# Patient Record
Sex: FEMALE | Race: WHITE | NOT HISPANIC OR LATINO | Employment: OTHER | URBAN - METROPOLITAN AREA
[De-identification: names, ages, dates, MRNs, and addresses within clinical notes are randomized per-mention and may not be internally consistent; named-entity substitution may affect disease eponyms.]

---

## 2017-01-02 ENCOUNTER — NURSE TRIAGE (OUTPATIENT)
Dept: ADMINISTRATIVE | Facility: CLINIC | Age: 82
End: 2017-01-02

## 2017-01-02 NOTE — TELEPHONE ENCOUNTER
"    Reason for Disposition   Pain in the big toe joint    Answer Assessment - Initial Assessment Questions  1. ONSET: "When did the pain start?"       Yesterday    2. LOCATION: "Where is the pain located?"       Right Foot (Toe)  3. PAIN: "How bad is the pain?"    (Scale 1-10; or mild, moderate, severe)    -  MILD (1-3): doesn't interfere with normal activities     -  MODERATE (4-7): interferes with normal activities (e.g., work or school) or awakens from sleep, limping     -  SEVERE (8-10): excruciating pain, unable to do any normal activities, unable to walk      4-5/10 on pain scale  4. WORK OR EXERCISE: "Has there been any recent work or exercise that involved this part of the body?"       No  5. CAUSE: "What do you think is causing the foot pain?"      Gout  6. OTHER SYMPTOMS: "Do you have any other symptoms?" (e.g., leg pain, rash, fever, numbness)      No  7. PREGNANCY: "Is there any chance you are pregnant?" "When was your last menstrual period?"      N/A    Protocols used: ST FOOT PAIN-A-      Patient has an appointment with MD tomorrow.    "

## 2017-01-03 ENCOUNTER — PATIENT OUTREACH (OUTPATIENT)
Dept: ADMINISTRATIVE | Facility: CLINIC | Age: 82
End: 2017-01-03
Payer: MEDICARE

## 2017-01-03 ENCOUNTER — OFFICE VISIT (OUTPATIENT)
Dept: RHEUMATOLOGY | Facility: CLINIC | Age: 82
End: 2017-01-03
Payer: MEDICARE

## 2017-01-03 ENCOUNTER — LAB VISIT (OUTPATIENT)
Dept: LAB | Facility: HOSPITAL | Age: 82
End: 2017-01-03
Attending: INTERNAL MEDICINE
Payer: MEDICARE

## 2017-01-03 VITALS
HEIGHT: 62 IN | SYSTOLIC BLOOD PRESSURE: 135 MMHG | DIASTOLIC BLOOD PRESSURE: 57 MMHG | BODY MASS INDEX: 20.77 KG/M2 | WEIGHT: 112.88 LBS | HEART RATE: 70 BPM

## 2017-01-03 DIAGNOSIS — M10.371 ACUTE GOUT DUE TO RENAL IMPAIRMENT INVOLVING TOE OF RIGHT FOOT: ICD-10-CM

## 2017-01-03 DIAGNOSIS — M1A.9XX1 TOPHACEOUS GOUT: Primary | ICD-10-CM

## 2017-01-03 DIAGNOSIS — N18.30 CKD (CHRONIC KIDNEY DISEASE) STAGE 3, GFR 30-59 ML/MIN: ICD-10-CM

## 2017-01-03 DIAGNOSIS — M1A.9XX1 TOPHACEOUS GOUT: ICD-10-CM

## 2017-01-03 DIAGNOSIS — M81.0 OSTEOPOROSIS, SENILE: ICD-10-CM

## 2017-01-03 DIAGNOSIS — I50.42 CHRONIC COMBINED SYSTOLIC AND DIASTOLIC CONGESTIVE HEART FAILURE: ICD-10-CM

## 2017-01-03 LAB
ALBUMIN SERPL BCP-MCNC: 3.9 G/DL
ALP SERPL-CCNC: 63 U/L
ALT SERPL W/O P-5'-P-CCNC: 35 U/L
ANION GAP SERPL CALC-SCNC: 13 MMOL/L
AST SERPL-CCNC: 28 U/L
BASOPHILS # BLD AUTO: 0.05 K/UL
BASOPHILS NFR BLD: 0.4 %
BILIRUB SERPL-MCNC: 0.7 MG/DL
BUN SERPL-MCNC: 31 MG/DL
CALCIUM SERPL-MCNC: 10.3 MG/DL
CHLORIDE SERPL-SCNC: 95 MMOL/L
CO2 SERPL-SCNC: 33 MMOL/L
CREAT SERPL-MCNC: 1.3 MG/DL
DIFFERENTIAL METHOD: ABNORMAL
EOSINOPHIL # BLD AUTO: 0 K/UL
EOSINOPHIL NFR BLD: 0.2 %
ERYTHROCYTE [DISTWIDTH] IN BLOOD BY AUTOMATED COUNT: 13.6 %
EST. GFR  (AFRICAN AMERICAN): 44 ML/MIN/1.73 M^2
EST. GFR  (NON AFRICAN AMERICAN): 38 ML/MIN/1.73 M^2
GLUCOSE SERPL-MCNC: 109 MG/DL
HCT VFR BLD AUTO: 41 %
HGB BLD-MCNC: 13.2 G/DL
LYMPHOCYTES # BLD AUTO: 1.8 K/UL
LYMPHOCYTES NFR BLD: 15.4 %
MCH RBC QN AUTO: 32.9 PG
MCHC RBC AUTO-ENTMCNC: 32.2 %
MCV RBC AUTO: 102 FL
MONOCYTES # BLD AUTO: 1.3 K/UL
MONOCYTES NFR BLD: 11.6 %
NEUTROPHILS # BLD AUTO: 8.3 K/UL
NEUTROPHILS NFR BLD: 73.4 %
PLATELET # BLD AUTO: 216 K/UL
PLATELET BLD QL SMEAR: ABNORMAL
PMV BLD AUTO: 9.9 FL
POTASSIUM SERPL-SCNC: 5.1 MMOL/L
PROT SERPL-MCNC: 7.4 G/DL
RBC # BLD AUTO: 4.01 M/UL
SODIUM SERPL-SCNC: 141 MMOL/L
URATE SERPL-MCNC: 11.1 MG/DL
WBC # BLD AUTO: 11.46 K/UL

## 2017-01-03 PROCEDURE — 80053 COMPREHEN METABOLIC PANEL: CPT | Mod: PO

## 2017-01-03 PROCEDURE — 99215 OFFICE O/P EST HI 40 MIN: CPT | Mod: S$PBB,,, | Performed by: INTERNAL MEDICINE

## 2017-01-03 PROCEDURE — 84550 ASSAY OF BLOOD/URIC ACID: CPT | Mod: PO

## 2017-01-03 PROCEDURE — 99999 PR PBB SHADOW E&M-EST. PATIENT-LVL III: CPT | Mod: PBBFAC,,, | Performed by: INTERNAL MEDICINE

## 2017-01-03 PROCEDURE — 36415 COLL VENOUS BLD VENIPUNCTURE: CPT | Mod: PO

## 2017-01-03 PROCEDURE — 85025 COMPLETE CBC W/AUTO DIFF WBC: CPT | Mod: PO

## 2017-01-03 RX ORDER — METHYLPREDNISOLONE 4 MG/1
TABLET ORAL
Qty: 1 PACKAGE | Refills: 1 | Status: SHIPPED | OUTPATIENT
Start: 2017-01-03 | End: 2017-01-09 | Stop reason: ALTCHOICE

## 2017-01-03 RX ORDER — BETAMETHASONE SODIUM PHOSPHATE AND BETAMETHASONE ACETATE 3; 3 MG/ML; MG/ML
6 INJECTION, SUSPENSION INTRA-ARTICULAR; INTRALESIONAL; INTRAMUSCULAR; SOFT TISSUE
Status: COMPLETED | OUTPATIENT
Start: 2017-01-03 | End: 2017-01-03

## 2017-01-03 RX ORDER — COLCHICINE 0.6 MG/1
0.6 TABLET ORAL DAILY
Qty: 30 TABLET | Refills: 3 | Status: SHIPPED | OUTPATIENT
Start: 2017-01-03 | End: 2017-05-19 | Stop reason: SDUPTHER

## 2017-01-03 RX ORDER — ALLOPURINOL 100 MG/1
TABLET ORAL
Qty: 30 TABLET | Refills: 1 | Status: SHIPPED | OUTPATIENT
Start: 2017-01-03 | End: 2017-05-19

## 2017-01-03 RX ADMIN — BETAMETHASONE ACETATE AND BETAMETHASONE SODIUM PHOSPHATE 6 MG: 3; 3 INJECTION, SUSPENSION INTRA-ARTICULAR; INTRALESIONAL; INTRAMUSCULAR; SOFT TISSUE at 11:01

## 2017-01-03 NOTE — PROGRESS NOTES
Administered 1cc Betamethasone 6mg/cc to  RUOQ GluteaL. Pt. Tolerated well. No acute reaction noted to site. Pt. Instructed on S/S to report. Pt. Verbalized understanding.    Lot: [id number]  Exp: 02/18

## 2017-01-03 NOTE — PATIENT INSTRUCTIONS
Gout    Gout or is an inflammation of a joint due to a build-up of gout crystals in the joint fluid. This occurs when there is an excess of uric acid (a normal waste product) in the body. Uric acid builds up in the body when the kidneys are unable to filter enough of it from the blood. This may occur with age. It is also associated with kidney disease. Gout occurs more often in persons with obesity, diabetes, hypertension, or high levels of fats in the blood. It may be run in families. Gout tends to come and go. A flare up of gout is called an attack. Drinking alcohol or eating certain foods (such as shellfish or foods with additives such as high-fructose corn syrup) may increase uric acid levels in the blood and cause a gout attack.  During a gout attack, the affected joint may become a hot, red, swollen and painful. If you have had one attack of gout, you are likely to have another. An attack of gout can be treated with medicine. If these attacks become frequent, a daily medicine may be prescribed to help the kidneys remove uric acid from the body.  Home care  During a gout attack:  · Rest painful joints. If gout affects the joints of your foot or leg, you may want to use crutches for the first few days to keep from bearing weight on the affected joint.  · When sitting or lying down, raise the painful joint to a level higher than your heart.  · Apply an ice pack (ice cubes in a plastic bag wrapped in a thin towel) over the injured area for 20 minutes every 1-2 hours the first day for pain relief. Continue this 3-4 times a day for swelling and pain.  · Avoid alcohol and foods listed below (see Preventing attacks) during a gout attack. Drink extra fluid to help flush the uric acid through your kidneys.  · If you were prescribed a medication to treat gout, take it as your healthcare provider has instructed. Don't skip doses.  · Take anti-inflammatory medicine as directed.   · If pain medicines have been prescribed,  take them exactly as directed.    Preventing attacks  · Minimize or avoid alcohol use. Excess alcohol intake can cause a gout attack.  · Limit these foods and beverages:  ¨ Organ meats, such as kidneys and liver  ¨ Certain seafoods (anchovies, sardines, shrimp, scallops, herring, mackerel)  ¨ Wild game, meat extracts and meat gravies  ¨ Foods and beverages sweetened with high-fructose corn syrup, such as sodas  · Eat a healthy diet including low-fat and nonfat dairy, whole grains, and vegetables.  · If you are overweight, talk to your healthcare provider about a weight reduction plan. Avoid fasting or extreme low calorie diets (less than 900 calories per day). This will increase uric acid levels in the body.  · If you have diabetes or high blood pressure, work with your doctor to manage these conditions.  · Protect the joint from injury. Trauma can trigger a gout attack.  Follow-up care  Follow up with your healthcare provider or as advised.   When to seek medical advice  Call your healthcare provider if you have any of the following:  · Fever over 100.4°F (38.ºC) with worsening joint pain  · Increasing redness around the joint  · Pain developing in another joint  · Repeated vomiting, abdominal pain, or blood in the vomit or stool (black or red color)  © 3147-6238 [employer]. [address] [place] [address], [place] [address]. All rights reserved. This information is not intended as a substitute for professional medical care. Always follow your healthcare professional's instructions.          Left- or Right- Side Congestive Heart Failure    The heart is a large muscle. It is a pump that circulates blood throughout the body. Blood carries oxygen to all of the organs, including the brain, muscles, and skin. After your body takes the oxygen out of the blood, the blood returns to the heart. The right side of the heart collects the blood from the body and pumps it to the lungs. In the lungs, it gets fresh oxygen  and gives up carbon dioxide. The oxygen-rich blood from the lungs then returns to the left side of the heart, where it is pumped back out to the rest of your body, starting the process all over.  Congestive heart failure (CHF) occurs when the heart muscle is weakened. This affects the pumping action of the heart. Heart failure can affect the right side of the heart or the left side. But heart failure may affect not only the right side of the heart or only the left side. Although it may have started on one side, it can and often eventually does affect both sides.  Right-side heart failure  When the right side of the heart is weakened, it cant handle the blood it is getting from the rest of the body. This blood returns to the heart through veins. When too much pressure builds up in the veins, fluid leaks out into the tissues. Gravity then causes that fluid to move to those parts of the body that are the lowest. So one of the first symptoms of right-side CHF can include swelling in the feet and ankles. If the condition gets worse, the swelling can even go up past the knees. Sometimes it gets so severe, the liver can get congested as well.  Left-side heart failure  When the left side of the heart is weakened, it cant handle the blood it gets from the lungs. Pressure then builds up in the veins of the lungs, causing fluid to leak into the lung tissues. This may cause CHF and pulmonary edema. This causes you to feel short of breath, weak, or dizzy. These symptoms are often worse with exertion, such as when climbing stairs or walking up hills. Lying with your head flat is uncomfortable and can make your breathing worse. This may make sleeping difficult. You may need to use extra pillows to elevate your upper body to sleep well. The same is true when just resting during the daytime.  There are many causes of heart failure including:  · Coronary artery disease  · Past heart attack (also known as acute myocardial  infarction, or AMI)  · High blood pressure  · Damaged heart valve  · Diabetes  · Obesity  · Cigarette smoking  · Alcohol abuse  Heart failure is a chronic condition. There is no cure. The purpose of medical treatment is to improve the pumping action of the heart. The main way to do this is to remove excess water from the body. A number of medicines can help reach this goal, improve symptoms, and prevent the heart from becoming weaker. Sometimes, heart failure can become so severe that a device is placed in the heart to help with pumping. Another major goal is to better treat the causes of heart failure, such as diabetes and high blood pressure, by making changes in your lifestyle and maximizing medical control when needed.  Home care  Follow these guidelines when caring for yourself at home:  · Check your weight every day. This is very important because a sudden increase in weight gain could mean worsening heart failure. Keep these things in mind:  ¨ Use the same scale every day.  ¨ Weigh yourself at the same time every day.  ¨ Make sure the scale is on a hard floor surface, not on a rug or carpet.  ¨ Keep a record of your weight every day so your healthcare provider can see it. If you are not given a log sheet for this, keep a separate journal for this purpose.   · Cut back on the amount of salt (sodium) you eat. Follow your healthcare provider's recommendation on how much salt or sodium you should have each day.  ¨ Avoid high-salt foods. These include olives, pickles, smoked meats, salted potato chips, and most prepared foods.  ¨ Don't add salt to your food at the table. Use only small amounts of salt when cooking.  ¨ Read the labels carefully on food packages to learn how much salt or sodium is in each serving in the package. Remember, a can or package of food may contain more than 1 serving. So if you eat all the food in the package, you may be getting more salt than you think.  · Follow your healthcare  provider's recommendations about how much fluid you should have. Be aware that some foods, such as soup, pudding, and juicy fruits like oranges or melons, contain liquid. You'll need to count the liquid in those foods as part of your daily fluid intake. Your provider can help you with this.  · Stop smoking.  · Cut back on how much alcohol you drink.  · Lose weight if you are overweight. The excess weight adds a lot of stress on the workload of the heart.  · Stay active. Talk with your provider about an exercise program that is safe for your heart.  · Keep your feet elevated to reduce swelling. Ask your provider about support hose as a preventive treatment for daytime leg swelling.  Besides taking your medicine as instructed, an important part of treatment is lifestyle changes. These include diet, physical activity, stopping smoking, and weight control.  Improve your diet by including more fresh foods, cutting back on how much sugar and saturated fat you eat, and eating fewer processed foods and less salt.  Follow-up care  Follow up with your healthcare provider, or as advised.  Make sure to keep any appointments that were made for you. These can help better control your congestive heart failure. You will need to follow up with your provider on a routine basis to make sure your heart failure is well managed.  If an X-ray, ECG, or other tests were done, you will be told of any new findings that may affect your care.  Call 911  Call 911 if you:  · Become severely short of breath  · Feel lightheaded, or feel like you might pass out or faint  · Have chest pain or discomfort that is different than usual, the medicines your doctor told you to use for this don't help, or the pain lasts longer than 10 to 15 minutes  · You suddenly develop a rapid heart rate  When to seek medical advice  The following may be signs that your heart failure is getting worse. Call your healthcare provider right away if any of these  happen:  · Sudden weight gain. This means 3 or more pounds in one day, or 5 or more pounds in 1 week.  · Trouble breathing not related to being active  · New or increased swelling of your legs or ankles  · Swelling or pain in your abdomen  · Breathing trouble at night. This means waking up short of breath or needing more pillows to breathe.  · Frequent coughing that doesnt go away  · Feeling much more tired than usual  © 5396-3925 [employer]. [address] [place] [address], [place] [address]. All rights reserved. This information is not intended as a substitute for professional medical care. Always follow your healthcare professional's instructions.

## 2017-01-03 NOTE — MR AVS SNAPSHOT
[facility]  [address] [facility] [address] [place] [address]  Phone: [phone]  Fax: [phone]                  Jennifer Mathews   1/3/2017 10:00 AM   Office Visit    Description:  Female : 1932   Provider:  Halina Hernandez MD   Department:  [facility] Rheumatology           Reason for Visit     Osteoporosis     Osteoarthritis           Diagnoses this Visit        Comments    Tophaceous gout    -  Primary     Acute gout due to renal impairment involving toe of right foot         CKD (chronic kidney disease) stage 3, GFR 30-59 ml/min         Chronic combined systolic and diastolic congestive heart failure         Osteoporosis, senile                To Do List           Future Appointments        Provider Department Dept Phone    1/3/2017 1:20 PM LABORATORY, [facility] [phone]    2017 11:00 AM RAUL Gupta [facility] Card[facility] [phone]    2017 11:40 AM Desirae Bello MD [facility] Internal Medicine [phone]    2017 10:15 AM William WaldronD [facility] - Coumadin [phone]    2017 8:00 AM HOME MONITOR DEVICE CHECK, [facility] Arrhythmia [phone]      Goals (5 Years of Data)     None       These Medications        Disp Refills Start End    allopurinol (ZYLOPRIM) 100 MG tablet 30 tablet 1 1/3/2017     Take 1/2 tablet daily until otherwise instructed    Pharmacy: [id number] - [place], [place] - [address] [facility] Ph #: [phone]       colchicine 0.6 mg tablet 30 tablet 3 1/3/2017 1/3/2018    Take 1 tablet (0.6 mg total) by mouth once daily. - Oral    Pharmacy: [id number] - [place], [place] - [address] [facility] Ph #: [phone]       methylPREDNISolone (MEDROL DOSEPACK) 4 mg tablet 1 Package 1 1/3/2017 2017    use as directed    Pharmacy: [id number] - [place], [place] - [address] [facility] Ph #: [phone]         [facility]     [facility] Care Line -   Assistance  Registered nurses in the [facility] provide clinical advisement, health education, appointment booking, and other advisory services.  Call for this free service at [phone].             Medications           Message regarding Medications     Verify the changes and/or additions to your medication regime listed below are the same as discussed with your clinician today.  If any of these changes or additions are incorrect, please notify your healthcare provider.        START taking these NEW medications        Refills    allopurinol (ZYLOPRIM) 100 MG tablet 1    Sig: Take 1/2 tablet daily until otherwise instructed    Class: Normal    colchicine 0.6 mg tablet 3    Sig: Take 1 tablet (0.6 mg total) by mouth once daily.    Class: Normal    Route: Oral    methylPREDNISolone (MEDROL DOSEPACK) 4 mg tablet 1    Sig: use as directed    Class: Normal      These medications were administered today        Dose Freq    betamethasone acetate-betamethasone sodium phosphate injection 6 mg 6 mg [facility] 1 time    Sig: Inject 1 mL (6 mg total) into the muscle one time.    Class: Normal    Route: Intramuscular      STOP taking these medications     levoFLOXacin (LEVAQUIN) 500 MG tablet Take 1 tablet (500 mg total) by mouth once daily.           Verify that the below list of medications is an accurate representation of the medications you are currently taking.  If none reported, the list may be blank. If incorrect, please contact your healthcare provider. Carry this list with you in case of emergency.           Current Medications     acetaminophen (TYLENOL EXTRA STRENGTH) 325 MG tablet Take 2 tablets (650 mg total) by mouth every 8 (eight) hours.    amiodarone (PACERONE) 200 MG Tab Take 1 tablet (200 mg total) by mouth once daily.    aspirin (ECOTRIN) 81 MG EC tablet Take 81 mg by mouth once daily.    carvedilol (COREG) 25 MG tablet Take 1 tablet (25 mg total) by mouth 2 (two) times daily with meals.     "cranberry 1,000 mg Cap Take 1 capsule by mouth Daily.    digoxin (LANOXIN) 125 mcg tablet TAKE 1 TABLET (0.125 MG TOTAL) BY MOUTH ONCE DAILY.    diphenhydrAMINE (BENADRYL) 25 mg capsule Take 25 mg by mouth every 6 (six) hours as needed for Itching.    furosemide (LASIX) 40 MG tablet Take 1 tablet (40 mg total) by mouth 2 (two) times daily. 8 am and 2 pm    gabapentin (NEURONTIN) 100 MG capsule TAKE ONE CAPSULE BY MOUTH TWO TIMES DAILY    MULTIVITAMIN ORAL Take 1 tablet by mouth Daily.    ondansetron (ZOFRAN-ODT) 8 MG TbDL Take 1 tablet (8 mg total) by mouth 3 (three) times daily.    PENNSAID 20 mg/gram /actuation(2 %) sopm Apply 40 mg topically 2 (two) times daily.    tramadol (ULTRAM) 50 mg tablet Take 1 tablet (50 mg total) by mouth 3 (three) times daily as needed.    valsartan (DIOVAN) 80 MG tablet TAKE TWO TABLETS BY MOUTH TWICE DAILY    warfarin (COUMADIN) 5 MG tablet Take  1/2 (2.5 mg) every evening  as directed by the [facility] Clinic.    allopurinol (ZYLOPRIM) 100 MG tablet Take 1/2 tablet daily until otherwise instructed    colchicine 0.6 mg tablet Take 1 tablet (0.6 mg total) by mouth once daily.    methylPREDNISolone (MEDROL DOSEPACK) 4 mg tablet use as directed           Clinical Reference Information           Vital Signs - Last Recorded  Most recent update: 1/3/2017 10:08 AM by Izabel Rouse MA    BP Pulse Ht Wt BMI    (!) 135/57 70 5' 2" (1.575 m) 51.2 kg (112 lb 14 oz) 20.65 kg/m2      Blood Pressure          Most Recent Value    BP  (!)  135/57      Allergies as of 1/3/2017     Cephalosporins    Metaxalone    Penicillins    Pregabalin    Sulfa (Sulfonamide Antibiotics)      Immunizations Administered on Date of Encounter - 1/3/2017     None      Orders Placed During Today's Visit     Recurring Lab Work Interval Expires    CBC auto differential   1/3/2021    Comprehensive metabolic panel   1/3/2021    Uric acid  Every 4 Weeks until 3/4/2018 3/4/2018      Instructions    1. Labwork today to get " baseline kidney and liver function and uric acid  2. Steroid shot today for the gout flare and then start colchicine 0.6mg daily and take it  3. Once your attack resolves completely you can start the allopurinol 50mg (1/2 tablet) daily. If you notice any rash stop immediately and let me know. Otherwise, please call our office when you start the medicine so we know when to schedule the bloodwork.   Allopurinol Oral tablet  What is this medicine?  ALLOPURINOL (al oh PURE i nole) reduces the amount of uric acid the body makes. It is used to treat the symptoms of gout. It is also used to treat or prevent high uric acid levels that occur as a result of certain types of chemotherapy. This medicine may also help patients who frequently have kidney stones.  This medicine may be used for other purposes; ask your health care provider or pharmacist if you have questions.  What should I tell my health care provider before I take this medicine?  They need to know if you have any of these conditions:  · kidney or liver disease  · an unusual or allergic reaction to allopurinol, other medicines, foods, dyes, or preservatives  · pregnant or trying to get pregnant  · breast feeding  How should I use this medicine?  Take this medicine by mouth with a glass of water. Follow the directions on the prescription label. If this medicine upsets your stomach, take it with food or milk. Take your doses at regular intervals. Do not take your medicine more often than directed.  Talk to your pediatrician regarding the use of this medicine in children. Special care may be needed. While this drug may be prescribed for children as young as 6 years for selected conditions, precautions do apply.  Overdosage: If you think you have taken too much of this medicine contact a poison control center or emergency room at once.  NOTE: This medicine is only for you. Do not share this medicine with others.  What if I miss a dose?  If you miss a dose, take it as  soon as you can. If it is almost time for your next dose, take only that dose. Do not take double or extra doses.  What may interact with this medicine?  Do not take this medicine with the following medication:  · didanosine, ddI  This medicine may also interact with the following medications:  · amoxicillin or ampicillin  · azathioprine  · certain medicines used to treat gout  · certain types of diuretics  · chlorpropamide  · cyclosporine  · dicumarol  · mercaptopurine  · tolbutamide  · warfarin  This list may not describe all possible interactions. Give your health care provider a list of all the medicines, herbs, non-prescription drugs, or dietary supplements you use. Also tell them if you smoke, drink alcohol, or use illegal drugs. Some items may interact with your medicine.  What should I watch for while using this medicine?  Visit your doctor or health care professional for regular checks on your progress. If you are taking this medicine to treat gout, you may not have less frequent attacks at first. Keep taking your medicine regularly and the attacks should get better within 2 to 6 weeks. Drink plenty of water (10 to 12 full glasses a day) while you are taking this medicine. This will help to reduce stomach upset and reduce the risk of getting gout or kidney stones.  Call your doctor or health care professional at once if you get a skin rash together with chills, fever, sore throat, or nausea and vomiting, if you have blood in your urine, or difficulty passing urine.  Do not take vitamin C without asking your doctor or health care professional. Too much vitamin C can increase the chance of getting kidney stones.  You may get drowsy or dizzy. Do not drive, use machinery, or do anything that needs mental alertness until you know how this drug affects you. Do not stand or sit up quickly, especially if you are an older patient. This reduces the risk of dizzy or fainting spells. Alcohol can make you more drowsy  and dizzy. Alcohol can also increase the chance of stomach problems and increase the amount of uric acid in your blood. Avoid alcoholic drinks.  What side effects may I notice from receiving this medicine?  Side effects that you should report to your doctor or health care professional as soon as possible:  · allergic reactions like skin rash, itching or hives, swelling of the face, lips, or tongue  · breathing problems  · muscle aches or pains  · redness, blistering, peeling or loosening of the skin, including inside the mouth  Side effects that usually do not require medical attention (report to your doctor or health care professional if they continue or are bothersome):  · changes in taste  · diarrhea  · indigestion  · stomach pain or cramps  This list may not describe all possible side effects. Call your doctor for medical advice about side effects. You may report side effects to FDA at 1-197-FDA-1766.  Where should I keep my medicine?  Keep out of the reach of children.  Store at room temperature between 15 and 25 degrees C (59 and 77 degrees F). Protect from light and moisture. Throw away any unused medicine after the expiration date.  NOTE:This sheet is a summary. It may not cover all possible information. If you have questions about this medicine, talk to your doctor, pharmacist, or health care provider. Copyright© 2016 [employer]      Colchicine Oral tablet  What is this medicine?  COLCHICINE (KOL chi seen) is for joint pain and swelling due to attacks of acute gouty arthritis. The medicine is also used to treat familial Mediterranean fever.  This medicine may be used for other purposes; ask your health care provider or pharmacist if you have questions.  What should I tell my health care provider before I take this medicine?  They need to know if you have any of these conditions:  · anemia  · blood disorders like leukemia or lymphoma  · heart disease  · immune system problems  · intestinal  disease  · kidney disease  · liver disease  · muscle pain or weakness  · take other medicines  · stomach problems  · an unusual or allergic reaction to colchicine, other medicines, lactose, foods, dyes, or preservatives  · pregnant or trying to get pregnant  · breast-feeding  How should I use this medicine?  Take this medicine by mouth with a full glass of water. Follow the directions on the prescription label. You can take it with or without food. If it upsets your stomach, take it with food. Take your medicine at regular intervals. Do not take your medicine more often than directed.  A special MedGuide will be given to you by the pharmacist with each prescription and refill. Be sure to read this information carefully each time.  Talk to your pediatrician regarding the use of this medicine in children. While this drug may be prescribed for children as young as 4 years old for selected conditions, precautions do apply.  Patients over 65 years old may have a stronger reaction and need a smaller dose.  Overdosage: If you think you have taken too much of this medicine contact a poison control center or emergency room at once.  NOTE: This medicine is only for you. Do not share this medicine with others.  What if I miss a dose?  If you miss a dose, take it as soon as you can. If it is almost time for your next dose, take only that dose. Do not take double or extra doses.  What may interact with this medicine?  Do not take this medicine with any of the following medications:  · certain medicines for fungal infections like itraconazole  This medicine may also interact with the following medications:  · alcohol  · certain medicines for cholesterol like atorvastatin  · certain medicines for coughs and colds  · certain medicines to help you breathe better  · cyclosporine  · digoxin  · epinephrine  · grapefruit or grapefruit juice  · methenamine  · other medicines for fungal infection  · sodium bicarbonate  · some antibiotics  like clarithromycin, erythromycin, and telithromycin  · some medicines for an irregular heartbeat or other heart problems  · some medicines for cancer, like lapatinib and tamoxifen  · some medicines for HIV  This list may not describe all possible interactions. Give your health care provider a list of all the medicines, herbs, non-prescription drugs, or dietary supplements you use. Also tell them if you smoke, drink alcohol, or use illegal drugs. Some items may interact with your medicine.  What should I watch for while using this medicine?  Visit your doctor or health care professional for regular checks on your progress. You may need periodic blood checks.  Alcohol can increase the chance of getting stomach problems and gout attacks. Do not drink alcohol.  What side effects may I notice from receiving this medicine?  Side effects that you should report to your doctor or health care professional as soon as possible:  · allergic reactions like skin rash, itching or hives, swelling of the face, lips, or tongue  · fever, chills, or sore throat  · muscle tenderness, pain, or weakness  · numbness or tingling in hands or feet  · unusual bleeding or bruising  · unusually weak or tired  · vomiting  Side effects that usually do not require medical attention (report to your doctor or health care professional if they continue or are bothersome):  · diarrhea  · hair loss  · loss of appetite  · stomach pain or nausea  This list may not describe all possible side effects. Call your doctor for medical advice about side effects. You may report side effects to FDA at 9-957-FDA-9930.  Where should I keep my medicine?  Keep out of the reach of children.  Store at room temperature between 15 and 30 degrees C (59 and 86 degrees F). Keep container tightly closed. Protect from light. Throw away any unused medicine after the expiration date.  NOTE:This sheet is a summary. It may not cover all possible information. If you have questions  about this medicine, talk to your doctor, pharmacist, or health care provider. Copyright© 2016 [employer]

## 2017-01-03 NOTE — PATIENT INSTRUCTIONS
1. Labwork today to get baseline kidney and liver function and uric acid  2. Steroid shot today for the gout flare and then start colchicine 0.6mg daily and take it  3. Once your attack resolves completely you can start the allopurinol 50mg (1/2 tablet) daily. If you notice any rash stop immediately and let me know. Otherwise, please call our office when you start the medicine so we know when to schedule the bloodwork.   Allopurinol Oral tablet  What is this medicine?  ALLOPURINOL (al oh PURE i nole) reduces the amount of uric acid the body makes. It is used to treat the symptoms of gout. It is also used to treat or prevent high uric acid levels that occur as a result of certain types of chemotherapy. This medicine may also help patients who frequently have kidney stones.  This medicine may be used for other purposes; ask your health care provider or pharmacist if you have questions.  What should I tell my health care provider before I take this medicine?  They need to know if you have any of these conditions:  · kidney or liver disease  · an unusual or allergic reaction to allopurinol, other medicines, foods, dyes, or preservatives  · pregnant or trying to get pregnant  · breast feeding  How should I use this medicine?  Take this medicine by mouth with a glass of water. Follow the directions on the prescription label. If this medicine upsets your stomach, take it with food or milk. Take your doses at regular intervals. Do not take your medicine more often than directed.  Talk to your pediatrician regarding the use of this medicine in children. Special care may be needed. While this drug may be prescribed for children as young as 6 years for selected conditions, precautions do apply.  Overdosage: If you think you have taken too much of this medicine contact a poison control center or emergency room at once.  NOTE: This medicine is only for you. Do not share this medicine with others.  What if I miss a dose?  If you  miss a dose, take it as soon as you can. If it is almost time for your next dose, take only that dose. Do not take double or extra doses.  What may interact with this medicine?  Do not take this medicine with the following medication:  · didanosine, ddI  This medicine may also interact with the following medications:  · amoxicillin or ampicillin  · azathioprine  · certain medicines used to treat gout  · certain types of diuretics  · chlorpropamide  · cyclosporine  · dicumarol  · mercaptopurine  · tolbutamide  · warfarin  This list may not describe all possible interactions. Give your health care provider a list of all the medicines, herbs, non-prescription drugs, or dietary supplements you use. Also tell them if you smoke, drink alcohol, or use illegal drugs. Some items may interact with your medicine.  What should I watch for while using this medicine?  Visit your doctor or health care professional for regular checks on your progress. If you are taking this medicine to treat gout, you may not have less frequent attacks at first. Keep taking your medicine regularly and the attacks should get better within 2 to 6 weeks. Drink plenty of water (10 to 12 full glasses a day) while you are taking this medicine. This will help to reduce stomach upset and reduce the risk of getting gout or kidney stones.  Call your doctor or health care professional at once if you get a skin rash together with chills, fever, sore throat, or nausea and vomiting, if you have blood in your urine, or difficulty passing urine.  Do not take vitamin C without asking your doctor or health care professional. Too much vitamin C can increase the chance of getting kidney stones.  You may get drowsy or dizzy. Do not drive, use machinery, or do anything that needs mental alertness until you know how this drug affects you. Do not stand or sit up quickly, especially if you are an older patient. This reduces the risk of dizzy or fainting spells. Alcohol can  make you more drowsy and dizzy. Alcohol can also increase the chance of stomach problems and increase the amount of uric acid in your blood. Avoid alcoholic drinks.  What side effects may I notice from receiving this medicine?  Side effects that you should report to your doctor or health care professional as soon as possible:  · allergic reactions like skin rash, itching or hives, swelling of the face, lips, or tongue  · breathing problems  · muscle aches or pains  · redness, blistering, peeling or loosening of the skin, including inside the mouth  Side effects that usually do not require medical attention (report to your doctor or health care professional if they continue or are bothersome):  · changes in taste  · diarrhea  · indigestion  · stomach pain or cramps  This list may not describe all possible side effects. Call your doctor for medical advice about side effects. You may report side effects to FDA at 1-049-FDA-9424.  Where should I keep my medicine?  Keep out of the reach of children.  Store at room temperature between 15 and 25 degrees C (59 and 77 degrees F). Protect from light and moisture. Throw away any unused medicine after the expiration date.  NOTE:This sheet is a summary. It may not cover all possible information. If you have questions about this medicine, talk to your doctor, pharmacist, or health care provider. Copyright© 2016 [employer] Standard      Colchicine Oral tablet  What is this medicine?  COLCHICINE (KOL chi seen) is for joint pain and swelling due to attacks of acute gouty arthritis. The medicine is also used to treat familial Mediterranean fever.  This medicine may be used for other purposes; ask your health care provider or pharmacist if you have questions.  What should I tell my health care provider before I take this medicine?  They need to know if you have any of these conditions:  · anemia  · blood disorders like leukemia or lymphoma  · heart disease  · immune system  problems  · intestinal disease  · kidney disease  · liver disease  · muscle pain or weakness  · take other medicines  · stomach problems  · an unusual or allergic reaction to colchicine, other medicines, lactose, foods, dyes, or preservatives  · pregnant or trying to get pregnant  · breast-feeding  How should I use this medicine?  Take this medicine by mouth with a full glass of water. Follow the directions on the prescription label. You can take it with or without food. If it upsets your stomach, take it with food. Take your medicine at regular intervals. Do not take your medicine more often than directed.  A special MedGuide will be given to you by the pharmacist with each prescription and refill. Be sure to read this information carefully each time.  Talk to your pediatrician regarding the use of this medicine in children. While this drug may be prescribed for children as young as 4 years old for selected conditions, precautions do apply.  Patients over 65 years old may have a stronger reaction and need a smaller dose.  Overdosage: If you think you have taken too much of this medicine contact a poison control center or emergency room at once.  NOTE: This medicine is only for you. Do not share this medicine with others.  What if I miss a dose?  If you miss a dose, take it as soon as you can. If it is almost time for your next dose, take only that dose. Do not take double or extra doses.  What may interact with this medicine?  Do not take this medicine with any of the following medications:  · certain medicines for fungal infections like itraconazole  This medicine may also interact with the following medications:  · alcohol  · certain medicines for cholesterol like atorvastatin  · certain medicines for coughs and colds  · certain medicines to help you breathe better  · cyclosporine  · digoxin  · epinephrine  · grapefruit or grapefruit juice  · methenamine  · other medicines for fungal infection  · sodium  bicarbonate  · some antibiotics like clarithromycin, erythromycin, and telithromycin  · some medicines for an irregular heartbeat or other heart problems  · some medicines for cancer, like lapatinib and tamoxifen  · some medicines for HIV  This list may not describe all possible interactions. Give your health care provider a list of all the medicines, herbs, non-prescription drugs, or dietary supplements you use. Also tell them if you smoke, drink alcohol, or use illegal drugs. Some items may interact with your medicine.  What should I watch for while using this medicine?  Visit your doctor or health care professional for regular checks on your progress. You may need periodic blood checks.  Alcohol can increase the chance of getting stomach problems and gout attacks. Do not drink alcohol.  What side effects may I notice from receiving this medicine?  Side effects that you should report to your doctor or health care professional as soon as possible:  · allergic reactions like skin rash, itching or hives, swelling of the face, lips, or tongue  · fever, chills, or sore throat  · muscle tenderness, pain, or weakness  · numbness or tingling in hands or feet  · unusual bleeding or bruising  · unusually weak or tired  · vomiting  Side effects that usually do not require medical attention (report to your doctor or health care professional if they continue or are bothersome):  · diarrhea  · hair loss  · loss of appetite  · stomach pain or nausea  This list may not describe all possible side effects. Call your doctor for medical advice about side effects. You may report side effects to FDA at 5-638-FDA-7513.  Where should I keep my medicine?  Keep out of the reach of children.  Store at room temperature between 15 and 30 degrees C (59 and 86 degrees F). Keep container tightly closed. Protect from light. Throw away any unused medicine after the expiration date.  NOTE:This sheet is a summary. It may not cover all possible  information. If you have questions about this medicine, talk to your doctor, pharmacist, or health care provider. Copyright© 2016 Gold Standard

## 2017-01-03 NOTE — PROGRESS NOTES
"Subjective:       Patient ID: Jennifer Mathews is a 84 y.o. female.    Chief Complaint: Osteoporosis and Osteoarthritis    HPI   Here for emergent follow-up after hospitalization recently with possible attack of gout. Previously she was seen 11/15 for osteoporosis therapy and treated with Prolia. She has hx of inflammatory arthritis with hx of foot swelling with possible gout- although didn't sound like it by history.   She does have hyperuricemia.   12/18 she was seen in urgent care and had a cough, fever, shortness of breath 100.5. She was sent to the ED and admitted for IV lasix, duonebs. She had UTI and was treated for pneumonia. Diagnosed with CHF exacerbation, pneumonia,     Other issues include AFIB on coumadin, CHF, HTN, LEV, CAD, HL, CKD, ANemia, CVA    Saturday she noticed AM Right foot pain over the 2nd toe. She has been putting ice on it. She takes tylenol two tylenols three times daily. Takes tramadol three times/day. No improvement.     This is her 3rd attack. Last attack was 6 month ago in the left foot 2nd, 3rd, and 4th toe. Before that she had attack in the right foot same exact presentation.     Review of Systems   Constitutional: Negative for chills and fever.   HENT: Positive for rhinorrhea and sinus pressure. Negative for sore throat.    Respiratory: Positive for cough (at her baseline, nonproductive). Negative for chest tightness and shortness of breath.    Musculoskeletal: Positive for arthralgias.        R 2nd, 3rd, 4th  toe large and swollen               Objective:     Visit Vitals    BP (!) 135/57    Pulse 70    Ht 5' 2" (1.575 m)    Wt 51.2 kg (112 lb 14 oz)    BMI 20.65 kg/m2        Physical Exam   Vitals reviewed.  Constitutional: She is oriented to person, place, and time and well-developed, well-nourished, and in no distress. No distress.   HENT:   Head: Normocephalic and atraumatic.   Right Ear: External ear normal.   Left Ear: External ear normal.   Eyes: Conjunctivae are " normal. Right eye exhibits no discharge. Left eye exhibits no discharge. No scleral icterus.   Neck: Neck supple.   Cardiovascular: Normal rate, regular rhythm and normal heart sounds.    LE varicosities     Pulmonary/Chest: Effort normal. No respiratory distress.   Abdominal: She exhibits no distension.   Neurological: She is alert and oriented to person, place, and time. No cranial nerve deficit. She exhibits normal muscle tone.   Skin: Skin is warm and dry. No rash noted. She is not diaphoretic. No erythema.     Psychiatric: Mood, memory, affect and judgment normal.   Musculoskeletal: Normal range of motion. She exhibits edema and tenderness. She exhibits no deformity.   Right foot with 2nd, 3rd, 4th toe swelling and redness     Left 2nd toe with nail fungal infection but also possible tophus          I personally viewed the xrays with my impressions below:  CXR with left costophernic angle blunting c/w pleural effusion, basilar infiltrate?     LABS     Ref. Range 4/11/2006 13:46 4/7/2014 05:00 3/10/2015 11:35 5/16/2016 10:50 6/14/2016 11:25 6/29/2016 01:35   Uric Acid Latest Ref Range: 2.4 - 5.7 mg/dL 5.2 7.1 (H) 9.7 (H) 9.7 (H) 6.6 (H) 7.9 (H)     Uric acid today is 11  Results for VINICIUS STORM (MRN [id number]) as of 1/3/2017 13:48   Ref. Range 1/3/2017 11:19   WBC Latest Ref Range: 3.90 - 12.70 K/uL 11.46   RBC Latest Ref Range: 4.00 - 5.40 M/uL 4.01   Hemoglobin Latest Ref Range: 12.0 - 16.0 g/dL 13.2   Hematocrit Latest Ref Range: 37.0 - 48.5 % 41.0   MCV Latest Ref Range: 82 - 98 fL 102 (H)   MCH Latest Ref Range: 27.0 - 31.0 pg 32.9 (H)   MCHC Latest Ref Range: 32.0 - 36.0 % 32.2   RDW Latest Ref Range: 11.5 - 14.5 % 13.6   Platelets Latest Ref Range: 150 - 350 K/uL 216   MPV Latest Ref Range: 9.2 - 12.9 fL 9.9   Gran% Latest Ref Range: 38.0 - 73.0 % 73.4 (H)   Gran # Latest Ref Range: 1.8 - 7.7 K/uL 8.3 (H)   Lymph% Latest Ref Range: 18.0 - 48.0 % 15.4 (L)   Lymph # Latest Ref Range: 1.0 - 4.8 K/uL 1.8    Mono% Latest Ref Range: 4.0 - 15.0 % 11.6   Mono # Latest Ref Range: 0.3 - 1.0 K/uL 1.3 (H)   Eosinophil% Latest Ref Range: 0.0 - 8.0 % 0.2   Eos # Latest Ref Range: 0.0 - 0.5 K/uL 0.0   Basophil% Latest Ref Range: 0.0 - 1.9 % 0.4   Baso # Latest Ref Range: 0.00 - 0.20 K/uL 0.05   Platelet Estimate Unknown Appears normal   Sodium Latest Ref Range: 136 - 145 mmol/L 141   Potassium Latest Ref Range: 3.5 - 5.1 mmol/L 5.1   Chloride Latest Ref Range: 95 - 110 mmol/L 95   CO2 Latest Ref Range: 23 - 29 mmol/L 33 (H)   Anion Gap Latest Ref Range: 8 - 16 mmol/L 13   BUN, Bld Latest Ref Range: 8 - 23 mg/dL 31 (H)   Creatinine Latest Ref Range: 0.5 - 1.4 mg/dL 1.3   eGFR if non African American Latest Ref Range: >60 mL/min/1.73 m^2 38 (A)   eGFR if African American Latest Ref Range: >60 mL/min/1.73 m^2 44 (A)   Glucose Latest Ref Range: 70 - 110 mg/dL 109   Calcium Latest Ref Range: 8.7 - 10.5 mg/dL 10.3   Alkaline Phosphatase Latest Ref Range: 55 - 135 U/L 63   Total Protein Latest Ref Range: 6.0 - 8.4 g/dL 7.4   Albumin Latest Ref Range: 3.5 - 5.2 g/dL 3.9   Uric Acid Latest Ref Range: 2.4 - 5.7 mg/dL 11.1 (H)   Total Bilirubin Latest Ref Range: 0.1 - 1.0 mg/dL 0.7   AST Latest Ref Range: 10 - 40 U/L 28   ALT Latest Ref Range: 10 - 44 U/L 35     Assessment:       1. Tophaceous gout    2. Acute gout due to renal impairment involving toe of right foot    3. CKD (chronic kidney disease) stage 3, GFR 30-59 ml/min    4. Chronic combined systolic and diastolic congestive heart failure    5. Osteoporosis, senile        She meets clinical diagnosis of gout based on the ACR Gout classification criteria (10 points). This is not counting her possible tophus on the left 2nd toe. She has had 3 attacks now over the last 1.5 years and she has evidence of CKD stage 3 indicating need to start uric acid lowering therapy.     Gout- acutely exacerbated in right 2nd, 3rd, 4th toe. Likely caused by CHF exacerbation and increased diuretic use.      CKD- need to start low and uptitrate allopurinol slowly  Adjust Colchicine based on kidney function as well     CHF- on diuretics, amniodarone, digoxin. Digoxin changes concentrations of protein bound medicines.     OP- due for Prolia next visit 5/17/2016    Plan:   Cmp, cbc    Start allopurinol 50mg daily when flare resolves  Give IM betamethasone 6mg today and start colchicine 0.6mg daily (digoxin will increase the concentration so I want to avoid diarrhea with once daily dosing, also has CKD)    Gout hand out provided- diet discussed   Side effects discussed of allopurinol and colchicine    Just in case medrol RX also prescribed to pharmacy for patient to keep in case     Let me know when she starts the allopurinol and will schedule monthly cmp, uric acid and adjust accordingly    RTC in 3 months but labs in between x 2 to monitor for allopurinol toxicity in this high risk patient    MB

## 2017-01-09 ENCOUNTER — OFFICE VISIT (OUTPATIENT)
Dept: INTERNAL MEDICINE | Facility: CLINIC | Age: 82
End: 2017-01-09
Payer: MEDICARE

## 2017-01-09 ENCOUNTER — TELEPHONE (OUTPATIENT)
Dept: CARDIOLOGY | Facility: CLINIC | Age: 82
End: 2017-01-09

## 2017-01-09 ENCOUNTER — OFFICE VISIT (OUTPATIENT)
Dept: CARDIOLOGY | Facility: CLINIC | Age: 82
End: 2017-01-09
Payer: MEDICARE

## 2017-01-09 ENCOUNTER — LAB VISIT (OUTPATIENT)
Dept: LAB | Facility: HOSPITAL | Age: 82
End: 2017-01-09
Attending: NURSE PRACTITIONER
Payer: MEDICARE

## 2017-01-09 VITALS
WEIGHT: 109.81 LBS | BODY MASS INDEX: 20.21 KG/M2 | DIASTOLIC BLOOD PRESSURE: 70 MMHG | HEIGHT: 62 IN | OXYGEN SATURATION: 92 % | HEART RATE: 70 BPM | SYSTOLIC BLOOD PRESSURE: 140 MMHG

## 2017-01-09 VITALS
HEIGHT: 62 IN | OXYGEN SATURATION: 95 % | DIASTOLIC BLOOD PRESSURE: 60 MMHG | SYSTOLIC BLOOD PRESSURE: 160 MMHG | WEIGHT: 108.44 LBS | HEART RATE: 70 BPM | TEMPERATURE: 98 F | BODY MASS INDEX: 19.96 KG/M2

## 2017-01-09 DIAGNOSIS — N39.0 URINARY TRACT INFECTION WITHOUT HEMATURIA, SITE UNSPECIFIED: Primary | ICD-10-CM

## 2017-01-09 DIAGNOSIS — I25.5 ISCHEMIC CARDIOMYOPATHY: ICD-10-CM

## 2017-01-09 DIAGNOSIS — Z95.810 CARDIAC RESYNCHRONIZATION THERAPY DEFIBRILLATOR (CRT-D) IN PLACE: ICD-10-CM

## 2017-01-09 DIAGNOSIS — Z95.0 S/P PLACEMENT OF CARDIAC PACEMAKER: ICD-10-CM

## 2017-01-09 DIAGNOSIS — I50.42 CHRONIC COMBINED SYSTOLIC AND DIASTOLIC CONGESTIVE HEART FAILURE: ICD-10-CM

## 2017-01-09 DIAGNOSIS — Z95.2 S/P MVR (MITRAL VALVE REPLACEMENT): ICD-10-CM

## 2017-01-09 DIAGNOSIS — G47.33 OSA (OBSTRUCTIVE SLEEP APNEA): Chronic | ICD-10-CM

## 2017-01-09 DIAGNOSIS — J18.9 PNEUMONIA DUE TO INFECTIOUS ORGANISM, UNSPECIFIED LATERALITY, UNSPECIFIED PART OF LUNG: ICD-10-CM

## 2017-01-09 DIAGNOSIS — Z79.01 ANTICOAGULATED ON COUMADIN: ICD-10-CM

## 2017-01-09 DIAGNOSIS — I10 ESSENTIAL HYPERTENSION: Primary | ICD-10-CM

## 2017-01-09 DIAGNOSIS — I48.91 ATRIAL FIBRILLATION, UNSPECIFIED TYPE: ICD-10-CM

## 2017-01-09 LAB
BACTERIA #/AREA URNS HPF: ABNORMAL /HPF
BILIRUB UR QL STRIP: NEGATIVE
CLARITY UR: ABNORMAL
COLOR UR: YELLOW
GLUCOSE UR QL STRIP: NEGATIVE
HGB UR QL STRIP: NEGATIVE
HYALINE CASTS #/AREA URNS LPF: 12 /LPF
INR PPP: 2.3
KETONES UR QL STRIP: NEGATIVE
LEUKOCYTE ESTERASE UR QL STRIP: ABNORMAL
MICROSCOPIC COMMENT: ABNORMAL
NITRITE UR QL STRIP: NEGATIVE
NON-SQ EPI CELLS #/AREA URNS HPF: 1 /HPF
PH UR STRIP: 6 [PH] (ref 5–8)
PROT UR QL STRIP: NEGATIVE
PROTHROMBIN TIME: 23.6 SEC
SP GR UR STRIP: 1.01 (ref 1–1.03)
SQUAMOUS #/AREA URNS HPF: 2 /HPF
URN SPEC COLLECT METH UR: ABNORMAL
WBC #/AREA URNS HPF: >100 /HPF (ref 0–5)

## 2017-01-09 PROCEDURE — 99495 TRANSJ CARE MGMT MOD F2F 14D: CPT | Mod: PBBFAC | Performed by: FAMILY MEDICINE

## 2017-01-09 PROCEDURE — 87088 URINE BACTERIA CULTURE: CPT

## 2017-01-09 PROCEDURE — 99999 PR PBB SHADOW E&M-EST. PATIENT-LVL III: CPT | Mod: PBBFAC,,, | Performed by: FAMILY MEDICINE

## 2017-01-09 PROCEDURE — 99999 PR PBB SHADOW E&M-EST. PATIENT-LVL III: CPT | Mod: PBBFAC,,, | Performed by: NURSE PRACTITIONER

## 2017-01-09 PROCEDURE — 99214 OFFICE O/P EST MOD 30 MIN: CPT | Mod: S$PBB,,, | Performed by: NURSE PRACTITIONER

## 2017-01-09 PROCEDURE — 87077 CULTURE AEROBIC IDENTIFY: CPT

## 2017-01-09 PROCEDURE — 81000 URINALYSIS NONAUTO W/SCOPE: CPT

## 2017-01-09 PROCEDURE — 87186 SC STD MICRODIL/AGAR DIL: CPT

## 2017-01-09 PROCEDURE — 87086 URINE CULTURE/COLONY COUNT: CPT

## 2017-01-09 PROCEDURE — 99213 OFFICE O/P EST LOW 20 MIN: CPT | Mod: PBBFAC,27 | Performed by: FAMILY MEDICINE

## 2017-01-09 NOTE — MR AVS SNAPSHOT
[facility]  [address] [address]  Phone: [phone]  Fax: [phone]                  Jennifer Mathews   2017 11:00 AM   Office Visit    Description:  Female : 1932   Provider:  RAUL Gupta   Department:  [facility] - Cardiology           Reason for Visit     Hypertension           Diagnoses this Visit        Comments    Essential hypertension    -  Primary     S/P MVR (mitral valve replacement)         S/P placement of cardiac pacemaker         Cardiac resynchronization therapy defibrillator (CRT-D) in place         Anticoagulated on Coumadin         Chronic combined systolic and diastolic congestive heart failure         Ischemic cardiomyopathy         LEV (obstructive sleep apnea)         Atrial fibrillation, unspecified type                To Do List           Future Appointments        Provider Department Dept Phone    2017 1:20 PM Desirae Bello MD [facility] Internal Medicine [phone]    2017 10:15 AM William WaldronD [facility] Coumadin [phone]    2017 8:00 AM HOME MONITOR DEVICE CHECK [facility] Arrhythmia Procedures [phone]    2017 10:30 AM LABORATORY, [facility] [phone]    2017 11:00 AM Halina Hernandez MD [facility] Rheumatology [phone]      Goals (5 Years of Data)     None      Follow-Up and Disposition     Return in about 6 months (around 2017).      [facility]     [facility] Care [facility] - 24/ Assistance  Registered nurses in the [facility] provide clinical advisement, health education, appointment booking, and other advisory services.  Call for this free service at [phone].             Medications           Message regarding Medications     Verify the changes and/or additions to your medication regime listed below are the same as discussed with your clinician today.  If any of these changes or additions are  incorrect, please notify your healthcare provider.        STOP taking these medications     methylPREDNISolone (MEDROL DOSEPACK) 4 mg tablet use as directed           Verify that the below list of medications is an accurate representation of the medications you are currently taking.  If none reported, the list may be blank. If incorrect, please contact your healthcare provider. Carry this list with you in case of emergency.           Current Medications     acetaminophen (TYLENOL EXTRA STRENGTH) 325 MG tablet Take 2 tablets (650 mg total) by mouth every 8 (eight) hours.    allopurinol (ZYLOPRIM) 100 MG tablet Take 1/2 tablet daily until otherwise instructed    amiodarone (PACERONE) 200 MG Tab Take 1 tablet (200 mg total) by mouth once daily.    aspirin (ECOTRIN) 81 MG EC tablet Take 81 mg by mouth once daily.    carvedilol (COREG) 25 MG tablet Take 1 tablet (25 mg total) by mouth 2 (two) times daily with meals.    colchicine 0.6 mg tablet Take 1 tablet (0.6 mg total) by mouth once daily.    cranberry 1,000 mg Cap Take 1 capsule by mouth Daily.    digoxin (LANOXIN) 125 mcg tablet TAKE 1 TABLET (0.125 MG TOTAL) BY MOUTH ONCE DAILY.    diphenhydrAMINE (BENADRYL) 25 mg capsule Take 25 mg by mouth every 6 (six) hours as needed for Itching.    furosemide (LASIX) 40 MG tablet Take 1 tablet (40 mg total) by mouth 2 (two) times daily. 8 am and 2 pm    gabapentin (NEURONTIN) 100 MG capsule TAKE ONE CAPSULE BY MOUTH TWO TIMES DAILY    MULTIVITAMIN ORAL Take 1 tablet by mouth Daily.    PENNSAID 20 mg/gram /actuation(2 %) sopm Apply 40 mg topically 2 (two) times daily.    tramadol (ULTRAM) 50 mg tablet Take 1 tablet (50 mg total) by mouth 3 (three) times daily as needed.    valsartan (DIOVAN) 80 MG tablet TAKE TWO TABLETS BY MOUTH TWICE DAILY    warfarin (COUMADIN) 5 MG tablet Take  1/2 (2.5 mg) every evening  as directed by the [facility] Clinic.           Clinical Reference Information           Vital Signs - Last Recorded  Most  "recent update: 1/9/2017 11:41 AM by RAUL Gupta    BP Pulse Ht Wt SpO2 BMI    (!) 140/70 (BP Location: Left arm, Patient Position: Sitting, BP Method: Manual) 70 5' 2" (1.575 m) 49.8 kg (109 lb 12.6 oz) (!) 92% 20.08 kg/m2      Blood Pressure          Most Recent Value    BP  (!)  140/70      Allergies as of 1/9/2017     Cephalosporins    Metaxalone    Penicillins    Pregabalin    Sulfa (Sulfonamide Antibiotics)      Immunizations Administered on Date of Encounter - 1/9/2017     None      Orders Placed During Today's Visit     Future Labs/Procedures Expected by Expires    [facility]-INR  1/9/2017 3/10/2018      "

## 2017-01-09 NOTE — TELEPHONE ENCOUNTER
Please inform patient that her INR has gone from 1.4 to 2.3. She is now within range since the abx. Continue current dose and follow up with coumadin clinic as scheduled

## 2017-01-09 NOTE — PROGRESS NOTES
Transitional Care Note  Subjective:       Patient ID: Jennifer Mathews is a 84 y.o. female.  Chief Complaint: Transitional Care    Family and/or Caretaker present at visit?  Yes.  Diagnostic tests reviewed/disposition: No diagnosic tests pending after this hospitalization.  Disease/illness education: see A/P  Home health/community services discussion/referrals: Patient does not have home health established from hospital visit.  They do not need home health.  If needed, we will set up home health for the patient.   Establishment or re-establishment of referral orders for community resources: No other necessary community resources.   Discussion with other health care providers: No discussion with other health care providers necessary.   HPI Comments: Patient presents to clinic today with her daughter for [facility] hospital follow up. Patient reports she is feeling well. She denies urinary tract symptoms, fever or chills. She reports completing initial course of antibiotics, she states she was given another course of levaquin, which she has not taken. She has appointment with Cardiology today as well for follow up for CHF.    Review of Systems   Constitutional: Negative for chills, fatigue, fever and unexpected weight change.   Eyes: Negative for visual disturbance.   Respiratory: Negative for shortness of breath.    Cardiovascular: Negative for chest pain.   Musculoskeletal: Negative for myalgias.   Neurological: Negative for headaches.       Objective:      Physical Exam   Constitutional: She is oriented to person, place, and time. She appears well-developed and well-nourished. No distress.   HENT:   Head: Normocephalic and atraumatic.   Eyes: Conjunctivae and EOM are normal. Pupils are equal, round, and reactive to light. No scleral icterus.   Cardiovascular: Normal rate and regular rhythm.  Exam reveals no gallop and no friction rub.    No murmur heard.  Pulmonary/Chest: Effort normal and breath sounds normal.    Neurological: She is alert and oriented to person, place, and time. No cranial nerve deficit. Gait normal.   Psychiatric: She has a normal mood and affect.   Vitals reviewed.      Assessment:       1. Urinary tract infection without hematuria, site unspecified    2. Pneumonia due to infectious organism, unspecified laterality, unspecified part of lung    3. Chronic combined systolic and diastolic congestive heart failure        Plan:       Jennifer was seen today for transitional care.    Diagnoses and all orders for this visit:    Urinary tract infection without hematuria, site unspecified  -     Urinalysis  -     Urine culture    Pneumonia due to infectious organism, unspecified laterality, unspecified part of lung  Comments:  completed course of antibiotic, symptoms resolved    Chronic combined systolic and diastolic congestive heart failure  Comments:  keep follow up with Cardiology today.    Other orders  -     Urinalysis Microscopic    - since patient may not have completed recommended course of antibiotics, will recheck UA/culture today. She is asymptomatic.   - advised to keep follow up with Cardiology today.

## 2017-01-09 NOTE — PROGRESS NOTES
Subjective:   Patient ID:  Jennifer Mathews is a 84 y.o. female who presents for follow up of Hypertension      HPI      Patient presents to clinic for follow up after stopping Lisinopril due to her taking Diovan as well. She has not noticed any major difference in her BP since stopping Lisinopril. Admitted earlier this month with CHF exacerbation. She admits that she had a little more sodium intake over the holiday break.  She was diuresed and discharged home. She has not had any symptoms to suggest decompensated HF. No edema, orthopnea or PND. Continues to follow with Coumadin clinic.  INR hasn't been checked since she has been on IV and oral abx. Course has been completed. She no CNS complaints to suggest TIA or CVA. No abnormal bleeding on Coumadin. Noted to have slight drop in EF to 35% form 40% a year ago, during admission.          Past Medical History   Diagnosis Date    Acute coronary syndrome     Anemia     Anticoagulated on Coumadin 7/13/2015    Arthritis     Atrial fibrillation     Basal cell carcinoma 10/2015     left neck    Cardiac arrest     Cardiac resynchronization therapy defibrillator (CRT-D) in place 7/13/2015    Cardiac resynchronization therapy defibrillator (CRT-D) in place 7/13/2015    Cardiomyopathy 1/30/2014    CHF (congestive heart failure)     Chronic combined systolic and diastolic congestive heart failure     CKD (chronic kidney disease) stage 3, GFR 30-59 ml/min     Dyslipidemia 1/30/2014    Edema     Heart disease     Hypertension     Ischemic cardiomyopathy 7/13/2015    Macular hole of left eye      Old    Macular hole of left eye     LEV (obstructive sleep apnea) 9/30/2013    Recurrent UTI     Refractive error     S/P MVR (mitral valve replacement) 1/30/2014    S/P placement of cardiac pacemaker 1/30/2014    Skin cancer     Sleep apnea     Stroke        Past Surgical History   Procedure Laterality Date    Colonoscopy      Mitral valve surgery       Cardiac pacemaker placement      Cataract extraction       OU    Cholecystectomy      Appendectomy      Cardiac valve surgery         Social History   Substance Use Topics    Smoking status: Never Smoker    Smokeless tobacco: Never Used    Alcohol use No       Family History   Problem Relation Age of Onset    Coronary artery disease Mother      mi    Coronary artery disease Father     Diabetes Brother     Cancer Brother     Melanoma Neg Hx     Psoriasis Neg Hx     Lupus Neg Hx     Eczema Neg Hx        Current Outpatient Prescriptions   Medication Sig    acetaminophen (TYLENOL EXTRA STRENGTH) 325 MG tablet Take 2 tablets (650 mg total) by mouth every 8 (eight) hours.    allopurinol (ZYLOPRIM) 100 MG tablet Take 1/2 tablet daily until otherwise instructed    amiodarone (PACERONE) 200 MG Tab Take 1 tablet (200 mg total) by mouth once daily.    aspirin (ECOTRIN) 81 MG EC tablet Take 81 mg by mouth once daily.    carvedilol (COREG) 25 MG tablet Take 1 tablet (25 mg total) by mouth 2 (two) times daily with meals.    colchicine 0.6 mg tablet Take 1 tablet (0.6 mg total) by mouth once daily.    cranberry 1,000 mg Cap Take 1 capsule by mouth Daily.    digoxin (LANOXIN) 125 mcg tablet TAKE 1 TABLET (0.125 MG TOTAL) BY MOUTH ONCE DAILY.    diphenhydrAMINE (BENADRYL) 25 mg capsule Take 25 mg by mouth every 6 (six) hours as needed for Itching.    furosemide (LASIX) 40 MG tablet Take 1 tablet (40 mg total) by mouth 2 (two) times daily. 8 am and 2 pm    gabapentin (NEURONTIN) 100 MG capsule TAKE ONE CAPSULE BY MOUTH TWO TIMES DAILY    MULTIVITAMIN ORAL Take 1 tablet by mouth Daily.    PENNSAID 20 mg/gram /actuation(2 %) sopm Apply 40 mg topically 2 (two) times daily.    tramadol (ULTRAM) 50 mg tablet Take 1 tablet (50 mg total) by mouth 3 (three) times daily as needed.    valsartan (DIOVAN) 80 MG tablet TAKE TWO TABLETS BY MOUTH TWICE DAILY    warfarin (COUMADIN) 5 MG tablet Take  1/2 (2.5 mg) every  evening  as directed by the Coumadin Clinic.     Current Facility-Administered Medications   Medication    denosumab (PROLIA) injection 60 mg     Current Outpatient Prescriptions on File Prior to Visit   Medication Sig    acetaminophen (TYLENOL EXTRA STRENGTH) 325 MG tablet Take 2 tablets (650 mg total) by mouth every 8 (eight) hours.    allopurinol (ZYLOPRIM) 100 MG tablet Take 1/2 tablet daily until otherwise instructed    amiodarone (PACERONE) 200 MG Tab Take 1 tablet (200 mg total) by mouth once daily.    aspirin (ECOTRIN) 81 MG EC tablet Take 81 mg by mouth once daily.    carvedilol (COREG) 25 MG tablet Take 1 tablet (25 mg total) by mouth 2 (two) times daily with meals.    colchicine 0.6 mg tablet Take 1 tablet (0.6 mg total) by mouth once daily.    cranberry 1,000 mg Cap Take 1 capsule by mouth Daily.    digoxin (LANOXIN) 125 mcg tablet TAKE 1 TABLET (0.125 MG TOTAL) BY MOUTH ONCE DAILY.    diphenhydrAMINE (BENADRYL) 25 mg capsule Take 25 mg by mouth every 6 (six) hours as needed for Itching.    furosemide (LASIX) 40 MG tablet Take 1 tablet (40 mg total) by mouth 2 (two) times daily. 8 am and 2 pm    gabapentin (NEURONTIN) 100 MG capsule TAKE ONE CAPSULE BY MOUTH TWO TIMES DAILY    MULTIVITAMIN ORAL Take 1 tablet by mouth Daily.    PENNSAID 20 mg/gram /actuation(2 %) sopm Apply 40 mg topically 2 (two) times daily.    tramadol (ULTRAM) 50 mg tablet Take 1 tablet (50 mg total) by mouth 3 (three) times daily as needed.    valsartan (DIOVAN) 80 MG tablet TAKE TWO TABLETS BY MOUTH TWICE DAILY    warfarin (COUMADIN) 5 MG tablet Take  1/2 (2.5 mg) every evening  as directed by the Coumadin Clinic.    [DISCONTINUED] methylPREDNISolone (MEDROL DOSEPACK) 4 mg tablet use as directed     Current Facility-Administered Medications on File Prior to Visit   Medication    denosumab (PROLIA) injection 60 mg       Review of Systems   Constitution: Negative for decreased appetite, weakness, malaise/fatigue,  weight gain and weight loss.   HENT: Negative for nosebleeds.    Cardiovascular: Positive for leg swelling ( occasional ) and palpitations (occasional ). Negative for chest pain, claudication, dyspnea on exertion, irregular heartbeat, near-syncope, orthopnea, paroxysmal nocturnal dyspnea and syncope.   Respiratory: Negative for cough, shortness of breath, sleep disturbances due to breathing, snoring and wheezing.         Uses CPAP    Hematologic/Lymphatic: Negative for bleeding problem. Bruises/bleeds easily.   Skin: Negative for rash.   Musculoskeletal: Positive for arthritis, back pain and joint pain. Negative for falls, joint swelling, muscle cramps, muscle weakness and myalgias.   Gastrointestinal: Negative for bloating, abdominal pain, constipation, diarrhea, heartburn, nausea and vomiting.   Genitourinary: Negative for dysuria, hematuria and nocturia.   Neurological: Negative for excessive daytime sleepiness, dizziness, light-headedness, loss of balance, numbness, paresthesias and vertigo.       Objective:   Physical Exam   Constitutional: She is oriented to person, place, and time. She appears well-developed and well-nourished.   Neck: Neck supple. No JVD present.   Cardiovascular: Normal rate, regular rhythm, normal heart sounds and normal pulses.  Exam reveals no friction rub.    No murmur heard.  Pulmonary/Chest: Effort normal and breath sounds normal. No respiratory distress. She has no wheezes. She has no rales.   Abdominal: Soft. Bowel sounds are normal. She exhibits no distension.   Musculoskeletal: She exhibits no edema or tenderness.   Neurological: She is alert and oriented to person, place, and time.   Skin: Skin is warm and dry. No rash noted.   Psychiatric: She has a normal mood and affect. Her behavior is normal.   Nursing note and vitals reviewed.    Vitals:    01/09/17 1119   BP: (!) 140/70   BP Location: Left arm   Patient Position: Sitting   BP Method: Manual   Pulse: 70   SpO2: (!) 92%  "  Weight: 49.8 kg (109 lb 12.6 oz)   Height: 5' 2" (1.575 m)     Lab Results   Component Value Date    CHOL 162 12/27/2013    CHOL 152 05/30/2013    CHOL 137 11/20/2012     Lab Results   Component Value Date    HDL 49 12/27/2013    HDL 47 05/30/2013    HDL 38 (L) 11/20/2012     Lab Results   Component Value Date    LDLCALC 89.0 12/27/2013    LDLCALC 75.0 05/30/2013    LDLCALC 67.0 11/20/2012     Lab Results   Component Value Date    TRIG 120 12/27/2013    TRIG 152 (H) 05/30/2013    TRIG 158 (H) 11/20/2012     Lab Results   Component Value Date    CHOLHDL 30.2 12/27/2013    CHOLHDL 30.9 05/30/2013    CHOLHDL 27.7 11/20/2012       Chemistry        Component Value Date/Time     01/03/2017 1119    K 5.1 01/03/2017 1119    CL 95 01/03/2017 1119    CO2 33 (H) 01/03/2017 1119    BUN 31 (H) 01/03/2017 1119    CREATININE 1.3 01/03/2017 1119     01/03/2017 1119        Component Value Date/Time    CALCIUM 10.3 01/03/2017 1119    ALKPHOS 63 01/03/2017 1119    AST 28 01/03/2017 1119    ALT 35 01/03/2017 1119    BILITOT 0.7 01/03/2017 1119          Lab Results   Component Value Date    TSH 4.026 (H) 07/22/2015     Lab Results   Component Value Date    INR 1.4 (H) 12/30/2016    INR 1.6 (H) 12/29/2016    INR 2.3 (H) 12/28/2016     Lab Results   Component Value Date    WBC 11.46 01/03/2017    HGB 13.2 01/03/2017    HCT 41.0 01/03/2017     (H) 01/03/2017     01/03/2017     BMP  Sodium   Date Value Ref Range Status   01/03/2017 141 136 - 145 mmol/L Final     Potassium   Date Value Ref Range Status   01/03/2017 5.1 3.5 - 5.1 mmol/L Final     Chloride   Date Value Ref Range Status   01/03/2017 95 95 - 110 mmol/L Final     CO2   Date Value Ref Range Status   01/03/2017 33 (H) 23 - 29 mmol/L Final     BUN, Bld   Date Value Ref Range Status   01/03/2017 31 (H) 8 - 23 mg/dL Final     Creatinine   Date Value Ref Range Status   01/03/2017 1.3 0.5 - 1.4 mg/dL Final     Calcium   Date Value Ref Range Status "   01/03/2017 10.3 8.7 - 10.5 mg/dL Final     Anion Gap   Date Value Ref Range Status   01/03/2017 13 8 - 16 mmol/L Final     eGFR if    Date Value Ref Range Status   01/03/2017 44 (A) >60 mL/min/1.73 m^2 Final     eGFR if non    Date Value Ref Range Status   01/03/2017 38 (A) >60 mL/min/1.73 m^2 Final     Comment:     Calculation used to obtain the estimated glomerular filtration  rate (eGFR) is the CKD-EPI equation. Since race is unknown   in our information system, the eGFR values for   -American and Non--American patients are given   for each creatinine result.       Estimated Creatinine Clearance: 25.3 mL/min (based on Cr of 1.3).    Assessment:     1. Essential hypertension    2. S/P MVR (mitral valve replacement)    3. S/P placement of cardiac pacemaker    4. Cardiac resynchronization therapy defibrillator (CRT-D) in place    5. Anticoagulated on Coumadin    6. Chronic combined systolic and diastolic congestive heart failure    7. Ischemic cardiomyopathy    8. LEV (obstructive sleep apnea)    9. Atrial fibrillation, unspecified type    No abnormal bleeding on Coumadin therapy. INR 1.4 during admission and hasn't been checked since she received IV and PO abx  BP acceptable for age  No evidence of volume overload on exam  Feeling well since hospital discharge  No angina or equivalent  NO CNS complaints to suggest TIA or CVA     Plan:   Will have patient continue current medical therapy for now  Will have check INR today since she has been on ABX and make adjustments as needed. Keep scheduled appt with Coumadin clinic later this month  RTC in 6 months

## 2017-01-09 NOTE — MR AVS SNAPSHOT
[facility] Medicine  [address] [facility] [address] [address]  Phone: [phone]  Fax: [phone]                  Jennifer Mathews   2017 1:20 PM   Office Visit    Description:  Female : 1932   Provider:  Desirae Bello MD   Department:  [facility]           Reason for Visit     Transitional Care           Diagnoses this Visit        Comments    Urinary tract infection without hematuria, site unspecified    -  Primary            To Do List           Future Appointments        Provider Department Dept Phone    2017 3:00 PM LABORATORY, [address] [facility] [phone]    2017 10:15 AM Parvin Tay PharmD [facility] Coumadin [phone]    2017 8:00 AM HOME MONITOR DEVICE CHECK [facility] Arrhythmia Procedures [phone]    2017 10:30 AM LABORATORY, [place] [facility] [phone]    2017 11:00 AM Halina Hernandez MD [facility] Rheumatology [phone]      Goals (5 Years of Data)     None      Follow-Up and Disposition     Return in about 6 months (around 2017), or if symptoms worsen or fail to improve, for annual wellness exam.      [facility]     [facility] Nurse Care Line - / Assistance  Registered nurses in the [facility] provide clinical advisement, health education, appointment booking, and other advisory services.  Call for this free service at [phone].             Medications           Message regarding Medications     Verify the changes and/or additions to your medication regime listed below are the same as discussed with your clinician today.  If any of these changes or additions are incorrect, please notify your healthcare provider.             Verify that the below list of medications is an accurate representation of the medications you are currently taking.  If none reported, the list may be blank. If incorrect, please contact your  "healthcare provider. Carry this list with you in case of emergency.           Current Medications     acetaminophen (TYLENOL EXTRA STRENGTH) 325 MG tablet Take 2 tablets (650 mg total) by mouth every 8 (eight) hours.    allopurinol (ZYLOPRIM) 100 MG tablet Take 1/2 tablet daily until otherwise instructed    amiodarone (PACERONE) 200 MG Tab Take 1 tablet (200 mg total) by mouth once daily.    aspirin (ECOTRIN) 81 MG EC tablet Take 81 mg by mouth once daily.    carvedilol (COREG) 25 MG tablet Take 1 tablet (25 mg total) by mouth 2 (two) times daily with meals.    colchicine 0.6 mg tablet Take 1 tablet (0.6 mg total) by mouth once daily.    cranberry 1,000 mg Cap Take 1 capsule by mouth Daily.    digoxin (LANOXIN) 125 mcg tablet TAKE 1 TABLET (0.125 MG TOTAL) BY MOUTH ONCE DAILY.    diphenhydrAMINE (BENADRYL) 25 mg capsule Take 25 mg by mouth every 6 (six) hours as needed for Itching.    furosemide (LASIX) 40 MG tablet Take 1 tablet (40 mg total) by mouth 2 (two) times daily. 8 am and 2 pm    gabapentin (NEURONTIN) 100 MG capsule TAKE ONE CAPSULE BY MOUTH TWO TIMES DAILY    MULTIVITAMIN ORAL Take 1 tablet by mouth Daily.    PENNSAID 20 mg/gram /actuation(2 %) sopm Apply 40 mg topically 2 (two) times daily.    tramadol (ULTRAM) 50 mg tablet Take 1 tablet (50 mg total) by mouth 3 (three) times daily as needed.    valsartan (DIOVAN) 80 MG tablet TAKE TWO TABLETS BY MOUTH TWICE DAILY    warfarin (COUMADIN) 5 MG tablet Take  1/2 (2.5 mg) every evening  as directed by the Coumadin Clinic.           Clinical Reference Information           Vital Signs - Last Recorded  Most recent update: 1/9/2017  1:34 PM by Xiomara Terrell    BP Pulse Temp Ht Wt SpO2    (!) 160/60 (BP Location: Right arm, Patient Position: Sitting) 70 98.4 °F (36.9 °C) (Tympanic) 5' 2" (1.575 m) 49.2 kg (108 lb 7.5 oz) 95%    BMI                19.84 kg/m2          Blood Pressure          Most Recent Value    BP  (!)  160/60      Allergies as of 1/9/2017     " Cephalosporins    Metaxalone    Penicillins    Pregabalin    Sulfa (Sulfonamide Antibiotics)      Immunizations Administered on Date of Encounter - 1/9/2017     None      Orders Placed During Today's Visit      Normal Orders This Visit    Urinalysis     Urine culture

## 2017-01-10 ENCOUNTER — TELEPHONE (OUTPATIENT)
Dept: RHEUMATOLOGY | Facility: CLINIC | Age: 82
End: 2017-01-10

## 2017-01-10 NOTE — TELEPHONE ENCOUNTER
----- Message from pAryl Archibald sent at 1/10/2017  8:31 AM CST -----  Call her regarding medication for her gout.  Call hear t [phone].                                  nuno

## 2017-01-10 NOTE — TELEPHONE ENCOUNTER
Spoke with patient. States that she has finished her medrol dose pack and will be starting her Allopurinol tonight.

## 2017-01-11 LAB — BACTERIA UR CULT: NORMAL

## 2017-01-12 ENCOUNTER — PATIENT MESSAGE (OUTPATIENT)
Dept: INTERNAL MEDICINE | Facility: CLINIC | Age: 82
End: 2017-01-12

## 2017-01-12 ENCOUNTER — TELEPHONE (OUTPATIENT)
Dept: INTERNAL MEDICINE | Facility: CLINIC | Age: 82
End: 2017-01-12

## 2017-01-12 RX ORDER — GABAPENTIN 100 MG/1
CAPSULE ORAL
Qty: 180 CAPSULE | Refills: 1 | Status: SHIPPED | OUTPATIENT
Start: 2017-01-12 | End: 2017-05-01 | Stop reason: SDUPTHER

## 2017-01-12 NOTE — TELEPHONE ENCOUNTER
----- Message from Roopa Hamlin sent at 1/12/2017 11:47 AM CST -----  Contact: pt  Please erasmo pt @ [phone] regarding test results, pt don't understand them.

## 2017-01-13 NOTE — TELEPHONE ENCOUNTER
Patient notified of results and of 's recommendations. Patient states she is not having any fever or other problems. Advised to call the office as needed, patient verbalized understanding.

## 2017-01-13 NOTE — TELEPHONE ENCOUNTER
Please notify patient that her urine culture was positive. It showed resistance to cipro, which is similar to the levaquin she took. Due to her allergies, we are also limited on oral antibiotics that she can take. I consulted with kidney specialist and he advised no treatment is recommended unless she is running fever. When I saw patient in clinic she was afebrile and asymptomatic. Please confirm that this is still the case. If she is doing well, then I would recommend she follow up with Dr. Armijo, regarding recurrent UTIs. If she is having a problem we may need to consider treatment. Please let me know if she has any questions.

## 2017-01-16 ENCOUNTER — TELEPHONE (OUTPATIENT)
Dept: UROLOGY | Facility: CLINIC | Age: 82
End: 2017-01-16

## 2017-01-16 DIAGNOSIS — R30.0 DYSURIA: Primary | ICD-10-CM

## 2017-01-16 NOTE — TELEPHONE ENCOUNTER
Patient states she has a positive urine culture and would like Dr. Armijo to prescribe antibiotics per Dr. Bello. Being that the urine culture was a clean-catch specimen, patient will need a catheterized urine specimen per Dr. Armijo. Scheduled nurse visit for cath u/a and c/s. Will Rx antibiotics based of catheterized urine specimen results if necessary.

## 2017-01-16 NOTE — TELEPHONE ENCOUNTER
----- Message from Hayley Masters sent at 1/16/2017  9:11 AM CST -----  Contact: pt  Pt calling to speak to nurse...states that she recently had urine specimen tested and Dr Bello is advising that results indicated a bacteria in her urine....the patient was advised to contact Dr Armijo to see if he would like to call in something or what needs to be done...please adv/call pt back at [phone]///thx jw

## 2017-01-20 ENCOUNTER — CLINICAL SUPPORT (OUTPATIENT)
Dept: UROLOGY | Facility: CLINIC | Age: 82
End: 2017-01-20
Payer: MEDICARE

## 2017-01-20 ENCOUNTER — ANTI-COAG VISIT (OUTPATIENT)
Dept: CARDIOLOGY | Facility: CLINIC | Age: 82
End: 2017-01-20
Payer: MEDICARE

## 2017-01-20 VITALS — WEIGHT: 108 LBS | BODY MASS INDEX: 19.88 KG/M2 | HEIGHT: 62 IN

## 2017-01-20 DIAGNOSIS — R30.0 DYSURIA: ICD-10-CM

## 2017-01-20 DIAGNOSIS — I48.91 ATRIAL FIBRILLATION, UNSPECIFIED TYPE: ICD-10-CM

## 2017-01-20 DIAGNOSIS — Z79.01 LONG TERM (CURRENT) USE OF ANTICOAGULANTS: Primary | ICD-10-CM

## 2017-01-20 LAB
BACTERIA #/AREA URNS AUTO: ABNORMAL /HPF
HYALINE CASTS UR QL AUTO: 15 /LPF
INR PPP: 5 (ref 2–3)
MICROSCOPIC COMMENT: ABNORMAL
RBC #/AREA URNS AUTO: 2 /HPF (ref 0–4)
SQUAMOUS #/AREA URNS AUTO: 1 /HPF
WBC #/AREA URNS AUTO: 79 /HPF (ref 0–5)
WBC CLUMPS UR QL AUTO: ABNORMAL

## 2017-01-20 PROCEDURE — 87077 CULTURE AEROBIC IDENTIFY: CPT

## 2017-01-20 PROCEDURE — 99999 PR PBB SHADOW E&M-EST. PATIENT-LVL I: CPT | Mod: PBBFAC,,,

## 2017-01-20 PROCEDURE — 87186 SC STD MICRODIL/AGAR DIL: CPT

## 2017-01-20 PROCEDURE — 87086 URINE CULTURE/COLONY COUNT: CPT

## 2017-01-20 PROCEDURE — 99999 PR PBB SHADOW E&M-EST. PATIENT-LVL III: CPT | Mod: PBBFAC,,,

## 2017-01-20 PROCEDURE — 99213 OFFICE O/P EST LOW 20 MIN: CPT | Mod: PBBFAC

## 2017-01-20 PROCEDURE — 81001 URINALYSIS AUTO W/SCOPE: CPT

## 2017-01-20 PROCEDURE — 87088 URINE BACTERIA CULTURE: CPT

## 2017-01-20 RX ORDER — WARFARIN 2.5 MG/1
TABLET ORAL
Qty: 30 TABLET | Refills: 3 | Status: SHIPPED | OUTPATIENT
Start: 2017-01-20 | End: 2017-05-05 | Stop reason: SDUPTHER

## 2017-01-20 NOTE — PROGRESS NOTES
Cath u/a and c/s ordered per Dr. Armijo. Using sterile technique, inserted pediatric catheter into bladder and obtained 13ml of hazy, yellow urine. Patient tolerated well. Specimen sent to lab.

## 2017-01-20 NOTE — PROGRESS NOTES
Recently discharged after c/o SOB and diagnosed with Acute hypoxemic respiratory failure. Several medication changes since last visit. Now on colchicine. Gout flare most likely cause of supra-therapeutic INR. No bleeding issues noted. Will hold x2 doses and lower total weekly dose. Repeat INR in 1 week.

## 2017-01-20 NOTE — MR AVS SNAPSHOT
[facility]  [address] [facility] [address]  Phone: [phone]  Fax: [phone]                  Jennifer Mathews   2017 10:15 AM   Anti-coag visit    Description:  Female : 1932   Provider:  Parvin Tay PharmD   Department:  [facility]           Diagnoses this Visit        Comments    Long term (current) use of anticoagulants    -  Primary     Atrial fibrillation, unspecified type                To Do List           Future Appointments        Provider Department Dept Phone    2017 10:15 AM [facility] [phone]    2017 11:00 AM UROLOGY NURSE, [facility] - Urology [phone]    2017 10:00 AM [facility] [phone]    2017 8:00 AM HOME MONITOR DEVICE CHECK [facility] - Arrhythmia Procedures [phone]    2017 10:30 AM LAB[facility]ATORY, [facility] [phone]      Goals (5 Years of Data)     None      [facility]     [facility] -  Assistance  Registered nurses in the [facility] provide clinical advisement, health education, appointment booking, and other advisory services.  Call for this free service at [phone].             Medications           Message regarding Medications     Verify the changes and/or additions to your medication regime listed below are the same as discussed with your clinician today.  If any of these changes or additions are incorrect, please notify your healthcare provider.             Verify that the below list of medications is an accurate representation of the medications you are currently taking.  If none reported, the list may be blank. If incorrect, please contact your healthcare provider. Carry this list with you in case of emergency.           Current Medications     acetaminophen (TYLENOL EXTRA STRENGTH) 325 MG tablet Take 2 tablets (650 mg total) by mouth every 8  (eight) hours.    allopurinol (ZYLOPRIM) 100 MG tablet Take 1/2 tablet daily until otherwise instructed    amiodarone (PACERONE) 200 MG Tab Take 1 tablet (200 mg total) by mouth once daily.    aspirin (ECOTRIN) 81 MG EC tablet Take 81 mg by mouth once daily.    carvedilol (COREG) 25 MG tablet Take 1 tablet (25 mg total) by mouth 2 (two) times daily with meals.    colchicine 0.6 mg tablet Take 1 tablet (0.6 mg total) by mouth once daily.    cranberry 1,000 mg Cap Take 1 capsule by mouth Daily.    digoxin (LANOXIN) 125 mcg tablet TAKE 1 TABLET (0.125 MG TOTAL) BY MOUTH ONCE DAILY.    diphenhydrAMINE (BENADRYL) 25 mg capsule Take 25 mg by mouth every 6 (six) hours as needed for Itching.    furosemide (LASIX) 40 MG tablet Take 1 tablet (40 mg total) by mouth 2 (two) times daily. 8 am and 2 pm    gabapentin (NEURONTIN) 100 MG capsule TAKE ONE CAPSULE BY MOUTH TWO TIMES DAILY    MULTIVITAMIN ORAL Take 1 tablet by mouth Daily.    PENNSAID 20 mg/gram /actuation(2 %) sopm Apply 40 mg topically 2 (two) times daily.    tramadol (ULTRAM) 50 mg tablet Take 1 tablet (50 mg total) by mouth 3 (three) times daily as needed.    valsartan (DIOVAN) 80 MG tablet TAKE TWO TABLETS BY MOUTH TWICE DAILY    warfarin (COUMADIN) 5 MG tablet Take  1/2 (2.5 mg) every evening  as directed by the [facility].           Clinical Reference Information           Allergies as of 1/20/2017     Cephalosporins    Metaxalone    Penicillins    Pregabalin    Sulfa (Sulfonamide Antibiotics)      Immunizations Administered on Date of Encounter - 1/20/2017     None      Orders Placed During Today's Visit      Normal Orders This Visit    POCT INR          1/20/2017  9:57 AM - William WaldronD      Component Results     Component Value Flag Ref Range Units Status    INR 5.0 (A) 2.0 - 3.0  Final      December 2016 Details    Sun Mon Tue Wed Thu Fri Sat         1               2               3                 4               5               6                7               8               9               10                 11               12               13               14               15               16               17                 18               19               20               21      2.5 mg         22      2.5 mg         23   1.9   2.5 mg   See details      24      2.5 mg           25      2.5 mg         26      2.5 mg         27      2.5 mg         28   2.3   2.5 mg   See details      29   1.6   2.5 mg   See details      30   1.4   2.5 mg   See details      31      2.5 mg          Date Details   12/23 Last INR check   INR: 1.9      12/28 INR: 2.3   Coumadin Therapy: 2.0 - 3.0 for INR for all indicators except mechanical heart valves and antiphospholipid syndromes which should use 2.5 - 3.5.       12/29 INR: 1.6   Coumadin Therapy: 2.0 - 3.0 for INR for all indicators except mechanical heart valves and antiphospholipid syndromes which should use 2.5 - 3.5.       12/30 INR: 1.4   Coumadin Therapy: 2.0 - 3.0 for INR for all indicators except mechanical heart valves and antiphospholipid syndromes which should use 2.5 - 3.5.                  January 2017 Details    Sun Mon Tue Wed Thu Fri Sat     1      2.5 mg         2      2.5 mg         3      2.5 mg         4      2.5 mg         5      2.5 mg         6      2.5 mg         7      2.5 mg           8      2.5 mg         9   2.3   2.5 mg   See details      10      2.5 mg         11      2.5 mg         12      2.5 mg         13      2.5 mg         14      2.5 mg           15      2.5 mg         16      2.5 mg         17      2.5 mg         18      2.5 mg         19      2.5 mg         20   5.0   Hold   See details      21      Hold           22      2.5 mg         23      1.25 mg         24      2.5 mg         25      1.25 mg         26      2.5 mg         27            28                 29               30               31                    Date Details   01/09 INR: 2.3   Coumadin Therapy: 2.0 -  3.0 for INR for all indicators except mechanical heart valves and antiphospholipid syndromes which should use 2.5 - 3.5.       01/20 This INR check   INR: 5.0       Date of next INR:  1/27/2017               How to take your warfarin dose     To take:  1.25 mg Take 0.5 of a 2.5 mg tablet.    To take:  2.5 mg Take 1 of the 2.5 mg tablets.    Hold Do not take your warfarin dose. See the Details table to the right for additional instructions.                Anticoagulation Summary as of 1/20/2017     Maintenance plan 1.25 mg (2.5 mg x 0.5) on Mon, Wed; 2.5 mg (2.5 mg x 1) all other days    Full instructions 1/20: Hold; 1/21: Hold; Otherwise 1.25 mg on Mon, Wed; 2.5 mg all other days    Next INR check 1/27/2017      Anticoagulation Episode Summary     Comments       Patient Findings     Positives Change in medications    Negatives Signs/symptoms of thrombosis, Signs/symptoms of bleeding, Laboratory test error suspected, Change in health, Change in alcohol use, Change in activity, Upcoming invasive procedure, Emergency department visit, Upcoming dental procedure, Missed doses, Extra doses, Change in diet/appetite, Hospital admission, Bruising, Other complaints

## 2017-01-23 ENCOUNTER — TELEPHONE (OUTPATIENT)
Dept: UROLOGY | Facility: CLINIC | Age: 82
End: 2017-01-23

## 2017-01-23 LAB — BACTERIA UR CULT: NORMAL

## 2017-01-23 RX ORDER — NITROFURANTOIN (MACROCRYSTALS) 100 MG/1
100 CAPSULE ORAL EVERY 12 HOURS
Qty: 14 CAPSULE | Refills: 0 | Status: SHIPPED | OUTPATIENT
Start: 2017-01-23 | End: 2017-01-30

## 2017-01-25 DIAGNOSIS — I10 ESSENTIAL HYPERTENSION: Primary | ICD-10-CM

## 2017-01-25 RX ORDER — VALSARTAN 80 MG/1
160 TABLET ORAL 2 TIMES DAILY
Qty: 180 TABLET | Refills: 3 | Status: SHIPPED | OUTPATIENT
Start: 2017-01-25 | End: 2017-07-12 | Stop reason: SDUPTHER

## 2017-01-27 ENCOUNTER — OFFICE VISIT (OUTPATIENT)
Dept: INTERNAL MEDICINE | Facility: CLINIC | Age: 82
End: 2017-01-27
Payer: MEDICARE

## 2017-01-27 ENCOUNTER — ANTI-COAG VISIT (OUTPATIENT)
Dept: CARDIOLOGY | Facility: CLINIC | Age: 82
End: 2017-01-27
Payer: MEDICARE

## 2017-01-27 ENCOUNTER — HOSPITAL ENCOUNTER (OUTPATIENT)
Dept: RADIOLOGY | Facility: HOSPITAL | Age: 82
Discharge: HOME | End: 2017-01-27
Attending: NURSE PRACTITIONER
Payer: MEDICARE

## 2017-01-27 VITALS
DIASTOLIC BLOOD PRESSURE: 66 MMHG | BODY MASS INDEX: 20.98 KG/M2 | OXYGEN SATURATION: 95 % | HEART RATE: 70 BPM | SYSTOLIC BLOOD PRESSURE: 136 MMHG | TEMPERATURE: 97 F | WEIGHT: 114 LBS | HEIGHT: 62 IN

## 2017-01-27 DIAGNOSIS — R05.9 COUGH: Primary | ICD-10-CM

## 2017-01-27 DIAGNOSIS — Z79.01 LONG TERM (CURRENT) USE OF ANTICOAGULANTS: Primary | ICD-10-CM

## 2017-01-27 DIAGNOSIS — R05.9 COUGH: ICD-10-CM

## 2017-01-27 LAB
CTP QC/QA: YES
INR PPP: 1.3 (ref 2–3)

## 2017-01-27 PROCEDURE — 71020 XR CHEST PA AND LATERAL: CPT | Mod: TC

## 2017-01-27 PROCEDURE — 99999 PR PBB SHADOW E&M-EST. PATIENT-LVL I: CPT | Mod: PBBFAC,,,

## 2017-01-27 PROCEDURE — 99999 PR PBB SHADOW E&M-EST. PATIENT-LVL IV: CPT | Mod: PBBFAC,,, | Performed by: NURSE PRACTITIONER

## 2017-01-27 PROCEDURE — 99214 OFFICE O/P EST MOD 30 MIN: CPT | Mod: S$PBB,,, | Performed by: NURSE PRACTITIONER

## 2017-01-27 PROCEDURE — 71020 XR CHEST PA AND LATERAL: CPT | Mod: 26,,, | Performed by: RADIOLOGY

## 2017-01-27 NOTE — PROGRESS NOTES
INR is now sub-therapeutic. Will begin 2.5mg daily except 1.25mg on Monday. Repeat INR in 1 week. Patient denies missed doses, denies diet changes.

## 2017-01-27 NOTE — MR AVS SNAPSHOT
[facility]  [address] [facility]  [place] [address]  Phone: [phone]  Fax: [phone]                  Jennifer Mathews   2017 10:00 AM   Anti-coag visit    Description:  Female : 1932   Provider:  Parvin Tay PharmD   Department:  [facility]           Diagnoses this Visit        Comments    Long term (current) use of anticoagulants    -  Primary            To Do List           Future Appointments        Provider Department Dept Phone    2017 10:40 AM Shikha Browning NP [facility] [phone]    2017 8:00 AM HOME MONITOR DEVICE CHECK [facility] Arrhythmia Procedures [phone]    2/3/2017 10:30 AM [facility] [phone]    2017 10:30 AM LAB[facility]ATORY, [facility] [phone]    2017 11:00 AM Halina Hernandez MD [facility] Rheumatology [phone]      Goals (5 Years of Data)     None      [facility]     [facility] Care [facility] -  Assistance  Registered nurses in the [facility] provide clinical advisement, health education, appointment booking, and other advisory services.  Call for this free service at [phone].             Medications           Message regarding Medications     Verify the changes and/or additions to your medication regime listed below are the same as discussed with your clinician today.  If any of these changes or additions are incorrect, please notify your healthcare provider.             Verify that the below list of medications is an accurate representation of the medications you are currently taking.  If none reported, the list may be blank. If incorrect, please contact your healthcare provider. Carry this list with you in case of emergency.           Current Medications     acetaminophen (TYLENOL EXTRA STRENGTH) 325 MG tablet Take 2 tablets (650 mg total) by mouth every 8 (eight) hours.     allopurinol (ZYLOPRIM) 100 MG tablet Take 1/2 tablet daily until otherwise instructed    amiodarone (PACERONE) 200 MG Tab Take 1 tablet (200 mg total) by mouth once daily.    aspirin (ECOTRIN) 81 MG EC tablet Take 81 mg by mouth once daily.    carvedilol (COREG) 25 MG tablet Take 1 tablet (25 mg total) by mouth 2 (two) times daily with meals.    colchicine 0.6 mg tablet Take 1 tablet (0.6 mg total) by mouth once daily.    cranberry 1,000 mg Cap Take 1 capsule by mouth Daily.    digoxin (LANOXIN) 125 mcg tablet TAKE 1 TABLET (0.125 MG TOTAL) BY MOUTH ONCE DAILY.    diphenhydrAMINE (BENADRYL) 25 mg capsule Take 25 mg by mouth every 6 (six) hours as needed for Itching.    furosemide (LASIX) 40 MG tablet Take 1 tablet (40 mg total) by mouth 2 (two) times daily. 8 am and 2 pm    gabapentin (NEURONTIN) 100 MG capsule TAKE ONE CAPSULE BY MOUTH TWO TIMES DAILY    MULTIVITAMIN ORAL Take 1 tablet by mouth Daily.    nitrofurantoin (MACRODANTIN) 100 MG capsule Take 1 capsule (100 mg total) by mouth every 12 (twelve) hours.    PENNSAID 20 mg/gram /actuation(2 %) sopm Apply 40 mg topically 2 (two) times daily.    tramadol (ULTRAM) 50 mg tablet Take 1 tablet (50 mg total) by mouth 3 (three) times daily as needed.    valsartan (DIOVAN) 80 MG tablet Take 2 tablets (160 mg total) by mouth 2 (two) times daily.    warfarin (COUMADIN) 2.5 MG tablet Take 1/2 tablet on Monday and Wednesday and 1 tablet all other days. Discontinue 5mg tablet.           Clinical Reference Information           Allergies as of 1/27/2017     Cephalosporins    Metaxalone    Penicillins    Pregabalin    Sulfa (Sulfonamide Antibiotics)      Immunizations Administered on Date of Encounter - 1/27/2017     None      Orders Placed During Today's Visit      Normal Orders This Visit    POCT INR          1/27/2017 10:05 AM - William WaldronD      Component Results     Component Value Flag Ref Range Units Status    INR 1.3 (A) 2.0 - 3.0  Final    Quality  Control Acceptable Yes    Final      December 2016 Details    Sun Mon Tue Wed Thu Fri Sat         1               2               3                 4               5               6               7               8               9               10                 11               12               13               14               15               16               17                 18               19               20               21               22               23               24                 25               26               27               28   2.3   2.5 mg   See details      29   1.6   2.5 mg   See details      30   1.4   2.5 mg   See details      31      2.5 mg          Date Details   12/28 INR: 2.3   Coumadin Therapy: 2.0 - 3.0 for INR for all indicators except mechanical heart valves and antiphospholipid syndromes which should use 2.5 - 3.5.       12/29 INR: 1.6   Coumadin Therapy: 2.0 - 3.0 for INR for all indicators except mechanical heart valves and antiphospholipid syndromes which should use 2.5 - 3.5.       12/30 INR: 1.4   Coumadin Therapy: 2.0 - 3.0 for INR for all indicators except mechanical heart valves and antiphospholipid syndromes which should use 2.5 - 3.5.                  January 2017 Details    Sun Mon Tue Wed Thu Fri Sat     1      2.5 mg         2      2.5 mg         3      2.5 mg         4      2.5 mg         5      2.5 mg         6      2.5 mg         7      2.5 mg           8      2.5 mg         9   2.3   2.5 mg   See details      10      2.5 mg         11      2.5 mg         12      2.5 mg         13      2.5 mg         14      2.5 mg           15      2.5 mg         16      2.5 mg         17      2.5 mg         18      2.5 mg         19      2.5 mg         20   5.0   Hold   See details      21      Hold           22      2.5 mg         23      1.25 mg         24      2.5 mg         25      1.25 mg         26      2.5 mg         27   1.3   2.5 mg   See details      28      2.5 mg            29      2.5 mg         30      1.25 mg         31      2.5 mg              Date Details   01/09 INR: 2.3   Coumadin Therapy: 2.0 - 3.0 for INR for all indicators except mechanical heart valves and antiphospholipid syndromes which should use 2.5 - 3.5.       01/20 Last INR check   INR: 5.0      01/27 This INR check   INR: 1.3                     How to take your warfarin dose     To take:  1.25 mg Take 0.5 of a 2.5 mg tablet.    To take:  2.5 mg Take 1 of the 2.5 mg tablets.           February 2017 Details    Sun Mon Tue Wed Thu Fri Sat        1      2.5 mg         2      2.5 mg         3            4                 5               6               7               8               9               10               11                 12               13               14               15               16               17               18                 19               20               21               22               23               24               25                 26               27               28                    Date Details   No additional details    Date of next INR:  2/3/2017         How to take your warfarin dose     To take:  2.5 mg Take 1 of the 2.5 mg tablets.           Anticoagulation Summary as of 1/27/2017     Maintenance plan 1.25 mg (2.5 mg x 0.5) on Mon; 2.5 mg (2.5 mg x 1) all other days    Full instructions 1.25 mg on Mon; 2.5 mg all other days    Next INR check 2/3/2017      Anticoagulation Episode Summary     Comments

## 2017-01-27 NOTE — PROGRESS NOTES
Subjective:       Patient ID: Jennifer Mathews is a 84 y.o. female.    Chief Complaint: Cough    Cough   This is a new problem. The current episode started in the past 7 days (3 days). The problem has been unchanged. The problem occurs every few minutes. The cough is non-productive. Associated symptoms include nasal congestion. Pertinent negatives include no chest pain, chills, ear pain, fever, headaches, myalgias, postnasal drip, rhinorrhea, sore throat or shortness of breath. Nothing aggravates the symptoms. She has tried nothing for the symptoms. The treatment provided no relief.     Review of Systems   Constitutional: Negative for chills and fever.   HENT: Negative for ear pain, postnasal drip, rhinorrhea, sinus pressure, sneezing and sore throat.    Respiratory: Positive for cough. Negative for shortness of breath.    Cardiovascular: Negative for chest pain.   Gastrointestinal: Negative for diarrhea, nausea and vomiting.   Musculoskeletal: Negative for myalgias.   Neurological: Negative for headaches.       Objective:      Physical Exam   Constitutional: She is oriented to person, place, and time. She appears well-developed and well-nourished. No distress.   HENT:   Head: Normocephalic and atraumatic.   Right Ear: External ear normal.   Left Ear: External ear normal.   Nose: Rhinorrhea present. Right sinus exhibits no maxillary sinus tenderness and no frontal sinus tenderness. Left sinus exhibits no maxillary sinus tenderness and no frontal sinus tenderness.   Mouth/Throat: Oropharynx is clear and moist.   Eyes: Conjunctivae and EOM are normal. Pupils are equal, round, and reactive to light. No scleral icterus.   Cardiovascular: Normal rate and regular rhythm.  Exam reveals no gallop and no friction rub.    No murmur heard.  Pulmonary/Chest: Effort normal and breath sounds normal.   Neurological: She is alert and oriented to person, place, and time. No cranial nerve deficit.   Skin: Skin is warm and dry.    Psychiatric: She has a normal mood and affect.   Vitals reviewed.      Assessment:       1. Cough        Plan:   Cough  -     X-Ray Chest PA And Lateral; Future; Expected date: 1/27/17    Chest x-ray negative for PNA. Recommend patient try mucinex (plain) to improve congestion and cough. Patient verbalized understanding and agreement.   Return if symptoms worsen or fail to improve.

## 2017-01-28 PROCEDURE — 99495 TRANSJ CARE MGMT MOD F2F 14D: CPT | Mod: S$PBB,,, | Performed by: FAMILY MEDICINE

## 2017-01-30 ENCOUNTER — CLINICAL SUPPORT (OUTPATIENT)
Dept: CARDIOLOGY | Facility: CLINIC | Age: 82
End: 2017-01-30
Payer: MEDICARE

## 2017-01-30 DIAGNOSIS — I44.2 COMPLETE HEART BLOCK: ICD-10-CM

## 2017-01-30 DIAGNOSIS — I48.20 CHRONIC ATRIAL FIBRILLATION: ICD-10-CM

## 2017-01-30 PROCEDURE — 93296 REM INTERROG EVL PM/IDS: CPT | Mod: PBBFAC,PO | Performed by: INTERNAL MEDICINE

## 2017-01-30 PROCEDURE — 93294 REM INTERROG EVL PM/LDLS PM: CPT | Mod: ,,, | Performed by: INTERNAL MEDICINE

## 2017-02-03 ENCOUNTER — ANTI-COAG VISIT (OUTPATIENT)
Dept: CARDIOLOGY | Facility: CLINIC | Age: 82
End: 2017-02-03
Payer: MEDICARE

## 2017-02-03 DIAGNOSIS — Z79.01 LONG TERM (CURRENT) USE OF ANTICOAGULANTS: Primary | ICD-10-CM

## 2017-02-03 LAB — INR PPP: 1.4 (ref 2–3)

## 2017-02-03 PROCEDURE — 99999 PR PBB SHADOW E&M-EST. PATIENT-LVL I: CPT | Mod: PBBFAC,,,

## 2017-02-03 PROCEDURE — 99211 OFF/OP EST MAY X REQ PHY/QHP: CPT | Mod: PBBFAC

## 2017-02-03 PROCEDURE — 85610 PROTHROMBIN TIME: CPT | Mod: PBBFAC

## 2017-02-03 NOTE — MR AVS SNAPSHOT
[facility]  [address] [facility]  [place] [address]  Phone: [phone]  Fax: [phone]                  Jennifer Mathews   2/3/2017 10:30 AM   Anti-coag visit    Description:  Female : 1932   Provider:  Parvin Tay PharmD   Department:  [facility]           Diagnoses this Visit        Comments    Long term (current) use of anticoagulants    -  Primary            To Do List           Future Appointments        Provider Department Dept Phone    2/3/2017 10:30 AM Parvin Tay PharmD [facility] [phone]    2/10/2017 10:00 AM Parvin Tay PharmD [facility] [phone]    2017 10:30 AM LAB[facility]ATORY, [facility] [phone]    2017 11:00 AM Halina Hernandez MD [facility] Rheumatology [phone]    2017 2:40 PM Kevin Ngo MD [facility] - Pulmonary Services [phone]      Goals (5 Years of Data)     None      [facility]     [facility] -  Assistance  Registered nurses in the [facility] provide clinical advisement, health education, appointment booking, and other advisory services.  Call for this free service at [phone].             Medications           Message regarding Medications     Verify the changes and/or additions to your medication regime listed below are the same as discussed with your clinician today.  If any of these changes or additions are incorrect, please notify your healthcare provider.             Verify that the below list of medications is an accurate representation of the medications you are currently taking.  If none reported, the list may be blank. If incorrect, please contact your healthcare provider. Carry this list with you in case of emergency.           Current Medications     acetaminophen (TYLENOL EXTRA STRENGTH) 325 MG tablet Take 2 tablets (650 mg total) by mouth every 8 (eight) hours.    allopurinol (ZYLOPRIM)  100 MG tablet Take 1/2 tablet daily until otherwise instructed    amiodarone (PACERONE) 200 MG Tab Take 1 tablet (200 mg total) by mouth once daily.    aspirin (ECOTRIN) 81 MG EC tablet Take 81 mg by mouth once daily.    carvedilol (COREG) 25 MG tablet Take 1 tablet (25 mg total) by mouth 2 (two) times daily with meals.    colchicine 0.6 mg tablet Take 1 tablet (0.6 mg total) by mouth once daily.    cranberry 1,000 mg Cap Take 1 capsule by mouth Daily.    digoxin (LANOXIN) 125 mcg tablet TAKE 1 TABLET (0.125 MG TOTAL) BY MOUTH ONCE DAILY.    diphenhydrAMINE (BENADRYL) 25 mg capsule Take 25 mg by mouth every 6 (six) hours as needed for Itching.    furosemide (LASIX) 40 MG tablet Take 1 tablet (40 mg total) by mouth 2 (two) times daily. 8 am and 2 pm    gabapentin (NEURONTIN) 100 MG capsule TAKE ONE CAPSULE BY MOUTH TWO TIMES DAILY    MULTIVITAMIN ORAL Take 1 tablet by mouth Daily.    PENNSAID 20 mg/gram /actuation(2 %) sopm Apply 40 mg topically 2 (two) times daily.    tramadol (ULTRAM) 50 mg tablet Take 1 tablet (50 mg total) by mouth 3 (three) times daily as needed.    valsartan (DIOVAN) 80 MG tablet Take 2 tablets (160 mg total) by mouth 2 (two) times daily.    warfarin (COUMADIN) 2.5 MG tablet Take 1/2 tablet on Monday and Wednesday and 1 tablet all other days. Discontinue 5mg tablet.           Clinical Reference Information           Allergies as of 2/3/2017     Cephalosporins    Metaxalone    Penicillins    Pregabalin    Sulfa (Sulfonamide Antibiotics)      Immunizations Administered on Date of Encounter - 2/3/2017     None      Orders Placed During Today's Visit      Normal Orders This Visit    POCT INR          2/3/2017  9:58 AM - Parvin Tay, PharmD      Component Results     Component Value Flag Ref Range Units Status    INR 1.4 (A) 2.0 - 3.0  Final      January 2017 Details    Sun Mon Tue Wed Thu Fri Sat     1               2               3               4      2.5 mg         5      2.5 mg         6       2.5 mg         7      2.5 mg           8      2.5 mg         9   2.3   2.5 mg   See details      10      2.5 mg         11      2.5 mg         12      2.5 mg         13      2.5 mg         14      2.5 mg           15      2.5 mg         16      2.5 mg         17      2.5 mg         18      2.5 mg         19      2.5 mg         20   5.0   Hold   See details      21      Hold           22      2.5 mg         23      1.25 mg         24      2.5 mg         25      1.25 mg         26      2.5 mg         27   1.3   2.5 mg   See details      28      2.5 mg           29      2.5 mg         30      1.25 mg         31      2.5 mg              Date Details   01/09 INR: 2.3   Coumadin Therapy: 2.0 - 3.0 for INR for all indicators except mechanical heart valves and antiphospholipid syndromes which should use 2.5 - 3.5.       01/20 INR: 5.0      01/27 Last INR check   INR: 1.3                 February 2017 Details    Sun Mon Tue Wed Thu Fri Sat        1      2.5 mg         2      2.5 mg         3   1.4   5 mg   See details      4      2.5 mg           5      2.5 mg         6      2.5 mg         7      2.5 mg         8      2.5 mg         9      2.5 mg         10            11                 12               13               14               15               16               17               18                 19               20               21               22               23               24               25                 26               27               28                    Date Details   02/03 This INR check   INR: 1.4       Date of next INR:  2/10/2017               How to take your warfarin dose     To take:  2.5 mg Take 1 of the 2.5 mg tablets.    To take:  5 mg Take 2 of the 2.5 mg tablets.           Anticoagulation Summary as of 2/3/2017     Maintenance plan 1.25 mg (2.5 mg x 0.5) on Mon; 2.5 mg (2.5 mg x 1) all other days    Full instructions 2/3: 5 mg; 2/6: 2.5 mg; Otherwise 1.25 mg on Mon; 2.5 mg all other  days    Next INR check 2/10/2017      Anticoagulation Episode Summary     Comments       Patient Findings     Negatives Signs/symptoms of thrombosis, Signs/symptoms of bleeding, Laboratory test error suspected, Change in health, Change in alcohol use, Change in activity, Upcoming invasive procedure, Emergency department visit, Upcoming dental procedure, Missed doses, Extra doses, Change in medications, Change in diet/appetite, Hospital admission, Bruising, Other complaints      Language Assistance Services     ATTENTION: Language assistance services are available, free of charge. Please call [phone].      ATENCIÓN: Si kattyla vitaliy, tiene a howard disposición servicios gratuitos de asistencia lingüística. Llame al [phone].     CHÚ Ý: N?u b?n nói Ti?ng Vi?t, có các d?ch v? h? tr? ngôn ng? mi?n phí dành cho b?n. G?i s? [phone].         [facility] complies with applicable Federal civil rights laws and does not discriminate on the basis of race, color, national origin, age, disability, or sex.

## 2017-02-10 ENCOUNTER — ANTI-COAG VISIT (OUTPATIENT)
Dept: CARDIOLOGY | Facility: CLINIC | Age: 82
End: 2017-02-10
Payer: MEDICARE

## 2017-02-10 DIAGNOSIS — Z79.01 LONG TERM (CURRENT) USE OF ANTICOAGULANTS: Primary | ICD-10-CM

## 2017-02-10 LAB — INR PPP: 2.7 (ref 2–3)

## 2017-02-10 PROCEDURE — 85610 PROTHROMBIN TIME: CPT | Mod: PBBFAC

## 2017-02-10 PROCEDURE — 99999 PR PBB SHADOW E&M-EST. PATIENT-LVL I: CPT | Mod: PBBFAC,,,

## 2017-02-10 PROCEDURE — 99211 OFF/OP EST MAY X REQ PHY/QHP: CPT | Mod: PBBFAC

## 2017-02-10 NOTE — MR AVS SNAPSHOT
[facility]  [address]  [place] [address]  Phone: [phone]  Fax: [phone]                  Jennifer Mathews   2/10/2017 10:00 AM   Anti-coag visit    Description:  Female : 1932   Provider:  Parvin Tay PharmD   Department:  [facility]           Diagnoses this Visit        Comments    Long term (current) use of anticoagulants    -  Primary            To Do List           Future Appointments        Provider Department Dept Phone    2/10/2017 10:00 AM Parvin Tay PharmD [facility] [phone]    3/10/2017 10:00 AM Melanie Waldron[facility] [phone]    2017 10:30 AM LAB[facility]ATORY, [facility] [phone]    2017 11:00 AM Halina Hernandez MD [facility] Rheumatology [phone]    2017 2:40 PM Kevin Ngo MD [facility] [phone]      Goals (5 Years of Data)     None      [facility]     [facility] -  Assistance  Registered nurses in the [facility] provide clinical advisement, health education, appointment booking, and other advisory services.  Call for this free service at [phone].             Medications           Message regarding Medications     Verify the changes and/or additions to your medication regime listed below are the same as discussed with your clinician today.  If any of these changes or additions are incorrect, please notify your healthcare provider.             Verify that the below list of medications is an accurate representation of the medications you are currently taking.  If none reported, the list may be blank. If incorrect, please contact your healthcare provider. Carry this list with you in case of emergency.           Current Medications     acetaminophen (TYLENOL EXTRA STRENGTH) 325 MG tablet Take 2 tablets (650 mg total) by mouth every 8 (eight) hours.    allopurinol (ZYLOPRIM)  100 MG tablet Take 1/2 tablet daily until otherwise instructed    amiodarone (PACERONE) 200 MG Tab Take 1 tablet (200 mg total) by mouth once daily.    aspirin (ECOTRIN) 81 MG EC tablet Take 81 mg by mouth once daily.    carvedilol (COREG) 25 MG tablet Take 1 tablet (25 mg total) by mouth 2 (two) times daily with meals.    colchicine 0.6 mg tablet Take 1 tablet (0.6 mg total) by mouth once daily.    cranberry 1,000 mg Cap Take 1 capsule by mouth Daily.    digoxin (LANOXIN) 125 mcg tablet TAKE 1 TABLET (0.125 MG TOTAL) BY MOUTH ONCE DAILY.    diphenhydrAMINE (BENADRYL) 25 mg capsule Take 25 mg by mouth every 6 (six) hours as needed for Itching.    furosemide (LASIX) 40 MG tablet Take 1 tablet (40 mg total) by mouth 2 (two) times daily. 8 am and 2 pm    gabapentin (NEURONTIN) 100 MG capsule TAKE ONE CAPSULE BY MOUTH TWO TIMES DAILY    MULTIVITAMIN ORAL Take 1 tablet by mouth Daily.    PENNSAID 20 mg/gram /actuation(2 %) sopm Apply 40 mg topically 2 (two) times daily.    tramadol (ULTRAM) 50 mg tablet Take 1 tablet (50 mg total) by mouth 3 (three) times daily as needed.    valsartan (DIOVAN) 80 MG tablet Take 2 tablets (160 mg total) by mouth 2 (two) times daily.    warfarin (COUMADIN) 2.5 MG tablet Take 1/2 tablet on Monday and Wednesday and 1 tablet all other days. Discontinue 5mg tablet.           Clinical Reference Information           Allergies as of 2/10/2017     Cephalosporins    Metaxalone    Penicillins    Pregabalin    Sulfa (Sulfonamide Antibiotics)      Immunizations Administered on Date of Encounter - 2/10/2017     None      Orders Placed During Today's Visit      Normal Orders This Visit    POCT INR          2/10/2017  9:49 AM - Parvin Tay, PharmD      Component Results     Component Value Flag Ref Range Units Status    INR 2.7  2.0 - 3.0  Final      January 2017 Details    Sun Mon Tue Wed Thu Fri Sat     1               2               3               4               5               6                7                 8               9               10               11      2.5 mg         12      2.5 mg         13      2.5 mg         14      2.5 mg           15      2.5 mg         16      2.5 mg         17      2.5 mg         18      2.5 mg         19      2.5 mg         20   5.0   Hold   See details      21      Hold           22      2.5 mg         23      1.25 mg         24      2.5 mg         25      1.25 mg         26      2.5 mg         27   1.3   2.5 mg   See details      28      2.5 mg           29      2.5 mg         30      1.25 mg         31      2.5 mg              Date Details   01/20 INR: 5.0      01/27 INR: 1.3                 February 2017 Details    Sun Mon Tue Wed Thu Fri Sat        1      2.5 mg         2      2.5 mg         3   1.4   5 mg   See details      4      2.5 mg           5      2.5 mg         6      2.5 mg         7      2.5 mg         8      2.5 mg         9      2.5 mg         10   2.7   2.5 mg   See details      11      2.5 mg           12      2.5 mg         13      2.5 mg         14      2.5 mg         15      2.5 mg         16      2.5 mg         17      2.5 mg         18      2.5 mg           19      2.5 mg         20      2.5 mg         21      2.5 mg         22      2.5 mg         23      2.5 mg         24      2.5 mg         25      2.5 mg           26      2.5 mg         27      2.5 mg         28      2.5 mg              Date Details   02/03 Last INR check   INR: 1.4      02/10 This INR check   INR: 2.7                     How to take your warfarin dose     To take:  2.5 mg Take 1 of the 2.5 mg tablets.           March 2017 Details    Sun Mon Tue Wed Thu Fri Sat        1      2.5 mg         2      2.5 mg         3      2.5 mg         4      2.5 mg           5      2.5 mg         6      2.5 mg         7      2.5 mg         8      2.5 mg         9      2.5 mg         10            11                 12               13               14               15               16                17               18                 19               20               21               22               23               24               25                 26               27               28               29               30               31                 Date Details   No additional details    Date of next INR:  3/10/2017         How to take your warfarin dose     To take:  2.5 mg Take 1 of the 2.5 mg tablets.           Anticoagulation Summary as of 2/10/2017     Maintenance plan 2.5 mg (2.5 mg x 1) every day    Full instructions 2.5 mg every day    Next INR check 3/10/2017      Anticoagulation Episode Summary     Comments       Patient Findings     Negatives Signs/symptoms of thrombosis, Signs/symptoms of bleeding, Laboratory test error suspected, Change in health, Change in alcohol use, Change in activity, Upcoming invasive procedure, Emergency department visit, Upcoming dental procedure, Missed doses, Extra doses, Change in medications, Change in diet/appetite, Hospital admission, Bruising, Other complaints      Language Assistance Services     ATTENTION: Language assistance services are available, free of charge. Please call [phone].      ATENCIÓN: Si habla español, tiene a howard disposición servicios gratuitos de asistencia lingüística. Llame al [phone].     CHÚ Ý: N?u b?n nói Ti?ng Vi?t, có các d?ch v? h? tr? ngôn ng? mi?n phí dành cho b?n. G?i s? [phone].         [facility] complies with applicable Federal civil rights laws and does not discriminate on the basis of race, color, national origin, age, disability, or sex.

## 2017-02-10 NOTE — PROGRESS NOTES
INR is now therapeutic. Will begin 2.5mg daily until follow-up. Patient reports no bleeding or bruising, no new medications and no diet changes.  I reminded the patient to call with any problems, changes or questions before the next visit.

## 2017-02-13 DIAGNOSIS — I50.9 ACUTE ON CHRONIC CONGESTIVE HEART FAILURE, UNSPECIFIED CONGESTIVE HEART FAILURE TYPE: ICD-10-CM

## 2017-02-13 RX ORDER — CARVEDILOL 25 MG/1
TABLET ORAL
Qty: 60 TABLET | Refills: 2 | Status: SHIPPED | OUTPATIENT
Start: 2017-02-13 | End: 2017-05-29 | Stop reason: SDUPTHER

## 2017-03-10 ENCOUNTER — ANTI-COAG VISIT (OUTPATIENT)
Dept: CARDIOLOGY | Facility: CLINIC | Age: 82
End: 2017-03-10
Payer: MEDICARE

## 2017-03-10 DIAGNOSIS — Z79.01 LONG TERM (CURRENT) USE OF ANTICOAGULANTS: Primary | ICD-10-CM

## 2017-03-10 LAB — INR PPP: 3.2 (ref 2–3)

## 2017-03-10 PROCEDURE — 99211 OFF/OP EST MAY X REQ PHY/QHP: CPT | Mod: PBBFAC

## 2017-03-10 PROCEDURE — 99999 PR PBB SHADOW E&M-EST. PATIENT-LVL I: CPT | Mod: PBBFAC,,,

## 2017-03-10 PROCEDURE — 85610 PROTHROMBIN TIME: CPT | Mod: PBBFAC

## 2017-03-10 NOTE — PROGRESS NOTES
INR is slightly supra-therapeutic today. Possible diet changes. Will restart high vitamin k 2x/week and lower tonight's dose only. Patient reports no bleeding or bruising, no new medications.  I reminded the patient to call with any problems, changes or questions before the next visit.

## 2017-03-10 NOTE — MR AVS SNAPSHOT
[facility]  [address]  [place] [address]  Phone: [phone]  Fax: [phone]                  Jennifer Mathews   3/10/2017 10:00 AM   Anti-coag visit    Description:  Female : 1932   Provider:  Parvin Tay PharmD   Department:  [facility]           Diagnoses this Visit        Comments    Long term (current) use of anticoagulants    -  Primary            To Do List           Future Appointments        Provider Department Dept Phone    3/10/2017 10:00 AM Parvin Tay PharmD [facility] [phone]    2017 10:15 AM Parvin Tay PharmD [facility] [phone]    2017 10:30 AM [facility]AT[facility], [facility] [phone]    2017 11:00 AM Halina Hernandez MD [facility] Rheumatology [phone]    2017 2:40 PM Kevin Ngo MD [facility] [facility] [phone]      Goals (5 Years of Data)     None      [facility]     [facility] -  Assistance  Registered nurses in the [facility] provide clinical advisement, health education, appointment booking, and other advisory services.  Call for this free service at [phone].             Medications           Message regarding Medications     Verify the changes and/or additions to your medication regime listed below are the same as discussed with your clinician today.  If any of these changes or additions are incorrect, please notify your healthcare provider.             Verify that the below list of medications is an accurate representation of the medications you are currently taking.  If none reported, the list may be blank. If incorrect, please contact your healthcare provider. Carry this list with you in case of emergency.           Current Medications     acetaminophen (TYLENOL EXTRA STRENGTH) 325 MG tablet Take 2 tablets (650 mg total) by mouth every 8 (eight) hours.    allopurinol (ZYLOPRIM)  100 MG tablet Take 1/2 tablet daily until otherwise instructed    amiodarone (PACERONE) 200 MG Tab Take 1 tablet (200 mg total) by mouth once daily.    aspirin (ECOTRIN) 81 MG EC tablet Take 81 mg by mouth once daily.    carvedilol (COREG) 25 MG tablet TAKE ONE TABLET BY MOUTH TWICE A DAY WITH MEALS    colchicine 0.6 mg tablet Take 1 tablet (0.6 mg total) by mouth once daily.    cranberry 1,000 mg Cap Take 1 capsule by mouth Daily.    digoxin (LANOXIN) 125 mcg tablet TAKE 1 TABLET (0.125 MG TOTAL) BY MOUTH ONCE DAILY.    diphenhydrAMINE (BENADRYL) 25 mg capsule Take 25 mg by mouth every 6 (six) hours as needed for Itching.    furosemide (LASIX) 40 MG tablet Take 1 tablet (40 mg total) by mouth 2 (two) times daily. 8 am and 2 pm    gabapentin (NEURONTIN) 100 MG capsule TAKE ONE CAPSULE BY MOUTH TWO TIMES DAILY    MULTIVITAMIN ORAL Take 1 tablet by mouth Daily.    PENNSAID 20 mg/gram /actuation(2 %) sopm Apply 40 mg topically 2 (two) times daily.    tramadol (ULTRAM) 50 mg tablet Take 1 tablet (50 mg total) by mouth 3 (three) times daily as needed.    valsartan (DIOVAN) 80 MG tablet Take 2 tablets (160 mg total) by mouth 2 (two) times daily.    warfarin (COUMADIN) 2.5 MG tablet Take 1/2 tablet on Monday and Wednesday and 1 tablet all other days. Discontinue 5mg tablet.           Clinical Reference Information           Allergies as of 3/10/2017     Cephalosporins    Metaxalone    Penicillins    Pregabalin    Sulfa (Sulfonamide Antibiotics)      Immunizations Administered on Date of Encounter - 3/10/2017     None      Orders Placed During Today's Visit      Normal Orders This Visit    POCT INR          3/10/2017  9:51 AM - Parvin Tay, PharmD      Component Results     Component Value Flag Ref Range Units Status    INR 3.2 (A) 2.0 - 3.0  Final      February 2017 Details    Sun Mon Tue Wed Thu Fri Sat        1               2               3               4                 5               6               7                8      2.5 mg         9      2.5 mg         10   2.7   2.5 mg   See details      11      2.5 mg           12      2.5 mg         13      2.5 mg         14      2.5 mg         15      2.5 mg         16      2.5 mg         17      2.5 mg         18      2.5 mg           19      2.5 mg         20      2.5 mg         21      2.5 mg         22      2.5 mg         23      2.5 mg         24      2.5 mg         25      2.5 mg           26      2.5 mg         27      2.5 mg         28      2.5 mg              Date Details   02/10 Last INR check   INR: 2.7                 March 2017 Details    Sun Mon Tue Wed Thu Fri Sat        1      2.5 mg         2      2.5 mg         3      2.5 mg         4      2.5 mg           5      2.5 mg         6      2.5 mg         7      2.5 mg         8      2.5 mg         9      2.5 mg         10   3.2   1.25 mg   See details      11      2.5 mg           12      2.5 mg         13      2.5 mg         14      2.5 mg         15      2.5 mg         16      2.5 mg         17      2.5 mg         18      2.5 mg           19      2.5 mg         20      2.5 mg         21      2.5 mg         22      2.5 mg         23      2.5 mg         24      2.5 mg         25      2.5 mg           26      2.5 mg         27      2.5 mg         28      2.5 mg         29      2.5 mg         30      2.5 mg         31      2.5 mg           Date Details   03/10 This INR check   INR: 3.2                     How to take your warfarin dose     To take:  1.25 mg Take 0.5 of a 2.5 mg tablet.    To take:  2.5 mg Take 1 of the 2.5 mg tablets.           April 2017 Details    Sun Mon Tue Wed Thu Fri Sat           1      2.5 mg           2      2.5 mg         3      2.5 mg         4      2.5 mg         5      2.5 mg         6      2.5 mg         7            8                 9               10               11               12               13               14               15                 16               17                18               19               20               21               22                 23               24               25               26               27               28               29                 30                      Date Details   No additional details    Date of next INR:  4/7/2017         How to take your warfarin dose     To take:  2.5 mg Take 1 of the 2.5 mg tablets.           Anticoagulation Summary as of 3/10/2017     Maintenance plan 2.5 mg (2.5 mg x 1) every day    Full instructions 3/10: 1.25 mg; Otherwise 2.5 mg every day    Next INR check 4/7/2017      Anticoagulation Episode Summary     Comments       Patient Findings     Negatives Signs/symptoms of thrombosis, Signs/symptoms of bleeding, Laboratory test error suspected, Change in health, Change in alcohol use, Change in activity, Upcoming invasive procedure, Emergency department visit, Upcoming dental procedure, Missed doses, Extra doses, Change in medications, Change in diet/appetite, Hospital admission, Bruising, Other complaints      Language Assistance Services     ATTENTION: Language assistance services are available, free of charge. Please call [phone].      ATENCIÓN: Si habla vianneyañol, tiene a howard disposición servicios gratuitos de asistencia lingüística. Llame al [phone].     CHÚ Ý: N?u b?n nói Ti?ng Vi?t, có các d?ch v? h? tr? ngôn ng? mi?n phí dành cho b?n. G?i s? [phone].         [facility] complies with applicable Federal civil rights laws and does not discriminate on the basis of race, color, national origin, age, disability, or sex.

## 2017-04-07 ENCOUNTER — ANTI-COAG VISIT (OUTPATIENT)
Dept: CARDIOLOGY | Facility: CLINIC | Age: 82
End: 2017-04-07
Payer: MEDICARE

## 2017-04-07 DIAGNOSIS — Z79.01 LONG TERM (CURRENT) USE OF ANTICOAGULANTS: Primary | ICD-10-CM

## 2017-04-07 LAB — INR PPP: 2.7 (ref 2–3)

## 2017-04-07 PROCEDURE — 85610 PROTHROMBIN TIME: CPT | Mod: PBBFAC

## 2017-04-07 NOTE — MR AVS SNAPSHOT
[facility]  [address]  [place] [address]  Phone: [phone]  Fax: [phone]                  Jennifer Mathews   2017 10:15 AM   Anti-coag visit    Description:  Female : 1932   Provider:  Parvin Tay PharmD   Department:  [facility]           Diagnoses this Visit        Comments    Long term (current) use of anticoagulants    -  Primary            To Do List           Future Appointments        Provider Department Dept Phone    2017 10:15 AM Parvin Tay PharmD [facility] [phone]    2017 10:15 AM [facility] [phone]    2017 10:15 AM Parvin Tay PharmD [facility] [phone]    2017 10:30 AM LAB[facility]AT[facility], [facility] [phone]    2017 11:00 AM Halina Hernandez MD [facility] Rheumatology [phone]      Goals (5 Years of Data)     None      [facility]     [facility] Care [facility] -  Assistance  Unless otherwise directed by your provider, please contact [facility], our nurse care line that is available for  assistance.     Registered nurses in the [facility] provide: appointment scheduling, clinical advisement, health education, and other advisory services.  Call: [phone] (toll free)               Medications           Message regarding Medications     Verify the changes and/or additions to your medication regime listed below are the same as discussed with your clinician today.  If any of these changes or additions are incorrect, please notify your healthcare provider.             Verify that the below list of medications is an accurate representation of the medications you are currently taking.  If none reported, the list may be blank. If incorrect, please contact your healthcare provider. Carry this list with you in case of emergency.           Current  Medications     acetaminophen (TYLENOL EXTRA STRENGTH) 325 MG tablet Take 2 tablets (650 mg total) by mouth every 8 (eight) hours.    allopurinol (ZYLOPRIM) 100 MG tablet Take 1/2 tablet daily until otherwise instructed    amiodarone (PACERONE) 200 MG Tab Take 1 tablet (200 mg total) by mouth once daily.    aspirin (ECOTRIN) 81 MG EC tablet Take 81 mg by mouth once daily.    carvedilol (COREG) 25 MG tablet TAKE ONE TABLET BY MOUTH TWICE A DAY WITH MEALS    colchicine 0.6 mg tablet Take 1 tablet (0.6 mg total) by mouth once daily.    cranberry 1,000 mg Cap Take 1 capsule by mouth Daily.    digoxin (LANOXIN) 125 mcg tablet TAKE 1 TABLET (0.125 MG TOTAL) BY MOUTH ONCE DAILY.    diphenhydrAMINE (BENADRYL) 25 mg capsule Take 25 mg by mouth every 6 (six) hours as needed for Itching.    furosemide (LASIX) 40 MG tablet Take 1 tablet (40 mg total) by mouth 2 (two) times daily. 8 am and 2 pm    gabapentin (NEURONTIN) 100 MG capsule TAKE ONE CAPSULE BY MOUTH TWO TIMES DAILY    MULTIVITAMIN ORAL Take 1 tablet by mouth Daily.    PENNSAID 20 mg/gram /actuation(2 %) sopm Apply 40 mg topically 2 (two) times daily.    tramadol (ULTRAM) 50 mg tablet Take 1 tablet (50 mg total) by mouth 3 (three) times daily as needed.    valsartan (DIOVAN) 80 MG tablet Take 2 tablets (160 mg total) by mouth 2 (two) times daily.    warfarin (COUMADIN) 2.5 MG tablet Take 1/2 tablet on Monday and Wednesday and 1 tablet all other days. Discontinue 5mg tablet.           Clinical Reference Information           Allergies as of 4/7/2017     Cephalosporins    Metaxalone    Penicillins    Pregabalin    Sulfa (Sulfonamide Antibiotics)      Immunizations Administered on Date of Encounter - 4/7/2017     None      Orders Placed During Today's Visit      Normal Orders This Visit    POCT INR          4/7/2017 10:11 AM - Parvin Tay, PharmD      Component Results     Component Value Flag Ref Range Units Status    INR 2.7  2.0 - 3.0  Final      March 2017  Details    Sun Mon Tue Wed Thu Fri Sat        1               2               3               4                 5               6               7               8      2.5 mg         9      2.5 mg         10   3.2   1.25 mg   See details      11      2.5 mg           12      2.5 mg         13      2.5 mg         14      2.5 mg         15      2.5 mg         16      2.5 mg         17      2.5 mg         18      2.5 mg           19      2.5 mg         20      2.5 mg         21      2.5 mg         22      2.5 mg         23      2.5 mg         24      2.5 mg         25      2.5 mg           26      2.5 mg         27      2.5 mg         28      2.5 mg         29      2.5 mg         30      2.5 mg         31      2.5 mg           Date Details   03/10 Last INR check   INR: 3.2                 April 2017 Details    Sun Mon Tue Wed u Fri Sat           1      2.5 mg           2      2.5 mg         3      2.5 mg         4      2.5 mg         5      2.5 mg         6      2.5 mg         7   2.7   2.5 mg   See details      8      2.5 mg           9      2.5 mg         10      2.5 mg         11      2.5 mg         12      2.5 mg         13      2.5 mg         14      2.5 mg         15      2.5 mg           16      2.5 mg         17      2.5 mg         18      2.5 mg         19      2.5 mg         20      2.5 mg         21      2.5 mg         22      2.5 mg           23      2.5 mg         24      2.5 mg         25      2.5 mg         26      2.5 mg         27      2.5 mg         28      2.5 mg         29      2.5 mg           30      2.5 mg                Date Details   04/07 This INR check   INR: 2.7                     How to take your warfarin dose     To take:  2.5 mg Take 1 of the 2.5 mg tablets.           May 2017 Details    Sun Mon Tue Wed Thu Fri Sat      1      2.5 mg         2      2.5 mg         3      2.5 mg         4      2.5 mg         5            6                 7               8               9               10                11               12               13                 14               15               16               17               18               19               20                 21               22               23               24               25               26               27                 28               29               30               31                   Date Details   No additional details    Date of next INR:  5/5/2017         How to take your warfarin dose     To take:  2.5 mg Take 1 of the 2.5 mg tablets.           Anticoagulation Summary as of 4/7/2017     Maintenance plan 2.5 mg (2.5 mg x 1) every day    Full instructions 2.5 mg every day    Next INR check 5/5/2017      Anticoagulation Episode Summary     Comments       Patient Findings     Negatives Signs/symptoms of thrombosis, Signs/symptoms of bleeding, Laboratory test error suspected, Change in health, Change in alcohol use, Change in activity, Upcoming invasive procedure, Emergency department visit, Upcoming dental procedure, Missed doses, Extra doses, Change in medications, Change in diet/appetite, Hospital admission, Bruising, Other complaints      Language Assistance Services     ATTENTION: Language assistance services are available, free of charge. Please call [phone].      ATENCIÓN: Si habla vitaliy, tiene a howard disposición servicios gratuitos de asistencia lingüística. Llame al [phone].     CHÚ Ý: N?u b?n nói Ti?ng Vi?t, có các d?ch v? h? tr? ngôn ng? mi?n phí dành cho b?n. G?i s? [phone].         [facility] complies with applicable Federal civil rights laws and does not discriminate on the basis of race, color, national origin, age, disability, or sex.

## 2017-04-20 ENCOUNTER — CLINICAL SUPPORT (OUTPATIENT)
Dept: CARDIOLOGY | Facility: CLINIC | Age: 82
End: 2017-04-20
Payer: MEDICARE

## 2017-04-20 DIAGNOSIS — I48.20 CHRONIC ATRIAL FIBRILLATION: ICD-10-CM

## 2017-04-20 PROCEDURE — 93280 PM DEVICE PROGR EVAL DUAL: CPT | Mod: 26,,, | Performed by: INTERNAL MEDICINE

## 2017-05-01 RX ORDER — GABAPENTIN 100 MG/1
CAPSULE ORAL
Qty: 180 CAPSULE | Refills: 1 | Status: SHIPPED | OUTPATIENT
Start: 2017-05-01 | End: 2017-08-10

## 2017-05-05 ENCOUNTER — ANTI-COAG VISIT (OUTPATIENT)
Dept: CARDIOLOGY | Facility: CLINIC | Age: 82
End: 2017-05-05
Payer: MEDICARE

## 2017-05-05 DIAGNOSIS — Z79.01 LONG TERM (CURRENT) USE OF ANTICOAGULANTS: Primary | ICD-10-CM

## 2017-05-05 LAB — INR PPP: 1.9 (ref 2–3)

## 2017-05-05 PROCEDURE — 85610 PROTHROMBIN TIME: CPT | Mod: PBBFAC

## 2017-05-05 RX ORDER — WARFARIN 2.5 MG/1
TABLET ORAL
Qty: 30 TABLET | Refills: 3 | Status: SHIPPED | OUTPATIENT
Start: 2017-05-05 | End: 2017-07-25 | Stop reason: SDUPTHER

## 2017-05-05 NOTE — MR AVS SNAPSHOT
[facility]  [address] [facility]  [place] [address]  Phone: [phone]  Fax: [phone]                  Jennifer Mathews   2017 10:15 AM   Anti-coag visit    Description:  Female : 1932   Provider:  Parvin Tay PharmD   Department:  [facility]           Diagnoses this Visit        Comments    Long term (current) use of anticoagulants    -  Primary            To Do List           Future Appointments        Provider Department Dept Phone    2017 10:15 AM Parvin Tay PharmD [facility] [phone]    2017 10:30 AM LABORATORY, [facility] [phone]    2017 11:00 AM Halina Hernandez MD [facility] [phone]    2017 10:15 AM Parvin Tay PharmD [facility] [phone]    2017 2:40 PM Kevin Ngo MD [facility] [phone]      Goals (5 Years of Data)     None      [facility]     [facility] Line -  Assistance  Unless otherwise directed by your provider, please contact [facility], our nurse care line that is available for  assistance.     Registered nurses in the [facility] provide: appointment scheduling, clinical advisement, health education, and other advisory services.  Call: [phone] (toll free)               Medications           Message regarding Medications     Verify the changes and/or additions to your medication regime listed below are the same as discussed with your clinician today.  If any of these changes or additions are incorrect, please notify your healthcare provider.             Verify that the below list of medications is an accurate representation of the medications you are currently taking.  If none reported, the list may be blank. If incorrect, please contact your healthcare provider. Carry this list with you in case of emergency.           Current Medications      acetaminophen (TYLENOL EXTRA STRENGTH) 325 MG tablet Take 2 tablets (650 mg total) by mouth every 8 (eight) hours.    allopurinol (ZYLOPRIM) 100 MG tablet Take 1/2 tablet daily until otherwise instructed    amiodarone (PACERONE) 200 MG Tab Take 1 tablet (200 mg total) by mouth once daily.    aspirin (ECOTRIN) 81 MG EC tablet Take 81 mg by mouth once daily.    carvedilol (COREG) 25 MG tablet TAKE ONE TABLET BY MOUTH TWICE A DAY WITH MEALS    colchicine 0.6 mg tablet Take 1 tablet (0.6 mg total) by mouth once daily.    cranberry 1,000 mg Cap Take 1 capsule by mouth Daily.    digoxin (LANOXIN) 125 mcg tablet TAKE 1 TABLET (0.125 MG TOTAL) BY MOUTH ONCE DAILY.    diphenhydrAMINE (BENADRYL) 25 mg capsule Take 25 mg by mouth every 6 (six) hours as needed for Itching.    furosemide (LASIX) 40 MG tablet Take 1 tablet (40 mg total) by mouth 2 (two) times daily. 8 am and 2 pm    gabapentin (NEURONTIN) 100 MG capsule TAKE ONE CAPSULE BY MOUTH TWO TIMES DAILY    MULTIVITAMIN ORAL Take 1 tablet by mouth Daily.    PENNSAID 20 mg/gram /actuation(2 %) sopm Apply 40 mg topically 2 (two) times daily.    tramadol (ULTRAM) 50 mg tablet Take 1 tablet (50 mg total) by mouth 3 (three) times daily as needed.    valsartan (DIOVAN) 80 MG tablet Take 2 tablets (160 mg total) by mouth 2 (two) times daily.    warfarin (COUMADIN) 2.5 MG tablet Take 1/2 tablet on Monday and Wednesday and 1 tablet all other days. Discontinue 5mg tablet.           Clinical Reference Information           Allergies as of 5/5/2017     Cephalosporins    Metaxalone    Penicillins    Pregabalin    Sulfa (Sulfonamide Antibiotics)      Immunizations Administered on Date of Encounter - 5/5/2017     None      Orders Placed During Today's Visit      Normal Orders This Visit    POCT INR          5/5/2017  9:57 AM - Parvin Tay, PharmD      Component Results     Component Value Flag Ref Range Units Status    INR 1.9 (A) 2.0 - 3.0  Final      April 2017 Details    Sun Mon  Tue Wed Thu Fri Sat           1                 2               3               4               5      2.5 mg         6      2.5 mg         7   2.7   2.5 mg   See details      8      2.5 mg           9      2.5 mg         10      2.5 mg         11      2.5 mg         12      2.5 mg         13      2.5 mg         14      2.5 mg         15      2.5 mg           16      2.5 mg         17      2.5 mg         18      2.5 mg         19      2.5 mg         20      2.5 mg         21      2.5 mg         22      2.5 mg           23      2.5 mg         24      2.5 mg         25      2.5 mg         26      2.5 mg         27      2.5 mg         28      2.5 mg         29      2.5 mg           30      2.5 mg                Date Details   04/07 Last INR check   INR: 2.7                 May 2017 Details    Sun Mon Tue Wed Thu Fri Sat      1      2.5 mg         2      2.5 mg         3      2.5 mg         4      2.5 mg         5   1.9   2.5 mg   See details      6      2.5 mg           7      2.5 mg         8      2.5 mg         9      2.5 mg         10      2.5 mg         11      2.5 mg         12      2.5 mg         13      2.5 mg           14      2.5 mg         15      2.5 mg         16      2.5 mg         17      2.5 mg         18      2.5 mg         19      2.5 mg         20      2.5 mg           21      2.5 mg         22      2.5 mg         23      2.5 mg         24      2.5 mg         25      2.5 mg         26      2.5 mg         27      2.5 mg           28      2.5 mg         29      2.5 mg         30      2.5 mg         31      2.5 mg             Date Details   05/05 This INR check   INR: 1.9                     How to take your warfarin dose     To take:  2.5 mg Take 1 of the 2.5 mg tablets.           June 2017 Details    Sun Mon Tue Wed Thu Fri Sat         1      2.5 mg         2            3                 4               5               6               7               8               9               10                  11               12               13               14               15               16               17                 18               19               20               21               22               23               24                 25               26               27               28               29               30                 Date Details   No additional details    Date of next INR:  6/2/2017         How to take your warfarin dose     To take:  2.5 mg Take 1 of the 2.5 mg tablets.           Anticoagulation Summary as of 5/5/2017     Maintenance plan 2.5 mg (2.5 mg x 1) every day    Full instructions 2.5 mg every day    Next INR check 6/2/2017      Anticoagulation Episode Summary     Comments       Patient Findings     Negatives Signs/symptoms of thrombosis, Signs/symptoms of bleeding, Laboratory test error suspected, Change in health, Change in alcohol use, Change in activity, Upcoming invasive procedure, Emergency department visit, Upcoming dental procedure, Missed doses, Extra doses, Change in medications, Change in diet/appetite, Hospital admission, Bruising, Other complaints      Language Assistance Services     ATTENTION: Language assistance services are available, free of charge. Please call [phone].      ATENCIÓN: Si habla vianneyciara, tiene a howard disposición servicios gratuitos de asistencia lingüística. Llame al [phone].     CHÚ Ý: N?u b?n nói Ti?ng Vi?t, có các d?ch v? h? tr? ngôn ng? mi?n phí dành cho b?n. G?i s? [phone].         [facility] complies with applicable Federal civil rights laws and does not discriminate on the basis of race, color, national origin, age, disability, or sex.

## 2017-05-10 DIAGNOSIS — I48.20 CHRONIC ATRIAL FIBRILLATION: ICD-10-CM

## 2017-05-10 DIAGNOSIS — Z95.0 CARDIAC PACEMAKER IN SITU: Primary | ICD-10-CM

## 2017-05-10 DIAGNOSIS — I25.5 ISCHEMIC CARDIOMYOPATHY: ICD-10-CM

## 2017-05-17 ENCOUNTER — LAB VISIT (OUTPATIENT)
Dept: LAB | Facility: HOSPITAL | Age: 82
End: 2017-05-17
Attending: INTERNAL MEDICINE
Payer: MEDICARE

## 2017-05-17 ENCOUNTER — OFFICE VISIT (OUTPATIENT)
Dept: RHEUMATOLOGY | Facility: CLINIC | Age: 82
End: 2017-05-17
Payer: MEDICARE

## 2017-05-17 ENCOUNTER — TELEPHONE (OUTPATIENT)
Dept: RHEUMATOLOGY | Facility: CLINIC | Age: 82
End: 2017-05-17

## 2017-05-17 VITALS
BODY MASS INDEX: 21.06 KG/M2 | DIASTOLIC BLOOD PRESSURE: 69 MMHG | WEIGHT: 114.44 LBS | HEIGHT: 62 IN | HEART RATE: 69 BPM | SYSTOLIC BLOOD PRESSURE: 157 MMHG

## 2017-05-17 DIAGNOSIS — N18.30 CKD (CHRONIC KIDNEY DISEASE) STAGE 3, GFR 30-59 ML/MIN: ICD-10-CM

## 2017-05-17 DIAGNOSIS — I48.91 ATRIAL FIBRILLATION, UNSPECIFIED TYPE: ICD-10-CM

## 2017-05-17 DIAGNOSIS — M81.0 OSTEOPOROSIS, SENILE: ICD-10-CM

## 2017-05-17 DIAGNOSIS — M1A.3791 CHRONIC GOUT DUE TO RENAL IMPAIRMENT INVOLVING FOOT WITH TOPHUS, UNSPECIFIED LATERALITY: Primary | ICD-10-CM

## 2017-05-17 DIAGNOSIS — M1A.9XX1 TOPHACEOUS GOUT: ICD-10-CM

## 2017-05-17 PROBLEM — M1A.3790: Status: ACTIVE | Noted: 2017-05-17

## 2017-05-17 LAB
ANION GAP SERPL CALC-SCNC: 16 MMOL/L
BUN SERPL-MCNC: 27 MG/DL
CALCIUM SERPL-MCNC: 9.5 MG/DL
CHLORIDE SERPL-SCNC: 102 MMOL/L
CO2 SERPL-SCNC: 28 MMOL/L
CREAT SERPL-MCNC: 1.1 MG/DL
EST. GFR  (AFRICAN AMERICAN): 53 ML/MIN/1.73 M^2
EST. GFR  (NON AFRICAN AMERICAN): 46 ML/MIN/1.73 M^2
GLUCOSE SERPL-MCNC: 100 MG/DL
POTASSIUM SERPL-SCNC: 4.8 MMOL/L
SODIUM SERPL-SCNC: 146 MMOL/L
URATE SERPL-MCNC: 8.7 MG/DL

## 2017-05-17 PROCEDURE — 99999 PR PBB SHADOW E&M-EST. PATIENT-LVL III: CPT | Mod: PBBFAC,,, | Performed by: INTERNAL MEDICINE

## 2017-05-17 PROCEDURE — 99213 OFFICE O/P EST LOW 20 MIN: CPT | Mod: PBBFAC,PO | Performed by: INTERNAL MEDICINE

## 2017-05-17 PROCEDURE — 96372 THER/PROPH/DIAG INJ SC/IM: CPT | Mod: PBBFAC,PO

## 2017-05-17 PROCEDURE — 99214 OFFICE O/P EST MOD 30 MIN: CPT | Mod: S$PBB,,, | Performed by: INTERNAL MEDICINE

## 2017-05-17 RX ADMIN — DENOSUMAB 60 MG: 60 INJECTION SUBCUTANEOUS at 11:05

## 2017-05-17 NOTE — PROGRESS NOTES
"Subjective:       Patient ID: Jennifer Mathews is a 84 y.o. female.    Chief Complaint: Gout; Chronic Kidney Disease; and Osteoporosis    HPI   Here for routine fup for newly diagnosed, newly treated gout in setting of CKD, also with OP on Prolia. Her gout attacks have involved the digits in her feet, midfoot and she had 3 attacks prior to seeing me. No crystal diagnosis. She had dactylitis last visit that looked pretty characteristic of gout. UA 11. I started colchicine 0.6mg daily and allopurinol 50mg daily. She has not had any issues with these medicines but unfortunately she switched pharmacies and has not been taking the medicines for 2 weeks. No gout flares.  Other issues include AFIB on coumadin, CHF, HTN, LEV, CAD, HL, CKD, ANemia, CVA    For OP on Prolia without previous complications. Not on calcium supplements or vitamin D, only multivitamin.    Review of Systems   Constitutional: Negative for chills and fever.   HENT: Negative for sore throat.    Respiratory: Positive for shortness of breath (with exertion, at baseline). Negative for cough (at her baseline, nonproductive) and chest tightness.    Musculoskeletal: Negative for arthralgias.                 Skin: Positive for color change (over the toes, chronic hyperpigmentation).   Allergic/Immunologic: Positive for environmental allergies.         Objective:     BP (!) 157/69  Pulse 69  Ht 5' 2" (1.575 m)  Wt 51.9 kg (114 lb 6.7 oz)  BMI 20.93 kg/m2     Physical Exam   Vitals reviewed.  Constitutional: She is oriented to person, place, and time and well-developed, well-nourished, and in no distress. No distress.   HENT:   Head: Normocephalic and atraumatic.   Right Ear: External ear normal.   Left Ear: External ear normal.   Eyes: Conjunctivae are normal. Right eye exhibits no discharge. Left eye exhibits no discharge. No scleral icterus.   Neck: Neck supple.   Cardiovascular: Regular rhythm.    LE varicosities     Pulmonary/Chest: Effort normal. No " respiratory distress.   Abdominal: She exhibits no distension.   Neurological: She is alert and oriented to person, place, and time. No cranial nerve deficit. She exhibits normal muscle tone.   Skin: Skin is warm and dry. No rash noted. She is not diaphoretic. No erythema.     Left 2nd, 3rd toe with hyperpigmentation but no pain or active swelling     Psychiatric: Mood, memory, affect and judgment normal.   Musculoskeletal: Normal range of motion. She exhibits deformity (left 2nd toe with possible tophus). She exhibits no edema or tenderness.                  LABS REVIEWED:   Ref. Range 5/17/2017 10:41   Sodium Latest Ref Range: 136 - 145 mmol/L 146 (H)   Potassium Latest Ref Range: 3.5 - 5.1 mmol/L 4.8   Chloride Latest Ref Range: 95 - 110 mmol/L 102   CO2 Latest Ref Range: 23 - 29 mmol/L 28   Anion Gap Latest Ref Range: 8 - 16 mmol/L 16   BUN, Bld Latest Ref Range: 8 - 23 mg/dL 27 (H)   Creatinine Latest Ref Range: 0.5 - 1.4 mg/dL 1.1   eGFR if non African American Latest Ref Range: >60 mL/min/1.73 m^2 46 (A)   eGFR if African American Latest Ref Range: >60 mL/min/1.73 m^2 53 (A)   Glucose Latest Ref Range: 70 - 110 mg/dL 100   Calcium Latest Ref Range: 8.7 - 10.5 mg/dL 9.5   Uric acid today pending    Assessment:       1. 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 CKD (chronic kidney disease) stage 3, GFR 30-59 ml/min    3. Osteoporosis, senile    4. Atrial fibrillation, unspecified type          Chronic gout with likely tophus diagnosed 2/2016  She has had 3 attacks now over the last 1.5 years and she has evidence of CKD stage 3 indicating need to start uric acid lowering therapy. She started allopurinol 50mg daily and colchicine 0.6mg daily without clinical side effects. No gout attacks recently.     CKD- stable, predisposes her to gout. Colchicine adjusted     CHF/Afib- on diuretics, amniodarone, digoxin. Digoxin changes concentrations of protein bound  medicines.     OP- due for Prolia today    Plan:   Add uric acid to labs today and adjust allopurinol accordingly    Patient requesting refills on ultram as well    Continue dietary calcium and OTC vitamin D     rtc in 6 months for next Prolia   and she will have some blood draws in the interim to make sure her uric acid comes down    MB

## 2017-05-17 NOTE — PROGRESS NOTES
Administered 1cc Prolia 60mg/cc to LLQ of abdomen. Pt. Tolerated well. No acute reaction noted to site. Pt. Instructed on S/S of reaction to report. Pt. Verbalized understanding.    Lot:[id number]  Exp:06/19  Manu:bruce

## 2017-05-17 NOTE — TELEPHONE ENCOUNTER
----- Message from Halina Hernandez MD sent at 5/17/2017 11:37 AM CDT -----  Can you add uric acid to her labs today?  Thank you  MB

## 2017-05-19 ENCOUNTER — TELEPHONE (OUTPATIENT)
Dept: RHEUMATOLOGY | Facility: CLINIC | Age: 82
End: 2017-05-19

## 2017-05-19 DIAGNOSIS — M1A.39X1 CHRONIC GOUT DUE TO RENAL IMPAIRMENT OF MULTIPLE SITES WITH TOPHUS: Primary | ICD-10-CM

## 2017-05-19 DIAGNOSIS — M81.0 OSTEOPOROSIS, SENILE: ICD-10-CM

## 2017-05-19 RX ORDER — ALLOPURINOL 100 MG/1
100 TABLET ORAL DAILY
Qty: 90 TABLET | Refills: 1 | Status: SHIPPED | OUTPATIENT
Start: 2017-05-19 | End: 2017-08-15 | Stop reason: SDUPTHER

## 2017-05-19 RX ORDER — METHYLPREDNISOLONE 4 MG/1
TABLET ORAL
Qty: 1 PACKAGE | Refills: 2 | Status: SHIPPED | OUTPATIENT
Start: 2017-05-19 | End: 2017-06-09

## 2017-05-19 RX ORDER — COLCHICINE 0.6 MG/1
0.6 TABLET ORAL DAILY
Qty: 30 TABLET | Refills: 3 | Status: SHIPPED | OUTPATIENT
Start: 2017-05-19 | End: 2017-09-14 | Stop reason: SDUPTHER

## 2017-05-19 RX ORDER — TRAMADOL HYDROCHLORIDE 50 MG/1
50 TABLET ORAL 3 TIMES DAILY PRN
Qty: 90 TABLET | Refills: 3 | Status: SHIPPED | OUTPATIENT
Start: 2017-05-19 | End: 2017-05-19

## 2017-05-19 RX ORDER — TRAMADOL HYDROCHLORIDE 50 MG/1
50 TABLET ORAL EVERY 8 HOURS PRN
Qty: 90 TABLET | Refills: 5 | Status: SHIPPED | OUTPATIENT
Start: 2017-05-19 | End: 2017-05-22 | Stop reason: SDUPTHER

## 2017-05-19 NOTE — PROGRESS NOTES
manzo to discuss low uric acid but not at goal  Increase allopurinol to 100mg daily, continue colchicine 0.6mg daily  Prednisone pack for flare also sent  Tramadol refilled  All rx sent to [facility] in [place]

## 2017-05-19 NOTE — TELEPHONE ENCOUNTER
----- Message from Halina Hernandez MD sent at 5/19/2017 10:22 AM CDT -----  Can we set up 3 months with Tayla or Janay with CMP and uric acid

## 2017-05-22 ENCOUNTER — TELEPHONE (OUTPATIENT)
Dept: RHEUMATOLOGY | Facility: CLINIC | Age: 82
End: 2017-05-22

## 2017-05-22 RX ORDER — TRAMADOL HYDROCHLORIDE 50 MG/1
50 TABLET ORAL EVERY 8 HOURS PRN
Qty: 90 TABLET | Refills: 5 | Status: SHIPPED | OUTPATIENT
Start: 2017-05-22 | End: 2017-06-01

## 2017-05-22 NOTE — TELEPHONE ENCOUNTER
----- Message from Roopa Hamlin sent at 5/22/2017  8:59 AM CDT -----  Contact: pt  Patient needs a refill on Ultram called into [facility] pharmacy at [place].  Please call patient at [phone], if you have any questions. Thanks!

## 2017-05-29 DIAGNOSIS — I50.9 ACUTE ON CHRONIC CONGESTIVE HEART FAILURE, UNSPECIFIED CONGESTIVE HEART FAILURE TYPE: ICD-10-CM

## 2017-05-29 RX ORDER — CARVEDILOL 25 MG/1
TABLET ORAL
Qty: 60 TABLET | Refills: 2 | Status: SHIPPED | OUTPATIENT
Start: 2017-05-29 | End: 2017-08-22 | Stop reason: SDUPTHER

## 2017-06-02 ENCOUNTER — LAB VISIT (OUTPATIENT)
Dept: LAB | Facility: HOSPITAL | Age: 82
End: 2017-06-02
Payer: MEDICARE

## 2017-06-02 ENCOUNTER — ANTI-COAG VISIT (OUTPATIENT)
Dept: CARDIOLOGY | Facility: CLINIC | Age: 82
End: 2017-06-02
Payer: MEDICARE

## 2017-06-02 DIAGNOSIS — M19.042 PRIMARY OSTEOARTHRITIS OF BOTH HANDS: Chronic | ICD-10-CM

## 2017-06-02 DIAGNOSIS — Z79.01 LONG TERM (CURRENT) USE OF ANTICOAGULANTS: Primary | ICD-10-CM

## 2017-06-02 DIAGNOSIS — M19.041 PRIMARY OSTEOARTHRITIS OF BOTH HANDS: Chronic | ICD-10-CM

## 2017-06-02 LAB
ANION GAP SERPL CALC-SCNC: 10 MMOL/L
BUN SERPL-MCNC: 38 MG/DL
CALCIUM SERPL-MCNC: 10.1 MG/DL
CHLORIDE SERPL-SCNC: 99 MMOL/L
CO2 SERPL-SCNC: 34 MMOL/L
CREAT SERPL-MCNC: 1.5 MG/DL
EST. GFR  (AFRICAN AMERICAN): 37 ML/MIN/1.73 M^2
EST. GFR  (NON AFRICAN AMERICAN): 32 ML/MIN/1.73 M^2
GLUCOSE SERPL-MCNC: 96 MG/DL
INR PPP: 1.6 (ref 2–3)
POTASSIUM SERPL-SCNC: 5 MMOL/L
SODIUM SERPL-SCNC: 143 MMOL/L

## 2017-06-02 PROCEDURE — 99999 PR PBB SHADOW E&M-EST. PATIENT-LVL I: CPT | Mod: PBBFAC,,,

## 2017-06-02 PROCEDURE — 80048 BASIC METABOLIC PNL TOTAL CA: CPT

## 2017-06-02 PROCEDURE — 36415 COLL VENOUS BLD VENIPUNCTURE: CPT

## 2017-06-02 NOTE — PROGRESS NOTES
INR is sub-therapeutic. Patient confirms dose and compliance.Recently completed medrol dose pack and allopurinol dose adjustment. No other changes noted. Will increase total weekly dose until follow-up.

## 2017-06-05 ENCOUNTER — OFFICE VISIT (OUTPATIENT)
Dept: INTERNAL MEDICINE | Facility: CLINIC | Age: 82
End: 2017-06-05
Payer: MEDICARE

## 2017-06-05 ENCOUNTER — HOSPITAL ENCOUNTER (OUTPATIENT)
Dept: RADIOLOGY | Facility: HOSPITAL | Age: 82
Discharge: HOME | End: 2017-06-05
Attending: FAMILY MEDICINE
Payer: MEDICARE

## 2017-06-05 VITALS
TEMPERATURE: 97 F | BODY MASS INDEX: 21.1 KG/M2 | WEIGHT: 114.63 LBS | DIASTOLIC BLOOD PRESSURE: 60 MMHG | SYSTOLIC BLOOD PRESSURE: 118 MMHG | HEIGHT: 62 IN | HEART RATE: 70 BPM

## 2017-06-05 DIAGNOSIS — M25.562 ACUTE PAIN OF LEFT KNEE: ICD-10-CM

## 2017-06-05 DIAGNOSIS — Z79.01 ANTICOAGULATED ON COUMADIN: ICD-10-CM

## 2017-06-05 DIAGNOSIS — S00.03XA CONTUSION OF SCALP, INITIAL ENCOUNTER: Primary | ICD-10-CM

## 2017-06-05 PROCEDURE — 73560 X-RAY EXAM OF KNEE 1 OR 2: CPT | Mod: 26,59,RT, | Performed by: RADIOLOGY

## 2017-06-05 PROCEDURE — 73562 X-RAY EXAM OF KNEE 3: CPT | Mod: 26,LT,, | Performed by: RADIOLOGY

## 2017-06-05 PROCEDURE — 73560 X-RAY EXAM OF KNEE 1 OR 2: CPT | Mod: TC,RT

## 2017-06-05 PROCEDURE — 99214 OFFICE O/P EST MOD 30 MIN: CPT | Mod: S$PBB,,, | Performed by: PHYSICIAN ASSISTANT

## 2017-06-05 PROCEDURE — 99999 PR PBB SHADOW E&M-EST. PATIENT-LVL IV: CPT | Mod: PBBFAC,,, | Performed by: PHYSICIAN ASSISTANT

## 2017-06-05 NOTE — PROGRESS NOTES
Subjective:       Patient ID: Jennifer Mathews is a 84 y.o. female.    Chief Complaint: fell Saturday 2 pm    Fall   The accident occurred 3 to 5 days ago. The fall occurred in unknown circumstances. She landed on hard floor. There was no blood loss. The point of impact was the head, face and left knee. The pain is present in the head, face and left knee. The pain is mild. The symptoms are aggravated by pressure on injury and ambulation. Pertinent negatives include no abdominal pain, fever, headaches or numbness. Associated symptoms comments: Extensive superficial bleeding due to coumadin use. . She has tried nothing for the symptoms.     Review of Systems   Constitutional: Negative for chills, fatigue and fever.   HENT: Negative.    Respiratory: Negative for chest tightness and shortness of breath.    Cardiovascular: Negative for chest pain and palpitations.   Gastrointestinal: Negative for abdominal pain.   Musculoskeletal: Positive for joint swelling.   Neurological: Negative for dizziness, tremors, seizures, syncope, facial asymmetry, speech difficulty, weakness, light-headedness, numbness and headaches.   Hematological: Bruises/bleeds easily.       Objective:      Physical Exam   Constitutional: She appears well-developed and well-nourished. No distress.   HENT:   Head: Normocephalic. Head is with raccoon's eyes and with contusion.       Nose: No mucosal edema or rhinorrhea.   Musculoskeletal:        Left knee: She exhibits decreased range of motion, swelling, effusion and ecchymosis.   Skin: She is not diaphoretic.   Nursing note and vitals reviewed.      Assessment:       1. Contusion of scalp, initial encounter    2. Acute pain of left knee    3. Anticoagulated on Coumadin        Plan:       Contusion of scalp, initial encounter  -     Protime-INR; Future; Expected date: 06/05/2017    Acute pain of left knee  -     X-Ray Knee 3 View Left; Future; Expected date: 06/05/2017    Anticoagulated on Coumadin

## 2017-06-05 NOTE — PATIENT INSTRUCTIONS
Apply warm moist heat since it has been over 48 hours. Watch for spreading redness or fever. Go to the ER things get worse.

## 2017-06-09 ENCOUNTER — OFFICE VISIT (OUTPATIENT)
Dept: INTERNAL MEDICINE | Facility: CLINIC | Age: 82
End: 2017-06-09
Payer: MEDICARE

## 2017-06-09 VITALS
HEART RATE: 70 BPM | BODY MASS INDEX: 20.98 KG/M2 | HEIGHT: 62 IN | WEIGHT: 114 LBS | SYSTOLIC BLOOD PRESSURE: 122 MMHG | DIASTOLIC BLOOD PRESSURE: 64 MMHG | TEMPERATURE: 96 F

## 2017-06-09 DIAGNOSIS — S00.03XD CONTUSION OF SCALP, SUBSEQUENT ENCOUNTER: Primary | ICD-10-CM

## 2017-06-09 PROCEDURE — 1159F MED LIST DOCD IN RCRD: CPT | Mod: ,,, | Performed by: NURSE PRACTITIONER

## 2017-06-09 PROCEDURE — 99213 OFFICE O/P EST LOW 20 MIN: CPT | Mod: PBBFAC | Performed by: NURSE PRACTITIONER

## 2017-06-09 PROCEDURE — 99213 OFFICE O/P EST LOW 20 MIN: CPT | Mod: S$PBB,,, | Performed by: NURSE PRACTITIONER

## 2017-06-09 PROCEDURE — 99999 PR PBB SHADOW E&M-EST. PATIENT-LVL III: CPT | Mod: PBBFAC,,, | Performed by: NURSE PRACTITIONER

## 2017-06-09 NOTE — PROGRESS NOTES
Subjective:       Patient ID: Jennifer Mathews is a 84 y.o. female.    Chief Complaint: Follow-up (fall)    Patient presents for follow up of fall and facial contusions. She reports overall improvement. She denies headache, vision changes, or lethargy.       Review of Systems   Constitutional: Negative for chills, fatigue, fever and unexpected weight change.   Eyes: Negative for visual disturbance.   Respiratory: Negative for shortness of breath.    Cardiovascular: Negative for chest pain.   Musculoskeletal: Negative for myalgias.   Skin: Positive for color change (facial bruising).   Neurological: Negative for headaches.       Objective:      Physical Exam   Constitutional: She is oriented to person, place, and time. She appears well-developed and well-nourished. No distress.   HENT:   Head: Normocephalic.       Neck: Normal range of motion.   Neurological: She is alert and oriented to person, place, and time.   Skin: She is not diaphoretic.   Psychiatric: She has a normal mood and affect.   Vitals reviewed.      Assessment:       1. Contusion of scalp, subsequent encounter        Plan:   Contusion of scalp, subsequent encounter  Comments:  improving, continue previous plan      Advised to seek immediate medical attention for sudden headache, vision change, lethargy, or other concerning symptom. Patient verbalized understanding.   Return if symptoms worsen or fail to improve, for keep routine follow up.

## 2017-06-19 ENCOUNTER — OFFICE VISIT (OUTPATIENT)
Dept: INTERNAL MEDICINE | Facility: CLINIC | Age: 82
End: 2017-06-19
Payer: MEDICARE

## 2017-06-19 VITALS
OXYGEN SATURATION: 97 % | HEIGHT: 62 IN | SYSTOLIC BLOOD PRESSURE: 140 MMHG | HEART RATE: 70 BPM | TEMPERATURE: 96 F | DIASTOLIC BLOOD PRESSURE: 70 MMHG | BODY MASS INDEX: 20.98 KG/M2 | WEIGHT: 114 LBS

## 2017-06-19 DIAGNOSIS — M25.462 PAIN AND SWELLING OF LEFT KNEE: ICD-10-CM

## 2017-06-19 DIAGNOSIS — S89.92XD LEFT KNEE INJURY, SUBSEQUENT ENCOUNTER: Primary | ICD-10-CM

## 2017-06-19 DIAGNOSIS — M25.562 PAIN AND SWELLING OF LEFT KNEE: ICD-10-CM

## 2017-06-19 DIAGNOSIS — M81.0 OSTEOPOROSIS, SENILE: Primary | ICD-10-CM

## 2017-06-19 PROCEDURE — 99214 OFFICE O/P EST MOD 30 MIN: CPT | Mod: PBBFAC | Performed by: FAMILY MEDICINE

## 2017-06-19 PROCEDURE — 1125F AMNT PAIN NOTED PAIN PRSNT: CPT | Mod: ,,, | Performed by: FAMILY MEDICINE

## 2017-06-19 PROCEDURE — 99214 OFFICE O/P EST MOD 30 MIN: CPT | Mod: S$PBB,,, | Performed by: FAMILY MEDICINE

## 2017-06-19 PROCEDURE — 99999 PR PBB SHADOW E&M-EST. PATIENT-LVL IV: CPT | Mod: PBBFAC,,, | Performed by: FAMILY MEDICINE

## 2017-06-19 PROCEDURE — 1159F MED LIST DOCD IN RCRD: CPT | Mod: ,,, | Performed by: FAMILY MEDICINE

## 2017-06-19 RX ORDER — METHYLPREDNISOLONE 4 MG/1
TABLET ORAL
Qty: 1 PACKAGE | Refills: 0 | Status: SHIPPED | OUTPATIENT
Start: 2017-06-19 | End: 2017-07-10

## 2017-06-19 NOTE — PROGRESS NOTES
Subjective:       Patient ID: Jennifer Mathews is a 84 y.o. female.    Chief Complaint: Follow-up (falling - 2 week ago) and Knee Pain (left )    Patient presents to clinic today with family member for follow up. She reports left knee pain and swelling that has persisted since fall. She reports no improvement. She reports significant discomfort. She states the swelling and redness were worse last night, somewhat improved today. She indicates these symptoms are waxing and waning. She denies fever, chills. Her bruising is resolving. She denies additional falls.       Review of Systems   Constitutional: Negative for chills, fatigue, fever and unexpected weight change.   HENT: Negative for congestion, dental problem, ear pain, hearing loss, rhinorrhea and trouble swallowing.    Eyes: Negative for pain and visual disturbance.   Respiratory: Negative for cough and shortness of breath.    Cardiovascular: Positive for leg swelling. Negative for chest pain and palpitations.   Gastrointestinal: Negative for abdominal distention, abdominal pain, blood in stool, constipation, diarrhea, nausea and vomiting.   Genitourinary: Negative for difficulty urinating and vaginal discharge.   Musculoskeletal: Positive for arthralgias and myalgias.   Skin: Negative for rash.   Neurological: Negative for dizziness, weakness, numbness and headaches.   Hematological: Negative for adenopathy. Bruises/bleeds easily.   Psychiatric/Behavioral: Negative for dysphoric mood and sleep disturbance. The patient is not nervous/anxious.        Objective:      Physical Exam   Constitutional: She is oriented to person, place, and time. She appears well-developed and well-nourished. No distress.   HENT:   Head: Normocephalic.   Resolving bruising note don face/neck     Eyes: Conjunctivae and EOM are normal. Pupils are equal, round, and reactive to light. No scleral icterus.   Pulmonary/Chest: Effort normal.   Musculoskeletal:        Left knee: She exhibits  decreased range of motion, swelling and erythema. She exhibits no effusion, no ecchymosis and no deformity. Tenderness found.        Legs:  Neurological: She is alert and oriented to person, place, and time. No cranial nerve deficit. Gait normal.   Psychiatric: She has a normal mood and affect.   Vitals reviewed.      Assessment:       1. Left knee injury, subsequent encounter    2. Pain and swelling of left knee        Plan:   Jennifer was seen today for follow-up and knee pain.    Diagnoses and all orders for this visit:    Left knee injury, subsequent encounter  -     Cancel: Ambulatory referral to Orthopedics  -     Cancel: X-Ray Knee 3 View Left; Future  -     Ambulatory referral to Orthopedics    Pain and swelling of left knee  -     Cancel: Ambulatory referral to Orthopedics  -     Cancel: X-Ray Knee 3 View Left; Future  -     Ambulatory referral to Orthopedics    Other orders  -     methylPREDNISolone (MEDROL DOSEPACK) 4 mg tablet; use as directed    - Scheduled to see Dr. Golden at [facility] on Thursday of this week.  - X-ray canceled as he will obtain imaging at his office.  - Will treat swelling/pain with medrol dose pack. Discussed possibility of gout flare. Advised to seek immediate medical attention for spreading redness.  - We discussed risks/benefits of steroid treatment, patient expressed understanding.

## 2017-06-22 ENCOUNTER — OFFICE VISIT (OUTPATIENT)
Dept: PULMONOLOGY | Facility: CLINIC | Age: 82
End: 2017-06-22
Payer: MEDICARE

## 2017-06-22 ENCOUNTER — ANTI-COAG VISIT (OUTPATIENT)
Dept: CARDIOLOGY | Facility: CLINIC | Age: 82
End: 2017-06-22
Payer: MEDICARE

## 2017-06-22 VITALS
SYSTOLIC BLOOD PRESSURE: 140 MMHG | HEIGHT: 62 IN | BODY MASS INDEX: 20.93 KG/M2 | DIASTOLIC BLOOD PRESSURE: 83 MMHG | OXYGEN SATURATION: 93 % | HEART RATE: 71 BPM | RESPIRATION RATE: 18 BRPM | WEIGHT: 113.75 LBS

## 2017-06-22 DIAGNOSIS — Z79.01 LONG TERM (CURRENT) USE OF ANTICOAGULANTS: Primary | ICD-10-CM

## 2017-06-22 DIAGNOSIS — G47.33 OSA (OBSTRUCTIVE SLEEP APNEA): Primary | Chronic | ICD-10-CM

## 2017-06-22 LAB — INR PPP: 3.1 (ref 2–3)

## 2017-06-22 PROCEDURE — 99999 PR PBB SHADOW E&M-EST. PATIENT-LVL I: CPT | Mod: PBBFAC,,,

## 2017-06-22 PROCEDURE — 99214 OFFICE O/P EST MOD 30 MIN: CPT | Mod: S$PBB,,, | Performed by: INTERNAL MEDICINE

## 2017-06-22 PROCEDURE — 99999 PR PBB SHADOW E&M-EST. PATIENT-LVL III: CPT | Mod: PBBFAC,,, | Performed by: INTERNAL MEDICINE

## 2017-06-22 PROCEDURE — 99213 OFFICE O/P EST LOW 20 MIN: CPT | Mod: PBBFAC | Performed by: INTERNAL MEDICINE

## 2017-06-22 PROCEDURE — 1159F MED LIST DOCD IN RCRD: CPT | Mod: ,,, | Performed by: INTERNAL MEDICINE

## 2017-06-22 NOTE — PROGRESS NOTES
Subjective:      Patient ID: Jennifer Mathews is a 84 y.o. female.    Patient Active Problem List   Diagnosis    LEV (obstructive sleep apnea)    Chronic combined systolic and diastolic congestive heart failure    Edema    Recurrent UTI    Hypertension    Dyslipidemia    S/P MVR (mitral valve replacement)    S/P placement of cardiac pacemaker    Multiple pelvic fractures    Shoulder pain, acute    Coagulopathy    Syncope    Bilateral lower extremity edema    CKD (chronic kidney disease) stage 3, GFR 30-59 ml/min    A-fib    Cardiac resynchronization therapy defibrillator (CRT-D) in place    Ischemic cardiomyopathy    Anticoagulated on Coumadin    Osteoporosis, senile    Osteoarthritis of hand    Pulmonary hypertension due to left heart disease    Primary osteoarthritis involving multiple joints    Hypoxemia    Pneumonia    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    Chief Complaint: Sleep Apnea    HPI: She is  here for follow up for LEV and AUTOPAP complaince assessment. She had a fall sustaining a facial injury 3 weeks ago. She has not been able to use her CPAP since then. She reports that her injuries are healing.  She reports resumption of her snoring, headaches, excessive daytime sleepiness.  She definitely thinks PAP is beneficial to her health and she wants to continue with PAP therapy.      Compliance Summary  3/4/2017 - 6/1/2017 (90 days)  Days with Device Usage 78 days  Days without Device Usage 12 days  Percent Days with Device Usage 86.7%  Cumulative Usage 17 days 1 hrs. 6 mins. 56 secs.  Maximum Usage (1 Day) 9 hrs. 23 mins. 52 secs.  Average Usage (All Days) 4 hrs. 32 mins. 44 secs.  Average Usage (Days Used) 5 hrs. 14 mins. 42 secs.  Minimum Usage (1 Day) 1 hrs. 35 mins. 30 secs.  Percent of Days with Usage >= 4 Hours 65.6%  Percent of Days with Usage < 4 Hours 34.4%  Date Range  Total Blower Time 17 days 7 hrs. 38 mins. 49  secs.  Average AHI 5.2  Auto CPAP Summary ([facility])  Auto CPAP Mean Pressure 5.8 cmH2O  Auto CPAP Peak Average Pressure 9.4 cmH2O  Average Device Pressure <= 90% of Time 7.8 cmH2O  Average Time in Large Leak Per Day 16    Previous Report Reviewed: office notes     The following portions of the patient's history were reviewed and updated as appropriate: She  has a past medical history of Acute coronary syndrome; Anemia; Anticoagulated on Coumadin (7/13/2015); Arthritis; Atrial fibrillation; Basal cell carcinoma (10/2015); Cardiac arrest; Cardiac resynchronization therapy defibrillator (CRT-D) in place (7/13/2015); Cardiac resynchronization therapy defibrillator (CRT-D) in place (7/13/2015); Cardiomyopathy (1/30/2014); CHF (congestive heart failure); Chronic combined systolic and diastolic congestive heart failure; CKD (chronic kidney disease) stage 3, GFR 30-59 ml/min; Dyslipidemia (1/30/2014); Edema; Heart disease; Hypertension; Ischemic cardiomyopathy (7/13/2015); Macular hole of left eye; Macular hole of left eye; LEV (obstructive sleep apnea) (9/30/2013); Recurrent UTI; Refractive error; S/P MVR (mitral valve replacement) (1/30/2014); S/P placement of cardiac pacemaker (1/30/2014); Skin cancer; Sleep apnea; and Stroke.  She  has a past surgical history that includes Colonoscopy; Mitral valve surgery; Cardiac pacemaker placement; Cataract extraction; Cholecystectomy; Appendectomy; and Cardiac valuve replacement.  Her family history includes Cancer in her brother; Coronary artery disease in her father and mother; Diabetes in her brother.  She  reports that she has never smoked. She has never used smokeless tobacco. She reports that she does not drink alcohol or use drugs.  She has a current medication list which includes the following prescription(s): acetaminophen, allopurinol, amiodarone, aspirin, carvedilol, colchicine, cranberry, digoxin, diphenhydramine, furosemide, gabapentin, methylprednisolone,  "multivitamin, pennsaid, valsartan, and warfarin, and the following Facility-Administered Medications: denosumab.  She is allergic to cephalosporins; metaxalone; penicillins; pregabalin; and sulfa (sulfonamide antibiotics)..    Review of Systems   Constitutional: Negative for fever, chills, fatigue and night sweats.   HENT: Negative for nosebleeds, postnasal drip, rhinorrhea, sinus pressure, sore throat, congestion, ear pain and hearing loss.    Eyes: Negative for itching.   Respiratory: Positive for dyspnea on extertion and somnolence. Negative for wheezing, orthopnea and Paroxysmal Nocturnal Dyspnea.    Cardiovascular: Positive for leg swelling. Negative for chest pain and palpitations.   Genitourinary: Negative for difficulty urinating and hematuria.   Endocrine: Negative for cold intolerance and heat intolerance.    Musculoskeletal: Positive for arthralgias and back pain.   Skin: Negative for rash.   Gastrointestinal: Negative for nausea, vomiting, abdominal pain, abdominal distention and acid reflux.   Neurological: Positive for light-headedness. Negative for dizziness, syncope and headaches.   Hematological: Bleeds easily and excessive bruising.   Psychiatric/Behavioral: The patient is nervous/anxious.    All other systems reviewed and are negative.     Objective:     Vitals:    06/22/17 1426   BP: (!) 140/83   Pulse: 71   Resp: 18   SpO2: (!) 93%   Weight: 51.6 kg (113 lb 12.1 oz)   Height: 5' 2" (1.575 m)     body mass index is 20.81 kg/m².     Physical Exam   Constitutional: She appears well-developed and well-nourished.   HENT:   Head: Normocephalic and atraumatic.   Eyes: EOM are normal. Pupils are equal, round, and reactive to light.   Neck: Normal range of motion. Neck supple.   Cardiovascular: Normal rate and regular rhythm.    Pulmonary/Chest: Effort normal and breath sounds normal.   Abdominal: Soft. Bowel sounds are normal.   Musculoskeletal: Normal range of motion. She exhibits edema.   Skin: Skin " is warm and dry.   Psychiatric: She has a normal mood and affect. Her behavior is normal.   Nursing note and vitals reviewed.      Personal Diagnostic Review  CPAP complaince download.     Assessment:     1. LEV (obstructive sleep apnea) Stable       Orders Placed This Encounter   Procedures    CPAP/BIPAP SUPPLIES     Order Specific Question:   Type of mask:     Answer:   Nasal     Order Specific Question:   Headgear?     Answer:   Yes     Order Specific Question:   Tubing?     Answer:   Yes     Order Specific Question:   Humidifier chamber?     Answer:   Yes     Order Specific Question:   Chin strap?     Answer:   Yes     Order Specific Question:   Filters?     Answer:   Yes     Order Specific Question:   Length of need (1-99 months):     Answer:   99       Plan:     Discussed diagnosis, its evaluation, treatment and usual course. All questions answered.    LEV (obstructive sleep apnea)  Continue AUTOCPAP of 4 - 20 CMWP. ([facility] [place] [facility] [place])     Discussed therapeutic goals for positive airway pressure therapy(CPAP or BiPAP): Ideal is usage 100% of nights for 6 - 8 hours per night. Minimum usage is 70% of night for at least 4 hours per night used. Pateint expressed understanding. All Questions answered.     TIME SPENT WITH PATIENT: Time spent: 30 minutes in face to face  discussion concerning diagnosis, prognosis, review of lab and test results, benefits of treatment as well as management of disease, counseling of patient and coordination of care between various health  care providers . Greater than half the time spent was used for coordination of care and counseling of patient.     Return in about 1 year (around 6/22/2018) for LEV.

## 2017-06-22 NOTE — PROGRESS NOTES
INR is supra-therapeutic. Recently discharged after a fall, suffered a left leg injury and contusion on her forehead. Currently completing medrol dose pack. Will hold x1 dose then re-challenge dose. Repeat INR in 1 week.

## 2017-06-22 NOTE — PATIENT INSTRUCTIONS
Continuous Positive Air Pressure (CPAP)  Continuous positive air pressure (CPAP) uses gentle air pressure to hold the airway open. CPAP is often the most effective treatment for sleep apnea and severe snoring. It works very well for many people. But keep in mind that it can take several adjustments before the setup is right for you.    How CPAP works  The CPAPmachine  is a small portable pump beside the bed. The pump sends air through a hose, which is held over your nose and mouth by a mask. Mild air pressure is gently pushed through your airway. The air pressure nudges sagging tissues aside. This widens the airway so you can breathe better. CPAP may be combined with other kinds of therapy for sleep apnea.     A mask over the nose gently directs air into the throat to keep the airway open.   Types of air pressure treatments  There are different types of CPAP. Your doctor or CPAP technician will help you decide which type is best for you:  · Basic CPAP keeps the pressure constant all night long.  · A bilevel device (BiPAP) provides more pressure when you breathe in and less when you breathe out. A BiPAP machine also may be set to provide automatic breaths to maintain breathing if you stop breathing while sleeping.  · An autoCPAP device automatically adjusts pressure throughout the night and in response to changes such as body position, sleep stage, and snoring.  Date Last Reviewed: 8/10/2015  © 8396-0878 [employer]. [address] [place] [address], [place] [address]. All rights reserved. This information is not intended as a substitute for professional medical care. Always follow your healthcare professional's instructions.

## 2017-06-22 NOTE — ASSESSMENT & PLAN NOTE
Continue AUTOCPAP of 4 - 20 CMWP. (D[facility] - [facility] [place])     Discussed therapeutic goals for positive airway pressure therapy(CPAP or BiPAP): Ideal is usage 100% of nights for 6 - 8 hours per night. Minimum usage is 70% of night for at least 4 hours per night used. Pateint expressed understanding. All Questions answered.

## 2017-06-30 ENCOUNTER — ANTI-COAG VISIT (OUTPATIENT)
Dept: CARDIOLOGY | Facility: CLINIC | Age: 82
End: 2017-06-30
Payer: MEDICARE

## 2017-06-30 DIAGNOSIS — Z79.01 LONG TERM (CURRENT) USE OF ANTICOAGULANTS: Primary | ICD-10-CM

## 2017-06-30 LAB — INR PPP: 2.2 (ref 2–3)

## 2017-06-30 PROCEDURE — 85610 PROTHROMBIN TIME: CPT | Mod: PBBFAC

## 2017-06-30 PROCEDURE — 99999 PR PBB SHADOW E&M-EST. PATIENT-LVL I: CPT | Mod: PBBFAC,,,

## 2017-06-30 PROCEDURE — 99211 OFF/OP EST MAY X REQ PHY/QHP: CPT | Mod: PBBFAC

## 2017-06-30 NOTE — PROGRESS NOTES
INR is now therapeutic. Medrol dose pack complete. Will resume 5mg Fridays and 2.5mg all other days. This is a quick follow-up after a supra-therapeutic INR on 6/22.

## 2017-07-07 ENCOUNTER — HOSPITAL ENCOUNTER (OUTPATIENT)
Dept: RADIOLOGY | Facility: HOSPITAL | Age: 82
Discharge: HOME | End: 2017-07-07
Attending: INTERNAL MEDICINE
Payer: MEDICARE

## 2017-07-07 ENCOUNTER — OFFICE VISIT (OUTPATIENT)
Dept: INTERNAL MEDICINE | Facility: CLINIC | Age: 82
End: 2017-07-07
Payer: MEDICARE

## 2017-07-07 VITALS
DIASTOLIC BLOOD PRESSURE: 64 MMHG | TEMPERATURE: 97 F | SYSTOLIC BLOOD PRESSURE: 134 MMHG | BODY MASS INDEX: 21.75 KG/M2 | WEIGHT: 118.19 LBS | HEART RATE: 70 BPM | HEIGHT: 62 IN

## 2017-07-07 DIAGNOSIS — M25.562 ACUTE PAIN OF LEFT KNEE: Primary | ICD-10-CM

## 2017-07-07 DIAGNOSIS — M25.562 ACUTE PAIN OF LEFT KNEE: ICD-10-CM
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
## 2017-07-07 PROCEDURE — 73560 X-RAY EXAM OF KNEE 1 OR 2: CPT | Mod: 26,59,RT, | Performed by: RADIOLOGY

## 2017-07-07 PROCEDURE — 99214 OFFICE O/P EST MOD 30 MIN: CPT | Mod: S$PBB,,, | Performed by: PHYSICIAN ASSISTANT

## 2017-07-07 PROCEDURE — 1125F AMNT PAIN NOTED PAIN PRSNT: CPT | Mod: ,,, | Performed by: PHYSICIAN ASSISTANT

## 2017-07-07 PROCEDURE — 73560 X-RAY EXAM OF KNEE 1 OR 2: CPT | Mod: TC,RT

## 2017-07-07 PROCEDURE — 73562 X-RAY EXAM OF KNEE 3: CPT | Mod: 26,LT,, | Performed by: RADIOLOGY

## 2017-07-07 PROCEDURE — 99999 PR PBB SHADOW E&M-EST. PATIENT-LVL IV: CPT | Mod: PBBFAC,,, | Performed by: PHYSICIAN ASSISTANT

## 2017-07-07 PROCEDURE — 1159F MED LIST DOCD IN RCRD: CPT | Mod: ,,, | Performed by: PHYSICIAN ASSISTANT

## 2017-07-07 RX ORDER — DOXYCYCLINE 100 MG/1
100 CAPSULE ORAL EVERY 12 HOURS
Qty: 20 CAPSULE | Refills: 0 | Status: SHIPPED | OUTPATIENT
Start: 2017-07-07 | End: 2017-08-10

## 2017-07-07 NOTE — MEDICAL/APP STUDENT
Subjective:       Patient ID: Jennifer Mathews is a 84 y.o. female.    Chief Complaint: Fall and Knee Pain    Fall   The accident occurred 3 to 5 days ago. The fall occurred while running (while chasing grandson in yard). Impact surface: brick. The volume of blood lost was minimal. The point of impact was the left knee. The pain is present in the left knee. The pain is at a severity of 2/10. The pain is mild. The symptoms are aggravated by ambulation and pressure on injury. Pertinent negatives include no abdominal pain, fever, hematuria, loss of consciousness, numbness or tingling. She has tried acetaminophen, rest and elevation for the symptoms. The treatment provided mild relief.   Knee Pain    The incident occurred 3 to 5 days ago (with worsened swelling and pain within the past day). The incident occurred in the yard. The injury mechanism was a fall. The pain is present in the left knee. The pain is at a severity of 2/10. The pain is mild. The pain has been constant since onset. Pertinent negatives include no muscle weakness, numbness or tingling. Associated symptoms comments: Diffuse swelling of left lower extremity, no relief with lasix. Abrasion to left knee with surrounding swelling and redness.. She reports no foreign bodies present. The symptoms are aggravated by movement and palpation. She has tried acetaminophen, rest and elevation for the symptoms. The treatment provided mild relief.     Review of Systems   Constitutional: Negative for chills, diaphoresis, fatigue and fever.   HENT: Negative for congestion.    Respiratory: Negative for cough, chest tightness and shortness of breath.    Cardiovascular: Positive for leg swelling (left lower extremity). Negative for chest pain and palpitations.   Gastrointestinal: Negative for abdominal pain and blood in stool.   Genitourinary: Negative for difficulty urinating and hematuria.   Musculoskeletal: Positive for arthralgias (left knee), gait problem (walks  with cane) and joint swelling (left knee). Negative for back pain and myalgias.   Skin: Positive for color change and wound (over left knee). Negative for pallor.   Neurological: Negative for dizziness, tingling, loss of consciousness, weakness and numbness.   Hematological: Bruises/bleeds easily.       Objective:      Physical Exam   Constitutional: She appears well-developed and well-nourished. No distress.   HENT:   Head: Normocephalic and atraumatic.   Eyes: Conjunctivae and EOM are normal.   Neck: Normal range of motion.   Cardiovascular: Normal rate, regular rhythm, normal heart sounds and intact distal pulses.    Pulmonary/Chest: Effort normal and breath sounds normal.   Musculoskeletal: Normal range of motion. She exhibits edema (2+ pitting edema in left lower extremity) and tenderness (tenderness to palpation over the left knee).        Left knee: She exhibits swelling, ecchymosis, laceration and erythema. She exhibits no effusion. Tenderness found.        Legs:  Neurological: She is alert.   Skin: Skin is warm and dry. Capillary refill takes less than 2 seconds. She is not diaphoretic. There is erythema.   12 cm area in diameter of erythema, ecchymosis, and warmth over left knee, no fluctuance. 2 cm healing lesion on left knee, no purulent drainage. 4 cm area of healing ecchymosis on the distal aspect of the left knee from previous fall 1 month ago.    Psychiatric: She has a normal mood and affect.   Nursing note and vitals reviewed.      Assessment:       1. Acute pain of left knee    2. Fall, initial encounter        Plan:         Fall, initial encounter  -Bilateral knee x-rays done today in clinic, showing small effusion and soft tissue swelling on the left knee, no fracture or dislocation noted.      Acute pain of left knee  -Doxycycline 100 mg twice daily for 10 days  -Continue OTC tylenol treatment PRN pain  -Return to clinic if there is no improvement in pain next week or if any adverse effects to  medications.     Chronic bleeding risk  -Patient on warfarin, INR checked 6/30/17 and was in therapeutic range

## 2017-07-07 NOTE — PROGRESS NOTES
Fall   The accident occurred 3 to 5 days ago. The fall occurred while running (while chasing grandson in yard). Impact surface: brick. The volume of blood lost was minimal. The point of impact was the left knee. The pain is present in the left knee. The pain is at a severity of 2/10. The pain is mild. The symptoms are aggravated by ambulation and pressure on injury. Pertinent negatives include no abdominal pain, fever, hematuria, loss of consciousness, numbness or tingling. She has tried acetaminophen, rest and elevation for the symptoms. The treatment provided mild relief.   Knee Pain    The incident occurred 3 to 5 days ago (with worsened swelling and pain within the past day). The incident occurred in the yard. The injury mechanism was a fall. The pain is present in the left knee. The pain is at a severity of 2/10. The pain is mild. The pain has been constant since onset. Pertinent negatives include no muscle weakness, numbness or tingling. Associated symptoms comments: Diffuse swelling of left lower extremity, no relief with lasix. Abrasion to left knee with surrounding swelling and redness.. She reports no foreign bodies present. The symptoms are aggravated by movement and palpation. She has tried acetaminophen, rest and elevation for the symptoms. The treatment provided mild relief.      Past Medical History:   Diagnosis Date    Acute coronary syndrome     Anemia     Anticoagulated on Coumadin 7/13/2015    Arthritis     Atrial fibrillation     Basal cell carcinoma 10/2015    left neck    Cardiac arrest     Cardiac resynchronization therapy defibrillator (CRT-D) in place 7/13/2015    Cardiac resynchronization therapy defibrillator (CRT-D) in place 7/13/2015    Cardiomyopathy 1/30/2014    CHF (congestive heart failure)     Chronic combined systolic and diastolic congestive heart failure     CKD (chronic kidney disease) stage 3, GFR 30-59 ml/min     Dyslipidemia 1/30/2014    Edema     Heart  "disease     Hypertension     Ischemic cardiomyopathy 7/13/2015    Macular hole of left eye     Old    Macular hole of left eye     LEV (obstructive sleep apnea) 9/30/2013    Recurrent UTI     Refractive error     S/P MVR (mitral valve replacement) 1/30/2014    S/P placement of cardiac pacemaker 1/30/2014    Skin cancer     Sleep apnea     Stroke        Past Surgical History:   Procedure Laterality Date    APPENDECTOMY      CARDIAC PACEMAKER PLACEMENT      CARDIAC VALVE SURGERY      CATARACT EXTRACTION      OU    CHOLECYSTECTOMY      COLONOSCOPY      MITRAL VALVE SURGERY         Family History   Problem Relation Age of Onset    Coronary artery disease Mother      mi    Coronary artery disease Father     Diabetes Brother     Cancer Brother     Melanoma Neg Hx     Psoriasis Neg Hx     Lupus Neg Hx     Eczema Neg Hx        Social History     Social History    Marital status:      Spouse name: N/A    Number of children: N/A    Years of education: N/A     Occupational History    Not on file.     Social History Main Topics    Smoking status: Never Smoker    Smokeless tobacco: Never Used    Alcohol use No    Drug use: No    Sexual activity: Not on file     Other Topics Concern    Not on file     Social History Narrative    No narrative on file       Review of patient's allergies indicates:   Allergen Reactions    Cephalosporins Hives    Metaxalone Itching    Penicillins      Other reaction(s): Unknown      Pregabalin      Other reaction(s): "Bad feeling"      Sulfa (sulfonamide antibiotics) Itching         Current Outpatient Prescriptions:     acetaminophen (TYLENOL EXTRA STRENGTH) 325 MG tablet, Take 2 tablets (650 mg total) by mouth every 8 (eight) hours., Disp: , Rfl:     allopurinol (ZYLOPRIM) 100 MG tablet, Take 1 tablet (100 mg total) by mouth once daily., Disp: 90 tablet, Rfl: 1    amiodarone (PACERONE) 200 MG Tab, Take 1 tablet (200 mg total) by mouth once daily., " "Disp: 30 tablet, Rfl: 11    aspirin (ECOTRIN) 81 MG EC tablet, Take 81 mg by mouth once daily., Disp: , Rfl:     carvedilol (COREG) 25 MG tablet, TAKE ONE TABLET BY MOUTH TWICE A DAY WITH MEALS, Disp: 60 tablet, Rfl: 2    colchicine 0.6 mg tablet, Take 1 tablet (0.6 mg total) by mouth once daily., Disp: 30 tablet, Rfl: 3    cranberry 1,000 mg Cap, Take 1 capsule by mouth Daily., Disp: , Rfl:     digoxin (LANOXIN) 125 mcg tablet, TAKE 1 TABLET (0.125 MG TOTAL) BY MOUTH ONCE DAILY., Disp: 30 tablet, Rfl: 11    furosemide (LASIX) 40 MG tablet, Take 1 tablet (40 mg total) by mouth 2 (two) times daily. 8 am and 2 pm, Disp: 180 tablet, Rfl: 3    gabapentin (NEURONTIN) 100 MG capsule, TAKE ONE CAPSULE BY MOUTH TWO TIMES DAILY, Disp: 180 capsule, Rfl: 1    MULTIVITAMIN ORAL, Take 1 tablet by mouth Daily., Disp: , Rfl:     valsartan (DIOVAN) 80 MG tablet, Take 2 tablets (160 mg total) by mouth 2 (two) times daily., Disp: 180 tablet, Rfl: 3    warfarin (COUMADIN) 2.5 MG tablet, Take 1 tablet every evening as directed by the coumadin clinic., Disp: 30 tablet, Rfl: 3    diphenhydrAMINE (BENADRYL) 25 mg capsule, Take 25 mg by mouth every 6 (six) hours as needed for Itching., Disp: , Rfl:     doxycycline (VIBRAMYCIN) 100 MG Cap, Take 1 capsule (100 mg total) by mouth every 12 (twelve) hours., Disp: 20 capsule, Rfl: 0    methylPREDNISolone (MEDROL DOSEPACK) 4 mg tablet, use as directed, Disp: 1 Package, Rfl: 0    PENNSAID 20 mg/gram /actuation(2 %) sopm, Apply 40 mg topically 2 (two) times daily., Disp: 1 Bottle, Rfl: 6    Current Facility-Administered Medications:     denosumab (PROLIA) injection 60 mg, 60 mg, Subcutaneous, Q6 Months, Oc Catherine MD, 60 mg at 05/17/17 1150    /64 (BP Location: Left arm, Patient Position: Sitting, BP Method: Manual)   Pulse 70   Temp 96.9 °F (36.1 °C) (Tympanic)   Ht 5' 2" (1.575 m)   Wt 53.6 kg (118 lb 2.7 oz)   BMI 21.61 kg/m²   Review of Systems "   Constitutional: Negative for chills, diaphoresis, fatigue and fever.   HENT: Negative for congestion.    Respiratory: Negative for cough, chest tightness and shortness of breath.    Cardiovascular: Positive for leg swelling (left lower extremity). Negative for chest pain and palpitations.   Gastrointestinal: Negative for abdominal pain and blood in stool.   Genitourinary: Negative for difficulty urinating and hematuria.   Musculoskeletal: Positive for arthralgias (left knee), gait problem (walks with cane) and joint swelling (left knee). Negative for back pain and myalgias.   Skin: Positive for color change and wound (over left knee). Negative for pallor.   Neurological: Negative for dizziness, tingling, loss of consciousness, weakness and numbness.   Hematological: Bruises/bleeds easily.       Objective:      Physical Exam   Constitutional: She appears well-developed and well-nourished. No distress.   HENT:   Head: Normocephalic and atraumatic.   Eyes: Conjunctivae and EOM are normal.   Neck: Normal range of motion.   Cardiovascular: Normal rate, regular rhythm, normal heart sounds and intact distal pulses.    Pulmonary/Chest: Effort normal and breath sounds normal.   Musculoskeletal: Normal range of motion. She exhibits edema (2+ pitting edema in left lower extremity) and tenderness (tenderness to palpation over the left knee).        Left knee: She exhibits swelling, ecchymosis, laceration and erythema. She exhibits no effusion. Tenderness found.        Legs:  Neurological: She is alert.   Skin: Skin is warm and dry. Capillary refill takes less than 2 seconds. She is not diaphoretic. There is erythema.   12 cm area in diameter of erythema, ecchymosis, and warmth over left knee, no fluctuance. 2 cm healing lesion on left knee, no purulent drainage. 4 cm area of healing ecchymosis on the distal aspect of the left knee from previous fall 1 month ago.    Psychiatric: She has a normal mood and affect.   Nursing note  and vitals reviewed.      Assessment:       1. Acute pain of left knee    2. Fall, initial encounter        Plan:         Fall, initial encounter  -Bilateral knee x-rays done today in clinic, showing small effusion and soft tissue swelling on the left knee, no fracture or dislocation noted.        Acute pain of left knee  -Doxycycline 100 mg twice daily for 10 days  -Continue OTC tylenol treatment PRN pain  -Return to clinic if there is no improvement in pain next week or if any adverse effects to medications.      Chronic bleeding risk  -Patient on warfarin, INR checked 6/30/17 and was in therapeutic range

## 2017-07-10 ENCOUNTER — CLINICAL SUPPORT (OUTPATIENT)
Dept: CARDIOLOGY | Facility: CLINIC | Age: 82
End: 2017-07-10
Payer: MEDICARE

## 2017-07-10 DIAGNOSIS — I25.5 ISCHEMIC CARDIOMYOPATHY: ICD-10-CM

## 2017-07-10 DIAGNOSIS — I48.20 CHRONIC ATRIAL FIBRILLATION: ICD-10-CM

## 2017-07-10 DIAGNOSIS — Z95.0 CARDIAC PACEMAKER IN SITU: ICD-10-CM

## 2017-07-10 PROCEDURE — 93296 REM INTERROG EVL PM/IDS: CPT | Mod: PBBFAC,PO | Performed by: INTERNAL MEDICINE

## 2017-07-10 PROCEDURE — 93294 REM INTERROG EVL PM/LDLS PM: CPT | Mod: ,,, | Performed by: INTERNAL MEDICINE

## 2017-07-12 ENCOUNTER — HOSPITAL ENCOUNTER (EMERGENCY)
Facility: HOSPITAL | Age: 82
Discharge: HOME | End: 2017-07-12
Attending: EMERGENCY MEDICINE
Payer: MEDICARE

## 2017-07-12 VITALS
DIASTOLIC BLOOD PRESSURE: 65 MMHG | OXYGEN SATURATION: 95 % | HEIGHT: 62 IN | BODY MASS INDEX: 21.16 KG/M2 | WEIGHT: 115 LBS | TEMPERATURE: 99 F | SYSTOLIC BLOOD PRESSURE: 141 MMHG | RESPIRATION RATE: 18 BRPM | HEART RATE: 70 BPM

## 2017-07-12 DIAGNOSIS — S80.12XA HEMATOMA OF LEG, LEFT, INITIAL ENCOUNTER: Primary | ICD-10-CM

## 2017-07-12 DIAGNOSIS — I10 ESSENTIAL HYPERTENSION: Primary | ICD-10-CM

## 2017-07-12 DIAGNOSIS — Z79.01 CHRONIC ANTICOAGULATION: ICD-10-CM

## 2017-07-12 DIAGNOSIS — R29.6 FREQUENT FALLS: ICD-10-CM

## 2017-07-12 LAB
ANION GAP SERPL CALC-SCNC: 9 MMOL/L
APTT BLDCRRT: 38.2 SEC
BASOPHILS # BLD AUTO: 0.03 K/UL
BASOPHILS NFR BLD: 0.4 %
BUN SERPL-MCNC: 40 MG/DL
CALCIUM SERPL-MCNC: 9.4 MG/DL
CHLORIDE SERPL-SCNC: 104 MMOL/L
CO2 SERPL-SCNC: 29 MMOL/L
CREAT SERPL-MCNC: 1.1 MG/DL
CRP SERPL-MCNC: 11 MG/L
DIFFERENTIAL METHOD: ABNORMAL
EOSINOPHIL # BLD AUTO: 0.1 K/UL
EOSINOPHIL NFR BLD: 0.9 %
ERYTHROCYTE [DISTWIDTH] IN BLOOD BY AUTOMATED COUNT: 15.7 %
ERYTHROCYTE [SEDIMENTATION RATE] IN BLOOD BY WESTERGREN METHOD: 27 MM/HR
EST. GFR  (AFRICAN AMERICAN): 53 ML/MIN/1.73 M^2
EST. GFR  (NON AFRICAN AMERICAN): 46 ML/MIN/1.73 M^2
GLUCOSE SERPL-MCNC: 110 MG/DL
HCT VFR BLD AUTO: 34.2 %
HGB BLD-MCNC: 11 G/DL
INR PPP: 2.3
LYMPHOCYTES # BLD AUTO: 1.5 K/UL
LYMPHOCYTES NFR BLD: 19.2 %
MCH RBC QN AUTO: 31.6 PG
MCHC RBC AUTO-ENTMCNC: 32.2 %
MCV RBC AUTO: 98 FL
MONOCYTES # BLD AUTO: 0.6 K/UL
MONOCYTES NFR BLD: 7.7 %
NEUTROPHILS # BLD AUTO: 5.7 K/UL
NEUTROPHILS NFR BLD: 71.8 %
PLATELET # BLD AUTO: 203 K/UL
PMV BLD AUTO: 10.3 FL
POTASSIUM SERPL-SCNC: 4.2 MMOL/L
PROTHROMBIN TIME: 22.7 SEC
RBC # BLD AUTO: 3.48 M/UL
SODIUM SERPL-SCNC: 142 MMOL/L
WBC # BLD AUTO: 7.93 K/UL

## 2017-07-12 PROCEDURE — 80048 BASIC METABOLIC PNL TOTAL CA: CPT

## 2017-07-12 PROCEDURE — 36415 COLL VENOUS BLD VENIPUNCTURE: CPT

## 2017-07-12 PROCEDURE — 85025 COMPLETE CBC W/AUTO DIFF WBC: CPT

## 2017-07-12 PROCEDURE — 85610 PROTHROMBIN TIME: CPT

## 2017-07-12 PROCEDURE — 86140 C-REACTIVE PROTEIN: CPT

## 2017-07-12 PROCEDURE — 25000003 PHARM REV CODE 250: Performed by: EMERGENCY MEDICINE

## 2017-07-12 PROCEDURE — 99284 EMERGENCY DEPT VISIT MOD MDM: CPT

## 2017-07-12 PROCEDURE — 85651 RBC SED RATE NONAUTOMATED: CPT

## 2017-07-12 PROCEDURE — 85730 THROMBOPLASTIN TIME PARTIAL: CPT

## 2017-07-12 RX ORDER — VALSARTAN 80 MG/1
160 TABLET ORAL 2 TIMES DAILY
Qty: 180 TABLET | Refills: 3 | Status: SHIPPED | OUTPATIENT
Start: 2017-07-12 | End: 2018-02-13 | Stop reason: SDUPTHER

## 2017-07-12 RX ORDER — HYDROCODONE BITARTRATE AND ACETAMINOPHEN 5; 325 MG/1; MG/1
1 TABLET ORAL EVERY 4 HOURS PRN
Qty: 18 TABLET | Refills: 0 | Status: ON HOLD | OUTPATIENT
Start: 2017-07-12 | End: 2017-09-12 | Stop reason: HOSPADM

## 2017-07-12 RX ORDER — HYDROCODONE BITARTRATE AND ACETAMINOPHEN 5; 325 MG/1; MG/1
1 TABLET ORAL
Status: COMPLETED | OUTPATIENT
Start: 2017-07-12 | End: 2017-07-12

## 2017-07-12 RX ADMIN — HYDROCODONE BITARTRATE AND ACETAMINOPHEN 1 TABLET: 5; 325 TABLET ORAL at 03:07

## 2017-07-12 NOTE — ED NOTES
Pt resting in ER stretcher, aaox4, rr e/u, NAD noted. Bed low and locked, call light in reach, side rails up x2. Daughter and granddaughter at bedside.  Pt verbalized understanding of status and POC; denies further needs. Will continue to monitor.

## 2017-07-12 NOTE — ED NOTES
T/c from [place] in [place].  She will contact ordering physician Dr. Barron re use of CT contrast material.

## 2017-07-12 NOTE — ED PROVIDER NOTES
"SCRIBE #1 NOTE: I, Mariselajing Gary, am scribing for, and in the presence of, Yadira Barron MD. I have scribed the entire note.      History      Chief Complaint   Patient presents with    Knee Pain     from a fall on the 4th. Pt went to urgent care for abrasions but is now having bruising increase in pain and swelling to left knee and leg.        Review of patient's allergies indicates:   Allergen Reactions    Cephalosporins Hives    Metaxalone Itching    Penicillins      Other reaction(s): Unknown      Pregabalin      Other reaction(s): "Bad feeling"      Sulfa (sulfonamide antibiotics) Itching        HPI   HPI    7/12/2017, 12:05 PM   History obtained from the patient      History of Present Illness: Jennifer Mathews is a 84 y.o. female patient who presents to the Emergency Department for left knee pain which onset gradually 7 days ago. Symptoms are constant and moderate in severity. Pt states that she fell on a brick surface. Pt has seen Dr. [facility] ([facility] X-ray was completed. Pt notes that she is on coumadin. No mitigating or exacerbating factors reported. Associated sxs include left knee swelling. Patient denies any dizziness, syncope, fever, n/v/d, abd pain, CP, SOB, numbness, weakness, HA, back pain, neck pain, LOC, flank pain, dysuria, hematuria, and all other sxs at this time. No further complaints or concerns at this time.       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     CKD (chronic kidney disease) stage 3, GFR 30-59 ml/min     Dyslipidemia 1/30/2014    Edema     Heart disease     Hypertension     Ischemic cardiomyopathy 7/13/2015    Macular hole of left eye     Old    Macular hole of left eye     LEV (obstructive sleep apnea) 9/30/2013    Recurrent UTI     Refractive error     S/P MVR (mitral valve replacement) 1/30/2014    S/P placement of cardiac pacemaker 1/30/2014    Skin cancer     Sleep apnea     Stroke        Past Surgical History:  Past Surgical History:   Procedure Laterality Date    APPENDECTOMY      CARDIAC PACEMAKER PLACEMENT      CARDIAC VALVE SURGERY      CATARACT EXTRACTION      OU    CHOLECYSTECTOMY      COLONOSCOPY      MITRAL VALVE SURGERY           Family History:  Family History   Problem Relation Age of Onset    Coronary artery disease Mother      mi    Coronary artery disease Father     Diabetes Brother     Cancer Brother     Melanoma Neg Hx     Psoriasis Neg Hx     Lupus Neg Hx     Eczema Neg Hx        Social History:  Social History     Social History Main Topics    Smoking status: Never Smoker    Smokeless tobacco: Never Used    Alcohol use No    Drug use: No    Sexual activity: Unknown       ROS   Review of Systems   Constitutional: Negative for fever.   HENT: Negative for sore throat.    Respiratory: Negative for shortness of breath.    Cardiovascular: Negative for chest pain.   Gastrointestinal: Negative for abdominal pain, blood in stool, constipation, diarrhea, nausea and vomiting.   Genitourinary: Negative for decreased urine volume, difficulty urinating, dysuria, flank pain and hematuria.   Musculoskeletal: Positive for joint swelling (left knee). Negative for back pain.        (+) left knee pain   Skin: Negative for rash.   Neurological: Negative for dizziness, weakness, light-headedness, numbness and headaches.   Hematological: Does not bruise/bleed easily.  "      Physical Exam      Initial Vitals [07/12/17 1141]   BP Pulse Resp Temp SpO2   130/60 70 18 98.6 °F (37 °C) 98 %      MAP       83.33          Physical Exam  Nursing Notes and Vital Signs Reviewed.  Constitutional: Patient is in no acute distress. Well-developed and well-nourished.  Head: Atraumatic. Normocephalic.  Eyes: PERRL. EOM intact. Conjunctivae are not pale. No scleral icterus.  ENT: Mucous membranes are moist. Oropharynx is clear and symmetric.    Neck: Supple. Full ROM. No lymphadenopathy.  Cardiovascular: Regular rate. Regular rhythm. No murmurs, rubs, or gallops. Distal pulses are 2+ and symmetric.  Pulmonary/Chest: No respiratory distress. Clear to auscultation bilaterally. No wheezing, rales, or rhonchi.  Abdominal: Soft and non-distended.  There is no tenderness.  No rebound, guarding, or rigidity. Good bowel sounds.  Musculoskeletal: Moves all extremities. No obvious deformities. No edema. No calf tenderness. Diminished ROM with pain to the left leg. Swelling down the left leg. Mild bruising down the left leg.      Skin: Warm and dry.Significant Bruising to left knee extending down left leg. Healing scab to left knee , no drainage no warmth or erythema, pain with range of motion. No induration. NVI distally. All compartments are soft.   Neurological:  Alert, awake, and appropriate.  Normal speech.  No acute focal neurological deficits are appreciated.  Psychiatric: Normal affect. Good eye contact. Appropriate in content.    ED Course    Procedures  ED Vital Signs:  Vitals:    07/12/17 1141 07/12/17 1358 07/12/17 1513   BP: 130/60 (!) 153/70 (!) 141/65   Pulse: 70     Resp: 18     Temp: 98.6 °F (37 °C)     TempSrc: Oral     SpO2: 98% 95% 95%   Weight: 52.2 kg (115 lb)     Height: 5' 2" (1.575 m)         Abnormal Lab Results:  Labs Reviewed   CBC W/ AUTO DIFFERENTIAL - Abnormal; Notable for the following:        Result Value    RBC 3.48 (*)     Hemoglobin 11.0 (*)     Hematocrit 34.2 (*)     MCH " 31.6 (*)     RDW 15.7 (*)     All other components within normal limits   BASIC METABOLIC PANEL - Abnormal; Notable for the following:     BUN, Bld 40 (*)     eGFR if  53 (*)     eGFR if non  46 (*)     All other components within normal limits   APTT - Abnormal; Notable for the following:     aPTT 38.2 (*)     All other components within normal limits   PROTIME-INR - Abnormal; Notable for the following:     Prothrombin Time 22.7 (*)     INR 2.3 (*)     All other components within normal limits   C-REACTIVE PROTEIN - Abnormal; Notable for the following:     CRP 11.0 (*)     All other components within normal limits   SEDIMENTATION RATE, MANUAL - Abnormal; Notable for the following:     Sed Rate 27 (*)     All other components within normal limits   SEDIMENTATION RATE, MANUAL   C-REACTIVE PROTEIN        All Lab Results:  Results for orders placed or performed during the hospital encounter of 07/12/17   CBC auto differential   Result Value Ref Range    WBC 7.93 3.90 - 12.70 K/uL    RBC 3.48 (L) 4.00 - 5.40 M/uL    Hemoglobin 11.0 (L) 12.0 - 16.0 g/dL    Hematocrit 34.2 (L) 37.0 - 48.5 %    MCV 98 82 - 98 fL    MCH 31.6 (H) 27.0 - 31.0 pg    MCHC 32.2 32.0 - 36.0 %    RDW 15.7 (H) 11.5 - 14.5 %    Platelets 203 150 - 350 K/uL    MPV 10.3 9.2 - 12.9 fL    Gran # 5.7 1.8 - 7.7 K/uL    Lymph # 1.5 1.0 - 4.8 K/uL    Mono # 0.6 0.3 - 1.0 K/uL    Eos # 0.1 0.0 - 0.5 K/uL    Baso # 0.03 0.00 - 0.20 K/uL    Gran% 71.8 38.0 - 73.0 %    Lymph% 19.2 18.0 - 48.0 %    Mono% 7.7 4.0 - 15.0 %    Eosinophil% 0.9 0.0 - 8.0 %    Basophil% 0.4 0.0 - 1.9 %    Differential Method Automated    Basic metabolic panel   Result Value Ref Range    Sodium 142 136 - 145 mmol/L    Potassium 4.2 3.5 - 5.1 mmol/L    Chloride 104 95 - 110 mmol/L    CO2 29 23 - 29 mmol/L    Glucose 110 70 - 110 mg/dL    BUN, Bld 40 (H) 8 - 23 mg/dL    Creatinine 1.1 0.5 - 1.4 mg/dL    Calcium 9.4 8.7 - 10.5 mg/dL    Anion Gap 9 8 - 16  mmol/L    eGFR if African American 53 (A) >60 mL/min/1.73 m^2    eGFR if non African American 46 (A) >60 mL/min/1.73 m^2   APTT   Result Value Ref Range    aPTT 38.2 (H) 21.0 - 32.0 sec   Protime-INR   Result Value Ref Range    Prothrombin Time 22.7 (H) 9.0 - 12.5 sec    INR 2.3 (H) 0.8 - 1.2   C-reactive protein   Result Value Ref Range    CRP 11.0 (H) 0.0 - 8.2 mg/L   Sedimentation rate, manual   Result Value Ref Range    Sed Rate 27 (H) 0 - 20 mm/Hr       Imaging Results:  Imaging Results          CT Lower Extremity Without Contrast Left (Final result)  Result time 07/12/17 14:22:05   Procedure changed from CT Lower Extremity With Contrast Left     Final result by David Davis MD (07/12/17 14:22:05)                 Impression:         1.  Large hematoma in the anterior soft tissues of the left leg at the level of the knee and tibia tuberosity.    2.  No fracture or dislocation of the osseous structures of the left lower extremity.    All CT scans at this facility use dose modulation, iterative reconstruction and/or weight based dosing when appropriate to reduce radiation dose to as low as reasonably achievable.      Electronically signed by: DAVID DAVIS MD  Date:     07/12/17  Time:    14:22              Narrative:    Exam: CT scan of the left lower extremity without contrast    Clinical History:  Fall.  Left knee injury.  Soft tissue swelling anterior left knee.    Technique: The left lower extremity was scanned without intravenous contrast. Multiplanar reformats were reviewed and interpreted.    Comparison: Left knee x-ray, 07/07/2017    Findings:         There is a large hematoma in the anterior soft tissues of the left leg at the level of the knee joint and tibia tuberosity measuring 11.3 cm in craniocaudal dimension, 2.8 cm in anterior posterior dimension, and 5.4 cm in transverse dimension.No radiopaque foreign body is seen.      There is no fracture or dislocation of the left tibia or fibula or patella  or distal femur.  The tarsal bones have a normal appearance.  There is a trace knee joint effusion.                                      The Emergency Provider reviewed the vital signs and test results, which are outlined above.    ED Discussion     2:52 PM: Reassessed pt at this time. FAmily at bedside long discussion reguarding the likely long term healing process due to patient being on coumadin, I do not feel that she has a joint infection or joint hematoma, CT showed bruising was superficial involving the skin only, patient advised to elevated leg and ice it. She was prescribed emperic Doxycycline by PCP today. Pt instructed to follow up with outpatient orthopedics. Discussed with pt all pertinent ED information and results. Discussed pt dx and plan of tx. Gave pt all f/u and return to the ED instructions. All questions and concerns were addressed at this time. Pt expresses understanding of information and instructions, and is comfortable with plan to discharge. Pt is stable for discharge.      ED Medication(s):  Medications   hydrocodone-acetaminophen 5-325mg per tablet 1 tablet (1 tablet Oral Given 7/12/17 1508)       Discharge Medication List as of 7/12/2017  2:58 PM      START taking these medications    Details   hydrocodone-acetaminophen 5-325mg (NORCO) 5-325 mg per tablet Take 1 tablet by mouth every 4 (four) hours as needed for Pain., Starting Wed 7/12/2017, Print      valsartan (DIOVAN) 80 MG tablet Take 2 tablets (160 mg total) by mouth 2 (two) times daily., Starting Wed 7/12/2017, Normal             Follow-up Information     Desirae Bello MD. Schedule an appointment as soon as possible for a visit in 2 days.    Specialty:  Family Medicine  Why:  Return to the Emergency Room, If symptoms worsen  Contact information:  [address] [place] [address] [address] Nyla MORALEZ [id number]  [phone]             [facility]. Schedule an appointment as soon as possible for a visit in 2 days.    Specialty:   Orthopedics  Why:  Return to the Emergency Room, If symptoms worsen  Contact information:  [address] [facility] [address]  [phone]                   Medical Decision Making    Medical Decision Making:   Clinical Tests:   Lab Tests: Ordered and Reviewed  Radiological Study: Ordered and Reviewed           Scribe Attestation:   Scribe #1: I performed the above scribed service and the documentation accurately describes the services I performed. I attest to the accuracy of the note.    Attending:   Physician Attestation Statement for Scribe #1: I, Yadira Barron MD, personally performed the services described in this documentation, as scribed by Marisela Gary, in my presence, and it is both accurate and complete.          Clinical Impression       ICD-10-CM ICD-9-CM   1. Hematoma of leg, left, initial encounter S80.12XA 924.5   2. Chronic anticoagulation Z79.01 V58.61   3. Frequent falls R29.6 V15.88       Disposition:   Disposition: Discharged  Condition: Stable         Yadira Barron MD  07/14/17 3998

## 2017-07-12 NOTE — ED NOTES
T/c from Crystal in [place].  She noted pt labwork re kidney function and plans to consult with radiologist regarding use of contrast for CT.  She will call me back.

## 2017-07-13 ENCOUNTER — TELEPHONE (OUTPATIENT)
Dept: INTERNAL MEDICINE | Facility: CLINIC | Age: 82
End: 2017-07-13

## 2017-07-13 ENCOUNTER — OFFICE VISIT (OUTPATIENT)
Dept: INTERNAL MEDICINE | Facility: CLINIC | Age: 82
End: 2017-07-13
Payer: MEDICARE

## 2017-07-13 VITALS
HEART RATE: 70 BPM | TEMPERATURE: 100 F | SYSTOLIC BLOOD PRESSURE: 130 MMHG | HEIGHT: 62 IN | OXYGEN SATURATION: 97 % | DIASTOLIC BLOOD PRESSURE: 60 MMHG

## 2017-07-13 DIAGNOSIS — M17.11 PRIMARY OSTEOARTHRITIS OF RIGHT KNEE: ICD-10-CM

## 2017-07-13 DIAGNOSIS — S80.02XD TRAUMATIC HEMATOMA OF LEFT KNEE, SUBSEQUENT ENCOUNTER: Primary | ICD-10-CM

## 2017-07-13 PROCEDURE — 99214 OFFICE O/P EST MOD 30 MIN: CPT | Mod: S$PBB,,, | Performed by: FAMILY MEDICINE

## 2017-07-13 PROCEDURE — 1125F AMNT PAIN NOTED PAIN PRSNT: CPT | Mod: ,,, | Performed by: FAMILY MEDICINE

## 2017-07-13 PROCEDURE — 99213 OFFICE O/P EST LOW 20 MIN: CPT | Mod: PBBFAC | Performed by: FAMILY MEDICINE

## 2017-07-13 PROCEDURE — 99999 PR PBB SHADOW E&M-EST. PATIENT-LVL III: CPT | Mod: PBBFAC,,, | Performed by: FAMILY MEDICINE

## 2017-07-13 PROCEDURE — 1159F MED LIST DOCD IN RCRD: CPT | Mod: ,,, | Performed by: FAMILY MEDICINE

## 2017-07-13 NOTE — TELEPHONE ENCOUNTER
Please contact patient. I spoke with Ortho colleague and they recommended to continue with current care. Also, recommended she ice for 5-10 minutes hourly. Use precautions to prevent frost bite (a bag of peas or similar is a good ice pack for knee (do not consume); also recommend thin cloth or t-shirt between skin and ice pack. Keep appt with Ortho in August.

## 2017-07-17 NOTE — PROGRESS NOTES
"Subjective:       Patient ID: Jennifer Mathews is a 84 y.o. female.    Chief Complaint: Knee Injury (left, fell on July 4th.--Hematoma)    Patient presents to clinic today with her daughter for ER follow up. She was seen in ER on July 4th after fall. She had follow up with Mr. Ireland on July 7th. She had x-ray of left knee, negative for fracture. She was given doxycycline, which she is taking as directed. She was seen in ER again on July 12th for left knee pain/swelling. CT showed large hematoma. She presents today for follow up. They request rolling walker as well. Patient denies additional falls.      Review of Systems   Constitutional: Negative for chills, fatigue, fever and unexpected weight change.   HENT: Negative for congestion, dental problem, ear pain, hearing loss, rhinorrhea and trouble swallowing.    Eyes: Negative for pain and visual disturbance.   Respiratory: Negative for cough and shortness of breath.    Cardiovascular: Positive for leg swelling. Negative for chest pain and palpitations.   Gastrointestinal: Negative for abdominal distention, abdominal pain, blood in stool, constipation, diarrhea, nausea and vomiting.   Genitourinary: Negative for difficulty urinating and vaginal discharge.   Musculoskeletal: Positive for arthralgias and joint swelling. Negative for myalgias.   Skin: Negative for rash.   Neurological: Negative for dizziness, weakness, numbness and headaches.   Hematological: Negative for adenopathy. Bruises/bleeds easily.   Psychiatric/Behavioral: Negative for dysphoric mood and sleep disturbance. The patient is not nervous/anxious.        Objective:      Physical Exam   Constitutional: She appears well-developed and well-nourished. No distress.   Musculoskeletal:        Left knee: She exhibits decreased range of motion, swelling, effusion and ecchymosis. She exhibits no deformity. Tenderness found.   Mildly warm  Measurements:  Above knee 15.5" (stable)  Knee 14.5" (stable)  Mid " "calf 14" (decreased from 14.25")  Ankle 10" (decreased from 10.75")   Vitals reviewed.      Assessment:       1. Traumatic hematoma of left knee, subsequent encounter    2. Primary osteoarthritis of right knee        Plan:   Jennifer was seen today for knee injury.    Diagnoses and all orders for this visit:    Traumatic hematoma of left knee, subsequent encounter  -     WALKER FOR HOME USE  -     Ambulatory referral to Orthopedics    Primary osteoarthritis of right knee  -     WALKER FOR HOME USE    - Spoke with Ortho, they recommend to continue current care and also have patient ice (phone message sent).   - advised patient to complete course of antibiotics. Seek medical attention for any problems.  - patient and her daughter expressed understanding.    "

## 2017-07-20 ENCOUNTER — TELEPHONE (OUTPATIENT)
Dept: INTERNAL MEDICINE | Facility: CLINIC | Age: 82
End: 2017-07-20

## 2017-07-20 NOTE — TELEPHONE ENCOUNTER
----- Message from Marlen Mendez sent at 7/20/2017  9:18 AM CDT -----  Contact: pt  States she hasn't received her Walker yet and she wanted to see if you could trace it back. States she just wants to make sure it didn't get lost in the shuffle. Please call pt at [phone]. Thank you

## 2017-07-20 NOTE — TELEPHONE ENCOUNTER
S/w pt and confirmed she wanted rx for walker sent to [facility] in [place], advised pt I would send the order to the pharmacy today and to call us with any problems, pt voiced understanding.

## 2017-07-20 NOTE — TELEPHONE ENCOUNTER
----- Message from Zaheer Hobbs sent at 7/20/2017 12:34 PM CDT -----  Contact: pt n  States she's calling to follow up on her medicine that was called in to her pharm and pharm was told that she doesn't have the equipment in the store for the medicine and wants follow up on that walker and can be reached at [phone]

## 2017-07-21 NOTE — TELEPHONE ENCOUNTER
----- Message from Gudelia Alston sent at 7/21/2017  9:51 AM CDT -----  Contact: Pt   Pt is requesting a call back in regards to a walker pt can be reached at..[phone]

## 2017-07-25 ENCOUNTER — TELEPHONE (OUTPATIENT)
Dept: INTERNAL MEDICINE | Facility: CLINIC | Age: 82
End: 2017-07-25

## 2017-07-25 DIAGNOSIS — Z79.01 LONG TERM (CURRENT) USE OF ANTICOAGULANTS: ICD-10-CM

## 2017-07-25 RX ORDER — WARFARIN 2.5 MG/1
TABLET ORAL
Qty: 30 TABLET | Refills: 3 | Status: SHIPPED | OUTPATIENT
Start: 2017-07-25 | End: 2017-11-10 | Stop reason: SDUPTHER

## 2017-07-25 NOTE — TELEPHONE ENCOUNTER
----- Message from Mary Alice Mcintosh sent at 7/25/2017 12:40 PM CDT -----  Contact: Patient  Patient called and stated she has been waiting to hear back about her Rollator Walker and no one has contacted her.    She can be contacted at [phone].    Thanks,  Mary Alice

## 2017-07-28 ENCOUNTER — ANTI-COAG VISIT (OUTPATIENT)
Dept: CARDIOLOGY | Facility: CLINIC | Age: 82
End: 2017-07-28
Payer: MEDICARE

## 2017-07-28 DIAGNOSIS — Z79.01 LONG TERM (CURRENT) USE OF ANTICOAGULANTS: Primary | ICD-10-CM

## 2017-07-28 LAB — INR PPP: 3.4 (ref 2–3)

## 2017-07-28 PROCEDURE — 99211 OFF/OP EST MAY X REQ PHY/QHP: CPT | Mod: PBBFAC

## 2017-07-28 PROCEDURE — 85610 PROTHROMBIN TIME: CPT | Mod: PBBFAC

## 2017-07-28 PROCEDURE — 99999 PR PBB SHADOW E&M-EST. PATIENT-LVL I: CPT | Mod: PBBFAC,,,

## 2017-07-28 NOTE — PROGRESS NOTES
INR is supra-therapeutic. Recent fall, developed hematoma completed doxycycline x1 week ago per patient. Will hold x1 dose then re-challenge dose until follow-up. Repeat INR in 2 weeks.

## 2017-08-07 ENCOUNTER — OFFICE VISIT (OUTPATIENT)
Dept: ORTHOPEDICS | Facility: CLINIC | Age: 82
End: 2017-08-07
Payer: MEDICARE

## 2017-08-07 VITALS
BODY MASS INDEX: 20.43 KG/M2 | DIASTOLIC BLOOD PRESSURE: 63 MMHG | WEIGHT: 111 LBS | HEART RATE: 69 BPM | SYSTOLIC BLOOD PRESSURE: 130 MMHG | HEIGHT: 62 IN

## 2017-08-07 DIAGNOSIS — S80.02XA TRAUMATIC HEMATOMA OF KNEE, LEFT, INITIAL ENCOUNTER: ICD-10-CM

## 2017-08-07 DIAGNOSIS — M17.12 ARTHRITIS OF KNEE, LEFT: ICD-10-CM

## 2017-08-07 DIAGNOSIS — M25.562 ACUTE PAIN OF LEFT KNEE: Primary | ICD-10-CM

## 2017-08-07 PROCEDURE — 99213 OFFICE O/P EST LOW 20 MIN: CPT | Mod: S$PBB,,, | Performed by: PHYSICIAN ASSISTANT

## 2017-08-07 PROCEDURE — 99999 PR PBB SHADOW E&M-EST. PATIENT-LVL IV: CPT | Mod: PBBFAC,,, | Performed by: PHYSICIAN ASSISTANT

## 2017-08-07 PROCEDURE — 1125F AMNT PAIN NOTED PAIN PRSNT: CPT | Mod: ,,, | Performed by: PHYSICIAN ASSISTANT

## 2017-08-07 PROCEDURE — 99214 OFFICE O/P EST MOD 30 MIN: CPT | Mod: PBBFAC | Performed by: PHYSICIAN ASSISTANT

## 2017-08-07 PROCEDURE — 3078F DIAST BP <80 MM HG: CPT | Mod: ,,, | Performed by: PHYSICIAN ASSISTANT

## 2017-08-07 PROCEDURE — 1159F MED LIST DOCD IN RCRD: CPT | Mod: ,,, | Performed by: PHYSICIAN ASSISTANT

## 2017-08-07 PROCEDURE — 3075F SYST BP GE 130 - 139MM HG: CPT | Mod: ,,, | Performed by: PHYSICIAN ASSISTANT

## 2017-08-07 NOTE — PATIENT INSTRUCTIONS
Quad Set for Leg and Knee    This exercise is designed to stretch and strengthen your knee. Before beginning, read through all the instructions. While exercising, breathe normally and use smooth movements. If you feel any pain, stop the exercise. If pain persists, call your healthcare provider.  1.  Sit on the floor with one leg straight, the other bent.  2.  Flex the foot of your straight leg by pointing your toes toward you. Press the back of your knee into the floor while tightening the muscle on the top of your thigh. Hold for ______ seconds. Then relax.  3.  Repeat ______ times. Do ______ sets a day.  Caution  · Dont arch your back.  · Dont hunch your shoulders.  Date Last Reviewed: 9/20/2015  © 5278-1288 [employer]. [address] [place] [address]. All rights reserved. This information is not intended as a substitute for professional medical care. Always follow your healthcare professional's instructions.        R.I.C.E.    R.I.C.E. stands for Rest, Ice, Compression, and Elevation. Doing these things helps limit pain and swelling after an injury. R.I.C.E. also helps injuries heal faster. Use R.I.C.E. for sprains, strains, and severe bruises or bumps. Follow the tips on this handout and begin R.I.C.E. as soon as possible after an injury.  ? Rest  Pain is your bodys way of telling you to rest an injured area. Whether you have hurt an elbow, hand, foot, or knee, limiting its use will prevent further injury and help you heal.  ? Ice  Applying ice right after an injury helps prevent swelling and reduce pain. Dont place ice directly on your skin.  · Wrap a cold pack or bag of ice in a thin cloth. Place it over the injured area.  · Ice for 10 minutes every 3 hours. Dont ice for more than 20 minutes at a time.  ? Compression  Putting pressure (compression) on an injury helps prevent swelling and provides support.  · Wrap the injured area firmly with an elastic bandage. If your hand or  foot tingles, becomes discolored, or feels cold to the touch, the bandage may be too tight. Rewrap it more loosely.  · If your bandage becomes too loose, rewrap it.  · Do not wear an elastic bandage overnight.  ? Elevation  Keeping an injury elevated helps reduce swelling, pain, and throbbing. Elevation is most effective when the injury is kept elevated higher than the heart.     Call your healthcare provider if you notice any of the following:  · Fingers or toes feel numb, are cold to the touch, or change color  · Skin looks shiny or tight  · Pain, swelling, or bruising worsens and is not improved with elevation   Date Last Reviewed: 9/3/2015  © 8183-0676 [employer]. [address] [place] [address]. All rights reserved. This information is not intended as a substitute for professional medical care. Always follow your healthcare professional's instructions.        Hamstring Curls  The following exercise helps build strong, balanced leg muscles. Make sure to adjust exercise machines as instructed by your physical therapist. He or she will tell you how many times to do the exercise.  Note: To prevent injury, always warm up and stretch before your strengthening exercises. Stop any exercise that causes pain. Discuss it with your physical therapist or doctor.  · Lying on your stomach, pull one leg up as far as you comfortably can.  · Let your leg uncurl slowly and steadily.  · Take care not to arch your back.     Date Last Reviewed: 8/16/2015  © 0604-3442 [employer]. [address] [place] [address]. All rights reserved. This information is not intended as a substitute for professional medical care. Always follow your healthcare professional's instructions.        Quadriceps, Short Arcs (Strength)    1. Sit on the floor with your right leg straight in front of you. Bend your left knee up and put your left foot flat on the floor.  2. Place a rolled-up towel under your right  thigh, just above your knee. Relax your leg.  3. Straighten your right leg, lifting your foot off the floor. Hold for 5 seconds. Then relax.  4. Repeat 10 times, or as instructed.  5. Switch legs and then repeat.     Challenge yourself  Use ankle weights for a tougher workout. Your healthcare provider will tell you what size ankle weights to use.   Date Last Reviewed: 3/10/2016  © 4322-3404 [employer]. [address] [place] [address]. All rights reserved. This information is not intended as a substitute for professional medical care. Always follow your healthcare professional's instructions.        Quadriceps, Short Arcs (Strength)    6. Sit on the floor with your right leg straight in front of you. Bend your left knee up and put your left foot flat on the floor.  7. Place a rolled-up towel under your right thigh, just above your knee. Relax your leg.  8. Straighten your right leg, lifting your foot off the floor. Hold for 5 seconds. Then relax.  9. Repeat 10 times, or as instructed.  10. Switch legs and then repeat.     Challenge yourself  Use ankle weights for a tougher workout. Your healthcare provider will tell you what size ankle weights to use.   Date Last Reviewed: 3/10/2016  © 9503-7603 [employer]. [address] [place] [address]. All rights reserved. This information is not intended as a substitute for professional medical care. Always follow your healthcare professional's instructions.        Back Exercises: Knee Lift        To start, lie on your back with your knees bent and feet flat on the floor. Dont press your neck or lower back to the floor. Breathe deeply. You should feel comfortable and relaxed in this position:  · Start by tightening your abdominal muscles.  · Lift one bent knee off the floor 2 to 4 inches.  · Hold for 10 seconds. Return to start position.  · Repeat 3 times.  · Switch legs.  Date Last Reviewed: 8/16/2015 © 2000-2016 [employer]  [employer]. [address] [place] [address], [place] [address]. All rights reserved. This information is not intended as a substitute for professional medical care. Always follow your healthcare professional's instructions.

## 2017-08-07 NOTE — LETTER
August 10, 2017      Desirae Bello MD  [address] [place] [address] Dr Nyla MORALEZ [id number]           [facility]  [address] [place] [address] [place]  [place] [address]  Phone: [phone]  Fax: [phone]          Patient: Jennifer Mathews   MR Number: [id number]   YOB: 1932   Date of Visit: 8/7/2017       Dear Dr. Desirae Bello:    Thank you for referring Jennifer Mathews to me for evaluation. Attached you will find relevant portions of my assessment and plan of care.    If you have questions, please do not hesitate to call me. I look forward to following Jennifer Mathews along with you.    Sincerely,    Jennifer Enamorado PA-C    Enclosure  CC:  No Recipients    If you would like to receive this communication electronically, please contact [email or web address] or [phone] to request more information on [employer] access.    For providers and/or their staff who would like to refer a patient to [facility], please contact us through our one-stop-shop provider referral line, [facility], at [phone].    If you feel you have received this communication in error or would no longer like to receive these types of communications, please e-mail [email or web address]

## 2017-08-07 NOTE — PROGRESS NOTES
Subjective:      Patient ID: Jennifer Mathews is a 85 y.o. female.    Chief Complaint: Injury and Pain of the Left Knee    HPI     Patient presents to clinic for evaluation of left knee pain secondary to a fall on 7/8/17 and was later evaluated in the ER on 7/12/17.   She was watching kids in the swimming pool and missed a step while walking into the [place] area. She's had two falls in the past 2 months. She reached around in the utility room and fell onto oak tv trays (she has residual bruising to face still.)     She localizes her pain to the inside (medial joint line) of her left knee. The patient was prescribed Norco but does not like taking this so she switched to Tylenol.     The patient is here today with her sister. She had previous CT and x-rays earlier in July.  The patient uses her Rollator walker for ambulation assistance outside of her home.  She rates her current pain a 6 out of 10 with standing.      Review of Systems   Constitution: Negative for chills, fever and night sweats.   Respiratory: Negative for cough, shortness of breath and wheezing.    Skin: Positive for color change.   Musculoskeletal: Positive for falls and joint pain.   Gastrointestinal: Negative for diarrhea, nausea and vomiting.   Neurological: Negative for brief paralysis.   Psychiatric/Behavioral: Negative for altered mental status.         Objective:            General    Vitals reviewed.  Constitutional: She is oriented to person, place, and time. She appears well-developed and well-nourished.   HENT:   Head: Normocephalic.   Nose: Nose normal.   Eyes: EOM are normal. Pupils are equal, round, and reactive to light.   Neck: Normal range of motion. Neck supple.   Cardiovascular: Normal rate and regular rhythm.    Pulmonary/Chest: Effort normal.   Abdominal: Soft. She exhibits no distension. There is no tenderness.   Neurological: She is alert and oriented to person, place, and time.   Psychiatric: She has a normal mood and affect.  Her behavior is normal.     General Musculoskeletal Exam   Gait: normal   Pelvic Obliquity: none    Right Ankle/Foot Exam     Alignment   Forefoot Alignment: normal    Tests   Varus tilt: negative  Heel Walk: able to perform  Tiptoe Walk: able to perform    Other   Sensation: normal    Left Ankle/Foot Exam     Alignment   Forefoot Alignment: normal    Tests   Varus tilt: negative  Heel Walk: able to perform  Tiptoe Walk: able to perform    Other   Sensation: normal    Right Knee Exam     Inspection   Erythema: absent  Scars: absent  Effusion: effusion    Tenderness   The patient is experiencing no tenderness.         Range of Motion   Extension: normal   Flexion: normal     Tests   Ligament Examination Lachman: normal (-1 to 2mm) PCL-Posterior Drawer: normal (0 to 2mm)     Posterior Sag Test: negative  Posterolateral Corner: unstable (>15 degrees difference)  Patella   Patellar Tracking: normal    Other   Sensation: normal    Comments:  1+ edema- calf    Left Knee Exam     Inspection   Erythema: absent  Scars: absent  Swelling: present  Effusion: present (small effusion)    Tenderness   The patient tender to palpation of the medial joint line, lateral joint line and patella.    Crepitus   The patient has crepitus of the patella, MFC and LFC.    Range of Motion   The patient has normal left knee ROM.  Extension: normal   Flexion: normal     Tests   Stability Lachman: normal (-1 to 2mm) PCL-Posterior Drawer: normal (0 to 2mm)  Posterior Sag Test: negative  Posterolateral Corner: unstable (>15 degrees difference)  Patella   Patellar Tracking: normal    Other   Sensation: normal    Comments:  3+ edema- calf  Negative Zac's sign.     Eschar vs scab over anterior knee joint.   No area of fluctuance. No drainage. Area does not look aggressive.         Right Hip Exam     Inspection   Swelling: absent  Bruising: absent    Other   Sensation: normal  Left Hip Exam     Inspection   Swelling: absent  Bruising: absent    Other    Sensation: normal      Back (L-Spine & T-Spine) / Neck (C-Spine) Exam   Back exam is normal.    Neck (C-Spine) Range of Motion   Flexion:     Normal  Extension: Normal  Right Lateral Bend: normal  Left Lateral Bend: normal  Right Rotation: normal  Left Rotation: normal    Spinal Sensation   Right Side Sensation  C-Spine Level: normal   L-Spine Level: normal  S-Spine Level: normal  T-Spine Level: normal  Left Side Sensation  C-Spine Level: normal  L-Spine Level: normal  S-Spine Level: normal  T-Spine Level: normal    Other She has no scoliosis .      Right Hand/Wrist Exam     Inspection   Deformity: Wrist - deformity     Tests   Phalens Sign: negative  Tinels Sign (Medial Nerve): negative  Finkelstein: negative  Cubital Tunnel Compression Test: negative      Other     Neuorologic Exam    Median Distribution: normal  Ulnar Distribution: normal  Radial Distribution: normal      Left Hand/Wrist Exam     Inspection   Deformity: Wrist - absent     Tests   Phalens Sign: negative  Tinels Sign (Medial Nerve): negative  Finkelstein: negative  Cubital Tunnel Compression Test: negative      Other     Sensory Exam  Median Distribution: normal  Ulnar Distribution: normal  Radial Distribution: normal      Right Elbow Exam     Inspection   Effusion: absent  Bruising: absent  Deformity: absent    Tests Tinel's Sign (cubital tunnel): negative    Other   Sensation: normal      Left Elbow Exam     Inspection   Effusion: absent  Bruising: absent  Deformity: absent    Tests Tinel's Sign (cubital tunnel): negative    Other   Sensation: normal    Right Shoulder Exam     Range of Motion   Active Abduction: normal   Passive Abduction: normal   Extension: normal   Forward Flexion: normal   Forward Elevation: normal  Adduction: normal  External Rotation 0 degrees: normal   Internal Rotation 0 degrees: normal     Tests & Signs   Apprehension: negative  Cross Arm: negative  Impingement: negative    Other   Sensation: normal    Left  Shoulder Exam     Range of Motion   Active Abduction: normal   Passive Abduction: normal   Extension: normal   Forward Flexion: normal   Forward Elevation: normal  Adduction: normal  External Rotation 0 degrees: normal   Internal Rotation 0 degrees: normal     Tests & Signs   Apprehension: negative  Cross Arm: negative  Impingement: negative    Other   Sensation: normal       Muscle Strength   Right Upper Extremity   Wrist Extension:    Wrist Flexion:    :    Elbow Pronation:     Elbow Supination:     Elbow Extension:   Elbow Flexion:   Intrinsics:   EPL (Extensor Pollicis Longus):   Left Upper Extremity  Wrist Extension:    Wrist Flexion:    :     Elbow Pronation:     Elbow Supination:     Elbow Extension:   Elbow Flexion:   Intrinsics:   EPL (Extensor Pollicis Longus):   Right Lower Extremity   Hip Abduction:    Quadriceps:     Hamstrin/5   Left Lower Extremity   Hip Abduction:    Quadriceps:     Hamstrin/5     Reflexes     Left Side  Biceps:  2+  Triceps:  2+  Quadriceps:  2+  Achilles:  2+    Right Side   Biceps:  2+  Triceps:  2+  Quadriceps:  2+  Achilles:  2+    Vascular Exam     Right Pulses      Radial:                    2+      Left Pulses      Radial:                    2+      Capillary Refill  Right Hand: normal capillary refill  Left Hand: normal capillary refill    Edema  Right Lower Leg: present  Left Lower Leg: present                    Exam: CT scan of the left lower extremity without contrast    Clinical History:  Fall.  Left knee injury.  Soft tissue swelling anterior left knee.    Technique: The left lower extremity was scanned without intravenous contrast. Multiplanar reformats were reviewed and interpreted.    Comparison: Left knee x-ray, 2017    Findings:         There is a large hematoma in the anterior soft tissues of the left leg at the level of the knee joint and tibia tuberosity  measuring 11.3 cm in craniocaudal dimension, 2.8 cm in anterior posterior dimension, and 5.4 cm in transverse dimension.No radiopaque foreign body is seen.      There is no fracture or dislocation of the left tibia or fibula or patella or distal femur.  The tarsal bones have a normal appearance.  There is a trace knee joint effusion.   Impression          1.  Large hematoma in the anterior soft tissues of the left leg at the level of the knee and tibia tuberosity.    2.  No fracture or dislocation of the osseous structures of the left lower extremity.    All CT scans at this facility use dose modulation, iterative reconstruction and/or weight based dosing when appropriate to reduce radiation dose to as low as reasonably achievable.         Assessment:       Encounter Diagnoses   Name Primary?    Acute pain of left knee Yes    Traumatic hematoma of knee, left, initial encounter     Arthritis of knee, left           Plan:       Jennifer was seen today for injury and pain.    Diagnoses and all orders for this visit:    Acute pain of left knee    Traumatic hematoma of knee, left, initial encounter    Arthritis of knee, left        Patient was instructed to continue ice and using an Ace for compression to help further decrease any swelling.  The patient was instructed on several at-home exercises.  She'll return to recheck her left knee and leg in approximately 3-1/2-4 weeks.  If the area of skin mentioned under physical exam is of concern, I may need to discuss this with  for possible debridement.  However, I do not suspect any evidence or signs of infection at this present time.           Jennifer Enamorado PA-C

## 2017-08-09 DIAGNOSIS — I48.91 ATRIAL FIBRILLATION, UNSPECIFIED TYPE: Primary | ICD-10-CM

## 2017-08-10 ENCOUNTER — OFFICE VISIT (OUTPATIENT)
Dept: INTERNAL MEDICINE | Facility: CLINIC | Age: 82
End: 2017-08-10
Payer: MEDICARE

## 2017-08-10 ENCOUNTER — CLINICAL SUPPORT (OUTPATIENT)
Dept: CARDIOLOGY | Facility: CLINIC | Age: 82
End: 2017-08-10
Payer: MEDICARE

## 2017-08-10 ENCOUNTER — ANTI-COAG VISIT (OUTPATIENT)
Dept: CARDIOLOGY | Facility: CLINIC | Age: 82
End: 2017-08-10
Payer: MEDICARE

## 2017-08-10 ENCOUNTER — OFFICE VISIT (OUTPATIENT)
Dept: CARDIOLOGY | Facility: CLINIC | Age: 82
End: 2017-08-10
Payer: MEDICARE

## 2017-08-10 VITALS
OXYGEN SATURATION: 96 % | HEIGHT: 62 IN | BODY MASS INDEX: 21.51 KG/M2 | WEIGHT: 116.88 LBS | SYSTOLIC BLOOD PRESSURE: 122 MMHG | HEART RATE: 69 BPM | TEMPERATURE: 99 F | DIASTOLIC BLOOD PRESSURE: 58 MMHG

## 2017-08-10 VITALS
BODY MASS INDEX: 21.16 KG/M2 | WEIGHT: 115 LBS | HEART RATE: 70 BPM | DIASTOLIC BLOOD PRESSURE: 62 MMHG | HEIGHT: 62 IN | SYSTOLIC BLOOD PRESSURE: 126 MMHG

## 2017-08-10 DIAGNOSIS — Z95.2 S/P MVR (MITRAL VALVE REPLACEMENT): ICD-10-CM

## 2017-08-10 DIAGNOSIS — Z95.0 S/P PLACEMENT OF CARDIAC PACEMAKER: ICD-10-CM

## 2017-08-10 DIAGNOSIS — E78.5 DYSLIPIDEMIA: ICD-10-CM

## 2017-08-10 DIAGNOSIS — I50.42 CHRONIC COMBINED SYSTOLIC AND DIASTOLIC CONGESTIVE HEART FAILURE: ICD-10-CM

## 2017-08-10 DIAGNOSIS — I48.91 ATRIAL FIBRILLATION, UNSPECIFIED TYPE: ICD-10-CM

## 2017-08-10 DIAGNOSIS — M81.0 OSTEOPOROSIS, SENILE: ICD-10-CM

## 2017-08-10 DIAGNOSIS — I10 ESSENTIAL HYPERTENSION: ICD-10-CM

## 2017-08-10 DIAGNOSIS — I27.22 PULMONARY HYPERTENSION DUE TO LEFT HEART DISEASE: ICD-10-CM

## 2017-08-10 DIAGNOSIS — R60.9 EDEMA, UNSPECIFIED TYPE: Primary | ICD-10-CM

## 2017-08-10 DIAGNOSIS — N18.30 CKD (CHRONIC KIDNEY DISEASE) STAGE 3, GFR 30-59 ML/MIN: ICD-10-CM

## 2017-08-10 DIAGNOSIS — Z79.899 ENCOUNTER FOR LONG-TERM CURRENT USE OF MEDICATION: ICD-10-CM

## 2017-08-10 DIAGNOSIS — Z00.00 ROUTINE GENERAL MEDICAL EXAMINATION AT A HEALTH CARE FACILITY: Primary | ICD-10-CM

## 2017-08-10 DIAGNOSIS — I25.5 ISCHEMIC CARDIOMYOPATHY: ICD-10-CM

## 2017-08-10 DIAGNOSIS — G47.33 OSA (OBSTRUCTIVE SLEEP APNEA): Chronic | ICD-10-CM

## 2017-08-10 DIAGNOSIS — Z79.01 ANTICOAGULATED ON COUMADIN: ICD-10-CM

## 2017-08-10 DIAGNOSIS — M15.9 PRIMARY OSTEOARTHRITIS INVOLVING MULTIPLE JOINTS: ICD-10-CM

## 2017-08-10 DIAGNOSIS — Z79.01 LONG TERM (CURRENT) USE OF ANTICOAGULANTS: Primary | ICD-10-CM

## 2017-08-10 DIAGNOSIS — Z95.810 CARDIAC RESYNCHRONIZATION THERAPY DEFIBRILLATOR (CRT-D) IN PLACE: ICD-10-CM

## 2017-08-10 LAB — INR PPP: 3.3 (ref 2–3)

## 2017-08-10 PROCEDURE — 1125F AMNT PAIN NOTED PAIN PRSNT: CPT | Mod: ,,, | Performed by: FAMILY MEDICINE

## 2017-08-10 PROCEDURE — 93010 ELECTROCARDIOGRAM REPORT: CPT | Mod: S$PBB,,, | Performed by: INTERNAL MEDICINE

## 2017-08-10 PROCEDURE — 93005 ELECTROCARDIOGRAM TRACING: CPT | Mod: PBBFAC | Performed by: INTERNAL MEDICINE

## 2017-08-10 PROCEDURE — 99214 OFFICE O/P EST MOD 30 MIN: CPT | Mod: S$PBB,,, | Performed by: INTERNAL MEDICINE

## 2017-08-10 PROCEDURE — 1125F AMNT PAIN NOTED PAIN PRSNT: CPT | Mod: ,,, | Performed by: INTERNAL MEDICINE

## 2017-08-10 PROCEDURE — 3078F DIAST BP <80 MM HG: CPT | Mod: ,,, | Performed by: INTERNAL MEDICINE

## 2017-08-10 PROCEDURE — 99999 PR PBB SHADOW E&M-EST. PATIENT-LVL III: CPT | Mod: PBBFAC,,, | Performed by: FAMILY MEDICINE

## 2017-08-10 PROCEDURE — 99214 OFFICE O/P EST MOD 30 MIN: CPT | Mod: S$PBB,,, | Performed by: FAMILY MEDICINE

## 2017-08-10 PROCEDURE — 3078F DIAST BP <80 MM HG: CPT | Mod: ,,, | Performed by: FAMILY MEDICINE

## 2017-08-10 PROCEDURE — 1159F MED LIST DOCD IN RCRD: CPT | Mod: ,,, | Performed by: INTERNAL MEDICINE

## 2017-08-10 PROCEDURE — 3074F SYST BP LT 130 MM HG: CPT | Mod: ,,, | Performed by: FAMILY MEDICINE

## 2017-08-10 PROCEDURE — 1159F MED LIST DOCD IN RCRD: CPT | Mod: ,,, | Performed by: FAMILY MEDICINE

## 2017-08-10 PROCEDURE — 3074F SYST BP LT 130 MM HG: CPT | Mod: ,,, | Performed by: INTERNAL MEDICINE

## 2017-08-10 PROCEDURE — 99999 PR PBB SHADOW E&M-EST. PATIENT-LVL IV: CPT | Mod: PBBFAC,,, | Performed by: INTERNAL MEDICINE

## 2017-08-10 NOTE — PROGRESS NOTES
INR remains supra-therapeutic. No bleeding issues. Still recovering from recent fall. Will hold x1 dose then lower total weekly dose until follow-up. Patient reports no bleeding or bruising, no new medications and no diet changes.  I reminded the patient to call with any problems, changes or questions before the next visit.

## 2017-08-10 NOTE — PROGRESS NOTES
Subjective:   Patient ID:  Jennifer Mathews is a 85 y.o. female who presents for follow up of Atrial Fibrillation and Hypertension      HPI  An 84 yo female with h/o cardiomyopathy crt s/p multiple pacer  afib diastolic chf oral anticoagulation is here for f/u she ahs fallen and has bruised her arm and leg.she has noticed a mild increase in weight and some shortness of breath she claims compliance with diet. ahs not been walking a lot lately  For the past month that caused decrease in her exercise remarkably.no change in eating habits she has noted more swelling in legs.but no orthopnea or pnd  No chest pain no palpitation.  Past Medical History:   Diagnosis Date    Acute coronary syndrome     Anemia     Anticoagulated on Coumadin 7/13/2015    Arthritis     Atrial fibrillation     Basal cell carcinoma 10/2015    left neck    Cardiac arrest     Cardiac resynchronization therapy defibrillator (CRT-D) in place 7/13/2015    Cardiac resynchronization therapy defibrillator (CRT-D) in place 7/13/2015    Cardiomyopathy 1/30/2014    CHF (congestive heart failure)     Chronic combined systolic and diastolic congestive heart failure     CKD (chronic kidney disease) stage 3, GFR 30-59 ml/min     Dyslipidemia 1/30/2014    Edema     Heart disease     Hypertension     Ischemic cardiomyopathy 7/13/2015    Macular hole of left eye     Old    Macular hole of left eye     LEV (obstructive sleep apnea) 9/30/2013    Recurrent UTI     Refractive error     S/P MVR (mitral valve replacement) 1/30/2014    S/P placement of cardiac pacemaker 1/30/2014    Skin cancer     Sleep apnea     Stroke        Past Surgical History:   Procedure Laterality Date    APPENDECTOMY      CARDIAC PACEMAKER PLACEMENT      CARDIAC VALVE SURGERY      CATARACT EXTRACTION      OU    CHOLECYSTECTOMY      COLONOSCOPY      MITRAL VALVE SURGERY         Social History   Substance Use Topics    Smoking status: Never Smoker     Smokeless tobacco: Never Used    Alcohol use No       Family History   Problem Relation Age of Onset    Coronary artery disease Mother      mi    Coronary artery disease Father     Diabetes Brother     Cancer Brother     Melanoma Neg Hx     Psoriasis Neg Hx     Lupus Neg Hx     Eczema Neg Hx        Current Outpatient Prescriptions   Medication Sig    acetaminophen (TYLENOL EXTRA STRENGTH) 325 MG tablet Take 2 tablets (650 mg total) by mouth every 8 (eight) hours.    allopurinol (ZYLOPRIM) 100 MG tablet Take 1 tablet (100 mg total) by mouth once daily.    amiodarone (PACERONE) 200 MG Tab Take 1 tablet (200 mg total) by mouth once daily.    aspirin (ECOTRIN) 81 MG EC tablet Take 81 mg by mouth once daily.    carvedilol (COREG) 25 MG tablet TAKE ONE TABLET BY MOUTH TWICE A DAY WITH MEALS    colchicine 0.6 mg tablet Take 1 tablet (0.6 mg total) by mouth once daily.    cranberry 1,000 mg Cap Take 1 capsule by mouth Daily.    digoxin (LANOXIN) 125 mcg tablet TAKE 1 TABLET (0.125 MG TOTAL) BY MOUTH ONCE DAILY.    diphenhydrAMINE (BENADRYL) 25 mg capsule Take 25 mg by mouth every 6 (six) hours as needed for Itching.    furosemide (LASIX) 40 MG tablet Take 1 tablet (40 mg total) by mouth 2 (two) times daily. 8 am and 2 pm    hydrocodone-acetaminophen 5-325mg (NORCO) 5-325 mg per tablet Take 1 tablet by mouth every 4 (four) hours as needed for Pain.    MULTIVITAMIN ORAL Take 1 tablet by mouth Daily.    valsartan (DIOVAN) 80 MG tablet Take 2 tablets (160 mg total) by mouth 2 (two) times daily.    warfarin (COUMADIN) 2.5 MG tablet TAKE 1/2 TABLET ON MONDAY AND WEDNESDAY AND 1 TABLET ALL OTHER DAYS. DISCONTINUE 5MG TABLET.     Current Facility-Administered Medications   Medication    denosumab (PROLIA) injection 60 mg     Current Outpatient Prescriptions on File Prior to Visit   Medication Sig    acetaminophen (TYLENOL EXTRA STRENGTH) 325 MG tablet Take 2 tablets (650 mg total) by mouth every 8 (eight)  "hours.    allopurinol (ZYLOPRIM) 100 MG tablet Take 1 tablet (100 mg total) by mouth once daily.    amiodarone (PACERONE) 200 MG Tab Take 1 tablet (200 mg total) by mouth once daily.    aspirin (ECOTRIN) 81 MG EC tablet Take 81 mg by mouth once daily.    carvedilol (COREG) 25 MG tablet TAKE ONE TABLET BY MOUTH TWICE A DAY WITH MEALS    colchicine 0.6 mg tablet Take 1 tablet (0.6 mg total) by mouth once daily.    cranberry 1,000 mg Cap Take 1 capsule by mouth Daily.    digoxin (LANOXIN) 125 mcg tablet TAKE 1 TABLET (0.125 MG TOTAL) BY MOUTH ONCE DAILY.    diphenhydrAMINE (BENADRYL) 25 mg capsule Take 25 mg by mouth every 6 (six) hours as needed for Itching.    furosemide (LASIX) 40 MG tablet Take 1 tablet (40 mg total) by mouth 2 (two) times daily. 8 am and 2 pm    hydrocodone-acetaminophen 5-325mg (NORCO) 5-325 mg per tablet Take 1 tablet by mouth every 4 (four) hours as needed for Pain.    MULTIVITAMIN ORAL Take 1 tablet by mouth Daily.    valsartan (DIOVAN) 80 MG tablet Take 2 tablets (160 mg total) by mouth 2 (two) times daily.    warfarin (COUMADIN) 2.5 MG tablet TAKE 1/2 TABLET ON MONDAY AND WEDNESDAY AND 1 TABLET ALL OTHER DAYS. DISCONTINUE 5MG TABLET.    [DISCONTINUED] doxycycline (VIBRAMYCIN) 100 MG Cap Take 1 capsule (100 mg total) by mouth every 12 (twelve) hours.    [DISCONTINUED] gabapentin (NEURONTIN) 100 MG capsule TAKE ONE CAPSULE BY MOUTH TWO TIMES DAILY     Current Facility-Administered Medications on File Prior to Visit   Medication    denosumab (PROLIA) injection 60 mg     Review of patient's allergies indicates:   Allergen Reactions    Cephalosporins Hives    Metaxalone Itching    Penicillins      Other reaction(s): Unknown      Pregabalin      Other reaction(s): "Bad feeling"      Sulfa (sulfonamide antibiotics) Itching       ROS  Constitution: Negative for fever, weakness and malaise/fatigue.    HENT: Negative for congestion, headaches and sore throat.    Eyes: Negative for " "discharge.    Cardiovascular: Negative for chest pain, claudication, cyanosis, dyspnea on exertion, irregular heartbeat, near-syncope, orthopnea, palpitations, paroxysmal nocturnal dyspnia and syncope. Leg swelling chronic  Respiratory: Positive for snoring shortness of breath . Negative for cough, shortness of breath, sputum production and wheezing.    Endocrine: Negative for cold intolerance and heat intolerance.    Hematologic/Lymphatic: Negative for bleeding problem.    Skin: Negative for rash.    Musculoskeletal: Positive for arthritis, back pain, joint pain and joint swelling bruises noted of discoloration of arm and leg.. Negative for muscle cramps, muscle weakness, myalgias and neck pain.    Gastrointestinal: Negative for abdominal pain, heartburn, melena, nausea and vomiting.    Genitourinary: Negative for hematuria.    Neurological: Negative for dizziness, light-headedness, loss of balance, numbness, paresthesias and seizures.    Psychiatric/Behavioral: Negative for memory loss. The patient does not have insomnia.    Allergic/Immunologic: Negative for hives  Objective:   Physical Exam  Vitals:    08/10/17 1337   BP: 126/62   Pulse: 70   Weight: 52.2 kg (114 lb 15.5 oz)   Height: 5' 2" (1.575 m)   Constitutional: She is oriented to person, place, and time. She appears well-developed and well-nourished. No distress.   HENT:   Head: Normocephalic and atraumatic.   Eyes: Right eye exhibits no discharge. Left eye exhibits no discharge.   Neck: Neck supple. Positive JVD present. No tracheal deviation present. No thyromegaly present.   Cardiovascular: Normal rate, regular rhythm, S2 normal, normal heart sounds and intact distal pulses. PMI is not displaced. Exam reveals no gallop, no S3, no S4 and no friction rub.   No murmur heard.   Pulmonary/Chest: Effort normal and breath sounds normal. No respiratory distress. She has no wheezes. She has no rales. She exhibits no tenderness.   Sternotomy incision " well-healed. Pacemaker has moved laterally from its original site.   Abdominal: Soft. She exhibits no distension and no mass. There is no tenderness. There is no rebound and no guarding.   Musculoskeletal: She exhibits edema varicosities noted.. She exhibits no tenderness. Bruise large area in the left lower extremity. And left arm   Neurological: She is alert and oriented to person, place, and time. No cranial nerve deficit.   Skin: Skin is warm and dry. No rash noted. She is not diaphoretic. No erythema.   Psychiatric: She has a normal mood and affect. Her behavior is normal. Thought content normal  Lab Results   Component Value Date    CHOL 162 12/27/2013    CHOL 152 05/30/2013    CHOL 137 11/20/2012     Lab Results   Component Value Date    HDL 49 12/27/2013    HDL 47 05/30/2013    HDL 38 (L) 11/20/2012     Lab Results   Component Value Date    LDLCALC 89.0 12/27/2013    LDLCALC 75.0 05/30/2013    LDLCALC 67.0 11/20/2012     Lab Results   Component Value Date    TRIG 120 12/27/2013    TRIG 152 (H) 05/30/2013    TRIG 158 (H) 11/20/2012     Lab Results   Component Value Date    CHOLHDL 30.2 12/27/2013    CHOLHDL 30.9 05/30/2013    CHOLHDL 27.7 11/20/2012       Chemistry        Component Value Date/Time     07/12/2017 1228    K 4.2 07/12/2017 1228     07/12/2017 1228    CO2 29 07/12/2017 1228    BUN 40 (H) 07/12/2017 1228    CREATININE 1.1 07/12/2017 1228     07/12/2017 1228        Component Value Date/Time    CALCIUM 9.4 07/12/2017 1228    ALKPHOS 63 01/03/2017 1119    AST 28 01/03/2017 1119    ALT 35 01/03/2017 1119    BILITOT 0.7 01/03/2017 1119    ESTGFRAFRICA 53 (A) 07/12/2017 1228    EGFRNONAA 46 (A) 07/12/2017 1228          Lab Results   Component Value Date    TSH 4.026 (H) 07/22/2015     Lab Results   Component Value Date    INR 3.3 (A) 08/10/2017    INR 3.4 (A) 07/28/2017    INR 2.3 (H) 07/12/2017     Lab Results   Component Value Date    WBC 7.93 07/12/2017    HGB 11.0 (L) 07/12/2017     HCT 34.2 (L) 07/12/2017    MCV 98 07/12/2017     07/12/2017     BMP  Sodium   Date Value Ref Range Status   07/12/2017 142 136 - 145 mmol/L Final     Potassium   Date Value Ref Range Status   07/12/2017 4.2 3.5 - 5.1 mmol/L Final     Chloride   Date Value Ref Range Status   07/12/2017 104 95 - 110 mmol/L Final     CO2   Date Value Ref Range Status   07/12/2017 29 23 - 29 mmol/L Final     BUN, Bld   Date Value Ref Range Status   07/12/2017 40 (H) 8 - 23 mg/dL Final     Creatinine   Date Value Ref Range Status   07/12/2017 1.1 0.5 - 1.4 mg/dL Final     Calcium   Date Value Ref Range Status   07/12/2017 9.4 8.7 - 10.5 mg/dL Final     Anion Gap   Date Value Ref Range Status   07/12/2017 9 8 - 16 mmol/L Final     eGFR if    Date Value Ref Range Status   07/12/2017 53 (A) >60 mL/min/1.73 m^2 Final     eGFR if non    Date Value Ref Range Status   07/12/2017 46 (A) >60 mL/min/1.73 m^2 Final     Comment:     Calculation used to obtain the estimated glomerular filtration  rate (eGFR) is the CKD-EPI equation. Since race is unknown   in our information system, the eGFR values for   -American and Non--American patients are given   for each creatinine result.       CrCl cannot be calculated (Patient's most recent sCr result is older than the maximum 7 days allowed.).    Assessment:     1. Edema, unspecified type    2. LEV (obstructive sleep apnea)    3. Chronic combined systolic and diastolic congestive heart failure    4. Essential hypertension    5. Dyslipidemia    6. S/P MVR (mitral valve replacement)    7. S/P placement of cardiac pacemaker    8. CKD (chronic kidney disease) stage 3, GFR 30-59 ml/min    9. Cardiac resynchronization therapy defibrillator (CRT-D) in place    10. Ischemic cardiomyopathy    11. Anticoagulated on Coumadin    12. Pulmonary hypertension due to left heart disease      Possibly some mild volume overload will use lasix extra dose 2 days jonny  Row.  Encouraged her to do her exercise back and  will assess clinical response.  Plan:   Continue current therapy  Cardiac low salt diet.  Risk factor modification and excercise program.  F/u in 6 months with lipid cmp

## 2017-08-13 NOTE — PROGRESS NOTES
Subjective:       Patient ID: Jennifer Mathews is a 85 y.o. female.    Chief Complaint: Annual Exam    Patient presents to clinic today for annual physical exam.      Review of Systems   Constitutional: Negative for chills, fatigue, fever and unexpected weight change.   HENT: Negative for congestion, dental problem, ear pain, hearing loss, rhinorrhea and trouble swallowing.    Eyes: Negative for pain and visual disturbance.   Respiratory: Positive for shortness of breath. Negative for cough.    Cardiovascular: Positive for leg swelling. Negative for chest pain and palpitations.   Gastrointestinal: Negative for abdominal distention, abdominal pain, blood in stool, constipation, diarrhea, nausea and vomiting.   Genitourinary: Negative for difficulty urinating and vaginal discharge.   Musculoskeletal: Positive for arthralgias. Negative for myalgias.   Skin: Negative for rash.   Neurological: Negative for dizziness, weakness, numbness and headaches.   Hematological: Negative for adenopathy. Bruises/bleeds easily.   Psychiatric/Behavioral: Negative for dysphoric mood and sleep disturbance. The patient is not nervous/anxious.        Objective:      Physical Exam   Constitutional: She is oriented to person, place, and time. Vital signs are normal. She appears well-developed and well-nourished. No distress.   HENT:   Head: Normocephalic and atraumatic.   Right Ear: Tympanic membrane, external ear and ear canal normal.   Left Ear: Tympanic membrane, external ear and ear canal normal.   Nose: Nose normal. No mucosal edema or rhinorrhea.   Mouth/Throat: Uvula is midline, oropharynx is clear and moist and mucous membranes are normal.   Eyes: Conjunctivae, EOM and lids are normal. Pupils are equal, round, and reactive to light.   Neck: Normal range of motion. Neck supple. No thyromegaly present.   Cardiovascular: Normal rate and regular rhythm.  Exam reveals no gallop and no friction rub.    No murmur heard.  Pulmonary/Chest:  Effort normal and breath sounds normal. She has no wheezes. She has no rhonchi. She has no rales.   Abdominal: Soft. Normal appearance and bowel sounds are normal. She exhibits no distension and no mass. There is no hepatosplenomegaly. There is no tenderness.   Musculoskeletal: Normal range of motion.   Lymphadenopathy:     She has no cervical adenopathy.   Neurological: She is alert and oriented to person, place, and time. She has normal strength. No cranial nerve deficit or sensory deficit. Gait normal.   Reflex Scores:       Patellar reflexes are 2+ on the right side and 2+ on the left side.  Skin: Skin is warm and dry. No lesion and no rash noted. No cyanosis. Nails show no clubbing.   Psychiatric: She has a normal mood and affect.   Vitals reviewed.      Assessment:       1. Routine general medical examination at a health care facility    2. Encounter for long-term current use of medication    3. LEV (obstructive sleep apnea)    4. Pulmonary hypertension due to left heart disease    5. Chronic combined systolic and diastolic congestive heart failure    6. Essential hypertension    7. S/P MVR (mitral valve replacement)    8. S/P placement of cardiac pacemaker    9. Osteoporosis, senile    10. Primary osteoarthritis involving multiple joints    11. CKD (chronic kidney disease) stage 3, GFR 30-59 ml/min        Plan:   Jennifer was seen today for annual exam.    Diagnoses and all orders for this visit:    Routine general medical examination at a health care facility    Encounter for long-term current use of medication  -     Lipid panel; Standing  -     TSH; Standing    LEV (obstructive sleep apnea)  Comments:  followed by Pulmonology    Pulmonary hypertension due to left heart disease  Comments:  followed by Pulmonology    Chronic combined systolic and diastolic congestive heart failure  Comments:  followed by Cardiology    Essential hypertension  Comments:  controlled    S/P MVR (mitral valve  replacement)  Comments:  followed by Cardiology    S/P placement of cardiac pacemaker  Comments:  followed by Cardiology    Osteoporosis, senile  Comments:  followed by Rheumatology    Primary osteoarthritis involving multiple joints  Comments:  followed by Rheumatology    CKD (chronic kidney disease) stage 3, GFR 30-59 ml/min  Comments:  followed by Nephrology      - She has follow up with Dr. Cary this afternoon, she plans to discuss SOB/leg swelling with him at visit.   - Health Maintenance reviewed/updated.

## 2017-08-15 ENCOUNTER — OFFICE VISIT (OUTPATIENT)
Dept: RHEUMATOLOGY | Facility: CLINIC | Age: 82
End: 2017-08-15
Payer: MEDICARE

## 2017-08-15 ENCOUNTER — LAB VISIT (OUTPATIENT)
Dept: LAB | Facility: HOSPITAL | Age: 82
End: 2017-08-15
Attending: PHYSICIAN ASSISTANT
Payer: MEDICARE

## 2017-08-15 ENCOUNTER — TELEPHONE (OUTPATIENT)
Dept: RHEUMATOLOGY | Facility: CLINIC | Age: 82
End: 2017-08-15

## 2017-08-15 VITALS
DIASTOLIC BLOOD PRESSURE: 63 MMHG | SYSTOLIC BLOOD PRESSURE: 132 MMHG | WEIGHT: 117.75 LBS | HEART RATE: 70 BPM | BODY MASS INDEX: 21.53 KG/M2

## 2017-08-15 DIAGNOSIS — M1A.39X1 CHRONIC GOUT DUE TO RENAL IMPAIRMENT OF MULTIPLE SITES WITH TOPHUS: ICD-10-CM

## 2017-08-15 DIAGNOSIS — M85.80 OSTEOPENIA WITH HIGH RISK OF FRACTURE: Primary | ICD-10-CM

## 2017-08-15 DIAGNOSIS — M1A.3791 CHRONIC GOUT DUE TO RENAL IMPAIRMENT INVOLVING FOOT WITH TOPHUS, UNSPECIFIED LATERALITY: ICD-10-CM

## 2017-08-15 DIAGNOSIS — M89.9 DISORDER OF BONE AND ARTICULAR CARTILAGE: ICD-10-CM

## 2017-08-15 DIAGNOSIS — M1A.9XX1 TOPHACEOUS GOUT: ICD-10-CM

## 2017-08-15 DIAGNOSIS — N18.30 CKD (CHRONIC KIDNEY DISEASE) STAGE 3, GFR 30-59 ML/MIN: ICD-10-CM

## 2017-08-15 DIAGNOSIS — Z79.899 ENCOUNTER FOR LONG-TERM CURRENT USE OF MEDICATION: ICD-10-CM

## 2017-08-15 DIAGNOSIS — M94.9 DISORDER OF BONE AND ARTICULAR CARTILAGE: ICD-10-CM

## 2017-08-15 DIAGNOSIS — M81.0 OSTEOPOROSIS, SENILE: ICD-10-CM

## 2017-08-15 DIAGNOSIS — Z51.81 MEDICATION MONITORING ENCOUNTER: ICD-10-CM

## 2017-08-15 LAB
ALBUMIN SERPL BCP-MCNC: 3.8 G/DL
ALP SERPL-CCNC: 68 U/L
ALT SERPL W/O P-5'-P-CCNC: 18 U/L
ANION GAP SERPL CALC-SCNC: 12 MMOL/L
AST SERPL-CCNC: 23 U/L
BASOPHILS # BLD AUTO: 0.05 K/UL
BASOPHILS NFR BLD: 0.8 %
BILIRUB SERPL-MCNC: 0.7 MG/DL
BUN SERPL-MCNC: 23 MG/DL
CALCIUM SERPL-MCNC: 9.4 MG/DL
CHLORIDE SERPL-SCNC: 103 MMOL/L
CHOLEST/HDLC SERPL: 4.9 {RATIO}
CO2 SERPL-SCNC: 29 MMOL/L
CREAT SERPL-MCNC: 1.2 MG/DL
DIFFERENTIAL METHOD: ABNORMAL
EOSINOPHIL # BLD AUTO: 0.1 K/UL
EOSINOPHIL NFR BLD: 2.1 %
ERYTHROCYTE [DISTWIDTH] IN BLOOD BY AUTOMATED COUNT: 16.2 %
EST. GFR  (AFRICAN AMERICAN): 48 ML/MIN/1.73 M^2
EST. GFR  (NON AFRICAN AMERICAN): 41 ML/MIN/1.73 M^2
GLUCOSE SERPL-MCNC: 108 MG/DL
HCT VFR BLD AUTO: 37.5 %
HDL/CHOLESTEROL RATIO: 20.4 %
HDLC SERPL-MCNC: 157 MG/DL
HDLC SERPL-MCNC: 32 MG/DL
HGB BLD-MCNC: 11.8 G/DL
LDLC SERPL CALC-MCNC: 85.8 MG/DL
LYMPHOCYTES # BLD AUTO: 1.6 K/UL
LYMPHOCYTES NFR BLD: 26 %
MCH RBC QN AUTO: 32.2 PG
MCHC RBC AUTO-ENTMCNC: 31.5 G/DL
MCV RBC AUTO: 103 FL
MONOCYTES # BLD AUTO: 0.4 K/UL
MONOCYTES NFR BLD: 5.7 %
NEUTROPHILS # BLD AUTO: 4 K/UL
NEUTROPHILS NFR BLD: 65.4 %
NONHDLC SERPL-MCNC: 125 MG/DL
PLATELET # BLD AUTO: 197 K/UL
PMV BLD AUTO: 9.9 FL
POTASSIUM SERPL-SCNC: 4.6 MMOL/L
PROT SERPL-MCNC: 6.9 G/DL
RBC # BLD AUTO: 3.66 M/UL
SODIUM SERPL-SCNC: 144 MMOL/L
T4 FREE SERPL-MCNC: 1.06 NG/DL
TRIGL SERPL-MCNC: 196 MG/DL
TSH SERPL DL<=0.005 MIU/L-ACNC: 5.77 UIU/ML
URATE SERPL-MCNC: 8 MG/DL
WBC # BLD AUTO: 6.11 K/UL

## 2017-08-15 PROCEDURE — 80053 COMPREHEN METABOLIC PANEL: CPT | Mod: PO

## 2017-08-15 PROCEDURE — 84443 ASSAY THYROID STIM HORMONE: CPT

## 2017-08-15 PROCEDURE — 84439 ASSAY OF FREE THYROXINE: CPT

## 2017-08-15 PROCEDURE — 99214 OFFICE O/P EST MOD 30 MIN: CPT | Mod: S$PBB,,, | Performed by: PHYSICIAN ASSISTANT

## 2017-08-15 PROCEDURE — 1159F MED LIST DOCD IN RCRD: CPT | Mod: ,,, | Performed by: PHYSICIAN ASSISTANT

## 2017-08-15 PROCEDURE — 99214 OFFICE O/P EST MOD 30 MIN: CPT | Mod: PBBFAC,PO | Performed by: PHYSICIAN ASSISTANT

## 2017-08-15 PROCEDURE — 99999 PR PBB SHADOW E&M-EST. PATIENT-LVL IV: CPT | Mod: PBBFAC,,, | Performed by: PHYSICIAN ASSISTANT

## 2017-08-15 PROCEDURE — 36415 COLL VENOUS BLD VENIPUNCTURE: CPT | Mod: PO

## 2017-08-15 PROCEDURE — 3008F BODY MASS INDEX DOCD: CPT | Mod: ,,, | Performed by: PHYSICIAN ASSISTANT

## 2017-08-15 PROCEDURE — 1126F AMNT PAIN NOTED NONE PRSNT: CPT | Mod: ,,, | Performed by: PHYSICIAN ASSISTANT

## 2017-08-15 PROCEDURE — 85025 COMPLETE CBC W/AUTO DIFF WBC: CPT | Mod: PO

## 2017-08-15 PROCEDURE — 80061 LIPID PANEL: CPT | Mod: PO

## 2017-08-15 PROCEDURE — 3078F DIAST BP <80 MM HG: CPT | Mod: ,,, | Performed by: PHYSICIAN ASSISTANT

## 2017-08-15 PROCEDURE — 84550 ASSAY OF BLOOD/URIC ACID: CPT | Mod: PO

## 2017-08-15 PROCEDURE — 3075F SYST BP GE 130 - 139MM HG: CPT | Mod: ,,, | Performed by: PHYSICIAN ASSISTANT

## 2017-08-15 RX ORDER — ALLOPURINOL 100 MG/1
150 TABLET ORAL DAILY
Qty: 180 TABLET | Refills: 1 | Status: SHIPPED | OUTPATIENT
Start: 2017-08-15 | End: 2018-02-26 | Stop reason: SDUPTHER

## 2017-08-15 NOTE — PATIENT INSTRUCTIONS
prolia in November with labs    i'll watch for your uric acid, if still high we will increase allopurinol to 150mg/day x 4 weeks then 200mg/day, but i'll let you know    Cont colchicine 0.6mg/day     Cont daily vit D , calcium in your diet

## 2017-08-15 NOTE — PROGRESS NOTES
Subjective:       Patient ID: Jennifer Mathews is a 85 y.o. female.    Chief Complaint: Gout and Osteoporosis    Jennifer is here for follow up. She last saw Dr. MILLIGAN for newly diagnosed gout in the setting of CKD. SHe also has osteoporosis and is on Prolia. She had a fall a month ago at home after she missed a step outside, no fractures, using a cane now due to residual pain. She occasionally uses a rolling walker if she is doing lots of walking.  Regarding OP, she received prolia on 5.17.17 and is due in two months. No issues tolerating prolia.  She takes vit D daily. She gets calcium in her diet    Regarding gout she is on colchicine 0.6mg/day and allopurinol was raised to 100mg/day. Her uric acid level in may was not at goal.  No issues tolerating her medications. No gout flares in the last few months.       Review of Systems   Constitutional: Negative for chills, fatigue and fever.   HENT: Negative for mouth sores, rhinorrhea and sore throat.    Eyes: Negative for pain and redness.   Respiratory: Negative for cough and shortness of breath.    Cardiovascular: Negative for chest pain.        CHF, htn, CAD   Gastrointestinal: Negative for abdominal pain, constipation, diarrhea, nausea and vomiting.   Genitourinary: Negative for dysuria and hematuria.   Musculoskeletal: Positive for arthralgias. Negative for joint swelling and myalgias.   Skin: Negative for rash.   Neurological: Negative for weakness, numbness and headaches.   Hematological:        Chronic anemia   Psychiatric/Behavioral: The patient is not nervous/anxious.          Objective:   /63   Pulse 70   Wt 53.4 kg (117 lb 11.6 oz)   BMI 21.53 kg/m²      Physical Exam   Constitutional: She is oriented to person, place, and time and well-developed, well-nourished, and in no distress.   HENT:   Head: Normocephalic and atraumatic.   Eyes: Pupils are equal, round, and reactive to light. Right eye exhibits no discharge.   Neck: Normal range of motion.    Cardiovascular: Normal rate, regular rhythm and normal heart sounds.  Exam reveals no friction rub.    Pulmonary/Chest: Effort normal and breath sounds normal. No respiratory distress.   Abdominal: Soft. She exhibits no distension. There is no tenderness.   Lymphadenopathy:     She has no cervical adenopathy.   Neurological: She is alert and oriented to person, place, and time.   Skin: No rash noted. No erythema.     Psychiatric: Mood normal.   Musculoskeletal: Normal range of motion. She exhibits no edema or deformity.   Todd hands with oa changes to 1 cmcs, pips and dips, no synovitis, no tender joints  Right knee with echhymosis s/p fall, small abrasion anteriorly, no major effusion  todd feet with 2 toe ip joint enlargement, no tenderness    Ambulating with cane today         Recent Results (from the past 168 hour(s))   POCT INR    Collection Time: 08/10/17 11:38 AM   Result Value Ref Range    INR 3.3 (A) 2.0 - 3.0   CBC auto differential    Collection Time: 08/15/17  8:57 AM   Result Value Ref Range    WBC 6.11 3.90 - 12.70 K/uL    RBC 3.66 (L) 4.00 - 5.40 M/uL    Hemoglobin 11.8 (L) 12.0 - 16.0 g/dL    Hematocrit 37.5 37.0 - 48.5 %     (H) 82 - 98 fL    MCH 32.2 (H) 27.0 - 31.0 pg    MCHC 31.5 (L) 32.0 - 36.0 g/dL    RDW 16.2 (H) 11.5 - 14.5 %    Platelets 197 150 - 350 K/uL    MPV 9.9 9.2 - 12.9 fL    Gran # 4.0 1.8 - 7.7 K/uL    Lymph # 1.6 1.0 - 4.8 K/uL    Mono # 0.4 0.3 - 1.0 K/uL    Eos # 0.1 0.0 - 0.5 K/uL    Baso # 0.05 0.00 - 0.20 K/uL    Gran% 65.4 38.0 - 73.0 %    Lymph% 26.0 18.0 - 48.0 %    Mono% 5.7 4.0 - 15.0 %    Eosinophil% 2.1 0.0 - 8.0 %    Basophil% 0.8 0.0 - 1.9 %    Differential Method Automated        Dexa 7.22.15: DF -1.6, Right neck -2.3, spine -1.9 frax 22.4 major, 7.4 hip, decreasing bmd     Assessment:       1. Osteopenia with high risk of fracture    2. 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 CKD (chronic kidney disease)  stage 3, GFR 30-59 ml/min    4. Medication monitoring encounter    5. Disorder of bone and articular cartilage        Impression:  Osteopenia with high fracture risk: on prolia q 6 months, last dose may this year, daily vitamin D  Gout: on allopurinol 100mg/day colchcine 0.6mg/day, uric acid in may 8.7, todays level pending--goal fo 6 or less  medication monitoring: no issues, tolerates well colchicine, allopurinol, and prolia  CKD: stable, avoid nsaids      Plan:       prolia due November this year,  Cont Vit D daily 1000units  Check uric acid from today to see if any adjustments needed in allopurinol, if high will increase to 150mg/d x 1 mos then 200mg/day   Cont colchicine 0.6mg/day   Repeat dexa next visit      Return in November for prolia

## 2017-08-22 DIAGNOSIS — I50.9 ACUTE ON CHRONIC CONGESTIVE HEART FAILURE, UNSPECIFIED CONGESTIVE HEART FAILURE TYPE: ICD-10-CM

## 2017-08-22 RX ORDER — CARVEDILOL 25 MG/1
TABLET ORAL
Qty: 60 TABLET | Refills: 2 | Status: SHIPPED | OUTPATIENT
Start: 2017-08-22 | End: 2017-11-16 | Stop reason: SDUPTHER

## 2017-08-25 RX ORDER — CIPROFLOXACIN 500 MG/1
500 TABLET ORAL 2 TIMES DAILY
Qty: 20 TABLET | Refills: 0 | Status: SHIPPED | OUTPATIENT
Start: 2017-08-25 | End: 2017-08-25

## 2017-08-25 RX ORDER — SULFAMETHOXAZOLE AND TRIMETHOPRIM 800; 160 MG/1; MG/1
1 TABLET ORAL 2 TIMES DAILY
Qty: 10 TABLET | Refills: 0 | Status: CANCELLED | OUTPATIENT
Start: 2017-08-25 | End: 2017-08-30

## 2017-08-25 RX ORDER — LEVOFLOXACIN 500 MG/1
500 TABLET, FILM COATED ORAL DAILY
Qty: 10 TABLET | Refills: 0 | Status: ON HOLD | OUTPATIENT
Start: 2017-08-25 | End: 2017-09-12 | Stop reason: HOSPADM

## 2017-08-25 NOTE — TELEPHONE ENCOUNTER
Please let the patient know that a prescription for Levaquin was e- scribed in.  The prescription for Cipro was discontinued due to potential interaction with colchicine.    Thank you,    Dr. Rosenbaum

## 2017-08-25 NOTE — TELEPHONE ENCOUNTER
Please let the patient know that their medication refill was e- scribed in.    Thank you,    Dr. Rosenbaum

## 2017-09-01 ENCOUNTER — ANTI-COAG VISIT (OUTPATIENT)
Dept: CARDIOLOGY | Facility: CLINIC | Age: 82
End: 2017-09-01
Payer: MEDICARE

## 2017-09-01 ENCOUNTER — OFFICE VISIT (OUTPATIENT)
Dept: INTERNAL MEDICINE | Facility: CLINIC | Age: 82
End: 2017-09-01
Payer: MEDICARE

## 2017-09-01 VITALS
DIASTOLIC BLOOD PRESSURE: 60 MMHG | BODY MASS INDEX: 20.93 KG/M2 | OXYGEN SATURATION: 96 % | SYSTOLIC BLOOD PRESSURE: 122 MMHG | WEIGHT: 113.75 LBS | HEART RATE: 70 BPM | TEMPERATURE: 97 F | HEIGHT: 62 IN

## 2017-09-01 DIAGNOSIS — S80.02XD TRAUMATIC HEMATOMA OF LEFT KNEE, SUBSEQUENT ENCOUNTER: Primary | ICD-10-CM

## 2017-09-01 DIAGNOSIS — Z79.01 LONG TERM (CURRENT) USE OF ANTICOAGULANTS: Primary | ICD-10-CM

## 2017-09-01 DIAGNOSIS — M25.562 ACUTE PAIN OF LEFT KNEE: ICD-10-CM

## 2017-09-01 LAB — INR PPP: 1.9 (ref 2–3)

## 2017-09-01 PROCEDURE — 1159F MED LIST DOCD IN RCRD: CPT | Mod: ,,, | Performed by: NURSE PRACTITIONER

## 2017-09-01 PROCEDURE — 3078F DIAST BP <80 MM HG: CPT | Mod: ,,, | Performed by: NURSE PRACTITIONER

## 2017-09-01 PROCEDURE — 99999 PR PBB SHADOW E&M-EST. PATIENT-LVL I: CPT | Mod: PBBFAC,,,

## 2017-09-01 PROCEDURE — 99999 PR PBB SHADOW E&M-EST. PATIENT-LVL IV: CPT | Mod: PBBFAC,,, | Performed by: NURSE PRACTITIONER

## 2017-09-01 PROCEDURE — 99214 OFFICE O/P EST MOD 30 MIN: CPT | Mod: PBBFAC | Performed by: NURSE PRACTITIONER

## 2017-09-01 PROCEDURE — 3074F SYST BP LT 130 MM HG: CPT | Mod: ,,, | Performed by: NURSE PRACTITIONER

## 2017-09-01 PROCEDURE — 1125F AMNT PAIN NOTED PAIN PRSNT: CPT | Mod: ,,, | Performed by: NURSE PRACTITIONER

## 2017-09-01 PROCEDURE — 99213 OFFICE O/P EST LOW 20 MIN: CPT | Mod: S$PBB,,, | Performed by: NURSE PRACTITIONER

## 2017-09-01 PROCEDURE — 85610 PROTHROMBIN TIME: CPT | Mod: PBBFAC

## 2017-09-01 PROCEDURE — 99211 OFF/OP EST MAY X REQ PHY/QHP: CPT | Mod: PBBFAC

## 2017-09-01 RX ORDER — MUPIROCIN CALCIUM 20 MG/G
CREAM TOPICAL
Qty: 30 G | Refills: 0 | Status: ON HOLD | OUTPATIENT
Start: 2017-09-01 | End: 2017-09-12 | Stop reason: HOSPADM

## 2017-09-01 NOTE — PROGRESS NOTES
INR is now slightly sub-therapeutic. Recent labs showed increase in uric acid levels and allopurinol dose adjustment. Now on levofloxacin 500mg with 4 days of therapy left. Will re-challenge current calix until follow-up. Patient knows to contact coumadin clinic with any other medication changes before next visit.

## 2017-09-01 NOTE — PROGRESS NOTES
"Subjective:       Patient ID: Jennifer Mathews is a 85 y.o. female.    Chief Complaint: Wound Dehiscence (left knee, draining completes antibiotics monday.)    Patient presents for follow up of left knee hematoma. She reports improvement however "the scab forms, falls off, and it oozes pus". She is taking antibiotics as directed. She started to apply neosporin without improvement.       Review of Systems   Constitutional: Negative for chills, fatigue, fever and unexpected weight change.   Eyes: Negative for visual disturbance.   Respiratory: Negative for shortness of breath.    Cardiovascular: Negative for chest pain.   Musculoskeletal: Positive for arthralgias. Negative for myalgias.   Skin: Positive for wound (left knee).   Neurological: Negative for headaches.   Hematological: Bruises/bleeds easily (on coumadin).       Objective:      Physical Exam   Constitutional: She is oriented to person, place, and time. She appears well-developed and well-nourished.   HENT:   Head: Normocephalic and atraumatic.   Neurological: She is alert and oriented to person, place, and time.   Skin: She is not diaphoretic.        Psychiatric: She has a normal mood and affect.   Vitals reviewed.      Assessment:       1. Traumatic hematoma of left knee, subsequent encounter    2. Acute pain of left knee        Plan:   Traumatic hematoma of left knee, subsequent encounter  -     mupirocin calcium 2% (BACTROBAN) 2 % cream; Apply to affected area 3 times daily  Dispense: 30 g; Refill: 0    Acute pain of left knee  -     mupirocin calcium 2% (BACTROBAN) 2 % cream; Apply to affected area 3 times daily  Dispense: 30 g; Refill: 0      Keep f/u with ortho on 9/6. Continue antibiotics.   Return if symptoms worsen or fail to improve, for keep routine follow up.      "

## 2017-09-06 ENCOUNTER — HOSPITAL ENCOUNTER (OUTPATIENT)
Dept: RADIOLOGY | Facility: HOSPITAL | Age: 82
Discharge: HOME | End: 2017-09-06
Attending: ORTHOPAEDIC SURGERY
Payer: MEDICARE

## 2017-09-06 ENCOUNTER — OFFICE VISIT (OUTPATIENT)
Dept: ORTHOPEDICS | Facility: CLINIC | Age: 82
End: 2017-09-06
Payer: MEDICARE

## 2017-09-06 VITALS — WEIGHT: 113.75 LBS | HEIGHT: 62 IN | BODY MASS INDEX: 20.93 KG/M2

## 2017-09-06 DIAGNOSIS — S80.02XD TRAUMATIC HEMATOMA OF KNEE, LEFT, SUBSEQUENT ENCOUNTER: Primary | ICD-10-CM

## 2017-09-06 DIAGNOSIS — M25.562 ACUTE PAIN OF LEFT KNEE: ICD-10-CM

## 2017-09-06 DIAGNOSIS — Z01.818 PREOP TESTING: Primary | ICD-10-CM

## 2017-09-06 DIAGNOSIS — Z01.818 PREOP TESTING: ICD-10-CM

## 2017-09-06 DIAGNOSIS — M17.12 ARTHRITIS OF KNEE, LEFT: ICD-10-CM

## 2017-09-06 PROCEDURE — 71020 XR CHEST PA AND LATERAL PRE-OP: CPT | Mod: 26,,, | Performed by: RADIOLOGY

## 2017-09-06 PROCEDURE — 1125F AMNT PAIN NOTED PAIN PRSNT: CPT | Mod: ,,, | Performed by: PHYSICIAN ASSISTANT

## 2017-09-06 PROCEDURE — 1159F MED LIST DOCD IN RCRD: CPT | Mod: ,,, | Performed by: PHYSICIAN ASSISTANT

## 2017-09-06 PROCEDURE — 71020 XR CHEST PA AND LATERAL PRE-OP: CPT | Mod: TC

## 2017-09-06 PROCEDURE — 99999 PR PBB SHADOW E&M-EST. PATIENT-LVL III: CPT | Mod: PBBFAC,,, | Performed by: PHYSICIAN ASSISTANT

## 2017-09-06 PROCEDURE — 99213 OFFICE O/P EST LOW 20 MIN: CPT | Mod: S$PBB,,, | Performed by: PHYSICIAN ASSISTANT

## 2017-09-06 PROCEDURE — 99213 OFFICE O/P EST LOW 20 MIN: CPT | Mod: PBBFAC,25 | Performed by: PHYSICIAN ASSISTANT

## 2017-09-06 NOTE — PROGRESS NOTES
"Subjective:      Patient ID: Jennifer Mathews is a 85 y.o. female.    Chief Complaint: Pain of the Left Knee    HPI  The patient presents to clinic for a follow-up of her left knee. She has persistent discomfort, swelling and bruising of knee and lower leg. It appears that whenever she fell in July, she developed a hematoma of a varicose vein. She states that she completed antibiotics that were called in a couple of weeks ago. This "scabbed" area has been draining blood and slightly purulent substance.     By history, the patient had a fall on 7/8/17 and was later evaluated in the ER on 7/12/17.  She was watching kids in the swimming pool and missed a step while walking into the [place] area. She's had two falls in the past 2 months. She reached around in the utility room and fell onto oak tv trays (she has residual bruising to face still.) She had previous CT and x-rays earlier in July. CT shows: Large hematoma in the anterior soft tissues of the left leg at the level of the knee and tibia tuberosity.    Review of Systems   Constitution: Negative for chills, fever and night sweats.   Respiratory: Negative for cough, shortness of breath and wheezing.    Skin: Positive for color change.   Musculoskeletal: Positive for joint pain and joint swelling.   Gastrointestinal: Negative for diarrhea, nausea and vomiting.   Neurological: Negative for brief paralysis.   Psychiatric/Behavioral: Negative for altered mental status.         Objective:            General    Constitutional: She is oriented to person, place, and time. She appears well-developed and well-nourished.   Neck: Normal range of motion.   Cardiovascular: Normal rate and regular rhythm.    Pulmonary/Chest: Effort normal.   Abdominal: Soft.   Neurological: She is alert and oriented to person, place, and time.   Psychiatric: She has a normal mood and affect. Her behavior is normal.     General Musculoskeletal Exam   Gait: normal       Right Knee Exam "     Tenderness   The patient is experiencing no tenderness.           Left Knee Exam     Inspection   Erythema: absent  Swelling: present  Effusion: present  Bruising: present    Tenderness   The patient tender to palpation of the medial joint line, lateral joint line, patella and tibial tubercle.    Range of Motion   The patient has normal left knee ROM.    Comments:  Eschar vs scab over anterior knee joint.   Area is not actively draining but when squeezed it appears to want to drain. Slightly fluctuant.     3+ edema lower leg    Vascular Exam       Edema  Left Lower Leg: present                    Assessment:       Encounter Diagnoses   Name Primary?    Traumatic hematoma of knee, left, subsequent encounter Yes    Arthritis of knee, left     Acute pain of left knee           Plan:       Jennifer was seen today for pain.    Diagnoses and all orders for this visit:    Traumatic hematoma of knee, left, subsequent encounter    Arthritis of knee, left    Acute pain of left knee        Since the patient is at an unacceptable level of discomfort and her wound has been draining, it is best that we further address this with an outpatient surgery involving irrigation and debridement, possible ligation of varicose vein and wound closure. The patient wishes to proceed with this. She will be scheduled for this on 9/12/17. She understands that she will need preop clearance. The patient will need to hold her coumadin 5 days prior to her surgery.           Jennifer Enamorado PA-C

## 2017-09-07 ENCOUNTER — TELEPHONE (OUTPATIENT)
Dept: CARDIOLOGY | Facility: CLINIC | Age: 82
End: 2017-09-07

## 2017-09-07 DIAGNOSIS — S80.02XA TRAUMATIC HEMATOMA OF LEFT KNEE, INITIAL ENCOUNTER: Primary | ICD-10-CM

## 2017-09-07 NOTE — TELEPHONE ENCOUNTER
----- Message from Elisabet Levy LPN sent at 9/7/2017 10:09 AM CDT -----  This pt is schedule for a irrigation and debridement with would closure on 9/12. She is currently on Coumadin as prescribed by Dr. Cary. Can you please ask him what he would like for this pt to do.      Thanks,   Elisabet

## 2017-09-07 NOTE — TELEPHONE ENCOUNTER
Dr. Cary  Please advise if okay to hold Coumadin and how long if Dr. Rosenbaum has no special protocol

## 2017-09-11 ENCOUNTER — ANESTHESIA EVENT (OUTPATIENT)
Dept: SURGERY | Facility: HOSPITAL | Age: 82
End: 2017-09-11
Payer: MEDICARE

## 2017-09-11 RX ORDER — CALCIUM CARBONATE 600 MG
600 TABLET ORAL ONCE
COMMUNITY
End: 2019-03-20

## 2017-09-11 NOTE — PRE-PROCEDURE INSTRUCTIONS
Pre op instructions reviewed with patient per phone:    To confirm, Your surgeon has instructed you:  Surgery is scheduled 9/12/17 at 1100.      Please report to [facility] 1st floor main lobby by 0930 Pre admit office will call this afternoon only if arrival time changes.      INSTRUCTIONS IMPORTANT!!!  ¨ Do not eat, drink, or smoke after 12 midnight-including water. OK to brush teeth, no gum, candy or mints!    ¨ Take only these medicines with a small swallow of water-morning of surgery.  Pacerone, Carvedilol, Diovan    ____  Do not wear makeup, including mascara.  ____  No powder, lotions or creams to surgical area.  ____  Please remove all jewelry, including piercings and leave at home.  ____  No money or valuables needed. Please leave at home.  ____  Please bring identification and insurance information to hospital.  ____  If going home the same day, arrange for a ride home. You will not be able to   drive if Anesthesia was used.  ____  Children, under 12 years old, must remain in the waiting room with an adult.  They are not allowed in patient areas.  ____  Wear loose fitting clothing. Allow for dressings, bandages.  ____  Stop Aspirin, Ibuprofen, Motrin and Aleve at least 5-7 days before surgery, unless otherwise instructed by your doctor, or the nurse.   You MAY use Tylenol/acetaminophen until day of surgery.  ____  If you take diabetic medication, do not take am of surgery unless instructed by   Doctor.  ____ Stop taking any Fish Oil supplement or any Vitamins that contain Vitamin E at least 5 days prior to surgery.          Bathing Instructions-- The night before surgery and the morning prior to coming to the hospital:   -Do not shave the surgical area.   -Shower and wash your hair and body as usual with anti-bacterial  soap and shampoo.   -Rinse your hair and body completely.   -Use one packet of hibiclens to wash the surgical site (using your hand) gently for 5 minutes.  Do not  scrub you skin too hard.   -Do not use hibiclens on your head, face, or genitals.   -Do not wash with anti-bacterial soap after you use the hibiclens.   -Rinse your body thoroughly.   -Dry with clean, soft towel.  Do not use lotion, cream, deodorant, or powders on   the surgical site.    Use antibacterial soap in place of hibiclens if your surgery is on the head, face or genitals.         Surgical Site Infection    Prevention of surgical site infections:     -Keep incisions clean and dry.   -Do not soak/submerge incisions in water until completely healed.   -Do not apply lotions, powders, creams, or deodorants to site.   -Always make sure hands are cleaned with antibacterial soap/ alcohol-based   prior to touching the surgical site.  (This includes doctors, nurses, staff, and yourself.)    Signs and symptoms:   -Redness and pain around the area where you had surgery   -Drainage of cloudy fluid from your surgical wound   -Fever over 100.4  I have read or had read and explained to me, and understand the above information.

## 2017-09-11 NOTE — PRE-PROCEDURE INSTRUCTIONS
Dr. Holt notified of pt CHF. She states there is no time to get cardiac clearance so, just see what the doctor tomorrow thinks. Pt did see Cardiology for a check up on 8/10/17. Dr. Holt asked to please have the notes from this visit available. This is in Epic.

## 2017-09-12 ENCOUNTER — SURGERY (OUTPATIENT)
Age: 82
End: 2017-09-12

## 2017-09-12 ENCOUNTER — ANESTHESIA (OUTPATIENT)
Dept: SURGERY | Facility: HOSPITAL | Age: 82
End: 2017-09-12
Payer: MEDICARE

## 2017-09-12 ENCOUNTER — HOSPITAL ENCOUNTER (OUTPATIENT)
Facility: HOSPITAL | Age: 82
Discharge: HOME | End: 2017-09-13
Attending: ORTHOPAEDIC SURGERY | Admitting: ORTHOPAEDIC SURGERY
Payer: MEDICARE

## 2017-09-12 DIAGNOSIS — S80.02XD TRAUMATIC HEMATOMA OF LEFT KNEE, SUBSEQUENT ENCOUNTER: ICD-10-CM

## 2017-09-12 DIAGNOSIS — S81.002A UNSPECIFIED OPEN WOUND, LEFT KNEE, INITIAL ENCOUNTER: ICD-10-CM

## 2017-09-12 DIAGNOSIS — S80.02XA TRAUMATIC HEMATOMA OF LEFT KNEE, INITIAL ENCOUNTER: Primary | ICD-10-CM

## 2017-09-12 DIAGNOSIS — M19.90 ARTHRITIS: ICD-10-CM

## 2017-09-12 DIAGNOSIS — S80.02XD TRAUMATIC HEMATOMA OF KNEE, LEFT, SUBSEQUENT ENCOUNTER: ICD-10-CM

## 2017-09-12 PROCEDURE — 63600175 PHARM REV CODE 636 W HCPCS: Performed by: PHYSICIAN ASSISTANT

## 2017-09-12 PROCEDURE — 87070 CULTURE OTHR SPECIMN AEROBIC: CPT

## 2017-09-12 PROCEDURE — 37000009 HC ANESTHESIA EA ADD 15 MINS: Performed by: ORTHOPAEDIC SURGERY

## 2017-09-12 PROCEDURE — 25000003 PHARM REV CODE 250: Performed by: NURSE ANESTHETIST, CERTIFIED REGISTERED

## 2017-09-12 PROCEDURE — 25000003 PHARM REV CODE 250: Performed by: ORTHOPAEDIC SURGERY

## 2017-09-12 PROCEDURE — 88305 TISSUE EXAM BY PATHOLOGIST: CPT | Performed by: PATHOLOGY

## 2017-09-12 PROCEDURE — 87077 CULTURE AEROBIC IDENTIFY: CPT

## 2017-09-12 PROCEDURE — 87186 SC STD MICRODIL/AGAR DIL: CPT | Mod: 59

## 2017-09-12 PROCEDURE — 37000008 HC ANESTHESIA 1ST 15 MINUTES: Performed by: ORTHOPAEDIC SURGERY

## 2017-09-12 PROCEDURE — 88305 TISSUE EXAM BY PATHOLOGIST: CPT | Mod: 26,,, | Performed by: PATHOLOGY

## 2017-09-12 PROCEDURE — C1729 CATH, DRAINAGE: HCPCS | Performed by: ORTHOPAEDIC SURGERY

## 2017-09-12 PROCEDURE — 71000033 HC RECOVERY, INTIAL HOUR: Performed by: ORTHOPAEDIC SURGERY

## 2017-09-12 PROCEDURE — 25000003 PHARM REV CODE 250: Performed by: ANESTHESIOLOGY

## 2017-09-12 PROCEDURE — 87075 CULTR BACTERIA EXCEPT BLOOD: CPT

## 2017-09-12 PROCEDURE — 10140 I&D HMTMA SEROMA/FLUID COLLJ: CPT | Mod: LT,,, | Performed by: ORTHOPAEDIC SURGERY

## 2017-09-12 PROCEDURE — 25000003 PHARM REV CODE 250: Performed by: PHYSICIAN ASSISTANT

## 2017-09-12 PROCEDURE — 36000706: Performed by: ORTHOPAEDIC SURGERY

## 2017-09-12 PROCEDURE — 36000707: Performed by: ORTHOPAEDIC SURGERY

## 2017-09-12 PROCEDURE — 87076 CULTURE ANAEROBE IDENT EACH: CPT

## 2017-09-12 PROCEDURE — 63600175 PHARM REV CODE 636 W HCPCS: Performed by: NURSE ANESTHETIST, CERTIFIED REGISTERED

## 2017-09-12 PROCEDURE — G0378 HOSPITAL OBSERVATION PER HR: HCPCS

## 2017-09-12 PROCEDURE — 71000015 HC POSTOP RECOV 1ST HR: Performed by: ORTHOPAEDIC SURGERY

## 2017-09-12 PROCEDURE — 71000016 HC POSTOP RECOV ADDL HR: Performed by: ORTHOPAEDIC SURGERY

## 2017-09-12 RX ORDER — LIDOCAINE HCL/PF 100 MG/5ML
SYRINGE (ML) INTRAVENOUS
Status: DISCONTINUED | OUTPATIENT
Start: 2017-09-12 | End: 2017-09-12

## 2017-09-12 RX ORDER — SODIUM CHLORIDE 0.9 % (FLUSH) 0.9 %
3 SYRINGE (ML) INJECTION
Status: DISCONTINUED | OUTPATIENT
Start: 2017-09-12 | End: 2017-09-13 | Stop reason: HOSPADM

## 2017-09-12 RX ORDER — OXYCODONE HYDROCHLORIDE 5 MG/1
5 TABLET ORAL
Status: DISCONTINUED | OUTPATIENT
Start: 2017-09-12 | End: 2017-09-12 | Stop reason: SDUPTHER

## 2017-09-12 RX ORDER — ETOMIDATE 2 MG/ML
INJECTION INTRAVENOUS
Status: DISCONTINUED | OUTPATIENT
Start: 2017-09-12 | End: 2017-09-12

## 2017-09-12 RX ORDER — METOCLOPRAMIDE HYDROCHLORIDE 5 MG/ML
10 INJECTION INTRAMUSCULAR; INTRAVENOUS EVERY 10 MIN PRN
Status: DISCONTINUED | OUTPATIENT
Start: 2017-09-12 | End: 2017-09-12 | Stop reason: HOSPADM

## 2017-09-12 RX ORDER — MORPHINE SULFATE 10 MG/ML
2 INJECTION INTRAMUSCULAR; INTRAVENOUS; SUBCUTANEOUS EVERY 5 MIN PRN
Status: DISCONTINUED | OUTPATIENT
Start: 2017-09-12 | End: 2017-09-12 | Stop reason: HOSPADM

## 2017-09-12 RX ORDER — LIDOCAINE HYDROCHLORIDE 10 MG/ML
1 INJECTION, SOLUTION EPIDURAL; INFILTRATION; INTRACAUDAL; PERINEURAL ONCE
Status: DISCONTINUED | OUTPATIENT
Start: 2017-09-12 | End: 2017-09-12 | Stop reason: HOSPADM

## 2017-09-12 RX ORDER — SODIUM CHLORIDE, SODIUM LACTATE, POTASSIUM CHLORIDE, CALCIUM CHLORIDE 600; 310; 30; 20 MG/100ML; MG/100ML; MG/100ML; MG/100ML
INJECTION, SOLUTION INTRAVENOUS CONTINUOUS PRN
Status: DISCONTINUED | OUTPATIENT
Start: 2017-09-12 | End: 2017-09-12

## 2017-09-12 RX ORDER — ONDANSETRON 2 MG/ML
4 INJECTION INTRAMUSCULAR; INTRAVENOUS EVERY 4 HOURS PRN
Status: DISCONTINUED | OUTPATIENT
Start: 2017-09-12 | End: 2017-09-13 | Stop reason: HOSPADM

## 2017-09-12 RX ORDER — BACITRACIN 50000 [IU]/1
INJECTION, POWDER, FOR SOLUTION INTRAMUSCULAR
Status: DISCONTINUED | OUTPATIENT
Start: 2017-09-12 | End: 2017-09-12 | Stop reason: HOSPADM

## 2017-09-12 RX ORDER — FENTANYL CITRATE 50 UG/ML
INJECTION, SOLUTION INTRAMUSCULAR; INTRAVENOUS
Status: DISCONTINUED | OUTPATIENT
Start: 2017-09-12 | End: 2017-09-12

## 2017-09-12 RX ORDER — BUPIVACAINE HYDROCHLORIDE 2.5 MG/ML
INJECTION, SOLUTION EPIDURAL; INFILTRATION; INTRACAUDAL
Status: DISCONTINUED | OUTPATIENT
Start: 2017-09-12 | End: 2017-09-12 | Stop reason: HOSPADM

## 2017-09-12 RX ORDER — ONDANSETRON 4 MG/1
TABLET, ORALLY DISINTEGRATING ORAL
Qty: 30 TABLET | Refills: 1 | Status: SHIPPED | OUTPATIENT
Start: 2017-09-12 | End: 2017-09-29

## 2017-09-12 RX ORDER — ONDANSETRON 2 MG/ML
INJECTION INTRAMUSCULAR; INTRAVENOUS
Status: DISCONTINUED | OUTPATIENT
Start: 2017-09-12 | End: 2017-09-12

## 2017-09-12 RX ORDER — HYDROMORPHONE HYDROCHLORIDE 2 MG/ML
0.5 INJECTION, SOLUTION INTRAMUSCULAR; INTRAVENOUS; SUBCUTANEOUS
Status: DISCONTINUED | OUTPATIENT
Start: 2017-09-12 | End: 2017-09-13 | Stop reason: HOSPADM

## 2017-09-12 RX ORDER — MEPERIDINE HYDROCHLORIDE 50 MG/ML
12.5 INJECTION INTRAMUSCULAR; INTRAVENOUS; SUBCUTANEOUS ONCE AS NEEDED
Status: DISCONTINUED | OUTPATIENT
Start: 2017-09-12 | End: 2017-09-12 | Stop reason: HOSPADM

## 2017-09-12 RX ORDER — ACETAMINOPHEN 325 MG/1
650 TABLET ORAL EVERY 8 HOURS
Status: DISCONTINUED | OUTPATIENT
Start: 2017-09-12 | End: 2017-09-13 | Stop reason: HOSPADM

## 2017-09-12 RX ORDER — OXYCODONE HYDROCHLORIDE 5 MG/1
5 TABLET ORAL
Status: DISCONTINUED | OUTPATIENT
Start: 2017-09-12 | End: 2017-09-13 | Stop reason: HOSPADM

## 2017-09-12 RX ORDER — SODIUM CHLORIDE 9 MG/ML
INJECTION, SOLUTION INTRAVENOUS CONTINUOUS
Status: DISCONTINUED | OUTPATIENT
Start: 2017-09-13 | End: 2017-09-13 | Stop reason: HOSPADM

## 2017-09-12 RX ORDER — OXYCODONE HYDROCHLORIDE 5 MG/1
TABLET ORAL
Qty: 30 TABLET | Refills: 0 | Status: SHIPPED | OUTPATIENT
Start: 2017-09-12 | End: 2017-09-29

## 2017-09-12 RX ORDER — LIDOCAINE HYDROCHLORIDE 10 MG/ML
1 INJECTION, SOLUTION EPIDURAL; INFILTRATION; INTRACAUDAL; PERINEURAL ONCE
Status: DISCONTINUED | OUTPATIENT
Start: 2017-09-12 | End: 2017-09-12 | Stop reason: SDUPTHER

## 2017-09-12 RX ORDER — SODIUM CHLORIDE 0.9 % (FLUSH) 0.9 %
3 SYRINGE (ML) INJECTION EVERY 8 HOURS
Status: DISCONTINUED | OUTPATIENT
Start: 2017-09-12 | End: 2017-09-12 | Stop reason: HOSPADM

## 2017-09-12 RX ORDER — CIPROFLOXACIN 500 MG/1
500 TABLET ORAL 2 TIMES DAILY
Qty: 10 TABLET | Refills: 0 | Status: SHIPPED | OUTPATIENT
Start: 2017-09-12 | End: 2017-09-14 | Stop reason: SDUPTHER

## 2017-09-12 RX ADMIN — BUPIVACAINE HYDROCHLORIDE 30 ML: 2.5 INJECTION, SOLUTION EPIDURAL; INFILTRATION; INTRACAUDAL; PERINEURAL at 12:09

## 2017-09-12 RX ADMIN — OXYCODONE HYDROCHLORIDE 5 MG: 5 TABLET ORAL at 01:09

## 2017-09-12 RX ADMIN — ACETAMINOPHEN 650 MG: 325 TABLET ORAL at 10:09

## 2017-09-12 RX ADMIN — ACETAMINOPHEN 650 MG: 325 TABLET ORAL at 01:09

## 2017-09-12 RX ADMIN — BACITRACIN 50000 UNITS: 5000 INJECTION, POWDER, FOR SOLUTION INTRAMUSCULAR at 12:09

## 2017-09-12 RX ADMIN — FENTANYL CITRATE 100 MCG: 50 INJECTION, SOLUTION INTRAMUSCULAR; INTRAVENOUS at 11:09

## 2017-09-12 RX ADMIN — OXYCODONE HYDROCHLORIDE 5 MG: 5 TABLET ORAL at 04:09

## 2017-09-12 RX ADMIN — OXYCODONE HYDROCHLORIDE 5 MG: 5 TABLET ORAL at 10:09

## 2017-09-12 RX ADMIN — VANCOMYCIN HYDROCHLORIDE 1000 MG: 1 INJECTION, POWDER, LYOPHILIZED, FOR SOLUTION INTRAVENOUS at 10:09

## 2017-09-12 RX ADMIN — SODIUM CHLORIDE, SODIUM LACTATE, POTASSIUM CHLORIDE, AND CALCIUM CHLORIDE: 600; 310; 30; 20 INJECTION, SOLUTION INTRAVENOUS at 11:09

## 2017-09-12 RX ADMIN — LIDOCAINE HYDROCHLORIDE 80 MG: 20 INJECTION, SOLUTION INTRAVENOUS at 11:09

## 2017-09-12 RX ADMIN — ETOMIDATE 12 MG: 2 INJECTION, SOLUTION INTRAVENOUS at 11:09

## 2017-09-12 RX ADMIN — ONDANSETRON 4 MG: 2 INJECTION, SOLUTION INTRAMUSCULAR; INTRAVENOUS at 12:09

## 2017-09-12 NOTE — PROGRESS NOTES
Patient arrived to unit from PACU via stretcher.   Patient in room 228.  Transferred into bed independently.   Bedside report given with Opal BISHOP.  Charge nurse advised of patient arrival.   VS currently stable.   L knee Incision site C/D/I.    Patient oriented to room, rounding sheet and call bell.   Bed in lowest position, call light in reach.  Encouraged to notify of all needs.   Will continue to monitor.

## 2017-09-12 NOTE — INTERVAL H&P NOTE
The patient has been examined and the H&P has been reviewed:    I concur with the findings and no changes have occurred since H&P was written.    Anesthesia/Surgery risks, benefits and alternative options discussed and understood by patient/family.          Active Hospital Problems    Diagnosis  POA    Traumatic hematoma of left knee [S80.02XA]  Yes      Resolved Hospital Problems    Diagnosis Date Resolved POA   No resolved problems to display.

## 2017-09-12 NOTE — TRANSFER OF CARE
"Anesthesia Transfer of Care Note    Patient: Jennifer Mathews    Procedure(s) Performed: Procedure(s) (LRB):  IRRIGATION AND DEBRIDEMENT OF LEFT KNEE (Left)    Patient location: PACU    Anesthesia Type: general    Transport from OR: Transported from OR on room air with adequate spontaneous ventilation    Post pain: adequate analgesia    Post assessment: no apparent anesthetic complications    Post vital signs: stable    Level of consciousness: awake    Nausea/Vomiting: no nausea/vomiting    Complications: none    Transfer of care protocol was followed      Last vitals:   Visit Vitals  BP (!) 180/79 (BP Location: Right arm, Patient Position: Sitting)   Pulse 70   Temp 37.1 °C (98.8 °F) (Temporal)   Resp 16   Ht 5' 2" (1.575 m)   Wt 52.4 kg (115 lb 8.3 oz)   SpO2 (!) 94%   Breastfeeding? No   BMI 21.13 kg/m²     "

## 2017-09-12 NOTE — SIGNIFICANT EVENT
After drain was removed, patient had significant amount of bleeding from the site. Pressure applied.  MD walked by and was notified.  Dressing was changed by MD using sterile gauze, ABD, cast padding, and an ACE wrap

## 2017-09-12 NOTE — TREATMENT PLAN
Dr. Rosenbaum came to assess wound drainage.  Patient and family felt more comfortable if the patient was observed over night in the hospital.  Dr. Rosenbaum is transferring patient to the observation unit to monitor bleeding.  Dr. Rosenbaum removed dressing and reapplied: sterile gauze, ABD pads, cast padding and 2 ACE wraps.  When dressing was removed no active bleeding was noted.

## 2017-09-12 NOTE — BRIEF OP NOTE
[facility]  Brief Operative Note     SUMMARY     Surgery Date: 9/12/2017     Surgeon(s) and Role:     * Juliano Rosenbaum MD - Primary    Assisting Surgeon: None    Pre-op Diagnosis:  Traumatic hematoma of left knee, initial encounter [S80.02XA]  Open wound of left knee    Post-op Diagnosis:  Post-Op Diagnosis Codes:     * Traumatic hematoma of left knee, initial encounter [S80.02XA]  Open wound of left knee    Procedure(s) (LRB):  IRRIGATION AND DEBRIDEMENT OF LEFT KNEE wound with complex closure of wound measuring 8 cm in length.    Anesthesia: General    Description of the findings of the procedure: Necrotic tissue and resolving hematoma involving skin and subcutaneous tissues of the prepatellar area of the left knee    Findings/Key Components:    Necrotic tissue and resolving hematoma involving skin and subcutaneous tissues of the prepatellar area of the left knee        Estimated Blood Loss: 150 mL         Specimens: Skin and subcutaneous tissue  specimen was obtained for aerobic and anaerobic cultures  Specimen (12h ago through future)    Start     Ordered    09/12/17 1151  Specimen to Pathology - Surgery  Once     Comments:  Left prepatellar ulcerationDx:  Patellar wound      09/12/17 1151          Discharge Note    SUMMARY     Admit Date: 9/12/2017    Discharge Date and Time:  09/12/2017 1:06 PM    Hospital Course (synopsis of major diagnoses, care, treatment, and services provided during the course of the hospital stay): The patient had their procedure performed, had a routine post operative recovery, and was discharged home.       Final Diagnosis: Post-Op Diagnosis Codes:     * Traumatic hematoma of left knee, initial encounter [S80.02XA]  Open wound of left knee      Disposition: Home or Self Care    Follow Up/Patient Instructions:     Medications:  Reconciled Home Medications:   Current Discharge Medication List      START taking these medications    Details   ondansetron (ZOFRAN-ODT) 4 MG  TbDL Take 1 tablet sublingually every 4-6 hours as needed for nausea.  Qty: 30 tablet, Refills: 1      oxycodone (ROXICODONE) 5 MG immediate release tablet 1 tablet by mouth every 3-8 hours as needed for pain.  Qty: 30 tablet, Refills: 0         CONTINUE these medications which have NOT CHANGED    Details   acetaminophen (TYLENOL EXTRA STRENGTH) 325 MG tablet Take 2 tablets (650 mg total) by mouth every 8 (eight) hours.    Associated Diagnoses: Arthritis      allopurinol (ZYLOPRIM) 100 MG tablet Take 1.5 tablets (150 mg total) by mouth once daily. X 4 weeks, then increase to 2 tablets (200mg) by mouth daily.  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  Qty: 30 tablet, Refills: 11    Associated Diagnoses: Atrial fibrillation, unspecified type      calcium carbonate (OS-SHERRY) 600 mg calcium (1,500 mg) Tab Take 600 mg by mouth once.      carvedilol (COREG) 25 MG tablet TAKE ONE TABLET BY MOUTH TWICE A DAY WITH MEALS  Qty: 60 tablet, Refills: 2    Associated Diagnoses: Acute on chronic congestive heart failure, unspecified congestive heart failure type      colchicine 0.6 mg tablet Take 1 tablet (0.6 mg total) by mouth once daily.  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      digoxin (LANOXIN) 125 mcg tablet TAKE 1 TABLET (0.125 MG TOTAL) BY MOUTH ONCE DAILY.  Qty: 30 tablet, Refills: 11    Associated Diagnoses: Atrial fibrillation, unspecified type      diphenhydrAMINE (BENADRYL) 25 mg capsule Take 25 mg by mouth every 6 (six) hours as needed for Itching.      furosemide (LASIX) 40 MG tablet Take 1 tablet (40 mg total) by mouth 2 (two) times daily. 8 am and 2 pm  Qty: 180 tablet,  Refills: 3      MULTIVITAMIN ORAL Take 1 tablet by mouth Daily.      valsartan (DIOVAN) 80 MG tablet Take 2 tablets (160 mg total) by mouth 2 (two) times daily.  Qty: 180 tablet, Refills: 3    Associated Diagnoses: Essential hypertension      aspirin (ECOTRIN) 81 MG EC tablet Take 81 mg by mouth once daily.      warfarin (COUMADIN) 2.5 MG tablet TAKE 1/2 TABLET ON MONDAY AND WEDNESDAY AND 1 TABLET ALL OTHER DAYS. DISCONTINUE 5MG TABLET.  Qty: 30 tablet, Refills: 3    Associated Diagnoses: Long term (current) use of anticoagulants         STOP taking these medications       mupirocin calcium 2% (BACTROBAN) 2 % cream Comments:   Reason for Stopping:         hydrocodone-acetaminophen 5-325mg (NORCO) 5-325 mg per tablet Comments:   Reason for Stopping:         levoFLOXacin (LEVAQUIN) 500 MG tablet Comments:   Reason for Stopping:               Discharge Procedure Orders  Diet general   Order Comments: Resume home diet.  If you are diabetic, resume an  ADA [id number] diet.     Call MD for:   Order Comments: For fever greater than 102°, or new loss of sensation, numbness, or excessive pain, not relieved by icing, elevation, rest, and pain medication.     Weight bearing as tolerated     Wound care routine (specify)   Order Comments: Wound care routine: Leave dressing clean dry and intact for the first 5 or 6 days.  Then change dressing with sterile gauze 4 x 4's and reapply Ace wrap taking care to not put it on too tightly.  Patient may sponge bathe or bathe by covering the left lower extremity with a plastic bag and tying the bag and the groin until postoperative day 6 or 7.  Then patient may get her wound wet in the shower.  Do not submerge the knee in a tub bath.  Return to the office 15-18 days postoperatively for staple removal.       Follow-up Information     Nat Enamorado PA-C. Schedule an appointment as soon as possible for a visit on 9/27/2017.    Specialty:  Orthopedic Surgery  Why:  For suture  removal, For wound re-check  Contact information:  [address] [facility] [address] [place] [address] [phone]

## 2017-09-12 NOTE — OP NOTE
The patient has had bleeding through her dressing.  She required to dressing changes.  Her family was not comfortable taking her home seeding.  She will be admitted for 23 hour observation of her wound and dressings.  She will have PT and INR values checked and she will have a CBC performed in the morning.  The patient will have her wound and dressings monitored by nursing and changed on an as-needed basis as ordered.  The patient remains comfortable.  She will not be started on her anticoagulants this evening.

## 2017-09-12 NOTE — ANESTHESIA POSTPROCEDURE EVALUATION
"Anesthesia Post Evaluation    Patient: Jennifer Mathews    Procedure(s) Performed: Procedure(s) (LRB):  IRRIGATION AND DEBRIDEMENT OF LEFT KNEE (Left)    Final Anesthesia Type: general  Patient location during evaluation: PACU  Patient participation: Yes- Able to Participate  Level of consciousness: awake and alert  Post-procedure vital signs: reviewed and stable  Pain management: adequate  Airway patency: patent  PONV status at discharge: No PONV  Anesthetic complications: no      Cardiovascular status: blood pressure returned to baseline  Respiratory status: unassisted  Hydration status: euvolemic  Follow-up not needed.        Visit Vitals  BP (!) 156/70 (BP Location: Left arm, Patient Position: Lying)   Pulse 69   Temp 36.8 °C (98.2 °F) (Temporal)   Resp 20   Ht 5' 2" (1.575 m)   Wt 52.4 kg (115 lb 8.3 oz)   SpO2 (!) 94%   Breastfeeding? No   BMI 21.13 kg/m²       Pain/Elaine Score: Pain Assessment Performed: Yes (9/12/2017  2:30 PM)  Presence of Pain: complains of pain/discomfort (9/12/2017  2:30 PM)  Pain Rating Prior to Med Admin: 5 (9/12/2017  1:24 PM)  Elaine Score: 10 (9/12/2017  2:00 PM)      "

## 2017-09-12 NOTE — DISCHARGE INSTRUCTIONS
General Information:    1. Do not drink alcoholic beverages including beer for 24 hours or as long as you are on pain medication..  2. Do not drive a motor vehicle, operate machinery or power tools, or signs legal papers for 24 hours or as long as you are on pain medication.   3. You may experience light-headedness, dizziness, and sleepiness following surgery. Please do not stay alone. A responsible adult should be with you for this 24 hour period.  4. Go home and rest.  5. Progress slowly to a normal diet unless instructed.  Otherwise, begin with liquids such as soft drinks, then soup and crackers working up to solid foods. Drink plenty of nonalcoholic fluids.  6. Certain anesthetics and pain medications produce nausea and vomiting in certain individuals. If nausea becomes a problem at home, call you doctor.  7. A nurse will be calling you sometime after surgery. Do not be alarmed. This is our way of finding out how you are doing.  8. Several times every hour while you are awake, take 2-3 deep breaths and cough. If you had stomach surgery hold a pillow or rolled towel firmly against your stomach before you cough. This will help with any pain the cough might cause.  9. Several times every hour while you are awake, pump and flex your feet 5-6 times and do foot circles. This will help prevent blood clots.  10. Call your doctor for severe pain, bleeding, fever, or signs or symptoms of infection (pain, swelling, redness, foul odor, drainage).  11. You can contact your doctor anytime by [phone] for the [facility] (at [facility]) or [phone] for the [facility] on [facility].   [email or web address] is another way to contact your doctor if you are an active participant online with [facility].

## 2017-09-12 NOTE — TREATMENT PLAN
Patient has hemovac drain.  I was confirming with MD that he wanted drain removed prior to discharge.  I did not see an order.  He does want drain removed.

## 2017-09-12 NOTE — TREATMENT PLAN
Patient was transferred to room, report was given at bedside.  No bleeding was noted at the time of transfer.

## 2017-09-12 NOTE — ANESTHESIA RELEASE NOTE
"Anesthesia Release from PACU Note    Patient: Jennifer Mathews    Procedure(s) Performed: Procedure(s) (LRB):  IRRIGATION AND DEBRIDEMENT OF LEFT KNEE (Left)    Anesthesia type: general    Post pain: Adequate analgesia    Post assessment: no apparent anesthetic complications    Last Vitals:   Visit Vitals  BP (!) 168/70   Pulse 69   Temp 36.8 °C (98.2 °F) (Temporal)   Resp 20   Ht 5' 2" (1.575 m)   Wt 52.4 kg (115 lb 8.3 oz)   SpO2 98%   Breastfeeding? No   BMI 21.13 kg/m²       Post vital signs: stable    Level of consciousness: awake    Nausea/Vomiting: no nausea/no vomiting    Complications: none    Airway Patency: patent    Respiratory: unassisted    Cardiovascular: stable and blood pressure at baseline    Hydration: euvolemic  "

## 2017-09-12 NOTE — OP NOTE
DATE OF PROCEDURE:  09/12/2017    PREOPERATIVE DIAGNOSES:  Traumatic hematoma of left knee with open wound of left   prepatellar area of the knee.    POSTOPERATIVE DIAGNOSES:  Traumatic hematoma of left knee with open wound of   left prepatellar area of the knee, varicose veins.    OPERATION PERFORMED:  Irrigation and debridement of left knee wound including   skin and subcutaneous tissues as well as necrotic, resolving hematoma and   complex closure of wound measuring 8 cm in length.    SURGEON:  Juliano Rosenbaum M.D.    ASSISTANT:  Jennifer Enamorado PA-C.    ANESTHESIA:  General.    DRAINS:  One medium Hemovac drain.    FINDINGS:  Necrotic subcutaneous tissue, resolving hematoma, left knee.    SPECIMENS:  Skin and subcutaneous tissue as well as fluid for aerobic and   anaerobic cultures.    COMPLICATIONS:  None.    ESTIMATED BLOOD LOSS:  150 mL.    INDICATIONS:  This 85-year-old female had a hard fall on her knee and sustained   an open wound.  Wound has not healed over the course of the last several months.    It intermittently bleeds and drains.  The patient has been prophylactically   treated with oral antibiotics.  She was indicated for irrigation and debridement   of the wound with complex wound closure to decrease her pain, diminish her   morbidity and improve her ultimate functional ability.    Jennifer Enamorado's seasoned skilled hands were necessary as first assistant for   wound retraction, positioning of the extremity, handling of instruments and   instrumentation, wound closure and dressing application.    DESCRIPTION OF OPERATION:  The patient was placed in the supine position on the   operating table and a satisfactory general anesthesia was administered by   Anesthesia.  A tourniquet was placed high on the patient's left upper thigh and   her left lower extremity was then exsanguinated with an Esmarch bandage and   tourniquet inflated to 300 mmHg pressure.  The patient's knee and leg were then   prepped  and draped in the usual sterile fashion.  Curvilinear longitudinal   incision was made and ellipsed the 2 cm diameter wound with dried blood at its   base out of the surgical site.  The wound was planned to potentially accommodate   a total knee replacement incision at some point in the future.  Necrotic   subcutaneous tissue and skin were sharply and excisionally debrided with a #10   blade.  Three liters of bacitracin and saline irrigation solution was then   irrigated through the wound to debride it with a pulse lavage.  The tourniquet   was released and hemostasis maintained with electrocautery and hemostats.    Venous varicosities were cauterized.  After maintenance of hemostasis, a complex   wound closure was then performed utilizing 2-0 Vicryl interrupted buried simple   sutures for the subcutaneous tissues.  It should be noted that the wound was   closed over a medium Hemovac drain and 0.25% plain Marcaine was infiltrated into   the wound for postoperative analgesia purposes.  The skin was closed with   staples and a sterile compression dressing and Ace wrap was applied.  The   patient was then awakened and taken to Recovery Room in satisfactory condition.    There were no complications.  Blood loss was 150 mL and the patient tolerated   the procedure well.      KEITH/IN  dd: 09/12/2017 13:22:40 (CDT)  td: 09/12/2017 14:18:50 (CDT)  Doc ID   #[id number]  Job ID #[id number]    CC:

## 2017-09-12 NOTE — ANESTHESIA PREPROCEDURE EVALUATION
09/12/2017  Jennifer Mathews is a 85 y.o., female.    Anesthesia Evaluation    I have reviewed the Patient Summary Reports.    I have reviewed the Nursing Notes.   I have reviewed the Medications.     Review of Systems  Anesthesia Hx:  No problems with previous Anesthesia  Denies Family Hx of Anesthesia complications.   Denies Personal Hx of Anesthesia complications.   Social:  Non-Smoker    Cardiovascular:   Pacemaker Hypertension Past MI  CHF CONCLUSIONS     1 - Severe left atrial enlargement.     2 - Moderately depressed left ventricular systolic function (EF 35-40%).     3 - Pulmonary hypertension. The estimated PA systolic pressure is 59 mmHg.     4 - Mitral valve prosthesis.     5 - Trivial mitral regurgitation.     6 - Moderate tricuspid regurgitation.     7 - Increased central venous pressure.    Pulmonary:   Sleep Apnea    Renal/:   Chronic Renal Disease    Musculoskeletal:   Arthritis     Neurological:   CVA        Physical Exam  General:  Well nourished    Airway/Jaw/Neck:  Airway Findings: Mouth Opening: Normal Tongue: Normal  Mallampati: I      Dental:  Dental Findings: Upper Dentures   Chest/Lungs:  Chest/Lungs Findings: Normal Respiratory Rate     Heart/Vascular:  Heart Findings: Normal       Mental Status:  Mental Status Findings:  Alert and Oriented         Anesthesia Plan  Type of Anesthesia, risks & benefits discussed:  Anesthesia Type:  general  Patient's Preference:   Intra-op Monitoring Plan:   Intra-op Monitoring Plan Comments:   Post Op Pain Control Plan:   Post Op Pain Control Plan Comments:   Induction:   IV  Beta Blocker:  Patient is on a Beta-Blocker and has received one dose within the past 24 hours (No further documentation required).       Informed Consent: Patient understands risks and agrees with Anesthesia plan.  Questions answered. Anesthesia consent signed with  patient.  ASA Score: 3     Day of Surgery Review of History & Physical: I have interviewed and examined the patient. I have reviewed the patient's H&P dated:  There are no significant changes.          Ready For Surgery From Anesthesia Perspective.

## 2017-09-13 VITALS
OXYGEN SATURATION: 94 % | WEIGHT: 115.5 LBS | TEMPERATURE: 98 F | SYSTOLIC BLOOD PRESSURE: 134 MMHG | BODY MASS INDEX: 21.25 KG/M2 | HEIGHT: 62 IN | DIASTOLIC BLOOD PRESSURE: 65 MMHG | RESPIRATION RATE: 18 BRPM | HEART RATE: 70 BPM

## 2017-09-13 LAB
BASOPHILS # BLD AUTO: 0.05 K/UL
BASOPHILS NFR BLD: 0.6 %
DIFFERENTIAL METHOD: ABNORMAL
EOSINOPHIL # BLD AUTO: 0.1 K/UL
EOSINOPHIL NFR BLD: 1.5 %
ERYTHROCYTE [DISTWIDTH] IN BLOOD BY AUTOMATED COUNT: 14.8 %
HCT VFR BLD AUTO: 32.8 %
HGB BLD-MCNC: 9.8 G/DL
INR PPP: 1.1
LYMPHOCYTES # BLD AUTO: 1.9 K/UL
LYMPHOCYTES NFR BLD: 23.2 %
MCH RBC QN AUTO: 31.8 PG
MCHC RBC AUTO-ENTMCNC: 29.9 G/DL
MCV RBC AUTO: 107 FL
MONOCYTES # BLD AUTO: 0.9 K/UL
MONOCYTES NFR BLD: 10.8 %
NEUTROPHILS # BLD AUTO: 5.1 K/UL
NEUTROPHILS NFR BLD: 63.9 %
PLATELET # BLD AUTO: 199 K/UL
PMV BLD AUTO: 9.9 FL
PROTHROMBIN TIME: 11.1 SEC
RBC # BLD AUTO: 3.08 M/UL
WBC # BLD AUTO: 7.98 K/UL

## 2017-09-13 PROCEDURE — 25000003 PHARM REV CODE 250: Performed by: ORTHOPAEDIC SURGERY

## 2017-09-13 PROCEDURE — 85610 PROTHROMBIN TIME: CPT

## 2017-09-13 PROCEDURE — 85025 COMPLETE CBC W/AUTO DIFF WBC: CPT

## 2017-09-13 PROCEDURE — 36415 COLL VENOUS BLD VENIPUNCTURE: CPT

## 2017-09-13 RX ADMIN — ACETAMINOPHEN 650 MG: 325 TABLET ORAL at 05:09

## 2017-09-13 NOTE — PLAN OF CARE
Problem: Patient Care Overview  Goal: Plan of Care Review  Outcome: Ongoing (interventions implemented as appropriate)  Free of falls/injury during shift  Pain managed effectively w/ PRN meds  LLE w/o any neurological deficits noted  PT/INR monitored  Anticoags on hold  Safety interventions in place

## 2017-09-13 NOTE — PROGRESS NOTES
Went over discharge and post op instructions with patient.   Stressed importance of making and keeping all follow ups.   Patient verbalized understanding and has no questions in regards to discharge.  IV removed, catheter intact.  Telemetry box removed.  Patient wheeled down to waiting car by staff, no distress noted.

## 2017-09-13 NOTE — DISCHARGE SUMMARY
[facility]   Orthopedics  Discharge Summary      Patient Name: Jennifer Mathews  MRN: [id number]  Admission Date: 9/12/2017  Hospital Length of Stay: 0 days  Discharge Date and Time:  09/13/2017 7:31 AM  Attending Physician: Juliano Rosenbaum MD   Discharging Provider: Juliano Rosenbaum MD  Primary Care Provider: Desirae Bello MD    HPI: The patient presented for outpatient surgery to address an open wound of her left knee secondary to a traumatic hematoma.  She has a history of varicose veins.  The patient had bleeding from her drain site after the drain was pulled prior to an anticipated discharge yesterday afternoon.  She required several dressing changes for a significant amount of bloody drainage.  She was admitted overnight for observation.    Procedure(s) (LRB):  IRRIGATION AND DEBRIDEMENT OF LEFT KNEE wound with complex closure of wound measuring 8 cm in length.    Hospital Course: The patient was admitted for 23 hour observation after she had a large amount of bloody drainage from her drain site in the recovery room area.  She had no additional drainage and required no additional dressing changes overnight.  She was able to be discharged home the morning of September 13, 2017.  She will need follow-up for staple removal in 14-18 days.  She should keep her dressing intact for 4 days and then change it as instructed.           Significant Diagnostic Studies: Labs: BMP: No results for input(s): GLU, NA, K, CL, CO2, BUN, CREATININE, CALCIUM, MG in the last 48 hours., CMP No results for input(s): NA, K, CL, CO2, GLU, BUN, CREATININE, CALCIUM, PROT, ALBUMIN, BILITOT, ALKPHOS, AST, ALT, ANIONGAP, ESTGFRAFRICA, EGFRNONAA in the last 48 hours. and All labs within the past 24 hours have been reviewed    Pending Diagnostic Studies:     Procedure Component Value Units Date/Time    Protime-INR [[id number]] Collected:  09/12/17 1728    Order Status:  Sent Lab Status:  In process Updated:  09/12/17 1728     Specimen:  Blood from Blood         Final Active Diagnoses:    Diagnosis Date Noted POA    PRINCIPAL PROBLEM:  Unspecified open wound, left knee, initial encounter [S81.002A] 09/12/2017 Yes    Traumatic hematoma of left knee [S80.02XA] 09/12/2017 Yes    Coagulopathy [D68.9] 04/07/2014 Yes      Problems Resolved During this Admission:    Diagnosis Date Noted Date Resolved POA      Discharged Condition: good    Disposition: Home or Self Care    Follow Up:  Follow-up Information     Nat Enamorado PA-C. Schedule an appointment as soon as possible for a visit on 9/27/2017.    Specialty:  Orthopedic Surgery  Why:  For suture removal, For wound re-check  Contact information:  [address] [place] [address] [address] [place] [id number] [phone]                 Patient Instructions:     Diet general   Order Comments: Resume home diet.  If you are diabetic, resume an  ADA [id number] diet.     Call MD for:   Order Comments: For fever greater than 102°, or new loss of sensation, numbness, or excessive pain, not relieved by icing, elevation, rest, and pain medication.     Weight bearing as tolerated     Wound care routine (specify)   Order Comments: Wound care routine: Leave dressing clean dry and intact for the first 5 or 6 days.  Then change dressing with sterile gauze 4 x 4's and reapply Ace wrap taking care to not put it on too tightly.  Patient may sponge bathe or bathe by covering the left lower extremity with a plastic bag and tying the bag and the groin until postoperative day 6 or 7.  Then patient may get her wound wet in the shower.  Do not submerge the knee in a tub bath.  Return to the office 15-18 days postoperatively for staple removal.       Medications:  Reconciled Home Medications:   Current Discharge Medication List      START taking these medications    Details   ciprofloxacin HCl (CIPRO) 500 MG tablet Take 1 tablet (500 mg total) by mouth 2 (two) times daily.  Qty: 10 tablet, Refills: 0    Associated  Diagnoses: Arthritis      ondansetron (ZOFRAN-ODT) 4 MG TbDL Take 1 tablet sublingually every 4-6 hours as needed for nausea.  Qty: 30 tablet, Refills: 1      oxycodone (ROXICODONE) 5 MG immediate release tablet 1 tablet by mouth every 3-8 hours as needed for pain.  Qty: 30 tablet, Refills: 0         CONTINUE these medications which have NOT CHANGED    Details   acetaminophen (TYLENOL EXTRA STRENGTH) 325 MG tablet Take 2 tablets (650 mg total) by mouth every 8 (eight) hours.    Associated Diagnoses: Arthritis      allopurinol (ZYLOPRIM) 100 MG tablet Take 1.5 tablets (150 mg total) by mouth once daily. X 4 weeks, then increase to 2 tablets (200mg) by mouth daily.  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  Qty: 30 tablet, Refills: 11    Associated Diagnoses: Atrial fibrillation, unspecified type      calcium carbonate (OS-SHERRY) 600 mg calcium (1,500 mg) Tab Take 600 mg by mouth once.      carvedilol (COREG) 25 MG tablet TAKE ONE TABLET BY MOUTH TWICE A DAY WITH MEALS  Qty: 60 tablet, Refills: 2    Associated Diagnoses: Acute on chronic congestive heart failure, unspecified congestive heart failure type      colchicine 0.6 mg tablet Take 1 tablet (0.6 mg total) by mouth once daily.  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      digoxin (LANOXIN) 125 mcg tablet TAKE 1 TABLET (0.125 MG TOTAL) BY MOUTH ONCE DAILY.  Qty: 30 tablet, Refills: 11    Associated Diagnoses: Atrial fibrillation, unspecified type      diphenhydrAMINE (BENADRYL) 25 mg capsule Take 25 mg by mouth every 6 (six) hours as needed for Itching.      furosemide (LASIX) 40 MG tablet Take 1 tablet (40 mg total) by  mouth 2 (two) times daily. 8 am and 2 pm  Qty: 180 tablet, Refills: 3      MULTIVITAMIN ORAL Take 1 tablet by mouth Daily.      valsartan (DIOVAN) 80 MG tablet Take 2 tablets (160 mg total) by mouth 2 (two) times daily.  Qty: 180 tablet, Refills: 3    Associated Diagnoses: Essential hypertension      aspirin (ECOTRIN) 81 MG EC tablet Take 81 mg by mouth once daily.      warfarin (COUMADIN) 2.5 MG tablet TAKE 1/2 TABLET ON MONDAY AND WEDNESDAY AND 1 TABLET ALL OTHER DAYS. DISCONTINUE 5MG TABLET.  Qty: 30 tablet, Refills: 3    Associated Diagnoses: Long term (current) use of anticoagulants         STOP taking these medications       mupirocin calcium 2% (BACTROBAN) 2 % cream Comments:   Reason for Stopping:         hydrocodone-acetaminophen 5-325mg (NORCO) 5-325 mg per tablet Comments:   Reason for Stopping:         levoFLOXacin (LEVAQUIN) 500 MG tablet Comments:   Reason for Stopping:               Juliano Rosenbaum MD  Orthopedics  [facility]

## 2017-09-14 ENCOUNTER — TELEPHONE (OUTPATIENT)
Dept: ORTHOPEDICS | Facility: HOSPITAL | Age: 82
End: 2017-09-14

## 2017-09-14 DIAGNOSIS — M81.0 OSTEOPOROSIS, SENILE: ICD-10-CM

## 2017-09-14 DIAGNOSIS — M19.90 ARTHRITIS: ICD-10-CM

## 2017-09-14 DIAGNOSIS — M1A.39X1 CHRONIC GOUT DUE TO RENAL IMPAIRMENT OF MULTIPLE SITES WITH TOPHUS: ICD-10-CM

## 2017-09-14 RX ORDER — COLCHICINE 0.6 MG/1
0.6 TABLET ORAL DAILY
Qty: 30 TABLET | Refills: 3 | Status: SHIPPED | OUTPATIENT
Start: 2017-09-14 | End: 2018-01-19 | Stop reason: SDUPTHER

## 2017-09-14 RX ORDER — CIPROFLOXACIN 500 MG/1
500 TABLET ORAL 2 TIMES DAILY
Qty: 32 TABLET | Refills: 0 | Status: SHIPPED | OUTPATIENT
Start: 2017-09-14 | End: 2017-09-15

## 2017-09-15 DIAGNOSIS — L08.9 SOFT TISSUE INFECTION: Primary | ICD-10-CM

## 2017-09-15 LAB
BACTERIA SPEC AEROBE CULT: NORMAL
BACTERIA SPEC AEROBE CULT: NORMAL

## 2017-09-15 RX ORDER — MINOCYCLINE HYDROCHLORIDE 100 MG/1
100 CAPSULE ORAL 2 TIMES DAILY
Qty: 20 CAPSULE | Refills: 0 | Status: SHIPPED | OUTPATIENT
Start: 2017-09-15 | End: 2017-09-29

## 2017-09-15 RX ORDER — LINEZOLID 600 MG/1
600 TABLET, FILM COATED ORAL EVERY 12 HOURS
Qty: 20 TABLET | Refills: 0 | Status: SHIPPED | OUTPATIENT
Start: 2017-09-15 | End: 2017-09-29

## 2017-09-15 NOTE — TELEPHONE ENCOUNTER
The patient's culture sensitivities are available.  She has Escherichia coli and enterococcus.  She has an ALLERGY to penicillin, cephalosporins, and sulfa medications.  I spoke with Dr. Parish Duran of infectious disease.  He recommended treatment with minocycline 100 mg every 12 hours ×10 days and Zyvox 600 mg by mouth every 12 hours ×10 days.  These medications have been E scribed to the patient's pharmacy.    I called the patient and instructed her to  her medications, start taking them every 12 hours for 10 days, and stop taking Cipro.  She voiced understanding of the extreme importance of obtaining the antibiotics and taking them despite them being fairly expensive.

## 2017-09-18 LAB — BACTERIA SPEC ANAEROBE CULT: NORMAL

## 2017-09-21 ENCOUNTER — TELEPHONE (OUTPATIENT)
Dept: ORTHOPEDICS | Facility: CLINIC | Age: 82
End: 2017-09-21

## 2017-09-21 NOTE — TELEPHONE ENCOUNTER
Spoke with patient and informed her that I would send her message to KAYODE Enamorado PA-C for advisement. Patient verbalized understanding.

## 2017-09-21 NOTE — TELEPHONE ENCOUNTER
----- Message from Roopa Hamlin sent at 9/21/2017  1:17 PM CDT -----  Contact: pt  Please call pt @ [phone], pt had surgery last week, pt states leg is swollen, pt need to know what to do.

## 2017-09-22 ENCOUNTER — ANTI-COAG VISIT (OUTPATIENT)
Dept: CARDIOLOGY | Facility: CLINIC | Age: 82
End: 2017-09-22
Payer: MEDICARE

## 2017-09-22 ENCOUNTER — OFFICE VISIT (OUTPATIENT)
Dept: ORTHOPEDICS | Facility: CLINIC | Age: 82
End: 2017-09-22
Payer: MEDICARE

## 2017-09-22 DIAGNOSIS — Z79.01 LONG TERM (CURRENT) USE OF ANTICOAGULANTS: Primary | ICD-10-CM

## 2017-09-22 DIAGNOSIS — R60.0 EDEMA OF LEG: ICD-10-CM

## 2017-09-22 DIAGNOSIS — Z48.89 ENCOUNTER FOR POSTOPERATIVE WOUND CHECK: Primary | ICD-10-CM

## 2017-09-22 LAB — INR PPP: 1.5 (ref 2–3)

## 2017-09-22 PROCEDURE — 99999 PR PBB SHADOW E&M-EST. PATIENT-LVL II: CPT | Mod: PBBFAC,,, | Performed by: PHYSICIAN ASSISTANT

## 2017-09-22 PROCEDURE — 85610 PROTHROMBIN TIME: CPT | Mod: PBBFAC

## 2017-09-22 PROCEDURE — 99024 POSTOP FOLLOW-UP VISIT: CPT | Mod: ,,, | Performed by: PHYSICIAN ASSISTANT

## 2017-09-22 PROCEDURE — 99211 OFF/OP EST MAY X REQ PHY/QHP: CPT | Mod: PBBFAC

## 2017-09-22 PROCEDURE — 99999 PR PBB SHADOW E&M-EST. PATIENT-LVL I: CPT | Mod: PBBFAC,,,

## 2017-09-22 PROCEDURE — 99212 OFFICE O/P EST SF 10 MIN: CPT | Mod: PBBFAC | Performed by: PHYSICIAN ASSISTANT

## 2017-09-22 NOTE — PROGRESS NOTES
"Subjective:      Patient ID: Jennifer Mathews is a 85 y.o. female.    Chief Complaint: No chief complaint on file.    HPI    The patient is seen today for postop evaluation of swelling of left lower leg. She had outpatient surgery to address an open wound of her left knee secondary to a traumatic hematoma.  She has a history of varicose veins.This surgery was on 9/12/17.     She has no complaints of pain today. No shortness of breath or chest pain. No drainage from her surgical site. She did have a low -grade fever at home -- it was "99 something."      Review of Systems   Constitution: Negative for chills, fever and night sweats.   Respiratory: Negative for cough, shortness of breath and wheezing.    Musculoskeletal: Positive for joint swelling. Negative for joint pain.   Gastrointestinal: Negative for diarrhea, nausea and vomiting.   Neurological: Negative for brief paralysis.   Psychiatric/Behavioral: Negative for altered mental status.         Objective:            General    Constitutional: She is oriented to person, place, and time. She appears well-developed and well-nourished.   Neck: Normal range of motion.   Cardiovascular: Normal rate and regular rhythm.    Pulmonary/Chest: Effort normal.   Abdominal: Soft.   Neurological: She is alert and oriented to person, place, and time.   Psychiatric: She has a normal mood and affect. Her behavior is normal.     General Musculoskeletal Exam   Gait: normal         Left Knee Exam     Inspection   Erythema: absent  Swelling: present  Bruising: present    Tenderness   The patient is experiencing no tenderness.         Range of Motion   The patient has normal left knee ROM.    Comments:  Incision is clean, dry and intact. No evidence for infection.     + Edema of entire left lower leg, ankle and foot.     Negative Zac's sign. No pain on palpation or compression of knee or calf.     Vascular Exam       Edema  Left Lower Leg: present                    Assessment:     "   Encounter Diagnoses   Name Primary?    Encounter for postoperative wound check Yes    Edema of leg           Plan:       Diagnoses and all orders for this visit:    Encounter for postoperative wound check    Edema of leg        The patient had surgical dressing removed. She was instructed to increase her activity and was educated on at-home exercises for her ankle and leg. The patient was instructed to also take her diuretic twice daily to help decrease the swelling.     She will return in 1 week for staple removal.           Jennifer Enamorado PA-C

## 2017-09-22 NOTE — PROGRESS NOTES
INR is sub-therapeutic. Recent hospital admission outpatient surgery to address an open wound of her left knee secondary to a traumatic hematoma. Warfarin resumed at scheduled dose at discharge. Currently completing zyvox and minocycline. Take 5mg tonight then resume 2.5mg daily. Repeat INR in 1 week.

## 2017-09-29 ENCOUNTER — ANTI-COAG VISIT (OUTPATIENT)
Dept: CARDIOLOGY | Facility: CLINIC | Age: 82
End: 2017-09-29
Payer: MEDICARE

## 2017-09-29 ENCOUNTER — OFFICE VISIT (OUTPATIENT)
Dept: ORTHOPEDICS | Facility: CLINIC | Age: 82
End: 2017-09-29
Payer: MEDICARE

## 2017-09-29 VITALS
BODY MASS INDEX: 21.25 KG/M2 | SYSTOLIC BLOOD PRESSURE: 150 MMHG | WEIGHT: 115.5 LBS | HEART RATE: 70 BPM | DIASTOLIC BLOOD PRESSURE: 56 MMHG | HEIGHT: 62 IN

## 2017-09-29 DIAGNOSIS — R60.0 EDEMA OF LEG: ICD-10-CM

## 2017-09-29 DIAGNOSIS — Z48.89 ENCOUNTER FOR POSTOPERATIVE WOUND CHECK: Primary | ICD-10-CM

## 2017-09-29 DIAGNOSIS — S80.02XD TRAUMATIC HEMATOMA OF LEFT KNEE, SUBSEQUENT ENCOUNTER: ICD-10-CM

## 2017-09-29 DIAGNOSIS — Z79.01 LONG TERM (CURRENT) USE OF ANTICOAGULANTS: Primary | ICD-10-CM

## 2017-09-29 LAB — INR PPP: 3 (ref 2–3)

## 2017-09-29 PROCEDURE — 85610 PROTHROMBIN TIME: CPT | Mod: PBBFAC

## 2017-09-29 PROCEDURE — 99999 PR PBB SHADOW E&M-EST. PATIENT-LVL I: CPT | Mod: PBBFAC,,,

## 2017-09-29 PROCEDURE — 99211 OFF/OP EST MAY X REQ PHY/QHP: CPT | Mod: PBBFAC,27

## 2017-09-29 PROCEDURE — 99024 POSTOP FOLLOW-UP VISIT: CPT | Mod: ,,, | Performed by: PHYSICIAN ASSISTANT

## 2017-09-29 PROCEDURE — 99213 OFFICE O/P EST LOW 20 MIN: CPT | Mod: PBBFAC | Performed by: PHYSICIAN ASSISTANT

## 2017-09-29 PROCEDURE — 99999 PR PBB SHADOW E&M-EST. PATIENT-LVL III: CPT | Mod: PBBFAC,,, | Performed by: PHYSICIAN ASSISTANT

## 2017-09-29 NOTE — PROGRESS NOTES
INR is now therapeutic however on higher end of target range. All antibiotics now complete as of Monday per patient. Resume 2.5mg daily until follow-up. Patient reports no bleeding or bruising. I reminded the patient to call with any problems, changes or questions before the next visit.

## 2017-09-29 NOTE — PROGRESS NOTES
Subjective:      Patient ID: Jennifer Mathews is a 85 y.o. female.    Chief Complaint: Pain and Post-op Evaluation of the Left Knee    HPI  The patient is seen today for postop evaluation and staple removal. She had outpatient surgery to address an open wound of her left knee secondary to a traumatic hematoma.  She has a history of varicose veins.This surgery was on 9/12/17.   Her swelling has dramatically improved. She has no further complaints today.     The patient states that she has completed all antibiotic therapy. No current complaints of pain.    Review of Systems   Constitution: Negative for chills, fever and night sweats.   Respiratory: Negative for cough, shortness of breath and wheezing.    Skin: Positive for color change.   Musculoskeletal: Positive for joint swelling. Negative for joint pain.   Gastrointestinal: Negative for diarrhea, nausea and vomiting.   Neurological: Negative for brief paralysis.   Psychiatric/Behavioral: Negative for altered mental status.         Objective:            General    Constitutional: She is oriented to person, place, and time. She appears well-developed and well-nourished.   Neck: Normal range of motion.   Cardiovascular: Normal rate and regular rhythm.    Pulmonary/Chest: Effort normal.   Abdominal: Soft.   Neurological: She is alert and oriented to person, place, and time.   Psychiatric: She has a normal mood and affect. Her behavior is normal.     General Musculoskeletal Exam   Gait: normal     Right Ankle/Foot Exam     Comments:  Swelling over lateral and medial malleolus- this is chronic.     Left Ankle/Foot Exam     Comments:  Swelling over lateral and medial malleolus- this is chronic.         Left Knee Exam     Inspection   Erythema: absent  Scars: present  Effusion: absent    Tenderness   The patient is experiencing no tenderness.         Range of Motion   The patient has normal left knee ROM.    Comments:  No pain or TTP on exam.     Incision is clean, dry  and intact.                     Assessment:       Encounter Diagnoses   Name Primary?    Encounter for postoperative wound check Yes    Edema of leg     Traumatic hematoma of left knee, subsequent encounter           Plan:       Jennifer was seen today for pain and post-op evaluation.    Diagnoses and all orders for this visit:    Encounter for postoperative wound check    Edema of leg    Traumatic hematoma of left knee, subsequent encounter        The patient had staples removed today. She was instructed to keep entire incision area clean, dry and intact.     If she has any concerns, such as increased swelling, erythema or pain, she will notify the office. Otherwise, she will return as necessary.           Jennifer Enamorado PA-C

## 2017-09-29 NOTE — LETTER
September 29, 2017      Desirae Bello MD  [address] [place] [address] [address] [id number]           [facility]  [address] [place] [address] [address]  [place] [address]  Phone: [phone]  Fax: [phone]          Patient: Jennifer Mathews   MR Number: [id number]   YOB: 1932   Date of Visit: 9/29/2017       Dear Dr. Desirae Bello:    Thank you for referring Jennifer Mathews to me for evaluation. Attached you will find relevant portions of my assessment and plan of care.    If you have questions, please do not hesitate to call me. I look forward to following Jennifer Mathews along with you.    Sincerely,    Jennifer Enamorado PA-C    Enclosure  CC:  No Recipients    If you would like to receive this communication electronically, please contact [email or web address] or [phone] to request more information on [employer] Link access.    For providers and/or their staff who would like to refer a patient to [facility], please contact us through our one-stop-shop provider referral line, [facility], at [phone].    If you feel you have received this communication in error or would no longer like to receive these types of communications, please e-mail [email or web address]

## 2017-10-09 ENCOUNTER — CLINICAL SUPPORT (OUTPATIENT)
Dept: CARDIOLOGY | Facility: CLINIC | Age: 82
End: 2017-10-09
Payer: MEDICARE

## 2017-10-09 DIAGNOSIS — I48.20 CHRONIC ATRIAL FIBRILLATION: ICD-10-CM

## 2017-10-09 DIAGNOSIS — I25.5 ISCHEMIC CARDIOMYOPATHY: ICD-10-CM

## 2017-10-09 DIAGNOSIS — Z95.0 CARDIAC PACEMAKER IN SITU: ICD-10-CM

## 2017-10-09 PROCEDURE — 93294 REM INTERROG EVL PM/LDLS PM: CPT | Mod: ,,, | Performed by: INTERNAL MEDICINE

## 2017-10-09 PROCEDURE — 93296 REM INTERROG EVL PM/IDS: CPT | Mod: PBBFAC,PO | Performed by: INTERNAL MEDICINE

## 2017-10-27 ENCOUNTER — IMMUNIZATION (OUTPATIENT)
Dept: INTERNAL MEDICINE | Facility: CLINIC | Age: 82
End: 2017-10-27
Payer: MEDICARE

## 2017-10-27 ENCOUNTER — ANTI-COAG VISIT (OUTPATIENT)
Dept: CARDIOLOGY | Facility: CLINIC | Age: 82
End: 2017-10-27
Payer: MEDICARE

## 2017-10-27 DIAGNOSIS — Z79.01 LONG-TERM (CURRENT) USE OF ANTICOAGULANTS: Primary | ICD-10-CM

## 2017-10-27 LAB — INR PPP: 1.7 (ref 2–3)

## 2017-10-27 PROCEDURE — 85610 PROTHROMBIN TIME: CPT | Mod: PBBFAC

## 2017-10-27 PROCEDURE — G0008 ADMIN INFLUENZA VIRUS VAC: HCPCS | Mod: PBBFAC

## 2017-10-27 PROCEDURE — 99211 OFF/OP EST MAY X REQ PHY/QHP: CPT | Mod: PBBFAC,27

## 2017-10-27 PROCEDURE — 99999 PR PBB SHADOW E&M-EST. PATIENT-LVL II: CPT | Mod: PBBFAC,,,

## 2017-10-27 PROCEDURE — 99212 OFFICE O/P EST SF 10 MIN: CPT | Mod: PBBFAC

## 2017-10-27 PROCEDURE — 99999 PR PBB SHADOW E&M-EST. PATIENT-LVL I: CPT | Mod: PBBFAC,,,

## 2017-10-27 NOTE — PROGRESS NOTES
INR is sub-therapeutic today. Reports high vitamin k in diet this week. Confirms compliance. Will boost tonight's dose only then re-challenge dose and diet until follow-up. Repeat INR in 2 weeks.

## 2017-11-10 ENCOUNTER — ANTI-COAG VISIT (OUTPATIENT)
Dept: CARDIOLOGY | Facility: CLINIC | Age: 82
End: 2017-11-10
Payer: MEDICARE

## 2017-11-10 DIAGNOSIS — Z79.01 LONG-TERM (CURRENT) USE OF ANTICOAGULANTS: Primary | ICD-10-CM

## 2017-11-10 LAB — INR PPP: 1.8 (ref 2–3)

## 2017-11-10 PROCEDURE — 99211 OFF/OP EST MAY X REQ PHY/QHP: CPT | Mod: PBBFAC

## 2017-11-10 PROCEDURE — 85610 PROTHROMBIN TIME: CPT | Mod: PBBFAC

## 2017-11-10 PROCEDURE — 99999 PR PBB SHADOW E&M-EST. PATIENT-LVL I: CPT | Mod: PBBFAC,,,

## 2017-11-10 RX ORDER — WARFARIN 2.5 MG/1
TABLET ORAL
Qty: 30 TABLET | Refills: 3 | Status: SHIPPED | OUTPATIENT
Start: 2017-11-10 | End: 2017-12-01 | Stop reason: SDUPTHER

## 2017-11-10 NOTE — PROGRESS NOTES
INR remains sub-therapeutic. Patient confirms dose and compliance. Denies foods high in vitamin k. Will gently increase total weekly dose until follow-up. Repeat INR in 3 weeks.

## 2017-11-14 DIAGNOSIS — Z79.01 LONG TERM CURRENT USE OF ANTICOAGULANT THERAPY: ICD-10-CM

## 2017-11-14 RX ORDER — WARFARIN 2.5 MG/1
TABLET ORAL
Qty: 30 TABLET | Refills: 3 | Status: SHIPPED | OUTPATIENT
Start: 2017-11-14 | End: 2017-12-01 | Stop reason: SDUPTHER

## 2017-11-16 DIAGNOSIS — I50.9 ACUTE ON CHRONIC CONGESTIVE HEART FAILURE, UNSPECIFIED CONGESTIVE HEART FAILURE TYPE: ICD-10-CM

## 2017-11-16 RX ORDER — CARVEDILOL 25 MG/1
TABLET ORAL
Qty: 60 TABLET | Refills: 2 | Status: SHIPPED | OUTPATIENT
Start: 2017-11-16 | End: 2018-01-19 | Stop reason: SDUPTHER

## 2017-11-20 ENCOUNTER — OFFICE VISIT (OUTPATIENT)
Dept: RHEUMATOLOGY | Facility: CLINIC | Age: 82
End: 2017-11-20
Payer: MEDICARE

## 2017-11-20 ENCOUNTER — LAB VISIT (OUTPATIENT)
Dept: LAB | Facility: HOSPITAL | Age: 82
End: 2017-11-20
Attending: PHYSICIAN ASSISTANT
Payer: MEDICARE

## 2017-11-20 VITALS
HEART RATE: 69 BPM | DIASTOLIC BLOOD PRESSURE: 72 MMHG | SYSTOLIC BLOOD PRESSURE: 163 MMHG | WEIGHT: 115.06 LBS | BODY MASS INDEX: 21.05 KG/M2

## 2017-11-20 DIAGNOSIS — M85.80 OSTEOPENIA WITH HIGH RISK OF FRACTURE: Primary | ICD-10-CM

## 2017-11-20 DIAGNOSIS — M1A.3791 CHRONIC GOUT DUE TO RENAL IMPAIRMENT INVOLVING FOOT WITH TOPHUS, UNSPECIFIED LATERALITY: ICD-10-CM

## 2017-11-20 DIAGNOSIS — M1A.39X1 CHRONIC GOUT DUE TO RENAL IMPAIRMENT OF MULTIPLE SITES WITH TOPHUS: ICD-10-CM

## 2017-11-20 DIAGNOSIS — Z51.81 MEDICATION MONITORING ENCOUNTER: ICD-10-CM

## 2017-11-20 DIAGNOSIS — N18.30 CKD (CHRONIC KIDNEY DISEASE) STAGE 3, GFR 30-59 ML/MIN: ICD-10-CM

## 2017-11-20 LAB
ANION GAP SERPL CALC-SCNC: 12 MMOL/L
BUN SERPL-MCNC: 29 MG/DL
CALCIUM SERPL-MCNC: 9.9 MG/DL
CHLORIDE SERPL-SCNC: 103 MMOL/L
CO2 SERPL-SCNC: 30 MMOL/L
CREAT SERPL-MCNC: 1.1 MG/DL
EST. GFR  (AFRICAN AMERICAN): 53 ML/MIN/1.73 M^2
EST. GFR  (NON AFRICAN AMERICAN): 46 ML/MIN/1.73 M^2
GLUCOSE SERPL-MCNC: 107 MG/DL
POTASSIUM SERPL-SCNC: 5.1 MMOL/L
SODIUM SERPL-SCNC: 145 MMOL/L
URATE SERPL-MCNC: 5.4 MG/DL

## 2017-11-20 PROCEDURE — 99214 OFFICE O/P EST MOD 30 MIN: CPT | Mod: S$PBB,,, | Performed by: PHYSICIAN ASSISTANT

## 2017-11-20 PROCEDURE — 80048 BASIC METABOLIC PNL TOTAL CA: CPT | Mod: PO

## 2017-11-20 PROCEDURE — 99214 OFFICE O/P EST MOD 30 MIN: CPT | Mod: PBBFAC,25,PO | Performed by: PHYSICIAN ASSISTANT

## 2017-11-20 PROCEDURE — 36415 COLL VENOUS BLD VENIPUNCTURE: CPT | Mod: PO

## 2017-11-20 PROCEDURE — 99999 PR PBB SHADOW E&M-EST. PATIENT-LVL IV: CPT | Mod: PBBFAC,,, | Performed by: PHYSICIAN ASSISTANT

## 2017-11-20 PROCEDURE — 96372 THER/PROPH/DIAG INJ SC/IM: CPT | Mod: PBBFAC,PO

## 2017-11-20 PROCEDURE — 84550 ASSAY OF BLOOD/URIC ACID: CPT | Mod: PO

## 2017-11-20 RX ADMIN — DENOSUMAB 60 MG: 60 INJECTION SUBCUTANEOUS at 11:11

## 2017-11-20 NOTE — LETTER
November 20, 2017      Halina Hernandez MD  [address] [facility]  [address]  [place] [address]           [facility]  [address]  [place] [address]  Phone: [phone]  Fax: [phone]          Patient: Jennifer Mathews   MR Number: [id number]   YOB: 1932   Date of Visit: 11/20/2017       Dear Dr. Halina Hernandez:    Thank you for referring Jennifer Mathews to me for evaluation. Attached you will find relevant portions of my assessment and plan of care.    If you have questions, please do not hesitate to call me. I look forward to following Jennifer Mathews along with you.    Sincerely,    Janay Valdez PA-C    Enclosure  CC:  No Recipients    If you would like to receive this communication electronically, please contact [email or web address] or [phone] to request more information on [employer] Link access.    For providers and/or their staff who would like to refer a patient to [facility], please contact us through our one-stop-shop provider referral line, [facility], at [phone].    If you feel you have received this communication in error or would no longer like to receive these types of communications, please e-mail [email or web address]

## 2017-11-20 NOTE — PATIENT INSTRUCTIONS
Tumeric 1000 mg/day over the counter in vitamin section    prolia will continue every 6 months    Call if shoulder worsens and we will inject    Continue allopurinol 200mg/day, uric acid is at goal

## 2017-11-20 NOTE — PROGRESS NOTES
Subjective:       Patient ID: Jennifer Mathews is a 85 y.o. female.    Chief Complaint: Osteoporosis    Jennifer is here for follow up. She has newly diagnosed gout in the setting of CKD. SHe also has osteoporosis and is on Prolia.  She occasionally uses a rolling walker if she is doing lots of walking, but today ambulating without assistance.  Regarding OP, she received prolia on 5.17.17 and is due today. No issues tolerating prolia.  She takes vit D daily. She gets calcium in her diet. New dexa done today shows stable bmd at the hips, improving at the spine. She has some OA and joint pains but doesn't use nsaids due to coumadin. Today she is complaining of pain in her Right shoulder 5/10.  She denies any increased activity or injury. Pain with lifting her arm and tender on the outside    Regarding gout she is on colchicine 0.6mg/day and allopurinol is at 200mg/day. Last uric acid not at goal.  No issues tolerating her medications. No gout flares in the last few months.       Review of Systems   Constitutional: Negative for chills, fatigue and fever.   HENT: Negative for mouth sores, rhinorrhea and sore throat.    Eyes: Negative for pain and redness.   Respiratory: Negative for cough and shortness of breath.    Cardiovascular: Negative for chest pain.        CHF, htn, CAD   Gastrointestinal: Negative for abdominal pain, constipation, diarrhea, nausea and vomiting.   Genitourinary: Negative for dysuria and hematuria.   Musculoskeletal: Positive for arthralgias. Negative for joint swelling and myalgias.   Skin: Negative for rash.   Neurological: Negative for weakness, numbness and headaches.   Hematological:        Chronic anemia   Psychiatric/Behavioral: The patient is not nervous/anxious.          Objective:   BP (!) 163/72   Pulse 69   Wt 52.2 kg (115 lb 1.3 oz)   BMI 21.05 kg/m²      Physical Exam   Constitutional: She is oriented to person, place, and time and well-developed, well-nourished, and in no  distress.   HENT:   Head: Normocephalic and atraumatic.   Eyes: Pupils are equal, round, and reactive to light. Right eye exhibits no discharge.   Neck: Normal range of motion.   Cardiovascular: Normal rate, regular rhythm and normal heart sounds.  Exam reveals no friction rub.    Pulmonary/Chest: Effort normal and breath sounds normal. No respiratory distress.   Abdominal: Soft. She exhibits no distension. There is no tenderness.   Lymphadenopathy:     She has no cervical adenopathy.   Neurological: She is alert and oriented to person, place, and time.   Skin: No rash noted. No erythema.     Psychiatric: Mood normal.   Musculoskeletal: Normal range of motion. She exhibits no edema or deformity.   Todd hands with oa changes to 1 cmcs, pips and dips, no synovitis, no tender joints  Right shoulder tender to palpation laterally at the subacromial bursa, full but painful rom                 No results found for this or any previous visit (from the past 168 hour(s)).    Dexa 7.22.15: DF -1.6, Right neck -2.3, spine -1.9 frax 22.4 major, 7.4 hip, decreasing bmd  Dexa 11.20.17: LN -1.9, Right neck -2.2, DF-1.4, spine -1.3, stable hip, improving spine, frax major 20.4%, hip 6.5%     Assessment:       1. Osteopenia with high risk of fracture    2. 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 CKD (chronic kidney disease) stage 3, GFR 30-59 ml/min    4. Medication monitoring encounter        Impression:  Osteopenia with high fracture risk: on prolia q 6 months, last dose may this year, daily vitamin D; dexa today stable bmd at hip, improving spine    Gout: on allopurinol 200mg/day, colchcine 0.6mg/day, stable no attacks, uric acid at goal 5.4    medication monitoring: no issues, tolerates well colchicine, allopurinol, and prolia    CKD: stable, avoid nsaids    Right shoulder pain: likely subacromial bursitis, avoids nsaids due to chronic coumadin and ckd    Plan:       prolia due today, give today  and cont q 6 mos  Cont Vit D daily 1000units  Cont colchicine 0.6mg/day   Repeat dexa 11/2019  Cont allopurinol 200mg/day   rec tumeric for joint pain/antiinflammation  Call if shoulder worsens and we will inject      Return in May 2018

## 2017-11-20 NOTE — PROGRESS NOTES
Administered 1cc Prolia 60mg/cc to RLQ of abdomen. Pt. Tolerated well. No acute reaction noted to site. Pt. Instructed on S/S of reaction to report. Pt. Verbalized understanding. Instructed pt to wait 15 minutes for adverse side effects noted.    Lot:[id number]  Exp:09/19  Manu: bruce

## 2017-11-21 DIAGNOSIS — I48.91 ATRIAL FIBRILLATION, UNSPECIFIED TYPE: Primary | ICD-10-CM

## 2017-11-21 RX ORDER — FUROSEMIDE 40 MG/1
TABLET ORAL
Qty: 180 TABLET | Refills: 3 | Status: SHIPPED | OUTPATIENT
Start: 2017-11-21 | End: 2017-11-21 | Stop reason: SDUPTHER

## 2017-11-21 RX ORDER — FUROSEMIDE 40 MG/1
40 TABLET ORAL DAILY
Qty: 180 TABLET | Refills: 3 | Status: SHIPPED | OUTPATIENT
Start: 2017-11-21 | End: 2018-04-19 | Stop reason: SDUPTHER

## 2017-11-21 NOTE — TELEPHONE ENCOUNTER
----- Message from Jagdish Gómez sent at 11/21/2017 11:19 AM CST -----  Contact: Pt  Pt states she needs her Lasix called into...please contact the pt when it is sent because she has to drive a long way to get the RX    [id number] - [place], [place] - [address] [address] [address]  Phone: [phone] Fax: [phone]    Please contact the pt at [phone]

## 2017-12-01 ENCOUNTER — ANTI-COAG VISIT (OUTPATIENT)
Dept: CARDIOLOGY | Facility: CLINIC | Age: 82
End: 2017-12-01
Payer: MEDICARE

## 2017-12-01 DIAGNOSIS — Z79.01 LONG-TERM (CURRENT) USE OF ANTICOAGULANTS: Primary | ICD-10-CM

## 2017-12-01 DIAGNOSIS — Z79.01 LONG TERM CURRENT USE OF ANTICOAGULANT THERAPY: ICD-10-CM

## 2017-12-01 LAB — INR PPP: 1.8 (ref 2–3)

## 2017-12-01 PROCEDURE — 99211 OFF/OP EST MAY X REQ PHY/QHP: CPT | Mod: PBBFAC

## 2017-12-01 PROCEDURE — 99999 PR PBB SHADOW E&M-EST. PATIENT-LVL I: CPT | Mod: PBBFAC,,,

## 2017-12-01 PROCEDURE — 85610 PROTHROMBIN TIME: CPT | Mod: PBBFAC

## 2017-12-01 RX ORDER — WARFARIN 2.5 MG/1
TABLET ORAL
Qty: 32 TABLET | Refills: 3 | Status: ON HOLD | OUTPATIENT
Start: 2017-12-01 | End: 2018-01-25 | Stop reason: HOSPADM

## 2017-12-01 NOTE — PROGRESS NOTES
INR remains sub-therapeutic despite dose adjustment. Confirms dose and compliance. Will increase total weekly dose until follow-up. Repeat INR in 3 weeks.

## 2017-12-06 DIAGNOSIS — I48.91 ATRIAL FIBRILLATION, UNSPECIFIED TYPE: Primary | ICD-10-CM

## 2017-12-06 RX ORDER — DIGOXIN 125 MCG
125 TABLET ORAL DAILY
Qty: 30 TABLET | Refills: 5 | Status: SHIPPED | OUTPATIENT
Start: 2017-12-06 | End: 2018-01-19 | Stop reason: SDUPTHER

## 2017-12-15 ENCOUNTER — TELEPHONE (OUTPATIENT)
Dept: RHEUMATOLOGY | Facility: CLINIC | Age: 82
End: 2017-12-15

## 2017-12-15 DIAGNOSIS — I48.91 ATRIAL FIBRILLATION, UNSPECIFIED TYPE: Primary | ICD-10-CM

## 2017-12-15 RX ORDER — TRAMADOL HYDROCHLORIDE 50 MG/1
50 TABLET ORAL NIGHTLY PRN
Qty: 30 TABLET | Refills: 5 | Status: SHIPPED | OUTPATIENT
Start: 2017-12-15 | End: 2017-12-25

## 2017-12-15 RX ORDER — AMIODARONE HYDROCHLORIDE 200 MG/1
200 TABLET ORAL DAILY
Qty: 30 TABLET | Refills: 11 | Status: SHIPPED | OUTPATIENT
Start: 2017-12-15 | End: 2018-10-25 | Stop reason: SDUPTHER

## 2017-12-15 NOTE — TELEPHONE ENCOUNTER
----- Message from Milena Lim sent at 12/15/2017 11:34 AM CST -----  Contact: pt  The pt states she needs a refill on Tramadol, pt can be reached at [phone]///[email or web address]

## 2017-12-19 ENCOUNTER — ANTI-COAG VISIT (OUTPATIENT)
Dept: CARDIOLOGY | Facility: CLINIC | Age: 82
End: 2017-12-19
Payer: MEDICARE

## 2017-12-19 DIAGNOSIS — Z79.01 LONG-TERM (CURRENT) USE OF ANTICOAGULANTS: Primary | ICD-10-CM

## 2017-12-19 LAB — INR PPP: 1.9 (ref 2–3)

## 2017-12-19 PROCEDURE — 85610 PROTHROMBIN TIME: CPT | Mod: PBBFAC

## 2017-12-19 NOTE — PROGRESS NOTES
INR remains slightly sub-therapeutic despite dose adjustment. Confirms dose and compliance. Will increase total weekly dose until follow-up. Repeat INR in 3 weeks. Patient reports no bleeding or bruising, no new medications and no diet changes.  I reminded the patient to call with any problems, changes or questions before the next visit.

## 2017-12-21 RX ORDER — GABAPENTIN 100 MG/1
CAPSULE ORAL
Qty: 180 CAPSULE | Refills: 1 | Status: SHIPPED | OUTPATIENT
Start: 2017-12-21 | End: 2018-06-15 | Stop reason: SDUPTHER

## 2017-12-28 ENCOUNTER — HOSPITAL ENCOUNTER (OUTPATIENT)
Dept: RADIOLOGY | Facility: HOSPITAL | Age: 82
Discharge: HOME | End: 2017-12-28
Attending: NURSE PRACTITIONER
Payer: MEDICARE

## 2017-12-28 ENCOUNTER — OFFICE VISIT (OUTPATIENT)
Dept: URGENT CARE | Facility: CLINIC | Age: 82
End: 2017-12-28
Payer: MEDICARE

## 2017-12-28 VITALS
DIASTOLIC BLOOD PRESSURE: 50 MMHG | HEART RATE: 70 BPM | OXYGEN SATURATION: 94 % | TEMPERATURE: 98 F | BODY MASS INDEX: 21.04 KG/M2 | SYSTOLIC BLOOD PRESSURE: 120 MMHG | HEIGHT: 62 IN | WEIGHT: 114.31 LBS

## 2017-12-28 DIAGNOSIS — R35.0 URINARY FREQUENCY: ICD-10-CM

## 2017-12-28 DIAGNOSIS — I10 ESSENTIAL HYPERTENSION: ICD-10-CM

## 2017-12-28 DIAGNOSIS — R05.9 COUGH: ICD-10-CM

## 2017-12-28 DIAGNOSIS — N18.30 CKD (CHRONIC KIDNEY DISEASE) STAGE 3, GFR 30-59 ML/MIN: ICD-10-CM

## 2017-12-28 DIAGNOSIS — R50.9 FEVER, UNSPECIFIED FEVER CAUSE: ICD-10-CM

## 2017-12-28 DIAGNOSIS — N39.0 UTI (URINARY TRACT INFECTION), UNCOMPLICATED: ICD-10-CM

## 2017-12-28 DIAGNOSIS — I48.91 ATRIAL FIBRILLATION, UNSPECIFIED TYPE: ICD-10-CM

## 2017-12-28 DIAGNOSIS — R50.9 FEVER, UNSPECIFIED FEVER CAUSE: Primary | ICD-10-CM

## 2017-12-28 LAB
BILIRUB SERPL-MCNC: NEGATIVE MG/DL
BLOOD URINE, POC: ABNORMAL
COLOR, POC UA: YELLOW
CTP QC/QA: YES
FLUAV AG NPH QL: NEGATIVE
FLUBV AG NPH QL: NEGATIVE
GLUCOSE UR QL STRIP: NORMAL
KETONES UR QL STRIP: NEGATIVE
LEUKOCYTE ESTERASE URINE, POC: ABNORMAL
NITRITE, POC UA: POSITIVE
PH, POC UA: 5
PROTEIN, POC: ABNORMAL
SPECIFIC GRAVITY, POC UA: 1
UROBILINOGEN, POC UA: NORMAL

## 2017-12-28 PROCEDURE — 87077 CULTURE AEROBIC IDENTIFY: CPT

## 2017-12-28 PROCEDURE — 99214 OFFICE O/P EST MOD 30 MIN: CPT | Mod: S$PBB,,, | Performed by: NURSE PRACTITIONER

## 2017-12-28 PROCEDURE — 99215 OFFICE O/P EST HI 40 MIN: CPT | Mod: PBBFAC,25,PO | Performed by: NURSE PRACTITIONER

## 2017-12-28 PROCEDURE — 81002 URINALYSIS NONAUTO W/O SCOPE: CPT | Mod: PBBFAC,PO | Performed by: NURSE PRACTITIONER

## 2017-12-28 PROCEDURE — 87088 URINE BACTERIA CULTURE: CPT

## 2017-12-28 PROCEDURE — 71020 XR CHEST PA AND LATERAL: CPT | Mod: TC,PO

## 2017-12-28 PROCEDURE — 71020 XR CHEST PA AND LATERAL: CPT | Mod: 26,,, | Performed by: RADIOLOGY

## 2017-12-28 PROCEDURE — 87186 SC STD MICRODIL/AGAR DIL: CPT

## 2017-12-28 PROCEDURE — 99999 PR PBB SHADOW E&M-EST. PATIENT-LVL V: CPT | Mod: PBBFAC,,, | Performed by: NURSE PRACTITIONER

## 2017-12-28 PROCEDURE — 87804 INFLUENZA ASSAY W/OPTIC: CPT | Mod: PBBFAC,PO | Performed by: NURSE PRACTITIONER

## 2017-12-28 PROCEDURE — 87086 URINE CULTURE/COLONY COUNT: CPT

## 2017-12-28 RX ORDER — BENZONATATE 100 MG/1
200 CAPSULE ORAL 3 TIMES DAILY PRN
Qty: 60 CAPSULE | Refills: 1 | Status: SHIPPED | OUTPATIENT
Start: 2017-12-28 | End: 2018-01-07

## 2017-12-28 RX ORDER — DOXYCYCLINE 100 MG/1
100 CAPSULE ORAL 2 TIMES DAILY
Qty: 20 CAPSULE | Refills: 0 | Status: SHIPPED | OUTPATIENT
Start: 2017-12-28 | End: 2018-01-07

## 2017-12-28 NOTE — PROGRESS NOTES
Doxycycline 100mg BID beginning today. Will hold warfarin the lower total weekly dose. Repeat INR in 1 week. Patient verbalized understanding.

## 2017-12-28 NOTE — PROGRESS NOTES
Chief complaint/reason for visit:  dysuria, urinary frequency    History of present illness:  85-year-old female with daughter complains of dysuria,  urinary frequency & abdominal pain onset yesterday.  Planes of congestion and cough with a fever onset yesterday.  Patient denies any back pain, nausea, vomiting & vaginal d/c.  Patient tried over-the-counter medications with little relief.  Patient concerned about cough.  Discuss further evaluation with urinalysis and chest x-ray.  Patient agrees with plan of therapy.  Discussed need for further evaluation with PT/ INR.       Past Medical History:   Diagnosis Date    Acute coronary syndrome     Anemia     Anticoagulated on Coumadin 7/13/2015    Arthritis     Atrial fibrillation     Basal cell carcinoma 10/2015    left neck    Cardiac arrest     Cardiac resynchronization therapy defibrillator (CRT-D) in place 07/13/2015    Pt denies, states it does not shock me    Cardiac resynchronization therapy defibrillator (CRT-D) in place 7/13/2015    Cardiomyopathy 1/30/2014    CHF (congestive heart failure)     Chronic combined systolic and diastolic congestive heart failure     CKD (chronic kidney disease) stage 3, GFR 30-59 ml/min     Dyslipidemia 1/30/2014    Edema     Heart disease     Hypertension     Ischemic cardiomyopathy 7/13/2015    Macular hole of left eye     Old    Macular hole of left eye     Myocardial infarction     LEV (obstructive sleep apnea) 9/30/2013    Recurrent UTI     Refractive error     S/P MVR (mitral valve replacement) 1/30/2014    S/P placement of cardiac pacemaker 1/30/2014    Skin cancer     Sleep apnea     Stroke          Past Surgical History:   Procedure Laterality Date    APPENDECTOMY      CARDIAC PACEMAKER PLACEMENT      CARDIAC VALVE SURGERY      CATARACT EXTRACTION      OU    CHOLECYSTECTOMY      COLONOSCOPY      MITRAL VALVE SURGERY      x3            Social History     Social History    Marital status:       Spouse name: N/A    Number of children: N/A    Years of education: N/A     Occupational History    Not on file.     Social History Main Topics    Smoking status: Never Smoker    Smokeless tobacco: Never Used    Alcohol use No    Drug use: No    Sexual activity: Not on file     Other Topics Concern    Not on file     Social History Narrative    No narrative on file       Family History   Problem Relation Age of Onset    Coronary artery disease Mother      mi    Coronary artery disease Father     Diabetes Brother     Cancer Brother     Melanoma Neg Hx     Psoriasis Neg Hx     Lupus Neg Hx     Eczema Neg Hx        ROS:  Gen.:  Fever with fatigue  Skin:  Denies  no rashes, itching or changes in skin color or texture  HEENT: Denies headache, nasal congestion, sneezing  Nodes: No masses or lesions  Chest: cough and sputum production  Cardiovascular: Denies chest pain, shortness of breath  Abdomen: Reports slight abdominal pain  Urinary: Reports dysuria & frequency  Musculoskeletal: no joint stiffness, swelling. Denies back pain  Neurologic: History of seizures, paralysis, alteration of gait or coordination  Psychiatric: Denies mood swings, depression or suicidal    PE:  Appearance: Well-nourished, well-developed, in mild distress  V/S: Reviewed.  Skin: No masses, rashes or lesions  HEENT: turbinates pink, Mucus membranes okay, TM's clear bilateral   Chest: Minimal rhonchi on auscultation.   Cardiovascular: Regular rate and rhythm.  Abdomen: Soft with minimal supra pubic tenderness, with active bowel sounds, no CVA tenderness  Musculoskeletal: Motor: 5/5 strength major flexors/extensors.  Neurologic: No sensory deficits. Gait and posture: Ambulates slowly,  No cerebellar signs.   Mental status: Patient awake, alert and oriented    Plan:  Lab work POCT UA, C&S, CXR   Advise increase p.o. fluids--at least 64 ounces of water/juice & rest  Med's: Doxycycline and Tessalon Perles / no  refills  Practice good handwashing.  Advise follow up with PCP for PT/INR  Advise go to ER if nausea, vomiting, fever, increased back pain, or fail to improve with treatment.  AVS provided and reviewed with patient including supportive care, follow up, and red flag symptoms.   Patient verbalizes understanding and agrees with treatment plan. Discharged from Urgent Care in stable condition.      Diagnosis:  Fever  Cough  Hypertension  Urinary frequency / UTI  Chronic kidney disease  Atrial fibrillation/Coumadin therapy

## 2017-12-28 NOTE — PATIENT INSTRUCTIONS
Plan:  Lab work POCT UA, C&S, CXR   Advise increase p.o. fluids--at least 64 ounces of water/juice & rest  Meds: Doxycycline and Tessalon perles / no refills  Practice good handwashing.  Advise follow up with PCP for PT/INR  Advise go to ER if nausea, vomiting, fever, increased back pain, or fail to improve with treatment.  AVS provided and reviewed with patient including supportive care, follow up, and red flag symptoms.   Patient verbalizes understanding and agrees with treatment plan. Discharged from Urgent Care in stable condition.

## 2018-01-02 LAB — BACTERIA UR CULT: NORMAL

## 2018-01-04 ENCOUNTER — TELEPHONE (OUTPATIENT)
Dept: INTERNAL MEDICINE | Facility: CLINIC | Age: 83
End: 2018-01-04

## 2018-01-04 ENCOUNTER — ANTI-COAG VISIT (OUTPATIENT)
Dept: CARDIOLOGY | Facility: CLINIC | Age: 83
End: 2018-01-04
Payer: MEDICARE

## 2018-01-04 ENCOUNTER — PATIENT OUTREACH (OUTPATIENT)
Dept: ADMINISTRATIVE | Facility: HOSPITAL | Age: 83
End: 2018-01-04

## 2018-01-04 DIAGNOSIS — Z79.01 LONG TERM (CURRENT) USE OF ANTICOAGULANTS: Primary | ICD-10-CM

## 2018-01-04 DIAGNOSIS — Z79.01 LONG-TERM (CURRENT) USE OF ANTICOAGULANTS: ICD-10-CM

## 2018-01-04 LAB — INR PPP: 2.5 (ref 2–3)

## 2018-01-04 PROCEDURE — 85610 PROTHROMBIN TIME: CPT | Mod: PBBFAC

## 2018-01-04 PROCEDURE — 99999 PR PBB SHADOW E&M-EST. PATIENT-LVL I: CPT | Mod: PBBFAC,,,

## 2018-01-04 PROCEDURE — 99211 OFF/OP EST MAY X REQ PHY/QHP: CPT | Mod: PBBFAC

## 2018-01-04 NOTE — TELEPHONE ENCOUNTER
Pt states she received call from care coordinator. She was also requesting appt with Dr. Bello to f/u UTI dx with Urgent Care. Appt scheduled 01/18/18 @ 1pm.

## 2018-01-04 NOTE — TELEPHONE ENCOUNTER
----- Message from Eusebia Browning sent at 1/4/2018  3:14 PM CST -----  Contact: pt   Pt request call from nurse. Pt states that she received a call and from [facility] and want to know if it was from the office.   ..[phone] (home)

## 2018-01-04 NOTE — PROGRESS NOTES
INR is now therapeutic with dose adjustment. Completing doxycycline for URI with 3 days of therapy left. Will lower total weekly dose until follow-up. Patient voiced understanding.

## 2018-01-19 DIAGNOSIS — M81.0 OSTEOPOROSIS, SENILE: ICD-10-CM

## 2018-01-19 DIAGNOSIS — I48.91 ATRIAL FIBRILLATION, UNSPECIFIED TYPE: ICD-10-CM

## 2018-01-19 DIAGNOSIS — I50.9 ACUTE ON CHRONIC CONGESTIVE HEART FAILURE, UNSPECIFIED CONGESTIVE HEART FAILURE TYPE: ICD-10-CM

## 2018-01-19 DIAGNOSIS — M1A.39X1 CHRONIC GOUT DUE TO RENAL IMPAIRMENT OF MULTIPLE SITES WITH TOPHUS: ICD-10-CM

## 2018-01-19 RX ORDER — COLCHICINE 0.6 MG/1
0.6 TABLET ORAL DAILY
Qty: 90 TABLET | Refills: 3 | Status: SHIPPED | OUTPATIENT
Start: 2018-01-19 | End: 2018-02-12

## 2018-01-19 RX ORDER — DIGOXIN 125 MCG
125 TABLET ORAL DAILY
Qty: 30 TABLET | Refills: 5 | Status: SHIPPED | OUTPATIENT
Start: 2018-01-19 | End: 2018-01-22 | Stop reason: SDUPTHER

## 2018-01-19 RX ORDER — CARVEDILOL 25 MG/1
25 TABLET ORAL 2 TIMES DAILY WITH MEALS
Qty: 60 TABLET | Refills: 5 | Status: SHIPPED | OUTPATIENT
Start: 2018-01-19 | End: 2018-01-22 | Stop reason: SDUPTHER

## 2018-01-22 ENCOUNTER — CLINICAL SUPPORT (OUTPATIENT)
Dept: CARDIOLOGY | Facility: CLINIC | Age: 83
DRG: 392 | End: 2018-01-22
Attending: SPECIALIST
Payer: MEDICARE

## 2018-01-22 ENCOUNTER — HOSPITAL ENCOUNTER (INPATIENT)
Facility: HOSPITAL | Age: 83
LOS: 3 days | Discharge: HOME | DRG: 392 | End: 2018-01-25
Attending: SPECIALIST | Admitting: INTERNAL MEDICINE
Payer: MEDICARE

## 2018-01-22 DIAGNOSIS — K92.2 GASTROINTESTINAL HEMORRHAGE: ICD-10-CM

## 2018-01-22 DIAGNOSIS — K52.9 ENTEROCOLITIS: ICD-10-CM

## 2018-01-22 DIAGNOSIS — I48.20 CHRONIC ATRIAL FIBRILLATION: ICD-10-CM

## 2018-01-22 DIAGNOSIS — I50.9 ACUTE ON CHRONIC CONGESTIVE HEART FAILURE, UNSPECIFIED CONGESTIVE HEART FAILURE TYPE: ICD-10-CM

## 2018-01-22 DIAGNOSIS — I25.5 ISCHEMIC CARDIOMYOPATHY: ICD-10-CM

## 2018-01-22 DIAGNOSIS — I48.0 PAROXYSMAL ATRIAL FIBRILLATION: ICD-10-CM

## 2018-01-22 DIAGNOSIS — I24.9 ACUTE CORONARY SYNDROME: ICD-10-CM

## 2018-01-22 DIAGNOSIS — K92.2 GASTROINTESTINAL HEMORRHAGE, UNSPECIFIED GASTROINTESTINAL HEMORRHAGE TYPE: Primary | ICD-10-CM

## 2018-01-22 DIAGNOSIS — K92.1 HEMATOCHEZIA: ICD-10-CM

## 2018-01-22 DIAGNOSIS — Z95.0 CARDIAC PACEMAKER IN SITU: ICD-10-CM

## 2018-01-22 DIAGNOSIS — I48.91 ATRIAL FIBRILLATION, UNSPECIFIED TYPE: ICD-10-CM

## 2018-01-22 LAB
ABO + RH BLD: NORMAL
ALBUMIN SERPL BCP-MCNC: 3.7 G/DL
ALP SERPL-CCNC: 57 U/L
ALT SERPL W/O P-5'-P-CCNC: 22 U/L
ANION GAP SERPL CALC-SCNC: 8 MMOL/L
APTT BLDCRRT: 36.5 SEC
AST SERPL-CCNC: 32 U/L
BACTERIA #/AREA URNS HPF: ABNORMAL /HPF
BASOPHILS # BLD AUTO: 0.04 K/UL
BASOPHILS # BLD AUTO: 0.04 K/UL
BASOPHILS NFR BLD: 0.7 %
BASOPHILS NFR BLD: 0.7 %
BILIRUB SERPL-MCNC: 0.4 MG/DL
BILIRUB UR QL STRIP: NEGATIVE
BLD GP AB SCN CELLS X3 SERPL QL: NORMAL
BUN SERPL-MCNC: 23 MG/DL
CALCIUM SERPL-MCNC: 8.5 MG/DL
CHLORIDE SERPL-SCNC: 104 MMOL/L
CK SERPL-CCNC: 52 U/L
CLARITY UR: CLEAR
CO2 SERPL-SCNC: 31 MMOL/L
COLOR UR: YELLOW
CREAT SERPL-MCNC: 0.9 MG/DL
DIFFERENTIAL METHOD: ABNORMAL
DIFFERENTIAL METHOD: ABNORMAL
EOSINOPHIL # BLD AUTO: 0.1 K/UL
EOSINOPHIL # BLD AUTO: 0.1 K/UL
EOSINOPHIL NFR BLD: 1.7 %
EOSINOPHIL NFR BLD: 1.9 %
ERYTHROCYTE [DISTWIDTH] IN BLOOD BY AUTOMATED COUNT: 18.9 %
ERYTHROCYTE [DISTWIDTH] IN BLOOD BY AUTOMATED COUNT: 19.1 %
EST. GFR  (AFRICAN AMERICAN): >60 ML/MIN/1.73 M^2
EST. GFR  (NON AFRICAN AMERICAN): 58 ML/MIN/1.73 M^2
GLUCOSE SERPL-MCNC: 87 MG/DL
GLUCOSE UR QL STRIP: NEGATIVE
HCT VFR BLD AUTO: 33.5 %
HCT VFR BLD AUTO: 34 %
HGB BLD-MCNC: 10.1 G/DL
HGB BLD-MCNC: 10.3 G/DL
HGB UR QL STRIP: ABNORMAL
INR PPP: 3.3
IRON SERPL-MCNC: 51 UG/DL
KETONES UR QL STRIP: NEGATIVE
LEUKOCYTE ESTERASE UR QL STRIP: ABNORMAL
LYMPHOCYTES # BLD AUTO: 1.9 K/UL
LYMPHOCYTES # BLD AUTO: 2 K/UL
LYMPHOCYTES NFR BLD: 32.5 %
LYMPHOCYTES NFR BLD: 34 %
MAGNESIUM SERPL-MCNC: 2.1 MG/DL
MCH RBC QN AUTO: 27.2 PG
MCH RBC QN AUTO: 27.4 PG
MCHC RBC AUTO-ENTMCNC: 30.1 G/DL
MCHC RBC AUTO-ENTMCNC: 30.3 G/DL
MCV RBC AUTO: 90 FL
MCV RBC AUTO: 90 FL
MICROSCOPIC COMMENT: ABNORMAL
MONOCYTES # BLD AUTO: 0.5 K/UL
MONOCYTES # BLD AUTO: 0.6 K/UL
MONOCYTES NFR BLD: 8.7 %
MONOCYTES NFR BLD: 9.8 %
NEUTROPHILS # BLD AUTO: 3.2 K/UL
NEUTROPHILS # BLD AUTO: 3.3 K/UL
NEUTROPHILS NFR BLD: 54.9 %
NEUTROPHILS NFR BLD: 55.1 %
NITRITE UR QL STRIP: NEGATIVE
OB PNL STL: POSITIVE
PH UR STRIP: 6 [PH] (ref 5–8)
PLATELET # BLD AUTO: 166 K/UL
PLATELET # BLD AUTO: 168 K/UL
PMV BLD AUTO: 9.8 FL
PMV BLD AUTO: 9.8 FL
POTASSIUM SERPL-SCNC: 4.6 MMOL/L
PROT SERPL-MCNC: 6.3 G/DL
PROT UR QL STRIP: NEGATIVE
PROTHROMBIN TIME: 32.8 SEC
RBC # BLD AUTO: 3.72 M/UL
RBC # BLD AUTO: 3.76 M/UL
RBC #/AREA URNS HPF: 2 /HPF (ref 0–4)
SATURATED IRON: 12 %
SODIUM SERPL-SCNC: 143 MMOL/L
SP GR UR STRIP: 1.01 (ref 1–1.03)
SQUAMOUS #/AREA URNS HPF: 5 /HPF
TOTAL IRON BINDING CAPACITY: 414 UG/DL
TRANSFERRIN SERPL-MCNC: 280 MG/DL
TROPONIN I SERPL DL<=0.01 NG/ML-MCNC: 0.05 NG/ML
URN SPEC COLLECT METH UR: ABNORMAL
UROBILINOGEN UR STRIP-ACNC: NEGATIVE EU/DL
WBC # BLD AUTO: 5.73 K/UL
WBC # BLD AUTO: 5.97 K/UL
WBC #/AREA URNS HPF: 15 /HPF (ref 0–5)
YEAST URNS QL MICRO: ABNORMAL

## 2018-01-22 PROCEDURE — 82607 VITAMIN B-12: CPT

## 2018-01-22 PROCEDURE — 93010 ELECTROCARDIOGRAM REPORT: CPT | Mod: ,,, | Performed by: INTERNAL MEDICINE

## 2018-01-22 PROCEDURE — 81000 URINALYSIS NONAUTO W/SCOPE: CPT

## 2018-01-22 PROCEDURE — 86850 RBC ANTIBODY SCREEN: CPT

## 2018-01-22 PROCEDURE — 93296 REM INTERROG EVL PM/IDS: CPT

## 2018-01-22 PROCEDURE — 85730 THROMBOPLASTIN TIME PARTIAL: CPT

## 2018-01-22 PROCEDURE — 21400001 HC TELEMETRY ROOM

## 2018-01-22 PROCEDURE — 93294 REM INTERROG EVL PM/LDLS PM: CPT | Mod: ,,, | Performed by: INTERNAL MEDICINE

## 2018-01-22 PROCEDURE — 93005 ELECTROCARDIOGRAM TRACING: CPT

## 2018-01-22 PROCEDURE — 25000003 PHARM REV CODE 250: Performed by: NURSE PRACTITIONER

## 2018-01-22 PROCEDURE — 82550 ASSAY OF CK (CPK): CPT

## 2018-01-22 PROCEDURE — 80053 COMPREHEN METABOLIC PANEL: CPT

## 2018-01-22 PROCEDURE — 85610 PROTHROMBIN TIME: CPT

## 2018-01-22 PROCEDURE — 83735 ASSAY OF MAGNESIUM: CPT

## 2018-01-22 PROCEDURE — 85025 COMPLETE CBC W/AUTO DIFF WBC: CPT

## 2018-01-22 PROCEDURE — 99285 EMERGENCY DEPT VISIT HI MDM: CPT | Mod: 25

## 2018-01-22 PROCEDURE — 83540 ASSAY OF IRON: CPT

## 2018-01-22 PROCEDURE — 82746 ASSAY OF FOLIC ACID SERUM: CPT

## 2018-01-22 PROCEDURE — 36415 COLL VENOUS BLD VENIPUNCTURE: CPT

## 2018-01-22 PROCEDURE — 82728 ASSAY OF FERRITIN: CPT

## 2018-01-22 PROCEDURE — 82272 OCCULT BLD FECES 1-3 TESTS: CPT

## 2018-01-22 PROCEDURE — 84484 ASSAY OF TROPONIN QUANT: CPT

## 2018-01-22 RX ORDER — FUROSEMIDE 40 MG/1
40 TABLET ORAL DAILY
Status: DISCONTINUED | OUTPATIENT
Start: 2018-01-23 | End: 2018-01-25 | Stop reason: HOSPADM

## 2018-01-22 RX ORDER — GABAPENTIN 100 MG/1
100 CAPSULE ORAL 2 TIMES DAILY
Status: DISCONTINUED | OUTPATIENT
Start: 2018-01-22 | End: 2018-01-25 | Stop reason: HOSPADM

## 2018-01-22 RX ORDER — CARVEDILOL 12.5 MG/1
25 TABLET ORAL 2 TIMES DAILY WITH MEALS
Status: DISCONTINUED | OUTPATIENT
Start: 2018-01-22 | End: 2018-01-25 | Stop reason: HOSPADM

## 2018-01-22 RX ORDER — ASPIRIN 81 MG/1
81 TABLET ORAL DAILY
Status: DISCONTINUED | OUTPATIENT
Start: 2018-01-23 | End: 2018-01-25 | Stop reason: HOSPADM

## 2018-01-22 RX ORDER — VALSARTAN 80 MG/1
160 TABLET ORAL 2 TIMES DAILY
Status: DISCONTINUED | OUTPATIENT
Start: 2018-01-22 | End: 2018-01-25 | Stop reason: HOSPADM

## 2018-01-22 RX ORDER — CARVEDILOL 25 MG/1
25 TABLET ORAL 2 TIMES DAILY WITH MEALS
Qty: 180 TABLET | Refills: 3 | Status: SHIPPED | OUTPATIENT
Start: 2018-01-22 | End: 2019-02-21 | Stop reason: SDUPTHER

## 2018-01-22 RX ORDER — AMIODARONE HYDROCHLORIDE 200 MG/1
200 TABLET ORAL DAILY
Status: DISCONTINUED | OUTPATIENT
Start: 2018-01-23 | End: 2018-01-25 | Stop reason: HOSPADM

## 2018-01-22 RX ORDER — DIGOXIN 125 MCG
125 TABLET ORAL DAILY
Status: DISCONTINUED | OUTPATIENT
Start: 2018-01-23 | End: 2018-01-25 | Stop reason: HOSPADM

## 2018-01-22 RX ORDER — DIGOXIN 125 MCG
125 TABLET ORAL DAILY
Qty: 90 TABLET | Refills: 3 | Status: SHIPPED | OUTPATIENT
Start: 2018-01-22 | End: 2019-04-22 | Stop reason: SDUPTHER

## 2018-01-22 RX ADMIN — CARVEDILOL 25 MG: 12.5 TABLET, FILM COATED ORAL at 08:01

## 2018-01-22 RX ADMIN — GABAPENTIN 100 MG: 100 CAPSULE ORAL at 08:01

## 2018-01-22 RX ADMIN — VALSARTAN 160 MG: 80 TABLET, FILM COATED ORAL at 08:01

## 2018-01-22 NOTE — ED PROVIDER NOTES
"SCRIBE #1 NOTE: I, Noam Chong, am scribing for, and in the presence of, Gina Queen MD. I have scribed the entire note.      History      Chief Complaint   Patient presents with    Rectal Bleeding     Pt states, "I am bleeding from my rectum and I am on Coumadin."       Review of patient's allergies indicates:   Allergen Reactions    Cephalosporins Hives    Metaxalone Itching    Penicillins      Other reaction(s): Unknown      Pregabalin      Other reaction(s): "Bad feeling"      Sulfa (sulfonamide antibiotics) Itching        HPI   HPI    1/22/2018, 1:12 PM   History obtained from the patient and daughter      History of Present Illness: Jennifer Mathews is a 85 y.o. female patient with a PMHx of AFIB, who presents to the Emergency Department for blood in stool which onset suddenly this morning. Sxs are constant and moderate in severity. Pt reports bright red blood in stool after waking this morning. There are no mitigating or exacerbating factors noted. Associated sxs include ABD cramps.  Pt denies any fever, N/V/D, hematuria, vaginal bleeding, HA, numbness, CP, SOB, and all other sxs at this time. Pt is on coumadin. No further complaints or concerns at this time.       Arrival mode: Personal vehicle     PCP: Desirae Bello MD       Past Medical History:  Past Medical History:   Diagnosis Date    Acute coronary syndrome     Anemia     Anticoagulated on Coumadin 7/13/2015    Arthritis     Atrial fibrillation     Basal cell carcinoma 10/2015    left neck    Cardiac arrest     Cardiac resynchronization therapy defibrillator (CRT-D) in place 07/13/2015    Pt denies, states it does not shock me    Cardiac resynchronization therapy defibrillator (CRT-D) in place 7/13/2015    Cardiomyopathy 1/30/2014    CHF (congestive heart failure)     Chronic combined systolic and diastolic congestive heart failure     CKD (chronic kidney disease) stage 3, GFR 30-59 ml/min     Dyslipidemia " 1/30/2014    Edema     Heart disease     Hypertension     Ischemic cardiomyopathy 7/13/2015    Macular hole of left eye     Old    Macular hole of left eye     Myocardial infarction     LEV (obstructive sleep apnea) 9/30/2013    Recurrent UTI     Refractive error     S/P MVR (mitral valve replacement) 1/30/2014    S/P placement of cardiac pacemaker 1/30/2014    Skin cancer     Sleep apnea     Stroke        Past Surgical History:  Past Surgical History:   Procedure Laterality Date    APPENDECTOMY      CARDIAC PACEMAKER PLACEMENT      CARDIAC VALVE SURGERY      CATARACT EXTRACTION      OU    CHOLECYSTECTOMY      COLONOSCOPY      MITRAL VALVE SURGERY      x3         Family History:  Family History   Problem Relation Age of Onset    Coronary artery disease Mother      mi    Coronary artery disease Father     Diabetes Brother     Cancer Brother     Melanoma Neg Hx     Psoriasis Neg Hx     Lupus Neg Hx     Eczema Neg Hx        Social History:  Social History     Social History Main Topics    Smoking status: Never Smoker    Smokeless tobacco: Never Used    Alcohol use No    Drug use: No    Sexual activity: unknown       ROS   Review of Systems   Constitutional: Negative for fever.   HENT: Negative for sore throat.    Respiratory: Negative for shortness of breath.    Cardiovascular: Negative for chest pain.   Gastrointestinal: Positive for abdominal pain (cramps), anal bleeding and blood in stool. Negative for constipation, diarrhea, nausea and vomiting.   Genitourinary: Negative for dysuria.   Musculoskeletal: Negative for back pain.   Skin: Negative for rash.   Neurological: Negative for weakness.   Hematological: Does not bruise/bleed easily.       Physical Exam      Initial Vitals [01/22/18 0934]   BP Pulse Resp Temp SpO2   (!) 139/58 70 20 98.6 °F (37 °C) (!) 94 %      MAP       85          Physical Exam  Nursing Notes and Vital Signs Reviewed.  Constitutional: Patient is in no acute  "distress. Well-developed and well-nourished.  Head: Atraumatic. Normocephalic.  Eyes: PERRL. EOM intact. Conjunctivae are not pale. No scleral icterus.  ENT: Mucous membranes are moist. Oropharynx is clear and symmetric.    Neck: Supple. Full ROM. No lymphadenopathy.  Cardiovascular: Regular rate. Regular rhythm. No murmurs, rubs, or gallops. Distal pulses are 2+ and symmetric.  Pulmonary/Chest: No respiratory distress. Clear to auscultation bilaterally. No wheezing or rales.  Abdominal: Soft and non-distended.  There is no tenderness.  No rebound, guarding, or rigidity. Good bowel sounds.  Genitourinary: No CVA tenderness  Musculoskeletal: Moves all extremities. No obvious deformities. No edema. No calf tenderness.  Rectal:  No tenderness.  No masses.  No hemorrhoids.  Normal sphincter tone.  Stool color is brown with gross blood.  Skin: Warm and dry.  Neurological:  Alert, awake, and appropriate.  Normal speech.  No acute focal neurological deficits are appreciated.  Psychiatric: Normal affect. Good eye contact. Appropriate in content.    ED Course    Procedures  ED Vital Signs:  Vitals:    01/22/18 0934 01/22/18 1312 01/22/18 1317 01/22/18 1320   BP: (!) 139/58  (!) 165/70    Pulse: 70 79 69 69   Resp: 20  16    Temp: 98.6 °F (37 °C)      TempSrc: Oral      SpO2: (!) 94%  (!) 94%    Weight: 52.2 kg (115 lb)      Height: 5' 2" (1.575 m)          Abnormal Lab Results:  Labs Reviewed   CBC W/ AUTO DIFFERENTIAL - Abnormal; Notable for the following:        Result Value    RBC 3.76 (*)     Hemoglobin 10.3 (*)     Hematocrit 34.0 (*)     MCHC 30.3 (*)     RDW 18.9 (*)     All other components within normal limits   COMPREHENSIVE METABOLIC PANEL - Abnormal; Notable for the following:     CO2 31 (*)     Calcium 8.5 (*)     eGFR if non  58 (*)     All other components within normal limits   TROPONIN I - Abnormal; Notable for the following:     Troponin I 0.053 (*)     All other components within normal " limits   APTT - Abnormal; Notable for the following:     aPTT 36.5 (*)     All other components within normal limits   PROTIME-INR - Abnormal; Notable for the following:     Prothrombin Time 32.8 (*)     INR 3.3 (*)     All other components within normal limits   URINALYSIS - Abnormal; Notable for the following:     Occult Blood UA 1+ (*)     Leukocytes, UA 2+ (*)     All other components within normal limits   OCCULT BLOOD X 1, STOOL - Abnormal; Notable for the following:     Occult Blood Positive (*)     All other components within normal limits   URINALYSIS MICROSCOPIC - Abnormal; Notable for the following:     WBC, UA 15 (*)     Bacteria, UA Many (*)     All other components within normal limits   CBC W/ AUTO DIFFERENTIAL - Abnormal; Notable for the following:     RBC 3.72 (*)     Hemoglobin 10.1 (*)     Hematocrit 33.5 (*)     MCHC 30.1 (*)     RDW 19.1 (*)     All other components within normal limits   MAGNESIUM   CK   VITAMIN B12   FOLATE   IRON AND TIBC   FERRITIN   TYPE & SCREEN        All Lab Results:  Results for orders placed or performed during the hospital encounter of 01/22/18   CBC auto differential   Result Value Ref Range    WBC 5.97 3.90 - 12.70 K/uL    RBC 3.76 (L) 4.00 - 5.40 M/uL    Hemoglobin 10.3 (L) 12.0 - 16.0 g/dL    Hematocrit 34.0 (L) 37.0 - 48.5 %    MCV 90 82 - 98 fL    MCH 27.4 27.0 - 31.0 pg    MCHC 30.3 (L) 32.0 - 36.0 g/dL    RDW 18.9 (H) 11.5 - 14.5 %    Platelets 166 150 - 350 K/uL    MPV 9.8 9.2 - 12.9 fL    Gran # 3.3 1.8 - 7.7 K/uL    Lymph # 2.0 1.0 - 4.8 K/uL    Mono # 0.5 0.3 - 1.0 K/uL    Eos # 0.1 0.0 - 0.5 K/uL    Baso # 0.04 0.00 - 0.20 K/uL    Gran% 54.9 38.0 - 73.0 %    Lymph% 34.0 18.0 - 48.0 %    Mono% 8.7 4.0 - 15.0 %    Eosinophil% 1.7 0.0 - 8.0 %    Basophil% 0.7 0.0 - 1.9 %    Differential Method Automated    Comprehensive metabolic panel   Result Value Ref Range    Sodium 143 136 - 145 mmol/L    Potassium 4.6 3.5 - 5.1 mmol/L    Chloride 104 95 - 110 mmol/L     CO2 31 (H) 23 - 29 mmol/L    Glucose 87 70 - 110 mg/dL    BUN, Bld 23 8 - 23 mg/dL    Creatinine 0.9 0.5 - 1.4 mg/dL    Calcium 8.5 (L) 8.7 - 10.5 mg/dL    Total Protein 6.3 6.0 - 8.4 g/dL    Albumin 3.7 3.5 - 5.2 g/dL    Total Bilirubin 0.4 0.1 - 1.0 mg/dL    Alkaline Phosphatase 57 55 - 135 U/L    AST 32 10 - 40 U/L    ALT 22 10 - 44 U/L    Anion Gap 8 8 - 16 mmol/L    eGFR if African American >60 >60 mL/min/1.73 m^2    eGFR if non African American 58 (A) >60 mL/min/1.73 m^2   Magnesium   Result Value Ref Range    Magnesium 2.1 1.6 - 2.6 mg/dL   CK   Result Value Ref Range    CPK 52 20 - 180 U/L   Troponin I   Result Value Ref Range    Troponin I 0.053 (H) 0.000 - 0.026 ng/mL   APTT   Result Value Ref Range    aPTT 36.5 (H) 21.0 - 32.0 sec   Protime-INR   Result Value Ref Range    Prothrombin Time 32.8 (H) 9.0 - 12.5 sec    INR 3.3 (H) 0.8 - 1.2   Urinalysis   Result Value Ref Range    Specimen UA Urine, Clean Catch     Color, UA Yellow Yellow, Straw, Sarah    Appearance, UA Clear Clear    pH, UA 6.0 5.0 - 8.0    Specific Gravity, UA 1.010 1.005 - 1.030    Protein, UA Negative Negative    Glucose, UA Negative Negative    Ketones, UA Negative Negative    Bilirubin (UA) Negative Negative    Occult Blood UA 1+ (A) Negative    Nitrite, UA Negative Negative    Urobilinogen, UA Negative <2.0 EU/dL    Leukocytes, UA 2+ (A) Negative   Occult blood x 1, stool   Result Value Ref Range    Occult Blood Positive (A) Negative   Urinalysis Microscopic   Result Value Ref Range    RBC, UA 2 0 - 4 /hpf    WBC, UA 15 (H) 0 - 5 /hpf    Bacteria, UA Many (A) None-Occ /hpf    Yeast, UA None None    Squam Epithel, UA 5 /hpf    Microscopic Comment SEE COMMENT    CBC auto differential   Result Value Ref Range    WBC 5.73 3.90 - 12.70 K/uL    RBC 3.72 (L) 4.00 - 5.40 M/uL    Hemoglobin 10.1 (L) 12.0 - 16.0 g/dL    Hematocrit 33.5 (L) 37.0 - 48.5 %    MCV 90 82 - 98 fL    MCH 27.2 27.0 - 31.0 pg    MCHC 30.1 (L) 32.0 - 36.0 g/dL    RDW 19.1  (H) 11.5 - 14.5 %    Platelets 168 150 - 350 K/uL    MPV 9.8 9.2 - 12.9 fL    Gran # 3.2 1.8 - 7.7 K/uL    Lymph # 1.9 1.0 - 4.8 K/uL    Mono # 0.6 0.3 - 1.0 K/uL    Eos # 0.1 0.0 - 0.5 K/uL    Baso # 0.04 0.00 - 0.20 K/uL    Gran% 55.1 38.0 - 73.0 %    Lymph% 32.5 18.0 - 48.0 %    Mono% 9.8 4.0 - 15.0 %    Eosinophil% 1.9 0.0 - 8.0 %    Basophil% 0.7 0.0 - 1.9 %    Differential Method Automated    Type & Screen   Result Value Ref Range    Group & Rh O POS     Indirect Bibi NEG          Imaging Results:  Imaging Results          X-Ray Chest 1 View (Final result)  Result time 01/22/18 13:32:43    Final result by Bartolome Oh MD (01/22/18 13:32:43)                 Impression:     No adverse interval change      Electronically signed by: BARTOLOME OH MD  Date:     01/22/18  Time:    13:32              Narrative:    History: Bleeding    Comparison: 12/28/17    Result: Single view of the chest.      Cardiomegaly and left-sided pacing device noted in similar configuration. Scarring or small left-sided effusion. Mild atelectasis at the left lung base. No pneumothorax. Bones demonstrate degenerative changes. In comparison to the prior study, there is no adverse interval changes.                                      The EKG was ordered, reviewed, and independently interpreted by the ED provider.  Interpretation time: 15:13  Rate: 70 BPM  Rhythm: 1st degree AV block  Interpretation: LAD. LVH. No STEMI.      The Emergency Provider reviewed the vital signs and test results, which are outlined above.    ED Discussion     2:56 PM: Re-evaluated pt. I have discussed test results, shared treatment plan, and the need for admission with patient and family at bedside. Pt and family express understanding at this time and agree with all information. All questions answered. Pt and family have no further questions or concerns at this time. Pt is ready for admit.    2:57 PM: Discussed case with Serenity Pickett NP ([facility]).  Serenity agrees with current care and management of pt and accepts admission.   Admitting Service: Hospital medicine   Admitting Physician: Dr. Nguyễn  Admit to: Telemetry    ED Medication(s):  Medications   carvedilol tablet 25 mg (not administered)   digoxin tablet 125 mcg (not administered)   gabapentin capsule 100 mg (not administered)   amiodarone tablet 200 mg (not administered)   furosemide tablet 40 mg (not administered)   valsartan tablet 160 mg (not administered)   aspirin EC tablet 81 mg (not administered)       New Prescriptions    No medications on file             Medical Decision Making    Medical Decision Making:   Clinical Tests:   Lab Tests: Ordered and Reviewed  Radiological Study: Reviewed and Ordered  Medical Tests: Ordered and Reviewed           Scribe Attestation:   Scribe #1: I performed the above scribed service and the documentation accurately describes the services I performed. I attest to the accuracy of the note.    Attending:   Physician Attestation Statement for Scribe #1: I, Gina Queen MD, personally performed the services described in this documentation, as scribed by Noam Chong, in my presence, and it is both accurate and complete.          Clinical Impression       ICD-10-CM ICD-9-CM   1. Gastrointestinal hemorrhage, unspecified gastrointestinal hemorrhage type K92.2 578.9   2. Paroxysmal atrial fibrillation I48.0 427.31   3. Acute coronary syndrome I24.9 411.1       Disposition:   Disposition: Admitted  Condition: Fair         Gina Queen MD  02/12/18 1901

## 2018-01-22 NOTE — HPI
Jennifer Mathews is an 84 yo female with a PMH of mitral valve replacement x 3, HTN, PAF, ACS, pacemaker placement, CKD who presented to the ER today with c/o of rectal bleeding that began this morning around 6 am. The patient describes the the blood as bright red blood. The patient reports she has had no further bleeding. The patient denies any associated symptoms. The patient denies any ETOH use, as well as any NSAID use. The patient reports her last colonoscopy was 10 years ago. Patient denies fever, chills, CP, cough, calix, pnd, orthopnea, palpitations, diaphoresis, headache, blurred vision, numbness, tingling, dizziness, localized weakness, n/v/d, abdominal pain, melena, hematemesis, urinary frequency, urgency, dysuria or hematuria. The patient reports that she has had her mitral valve replaced 3 times and is on coumadin. Her PT/INR is 3.3. Her H/H is 10.3/34.0, which is close to her baseline. Will hold tonight's coumadin dose and trend H/H. If the patient has further bleeding overnight will consult GI in the AM.

## 2018-01-23 PROBLEM — K92.1 HEMATOCHEZIA: Status: ACTIVE | Noted: 2018-01-23

## 2018-01-23 PROBLEM — K52.9 ENTEROCOLITIS: Status: ACTIVE | Noted: 2018-01-23

## 2018-01-23 LAB
ALBUMIN SERPL BCP-MCNC: 3.2 G/DL
ALBUMIN SERPL BCP-MCNC: 3.4 G/DL
ALP SERPL-CCNC: 48 U/L
ALP SERPL-CCNC: 53 U/L
ALT SERPL W/O P-5'-P-CCNC: 17 U/L
ALT SERPL W/O P-5'-P-CCNC: 18 U/L
ANION GAP SERPL CALC-SCNC: 7 MMOL/L
ANION GAP SERPL CALC-SCNC: 8 MMOL/L
AST SERPL-CCNC: 25 U/L
AST SERPL-CCNC: 25 U/L
BASOPHILS # BLD AUTO: 0.04 K/UL
BASOPHILS # BLD AUTO: 0.05 K/UL
BASOPHILS NFR BLD: 0.6 %
BASOPHILS NFR BLD: 0.7 %
BILIRUB SERPL-MCNC: 0.4 MG/DL
BILIRUB SERPL-MCNC: 0.4 MG/DL
BUN SERPL-MCNC: 17 MG/DL
BUN SERPL-MCNC: 20 MG/DL
CALCIUM SERPL-MCNC: 8.1 MG/DL
CALCIUM SERPL-MCNC: 8.4 MG/DL
CHLORIDE SERPL-SCNC: 107 MMOL/L
CHLORIDE SERPL-SCNC: 107 MMOL/L
CO2 SERPL-SCNC: 31 MMOL/L
CO2 SERPL-SCNC: 31 MMOL/L
CREAT SERPL-MCNC: 0.8 MG/DL
CREAT SERPL-MCNC: 0.9 MG/DL
CRP SERPL-MCNC: 1.4 MG/L
DIFFERENTIAL METHOD: ABNORMAL
EOSINOPHIL # BLD AUTO: 0.1 K/UL
EOSINOPHIL NFR BLD: 1 %
EOSINOPHIL NFR BLD: 1.1 %
EOSINOPHIL NFR BLD: 1.8 %
EOSINOPHIL NFR BLD: 1.8 %
ERYTHROCYTE [DISTWIDTH] IN BLOOD BY AUTOMATED COUNT: 19.4 %
ERYTHROCYTE [DISTWIDTH] IN BLOOD BY AUTOMATED COUNT: 19.5 %
ERYTHROCYTE [SEDIMENTATION RATE] IN BLOOD BY WESTERGREN METHOD: 10 MM/HR
EST. GFR  (AFRICAN AMERICAN): >60 ML/MIN/1.73 M^2
EST. GFR  (AFRICAN AMERICAN): >60 ML/MIN/1.73 M^2
EST. GFR  (NON AFRICAN AMERICAN): 58 ML/MIN/1.73 M^2
EST. GFR  (NON AFRICAN AMERICAN): >60 ML/MIN/1.73 M^2
FERRITIN SERPL-MCNC: 51 NG/ML
FOLATE SERPL-MCNC: 15.7 NG/ML
GLUCOSE SERPL-MCNC: 162 MG/DL
GLUCOSE SERPL-MCNC: 92 MG/DL
HCT VFR BLD AUTO: 31.7 %
HCT VFR BLD AUTO: 33.2 %
HCT VFR BLD AUTO: 34 %
HCT VFR BLD AUTO: 34 %
HGB BLD-MCNC: 10 G/DL
HGB BLD-MCNC: 10.1 G/DL
HGB BLD-MCNC: 9.4 G/DL
HGB BLD-MCNC: 9.9 G/DL
INR PPP: 2
LYMPHOCYTES # BLD AUTO: 1.7 K/UL
LYMPHOCYTES # BLD AUTO: 1.7 K/UL
LYMPHOCYTES # BLD AUTO: 1.8 K/UL
LYMPHOCYTES # BLD AUTO: 2.2 K/UL
LYMPHOCYTES NFR BLD: 25.6 %
LYMPHOCYTES NFR BLD: 26.3 %
LYMPHOCYTES NFR BLD: 26.9 %
LYMPHOCYTES NFR BLD: 30.5 %
MCH RBC QN AUTO: 27.1 PG
MCH RBC QN AUTO: 27.2 PG
MCH RBC QN AUTO: 27.3 PG
MCH RBC QN AUTO: 27.4 PG
MCHC RBC AUTO-ENTMCNC: 29.4 G/DL
MCHC RBC AUTO-ENTMCNC: 29.7 G/DL
MCHC RBC AUTO-ENTMCNC: 29.7 G/DL
MCHC RBC AUTO-ENTMCNC: 29.8 G/DL
MCV RBC AUTO: 91 FL
MCV RBC AUTO: 92 FL
MCV RBC AUTO: 92 FL
MCV RBC AUTO: 93 FL
MONOCYTES # BLD AUTO: 0.5 K/UL
MONOCYTES # BLD AUTO: 0.7 K/UL
MONOCYTES NFR BLD: 10.6 %
MONOCYTES NFR BLD: 7.4 %
MONOCYTES NFR BLD: 9 %
MONOCYTES NFR BLD: 9 %
NEUTROPHILS # BLD AUTO: 4 K/UL
NEUTROPHILS # BLD AUTO: 4 K/UL
NEUTROPHILS # BLD AUTO: 4.2 K/UL
NEUTROPHILS # BLD AUTO: 4.5 K/UL
NEUTROPHILS NFR BLD: 58 %
NEUTROPHILS NFR BLD: 61.4 %
NEUTROPHILS NFR BLD: 63 %
NEUTROPHILS NFR BLD: 64.1 %
PLATELET # BLD AUTO: 159 K/UL
PLATELET # BLD AUTO: 169 K/UL
PLATELET # BLD AUTO: 169 K/UL
PLATELET # BLD AUTO: 180 K/UL
PMV BLD AUTO: 10.1 FL
PMV BLD AUTO: 9.2 FL
PMV BLD AUTO: 9.6 FL
PMV BLD AUTO: 9.8 FL
POTASSIUM SERPL-SCNC: 4.3 MMOL/L
POTASSIUM SERPL-SCNC: 4.3 MMOL/L
PROT SERPL-MCNC: 5.5 G/DL
PROT SERPL-MCNC: 6 G/DL
PROTHROMBIN TIME: 20.7 SEC
RBC # BLD AUTO: 3.47 M/UL
RBC # BLD AUTO: 3.62 M/UL
RBC # BLD AUTO: 3.67 M/UL
RBC # BLD AUTO: 3.68 M/UL
SODIUM SERPL-SCNC: 145 MMOL/L
SODIUM SERPL-SCNC: 146 MMOL/L
VIT B12 SERPL-MCNC: 560 PG/ML
WBC # BLD AUTO: 6.2 K/UL
WBC # BLD AUTO: 6.5 K/UL
WBC # BLD AUTO: 7.2 K/UL
WBC # BLD AUTO: 7.22 K/UL

## 2018-01-23 PROCEDURE — 94660 CPAP INITIATION&MGMT: CPT

## 2018-01-23 PROCEDURE — 63600175 PHARM REV CODE 636 W HCPCS: Performed by: NURSE PRACTITIONER

## 2018-01-23 PROCEDURE — 87427 SHIGA-LIKE TOXIN AG IA: CPT

## 2018-01-23 PROCEDURE — 99223 1ST HOSP IP/OBS HIGH 75: CPT | Mod: ,,, | Performed by: INTERNAL MEDICINE

## 2018-01-23 PROCEDURE — 89055 LEUKOCYTE ASSESSMENT FECAL: CPT

## 2018-01-23 PROCEDURE — 87209 SMEAR COMPLEX STAIN: CPT

## 2018-01-23 PROCEDURE — 87045 FECES CULTURE AEROBIC BACT: CPT

## 2018-01-23 PROCEDURE — 87449 NOS EACH ORGANISM AG IA: CPT

## 2018-01-23 PROCEDURE — 85651 RBC SED RATE NONAUTOMATED: CPT

## 2018-01-23 PROCEDURE — 21400001 HC TELEMETRY ROOM

## 2018-01-23 PROCEDURE — 99232 SBSQ HOSP IP/OBS MODERATE 35: CPT | Mod: ,,, | Performed by: INTERNAL MEDICINE

## 2018-01-23 PROCEDURE — 25000003 PHARM REV CODE 250: Performed by: NURSE PRACTITIONER

## 2018-01-23 PROCEDURE — 25500020 PHARM REV CODE 255: Performed by: INTERNAL MEDICINE

## 2018-01-23 PROCEDURE — 80053 COMPREHEN METABOLIC PANEL: CPT | Mod: 91

## 2018-01-23 PROCEDURE — 87046 STOOL CULTR AEROBIC BACT EA: CPT | Mod: 59

## 2018-01-23 PROCEDURE — 85025 COMPLETE CBC W/AUTO DIFF WBC: CPT | Mod: 91

## 2018-01-23 PROCEDURE — 86140 C-REACTIVE PROTEIN: CPT

## 2018-01-23 PROCEDURE — 85610 PROTHROMBIN TIME: CPT

## 2018-01-23 PROCEDURE — 27000190 HC CPAP FULL FACE MASK W/VALVE

## 2018-01-23 PROCEDURE — 36415 COLL VENOUS BLD VENIPUNCTURE: CPT

## 2018-01-23 PROCEDURE — 80053 COMPREHEN METABOLIC PANEL: CPT

## 2018-01-23 RX ORDER — NITROFURANTOIN 25; 75 MG/1; MG/1
100 CAPSULE ORAL EVERY 12 HOURS
Status: DISCONTINUED | OUTPATIENT
Start: 2018-01-23 | End: 2018-01-23

## 2018-01-23 RX ADMIN — GABAPENTIN 100 MG: 100 CAPSULE ORAL at 08:01

## 2018-01-23 RX ADMIN — IOHEXOL 100 ML: 350 INJECTION, SOLUTION INTRAVENOUS at 04:01

## 2018-01-23 RX ADMIN — DIGOXIN 125 MCG: 125 TABLET ORAL at 09:01

## 2018-01-23 RX ADMIN — ASPIRIN 81 MG: 81 TABLET, COATED ORAL at 09:01

## 2018-01-23 RX ADMIN — VALSARTAN 160 MG: 80 TABLET, FILM COATED ORAL at 09:01

## 2018-01-23 RX ADMIN — SODIUM CHLORIDE 1 G: 9 INJECTION, SOLUTION INTRAVENOUS at 06:01

## 2018-01-23 RX ADMIN — CARVEDILOL 25 MG: 12.5 TABLET, FILM COATED ORAL at 09:01

## 2018-01-23 RX ADMIN — GABAPENTIN 100 MG: 100 CAPSULE ORAL at 09:01

## 2018-01-23 RX ADMIN — CARVEDILOL 25 MG: 12.5 TABLET, FILM COATED ORAL at 05:01

## 2018-01-23 RX ADMIN — AMIODARONE HYDROCHLORIDE 200 MG: 200 TABLET ORAL at 09:01

## 2018-01-23 RX ADMIN — FUROSEMIDE 40 MG: 40 TABLET ORAL at 09:01

## 2018-01-23 NOTE — ASSESSMENT & PLAN NOTE
The patient has a porcine valve  On coumadin with INR 3.3  Will hold Coumadin tonight 2/2 GI bleed   Check INR in am

## 2018-01-23 NOTE — ASSESSMENT & PLAN NOTE
84 yo female with hematochezia.   Report of bloody diarrhea frequency and amount doesn't match with current clinical picture and labs.   We will repeat labs. Include CRP and ESR.   CTA to assess for source of bleeding.   Stool studies to rule out infection, specifically C. Diff colitis since she had a recent course of antibiotics.   Continue clear liquids for possible Colonoscopy. The timing will depend on the above along with INR - would prefer around 1.5.

## 2018-01-23 NOTE — SUBJECTIVE & OBJECTIVE
"Past Medical History:   Diagnosis Date    Acute coronary syndrome     Anemia     Anticoagulated on Coumadin 7/13/2015    Arthritis     Atrial fibrillation     Basal cell carcinoma 10/2015    left neck    Cardiac arrest     Cardiac resynchronization therapy defibrillator (CRT-D) in place 07/13/2015    Pt denies, states it does not shock me    Cardiac resynchronization therapy defibrillator (CRT-D) in place 7/13/2015    Cardiomyopathy 1/30/2014    CHF (congestive heart failure)     Chronic combined systolic and diastolic congestive heart failure     CKD (chronic kidney disease) stage 3, GFR 30-59 ml/min     Dyslipidemia 1/30/2014    Edema     Heart disease     Hypertension     Ischemic cardiomyopathy 7/13/2015    Macular hole of left eye     Old    Macular hole of left eye     Myocardial infarction     LEV (obstructive sleep apnea) 9/30/2013    Recurrent UTI     Refractive error     S/P MVR (mitral valve replacement) 1/30/2014    S/P placement of cardiac pacemaker 1/30/2014    Skin cancer     Sleep apnea     Stroke        Past Surgical History:   Procedure Laterality Date    APPENDECTOMY      CARDIAC PACEMAKER PLACEMENT      CARDIAC VALVE SURGERY      CATARACT EXTRACTION      OU    CHOLECYSTECTOMY      COLONOSCOPY      MITRAL VALVE SURGERY      x3       Review of patient's allergies indicates:   Allergen Reactions    Cephalosporins Hives    Metaxalone Itching    Penicillins      Other reaction(s): Unknown      Pregabalin      Other reaction(s): "Bad feeling"      Sulfa (sulfonamide antibiotics) Itching     Family History     Problem Relation (Age of Onset)    Cancer Brother    Coronary artery disease Mother, Father    Diabetes Brother        Social History Main Topics    Smoking status: Never Smoker    Smokeless tobacco: Never Used    Alcohol use No    Drug use: No    Sexual activity: Not on file     Review of Systems   Constitutional: Negative for fever.   HENT: Negative " for hearing loss.    Eyes: Negative for visual disturbance.   Respiratory: Negative for cough and shortness of breath.    Cardiovascular: Negative for chest pain.   Gastrointestinal:        As per HPI.   Genitourinary: Negative for dysuria, frequency and hematuria.   Musculoskeletal: Negative for arthralgias and back pain.   Skin: Negative for rash.   Neurological: Negative for seizures, syncope, numbness and headaches.   Hematological: Does not bruise/bleed easily.   Psychiatric/Behavioral: The patient is not nervous/anxious.      Objective:     Vital Signs (Most Recent):  Temp: 98.3 °F (36.8 °C) (01/23/18 1145)  Pulse: 70 (01/23/18 1145)  Resp: 18 (01/23/18 1145)  BP: (!) 164/70 (01/23/18 1145)  SpO2: 95 % (01/23/18 1145) Vital Signs (24h Range):  Temp:  [98 °F (36.7 °C)-98.6 °F (37 °C)] 98.3 °F (36.8 °C)  Pulse:  [69-79] 70  Resp:  [16-18] 18  SpO2:  [92 %-96 %] 95 %  BP: (141-167)/(66-74) 164/70     Weight: 52.3 kg (115 lb 4.8 oz) (01/23/18 0312)  Body mass index is 21.09 kg/m².      Intake/Output Summary (Last 24 hours) at 01/23/18 1301  Last data filed at 01/23/18 0900   Gross per 24 hour   Intake              240 ml   Output              550 ml   Net             -310 ml       Lines/Drains/Airways     Peripheral Intravenous Line                 Peripheral IV - Single Lumen 01/22/18 1358 Right Forearm less than 1 day                Physical Exam   Constitutional: She is oriented to person, place, and time. She appears well-developed and well-nourished.   HENT:   Head: Normocephalic and atraumatic.   Eyes: EOM are normal.   Neck: Normal range of motion. Neck supple. Carotid bruit is not present.   Cardiovascular: Normal rate and regular rhythm.    No murmur heard.  Pulmonary/Chest: Effort normal and breath sounds normal. No respiratory distress. She has no wheezes.   Abdominal: Soft. Normal appearance and bowel sounds are normal. She exhibits no distension and no mass. There is no tenderness.   Musculoskeletal:  She exhibits no edema.   Neurological: She is alert and oriented to person, place, and time. No cranial nerve deficit.   Skin: Skin is warm and dry. No rash noted.   Psychiatric: Her behavior is normal.       Significant Labs:  CBC:   Recent Labs  Lab 01/22/18  1816 01/22/18  2359 01/23/18  0800   WBC 5.73 7.22 7.20   HGB 10.1* 9.4* 9.9*   HCT 33.5* 31.7* 33.2*    169 180     CMP:   Recent Labs  Lab 01/23/18  0458   GLU 92   CALCIUM 8.1*   ALBUMIN 3.2*   PROT 5.5*      K 4.3   CO2 31*      BUN 20   CREATININE 0.8   ALKPHOS 48*   ALT 17   AST 25   BILITOT 0.4     Coagulation:   Recent Labs  Lab 01/22/18  1358 01/23/18  0458   INR 3.3* 2.0*   APTT 36.5*  --        Significant Imaging:  Imaging results within the past 24 hours have been reviewed.

## 2018-01-23 NOTE — ASSESSMENT & PLAN NOTE
-Continue home medications  -Ok to hold Coumadin as patient has bioprosthetic valve  -Continue ASA

## 2018-01-23 NOTE — HPI
Ms. Mathews is an 85 year old female patient with a PMHx of cardiomyopathy (EF=35%), combined CHF, atrial flutter s/p ablation, s/p MVR x 3 (bioprosthetic) and PAF (on Coumadin) who presented to [facility] ED today with a chief complaint of rectal bleeding that began after a BM around 6 AM. Associated symptoms included some mild abdominal cramping. Patient denied any associated fever, chills, nausea, or vomiting. Stated blood appeared mostly bright red. Initial workup in ED revealed mild anemia (10.3, 34.0) and patient subsequently admitted for further evaluation and treatment. Cardiology consulted to assist with management since patient is Warfarin. Patient seen and examined today, resting in bed. States she feels well. Reports bloody diarrhea since admittance, did recently complete a course of abx. No cardiac complaints. GI on board and workup in process. Chart reviewed. Repeat H and H stable. 2D echo in 12/16 showed EF of 35-40%, DD, pulmonary HTN.     Serial EKG's and PPM interrogation reports reviewed-no apparent episodes of atrial fibrillation in last 7 years.

## 2018-01-23 NOTE — PLAN OF CARE
Met with pt and her daughter, Stephy Do, to complete discharge planning assessment. Pt lives alone, but her daughter lives across the street. She has very good family support and does not anticipate any discharge needs at this time.  Report given to  Mack Estrada LCSW.     01/23/18 5830   Discharge Assessment   Assessment Type Discharge Planning Assessment   Confirmed/corrected address and phone number on facesheet? Yes   Assessment information obtained from? Patient;Caregiver   Prior to hospitilization cognitive status: Alert/Oriented   Prior to hospitalization functional status: Independent   Current cognitive status: Alert/Oriented   Current Functional Status: Independent   Lives With alone   Able to Return to Prior Arrangements yes   Is patient able to care for self after discharge? Yes   Who are your caregiver(s) and their phone number(s)? Stephy Do, dtr [phone] (home) [phone] (cell)   Equipment Currently Used at Home CPAP;bedside commode;rollator;bath bench;cane, straight   Do you have any problems affording any of your prescribed medications? No   Does the patient have transportation home? Yes   Transportation Available family or friend will provide;car   Discharge Plan A Home   Patient/Family In Agreement With Plan yes

## 2018-01-23 NOTE — ASSESSMENT & PLAN NOTE
-No recent documented episodes of afib  -Discussed risks and benefits of anticoagulation in detail with patient and her daughter. Since she has no had any arrhythmias in recent years, will hold Coumadin for time being and continue ASA 81 mg daily  -Will arrange for EP f/u as OP for further rec's regarding anticoagulation

## 2018-01-23 NOTE — ASSESSMENT & PLAN NOTE
No further bleeding since admission   CBC every 8 hours  Will transfuse if indicated  Hold coumadin overnight   Will consult GI in am if patient has further bleeding overnight   If no further bleeding overnight and H/H stable, PT can likely be discharged tomorrow with GI follow up

## 2018-01-23 NOTE — PLAN OF CARE
Problem: Patient Care Overview  Goal: Plan of Care Review  Outcome: Ongoing (interventions implemented as appropriate)  The patient has been sinus rhythm on the monitor. Pt NPO at midnight. Pt has had an uneventful night and is resting quietly. Will continue to monitor.

## 2018-01-23 NOTE — SUBJECTIVE & OBJECTIVE
"Past Medical History:   Diagnosis Date    Acute coronary syndrome     Anemia     Anticoagulated on Coumadin 7/13/2015    Arthritis     Atrial fibrillation     Basal cell carcinoma 10/2015    left neck    Cardiac arrest     Cardiac resynchronization therapy defibrillator (CRT-D) in place 07/13/2015    Pt denies, states it does not shock me    Cardiac resynchronization therapy defibrillator (CRT-D) in place 7/13/2015    Cardiomyopathy 1/30/2014    CHF (congestive heart failure)     Chronic combined systolic and diastolic congestive heart failure     CKD (chronic kidney disease) stage 3, GFR 30-59 ml/min     Dyslipidemia 1/30/2014    Edema     Heart disease     Hypertension     Ischemic cardiomyopathy 7/13/2015    Macular hole of left eye     Old    Macular hole of left eye     Myocardial infarction     LEV (obstructive sleep apnea) 9/30/2013    Recurrent UTI     Refractive error     S/P MVR (mitral valve replacement) 1/30/2014    S/P placement of cardiac pacemaker 1/30/2014    Skin cancer     Sleep apnea     Stroke        Past Surgical History:   Procedure Laterality Date    APPENDECTOMY      CARDIAC PACEMAKER PLACEMENT      CARDIAC VALVE SURGERY      CATARACT EXTRACTION      OU    CHOLECYSTECTOMY      COLONOSCOPY      MITRAL VALVE SURGERY      x3       Review of patient's allergies indicates:   Allergen Reactions    Cephalosporins Hives    Metaxalone Itching    Penicillins      Other reaction(s): Unknown      Pregabalin      Other reaction(s): "Bad feeling"      Sulfa (sulfonamide antibiotics) Itching       Current Facility-Administered Medications on File Prior to Encounter   Medication    denosumab (PROLIA) injection 60 mg     Current Outpatient Prescriptions on File Prior to Encounter   Medication Sig    acetaminophen (TYLENOL EXTRA STRENGTH) 325 MG tablet Take 2 tablets (650 mg total) by mouth every 8 (eight) hours.    allopurinol (ZYLOPRIM) 100 MG tablet Take 1.5 " tablets (150 mg total) by mouth once daily. X 4 weeks, then increase to 2 tablets (200mg) by mouth daily.    amiodarone (PACERONE) 200 MG Tab Take 1 tablet (200 mg total) by mouth once daily.    aspirin (ECOTRIN) 81 MG EC tablet Take 81 mg by mouth once daily.    calcium carbonate (OS-SHERRY) 600 mg calcium (1,500 mg) Tab Take 600 mg by mouth once.    colchicine 0.6 mg tablet Take 1 tablet (0.6 mg total) by mouth once daily.    cranberry 1,000 mg Cap Take 1 capsule by mouth Daily.    diphenhydrAMINE (BENADRYL) 25 mg capsule Take 25 mg by mouth every 6 (six) hours as needed for Itching.    furosemide (LASIX) 40 MG tablet Take 1 tablet (40 mg total) by mouth once daily.    gabapentin (NEURONTIN) 100 MG capsule TAKE ONE CAPSULE BY MOUTH TWO TIMES DAILY    MULTIVITAMIN ORAL Take 1 tablet by mouth Daily.    valsartan (DIOVAN) 80 MG tablet Take 2 tablets (160 mg total) by mouth 2 (two) times daily.    warfarin (COUMADIN) 2.5 MG tablet Take 1.5 tablets on Wednesdays and Fridays and 1 tablet all other days as directed by the coumadin clinic    [DISCONTINUED] carvedilol (COREG) 25 MG tablet Take 1 tablet (25 mg total) by mouth 2 (two) times daily with meals.    [DISCONTINUED] digoxin (LANOXIN) 125 mcg tablet Take 1 tablet (125 mcg total) by mouth once daily. TAKE 1 TABLET (0.125 MG TOTAL) BY MOUTH ONCE DAILY.     Family History     Problem Relation (Age of Onset)    Cancer Brother    Coronary artery disease Mother, Father    Diabetes Brother        Social History Main Topics    Smoking status: Never Smoker    Smokeless tobacco: Never Used    Alcohol use No    Drug use: No    Sexual activity: Not on file     Review of Systems   Constitutional: Negative for activity change, appetite change, chills, diaphoresis, fatigue, fever and unexpected weight change.   HENT: Negative for congestion, drooling, facial swelling, rhinorrhea, sinus pressure, sneezing and sore throat.    Eyes: Negative for discharge, redness,  itching and visual disturbance.   Respiratory: Negative for apnea, cough, choking, chest tightness, shortness of breath, wheezing and stridor.    Cardiovascular: Negative for chest pain, palpitations and leg swelling.   Gastrointestinal: Positive for anal bleeding and blood in stool. Negative for abdominal distention, abdominal pain, constipation, diarrhea, nausea and vomiting.   Genitourinary: Negative for decreased urine volume, difficulty urinating, dysuria, frequency, hematuria, pelvic pain, urgency, vaginal bleeding and vaginal discharge.   Musculoskeletal: Negative for arthralgias, back pain, gait problem, joint swelling, myalgias, neck pain and neck stiffness.   Skin: Negative for color change, pallor, rash and wound.   Neurological: Negative for dizziness, seizures, facial asymmetry, speech difficulty, weakness, light-headedness, numbness and headaches.   Psychiatric/Behavioral: Negative for agitation, confusion, hallucinations and suicidal ideas.   All other systems reviewed and are negative.    Objective:     Vital Signs (Most Recent):  Temp: 98.6 °F (37 °C) (01/22/18 0934)  Pulse: 69 (01/22/18 1320)  Resp: 16 (01/22/18 1317)  BP: (!) 165/70 (01/22/18 1317)  SpO2: (!) 94 % (01/22/18 1317) Vital Signs (24h Range):  Temp:  [98.6 °F (37 °C)] 98.6 °F (37 °C)  Pulse:  [69-79] 69  Resp:  [16-20] 16  SpO2:  [94 %] 94 %  BP: (139-165)/(58-70) 165/70     Weight: 52.2 kg (115 lb)  Body mass index is 21.03 kg/m².    Physical Exam   Constitutional: She is oriented to person, place, and time. She appears well-developed and well-nourished.   Elderly and frail appearing    HENT:   Head: Normocephalic and atraumatic.   Eyes: Conjunctivae and EOM are normal. Pupils are equal, round, and reactive to light.   Neck: Normal range of motion. Neck supple.   Cardiovascular: Normal rate, regular rhythm, normal heart sounds and intact distal pulses.    No murmur heard.  Pulmonary/Chest: Effort normal and breath sounds normal. No  respiratory distress.   Abdominal: Soft. Bowel sounds are normal. She exhibits no distension. There is no tenderness.   Musculoskeletal: Normal range of motion. She exhibits no edema, tenderness or deformity.   Neurological: She is alert and oriented to person, place, and time. She has normal reflexes.   Skin: Skin is warm and dry. No erythema.   Psychiatric: She has a normal mood and affect. Her behavior is normal.   Nursing note and vitals reviewed.        CRANIAL NERVES     CN III, IV, VI   Pupils are equal, round, and reactive to light.  Extraocular motions are normal.        Significant Labs: All pertinent labs within the past 24 hours have been reviewed.    Significant Imaging:   Imaging Results          X-Ray Chest 1 View (Final result)  Result time 01/22/18 13:32:43    Final result by Bartolome Oh MD (01/22/18 13:32:43)                 Impression:     No adverse interval change      Electronically signed by: BARTOLOME OH MD  Date:     01/22/18  Time:    13:32              Narrative:    History: Bleeding    Comparison: 12/28/17    Result: Single view of the chest.      Cardiomegaly and left-sided pacing device noted in similar configuration. Scarring or small left-sided effusion. Mild atelectasis at the left lung base. No pneumothorax. Bones demonstrate degenerative changes. In comparison to the prior study, there is no adverse interval changes.

## 2018-01-23 NOTE — CONSULTS
[facility] -   Gastroenterology  Consult Note    Patient Name: Jennifer Mathews  MRN: [id number]  Admission Date: 1/22/2018  Hospital Length of Stay: 1 days  Code Status: Prior   Attending Provider: Eben Nguyễn MD   Consulting Provider: Edinson Cabezas PA-C  Primary Care Physician: Desirae Bello MD  Principal Problem:Gastrointestinal hemorrhage    Inpatient consult to Gastroenterology  Consult performed by: EDINSON CABEZAS  Consult ordered by: GEORGIE SCHWARTZ  Reason for consult: Rectal bleeding        Subjective:     HPI:  The patient presented to the ER for rectal bleeding. The patient said this started yesterday. She felt lower abdominal cramping followed by blood stool. She said it was a mix of dark and bright red. She denies nausea or vomiting. She reports having a similar episode about ten years ago. That is when her last colonoscopy was done. The aid said she has had a blood liquid stool every 30 minutes to an hour. The patient looks good. She denies weakness or dizziness. Her Hgb has remained stable between 9.5 and 10. BUN and creatinine are within normal limits. She denies NSAID use. She is on Coumadin for a h/o Afib and artificial mitral valve. Her INR on admit was 3.3. Coumadin is being held and INR today was 2.0. She reports having recently finished a course of antibiotics for an UTI. She was having diarrhea which she attributed to that, but it continued after the antibiotics were finished.     Past Medical History:   Diagnosis Date    Acute coronary syndrome     Anemia     Anticoagulated on Coumadin 7/13/2015    Arthritis     Atrial fibrillation     Basal cell carcinoma 10/2015    left neck    Cardiac arrest     Cardiac resynchronization therapy defibrillator (CRT-D) in place 07/13/2015    Pt denies, states it does not shock me    Cardiac resynchronization therapy defibrillator (CRT-D) in place 7/13/2015    Cardiomyopathy 1/30/2014    CHF (congestive heart failure)      "Chronic combined systolic and diastolic congestive heart failure     CKD (chronic kidney disease) stage 3, GFR 30-59 ml/min     Dyslipidemia 1/30/2014    Edema     Heart disease     Hypertension     Ischemic cardiomyopathy 7/13/2015    Macular hole of left eye     Old    Macular hole of left eye     Myocardial infarction     LEV (obstructive sleep apnea) 9/30/2013    Recurrent UTI     Refractive error     S/P MVR (mitral valve replacement) 1/30/2014    S/P placement of cardiac pacemaker 1/30/2014    Skin cancer     Sleep apnea     Stroke        Past Surgical History:   Procedure Laterality Date    APPENDECTOMY      CARDIAC PACEMAKER PLACEMENT      CARDIAC VALVE SURGERY      CATARACT EXTRACTION      OU    CHOLECYSTECTOMY      COLONOSCOPY      MITRAL VALVE SURGERY      x3       Review of patient's allergies indicates:   Allergen Reactions    Cephalosporins Hives    Metaxalone Itching    Penicillins      Other reaction(s): Unknown      Pregabalin      Other reaction(s): "Bad feeling"      Sulfa (sulfonamide antibiotics) Itching     Family History     Problem Relation (Age of Onset)    Cancer Brother    Coronary artery disease Mother, Father    Diabetes Brother        Social History Main Topics    Smoking status: Never Smoker    Smokeless tobacco: Never Used    Alcohol use No    Drug use: No    Sexual activity: Not on file     Review of Systems   Constitutional: Negative for fever.   HENT: Negative for hearing loss.    Eyes: Negative for visual disturbance.   Respiratory: Negative for cough and shortness of breath.    Cardiovascular: Negative for chest pain.   Gastrointestinal:        As per HPI.   Genitourinary: Negative for dysuria, frequency and hematuria.   Musculoskeletal: Negative for arthralgias and back pain.   Skin: Negative for rash.   Neurological: Negative for seizures, syncope, numbness and headaches.   Hematological: Does not bruise/bleed easily.   Psychiatric/Behavioral: " The patient is not nervous/anxious.      Objective:     Vital Signs (Most Recent):  Temp: 98.3 °F (36.8 °C) (01/23/18 1145)  Pulse: 70 (01/23/18 1145)  Resp: 18 (01/23/18 1145)  BP: (!) 164/70 (01/23/18 1145)  SpO2: 95 % (01/23/18 1145) Vital Signs (24h Range):  Temp:  [98 °F (36.7 °C)-98.6 °F (37 °C)] 98.3 °F (36.8 °C)  Pulse:  [69-79] 70  Resp:  [16-18] 18  SpO2:  [92 %-96 %] 95 %  BP: (141-167)/(66-74) 164/70     Weight: 52.3 kg (115 lb 4.8 oz) (01/23/18 0312)  Body mass index is 21.09 kg/m².      Intake/Output Summary (Last 24 hours) at 01/23/18 1301  Last data filed at 01/23/18 0900   Gross per 24 hour   Intake              240 ml   Output              550 ml   Net             -310 ml       Lines/Drains/Airways     Peripheral Intravenous Line                 Peripheral IV - Single Lumen 01/22/18 1358 Right Forearm less than 1 day                Physical Exam   Constitutional: She is oriented to person, place, and time. She appears well-developed and well-nourished.   HENT:   Head: Normocephalic and atraumatic.   Eyes: EOM are normal.   Neck: Normal range of motion. Neck supple. Carotid bruit is not present.   Cardiovascular: Normal rate and regular rhythm.    No murmur heard.  Pulmonary/Chest: Effort normal and breath sounds normal. No respiratory distress. She has no wheezes.   Abdominal: Soft. Normal appearance and bowel sounds are normal. She exhibits no distension and no mass. There is no tenderness.   Musculoskeletal: She exhibits no edema.   Neurological: She is alert and oriented to person, place, and time. No cranial nerve deficit.   Skin: Skin is warm and dry. No rash noted.   Psychiatric: Her behavior is normal.       Significant Labs:  CBC:   Recent Labs  Lab 01/22/18  1816 01/22/18  2359 01/23/18  0800   WBC 5.73 7.22 7.20   HGB 10.1* 9.4* 9.9*   HCT 33.5* 31.7* 33.2*    169 180     CMP:   Recent Labs  Lab 01/23/18  0458   GLU 92   CALCIUM 8.1*   ALBUMIN 3.2*   PROT 5.5*      K 4.3    CO2 31*      BUN 20   CREATININE 0.8   ALKPHOS 48*   ALT 17   AST 25   BILITOT 0.4     Coagulation:   Recent Labs  Lab 01/22/18  1358 01/23/18  0458   INR 3.3* 2.0*   APTT 36.5*  --        Significant Imaging:  Imaging results within the past 24 hours have been reviewed.    Assessment/Plan:     Hematochezia    86 yo female with hematochezia.   Report of bloody diarrhea frequency and amount doesn't match with current clinical picture and labs.   We will repeat labs. Include CRP and ESR.   CTA to assess for source of bleeding.   Stool studies to rule out infection, specifically C. Diff colitis since she had a recent course of antibiotics.   Continue clear liquids for possible Colonoscopy. The timing will depend on the above along with INR - would prefer around 1.5.           S/P MVR (mitral valve replacement)    Coumadin is on hold to due active GI bleed.   Aspirin has been continued.   Case was discussed with Dr. Nguyễn.            Thank you for your consult. I will follow-up with patient. Please contact us if you have any additional questions.    Kelvin Cabezas PA-C  Gastroenterology  [facility]

## 2018-01-23 NOTE — H&P
"[facility] Hospital Medicine  History & Physical    Patient Name: Jennifer Mathews  MRN: [id number]  Admission Date: 1/22/2018  Attending Physician: Gina Queen MD   Primary Care Provider: Desirae Bello MD         Patient information was obtained from patient and ER records.     Subjective:     Principal Problem:Gastrointestinal hemorrhage    Chief Complaint:   Chief Complaint   Patient presents with    Rectal Bleeding     Pt states, "I am bleeding from my rectum and I am on Coumadin."        HPI: Jennifer Mathews is an 84 yo female with a PMH of mitral valve replacement x 3, HTN, PAF, ACS, pacemaker placement, CKD who presented to the ER today with c/o of rectal bleeding that began this morning around 6 am. The patient describes the the blood as bright red blood. The patient reports she has had no further bleeding. The patient denies any associated symptoms. The patient denies any ETOH use, as well as any NSAID use. The patient reports her last colonoscopy was 10 years ago. Patient denies fever, chills, CP, cough, calix, pnd, orthopnea, palpitations, diaphoresis, headache, blurred vision, numbness, tingling, dizziness, localized weakness, n/v/d, abdominal pain, melena, hematemesis, urinary frequency, urgency, dysuria or hematuria. The patient reports that she has had her mitral valve replaced 3 times and is on coumadin. Her PT/INR is 3.3. Her H/H is 10.3/34.0, which is close to her baseline. Will hold tonight's coumadin dose and trend H/H. If the patient has further bleeding overnight will consult GI in the AM.         Past Medical History:   Diagnosis Date    Acute coronary syndrome     Anemia     Anticoagulated on Coumadin 7/13/2015    Arthritis     Atrial fibrillation     Basal cell carcinoma 10/2015    left neck    Cardiac arrest     Cardiac resynchronization therapy defibrillator (CRT-D) in place 07/13/2015    Pt denies, states it does not shock me    Cardiac " "resynchronization therapy defibrillator (CRT-D) in place 7/13/2015    Cardiomyopathy 1/30/2014    CHF (congestive heart failure)     Chronic combined systolic and diastolic congestive heart failure     CKD (chronic kidney disease) stage 3, GFR 30-59 ml/min     Dyslipidemia 1/30/2014    Edema     Heart disease     Hypertension     Ischemic cardiomyopathy 7/13/2015    Macular hole of left eye     Old    Macular hole of left eye     Myocardial infarction     LEV (obstructive sleep apnea) 9/30/2013    Recurrent UTI     Refractive error     S/P MVR (mitral valve replacement) 1/30/2014    S/P placement of cardiac pacemaker 1/30/2014    Skin cancer     Sleep apnea     Stroke        Past Surgical History:   Procedure Laterality Date    APPENDECTOMY      CARDIAC PACEMAKER PLACEMENT      CARDIAC VALVE SURGERY      CATARACT EXTRACTION      OU    CHOLECYSTECTOMY      COLONOSCOPY      MITRAL VALVE SURGERY      x3       Review of patient's allergies indicates:   Allergen Reactions    Cephalosporins Hives    Metaxalone Itching    Penicillins      Other reaction(s): Unknown      Pregabalin      Other reaction(s): "Bad feeling"      Sulfa (sulfonamide antibiotics) Itching       Current Facility-Administered Medications on File Prior to Encounter   Medication    denosumab (PROLIA) injection 60 mg     Current Outpatient Prescriptions on File Prior to Encounter   Medication Sig    acetaminophen (TYLENOL EXTRA STRENGTH) 325 MG tablet Take 2 tablets (650 mg total) by mouth every 8 (eight) hours.    allopurinol (ZYLOPRIM) 100 MG tablet Take 1.5 tablets (150 mg total) by mouth once daily. X 4 weeks, then increase to 2 tablets (200mg) by mouth daily.    amiodarone (PACERONE) 200 MG Tab Take 1 tablet (200 mg total) by mouth once daily.    aspirin (ECOTRIN) 81 MG EC tablet Take 81 mg by mouth once daily.    calcium carbonate (OS-SHERRY) 600 mg calcium (1,500 mg) Tab Take 600 mg by mouth once.    colchicine " 0.6 mg tablet Take 1 tablet (0.6 mg total) by mouth once daily.    cranberry 1,000 mg Cap Take 1 capsule by mouth Daily.    diphenhydrAMINE (BENADRYL) 25 mg capsule Take 25 mg by mouth every 6 (six) hours as needed for Itching.    furosemide (LASIX) 40 MG tablet Take 1 tablet (40 mg total) by mouth once daily.    gabapentin (NEURONTIN) 100 MG capsule TAKE ONE CAPSULE BY MOUTH TWO TIMES DAILY    MULTIVITAMIN ORAL Take 1 tablet by mouth Daily.    valsartan (DIOVAN) 80 MG tablet Take 2 tablets (160 mg total) by mouth 2 (two) times daily.    warfarin (COUMADIN) 2.5 MG tablet Take 1.5 tablets on Wednesdays and Fridays and 1 tablet all other days as directed by the coumadin clinic    [DISCONTINUED] carvedilol (COREG) 25 MG tablet Take 1 tablet (25 mg total) by mouth 2 (two) times daily with meals.    [DISCONTINUED] digoxin (LANOXIN) 125 mcg tablet Take 1 tablet (125 mcg total) by mouth once daily. TAKE 1 TABLET (0.125 MG TOTAL) BY MOUTH ONCE DAILY.     Family History     Problem Relation (Age of Onset)    Cancer Brother    Coronary artery disease Mother, Father    Diabetes Brother        Social History Main Topics    Smoking status: Never Smoker    Smokeless tobacco: Never Used    Alcohol use No    Drug use: No    Sexual activity: Not on file     Review of Systems   Constitutional: Negative for activity change, appetite change, chills, diaphoresis, fatigue, fever and unexpected weight change.   HENT: Negative for congestion, drooling, facial swelling, rhinorrhea, sinus pressure, sneezing and sore throat.    Eyes: Negative for discharge, redness, itching and visual disturbance.   Respiratory: Negative for apnea, cough, choking, chest tightness, shortness of breath, wheezing and stridor.    Cardiovascular: Negative for chest pain, palpitations and leg swelling.   Gastrointestinal: Positive for anal bleeding and blood in stool. Negative for abdominal distention, abdominal pain, constipation, diarrhea, nausea  and vomiting.   Genitourinary: Negative for decreased urine volume, difficulty urinating, dysuria, frequency, hematuria, pelvic pain, urgency, vaginal bleeding and vaginal discharge.   Musculoskeletal: Negative for arthralgias, back pain, gait problem, joint swelling, myalgias, neck pain and neck stiffness.   Skin: Negative for color change, pallor, rash and wound.   Neurological: Negative for dizziness, seizures, facial asymmetry, speech difficulty, weakness, light-headedness, numbness and headaches.   Psychiatric/Behavioral: Negative for agitation, confusion, hallucinations and suicidal ideas.   All other systems reviewed and are negative.    Objective:     Vital Signs (Most Recent):  Temp: 98.6 °F (37 °C) (01/22/18 0934)  Pulse: 69 (01/22/18 1320)  Resp: 16 (01/22/18 1317)  BP: (!) 165/70 (01/22/18 1317)  SpO2: (!) 94 % (01/22/18 1317) Vital Signs (24h Range):  Temp:  [98.6 °F (37 °C)] 98.6 °F (37 °C)  Pulse:  [69-79] 69  Resp:  [16-20] 16  SpO2:  [94 %] 94 %  BP: (139-165)/(58-70) 165/70     Weight: 52.2 kg (115 lb)  Body mass index is 21.03 kg/m².    Physical Exam   Constitutional: She is oriented to person, place, and time. She appears well-developed and well-nourished.   Elderly and frail appearing    HENT:   Head: Normocephalic and atraumatic.   Eyes: Conjunctivae and EOM are normal. Pupils are equal, round, and reactive to light.   Neck: Normal range of motion. Neck supple.   Cardiovascular: Normal rate, regular rhythm, normal heart sounds and intact distal pulses.    No murmur heard.  Pulmonary/Chest: Effort normal and breath sounds normal. No respiratory distress.   Abdominal: Soft. Bowel sounds are normal. She exhibits no distension. There is no tenderness.   Musculoskeletal: Normal range of motion. She exhibits no edema, tenderness or deformity.   Neurological: She is alert and oriented to person, place, and time. She has normal reflexes.   Skin: Skin is warm and dry. No erythema.   Psychiatric: She has  a normal mood and affect. Her behavior is normal.   Nursing note and vitals reviewed.        CRANIAL NERVES     CN III, IV, VI   Pupils are equal, round, and reactive to light.  Extraocular motions are normal.        Significant Labs: All pertinent labs within the past 24 hours have been reviewed.    Significant Imaging:   Imaging Results          X-Ray Chest 1 View (Final result)  Result time 01/22/18 13:32:43    Final result by Bartolome Oh MD (01/22/18 13:32:43)                 Impression:     No adverse interval change      Electronically signed by: BARTOLOME OH MD  Date:     01/22/18  Time:    13:32              Narrative:    History: Bleeding    Comparison: 12/28/17    Result: Single view of the chest.      Cardiomegaly and left-sided pacing device noted in similar configuration. Scarring or small left-sided effusion. Mild atelectasis at the left lung base. No pneumothorax. Bones demonstrate degenerative changes. In comparison to the prior study, there is no adverse interval changes.                                 Assessment/Plan:     * Gastrointestinal hemorrhage    No further bleeding since admission   CBC every 8 hours  Will transfuse if indicated  Hold coumadin overnight   Will consult GI in am if patient has further bleeding overnight   If no further bleeding overnight and H/H stable, PT can likely be discharged tomorrow with GI follow up         S/P MVR (mitral valve replacement)    The patient has a porcine valve  On coumadin with INR 3.3  Will hold Coumadin tonight 2/2 GI bleed   Check INR in am           A-fib    Currently SR  Will hold tonight's Coumadin dose 2/2 GI bleed           Hypertension    Monitor vital signs  Resume home meds           LEV (obstructive sleep apnea)    CPAP QHS             VTE Risk Mitigation     None             Haile Morocho NP  Department of Hospital Medicine   [facility]

## 2018-01-23 NOTE — ASSESSMENT & PLAN NOTE
Coumadin is on hold to due active GI bleed.   Aspirin has been continued.   Case was discussed with Dr. Nguyễn.

## 2018-01-23 NOTE — SUBJECTIVE & OBJECTIVE
"Past Medical History:   Diagnosis Date    Acute coronary syndrome     Anemia     Anticoagulated on Coumadin 7/13/2015    Arthritis     Atrial fibrillation     Basal cell carcinoma 10/2015    left neck    Cardiac arrest     Cardiac resynchronization therapy defibrillator (CRT-D) in place 07/13/2015    Pt denies, states it does not shock me    Cardiac resynchronization therapy defibrillator (CRT-D) in place 7/13/2015    Cardiomyopathy 1/30/2014    CHF (congestive heart failure)     Chronic combined systolic and diastolic congestive heart failure     CKD (chronic kidney disease) stage 3, GFR 30-59 ml/min     Dyslipidemia 1/30/2014    Edema     Heart disease     Hypertension     Ischemic cardiomyopathy 7/13/2015    Macular hole of left eye     Old    Macular hole of left eye     Myocardial infarction     LEV (obstructive sleep apnea) 9/30/2013    Recurrent UTI     Refractive error     S/P MVR (mitral valve replacement) 1/30/2014    S/P placement of cardiac pacemaker 1/30/2014    Skin cancer     Sleep apnea     Stroke        Past Surgical History:   Procedure Laterality Date    APPENDECTOMY      CARDIAC PACEMAKER PLACEMENT      CARDIAC VALVE SURGERY      CATARACT EXTRACTION      OU    CHOLECYSTECTOMY      COLONOSCOPY      MITRAL VALVE SURGERY      x3       Review of patient's allergies indicates:   Allergen Reactions    Cephalosporins Hives    Metaxalone Itching    Penicillins      Other reaction(s): Unknown      Pregabalin      Other reaction(s): "Bad feeling"      Sulfa (sulfonamide antibiotics) Itching       No current facility-administered medications on file prior to encounter.      Current Outpatient Prescriptions on File Prior to Encounter   Medication Sig    acetaminophen (TYLENOL EXTRA STRENGTH) 325 MG tablet Take 2 tablets (650 mg total) by mouth every 8 (eight) hours.    allopurinol (ZYLOPRIM) 100 MG tablet Take 1.5 tablets (150 mg total) by mouth once daily. X 4 " weeks, then increase to 2 tablets (200mg) by mouth daily.    amiodarone (PACERONE) 200 MG Tab Take 1 tablet (200 mg total) by mouth once daily.    aspirin (ECOTRIN) 81 MG EC tablet Take 81 mg by mouth once daily.    calcium carbonate (OS-SHERRY) 600 mg calcium (1,500 mg) Tab Take 600 mg by mouth once.    colchicine 0.6 mg tablet Take 1 tablet (0.6 mg total) by mouth once daily.    cranberry 1,000 mg Cap Take 1 capsule by mouth Daily.    diphenhydrAMINE (BENADRYL) 25 mg capsule Take 25 mg by mouth every 6 (six) hours as needed for Itching.    furosemide (LASIX) 40 MG tablet Take 1 tablet (40 mg total) by mouth once daily.    gabapentin (NEURONTIN) 100 MG capsule TAKE ONE CAPSULE BY MOUTH TWO TIMES DAILY    MULTIVITAMIN ORAL Take 1 tablet by mouth Daily.    valsartan (DIOVAN) 80 MG tablet Take 2 tablets (160 mg total) by mouth 2 (two) times daily.    warfarin (COUMADIN) 2.5 MG tablet Take 1.5 tablets on Wednesdays and Fridays and 1 tablet all other days as directed by the coumadin clinic     Family History     Problem Relation (Age of Onset)    Cancer Brother    Coronary artery disease Mother, Father    Diabetes Brother        Social History Main Topics    Smoking status: Never Smoker    Smokeless tobacco: Never Used    Alcohol use No    Drug use: No    Sexual activity: Not on file     Review of Systems   Constitution: Negative.   Eyes: Negative.    Cardiovascular: Negative.    Respiratory: Negative.    Endocrine: Negative.    Hematologic/Lymphatic: Bruises/bleeds easily.   Skin: Negative.    Musculoskeletal: Positive for arthritis.   Gastrointestinal: Positive for hematochezia.        Rectal bleeding   Genitourinary: Negative.    Psychiatric/Behavioral: Negative.    Allergic/Immunologic: Negative.      Objective:     Vital Signs (Most Recent):  Temp: 98.4 °F (36.9 °C) (01/23/18 1449)  Pulse: 70 (01/23/18 1449)  Resp: 17 (01/23/18 1449)  BP: (!) 174/70 (01/23/18 1449)  SpO2: 99 % (01/23/18 1449) Vital  Signs (24h Range):  Temp:  [98 °F (36.7 °C)-98.6 °F (37 °C)] 98.4 °F (36.9 °C)  Pulse:  [69-70] 70  Resp:  [17-18] 17  SpO2:  [92 %-99 %] 99 %  BP: (141-174)/(66-74) 174/70     Weight: 52.3 kg (115 lb 4.8 oz)  Body mass index is 21.09 kg/m².    SpO2: 99 %  O2 Device (Oxygen Therapy): room air      Intake/Output Summary (Last 24 hours) at 01/23/18 1609  Last data filed at 01/23/18 0900   Gross per 24 hour   Intake              240 ml   Output              550 ml   Net             -310 ml       Lines/Drains/Airways     Peripheral Intravenous Line                 Peripheral IV - Single Lumen 01/22/18 1358 Right Forearm 1 day                Physical Exam   Constitutional: She is oriented to person, place, and time. She appears well-developed and well-nourished. No distress.   HENT:   Head: Normocephalic and atraumatic.   Eyes: Pupils are equal, round, and reactive to light. Right eye exhibits no discharge. Left eye exhibits no discharge.   Neck: Neck supple. No thyromegaly present.   Cardiovascular: Normal rate, regular rhythm, S1 normal and S2 normal.  PMI is not displaced.  Exam reveals no gallop, no S3, no S4 and no friction rub.    Murmur heard.  High-pitched blowing holosystolic murmur is present  at the apex  Pulses:       Radial pulses are 2+ on the right side, and 2+ on the left side.   Pulmonary/Chest: Effort normal and breath sounds normal. No respiratory distress. She has no wheezes. She has no rales.   PPM site well-healed   Abdominal: Soft. She exhibits no distension. There is no tenderness. There is no rebound.   Musculoskeletal: She exhibits no edema.   Neurological: She is alert and oriented to person, place, and time.   Skin: Skin is warm and dry. She is not diaphoretic. No erythema.   Psychiatric: She has a normal mood and affect. Her behavior is normal. Thought content normal.   Nursing note and vitals reviewed.      Significant Labs:   CMP   Recent Labs  Lab 01/22/18  1358 01/23/18  2650  01/23/18  1258    145 146*   K 4.6 4.3 4.3    107 107   CO2 31* 31* 31*   GLU 87 92 162*   BUN 23 20 17   CREATININE 0.9 0.8 0.9   CALCIUM 8.5* 8.1* 8.4*   PROT 6.3 5.5* 6.0   ALBUMIN 3.7 3.2* 3.4*   BILITOT 0.4 0.4 0.4   ALKPHOS 57 48* 53*   AST 32 25 25   ALT 22 17 18   ANIONGAP 8 7* 8   ESTGFRAFRICA >60 >60 >60   EGFRNONAA 58* >60 58*   , CBC   Recent Labs  Lab 01/22/18  2359 01/23/18  0800 01/23/18  1255   WBC 7.22 7.20 6.20   HGB 9.4* 9.9* 10.1*   HCT 31.7* 33.2* 34.0*    180 159    and All pertinent lab results from the last 24 hours have been reviewed.    Significant Imaging: Echocardiogram:   2D echo with color flow doppler:   Results for orders placed or performed during the hospital encounter of 12/28/16   2D echo with color flow doppler   Result Value Ref Range    EF 35 (A) 55 - 65    Mitral Valve Regurgitation TRIVIAL     Diastolic Dysfunction Yes (A)     Est. PA Systolic Pressure 58.56 (A)     Tricuspid Valve Regurgitation MODERATE (A)    , EKG: Reviewed and X-Ray: CXR: X-Ray Chest PA and Lateral (CXR): No results found for this visit on 01/22/18.

## 2018-01-23 NOTE — HPI
The patient presented to the ER for rectal bleeding. The patient said this started yesterday. She felt lower abdominal cramping followed by blood stool. She said it was a mix of dark and bright red. She denies nausea or vomiting. She reports having a similar episode about ten years ago. That is when her last colonoscopy was done. The aid said she has had a blood liquid stool every 30 minutes to an hour. The patient looks good. She denies weakness or dizziness. Her Hgb has remained stable between 9.5 and 10. BUN and creatinine are within normal limits. She denies NSAID use. She is on Coumadin for a h/o Afib and artificial mitral valve. Her INR on admit was 3.3. Coumadin is being held and INR today was 2.0. She reports having recently finished a course of antibiotics for an UTI. She was having diarrhea which she attributed to that, but it continued after the antibiotics were finished.

## 2018-01-23 NOTE — CONSULTS
[facility]  Cardiology  Consult Note    Patient Name: Jennifer Mathews  MRN: [id number]  Admission Date: 1/22/2018  Hospital Length of Stay: 1 days  Code Status: Prior   Attending Provider: Eben Nguyễn MD   Consulting Provider: Adriana Pizano PA-C  Primary Care Physician: Desirae Bello MD  Principal Problem:Gastrointestinal hemorrhage    Patient information was obtained from patient, past medical records and ER records.     Inpatient consult to Cardiology  Consult performed by: ADRIANA PIZANO  Consult ordered by: GEORGIE SCHWARTZ        Subjective:     Chief Complaint:  Rectal bleeding    HPI:   Ms. Mathews is an 85 year old female patient with a PMHx of cardiomyopathy (EF=35%), combined CHF, atrial flutter s/p ablation, s/p MVR x 3 (bioprosthetic) and PAF (on Coumadin) who presented to [facility] ED today with a chief complaint of rectal bleeding that began after a BM around 6 AM. Associated symptoms included some mild abdominal cramping. Patient denied any associated fever, chills, nausea, or vomiting. Stated blood appeared mostly bright red. Initial workup in ED revealed mild anemia (10.3, 34.0) and patient subsequently admitted for further evaluation and treatment. Cardiology consulted to assist with management since patient is Warfarin. Patient seen and examined today, resting in bed. States she feels well. Reports bloody diarrhea since admittance, did recently complete a course of abx. No cardiac complaints. GI on board and workup in process. Chart reviewed. Repeat H and H stable. 2D echo in 12/16 showed EF of 35-40%, DD, pulmonary HTN.     Serial EKG's and PPM interrogation reports reviewed-no apparent episodes of atrial fibrillation in last 7 years.     Past Medical History:   Diagnosis Date    Acute coronary syndrome     Anemia     Anticoagulated on Coumadin 7/13/2015    Arthritis     Atrial fibrillation     Basal cell carcinoma 10/2015    left neck    Cardiac arrest      "Cardiac resynchronization therapy defibrillator (CRT-D) in place 07/13/2015    Pt denies, states it does not shock me    Cardiac resynchronization therapy defibrillator (CRT-D) in place 7/13/2015    Cardiomyopathy 1/30/2014    CHF (congestive heart failure)     Chronic combined systolic and diastolic congestive heart failure     CKD (chronic kidney disease) stage 3, GFR 30-59 ml/min     Dyslipidemia 1/30/2014    Edema     Heart disease     Hypertension     Ischemic cardiomyopathy 7/13/2015    Macular hole of left eye     Old    Macular hole of left eye     Myocardial infarction     LEV (obstructive sleep apnea) 9/30/2013    Recurrent UTI     Refractive error     S/P MVR (mitral valve replacement) 1/30/2014    S/P placement of cardiac pacemaker 1/30/2014    Skin cancer     Sleep apnea     Stroke        Past Surgical History:   Procedure Laterality Date    APPENDECTOMY      CARDIAC PACEMAKER PLACEMENT      CARDIAC VALVE SURGERY      CATARACT EXTRACTION      OU    CHOLECYSTECTOMY      COLONOSCOPY      MITRAL VALVE SURGERY      x3       Review of patient's allergies indicates:   Allergen Reactions    Cephalosporins Hives    Metaxalone Itching    Penicillins      Other reaction(s): Unknown      Pregabalin      Other reaction(s): "Bad feeling"      Sulfa (sulfonamide antibiotics) Itching       No current facility-administered medications on file prior to encounter.      Current Outpatient Prescriptions on File Prior to Encounter   Medication Sig    acetaminophen (TYLENOL EXTRA STRENGTH) 325 MG tablet Take 2 tablets (650 mg total) by mouth every 8 (eight) hours.    allopurinol (ZYLOPRIM) 100 MG tablet Take 1.5 tablets (150 mg total) by mouth once daily. X 4 weeks, then increase to 2 tablets (200mg) by mouth daily.    amiodarone (PACERONE) 200 MG Tab Take 1 tablet (200 mg total) by mouth once daily.    aspirin (ECOTRIN) 81 MG EC tablet Take 81 mg by mouth once daily.    calcium " carbonate (OS-SHERRY) 600 mg calcium (1,500 mg) Tab Take 600 mg by mouth once.    colchicine 0.6 mg tablet Take 1 tablet (0.6 mg total) by mouth once daily.    cranberry 1,000 mg Cap Take 1 capsule by mouth Daily.    diphenhydrAMINE (BENADRYL) 25 mg capsule Take 25 mg by mouth every 6 (six) hours as needed for Itching.    furosemide (LASIX) 40 MG tablet Take 1 tablet (40 mg total) by mouth once daily.    gabapentin (NEURONTIN) 100 MG capsule TAKE ONE CAPSULE BY MOUTH TWO TIMES DAILY    MULTIVITAMIN ORAL Take 1 tablet by mouth Daily.    valsartan (DIOVAN) 80 MG tablet Take 2 tablets (160 mg total) by mouth 2 (two) times daily.    warfarin (COUMADIN) 2.5 MG tablet Take 1.5 tablets on Wednesdays and Fridays and 1 tablet all other days as directed by the coumadin clinic     Family History     Problem Relation (Age of Onset)    Cancer Brother    Coronary artery disease Mother, Father    Diabetes Brother        Social History Main Topics    Smoking status: Never Smoker    Smokeless tobacco: Never Used    Alcohol use No    Drug use: No    Sexual activity: Not on file     Review of Systems   Constitution: Negative.   Eyes: Negative.    Cardiovascular: Negative.    Respiratory: Negative.    Endocrine: Negative.    Hematologic/Lymphatic: Bruises/bleeds easily.   Skin: Negative.    Musculoskeletal: Positive for arthritis.   Gastrointestinal: Positive for hematochezia.        Rectal bleeding   Genitourinary: Negative.    Psychiatric/Behavioral: Negative.    Allergic/Immunologic: Negative.      Objective:     Vital Signs (Most Recent):  Temp: 98.4 °F (36.9 °C) (01/23/18 1449)  Pulse: 70 (01/23/18 1449)  Resp: 17 (01/23/18 1449)  BP: (!) 174/70 (01/23/18 1449)  SpO2: 99 % (01/23/18 1449) Vital Signs (24h Range):  Temp:  [98 °F (36.7 °C)-98.6 °F (37 °C)] 98.4 °F (36.9 °C)  Pulse:  [69-70] 70  Resp:  [17-18] 17  SpO2:  [92 %-99 %] 99 %  BP: (141-174)/(66-74) 174/70     Weight: 52.3 kg (115 lb 4.8 oz)  Body mass index is  21.09 kg/m².    SpO2: 99 %  O2 Device (Oxygen Therapy): room air      Intake/Output Summary (Last 24 hours) at 01/23/18 1609  Last data filed at 01/23/18 0900   Gross per 24 hour   Intake              240 ml   Output              550 ml   Net             -310 ml       Lines/Drains/Airways     Peripheral Intravenous Line                 Peripheral IV - Single Lumen 01/22/18 1358 Right Forearm 1 day                Physical Exam   Constitutional: She is oriented to person, place, and time. She appears well-developed and well-nourished. No distress.   HENT:   Head: Normocephalic and atraumatic.   Eyes: Pupils are equal, round, and reactive to light. Right eye exhibits no discharge. Left eye exhibits no discharge.   Neck: Neck supple. No thyromegaly present.   Cardiovascular: Normal rate, regular rhythm, S1 normal and S2 normal.  PMI is not displaced.  Exam reveals no gallop, no S3, no S4 and no friction rub.    Murmur heard.  High-pitched blowing holosystolic murmur is present  at the apex  Pulses:       Radial pulses are 2+ on the right side, and 2+ on the left side.   Pulmonary/Chest: Effort normal and breath sounds normal. No respiratory distress. She has no wheezes. She has no rales.   PPM site well-healed   Abdominal: Soft. She exhibits no distension. There is no tenderness. There is no rebound.   Musculoskeletal: She exhibits no edema.   Neurological: She is alert and oriented to person, place, and time.   Skin: Skin is warm and dry. She is not diaphoretic. No erythema.   Psychiatric: She has a normal mood and affect. Her behavior is normal. Thought content normal.   Nursing note and vitals reviewed.      Significant Labs:   CMP   Recent Labs  Lab 01/22/18  1358 01/23/18  0458 01/23/18  1258    145 146*   K 4.6 4.3 4.3    107 107   CO2 31* 31* 31*   GLU 87 92 162*   BUN 23 20 17   CREATININE 0.9 0.8 0.9   CALCIUM 8.5* 8.1* 8.4*   PROT 6.3 5.5* 6.0   ALBUMIN 3.7 3.2* 3.4*   BILITOT 0.4 0.4 0.4   ALKPHOS  57 48* 53*   AST 32 25 25   ALT 22 17 18   ANIONGAP 8 7* 8   ESTGFRAFRICA >60 >60 >60   EGFRNONAA 58* >60 58*   , CBC   Recent Labs  Lab 01/22/18  2359 01/23/18  0800 01/23/18  1255   WBC 7.22 7.20 6.20   HGB 9.4* 9.9* 10.1*   HCT 31.7* 33.2* 34.0*    180 159    and All pertinent lab results from the last 24 hours have been reviewed.    Significant Imaging: Echocardiogram:   2D echo with color flow doppler:   Results for orders placed or performed during the hospital encounter of 12/28/16   2D echo with color flow doppler   Result Value Ref Range    EF 35 (A) 55 - 65    Mitral Valve Regurgitation TRIVIAL     Diastolic Dysfunction Yes (A)     Est. PA Systolic Pressure 58.56 (A)     Tricuspid Valve Regurgitation MODERATE (A)    , EKG: Reviewed and X-Ray: CXR: X-Ray Chest PA and Lateral (CXR): No results found for this visit on 01/22/18.    Assessment and Plan:   Patient on Coumadin and ASA who presents with hematochezia. H and H stable, GI on board. As patient has bioprosthetic valve and has had no recent episodes of atrial fibrillation, recommend holding Coumadin for time being and continuing ASA 81 mg daily. Risks/benefits discussed. Will arrange EP f/u as OP for further rec's regarding anticoagulation.     Hematochezia    -GI on board  -CT pending  -Ok to hold Coumadin  -Continue ASA        A-fib    -No recent documented episodes of afib  -Discussed risks and benefits of anticoagulation in detail with patient and her daughter. Since she has no had any arrhythmias in recent years, will hold Coumadin for time being and continue ASA 81 mg daily  -Will arrange for EP f/u as OP for further rec's regarding anticoagulation          S/P MVR (mitral valve replacement)    -Continue home medications  -Ok to hold Coumadin as patient has bioprosthetic valve  -Continue ASA        Hypertension    -Continue home medications        Chronic combined systolic and diastolic congestive heart failure    -Appears  compensated  -Continue home medications        LEV (obstructive sleep apnea)    -Continue CPAP            VTE Risk Mitigation     None          Thank you for your consult. I will sign off. Please contact us if you have any additional questions.    Adriana Arizmendi PA-C  Cardiology   [facility]    Chart reviewed. Dr. Sevilla examined patient and agrees with plan as outlined above.

## 2018-01-24 ENCOUNTER — TELEPHONE (OUTPATIENT)
Dept: CARDIOLOGY | Facility: CLINIC | Age: 83
End: 2018-01-24

## 2018-01-24 LAB
ALBUMIN SERPL BCP-MCNC: 3.2 G/DL
ALP SERPL-CCNC: 43 U/L
ALT SERPL W/O P-5'-P-CCNC: 15 U/L
ANION GAP SERPL CALC-SCNC: 7 MMOL/L
AST SERPL-CCNC: 22 U/L
BASOPHILS # BLD AUTO: 0.04 K/UL
BASOPHILS # BLD AUTO: 0.05 K/UL
BASOPHILS NFR BLD: 0.7 %
BASOPHILS NFR BLD: 0.7 %
BASOPHILS NFR BLD: 0.8 %
BASOPHILS NFR BLD: 1 %
BILIRUB SERPL-MCNC: 0.4 MG/DL
BUN SERPL-MCNC: 13 MG/DL
C DIFF GDH STL QL: NEGATIVE
C DIFF TOX A+B STL QL IA: NEGATIVE
CALCIUM SERPL-MCNC: 7.9 MG/DL
CHLORIDE SERPL-SCNC: 105 MMOL/L
CO2 SERPL-SCNC: 30 MMOL/L
CREAT SERPL-MCNC: 0.7 MG/DL
DIFFERENTIAL METHOD: ABNORMAL
EOSINOPHIL # BLD AUTO: 0.1 K/UL
EOSINOPHIL NFR BLD: 1.2 %
EOSINOPHIL NFR BLD: 1.5 %
EOSINOPHIL NFR BLD: 1.8 %
EOSINOPHIL NFR BLD: 2 %
ERYTHROCYTE [DISTWIDTH] IN BLOOD BY AUTOMATED COUNT: 19.3 %
ERYTHROCYTE [DISTWIDTH] IN BLOOD BY AUTOMATED COUNT: 19.5 %
ERYTHROCYTE [DISTWIDTH] IN BLOOD BY AUTOMATED COUNT: 19.5 %
ERYTHROCYTE [DISTWIDTH] IN BLOOD BY AUTOMATED COUNT: 19.7 %
EST. GFR  (AFRICAN AMERICAN): >60 ML/MIN/1.73 M^2
EST. GFR  (NON AFRICAN AMERICAN): >60 ML/MIN/1.73 M^2
GLUCOSE SERPL-MCNC: 83 MG/DL
HCT VFR BLD AUTO: 29.2 %
HCT VFR BLD AUTO: 30.4 %
HCT VFR BLD AUTO: 30.5 %
HCT VFR BLD AUTO: 31.9 %
HGB BLD-MCNC: 8.9 G/DL
HGB BLD-MCNC: 9.1 G/DL
HGB BLD-MCNC: 9.1 G/DL
HGB BLD-MCNC: 9.6 G/DL
INR PPP: 1.5
LYMPHOCYTES # BLD AUTO: 1.4 K/UL
LYMPHOCYTES # BLD AUTO: 1.5 K/UL
LYMPHOCYTES # BLD AUTO: 1.5 K/UL
LYMPHOCYTES # BLD AUTO: 1.9 K/UL
LYMPHOCYTES NFR BLD: 25.3 %
LYMPHOCYTES NFR BLD: 28.5 %
LYMPHOCYTES NFR BLD: 28.7 %
LYMPHOCYTES NFR BLD: 30.5 %
MCH RBC QN AUTO: 27.4 PG
MCH RBC QN AUTO: 27.5 PG
MCH RBC QN AUTO: 27.8 PG
MCH RBC QN AUTO: 27.9 PG
MCHC RBC AUTO-ENTMCNC: 29.8 G/DL
MCHC RBC AUTO-ENTMCNC: 29.9 G/DL
MCHC RBC AUTO-ENTMCNC: 30.1 G/DL
MCHC RBC AUTO-ENTMCNC: 30.5 G/DL
MCV RBC AUTO: 92 FL
MCV RBC AUTO: 93 FL
MONOCYTES # BLD AUTO: 0.5 K/UL
MONOCYTES # BLD AUTO: 0.5 K/UL
MONOCYTES # BLD AUTO: 0.6 K/UL
MONOCYTES # BLD AUTO: 0.8 K/UL
MONOCYTES NFR BLD: 10.8 %
MONOCYTES NFR BLD: 11.5 %
MONOCYTES NFR BLD: 12.2 %
MONOCYTES NFR BLD: 8.3 %
NEUTROPHILS # BLD AUTO: 2.9 K/UL
NEUTROPHILS # BLD AUTO: 3.1 K/UL
NEUTROPHILS # BLD AUTO: 3.4 K/UL
NEUTROPHILS # BLD AUTO: 3.7 K/UL
NEUTROPHILS NFR BLD: 54.6 %
NEUTROPHILS NFR BLD: 57.7 %
NEUTROPHILS NFR BLD: 57.7 %
NEUTROPHILS NFR BLD: 64.5 %
PLATELET # BLD AUTO: 148 K/UL
PLATELET # BLD AUTO: 153 K/UL
PLATELET # BLD AUTO: 160 K/UL
PLATELET # BLD AUTO: 169 K/UL
PMV BLD AUTO: 10.1 FL
PMV BLD AUTO: 10.3 FL
PMV BLD AUTO: 9.6 FL
PMV BLD AUTO: 9.9 FL
POTASSIUM SERPL-SCNC: 3.9 MMOL/L
PROT SERPL-MCNC: 5.4 G/DL
PROTHROMBIN TIME: 15.4 SEC
RBC # BLD AUTO: 3.19 M/UL
RBC # BLD AUTO: 3.31 M/UL
RBC # BLD AUTO: 3.32 M/UL
RBC # BLD AUTO: 3.45 M/UL
SODIUM SERPL-SCNC: 142 MMOL/L
WBC # BLD AUTO: 5.01 K/UL
WBC # BLD AUTO: 5.29 K/UL
WBC # BLD AUTO: 5.78 K/UL
WBC # BLD AUTO: 6.13 K/UL
WBC #/AREA STL HPF: NORMAL /[HPF]

## 2018-01-24 PROCEDURE — 25000003 PHARM REV CODE 250: Performed by: NURSE PRACTITIONER

## 2018-01-24 PROCEDURE — 99900035 HC TECH TIME PER 15 MIN (STAT)

## 2018-01-24 PROCEDURE — 80053 COMPREHEN METABOLIC PANEL: CPT

## 2018-01-24 PROCEDURE — 36415 COLL VENOUS BLD VENIPUNCTURE: CPT

## 2018-01-24 PROCEDURE — 99232 SBSQ HOSP IP/OBS MODERATE 35: CPT | Mod: ,,, | Performed by: PHYSICIAN ASSISTANT

## 2018-01-24 PROCEDURE — 21400001 HC TELEMETRY ROOM

## 2018-01-24 PROCEDURE — 85610 PROTHROMBIN TIME: CPT

## 2018-01-24 PROCEDURE — 63600175 PHARM REV CODE 636 W HCPCS: Performed by: NURSE PRACTITIONER

## 2018-01-24 PROCEDURE — S0030 INJECTION, METRONIDAZOLE: HCPCS | Performed by: INTERNAL MEDICINE

## 2018-01-24 PROCEDURE — 63600175 PHARM REV CODE 636 W HCPCS: Performed by: INTERNAL MEDICINE

## 2018-01-24 PROCEDURE — 85025 COMPLETE CBC W/AUTO DIFF WBC: CPT | Mod: 91

## 2018-01-24 PROCEDURE — 25000003 PHARM REV CODE 250: Performed by: INTERNAL MEDICINE

## 2018-01-24 RX ORDER — CIPROFLOXACIN 2 MG/ML
400 INJECTION, SOLUTION INTRAVENOUS
Status: DISCONTINUED | OUTPATIENT
Start: 2018-01-24 | End: 2018-01-25 | Stop reason: HOSPADM

## 2018-01-24 RX ORDER — METRONIDAZOLE 500 MG/100ML
500 INJECTION, SOLUTION INTRAVENOUS
Status: DISCONTINUED | OUTPATIENT
Start: 2018-01-24 | End: 2018-01-25 | Stop reason: HOSPADM

## 2018-01-24 RX ADMIN — CARVEDILOL 25 MG: 12.5 TABLET, FILM COATED ORAL at 09:01

## 2018-01-24 RX ADMIN — CARVEDILOL 25 MG: 12.5 TABLET, FILM COATED ORAL at 04:01

## 2018-01-24 RX ADMIN — GABAPENTIN 100 MG: 100 CAPSULE ORAL at 09:01

## 2018-01-24 RX ADMIN — CIPROFLOXACIN 400 MG: 2 INJECTION, SOLUTION INTRAVENOUS at 04:01

## 2018-01-24 RX ADMIN — FUROSEMIDE 40 MG: 40 TABLET ORAL at 09:01

## 2018-01-24 RX ADMIN — SODIUM CHLORIDE 1 G: 9 INJECTION, SOLUTION INTRAVENOUS at 05:01

## 2018-01-24 RX ADMIN — DIGOXIN 125 MCG: 125 TABLET ORAL at 09:01

## 2018-01-24 RX ADMIN — ASPIRIN 81 MG: 81 TABLET, COATED ORAL at 09:01

## 2018-01-24 RX ADMIN — AMIODARONE HYDROCHLORIDE 200 MG: 200 TABLET ORAL at 09:01

## 2018-01-24 RX ADMIN — VALSARTAN 160 MG: 80 TABLET, FILM COATED ORAL at 09:01

## 2018-01-24 RX ADMIN — METRONIDAZOLE 500 MG: 500 INJECTION, SOLUTION INTRAVENOUS at 05:01

## 2018-01-24 NOTE — ASSESSMENT & PLAN NOTE
No further bleeding since admission   CBC every 8 hours  Will transfuse if indicated  Hold coumadin overnight   Several additional bloody bowel movements today  GI consulted, will monitor   Stool studies pending   CTA abd did not show any obvious source of bleeding

## 2018-01-24 NOTE — PROGRESS NOTES
[facility]  Gastroenterology  Progress Note    Patient Name: Jennifer Mathews  MRN: [id number]  Admission Date: 1/22/2018  Hospital Length of Stay: 2 days  Code Status: Prior   Attending Provider: Eben Nguyễn MD  Consulting Provider: Kelvin Cabezas PA-C  Primary Care Physician: Desirae Bello MD  Principal Problem: Gastrointestinal hemorrhage      Subjective:     Interval History:   The patient is feeling well. She reports having two loose stools today without blood. She is tolerating her diet. CTA didn't show any active bleeding. There was possible transverse colitis. C. Diff is negative. Stool positive for WBCs.     Review of Systems   Constitutional:        See Interval History for daily ROS.      Objective:     Vital Signs (Most Recent):  Temp: 97.7 °F (36.5 °C) (01/24/18 1147)  Pulse: 70 (01/24/18 1147)  Resp: 18 (01/24/18 1147)  BP: (!) 148/68 (01/24/18 1147)  SpO2: (!) 92 % (01/24/18 1147) Vital Signs (24h Range):  Temp:  [97.4 °F (36.3 °C)-98.5 °F (36.9 °C)] 97.7 °F (36.5 °C)  Pulse:  [69-70] 70  Resp:  [17-18] 18  SpO2:  [92 %-97 %] 92 %  BP: (134-162)/(63-77) 148/68     Weight: 52.3 kg (115 lb 4.8 oz) (01/23/18 0312)  Body mass index is 21.09 kg/m².      Intake/Output Summary (Last 24 hours) at 01/24/18 1542  Last data filed at 01/23/18 1800   Gross per 24 hour   Intake              240 ml   Output                0 ml   Net              240 ml       Lines/Drains/Airways     Peripheral Intravenous Line                 Peripheral IV - Single Lumen 01/22/18 1358 Right Forearm 2 days                Physical Exam   Constitutional: She is oriented to person, place, and time. She appears well-developed and well-nourished. No distress.   HENT:   Head: Normocephalic and atraumatic.   Cardiovascular: Normal rate and regular rhythm.    Pulmonary/Chest: Effort normal and breath sounds normal.   Abdominal: Soft. Bowel sounds are normal. She exhibits no distension. There is no tenderness.    Neurological: She is alert and oriented to person, place, and time. No cranial nerve deficit.   Psychiatric: Her behavior is normal.       Significant Labs:  CBC:   Recent Labs  Lab 01/24/18  0038 01/24/18  0540 01/24/18  1213   WBC 6.13 5.01 5.78   HGB 9.1* 9.1* 9.6*   HCT 30.4* 30.5* 31.9*    153 169     CMP:   Recent Labs  Lab 01/24/18  0540   GLU 83   CALCIUM 7.9*   ALBUMIN 3.2*   PROT 5.4*      K 3.9   CO2 30*      BUN 13   CREATININE 0.7   ALKPHOS 43*   ALT 15   AST 22   BILITOT 0.4     Coagulation:   Recent Labs  Lab 01/24/18  0854   INR 1.5*         Significant Imaging:  Imaging results within the past 24 hours have been reviewed.    Assessment/Plan:     Hematochezia    86 yo female with hematochezia.   Suspect infectious cause.   Bleeding stopped and Hgb stable.   Recommend starting antibiotics for colitis.   Advance diet as tolerated.   No plan for Colonoscopy at this time.   Follow up with GI in two weeks.   Plan discussed with Dr. Nguyễn.         A-fib    Patient said Cardiology doesn't plan to restart Coumadin.             Thank you for your consult. I will sign off. Please contact us if you have any additional questions.    Kelvin Cabezas PA-C  Gastroenterology  [facility]

## 2018-01-24 NOTE — SUBJECTIVE & OBJECTIVE
Interval History: The patient reported several additional bloody bowel movements today. The patient is hemodynamically stable. Her H/H is stable at 10/34. CTA abdomen did not show any obvious bleeding source. GI following, stool studies pending, will continue to monitor. Will continue to monitor.     Review of Systems   Constitutional: Negative for activity change, appetite change, chills, diaphoresis, fatigue, fever and unexpected weight change.   HENT: Negative for congestion, drooling, facial swelling, rhinorrhea, sinus pressure, sneezing and sore throat.    Eyes: Negative for discharge, redness, itching and visual disturbance.   Respiratory: Negative for apnea, cough, choking, chest tightness, shortness of breath, wheezing and stridor.    Cardiovascular: Negative for chest pain, palpitations and leg swelling.   Gastrointestinal: Positive for anal bleeding and blood in stool. Negative for abdominal distention, abdominal pain, constipation, diarrhea, nausea and vomiting.   Genitourinary: Negative for decreased urine volume, difficulty urinating, dysuria, frequency, hematuria, pelvic pain, urgency, vaginal bleeding and vaginal discharge.   Musculoskeletal: Negative for arthralgias, back pain, gait problem, joint swelling, myalgias, neck pain and neck stiffness.   Skin: Negative for color change, pallor, rash and wound.   Neurological: Negative for dizziness, seizures, facial asymmetry, speech difficulty, weakness, light-headedness, numbness and headaches.   Psychiatric/Behavioral: Negative for agitation, confusion, hallucinations and suicidal ideas.   All other systems reviewed and are negative.    Objective:     Vital Signs (Most Recent):  Temp: 98.4 °F (36.9 °C) (01/23/18 1449)  Pulse: 70 (01/23/18 1449)  Resp: 17 (01/23/18 1449)  BP: (!) 174/70 (01/23/18 1449)  SpO2: 99 % (01/23/18 1449) Vital Signs (24h Range):  Temp:  [98 °F (36.7 °C)-98.6 °F (37 °C)] 98.4 °F (36.9 °C)  Pulse:  [69-70] 70  Resp:  [17-18]  17  SpO2:  [92 %-99 %] 99 %  BP: (141-174)/(66-74) 174/70     Weight: 52.3 kg (115 lb 4.8 oz)  Body mass index is 21.09 kg/m².    Intake/Output Summary (Last 24 hours) at 01/23/18 1808  Last data filed at 01/23/18 0900   Gross per 24 hour   Intake              240 ml   Output              550 ml   Net             -310 ml      Physical Exam   Constitutional: She is oriented to person, place, and time. She appears well-developed and well-nourished.   Elderly and frail appearing    HENT:   Head: Normocephalic and atraumatic.   Eyes: Conjunctivae and EOM are normal. Pupils are equal, round, and reactive to light.   Neck: Normal range of motion. Neck supple.   Cardiovascular: Normal rate, regular rhythm, normal heart sounds and intact distal pulses.    No murmur heard.  Pulmonary/Chest: Effort normal and breath sounds normal. No respiratory distress.   Abdominal: Soft. Bowel sounds are normal. She exhibits no distension. There is no tenderness.   Musculoskeletal: Normal range of motion. She exhibits no edema, tenderness or deformity.   Neurological: She is alert and oriented to person, place, and time. She has normal reflexes.   Skin: Skin is warm and dry. No erythema.   Psychiatric: She has a normal mood and affect. Her behavior is normal.   Nursing note and vitals reviewed.      Significant Labs: All pertinent labs within the past 24 hours have been reviewed.    Significant Imaging:   Imaging Results          CTA Abdomen and Pelvis (Final result)  Result time 01/23/18 16:42:23    Final result by Bartolome Ham MD (01/23/18 16:42:23)                 Impression:         No evidence of active GI bleed. See above for discussion.    Fluid-filled loops of nondilated bowel are seen throughout the abdomen with mild hyperemia which can be seen with enteritis. Focal thickening in mild stranding is seen within the proximal transverse colon which should be seen with focal colitis.     Severe cardiomegaly and findings of right  heart failure.    All CT scans at this facility use dose modulation, iterative reconstruction and/or weight based dosing when appropriate to reduce radiation dose to as low as reasonably achievable.      Electronically signed by: DAMON OH MD  Date:     01/23/18  Time:    16:42              Narrative:    TECHNIQUE: Using 100 cc of  Omnipaque 350 IV, and multi-detector helical CT technique, axial CT angiogram images of the abdomen were obtained from the lung bases through the pelvis. Precontrast and portal venous phase images of the abdomen and pelvis also done. 2D post-processing coronal and sagittal reconstructions of the abdominal aorta and visceral arteries performed.     Comparison: 8/8/7.    History:  Lower GI bleed    Findings:    Scarring or atelectasis is seen at the lung bases, left greater than right. Mild pleural thickening is seen within the lung bases with trace left pleural effusion. Moderate to severe cardiomegaly. Conduit or enlarged vascular structures seen supplying the distal LAD and left heart. Reflux of contrast is seen down the IVC into the hepatic veins consistent with right heart failure. Small hiatal hernia seen within the lower chest..    The liver is normal in size and attenuation with no focal hepatic abnormality. SUE is seen within the right hepatic lobe without a discrete liver mass identified. The gallbladder shows no evidence of stones or pericholecystic fluid.  There is no intra-or extrahepatic biliary ductal dilatation.    The stomach, spleen, pancreas, and adrenal glands are unremarkable.    The kidneys are normal in size and location and concentrate and excrete contrast properly on delayed imaging. Scarring is seen within the kidneys bilaterally with small cysts noted. There is no evidence of hydronephrosis.  The ureters appear normal in course and caliber without evidence of ureteral dilatation. The urinary bladder demonstrates no significant abnormality.    The abdominal  aorta is normal in course and caliber with moderate atherosclerotic calcifications. No evidence of aneurysm or dissection.    The visualized loops of small and large bowel show no evidence of obstruction or inflammation.  Fluid-filled loops of nondilated bowel are seen throughout the abdomen with mild hyperemia which can be seen with enteritis. Focal thickening in mild stranding is seen within the proximal transverse colon which should be seen with focal colitis. Nonspecific hyperemia is seen within the cecum and proximal ascending colon from images 197 through 235. No evidence of pooling of contrast on delayed images to suggest acute GI bleed. There is no ascites, free fluid, or intraperitoneal free air. There is no evidence of lymph node enlargement in the abdomen or pelvis. No    Chronic fracture deformity is seen within the left pubic bone. Moderate degenerative changes are seen throughout the lumbar and pelvis. Chronic appearing wedge deformity is seen at T10 and T11.  The extraperitoneal soft tissues are unremarkable.                             X-Ray Chest 1 View (Final result)  Result time 01/22/18 13:32:43    Final result by Bartolome Oh MD (01/22/18 13:32:43)                 Impression:     No adverse interval change      Electronically signed by: BARTOLOME OH MD  Date:     01/22/18  Time:    13:32              Narrative:    History: Bleeding    Comparison: 12/28/17    Result: Single view of the chest.      Cardiomegaly and left-sided pacing device noted in similar configuration. Scarring or small left-sided effusion. Mild atelectasis at the left lung base. No pneumothorax. Bones demonstrate degenerative changes. In comparison to the prior study, there is no adverse interval changes.

## 2018-01-24 NOTE — TELEPHONE ENCOUNTER
----- Message from Adriana Arizmendi PA-C sent at 1/24/2018 12:25 PM CST -----  In room 220, needs hosp f/u with me next week when I am in clinic with Dr. Luis Daniel Harley

## 2018-01-24 NOTE — PLAN OF CARE
Problem: Patient Care Overview  Goal: Plan of Care Review  Outcome: Ongoing (interventions implemented as appropriate)  Pt stable. Pt is NSR and Paced on the heart monitor.  Pt had no complaints of pain.  Pt had frequent bloody bowel movements this shift.  Stool sample collected and sent to lab.  Pt had CTA of abd/pelvis, pt to have possible colonoscopy.  Pt tolerating clear liquids.  Plan of care reviewed.  Pt verbalizes understanding.  Pt was free from falls or injuries during duration of shift.  Pt turned and repositioned self.  PIV intact with no redness, swelling or drainage.  Bed low, wheels locked, bed alarm on, call light in reach.  Pt instructed to call for assistance.

## 2018-01-24 NOTE — PLAN OF CARE
Problem: Patient Care Overview  Goal: Plan of Care Review  Outcome: Ongoing (interventions implemented as appropriate)  The patient has been paced on the monitor. Pt on clear liquid diet. Pt had liquid bowel movement with some blood clots. Pt is resting quietly, will continue to monitor.

## 2018-01-24 NOTE — TELEPHONE ENCOUNTER
Patient has been scheduled for 10am next Friday at [facility] for hospital follow up. Attempted to notify/in hospital.

## 2018-01-24 NOTE — PROGRESS NOTES
Pt wore Cpap for 6 hours, now ready to take off mask, Family at bedside, said alarms kept her awake, very sweet lady.

## 2018-01-24 NOTE — SUBJECTIVE & OBJECTIVE
Subjective:     Interval History:   The patient is feeling well. She reports having two loose stools today without blood. She is tolerating her diet. CTA didn't show any active bleeding. There was possible transverse colitis. C. Diff is negative. Stool positive for WBCs.     Review of Systems   Constitutional:        See Interval History for daily ROS.      Objective:     Vital Signs (Most Recent):  Temp: 97.7 °F (36.5 °C) (01/24/18 1147)  Pulse: 70 (01/24/18 1147)  Resp: 18 (01/24/18 1147)  BP: (!) 148/68 (01/24/18 1147)  SpO2: (!) 92 % (01/24/18 1147) Vital Signs (24h Range):  Temp:  [97.4 °F (36.3 °C)-98.5 °F (36.9 °C)] 97.7 °F (36.5 °C)  Pulse:  [69-70] 70  Resp:  [17-18] 18  SpO2:  [92 %-97 %] 92 %  BP: (134-162)/(63-77) 148/68     Weight: 52.3 kg (115 lb 4.8 oz) (01/23/18 0312)  Body mass index is 21.09 kg/m².      Intake/Output Summary (Last 24 hours) at 01/24/18 1542  Last data filed at 01/23/18 1800   Gross per 24 hour   Intake              240 ml   Output                0 ml   Net              240 ml       Lines/Drains/Airways     Peripheral Intravenous Line                 Peripheral IV - Single Lumen 01/22/18 1358 Right Forearm 2 days                Physical Exam   Constitutional: She is oriented to person, place, and time. She appears well-developed and well-nourished. No distress.   HENT:   Head: Normocephalic and atraumatic.   Cardiovascular: Normal rate and regular rhythm.    Pulmonary/Chest: Effort normal and breath sounds normal.   Abdominal: Soft. Bowel sounds are normal. She exhibits no distension. There is no tenderness.   Neurological: She is alert and oriented to person, place, and time. No cranial nerve deficit.   Psychiatric: Her behavior is normal.       Significant Labs:  CBC:   Recent Labs  Lab 01/24/18  0038 01/24/18  0540 01/24/18  1213   WBC 6.13 5.01 5.78   HGB 9.1* 9.1* 9.6*   HCT 30.4* 30.5* 31.9*    153 169     CMP:   Recent Labs  Lab 01/24/18  0540   GLU 83   CALCIUM 7.9*    ALBUMIN 3.2*   PROT 5.4*      K 3.9   CO2 30*      BUN 13   CREATININE 0.7   ALKPHOS 43*   ALT 15   AST 22   BILITOT 0.4     Coagulation:   Recent Labs  Lab 01/24/18  0854   INR 1.5*         Significant Imaging:  Imaging results within the past 24 hours have been reviewed.

## 2018-01-24 NOTE — HOSPITAL COURSE
1/23/18 The patient reported several additional bloody bowel movements today. The patient is hemodynamically stable. Her H/H is stable at 10/34. CTA abdomen did not show any obvious bleeding source. GI following, stool studies pending, will continue to monitor. Will continue to monitor.   1/24/18 The patient had 2 additional bowel movements today that were not bloody. H/H is stable. C diff was negative. Diarrhea is likely 2/2 colitis, will start cirpo and flagyl. Advance diet as tolerated. Will likely discharge home tomorrow.    1/25/18 The patient denies any further bleeding overnight. The patient reports that her stool is not as watery today. Her H/H remained stable overnight at 8.9/29.4. The case was discussed with GI and no endoscopy is indicated at this time. The patient was seen and examined today and deemed stable for discharge. The patient will be discharged home on a coarse of cipro/flagyl. The patient will follow up with her PCP and will follow up with GI in 2 weeks.

## 2018-01-24 NOTE — ASSESSMENT & PLAN NOTE
Currently SR  Will hold tonight's Coumadin dose 2/2 GI bleed   The patient will need referral to EP upon discharge to determine appropriate anticoagulation for PAF/PAFL

## 2018-01-24 NOTE — ASSESSMENT & PLAN NOTE
84 yo female with hematochezia.   Suspect infectious cause.   Bleeding stopped and Hgb stable.   Recommend starting antibiotics for colitis.   Advance diet as tolerated.   No plan for Colonoscopy at this time.   Follow up with GI in two weeks.   Plan discussed with Dr. Nguyễn.

## 2018-01-24 NOTE — ASSESSMENT & PLAN NOTE
The patient has a porcine valve  On coumadin with INR 3.3  Will hold Coumadin tonight 2/2 GI bleed   Check INR in am   Continue ASA

## 2018-01-24 NOTE — PROGRESS NOTES
[facility] Hospital Medicine  Progress Note    Patient Name: Jennifer Mathews  MRN: [id number]  Patient Class: IP- Inpatient   Admission Date: 1/22/2018  Length of Stay: 1 days  Attending Physician: Eben Nguyễn MD  Primary Care Provider: Deisrae Bello MD        Subjective:     Principal Problem:Gastrointestinal hemorrhage    HPI:  Jennifer Mathews is an 84 yo female with a PMH of mitral valve replacement x 3, HTN, PAF, ACS, pacemaker placement, CKD who presented to the ER today with c/o of rectal bleeding that began this morning around 6 am. The patient describes the the blood as bright red blood. The patient reports she has had no further bleeding. The patient denies any associated symptoms. The patient denies any ETOH use, as well as any NSAID use. The patient reports her last colonoscopy was 10 years ago. Patient denies fever, chills, CP, cough, calix, pnd, orthopnea, palpitations, diaphoresis, headache, blurred vision, numbness, tingling, dizziness, localized weakness, n/v/d, abdominal pain, melena, hematemesis, urinary frequency, urgency, dysuria or hematuria. The patient reports that she has had her mitral valve replaced 3 times and is on coumadin. Her PT/INR is 3.3. Her H/H is 10.3/34.0, which is close to her baseline. Will hold tonight's coumadin dose and trend H/H. If the patient has further bleeding overnight will consult GI in the AM.         Hospital Course:  1/23/18 The patient reported several additional bloody bowel movements today. The patient is hemodynamically stable. Her H/H is stable at 10/34. CTA abdomen did not show any obvious bleeding source. GI following, stool studies pending, will continue to monitor. Will continue to monitor.     Interval History: The patient reported several additional bloody bowel movements today. The patient is hemodynamically stable. Her H/H is stable at 10/34. CTA abdomen did not show any obvious bleeding source. GI following, stool studies  pending, will continue to monitor. Will continue to monitor.     Review of Systems   Constitutional: Negative for activity change, appetite change, chills, diaphoresis, fatigue, fever and unexpected weight change.   HENT: Negative for congestion, drooling, facial swelling, rhinorrhea, sinus pressure, sneezing and sore throat.    Eyes: Negative for discharge, redness, itching and visual disturbance.   Respiratory: Negative for apnea, cough, choking, chest tightness, shortness of breath, wheezing and stridor.    Cardiovascular: Negative for chest pain, palpitations and leg swelling.   Gastrointestinal: Positive for anal bleeding and blood in stool. Negative for abdominal distention, abdominal pain, constipation, diarrhea, nausea and vomiting.   Genitourinary: Negative for decreased urine volume, difficulty urinating, dysuria, frequency, hematuria, pelvic pain, urgency, vaginal bleeding and vaginal discharge.   Musculoskeletal: Negative for arthralgias, back pain, gait problem, joint swelling, myalgias, neck pain and neck stiffness.   Skin: Negative for color change, pallor, rash and wound.   Neurological: Negative for dizziness, seizures, facial asymmetry, speech difficulty, weakness, light-headedness, numbness and headaches.   Psychiatric/Behavioral: Negative for agitation, confusion, hallucinations and suicidal ideas.   All other systems reviewed and are negative.    Objective:     Vital Signs (Most Recent):  Temp: 98.4 °F (36.9 °C) (01/23/18 1449)  Pulse: 70 (01/23/18 1449)  Resp: 17 (01/23/18 1449)  BP: (!) 174/70 (01/23/18 1449)  SpO2: 99 % (01/23/18 1449) Vital Signs (24h Range):  Temp:  [98 °F (36.7 °C)-98.6 °F (37 °C)] 98.4 °F (36.9 °C)  Pulse:  [69-70] 70  Resp:  [17-18] 17  SpO2:  [92 %-99 %] 99 %  BP: (141-174)/(66-74) 174/70     Weight: 52.3 kg (115 lb 4.8 oz)  Body mass index is 21.09 kg/m².    Intake/Output Summary (Last 24 hours) at 01/23/18 1808  Last data filed at 01/23/18 0900   Gross per 24 hour    Intake              240 ml   Output              550 ml   Net             -310 ml      Physical Exam   Constitutional: She is oriented to person, place, and time. She appears well-developed and well-nourished.   Elderly and frail appearing    HENT:   Head: Normocephalic and atraumatic.   Eyes: Conjunctivae and EOM are normal. Pupils are equal, round, and reactive to light.   Neck: Normal range of motion. Neck supple.   Cardiovascular: Normal rate, regular rhythm, normal heart sounds and intact distal pulses.    No murmur heard.  Pulmonary/Chest: Effort normal and breath sounds normal. No respiratory distress.   Abdominal: Soft. Bowel sounds are normal. She exhibits no distension. There is no tenderness.   Musculoskeletal: Normal range of motion. She exhibits no edema, tenderness or deformity.   Neurological: She is alert and oriented to person, place, and time. She has normal reflexes.   Skin: Skin is warm and dry. No erythema.   Psychiatric: She has a normal mood and affect. Her behavior is normal.   Nursing note and vitals reviewed.      Significant Labs: All pertinent labs within the past 24 hours have been reviewed.    Significant Imaging:   Imaging Results          CTA Abdomen and Pelvis (Final result)  Result time 01/23/18 16:42:23    Final result by Bartolome Ham MD (01/23/18 16:42:23)                 Impression:         No evidence of active GI bleed. See above for discussion.    Fluid-filled loops of nondilated bowel are seen throughout the abdomen with mild hyperemia which can be seen with enteritis. Focal thickening in mild stranding is seen within the proximal transverse colon which should be seen with focal colitis.     Severe cardiomegaly and findings of right heart failure.    All CT scans at this facility use dose modulation, iterative reconstruction and/or weight based dosing when appropriate to reduce radiation dose to as low as reasonably achievable.      Electronically signed by: BARTOLOME  ADRIANO CALI  Date:     01/23/18  Time:    16:42              Narrative:    TECHNIQUE: Using 100 cc of  Omnipaque 350 IV, and multi-detector helical CT technique, axial CT angiogram images of the abdomen were obtained from the lung bases through the pelvis. Precontrast and portal venous phase images of the abdomen and pelvis also done. 2D post-processing coronal and sagittal reconstructions of the abdominal aorta and visceral arteries performed.     Comparison: 8/8/7.    History:  Lower GI bleed    Findings:    Scarring or atelectasis is seen at the lung bases, left greater than right. Mild pleural thickening is seen within the lung bases with trace left pleural effusion. Moderate to severe cardiomegaly. Conduit or enlarged vascular structures seen supplying the distal LAD and left heart. Reflux of contrast is seen down the IVC into the hepatic veins consistent with right heart failure. Small hiatal hernia seen within the lower chest..    The liver is normal in size and attenuation with no focal hepatic abnormality. SUE is seen within the right hepatic lobe without a discrete liver mass identified. The gallbladder shows no evidence of stones or pericholecystic fluid.  There is no intra-or extrahepatic biliary ductal dilatation.    The stomach, spleen, pancreas, and adrenal glands are unremarkable.    The kidneys are normal in size and location and concentrate and excrete contrast properly on delayed imaging. Scarring is seen within the kidneys bilaterally with small cysts noted. There is no evidence of hydronephrosis.  The ureters appear normal in course and caliber without evidence of ureteral dilatation. The urinary bladder demonstrates no significant abnormality.    The abdominal aorta is normal in course and caliber with moderate atherosclerotic calcifications. No evidence of aneurysm or dissection.    The visualized loops of small and large bowel show no evidence of obstruction or inflammation.  Fluid-filled  loops of nondilated bowel are seen throughout the abdomen with mild hyperemia which can be seen with enteritis. Focal thickening in mild stranding is seen within the proximal transverse colon which should be seen with focal colitis. Nonspecific hyperemia is seen within the cecum and proximal ascending colon from images 197 through 235. No evidence of pooling of contrast on delayed images to suggest acute GI bleed. There is no ascites, free fluid, or intraperitoneal free air. There is no evidence of lymph node enlargement in the abdomen or pelvis. No    Chronic fracture deformity is seen within the left pubic bone. Moderate degenerative changes are seen throughout the lumbar and pelvis. Chronic appearing wedge deformity is seen at T10 and T11.  The extraperitoneal soft tissues are unremarkable.                             X-Ray Chest 1 View (Final result)  Result time 01/22/18 13:32:43    Final result by Bartolome Oh MD (01/22/18 13:32:43)                 Impression:     No adverse interval change      Electronically signed by: BARTOLOME OH MD  Date:     01/22/18  Time:    13:32              Narrative:    History: Bleeding    Comparison: 12/28/17    Result: Single view of the chest.      Cardiomegaly and left-sided pacing device noted in similar configuration. Scarring or small left-sided effusion. Mild atelectasis at the left lung base. No pneumothorax. Bones demonstrate degenerative changes. In comparison to the prior study, there is no adverse interval changes.                                 Assessment/Plan:      * Gastrointestinal hemorrhage    No further bleeding since admission   CBC every 8 hours  Will transfuse if indicated  Hold coumadin overnight   Several additional bloody bowel movements today  GI consulted, will monitor   Stool studies pending   CTA abd did not show any obvious source of bleeding        S/P MVR (mitral valve replacement)    The patient has a porcine valve  On coumadin with INR  3.3  Will hold Coumadin tonight 2/2 GI bleed   Check INR in am   Continue ASA        A-fib    Currently SR  Will hold tonight's Coumadin dose 2/2 GI bleed   The patient will need referral to EP upon discharge to determine appropriate anticoagulation for PAF/PAFL        S/P placement of cardiac pacemaker              Hypertension    Monitor vital signs  Resume home meds           LEV (obstructive sleep apnea)    CPAP QHS             VTE Risk Mitigation     None              Haile Morocho NP  Department of Hospital Medicine   [facility]

## 2018-01-25 VITALS
TEMPERATURE: 99 F | WEIGHT: 116 LBS | SYSTOLIC BLOOD PRESSURE: 161 MMHG | RESPIRATION RATE: 16 BRPM | BODY MASS INDEX: 21.35 KG/M2 | HEART RATE: 70 BPM | HEIGHT: 62 IN | OXYGEN SATURATION: 92 % | DIASTOLIC BLOOD PRESSURE: 71 MMHG

## 2018-01-25 PROBLEM — K92.2 GASTROINTESTINAL HEMORRHAGE: Status: RESOLVED | Noted: 2018-01-22 | Resolved: 2018-01-25

## 2018-01-25 PROBLEM — K92.1 HEMATOCHEZIA: Status: RESOLVED | Noted: 2018-01-23 | Resolved: 2018-01-25

## 2018-01-25 LAB
ALBUMIN SERPL BCP-MCNC: 3 G/DL
ALP SERPL-CCNC: 40 U/L
ALT SERPL W/O P-5'-P-CCNC: 13 U/L
ANION GAP SERPL CALC-SCNC: 7 MMOL/L
AST SERPL-CCNC: 22 U/L
BASOPHILS # BLD AUTO: 0.05 K/UL
BASOPHILS # BLD AUTO: 0.05 K/UL
BASOPHILS NFR BLD: 0.8 %
BASOPHILS NFR BLD: 0.8 %
BILIRUB SERPL-MCNC: 0.3 MG/DL
BUN SERPL-MCNC: 11 MG/DL
CALCIUM SERPL-MCNC: 7.5 MG/DL
CHLORIDE SERPL-SCNC: 101 MMOL/L
CO2 SERPL-SCNC: 29 MMOL/L
CREAT SERPL-MCNC: 0.8 MG/DL
DIFFERENTIAL METHOD: ABNORMAL
DIFFERENTIAL METHOD: ABNORMAL
E COLI SXT1 STL QL IA: NEGATIVE
E COLI SXT2 STL QL IA: NEGATIVE
EOSINOPHIL # BLD AUTO: 0.1 K/UL
EOSINOPHIL # BLD AUTO: 0.1 K/UL
EOSINOPHIL NFR BLD: 1.8 %
EOSINOPHIL NFR BLD: 2.1 %
ERYTHROCYTE [DISTWIDTH] IN BLOOD BY AUTOMATED COUNT: 19.5 %
ERYTHROCYTE [DISTWIDTH] IN BLOOD BY AUTOMATED COUNT: 19.5 %
EST. GFR  (AFRICAN AMERICAN): >60 ML/MIN/1.73 M^2
EST. GFR  (NON AFRICAN AMERICAN): >60 ML/MIN/1.73 M^2
GLUCOSE SERPL-MCNC: 130 MG/DL
HCT VFR BLD AUTO: 29.4 %
HCT VFR BLD AUTO: 30.2 %
HGB BLD-MCNC: 8.9 G/DL
HGB BLD-MCNC: 9.1 G/DL
LYMPHOCYTES # BLD AUTO: 1.6 K/UL
LYMPHOCYTES # BLD AUTO: 2 K/UL
LYMPHOCYTES NFR BLD: 25.5 %
LYMPHOCYTES NFR BLD: 30.3 %
MCH RBC QN AUTO: 27.7 PG
MCH RBC QN AUTO: 27.8 PG
MCHC RBC AUTO-ENTMCNC: 30.1 G/DL
MCHC RBC AUTO-ENTMCNC: 30.3 G/DL
MCV RBC AUTO: 92 FL
MCV RBC AUTO: 92 FL
MONOCYTES # BLD AUTO: 0.7 K/UL
MONOCYTES # BLD AUTO: 0.7 K/UL
MONOCYTES NFR BLD: 10.1 %
MONOCYTES NFR BLD: 11.3 %
NEUTROPHILS # BLD AUTO: 3.7 K/UL
NEUTROPHILS # BLD AUTO: 3.8 K/UL
NEUTROPHILS NFR BLD: 56.7 %
NEUTROPHILS NFR BLD: 60.6 %
O+P STL TRI STN: NORMAL
PLATELET # BLD AUTO: 151 K/UL
PLATELET # BLD AUTO: 168 K/UL
PMV BLD AUTO: 9.7 FL
PMV BLD AUTO: 9.8 FL
POTASSIUM SERPL-SCNC: 3.6 MMOL/L
PROT SERPL-MCNC: 5.3 G/DL
RBC # BLD AUTO: 3.2 M/UL
RBC # BLD AUTO: 3.28 M/UL
SODIUM SERPL-SCNC: 137 MMOL/L
WBC # BLD AUTO: 6.08 K/UL
WBC # BLD AUTO: 6.66 K/UL

## 2018-01-25 PROCEDURE — 85025 COMPLETE CBC W/AUTO DIFF WBC: CPT | Mod: 91

## 2018-01-25 PROCEDURE — 63600175 PHARM REV CODE 636 W HCPCS: Performed by: INTERNAL MEDICINE

## 2018-01-25 PROCEDURE — 80053 COMPREHEN METABOLIC PANEL: CPT

## 2018-01-25 PROCEDURE — 25000003 PHARM REV CODE 250: Performed by: NURSE PRACTITIONER

## 2018-01-25 PROCEDURE — 25000003 PHARM REV CODE 250: Performed by: INTERNAL MEDICINE

## 2018-01-25 PROCEDURE — S0030 INJECTION, METRONIDAZOLE: HCPCS | Performed by: INTERNAL MEDICINE

## 2018-01-25 PROCEDURE — 36415 COLL VENOUS BLD VENIPUNCTURE: CPT

## 2018-01-25 RX ORDER — CIPROFLOXACIN 500 MG/1
500 TABLET ORAL 2 TIMES DAILY
Qty: 14 TABLET | Refills: 0 | Status: SHIPPED | OUTPATIENT
Start: 2018-01-25 | End: 2018-02-03

## 2018-01-25 RX ORDER — METRONIDAZOLE 500 MG/1
500 TABLET ORAL 3 TIMES DAILY
Qty: 27 TABLET | Refills: 0 | Status: SHIPPED | OUTPATIENT
Start: 2018-01-25 | End: 2018-02-03

## 2018-01-25 RX ADMIN — ASPIRIN 81 MG: 81 TABLET, COATED ORAL at 08:01

## 2018-01-25 RX ADMIN — VALSARTAN 160 MG: 80 TABLET, FILM COATED ORAL at 08:01

## 2018-01-25 RX ADMIN — FUROSEMIDE 40 MG: 40 TABLET ORAL at 08:01

## 2018-01-25 RX ADMIN — CIPROFLOXACIN 400 MG: 2 INJECTION, SOLUTION INTRAVENOUS at 04:01

## 2018-01-25 RX ADMIN — GABAPENTIN 100 MG: 100 CAPSULE ORAL at 08:01

## 2018-01-25 RX ADMIN — METRONIDAZOLE 500 MG: 500 INJECTION, SOLUTION INTRAVENOUS at 01:01

## 2018-01-25 RX ADMIN — CARVEDILOL 25 MG: 12.5 TABLET, FILM COATED ORAL at 08:01

## 2018-01-25 RX ADMIN — METRONIDAZOLE 500 MG: 500 INJECTION, SOLUTION INTRAVENOUS at 08:01

## 2018-01-25 RX ADMIN — AMIODARONE HYDROCHLORIDE 200 MG: 200 TABLET ORAL at 08:01

## 2018-01-25 RX ADMIN — DIGOXIN 125 MCG: 125 TABLET ORAL at 08:01

## 2018-01-25 NOTE — PLAN OF CARE
Problem: Fall Risk (Adult)  Goal: Identify Related Risk Factors and Signs and Symptoms  Related risk factors and signs and symptoms are identified upon initiation of Human Response Clinical Practice Guideline (CPG)   Outcome: Ongoing (interventions implemented as appropriate)  Reviewed plan of care with patient and daughter, verbalized understanding. No bleeding noted this shift. Antibiotics infused as ordered, tolerated well. No falls, incidents this shift. Bed in lowest position, call light within reach, will continue to monitor.

## 2018-01-25 NOTE — PLAN OF CARE
Problem: Patient Care Overview  Goal: Plan of Care Review  Outcome: Outcome(s) achieved Date Met: 01/25/18  Patient ready for discharge.  Patient remained free of falls, accidents, and trama during the day shift.  Continue to monitor.

## 2018-01-25 NOTE — PLAN OF CARE
Problem: Patient Care Overview  Goal: Plan of Care Review  Outcome: Ongoing (interventions implemented as appropriate)  Pt stable. Pt is V-paced on the heart monitor.  Pt had no complaints of pain.  Pt stated bowel movements have slowed down and have become soft and brown.  Pt will be given regular/gi soft diet to see how pt tolerates.  Cipro and flagyl started this shift.  Plan of care reviewed.  Pt verbalizes understanding.  Pt was free from falls or injuries during duration of shift.  Pt turned and repositioned self.  PIV intact with no redness, swelling or drainage.  Bed low, wheels locked, bed alarm on, call light in reach.  Pt instructed to call for assistance.

## 2018-01-25 NOTE — ASSESSMENT & PLAN NOTE
No further bleeding since admission   CBC every 8 hours  Will transfuse if indicated  Hold coumadin overnight   Several additional bloody bowel movements today  GI following, no indication for a colonoscopy today. Will follow up with GI as an outpatient in 2 weeks  Stool studies pending   CTA abd did not show any obvious source of bleeding

## 2018-01-25 NOTE — DISCHARGE SUMMARY
[facility] Hospital Medicine  Discharge Summary      Patient Name: Jennifer Mathews  MRN: [id number]  Admission Date: 1/22/2018  Hospital Length of Stay: 3 days  Discharge Date and Time:  01/25/2018 4:49 PM  Attending Physician: No att. providers found   Discharging Provider: Haile Morocho NP  Primary Care Provider: Desirae Bello MD      HPI:   Jennifer Mathews is an 84 yo female with a PMH of mitral valve replacement x 3, HTN, PAF, ACS, pacemaker placement, CKD who presented to the ER today with c/o of rectal bleeding that began this morning around 6 am. The patient describes the the blood as bright red blood. The patient reports she has had no further bleeding. The patient denies any associated symptoms. The patient denies any ETOH use, as well as any NSAID use. The patient reports her last colonoscopy was 10 years ago. Patient denies fever, chills, CP, cough, calix, pnd, orthopnea, palpitations, diaphoresis, headache, blurred vision, numbness, tingling, dizziness, localized weakness, n/v/d, abdominal pain, melena, hematemesis, urinary frequency, urgency, dysuria or hematuria. The patient reports that she has had her mitral valve replaced 3 times and is on coumadin. Her PT/INR is 3.3. Her H/H is 10.3/34.0, which is close to her baseline. Will hold tonight's coumadin dose and trend H/H. If the patient has further bleeding overnight will consult GI in the AM.         * No surgery found *      Hospital Course:   1/23/18 The patient reported several additional bloody bowel movements today. The patient is hemodynamically stable. Her H/H is stable at 10/34. CTA abdomen did not show any obvious bleeding source. GI following, stool studies pending, will continue to monitor. Will continue to monitor.   1/24/18 The patient had 2 additional bowel movements today that were not bloody. H/H is stable. C diff was negative. Diarrhea is likely 2/2 colitis, will start cirpo and flagyl. Advance diet as tolerated.  Will likely discharge home tomorrow.    1/25/18 The patient denies any further bleeding overnight. The patient reports that her stool is not as watery today. Her H/H remained stable overnight at 8.9/29.4. The case was discussed with GI and no endoscopy is indicated at this time. The patient was seen and examined today and deemed stable for discharge. The patient will be discharged home on a coarse of cipro/flagyl. The patient will follow up with her PCP and will follow up with GI in 2 weeks.      Consults:   Consults         Status Ordering Provider     Inpatient consult to Cardiology  Once     Provider:  Juliano Sevilla MD    Completed GEORGIE SCHWARTZ     Inpatient consult to Gastroenterology  Once     Provider:  Timothy Arizmendi III, MD    Completed GEORGIE SCHWARTZ          No new Assessment & Plan notes have been filed under this hospital service since the last note was generated.  Service: Hospital Medicine    Final Active Diagnoses:    Diagnosis Date Noted POA    S/P MVR (mitral valve replacement) [Z95.2] 01/30/2014 Not Applicable    Chronic combined systolic and diastolic congestive heart failure [I50.42]  Yes    Enterocolitis [K52.9] 01/23/2018 Yes    A-fib [I48.91] 10/08/2014 Yes    S/P placement of cardiac pacemaker [Z95.0] 01/30/2014 Yes    Hypertension [I10]  Yes    LEV (obstructive sleep apnea) [G47.33] 09/30/2013 Yes     Chronic      Problems Resolved During this Admission:    Diagnosis Date Noted Date Resolved POA    PRINCIPAL PROBLEM:  Gastrointestinal hemorrhage [K92.2] 01/22/2018 01/25/2018 Yes    Hematochezia [K92.1] 01/23/2018 01/25/2018 Yes       Discharged Condition: stable    Disposition: Home or Self Care    Follow Up:  Follow-up Information     Desirae Bello MD. Schedule an appointment as soon as possible for a visit in 3 days.    Specialty:  Family Medicine  Contact information:  [address] [place] [address] [address] Nyla MORALEZ [address] [phone]             RAUL Arzate In 2  weeks.    Specialty:  Gastroenterology  Contact information:  [address]  [place] [address]  [phone]             Schedule an appointment as soon as possible for a visit with Haile Fontana MD.    Specialties:  Electrophysiology, Cardiovascular Disease  Contact information:  [address]  [place] [address]  [phone]                 Patient Instructions:   No discharge procedures on file.    Significant Diagnostic Studies:   Imaging Results          CTA Abdomen and Pelvis (Final result)  Result time 01/23/18 16:42:23    Final result by Bartolome Oh MD (01/23/18 16:42:23)                 Impression:         No evidence of active GI bleed. See above for discussion.    Fluid-filled loops of nondilated bowel are seen throughout the abdomen with mild hyperemia which can be seen with enteritis. Focal thickening in mild stranding is seen within the proximal transverse colon which should be seen with focal colitis.     Severe cardiomegaly and findings of right heart failure.    All CT scans at this facility use dose modulation, iterative reconstruction and/or weight based dosing when appropriate to reduce radiation dose to as low as reasonably achievable.      Electronically signed by: BARTOLOME OH MD  Date:     01/23/18  Time:    16:42              Narrative:    TECHNIQUE: Using 100 cc of  Omnipaque 350 IV, and multi-detector helical CT technique, axial CT angiogram images of the abdomen were obtained from the lung bases through the pelvis. Precontrast and portal venous phase images of the abdomen and pelvis also done. 2D post-processing coronal and sagittal reconstructions of the abdominal aorta and visceral arteries performed.     Comparison: 8/8/7.    History:  Lower GI bleed    Findings:    Scarring or atelectasis is seen at the lung bases, left greater than right. Mild pleural thickening is seen within the lung bases with trace left pleural effusion. Moderate to severe cardiomegaly.  Conduit or enlarged vascular structures seen supplying the distal LAD and left heart. Reflux of contrast is seen down the IVC into the hepatic veins consistent with right heart failure. Small hiatal hernia seen within the lower chest..    The liver is normal in size and attenuation with no focal hepatic abnormality. SUE is seen within the right hepatic lobe without a discrete liver mass identified. The gallbladder shows no evidence of stones or pericholecystic fluid.  There is no intra-or extrahepatic biliary ductal dilatation.    The stomach, spleen, pancreas, and adrenal glands are unremarkable.    The kidneys are normal in size and location and concentrate and excrete contrast properly on delayed imaging. Scarring is seen within the kidneys bilaterally with small cysts noted. There is no evidence of hydronephrosis.  The ureters appear normal in course and caliber without evidence of ureteral dilatation. The urinary bladder demonstrates no significant abnormality.    The abdominal aorta is normal in course and caliber with moderate atherosclerotic calcifications. No evidence of aneurysm or dissection.    The visualized loops of small and large bowel show no evidence of obstruction or inflammation.  Fluid-filled loops of nondilated bowel are seen throughout the abdomen with mild hyperemia which can be seen with enteritis. Focal thickening in mild stranding is seen within the proximal transverse colon which should be seen with focal colitis. Nonspecific hyperemia is seen within the cecum and proximal ascending colon from images 197 through 235. No evidence of pooling of contrast on delayed images to suggest acute GI bleed. There is no ascites, free fluid, or intraperitoneal free air. There is no evidence of lymph node enlargement in the abdomen or pelvis. No    Chronic fracture deformity is seen within the left pubic bone. Moderate degenerative changes are seen throughout the lumbar and pelvis. Chronic appearing  wedge deformity is seen at T10 and T11.  The extraperitoneal soft tissues are unremarkable.                             X-Ray Chest 1 View (Final result)  Result time 01/22/18 13:32:43    Final result by Bartolome Oh MD (01/22/18 13:32:43)                 Impression:     No adverse interval change      Electronically signed by: BARTOLOME OH MD  Date:     01/22/18  Time:    13:32              Narrative:    History: Bleeding    Comparison: 12/28/17    Result: Single view of the chest.      Cardiomegaly and left-sided pacing device noted in similar configuration. Scarring or small left-sided effusion. Mild atelectasis at the left lung base. No pneumothorax. Bones demonstrate degenerative changes. In comparison to the prior study, there is no adverse interval changes.                                 Pending Diagnostic Studies:     None         Medications:  Reconciled Home Medications:   Discharge Medication List as of 1/25/2018 10:55 AM      START taking these medications    Details   ciprofloxacin HCl (CIPRO) 500 MG tablet Take 1 tablet (500 mg total) by mouth 2 (two) times daily., Starting Thu 1/25/2018, Until Sat 2/3/2018, Normal      Lactobac 40-Bifido 3-S.thermop (PROBIOTIC) 100 billion cell Cap Take 1 capsule by mouth once daily., Starting Thu 1/25/2018, Normal      metroNIDAZOLE (FLAGYL) 500 MG tablet Take 1 tablet (500 mg total) by mouth 3 (three) times daily., Starting Thu 1/25/2018, Until Sat 2/3/2018, Normal         CONTINUE these medications which have NOT CHANGED    Details   acetaminophen (TYLENOL EXTRA STRENGTH) 325 MG tablet Take 2 tablets (650 mg total) by mouth every 8 (eight) hours., Starting 8/8/2014, Until Discontinued, OTC      allopurinol (ZYLOPRIM) 100 MG tablet Take 1.5 tablets (150 mg total) by mouth once daily. X 4 weeks, then increase to 2 tablets (200mg) by mouth daily., Starting Tue 8/15/2017, Normal      amiodarone (PACERONE) 200 MG Tab Take 1 tablet (200 mg total) by mouth once  daily., Starting Fri 12/15/2017, Normal      aspirin (ECOTRIN) 81 MG EC tablet Take 81 mg by mouth once daily., Until Discontinued, Historical Med      calcium carbonate (OS-SHERRY) 600 mg calcium (1,500 mg) Tab Take 600 mg by mouth once., Historical Med      carvedilol (COREG) 25 MG tablet Take 1 tablet (25 mg total) by mouth 2 (two) times daily with meals., Starting Mon 1/22/2018, Normal      colchicine 0.6 mg tablet Take 1 tablet (0.6 mg total) by mouth once daily., Starting Fri 1/19/2018, Until Sat 1/19/2019, Normal      cranberry 1,000 mg Cap Take 1 capsule by mouth Daily., Starting 5/10/2012, Until Discontinued, Historical Med      digoxin (LANOXIN) 125 mcg tablet Take 1 tablet (125 mcg total) by mouth once daily. TAKE 1 TABLET (0.125 MG TOTAL) BY MOUTH ONCE DAILY., Starting Mon 1/22/2018, Normal      diphenhydrAMINE (BENADRYL) 25 mg capsule Take 25 mg by mouth every 6 (six) hours as needed for Itching., Until Discontinued, Historical Med      furosemide (LASIX) 40 MG tablet Take 1 tablet (40 mg total) by mouth once daily., Starting Tue 11/21/2017, Normal      gabapentin (NEURONTIN) 100 MG capsule TAKE ONE CAPSULE BY MOUTH TWO TIMES DAILY, Normal      MULTIVITAMIN ORAL Take 1 tablet by mouth Daily., Until Discontinued, Historical Med      valsartan (DIOVAN) 80 MG tablet Take 2 tablets (160 mg total) by mouth 2 (two) times daily., Starting Wed 7/12/2017, Normal         STOP taking these medications       warfarin (COUMADIN) 2.5 MG tablet Comments:   Reason for Stopping:               Indwelling Lines/Drains at time of discharge:   Lines/Drains/Airways          No matching active lines, drains, or airways          Time spent on the discharge of patient: > 30 minutes  Patient was seen and examined on the date of discharge and determined to be suitable for discharge.         Haile Morocho NP  Department of Hospital Medicine  [facility]

## 2018-01-25 NOTE — SUBJECTIVE & OBJECTIVE
Interval History: The patient had 2 additional bowel movements today that were not bloody. H/H is stable. C diff was negative. Diarrhea is likely 2/2 colitis, will start cirpo and flagyl. Advance diet as tolerated. Will likely discharge home tomorrow.        Review of Systems   Constitutional: Negative for activity change, appetite change, chills, diaphoresis, fatigue, fever and unexpected weight change.   HENT: Negative for congestion, drooling, facial swelling, rhinorrhea, sinus pressure, sneezing and sore throat.    Eyes: Negative for discharge, redness, itching and visual disturbance.   Respiratory: Negative for apnea, cough, choking, chest tightness, shortness of breath, wheezing and stridor.    Cardiovascular: Negative for chest pain, palpitations and leg swelling.   Gastrointestinal: Positive for anal bleeding and blood in stool. Negative for abdominal distention, abdominal pain, constipation, diarrhea, nausea and vomiting.   Genitourinary: Negative for decreased urine volume, difficulty urinating, dysuria, frequency, hematuria, pelvic pain, urgency, vaginal bleeding and vaginal discharge.   Musculoskeletal: Negative for arthralgias, back pain, gait problem, joint swelling, myalgias, neck pain and neck stiffness.   Skin: Negative for color change, pallor, rash and wound.   Neurological: Negative for dizziness, seizures, facial asymmetry, speech difficulty, weakness, light-headedness, numbness and headaches.   Psychiatric/Behavioral: Negative for agitation, confusion, hallucinations and suicidal ideas.   All other systems reviewed and are negative.    Objective:     Vital Signs (Most Recent):  Temp: 98.8 °F (37.1 °C) (01/24/18 1615)  Pulse: 69 (01/24/18 1700)  Resp: 18 (01/24/18 1615)  BP: (!) 152/70 (01/24/18 1615)  SpO2: (!) 94 % (01/24/18 1615) Vital Signs (24h Range):  Temp:  [97.4 °F (36.3 °C)-98.8 °F (37.1 °C)] 98.8 °F (37.1 °C)  Pulse:  [69-70] 69  Resp:  [17-18] 18  SpO2:  [92 %-97 %] 94 %  BP:  (134-162)/(63-77) 152/70     Weight: 52.3 kg (115 lb 4.8 oz)  Body mass index is 21.09 kg/m².  No intake or output data in the 24 hours ending 01/24/18 1831   Physical Exam   Constitutional: She is oriented to person, place, and time. She appears well-developed and well-nourished.   Elderly and frail appearing    HENT:   Head: Normocephalic and atraumatic.   Eyes: Conjunctivae and EOM are normal. Pupils are equal, round, and reactive to light.   Neck: Normal range of motion. Neck supple.   Cardiovascular: Normal rate, regular rhythm, normal heart sounds and intact distal pulses.    No murmur heard.  Pulmonary/Chest: Effort normal and breath sounds normal. No respiratory distress.   Abdominal: Soft. Bowel sounds are normal. She exhibits no distension. There is no tenderness.   Musculoskeletal: Normal range of motion. She exhibits no edema, tenderness or deformity.   Neurological: She is alert and oriented to person, place, and time. She has normal reflexes.   Skin: Skin is warm and dry. No erythema.   Psychiatric: She has a normal mood and affect. Her behavior is normal.   Nursing note and vitals reviewed.      Significant Labs: All pertinent labs within the past 24 hours have been reviewed.    Significant Imaging:   Imaging Results          CTA Abdomen and Pelvis (Final result)  Result time 01/23/18 16:42:23    Final result by Bartolome Ham MD (01/23/18 16:42:23)                 Impression:         No evidence of active GI bleed. See above for discussion.    Fluid-filled loops of nondilated bowel are seen throughout the abdomen with mild hyperemia which can be seen with enteritis. Focal thickening in mild stranding is seen within the proximal transverse colon which should be seen with focal colitis.     Severe cardiomegaly and findings of right heart failure.    All CT scans at this facility use dose modulation, iterative reconstruction and/or weight based dosing when appropriate to reduce radiation dose to as  low as reasonably achievable.      Electronically signed by: DAMON OH MD  Date:     01/23/18  Time:    16:42              Narrative:    TECHNIQUE: Using 100 cc of  Omnipaque 350 IV, and multi-detector helical CT technique, axial CT angiogram images of the abdomen were obtained from the lung bases through the pelvis. Precontrast and portal venous phase images of the abdomen and pelvis also done. 2D post-processing coronal and sagittal reconstructions of the abdominal aorta and visceral arteries performed.     Comparison: 8/8/7.    History:  Lower GI bleed    Findings:    Scarring or atelectasis is seen at the lung bases, left greater than right. Mild pleural thickening is seen within the lung bases with trace left pleural effusion. Moderate to severe cardiomegaly. Conduit or enlarged vascular structures seen supplying the distal LAD and left heart. Reflux of contrast is seen down the IVC into the hepatic veins consistent with right heart failure. Small hiatal hernia seen within the lower chest..    The liver is normal in size and attenuation with no focal hepatic abnormality. SUE is seen within the right hepatic lobe without a discrete liver mass identified. The gallbladder shows no evidence of stones or pericholecystic fluid.  There is no intra-or extrahepatic biliary ductal dilatation.    The stomach, spleen, pancreas, and adrenal glands are unremarkable.    The kidneys are normal in size and location and concentrate and excrete contrast properly on delayed imaging. Scarring is seen within the kidneys bilaterally with small cysts noted. There is no evidence of hydronephrosis.  The ureters appear normal in course and caliber without evidence of ureteral dilatation. The urinary bladder demonstrates no significant abnormality.    The abdominal aorta is normal in course and caliber with moderate atherosclerotic calcifications. No evidence of aneurysm or dissection.    The visualized loops of small and large bowel  show no evidence of obstruction or inflammation.  Fluid-filled loops of nondilated bowel are seen throughout the abdomen with mild hyperemia which can be seen with enteritis. Focal thickening in mild stranding is seen within the proximal transverse colon which should be seen with focal colitis. Nonspecific hyperemia is seen within the cecum and proximal ascending colon from images 197 through 235. No evidence of pooling of contrast on delayed images to suggest acute GI bleed. There is no ascites, free fluid, or intraperitoneal free air. There is no evidence of lymph node enlargement in the abdomen or pelvis. No    Chronic fracture deformity is seen within the left pubic bone. Moderate degenerative changes are seen throughout the lumbar and pelvis. Chronic appearing wedge deformity is seen at T10 and T11.  The extraperitoneal soft tissues are unremarkable.                             X-Ray Chest 1 View (Final result)  Result time 01/22/18 13:32:43    Final result by Bartolome Oh MD (01/22/18 13:32:43)                 Impression:     No adverse interval change      Electronically signed by: BARTOLOME OH MD  Date:     01/22/18  Time:    13:32              Narrative:    History: Bleeding    Comparison: 12/28/17    Result: Single view of the chest.      Cardiomegaly and left-sided pacing device noted in similar configuration. Scarring or small left-sided effusion. Mild atelectasis at the left lung base. No pneumothorax. Bones demonstrate degenerative changes. In comparison to the prior study, there is no adverse interval changes.

## 2018-01-25 NOTE — PROGRESS NOTES
[facility] Hospital Medicine  Progress Note    Patient Name: Jennifer Mathews  MRN: [id number]  Patient Class: IP- Inpatient   Admission Date: 1/22/2018  Length of Stay: 2 days  Attending Physician: Eben Nguyễn MD  Primary Care Provider: Desirae Bello MD        Subjective:     Principal Problem:Gastrointestinal hemorrhage    HPI:  Jennfier Mathews is an 86 yo female with a PMH of mitral valve replacement x 3, HTN, PAF, ACS, pacemaker placement, CKD who presented to the ER today with c/o of rectal bleeding that began this morning around 6 am. The patient describes the the blood as bright red blood. The patient reports she has had no further bleeding. The patient denies any associated symptoms. The patient denies any ETOH use, as well as any NSAID use. The patient reports her last colonoscopy was 10 years ago. Patient denies fever, chills, CP, cough, calix, pnd, orthopnea, palpitations, diaphoresis, headache, blurred vision, numbness, tingling, dizziness, localized weakness, n/v/d, abdominal pain, melena, hematemesis, urinary frequency, urgency, dysuria or hematuria. The patient reports that she has had her mitral valve replaced 3 times and is on coumadin. Her PT/INR is 3.3. Her H/H is 10.3/34.0, which is close to her baseline. Will hold tonight's coumadin dose and trend H/H. If the patient has further bleeding overnight will consult GI in the AM.         Hospital Course:  1/23/18 The patient reported several additional bloody bowel movements today. The patient is hemodynamically stable. Her H/H is stable at 10/34. CTA abdomen did not show any obvious bleeding source. GI following, stool studies pending, will continue to monitor. Will continue to monitor.   1/24/18 The patient had 2 additional bowel movements today that were not bloody. H/H is stable. C diff was negative. Diarrhea is likely 2/2 colitis, will start cirpo and flagyl. Advance diet as tolerated. Will likely discharge home tomorrow.       Interval History: The patient had 2 additional bowel movements today that were not bloody. H/H is stable. C diff was negative. Diarrhea is likely 2/2 colitis, will start cirpo and flagyl. Advance diet as tolerated. Will likely discharge home tomorrow.        Review of Systems   Constitutional: Negative for activity change, appetite change, chills, diaphoresis, fatigue, fever and unexpected weight change.   HENT: Negative for congestion, drooling, facial swelling, rhinorrhea, sinus pressure, sneezing and sore throat.    Eyes: Negative for discharge, redness, itching and visual disturbance.   Respiratory: Negative for apnea, cough, choking, chest tightness, shortness of breath, wheezing and stridor.    Cardiovascular: Negative for chest pain, palpitations and leg swelling.   Gastrointestinal: Positive for anal bleeding and blood in stool. Negative for abdominal distention, abdominal pain, constipation, diarrhea, nausea and vomiting.   Genitourinary: Negative for decreased urine volume, difficulty urinating, dysuria, frequency, hematuria, pelvic pain, urgency, vaginal bleeding and vaginal discharge.   Musculoskeletal: Negative for arthralgias, back pain, gait problem, joint swelling, myalgias, neck pain and neck stiffness.   Skin: Negative for color change, pallor, rash and wound.   Neurological: Negative for dizziness, seizures, facial asymmetry, speech difficulty, weakness, light-headedness, numbness and headaches.   Psychiatric/Behavioral: Negative for agitation, confusion, hallucinations and suicidal ideas.   All other systems reviewed and are negative.    Objective:     Vital Signs (Most Recent):  Temp: 98.8 °F (37.1 °C) (01/24/18 1615)  Pulse: 69 (01/24/18 1700)  Resp: 18 (01/24/18 1615)  BP: (!) 152/70 (01/24/18 1615)  SpO2: (!) 94 % (01/24/18 1615) Vital Signs (24h Range):  Temp:  [97.4 °F (36.3 °C)-98.8 °F (37.1 °C)] 98.8 °F (37.1 °C)  Pulse:  [69-70] 69  Resp:  [17-18] 18  SpO2:  [92 %-97 %] 94 %  BP:  (134-162)/(63-77) 152/70     Weight: 52.3 kg (115 lb 4.8 oz)  Body mass index is 21.09 kg/m².  No intake or output data in the 24 hours ending 01/24/18 1831   Physical Exam   Constitutional: She is oriented to person, place, and time. She appears well-developed and well-nourished.   Elderly and frail appearing    HENT:   Head: Normocephalic and atraumatic.   Eyes: Conjunctivae and EOM are normal. Pupils are equal, round, and reactive to light.   Neck: Normal range of motion. Neck supple.   Cardiovascular: Normal rate, regular rhythm, normal heart sounds and intact distal pulses.    No murmur heard.  Pulmonary/Chest: Effort normal and breath sounds normal. No respiratory distress.   Abdominal: Soft. Bowel sounds are normal. She exhibits no distension. There is no tenderness.   Musculoskeletal: Normal range of motion. She exhibits no edema, tenderness or deformity.   Neurological: She is alert and oriented to person, place, and time. She has normal reflexes.   Skin: Skin is warm and dry. No erythema.   Psychiatric: She has a normal mood and affect. Her behavior is normal.   Nursing note and vitals reviewed.      Significant Labs: All pertinent labs within the past 24 hours have been reviewed.    Significant Imaging:   Imaging Results          CTA Abdomen and Pelvis (Final result)  Result time 01/23/18 16:42:23    Final result by Bartolome Ham MD (01/23/18 16:42:23)                 Impression:         No evidence of active GI bleed. See above for discussion.    Fluid-filled loops of nondilated bowel are seen throughout the abdomen with mild hyperemia which can be seen with enteritis. Focal thickening in mild stranding is seen within the proximal transverse colon which should be seen with focal colitis.     Severe cardiomegaly and findings of right heart failure.    All CT scans at this facility use dose modulation, iterative reconstruction and/or weight based dosing when appropriate to reduce radiation dose to as  low as reasonably achievable.      Electronically signed by: DAMON OH MD  Date:     01/23/18  Time:    16:42              Narrative:    TECHNIQUE: Using 100 cc of  Omnipaque 350 IV, and multi-detector helical CT technique, axial CT angiogram images of the abdomen were obtained from the lung bases through the pelvis. Precontrast and portal venous phase images of the abdomen and pelvis also done. 2D post-processing coronal and sagittal reconstructions of the abdominal aorta and visceral arteries performed.     Comparison: 8/8/7.    History:  Lower GI bleed    Findings:    Scarring or atelectasis is seen at the lung bases, left greater than right. Mild pleural thickening is seen within the lung bases with trace left pleural effusion. Moderate to severe cardiomegaly. Conduit or enlarged vascular structures seen supplying the distal LAD and left heart. Reflux of contrast is seen down the IVC into the hepatic veins consistent with right heart failure. Small hiatal hernia seen within the lower chest..    The liver is normal in size and attenuation with no focal hepatic abnormality. SUE is seen within the right hepatic lobe without a discrete liver mass identified. The gallbladder shows no evidence of stones or pericholecystic fluid.  There is no intra-or extrahepatic biliary ductal dilatation.    The stomach, spleen, pancreas, and adrenal glands are unremarkable.    The kidneys are normal in size and location and concentrate and excrete contrast properly on delayed imaging. Scarring is seen within the kidneys bilaterally with small cysts noted. There is no evidence of hydronephrosis.  The ureters appear normal in course and caliber without evidence of ureteral dilatation. The urinary bladder demonstrates no significant abnormality.    The abdominal aorta is normal in course and caliber with moderate atherosclerotic calcifications. No evidence of aneurysm or dissection.    The visualized loops of small and large bowel  show no evidence of obstruction or inflammation.  Fluid-filled loops of nondilated bowel are seen throughout the abdomen with mild hyperemia which can be seen with enteritis. Focal thickening in mild stranding is seen within the proximal transverse colon which should be seen with focal colitis. Nonspecific hyperemia is seen within the cecum and proximal ascending colon from images 197 through 235. No evidence of pooling of contrast on delayed images to suggest acute GI bleed. There is no ascites, free fluid, or intraperitoneal free air. There is no evidence of lymph node enlargement in the abdomen or pelvis. No    Chronic fracture deformity is seen within the left pubic bone. Moderate degenerative changes are seen throughout the lumbar and pelvis. Chronic appearing wedge deformity is seen at T10 and T11.  The extraperitoneal soft tissues are unremarkable.                             X-Ray Chest 1 View (Final result)  Result time 01/22/18 13:32:43    Final result by Bartolome Oh MD (01/22/18 13:32:43)                 Impression:     No adverse interval change      Electronically signed by: BARTOLOME OH MD  Date:     01/22/18  Time:    13:32              Narrative:    History: Bleeding    Comparison: 12/28/17    Result: Single view of the chest.      Cardiomegaly and left-sided pacing device noted in similar configuration. Scarring or small left-sided effusion. Mild atelectasis at the left lung base. No pneumothorax. Bones demonstrate degenerative changes. In comparison to the prior study, there is no adverse interval changes.                                 Assessment/Plan:      * Gastrointestinal hemorrhage    No further bleeding since admission   CBC every 8 hours  Will transfuse if indicated  Hold coumadin overnight   Several additional bloody bowel movements today  GI following, no indication for a colonoscopy today. Will follow up with GI as an outpatient in 2 weeks  Stool studies pending   CTA abd did not  show any obvious source of bleeding        S/P MVR (mitral valve replacement)    The patient has a porcine valve  On coumadin with INR 3.3  Will hold Coumadin tonight 2/2 GI bleed   Check INR in am   Continue ASA        Chronic combined systolic and diastolic congestive heart failure              Enterocolitis    Stool studies pending   IV cipro/flagyl          Hematochezia    Resolved           A-fib    Currently SR  Will hold tonight's Coumadin dose 2/2 GI bleed   The patient will need referral to EP upon discharge to determine appropriate anticoagulation for PAF/PAFL        S/P placement of cardiac pacemaker              Hypertension    Monitor vital signs  Resume home meds           LEV (obstructive sleep apnea)    CPAP QHS             VTE Risk Mitigation     None              Haile Morocho NP  Department of Hospital Medicine   [facility]

## 2018-01-26 ENCOUNTER — PATIENT OUTREACH (OUTPATIENT)
Dept: ADMINISTRATIVE | Facility: CLINIC | Age: 83
End: 2018-01-26

## 2018-01-26 ENCOUNTER — PATIENT OUTREACH (OUTPATIENT)
Dept: ADMINISTRATIVE | Facility: HOSPITAL | Age: 83
End: 2018-01-26

## 2018-01-26 NOTE — PATIENT INSTRUCTIONS
Lower GI Bleeding (Stable)  You have signs of blood in your stool. This is called rectal bleeding. The bleeding may have begun in another part of your gastrointestinal (GI) tract. If the blood is bright red, it is likely coming from the lower part of the GI tract. If the blood is black or dark, it might be coming from higher up in the GI tract. Very small amounts of GI bleeding may not be visible and can only be discovered during a test on your stool. Possible causes of lower GI bleeding include:  · Hemorrhoids  · Anal fissures  · Diverticulitis  · Inflammatory bowel disease (Crohn's disease or ulcerative colitis)  · Polyps (growths) in the intestine  Note: Iron supplements and medicines for diarrhea or upset stomach can cause black stools. Foods such as licorice and red beets can also discolor the stool and be mistaken for bleeding. These are not bleeding and are not a cause for alarm.  Home care  You have not lost a large amount of blood and your condition appears stable at this time. You may resume normal activity as long as you feel well.  Avoid NSAIDs, such as aspirin, ibuprofen, or naproxen. They can irritate the stomach and cause further bleeding. If you are taking these medicines for other medical reasons, talk to your healthcare provider before you stop them.   Follow-up care  Follow up with your healthcare provider as advised. Further tests may be needed to find the cause of your bleeding.  When to seek medical advice  Call your healthcare provider for any of the following:  · Large amount of rectal bleeding   · Increasing abdominal pain  · Weakness, dizziness  Call 911  Get emergency medical care if any of the following occur:  · Loss of consciousness  · Vomiting blood  Date Last Reviewed: 6/24/2015  © 6365-3316 [employer]. [address] [place] [address]. All rights reserved. This information is not intended as a substitute for professional medical care. Always follow your  healthcare professional's instructions.

## 2018-01-26 NOTE — PROGRESS NOTES
Patient return my call. Schedule patient on 01/29/18 at 11:00 am with Dr. Bello for hospital follow up and hypertension follow up. Lab prior to visit on the same day at 10:40 am. Patient in agreement and vocalize understanding.  I will send appointment reminder in mail today.    Patient also requested an appointment with RAUL Urrutia. This visit was also a hospital follow up given at discharge. Schedule appointment 02/01/18 at 22:20 pm. Patient in agreement and vocalize understanding. I will send appointment reminder in mail today.

## 2018-01-27 LAB — BACTERIA STL CULT: NORMAL

## 2018-01-29 ENCOUNTER — OFFICE VISIT (OUTPATIENT)
Dept: INTERNAL MEDICINE | Facility: CLINIC | Age: 83
End: 2018-01-29
Payer: MEDICARE

## 2018-01-29 ENCOUNTER — LAB VISIT (OUTPATIENT)
Dept: LAB | Facility: HOSPITAL | Age: 83
End: 2018-01-29
Attending: FAMILY MEDICINE
Payer: MEDICARE

## 2018-01-29 VITALS
BODY MASS INDEX: 21.62 KG/M2 | OXYGEN SATURATION: 97 % | SYSTOLIC BLOOD PRESSURE: 136 MMHG | TEMPERATURE: 98 F | HEART RATE: 70 BPM | WEIGHT: 117.5 LBS | HEIGHT: 62 IN | DIASTOLIC BLOOD PRESSURE: 54 MMHG

## 2018-01-29 DIAGNOSIS — K92.2 GASTROINTESTINAL HEMORRHAGE, UNSPECIFIED GASTROINTESTINAL HEMORRHAGE TYPE: ICD-10-CM

## 2018-01-29 DIAGNOSIS — N39.0 URINARY TRACT INFECTION WITHOUT HEMATURIA, SITE UNSPECIFIED: Primary | ICD-10-CM

## 2018-01-29 DIAGNOSIS — Z79.899 ENCOUNTER FOR LONG-TERM CURRENT USE OF MEDICATION: ICD-10-CM

## 2018-01-29 LAB
CHOLEST SERPL-MCNC: 154 MG/DL
CHOLEST/HDLC SERPL: 4.5 {RATIO}
HDLC SERPL-MCNC: 34 MG/DL
HDLC SERPL: 22.1 %
LDLC SERPL CALC-MCNC: 79 MG/DL
NONHDLC SERPL-MCNC: 120 MG/DL
T4 FREE SERPL-MCNC: 0.73 NG/DL
TRIGL SERPL-MCNC: 205 MG/DL
TSH SERPL DL<=0.005 MIU/L-ACNC: 7.33 UIU/ML

## 2018-01-29 PROCEDURE — 99495 TRANSJ CARE MGMT MOD F2F 14D: CPT | Mod: PBBFAC | Performed by: NURSE PRACTITIONER

## 2018-01-29 PROCEDURE — 84443 ASSAY THYROID STIM HORMONE: CPT

## 2018-01-29 PROCEDURE — 99999 PR PBB SHADOW E&M-EST. PATIENT-LVL V: CPT | Mod: PBBFAC,,, | Performed by: NURSE PRACTITIONER

## 2018-01-29 PROCEDURE — 99495 TRANSJ CARE MGMT MOD F2F 14D: CPT | Mod: S$PBB,,, | Performed by: NURSE PRACTITIONER

## 2018-01-29 PROCEDURE — 80061 LIPID PANEL: CPT

## 2018-01-29 PROCEDURE — 36415 COLL VENOUS BLD VENIPUNCTURE: CPT

## 2018-01-29 PROCEDURE — 84439 ASSAY OF FREE THYROXINE: CPT

## 2018-01-29 PROCEDURE — 99215 OFFICE O/P EST HI 40 MIN: CPT | Mod: PBBFAC | Performed by: NURSE PRACTITIONER

## 2018-01-29 RX ORDER — WARFARIN 2.5 MG/1
TABLET ORAL
Status: ON HOLD | COMMUNITY
Start: 2018-01-27 | End: 2018-04-15 | Stop reason: HOSPADM

## 2018-01-29 NOTE — PROGRESS NOTES
Subjective:       Patient ID: Jennifer Mathews is a 85 y.o. female.    Chief Complaint: TCC hospital followup  Transitional Care Note    Family and/or Caretaker present at visit?  No.  Diagnostic tests reviewed/disposition: No diagnosic tests pending after this hospitalization.  Disease/illness education: GI bleed, UTI  Home health/community services discussion/referrals: Patient does not have home health established from hospital visit.  They do not need home health.  If needed, we will set up home health for the patient.   Establishment or re-establishment of referral orders for community resources: No other necessary community resources.   Discussion with other health care providers: No discussion with other health care providers necessary.           Patient presents for hospital follow up/TCC. She was admitted for GI bleeding. Her H/H at discharge was 8.9/29.4. She reports having a UTI while in the hospital and treated but is still having increased frequency and urine decreased. She would like a recheck to see if UTI still present. She has been off coumadin per cardiology.       Review of Systems   Constitutional: Negative for appetite change, chills, fatigue, fever and unexpected weight change.   Respiratory: Negative for cough, shortness of breath and wheezing.    Cardiovascular: Positive for leg swelling (chronic, stable). Negative for chest pain and palpitations.   Gastrointestinal: Negative for blood in stool, diarrhea, nausea, rectal pain and vomiting.   Genitourinary: Positive for decreased urine volume and frequency. Negative for difficulty urinating, dysuria and hematuria.   Skin: Negative for rash.   Neurological: Negative for headaches.       Objective:      Physical Exam   Constitutional: She is oriented to person, place, and time. She appears well-developed and well-nourished. No distress.   HENT:   Head: Normocephalic and atraumatic.   Eyes: Conjunctivae and EOM are normal. Pupils are equal,  round, and reactive to light.   Cardiovascular: Normal rate and regular rhythm.  Exam reveals no gallop and no friction rub.    No murmur heard.  Pulmonary/Chest: Effort normal and breath sounds normal.   Lymphadenopathy:   +1-2 BLE edema, nonpitting   Neurological: She is alert and oriented to person, place, and time. No cranial nerve deficit.   Skin: Skin is warm and dry.   Psychiatric: She has a normal mood and affect.   Vitals reviewed.      Assessment:       1. Urinary tract infection without hematuria, site unspecified    2. Gastrointestinal hemorrhage, unspecified gastrointestinal hemorrhage type        Plan:   Urinary tract infection without hematuria, site unspecified  -     URINALYSIS; Future; Expected date: 01/29/2018  -     Urine culture; Future; Expected date: 01/29/2018    Gastrointestinal hemorrhage, unspecified gastrointestinal hemorrhage type  Comments:  stable, no bright red stools since hospital d/c, follow up with GI in 2 days; CBC ordered  Orders:  -     CBC auto differential; Future; Expected date: 01/29/2018      Patient reports overall improvement and appears stable today. She had already had labwork done immediately prior to appt and requested that CBC and UA be done 2/1 at [facility] prior to GI appt.   Follow-up if symptoms worsen or fail to improve, for keep routine follow up.

## 2018-01-30 NOTE — PHYSICIAN QUERY
PT Name: Jennifer Mathews  MR #: [id number]     Physician Query Form - Etiology of Condition Clarification      CDS/: Janna Crocker RN              Contact information:[email or web address]  This form is a permanent document in the medical record.     Query Date: January 30, 2018    By submitting this query, we are merely seeking further clarification of documentation.  Please utilize your independent clinical judgment when addressing the question(s) below.     The medical record contains the following:    Findings Supporting Clinical Information Location in Medical Record   GIB ASA and Coumadin on hold  Report of bloody diarrhea frequency and amount doesn't match with current clinical picture and labs.     1/23/18 The patient reported several additional bloody bowel movements today  1/24/18 The patient had 2 additional bowel movements today that were not bloody. H/H is stable. C diff was negative. Diarrhea is likely 2/2 colitis, will start cirpo and flagyl.  Gastrointestinal: Positive for anal bleeding and blood in stool  Enterocolitis.. IV cipro/flagyl    Hematochezia date noted 1/23; date resolved 1/25  Gastrointestinal hemorrhage date noted: 1/22; date resolved 1/25    Occult blood positive  H&H 10.3/34 (close to baseline per DC summary)  H&H 8.9/29.4 Gastroenterology consult 1/23        Hospital Medicine PN 1/24                    Discharge summary        Labs 1/22  Labs 1/22  Labs 1/25     Please document your best medical opinion regarding the etiology of _GIB__ for which the primary focus of treatment is/was directed.       [  x ] GIB due to Enterocolitis  [   ] GIB due to __________________________  [   ] GIB ruled out  [   ] Other ________________                  [    ]  Clinically Undetermined    Please document in your progress notes daily for the duration of treatment, until resolved, and include in your discharge summary.

## 2018-01-30 NOTE — PHYSICIAN QUERY
"PT Name: Jennifer Mathews  MR #: [id number]    Physician Query Form - Hematology Clarification      CDS/: Janna Crocker RN               Contact information:[email or web address]  This form is a permanent document in the medical record.      Query Date: January 30, 2018    By submitting this query, we are merely seeking further clarification of documentation. Please utilize your independent clinical judgment when addressing the question(s) below.    The Medical record contains the following:   Indicators  Supporting Clinical Findings Location in Medical Record   x "Anemia" documented Anemia H&P under past medical history   x H & H = H&H 10.3/34 (close to baseline per DC summary)  H&H 8.9/29.4 Labs 1/22  Labs 1/25    BP =                     HR=     x "GI bleeding" documented Hematochezia date noted 1/23; date resolved 1/25  Gastrointestinal hemorrhage date noted: 1/22; date resolved 1/25 DC summary    Acute bleeding (Non GI site)      Transfusion(s)      Treatment:     x Other:  Occult blood positive  Ferritin 51  Folate 15.7  Iron 51  Iron % sat 12  TIBC 4.4  Transferrin 280 Labs 1/22       Provider, please specify diagnosis or diagnoses associated with above clinical findings.      [x  ] Acute blood loss anemia  [  ] Iron deficiency anemia  [  ] Chronic blood loss anemia      [  ] Other Hematological Diagnosis (please specify): _________________________________    [  ] Clinically Undetermined       Please document in your progress notes daily for the duration of treatment, until resolved, and include in your discharge summary.                                                                                                      "

## 2018-02-01 ENCOUNTER — OFFICE VISIT (OUTPATIENT)
Dept: GASTROENTEROLOGY | Facility: CLINIC | Age: 83
End: 2018-02-01
Payer: MEDICARE

## 2018-02-01 ENCOUNTER — LAB VISIT (OUTPATIENT)
Dept: LAB | Facility: HOSPITAL | Age: 83
End: 2018-02-01
Attending: NURSE PRACTITIONER
Payer: MEDICARE

## 2018-02-01 VITALS
DIASTOLIC BLOOD PRESSURE: 60 MMHG | WEIGHT: 119.06 LBS | BODY MASS INDEX: 21.91 KG/M2 | HEIGHT: 62 IN | SYSTOLIC BLOOD PRESSURE: 140 MMHG | HEART RATE: 78 BPM

## 2018-02-01 DIAGNOSIS — K92.2 LOWER GI BLEED: ICD-10-CM

## 2018-02-01 DIAGNOSIS — K52.9 ENTEROCOLITIS: Primary | ICD-10-CM

## 2018-02-01 DIAGNOSIS — N39.0 URINARY TRACT INFECTION WITHOUT HEMATURIA, SITE UNSPECIFIED: ICD-10-CM

## 2018-02-01 DIAGNOSIS — K92.2 GASTROINTESTINAL HEMORRHAGE, UNSPECIFIED GASTROINTESTINAL HEMORRHAGE TYPE: ICD-10-CM

## 2018-02-01 DIAGNOSIS — Z79.01 LONG TERM (CURRENT) USE OF ANTICOAGULANTS: ICD-10-CM

## 2018-02-01 LAB
BACTERIA #/AREA URNS HPF: ABNORMAL /HPF
BASOPHILS # BLD AUTO: 0.08 K/UL
BASOPHILS NFR BLD: 1 %
BILIRUB UR QL STRIP: NEGATIVE
CLARITY UR: CLEAR
COLOR UR: ABNORMAL
DIFFERENTIAL METHOD: ABNORMAL
EOSINOPHIL # BLD AUTO: 0.2 K/UL
EOSINOPHIL NFR BLD: 2 %
ERYTHROCYTE [DISTWIDTH] IN BLOOD BY AUTOMATED COUNT: 20 %
GLUCOSE UR QL STRIP: NEGATIVE
HCT VFR BLD AUTO: 31.2 %
HGB BLD-MCNC: 9.3 G/DL
HGB UR QL STRIP: ABNORMAL
INR PPP: 1
KETONES UR QL STRIP: NEGATIVE
LEUKOCYTE ESTERASE UR QL STRIP: ABNORMAL
LYMPHOCYTES # BLD AUTO: 1.8 K/UL
LYMPHOCYTES NFR BLD: 23.3 %
MCH RBC QN AUTO: 27.4 PG
MCHC RBC AUTO-ENTMCNC: 29.8 G/DL
MCV RBC AUTO: 92 FL
MICROSCOPIC COMMENT: ABNORMAL
MONOCYTES # BLD AUTO: 0.8 K/UL
MONOCYTES NFR BLD: 10.7 %
NEUTROPHILS # BLD AUTO: 4.9 K/UL
NEUTROPHILS NFR BLD: 63 %
NITRITE UR QL STRIP: POSITIVE
PH UR STRIP: 6 [PH] (ref 5–8)
PLATELET # BLD AUTO: 280 K/UL
PMV BLD AUTO: 10 FL
PROT UR QL STRIP: NEGATIVE
PROTHROMBIN TIME: 10.7 SEC
RBC # BLD AUTO: 3.4 M/UL
RBC #/AREA URNS HPF: 6 /HPF (ref 0–4)
SP GR UR STRIP: 1.02 (ref 1–1.03)
SQUAMOUS #/AREA URNS HPF: 1 /HPF
URN SPEC COLLECT METH UR: ABNORMAL
WBC # BLD AUTO: 7.84 K/UL
WBC #/AREA URNS HPF: 1 /HPF (ref 0–5)

## 2018-02-01 PROCEDURE — 85610 PROTHROMBIN TIME: CPT | Mod: PO

## 2018-02-01 PROCEDURE — 99214 OFFICE O/P EST MOD 30 MIN: CPT | Mod: S$PBB,,, | Performed by: NURSE PRACTITIONER

## 2018-02-01 PROCEDURE — 99214 OFFICE O/P EST MOD 30 MIN: CPT | Mod: PBBFAC,PO | Performed by: NURSE PRACTITIONER

## 2018-02-01 PROCEDURE — 85025 COMPLETE CBC W/AUTO DIFF WBC: CPT | Mod: PO

## 2018-02-01 PROCEDURE — 36415 COLL VENOUS BLD VENIPUNCTURE: CPT | Mod: PO

## 2018-02-01 PROCEDURE — 99999 PR PBB SHADOW E&M-EST. PATIENT-LVL IV: CPT | Mod: PBBFAC,,, | Performed by: NURSE PRACTITIONER

## 2018-02-01 PROCEDURE — 1159F MED LIST DOCD IN RCRD: CPT | Mod: ,,, | Performed by: NURSE PRACTITIONER

## 2018-02-01 PROCEDURE — 1126F AMNT PAIN NOTED NONE PRSNT: CPT | Mod: ,,, | Performed by: NURSE PRACTITIONER

## 2018-02-01 PROCEDURE — 81000 URINALYSIS NONAUTO W/SCOPE: CPT | Mod: PO

## 2018-02-01 NOTE — PROGRESS NOTES
Clinic Consult:  [facility] Consultation Note    Reason for Consult:  The primary encounter diagnosis was Enterocolitis. A diagnosis of Lower GI bleed was also pertinent to this visit.    PCP: Desiare Bello   [address] [facility]  / [place] [id number]    HPI:  This is a 85 y.o. female here for follow up s/p hospital discharge  Pt was recently discharged from the hospital S/P lower GI bleed.  During her hospital stay, she developed diarrhea which was diagnosed and treated as colitis.  She reports that she has one day of abx therapy remaining.    The bleeding was thought to be a result of the colitis with coumadin daily.  She has had no further episodes of diarrhea of bloody stool.   She states that she is overall feeling well without any Gi complaints.   Denies any abdominal pain.  No nausea or vomiting.  No change in bowel pattern.  No melena or hematochezia. No weight loss.    Review of Systems   Constitutional: Negative for chills, fever, malaise/fatigue and weight loss.   Respiratory: Negative for cough.    Cardiovascular: Negative for chest pain.   Gastrointestinal:        Per HPI   Musculoskeletal: Negative for myalgias.   Skin: Negative for itching and rash.   Neurological: Negative for headaches.   Psychiatric/Behavioral: The patient is not nervous/anxious.        Medical History:   Past Medical History:   Diagnosis Date    Acute coronary syndrome     Anemia     Anticoagulated on Coumadin 7/13/2015    Arthritis     Atrial fibrillation     Basal cell carcinoma 10/2015    left neck    Cardiac arrest     Cardiac resynchronization therapy defibrillator (CRT-D) in place 07/13/2015    Pt denies, states it does not shock me    Cardiac resynchronization therapy defibrillator (CRT-D) in place 7/13/2015    Cardiomyopathy 1/30/2014    CHF (congestive heart failure)     Chronic combined systolic and diastolic congestive heart failure     CKD (chronic kidney disease) stage 3, GFR 30-59  ml/min     Dyslipidemia 1/30/2014    Edema     Heart disease     Hypertension     Ischemic cardiomyopathy 7/13/2015    Macular hole of left eye     Old    Macular hole of left eye     Myocardial infarction     LEV (obstructive sleep apnea) 9/30/2013    Recurrent UTI     Refractive error     S/P MVR (mitral valve replacement) 1/30/2014    S/P placement of cardiac pacemaker 1/30/2014    Skin cancer     Sleep apnea     Stroke        Surgical History:  Past Surgical History:   Procedure Laterality Date    APPENDECTOMY      CARDIAC PACEMAKER PLACEMENT      CARDIAC VALVE SURGERY      CATARACT EXTRACTION      OU    CHOLECYSTECTOMY      COLONOSCOPY      MITRAL VALVE SURGERY      x3       Family History:   Family History   Problem Relation Age of Onset    Coronary artery disease Mother      mi    Coronary artery disease Father     Diabetes Brother     Cancer Brother     Melanoma Neg Hx     Psoriasis Neg Hx     Lupus Neg Hx     Eczema Neg Hx        Social History:   Social History   Substance Use Topics    Smoking status: Never Smoker    Smokeless tobacco: Never Used    Alcohol use No       Allergies: Reviewed    Home Medications:   Current Outpatient Prescriptions on File Prior to Visit   Medication Sig Dispense Refill    acetaminophen (TYLENOL EXTRA STRENGTH) 325 MG tablet Take 2 tablets (650 mg total) by mouth every 8 (eight) hours. (Patient taking differently: Take 650 mg by mouth 3 (three) times daily as needed. )      allopurinol (ZYLOPRIM) 100 MG tablet Take 1.5 tablets (150 mg total) by mouth once daily. X 4 weeks, then increase to 2 tablets (200mg) by mouth daily. 180 tablet 1    amiodarone (PACERONE) 200 MG Tab Take 1 tablet (200 mg total) by mouth once daily. 30 tablet 11    aspirin (ECOTRIN) 81 MG EC tablet Take 81 mg by mouth once daily.      calcium carbonate (OS-SHERRY) 600 mg calcium (1,500 mg) Tab Take 600 mg by mouth once.      carvedilol (COREG) 25 MG tablet Take 1  "tablet (25 mg total) by mouth 2 (two) times daily with meals. 180 tablet 3    ciprofloxacin HCl (CIPRO) 500 MG tablet Take 1 tablet (500 mg total) by mouth 2 (two) times daily. 14 tablet 0    cranberry 1,000 mg Cap Take 1 capsule by mouth Daily.      digoxin (LANOXIN) 125 mcg tablet Take 1 tablet (125 mcg total) by mouth once daily. TAKE 1 TABLET (0.125 MG TOTAL) BY MOUTH ONCE DAILY. 90 tablet 3    diphenhydrAMINE (BENADRYL) 25 mg capsule Take 25 mg by mouth every 6 (six) hours as needed for Itching.      furosemide (LASIX) 40 MG tablet Take 1 tablet (40 mg total) by mouth once daily. (Patient taking differently: Take 40 mg by mouth 2 (two) times daily. ) 180 tablet 3    gabapentin (NEURONTIN) 100 MG capsule TAKE ONE CAPSULE BY MOUTH TWO TIMES DAILY 180 capsule 1    Lactobac 40-Bifido 3-S.thermop (PROBIOTIC) 100 billion cell Cap Take 1 capsule by mouth once daily. 30 capsule 0    metroNIDAZOLE (FLAGYL) 500 MG tablet Take 1 tablet (500 mg total) by mouth 3 (three) times daily. 27 tablet 0    MULTIVITAMIN ORAL Take 1 tablet by mouth Daily.      valsartan (DIOVAN) 80 MG tablet Take 2 tablets (160 mg total) by mouth 2 (two) times daily. 180 tablet 3    colchicine 0.6 mg tablet Take 1 tablet (0.6 mg total) by mouth once daily. 90 tablet 3    warfarin (COUMADIN) 2.5 MG tablet        Current Facility-Administered Medications on File Prior to Visit   Medication Dose Route Frequency Provider Last Rate Last Dose    denosumab (PROLIA) injection 60 mg  60 mg Subcutaneous Q6 Months Oc Catherine MD   60 mg at 11/20/17 1119       Physical Exam:  Vital Signs:  BP (!) 140/60   Pulse 78   Ht 5' 2" (1.575 m)   Wt 54 kg (119 lb 0.8 oz)   BMI 21.77 kg/m²   Body mass index is 21.77 kg/m².  Physical Exam   Constitutional: She is oriented to person, place, and time. She appears well-developed and well-nourished.   HENT:   Head: Normocephalic.   Eyes: No scleral icterus.   Neck: Normal range of motion. "   Cardiovascular: Normal rate and regular rhythm.    Pulmonary/Chest: Effort normal and breath sounds normal.   Abdominal: Soft. Bowel sounds are normal. She exhibits no distension. There is no tenderness.   Musculoskeletal: Normal range of motion.   Neurological: She is alert and oriented to person, place, and time.   Skin: Skin is warm and dry.   Psychiatric: She has a normal mood and affect.   Vitals reviewed.      Labs: Pertinent labs reviewed.      Assessment:  1. Enterocolitis    2. Lower GI bleed         Recommendations:  Enterocolitis  Lower GI bleed  - stable. No further complaints.   - No further investigation or intervention needed at this time.  Pt to contact clinic if diarrhea returns.           Follow-up if symptoms worsen or fail to improve.        Thank you so much for allowing me to participate in the care of ABELINO Parkinson

## 2018-02-01 NOTE — LETTER
February 1, 2018      Desirae Bello MD  [address] [place] [address] [address] [place] [id number]           [facility]  [address] [facility] [place] [address]  Phone: [phone]  Fax: [phone]          Patient: Jennifer Mathews   MR Number: [id number]   YOB: 1932   Date of Visit: 2/1/2018       Dear Dr. Desirae Bello:    Thank you for referring Jennifer Mathews to me for evaluation. Attached you will find relevant portions of my assessment and plan of care.    If you have questions, please do not hesitate to call me. I look forward to following Jennifer Mathews along with you.    Sincerely,    Meaghan Duggan, [facility]    Enclosure  CC:  No Recipients    If you would like to receive this communication electronically, please contact [email or web address] or [phone] to request more information on isocket Link access.    For providers and/or their staff who would like to refer a patient to [facility], please contact us through our one-stop-shop provider referral line, [facility], at [phone].    If you feel you have received this communication in error or would no longer like to receive these types of communications, please e-mail [email or web address]

## 2018-02-02 ENCOUNTER — LAB VISIT (OUTPATIENT)
Dept: LAB | Facility: HOSPITAL | Age: 83
End: 2018-02-02
Attending: INTERNAL MEDICINE
Payer: MEDICARE

## 2018-02-02 ENCOUNTER — ANTI-COAG VISIT (OUTPATIENT)
Dept: CARDIOLOGY | Facility: CLINIC | Age: 83
End: 2018-02-02

## 2018-02-02 ENCOUNTER — OFFICE VISIT (OUTPATIENT)
Dept: CARDIOLOGY | Facility: CLINIC | Age: 83
End: 2018-02-02
Payer: MEDICARE

## 2018-02-02 ENCOUNTER — CLINICAL SUPPORT (OUTPATIENT)
Dept: CARDIOLOGY | Facility: CLINIC | Age: 83
End: 2018-02-02
Attending: PHYSICIAN ASSISTANT
Payer: MEDICARE

## 2018-02-02 ENCOUNTER — TELEPHONE (OUTPATIENT)
Dept: CARDIOLOGY | Facility: CLINIC | Age: 83
End: 2018-02-02

## 2018-02-02 VITALS
DIASTOLIC BLOOD PRESSURE: 56 MMHG | WEIGHT: 121.25 LBS | BODY MASS INDEX: 22.31 KG/M2 | HEART RATE: 72 BPM | HEIGHT: 62 IN | SYSTOLIC BLOOD PRESSURE: 140 MMHG

## 2018-02-02 DIAGNOSIS — Z95.810 CARDIAC RESYNCHRONIZATION THERAPY DEFIBRILLATOR (CRT-D) IN PLACE: ICD-10-CM

## 2018-02-02 DIAGNOSIS — M79.89 LEFT LEG SWELLING: ICD-10-CM

## 2018-02-02 DIAGNOSIS — G47.33 OSA (OBSTRUCTIVE SLEEP APNEA): Chronic | ICD-10-CM

## 2018-02-02 DIAGNOSIS — Z95.0 S/P PLACEMENT OF CARDIAC PACEMAKER: ICD-10-CM

## 2018-02-02 DIAGNOSIS — M79.89 LEFT LEG SWELLING: Primary | ICD-10-CM

## 2018-02-02 DIAGNOSIS — I10 ESSENTIAL HYPERTENSION: ICD-10-CM

## 2018-02-02 DIAGNOSIS — M79.605 LEFT LEG PAIN: ICD-10-CM

## 2018-02-02 DIAGNOSIS — Z51.81 MEDICATION MONITORING ENCOUNTER: ICD-10-CM

## 2018-02-02 DIAGNOSIS — E78.5 DYSLIPIDEMIA: ICD-10-CM

## 2018-02-02 DIAGNOSIS — I50.42 CHRONIC COMBINED SYSTOLIC AND DIASTOLIC CONGESTIVE HEART FAILURE: ICD-10-CM

## 2018-02-02 DIAGNOSIS — Z95.2 S/P MVR (MITRAL VALVE REPLACEMENT): ICD-10-CM

## 2018-02-02 DIAGNOSIS — N18.30 CKD (CHRONIC KIDNEY DISEASE) STAGE 3, GFR 30-59 ML/MIN: ICD-10-CM

## 2018-02-02 DIAGNOSIS — I25.5 ISCHEMIC CARDIOMYOPATHY: ICD-10-CM

## 2018-02-02 LAB
ANION GAP SERPL CALC-SCNC: 10 MMOL/L
BNP SERPL-MCNC: 1068 PG/ML
BUN SERPL-MCNC: 34 MG/DL
CALCIUM SERPL-MCNC: 8.9 MG/DL
CHLORIDE SERPL-SCNC: 105 MMOL/L
CO2 SERPL-SCNC: 28 MMOL/L
CREAT SERPL-MCNC: 1.2 MG/DL
EST. GFR  (AFRICAN AMERICAN): 48 ML/MIN/1.73 M^2
EST. GFR  (NON AFRICAN AMERICAN): 41 ML/MIN/1.73 M^2
GLUCOSE SERPL-MCNC: 98 MG/DL
POTASSIUM SERPL-SCNC: 4.5 MMOL/L
SODIUM SERPL-SCNC: 143 MMOL/L

## 2018-02-02 PROCEDURE — 36415 COLL VENOUS BLD VENIPUNCTURE: CPT

## 2018-02-02 PROCEDURE — 1126F AMNT PAIN NOTED NONE PRSNT: CPT | Mod: ,,, | Performed by: PHYSICIAN ASSISTANT

## 2018-02-02 PROCEDURE — 1159F MED LIST DOCD IN RCRD: CPT | Mod: ,,, | Performed by: PHYSICIAN ASSISTANT

## 2018-02-02 PROCEDURE — 99214 OFFICE O/P EST MOD 30 MIN: CPT | Mod: PBBFAC,PO,25 | Performed by: PHYSICIAN ASSISTANT

## 2018-02-02 PROCEDURE — 99999 PR PBB SHADOW E&M-EST. PATIENT-LVL IV: CPT | Mod: PBBFAC,,, | Performed by: PHYSICIAN ASSISTANT

## 2018-02-02 PROCEDURE — 99214 OFFICE O/P EST MOD 30 MIN: CPT | Mod: S$PBB,,, | Performed by: PHYSICIAN ASSISTANT

## 2018-02-02 PROCEDURE — 80048 BASIC METABOLIC PNL TOTAL CA: CPT

## 2018-02-02 PROCEDURE — 83880 ASSAY OF NATRIURETIC PEPTIDE: CPT

## 2018-02-02 PROCEDURE — 93971 EXTREMITY STUDY: CPT | Mod: PBBFAC | Performed by: INTERNAL MEDICINE

## 2018-02-02 NOTE — PROGRESS NOTES
Subjective:    Patient ID:  Jennifer Mathews is a 85 y.o. female who presents for follow-up of Hospital Follow Up      HPI   Ms. Jennifer Mathews is an 84 yo female with a PMH of mitral valve replacement x 3, HTN, PAF, ACS, combined CHF, PPM-CRT-D placement, s/p MVR, edema, pacemaker placement, CKD who presents today for hospital follow-up. Patient recently admitted to [facility] to the ER today with enterocolitis/GI bleed. Her H and H remained stable and she was treated with a course of cipro/flagyl and discharged. Coumadin was stopped while admitted due to bleeding. Patient returns today and states she is doing well. Her only complaint is increased left lower extremity edema. Associated symptoms include left calf soreness. She denies any recent injury to area. States her legs are usually swollen, but her left one is now noticeably bigger than her right. Other than the above issue, patient seems stable. No recurrence of bleeding. No cardiac complaints. Denies chest pain, chest heaviness, or tightness. CHF wise, seems to be doing well. Denies any PND, orthopnea, or abdominal bloating. Weight stable at home. Patient reports compliance with her medications. Still off of Coumadin. No CNS complaints suggestive of TIA/CVA. BP stable. GI has seen patient, repeat H and H stable.       Review of Systems   Constitution: Negative for chills, decreased appetite, fever, weakness and malaise/fatigue.   HENT: Negative for congestion, hoarse voice and sore throat.    Eyes: Negative for blurred vision and discharge.   Cardiovascular: Negative for chest pain, claudication, cyanosis, dyspnea on exertion, irregular heartbeat, leg swelling, near-syncope, orthopnea, palpitations and paroxysmal nocturnal dyspnea.   Respiratory: Negative for cough, hemoptysis, shortness of breath, snoring, sputum production and wheezing.    Endocrine: Negative for cold intolerance and heat intolerance.   Hematologic/Lymphatic: Negative for bleeding problem.  "Does not bruise/bleed easily.   Skin: Negative for rash.   Musculoskeletal: Negative for arthritis, back pain, joint pain, joint swelling, muscle cramps, muscle weakness and myalgias.   Gastrointestinal: Negative for abdominal pain, constipation, diarrhea, heartburn, melena and nausea.   Genitourinary: Negative for hematuria.   Neurological: Negative for dizziness, focal weakness, headaches, light-headedness, loss of balance, numbness, paresthesias and seizures.   Psychiatric/Behavioral: Negative for memory loss. The patient does not have insomnia.    Allergic/Immunologic: Negative for hives.       BP (!) 140/56   Pulse 72   Ht 5' 2" (1.575 m)   Wt 55 kg (121 lb 4.1 oz)   BMI 22.18 kg/m²   Objective:    Physical Exam   Constitutional: She is oriented to person, place, and time. She appears well-developed and well-nourished. No distress.   HENT:   Head: Normocephalic and atraumatic.   Eyes: Pupils are equal, round, and reactive to light. Right eye exhibits no discharge. Left eye exhibits no discharge.   Neck: Neck supple. No JVD present.   Cardiovascular: Normal rate, regular rhythm, S1 normal, S2 normal and normal heart sounds.  PMI is not displaced.  Exam reveals no gallop, no S3, no S4 and no friction rub.    No murmur heard.  Pulses:       Radial pulses are 2+ on the right side, and 2+ on the left side.   Pulmonary/Chest: Effort normal and breath sounds normal. No respiratory distress. She has no wheezes. She has no rales.   Sternotomy site well-healed  PPM noted   Abdominal: Soft. She exhibits no distension. There is no tenderness. There is no rebound.   Musculoskeletal: She exhibits edema (left lower extremity 2+; RLE 1+).   Neurological: She is alert and oriented to person, place, and time.   Skin: Skin is warm and dry. She is not diaphoretic. No erythema.   Psychiatric: She has a normal mood and affect. Her behavior is normal. Thought content normal.   Nursing note and vitals reviewed.        2D Echo " CONCLUSIONS     1 - Severe left atrial enlargement.     2 - Moderately depressed left ventricular systolic function (EF 35-40%).     3 - Pulmonary hypertension. The estimated PA systolic pressure is 59 mmHg.     4 - Mitral valve prosthesis.     5 - Trivial mitral regurgitation.     6 - Moderate tricuspid regurgitation.     7 - Increased central venous pressure.       Chemistry        Component Value Date/Time     02/02/2018 1144    K 4.5 02/02/2018 1144     02/02/2018 1144    CO2 28 02/02/2018 1144    BUN 34 (H) 02/02/2018 1144    CREATININE 1.2 02/02/2018 1144    GLU 98 02/02/2018 1144        Component Value Date/Time    CALCIUM 8.9 02/02/2018 1144    ALKPHOS 40 (L) 01/25/2018 0552    AST 22 01/25/2018 0552    ALT 13 01/25/2018 0552    BILITOT 0.3 01/25/2018 0552    ESTGFRAFRICA 48 (A) 02/02/2018 1144    EGFRNONAA 41 (A) 02/02/2018 1144          Assessment:       1. Left leg swelling    2. S/P placement of cardiac pacemaker    3. S/P MVR (mitral valve replacement)    4. Ischemic cardiomyopathy    5. Essential hypertension    6. Dyslipidemia    7. Chronic combined systolic and diastolic congestive heart failure    8. Cardiac resynchronization therapy defibrillator (CRT-D) in place    9. CKD (chronic kidney disease) stage 3, GFR 30-59 ml/min    10. Medication monitoring encounter    11. LEV (obstructive sleep apnea)    12. Left leg pain       Patient doing well s/p hospital f/u. She does have some lower extremity edema, R > L with right calf pain. Will check LLE venous U/S to rule out DVT. Check BMP and BNP as well to see if we need to increase Lasix. Advised patient to elevate legs and wear compression socks. I discussed Coumadin therapy with Dr. Cary. As H and H is stable, will re-start Coumadin and have patient follow-up with Dr. Chau for further recommendations.   Plan:    -LLE venous U/S with phone review  -BMP, BNP today with phone review  -EP f/u with Dr. Dunne  -Follow-up in 3 months with  Dr. Cary    Chart reviewed. Dr. Cary agrees with plan as outlined above.

## 2018-02-02 NOTE — PROGRESS NOTES
Patient will restart warfarin at 2.5mg daily. Scheduled for pt/inr repeat on Friday. Patient verbalized understanding.

## 2018-02-02 NOTE — TELEPHONE ENCOUNTER
Please call patient. LLE U/S negative for DVT. Labs reviewed. BNP elevated but lower than previous. Creatinine and electrolytes stable. Please have her take extra Lasix x 2 days and assess response. Elevate legs.     Parvin from Coumadin clinic should be contacting her about re-starting Coumadin per Dr. Cary's recommendations. She is to notify us if any signs of bleeding    Thanks

## 2018-02-06 ENCOUNTER — TELEPHONE (OUTPATIENT)
Dept: CARDIOLOGY | Facility: HOSPITAL | Age: 83
End: 2018-02-06

## 2018-02-06 NOTE — TELEPHONE ENCOUNTER
----- Message from Zaheer Hobbs sent at 2/6/2018  3:51 PM CST -----  Contact: pt  Pt states she is returning nurse call and can be reached [phone]

## 2018-02-06 NOTE — TELEPHONE ENCOUNTER
I called pt home #, line busy  I called mobile # listed.   No answer.   Left message for pt to call back.

## 2018-02-06 NOTE — TELEPHONE ENCOUNTER
Spoke with states edema has improved some states she was having edema in left leg more than right. Pt states she is elevating legs when not on them. Pt denies any other symptoms.

## 2018-02-09 ENCOUNTER — ANTI-COAG VISIT (OUTPATIENT)
Dept: CARDIOLOGY | Facility: CLINIC | Age: 83
End: 2018-02-09
Payer: MEDICARE

## 2018-02-09 DIAGNOSIS — Z79.01 LONG TERM (CURRENT) USE OF ANTICOAGULANTS: Primary | ICD-10-CM

## 2018-02-09 LAB — INR PPP: 1.2 (ref 2–3)

## 2018-02-09 PROCEDURE — 99211 OFF/OP EST MAY X REQ PHY/QHP: CPT | Mod: PBBFAC

## 2018-02-09 PROCEDURE — 99999 PR PBB SHADOW E&M-EST. PATIENT-LVL I: CPT | Mod: PBBFAC,,,

## 2018-02-09 PROCEDURE — 85610 PROTHROMBIN TIME: CPT | Mod: PBBFAC

## 2018-02-09 NOTE — PROGRESS NOTES
INR is sub-therapeutic. Recently admitted for enterocolitis/GI bleed, INR was 3.2 on admission. Warfarin resumed last Friday once H/H was stable.  Confirms compliance. Reports high vitamin k in diet twice this week. Will gently increase total weekly dose. Repeat INR in 1 week.

## 2018-02-12 DIAGNOSIS — M1A.39X1 CHRONIC GOUT DUE TO RENAL IMPAIRMENT OF MULTIPLE SITES WITH TOPHUS: ICD-10-CM

## 2018-02-12 DIAGNOSIS — M81.0 OSTEOPOROSIS, SENILE: ICD-10-CM

## 2018-02-12 RX ORDER — COLCHICINE 0.6 MG/1
0.6 CAPSULE ORAL DAILY
Qty: 30 CAPSULE | Refills: 6 | Status: SHIPPED | OUTPATIENT
Start: 2018-02-12 | End: 2018-11-13 | Stop reason: SDUPTHER

## 2018-02-13 ENCOUNTER — TELEPHONE (OUTPATIENT)
Dept: CARDIOLOGY | Facility: CLINIC | Age: 83
End: 2018-02-13

## 2018-02-13 DIAGNOSIS — I10 ESSENTIAL HYPERTENSION: ICD-10-CM

## 2018-02-13 NOTE — TELEPHONE ENCOUNTER
Spoke with pt about medication dosage change..per medicate wants a 90 supply of valsartan.  Pt was taking 4 80mg tabs daily.  Changed to 160mg tabs 2 times/day.  Pt will call if meds are too costly.

## 2018-02-14 RX ORDER — VALSARTAN 160 MG/1
160 TABLET ORAL 2 TIMES DAILY
Qty: 180 TABLET | Refills: 3 | Status: SHIPPED | OUTPATIENT
Start: 2018-02-14 | End: 2018-03-06 | Stop reason: ALTCHOICE

## 2018-02-16 ENCOUNTER — ANTI-COAG VISIT (OUTPATIENT)
Dept: CARDIOLOGY | Facility: CLINIC | Age: 83
End: 2018-02-16
Payer: MEDICARE

## 2018-02-16 DIAGNOSIS — Z79.01 LONG TERM (CURRENT) USE OF ANTICOAGULANTS: Primary | ICD-10-CM

## 2018-02-16 LAB — INR PPP: 2 (ref 2–3)

## 2018-02-16 PROCEDURE — 99211 OFF/OP EST MAY X REQ PHY/QHP: CPT | Mod: PBBFAC

## 2018-02-16 PROCEDURE — 99999 PR PBB SHADOW E&M-EST. PATIENT-LVL I: CPT | Mod: PBBFAC,,,

## 2018-02-16 PROCEDURE — 85610 PROTHROMBIN TIME: CPT | Mod: PBBFAC

## 2018-02-16 NOTE — PROGRESS NOTES
INR is now therapeutic. This is a quick follow-up after a sub-therapeutic IRN on 2/9. Continue new dose until follow-up. Patient reports no bleeding or bruising, no new medications and no diet changes.  I reminded the patient to call with any problems, changes or questions before the next visit.

## 2018-02-20 ENCOUNTER — TELEPHONE (OUTPATIENT)
Dept: CARDIOLOGY | Facility: CLINIC | Age: 83
End: 2018-02-20

## 2018-02-20 NOTE — TELEPHONE ENCOUNTER
Patient states she is returning a missed call from the coumadin clinic, Parvin from this morning.

## 2018-02-20 NOTE — TELEPHONE ENCOUNTER
----- Message from Farzana Pastor sent at 2/20/2018  3:58 PM CST -----  Contact: pt  Pt is calling in regards to a missed call to go over her medications with the nurse please contact her at [phone]

## 2018-02-26 DIAGNOSIS — M1A.39X1 CHRONIC GOUT DUE TO RENAL IMPAIRMENT OF MULTIPLE SITES WITH TOPHUS: ICD-10-CM

## 2018-02-26 DIAGNOSIS — M81.0 OSTEOPOROSIS, SENILE: ICD-10-CM

## 2018-02-27 RX ORDER — ALLOPURINOL 100 MG/1
200 TABLET ORAL DAILY
Qty: 180 TABLET | Refills: 1 | Status: SHIPPED | OUTPATIENT
Start: 2018-02-27 | End: 2018-08-27 | Stop reason: SDUPTHER

## 2018-02-28 ENCOUNTER — INITIAL CONSULT (OUTPATIENT)
Dept: CARDIOLOGY | Facility: CLINIC | Age: 83
End: 2018-02-28
Payer: MEDICARE

## 2018-02-28 ENCOUNTER — ANTI-COAG VISIT (OUTPATIENT)
Dept: CARDIOLOGY | Facility: CLINIC | Age: 83
End: 2018-02-28
Payer: MEDICARE

## 2018-02-28 ENCOUNTER — LAB VISIT (OUTPATIENT)
Dept: LAB | Facility: HOSPITAL | Age: 83
End: 2018-02-28
Attending: INTERNAL MEDICINE
Payer: MEDICARE

## 2018-02-28 VITALS
BODY MASS INDEX: 22.11 KG/M2 | SYSTOLIC BLOOD PRESSURE: 142 MMHG | HEART RATE: 76 BPM | WEIGHT: 120.13 LBS | HEIGHT: 62 IN | DIASTOLIC BLOOD PRESSURE: 60 MMHG

## 2018-02-28 DIAGNOSIS — I48.0 PAROXYSMAL ATRIAL FIBRILLATION: ICD-10-CM

## 2018-02-28 DIAGNOSIS — E03.9 ACQUIRED HYPOTHYROIDISM: ICD-10-CM

## 2018-02-28 DIAGNOSIS — E03.9 HYPOTHYROIDISM, UNSPECIFIED TYPE: ICD-10-CM

## 2018-02-28 DIAGNOSIS — I50.42 CHRONIC COMBINED SYSTOLIC AND DIASTOLIC CONGESTIVE HEART FAILURE: Primary | ICD-10-CM

## 2018-02-28 DIAGNOSIS — Z79.01 LONG TERM (CURRENT) USE OF ANTICOAGULANTS: Primary | ICD-10-CM

## 2018-02-28 DIAGNOSIS — N18.30 CKD (CHRONIC KIDNEY DISEASE) STAGE 3, GFR 30-59 ML/MIN: ICD-10-CM

## 2018-02-28 DIAGNOSIS — E78.5 DYSLIPIDEMIA: ICD-10-CM

## 2018-02-28 DIAGNOSIS — Z95.810 CARDIAC RESYNCHRONIZATION THERAPY DEFIBRILLATOR (CRT-D) IN PLACE: ICD-10-CM

## 2018-02-28 DIAGNOSIS — I50.42 CHRONIC COMBINED SYSTOLIC AND DIASTOLIC CONGESTIVE HEART FAILURE: ICD-10-CM

## 2018-02-28 DIAGNOSIS — S80.02XS TRAUMATIC HEMATOMA OF LEFT KNEE, SEQUELA: ICD-10-CM

## 2018-02-28 DIAGNOSIS — Z95.2 S/P MVR (MITRAL VALVE REPLACEMENT): ICD-10-CM

## 2018-02-28 DIAGNOSIS — I27.22 PULMONARY HYPERTENSION DUE TO LEFT HEART DISEASE: ICD-10-CM

## 2018-02-28 DIAGNOSIS — G47.33 OSA (OBSTRUCTIVE SLEEP APNEA): Chronic | ICD-10-CM

## 2018-02-28 LAB
INR PPP: 1.9 (ref 2–3)
TSH SERPL DL<=0.005 MIU/L-ACNC: 7.53 UIU/ML

## 2018-02-28 PROCEDURE — 85610 PROTHROMBIN TIME: CPT | Mod: PBBFAC,PO

## 2018-02-28 PROCEDURE — 99205 OFFICE O/P NEW HI 60 MIN: CPT | Mod: S$PBB,,, | Performed by: INTERNAL MEDICINE

## 2018-02-28 PROCEDURE — 84439 ASSAY OF FREE THYROXINE: CPT

## 2018-02-28 PROCEDURE — 99211 OFF/OP EST MAY X REQ PHY/QHP: CPT | Mod: PBBFAC,PO

## 2018-02-28 PROCEDURE — 36415 COLL VENOUS BLD VENIPUNCTURE: CPT | Mod: PO

## 2018-02-28 PROCEDURE — 80299 QUANTITATIVE ASSAY DRUG: CPT

## 2018-02-28 PROCEDURE — 99999 PR PBB SHADOW E&M-EST. PATIENT-LVL I: CPT | Mod: PBBFAC,,,

## 2018-02-28 PROCEDURE — 84443 ASSAY THYROID STIM HORMONE: CPT

## 2018-02-28 PROCEDURE — 80162 ASSAY OF DIGOXIN TOTAL: CPT

## 2018-02-28 PROCEDURE — 99213 OFFICE O/P EST LOW 20 MIN: CPT | Mod: PBBFAC,PO,27 | Performed by: INTERNAL MEDICINE

## 2018-02-28 PROCEDURE — 99999 PR PBB SHADOW E&M-EST. PATIENT-LVL III: CPT | Mod: PBBFAC,,, | Performed by: INTERNAL MEDICINE

## 2018-02-28 RX ORDER — LEVOTHYROXINE SODIUM 50 UG/1
50 TABLET ORAL DAILY
Qty: 30 TABLET | Refills: 11 | Status: SHIPPED | OUTPATIENT
Start: 2018-02-28 | End: 2018-03-06

## 2018-02-28 NOTE — PROGRESS NOTES
Subjective:    Patient ID:  Jennifer Mathews is a 85 y.o. female who presents for eval of Atrial Fibrillation and Congestive Heart Failure      HPI  86 yo female with a PMH of mitral valve replacement x 3, HTN, PAF, ACS, combined CHF, CRT-P placement (SJ) , CKD.   Recently admitted to [facility] to the ER today with enterocolitis/GI bleed. Her H and H remained stable and she was treated with a course of cipro/flagyl and discharged.   Coumadin was stopped while admitted due to bleeding.   Patient returned to see Ms Arizmendi on 2/2 and she was doing well. Her only complaint was increased left lower extremity edema. Associated symptoms include left calf soreness. She denied any recent injury to area. Stated her legs are usually swollen, but her left one was then noticeably bigger than her right. Other than the above issue, patient seems stable. No recurrence of bleeding. No cardiac complaints. Denied chest pain, chest heaviness, or tightness.   CHF wise, seemed to be doing well. Denied any PND, orthopnea, or abdominal bloating. Weight stable at home.   Patient reports compliance with her medications. Was still off of Coumadin. No CNS complaints suggestive of TIA/CVA. BP stable. GI has seen patient, repeat H and H stable.   As H and H is stable, Ms Arizmendi re-started Coumadin and aske patient to follow-up with Me for further recommendations.   My last visit with her was in 2014 --  DCM with CHB and CRT-P  that resulted in alleviation of CHF Sx and improvement in EF  Most recent echo from late 2016 shows:    1 - Severe left atrial enlargement.     2 - Moderately depressed left ventricular systolic function (EF 35-40%).     3 - Pulmonary hypertension. The estimated PA systolic pressure is 59 mmHg.     4 - Mitral valve prosthesis.     5 - Trivial mitral regurgitation.     6 - Moderate tricuspid regurgitation.     7 - Increased central venous pressure.    I have reviewed the actual image of the most recent ECG tracing  and it  shows ABiV pacing with a favorable QRS morphology /duration (QRSd no more than 150 Msec in any one lead).  Most recent BNP ~1100  She is comfortable at rest and can walk from parking lot to clinic w/o issues. She walks her dog twice a day around her [place]. 30 to 45 min   She is hypothyroid now    Current Outpatient Prescriptions on File Prior to Visit   Medication Sig Dispense Refill    acetaminophen (TYLENOL EXTRA STRENGTH) 325 MG tablet Take 2 tablets (650 mg total) by mouth every 8 (eight) hours. (Patient taking differently: Take 650 mg by mouth 3 (three) times daily as needed. )      allopurinol (ZYLOPRIM) 100 MG tablet Take 2 tablets (200 mg total) by mouth once daily. 180 tablet 1    amiodarone (PACERONE) 200 MG Tab Take 1 tablet (200 mg total) by mouth once daily. 30 tablet 11    aspirin (ECOTRIN) 81 MG EC tablet Take 81 mg by mouth once daily.      calcium carbonate (OS-SHERRY) 600 mg calcium (1,500 mg) Tab Take 600 mg by mouth once.      carvedilol (COREG) 25 MG tablet Take 1 tablet (25 mg total) by mouth 2 (two) times daily with meals. 180 tablet 3    colchicine 0.6 mg Cap Take 1 capsule (0.6 mg total) by mouth once daily. 30 capsule 6    cranberry 1,000 mg Cap Take 1 capsule by mouth Daily.      digoxin (LANOXIN) 125 mcg tablet Take 1 tablet (125 mcg total) by mouth once daily. TAKE 1 TABLET (0.125 MG TOTAL) BY MOUTH ONCE DAILY. 90 tablet 3    diphenhydrAMINE (BENADRYL) 25 mg capsule Take 25 mg by mouth every 6 (six) hours as needed for Itching.      furosemide (LASIX) 40 MG tablet Take 1 tablet (40 mg total) by mouth once daily. (Patient taking differently: Take 40 mg by mouth 2 (two) times daily. ) 180 tablet 3    gabapentin (NEURONTIN) 100 MG capsule TAKE ONE CAPSULE BY MOUTH TWO TIMES DAILY 180 capsule 1    Lactobac 40-Bifido 3-S.thermop (PROBIOTIC) 100 billion cell Cap Take 1 capsule by mouth once daily. 30 capsule 0    MULTIVITAMIN ORAL Take 1 tablet by mouth Daily.       valsartan (DIOVAN) 160 MG tablet Take 1 tablet (160 mg total) by mouth 2 (two) times daily. 180 tablet 3    warfarin (COUMADIN) 2.5 MG tablet        Current Facility-Administered Medications on File Prior to Visit   Medication Dose Route Frequency Provider Last Rate Last Dose    denosumab (PROLIA) injection 60 mg  60 mg Subcutaneous Q6 Months Oc Catherine MD   60 mg at 11/20/17 1119       Review of Systems   Constitution: Negative. Negative for decreased appetite, weakness, weight gain and weight loss.   HENT: Positive for hearing loss. Negative for congestion, ear discharge, ear pain, hoarse voice, nosebleeds, odynophagia, sore throat, stridor and tinnitus.    Eyes: Negative.  Negative for blurred vision and visual disturbance.   Cardiovascular: Positive for claudication and leg swelling. Negative for chest pain, cyanosis, dyspnea on exertion, irregular heartbeat, near-syncope, orthopnea, palpitations, paroxysmal nocturnal dyspnea and syncope.   Respiratory: Negative.  Negative for cough, shortness of breath and wheezing.    Endocrine: Negative for heat intolerance.   Skin: Negative.  Negative for rash.   Musculoskeletal: Positive for arthritis, gout, joint pain and joint swelling. Negative for back pain, falls, muscle cramps, muscle weakness, myalgias, neck pain and stiffness.   Gastrointestinal: Positive for hemorrhoids. Negative for bloating, abdominal pain, anorexia, change in bowel habit, bowel incontinence, constipation, diarrhea, dysphagia, excessive appetite, flatus, heartburn, hematemesis, hematochezia, jaundice, melena, nausea and vomiting.   Genitourinary: Negative for menorrhagia.   Neurological: Positive for loss of balance. Negative for aphonia, brief paralysis, difficulty with concentration, disturbances in coordination, excessive daytime sleepiness, dizziness, focal weakness, headaches, light-headedness, numbness, paresthesias, seizures, sensory change, tremors and vertigo.    Psychiatric/Behavioral: Negative for altered mental status and depression. The patient is not nervous/anxious.         Objective:    Physical Exam   Constitutional: She is oriented to person, place, and time. She appears well-developed and well-nourished.   HENT:   Head: Normocephalic and atraumatic.   Right Ear: External ear normal.   Left Ear: External ear normal.   Eyes: Conjunctivae are normal. Pupils are equal, round, and reactive to light. Left eye exhibits no discharge. No scleral icterus.   Neck: Normal range of motion. Neck supple. No thyromegaly present.   Cardiovascular: Normal rate, regular rhythm, normal heart sounds and intact distal pulses.  Exam reveals no gallop, no S3, no S4, no friction rub, no midsystolic click and no opening snap.    No murmur heard.  Pulses:       Carotid pulses are 2+ on the right side, and 2+ on the left side.       Radial pulses are 2+ on the right side, and 2+ on the left side.        Dorsalis pedis pulses are 2+ on the right side, and 2+ on the left side.        Posterior tibial pulses are 2+ on the right side, and 2+ on the left side.   Gallop like widely split S2  Neck veins are up to mandible with a fast Y descent   Pulmonary/Chest: Effort normal and breath sounds normal.   Device pocket is in excellent repair   Abdominal: Soft. She exhibits no distension. There is no hepatomegaly. There is no tenderness. There is no guarding.   Musculoskeletal:        Right ankle: She exhibits no swelling.        Left ankle: She exhibits no swelling.        Right lower leg: She exhibits no swelling.        Left lower leg: She exhibits no swelling.   Todd non pitting edema   Neurological: She is alert and oriented to person, place, and time. She has normal strength. No cranial nerve deficit. Gait normal.   Skin: Skin is warm, dry and intact. No rash noted. No cyanosis. Nails show no clubbing.   Psychiatric: She has a normal mood and affect. Her speech is normal and behavior is normal.  "Thought content normal. Cognition and memory are normal.   Nursing note and vitals reviewed.  BP (!) 142/60   Pulse 76   Ht 5' 2" (1.575 m)   Wt 54.5 kg (120 lb 2.4 oz)   BMI 21.98 kg/m²         Assessment:     Overall, she seems to have sig fluid retention with TR and PHTN kiana she is comfortable at this time.  I believe that she would be better off with OAC than not and coumadin would be the go to med due to the Hx prior MV repairs  She probably should be on Aldactone as well as switch to Entresto  -- I will leave this to dr phillip's and Ms Arizmendi's discretion  I will also start her on Synthroid 50 and get amio and Dig levels  Would also update echo  1. Chronic combined systolic and diastolic congestive heart failure    2. 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 Paroxysmal atrial fibrillation    4. CKD (chronic kidney disease) stage 3, GFR 30-59 ml/min    5. Dyslipidemia    6. LEV (obstructive sleep apnea)    7. Pulmonary hypertension due to left heart disease    8. S/P MVR (mitral valve replacement)    9. Traumatic hematoma of left knee, sequela    10. Hypothyroidism, unspecified type    11. Acquired hypothyroidism         Plan:       Orders Placed This Encounter   Procedures    Digoxin level     Standing Status:   Future     Standing Expiration Date:   4/29/2019    Amiodarone level     Standing Status:   Future     Standing Expiration Date:   4/29/2019    TSH     Standing Status:   Future     Standing Expiration Date:   4/29/2019    2D echo with color flow doppler     Please analyze and report on TDIs  Tx     Standing Status:   Future     Standing Expiration Date:   2/28/2019   note sent to Dr phillip and to Ms Arizmendi  Follow-up if symptoms worsen or fail to improve.  There are no discontinued medications.  New Prescriptions    LEVOTHYROXINE (SYNTHROID) 50 MCG TABLET    Take 1 tablet (50 mcg total) by mouth once daily.     Modified Medications    No medications on file "

## 2018-02-28 NOTE — PROGRESS NOTES
INR is slightly sub-therapeutic. Bilateral leg edema, will see Dr. Saldivar today. No changes in medication since last visit. Will re-challenge recent adjusted dose until follow-up. Repeat INR in 4 weeks.

## 2018-02-28 NOTE — LETTER
February 28, 2018      Adriana Arizmendi PA-C  [address] [place] [address]           [facility]  [address]  [place] [address]  Phone: [phone]  Fax: [phone]          Patient: Jennifer Mathews   MR Number: [id number]   YOB: 1932   Date of Visit: 2/28/2018       Dear Adriana Arizmendi:    Thank you for referring Jennifer Mathews to me for evaluation. Attached you will find relevant portions of my assessment and plan of care.    If you have questions, please do not hesitate to call me. I look forward to following Jennifer Mathews along with you.    Sincerely,    Manny Dunne MD    Enclosure  CC:  No Recipients    If you would like to receive this communication electronically, please contact [email or web address] or [phone] to request more information on [employer] access.    For providers and/or their staff who would like to refer a patient to [facility], please contact us through our one-stop-shop provider referral line, [facility] , at [phone].    If you feel you have received this communication in error or would no longer like to receive these types of communications, please e-mail [email or web address]

## 2018-03-01 ENCOUNTER — CLINICAL SUPPORT (OUTPATIENT)
Dept: CARDIOLOGY | Facility: CLINIC | Age: 83
End: 2018-03-01
Attending: INTERNAL MEDICINE
Payer: MEDICARE

## 2018-03-01 DIAGNOSIS — Z95.810 CARDIAC RESYNCHRONIZATION THERAPY DEFIBRILLATOR (CRT-D) IN PLACE: ICD-10-CM

## 2018-03-01 LAB
AORTIC VALVE REGURGITATION: ABNORMAL
DIGOXIN SERPL-MCNC: 1.4 NG/ML
ESTIMATED PA SYSTOLIC PRESSURE: 62.06
MITRAL VALVE REGURGITATION: ABNORMAL
RETIRED EF AND QEF - SEE NOTES: 25 (ref 55–65)
T4 FREE SERPL-MCNC: 0.77 NG/DL
TRICUSPID VALVE REGURGITATION: ABNORMAL

## 2018-03-01 PROCEDURE — 93306 TTE W/DOPPLER COMPLETE: CPT | Mod: PBBFAC | Performed by: INTERNAL MEDICINE

## 2018-03-02 LAB
AMIODARONE FLD-MCNC: 0.6 UG/ML (ref 1–3)
N-DESETHYL-AMIODARONE: 0.9 UG/ML (ref 1–3)

## 2018-03-05 ENCOUNTER — TELEPHONE (OUTPATIENT)
Dept: CARDIOLOGY | Facility: CLINIC | Age: 83
End: 2018-03-05

## 2018-03-05 DIAGNOSIS — E03.8 OTHER SPECIFIED HYPOTHYROIDISM: Primary | ICD-10-CM

## 2018-03-05 NOTE — TELEPHONE ENCOUNTER
Telephoned patient with recent lab results.  Noted message received that she had some complaints about Levothyroxine 50 mcg causing chills and insomnia.  Advised of results and Dr. Schulz wanting to keep everything the same however she stated she stopped Levothyroxine x 2 days now.  She has an appointment with APhillips NP in the morning and asked that everyone be on the same page with her treatment.  Informed that I would check and notify Jazz Gordon and Giorgi

## 2018-03-05 NOTE — TELEPHONE ENCOUNTER
----- Message from Maikel Cary MD sent at 3/4/2018  7:38 AM CST -----  JUAN LUIS   I will thxa I will get my nurse cyndi to get her on ANGIRELLE SARABIA SCHEDULE TO GET Entresto streted and aldactone per our discussion and your note  Thxs  ----- Message -----  From: Juan Luis Dunne MD  Sent: 3/3/2018   9:51 PM  To: Maikel Cary MD    Hi Maikel,  As per my previous visit note , I think sje needs to be strated on Entresto etc  Best

## 2018-03-06 ENCOUNTER — OFFICE VISIT (OUTPATIENT)
Dept: CARDIOLOGY | Facility: CLINIC | Age: 83
End: 2018-03-06
Payer: MEDICARE

## 2018-03-06 VITALS
DIASTOLIC BLOOD PRESSURE: 70 MMHG | WEIGHT: 120.81 LBS | HEIGHT: 62 IN | BODY MASS INDEX: 22.23 KG/M2 | HEART RATE: 72 BPM | SYSTOLIC BLOOD PRESSURE: 138 MMHG

## 2018-03-06 DIAGNOSIS — I50.42 CHRONIC COMBINED SYSTOLIC AND DIASTOLIC CONGESTIVE HEART FAILURE: Primary | ICD-10-CM

## 2018-03-06 DIAGNOSIS — I27.22 PULMONARY HYPERTENSION DUE TO LEFT HEART DISEASE: ICD-10-CM

## 2018-03-06 DIAGNOSIS — I48.0 PAROXYSMAL ATRIAL FIBRILLATION: ICD-10-CM

## 2018-03-06 DIAGNOSIS — N18.30 CKD (CHRONIC KIDNEY DISEASE) STAGE 3, GFR 30-59 ML/MIN: ICD-10-CM

## 2018-03-06 DIAGNOSIS — I10 ESSENTIAL HYPERTENSION: ICD-10-CM

## 2018-03-06 DIAGNOSIS — Z95.2 S/P MVR (MITRAL VALVE REPLACEMENT): ICD-10-CM

## 2018-03-06 DIAGNOSIS — Z79.01 ANTICOAGULATED ON COUMADIN: ICD-10-CM

## 2018-03-06 DIAGNOSIS — I25.5 ISCHEMIC CARDIOMYOPATHY: ICD-10-CM

## 2018-03-06 DIAGNOSIS — E78.5 DYSLIPIDEMIA: ICD-10-CM

## 2018-03-06 DIAGNOSIS — Z95.810 CARDIAC RESYNCHRONIZATION THERAPY DEFIBRILLATOR (CRT-D) IN PLACE: ICD-10-CM

## 2018-03-06 PROCEDURE — 99999 PR PBB SHADOW E&M-EST. PATIENT-LVL IV: CPT | Mod: PBBFAC,,, | Performed by: NURSE PRACTITIONER

## 2018-03-06 PROCEDURE — 99214 OFFICE O/P EST MOD 30 MIN: CPT | Mod: PBBFAC | Performed by: NURSE PRACTITIONER

## 2018-03-06 PROCEDURE — 99214 OFFICE O/P EST MOD 30 MIN: CPT | Mod: S$PBB,,, | Performed by: NURSE PRACTITIONER

## 2018-03-06 RX ORDER — LEVOTHYROXINE SODIUM 50 UG/1
50 TABLET ORAL DAILY
Qty: 30 TABLET | Refills: 11
Start: 2018-03-06 | End: 2019-05-31

## 2018-03-06 NOTE — PROGRESS NOTES
Subjective:   Patient ID:  Jennifer Mathews is a 85 y.o. female who presents for follow up of Congestive Heart Failure      HPI  Patient presents to clinic for management of cardiomyopathy. She has PMH of mitral valve replacement x 3, HTN, PAF, ACS, combined CHF, PPM-CRT-D placement, s/p MVR, edema, pacemaker placement, CKD. Had 2D echo 25% on most recent echo. Patient complains of lower extremity edema. No chest pain, sob, orthopnea, PND, GAR, dizziness, near syncope or syncope. Noted to have TSH 7.527. Patient states that she stopped her Synthroid because it made her too anxious. Was taking Synthroid in the evening.  INR 1.9 without abnormal bleeding. Currently taking Lasix 40mg BID. BMP stable.     Past Medical History:   Diagnosis Date    Acute coronary syndrome     Anemia     Anticoagulated on Coumadin 7/13/2015    Arthritis     Atrial fibrillation     Basal cell carcinoma 10/2015    left neck    Cardiac arrest     Cardiac resynchronization therapy defibrillator (CRT-D) in place 07/13/2015    Pt denies, states it does not shock me    Cardiac resynchronization therapy defibrillator (CRT-D) in place 7/13/2015    Cardiomyopathy 1/30/2014    CHF (congestive heart failure)     Chronic combined systolic and diastolic congestive heart failure     CKD (chronic kidney disease) stage 3, GFR 30-59 ml/min     Dyslipidemia 1/30/2014    Edema     Heart disease     Hypertension     Ischemic cardiomyopathy 7/13/2015    Macular hole of left eye     Old    Macular hole of left eye     Myocardial infarction     LEV (obstructive sleep apnea) 9/30/2013    Recurrent UTI     Refractive error     S/P MVR (mitral valve replacement) 1/30/2014    S/P placement of cardiac pacemaker 1/30/2014    Skin cancer     Sleep apnea     Stroke        Past Surgical History:   Procedure Laterality Date    APPENDECTOMY      CARDIAC PACEMAKER PLACEMENT      CARDIAC VALVE SURGERY      CATARACT EXTRACTION      OU     CHOLECYSTECTOMY      COLONOSCOPY      MITRAL VALVE SURGERY      x3       Social History   Substance Use Topics    Smoking status: Never Smoker    Smokeless tobacco: Never Used    Alcohol use No       Family History   Problem Relation Age of Onset    Coronary artery disease Mother      mi    Coronary artery disease Father     Diabetes Brother     Cancer Brother     Melanoma Neg Hx     Psoriasis Neg Hx     Lupus Neg Hx     Eczema Neg Hx        Current Outpatient Prescriptions   Medication Sig    acetaminophen (TYLENOL EXTRA STRENGTH) 325 MG tablet Take 2 tablets (650 mg total) by mouth every 8 (eight) hours. (Patient taking differently: Take 650 mg by mouth 3 (three) times daily as needed. )    allopurinol (ZYLOPRIM) 100 MG tablet Take 2 tablets (200 mg total) by mouth once daily.    amiodarone (PACERONE) 200 MG Tab Take 1 tablet (200 mg total) by mouth once daily.    aspirin (ECOTRIN) 81 MG EC tablet Take 81 mg by mouth once daily.    calcium carbonate (OS-SHERRY) 600 mg calcium (1,500 mg) Tab Take 600 mg by mouth once.    carvedilol (COREG) 25 MG tablet Take 1 tablet (25 mg total) by mouth 2 (two) times daily with meals.    colchicine 0.6 mg Cap Take 1 capsule (0.6 mg total) by mouth once daily.    cranberry 1,000 mg Cap Take 1 capsule by mouth Daily.    digoxin (LANOXIN) 125 mcg tablet Take 1 tablet (125 mcg total) by mouth once daily. TAKE 1 TABLET (0.125 MG TOTAL) BY MOUTH ONCE DAILY.    diphenhydrAMINE (BENADRYL) 25 mg capsule Take 25 mg by mouth every 6 (six) hours as needed for Itching.    furosemide (LASIX) 40 MG tablet Take 1 tablet (40 mg total) by mouth once daily. (Patient taking differently: Take 40 mg by mouth 2 (two) times daily. )    gabapentin (NEURONTIN) 100 MG capsule TAKE ONE CAPSULE BY MOUTH TWO TIMES DAILY    Lactobac 40-Bifido 3-S.thermop (PROBIOTIC) 100 billion cell Cap Take 1 capsule by mouth once daily.    MULTIVITAMIN ORAL Take 1 tablet by mouth Daily.    warfarin  (COUMADIN) 2.5 MG tablet     sacubitril-valsartan (ENTRESTO) 49-51 mg per tablet Take 1 tablet by mouth 2 (two) times daily.     Current Facility-Administered Medications   Medication    denosumab (PROLIA) injection 60 mg     Current Outpatient Prescriptions on File Prior to Visit   Medication Sig    acetaminophen (TYLENOL EXTRA STRENGTH) 325 MG tablet Take 2 tablets (650 mg total) by mouth every 8 (eight) hours. (Patient taking differently: Take 650 mg by mouth 3 (three) times daily as needed. )    allopurinol (ZYLOPRIM) 100 MG tablet Take 2 tablets (200 mg total) by mouth once daily.    amiodarone (PACERONE) 200 MG Tab Take 1 tablet (200 mg total) by mouth once daily.    aspirin (ECOTRIN) 81 MG EC tablet Take 81 mg by mouth once daily.    calcium carbonate (OS-SHERRY) 600 mg calcium (1,500 mg) Tab Take 600 mg by mouth once.    carvedilol (COREG) 25 MG tablet Take 1 tablet (25 mg total) by mouth 2 (two) times daily with meals.    colchicine 0.6 mg Cap Take 1 capsule (0.6 mg total) by mouth once daily.    cranberry 1,000 mg Cap Take 1 capsule by mouth Daily.    digoxin (LANOXIN) 125 mcg tablet Take 1 tablet (125 mcg total) by mouth once daily. TAKE 1 TABLET (0.125 MG TOTAL) BY MOUTH ONCE DAILY.    diphenhydrAMINE (BENADRYL) 25 mg capsule Take 25 mg by mouth every 6 (six) hours as needed for Itching.    furosemide (LASIX) 40 MG tablet Take 1 tablet (40 mg total) by mouth once daily. (Patient taking differently: Take 40 mg by mouth 2 (two) times daily. )    gabapentin (NEURONTIN) 100 MG capsule TAKE ONE CAPSULE BY MOUTH TWO TIMES DAILY    Lactobac 40-Bifido 3-S.thermop (PROBIOTIC) 100 billion cell Cap Take 1 capsule by mouth once daily.    MULTIVITAMIN ORAL Take 1 tablet by mouth Daily.    warfarin (COUMADIN) 2.5 MG tablet     [DISCONTINUED] valsartan (DIOVAN) 160 MG tablet Take 1 tablet (160 mg total) by mouth 2 (two) times daily.    [DISCONTINUED] levothyroxine (SYNTHROID) 50 MCG tablet Take 1 tablet  "(50 mcg total) by mouth once daily.     Current Facility-Administered Medications on File Prior to Visit   Medication    denosumab (PROLIA) injection 60 mg       ROS    Objective:   Physical Exam   Constitutional: She is oriented to person, place, and time. She appears well-developed and well-nourished.   Neck: Neck supple. No JVD present.   Cardiovascular: Normal rate, regular rhythm, normal heart sounds and normal pulses.  Exam reveals no friction rub.    No murmur heard.  Pulmonary/Chest: Effort normal and breath sounds normal. No respiratory distress. She has no wheezes. She has no rales.   Left device pocket WNL    Abdominal: Soft. Bowel sounds are normal. She exhibits no distension.   Musculoskeletal: She exhibits edema. She exhibits no tenderness.   Neurological: She is alert and oriented to person, place, and time.   Skin: Skin is warm and dry. No rash noted.   Spider veins to BLE    Psychiatric: She has a normal mood and affect. Her behavior is normal.   Nursing note and vitals reviewed.    Vitals:    03/06/18 0901 03/06/18 0903   BP: (!) 142/72 138/70   BP Location: Left arm Right arm   Pulse: 72    Weight: 54.8 kg (120 lb 13 oz)    Height: 5' 2" (1.575 m)      Lab Results   Component Value Date    CHOL 154 01/29/2018    CHOL 157 08/15/2017    CHOL 162 12/27/2013     Lab Results   Component Value Date    HDL 34 (L) 01/29/2018    HDL 32 (L) 08/15/2017    HDL 49 12/27/2013     Lab Results   Component Value Date    LDLCALC 79.0 01/29/2018    LDLCALC 85.8 08/15/2017    LDLCALC 89.0 12/27/2013     Lab Results   Component Value Date    TRIG 205 (H) 01/29/2018    TRIG 196 (H) 08/15/2017    TRIG 120 12/27/2013     Lab Results   Component Value Date    CHOLHDL 22.1 01/29/2018    CHOLHDL 20.4 08/15/2017    CHOLHDL 30.2 12/27/2013       Chemistry        Component Value Date/Time     02/02/2018 1144    K 4.5 02/02/2018 1144     02/02/2018 1144    CO2 28 02/02/2018 1144    BUN 34 (H) 02/02/2018 1144    " CREATININE 1.2 02/02/2018 1144    GLU 98 02/02/2018 1144        Component Value Date/Time    CALCIUM 8.9 02/02/2018 1144    ALKPHOS 40 (L) 01/25/2018 0552    AST 22 01/25/2018 0552    ALT 13 01/25/2018 0552    BILITOT 0.3 01/25/2018 0552    ESTGFRAFRICA 48 (A) 02/02/2018 1144    EGFRNONAA 41 (A) 02/02/2018 1144          Lab Results   Component Value Date    TSH 7.527 (H) 02/28/2018     Lab Results   Component Value Date    INR 1.9 (A) 02/28/2018    INR 2.0 02/16/2018    INR 1.2 (A) 02/09/2018     Lab Results   Component Value Date    WBC 7.84 02/01/2018    HGB 9.3 (L) 02/01/2018    HCT 31.2 (L) 02/01/2018    MCV 92 02/01/2018     02/01/2018     BMP  Sodium   Date Value Ref Range Status   02/02/2018 143 136 - 145 mmol/L Final     Potassium   Date Value Ref Range Status   02/02/2018 4.5 3.5 - 5.1 mmol/L Final     Chloride   Date Value Ref Range Status   02/02/2018 105 95 - 110 mmol/L Final     CO2   Date Value Ref Range Status   02/02/2018 28 23 - 29 mmol/L Final     BUN, Bld   Date Value Ref Range Status   02/02/2018 34 (H) 8 - 23 mg/dL Final     Creatinine   Date Value Ref Range Status   02/02/2018 1.2 0.5 - 1.4 mg/dL Final     Calcium   Date Value Ref Range Status   02/02/2018 8.9 8.7 - 10.5 mg/dL Final     Anion Gap   Date Value Ref Range Status   02/02/2018 10 8 - 16 mmol/L Final     eGFR if    Date Value Ref Range Status   02/02/2018 48 (A) >60 mL/min/1.73 m^2 Final     eGFR if non    Date Value Ref Range Status   02/02/2018 41 (A) >60 mL/min/1.73 m^2 Final     Comment:     Calculation used to obtain the estimated glomerular filtration  rate (eGFR) is the CKD-EPI equation.        CrCl cannot be calculated (Patient's most recent lab result is older than the maximum 7 days allowed.).    Assessment:     1. Chronic combined systolic and diastolic congestive heart failure    2. Pulmonary hypertension due to left heart disease    3. Essential hypertension    4. Dyslipidemia    5.  S/P MVR (mitral valve replacement)    6. Paroxysmal atrial fibrillation    7. Cardiac resynchronization therapy defibrillator (CRT-D) in place    8. Ischemic cardiomyopathy    9. CKD (chronic kidney disease) stage 3, GFR 30-59 ml/min    10. Anticoagulated on Coumadin    Has edema on exam.  Currently taking Lasix 40mg BID   BP stable  Has stopped Synthroid, TSH 7.8  No CNS complaints to suggest TIA    No abnormal bleeding on coumadin  INR 1.9. Being managed by coumadin clinic     Plan:      Needs to restart synthroid   Recheck TSH in 6 weeks   Stop Diovan and start Entresto 49/51mg BID   RTC in 2 weeks with BMP   Will plan to optimize Entresto and add Aldactone in the future

## 2018-03-20 ENCOUNTER — OFFICE VISIT (OUTPATIENT)
Dept: CARDIOLOGY | Facility: CLINIC | Age: 83
End: 2018-03-20
Payer: MEDICARE

## 2018-03-20 ENCOUNTER — LAB VISIT (OUTPATIENT)
Dept: LAB | Facility: HOSPITAL | Age: 83
End: 2018-03-20
Attending: INTERNAL MEDICINE
Payer: MEDICARE

## 2018-03-20 ENCOUNTER — TELEPHONE (OUTPATIENT)
Dept: CARDIOLOGY | Facility: CLINIC | Age: 83
End: 2018-03-20

## 2018-03-20 VITALS
BODY MASS INDEX: 21.99 KG/M2 | HEART RATE: 72 BPM | SYSTOLIC BLOOD PRESSURE: 136 MMHG | DIASTOLIC BLOOD PRESSURE: 66 MMHG | HEIGHT: 62 IN | WEIGHT: 119.5 LBS

## 2018-03-20 DIAGNOSIS — N18.30 CKD (CHRONIC KIDNEY DISEASE) STAGE 3, GFR 30-59 ML/MIN: ICD-10-CM

## 2018-03-20 DIAGNOSIS — I10 ESSENTIAL HYPERTENSION: ICD-10-CM

## 2018-03-20 DIAGNOSIS — Z95.2 S/P MVR (MITRAL VALVE REPLACEMENT): ICD-10-CM

## 2018-03-20 DIAGNOSIS — I48.0 PAROXYSMAL ATRIAL FIBRILLATION: ICD-10-CM

## 2018-03-20 DIAGNOSIS — I27.22 PULMONARY HYPERTENSION DUE TO LEFT HEART DISEASE: ICD-10-CM

## 2018-03-20 DIAGNOSIS — I25.5 ISCHEMIC CARDIOMYOPATHY: ICD-10-CM

## 2018-03-20 DIAGNOSIS — Z95.810 CARDIAC RESYNCHRONIZATION THERAPY DEFIBRILLATOR (CRT-D) IN PLACE: ICD-10-CM

## 2018-03-20 DIAGNOSIS — N18.30 CKD (CHRONIC KIDNEY DISEASE) STAGE 3, GFR 30-59 ML/MIN: Primary | ICD-10-CM

## 2018-03-20 DIAGNOSIS — I50.42 CHRONIC COMBINED SYSTOLIC AND DIASTOLIC CONGESTIVE HEART FAILURE: Primary | ICD-10-CM

## 2018-03-20 DIAGNOSIS — Z79.01 ANTICOAGULATED ON COUMADIN: ICD-10-CM

## 2018-03-20 DIAGNOSIS — E78.5 DYSLIPIDEMIA: ICD-10-CM

## 2018-03-20 DIAGNOSIS — Z95.0 S/P PLACEMENT OF CARDIAC PACEMAKER: ICD-10-CM

## 2018-03-20 DIAGNOSIS — I50.42 CHRONIC COMBINED SYSTOLIC AND DIASTOLIC CONGESTIVE HEART FAILURE: ICD-10-CM

## 2018-03-20 DIAGNOSIS — G47.33 OSA (OBSTRUCTIVE SLEEP APNEA): Chronic | ICD-10-CM

## 2018-03-20 LAB
ALBUMIN SERPL BCP-MCNC: 3.8 G/DL
ALP SERPL-CCNC: 78 U/L
ALT SERPL W/O P-5'-P-CCNC: 35 U/L
ANION GAP SERPL CALC-SCNC: 9 MMOL/L
ANION GAP SERPL CALC-SCNC: 9 MMOL/L
AST SERPL-CCNC: 46 U/L
BILIRUB SERPL-MCNC: 0.4 MG/DL
BUN SERPL-MCNC: 43 MG/DL
BUN SERPL-MCNC: 43 MG/DL
CALCIUM SERPL-MCNC: 8.9 MG/DL
CALCIUM SERPL-MCNC: 8.9 MG/DL
CHLORIDE SERPL-SCNC: 109 MMOL/L
CHLORIDE SERPL-SCNC: 109 MMOL/L
CHOLEST SERPL-MCNC: 155 MG/DL
CHOLEST/HDLC SERPL: 4.3 {RATIO}
CO2 SERPL-SCNC: 31 MMOL/L
CO2 SERPL-SCNC: 31 MMOL/L
CREAT SERPL-MCNC: 1.5 MG/DL
CREAT SERPL-MCNC: 1.5 MG/DL
EST. GFR  (AFRICAN AMERICAN): 36 ML/MIN/1.73 M^2
EST. GFR  (AFRICAN AMERICAN): 36 ML/MIN/1.73 M^2
EST. GFR  (NON AFRICAN AMERICAN): 32 ML/MIN/1.73 M^2
EST. GFR  (NON AFRICAN AMERICAN): 32 ML/MIN/1.73 M^2
GLUCOSE SERPL-MCNC: 103 MG/DL
GLUCOSE SERPL-MCNC: 103 MG/DL
HDLC SERPL-MCNC: 36 MG/DL
HDLC SERPL: 23.2 %
LDLC SERPL CALC-MCNC: 90.8 MG/DL
NONHDLC SERPL-MCNC: 119 MG/DL
POTASSIUM SERPL-SCNC: 5.3 MMOL/L
POTASSIUM SERPL-SCNC: 5.3 MMOL/L
PROT SERPL-MCNC: 6.9 G/DL
SODIUM SERPL-SCNC: 149 MMOL/L
SODIUM SERPL-SCNC: 149 MMOL/L
TRIGL SERPL-MCNC: 141 MG/DL

## 2018-03-20 PROCEDURE — 99999 PR PBB SHADOW E&M-EST. PATIENT-LVL III: CPT | Mod: PBBFAC,,, | Performed by: NURSE PRACTITIONER

## 2018-03-20 PROCEDURE — 80053 COMPREHEN METABOLIC PANEL: CPT | Mod: PO

## 2018-03-20 PROCEDURE — 99213 OFFICE O/P EST LOW 20 MIN: CPT | Mod: PBBFAC | Performed by: NURSE PRACTITIONER

## 2018-03-20 PROCEDURE — 80061 LIPID PANEL: CPT | Mod: PO

## 2018-03-20 PROCEDURE — 36415 COLL VENOUS BLD VENIPUNCTURE: CPT | Mod: PO

## 2018-03-20 PROCEDURE — 99215 OFFICE O/P EST HI 40 MIN: CPT | Mod: S$PBB,,, | Performed by: NURSE PRACTITIONER

## 2018-03-20 NOTE — TELEPHONE ENCOUNTER
The patient has been notified of this information and all questions answered.      Pt has been scheduled for 1 month f/u with labs before scheduled appt. I have mailed pt appt letters.

## 2018-03-20 NOTE — PROGRESS NOTES
Subjective:   Patient ID:  Jennifer Mathews is a 85 y.o. female who presents for follow up of Congestive Heart Failure      HPI    Patient presents to clinic for management of cardiomyopathy. She has PMH of mitral valve replacement x 3, HTN, PAF, ACS, combined CHF, PPM-CRT-D placement, s/p MVR, edema, pacemaker placement, CKD. Had 2D echo 25% on most recent echo. No chest pain, SOB, orthopnea, PND, GAR, dizziness, near syncope or syncope. Sleeps on 1 pillow. INR 1.9 in Feb and has no abnormal bleeding. Currently taking Lasix 40mg BID. Edema improving since last visit.  Started on Entresto 49/51mg BID at last visit. BMP hasn't been checked since starting. Continues to do well with sodium intake. BP stable since starting Entresto.        Past Medical History:   Diagnosis Date    Acute coronary syndrome     Anemia     Anticoagulated on Coumadin 7/13/2015    Arthritis     Atrial fibrillation     Basal cell carcinoma 10/2015    left neck    Cardiac arrest     Cardiac resynchronization therapy defibrillator (CRT-D) in place 07/13/2015    Pt denies, states it does not shock me    Cardiac resynchronization therapy defibrillator (CRT-D) in place 7/13/2015    Cardiomyopathy 1/30/2014    CHF (congestive heart failure)     Chronic combined systolic and diastolic congestive heart failure     CKD (chronic kidney disease) stage 3, GFR 30-59 ml/min     Dyslipidemia 1/30/2014    Edema     Heart disease     Hypertension     Ischemic cardiomyopathy 7/13/2015    Macular hole of left eye     Old    Macular hole of left eye     Myocardial infarction     LEV (obstructive sleep apnea) 9/30/2013    Recurrent UTI     Refractive error     S/P MVR (mitral valve replacement) 1/30/2014    S/P placement of cardiac pacemaker 1/30/2014    Skin cancer     Sleep apnea     Stroke        Past Surgical History:   Procedure Laterality Date    APPENDECTOMY      CARDIAC PACEMAKER PLACEMENT      CARDIAC VALVE SURGERY       CATARACT EXTRACTION      OU    CHOLECYSTECTOMY      COLONOSCOPY      MITRAL VALVE SURGERY      x3       Social History   Substance Use Topics    Smoking status: Never Smoker    Smokeless tobacco: Never Used    Alcohol use No       Family History   Problem Relation Age of Onset    Coronary artery disease Mother      mi    Coronary artery disease Father     Diabetes Brother     Cancer Brother     Melanoma Neg Hx     Psoriasis Neg Hx     Lupus Neg Hx     Eczema Neg Hx        Current Outpatient Prescriptions   Medication Sig    acetaminophen (TYLENOL EXTRA STRENGTH) 325 MG tablet Take 2 tablets (650 mg total) by mouth every 8 (eight) hours. (Patient taking differently: Take 650 mg by mouth 3 (three) times daily as needed. )    allopurinol (ZYLOPRIM) 100 MG tablet Take 2 tablets (200 mg total) by mouth once daily.    amiodarone (PACERONE) 200 MG Tab Take 1 tablet (200 mg total) by mouth once daily.    aspirin (ECOTRIN) 81 MG EC tablet Take 81 mg by mouth once daily.    calcium carbonate (OS-SHERRY) 600 mg calcium (1,500 mg) Tab Take 600 mg by mouth once.    carvedilol (COREG) 25 MG tablet Take 1 tablet (25 mg total) by mouth 2 (two) times daily with meals.    colchicine 0.6 mg Cap Take 1 capsule (0.6 mg total) by mouth once daily.    cranberry 1,000 mg Cap Take 1 capsule by mouth Daily.    digoxin (LANOXIN) 125 mcg tablet Take 1 tablet (125 mcg total) by mouth once daily. TAKE 1 TABLET (0.125 MG TOTAL) BY MOUTH ONCE DAILY.    diphenhydrAMINE (BENADRYL) 25 mg capsule Take 25 mg by mouth every 6 (six) hours as needed for Itching.    furosemide (LASIX) 40 MG tablet Take 1 tablet (40 mg total) by mouth once daily. (Patient taking differently: Take 40 mg by mouth 2 (two) times daily. )    gabapentin (NEURONTIN) 100 MG capsule TAKE ONE CAPSULE BY MOUTH TWO TIMES DAILY    Lactobac 40-Bifido 3-S.thermop (PROBIOTIC) 100 billion cell Cap Take 1 capsule by mouth once daily.    levothyroxine (SYNTHROID) 50  MCG tablet Take 1 tablet (50 mcg total) by mouth once daily.    MULTIVITAMIN ORAL Take 1 tablet by mouth Daily.    sacubitril-valsartan (ENTRESTO) 49-51 mg per tablet Take 1 tablet by mouth 2 (two) times daily.    warfarin (COUMADIN) 2.5 MG tablet      Current Facility-Administered Medications   Medication    denosumab (PROLIA) injection 60 mg     Current Outpatient Prescriptions on File Prior to Visit   Medication Sig    acetaminophen (TYLENOL EXTRA STRENGTH) 325 MG tablet Take 2 tablets (650 mg total) by mouth every 8 (eight) hours. (Patient taking differently: Take 650 mg by mouth 3 (three) times daily as needed. )    allopurinol (ZYLOPRIM) 100 MG tablet Take 2 tablets (200 mg total) by mouth once daily.    amiodarone (PACERONE) 200 MG Tab Take 1 tablet (200 mg total) by mouth once daily.    aspirin (ECOTRIN) 81 MG EC tablet Take 81 mg by mouth once daily.    calcium carbonate (OS-SHERRY) 600 mg calcium (1,500 mg) Tab Take 600 mg by mouth once.    carvedilol (COREG) 25 MG tablet Take 1 tablet (25 mg total) by mouth 2 (two) times daily with meals.    colchicine 0.6 mg Cap Take 1 capsule (0.6 mg total) by mouth once daily.    cranberry 1,000 mg Cap Take 1 capsule by mouth Daily.    digoxin (LANOXIN) 125 mcg tablet Take 1 tablet (125 mcg total) by mouth once daily. TAKE 1 TABLET (0.125 MG TOTAL) BY MOUTH ONCE DAILY.    diphenhydrAMINE (BENADRYL) 25 mg capsule Take 25 mg by mouth every 6 (six) hours as needed for Itching.    furosemide (LASIX) 40 MG tablet Take 1 tablet (40 mg total) by mouth once daily. (Patient taking differently: Take 40 mg by mouth 2 (two) times daily. )    gabapentin (NEURONTIN) 100 MG capsule TAKE ONE CAPSULE BY MOUTH TWO TIMES DAILY    Lactobac 40-Bifido 3-S.thermop (PROBIOTIC) 100 billion cell Cap Take 1 capsule by mouth once daily.    levothyroxine (SYNTHROID) 50 MCG tablet Take 1 tablet (50 mcg total) by mouth once daily.    MULTIVITAMIN ORAL Take 1 tablet by mouth Daily.     sacubitril-valsartan (ENTRESTO) 49-51 mg per tablet Take 1 tablet by mouth 2 (two) times daily.    warfarin (COUMADIN) 2.5 MG tablet      Current Facility-Administered Medications on File Prior to Visit   Medication    denosumab (PROLIA) injection 60 mg       Review of Systems   Constitution: Negative for diaphoresis, weakness, malaise/fatigue, weight gain and weight loss.   HENT: Negative for congestion and nosebleeds.    Cardiovascular: Positive for leg swelling. Negative for chest pain, claudication, cyanosis, dyspnea on exertion, irregular heartbeat, near-syncope, orthopnea, palpitations, paroxysmal nocturnal dyspnea and syncope.   Respiratory: Negative for cough, hemoptysis, shortness of breath, sleep disturbances due to breathing, snoring, sputum production and wheezing.    Hematologic/Lymphatic: Negative for bleeding problem. Does not bruise/bleed easily.   Skin: Negative for rash.   Musculoskeletal: Positive for arthritis, gout and joint pain. Negative for back pain, falls, muscle cramps and muscle weakness.   Gastrointestinal: Negative for abdominal pain, constipation, diarrhea, heartburn, hematemesis, hematochezia, melena and nausea.   Genitourinary: Negative for dysuria, hematuria and nocturia.   Neurological: Negative for excessive daytime sleepiness, dizziness, headaches, light-headedness, loss of balance, numbness and vertigo.       Objective:   Physical Exam   Constitutional: She is oriented to person, place, and time. She appears well-developed and well-nourished.   Neck: Neck supple. No JVD present.   Cardiovascular: Normal rate, regular rhythm, normal heart sounds and normal pulses.  Exam reveals no friction rub.    No murmur heard.  Pulmonary/Chest: Effort normal and breath sounds normal. No respiratory distress. She has no wheezes. She has no rales.   Left device pocket WNL    Abdominal: Soft. Bowel sounds are normal. She exhibits no distension.   Musculoskeletal: She exhibits edema. She  "exhibits no tenderness.   Neurological: She is alert and oriented to person, place, and time.   Skin: Skin is warm and dry. No rash noted.   Spider veins to BLE    Psychiatric: She has a normal mood and affect. Her behavior is normal.   Nursing note and vitals reviewed.    Vitals:    03/20/18 1055 03/20/18 1057   BP: 126/64 136/66   BP Location: Left arm Right arm   Pulse: 72    Weight: 54.2 kg (119 lb 7.8 oz)    Height: 5' 2" (1.575 m)      Lab Results   Component Value Date    CHOL 154 01/29/2018    CHOL 157 08/15/2017    CHOL 162 12/27/2013     Lab Results   Component Value Date    HDL 34 (L) 01/29/2018    HDL 32 (L) 08/15/2017    HDL 49 12/27/2013     Lab Results   Component Value Date    LDLCALC 79.0 01/29/2018    LDLCALC 85.8 08/15/2017    LDLCALC 89.0 12/27/2013     Lab Results   Component Value Date    TRIG 205 (H) 01/29/2018    TRIG 196 (H) 08/15/2017    TRIG 120 12/27/2013     Lab Results   Component Value Date    CHOLHDL 22.1 01/29/2018    CHOLHDL 20.4 08/15/2017    CHOLHDL 30.2 12/27/2013       Chemistry        Component Value Date/Time     02/02/2018 1144    K 4.5 02/02/2018 1144     02/02/2018 1144    CO2 28 02/02/2018 1144    BUN 34 (H) 02/02/2018 1144    CREATININE 1.2 02/02/2018 1144    GLU 98 02/02/2018 1144        Component Value Date/Time    CALCIUM 8.9 02/02/2018 1144    ALKPHOS 40 (L) 01/25/2018 0552    AST 22 01/25/2018 0552    ALT 13 01/25/2018 0552    BILITOT 0.3 01/25/2018 0552    ESTGFRAFRICA 48 (A) 02/02/2018 1144    EGFRNONAA 41 (A) 02/02/2018 1144          Lab Results   Component Value Date    TSH 7.527 (H) 02/28/2018     Lab Results   Component Value Date    INR 1.9 (A) 02/28/2018    INR 2.0 02/16/2018    INR 1.2 (A) 02/09/2018     Lab Results   Component Value Date    WBC 7.84 02/01/2018    HGB 9.3 (L) 02/01/2018    HCT 31.2 (L) 02/01/2018    MCV 92 02/01/2018     02/01/2018     BMP  Sodium   Date Value Ref Range Status   02/02/2018 143 136 - 145 mmol/L Final "     Potassium   Date Value Ref Range Status   02/02/2018 4.5 3.5 - 5.1 mmol/L Final     Chloride   Date Value Ref Range Status   02/02/2018 105 95 - 110 mmol/L Final     CO2   Date Value Ref Range Status   02/02/2018 28 23 - 29 mmol/L Final     BUN, Bld   Date Value Ref Range Status   02/02/2018 34 (H) 8 - 23 mg/dL Final     Creatinine   Date Value Ref Range Status   02/02/2018 1.2 0.5 - 1.4 mg/dL Final     Calcium   Date Value Ref Range Status   02/02/2018 8.9 8.7 - 10.5 mg/dL Final     Anion Gap   Date Value Ref Range Status   02/02/2018 10 8 - 16 mmol/L Final     eGFR if    Date Value Ref Range Status   02/02/2018 48 (A) >60 mL/min/1.73 m^2 Final     eGFR if non    Date Value Ref Range Status   02/02/2018 41 (A) >60 mL/min/1.73 m^2 Final     Comment:     Calculation used to obtain the estimated glomerular filtration  rate (eGFR) is the CKD-EPI equation.        CrCl cannot be calculated (Patient's most recent lab result is older than the maximum 7 days allowed.).    Assessment:     1. Chronic combined systolic and diastolic congestive heart failure    2. Essential hypertension    3. Dyslipidemia    4. S/P MVR (mitral valve replacement)    5. S/P placement of cardiac pacemaker    6. Paroxysmal atrial fibrillation    7. Cardiac resynchronization therapy defibrillator (CRT-D) in place    8. Ischemic cardiomyopathy    9. CKD (chronic kidney disease) stage 3, GFR 30-59 ml/min    10. Anticoagulated on Coumadin    11. LEV (obstructive sleep apnea)    12. Pulmonary hypertension due to left heart disease    Edema improving since last visit.   BP stable since starting Entresto  No CNS complaints to suggest TIA    No abnormal bleeding on coumadin  INR 1.9 in Feb. Being managed by coumadin clinic   BMP pending     Plan:     Will titrate meds based on BMP results today.  Continue current medical management for now.   Heart healthy diet  Limit fluid intake 50-60 oz   Daily weights and to notify  clinic if weight increases by more than 3 lbs in 1 day or 5 lbs in 1 week.   Exercise routine as tolerated  RTC based on lab results

## 2018-03-20 NOTE — TELEPHONE ENCOUNTER
I have reviewed labs from today and recommend that she just continue her current medical management for now. She is to continue her current dose of Entresto and Lasix for now. Will need to  refill for the Entresto. Keep Lasix dose the same for now. Follow up in 1 month with BMP before visit to recheck renal function

## 2018-03-27 ENCOUNTER — ANTI-COAG VISIT (OUTPATIENT)
Dept: CARDIOLOGY | Facility: CLINIC | Age: 83
End: 2018-03-27
Payer: MEDICARE

## 2018-03-27 ENCOUNTER — LAB VISIT (OUTPATIENT)
Dept: LAB | Facility: HOSPITAL | Age: 83
End: 2018-03-27
Attending: PHYSICIAN ASSISTANT
Payer: MEDICARE

## 2018-03-27 ENCOUNTER — OFFICE VISIT (OUTPATIENT)
Dept: CARDIOLOGY | Facility: CLINIC | Age: 83
End: 2018-03-27
Payer: MEDICARE

## 2018-03-27 VITALS
HEART RATE: 70 BPM | HEIGHT: 62 IN | DIASTOLIC BLOOD PRESSURE: 64 MMHG | BODY MASS INDEX: 21.86 KG/M2 | SYSTOLIC BLOOD PRESSURE: 132 MMHG | WEIGHT: 118.81 LBS

## 2018-03-27 DIAGNOSIS — Z95.0 S/P PLACEMENT OF CARDIAC PACEMAKER: ICD-10-CM

## 2018-03-27 DIAGNOSIS — I25.5 ISCHEMIC CARDIOMYOPATHY: ICD-10-CM

## 2018-03-27 DIAGNOSIS — Z95.810 CARDIAC RESYNCHRONIZATION THERAPY DEFIBRILLATOR (CRT-D) IN PLACE: ICD-10-CM

## 2018-03-27 DIAGNOSIS — I50.22 CHRONIC SYSTOLIC CONGESTIVE HEART FAILURE: ICD-10-CM

## 2018-03-27 DIAGNOSIS — I50.42 CHRONIC COMBINED SYSTOLIC AND DIASTOLIC CONGESTIVE HEART FAILURE: ICD-10-CM

## 2018-03-27 DIAGNOSIS — G47.33 OSA (OBSTRUCTIVE SLEEP APNEA): Chronic | ICD-10-CM

## 2018-03-27 DIAGNOSIS — E78.5 DYSLIPIDEMIA: ICD-10-CM

## 2018-03-27 DIAGNOSIS — Z95.2 S/P MVR (MITRAL VALVE REPLACEMENT): ICD-10-CM

## 2018-03-27 DIAGNOSIS — I10 ESSENTIAL HYPERTENSION: ICD-10-CM

## 2018-03-27 DIAGNOSIS — R60.9 EDEMA, UNSPECIFIED TYPE: Primary | ICD-10-CM

## 2018-03-27 DIAGNOSIS — Z79.01 LONG TERM (CURRENT) USE OF ANTICOAGULANTS: Primary | ICD-10-CM

## 2018-03-27 LAB
ANION GAP SERPL CALC-SCNC: 12 MMOL/L
BNP SERPL-MCNC: 1047 PG/ML
BUN SERPL-MCNC: 34 MG/DL
CALCIUM SERPL-MCNC: 9.2 MG/DL
CHLORIDE SERPL-SCNC: 105 MMOL/L
CO2 SERPL-SCNC: 26 MMOL/L
CREAT SERPL-MCNC: 1.2 MG/DL
EST. GFR  (AFRICAN AMERICAN): 48 ML/MIN/1.73 M^2
EST. GFR  (NON AFRICAN AMERICAN): 41 ML/MIN/1.73 M^2
GLUCOSE SERPL-MCNC: 109 MG/DL
INR PPP: 5.4 (ref 2–3)
POTASSIUM SERPL-SCNC: 4.6 MMOL/L
SODIUM SERPL-SCNC: 143 MMOL/L

## 2018-03-27 PROCEDURE — 99214 OFFICE O/P EST MOD 30 MIN: CPT | Mod: PBBFAC,PO | Performed by: PHYSICIAN ASSISTANT

## 2018-03-27 PROCEDURE — 99999 PR PBB SHADOW E&M-EST. PATIENT-LVL I: CPT | Mod: PBBFAC,,,

## 2018-03-27 PROCEDURE — 80048 BASIC METABOLIC PNL TOTAL CA: CPT | Mod: PO

## 2018-03-27 PROCEDURE — 83880 ASSAY OF NATRIURETIC PEPTIDE: CPT

## 2018-03-27 PROCEDURE — 36415 COLL VENOUS BLD VENIPUNCTURE: CPT | Mod: PO

## 2018-03-27 PROCEDURE — 99214 OFFICE O/P EST MOD 30 MIN: CPT | Mod: S$PBB,,, | Performed by: PHYSICIAN ASSISTANT

## 2018-03-27 PROCEDURE — 99211 OFF/OP EST MAY X REQ PHY/QHP: CPT | Mod: PBBFAC

## 2018-03-27 PROCEDURE — 85610 PROTHROMBIN TIME: CPT | Mod: PBBFAC

## 2018-03-27 PROCEDURE — 99999 PR PBB SHADOW E&M-EST. PATIENT-LVL IV: CPT | Mod: PBBFAC,,, | Performed by: PHYSICIAN ASSISTANT

## 2018-03-27 NOTE — PROGRESS NOTES
Subjective:    Patient ID:  Jennifer Mathews is a 85 y.o. female who presents for follow-up of Hypertension; Atrial Fibrillation; and Results (rev labs)      HPI   Ms. Mathews is an 84 yo female with a PMH of mitral valve replacement x 3, HTN, PAF, ACS, combined CHF, PPM-CRT-D placement, s/p MVR, edema, pacemaker placement, and CKD who presents today for follow-up. Patient returns today and states she is doing ok. Complains of increased lower extremity edema over the past few days. Denies any associated symptoms. No worsening SOB, PND, weight gain, or abdominal bloating. Reports compliance with her diuretic regimen at home, denies excessive salt intake. No chest pain or tightness. Patient reports compliance with her medications and CPAP machine. Remains on Coumadin, no bleeding issues. INR this AM 5.4, being followed by Coumadin clinic. Recent labs reviewed, will plan to repeat today.       Review of Systems   Constitution: Negative for chills, decreased appetite, fever, weakness and malaise/fatigue.   HENT: Negative for congestion, hoarse voice and sore throat.    Eyes: Negative for blurred vision and discharge.   Cardiovascular: Positive for leg swelling. Negative for chest pain, claudication, cyanosis, dyspnea on exertion, irregular heartbeat, near-syncope, orthopnea, palpitations and paroxysmal nocturnal dyspnea.   Respiratory: Negative for cough, hemoptysis, shortness of breath, snoring, sputum production and wheezing.    Endocrine: Negative for cold intolerance and heat intolerance.   Hematologic/Lymphatic: Negative for bleeding problem. Does not bruise/bleed easily.   Skin: Negative for rash.   Musculoskeletal: Negative for arthritis, back pain, joint pain, joint swelling, muscle cramps, muscle weakness and myalgias.   Gastrointestinal: Negative for abdominal pain, constipation, diarrhea, heartburn, melena and nausea.   Genitourinary: Negative for hematuria.   Neurological: Negative for dizziness, focal  "weakness, headaches, light-headedness, loss of balance, numbness, paresthesias and seizures.   Psychiatric/Behavioral: Negative for memory loss. The patient does not have insomnia.    Allergic/Immunologic: Negative for hives.       /64   Pulse 70   Ht 5' 2" (1.575 m)   Wt 53.9 kg (118 lb 13.3 oz)   BMI 21.73 kg/m²     Objective:    Physical Exam   Constitutional: She is oriented to person, place, and time. She appears well-developed and well-nourished. No distress.   HENT:   Head: Normocephalic and atraumatic.   Eyes: Pupils are equal, round, and reactive to light.   Neck: Neck supple. No JVD present.   Cardiovascular: Normal rate, regular rhythm, S1 normal, S2 normal and normal heart sounds.    No murmur heard.  Pulmonary/Chest: Effort normal and breath sounds normal. No respiratory distress. She has no wheezes. She has no rales.   cabg site well-healed  PPM site well-healed   Abdominal: Soft. She exhibits no distension. There is no tenderness. There is no rebound.   Musculoskeletal: She exhibits edema (2+ BLE).   Neurological: She is alert and oriented to person, place, and time.   Skin: Skin is warm and dry. She is not diaphoretic. No erythema.   CVI changes BLE     Psychiatric: She has a normal mood and affect. Her behavior is normal. Thought content normal.   Nursing note and vitals reviewed.          Chemistry        Component Value Date/Time     03/27/2018 1216    K 4.6 03/27/2018 1216     03/27/2018 1216    CO2 26 03/27/2018 1216    BUN 34 (H) 03/27/2018 1216    CREATININE 1.2 03/27/2018 1216     03/27/2018 1216        Component Value Date/Time    CALCIUM 9.2 03/27/2018 1216    ALKPHOS 78 03/20/2018 1000    AST 46 (H) 03/20/2018 1000    ALT 35 03/20/2018 1000    BILITOT 0.4 03/20/2018 1000    ESTGFRAFRICA 48 (A) 03/27/2018 1216    EGFRNONAA 41 (A) 03/27/2018 1216            2D Echo CONCLUSIONS     1 - Severely depressed left ventricular systolic function (EF 25%).     2 - Normal " right ventricular systolic function .     3 - Mild aortic regurgitation.     4 - Mitral valve prosthesis.     5 - Moderate tricuspid regurgitation.     6 - Increased central venous pressure.     7 - Severe left atrial enlargement.     8 - Concentric hypertrophy.     9 - Pulmonary hypertension. The estimated PA systolic pressure is 62 mmHg.   Assessment:       1. Edema, unspecified type    2. Chronic systolic congestive heart failure    3. LEV (obstructive sleep apnea)    4. S/P placement of cardiac pacemaker    5. S/P MVR (mitral valve replacement)    6. Ischemic cardiomyopathy    7. Essential hypertension    8. Dyslipidemia    9. Cardiac resynchronization therapy defibrillator (CRT-D) in place    10. Chronic combined systolic and diastolic congestive heart failure       Doing well. Complains of increased leg edema. Needs to elevate legs and wear stockings. Ok to increase Lasix to 60 mg BID x 3 days, will assess response. Repeat labs today.   Plan:    -BMP, BNP today with phone review  -Continue current medical management and risk factor modification  -Cardiac, low salt diet  -Elevate legs, compression hose  -Follow-up TBA    Chart reviewed. Dr. Cary agrees with plan as outlined above.

## 2018-03-28 ENCOUNTER — TELEPHONE (OUTPATIENT)
Dept: CARDIOLOGY | Facility: CLINIC | Age: 83
End: 2018-03-28

## 2018-03-28 NOTE — TELEPHONE ENCOUNTER
Please phone patient. Labs reviewed. Creatinine and electrolytes stable. BNP stable as well    Ok to take Lasix 60 mg BID today, will assess response    Thanks

## 2018-03-29 ENCOUNTER — TELEPHONE (OUTPATIENT)
Dept: CARDIOLOGY | Facility: CLINIC | Age: 83
End: 2018-03-29

## 2018-03-29 DIAGNOSIS — Z79.01 LONG TERM CURRENT USE OF ANTICOAGULANT THERAPY: ICD-10-CM

## 2018-03-29 RX ORDER — WARFARIN 2.5 MG/1
TABLET ORAL
Qty: 30 TABLET | Refills: 3 | Status: ON HOLD | OUTPATIENT
Start: 2018-03-29 | End: 2018-04-15 | Stop reason: HOSPADM

## 2018-03-29 NOTE — TELEPHONE ENCOUNTER
Spoke with patient, lower extremity edema is improving but still present. Advised ok to take lasix 60 mg BID again tmw then resume normal dose. She verbalized understanding and repeated back instructions correctly.

## 2018-04-03 ENCOUNTER — ANTI-COAG VISIT (OUTPATIENT)
Dept: CARDIOLOGY | Facility: CLINIC | Age: 83
End: 2018-04-03
Payer: MEDICARE

## 2018-04-03 DIAGNOSIS — Z79.01 LONG TERM (CURRENT) USE OF ANTICOAGULANTS: Primary | ICD-10-CM

## 2018-04-03 LAB — INR PPP: 2.4 (ref 2–3)

## 2018-04-03 PROCEDURE — 85610 PROTHROMBIN TIME: CPT | Mod: PBBFAC

## 2018-04-10 ENCOUNTER — HOSPITAL ENCOUNTER (INPATIENT)
Facility: HOSPITAL | Age: 83
LOS: 5 days | Discharge: HOME HEALTH | DRG: 871 | End: 2018-04-15
Attending: EMERGENCY MEDICINE | Admitting: FAMILY MEDICINE
Payer: MEDICARE

## 2018-04-10 ENCOUNTER — OFFICE VISIT (OUTPATIENT)
Dept: INTERNAL MEDICINE | Facility: CLINIC | Age: 83
DRG: 871 | End: 2018-04-10
Payer: MEDICARE

## 2018-04-10 ENCOUNTER — ANTI-COAG VISIT (OUTPATIENT)
Dept: CARDIOLOGY | Facility: CLINIC | Age: 83
DRG: 871 | End: 2018-04-10
Payer: MEDICARE

## 2018-04-10 VITALS
OXYGEN SATURATION: 90 % | SYSTOLIC BLOOD PRESSURE: 140 MMHG | TEMPERATURE: 103 F | HEART RATE: 70 BPM | DIASTOLIC BLOOD PRESSURE: 44 MMHG | HEIGHT: 62 IN

## 2018-04-10 DIAGNOSIS — R50.9 FEVER, UNSPECIFIED FEVER CAUSE: Primary | ICD-10-CM

## 2018-04-10 DIAGNOSIS — R09.02 HYPOXIA: Primary | ICD-10-CM

## 2018-04-10 DIAGNOSIS — Z79.01 LONG TERM (CURRENT) USE OF ANTICOAGULANTS: Primary | ICD-10-CM

## 2018-04-10 DIAGNOSIS — J18.9 PNEUMONIA OF RIGHT UPPER LOBE DUE TO INFECTIOUS ORGANISM: ICD-10-CM

## 2018-04-10 DIAGNOSIS — I50.42 CHRONIC COMBINED SYSTOLIC AND DIASTOLIC CONGESTIVE HEART FAILURE: ICD-10-CM

## 2018-04-10 DIAGNOSIS — R50.9 FEVER: ICD-10-CM

## 2018-04-10 DIAGNOSIS — Z95.2 S/P MVR (MITRAL VALVE REPLACEMENT): ICD-10-CM

## 2018-04-10 DIAGNOSIS — R09.02 HYPOXIA: ICD-10-CM

## 2018-04-10 LAB
ALBUMIN SERPL BCP-MCNC: 3.5 G/DL
ALP SERPL-CCNC: 66 U/L
ALT SERPL W/O P-5'-P-CCNC: 19 U/L
ANION GAP SERPL CALC-SCNC: 8 MMOL/L
AST SERPL-CCNC: 21 U/L
BACTERIA #/AREA URNS HPF: ABNORMAL /HPF
BASOPHILS # BLD AUTO: 0.03 K/UL
BASOPHILS NFR BLD: 0.2 %
BILIRUB SERPL-MCNC: 1.3 MG/DL
BILIRUB UR QL STRIP: NEGATIVE
BNP SERPL-MCNC: 3587 PG/ML
BUN SERPL-MCNC: 45 MG/DL
CALCIUM SERPL-MCNC: 8.3 MG/DL
CHLORIDE SERPL-SCNC: 100 MMOL/L
CLARITY UR: ABNORMAL
CO2 SERPL-SCNC: 28 MMOL/L
COLOR UR: YELLOW
CREAT SERPL-MCNC: 1.6 MG/DL
DIFFERENTIAL METHOD: ABNORMAL
DIGOXIN SERPL-MCNC: 2.6 NG/ML
EOSINOPHIL # BLD AUTO: 0 K/UL
EOSINOPHIL NFR BLD: 0 %
ERYTHROCYTE [DISTWIDTH] IN BLOOD BY AUTOMATED COUNT: 20.8 %
EST. GFR  (AFRICAN AMERICAN): 34 ML/MIN/1.73 M^2
EST. GFR  (NON AFRICAN AMERICAN): 29 ML/MIN/1.73 M^2
FLUAV AG SPEC QL IA: NEGATIVE
FLUBV AG SPEC QL IA: NEGATIVE
GLUCOSE SERPL-MCNC: 134 MG/DL
GLUCOSE UR QL STRIP: NEGATIVE
HCT VFR BLD AUTO: 29.5 %
HGB BLD-MCNC: 8.6 G/DL
HGB UR QL STRIP: NEGATIVE
HYALINE CASTS #/AREA URNS LPF: 0 /LPF
INR PPP: 1.3
INR PPP: 1.6 (ref 2–3)
KETONES UR QL STRIP: NEGATIVE
LACTATE SERPL-SCNC: 1.8 MMOL/L
LEUKOCYTE ESTERASE UR QL STRIP: ABNORMAL
LIPASE SERPL-CCNC: 31 U/L
LYMPHOCYTES # BLD AUTO: 1.7 K/UL
LYMPHOCYTES NFR BLD: 8.7 %
MCH RBC QN AUTO: 24.8 PG
MCHC RBC AUTO-ENTMCNC: 29.2 G/DL
MCV RBC AUTO: 85 FL
MICROSCOPIC COMMENT: ABNORMAL
MONOCYTES # BLD AUTO: 1.5 K/UL
MONOCYTES NFR BLD: 7.9 %
NEUTROPHILS # BLD AUTO: 16.1 K/UL
NEUTROPHILS NFR BLD: 83.2 %
NITRITE UR QL STRIP: NEGATIVE
PH UR STRIP: 6 [PH] (ref 5–8)
PLATELET # BLD AUTO: 202 K/UL
PMV BLD AUTO: 10.6 FL
POTASSIUM SERPL-SCNC: 4.4 MMOL/L
PROCALCITONIN SERPL IA-MCNC: 2.16 NG/ML
PROT SERPL-MCNC: 6.7 G/DL
PROT UR QL STRIP: ABNORMAL
PROTHROMBIN TIME: 13.6 SEC
RBC # BLD AUTO: 3.47 M/UL
RBC #/AREA URNS HPF: 2 /HPF (ref 0–4)
SODIUM SERPL-SCNC: 136 MMOL/L
SP GR UR STRIP: 1.02 (ref 1–1.03)
SPECIMEN SOURCE: NORMAL
SQUAMOUS #/AREA URNS HPF: 2 /HPF
TROPONIN I SERPL DL<=0.01 NG/ML-MCNC: 0.12 NG/ML
URN SPEC COLLECT METH UR: ABNORMAL
UROBILINOGEN UR STRIP-ACNC: NEGATIVE EU/DL
WBC # BLD AUTO: 19.32 K/UL
WBC #/AREA URNS HPF: 30 /HPF (ref 0–5)
WBC CLUMPS URNS QL MICRO: ABNORMAL

## 2018-04-10 PROCEDURE — 99214 OFFICE O/P EST MOD 30 MIN: CPT | Mod: PBBFAC | Performed by: PHYSICIAN ASSISTANT

## 2018-04-10 PROCEDURE — 85610 PROTHROMBIN TIME: CPT | Mod: PBBFAC

## 2018-04-10 PROCEDURE — 21400001 HC TELEMETRY ROOM

## 2018-04-10 PROCEDURE — 99999 PR PBB SHADOW E&M-EST. PATIENT-LVL IV: CPT | Mod: PBBFAC,,, | Performed by: PHYSICIAN ASSISTANT

## 2018-04-10 PROCEDURE — 63600175 PHARM REV CODE 636 W HCPCS: Performed by: FAMILY MEDICINE

## 2018-04-10 PROCEDURE — 87040 BLOOD CULTURE FOR BACTERIA: CPT

## 2018-04-10 PROCEDURE — 96361 HYDRATE IV INFUSION ADD-ON: CPT

## 2018-04-10 PROCEDURE — 80053 COMPREHEN METABOLIC PANEL: CPT

## 2018-04-10 PROCEDURE — 25000003 PHARM REV CODE 250: Performed by: PHYSICIAN ASSISTANT

## 2018-04-10 PROCEDURE — 96375 TX/PRO/DX INJ NEW DRUG ADDON: CPT

## 2018-04-10 PROCEDURE — 99214 OFFICE O/P EST MOD 30 MIN: CPT | Mod: S$PBB,,, | Performed by: PHYSICIAN ASSISTANT

## 2018-04-10 PROCEDURE — 87400 INFLUENZA A/B EACH AG IA: CPT | Mod: 59

## 2018-04-10 PROCEDURE — 87186 SC STD MICRODIL/AGAR DIL: CPT

## 2018-04-10 PROCEDURE — 80162 ASSAY OF DIGOXIN TOTAL: CPT

## 2018-04-10 PROCEDURE — 94640 AIRWAY INHALATION TREATMENT: CPT

## 2018-04-10 PROCEDURE — 84484 ASSAY OF TROPONIN QUANT: CPT

## 2018-04-10 PROCEDURE — 93010 ELECTROCARDIOGRAM REPORT: CPT | Mod: ,,, | Performed by: INTERNAL MEDICINE

## 2018-04-10 PROCEDURE — 87077 CULTURE AEROBIC IDENTIFY: CPT

## 2018-04-10 PROCEDURE — 99999 PR PBB SHADOW E&M-EST. PATIENT-LVL I: CPT | Mod: PBBFAC,,,

## 2018-04-10 PROCEDURE — 83690 ASSAY OF LIPASE: CPT

## 2018-04-10 PROCEDURE — 83880 ASSAY OF NATRIURETIC PEPTIDE: CPT

## 2018-04-10 PROCEDURE — 93005 ELECTROCARDIOGRAM TRACING: CPT

## 2018-04-10 PROCEDURE — 85610 PROTHROMBIN TIME: CPT

## 2018-04-10 PROCEDURE — 85025 COMPLETE CBC W/AUTO DIFF WBC: CPT

## 2018-04-10 PROCEDURE — 25000242 PHARM REV CODE 250 ALT 637 W/ HCPCS: Performed by: PHYSICIAN ASSISTANT

## 2018-04-10 PROCEDURE — 63600175 PHARM REV CODE 636 W HCPCS: Performed by: PHYSICIAN ASSISTANT

## 2018-04-10 PROCEDURE — 81000 URINALYSIS NONAUTO W/SCOPE: CPT

## 2018-04-10 PROCEDURE — 87088 URINE BACTERIA CULTURE: CPT

## 2018-04-10 PROCEDURE — 63600175 PHARM REV CODE 636 W HCPCS: Performed by: EMERGENCY MEDICINE

## 2018-04-10 PROCEDURE — 84145 PROCALCITONIN (PCT): CPT

## 2018-04-10 PROCEDURE — 36415 COLL VENOUS BLD VENIPUNCTURE: CPT

## 2018-04-10 PROCEDURE — 87086 URINE CULTURE/COLONY COUNT: CPT

## 2018-04-10 PROCEDURE — 99211 OFF/OP EST MAY X REQ PHY/QHP: CPT | Mod: PBBFAC,25

## 2018-04-10 PROCEDURE — 99285 EMERGENCY DEPT VISIT HI MDM: CPT | Mod: 25,27

## 2018-04-10 PROCEDURE — 25000003 PHARM REV CODE 250: Performed by: EMERGENCY MEDICINE

## 2018-04-10 PROCEDURE — 96374 THER/PROPH/DIAG INJ IV PUSH: CPT

## 2018-04-10 PROCEDURE — 25000003 PHARM REV CODE 250: Performed by: FAMILY MEDICINE

## 2018-04-10 PROCEDURE — 83605 ASSAY OF LACTIC ACID: CPT

## 2018-04-10 RX ORDER — IPRATROPIUM BROMIDE AND ALBUTEROL SULFATE 2.5; .5 MG/3ML; MG/3ML
3 SOLUTION RESPIRATORY (INHALATION)
Status: DISCONTINUED | OUTPATIENT
Start: 2018-04-10 | End: 2018-04-15 | Stop reason: HOSPADM

## 2018-04-10 RX ORDER — FUROSEMIDE 10 MG/ML
40 INJECTION INTRAMUSCULAR; INTRAVENOUS
Status: COMPLETED | OUTPATIENT
Start: 2018-04-10 | End: 2018-04-10

## 2018-04-10 RX ORDER — VANCOMYCIN/0.9 % SOD CHLORIDE 1 G/100 ML
1000 PLASTIC BAG, INJECTION (ML) INTRAVENOUS
Status: DISCONTINUED | OUTPATIENT
Start: 2018-04-10 | End: 2018-04-10

## 2018-04-10 RX ORDER — AMIODARONE HYDROCHLORIDE 200 MG/1
200 TABLET ORAL DAILY
Status: DISCONTINUED | OUTPATIENT
Start: 2018-04-11 | End: 2018-04-15 | Stop reason: HOSPADM

## 2018-04-10 RX ORDER — WARFARIN 2 MG/1
4 TABLET ORAL ONCE
Status: COMPLETED | OUTPATIENT
Start: 2018-04-10 | End: 2018-04-10

## 2018-04-10 RX ORDER — HEPARIN SODIUM 5000 [USP'U]/ML
5000 INJECTION, SOLUTION INTRAVENOUS; SUBCUTANEOUS EVERY 8 HOURS
Status: DISCONTINUED | OUTPATIENT
Start: 2018-04-10 | End: 2018-04-10

## 2018-04-10 RX ORDER — FUROSEMIDE 40 MG/1
40 TABLET ORAL DAILY
Status: DISCONTINUED | OUTPATIENT
Start: 2018-04-11 | End: 2018-04-15 | Stop reason: HOSPADM

## 2018-04-10 RX ORDER — GABAPENTIN 100 MG/1
100 CAPSULE ORAL 2 TIMES DAILY
Status: DISCONTINUED | OUTPATIENT
Start: 2018-04-10 | End: 2018-04-15 | Stop reason: HOSPADM

## 2018-04-10 RX ORDER — LEVOTHYROXINE SODIUM 50 UG/1
50 TABLET ORAL DAILY
Status: DISCONTINUED | OUTPATIENT
Start: 2018-04-11 | End: 2018-04-15 | Stop reason: HOSPADM

## 2018-04-10 RX ORDER — ASPIRIN 81 MG/1
81 TABLET ORAL DAILY
Status: DISCONTINUED | OUTPATIENT
Start: 2018-04-10 | End: 2018-04-15 | Stop reason: HOSPADM

## 2018-04-10 RX ORDER — ONDANSETRON 2 MG/ML
4 INJECTION INTRAMUSCULAR; INTRAVENOUS EVERY 12 HOURS PRN
Status: DISCONTINUED | OUTPATIENT
Start: 2018-04-10 | End: 2018-04-15 | Stop reason: HOSPADM

## 2018-04-10 RX ORDER — WARFARIN 2.5 MG/1
2.5 TABLET ORAL DAILY
Status: DISCONTINUED | OUTPATIENT
Start: 2018-04-11 | End: 2018-04-13

## 2018-04-10 RX ORDER — MOXIFLOXACIN HYDROCHLORIDE 400 MG/250ML
400 INJECTION, SOLUTION INTRAVENOUS
Status: DISCONTINUED | OUTPATIENT
Start: 2018-04-10 | End: 2018-04-10

## 2018-04-10 RX ORDER — ACETAMINOPHEN 325 MG/1
650 TABLET ORAL EVERY 8 HOURS PRN
Status: DISCONTINUED | OUTPATIENT
Start: 2018-04-10 | End: 2018-04-15 | Stop reason: HOSPADM

## 2018-04-10 RX ORDER — ACETAMINOPHEN 325 MG/1
650 TABLET ORAL
Status: COMPLETED | OUTPATIENT
Start: 2018-04-10 | End: 2018-04-10

## 2018-04-10 RX ORDER — CARVEDILOL 12.5 MG/1
25 TABLET ORAL 2 TIMES DAILY WITH MEALS
Status: DISCONTINUED | OUTPATIENT
Start: 2018-04-10 | End: 2018-04-15 | Stop reason: HOSPADM

## 2018-04-10 RX ORDER — DIGOXIN 125 MCG
125 TABLET ORAL DAILY
Status: DISCONTINUED | OUTPATIENT
Start: 2018-04-11 | End: 2018-04-11

## 2018-04-10 RX ORDER — SODIUM CHLORIDE 9 MG/ML
INJECTION, SOLUTION INTRAVENOUS CONTINUOUS
Status: ACTIVE | OUTPATIENT
Start: 2018-04-10 | End: 2018-04-11

## 2018-04-10 RX ORDER — MEROPENEM AND SODIUM CHLORIDE 500 MG/50ML
500 INJECTION, SOLUTION INTRAVENOUS EVERY 12 HOURS
Status: DISCONTINUED | OUTPATIENT
Start: 2018-04-10 | End: 2018-04-13

## 2018-04-10 RX ORDER — SODIUM CHLORIDE 9 MG/ML
INJECTION, SOLUTION INTRAVENOUS CONTINUOUS
Status: DISCONTINUED | OUTPATIENT
Start: 2018-04-10 | End: 2018-04-10

## 2018-04-10 RX ADMIN — CARVEDILOL 25 MG: 12.5 TABLET, FILM COATED ORAL at 06:04

## 2018-04-10 RX ADMIN — FUROSEMIDE 40 MG: 10 INJECTION, SOLUTION INTRAMUSCULAR; INTRAVENOUS at 03:04

## 2018-04-10 RX ADMIN — IPRATROPIUM BROMIDE AND ALBUTEROL SULFATE 3 ML: .5; 3 SOLUTION RESPIRATORY (INHALATION) at 08:04

## 2018-04-10 RX ADMIN — SODIUM CHLORIDE 1551 ML: 0.9 INJECTION, SOLUTION INTRAVENOUS at 02:04

## 2018-04-10 RX ADMIN — SODIUM CHLORIDE: 0.9 INJECTION, SOLUTION INTRAVENOUS at 08:04

## 2018-04-10 RX ADMIN — SACUBITRIL AND VALSARTAN 1 TABLET: 49; 51 TABLET, FILM COATED ORAL at 09:04

## 2018-04-10 RX ADMIN — WARFARIN SODIUM 4 MG: 2 TABLET ORAL at 06:04

## 2018-04-10 RX ADMIN — ACETAMINOPHEN 650 MG: 325 TABLET ORAL at 02:04

## 2018-04-10 RX ADMIN — GABAPENTIN 100 MG: 100 CAPSULE ORAL at 09:04

## 2018-04-10 RX ADMIN — MEROPENEM AND SODIUM CHLORIDE 500 MG: 500 INJECTION, SOLUTION INTRAVENOUS at 06:04

## 2018-04-10 RX ADMIN — MOXIFLOXACIN HYDROCHLORIDE 400 MG: 400 INJECTION, SOLUTION INTRAVENOUS at 03:04

## 2018-04-10 RX ADMIN — SODIUM CHLORIDE 1000 MG: 900 INJECTION, SOLUTION INTRAVENOUS at 08:04

## 2018-04-10 RX ADMIN — ASPIRIN 81 MG: 81 TABLET, COATED ORAL at 06:04

## 2018-04-10 NOTE — PROGRESS NOTES
Subjective:       Patient ID: Jennifer Mathews is a 85 y.o. female.    Chief Complaint: Fatigue and Fever    Fever    This is a new problem. The current episode started in the past 7 days. The problem occurs constantly. The problem has been gradually worsening. The maximum temperature noted was 103 to 103.9 F. Associated symptoms include urinary pain. Pertinent negatives include no abdominal pain, chest pain, congestion, coughing, diarrhea, headaches or muscle aches. Associated symptoms comments: lethargic.     Past Medical History:   Diagnosis Date    Acute coronary syndrome     Anemia     Anticoagulated on Coumadin 7/13/2015    Arthritis     Atrial fibrillation     Basal cell carcinoma 10/2015    left neck    Cardiac arrest     Cardiac resynchronization therapy defibrillator (CRT-D) in place 07/13/2015    Pt denies, states it does not shock me    Cardiac resynchronization therapy defibrillator (CRT-D) in place 7/13/2015    Cardiomyopathy 1/30/2014    CHF (congestive heart failure)     Chronic combined systolic and diastolic congestive heart failure     CKD (chronic kidney disease) stage 3, GFR 30-59 ml/min     Dyslipidemia 1/30/2014    Edema     Heart disease     Hypertension     Ischemic cardiomyopathy 7/13/2015    Macular hole of left eye     Old    Macular hole of left eye     Myocardial infarction     LEV (obstructive sleep apnea) 9/30/2013    Recurrent UTI     Refractive error     S/P MVR (mitral valve replacement) 1/30/2014    S/P placement of cardiac pacemaker 1/30/2014    Skin cancer     Sleep apnea     Stroke        Past Surgical History:   Procedure Laterality Date    APPENDECTOMY      CARDIAC PACEMAKER PLACEMENT      CARDIAC VALVE SURGERY      CATARACT EXTRACTION      OU    CHOLECYSTECTOMY      COLONOSCOPY      MITRAL VALVE SURGERY      x3       Family History   Problem Relation Age of Onset    Coronary artery disease Mother      mi    Coronary artery disease  "Father     Diabetes Brother     Cancer Brother     Melanoma Neg Hx     Psoriasis Neg Hx     Lupus Neg Hx     Eczema Neg Hx        Social History     Social History    Marital status:      Spouse name: N/A    Number of children: N/A    Years of education: N/A     Occupational History    Not on file.     Social History Main Topics    Smoking status: Never Smoker    Smokeless tobacco: Never Used    Alcohol use No    Drug use: No    Sexual activity: Not on file     Other Topics Concern    Not on file     Social History Narrative    No narrative on file       Review of patient's allergies indicates:   Allergen Reactions    Cephalosporins Hives    Metaxalone Itching    Penicillins      Other reaction(s): Unknown      Pregabalin      Other reaction(s): "Bad feeling"      Sulfa (sulfonamide antibiotics) Itching       No current facility-administered medications for this visit.     Current Outpatient Prescriptions:     allopurinol (ZYLOPRIM) 100 MG tablet, Take 2 tablets (200 mg total) by mouth once daily., Disp: 180 tablet, Rfl: 1    amiodarone (PACERONE) 200 MG Tab, Take 1 tablet (200 mg total) by mouth once daily., Disp: 30 tablet, Rfl: 11    aspirin (ECOTRIN) 81 MG EC tablet, Take 81 mg by mouth once daily., Disp: , Rfl:     carvedilol (COREG) 25 MG tablet, Take 1 tablet (25 mg total) by mouth 2 (two) times daily with meals., Disp: 180 tablet, Rfl: 3    colchicine 0.6 mg Cap, Take 1 capsule (0.6 mg total) by mouth once daily., Disp: 30 capsule, Rfl: 6    digoxin (LANOXIN) 125 mcg tablet, Take 1 tablet (125 mcg total) by mouth once daily. TAKE 1 TABLET (0.125 MG TOTAL) BY MOUTH ONCE DAILY., Disp: 90 tablet, Rfl: 3    furosemide (LASIX) 40 MG tablet, Take 1 tablet (40 mg total) by mouth once daily. (Patient taking differently: Take 40 mg by mouth 2 (two) times daily. ), Disp: 180 tablet, Rfl: 3    gabapentin (NEURONTIN) 100 MG capsule, TAKE ONE CAPSULE BY MOUTH TWO TIMES DAILY, Disp: " "180 capsule, Rfl: 1    Lactobac 40-Bifido 3-S.thermop (PROBIOTIC) 100 billion cell Cap, Take 1 capsule by mouth once daily., Disp: 30 capsule, Rfl: 0    levothyroxine (SYNTHROID) 50 MCG tablet, Take 1 tablet (50 mcg total) by mouth once daily., Disp: 30 tablet, Rfl: 11    sacubitril-valsartan (ENTRESTO) 49-51 mg per tablet, Take 1 tablet by mouth 2 (two) times daily., Disp: 60 tablet, Rfl: 6    warfarin (COUMADIN) 2.5 MG tablet, TAKE 1 AND 1/2 TABLETS ON FRIDAYS AND 1 TABLET ALL OTHER DAYS AS DIRECTED BY THE COU[facility]ADIN CLINIC (Patient taking differently: Take 1 tablet by mouth every evening as directed by the [facility] Clinic.), Disp: 30 tablet, Rfl: 3    acetaminophen (TYLENOL EXTRA STRENGTH) 325 MG tablet, Take 2 tablets (650 mg total) by mouth every 8 (eight) hours. (Patient taking differently: Take 650 mg by mouth 3 (three) times daily as needed. ), Disp: , Rfl:     calcium carbonate (OS-SHERRY) 600 mg calcium (1,500 mg) Tab, Take 600 mg by mouth once., Disp: , Rfl:     cranberry 1,000 mg Cap, Take 1 capsule by mouth Daily., Disp: , Rfl:     diphenhydrAMINE (BENADRYL) 25 mg capsule, Take 25 mg by mouth every 6 (six) hours as needed for Itching., Disp: , Rfl:     MULTIVITAMIN ORAL, Take 1 tablet by mouth Daily., Disp: , Rfl:     warfarin (COUMADIN) 2.5 MG tablet, Take 1/2 tablet (1.25 mg) on Tuesdays, then 1 tablet on other days as directed by Coumadin Clinic., Disp: , Rfl:     Facility-Administered Medications Ordered in Other Visits:     acetaminophen tablet 650 mg, 650 mg, Oral, ED 1 Time, Randolph Agustin MD    sodium chloride 0.9% bolus 30 mL/kg (Dosing Weight), 30 mL/kg (Dosing Weight), Intravenous, ED 1 Time, Randolph Agustin MD    BP (!) 140/44 (BP Location: Left arm, Patient Position: Sitting, BP Method: Medium (Manual))   Pulse 70   Temp (!) 103.4 °F (39.7 °C) (Tympanic) Comment: wheelchair  Ht 5' 2" (1.575 m)   SpO2 (!) 90%   Review of Systems   Constitutional: Positive for activity " change, appetite change, chills, fatigue and fever.   HENT: Negative for congestion.    Respiratory: Negative for cough, chest tightness and shortness of breath.    Cardiovascular: Negative for chest pain.   Gastrointestinal: Negative for abdominal pain and diarrhea.   Genitourinary: Positive for dysuria.   Neurological: Negative for headaches.   Psychiatric/Behavioral: Positive for confusion.       Objective:      Physical Exam   Constitutional: She appears well-developed and well-nourished. No distress.   HENT:   Head: Normocephalic and atraumatic.   Cardiovascular: Normal rate and regular rhythm.  Exam reveals no gallop and no friction rub.    No murmur heard.  Pulmonary/Chest: Effort normal and breath sounds normal. No respiratory distress. She has no wheezes. She has no rales. She exhibits no tenderness.   Abdominal: Soft. There is no tenderness.   Skin: She is not diaphoretic.   Nursing note and vitals reviewed.      Assessment:       1. Fever, unspecified fever cause    2. Hypoxia        Plan:       Fever, unspecified fever cause    Hypoxia       Patient was sent to the ER for further evaluation

## 2018-04-10 NOTE — PROGRESS NOTES
Patient's INR sub therapeutic today at 1.6.  Reports no missed doses or diet changes.  Instructed to take 1 1/2 tablet (3.75 mg) today - only, then start 2.5 mg daily.  Recheck in 1 week.

## 2018-04-10 NOTE — ASSESSMENT & PLAN NOTE
-Most likely associated with sepsis and CKD.   -Trend troponins.   -ASA daily.   -Continue valsartan and Coreg.

## 2018-04-10 NOTE — ED PROVIDER NOTES
"SCRIBE #1 NOTE: I, Corinne Mack, am scribing for, and in the presence of, Randolph Agustin MD. I have scribed the entire note.      History      Chief Complaint   Patient presents with    Fever     since Sunday; sent from clinics for hypoxia and febrile       Review of patient's allergies indicates:   Allergen Reactions    Cephalosporins Hives    Metaxalone Itching    Penicillins      Other reaction(s): Unknown      Pregabalin      Other reaction(s): "Bad feeling"      Sulfa (sulfonamide antibiotics) Itching        HPI   HPI    4/10/2018, 1:23 PM  History obtained from the patient      History of Present Illness: Jennifer Mathews is a 85 y.o. female patient with PMHx of Afib, cardiac arrest, cardiomyopathy, CHF, CKD, and HTN who was sent to the Emergency Department from the clinic for further evaluation of pt hypoxia and fever (Tmax 102.5) which onset gradually 3 days ago. Symptoms are intermittent and moderate in severity. No mitigating or exacerbating factors reported. No associated sxs reported. Patient denies any chills, diaphoresis, CP, SOB, N/V/D, abd pain, HA, dizziness, and all other sxs at this time. Prior Tx includes Tylenol with little relief. No further complaints or concerns at this time.         Arrival mode: Personal vehicle    PCP: Desirae Bello MD       Past Medical History:  Past Medical History:   Diagnosis Date    Anemia     Anticoagulated on Coumadin 7/13/2015    Arthritis     Atrial fibrillation     Basal cell carcinoma 10/2015    left neck    Cardiac arrest     Cardiac resynchronization therapy defibrillator (CRT-D) in place 07/13/2015    Pt denies, states it does not shock me    Chronic combined systolic and diastolic congestive heart failure     CKD (chronic kidney disease) stage 3, GFR 30-59 ml/min     Dyslipidemia 1/30/2014    Hypertension     Ischemic cardiomyopathy 01/30/2014    Macular hole of left eye     Old    Macular hole of left eye     LEV (obstructive " sleep apnea) 9/30/2013    Recurrent UTI     Refractive error     Stroke        Past Surgical History:  Past Surgical History:   Procedure Laterality Date    APPENDECTOMY      CARDIAC PACEMAKER PLACEMENT  2014    CATARACT EXTRACTION      OU    CHOLECYSTECTOMY      COLONOSCOPY      MITRAL VALVE REPLACEMENT  2014    MITRAL VALVE SURGERY      x3         Family History:  Family History   Problem Relation Age of Onset    Coronary artery disease Mother      mi    Coronary artery disease Father     Diabetes Brother     Cancer Brother     Melanoma Neg Hx     Psoriasis Neg Hx     Lupus Neg Hx     Eczema Neg Hx        Social History:  Social History     Social History Main Topics    Smoking status: Never Smoker    Smokeless tobacco: Never Used    Alcohol use No    Drug use: No    Sexual activity: Not on file       ROS   Review of Systems   Constitutional: Positive for fever (102.5). Negative for chills and diaphoresis.   Respiratory: Negative for cough and shortness of breath.         (+) hypoxia   Cardiovascular: Negative for chest pain and leg swelling.   Gastrointestinal: Negative for abdominal pain, diarrhea, nausea and vomiting.   Musculoskeletal: Negative for back pain, neck pain and neck stiffness.   Skin: Negative for rash and wound.   Neurological: Negative for dizziness, light-headedness, numbness and headaches.   All other systems reviewed and are negative.      Physical Exam      Initial Vitals [04/10/18 1301]   BP Pulse Resp Temp SpO2   (!) 128/55 69 20 (!) 102.5 °F (39.2 °C) (!) 88 %      MAP       79.33          Physical Exam  Nursing Notes and Vital Signs Reviewed.  Constitutional: Patient is in no apparent distress. Well-developed and well-nourished. Elderly.  Head: Atraumatic. Normocephalic.  Eyes: PERRL. EOM intact. Conjunctivae are not pale. No scleral icterus.  ENT: Mucous membranes are moist. Oropharynx is clear and symmetric.    Neck: Supple. Full ROM. No  "lymphadenopathy.  Cardiovascular: Regular rate. Regular rhythm. No rubs or gallops. Distal pulses are 2+ and symmetric. 3/5 murmur.  Pulmonary/Chest: No respiratory distress. Clear to auscultation bilaterally. No wheezing or rales.  Abdominal: Soft and non-distended.  There is no tenderness.  No rebound, guarding, or rigidity.   Musculoskeletal: Moves all extremities. No obvious deformities. Trace BLE pitting edema. No calf tenderness.  Skin: Warm and dry.  Neurological:  Alert, awake, and appropriate.  Normal speech.  No acute focal neurological deficits are appreciated.  Psychiatric: Normal affect. Good eye contact. Appropriate in content.    ED Course    Procedures  ED Vital Signs:  Vitals:    04/10/18 1301 04/10/18 1354 04/10/18 1402   BP: (!) 128/55  (!) 135/109   Pulse: 69  69   Resp: 20  18   Temp: (!) 102.5 °F (39.2 °C)     TempSrc: Oral     SpO2: (!) 88%  100%   Weight:  51.7 kg (114 lb)    Height: 5' 2" (1.575 m) 5' 2" (1.575 m)        Abnormal Lab Results:  Labs Reviewed   CBC W/ AUTO DIFFERENTIAL - Abnormal; Notable for the following:        Result Value    WBC 19.32 (*)     RBC 3.47 (*)     Hemoglobin 8.6 (*)     Hematocrit 29.5 (*)     MCH 24.8 (*)     MCHC 29.2 (*)     RDW 20.8 (*)     Gran # (ANC) 16.1 (*)     Mono # 1.5 (*)     Gran% 83.2 (*)     Lymph% 8.7 (*)     All other components within normal limits   COMPREHENSIVE METABOLIC PANEL - Abnormal; Notable for the following:     Glucose 134 (*)     BUN, Bld 45 (*)     Creatinine 1.6 (*)     Calcium 8.3 (*)     Total Bilirubin 1.3 (*)     eGFR if  34 (*)     eGFR if non  29 (*)     All other components within normal limits   B-TYPE NATRIURETIC PEPTIDE - Abnormal; Notable for the following:     BNP 3,587 (*)     All other components within normal limits   TROPONIN I - Abnormal; Notable for the following:     Troponin I 0.119 (*)     All other components within normal limits   PROCALCITONIN - Abnormal; Notable for the " following:     Procalcitonin 2.16 (*)     All other components within normal limits   CULTURE, BLOOD   CULTURE, BLOOD   CULTURE, URINE   LACTIC ACID, PLASMA   INFLUENZA A AND B ANTIGEN   LIPASE   DIGOXIN LEVEL   PROTIME-INR   URINALYSIS        All Lab Results:  Results for orders placed or performed during the hospital encounter of 04/10/18   CBC auto differential   Result Value Ref Range    WBC 19.32 (H) 3.90 - 12.70 K/uL    RBC 3.47 (L) 4.00 - 5.40 M/uL    Hemoglobin 8.6 (L) 12.0 - 16.0 g/dL    Hematocrit 29.5 (L) 37.0 - 48.5 %    MCV 85 82 - 98 fL    MCH 24.8 (L) 27.0 - 31.0 pg    MCHC 29.2 (L) 32.0 - 36.0 g/dL    RDW 20.8 (H) 11.5 - 14.5 %    Platelets 202 150 - 350 K/uL    MPV 10.6 9.2 - 12.9 fL    Gran # (ANC) 16.1 (H) 1.8 - 7.7 K/uL    Lymph # 1.7 1.0 - 4.8 K/uL    Mono # 1.5 (H) 0.3 - 1.0 K/uL    Eos # 0.0 0.0 - 0.5 K/uL    Baso # 0.03 0.00 - 0.20 K/uL    Gran% 83.2 (H) 38.0 - 73.0 %    Lymph% 8.7 (L) 18.0 - 48.0 %    Mono% 7.9 4.0 - 15.0 %    Eosinophil% 0.0 0.0 - 8.0 %    Basophil% 0.2 0.0 - 1.9 %    Differential Method Automated    Comprehensive metabolic panel   Result Value Ref Range    Sodium 136 136 - 145 mmol/L    Potassium 4.4 3.5 - 5.1 mmol/L    Chloride 100 95 - 110 mmol/L    CO2 28 23 - 29 mmol/L    Glucose 134 (H) 70 - 110 mg/dL    BUN, Bld 45 (H) 8 - 23 mg/dL    Creatinine 1.6 (H) 0.5 - 1.4 mg/dL    Calcium 8.3 (L) 8.7 - 10.5 mg/dL    Total Protein 6.7 6.0 - 8.4 g/dL    Albumin 3.5 3.5 - 5.2 g/dL    Total Bilirubin 1.3 (H) 0.1 - 1.0 mg/dL    Alkaline Phosphatase 66 55 - 135 U/L    AST 21 10 - 40 U/L    ALT 19 10 - 44 U/L    Anion Gap 8 8 - 16 mmol/L    eGFR if African American 34 (A) >60 mL/min/1.73 m^2    eGFR if non African American 29 (A) >60 mL/min/1.73 m^2   Lactic acid, plasma #1   Result Value Ref Range    Lactate (Lactic Acid) 1.8 0.5 - 2.2 mmol/L   Influenza antigen Nasopharyngeal Swab   Result Value Ref Range    Influenza A Ag, EIA Negative Negative    Influenza B Ag, EIA Negative  Negative    Flu A & B Source Nasopharyngeal Swab    Brain natriuretic peptide   Result Value Ref Range    BNP 3,587 (H) 0 - 99 pg/mL   Lipase   Result Value Ref Range    Lipase 31 4 - 60 U/L   Troponin I   Result Value Ref Range    Troponin I 0.119 (H) 0.000 - 0.026 ng/mL   Procalcitonin   Result Value Ref Range    Procalcitonin 2.16 (H) <0.25 ng/mL       Imaging Results:  Imaging Results          X-Ray Chest AP Portable (Final result)  Result time 04/10/18 14:14:08    Final result by Caesar Jenkins III, MD (04/10/18 14:14:08)                 Impression:     Right upper lobe pneumonia.  Small bilateral pleural effusions.        Electronically signed by: CAESAR JENKINS MD  Date:     04/10/18  Time:    14:14              Narrative:    XR CHEST AP PORTABLE    Clinical history: Sepsis .      Comparison: 01/22/2018    Findings: There is stable cardiomegaly. Multiple pacing leads appear stable. There are small bilateral pleural effusions.  There is mild left basilar discoid atelectasis or scarring.  There is new airspace consolidation in the right upper lobe pneumonia.  The remainder of the lungs appear clear of active disease.                               The EKG was ordered, reviewed, and independently interpreted by the ED provider.  Interpretation time: 1332  Rate: 70 BPM  Rhythm: AV dual-paced rhythm  Interpretation: No STEMI.             The Emergency Provider reviewed the vital signs and test results, which are outlined above.    ED Discussion     2:47 PM: Re-evaluated pt. I have discussed test results, shared treatment plan, and the need for admission with patient and family at bedside. Pt and family express understanding at this time and agree with all information. All questions answered. Pt and family have no further questions or concerns at this time. Pt is ready for admit.    2:56 PM: Discussed case with LAST Hernandez (Hospital Medicine). Dr. Jacobson agrees with current care and management of pt and  accepts admission.   Admitting Service: Hospital medicine   Admitting Physician: Dr. Jacobson  Admit to: In-pt tele      ED Medication(s):  Medications   moxifloxacin 400 mg/250 mL IVPB 400 mg (400 mg Intravenous New Bag 4/10/18 1517)   sodium chloride 0.9% bolus 1,551 mL (0 mL/kg × 51.7 kg Intravenous Stopped 4/10/18 1501)   acetaminophen tablet 650 mg (650 mg Oral Given 4/10/18 1416)   furosemide injection 40 mg (40 mg Intravenous Given 4/10/18 1517)       New Prescriptions    No medications on file             Medical Decision Making    Medical Decision Making:   Clinical Tests:   Lab Tests: Ordered and Reviewed  Radiological Study: Ordered and Reviewed  Medical Tests: Ordered and Reviewed           Scribe Attestation:   Scribe #1: I performed the above scribed service and the documentation accurately describes the services I performed. I attest to the accuracy of the note.    Attending:   Physician Attestation Statement for Scribe #1: I, Randolph Agustin MD, personally performed the services described in this documentation, as scribed by Corinne Mack, in my presence, and it is both accurate and complete.          Clinical Impression       ICD-10-CM ICD-9-CM   1. Pneumonia of right upper lobe due to infectious organism J18.1 486   2. Fever R50.9 780.60   3. Hypoxia R09.02 799.02       Disposition:   Disposition: Admitted  Condition: Fair           Randolph Agustin MD  04/10/18 1530

## 2018-04-10 NOTE — ASSESSMENT & PLAN NOTE
-Possibly due to aspiration.   -IV antibiotics, inhaled medications and oxygen therapy.   -Check sputum culture.   -Speech therapy evaluation.

## 2018-04-10 NOTE — HPI
Jennifer Mathews is an 85 year old female with a history of mitral valve replacement, CHF and CKD III complaining of fever that started 3 days ago. She denies associated symptoms and reports that she felt great prior to the onset of fever. The patient does reports chronic bilateral lower extremity edema that is unchanged. She denies cough, SOB, chills, nausea and vomiting. In the ED, the patient was found to have a WBC count of 19,320 with a left shift, a lactic acid of 1.8, an elevated troponin of 0.119, a creatinine of 1.6 and a procalcitonin of 2.16. Code status was discussed with the patient. She if a DNR. Her daughter, Eri Garnett, is her medical power of .

## 2018-04-10 NOTE — SUBJECTIVE & OBJECTIVE
"Past Medical History:   Diagnosis Date    Anemia     Anticoagulated on Coumadin 7/13/2015    Arthritis     Atrial fibrillation     Basal cell carcinoma 10/2015    left neck    Cardiac arrest     Cardiac resynchronization therapy defibrillator (CRT-D) in place 07/13/2015    Pt denies, states it does not shock me    Chronic combined systolic and diastolic congestive heart failure     CKD (chronic kidney disease) stage 3, GFR 30-59 ml/min     Dyslipidemia 1/30/2014    Hypertension     Ischemic cardiomyopathy 01/30/2014    Macular hole of left eye     Old    Macular hole of left eye     LEV (obstructive sleep apnea) 9/30/2013    Recurrent UTI     Refractive error     Stroke        Past Surgical History:   Procedure Laterality Date    APPENDECTOMY      CARDIAC PACEMAKER PLACEMENT  2014    CATARACT EXTRACTION      OU    CHOLECYSTECTOMY      COLONOSCOPY      MITRAL VALVE REPLACEMENT  2014    MITRAL VALVE SURGERY      x3       Review of patient's allergies indicates:   Allergen Reactions    Cephalosporins Hives    Metaxalone Itching    Penicillins      Other reaction(s): Unknown      Pregabalin      Other reaction(s): "Bad feeling"      Sulfa (sulfonamide antibiotics) Itching       Current Facility-Administered Medications on File Prior to Encounter   Medication    denosumab (PROLIA) injection 60 mg     Current Outpatient Prescriptions on File Prior to Encounter   Medication Sig    acetaminophen (TYLENOL EXTRA STRENGTH) 325 MG tablet Take 2 tablets (650 mg total) by mouth every 8 (eight) hours. (Patient taking differently: Take 650 mg by mouth 3 (three) times daily as needed. )    allopurinol (ZYLOPRIM) 100 MG tablet Take 2 tablets (200 mg total) by mouth once daily.    amiodarone (PACERONE) 200 MG Tab Take 1 tablet (200 mg total) by mouth once daily.    aspirin (ECOTRIN) 81 MG EC tablet Take 81 mg by mouth once daily.    carvedilol (COREG) 25 MG tablet Take 1 tablet (25 mg total) by " mouth 2 (two) times daily with meals.    colchicine 0.6 mg Cap Take 1 capsule (0.6 mg total) by mouth once daily.    digoxin (LANOXIN) 125 mcg tablet Take 1 tablet (125 mcg total) by mouth once daily. TAKE 1 TABLET (0.125 MG TOTAL) BY MOUTH ONCE DAILY.    furosemide (LASIX) 40 MG tablet Take 1 tablet (40 mg total) by mouth once daily. (Patient taking differently: Take 40 mg by mouth 2 (two) times daily. )    gabapentin (NEURONTIN) 100 MG capsule TAKE ONE CAPSULE BY MOUTH TWO TIMES DAILY    Lactobac 40-Bifido 3-S.thermop (PROBIOTIC) 100 billion cell Cap Take 1 capsule by mouth once daily.    levothyroxine (SYNTHROID) 50 MCG tablet Take 1 tablet (50 mcg total) by mouth once daily.    sacubitril-valsartan (ENTRESTO) 49-51 mg per tablet Take 1 tablet by mouth 2 (two) times daily.    warfarin (COUMADIN) 2.5 MG tablet Take 1/2 tablet (1.25 mg) on Tuesdays, then 1 tablet on other days as directed by [facility] Clinic.    warfarin (COUMADIN) 2.5 MG tablet TAKE 1 AND 1/2 TABLETS ON FRIDAYS AND 1 TABLET ALL OTHER DAYS AS DIRECTED BY THE COUMADIN CLINIC (Patient taking differently: Take 1 tablet by mouth every evening as directed by the [facility]madin Clinic.)    calcium carbonate (OS-SHERRY) 600 mg calcium (1,500 mg) Tab Take 600 mg by mouth once.    cranberry 1,000 mg Cap Take 1 capsule by mouth Daily.    diphenhydrAMINE (BENADRYL) 25 mg capsule Take 25 mg by mouth every 6 (six) hours as needed for Itching.    MULTIVITAMIN ORAL Take 1 tablet by mouth Daily.     Family History     Problem Relation (Age of Onset)    Cancer Brother    Coronary artery disease Mother, Father    Diabetes Brother        Social History Main Topics    Smoking status: Never Smoker    Smokeless tobacco: Never Used    Alcohol use No    Drug use: No    Sexual activity: Not on file     Review of Systems   Constitutional: Positive for fever. Negative for appetite change, chills, diaphoresis and fatigue.   HENT: Negative for congestion, ear pain,  mouth sores, sore throat and trouble swallowing.    Eyes: Negative for pain and visual disturbance.   Respiratory: Negative for cough, chest tightness and shortness of breath.    Cardiovascular: Positive for leg swelling. Negative for chest pain and palpitations.   Gastrointestinal: Negative for abdominal pain, constipation and nausea.   Endocrine: Negative for cold intolerance, heat intolerance, polydipsia and polyuria.   Genitourinary: Negative for dysuria, frequency and hematuria.   Musculoskeletal: Negative for arthralgias, back pain, myalgias and neck pain.   Skin: Negative for pallor, rash and wound.   Allergic/Immunologic: Negative for environmental allergies and immunocompromised state.   Neurological: Negative for dizziness, seizures, syncope, weakness, numbness and headaches.   Hematological: Negative for adenopathy. Does not bruise/bleed easily.   Psychiatric/Behavioral: Negative for agitation, confusion and sleep disturbance.     Objective:     Vital Signs (Most Recent):  Temp: (!) 102.5 °F (39.2 °C) (04/10/18 1301)  Pulse: 69 (04/10/18 1502)  Resp: 16 (04/10/18 1502)  BP: 127/61 (04/10/18 1502)  SpO2: 96 % (04/10/18 1502) Vital Signs (24h Range):  Temp:  [102.5 °F (39.2 °C)-103.4 °F (39.7 °C)] 102.5 °F (39.2 °C)  Pulse:  [69-70] 69  Resp:  [16-20] 16  SpO2:  [88 %-100 %] 96 %  BP: (127-140)/() 127/61     Weight: 51.7 kg (114 lb)  Body mass index is 20.85 kg/m².    Physical Exam   Constitutional: She is oriented to person, place, and time. She appears well-developed and well-nourished. No distress.   HENT:   Head: Normocephalic and atraumatic.   Eyes: Conjunctivae are normal.   PERRL   Neck: Neck supple. No JVD present.   Cardiovascular: Normal rate, regular rhythm and normal heart sounds.    Pulmonary/Chest: Effort normal. She has decreased breath sounds in the right lower field and the left lower field.   Abdominal: Soft. Bowel sounds are normal. She exhibits no distension. There is no  tenderness.   Musculoskeletal: Normal range of motion. She exhibits edema (+2 lower extremity ).   Neurological: She is alert and oriented to person, place, and time.   Skin: Skin is warm and dry.   Psychiatric: She has a normal mood and affect. Her behavior is normal. Thought content normal.   Nursing note and vitals reviewed.          Significant Labs:   CBC:   Recent Labs  Lab 04/10/18  1330   WBC 19.32*   HGB 8.6*   HCT 29.5*        CMP:   Recent Labs  Lab 04/10/18  1330      K 4.4      CO2 28   *   BUN 45*   CREATININE 1.6*   CALCIUM 8.3*   PROT 6.7   ALBUMIN 3.5   BILITOT 1.3*   ALKPHOS 66   AST 21   ALT 19   ANIONGAP 8   EGFRNONAA 29*     Lactic Acid:   Recent Labs  Lab 04/10/18  1330   LACTATE 1.8     Troponin:   Recent Labs  Lab 04/10/18  1330   TROPONINI 0.119*     All pertinent labs within the past 24 hours have been reviewed.    Significant Imaging: I have reviewed all pertinent imaging results/findings within the past 24 hours.   Imaging Results          X-Ray Chest AP Portable (Final result)  Result time 04/10/18 14:14:08    Final result by Caesar Jenkins III, MD (04/10/18 14:14:08)                 Impression:     Right upper lobe pneumonia.  Small bilateral pleural effusions.        Electronically signed by: CAESAR JENKINS MD  Date:     04/10/18  Time:    14:14              Narrative:    XR CHEST AP PORTABLE    Clinical history: Sepsis .      Comparison: 01/22/2018    Findings: There is stable cardiomegaly. Multiple pacing leads appear stable. There are small bilateral pleural effusions.  There is mild left basilar discoid atelectasis or scarring.  There is new airspace consolidation in the right upper lobe pneumonia.  The remainder of the lungs appear clear of active disease.

## 2018-04-10 NOTE — H&P
[facility] Hospital Medicine  History & Physical    Patient Name: Jennifer Mathews  MRN: [id number]  Admission Date: 4/10/2018  Attending Physician: Randolph Agustin MD   Primary Care Provider: Desirae Bello MD         Patient information was obtained from patient, past medical records and ER records.     Subjective:     Principal Problem:Pneumonia of right upper lobe due to infectious organism    Chief Complaint:   Chief Complaint   Patient presents with    Fever     since Sunday; sent from clinics for hypoxia and febrile        HPI: Jennifer Mathews is an 85 year old female with a history of mitral valve replacement, CHF and CKD III complaining of fever that started 3 days ago. She denies associated symptoms and reports that she felt great prior to the onset of fever. The patient does reports chronic bilateral lower extremity edema that is unchanged. She denies cough, SOB, chills, nausea and vomiting. In the ED, the patient was found to have a WBC count of 19,320 with a left shift, a lactic acid of 1.8, an elevated troponin of 0.119, a creatinine of 1.6 and a procalcitonin of 2.16. Code status was discussed with the patient. She if a DNR. Her daughter, Eri Garnett, is her medical power of .     Past Medical History:   Diagnosis Date    Anemia     Anticoagulated on Coumadin 7/13/2015    Arthritis     Atrial fibrillation     Basal cell carcinoma 10/2015    left neck    Cardiac arrest     Cardiac resynchronization therapy defibrillator (CRT-D) in place 07/13/2015    Pt denies, states it does not shock me    Chronic combined systolic and diastolic congestive heart failure     CKD (chronic kidney disease) stage 3, GFR 30-59 ml/min     Dyslipidemia 1/30/2014    Hypertension     Ischemic cardiomyopathy 01/30/2014    Macular hole of left eye     Old    Macular hole of left eye     LEV (obstructive sleep apnea) 9/30/2013    Recurrent UTI     Refractive error     Stroke   "      Past Surgical History:   Procedure Laterality Date    APPENDECTOMY      CARDIAC PACEMAKER PLACEMENT  2014    CATARACT EXTRACTION      OU    CHOLECYSTECTOMY      COLONOSCOPY      MITRAL VALVE REPLACEMENT  2014    MITRAL VALVE SURGERY      x3       Review of patient's allergies indicates:   Allergen Reactions    Cephalosporins Hives    Metaxalone Itching    Penicillins      Other reaction(s): Unknown      Pregabalin      Other reaction(s): "Bad feeling"      Sulfa (sulfonamide antibiotics) Itching       Current Facility-Administered Medications on File Prior to Encounter   Medication    denosumab (PROLIA) injection 60 mg     Current Outpatient Prescriptions on File Prior to Encounter   Medication Sig    acetaminophen (TYLENOL EXTRA STRENGTH) 325 MG tablet Take 2 tablets (650 mg total) by mouth every 8 (eight) hours. (Patient taking differently: Take 650 mg by mouth 3 (three) times daily as needed. )    allopurinol (ZYLOPRIM) 100 MG tablet Take 2 tablets (200 mg total) by mouth once daily.    amiodarone (PACERONE) 200 MG Tab Take 1 tablet (200 mg total) by mouth once daily.    aspirin (ECOTRIN) 81 MG EC tablet Take 81 mg by mouth once daily.    carvedilol (COREG) 25 MG tablet Take 1 tablet (25 mg total) by mouth 2 (two) times daily with meals.    colchicine 0.6 mg Cap Take 1 capsule (0.6 mg total) by mouth once daily.    digoxin (LANOXIN) 125 mcg tablet Take 1 tablet (125 mcg total) by mouth once daily. TAKE 1 TABLET (0.125 MG TOTAL) BY MOUTH ONCE DAILY.    furosemide (LASIX) 40 MG tablet Take 1 tablet (40 mg total) by mouth once daily. (Patient taking differently: Take 40 mg by mouth 2 (two) times daily. )    gabapentin (NEURONTIN) 100 MG capsule TAKE ONE CAPSULE BY MOUTH TWO TIMES DAILY    Lactobac 40-Bifido 3-S.thermop (PROBIOTIC) 100 billion cell Cap Take 1 capsule by mouth once daily.    levothyroxine (SYNTHROID) 50 MCG tablet Take 1 tablet (50 mcg total) by mouth once daily.    " sacubitril-valsartan (ENTRESTO) 49-51 mg per tablet Take 1 tablet by mouth 2 (two) times daily.    warfarin (COUMADIN) 2.5 MG tablet Take 1/2 tablet (1.25 mg) on Tuesdays, then 1 tablet on other days as directed by Coumadin Clinic.    warfarin (COUMADIN) 2.5 MG tablet TAKE 1 AND 1/2 TABLETS ON FRIDAYS AND 1 TABLET ALL OTHER DAYS AS DIRECTED BY THE COUMADIN CLINIC (Patient taking differently: Take 1 tablet by mouth every evening as directed by the Coumadin Clinic.)    calcium carbonate (OS-SHERRY) 600 mg calcium (1,500 mg) Tab Take 600 mg by mouth once.    cranberry 1,000 mg Cap Take 1 capsule by mouth Daily.    diphenhydrAMINE (BENADRYL) 25 mg capsule Take 25 mg by mouth every 6 (six) hours as needed for Itching.    MULTIVITAMIN ORAL Take 1 tablet by mouth Daily.     Family History     Problem Relation (Age of Onset)    Cancer Brother    Coronary artery disease Mother, Father    Diabetes Brother        Social History Main Topics    Smoking status: Never Smoker    Smokeless tobacco: Never Used    Alcohol use No    Drug use: No    Sexual activity: Not on file     Review of Systems   Constitutional: Positive for fever. Negative for appetite change, chills, diaphoresis and fatigue.   HENT: Negative for congestion, ear pain, mouth sores, sore throat and trouble swallowing.    Eyes: Negative for pain and visual disturbance.   Respiratory: Negative for cough, chest tightness and shortness of breath.    Cardiovascular: Positive for leg swelling. Negative for chest pain and palpitations.   Gastrointestinal: Negative for abdominal pain, constipation and nausea.   Endocrine: Negative for cold intolerance, heat intolerance, polydipsia and polyuria.   Genitourinary: Negative for dysuria, frequency and hematuria.   Musculoskeletal: Negative for arthralgias, back pain, myalgias and neck pain.   Skin: Negative for pallor, rash and wound.   Allergic/Immunologic: Negative for environmental allergies and immunocompromised  state.   Neurological: Negative for dizziness, seizures, syncope, weakness, numbness and headaches.   Hematological: Negative for adenopathy. Does not bruise/bleed easily.   Psychiatric/Behavioral: Negative for agitation, confusion and sleep disturbance.     Objective:     Vital Signs (Most Recent):  Temp: (!) 102.5 °F (39.2 °C) (04/10/18 1301)  Pulse: 69 (04/10/18 1502)  Resp: 16 (04/10/18 1502)  BP: 127/61 (04/10/18 1502)  SpO2: 96 % (04/10/18 1502) Vital Signs (24h Range):  Temp:  [102.5 °F (39.2 °C)-103.4 °F (39.7 °C)] 102.5 °F (39.2 °C)  Pulse:  [69-70] 69  Resp:  [16-20] 16  SpO2:  [88 %-100 %] 96 %  BP: (127-140)/() 127/61     Weight: 51.7 kg (114 lb)  Body mass index is 20.85 kg/m².    Physical Exam   Constitutional: She is oriented to person, place, and time. She appears well-developed and well-nourished. No distress.   HENT:   Head: Normocephalic and atraumatic.   Eyes: Conjunctivae are normal.   PERRL   Neck: Neck supple. No JVD present.   Cardiovascular: Normal rate, regular rhythm and normal heart sounds.    Pulmonary/Chest: Effort normal. She has decreased breath sounds in the right lower field and the left lower field.   Abdominal: Soft. Bowel sounds are normal. She exhibits no distension. There is no tenderness.   Musculoskeletal: Normal range of motion. She exhibits edema (+2 lower extremity ).   Neurological: She is alert and oriented to person, place, and time.   Skin: Skin is warm and dry.   Psychiatric: She has a normal mood and affect. Her behavior is normal. Thought content normal.   Nursing note and vitals reviewed.          Significant Labs:   CBC:   Recent Labs  Lab 04/10/18  1330   WBC 19.32*   HGB 8.6*   HCT 29.5*        CMP:   Recent Labs  Lab 04/10/18  1330      K 4.4      CO2 28   *   BUN 45*   CREATININE 1.6*   CALCIUM 8.3*   PROT 6.7   ALBUMIN 3.5   BILITOT 1.3*   ALKPHOS 66   AST 21   ALT 19   ANIONGAP 8   EGFRNONAA 29*     Lactic Acid:   Recent  Labs  Lab 04/10/18  1330   LACTATE 1.8     Troponin:   Recent Labs  Lab 04/10/18  1330   TROPONINI 0.119*     All pertinent labs within the past 24 hours have been reviewed.    Significant Imaging: I have reviewed all pertinent imaging results/findings within the past 24 hours.   Imaging Results          X-Ray Chest AP Portable (Final result)  Result time 04/10/18 14:14:08    Final result by Caesar Jenkins III, MD (04/10/18 14:14:08)                 Impression:     Right upper lobe pneumonia.  Small bilateral pleural effusions.        Electronically signed by: CAESAR JENKINS MD  Date:     04/10/18  Time:    14:14              Narrative:    XR CHEST AP PORTABLE    Clinical history: Sepsis .      Comparison: 01/22/2018    Findings: There is stable cardiomegaly. Multiple pacing leads appear stable. There are small bilateral pleural effusions.  There is mild left basilar discoid atelectasis or scarring.  There is new airspace consolidation in the right upper lobe pneumonia.  The remainder of the lungs appear clear of active disease.                                Assessment/Plan:     * Pneumonia of right upper lobe due to infectious organism with sepsis    -Possibly due to aspiration.   -IV antibiotics, inhaled medications and oxygen therapy.   -Check sputum culture.   -Speech therapy evaluation.           Elevated troponin    -Most likely associated with sepsis and CKD.   -Trend troponins.   -ASA daily.   -Continue valsartan and Coreg.           S/P MVR (mitral valve replacement)    -Continue coumadin.   -Follow up INR.           Acquired hypothyroidism    Continue Synthroid.           Atrial fibrillation    -Rate controlled.   -Continue digoxin, amiodarone and Coreg.   -Follow up INR.           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           CKD (chronic kidney disease) stage 3, GFR 30-59 ml/min    -Creatinine at baseline.   -Monitor.           Hypertension     Continue Coreg, lasix and valsartan.           Chronic combined systolic and diastolic congestive heart failure    Continue Coreg, Lasix and Entresto.           LEV (obstructive sleep apnea)    CPAP QHS.             VTE Risk Mitigation         Ordered     warfarin (COUMADIN) split tablet 1.25 mg  Daily     Route:  Oral        04/10/18 5218             LAST Potter  Department of Hospital Medicine   [facility]

## 2018-04-11 PROBLEM — D62 ACUTE BLOOD LOSS ANEMIA: Status: ACTIVE | Noted: 2018-04-11

## 2018-04-11 PROBLEM — R19.7 DIARRHEA: Status: ACTIVE | Noted: 2018-04-11

## 2018-04-11 LAB
ANION GAP SERPL CALC-SCNC: 8 MMOL/L
BASOPHILS # BLD AUTO: 0.02 K/UL
BASOPHILS NFR BLD: 0.2 %
BUN SERPL-MCNC: 48 MG/DL
CALCIUM SERPL-MCNC: 7.5 MG/DL
CHLORIDE SERPL-SCNC: 108 MMOL/L
CO2 SERPL-SCNC: 22 MMOL/L
CREAT SERPL-MCNC: 1.3 MG/DL
DIFFERENTIAL METHOD: ABNORMAL
DIGOXIN SERPL-MCNC: 2.2 NG/ML
EOSINOPHIL # BLD AUTO: 0 K/UL
EOSINOPHIL NFR BLD: 0 %
ERYTHROCYTE [DISTWIDTH] IN BLOOD BY AUTOMATED COUNT: 20.9 %
EST. GFR  (AFRICAN AMERICAN): 43 ML/MIN/1.73 M^2
EST. GFR  (NON AFRICAN AMERICAN): 37 ML/MIN/1.73 M^2
GLUCOSE SERPL-MCNC: 91 MG/DL
HCT VFR BLD AUTO: 24.5 %
HCT VFR BLD AUTO: 27.2 %
HGB BLD-MCNC: 7.2 G/DL
HGB BLD-MCNC: 7.9 G/DL
INR PPP: 1.6
INR PPP: 1.6
LYMPHOCYTES # BLD AUTO: 1.1 K/UL
LYMPHOCYTES NFR BLD: 8.4 %
MAGNESIUM SERPL-MCNC: 1.9 MG/DL
MCH RBC QN AUTO: 25 PG
MCHC RBC AUTO-ENTMCNC: 29.4 G/DL
MCV RBC AUTO: 85 FL
MONOCYTES # BLD AUTO: 1 K/UL
MONOCYTES NFR BLD: 7.8 %
NEUTROPHILS # BLD AUTO: 10.6 K/UL
NEUTROPHILS NFR BLD: 83.6 %
PHOSPHATE SERPL-MCNC: 2.4 MG/DL
PLATELET # BLD AUTO: 162 K/UL
PMV BLD AUTO: 11.1 FL
POTASSIUM SERPL-SCNC: 4 MMOL/L
PROTHROMBIN TIME: 17 SEC
PROTHROMBIN TIME: 17 SEC
RBC # BLD AUTO: 2.88 M/UL
SODIUM SERPL-SCNC: 138 MMOL/L
VANCOMYCIN SERPL-MCNC: 15.6 UG/ML
WBC # BLD AUTO: 12.61 K/UL

## 2018-04-11 PROCEDURE — 63600175 PHARM REV CODE 636 W HCPCS: Performed by: PHYSICIAN ASSISTANT

## 2018-04-11 PROCEDURE — 85025 COMPLETE CBC W/AUTO DIFF WBC: CPT

## 2018-04-11 PROCEDURE — 25000003 PHARM REV CODE 250: Performed by: PHYSICIAN ASSISTANT

## 2018-04-11 PROCEDURE — 80202 ASSAY OF VANCOMYCIN: CPT

## 2018-04-11 PROCEDURE — 87070 CULTURE OTHR SPECIMN AEROBIC: CPT

## 2018-04-11 PROCEDURE — 85018 HEMOGLOBIN: CPT

## 2018-04-11 PROCEDURE — 83735 ASSAY OF MAGNESIUM: CPT

## 2018-04-11 PROCEDURE — 92610 EVALUATE SWALLOWING FUNCTION: CPT

## 2018-04-11 PROCEDURE — 80162 ASSAY OF DIGOXIN TOTAL: CPT

## 2018-04-11 PROCEDURE — 84100 ASSAY OF PHOSPHORUS: CPT

## 2018-04-11 PROCEDURE — 25000242 PHARM REV CODE 250 ALT 637 W/ HCPCS: Performed by: PHYSICIAN ASSISTANT

## 2018-04-11 PROCEDURE — 80048 BASIC METABOLIC PNL TOTAL CA: CPT

## 2018-04-11 PROCEDURE — 85014 HEMATOCRIT: CPT

## 2018-04-11 PROCEDURE — 94640 AIRWAY INHALATION TREATMENT: CPT

## 2018-04-11 PROCEDURE — 36415 COLL VENOUS BLD VENIPUNCTURE: CPT

## 2018-04-11 PROCEDURE — 87205 SMEAR GRAM STAIN: CPT

## 2018-04-11 PROCEDURE — 25000003 PHARM REV CODE 250: Performed by: FAMILY MEDICINE

## 2018-04-11 PROCEDURE — 85610 PROTHROMBIN TIME: CPT

## 2018-04-11 PROCEDURE — 21400001 HC TELEMETRY ROOM

## 2018-04-11 PROCEDURE — 63600175 PHARM REV CODE 636 W HCPCS: Performed by: FAMILY MEDICINE

## 2018-04-11 PROCEDURE — 25000003 PHARM REV CODE 250: Performed by: EMERGENCY MEDICINE

## 2018-04-11 PROCEDURE — 27000221 HC OXYGEN, UP TO 24 HOURS

## 2018-04-11 RX ORDER — FAMOTIDINE 20 MG/1
20 TABLET, FILM COATED ORAL DAILY
Status: DISCONTINUED | OUTPATIENT
Start: 2018-04-11 | End: 2018-04-15 | Stop reason: HOSPADM

## 2018-04-11 RX ORDER — DIGOXIN 125 MCG
125 TABLET ORAL
Status: DISCONTINUED | OUTPATIENT
Start: 2018-04-13 | End: 2018-04-15 | Stop reason: HOSPADM

## 2018-04-11 RX ORDER — CALCIUM CARBONATE 200(500)MG
1000 TABLET,CHEWABLE ORAL ONCE
Status: COMPLETED | OUTPATIENT
Start: 2018-04-11 | End: 2018-04-11

## 2018-04-11 RX ADMIN — VANCOMYCIN HYDROCHLORIDE 750 MG: 750 INJECTION, POWDER, LYOPHILIZED, FOR SOLUTION INTRAVENOUS at 09:04

## 2018-04-11 RX ADMIN — SACUBITRIL AND VALSARTAN 1 TABLET: 49; 51 TABLET, FILM COATED ORAL at 09:04

## 2018-04-11 RX ADMIN — AMIODARONE HYDROCHLORIDE 200 MG: 200 TABLET ORAL at 09:04

## 2018-04-11 RX ADMIN — FAMOTIDINE 20 MG: 20 TABLET, FILM COATED ORAL at 03:04

## 2018-04-11 RX ADMIN — FUROSEMIDE 40 MG: 40 TABLET ORAL at 09:04

## 2018-04-11 RX ADMIN — WARFARIN SODIUM 2.5 MG: 2.5 TABLET ORAL at 04:04

## 2018-04-11 RX ADMIN — CARVEDILOL 25 MG: 12.5 TABLET, FILM COATED ORAL at 07:04

## 2018-04-11 RX ADMIN — ACETAMINOPHEN 650 MG: 325 TABLET ORAL at 07:04

## 2018-04-11 RX ADMIN — SACUBITRIL AND VALSARTAN 1 TABLET: 49; 51 TABLET, FILM COATED ORAL at 08:04

## 2018-04-11 RX ADMIN — CALCIUM CARBONATE (ANTACID) CHEW TAB 500 MG 1000 MG: 500 CHEW TAB at 03:04

## 2018-04-11 RX ADMIN — CARVEDILOL 25 MG: 12.5 TABLET, FILM COATED ORAL at 04:04

## 2018-04-11 RX ADMIN — MEROPENEM AND SODIUM CHLORIDE 500 MG: 500 INJECTION, SOLUTION INTRAVENOUS at 08:04

## 2018-04-11 RX ADMIN — GABAPENTIN 100 MG: 100 CAPSULE ORAL at 09:04

## 2018-04-11 RX ADMIN — MEROPENEM AND SODIUM CHLORIDE 500 MG: 500 INJECTION, SOLUTION INTRAVENOUS at 07:04

## 2018-04-11 RX ADMIN — GABAPENTIN 100 MG: 100 CAPSULE ORAL at 08:04

## 2018-04-11 RX ADMIN — ASPIRIN 81 MG: 81 TABLET, COATED ORAL at 09:04

## 2018-04-11 RX ADMIN — IPRATROPIUM BROMIDE AND ALBUTEROL SULFATE 3 ML: .5; 3 SOLUTION RESPIRATORY (INHALATION) at 08:04

## 2018-04-11 RX ADMIN — LEVOTHYROXINE SODIUM 50 MCG: 50 TABLET ORAL at 09:04

## 2018-04-11 RX ADMIN — IPRATROPIUM BROMIDE AND ALBUTEROL SULFATE 3 ML: .5; 3 SOLUTION RESPIRATORY (INHALATION) at 02:04

## 2018-04-11 RX ADMIN — IPRATROPIUM BROMIDE AND ALBUTEROL SULFATE 3 ML: .5; 3 SOLUTION RESPIRATORY (INHALATION) at 07:04

## 2018-04-11 NOTE — PROGRESS NOTES
Clinical Pharmacy Progress Note    Pharmacy consulted to dose vancomycin for treatment of Pneumonia    85 y.o. female with history of Afib/LEV/Recurrent UTI/multiple pelvic fractures/faintingPulm HTN due to lt heart disease/recurrent pneumonia     The patient has the following labs:     Date  Creatinine (mg/dl)  BUN  WBC Count  4/10/2018  Estimated Creatinine Clearance: 20.3 mL/min (A) (based on SCr of 1.6 mg/dL (H)).  Lab Results  Component  Value  Date  BUN  45 (H)  04/10/2018     Lab Results  Component  Value  Date  WBC  19.32 (H)  04/10/2018    Ideal BW: 50.1 kg  Actual BW: 54.7 kg  Adjusted (Dosing) BW: 51.9 kg    Tmax/24h = 103.4 F  Cultures: Urine collected/Blood Collected/respiratory-no result      Vancomycin (day 1 of therapy)     Other anti-infectives: Merrem 500 iv q12hrs (day 1 of therapy)    Based on Estimated Creatinine Clearance: 20.3 mL/min (A) (based on SCr of 1.6 mg/dL (H)). and weight of 54.7 kg pharmacy will initiate vancomycin pulse dosing with 750 mg iv q48hrs as place bartholomew dose only and is not to be given unless level is <18 .  A random is ordered for 4/12 @ 0600. Pharmacy will continue to follow patients clinical status, renal function, C&S, and adjust dose as necessary to maintain trough levels between 15 and 20.     Thank you for allowing us to participate in this patient's care.     Florence Rawls [facility]  4/10/2018 7:30 PM

## 2018-04-11 NOTE — PLAN OF CARE
Problem: Patient Care Overview  Goal: Plan of Care Review  Outcome: Ongoing (interventions implemented as appropriate)  POC discussed, all questions addressed at this time. No acute distress. Paced rhythm noted on monitor, HR= 60s. No complaint of pain or discomfort. Pt had diarrhea beginning of shift.  Tmax =101.6. NS @ 50 infusing for the remainder of the ordered 12 hours. IV ABX also given as ordered. Sputum sample pending. Avasys in use, bed low, call bell placed within reach. Will continue to monitor.

## 2018-04-11 NOTE — PT/OT/SLP EVAL
Speech Language Pathology Evaluation  Bedside Swallow    Patient Name:  Jennifer Mathews   MRN:  [id number]  Admitting Diagnosis: Pneumonia of right upper lobe due to infectious organism    Recommendations:                 General Recommendations:  Follow-up not indicated  Diet recommendations:  Regular, Thin   Aspiration Precautions: HOB to 90 degrees and Remain upright 30 minutes post meal   General Precautions: Standard, fall    History:     Past Medical History:   Diagnosis Date    Anemia     Anticoagulated on Coumadin 7/13/2015    Arthritis     Atrial fibrillation     Basal cell carcinoma 10/2015    left neck    Cardiac arrest     Cardiac resynchronization therapy defibrillator (CRT-D) in place 07/13/2015    Pt denies, states it does not shock me    Chronic combined systolic and diastolic congestive heart failure     CKD (chronic kidney disease) stage 3, GFR 30-59 ml/min     Dyslipidemia 1/30/2014    Hypertension     Ischemic cardiomyopathy 01/30/2014    Macular hole of left eye     Old    Macular hole of left eye     LEV (obstructive sleep apnea) 9/30/2013    Recurrent UTI     Refractive error     Stroke        Past Surgical History:   Procedure Laterality Date    APPENDECTOMY      CARDIAC PACEMAKER PLACEMENT  2014    CATARACT EXTRACTION      OU    CHOLECYSTECTOMY      COLONOSCOPY      MITRAL VALVE REPLACEMENT  2014    MITRAL VALVE SURGERY      x3       Social History: Patient lives by herself and has great family support.  Her daughter lives next across the street.  Pt is Independent with all ADLS    Chest X-Rays: Right upper lobe pneumonia    Prior diet: Regular     Subjective   Pt seen sitting on the bedside in no distress.  Patient goals: none stated    Pain/Comfort:  · Pain Rating 1: 0/10    Objective:     Oral Musculature Evaluation  · Oral Musculature: WFL  · Dentition: present and adequate    Bedside Swallow Eval:   Consistencies Assessed:  · Thin liquids and solids      Oral  Phase:   · WFL    Pharyngeal Phase:   · no overt clinical signs/symptoms of aspiration  · no overt clinical signs/symptoms of pharyngeal dysphagia    Assessment:     Jennifer Mathews is a 85 y.o. female with a diagnosis of pnuemonia.  She presents with a safe, functional swallow for intake of all consistencies.  No further ST warranted at this time.       Plan:     · Plan of Care reviewed with:  patient, daughter   · SLP Follow-Up:  No       Discharge recommendations:  home   Barriers to Discharge:  None    Time Tracking:     SLP Treatment Date:   04/11/18  Speech Start Time:  1030  Speech Stop Time:  1048     Speech Total Time (min):  18 min    Billable Minutes: Eval Swallow and Oral Function 18    Ananya Mancini CCC-SLP  04/11/2018

## 2018-04-11 NOTE — PLAN OF CARE
Problem: Patient Care Overview  Goal: Plan of Care Review  Outcome: Ongoing (interventions implemented as appropriate)  Pt free of falls during shift. VSS.  PIV intact and saline locked. AV paced on tele monitor. On contact isolation until rule out cdif - pt having multiple loose stools. Pt educated on need for stool sample. Pt on 2L NC, no SOB noted. Call light in reach, hourly rounding made, daughter at bedside during most of shift,  will continue to monitor.

## 2018-04-11 NOTE — PLAN OF CARE
Met with patient and family. Patient denies any post hospital needs or services at this time.  Discussed possible home health services upon discharge. Patient stated that she has equipment at home but currently only uses a cane . Patient also stated that she has a POA and Living Will, currently not on file. Requested a copy brought to her pcp so that a copy can be placed in her record. Transitional Care Folder, Discharge Planning Begins on Admission pamphlet, [facility] Bedside Delivery pamphlet, Advance Directive information given to patient along with the contact information given.Instructed patient or family to call with any questions or concerns.         04/11/18 3784   Discharge Assessment   Assessment Type Discharge Planning Assessment   Confirmed/corrected address and phone number on facesheet? Yes   Assessment information obtained from? Patient;Caregiver;Medical Record   Expected Length of Stay (days) (TBD)   Communicated expected length of stay with patient/caregiver yes   Prior to hospitilization cognitive status: Alert/Oriented   Prior to hospitalization functional status: Assistive Equipment;Independent   Current cognitive status: Alert/Oriented   Current Functional Status: Needs Assistance   Facility Arrived From: home   Lives With alone   Able to Return to Prior Arrangements yes   Is patient able to care for self after discharge? Yes   Who are your caregiver(s) and their phone number(s)? Stephy Do ( daughter ) [phone]   Patient's perception of discharge disposition home or selfcare   Readmission Within The Last 30 Days no previous admission in last 30 days   Patient currently being followed by outpatient case management? No   Patient currently receives any other outside agency services? No   Equipment Currently Used at Home CPAP;bedside commode;rollator;cane, straight   Do you have any problems affording any of your prescribed medications? No   Is the patient taking medications as  prescribed? yes   Does the patient have transportation home? Yes   Does the patient receive services at the [facility]? No   Discharge Plan A Home;Home with family   Discharge Plan B Home Health   Patient/Family In Agreement With Plan yes

## 2018-04-11 NOTE — PROGRESS NOTES
Clinical Pharmacy: Coumadin Progress Note    Today's INR = 1.6 (unchanged)   Goal INR = 2.0-3.0  Indication: Afib     Will continue patient on current dose of Coumadin 2.5mg daily. Patient had a therapeutic INR of 2.4, last week on 04/03 and often has fluctuations in INR. Daily PT/INR will be monitored and dose adjustments will be made accordingly.   Patient has been educated.     Thank you for allowing us to participate in this patient's care.   Isidra Lindsey, PharmD 4/11/2018 3:04 PM

## 2018-04-11 NOTE — PLAN OF CARE
Problem: Patient Care Overview  Goal: Plan of Care Review  Outcome: Ongoing (interventions implemented as appropriate)  Plan of care discussed with patient, and patient verbalized understanding.  Patient remained Aox4, 2L Nc, and paced on the monitor.  Patient has no pain.  Patient remained free of falls, accidents, and trama during the day shift.  Bed is in the lowest position, bed alarm on and tele sitter -  and call light is within reach - Continue to monitor.

## 2018-04-11 NOTE — PROGRESS NOTES
Clinical Pharmacy: Vancomycin Progress Note    Vancomycin random level = 15.6 mcg/ml    WBC = 12.61 (down from 19.32)  Tmax = 103.4  SCr = 1.3 (down from 1.6)    Blood Cx x 2 sets (04/10) -- NGTD x 1 day   Resp Cx -- no result  Dx: Pneumonia       Goal trough: 15-20 mcg/ml     Patient will receive a Vancomycin 750mg dose today. Pharmacy will continue to pulse dose patient at this time. Patient may receive another dose once level is <18.   Random level due: 04/12 at 0430    Thank you for allowing us to participate in this patient's care.   Isidra Lindsey, PharmD 4/11/2018 8:14 AM

## 2018-04-11 NOTE — PROGRESS NOTES
Discussed dig level with pharmacist. New dig level ordered. Pharmacist states she will let me know if I should give or hold and she will discuss with MD and let me know. Will monitor for dig level and new orders.

## 2018-04-11 NOTE — PROGRESS NOTES
Patient arrived from [facility], report received from MAIRA Anderson taken, and cardiac monitor placed on the patient.  Family is at the bedside.  Continue to monitor.

## 2018-04-12 LAB
ANION GAP SERPL CALC-SCNC: 8 MMOL/L
BASOPHILS # BLD AUTO: 0.03 K/UL
BASOPHILS NFR BLD: 0.3 %
BNP SERPL-MCNC: 3010 PG/ML
BUN SERPL-MCNC: 44 MG/DL
C DIFF GDH STL QL: NEGATIVE
C DIFF TOX A+B STL QL IA: NEGATIVE
CALCIUM SERPL-MCNC: 7.9 MG/DL
CHLORIDE SERPL-SCNC: 109 MMOL/L
CO2 SERPL-SCNC: 25 MMOL/L
CREAT SERPL-MCNC: 1.3 MG/DL
DIFFERENTIAL METHOD: ABNORMAL
EOSINOPHIL # BLD AUTO: 0 K/UL
EOSINOPHIL NFR BLD: 0.3 %
ERYTHROCYTE [DISTWIDTH] IN BLOOD BY AUTOMATED COUNT: 21.1 %
EST. GFR  (AFRICAN AMERICAN): 43 ML/MIN/1.73 M^2
EST. GFR  (NON AFRICAN AMERICAN): 37 ML/MIN/1.73 M^2
GLUCOSE SERPL-MCNC: 84 MG/DL
HCT VFR BLD AUTO: 29.6 %
HGB BLD-MCNC: 8.5 G/DL
INR PPP: 1.6
LYMPHOCYTES # BLD AUTO: 1.2 K/UL
LYMPHOCYTES NFR BLD: 13 %
MAGNESIUM SERPL-MCNC: 1.9 MG/DL
MCH RBC QN AUTO: 24.6 PG
MCHC RBC AUTO-ENTMCNC: 28.7 G/DL
MCV RBC AUTO: 86 FL
MONOCYTES # BLD AUTO: 1 K/UL
MONOCYTES NFR BLD: 11.3 %
NEUTROPHILS # BLD AUTO: 6.6 K/UL
NEUTROPHILS NFR BLD: 75.6 %
OB PNL STL: POSITIVE
PHOSPHATE SERPL-MCNC: 2.2 MG/DL
PLATELET # BLD AUTO: 213 K/UL
PMV BLD AUTO: 10.2 FL
POTASSIUM SERPL-SCNC: 4 MMOL/L
PROTHROMBIN TIME: 16.3 SEC
RBC # BLD AUTO: 3.46 M/UL
SODIUM SERPL-SCNC: 142 MMOL/L
VANCOMYCIN SERPL-MCNC: 16.2 UG/ML
WBC # BLD AUTO: 8.84 K/UL

## 2018-04-12 PROCEDURE — 80202 ASSAY OF VANCOMYCIN: CPT

## 2018-04-12 PROCEDURE — 83880 ASSAY OF NATRIURETIC PEPTIDE: CPT

## 2018-04-12 PROCEDURE — G8979 MOBILITY GOAL STATUS: HCPCS | Mod: CJ

## 2018-04-12 PROCEDURE — 87449 NOS EACH ORGANISM AG IA: CPT

## 2018-04-12 PROCEDURE — 25000003 PHARM REV CODE 250: Performed by: INTERNAL MEDICINE

## 2018-04-12 PROCEDURE — 94761 N-INVAS EAR/PLS OXIMETRY MLT: CPT

## 2018-04-12 PROCEDURE — G8987 SELF CARE CURRENT STATUS: HCPCS | Mod: CK

## 2018-04-12 PROCEDURE — 83735 ASSAY OF MAGNESIUM: CPT

## 2018-04-12 PROCEDURE — 21400001 HC TELEMETRY ROOM

## 2018-04-12 PROCEDURE — 25000003 PHARM REV CODE 250: Performed by: PHYSICIAN ASSISTANT

## 2018-04-12 PROCEDURE — G8988 SELF CARE GOAL STATUS: HCPCS | Mod: CJ

## 2018-04-12 PROCEDURE — 97116 GAIT TRAINING THERAPY: CPT

## 2018-04-12 PROCEDURE — 97530 THERAPEUTIC ACTIVITIES: CPT

## 2018-04-12 PROCEDURE — 27000221 HC OXYGEN, UP TO 24 HOURS

## 2018-04-12 PROCEDURE — 97165 OT EVAL LOW COMPLEX 30 MIN: CPT

## 2018-04-12 PROCEDURE — G8978 MOBILITY CURRENT STATUS: HCPCS | Mod: CK

## 2018-04-12 PROCEDURE — 25000003 PHARM REV CODE 250: Performed by: FAMILY MEDICINE

## 2018-04-12 PROCEDURE — 97161 PT EVAL LOW COMPLEX 20 MIN: CPT

## 2018-04-12 PROCEDURE — 36415 COLL VENOUS BLD VENIPUNCTURE: CPT

## 2018-04-12 PROCEDURE — 94640 AIRWAY INHALATION TREATMENT: CPT

## 2018-04-12 PROCEDURE — 84100 ASSAY OF PHOSPHORUS: CPT

## 2018-04-12 PROCEDURE — 85610 PROTHROMBIN TIME: CPT

## 2018-04-12 PROCEDURE — 25000242 PHARM REV CODE 250 ALT 637 W/ HCPCS: Performed by: PHYSICIAN ASSISTANT

## 2018-04-12 PROCEDURE — 63600175 PHARM REV CODE 636 W HCPCS: Performed by: INTERNAL MEDICINE

## 2018-04-12 PROCEDURE — 80048 BASIC METABOLIC PNL TOTAL CA: CPT

## 2018-04-12 PROCEDURE — 82272 OCCULT BLD FECES 1-3 TESTS: CPT

## 2018-04-12 PROCEDURE — 63600175 PHARM REV CODE 636 W HCPCS: Performed by: PHYSICIAN ASSISTANT

## 2018-04-12 PROCEDURE — 85025 COMPLETE CBC W/AUTO DIFF WBC: CPT

## 2018-04-12 RX ORDER — ENOXAPARIN SODIUM 100 MG/ML
58 INJECTION SUBCUTANEOUS EVERY 24 HOURS
Status: DISCONTINUED | OUTPATIENT
Start: 2018-04-12 | End: 2018-04-13

## 2018-04-12 RX ADMIN — SODIUM CHLORIDE 750 MG: 900 INJECTION, SOLUTION INTRAVENOUS at 10:04

## 2018-04-12 RX ADMIN — MEROPENEM AND SODIUM CHLORIDE 500 MG: 500 INJECTION, SOLUTION INTRAVENOUS at 10:04

## 2018-04-12 RX ADMIN — SACUBITRIL AND VALSARTAN 1 TABLET: 49; 51 TABLET, FILM COATED ORAL at 10:04

## 2018-04-12 RX ADMIN — LEVOTHYROXINE SODIUM 50 MCG: 50 TABLET ORAL at 10:04

## 2018-04-12 RX ADMIN — FUROSEMIDE 40 MG: 40 TABLET ORAL at 10:04

## 2018-04-12 RX ADMIN — FAMOTIDINE 20 MG: 20 TABLET, FILM COATED ORAL at 10:04

## 2018-04-12 RX ADMIN — ENOXAPARIN SODIUM 60 MG: 100 INJECTION SUBCUTANEOUS at 05:04

## 2018-04-12 RX ADMIN — GABAPENTIN 100 MG: 100 CAPSULE ORAL at 08:04

## 2018-04-12 RX ADMIN — SACUBITRIL AND VALSARTAN 1 TABLET: 49; 51 TABLET, FILM COATED ORAL at 08:04

## 2018-04-12 RX ADMIN — CARVEDILOL 25 MG: 12.5 TABLET, FILM COATED ORAL at 09:04

## 2018-04-12 RX ADMIN — GABAPENTIN 100 MG: 100 CAPSULE ORAL at 10:04

## 2018-04-12 RX ADMIN — IPRATROPIUM BROMIDE AND ALBUTEROL SULFATE 3 ML: .5; 3 SOLUTION RESPIRATORY (INHALATION) at 01:04

## 2018-04-12 RX ADMIN — ASPIRIN 81 MG: 81 TABLET, COATED ORAL at 10:04

## 2018-04-12 RX ADMIN — CARVEDILOL 25 MG: 12.5 TABLET, FILM COATED ORAL at 10:04

## 2018-04-12 RX ADMIN — IPRATROPIUM BROMIDE AND ALBUTEROL SULFATE 3 ML: .5; 3 SOLUTION RESPIRATORY (INHALATION) at 08:04

## 2018-04-12 RX ADMIN — MEROPENEM AND SODIUM CHLORIDE 500 MG: 500 INJECTION, SOLUTION INTRAVENOUS at 08:04

## 2018-04-12 RX ADMIN — WARFARIN SODIUM 2.5 MG: 2.5 TABLET ORAL at 06:04

## 2018-04-12 RX ADMIN — CARVEDILOL 25 MG: 12.5 TABLET, FILM COATED ORAL at 04:04

## 2018-04-12 RX ADMIN — AMIODARONE HYDROCHLORIDE 200 MG: 200 TABLET ORAL at 10:04

## 2018-04-12 RX ADMIN — IPRATROPIUM BROMIDE AND ALBUTEROL SULFATE 3 ML: .5; 3 SOLUTION RESPIRATORY (INHALATION) at 07:04

## 2018-04-12 NOTE — ASSESSMENT & PLAN NOTE
4/11/2018 - likely side effect from antibx, c.diff ordered, pending. Will consider symptomatic tx in am if not improved vs changing antibx regimen.

## 2018-04-12 NOTE — HOSPITAL COURSE
4/11/2018 - sob improving, fever 101 overnight, white count down from 19 to 12, hemoglobin decreased to 7.9, will follow hemoccult secondary to recent GI bleed, pt developed diarrhea overnight, no hx of c.diff, follow up results and tx symptoms prn, urine output adequate, ivf d/c, will watch for s/s of heart failure exacerbation, follow blood and sputum cultures  4/12/18- diarrhea improved continued contact isolation for probable cdiff                 Urinalysis -Ecoli                  Follow up CXR -b/l pneumonia                  BC-NGTD                  Cdiff- neg                 mitral valve with  INR 2.4  continue  Lovenox bridge for goal of 3.0                  Continue ASA  Patient doesn't have a mechanical valve -discontinue Lovenox for goal of 2.0-30.      Admitted for shortness of breath, treated for pneumonia and UTI  She had diarrhea due to antibiotics , stools were negative for Cdiff  Patient qualified for home oxygen  Seen and examined, stable for discharge

## 2018-04-12 NOTE — PLAN OF CARE
Problem: Patient Care Overview  Goal: Plan of Care Review  Outcome: Ongoing (interventions implemented as appropriate)  AAOx4, no complaint of pain or discomfort. Paced rhythm on monitor. POC discussed, pt reports being unable to cough up sputum for sample, and stool sample has been contaminated with urine each time using collection hat. Tmax 101.1. IV ABX given as ordered. CXR for this am pending. No falls or injury this shift. Tele sitter in use. Pt assisted with repositioning as needed. Bed low, call bell placed within reach, will continue to monitor.

## 2018-04-12 NOTE — SUBJECTIVE & OBJECTIVE
Interval History:     Review of Systems   Constitutional: Negative.    HENT: Negative.    Eyes: Negative.    Respiratory: Positive for cough, chest tightness and shortness of breath. Negative for choking.    Cardiovascular: Negative.    Gastrointestinal: Positive for diarrhea. Negative for abdominal distention, abdominal pain, anal bleeding, blood in stool, constipation, nausea, rectal pain and vomiting.   Endocrine: Negative.    Genitourinary: Negative.    Musculoskeletal: Negative.    Skin: Negative.    Allergic/Immunologic: Negative.    Neurological: Negative.    Hematological: Negative.    Psychiatric/Behavioral: Negative.      Objective:     Vital Signs (Most Recent):  Temp: 96.8 °F (36 °C) (04/12/18 0826)  Pulse: 75 (04/12/18 1308)  Resp: 18 (04/12/18 1308)  BP: (!) 170/74 (04/12/18 0826)  SpO2: 95 % (04/12/18 1308) Vital Signs (24h Range):  Temp:  [96.8 °F (36 °C)-101.1 °F (38.4 °C)] 96.8 °F (36 °C)  Pulse:  [69-75] 75  Resp:  [18-20] 18  SpO2:  [94 %-97 %] 95 %  BP: (141-170)/(62-74) 170/74     Weight: 58 kg (127 lb 13.9 oz)  Body mass index is 23.39 kg/m².    Intake/Output Summary (Last 24 hours) at 04/12/18 1406  Last data filed at 04/11/18 2025   Gross per 24 hour   Intake               50 ml   Output                0 ml   Net               50 ml      Physical Exam   Constitutional: She is oriented to person, place, and time. She appears well-developed and well-nourished.   HENT:   Head: Normocephalic and atraumatic.   Cardiovascular: Regular rhythm and intact distal pulses.    Pulmonary/Chest: Effort normal.   BS decreased at the bases.   Abdominal: Soft. Bowel sounds are normal. She exhibits no distension. There is no tenderness. There is no guarding.   Musculoskeletal: Normal range of motion. She exhibits no edema or tenderness.   Neurological: She is alert and oriented to person, place, and time. She displays normal reflexes. No cranial nerve deficit or sensory deficit. She exhibits normal muscle  tone. Coordination normal.   Skin: Skin is warm and dry.   Psychiatric: She has a normal mood and affect. Her behavior is normal. Judgment and thought content normal.       Significant Labs:   BMP:   Recent Labs  Lab 04/12/18  0406   GLU 84      K 4.0      CO2 25   BUN 44*   CREATININE 1.3   CALCIUM 7.9*   MG 1.9     CBC:   Recent Labs  Lab 04/11/18  0534 04/11/18  1407   WBC 12.61  --    HGB 7.2* 7.9*   HCT 24.5* 27.2*     --        Significant Imaging:

## 2018-04-12 NOTE — PT/OT/SLP EVAL
Physical Therapy Evaluation    Patient Name:  Jennifer Mathews   MRN:  [id number]    Recommendations:     Discharge Recommendations:  home health PT   Discharge Equipment Recommendations: none   Barriers to discharge: None    Assessment:     Jennifer Mathews is a 85 y.o. female admitted with a medical diagnosis of Pneumonia of right upper lobe due to infectious organism.  She presents with the following impairments/functional limitations:  weakness, impaired endurance, impaired functional mobilty, gait instability, impaired balance, decreased safety awareness .    Rehab Prognosis:  GOOD; patient would benefit from acute skilled PT services to address these deficits and reach maximum level of function.      Recent Surgery: * No surgery found *      Plan:     During this hospitalization, patient to be seen 5 x/week to address the above listed problems via gait training, therapeutic activities, therapeutic exercises  · Plan of Care Expires:  04/19/18   Plan of Care Reviewed with: daughter    Subjective     Communicated with EPIC AND NURSE DARSHAN prior to session.  Patient found IN SUPINE upon PT entry to room, agreeable to evaluation.      Chief Complaint: NONE  Patient comments/goals: TO GO HOME  Pain/Comfort:  · Pain Rating 1: 0/10    Patients cultural, spiritual, Confucianist conflicts given the current situation:      Living Environment:  PT LIVES ALONE AND DAUGHTER LIVES NEARBY, NO STEPS TO ENTER BUT HAS A RAMP  Prior to admission, patients level of function was INDEPENDENT WITH GAIT AND ADL'S.  Patient has the following equipment: bedside commode, rollator, cane, straight (RAMP TO ENTER HOME).  DME owned (not currently used): none.  Upon discharge, patient will have assistance from FAMILY.    Objective:     Patient found with: oxygen     General Precautions: Standard, fall   Orthopedic Precautions:N/A   Braces: N/A     Exams:  · Cognitive Exam:  Patient is oriented to Person, Place, Time and Situation and follows  100% of MULTI-STEP commands   · RLE ROM: WFL  · RLE Strength: Deficits: 3+/5  · LLE ROM: WFL  · LLE Strength: Deficits: 3+/5    Functional Mobility:  · Bed Mobility:     · Supine to Sit: minimum assistance  · Transfers:     · Sit to Stand:  minimum assistance with no AD  · Bed to Chair: minimum assistance with  no AD and HHA  using  Step Transfer  · Gait: 2 X 20 FEET IN ROOM WITH HHA X1    AM-PAC 6 CLICK MOBILITY  Total Score:18       Therapeutic Activities and Exercises:   PT EDUCATION,  MOBILITY AND GAIT IN ROOM,  HHA X1, NO LOB,, WEAK WITH SLOW PACE.  IN CHAIR FOR OOB TOLERANCE.    Patient left up in chair with all lines intact, call button in reach, NURSE  notified and DAUGHTER present.    GOALS:    Physical Therapy Goals        Problem: Physical Therapy Goal    Goal Priority Disciplines Outcome Goal Variances Interventions   Physical Therapy Goal     PT/OT, PT      Description:  Goals to be met by: 18     Patient will increase functional independence with mobility by performin. Supine to sit with Stand-by Assistance  2. Sit to stand transfer with Stand-by Assistance  3. Bed to chair transfer with Stand-by Assistance using Rolling Walker  4. Gait  2x 100 feet with Stand-by Assistance using Rolling Walker.   5. Lower extremity exercise program x20 reps per handout, with supervision                      History:     Past Medical History:   Diagnosis Date    Anemia     Anticoagulated on Coumadin 2015    Arthritis     Atrial fibrillation     Basal cell carcinoma 10/2015    left neck    Cardiac arrest     Cardiac resynchronization therapy defibrillator (CRT-D) in place 2015    Pt denies, states it does not shock me    Chronic combined systolic and diastolic congestive heart failure     CKD (chronic kidney disease) stage 3, GFR 30-59 ml/min     Dyslipidemia 2014    Hypertension     Ischemic cardiomyopathy 2014    Macular hole of left eye     Old    Macular hole of left  eye     LEV (obstructive sleep apnea) 9/30/2013    Recurrent UTI     Refractive error     Stroke        Past Surgical History:   Procedure Laterality Date    APPENDECTOMY      CARDIAC PACEMAKER PLACEMENT  2014    CATARACT EXTRACTION      OU    CHOLECYSTECTOMY      COLONOSCOPY      MITRAL VALVE REPLACEMENT  2014    MITRAL VALVE SURGERY      x3       Clinical Decision Making:     History  Co-morbidities and personal factors that may impact the plan of care Examination  Body Structures and Functions, activity limitations and participation restrictions that may impact the plan of care Clinical Presentation   Decision Making/ Complexity Score   Co-morbidities:   [] Time since onset of injury / illness / exacerbation  [] Status of current condition  []Patient's cognitive status and safety concerns    [] Multiple Medical Problems (see med hx)  Personal Factors:   [] Patient's age  [] Prior Level of function   [] Patient's home situation (environment and family support)  [] Patient's level of motivation  [] Expected progression of patient      HISTORY:(criteria)    [] 23632 - no personal factors/history    [] 60816 - has 1-2 personal factor/comorbidity     [x] 80883 - has >3 personal factor/comorbidity     Body Regions:  [] Objective examination findings  [] Head     []  Neck  [] Trunk   [] Upper Extremity  [] Lower Extremity    Body Systems:  [] For communication ability, affect, cognition, language, and learning style: the assessment of the ability to make needs known, consciousness, orientation (person, place, and time), expected emotional /behavioral responses, and learning preferences (eg, learning barriers, education  needs)  [] For the neuromuscular system: a general assessment of gross coordinated movement (eg, balance, gait, locomotion, transfers, and transitions) and motor function  (motor control and motor learning)  [] For the musculoskeletal system: the assessment of gross symmetry, gross range of  motion, gross strength, height, and weight  [] For the integumentary system: the assessment of pliability(texture), presence of scar formation, skin color, and skin integrity  [] For cardiovascular/pulmonary system: the assessment of heart rate, respiratory rate, blood pressure, and edema     Activity limitations:    [] Patient's cognitive status and saf ety concerns          [] Status of current condition      [] Weight bearing restriction  [] Cardiopulmunary Restriction    Participation Restrictions:   [x] Goals and goal agreement with the patient     [x] Rehab potential (prognosis) and probable outcome      Examination of Body System: (criteria)    [] 00439 - addressing 1-2 elements    [x] 05127 - addressing a total of 3 or more elements     [] 08537 -  Addressing a total of 4 or more elements         Clinical Presentation: (criteria)  Stable - 14105     On examination of body system using standardized tests and measures patient presents with 3 or more elements from any of the following: body structures and functions, activity limitations, and/or participation restrictions.  Leading to a clinical presentation that is considered stable and/or uncomplicated                              Clinical Decision Making  (Eval Complexity):  Low- 67057     Time Tracking:     PT Received On: 04/12/18  PT Start Time: 0925     PT Stop Time: 0950  PT Total Time (min): 25 min     Billable Minutes: Evaluation 15 and Gait Training 10      Mikala Zabala PT  04/12/2018

## 2018-04-12 NOTE — PROGRESS NOTES
[facility] Hospital Medicine  Progress Note    Patient Name: Jennifer Mathews  MRN: [id number]  Patient Class: IP- Inpatient   Admission Date: 4/10/2018  Length of Stay: 2 days  Attending Physician: Salina Crandall MD  Primary Care Provider: Desirae Bello MD        Subjective:     Principal Problem:Pneumonia of right upper lobe due to infectious organism    HPI:  Jennifer Mathews is an 85 year old female with a history of mitral valve replacement, CHF and CKD III complaining of fever that started 3 days ago. She denies associated symptoms and reports that she felt great prior to the onset of fever. The patient does reports chronic bilateral lower extremity edema that is unchanged. She denies cough, SOB, chills, nausea and vomiting. In the ED, the patient was found to have a WBC count of 19,320 with a left shift, a lactic acid of 1.8, an elevated troponin of 0.119, a creatinine of 1.6 and a procalcitonin of 2.16. Code status was discussed with the patient. She if a DNR. Her daughter, Eri Garnett, is her medical power of .     Hospital Course:  4/11/2018 - sob improving, fever 101 overnight, white count down from 19 to 12, hemoglobin decreased to 7.9, will follow hemoccult secondary to recent GI bleed, pt developed diarrhea overnight, no hx of c.diff, follow up results and tx symptoms prn, urine output adequate, ivf d/c, will watch for s/s of heart failure exacerbation, follow blood and sputum cultures  4/12/18- diarrhea improved continued contact isolation for probable cdiff                 Urinalysis -presumptive Ecoli                  Follow up CXR -b/l pneumonia                  BC-NGTD                  Mechanical mitral valve with subtherapeutic INR 1.6 will add Lovenox bridge for goal of 3.0                  Continue ASA    Interval History:     Review of Systems   Constitutional: Negative.    HENT: Negative.    Eyes: Negative.    Respiratory: Positive for cough, chest tightness  and shortness of breath. Negative for choking.    Cardiovascular: Negative.    Gastrointestinal: Positive for diarrhea. Negative for abdominal distention, abdominal pain, anal bleeding, blood in stool, constipation, nausea, rectal pain and vomiting.   Endocrine: Negative.    Genitourinary: Negative.    Musculoskeletal: Negative.    Skin: Negative.    Allergic/Immunologic: Negative.    Neurological: Negative.    Hematological: Negative.    Psychiatric/Behavioral: Negative.      Objective:     Vital Signs (Most Recent):  Temp: 96.8 °F (36 °C) (04/12/18 0826)  Pulse: 75 (04/12/18 1308)  Resp: 18 (04/12/18 1308)  BP: (!) 170/74 (04/12/18 0826)  SpO2: 95 % (04/12/18 1308) Vital Signs (24h Range):  Temp:  [96.8 °F (36 °C)-101.1 °F (38.4 °C)] 96.8 °F (36 °C)  Pulse:  [69-75] 75  Resp:  [18-20] 18  SpO2:  [94 %-97 %] 95 %  BP: (141-170)/(62-74) 170/74     Weight: 58 kg (127 lb 13.9 oz)  Body mass index is 23.39 kg/m².    Intake/Output Summary (Last 24 hours) at 04/12/18 1406  Last data filed at 04/11/18 2025   Gross per 24 hour   Intake               50 ml   Output                0 ml   Net               50 ml      Physical Exam   Constitutional: She is oriented to person, place, and time. She appears well-developed and well-nourished.   HENT:   Head: Normocephalic and atraumatic.   Cardiovascular: Regular rhythm and intact distal pulses.    Pulmonary/Chest: Effort normal.   BS decreased at the bases.   Abdominal: Soft. Bowel sounds are normal. She exhibits no distension. There is no tenderness. There is no guarding.   Musculoskeletal: Normal range of motion. She exhibits no edema or tenderness.   Neurological: She is alert and oriented to person, place, and time. She displays normal reflexes. No cranial nerve deficit or sensory deficit. She exhibits normal muscle tone. Coordination normal.   Skin: Skin is warm and dry.   Psychiatric: She has a normal mood and affect. Her behavior is normal. Judgment and thought content  normal.       Significant Labs:   BMP:   Recent Labs  Lab 04/12/18  0406   GLU 84      K 4.0      CO2 25   BUN 44*   CREATININE 1.3   CALCIUM 7.9*   MG 1.9     CBC:   Recent Labs  Lab 04/11/18  0534 04/11/18  1407   WBC 12.61  --    HGB 7.2* 7.9*   HCT 24.5* 27.2*     --        Significant Imaging:     Assessment/Plan:      * Pneumonia of right upper lobe due to infectious organism with sepsis    -Possibly due to aspiration.   -IV antibiotics, inhaled medications and oxygen therapy.   -Check sputum culture.   -Speech therapy evaluation.           Acute blood loss anemia      4/11/2018 - etiology unclear, pt hospitalized 6 months prior due to gi bleed, will follow h/h and hemoccult        Diarrhea    4/11/2018 - likely side effect from antibx, c.diff ordered, pending.         Acquired hypothyroidism    Continue Synthroid.           Atrial fibrillation    -Rate controlled.   -Continue digoxin, amiodarone and Coreg.   -Follow up INR.           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           Pneumonia    4/11/2018 - continue current antibx   continue supplemental oxygen   follow up shows bilateral pneumonia        Elevated troponin    -Most likely associated with sepsis and CKD.   -Trend troponins.   -ASA daily.   -Continue valsartan and Coreg.           CKD (chronic kidney disease) stage 3, GFR 30-59 ml/min    -Creatinine at baseline.   -Monitor.           S/P MVR (mitral valve replacement)    INR 1.6  Add lovenox bridge for goal INR 3.0  Continue ASA          Hypertension    Continue Coreg, lasix and valsartan.           Chronic combined systolic and diastolic congestive heart failure     Compensated   Continue Coreg, Lasix and Entresto.              LEV (obstructive sleep apnea)    CPAP QHS.             VTE Risk Mitigation         Ordered     enoxaparin injection 60 mg  Daily     Route:  Subcutaneous        04/12/18 1418     warfarin (COUMADIN)  tablet 2.5 mg  Daily     Route:  Oral        04/10/18 1923              Salina Crandall MD  Department of Hospital Medicine   [facility]

## 2018-04-12 NOTE — SUBJECTIVE & OBJECTIVE
Interval History:     Review of Systems   Constitutional: Positive for fever. Negative for appetite change, chills, diaphoresis and fatigue.   HENT: Negative for congestion, ear pain, mouth sores, sore throat and trouble swallowing.    Eyes: Negative for pain and visual disturbance.   Respiratory: Negative for cough, chest tightness and shortness of breath.    Cardiovascular: Positive for leg swelling. Negative for chest pain and palpitations.   Gastrointestinal: Negative for abdominal pain, constipation and nausea.   Endocrine: Negative for cold intolerance, heat intolerance, polydipsia and polyuria.   Genitourinary: Negative for dysuria, frequency and hematuria.   Musculoskeletal: Negative for arthralgias, back pain, myalgias and neck pain.   Skin: Negative for pallor, rash and wound.   Allergic/Immunologic: Negative for environmental allergies and immunocompromised state.   Neurological: Negative for dizziness, seizures, syncope, weakness, numbness and headaches.   Hematological: Negative for adenopathy. Does not bruise/bleed easily.   Psychiatric/Behavioral: Negative for agitation, confusion and sleep disturbance.     Objective:     Vital Signs (Most Recent):  Temp: (!) 100.9 °F (38.3 °C) (04/11/18 1958)  Pulse: 70 (04/11/18 1958)  Resp: 18 (04/11/18 1958)  BP: (!) 141/62 (04/11/18 1958)  SpO2: 95 % (04/11/18 1958) Vital Signs (24h Range):  Temp:  [96.8 °F (36 °C)-101.8 °F (38.8 °C)] 100.9 °F (38.3 °C)  Pulse:  [70-72] 70  Resp:  [12-20] 18  SpO2:  [86 %-98 %] 95 %  BP: (113-146)/(54-73) 141/62     Weight: 56.1 kg (123 lb 10.9 oz)  Body mass index is 22.62 kg/m².    Intake/Output Summary (Last 24 hours) at 04/11/18 2229  Last data filed at 04/11/18 0855   Gross per 24 hour   Intake           550.83 ml   Output              450 ml   Net           100.83 ml      Physical Exam   Constitutional: She is oriented to person, place, and time. She appears well-developed and well-nourished. No distress.   HENT:   Head:  Normocephalic and atraumatic.   Eyes: Conjunctivae are normal.   PERRL   Neck: Neck supple. No JVD present.   Cardiovascular: Normal rate, regular rhythm and normal heart sounds.    Pulmonary/Chest: Effort normal. She has decreased breath sounds in the right lower field and the left lower field.   Abdominal: Soft. Bowel sounds are normal. She exhibits no distension. There is no tenderness.   Musculoskeletal: Normal range of motion. She exhibits edema (+2 lower extremity ).   Neurological: She is alert and oriented to person, place, and time.   Skin: Skin is warm and dry.   Psychiatric: She has a normal mood and affect. Her behavior is normal. Thought content normal.   Nursing note and vitals reviewed.      Significant Labs: All pertinent labs within the past 24 hours have been reviewed.    Significant Imaging: I have reviewed all pertinent imaging results/findings within the past 24 hours.

## 2018-04-12 NOTE — ASSESSMENT & PLAN NOTE
4/11/2018 - etiology unclear, pt hospitalized 6 months prior due to gi bleed, will follow h/h and hemoccult

## 2018-04-12 NOTE — ASSESSMENT & PLAN NOTE
4/11/2018 - continue current antibx, follow cultures, watch for signs of fluid overload, consider bnp

## 2018-04-12 NOTE — PROGRESS NOTES
[facility] Hospital Medicine  Progress Note    Patient Name: Jennifer Mathews  MRN: [id number]  Patient Class: IP- Inpatient   Admission Date: 4/10/2018  Length of Stay: 1 days  Attending Physician: Juliano Jacobson MD  Primary Care Provider: Desirae Bello MD        Subjective:     Principal Problem:Pneumonia of right upper lobe due to infectious organism    HPI:  Jennifer Mathews is an 85 year old female with a history of mitral valve replacement, CHF and CKD III complaining of fever that started 3 days ago. She denies associated symptoms and reports that she felt great prior to the onset of fever. The patient does reports chronic bilateral lower extremity edema that is unchanged. She denies cough, SOB, chills, nausea and vomiting. In the ED, the patient was found to have a WBC count of 19,320 with a left shift, a lactic acid of 1.8, an elevated troponin of 0.119, a creatinine of 1.6 and a procalcitonin of 2.16. Code status was discussed with the patient. She if a DNR. Her daughter, Eri Garnett, is her medical power of .     Hospital Course:  4/11/2018 - sob improving, fever 101 overnight, white count down from 19 to 12, hemoglobin decreased to 7.9, will follow hemoccult secondary to recent GI bleed, pt developed diarrhea overnight, no hx of c.diff, follow up results and tx symptoms prn, urine output adequate, ivf d/c, will watch for s/s of heart failure exacerbation, follow blood and sputum cultures    Interval History:     Review of Systems   Constitutional: Positive for fever. Negative for appetite change, chills, diaphoresis and fatigue.   HENT: Negative for congestion, ear pain, mouth sores, sore throat and trouble swallowing.    Eyes: Negative for pain and visual disturbance.   Respiratory: Negative for cough, chest tightness and shortness of breath.    Cardiovascular: Positive for leg swelling. Negative for chest pain and palpitations.   Gastrointestinal: Negative for abdominal  pain, constipation and nausea.   Endocrine: Negative for cold intolerance, heat intolerance, polydipsia and polyuria.   Genitourinary: Negative for dysuria, frequency and hematuria.   Musculoskeletal: Negative for arthralgias, back pain, myalgias and neck pain.   Skin: Negative for pallor, rash and wound.   Allergic/Immunologic: Negative for environmental allergies and immunocompromised state.   Neurological: Negative for dizziness, seizures, syncope, weakness, numbness and headaches.   Hematological: Negative for adenopathy. Does not bruise/bleed easily.   Psychiatric/Behavioral: Negative for agitation, confusion and sleep disturbance.     Objective:     Vital Signs (Most Recent):  Temp: (!) 100.9 °F (38.3 °C) (04/11/18 1958)  Pulse: 70 (04/11/18 1958)  Resp: 18 (04/11/18 1958)  BP: (!) 141/62 (04/11/18 1958)  SpO2: 95 % (04/11/18 1958) Vital Signs (24h Range):  Temp:  [96.8 °F (36 °C)-101.8 °F (38.8 °C)] 100.9 °F (38.3 °C)  Pulse:  [70-72] 70  Resp:  [12-20] 18  SpO2:  [86 %-98 %] 95 %  BP: (113-146)/(54-73) 141/62     Weight: 56.1 kg (123 lb 10.9 oz)  Body mass index is 22.62 kg/m².    Intake/Output Summary (Last 24 hours) at 04/11/18 2229  Last data filed at 04/11/18 0855   Gross per 24 hour   Intake           550.83 ml   Output              450 ml   Net           100.83 ml      Physical Exam   Constitutional: She is oriented to person, place, and time. She appears well-developed and well-nourished. No distress.   HENT:   Head: Normocephalic and atraumatic.   Eyes: Conjunctivae are normal.   PERRL   Neck: Neck supple. No JVD present.   Cardiovascular: Normal rate, regular rhythm and normal heart sounds.    Pulmonary/Chest: Effort normal. She has decreased breath sounds in the right lower field and the left lower field.   Abdominal: Soft. Bowel sounds are normal. She exhibits no distension. There is no tenderness.   Musculoskeletal: Normal range of motion. She exhibits edema (+2 lower extremity ).   Neurological:  She is alert and oriented to person, place, and time.   Skin: Skin is warm and dry.   Psychiatric: She has a normal mood and affect. Her behavior is normal. Thought content normal.   Nursing note and vitals reviewed.      Significant Labs: All pertinent labs within the past 24 hours have been reviewed.    Significant Imaging: I have reviewed all pertinent imaging results/findings within the past 24 hours.    Assessment/Plan:      * Pneumonia of right upper lobe due to infectious organism with sepsis    -Possibly due to aspiration.   -IV antibiotics, inhaled medications and oxygen therapy.   -Check sputum culture.   -Speech therapy evaluation.           Acute blood loss anemia      4/11/2018 - etiology unclear, pt hospitalized 6 months prior due to gi bleed, will follow h/h and hemoccult        Diarrhea    4/11/2018 - likely side effect from antibx, c.diff ordered, pending. Will consider symptomatic tx in am if not improved vs changing antibx regimen.          Acquired hypothyroidism    Continue Synthroid.           Atrial fibrillation    -Rate controlled.   -Continue digoxin, amiodarone and Coreg.   -Follow up INR.           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           Pneumonia    4/11/2018 - continue current antibx, follow cultures, watch for signs of fluid overload, consider bnp          Elevated troponin    -Most likely associated with sepsis and CKD.   -Trend troponins.   -ASA daily.   -Continue valsartan and Coreg.           CKD (chronic kidney disease) stage 3, GFR 30-59 ml/min    -Creatinine at baseline.   -Monitor.           S/P MVR (mitral valve replacement)    -Continue coumadin.   -Follow up INR.           Hypertension    Continue Coreg, lasix and valsartan.           Chronic combined systolic and diastolic congestive heart failure    Continue Coreg, Lasix and Entresto.           LEV (obstructive sleep apnea)    CPAP QHS.             VTE Risk  Mitigation         Ordered     warfarin (COUMADIN) tablet 2.5 mg  Daily     Route:  Oral        04/10/18 1923              Juliano Jacobson MD  Department of Hospital Medicine   [facility]

## 2018-04-12 NOTE — PROGRESS NOTES
Clinical Pharmacy: Vancomycin Progress Note    Vancomycin random level = 16.2 mcg/ml     WBC = 12.61 --from 04/11  Tmax = 101.4 (down from 103.4)  SCr = 1.3 (stable)     Blood Cx x 2 sets (04/10) -- NGTD x 2 days   Resp Cx -- rare gram positive rods   Dx: Pneumonia       Goal trough: 15-20 mcg/ml      Patient will receive a Vancomycin 750mg dose today. Patient will continue to be pulse dosed at this time. Patient may receive another dose once level is <18.   Random level due: 04/13 at 0430    Thank you for allowing us to participate in this patient's care.   Isidra Lindsey, PharmD 4/12/2018 8:06 AM

## 2018-04-12 NOTE — ASSESSMENT & PLAN NOTE
4/11/2018 - continue current antibx   continue supplemental oxygen   follow up shows bilateral pneumonia

## 2018-04-12 NOTE — PT/OT/SLP EVAL
Occupational Therapy   Evaluation    Name: Jennifer Mathews  MRN: [id number]  Admitting Diagnosis:  Pneumonia of right upper lobe due to infectious organism      Recommendations:     Discharge Recommendations: home health OT (with 25 hour care)  Discharge Equipment Recommendations:  none  Barriers to discharge:  None    History:     Occupational Profile:  Living Environment: lives alone with daughter near by . Ramp to enter  Previous level of function: (i) with adl's   Roles and Routines: occupational therapy  Equipment Owned:  bedside commode, rollator, cane, straight (ramp to enter)  Assistance upon Discharge:     Past Medical History:   Diagnosis Date    Anemia     Anticoagulated on Coumadin 7/13/2015    Arthritis     Atrial fibrillation     Basal cell carcinoma 10/2015    left neck    Cardiac arrest     Cardiac resynchronization therapy defibrillator (CRT-D) in place 07/13/2015    Pt denies, states it does not shock me    Chronic combined systolic and diastolic congestive heart failure     CKD (chronic kidney disease) stage 3, GFR 30-59 ml/min     Dyslipidemia 1/30/2014    Hypertension     Ischemic cardiomyopathy 01/30/2014    Macular hole of left eye     Old    Macular hole of left eye     LEV (obstructive sleep apnea) 9/30/2013    Recurrent UTI     Refractive error     Stroke        Past Surgical History:   Procedure Laterality Date    APPENDECTOMY      CARDIAC PACEMAKER PLACEMENT  2014    CATARACT EXTRACTION      OU    CHOLECYSTECTOMY      COLONOSCOPY      MITRAL VALVE REPLACEMENT  2014    MITRAL VALVE SURGERY      x3       Subjective     Chief Complaint: debility and generalized weakness  Patient/Family stated goals:   Communicated with: nurse and epic chart review prior to session.  Pain/Comfort:  ·      Patients cultural, spiritual, Mandaeism conflicts given the current situation:      Objective:     Patient found with:      General Precautions: Standard,     Orthopedic Precautions:     Braces:       Occupational Performance:    Bed Mobility:    · Patient completed Rolling/Turning to Right with contact guard assistance  · Patient completed Scooting/Bridging with contact guard assistance  · Patient completed Supine to Sit with contact guard assistance    Functional Mobility/Transfers:  · Patient completed Sit <> Stand Transfer with contact guard assistance  with  rolling walker   · Patient completed Bed <> Chair Transfer using Squat Pivot technique with contact guard assistance with rolling walker  · Functional Mobility: pt ambulated 58 feet with cga, rw and slow pace    Activities of Daily Living:  · UB Dressing: minimum assistance x1  · LB Dressing: contact guard assistance x1    Cognitive/Visual Perceptual:  Cognitive/Psychosocial Skills:     -       Oriented to: Person, Place, Time and Situation   -       Follows Commands/attention:Follows two-step commands  -       Communication: clear/fluent  -       Memory: No Deficits noted  -       Safety awareness/insight to disability: intact   Visual/Perceptual:      -Intact    Physical Exam:  Upper Extremity Range of Motion:     -       Right Upper Extremity: WFL  -       Left Upper Extremity: WFL  Upper Extremity Strength:    -       Right Upper Extremity: mmt: 3/5 grossly  -       Left Upper Extremity: mmt: 3/5 grossly   Strength:    -       Right Upper Extremity: mmt: 3/5 grossly  -       Left Upper Extremity: mmt: 3/5 grossly    Patient left up in chair with all lines intact, call button in reach, nurse notified and digna sytem and daughter present    AMPA 6 Click:  AMPA Total Score:      Treatment & Education:  Education:    Assessment:     Jennifer Mathews is a 85 y.o. female with a medical diagnosis of Pneumonia of right upper lobe due to infectious organism.  She presents with Performance deficits affecting function are  .      Rehab Prognosis:  Fair+; patient would benefit from acute skilled OT services to address these deficits and  "reach maximum level of function.         Clinical Decision Makin.  OT Low:  "Pt evaluation falls under low complexity for evaluation coding due to performance deficits noted in 1-3 areas as stated above and 0 co-morbities affecting current functional status. Data obtained from problem focused assessments. No modifications or assistance was required for completion of evaluation. Only brief occupational profile and history review completed."     Plan:     Patient to be seen   to address the above listed problems via    · Plan of Care Expires:    · Plan of Care Reviewed with:      This Plan of care has been discussed with the patient who was involved in its development and understands and is in agreement with the identified goals and treatment plan    GOALS:    Occupational Therapy Goals     Not on file                Time Tracking:     OT Date of Treatment: 18  OT Start Time: 1006  OT Stop Time: 1030  OT Total Time (min): 24 min    Billable Minutes:Evaluation 10 minutes  Therapeutic Activity 14 minutes    Brie Schreiber OT  2018    "

## 2018-04-12 NOTE — PLAN OF CARE
Met with patient and family discussed receiving home health services. List from [facility] for home health printed and given to patient. Preference letter obtained for [facility]. Referral faxed to [facility] . Notified Nelsy Valdez  with [facility]

## 2018-04-13 LAB
ANION GAP SERPL CALC-SCNC: 8 MMOL/L
ANION GAP SERPL CALC-SCNC: 8 MMOL/L
BACTERIA UR CULT: NORMAL
BASOPHILS # BLD AUTO: 0.03 K/UL
BASOPHILS NFR BLD: 0.4 %
BUN SERPL-MCNC: 36 MG/DL
BUN SERPL-MCNC: 36 MG/DL
CALCIUM SERPL-MCNC: 8 MG/DL
CALCIUM SERPL-MCNC: 8 MG/DL
CHLORIDE SERPL-SCNC: 108 MMOL/L
CHLORIDE SERPL-SCNC: 108 MMOL/L
CO2 SERPL-SCNC: 24 MMOL/L
CO2 SERPL-SCNC: 24 MMOL/L
CREAT SERPL-MCNC: 0.9 MG/DL
CREAT SERPL-MCNC: 0.9 MG/DL
DIFFERENTIAL METHOD: ABNORMAL
EOSINOPHIL # BLD AUTO: 0.1 K/UL
EOSINOPHIL NFR BLD: 1.6 %
ERYTHROCYTE [DISTWIDTH] IN BLOOD BY AUTOMATED COUNT: 20.9 %
EST. GFR  (AFRICAN AMERICAN): >60 ML/MIN/1.73 M^2
EST. GFR  (AFRICAN AMERICAN): >60 ML/MIN/1.73 M^2
EST. GFR  (NON AFRICAN AMERICAN): 58 ML/MIN/1.73 M^2
EST. GFR  (NON AFRICAN AMERICAN): 58 ML/MIN/1.73 M^2
GLUCOSE SERPL-MCNC: 100 MG/DL
GLUCOSE SERPL-MCNC: 100 MG/DL
HCT VFR BLD AUTO: 27.9 %
HGB BLD-MCNC: 8 G/DL
INR PPP: 1.7
LYMPHOCYTES # BLD AUTO: 1.1 K/UL
LYMPHOCYTES NFR BLD: 14.8 %
MAGNESIUM SERPL-MCNC: 2 MG/DL
MCH RBC QN AUTO: 24.2 PG
MCHC RBC AUTO-ENTMCNC: 28.7 G/DL
MCV RBC AUTO: 85 FL
MONOCYTES # BLD AUTO: 1 K/UL
MONOCYTES NFR BLD: 12.8 %
NEUTROPHILS # BLD AUTO: 5.3 K/UL
NEUTROPHILS NFR BLD: 71.1 %
PHOSPHATE SERPL-MCNC: 1.7 MG/DL
PLATELET # BLD AUTO: 215 K/UL
PMV BLD AUTO: 10.5 FL
POTASSIUM SERPL-SCNC: 3.6 MMOL/L
POTASSIUM SERPL-SCNC: 3.6 MMOL/L
PROTHROMBIN TIME: 17.8 SEC
RBC # BLD AUTO: 3.3 M/UL
SODIUM SERPL-SCNC: 140 MMOL/L
SODIUM SERPL-SCNC: 140 MMOL/L
VANCOMYCIN SERPL-MCNC: 13.4 UG/ML
WBC # BLD AUTO: 7.51 K/UL

## 2018-04-13 PROCEDURE — 84100 ASSAY OF PHOSPHORUS: CPT

## 2018-04-13 PROCEDURE — 25000003 PHARM REV CODE 250: Performed by: PHYSICIAN ASSISTANT

## 2018-04-13 PROCEDURE — 21400001 HC TELEMETRY ROOM

## 2018-04-13 PROCEDURE — 25000242 PHARM REV CODE 250 ALT 637 W/ HCPCS: Performed by: PHYSICIAN ASSISTANT

## 2018-04-13 PROCEDURE — 25000003 PHARM REV CODE 250: Performed by: INTERNAL MEDICINE

## 2018-04-13 PROCEDURE — 94761 N-INVAS EAR/PLS OXIMETRY MLT: CPT

## 2018-04-13 PROCEDURE — 97530 THERAPEUTIC ACTIVITIES: CPT

## 2018-04-13 PROCEDURE — 27000221 HC OXYGEN, UP TO 24 HOURS

## 2018-04-13 PROCEDURE — 85025 COMPLETE CBC W/AUTO DIFF WBC: CPT

## 2018-04-13 PROCEDURE — 97116 GAIT TRAINING THERAPY: CPT

## 2018-04-13 PROCEDURE — 80048 BASIC METABOLIC PNL TOTAL CA: CPT

## 2018-04-13 PROCEDURE — G8987 SELF CARE CURRENT STATUS: HCPCS | Mod: CK

## 2018-04-13 PROCEDURE — 36415 COLL VENOUS BLD VENIPUNCTURE: CPT

## 2018-04-13 PROCEDURE — 85610 PROTHROMBIN TIME: CPT

## 2018-04-13 PROCEDURE — 94640 AIRWAY INHALATION TREATMENT: CPT

## 2018-04-13 PROCEDURE — 80202 ASSAY OF VANCOMYCIN: CPT

## 2018-04-13 PROCEDURE — 97110 THERAPEUTIC EXERCISES: CPT

## 2018-04-13 PROCEDURE — G8988 SELF CARE GOAL STATUS: HCPCS | Mod: CJ

## 2018-04-13 PROCEDURE — 25000003 PHARM REV CODE 250: Performed by: FAMILY MEDICINE

## 2018-04-13 PROCEDURE — 63600175 PHARM REV CODE 636 W HCPCS: Performed by: INTERNAL MEDICINE

## 2018-04-13 PROCEDURE — 83735 ASSAY OF MAGNESIUM: CPT

## 2018-04-13 RX ORDER — DOXYCYCLINE HYCLATE 100 MG
100 TABLET ORAL EVERY 12 HOURS
Status: DISCONTINUED | OUTPATIENT
Start: 2018-04-13 | End: 2018-04-13

## 2018-04-13 RX ORDER — DOXYCYCLINE HYCLATE 100 MG
100 TABLET ORAL EVERY 12 HOURS
Status: DISCONTINUED | OUTPATIENT
Start: 2018-04-13 | End: 2018-04-14

## 2018-04-13 RX ORDER — ENOXAPARIN SODIUM 100 MG/ML
58 INJECTION SUBCUTANEOUS EVERY 12 HOURS
Status: DISCONTINUED | OUTPATIENT
Start: 2018-04-13 | End: 2018-04-14

## 2018-04-13 RX ORDER — WARFARIN 2.5 MG/1
2.5 TABLET ORAL DAILY
Status: DISCONTINUED | OUTPATIENT
Start: 2018-04-14 | End: 2018-04-14

## 2018-04-13 RX ORDER — WARFARIN SODIUM 5 MG/1
5 TABLET ORAL ONCE
Status: COMPLETED | OUTPATIENT
Start: 2018-04-13 | End: 2018-04-13

## 2018-04-13 RX ADMIN — FUROSEMIDE 40 MG: 40 TABLET ORAL at 08:04

## 2018-04-13 RX ADMIN — DOXYCYCLINE HYCLATE 100 MG: 100 TABLET, COATED ORAL at 09:04

## 2018-04-13 RX ADMIN — AMIODARONE HYDROCHLORIDE 200 MG: 200 TABLET ORAL at 08:04

## 2018-04-13 RX ADMIN — CARVEDILOL 25 MG: 12.5 TABLET, FILM COATED ORAL at 05:04

## 2018-04-13 RX ADMIN — IPRATROPIUM BROMIDE AND ALBUTEROL SULFATE 3 ML: .5; 3 SOLUTION RESPIRATORY (INHALATION) at 07:04

## 2018-04-13 RX ADMIN — GABAPENTIN 100 MG: 100 CAPSULE ORAL at 08:04

## 2018-04-13 RX ADMIN — IPRATROPIUM BROMIDE AND ALBUTEROL SULFATE 3 ML: .5; 3 SOLUTION RESPIRATORY (INHALATION) at 02:04

## 2018-04-13 RX ADMIN — CARVEDILOL 25 MG: 12.5 TABLET, FILM COATED ORAL at 08:04

## 2018-04-13 RX ADMIN — ENOXAPARIN SODIUM 60 MG: 60 INJECTION, SOLUTION INTRAVENOUS; SUBCUTANEOUS at 08:04

## 2018-04-13 RX ADMIN — ENOXAPARIN SODIUM 60 MG: 60 INJECTION, SOLUTION INTRAVENOUS; SUBCUTANEOUS at 09:04

## 2018-04-13 RX ADMIN — ASPIRIN 81 MG: 81 TABLET, COATED ORAL at 08:04

## 2018-04-13 RX ADMIN — SACUBITRIL AND VALSARTAN 1 TABLET: 49; 51 TABLET, FILM COATED ORAL at 08:04

## 2018-04-13 RX ADMIN — FAMOTIDINE 20 MG: 20 TABLET, FILM COATED ORAL at 08:04

## 2018-04-13 RX ADMIN — LEVOTHYROXINE SODIUM 50 MCG: 50 TABLET ORAL at 08:04

## 2018-04-13 RX ADMIN — DOXYCYCLINE HYCLATE 100 MG: 100 TABLET, COATED ORAL at 08:04

## 2018-04-13 RX ADMIN — DIGOXIN 125 MCG: 125 TABLET ORAL at 08:04

## 2018-04-13 RX ADMIN — WARFARIN SODIUM 5 MG: 5 TABLET ORAL at 05:04

## 2018-04-13 NOTE — SUBJECTIVE & OBJECTIVE
Interval History:     Review of Systems   Constitutional: Negative.    HENT: Negative.    Eyes: Negative.    Respiratory: Positive for cough, chest tightness and shortness of breath. Negative for choking.    Cardiovascular: Negative.    Gastrointestinal: Positive for diarrhea. Negative for abdominal distention, abdominal pain, anal bleeding, blood in stool, constipation, nausea, rectal pain and vomiting.   Endocrine: Negative.    Genitourinary: Negative.    Musculoskeletal: Negative.    Skin: Negative.    Allergic/Immunologic: Negative.    Neurological: Negative.    Hematological: Negative.    Psychiatric/Behavioral: Negative.      Objective:     Vital Signs (Most Recent):  Temp: 98.5 °F (36.9 °C) (04/13/18 0735)  Pulse: 70 (04/13/18 0945)  Resp: 20 (04/13/18 0735)  BP: (!) 147/66 (04/13/18 0735)  SpO2: (!) 93 % (04/13/18 1031) Vital Signs (24h Range):  Temp:  [98 °F (36.7 °C)-99 °F (37.2 °C)] 98.5 °F (36.9 °C)  Pulse:  [69-78] 70  Resp:  [18-20] 20  SpO2:  [92 %-96 %] 93 %  BP: (122-159)/(60-70) 147/66     Weight: 58.1 kg (128 lb 1.4 oz)  Body mass index is 23.43 kg/m².    Intake/Output Summary (Last 24 hours) at 04/13/18 1058  Last data filed at 04/13/18 0500   Gross per 24 hour   Intake              100 ml   Output                0 ml   Net              100 ml      Physical Exam   Constitutional: She is oriented to person, place, and time. She appears well-developed and well-nourished.   HENT:   Head: Normocephalic and atraumatic.   Eyes: Conjunctivae and EOM are normal. Pupils are equal, round, and reactive to light.   Neck: Normal range of motion. Neck supple. No JVD present.   Cardiovascular: Regular rhythm and intact distal pulses.    Pulmonary/Chest: Effort normal.   BS decreased at the bases.   Abdominal: Soft. Bowel sounds are normal. She exhibits no distension. There is no tenderness. There is no guarding.   Musculoskeletal: Normal range of motion. She exhibits no edema or tenderness.   Neurological: She  is alert and oriented to person, place, and time. She displays normal reflexes. No cranial nerve deficit or sensory deficit. She exhibits normal muscle tone. Coordination normal.   Skin: Skin is warm and dry.   Psychiatric: She has a normal mood and affect. Her behavior is normal. Judgment and thought content normal.     Interval History:     Review of Systems   Constitutional: Negative.    HENT: Negative.    Eyes: Negative.    Respiratory: Positive for cough, chest tightness and shortness of breath. Negative for choking.    Cardiovascular: Negative.    Gastrointestinal: Positive for diarrhea. Negative for abdominal distention, abdominal pain, anal bleeding, blood in stool, constipation, nausea, rectal pain and vomiting.   Endocrine: Negative.    Genitourinary: Negative.    Musculoskeletal: Negative.    Skin: Negative.    Allergic/Immunologic: Negative.    Neurological: Negative.    Hematological: Negative.    Psychiatric/Behavioral: Negative.      Objective:     Vital Signs (Most Recent):  Temp: 98.5 °F (36.9 °C) (04/13/18 0735)  Pulse: 70 (04/13/18 0945)  Resp: 20 (04/13/18 0735)  BP: (!) 147/66 (04/13/18 0735)  SpO2: (!) 93 % (04/13/18 1031) Vital Signs (24h Range):  Temp:  [98 °F (36.7 °C)-99 °F (37.2 °C)] 98.5 °F (36.9 °C)  Pulse:  [69-78] 70  Resp:  [18-20] 20  SpO2:  [92 %-96 %] 93 %  BP: (122-159)/(60-70) 147/66     Weight: 58.1 kg (128 lb 1.4 oz)  Body mass index is 23.43 kg/m².    Intake/Output Summary (Last 24 hours) at 04/13/18 1059  Last data filed at 04/13/18 0500   Gross per 24 hour   Intake              100 ml   Output                0 ml   Net              100 ml      Physical Exam   Constitutional: She is oriented to person, place, and time. She appears well-developed and well-nourished.   HENT:   Head: Normocephalic and atraumatic.   Cardiovascular: Regular rhythm and intact distal pulses.    Pulmonary/Chest: Effort normal.   BS decreased at the bases.   Abdominal: Soft. Bowel sounds are normal.  She exhibits no distension. There is no tenderness. There is no guarding.   Musculoskeletal: Normal range of motion. She exhibits no edema or tenderness.   Neurological: She is alert and oriented to person, place, and time. She displays normal reflexes. No cranial nerve deficit or sensory deficit. She exhibits normal muscle tone. Coordination normal.   Skin: Skin is warm and dry.   Psychiatric: She has a normal mood and affect. Her behavior is normal. Judgment and thought content normal.       Significant Labs:   BMP:     Recent Labs  Lab 04/13/18  0438     100     140   K 3.6  3.6     108   CO2 24  24   BUN 36*  36*   CREATININE 0.9  0.9   CALCIUM 8.0*  8.0*   MG 2.0     CBC:     Recent Labs  Lab 04/11/18  1407 04/12/18  1410 04/13/18  0438   WBC  --  8.84 7.51   HGB 7.9* 8.5* 8.0*   HCT 27.2* 29.6* 27.9*   PLT  --  213 215       Significant Imaging:     Significant Labs:   BMP:     Recent Labs  Lab 04/13/18  0438     100     140   K 3.6  3.6     108   CO2 24  24   BUN 36*  36*   CREATININE 0.9  0.9   CALCIUM 8.0*  8.0*   MG 2.0     CBC:     Recent Labs  Lab 04/11/18  1407 04/12/18  1410 04/13/18  0438   WBC  --  8.84 7.51   HGB 7.9* 8.5* 8.0*   HCT 27.2* 29.6* 27.9*   PLT  --  213 215       Significant Imaging:

## 2018-04-13 NOTE — PT/OT/SLP PROGRESS
Physical Therapy  Treatment    Jennifer Mathews   MRN: [id number]   Admitting Diagnosis: Pneumonia of right upper lobe due to infectious organism    PT Received On: 04/13/18  PT Start Time: 0850     PT Stop Time: 0915    PT Total Time (min): 25 min       Billable Minutes:  Gait Training 15 and Therapeutic Activity 10    Treatment Type: Treatment  PT/PTA: PT             General Precautions: Standard, fall  Orthopedic Precautions: N/A   Braces: N/A         Subjective:  Communicated with EPIC AND NURSE CASANOVA prior to session.  PT AGREES TO THERAPY, STATES SHE FEELS BETTER TODAY    Pain/Comfort  Pain Rating 1: 0/10    Objective:   Patient found with: telemetry, oxygen    Functional Mobility:  Bed Mobility: KRISTI SUPINE TO SIT       Transfers: KRISTI X 1 SIT TO STAND FROM BED TO RW       Gait: AMBULATED 40 FEET WITH RW, SLOW PACE, NO LOB       Therapeutic Activities and Exercises:  PT EDUCATION, MOBILITY AND GAIT.  UP IN CHAIR FOR OOB TOLERANCE, ENCOURAGED TO CONT LE EXERCISES THROUGH THE DAY     AM-PAC 6 CLICK MOBILITY  How much help from another person does this patient currently need?   1 = Unable, Total/Dependent Assistance  2 = A lot, Maximum/Moderate Assistance  3 = A little, Minimum/Contact Guard/Supervision  4 = None, Modified [place]/Independent    Turning over in bed (including adjusting bedclothes, sheets and blankets)?: 4  Sitting down on and standing up from a chair with arms (e.g., wheelchair, bedside commode, etc.): 3  Moving from lying on back to sitting on the side of the bed?: 3  Moving to and from a bed to a chair (including a wheelchair)?: 3  Need to walk in hospital room?: 3  Climbing 3-5 steps with a railing?: 1  Total Score: 17    AM-PAC Raw Score CMS G-Code Modifier Level of Impairment Assistance   6 % Total / Unable   7 - 9 CM 80 - 100% Maximal Assist   10 - 14 CL 60 - 80% Moderate Assist   15 - 19 CK 40 - 60% Moderate Assist   20 - 22 CJ 20 - 40% Minimal Assist   23 CI 1-20% SBA / CGA    24 CH 0% Independent/ Mod I     Patient left up in chair with all lines intact, call button in reach and NURSE notified.    Assessment:  Jennifer Mathews is a 85 y.o. female with a medical diagnosis of Pneumonia of right upper lobe due to infectious organism and presents with IMPAIRED MOBILITY AND WILL NEED CONT P.T.    Rehab identified problem list/impairments: Rehab identified problem list/impairments: weakness, impaired endurance, impaired functional mobilty, gait instability, impaired balance, impaired coordination    Rehab potential is good.    Activity tolerance: Good    Discharge recommendations: Discharge Facility/Level Of Care Needs: home health PT     Barriers to discharge:  NONE    Equipment recommendations: Equipment Needed After Discharge: none     GOALS:    Physical Therapy Goals        Problem: Physical Therapy Goal    Goal Priority Disciplines Outcome Goal Variances Interventions   Physical Therapy Goal     PT/OT, PT Ongoing (interventions implemented as appropriate)     Description:  Goals to be met by: 18     Patient will increase functional independence with mobility by performin. Supine to sit with Stand-by Assistance  2. Sit to stand transfer with Stand-by Assistance  3. Bed to chair transfer with Stand-by Assistance using Rolling Walker  4. Gait  2x 100 feet with Stand-by Assistance using Rolling Walker.   5. Lower extremity exercise program x20 reps per handout, with supervision                      PLAN:    Patient to be seen 5 x/week  to address the above listed problems via gait training, therapeutic activities, therapeutic exercises  Plan of Care expires: 18  Plan of Care reviewed with: patient         Mikala Vj, PT  2018

## 2018-04-13 NOTE — PT/OT/SLP PROGRESS
"Occupational Therapy  Treatment    Jennifer Mathews   MRN: [id number]   Admitting Diagnosis: Pneumonia of right upper lobe due to infectious organism    OT Date of Treatment: 04/13/18   OT Start Time: 0905  OT Stop Time: 0915  OT Total Time (min): 10 min    Billable Minutes:  Therapeutic Activity 10    General Precautions: Standard, fall  Orthopedic Precautions: N/A  Braces: N/A         Subjective:  Communicated with RN prior to session.    Pain/Comfort  Pain Rating 1: 0/10  Pain Rating Post-Intervention 1: 0/10    Objective:  Patient found with: telemetry, oxygen     Functional Mobility:  Bed Mobility:       Transfers:        Functional Ambulation: PT AMBULATED X40 FEET WITH RW, MIN A    Balance:   Static Sit: NORMAL: No deviations seen in posture held statically  Dynamic Sit: GOOD: Maintains balance through MODERATE excursions of active trunk movement  Static Stand: FAIR: Maintains without assist but unable to take challenges  Dynamic stand: FAIR: Needs CONTACT GUARD during gait    Therapeutic Activities and Exercises:  PT BED MOBILITY MIN A. STOOD MIN A WITH RW AND AMBULATED X40 FEET MIN A. PT LEFT SEATED UP IN CHAIR WITH ALL NEEDS MET.     AM-PAC 6 CLICK ADL   How much help from another person does this patient currently need?   1 = Unable, Total/Dependent Assistance  2 = A lot, Maximum/Moderate Assistance  3 = A little, Minimum/Contact Guard/Supervision  4 = None, Modified [place]/Independent    Putting on and taking off regular lower body clothing? : 3  Bathing (including washing, rinsing, drying)?: 3  Toileting, which includes using toilet, bedpan, or urinal? : 3  Putting on and taking off regular upper body clothing?: 3  Taking care of personal grooming such as brushing teeth?: 4  Eating meals?: 4  Total Score: 20     AM-PAC Raw Score CMS "G-Code Modifier Level of Impairment Assistance   6 % Total / Unable   7 - 8 CM 80 - 100% Maximal Assist   9-13 CL 60 - 80% Moderate Assist   14 - 19 CK 40 - 60% " Moderate Assist   20 - 22  20 - 40% Minimal Assist   23 CI 1-20% SBA / CGA   24 CH 0% Independent/ Mod I       Patient left up in chair with all lines intact, call button in reach and NURSING notified    ASSESSMENT:  Jennifer Mathews is a 85 y.o. female with a medical diagnosis of Pneumonia of right upper lobe due to infectious organism and presents with DEBILITY, IMPAIRED ADLS, FUNCTIONAL MOBILITY, SAFETY AWARENESS AND POOR ACTIVITY TOLERANCE.    Rehab identified problem list/impairments: Rehab identified problem list/impairments: weakness, impaired endurance, impaired functional mobilty, gait instability, impaired self care skills, impaired balance, decreased coordination, decreased safety awareness    Rehab potential is good.    Activity tolerance: Good    Discharge recommendations: Discharge Facility/Level Of Care Needs: home health OT     Barriers to discharge: Barriers to Discharge: None    Equipment recommendations: none     GOALS:    Occupational Therapy Goals        Problem: Occupational Therapy Goal    Goal Priority Disciplines Outcome Interventions   Occupational Therapy Goal     OT, PT/OT Ongoing (interventions implemented as appropriate)    Description:  Occupational Therapy   Goals to be met by: 4-20-18  1. Pt will tolerate 1 set x 15 reps b ue rom exercise with min resistance  2. s with ue dressing  3. s with bsc t/f's          Jennifer Mathews   MRN: [id number]                             Plan:  Patient to be seen 3 x/week to address the above listed problems via self-care/home management, therapeutic activities, therapeutic exercises  Plan of Care expires: 04/19/18  Plan of Care reviewed with: patient    OT G-codes  Functional Assessment Tool Used: [place] AM-PAC  Score: 20  Functional Limitation: Self care  Self Care Current Status (): CK  Self Care Goal Status (): LUIS Masters, OT  04/13/2018

## 2018-04-13 NOTE — PROGRESS NOTES
Home Oxygen Evaluation    Date Performed: 4/13/2018    1) Patient's Home O2 Sat on room air, while at rest: 88%         If O2 sats on room air at rest are 88% or below, patient qualifies. No additional testing needed. Document N/A in steps 2 and 3. If 89% or above, complete steps 2.      2) Patient's O2 Sat on room air while exercising: N/A        If O2 sats on room air while exercising remain 89% or above patient does not qualify, no further testing needed Document N/A in step 3. If O2 sats on room air while exercising are 88% or below, continue to step 3.      3) Patient's O2 Sat while exercising on O2: N/A at N/A LPM         (Must show improvement from #2 for patients to qualify)    If O2 sats improve on oxygen, patient qualifies for portable oxygen. If not, the patient does not qualify.

## 2018-04-13 NOTE — PLAN OF CARE
Problem: Patient Care Overview  Goal: Plan of Care Review  Outcome: Ongoing (interventions implemented as appropriate)  Pt AAO x 3.  VSS.  Pt remained afebrile throughout this shift.   Pt remained free of falls this shift.   Pt had no c/o pain this shift.  Plan of care reviewed. Patient verbalizes understanding.   Pt moving/turing independently. Frequent weight shifting encouraged.  Pt has Avasys in room.   Pt participated with PT/OT today.   Patient paced on monitor.   Bed low, side rails up x 2, wheels locked, call light in reach.   Patient instructed to call for assistance.   Hourly rounding completed.   Will continue to monitor.

## 2018-04-13 NOTE — ASSESSMENT & PLAN NOTE
4/11/2018 -    continue supplemental oxygen   follow up shows bilateral pneumonia   clinically improved , deesculate abx

## 2018-04-13 NOTE — PLAN OF CARE
Problem: Occupational Therapy Goal  Goal: Occupational Therapy Goal  Occupational Therapy   Goals to be met by: 4-20-18  1. Pt will tolerate 1 set x 15 reps b ue rom exercise with min resistance  2. s with ue dressing  3. s with bsc t/f's          Jennifer LUX Ransom   MRN: [id number]            Outcome: Ongoing (interventions implemented as appropriate)  PT BED MOBILITY MIN A. STOOD MIN A WITH RW AND AMBULATED X40 FEET MIN A. PT LEFT SEATED UP IN CHAIR WITH ALL NEEDS MET.

## 2018-04-13 NOTE — PROGRESS NOTES
Clinical Pharmacy: Coumadin Progress Note    Today's INR = 1.7 (up from 1.6)   Goal INR = 2.0-3.0  Indication: Afib     Patient will receive a Coumadin 5mg dose today and then will be continued on Coumadin 2.5mg daily. Patient is receiving therapeutic Lovenox as a bridge until INR is therapeutic.   Daily PT/INR will be monitored and dose adjustments will be made accordingly.   Patient has been educated.     Thank you for allowing us to participate in this patient's care.   Isidra Lindsey, PharmD 4/13/2018 1:47 PM

## 2018-04-13 NOTE — PLAN OF CARE
Problem: Physical Therapy Goal  Goal: Physical Therapy Goal  Goals to be met by: 18     Patient will increase functional independence with mobility by performin. Supine to sit with Stand-by Assistance  2. Sit to stand transfer with Stand-by Assistance  3. Bed to chair transfer with Stand-by Assistance using Rolling Walker  4. Gait  2x 100 feet with Stand-by Assistance using Rolling Walker.   5. Lower extremity exercise program x20 reps per handout, with supervision     Outcome: Ongoing (interventions implemented as appropriate)  Pt ambulated 50 FEET WITH RW, SLOW PACE, NO SOB

## 2018-04-13 NOTE — PLAN OF CARE
Problem: Patient Care Overview  Goal: Plan of Care Review  Outcome: Ongoing (interventions implemented as appropriate)  The patient has been av paced on the monitor. CDIFF negative, discontinued contact precautions. Pt has had diarrhea several times that is watery in consistency and green. Pt is resting quietly, will continue to monitor.

## 2018-04-13 NOTE — PROGRESS NOTES
[facility] Hospital Medicine  Progress Note    Patient Name: Jennifer Mathews  MRN: [id number]  Patient Class: IP- Inpatient   Admission Date: 4/10/2018  Length of Stay: 3 days  Attending Physician: Salina Crandall MD  Primary Care Provider: Desirae Bello MD        Subjective:     Principal Problem:Pneumonia of right upper lobe due to infectious organism    HPI:  Jennifer Mathews is an 85 year old female with a history of mitral valve replacement, CHF and CKD III complaining of fever that started 3 days ago. She denies associated symptoms and reports that she felt great prior to the onset of fever. The patient does reports chronic bilateral lower extremity edema that is unchanged. She denies cough, SOB, chills, nausea and vomiting. In the ED, the patient was found to have a WBC count of 19,320 with a left shift, a lactic acid of 1.8, an elevated troponin of 0.119, a creatinine of 1.6 and a procalcitonin of 2.16. Code status was discussed with the patient. She if a DNR. Her daughter, Eri Garnett, is her medical power of .     Hospital Course:  4/11/2018 - sob improving, fever 101 overnight, white count down from 19 to 12, hemoglobin decreased to 7.9, will follow hemoccult secondary to recent GI bleed, pt developed diarrhea overnight, no hx of c.diff, follow up results and tx symptoms prn, urine output adequate, ivf d/c, will watch for s/s of heart failure exacerbation, follow blood and sputum cultures  4/12/18- diarrhea improved continued contact isolation for probable cdiff                 Urinalysis -Ecoli                  Follow up CXR -b/l pneumonia                  BC-NGTD                  Mechanical mitral valve with subtherapeutic INR 1.7 continue  Lovenox bridge for goal of 3.0                  Continue ASA    Interval History:     Review of Systems   Constitutional: Negative.    HENT: Negative.    Eyes: Negative.    Respiratory: Positive for cough, chest tightness and  shortness of breath. Negative for choking.    Cardiovascular: Negative.    Gastrointestinal: Positive for diarrhea. Negative for abdominal distention, abdominal pain, anal bleeding, blood in stool, constipation, nausea, rectal pain and vomiting.   Endocrine: Negative.    Genitourinary: Negative.    Musculoskeletal: Negative.    Skin: Negative.    Allergic/Immunologic: Negative.    Neurological: Negative.    Hematological: Negative.    Psychiatric/Behavioral: Negative.      Objective:     Vital Signs (Most Recent):  Temp: 98.5 °F (36.9 °C) (04/13/18 0735)  Pulse: 70 (04/13/18 0945)  Resp: 20 (04/13/18 0735)  BP: (!) 147/66 (04/13/18 0735)  SpO2: (!) 93 % (04/13/18 1031) Vital Signs (24h Range):  Temp:  [98 °F (36.7 °C)-99 °F (37.2 °C)] 98.5 °F (36.9 °C)  Pulse:  [69-78] 70  Resp:  [18-20] 20  SpO2:  [92 %-96 %] 93 %  BP: (122-159)/(60-70) 147/66     Weight: 58.1 kg (128 lb 1.4 oz)  Body mass index is 23.43 kg/m².    Intake/Output Summary (Last 24 hours) at 04/13/18 1058  Last data filed at 04/13/18 0500   Gross per 24 hour   Intake              100 ml   Output                0 ml   Net              100 ml      Physical Exam   Constitutional: She is oriented to person, place, and time. She appears well-developed and well-nourished.   HENT:   Head: Normocephalic and atraumatic.   Eyes: Conjunctivae and EOM are normal. Pupils are equal, round, and reactive to light.   Neck: Normal range of motion. Neck supple. No JVD present.   Cardiovascular: Regular rhythm and intact distal pulses.    Pulmonary/Chest: Effort normal.   BS decreased at the bases.   Abdominal: Soft. Bowel sounds are normal. She exhibits no distension. There is no tenderness. There is no guarding.   Musculoskeletal: Normal range of motion. She exhibits no edema or tenderness.   Neurological: She is alert and oriented to person, place, and time. She displays normal reflexes. No cranial nerve deficit or sensory deficit. She exhibits normal muscle tone.  Coordination normal.   Skin: Skin is warm and dry.   Psychiatric: She has a normal mood and affect. Her behavior is normal. Judgment and thought content normal.     Interval History:     Review of Systems   Constitutional: Negative.    HENT: Negative.    Eyes: Negative.    Respiratory: Positive for cough, chest tightness and shortness of breath. Negative for choking.    Cardiovascular: Negative.    Gastrointestinal: Positive for diarrhea. Negative for abdominal distention, abdominal pain, anal bleeding, blood in stool, constipation, nausea, rectal pain and vomiting.   Endocrine: Negative.    Genitourinary: Negative.    Musculoskeletal: Negative.    Skin: Negative.    Allergic/Immunologic: Negative.    Neurological: Negative.    Hematological: Negative.    Psychiatric/Behavioral: Negative.      Objective:     Vital Signs (Most Recent):  Temp: 98.5 °F (36.9 °C) (04/13/18 0735)  Pulse: 70 (04/13/18 0945)  Resp: 20 (04/13/18 0735)  BP: (!) 147/66 (04/13/18 0735)  SpO2: (!) 93 % (04/13/18 1031) Vital Signs (24h Range):  Temp:  [98 °F (36.7 °C)-99 °F (37.2 °C)] 98.5 °F (36.9 °C)  Pulse:  [69-78] 70  Resp:  [18-20] 20  SpO2:  [92 %-96 %] 93 %  BP: (122-159)/(60-70) 147/66     Weight: 58.1 kg (128 lb 1.4 oz)  Body mass index is 23.43 kg/m².    Intake/Output Summary (Last 24 hours) at 04/13/18 1059  Last data filed at 04/13/18 0500   Gross per 24 hour   Intake              100 ml   Output                0 ml   Net              100 ml      Physical Exam   Constitutional: She is oriented to person, place, and time. She appears well-developed and well-nourished.   HENT:   Head: Normocephalic and atraumatic.   Cardiovascular: Regular rhythm and intact distal pulses.    Pulmonary/Chest: Effort normal.   BS decreased at the bases.   Abdominal: Soft. Bowel sounds are normal. She exhibits no distension. There is no tenderness. There is no guarding.   Musculoskeletal: Normal range of motion. She exhibits no edema or tenderness.    Neurological: She is alert and oriented to person, place, and time. She displays normal reflexes. No cranial nerve deficit or sensory deficit. She exhibits normal muscle tone. Coordination normal.   Skin: Skin is warm and dry.   Psychiatric: She has a normal mood and affect. Her behavior is normal. Judgment and thought content normal.       Significant Labs:   BMP:     Recent Labs  Lab 04/13/18  0438     100     140   K 3.6  3.6     108   CO2 24  24   BUN 36*  36*   CREATININE 0.9  0.9   CALCIUM 8.0*  8.0*   MG 2.0     CBC:     Recent Labs  Lab 04/11/18  1407 04/12/18  1410 04/13/18  0438   WBC  --  8.84 7.51   HGB 7.9* 8.5* 8.0*   HCT 27.2* 29.6* 27.9*   PLT  --  213 215       Significant Imaging:     Significant Labs:   BMP:     Recent Labs  Lab 04/13/18  0438     100     140   K 3.6  3.6     108   CO2 24  24   BUN 36*  36*   CREATININE 0.9  0.9   CALCIUM 8.0*  8.0*   MG 2.0     CBC:     Recent Labs  Lab 04/11/18  1407 04/12/18  1410 04/13/18  0438   WBC  --  8.84 7.51   HGB 7.9* 8.5* 8.0*   HCT 27.2* 29.6* 27.9*   PLT  --  213 215       Significant Imaging:     Assessment/Plan:      * Pneumonia of right upper lobe due to infectious organism with sepsis    -Possibly due to aspiration.   -IV antibiotics, inhaled medications and oxygen therapy.   -Check sputum culture.   -Speech therapy evaluation.           Acute blood loss anemia      4/11/2018 - etiology unclear, pt hospitalized 6 months prior due to gi bleed, will follow h/h and hemoccult        Diarrhea    4/11/2018 - likely side effect from antibx, c.diff  -neg         Acquired hypothyroidism    Continue Synthroid.           Atrial fibrillation    -Rate controlled.   -Continue digoxin, amiodarone and Coreg.   -Follow up INR.           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           Pneumonia    4/11/2018 -    continue supplemental oxygen   follow up  shows bilateral pneumonia   clinically improved , deesculate abx        Elevated troponin    -Most likely associated with sepsis and CKD.   -Trend troponins.   -ASA daily.   -Continue valsartan and Coreg.           CKD (chronic kidney disease) stage 3, GFR 30-59 ml/min    -Creatinine at baseline.   -Monitor.           S/P MVR (mitral valve replacement)    INR 1.7   lovenox bridge for goal INR 3.0  Continue ASA          Hypertension    Continue Coreg, lasix and valsartan.           Chronic combined systolic and diastolic congestive heart failure     Compensated   Continue Coreg, Lasix and Entresto.              LEV (obstructive sleep apnea)    CPAP QHS.             VTE Risk Mitigation         Ordered     warfarin (COUMADIN) tablet 2.5 mg  Daily     Route:  Oral        04/13/18 0857     warfarin (COUMADIN) tablet 5 mg  Once     Route:  Oral        04/13/18 0842     enoxaparin injection 60 mg  Every 12 hours     Route:  Subcutaneous        04/13/18 0841              Salina Crandall MD  Department of Hospital Medicine   [facility]

## 2018-04-14 LAB
ANION GAP SERPL CALC-SCNC: 8 MMOL/L
ANION GAP SERPL CALC-SCNC: 8 MMOL/L
BACTERIA SPEC AEROBE CULT: NORMAL
BASOPHILS # BLD AUTO: 0.02 K/UL
BASOPHILS NFR BLD: 0.3 %
BUN SERPL-MCNC: 32 MG/DL
BUN SERPL-MCNC: 32 MG/DL
CALCIUM SERPL-MCNC: 7.8 MG/DL
CALCIUM SERPL-MCNC: 7.8 MG/DL
CHLORIDE SERPL-SCNC: 111 MMOL/L
CHLORIDE SERPL-SCNC: 111 MMOL/L
CO2 SERPL-SCNC: 24 MMOL/L
CO2 SERPL-SCNC: 24 MMOL/L
CREAT SERPL-MCNC: 0.9 MG/DL
CREAT SERPL-MCNC: 0.9 MG/DL
DIFFERENTIAL METHOD: ABNORMAL
EOSINOPHIL # BLD AUTO: 0.2 K/UL
EOSINOPHIL NFR BLD: 3 %
ERYTHROCYTE [DISTWIDTH] IN BLOOD BY AUTOMATED COUNT: 20.9 %
EST. GFR  (AFRICAN AMERICAN): >60 ML/MIN/1.73 M^2
EST. GFR  (AFRICAN AMERICAN): >60 ML/MIN/1.73 M^2
EST. GFR  (NON AFRICAN AMERICAN): 58 ML/MIN/1.73 M^2
EST. GFR  (NON AFRICAN AMERICAN): 58 ML/MIN/1.73 M^2
GLUCOSE SERPL-MCNC: 105 MG/DL
GLUCOSE SERPL-MCNC: 105 MG/DL
GRAM STN SPEC: NORMAL
HCT VFR BLD AUTO: 27.5 %
HGB BLD-MCNC: 7.9 G/DL
INR PPP: 2.4
LYMPHOCYTES # BLD AUTO: 1.1 K/UL
LYMPHOCYTES NFR BLD: 17.4 %
MAGNESIUM SERPL-MCNC: 1.9 MG/DL
MCH RBC QN AUTO: 24.4 PG
MCHC RBC AUTO-ENTMCNC: 28.7 G/DL
MCV RBC AUTO: 85 FL
MONOCYTES # BLD AUTO: 0.7 K/UL
MONOCYTES NFR BLD: 10.3 %
NEUTROPHILS # BLD AUTO: 4.4 K/UL
NEUTROPHILS NFR BLD: 70 %
PHOSPHATE SERPL-MCNC: 1.8 MG/DL
PLATELET # BLD AUTO: 216 K/UL
PMV BLD AUTO: 10.3 FL
POTASSIUM SERPL-SCNC: 3.3 MMOL/L
POTASSIUM SERPL-SCNC: 3.3 MMOL/L
PROTHROMBIN TIME: 24.8 SEC
RBC # BLD AUTO: 3.24 M/UL
SODIUM SERPL-SCNC: 143 MMOL/L
SODIUM SERPL-SCNC: 143 MMOL/L
WBC # BLD AUTO: 6.31 K/UL

## 2018-04-14 PROCEDURE — 25000003 PHARM REV CODE 250: Performed by: FAMILY MEDICINE

## 2018-04-14 PROCEDURE — 27000221 HC OXYGEN, UP TO 24 HOURS

## 2018-04-14 PROCEDURE — 96372 THER/PROPH/DIAG INJ SC/IM: CPT

## 2018-04-14 PROCEDURE — 94640 AIRWAY INHALATION TREATMENT: CPT

## 2018-04-14 PROCEDURE — 36415 COLL VENOUS BLD VENIPUNCTURE: CPT

## 2018-04-14 PROCEDURE — 85025 COMPLETE CBC W/AUTO DIFF WBC: CPT

## 2018-04-14 PROCEDURE — 85610 PROTHROMBIN TIME: CPT

## 2018-04-14 PROCEDURE — 84100 ASSAY OF PHOSPHORUS: CPT

## 2018-04-14 PROCEDURE — 97530 THERAPEUTIC ACTIVITIES: CPT

## 2018-04-14 PROCEDURE — 94761 N-INVAS EAR/PLS OXIMETRY MLT: CPT

## 2018-04-14 PROCEDURE — 63600175 PHARM REV CODE 636 W HCPCS: Performed by: INTERNAL MEDICINE

## 2018-04-14 PROCEDURE — 25000003 PHARM REV CODE 250: Performed by: PHYSICIAN ASSISTANT

## 2018-04-14 PROCEDURE — 25000242 PHARM REV CODE 250 ALT 637 W/ HCPCS: Performed by: PHYSICIAN ASSISTANT

## 2018-04-14 PROCEDURE — 21400001 HC TELEMETRY ROOM

## 2018-04-14 PROCEDURE — 80048 BASIC METABOLIC PNL TOTAL CA: CPT

## 2018-04-14 PROCEDURE — 25000003 PHARM REV CODE 250: Performed by: INTERNAL MEDICINE

## 2018-04-14 PROCEDURE — 83735 ASSAY OF MAGNESIUM: CPT

## 2018-04-14 PROCEDURE — 97116 GAIT TRAINING THERAPY: CPT

## 2018-04-14 RX ORDER — WARFARIN 2.5 MG/1
2.5 TABLET ORAL ONCE
Status: DISCONTINUED | OUTPATIENT
Start: 2018-04-15 | End: 2018-04-15

## 2018-04-14 RX ORDER — POTASSIUM CHLORIDE 20 MEQ/1
40 TABLET, EXTENDED RELEASE ORAL ONCE
Status: COMPLETED | OUTPATIENT
Start: 2018-04-14 | End: 2018-04-14

## 2018-04-14 RX ORDER — WARFARIN 1 MG/1
1 TABLET ORAL ONCE
Status: COMPLETED | OUTPATIENT
Start: 2018-04-14 | End: 2018-04-14

## 2018-04-14 RX ADMIN — WARFARIN SODIUM 1 MG: 1 TABLET ORAL at 05:04

## 2018-04-14 RX ADMIN — IPRATROPIUM BROMIDE AND ALBUTEROL SULFATE 3 ML: .5; 3 SOLUTION RESPIRATORY (INHALATION) at 01:04

## 2018-04-14 RX ADMIN — ASPIRIN 81 MG: 81 TABLET, COATED ORAL at 08:04

## 2018-04-14 RX ADMIN — SACUBITRIL AND VALSARTAN 1 TABLET: 49; 51 TABLET, FILM COATED ORAL at 08:04

## 2018-04-14 RX ADMIN — DOXYCYCLINE HYCLATE 100 MG: 100 TABLET, COATED ORAL at 08:04

## 2018-04-14 RX ADMIN — LEVOTHYROXINE SODIUM 50 MCG: 50 TABLET ORAL at 08:04

## 2018-04-14 RX ADMIN — IPRATROPIUM BROMIDE AND ALBUTEROL SULFATE 3 ML: .5; 3 SOLUTION RESPIRATORY (INHALATION) at 07:04

## 2018-04-14 RX ADMIN — GABAPENTIN 100 MG: 100 CAPSULE ORAL at 08:04

## 2018-04-14 RX ADMIN — CARVEDILOL 25 MG: 12.5 TABLET, FILM COATED ORAL at 05:04

## 2018-04-14 RX ADMIN — FUROSEMIDE 40 MG: 40 TABLET ORAL at 08:04

## 2018-04-14 RX ADMIN — POTASSIUM CHLORIDE 40 MEQ: 1500 TABLET, EXTENDED RELEASE ORAL at 11:04

## 2018-04-14 RX ADMIN — AMIODARONE HYDROCHLORIDE 200 MG: 200 TABLET ORAL at 08:04

## 2018-04-14 RX ADMIN — FAMOTIDINE 20 MG: 20 TABLET, FILM COATED ORAL at 08:04

## 2018-04-14 RX ADMIN — CARVEDILOL 25 MG: 12.5 TABLET, FILM COATED ORAL at 08:04

## 2018-04-14 RX ADMIN — ENOXAPARIN SODIUM 60 MG: 60 INJECTION, SOLUTION INTRAVENOUS; SUBCUTANEOUS at 08:04

## 2018-04-14 RX ADMIN — SACUBITRIL AND VALSARTAN 1 TABLET: 49; 51 TABLET, FILM COATED ORAL at 09:04

## 2018-04-14 RX ADMIN — GABAPENTIN 100 MG: 100 CAPSULE ORAL at 09:04

## 2018-04-14 NOTE — PLAN OF CARE
Problem: Physical Therapy Goal  Goal: Physical Therapy Goal  Goals to be met by: 18     Patient will increase functional independence with mobility by performin. Supine to sit with Stand-by Assistance  2. Sit to stand transfer with Stand-by Assistance  3. Bed to chair transfer with Stand-by Assistance using Rolling Walker  4. Gait  2x 100 feet with Stand-by Assistance using Rolling Walker.   5. Lower extremity exercise program x20 reps per handout, with supervision     Outcome: Ongoing (interventions implemented as appropriate)  PT with improved endurance and gait safety. Pt met goal for gait today

## 2018-04-14 NOTE — PROGRESS NOTES
[facility] Hospital Medicine  Progress Note    Patient Name: Jennifer Mathews  MRN: [id number]  Patient Class: IP- Inpatient   Admission Date: 4/10/2018  Length of Stay: 4 days  Attending Physician: Salina Crandall MD  Primary Care Provider: Desirae Bello MD        Subjective:     Principal Problem:Pneumonia of right upper lobe due to infectious organism    HPI:  Jennifer Mathews is an 85 year old female with a history of mitral valve replacement, CHF and CKD III complaining of fever that started 3 days ago. She denies associated symptoms and reports that she felt great prior to the onset of fever. The patient does reports chronic bilateral lower extremity edema that is unchanged. She denies cough, SOB, chills, nausea and vomiting. In the ED, the patient was found to have a WBC count of 19,320 with a left shift, a lactic acid of 1.8, an elevated troponin of 0.119, a creatinine of 1.6 and a procalcitonin of 2.16. Code status was discussed with the patient. She if a DNR. Her daughter, Eri Garnett, is her medical power of .     Hospital Course:  4/11/2018 - sob improving, fever 101 overnight, white count down from 19 to 12, hemoglobin decreased to 7.9, will follow hemoccult secondary to recent GI bleed, pt developed diarrhea overnight, no hx of c.diff, follow up results and tx symptoms prn, urine output adequate, ivf d/c, will watch for s/s of heart failure exacerbation, follow blood and sputum cultures  4/12/18- diarrhea improved continued contact isolation for probable cdiff                 Urinalysis -Ecoli                  Follow up CXR -b/l pneumonia                  BC-NGTD                 mitral valve with  INR 2.4  continue  Lovenox bridge for goal of 3.0                  Continue ASA  Patient doesn't have a mechanical valve -discontinue Lovenox for goal of 2.0-30.    No new subjective & objective note has been filed under this hospital service since the last note was  generated.    Assessment/Plan:      * Pneumonia of right upper lobe due to infectious organism with sepsis    -Possibly due to aspiration.   -IV antibiotics, inhaled medications and oxygen therapy.           Acute blood loss anemia      4/11/2018 - etiology unclear, pt hospitalized 6 months prior due to gi bleed        Diarrhea    4/11/2018 - likely side effect from antibx, c.diff  -neg         Acquired hypothyroidism    Continue Synthroid.           Atrial fibrillation    -Rate controlled.   -Continue digoxin, amiodarone and Coreg.   -Follow up INR.           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           Pneumonia    4/11/2018 -    continue supplemental oxygen   follow up shows bilateral pneumonia   clinically improved , deesculate abx        Elevated troponin    -Most likely associated with sepsis and CKD.   -Trend troponins.   -ASA daily.   -Continue valsartan and Coreg.           CKD (chronic kidney disease) stage 3, GFR 30-59 ml/min    -Creatinine at baseline.   -Monitor.           S/P MVR (mitral valve replacement)    INR 2.4   lovenox bridge for goal INR 3.0  Continue ASA          Hypertension    Continue Coreg, lasix and valsartan.           Chronic combined systolic and diastolic congestive heart failure     Compensated   Continue Coreg, Lasix and Entresto.              LEV (obstructive sleep apnea)    CPAP QHS.             VTE Risk Mitigation         Ordered     warfarin (COUMADIN) tablet 2.5 mg  Once     Route:  Oral        04/14/18 0924     warfarin (COUMADIN) tablet 1 mg  Once     Route:  Oral        04/14/18 1213              Salina Crandall MD  Department of Hospital Medicine   [facility]

## 2018-04-14 NOTE — SUBJECTIVE & OBJECTIVE
Interval History:     Review of Systems   Constitutional: Negative.    HENT: Negative.    Eyes: Negative.    Respiratory: Positive for cough, chest tightness and shortness of breath. Negative for choking.    Cardiovascular: Negative.    Gastrointestinal: Positive for diarrhea. Negative for abdominal distention, abdominal pain, anal bleeding, blood in stool, constipation, nausea, rectal pain and vomiting.   Endocrine: Negative.    Genitourinary: Negative.    Musculoskeletal: Negative.    Skin: Negative.    Allergic/Immunologic: Negative.    Neurological: Negative.    Hematological: Negative.    Psychiatric/Behavioral: Negative.      Objective:     Vital Signs (Most Recent):  Temp: 98.1 °F (36.7 °C) (04/14/18 1109)  Pulse: 70 (04/14/18 1109)  Resp: 20 (04/14/18 1109)  BP: (!) 155/69 (04/14/18 1109)  SpO2: 99 % (04/14/18 1109) Vital Signs (24h Range):  Temp:  [97.6 °F (36.4 °C)-98.4 °F (36.9 °C)] 98.1 °F (36.7 °C)  Pulse:  [69-70] 70  Resp:  [18-20] 20  SpO2:  [94 %-99 %] 99 %  BP: (120-173)/(57-71) 155/69     Weight: 58.3 kg (128 lb 8.5 oz)  Body mass index is 23.51 kg/m².    Intake/Output Summary (Last 24 hours) at 04/14/18 1200  Last data filed at 04/13/18 1300   Gross per 24 hour   Intake              240 ml   Output                0 ml   Net              240 ml      Physical Exam   Constitutional: She is oriented to person, place, and time. She appears well-developed and well-nourished.   HENT:   Head: Normocephalic and atraumatic.   Eyes: Conjunctivae and EOM are normal. Pupils are equal, round, and reactive to light.   Neck: Normal range of motion. Neck supple. No JVD present.   Cardiovascular: Regular rhythm and intact distal pulses.    Pulmonary/Chest: Effort normal.   BS decreased at the bases.   Abdominal: Soft. Bowel sounds are normal. She exhibits no distension. There is no tenderness. There is no guarding.   Musculoskeletal: Normal range of motion. She exhibits no edema or tenderness.   Neurological: She  is alert and oriented to person, place, and time. She displays normal reflexes. No cranial nerve deficit or sensory deficit. She exhibits normal muscle tone. Coordination normal.   Skin: Skin is warm and dry.   Psychiatric: She has a normal mood and affect. Her behavior is normal. Judgment and thought content normal.     Interval History:     Review of Systems   Constitutional: Negative.    HENT: Negative.    Eyes: Negative.    Respiratory: Positive for cough, chest tightness and shortness of breath. Negative for choking.    Cardiovascular: Negative.    Gastrointestinal: Positive for diarrhea. Negative for abdominal distention, abdominal pain, anal bleeding, blood in stool, constipation, nausea, rectal pain and vomiting.   Endocrine: Negative.    Genitourinary: Negative.    Musculoskeletal: Negative.    Skin: Negative.    Allergic/Immunologic: Negative.    Neurological: Negative.    Hematological: Negative.    Psychiatric/Behavioral: Negative.      Objective:     Vital Signs (Most Recent):  Temp: 98.1 °F (36.7 °C) (04/14/18 1109)  Pulse: 70 (04/14/18 1109)  Resp: 20 (04/14/18 1109)  BP: (!) 155/69 (04/14/18 1109)  SpO2: 99 % (04/14/18 1109) Vital Signs (24h Range):  Temp:  [97.6 °F (36.4 °C)-98.4 °F (36.9 °C)] 98.1 °F (36.7 °C)  Pulse:  [69-70] 70  Resp:  [18-20] 20  SpO2:  [94 %-99 %] 99 %  BP: (120-173)/(57-71) 155/69     Weight: 58.3 kg (128 lb 8.5 oz)  Body mass index is 23.51 kg/m².    Intake/Output Summary (Last 24 hours) at 04/14/18 1200  Last data filed at 04/13/18 1300   Gross per 24 hour   Intake              240 ml   Output                0 ml   Net              240 ml      Physical Exam   Constitutional: She is oriented to person, place, and time. She appears well-developed and well-nourished.   HENT:   Head: Normocephalic and atraumatic.   Cardiovascular: Regular rhythm and intact distal pulses.    Pulmonary/Chest: Effort normal.   BS decreased at the bases.   Abdominal: Soft. Bowel sounds are normal.  She exhibits no distension. There is no tenderness. There is no guarding.   Musculoskeletal: Normal range of motion. She exhibits no edema or tenderness.   Neurological: She is alert and oriented to person, place, and time. She displays normal reflexes. No cranial nerve deficit or sensory deficit. She exhibits normal muscle tone. Coordination normal.   Skin: Skin is warm and dry.   Psychiatric: She has a normal mood and affect. Her behavior is normal. Judgment and thought content normal.       Significant Labs:   BMP:     Recent Labs  Lab 04/14/18  0525     105     143   K 3.3*  3.3*   *  111*   CO2 24  24   BUN 32*  32*   CREATININE 0.9  0.9   CALCIUM 7.8*  7.8*   MG 1.9     CBC:     Recent Labs  Lab 04/12/18  1410 04/13/18  0438 04/14/18  0622   WBC 8.84 7.51 6.31   HGB 8.5* 8.0* 7.9*   HCT 29.6* 27.9* 27.5*    215 216       Significant Imaging:     Significant Labs:   BMP:     Recent Labs  Lab 04/14/18  0525     105     143   K 3.3*  3.3*   *  111*   CO2 24  24   BUN 32*  32*   CREATININE 0.9  0.9   CALCIUM 7.8*  7.8*   MG 1.9     CBC:     Recent Labs  Lab 04/12/18  1410 04/13/18  0438 04/14/18  0622   WBC 8.84 7.51 6.31   HGB 8.5* 8.0* 7.9*   HCT 29.6* 27.9* 27.5*    215 216       Significant Imaging: Interval History:     Review of Systems   Constitutional: Negative.    HENT: Negative.    Eyes: Negative.    Respiratory: Positive for cough, chest tightness and shortness of breath. Negative for choking.    Cardiovascular: Negative.    Gastrointestinal: Positive for diarrhea. Negative for abdominal distention, abdominal pain, anal bleeding, blood in stool, constipation, nausea, rectal pain and vomiting.   Endocrine: Negative.    Genitourinary: Negative.    Musculoskeletal: Negative.    Skin: Negative.    Allergic/Immunologic: Negative.    Neurological: Negative.    Hematological: Negative.    Psychiatric/Behavioral: Negative.      Objective:      Vital Signs (Most Recent):  Temp: 98.1 °F (36.7 °C) (04/14/18 1109)  Pulse: 70 (04/14/18 1109)  Resp: 20 (04/14/18 1109)  BP: (!) 155/69 (04/14/18 1109)  SpO2: 99 % (04/14/18 1109) Vital Signs (24h Range):  Temp:  [97.6 °F (36.4 °C)-98.4 °F (36.9 °C)] 98.1 °F (36.7 °C)  Pulse:  [69-70] 70  Resp:  [18-20] 20  SpO2:  [94 %-99 %] 99 %  BP: (120-173)/(57-71) 155/69     Weight: 58.3 kg (128 lb 8.5 oz)  Body mass index is 23.51 kg/m².    Intake/Output Summary (Last 24 hours) at 04/14/18 1159  Last data filed at 04/13/18 1300   Gross per 24 hour   Intake              240 ml   Output                0 ml   Net              240 ml      Physical Exam   Constitutional: She is oriented to person, place, and time. She appears well-developed and well-nourished.   HENT:   Head: Normocephalic and atraumatic.   Eyes: Conjunctivae and EOM are normal. Pupils are equal, round, and reactive to light.   Neck: Normal range of motion. Neck supple. No JVD present.   Cardiovascular: Regular rhythm and intact distal pulses.    Pulmonary/Chest: Effort normal.   BS decreased at the bases.   Abdominal: Soft. Bowel sounds are normal. She exhibits no distension. There is no tenderness. There is no guarding.   Musculoskeletal: Normal range of motion. She exhibits no edema or tenderness.   Neurological: She is alert and oriented to person, place, and time. She displays normal reflexes. No cranial nerve deficit or sensory deficit. She exhibits normal muscle tone. Coordination normal.   Skin: Skin is warm and dry.   Psychiatric: She has a normal mood and affect. Her behavior is normal. Judgment and thought content normal.     Interval History:     Review of Systems   Constitutional: Negative.    HENT: Negative.    Eyes: Negative.    Respiratory: Positive for cough, chest tightness and shortness of breath. Negative for choking.    Cardiovascular: Negative.    Gastrointestinal: Positive for diarrhea. Negative for abdominal distention, abdominal pain,  anal bleeding, blood in stool, constipation, nausea, rectal pain and vomiting.   Endocrine: Negative.    Genitourinary: Negative.    Musculoskeletal: Negative.    Skin: Negative.    Allergic/Immunologic: Negative.    Neurological: Negative.    Hematological: Negative.    Psychiatric/Behavioral: Negative.      Objective:     Vital Signs (Most Recent):  Temp: 98.1 °F (36.7 °C) (04/14/18 1109)  Pulse: 70 (04/14/18 1109)  Resp: 20 (04/14/18 1109)  BP: (!) 155/69 (04/14/18 1109)  SpO2: 99 % (04/14/18 1109) Vital Signs (24h Range):  Temp:  [97.6 °F (36.4 °C)-98.4 °F (36.9 °C)] 98.1 °F (36.7 °C)  Pulse:  [69-70] 70  Resp:  [18-20] 20  SpO2:  [94 %-99 %] 99 %  BP: (120-173)/(57-71) 155/69     Weight: 58.3 kg (128 lb 8.5 oz)  Body mass index is 23.51 kg/m².    Intake/Output Summary (Last 24 hours) at 04/14/18 1159  Last data filed at 04/13/18 1300   Gross per 24 hour   Intake              240 ml   Output                0 ml   Net              240 ml      Physical Exam   Constitutional: She is oriented to person, place, and time. She appears well-developed and well-nourished.   HENT:   Head: Normocephalic and atraumatic.   Cardiovascular: Regular rhythm and intact distal pulses.    Pulmonary/Chest: Effort normal.   BS decreased at the bases.   Abdominal: Soft. Bowel sounds are normal. She exhibits no distension. There is no tenderness. There is no guarding.   Musculoskeletal: Normal range of motion. She exhibits no edema or tenderness.   Neurological: She is alert and oriented to person, place, and time. She displays normal reflexes. No cranial nerve deficit or sensory deficit. She exhibits normal muscle tone. Coordination normal.   Skin: Skin is warm and dry.   Psychiatric: She has a normal mood and affect. Her behavior is normal. Judgment and thought content normal.       Significant Labs:   BMP:     Recent Labs  Lab 04/14/18  0525     105     143   K 3.3*  3.3*   *  111*   CO2 24  24   BUN 32*  32*    CREATININE 0.9  0.9   CALCIUM 7.8*  7.8*   MG 1.9     CBC:     Recent Labs  Lab 04/12/18  1410 04/13/18  0438 04/14/18  0622   WBC 8.84 7.51 6.31   HGB 8.5* 8.0* 7.9*   HCT 29.6* 27.9* 27.5*    215 216       Significant Imaging:     Significant Labs:   BMP:     Recent Labs  Lab 04/14/18  0525     105     143   K 3.3*  3.3*   *  111*   CO2 24  24   BUN 32*  32*   CREATININE 0.9  0.9   CALCIUM 7.8*  7.8*   MG 1.9     CBC:     Recent Labs  Lab 04/12/18  1410 04/13/18  0438 04/14/18  0622   WBC 8.84 7.51 6.31   HGB 8.5* 8.0* 7.9*   HCT 29.6* 27.9* 27.5*    215 216       Significant Imaging:

## 2018-04-14 NOTE — PROGRESS NOTES
Coumadin Progress Notes:  INR today = 2.7 increased from 1.7 yesterday  Discussed with Dr. Crandall the goal INR and she confirmed goal to be 2.5-3.5 for Mechanical Heart valve   Will give a 1 mg dose today since INR increased from 1.7 yesterday and she is discontinuing the Lovenox bridge and the doxycline po which increases the INR considerably  Will continue to follow and monitor daily INR's and make adjustements accordingly/Florence Rawls [facility] 4/14/2018 11:53 AM

## 2018-04-14 NOTE — PT/OT/SLP PROGRESS
Physical Therapy Treatment    Patient Name:  Jennifer Mathews   MRN:  [id number]    Recommendations:     Discharge Recommendations:  home health PT   Discharge Equipment Recommendations:     Barriers to discharge: None    Assessment:     Jennifer Mathews is a 85 y.o. female admitted with a medical diagnosis of Pneumonia of right upper lobe due to infectious organism  Improved safety and endurance with gait training today.    Rehab Prognosis:  good; patient would benefit from acute skilled PT services to address these deficits and reach maximum level of function.      Recent Surgery: * No surgery found *      Plan:     During this hospitalization, patient to be seen 5 x/week to address the above listed problems via gait training, therapeutic activities, therapeutic exercises  · Plan of Care Expires:  04/19/18   Plan of Care Reviewed with: patient    Subjective     Communicated with [facility] and [facility] prior to session.  Patient found supine upon PT entry to room, agreeable to treatment.      Chief Complaint: none  Patient comments/goals: improve endurance  Pain/Comfort:  · Pain Rating 1: 0/10    Patients cultural, spiritual, Holiness conflicts given the current situation:      Objective:     Patient found with: oxygen, telemetry     General Precautions: Standard, fall   Orthopedic Precautions:N/A   Braces:       Functional Mobility:  · Bed Mobility:     · Supine to Sit: contact guard assistance  · Transfers:     · Sit to Stand:  contact guard assistance with rolling walker  · Gait: AMB 2 x 100 feet with RW with SBA.  Needed one rest break for LE fatigue.  Good safety demod      AM-PAC 6 CLICK MOBILITY  Turning over in bed (including adjusting bedclothes, sheets and blankets)?: 4  Sitting down on and standing up from a chair with arms (e.g., wheelchair, bedside commode, etc.): 3  Moving from lying on back to sitting on the side of the bed?: 4  Moving to and from a bed to a chair (including a wheelchair)?: 3  Need to walk  in hospital room?: 3  Climbing 3-5 steps with a railing?: 1  Total Score: 18       Therapeutic Activities and Exercises:   Sit to stand with CGA and verbal cues for positioning and forward trunk lean    Patient left up in chair with all lines intact, call button in reach and nursing notified..    GOALS:    Physical Therapy Goals        Problem: Physical Therapy Goal    Goal Priority Disciplines Outcome Goal Variances Interventions   Physical Therapy Goal     PT/OT, PT Ongoing (interventions implemented as appropriate)     Description:  Goals to be met by: 18     Patient will increase functional independence with mobility by performin. Supine to sit with Stand-by Assistance  2. Sit to stand transfer with Stand-by Assistance  3. Bed to chair transfer with Stand-by Assistance using Rolling Walker  4. Gait  2x 100 feet with Stand-by Assistance using Rolling Walker.   5. Lower extremity exercise program x20 reps per handout, with supervision                      Time Tracking:     PT Received On: 18  PT Start Time: 0900     PT Stop Time: 0925  PT Total Time (min): 25 min     Billable Minutes: Gait Training 15 and Therapeutic Activity 10    Treatment Type: Treatment  PT/PTA: PT           Moustapha Dawson, PT  2018

## 2018-04-14 NOTE — PLAN OF CARE
Problem: Patient Care Overview  Goal: Plan of Care Review  Outcome: Ongoing (interventions implemented as appropriate)  Plan of care reviewed and discussed with patient.  Safety and fall precautions in place.  Patient free from injury.  Avasys discontinued.  Oral hydration and ambulation promoted.  Denies pain.  Will continue to monitor.    12 hour chart check complete.

## 2018-04-14 NOTE — PROGRESS NOTES
[facility] Hospital Medicine  Progress Note    Patient Name: Jennifer Mathews  MRN: [id number]  Patient Class: IP- Inpatient   Admission Date: 4/10/2018  Length of Stay: 4 days  Attending Physician: Salina Crandall MD  Primary Care Provider: Desirae Bello MD        Subjective:     Principal Problem:Pneumonia of right upper lobe due to infectious organism    HPI:  Jennifer Mathews is an 85 year old female with a history of mitral valve replacement, CHF and CKD III complaining of fever that started 3 days ago. She denies associated symptoms and reports that she felt great prior to the onset of fever. The patient does reports chronic bilateral lower extremity edema that is unchanged. She denies cough, SOB, chills, nausea and vomiting. In the ED, the patient was found to have a WBC count of 19,320 with a left shift, a lactic acid of 1.8, an elevated troponin of 0.119, a creatinine of 1.6 and a procalcitonin of 2.16. Code status was discussed with the patient. She if a DNR. Her daughter, Eri Garnett, is her medical power of .     Hospital Course:  4/11/2018 - sob improving, fever 101 overnight, white count down from 19 to 12, hemoglobin decreased to 7.9, will follow hemoccult secondary to recent GI bleed, pt developed diarrhea overnight, no hx of c.diff, follow up results and tx symptoms prn, urine output adequate, ivf d/c, will watch for s/s of heart failure exacerbation, follow blood and sputum cultures  4/12/18- diarrhea improved continued contact isolation for probable cdiff                 Urinalysis -Ecoli                  Follow up CXR -b/l pneumonia                  BC-NGTD                  Mechanical mitral valve with  INR 2.4  continue  Lovenox bridge for goal of 3.0                  Continue ASA    Interval History:     Review of Systems   Constitutional: Negative.    HENT: Negative.    Eyes: Negative.    Respiratory: Positive for cough, chest tightness and shortness of breath.  Negative for choking.    Cardiovascular: Negative.    Gastrointestinal: Positive for diarrhea. Negative for abdominal distention, abdominal pain, anal bleeding, blood in stool, constipation, nausea, rectal pain and vomiting.   Endocrine: Negative.    Genitourinary: Negative.    Musculoskeletal: Negative.    Skin: Negative.    Allergic/Immunologic: Negative.    Neurological: Negative.    Hematological: Negative.    Psychiatric/Behavioral: Negative.      Objective:     Vital Signs (Most Recent):  Temp: 98.1 °F (36.7 °C) (04/14/18 1109)  Pulse: 70 (04/14/18 1109)  Resp: 20 (04/14/18 1109)  BP: (!) 155/69 (04/14/18 1109)  SpO2: 99 % (04/14/18 1109) Vital Signs (24h Range):  Temp:  [97.6 °F (36.4 °C)-98.4 °F (36.9 °C)] 98.1 °F (36.7 °C)  Pulse:  [69-70] 70  Resp:  [18-20] 20  SpO2:  [94 %-99 %] 99 %  BP: (120-173)/(57-71) 155/69     Weight: 58.3 kg (128 lb 8.5 oz)  Body mass index is 23.51 kg/m².    Intake/Output Summary (Last 24 hours) at 04/14/18 1200  Last data filed at 04/13/18 1300   Gross per 24 hour   Intake              240 ml   Output                0 ml   Net              240 ml      Physical Exam   Constitutional: She is oriented to person, place, and time. She appears well-developed and well-nourished.   HENT:   Head: Normocephalic and atraumatic.   Eyes: Conjunctivae and EOM are normal. Pupils are equal, round, and reactive to light.   Neck: Normal range of motion. Neck supple. No JVD present.   Cardiovascular: Regular rhythm and intact distal pulses.    Pulmonary/Chest: Effort normal.   BS decreased at the bases.   Abdominal: Soft. Bowel sounds are normal. She exhibits no distension. There is no tenderness. There is no guarding.   Musculoskeletal: Normal range of motion. She exhibits no edema or tenderness.   Neurological: She is alert and oriented to person, place, and time. She displays normal reflexes. No cranial nerve deficit or sensory deficit. She exhibits normal muscle tone. Coordination normal.    Skin: Skin is warm and dry.   Psychiatric: She has a normal mood and affect. Her behavior is normal. Judgment and thought content normal.     Interval History:     Review of Systems   Constitutional: Negative.    HENT: Negative.    Eyes: Negative.    Respiratory: Positive for cough, chest tightness and shortness of breath. Negative for choking.    Cardiovascular: Negative.    Gastrointestinal: Positive for diarrhea. Negative for abdominal distention, abdominal pain, anal bleeding, blood in stool, constipation, nausea, rectal pain and vomiting.   Endocrine: Negative.    Genitourinary: Negative.    Musculoskeletal: Negative.    Skin: Negative.    Allergic/Immunologic: Negative.    Neurological: Negative.    Hematological: Negative.    Psychiatric/Behavioral: Negative.      Objective:     Vital Signs (Most Recent):  Temp: 98.1 °F (36.7 °C) (04/14/18 1109)  Pulse: 70 (04/14/18 1109)  Resp: 20 (04/14/18 1109)  BP: (!) 155/69 (04/14/18 1109)  SpO2: 99 % (04/14/18 1109) Vital Signs (24h Range):  Temp:  [97.6 °F (36.4 °C)-98.4 °F (36.9 °C)] 98.1 °F (36.7 °C)  Pulse:  [69-70] 70  Resp:  [18-20] 20  SpO2:  [94 %-99 %] 99 %  BP: (120-173)/(57-71) 155/69     Weight: 58.3 kg (128 lb 8.5 oz)  Body mass index is 23.51 kg/m².    Intake/Output Summary (Last 24 hours) at 04/14/18 1200  Last data filed at 04/13/18 1300   Gross per 24 hour   Intake              240 ml   Output                0 ml   Net              240 ml      Physical Exam   Constitutional: She is oriented to person, place, and time. She appears well-developed and well-nourished.   HENT:   Head: Normocephalic and atraumatic.   Cardiovascular: Regular rhythm and intact distal pulses.    Pulmonary/Chest: Effort normal.   BS decreased at the bases.   Abdominal: Soft. Bowel sounds are normal. She exhibits no distension. There is no tenderness. There is no guarding.   Musculoskeletal: Normal range of motion. She exhibits no edema or tenderness.   Neurological: She is  alert and oriented to person, place, and time. She displays normal reflexes. No cranial nerve deficit or sensory deficit. She exhibits normal muscle tone. Coordination normal.   Skin: Skin is warm and dry.   Psychiatric: She has a normal mood and affect. Her behavior is normal. Judgment and thought content normal.       Significant Labs:   BMP:     Recent Labs  Lab 04/14/18  0525     105     143   K 3.3*  3.3*   *  111*   CO2 24  24   BUN 32*  32*   CREATININE 0.9  0.9   CALCIUM 7.8*  7.8*   MG 1.9     CBC:     Recent Labs  Lab 04/12/18  1410 04/13/18  0438 04/14/18  0622   WBC 8.84 7.51 6.31   HGB 8.5* 8.0* 7.9*   HCT 29.6* 27.9* 27.5*    215 216       Significant Imaging:     Significant Labs:   BMP:     Recent Labs  Lab 04/14/18  0525     105     143   K 3.3*  3.3*   *  111*   CO2 24  24   BUN 32*  32*   CREATININE 0.9  0.9   CALCIUM 7.8*  7.8*   MG 1.9     CBC:     Recent Labs  Lab 04/12/18  1410 04/13/18  0438 04/14/18  0622   WBC 8.84 7.51 6.31   HGB 8.5* 8.0* 7.9*   HCT 29.6* 27.9* 27.5*    215 216       Significant Imaging: Interval History:     Review of Systems   Constitutional: Negative.    HENT: Negative.    Eyes: Negative.    Respiratory: Positive for cough, chest tightness and shortness of breath. Negative for choking.    Cardiovascular: Negative.    Gastrointestinal: Positive for diarrhea. Negative for abdominal distention, abdominal pain, anal bleeding, blood in stool, constipation, nausea, rectal pain and vomiting.   Endocrine: Negative.    Genitourinary: Negative.    Musculoskeletal: Negative.    Skin: Negative.    Allergic/Immunologic: Negative.    Neurological: Negative.    Hematological: Negative.    Psychiatric/Behavioral: Negative.      Objective:     Vital Signs (Most Recent):  Temp: 98.1 °F (36.7 °C) (04/14/18 1109)  Pulse: 70 (04/14/18 1109)  Resp: 20 (04/14/18 1109)  BP: (!) 155/69 (04/14/18 1109)  SpO2: 99 % (04/14/18  1109) Vital Signs (24h Range):  Temp:  [97.6 °F (36.4 °C)-98.4 °F (36.9 °C)] 98.1 °F (36.7 °C)  Pulse:  [69-70] 70  Resp:  [18-20] 20  SpO2:  [94 %-99 %] 99 %  BP: (120-173)/(57-71) 155/69     Weight: 58.3 kg (128 lb 8.5 oz)  Body mass index is 23.51 kg/m².    Intake/Output Summary (Last 24 hours) at 04/14/18 1159  Last data filed at 04/13/18 1300   Gross per 24 hour   Intake              240 ml   Output                0 ml   Net              240 ml      Physical Exam   Constitutional: She is oriented to person, place, and time. She appears well-developed and well-nourished.   HENT:   Head: Normocephalic and atraumatic.   Eyes: Conjunctivae and EOM are normal. Pupils are equal, round, and reactive to light.   Neck: Normal range of motion. Neck supple. No JVD present.   Cardiovascular: Regular rhythm and intact distal pulses.    Pulmonary/Chest: Effort normal.   BS decreased at the bases.   Abdominal: Soft. Bowel sounds are normal. She exhibits no distension. There is no tenderness. There is no guarding.   Musculoskeletal: Normal range of motion. She exhibits no edema or tenderness.   Neurological: She is alert and oriented to person, place, and time. She displays normal reflexes. No cranial nerve deficit or sensory deficit. She exhibits normal muscle tone. Coordination normal.   Skin: Skin is warm and dry.   Psychiatric: She has a normal mood and affect. Her behavior is normal. Judgment and thought content normal.     Interval History:     Review of Systems   Constitutional: Negative.    HENT: Negative.    Eyes: Negative.    Respiratory: Positive for cough, chest tightness and shortness of breath. Negative for choking.    Cardiovascular: Negative.    Gastrointestinal: Positive for diarrhea. Negative for abdominal distention, abdominal pain, anal bleeding, blood in stool, constipation, nausea, rectal pain and vomiting.   Endocrine: Negative.    Genitourinary: Negative.    Musculoskeletal: Negative.    Skin: Negative.     Allergic/Immunologic: Negative.    Neurological: Negative.    Hematological: Negative.    Psychiatric/Behavioral: Negative.      Objective:     Vital Signs (Most Recent):  Temp: 98.1 °F (36.7 °C) (04/14/18 1109)  Pulse: 70 (04/14/18 1109)  Resp: 20 (04/14/18 1109)  BP: (!) 155/69 (04/14/18 1109)  SpO2: 99 % (04/14/18 1109) Vital Signs (24h Range):  Temp:  [97.6 °F (36.4 °C)-98.4 °F (36.9 °C)] 98.1 °F (36.7 °C)  Pulse:  [69-70] 70  Resp:  [18-20] 20  SpO2:  [94 %-99 %] 99 %  BP: (120-173)/(57-71) 155/69     Weight: 58.3 kg (128 lb 8.5 oz)  Body mass index is 23.51 kg/m².    Intake/Output Summary (Last 24 hours) at 04/14/18 1159  Last data filed at 04/13/18 1300   Gross per 24 hour   Intake              240 ml   Output                0 ml   Net              240 ml      Physical Exam   Constitutional: She is oriented to person, place, and time. She appears well-developed and well-nourished.   HENT:   Head: Normocephalic and atraumatic.   Cardiovascular: Regular rhythm and intact distal pulses.    Pulmonary/Chest: Effort normal.   BS decreased at the bases.   Abdominal: Soft. Bowel sounds are normal. She exhibits no distension. There is no tenderness. There is no guarding.   Musculoskeletal: Normal range of motion. She exhibits no edema or tenderness.   Neurological: She is alert and oriented to person, place, and time. She displays normal reflexes. No cranial nerve deficit or sensory deficit. She exhibits normal muscle tone. Coordination normal.   Skin: Skin is warm and dry.   Psychiatric: She has a normal mood and affect. Her behavior is normal. Judgment and thought content normal.       Significant Labs:   BMP:     Recent Labs  Lab 04/14/18  0525     105     143   K 3.3*  3.3*   *  111*   CO2 24  24   BUN 32*  32*   CREATININE 0.9  0.9   CALCIUM 7.8*  7.8*   MG 1.9     CBC:     Recent Labs  Lab 04/12/18  1410 04/13/18  0438 04/14/18  0622   WBC 8.84 7.51 6.31   HGB 8.5* 8.0* 7.9*   HCT  29.6* 27.9* 27.5*    215 216       Significant Imaging:     Significant Labs:   BMP:     Recent Labs  Lab 04/14/18  0525     105     143   K 3.3*  3.3*   *  111*   CO2 24  24   BUN 32*  32*   CREATININE 0.9  0.9   CALCIUM 7.8*  7.8*   MG 1.9     CBC:     Recent Labs  Lab 04/12/18  1410 04/13/18  0438 04/14/18  0622   WBC 8.84 7.51 6.31   HGB 8.5* 8.0* 7.9*   HCT 29.6* 27.9* 27.5*    215 216       Significant Imaging:     Assessment/Plan:      * Pneumonia of right upper lobe due to infectious organism with sepsis    -Possibly due to aspiration.   -IV antibiotics, inhaled medications and oxygen therapy.           Acute blood loss anemia      4/11/2018 - etiology unclear, pt hospitalized 6 months prior due to gi bleed        Diarrhea    4/11/2018 - likely side effect from antibx, c.diff  -neg         Acquired hypothyroidism    Continue Synthroid.           Atrial fibrillation    -Rate controlled.   -Continue digoxin, amiodarone and Coreg.   -Follow up INR.           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           Pneumonia    4/11/2018 -    continue supplemental oxygen   follow up shows bilateral pneumonia   clinically improved , deesculate abx        Elevated troponin    -Most likely associated with sepsis and CKD.   -Trend troponins.   -ASA daily.   -Continue valsartan and Coreg.           CKD (chronic kidney disease) stage 3, GFR 30-59 ml/min    -Creatinine at baseline.   -Monitor.           S/P MVR (mitral valve replacement)    INR 2.4   lovenox bridge for goal INR 3.0  Continue ASA          Hypertension    Continue Coreg, lasix and valsartan.           Chronic combined systolic and diastolic congestive heart failure     Compensated   Continue Coreg, Lasix and Entresto.              LEV (obstructive sleep apnea)    CPAP QHS.             VTE Risk Mitigation         Ordered     warfarin (COUMADIN) tablet 2.5 mg  Once      Route:  Oral        04/14/18 0924     enoxaparin injection 60 mg  Every 12 hours     Route:  Subcutaneous        04/13/18 0841              Salina Crandall MD  Department of Hospital Medicine   [facility]

## 2018-04-15 VITALS
SYSTOLIC BLOOD PRESSURE: 128 MMHG | DIASTOLIC BLOOD PRESSURE: 68 MMHG | HEART RATE: 71 BPM | TEMPERATURE: 98 F | HEIGHT: 62 IN | RESPIRATION RATE: 17 BRPM | BODY MASS INDEX: 23.65 KG/M2 | OXYGEN SATURATION: 98 % | WEIGHT: 128.5 LBS

## 2018-04-15 PROBLEM — R19.7 DIARRHEA: Status: RESOLVED | Noted: 2018-04-15 | Resolved: 2018-04-15

## 2018-04-15 PROBLEM — R19.7 DIARRHEA: Status: RESOLVED | Noted: 2018-04-11 | Resolved: 2018-04-15

## 2018-04-15 PROBLEM — J18.9 PNEUMONIA OF RIGHT UPPER LOBE DUE TO INFECTIOUS ORGANISM: Status: RESOLVED | Noted: 2018-04-10 | Resolved: 2018-04-15

## 2018-04-15 PROBLEM — D62 ACUTE BLOOD LOSS ANEMIA: Status: RESOLVED | Noted: 2018-04-11 | Resolved: 2018-04-15

## 2018-04-15 PROBLEM — D62 ACUTE BLOOD LOSS ANEMIA: Status: RESOLVED | Noted: 2018-04-15 | Resolved: 2018-04-15

## 2018-04-15 LAB
ANION GAP SERPL CALC-SCNC: 9 MMOL/L
ANION GAP SERPL CALC-SCNC: 9 MMOL/L
BACTERIA BLD CULT: NORMAL
BACTERIA BLD CULT: NORMAL
BASOPHILS # BLD AUTO: 0.02 K/UL
BASOPHILS NFR BLD: 0.3 %
BUN SERPL-MCNC: 29 MG/DL
BUN SERPL-MCNC: 29 MG/DL
CALCIUM SERPL-MCNC: 7.9 MG/DL
CALCIUM SERPL-MCNC: 7.9 MG/DL
CHLORIDE SERPL-SCNC: 109 MMOL/L
CHLORIDE SERPL-SCNC: 109 MMOL/L
CO2 SERPL-SCNC: 24 MMOL/L
CO2 SERPL-SCNC: 24 MMOL/L
CREAT SERPL-MCNC: 0.8 MG/DL
CREAT SERPL-MCNC: 0.8 MG/DL
DIFFERENTIAL METHOD: ABNORMAL
EOSINOPHIL # BLD AUTO: 0.1 K/UL
EOSINOPHIL NFR BLD: 1.8 %
ERYTHROCYTE [DISTWIDTH] IN BLOOD BY AUTOMATED COUNT: 20.8 %
EST. GFR  (AFRICAN AMERICAN): >60 ML/MIN/1.73 M^2
EST. GFR  (AFRICAN AMERICAN): >60 ML/MIN/1.73 M^2
EST. GFR  (NON AFRICAN AMERICAN): >60 ML/MIN/1.73 M^2
EST. GFR  (NON AFRICAN AMERICAN): >60 ML/MIN/1.73 M^2
GLUCOSE SERPL-MCNC: 103 MG/DL
GLUCOSE SERPL-MCNC: 103 MG/DL
HCT VFR BLD AUTO: 28.9 %
HGB BLD-MCNC: 8.1 G/DL
INR PPP: 2.7
LYMPHOCYTES # BLD AUTO: 1.1 K/UL
LYMPHOCYTES NFR BLD: 16.2 %
MAGNESIUM SERPL-MCNC: 1.8 MG/DL
MCH RBC QN AUTO: 23.8 PG
MCHC RBC AUTO-ENTMCNC: 28 G/DL
MCV RBC AUTO: 85 FL
MONOCYTES # BLD AUTO: 0.7 K/UL
MONOCYTES NFR BLD: 10.1 %
NEUTROPHILS # BLD AUTO: 4.8 K/UL
NEUTROPHILS NFR BLD: 73 %
PHOSPHATE SERPL-MCNC: 1.8 MG/DL
PLATELET # BLD AUTO: 275 K/UL
PMV BLD AUTO: 10.6 FL
POTASSIUM SERPL-SCNC: 4.4 MMOL/L
POTASSIUM SERPL-SCNC: 4.4 MMOL/L
PROTHROMBIN TIME: 27.6 SEC
RBC # BLD AUTO: 3.4 M/UL
SODIUM SERPL-SCNC: 142 MMOL/L
SODIUM SERPL-SCNC: 142 MMOL/L
WBC # BLD AUTO: 6.66 K/UL

## 2018-04-15 PROCEDURE — 94761 N-INVAS EAR/PLS OXIMETRY MLT: CPT

## 2018-04-15 PROCEDURE — 25000003 PHARM REV CODE 250: Performed by: FAMILY MEDICINE

## 2018-04-15 PROCEDURE — 80048 BASIC METABOLIC PNL TOTAL CA: CPT

## 2018-04-15 PROCEDURE — 97116 GAIT TRAINING THERAPY: CPT

## 2018-04-15 PROCEDURE — 94640 AIRWAY INHALATION TREATMENT: CPT

## 2018-04-15 PROCEDURE — G8989 SELF CARE D/C STATUS: HCPCS | Mod: CH

## 2018-04-15 PROCEDURE — 25000242 PHARM REV CODE 250 ALT 637 W/ HCPCS: Performed by: PHYSICIAN ASSISTANT

## 2018-04-15 PROCEDURE — 25000003 PHARM REV CODE 250: Performed by: PHYSICIAN ASSISTANT

## 2018-04-15 PROCEDURE — 84100 ASSAY OF PHOSPHORUS: CPT

## 2018-04-15 PROCEDURE — 36415 COLL VENOUS BLD VENIPUNCTURE: CPT

## 2018-04-15 PROCEDURE — G8987 SELF CARE CURRENT STATUS: HCPCS | Mod: CJ

## 2018-04-15 PROCEDURE — G8988 SELF CARE GOAL STATUS: HCPCS | Mod: CI

## 2018-04-15 PROCEDURE — 27000221 HC OXYGEN, UP TO 24 HOURS

## 2018-04-15 PROCEDURE — 97110 THERAPEUTIC EXERCISES: CPT

## 2018-04-15 PROCEDURE — 85025 COMPLETE CBC W/AUTO DIFF WBC: CPT

## 2018-04-15 PROCEDURE — 83735 ASSAY OF MAGNESIUM: CPT

## 2018-04-15 PROCEDURE — 25000003 PHARM REV CODE 250: Performed by: INTERNAL MEDICINE

## 2018-04-15 PROCEDURE — 85610 PROTHROMBIN TIME: CPT

## 2018-04-15 RX ORDER — WARFARIN 1 MG/1
1 TABLET ORAL ONCE
Status: DISCONTINUED | OUTPATIENT
Start: 2018-04-15 | End: 2018-04-15

## 2018-04-15 RX ORDER — WARFARIN 2.5 MG/1
2.5 TABLET ORAL ONCE
Status: DISCONTINUED | OUTPATIENT
Start: 2018-04-16 | End: 2018-04-15 | Stop reason: HOSPADM

## 2018-04-15 RX ORDER — WARFARIN 2.5 MG/1
2.5 TABLET ORAL ONCE
Qty: 1 TABLET | Refills: 0 | Status: SHIPPED | OUTPATIENT
Start: 2018-04-15 | End: 2018-04-19

## 2018-04-15 RX ORDER — DOXYCYCLINE HYCLATE 100 MG
100 TABLET ORAL EVERY 12 HOURS
Status: DISCONTINUED | OUTPATIENT
Start: 2018-04-15 | End: 2018-04-15 | Stop reason: HOSPADM

## 2018-04-15 RX ORDER — SODIUM,POTASSIUM PHOSPHATES 280-250MG
1 POWDER IN PACKET (EA) ORAL ONCE
Status: COMPLETED | OUTPATIENT
Start: 2018-04-15 | End: 2018-04-15

## 2018-04-15 RX ADMIN — POTASSIUM & SODIUM PHOSPHATES POWDER PACK 280-160-250 MG 1 PACKET: 280-160-250 PACK at 12:04

## 2018-04-15 RX ADMIN — DOXYCYCLINE HYCLATE 100 MG: 100 TABLET, COATED ORAL at 09:04

## 2018-04-15 RX ADMIN — IPRATROPIUM BROMIDE AND ALBUTEROL SULFATE 3 ML: .5; 3 SOLUTION RESPIRATORY (INHALATION) at 02:04

## 2018-04-15 RX ADMIN — SACUBITRIL AND VALSARTAN 1 TABLET: 49; 51 TABLET, FILM COATED ORAL at 09:04

## 2018-04-15 RX ADMIN — FUROSEMIDE 40 MG: 40 TABLET ORAL at 09:04

## 2018-04-15 RX ADMIN — LEVOTHYROXINE SODIUM 50 MCG: 50 TABLET ORAL at 09:04

## 2018-04-15 RX ADMIN — CARVEDILOL 25 MG: 12.5 TABLET, FILM COATED ORAL at 07:04

## 2018-04-15 RX ADMIN — AMIODARONE HYDROCHLORIDE 200 MG: 200 TABLET ORAL at 09:04

## 2018-04-15 RX ADMIN — FAMOTIDINE 20 MG: 20 TABLET, FILM COATED ORAL at 09:04

## 2018-04-15 RX ADMIN — IPRATROPIUM BROMIDE AND ALBUTEROL SULFATE 3 ML: .5; 3 SOLUTION RESPIRATORY (INHALATION) at 07:04

## 2018-04-15 RX ADMIN — ASPIRIN 81 MG: 81 TABLET, COATED ORAL at 09:04

## 2018-04-15 RX ADMIN — GABAPENTIN 100 MG: 100 CAPSULE ORAL at 09:04

## 2018-04-15 NOTE — PLAN OF CARE
Problem: Physical Therapy Goal  Goal: Physical Therapy Goal  Goals to be met by: 18     Patient will increase functional independence with mobility by performin. Supine to sit with Stand-by Assistance  2. Sit to stand transfer with Stand-by Assistance  3. Bed to chair transfer with Stand-by Assistance using Rolling Walker  4. Gait  2x 100 feet with Stand-by Assistance using Rolling Walker.   5. Lower extremity exercise program x20 reps per handout, with supervision     Outcome: Ongoing (interventions implemented as appropriate)  PATIENT WITH GOOD MOTIVATION AND WITH PREPARATIONS BEING MADE FOR D/C IN NEAR FUTURE.

## 2018-04-15 NOTE — NURSING
1320 IV removed. Dc instructions provided. Informed pt that she should hold Coumadin tonight and take tomorrow. Then f/u c Coumadin clinic on Tuesday. CAROLINE. Received O2 at 1615

## 2018-04-15 NOTE — PLAN OF CARE
04/15/18 1649   Final Note   Assessment Type Final Discharge Note   Discharge Disposition Home   What phone number can be called within the next 1-3 days to see how you are doing after discharge? [phone]   Right Care Referral Info   Post Acute Recommendation No Care   Citlalli Hinojosa LMSW, M-Sw [facility]  4/15/2018

## 2018-04-15 NOTE — PLAN OF CARE
Problem: Patient Care Overview  Goal: Interdisciplinary Rounds/Family Conf  Outcome: Ongoing (interventions implemented as appropriate)  Pt stable. Plan of care reviewed with patient. Patient verbalized understanding. Bed low, wheels locked, bed alarm on, call light within reach. Patient instructed to call for assistance. Will continue to monitor.     Removed Right ac IV.   Uneventful shift     Value Min Max   Temp 96.7 °F (35.9 °C) 99 °F (37.2 °C)   Pulse 70 71   Resp 18 20   BP: Systolic 125 173   BP: Diastolic 59 71   MAP (mmHg) 85 102   Oxygen Concentration (%) 28 28   SpO2 93 % 99 %

## 2018-04-15 NOTE — PT/OT/SLP PROGRESS
Physical Therapy  Treatment    Jennifer Mathews   MRN: [id number]   Admitting Diagnosis: Pneumonia of right upper lobe due to infectious organism       PT Start Time: 1000     PT Stop Time: 1015    PT Total Time (min): 15 min       Billable Minutes:  Gait Training 15    Treatment Type: Treatment  PT/PTA: PTA     PTA Visit Number: 1       General Precautions: Standard, fall  Orthopedic Precautions: N/A   Braces:           Subjective:  Communicated with NSG prior to session.      Pain/Comfort  Pain Rating 1: 0/10  Pain Rating Post-Intervention 1: 0/10    Objective:        Functional Mobility:  Bed Mobility:        Transfers:       Gait:        Stairs:      Balance:   Static Sit: GOOD: Takes MODERATE challenges from all directions  Dynamic Sit: GOOD-: Maintains balance through MODERATE excursions of active trunk movement,     Static Stand: GOOD-: Takes MODERATE challenges from all directions inconsistently  Dynamic stand: GOOD-: Needs SUPERVISION only during gait and able to self right with moderate      Therapeutic Activities and Exercises:  UPON ARRIVING PATIENT WAS SITING ON LOVE SEAT IN ROOM BY WINDOW IN SUN LIGHT. SIT<-->STAND SBA. AMBULATED WITH RW 75'X2 SBA      AM-PAC 6 CLICK MOBILITY  How much help from another person does this patient currently need?   1 = Unable, Total/Dependent Assistance  2 = A lot, Maximum/Moderate Assistance  3 = A little, Minimum/Contact Guard/Supervision  4 = None, Modified [place]/Independent         AM-PAC Raw Score CMS G-Code Modifier Level of Impairment Assistance   6 % Total / Unable   7 - 9 CM 80 - 100% Maximal Assist   10 - 14 CL 60 - 80% Moderate Assist   15 - 19 CK 40 - 60% Moderate Assist   20 - 22 CJ 20 - 40% Minimal Assist   23 CI 1-20% SBA / CGA   24 CH 0% Independent/ Mod I     Patient left LOVE SEAT IN ROOM UNDER WINDOW with call button in reach.    Assessment:  Jennifer Mathews is a 85 y.o. female with a medical diagnosis of Pneumonia of right upper lobe due  to infectious organism and presents with .    Rehab identified problem list/impairments: Rehab identified problem list/impairments: weakness, impaired balance, impaired self care skills, impaired functional mobilty, impaired endurance, gait instability    Rehab potential is good.    Activity tolerance: Good    Discharge recommendations: Discharge Facility/Level Of Care Needs:  (NONE)     Barriers to discharge:      Equipment recommendations: Equipment Needed After Discharge: walker, rolling     GOALS:    Physical Therapy Goals        Problem: Physical Therapy Goal    Goal Priority Disciplines Outcome Goal Variances Interventions   Physical Therapy Goal     PT/OT, PT Ongoing (interventions implemented as appropriate)     Description:  Goals to be met by: 18     Patient will increase functional independence with mobility by performin. Supine to sit with Stand-by Assistance  2. Sit to stand transfer with Stand-by Assistance  3. Bed to chair transfer with Stand-by Assistance using Rolling Walker  4. Gait  2x 100 feet with Stand-by Assistance using Rolling Walker.   5. Lower extremity exercise program x20 reps per handout, with supervision                      PLAN:    Patient to be seen 5 x/week  to address the above listed problems via gait training, therapeutic activities, therapeutic exercises  Plan of Care expires: 18  Plan of Care reviewed with: patient         Haile Ceasar, PTA  04/15/2018

## 2018-04-15 NOTE — DISCHARGE SUMMARY
[facility] Hospital Medicine  Discharge Summary      Patient Name: Jennifer Mathews  MRN: [id number]  Admission Date: 4/10/2018  Hospital Length of Stay: 5 days  Discharge Date and Time:  04/15/2018 11:23 AM  Attending Physician: Salina Crandall MD   Discharging Provider: Salina Crandall MD  Primary Care Provider: Desirae Bello MD      HPI:   Jennifer Mathews is an 85 year old female with a history of mitral valve replacement, CHF and CKD III complaining of fever that started 3 days ago. She denies associated symptoms and reports that she felt great prior to the onset of fever. The patient does reports chronic bilateral lower extremity edema that is unchanged. She denies cough, SOB, chills, nausea and vomiting. In the ED, the patient was found to have a WBC count of 19,320 with a left shift, a lactic acid of 1.8, an elevated troponin of 0.119, a creatinine of 1.6 and a procalcitonin of 2.16. Code status was discussed with the patient. She if a DNR. Her daughter, Eri Garnett, is her medical power of .     * No surgery found *      Hospital Course:   4/11/2018 - sob improving, fever 101 overnight, white count down from 19 to 12, hemoglobin decreased to 7.9, will follow hemoccult secondary to recent GI bleed, pt developed diarrhea overnight, no hx of c.diff, follow up results and tx symptoms prn, urine output adequate, ivf d/c, will watch for s/s of heart failure exacerbation, follow blood and sputum cultures  4/12/18- diarrhea improved continued contact isolation for probable cdiff                 Urinalysis -Ecoli                  Follow up CXR -b/l pneumonia                  BC-NGTD                  Cdiff- neg                 mitral valve with  INR 2.4  continue  Lovenox bridge for goal of 3.0                  Continue ASA  Patient doesn't have a mechanical valve -discontinue Lovenox for goal of 2.0-30.      Admitted for shortness of breath, treated for pneumonia and UTI  She had  diarrhea due to antibiotics , stools were negative for Cdiff  Patient qualified for home oxygen  Seen and examined, stable for discharge     Consults:   Consults         Status Ordering Provider     Pharmacy to dose Warfarin consult  Once     Provider:  (Not yet assigned)    EVELINE Salnias          No new Assessment & Plan notes have been filed under this hospital service since the last note was generated.  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] 05/17/2017 Yes    Elevated troponin [R74.8] 12/30/2016 Yes    Pneumonia [J18.9] 12/28/2016 Yes    CKD (chronic kidney disease) stage 3, GFR 30-59 ml/min [N18.3] 06/23/2014 Yes    S/P MVR (mitral valve replacement) [Z95.2] 01/30/2014 Not Applicable    Chronic combined systolic and diastolic congestive heart failure [I50.42]  Yes    Hypertension [I10]  Yes    LEV (obstructive sleep apnea) [G47.33] 09/30/2013 Yes     Chronic      Problems Resolved During this Admission:    Diagnosis Date Noted Date Resolved POA       Discharged Condition: good    Disposition:     Follow Up:  Follow-up Information     [facility].    Specialty:  Home Health Services  Why:  Home Health  Contact information:  [address], [address]  [facility] [place] [address] [phone]                 Patient Instructions:   No discharge procedures on file.    Significant Diagnostic Studies: Labs:   BMP:   Recent Labs  Lab 04/14/18  0525 04/15/18  0536     105 103  103     143 142  142   K 3.3*  3.3* 4.4  4.4   *  111* 109  109    CO2 24  24 24  24   BUN 32*  32* 29*  29*   CREATININE 0.9  0.9 0.8  0.8   CALCIUM 7.8*  7.8* 7.9*  7.9*   MG 1.9 1.8   , CMP   Recent Labs  Lab 04/14/18  0525 04/15/18  0536     143 142  142   K 3.3*  3.3* 4.4  4.4   *  111* 109  109   CO2 24  24 24  24     105 103  103   BUN 32*  32* 29*  29*   CREATININE 0.9  0.9 0.8  0.8   CALCIUM 7.8*  7.8* 7.9*  7.9*   ANIONGAP 8  8 9  9   ESTGFRAFRICA >60  >60 >60  >60   EGFRNONAA 58*  58* >60  >60    and CBC   Recent Labs  Lab 04/14/18  0622 04/15/18  0536   WBC 6.31 6.66   HGB 7.9* 8.1*   HCT 27.5* 28.9*    275       Pending Diagnostic Studies:     Procedure Component Value Units Date/Time    Occult blood x 1, stool [[id number]] Collected:  04/12/18 1610    Order Status:  Sent Lab Status:  In process Updated:  04/12/18 1637    Specimen:  Stool from Stool          Medications:  Reconciled Home Medications:      Medication List      ASK your doctor about these medications    acetaminophen 325 MG tablet  Commonly known as:  TYLENOL EXTRA STRENGTH  Take 2 tablets (650 mg total) by mouth every 8 (eight) hours.     allopurinol 100 MG tablet  Commonly known as:  ZYLOPRIM  Take 2 tablets (200 mg total) by mouth once daily.     amiodarone 200 MG Tab  Commonly known as:  PACERONE  Take 1 tablet (200 mg total) by mouth once daily.     aspirin 81 MG EC tablet  Commonly known as:  ECOTRIN  Take 81 mg by mouth once daily.     calcium carbonate 600 mg calcium (1,500 mg) Tab  Commonly known as:  OS-SHERRY  Take 600 mg by mouth once.     carvedilol 25 MG tablet  Commonly known as:  COREG  Take 1 tablet (25 mg total) by mouth 2 (two) times daily with meals.     colchicine 0.6 mg Cap  Take 1 capsule (0.6 mg total) by mouth once daily.     cranberry 1,000 mg Cap  Take 1 capsule by mouth Daily.     digoxin 125 mcg tablet  Commonly known as:  LANOXIN  Take 1 tablet (125 mcg total) by mouth once daily. TAKE 1 TABLET (0.125 MG TOTAL) BY MOUTH  ONCE DAILY.     diphenhydrAMINE 25 mg capsule  Commonly known as:  BENADRYL  Take 25 mg by mouth every 6 (six) hours as needed for Itching.     furosemide 40 MG tablet  Commonly known as:  LASIX  Take 1 tablet (40 mg total) by mouth once daily.     gabapentin 100 MG capsule  Commonly known as:  NEURONTIN  TAKE ONE CAPSULE BY MOUTH TWO TIMES DAILY     Lactobac 40-Bifido 3-S.thermop 100 billion cell Cap  Commonly known as:  PROBIOTIC  Take 1 capsule by mouth once daily.     levothyroxine 50 MCG tablet  Commonly known as:  SYNTHROID  Take 1 tablet (50 mcg total) by mouth once daily.     MULTIVITAMIN ORAL  Take 1 tablet by mouth Daily.     sacubitril-valsartan 49-51 mg per tablet  Commonly known as:  ENTRESTO  Take 1 tablet by mouth 2 (two) times daily.     * warfarin 2.5 MG tablet  Commonly known as:  COUMADIN  Take 1/2 tablet (1.25 mg) on Tuesdays, then 1 tablet on other days as directed by Coumadin Clinic.     * warfarin 2.5 MG tablet  Commonly known as:  COUMADIN  TAKE 1 AND 1/2 TABLETS ON FRIDAYS AND 1 TABLET ALL OTHER DAYS AS DIRECTED BY THE COUMADIN CLINIC        * This list has 2 medication(s) that are the same as other medications prescribed for you. Read the directions carefully, and ask your doctor or other care provider to review them with you.                Indwelling Lines/Drains at time of discharge:   Lines/Drains/Airways          No matching active lines, drains, or airways          Time spent on the discharge of patient: 30  minutes  Patient was seen and examined on the date of discharge and determined to be suitable for discharge.         Salina Crandall MD  Department of Hospital Medicine  [facility]

## 2018-04-15 NOTE — PROGRESS NOTES
"Coumadin Progress Notes:  Jennifer Mathews 's   Target INR is 2-3 for Mitral valve (no Mechanical)  (no mechanical mitral valve per Dr. Crandall-reconfirmation per notes)  Coumadin Monitoring INR   Date Value Ref Range Status   03/23/2011 2.1 (H) 0.8 - 1.2 Final     Comment:     ACCP Guidelline for Coumadin usage recommends a "target range" of  2.0 - 3.0 for INR for all indicators except mechanical heart valves  and antiphospholipid syndromes which should use 2.5 - 3.5.  .     INR   Date Value Ref Range Status   04/15/2018 2.7 (H) 0.8 - 1.2 Final     Comment:     Coumadin Therapy:  2.0 - 3.0 for INR for all indicators except mechanical heart valves  and antiphospholipid syndromes which should use 2.5 - 3.5.       Patient will be receive a 1mg dose today with the anticipation may see effects of the 5 mg dose for 1 more day  PT/INR will be monitored daily. Dose adjustments will be made accordingly.      Thank you for allowing us to participate in this patient's care.     Florence Rawls 4/15/2018 11:22 AM    "

## 2018-04-15 NOTE — PLAN OF CARE
Problem: Occupational Therapy Goal  Goal: Occupational Therapy Goal  Occupational Therapy   Goals to be met by: 4-20-18  1. Pt will tolerate 1 set x 15 reps b ue rom exercise with min resistance  2. s with ue dressing  3. s with bsc t/f's          Jennifer Mathews   MRN: [id number]            Outcome: Ongoing (interventions implemented as appropriate)  PT PROGRESSING APPROPRIATELY.

## 2018-04-15 NOTE — CONSULTS
Order for O2 called into [facility].  Updated order input at 2:25 p.m. Awaiting delivery of O2 to the bedside.  Orders, demographic, discharge summary and O2 eval. .  faxed to [phone].  Citlalli Hinojosa LMSW, ACM-Sw, [facility]

## 2018-04-15 NOTE — DISCHARGE INSTRUCTIONS
Do not take Coumadin tonight. Take 2.5 mg Coumadin orally tomorrow Monday 4/16 and then follow-up with Coumadin clinic on Tuesday 4/17.

## 2018-04-15 NOTE — PT/OT/SLP PROGRESS
"Occupational Therapy  Treatment    Jennifer Mathews   MRN: [id number]   Admitting Diagnosis: Pneumonia of right upper lobe due to infectious organism    OT Date of Treatment: 04/15/18   OT Start Time: 1025  OT Stop Time: 1045  OT Total Time (min): 20 min    Billable Minutes:  Therapeutic Exercise 20    General Precautions: Standard, fall  Orthopedic Precautions: N/A  Braces: N/A         Subjective:  Communicated with NURSE DUDLEY prior to session.  PT FOUND SITTING UP WITH LEGS EXTENDED ON COUCH IN ROOM WEARING OXYGEN  Pain/Comfort  Pain Rating 1: 0/10    Objective:  Patient found with: telemetry, oxygen     Functional Mobility:  Bed Mobility:       Transfers:        Functional Ambulation:     Activities of Daily Living:                     Balance:   Static Sit: NORMAL: No deviations seen in posture held statically  Dynamic Sit: GOOD: Maintains balance through MODERATE excursions of active trunk movement  Static Stand: GOOD-: Takes MODERATE challenges from all directions inconsistently  Dynamic stand: GOOD-: Needs SUPERVISION only during gait and able to self right with moderate     Therapeutic Activities and Exercises:  PT ABLE TO SIT-STAND WITH SUPERVISION. AMBULATED USING RW WITH SBA. ABLE TO PERFORM 2x20 REPS OF B UE ROM WHILE STANDING WITH SBA. NO COMPLAINTS OF SOB DURING TREATMENT  AM-PAC 6 CLICK ADL   How much help from another person does this patient currently need?   1 = Unable, Total/Dependent Assistance  2 = A lot, Maximum/Moderate Assistance  3 = A little, Minimum/Contact Guard/Supervision  4 = None, Modified [place]/Independent    Putting on and taking off regular lower body clothing? : 4  Bathing (including washing, rinsing, drying)?: 3  Toileting, which includes using toilet, bedpan, or urinal? : 3  Putting on and taking off regular upper body clothing?: 4  Taking care of personal grooming such as brushing teeth?: 4  Eating meals?: 4  Total Score: 22     AM-PAC Raw Score CMS "G-Code Modifier " Level of Impairment Assistance   6 % Total / Unable   7 - 8 CM 80 - 100% Maximal Assist   9-13 CL 60 - 80% Moderate Assist   14 - 19 CK 40 - 60% Moderate Assist   20 - 22 CJ 20 - 40% Minimal Assist   23 CI 1-20% SBA / CGA   24 CH 0% Independent/ Mod I       Patient left SITTING UP ON COUCH with all lines intact and call button in reach    ASSESSMENT:  Jennifer Mathews is a 85 y.o. female with a medical diagnosis of Pneumonia of right upper lobe due to infectious organism and presents with IMPROVING SELF-CARE SKILLS AND FUNCTIONAL MOBILITY. SAFETY AWARENESS QUESTIONABLE.    Rehab identified problem list/impairments: Rehab identified problem list/impairments: weakness, impaired functional mobilty, impaired cardiopulmonary response to activity, decreased safety awareness    Rehab potential is good.    Activity tolerance: Good    Discharge recommendations: Discharge Facility/Level Of Care Needs: home health PT     Barriers to discharge: Barriers to Discharge: None    Equipment recommendations: none     GOALS:    Occupational Therapy Goals        Problem: Occupational Therapy Goal    Goal Priority Disciplines Outcome Interventions   Occupational Therapy Goal     OT, PT/OT Ongoing (interventions implemented as appropriate)    Description:  Occupational Therapy   Goals to be met by: 4-20-18  1. Pt will tolerate 1 set x 15 reps b ue rom exercise with min resistance  2. s with ue dressing  3. s with bsc t/f's          Jennifer Mathews   MRN: [id number]                             Plan:  Patient to be seen 3 x/week to address the above listed problems via self-care/home management, therapeutic activities, therapeutic exercises  Plan of Care expires: 04/19/18  Plan of Care reviewed with: patient    OT G-codes  Functional Assessment Tool Used: SUSAN AM-PAC  Score: 22  Functional Limitation: Self care  Self Care Current Status (): CJ  Self Care Goal Status (): CI  Self Care Discharge Status (): ARNOLDO [place]  [place], STEFAN  04/15/2018

## 2018-04-16 ENCOUNTER — PATIENT OUTREACH (OUTPATIENT)
Dept: ADMINISTRATIVE | Facility: CLINIC | Age: 83
End: 2018-04-16

## 2018-04-16 DIAGNOSIS — R53.1 GENERALIZED WEAKNESS: Primary | ICD-10-CM

## 2018-04-16 NOTE — PATIENT INSTRUCTIONS
When You Have Pneumonia  You have been diagnosed with pneumonia. This is a serious lung infection. Most cases of pneumonia are caused by bacteria. Pneumonia most often occurs in older adults, young children, and people with chronic health problems.  Home care  · Take your medicine exactly as directed. Dont skip doses. Continue taking your antibiotics as until they are all gone, even if you start to feel better. This will prevent the pneumonia from coming back.  · Drink at least 8 glasses of water daily, unless directed otherwise. This helps to loosen and thin secretions so that you can cough them up.  · Use a cool-mist humidifier in your bedroom. Be sure to clean the humidifier daily.  · Dont use medicines to suppress your cough unless your cough is dry, painful, or interferes with your sleep. Coughing up mucus is normal. You may use an expectorant if your healthcare provider says its okay.  · You can use warm compresses or a heating pad on the lowest setting to relieve chest discomfort. Use several times a day for 15-20 minutes at a time. To prevent injury to your skin, set the temperature to warm, not hot. Dont put the compress or pad directly on your skin. Make certain it has a cover or wrap it in a towel. This is to prevent skin burns.  · Get plenty of rest until your fever, shortness of breath, and chest pain go away.  · Plan to get a flu shot every year. The flu is a common cause of pneumonia. Getting a flu shot every year can help prevent both the flu and pneumonia.  Getting the pneumococcal vaccine  Talk with your healthcare provider about getting the pneumococcalvaccine. Pneumococcal pneumonia is caused by bacteria that spread from person to person. It can cause minor problems, such as ear infections. But it can also turn into life-threatening illnesses of the lungs (pneumonia), the covering of the brain and spinal cord (meningitis), and the blood (bacteremia).  Children under 2 years of age, adults  over age 65, people with certain health conditions, and smokers are at the highest risk of pneumococcal disease. This vaccine can help prevent pneumococcal disease in both adults and children. Some people should not have the vaccine. Make sure to ask your healthcare provider if you should have the vaccine.   Follow-up care  Make a follow-up appointment as directed by our staff.  When to call your healthcare provider  Call your healthcare provider if you have any of the following:  · Fever above 100.4°F (38°C), or as directed by your healthcare provider  · Mucus from the lungs (sputum) thats yellow, green, bloody, or smells bad  · A large amount of sputum  · Vomiting  · Symptoms that get worse  When to call 911  Call 911 right away if you have any of the following:  · Chest pain  · Trouble breathing  · Blue lips or fingernails   Date Last Reviewed: 11/1/2016  © 8098-8541 [employer]. [address] [place] [address], [place] [address]. All rights reserved. This information is not intended as a substitute for professional medical care. Always follow your healthcare professional's instructions.

## 2018-04-17 ENCOUNTER — LAB VISIT (OUTPATIENT)
Dept: LAB | Facility: HOSPITAL | Age: 83
End: 2018-04-17
Attending: FAMILY MEDICINE
Payer: MEDICARE

## 2018-04-17 ENCOUNTER — TELEPHONE (OUTPATIENT)
Dept: CARDIOLOGY | Facility: CLINIC | Age: 83
End: 2018-04-17

## 2018-04-17 ENCOUNTER — ANTI-COAG VISIT (OUTPATIENT)
Dept: CARDIOLOGY | Facility: CLINIC | Age: 83
End: 2018-04-17
Payer: MEDICARE

## 2018-04-17 DIAGNOSIS — N18.30 CKD (CHRONIC KIDNEY DISEASE) STAGE 3, GFR 30-59 ML/MIN: ICD-10-CM

## 2018-04-17 DIAGNOSIS — Z79.01 LONG TERM (CURRENT) USE OF ANTICOAGULANTS: Primary | ICD-10-CM

## 2018-04-17 DIAGNOSIS — I50.42 CHRONIC COMBINED SYSTOLIC AND DIASTOLIC CONGESTIVE HEART FAILURE: ICD-10-CM

## 2018-04-17 LAB
ANION GAP SERPL CALC-SCNC: 10 MMOL/L
BUN SERPL-MCNC: 27 MG/DL
CALCIUM SERPL-MCNC: 8.4 MG/DL
CHLORIDE SERPL-SCNC: 109 MMOL/L
CO2 SERPL-SCNC: 24 MMOL/L
CREAT SERPL-MCNC: 0.9 MG/DL
EST. GFR  (AFRICAN AMERICAN): >60 ML/MIN/1.73 M^2
EST. GFR  (NON AFRICAN AMERICAN): 58 ML/MIN/1.73 M^2
GLUCOSE SERPL-MCNC: 117 MG/DL
INR PPP: 4 (ref 2–3)
POTASSIUM SERPL-SCNC: 5 MMOL/L
SODIUM SERPL-SCNC: 143 MMOL/L
T4 FREE SERPL-MCNC: 1.28 NG/DL
TSH SERPL DL<=0.005 MIU/L-ACNC: 4.83 UIU/ML

## 2018-04-17 PROCEDURE — 99999 PR PBB SHADOW E&M-EST. PATIENT-LVL I: CPT | Mod: PBBFAC,,,

## 2018-04-17 PROCEDURE — 84443 ASSAY THYROID STIM HORMONE: CPT

## 2018-04-17 PROCEDURE — 84439 ASSAY OF FREE THYROXINE: CPT

## 2018-04-17 PROCEDURE — 80048 BASIC METABOLIC PNL TOTAL CA: CPT

## 2018-04-17 PROCEDURE — 85610 PROTHROMBIN TIME: CPT | Mod: PBBFAC

## 2018-04-17 PROCEDURE — 36415 COLL VENOUS BLD VENIPUNCTURE: CPT

## 2018-04-17 PROCEDURE — 99211 OFF/OP EST MAY X REQ PHY/QHP: CPT | Mod: PBBFAC

## 2018-04-17 NOTE — PLAN OF CARE
04/17/18 0928   Final Note   Assessment Type Final Discharge Note   Discharge Disposition Home-Health  ([facility])

## 2018-04-17 NOTE — PHYSICIAN QUERY
PT Name: Jennifer Mathews  MR #: [id number]    Physician Query Form - Atrial Fibrillation Specificity     CDS/: Hayley Baltazar RN               Contact information: [phone]     This form is a permanent document in the medical record.     Query Date: April 17, 2018    By submitting this query, we are merely seeking further clarification of documentation. Please utilize your independent clinical judgment when addressing the question(s) below.    The medical record contains the following:   Indicators     Supporting Clinical Findings Location in Medical Record   x Atrial Fibrillation Atrial fibrillation   4/14 Hosp med note   x EKG results Vent. Rate : 070 BPM     Atrial Rate : 070 BPM     P-R Int : 000 ms          QRS Dur : 168 ms      QT Int : 450 ms       P-R-T Axes : 000 -31 127 degrees     QTc Int : 486 ms  AV dual-paced rhythm  Abnormal ECG  No previous ECGs available 4/10 EKG     x Medication Home meds:  Amiodarone 200mg daily  Digoxin 125mcg daily  Coreg 25mg twice daily    Amiodarone 200mg daily  Digoxin 125mcg daily  Coreg 25mg twice char   4/10 HP          MAR   x Treatment -Rate controlled.   -Continue digoxin, amiodarone and Coreg.   -Follow up INR.      4/14 Hosp med note    Other         Provider, please further specify the Atrial Fibrillation diagnosis.    [  ] Chronic  [  ] Paroxysmal  [  ] Permanent  [ x ] Persistent  [  ] Other (please specify): ____________________________  [  ] Clinically Undetermined    Please document in your progress notes daily for the duration of treatment until resolved, and include in your discharge summary.

## 2018-04-17 NOTE — PHYSICIAN QUERY
PT Name: Jennifer Mathews  MR #: [id number]    Physician Query Form - Cause and Effect Relationship Clarification      CDS/: Hayley Baltazar RN              Contact information: [phone]    This form is a permanent document in the medical record.     Query Date: April 17, 2018    By submitting this query, we are merely seeking further clarification of documentation. Please utilize your independent clinical judgment when addressing the question(s) below.    The Medical record contains the following:  Supporting Clinical Findings   Location in record    Pneumonia of right upper lobe due to infectious organism with sepsis                                                                                                                                                     Admitted for shortness of breath, treated for pneumonia and UTI                 4/15 DC summary   ESCHERICHIA COLI   >100,000 cfu/ml   No other significant isolate                                                                                                                                                              Respiratory Culture Normal respiratory praful    Gram Stain (Respiratory) <10 epithelial cells per low power field.    Gram Stain (Respiratory) Rare WBC's    Gram Stain (Respiratory) Few yeast    Gram Stain (Respiratory) Rare Gram positive rods                                    4/10 Urine culture          4/11 Resp culture         Provider, please clarify if there is any correlation between Sepsis  and Escherichia coli .           Are the conditions:     [ X ] Due to or associated with each other     [  ] Unrelated to each other     [  ] Other (Please Specify): _________________________     [  ] Clinically Undetermined

## 2018-04-17 NOTE — PHYSICIAN QUERY
PT Name: Jennifer Mathews  MR #: [id number]    Physician Query Form - Cause and Effect Relationship Clarification      CDS/: Hayley Baltazar RN              Contact information: [phone]    This form is a permanent document in the medical record.     Query Date: April 17, 2018    By submitting this query, we are merely seeking further clarification of documentation. Please utilize your independent clinical judgment when addressing the question(s) below.    The Medical record contains the following:  Supporting Clinical Findings   Location in record   Admitted for shortness of breath, treated for pneumonia and UTI                                                                                                                        4/15 DC summary      ESCHERICHIA COLI   >100,000 cfu/ml   No other significant isolate        Meropenem IVPB  Moxifloxacin IVPB  Vancomycin IVPB                                                                                                                                                                                     4/10 Urine culture        4/10-11 MAR    4/11- 4/12 MAR         Provider, please clarify if there is any correlation between UTI  and Ecoli .           Are the conditions:     [ x ] Due to or associated with each other     [  ] Unrelated to each other     [  ] Other (Please Specify): _________________________     [  ] Clinically Undetermined

## 2018-04-17 NOTE — PROGRESS NOTES
Patient's INR is supra therapeutic today at 4.0.  Recent hospital discharge from 4/15/2018.  Reports no active bleeding at present.  Instructed to hold dose today, take 2.5 mg on Wednesday, and recheck on Thursday, 4/19/2018.

## 2018-04-17 NOTE — TELEPHONE ENCOUNTER
BMP stable. Thyroid values are improved. She needs to make sure she is seeing her PCP for management of thyroid disease. Her Synthroid dose may need to be adjusted. We will continue to watch it as well, especially with being on Amiodarone

## 2018-04-19 ENCOUNTER — HOSPITAL ENCOUNTER (OUTPATIENT)
Dept: RADIOLOGY | Facility: HOSPITAL | Age: 83
Discharge: HOME | End: 2018-04-19
Attending: FAMILY MEDICINE
Payer: MEDICARE

## 2018-04-19 ENCOUNTER — ANTI-COAG VISIT (OUTPATIENT)
Dept: CARDIOLOGY | Facility: CLINIC | Age: 83
End: 2018-04-19
Payer: MEDICARE

## 2018-04-19 ENCOUNTER — OFFICE VISIT (OUTPATIENT)
Dept: INTERNAL MEDICINE | Facility: CLINIC | Age: 83
End: 2018-04-19
Payer: MEDICARE

## 2018-04-19 ENCOUNTER — OFFICE VISIT (OUTPATIENT)
Dept: CARDIOLOGY | Facility: CLINIC | Age: 83
End: 2018-04-19
Payer: MEDICARE

## 2018-04-19 VITALS
HEIGHT: 62 IN | OXYGEN SATURATION: 94 % | WEIGHT: 124.13 LBS | BODY MASS INDEX: 22.84 KG/M2 | DIASTOLIC BLOOD PRESSURE: 60 MMHG | TEMPERATURE: 98 F | SYSTOLIC BLOOD PRESSURE: 154 MMHG

## 2018-04-19 VITALS
WEIGHT: 124.13 LBS | HEART RATE: 66 BPM | DIASTOLIC BLOOD PRESSURE: 60 MMHG | BODY MASS INDEX: 22.84 KG/M2 | SYSTOLIC BLOOD PRESSURE: 160 MMHG | HEIGHT: 62 IN

## 2018-04-19 DIAGNOSIS — G47.33 OSA ON CPAP: ICD-10-CM

## 2018-04-19 DIAGNOSIS — I50.42 CHRONIC COMBINED SYSTOLIC AND DIASTOLIC CONGESTIVE HEART FAILURE: ICD-10-CM

## 2018-04-19 DIAGNOSIS — Z95.810 CARDIAC RESYNCHRONIZATION THERAPY DEFIBRILLATOR (CRT-D) IN PLACE: ICD-10-CM

## 2018-04-19 DIAGNOSIS — N18.30 CKD (CHRONIC KIDNEY DISEASE) STAGE 3, GFR 30-59 ML/MIN: ICD-10-CM

## 2018-04-19 DIAGNOSIS — Z95.2 S/P MVR (MITRAL VALVE REPLACEMENT): ICD-10-CM

## 2018-04-19 DIAGNOSIS — I25.5 ISCHEMIC CARDIOMYOPATHY: Primary | ICD-10-CM

## 2018-04-19 DIAGNOSIS — Z95.0 S/P PLACEMENT OF CARDIAC PACEMAKER: ICD-10-CM

## 2018-04-19 DIAGNOSIS — D64.9 ANEMIA, UNSPECIFIED TYPE: ICD-10-CM

## 2018-04-19 DIAGNOSIS — I48.0 PAROXYSMAL ATRIAL FIBRILLATION: ICD-10-CM

## 2018-04-19 DIAGNOSIS — G47.33 OSA (OBSTRUCTIVE SLEEP APNEA): Chronic | ICD-10-CM

## 2018-04-19 DIAGNOSIS — Z79.01 LONG TERM (CURRENT) USE OF ANTICOAGULANTS: Primary | ICD-10-CM

## 2018-04-19 DIAGNOSIS — E03.9 ACQUIRED HYPOTHYROIDISM: ICD-10-CM

## 2018-04-19 DIAGNOSIS — I10 ESSENTIAL HYPERTENSION: ICD-10-CM

## 2018-04-19 DIAGNOSIS — Z79.01 ANTICOAGULATED ON COUMADIN: ICD-10-CM

## 2018-04-19 DIAGNOSIS — I48.91 ATRIAL FIBRILLATION, UNSPECIFIED TYPE: ICD-10-CM

## 2018-04-19 DIAGNOSIS — J18.9 PNEUMONIA OF RIGHT UPPER LOBE DUE TO INFECTIOUS ORGANISM: Primary | ICD-10-CM

## 2018-04-19 DIAGNOSIS — J18.9 PNEUMONIA OF RIGHT UPPER LOBE DUE TO INFECTIOUS ORGANISM: ICD-10-CM

## 2018-04-19 LAB — INR PPP: 2.8 (ref 2–3)

## 2018-04-19 PROCEDURE — 99999 PR PBB SHADOW E&M-EST. PATIENT-LVL IV: CPT | Mod: PBBFAC,,, | Performed by: FAMILY MEDICINE

## 2018-04-19 PROCEDURE — 99213 OFFICE O/P EST LOW 20 MIN: CPT | Mod: PBBFAC,25 | Performed by: NURSE PRACTITIONER

## 2018-04-19 PROCEDURE — 99999 PR PBB SHADOW E&M-EST. PATIENT-LVL III: CPT | Mod: PBBFAC,,, | Performed by: NURSE PRACTITIONER

## 2018-04-19 PROCEDURE — 99495 TRANSJ CARE MGMT MOD F2F 14D: CPT | Mod: PBBFAC | Performed by: FAMILY MEDICINE

## 2018-04-19 PROCEDURE — 99214 OFFICE O/P EST MOD 30 MIN: CPT | Mod: S$PBB,,, | Performed by: NURSE PRACTITIONER

## 2018-04-19 PROCEDURE — 85610 PROTHROMBIN TIME: CPT | Mod: PBBFAC

## 2018-04-19 PROCEDURE — 71046 X-RAY EXAM CHEST 2 VIEWS: CPT | Mod: 26,,, | Performed by: RADIOLOGY

## 2018-04-19 PROCEDURE — 99214 OFFICE O/P EST MOD 30 MIN: CPT | Mod: PBBFAC,25,27 | Performed by: FAMILY MEDICINE

## 2018-04-19 PROCEDURE — 99495 TRANSJ CARE MGMT MOD F2F 14D: CPT | Mod: S$PBB,,, | Performed by: FAMILY MEDICINE

## 2018-04-19 PROCEDURE — 71046 X-RAY EXAM CHEST 2 VIEWS: CPT | Mod: TC

## 2018-04-19 RX ORDER — FUROSEMIDE 40 MG/1
60 TABLET ORAL DAILY
Qty: 180 TABLET | Refills: 3
Start: 2018-04-19 | End: 2018-04-30

## 2018-04-19 NOTE — PROGRESS NOTES
Patient's INR is therapeutic at 2.8.  Will start new dosage of 2.5 mg Monday through Friday and 1.25 mg (1/2 tablet) on Saturday and Sunday.  Recheck on Tuesday, 4/24/2018.

## 2018-04-19 NOTE — PROGRESS NOTES
Subjective:   Patient ID:  Jennifer Mathews is a 85 y.o. female who presents for follow up of Atrial Fibrillation; Congestive Heart Failure; and Hypertension      HPI  Patient presents to clinic for management of cardiomyopathy. She has PMH of mitral valve replacement x 3, HTN, PAF, ACS, combined CHF, PPM-CRT-D placement, s/p MVR, edema, pacemaker placement, CKD. Had 2D echo 25% on most recent echo. No chest pain, SOB, orthopnea, PND, GAR, dizziness, near syncope or syncope. Does complain of lower extremity edema that extends to her knees. Patient feels that her legs began swelling after taking Amiodarone.  Denies any CNS Complaints to suggest TIA or CVA. Recently admitted to [facility] [place] and treated for pneumonia and UTI. Patient is compliant with sodium intake. Unable to wear compression stockings due to her legs being too swollen and arthritis in her hands. Has no abnormal bleeding on Coumadin. INR 2.8 today.     Past Medical History:   Diagnosis Date    Anemia     Anticoagulated on Coumadin 7/13/2015    Arthritis     Atrial fibrillation     Basal cell carcinoma 10/2015    left neck    Cardiac arrest     Cardiac resynchronization therapy defibrillator (CRT-D) in place 07/13/2015    Pt denies, states it does not shock me    Chronic combined systolic and diastolic congestive heart failure     CKD (chronic kidney disease) stage 3, GFR 30-59 ml/min     Dyslipidemia 1/30/2014    Hypertension     Ischemic cardiomyopathy 01/30/2014    Macular hole of left eye     Old    Macular hole of left eye     LEV (obstructive sleep apnea) 9/30/2013    Recurrent UTI     Refractive error     Stroke        Past Surgical History:   Procedure Laterality Date    APPENDECTOMY      CARDIAC PACEMAKER PLACEMENT  2014    CATARACT EXTRACTION      OU    CHOLECYSTECTOMY      COLONOSCOPY      MITRAL VALVE REPLACEMENT  2014    MITRAL VALVE SURGERY      x3       Social History   Substance Use Topics    Smoking status: Never  Smoker    Smokeless tobacco: Never Used    Alcohol use No       Family History   Problem Relation Age of Onset    Coronary artery disease Mother      mi    Coronary artery disease Father     Diabetes Brother     Cancer Brother     Melanoma Neg Hx     Psoriasis Neg Hx     Lupus Neg Hx     Eczema Neg Hx        Current Outpatient Prescriptions   Medication Sig    acetaminophen (TYLENOL EXTRA STRENGTH) 325 MG tablet Take 2 tablets (650 mg total) by mouth every 8 (eight) hours. (Patient taking differently: Take 650 mg by mouth 3 (three) times daily as needed. )    allopurinol (ZYLOPRIM) 100 MG tablet Take 2 tablets (200 mg total) by mouth once daily.    amiodarone (PACERONE) 200 MG Tab Take 1 tablet (200 mg total) by mouth once daily.    aspirin (ECOTRIN) 81 MG EC tablet Take 81 mg by mouth once daily.    calcium carbonate (OS-SHERRY) 600 mg calcium (1,500 mg) Tab Take 600 mg by mouth once.    carvedilol (COREG) 25 MG tablet Take 1 tablet (25 mg total) by mouth 2 (two) times daily with meals.    colchicine 0.6 mg Cap Take 1 capsule (0.6 mg total) by mouth once daily.    cranberry 1,000 mg Cap Take 1 capsule by mouth Daily.    digoxin (LANOXIN) 125 mcg tablet Take 1 tablet (125 mcg total) by mouth once daily. TAKE 1 TABLET (0.125 MG TOTAL) BY MOUTH ONCE DAILY.    furosemide (LASIX) 40 MG tablet Take 1 tablet (40 mg total) by mouth once daily. (Patient taking differently: Take 40 mg by mouth 2 (two) times daily. )    gabapentin (NEURONTIN) 100 MG capsule TAKE ONE CAPSULE BY MOUTH TWO TIMES DAILY    Lactobac 40-Bifido 3-S.thermop (PROBIOTIC) 100 billion cell Cap Take 1 capsule by mouth once daily.    levothyroxine (SYNTHROID) 50 MCG tablet Take 1 tablet (50 mcg total) by mouth once daily.    MULTIVITAMIN ORAL Take 1 tablet by mouth Daily.    sacubitril-valsartan (ENTRESTO) 49-51 mg per tablet Take 1 tablet by mouth 2 (two) times daily.    warfarin (COUMADIN) 2.5 MG tablet Take 1 tablet (2.5 mg total)  by mouth once. (Patient taking differently: Take 2.5 mg by mouth once. Take 1 tablet by mouth every evening Monday through Friday and 1/2 tablet on Saturday and Sunday as directed by the [facility].)     Current Facility-Administered Medications   Medication    denosumab (PROLIA) injection 60 mg     Current Outpatient Prescriptions on File Prior to Visit   Medication Sig    acetaminophen (TYLENOL EXTRA STRENGTH) 325 MG tablet Take 2 tablets (650 mg total) by mouth every 8 (eight) hours. (Patient taking differently: Take 650 mg by mouth 3 (three) times daily as needed. )    allopurinol (ZYLOPRIM) 100 MG tablet Take 2 tablets (200 mg total) by mouth once daily.    amiodarone (PACERONE) 200 MG Tab Take 1 tablet (200 mg total) by mouth once daily.    aspirin (ECOTRIN) 81 MG EC tablet Take 81 mg by mouth once daily.    calcium carbonate (OS-SHERRY) 600 mg calcium (1,500 mg) Tab Take 600 mg by mouth once.    carvedilol (COREG) 25 MG tablet Take 1 tablet (25 mg total) by mouth 2 (two) times daily with meals.    colchicine 0.6 mg Cap Take 1 capsule (0.6 mg total) by mouth once daily.    cranberry 1,000 mg Cap Take 1 capsule by mouth Daily.    digoxin (LANOXIN) 125 mcg tablet Take 1 tablet (125 mcg total) by mouth once daily. TAKE 1 TABLET (0.125 MG TOTAL) BY MOUTH ONCE DAILY.    furosemide (LASIX) 40 MG tablet Take 1 tablet (40 mg total) by mouth once daily. (Patient taking differently: Take 40 mg by mouth 2 (two) times daily. )    gabapentin (NEURONTIN) 100 MG capsule TAKE ONE CAPSULE BY MOUTH TWO TIMES DAILY    Lactobac 40-Bifido 3-S.thermop (PROBIOTIC) 100 billion cell Cap Take 1 capsule by mouth once daily.    levothyroxine (SYNTHROID) 50 MCG tablet Take 1 tablet (50 mcg total) by mouth once daily.    MULTIVITAMIN ORAL Take 1 tablet by mouth Daily.    sacubitril-valsartan (ENTRESTO) 49-51 mg per tablet Take 1 tablet by mouth 2 (two) times daily.    warfarin (COUMADIN) 2.5 MG tablet Take 1 tablet (2.5  mg total) by mouth once. (Patient taking differently: Take 2.5 mg by mouth once. Take 1 tablet by mouth every evening Monday through Friday and 1/2 tablet on Saturday and Sunday as directed by the [facility]madin Clinic.)     Current Facility-Administered Medications on File Prior to Visit   Medication    denosumab (PROLIA) injection 60 mg       Review of Systems   Constitution: Negative for chills, decreased appetite, fever, weakness and malaise/fatigue.   HENT: Negative for congestion, hoarse voice and sore throat.    Eyes: Negative for blurred vision and discharge.   Cardiovascular: Positive for leg swelling. Negative for chest pain, claudication, cyanosis, dyspnea on exertion, irregular heartbeat, near-syncope, orthopnea, palpitations and paroxysmal nocturnal dyspnea.   Respiratory: Negative for cough, hemoptysis, shortness of breath, snoring, sputum production and wheezing.    Endocrine: Negative for cold intolerance and heat intolerance.   Hematologic/Lymphatic: Negative for bleeding problem. Does not bruise/bleed easily.   Skin: Negative for rash.   Musculoskeletal: Negative for arthritis, back pain, joint pain, joint swelling, muscle cramps, muscle weakness and myalgias.   Gastrointestinal: Negative for abdominal pain, constipation, diarrhea, heartburn, melena and nausea.   Genitourinary: Negative for hematuria.   Neurological: Negative for dizziness, focal weakness, headaches, light-headedness, loss of balance, numbness, paresthesias and seizures.   Psychiatric/Behavioral: Negative for memory loss. The patient does not have insomnia.    Allergic/Immunologic: Negative for hives.       Objective:   Physical Exam   Constitutional: She is oriented to person, place, and time. She appears well-developed and well-nourished. No distress.   HENT:   Head: Normocephalic and atraumatic.   Eyes: Pupils are equal, round, and reactive to light.   Neck: Neck supple. No JVD present.   Cardiovascular: Normal rate, regular rhythm,  S1 normal, S2 normal and normal heart sounds.    No murmur heard.  Pulmonary/Chest: Effort normal and breath sounds normal. No respiratory distress. She has no wheezes. She has no rales.   cabg site well-healed  PPM site well-healed   Abdominal: Soft. She exhibits no distension. There is no tenderness. There is no rebound.   Musculoskeletal: She exhibits edema (2+ BLE).   Neurological: She is alert and oriented to person, place, and time.   Skin: Skin is warm and dry. She is not diaphoretic. No erythema.   CVI changes BLE     Psychiatric: She has a normal mood and affect. Her behavior is normal. Thought content normal.   Nursing note and vitals reviewed.               There were no vitals filed for this visit.  Lab Results   Component Value Date    CHOL 155 03/20/2018    CHOL 154 01/29/2018    CHOL 157 08/15/2017     Lab Results   Component Value Date    HDL 36 (L) 03/20/2018    HDL 34 (L) 01/29/2018    HDL 32 (L) 08/15/2017     Lab Results   Component Value Date    LDLCALC 90.8 03/20/2018    LDLCALC 79.0 01/29/2018    LDLCALC 85.8 08/15/2017     Lab Results   Component Value Date    TRIG 141 03/20/2018    TRIG 205 (H) 01/29/2018    TRIG 196 (H) 08/15/2017     Lab Results   Component Value Date    CHOLHDL 23.2 03/20/2018    CHOLHDL 22.1 01/29/2018    CHOLHDL 20.4 08/15/2017       Chemistry        Component Value Date/Time     04/17/2018 1010    K 5.0 04/17/2018 1010     04/17/2018 1010    CO2 24 04/17/2018 1010    BUN 27 (H) 04/17/2018 1010    CREATININE 0.9 04/17/2018 1010     (H) 04/17/2018 1010        Component Value Date/Time    CALCIUM 8.4 (L) 04/17/2018 1010    ALKPHOS 66 04/10/2018 1330    AST 21 04/10/2018 1330    ALT 19 04/10/2018 1330    BILITOT 1.3 (H) 04/10/2018 1330    ESTGFRAFRICA >60 04/17/2018 1010    EGFRNONAA 58 (A) 04/17/2018 1010          Lab Results   Component Value Date    TSH 4.831 (H) 04/17/2018     Lab Results   Component Value Date    INR 2.8 04/19/2018    INR 4.0 (A)  04/17/2018    INR 2.7 (H) 04/15/2018     Lab Results   Component Value Date    WBC 6.66 04/15/2018    HGB 8.1 (L) 04/15/2018    HCT 28.9 (L) 04/15/2018    MCV 85 04/15/2018     04/15/2018     BMP  Sodium   Date Value Ref Range Status   04/17/2018 143 136 - 145 mmol/L Final     Potassium   Date Value Ref Range Status   04/17/2018 5.0 3.5 - 5.1 mmol/L Final     Chloride   Date Value Ref Range Status   04/17/2018 109 95 - 110 mmol/L Final     CO2   Date Value Ref Range Status   04/17/2018 24 23 - 29 mmol/L Final     BUN, Bld   Date Value Ref Range Status   04/17/2018 27 (H) 8 - 23 mg/dL Final     Creatinine   Date Value Ref Range Status   04/17/2018 0.9 0.5 - 1.4 mg/dL Final     Calcium   Date Value Ref Range Status   04/17/2018 8.4 (L) 8.7 - 10.5 mg/dL Final     Anion Gap   Date Value Ref Range Status   04/17/2018 10 8 - 16 mmol/L Final     eGFR if    Date Value Ref Range Status   04/17/2018 >60 >60 mL/min/1.73 m^2 Final     eGFR if non    Date Value Ref Range Status   04/17/2018 58 (A) >60 mL/min/1.73 m^2 Final     Comment:     Calculation used to obtain the estimated glomerular filtration  rate (eGFR) is the CKD-EPI equation.        CrCl cannot be calculated (Unknown ideal weight.).    CONCLUSIONS     1 - Severely depressed left ventricular systolic function (EF 25%).     2 - Normal right ventricular systolic function .     3 - Mild aortic regurgitation.     4 - Mitral valve prosthesis.     5 - Moderate tricuspid regurgitation.     6 - Increased central venous pressure.     7 - Severe left atrial enlargement.     8 - Concentric hypertrophy.     9 - Pulmonary hypertension. The estimated PA systolic pressure is 62 mmHg.             This document has been electronically    SIGNED BY: Juliano Sevilla MD On: 03/01/2018 17:47      Assessment:     1. Ischemic cardiomyopathy    2. Chronic combined systolic and diastolic congestive heart failure    3. Essential hypertension    4. S/P MVR  (mitral valve replacement)    5. S/P placement of cardiac pacemaker    6. Paroxysmal atrial fibrillation    7. Cardiac resynchronization therapy defibrillator (CRT-D) in place    8. CKD (chronic kidney disease) stage 3, GFR 30-59 ml/min    9. Anticoagulated on Coumadin    10. Acquired hypothyroidism    11. LEV (obstructive sleep apnea)      Still having significant LE edema   BP stable  No CNS complaints to suggest TIA    No abnormal bleeding on coumadin  Renal function stable   BMP pending   Good med and diet compliance  Plan:     Increase Lasix 60mg BID over the weekend  Phone review on Monday  Heart healthy diet  Limit fluid intake 50-60 oz   Daily weights and to notify clinic if weight increases by more than 3 lbs in 1 day or 5 lbs in 1 week.   Exercise routine as tolerated  Will discuss return based on phone review.

## 2018-04-20 ENCOUNTER — PROCEDURE VISIT (OUTPATIENT)
Dept: PULMONOLOGY | Facility: CLINIC | Age: 83
End: 2018-04-20
Payer: MEDICARE

## 2018-04-20 ENCOUNTER — OFFICE VISIT (OUTPATIENT)
Dept: PULMONOLOGY | Facility: CLINIC | Age: 83
End: 2018-04-20
Payer: MEDICARE

## 2018-04-20 VITALS
HEIGHT: 62 IN | OXYGEN SATURATION: 97 % | SYSTOLIC BLOOD PRESSURE: 136 MMHG | HEART RATE: 75 BPM | RESPIRATION RATE: 18 BRPM | DIASTOLIC BLOOD PRESSURE: 52 MMHG | BODY MASS INDEX: 22.44 KG/M2 | WEIGHT: 121.94 LBS

## 2018-04-20 DIAGNOSIS — R09.02 HYPOXEMIA: Chronic | ICD-10-CM

## 2018-04-20 DIAGNOSIS — J18.9 PNEUMONIA OF RIGHT UPPER LOBE DUE TO INFECTIOUS ORGANISM: Primary | Chronic | ICD-10-CM

## 2018-04-20 DIAGNOSIS — Z79.01 LONG TERM CURRENT USE OF ANTICOAGULANT THERAPY: ICD-10-CM

## 2018-04-20 DIAGNOSIS — R79.89 ELEVATED PLATELET COUNT: ICD-10-CM

## 2018-04-20 DIAGNOSIS — Z87.19 HISTORY OF GI BLEED: ICD-10-CM

## 2018-04-20 DIAGNOSIS — D64.9 ANEMIA, UNSPECIFIED TYPE: Primary | ICD-10-CM

## 2018-04-20 DIAGNOSIS — G47.33 OSA (OBSTRUCTIVE SLEEP APNEA): Chronic | ICD-10-CM

## 2018-04-20 PROCEDURE — 99213 OFFICE O/P EST LOW 20 MIN: CPT | Mod: PBBFAC | Performed by: INTERNAL MEDICINE

## 2018-04-20 PROCEDURE — 99215 OFFICE O/P EST HI 40 MIN: CPT | Mod: S$PBB,,, | Performed by: INTERNAL MEDICINE

## 2018-04-20 PROCEDURE — 99999 PR PBB SHADOW E&M-EST. PATIENT-LVL III: CPT | Mod: PBBFAC,,, | Performed by: INTERNAL MEDICINE

## 2018-04-20 RX ORDER — DOXYCYCLINE 100 MG/1
100 CAPSULE ORAL EVERY 12 HOURS
Qty: 10 CAPSULE | Refills: 0 | Status: SHIPPED | OUTPATIENT
Start: 2018-04-20 | End: 2018-04-25

## 2018-04-20 RX ORDER — WARFARIN 2.5 MG/1
TABLET ORAL
Qty: 30 TABLET | Refills: 3 | Status: SHIPPED | OUTPATIENT
Start: 2018-04-20 | End: 2018-08-06 | Stop reason: SDUPTHER

## 2018-04-20 NOTE — PROGRESS NOTES
Oxygen Evaluation    1) Patient's O2 Sat on room air while at rest: 95%        If at rest Sat is 89% or above, continue.    2) Patient's O2 Sat on room air while exercisin%         If exercise Sat is 88% or below, continue.    3) Patient's O2 Sat while exercising on O2:  at  LPM         (Must show improvement from #2 for patients to qualify)    If exercise Sat on O2 shows improvement from #2, this patient qualifies for portable Oxygen.  If not, the patient does not qualify.

## 2018-04-20 NOTE — LETTER
April 20, 2018      Desirae Bello MD  [address] [place] [address] [address] [place] [id number]           [facility]  [address] [place] [address] [address]  [place] [address]  Phone: [phone]  Fax: [phone]          Patient: Jennifer Mathews   MR Number: [id number]   YOB: 1932   Date of Visit: 4/20/2018       Dear Dr. Desirae Bello:    Thank you for referring Jennifer Mathews to me for evaluation. Attached you will find relevant portions of my assessment and plan of care.    If you have questions, please do not hesitate to call me. I look forward to following Jennifer Mathews along with you.    Sincerely,    Kevin Ngo MD    Enclosure  CC:  No Recipients    If you would like to receive this communication electronically, please contact [email or web address] or [phone] to request more information on [facility] Link access.    For providers and/or their staff who would like to refer a patient to [facility], please contact us through our one-stop-shop provider referral line, [facility] , at [phone].    If you feel you have received this communication in error or would no longer like to receive these types of communications, please e-mail [email or web address]

## 2018-04-20 NOTE — ASSESSMENT & PLAN NOTE
During exercise, there was no significant desaturation while breathing room air.    DISCONTINUE HOME OXYGEN

## 2018-04-20 NOTE — ASSESSMENT & PLAN NOTE
Continue APAP of 4 - 20 CMWP. ([facility]     Discussed therapeutic goals for positive airway pressure therapy(CPAP or BiPAP): Ideal is usage 100% of nights for 6 - 8 hours per night. Minimum usage is 70% of night for at least 4 hours per night used. Pateint expressed understanding. All Questions answered.    Complaince evaluation in 2 months.

## 2018-04-20 NOTE — PROGRESS NOTES
Subjective:      Patient ID: Jennifer Mathews is a 85 y.o. female.  Patient Active Problem List   Diagnosis    LEV (obstructive sleep apnea)    Chronic combined systolic and diastolic congestive heart failure    Edema    Recurrent UTI    Hypertension    Dyslipidemia    S/P MVR (mitral valve replacement)    S/P placement of cardiac pacemaker    Multiple pelvic fractures    Shoulder pain, acute    Coagulopathy    Syncope    Bilateral lower extremity edema    CKD (chronic kidney disease) stage 3, GFR 30-59 ml/min    A-fib    Cardiac resynchronization therapy defibrillator (CRT-D) in place    Ischemic cardiomyopathy    Anticoagulated on Coumadin    Osteopenia with high risk of fracture    Osteoarthritis of hand    Pulmonary hypertension due to left heart disease    Primary osteoarthritis involving multiple joints    Hypoxemia    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    Chief Complaint: Sleep Apnea and Pneumonia        HPI    She was admitted at [facility] from 04/10/2018 - 04/15/2018 for UTI, pneumonia. Successfully treated with IV antibiotics. Discharged home with. Referred to pulmonary by her PCP to evaluate her oxygen requirements. CXR by her PCP revealed radiological improvement in her Pneumonia - RUL.     Patients reports NYHA II dyspnea    The patient does not have currently have symptoms / an exacerbation.       No cough, sputum, or hemoptysis, No shortness or breath and No recent change in breathing.      EXERCISE OXIMETRY;     O2 Sat % Supplemental Oxygen Heart Rate   Pre-exercise  (Resting) 95 N/A  70   Minute 1 92 N/A 74   Minute 2 91 N/A 86   Minute 3 94 N/A 90   Minute 4 93 N/A 101   Minute 5  95 N/A 100   Minute 6 92 N/A 102     Recovery Time: n/a    Performing nurse/viviane:ED Loo    CLINICAL INTERPRETATION:    During exercise, there was no significant desaturation while breathing room air.    She is on AUTOPAP 4 - 20 CMWP. She definitely thinks PAP is beneficial to her health and she wants to continue with PAP therapy.    Compliance Summary  1/20/2018 - 4/19/2018 (90 days)  Days with Device Usage 69 days  Days without Device Usage 21 days  Percent Days with Device Usage 76.7%  Cumulative Usage 14 days 17 hrs. 32 mins. 22 secs.  Maximum Usage (1 Day) 8 hrs. 47 mins. 47 secs.  Average Usage (All Days) 3 hrs. 55 mins. 41 secs.  Average Usage (Days Used) 5 hrs. 7 mins. 25 secs.  Minimum Usage (1 Day) 37 secs.  Percent of Days with Usage >= 4 Hours 57.8%  Percent of Days with Usage < 4 Hours 42.2%  Date Range  Total Blower Time 14 days 20 hrs. 18 mins. 46 secs.  Average AHI 3.4  Auto-CPAP Summary ([facility])  Auto-CPAP Mean Pressure 5.4 cmH2O  Auto-CPAP Peak Average Pressure 8.7 cmH2O  Average Device Pressure <= 90% of Time 7.2 cmH2O  Average Time in Large Leak Per Day 1 mins. 39 secs.  [place] Sleepiness Scale   EPWORTH SLEEPINESS SCALE 4/20/2018 6/22/2017 6/15/2016 4/13/2016 2/29/2016   Sitting and reading 0 0 1 1 3   Watching TV 1 1 1 3 3   Sitting, inactive in a public place (e.g. a theatre or a meeting) 0 0 0 0 0   As a passenger in a car for an hour without a break 0 0 0 0 3   Lying down to rest in the afternoon when circumstances permit 2 1 2 0 3   Sitting and talking to someone 0 0 0 1 2   Sitting quietly after a lunch without alcohol 1 0 2 2 1   In a car, while stopped for a few minutes in traffic 0 0 0 0 0   Total score 4 2 6 7 15       A full  review of systems, past , family  and social histories was performed except as mentioned in the note above, these are non contributory to the main  issues discussed today.       Previous Report Reviewed: office notes and radiology reports     The following portions of the patient's history were reviewed and updated as appropriate: She  has a past medical history of Anemia; Anticoagulated on Coumadin (7/13/2015); Arthritis; Atrial fibrillation; Basal cell carcinoma (10/2015); Cardiac arrest; Cardiac resynchronization therapy defibrillator (CRT-D) in place (07/13/2015); Chronic combined systolic and diastolic congestive heart failure; CKD (chronic kidney disease) stage 3, GFR 30-59 ml/min; Dyslipidemia (1/30/2014); Hypertension; Ischemic cardiomyopathy (01/30/2014); Macular hole of left eye; Macular hole of left eye; LEV (obstructive sleep apnea) (9/30/2013); Recurrent UTI; Refractive error; and Stroke.  She  has a past surgical history that includes Colonoscopy; Mitral valve surgery; Cataract extraction; Cholecystectomy; Appendectomy; Cardiac pacemaker placement (2014); and Mitral valve replacement (2014).  Her family history includes Cancer in her brother; Coronary artery disease in her father and mother; Diabetes in her brother.  She  reports that she has never smoked. She has never used smokeless tobacco. She reports that she does not drink alcohol or use drugs.  She has a current medication list which includes the following prescription(s): acetaminophen, allopurinol, amiodarone, aspirin, calcium carbonate, carvedilol, colchicine, cranberry, digoxin, furosemide, gabapentin, lactobac 40-bifido 3-s.thermop, levothyroxine, multivitamin, sacubitril-valsartan, warfarin, and doxycycline, and the following Facility-Administered Medications: denosumab.  She is allergic to cephalosporins; metaxalone; penicillins; pregabalin; and sulfa (sulfonamide antibiotics)..    Review of Systems   Constitutional: Negative for fever, chills, fatigue and night sweats.   HENT: Negative for nosebleeds, postnasal drip, rhinorrhea, sinus pressure, sore throat, congestion, ear pain and  "hearing loss.    Eyes: Negative for itching.   Respiratory: Positive for dyspnea on extertion and somnolence. Negative for wheezing, orthopnea and Paroxysmal Nocturnal Dyspnea.    Cardiovascular: Positive for leg swelling. Negative for chest pain and palpitations.   Genitourinary: Negative for difficulty urinating and hematuria.   Endocrine: Negative for cold intolerance and heat intolerance.    Musculoskeletal: Positive for arthralgias and back pain.   Skin: Negative for rash.   Gastrointestinal: Negative for nausea, vomiting, abdominal pain, abdominal distention and acid reflux.   Neurological: Positive for light-headedness. Negative for dizziness, syncope and headaches.   Hematological: Bleeds easily and excessive bruising.   Psychiatric/Behavioral: The patient is nervous/anxious.    All other systems reviewed and are negative.       Objective:   BP (!) 136/52   Pulse 75   Resp 18   Ht 5' 2" (1.575 m)   Wt 55.3 kg (121 lb 14.6 oz)   SpO2 97%   BMI 22.30 kg/m²   Body mass index is 22.3 kg/m².    Physical Exam   Constitutional: She appears well-developed and well-nourished.   HENT:   Head: Normocephalic and atraumatic.   Eyes: EOM are normal. Pupils are equal, round, and reactive to light.   Neck: Normal range of motion. Neck supple.   Cardiovascular: Normal rate and regular rhythm.    Pulmonary/Chest: Effort normal and breath sounds normal.   Abdominal: Soft. Bowel sounds are normal.   Musculoskeletal: Normal range of motion. She exhibits edema.   Skin: Skin is warm and dry.   Psychiatric: She has a normal mood and affect. Her behavior is normal.   Nursing note and vitals reviewed.      Personal Diagnostic Review  Chest x-ray: There is persistent consolidation noted within the right upper lung zone although the area of consolidation is less dense and smaller when compared to the prior exam.  Continued follow-up to resolution is recommended.  The heart remains enlarged.  A multi lead pacing device is noted. "  Cardiac valve replacement noted.  Small bilateral pleural effusions suspected.      EXERCISE OXIMETRY;     O2 Sat % Supplemental Oxygen Heart Rate   Pre-exercise  (Resting) 95 N/A 70   Minute 1 92 N/A 74   Minute 2 91 N/A 86   Minute 3 94 N/A 90   Minute 4 93 N/A 101   Minute 5  95 N/A 100   Minute 6 92 N/A 102     Recovery Time: n/a    Performing nurse/tech:ED Loo    CLINICAL INTERPRETATION:    During exercise, there was no significant desaturation while breathing room air.      Assessment / plan:     Discussed diagnosis, its evaluation, treatment and usual course. All questions answered.    Problem List Items Addressed This Visit        Pulmonary    Hypoxemia    Current Assessment & Plan     During exercise, there was no significant desaturation while breathing room air.    DISCONTINUE HOME OXYGEN         Relevant Orders    Six Minute Walk Test to qualify for Home Oxygen    HME - OTHER    Pneumonia of right upper lobe due to infectious organism  - Primary    Current Assessment & Plan     Clinically resolved. Radiologically improving.   DOXYCYCLINE 100mg PO BID  X 5 days.   Repeat CXR in 8 weeks.          Relevant Medications    doxycycline (VIBRAMYCIN) 100 MG Cap    Other Relevant Orders    X-Ray Chest PA And Lateral       Other    LEV (obstructive sleep apnea) (Chronic)    Current Assessment & Plan     Continue APAP of 4 - 20 CMWP. (DME - [facility])     Discussed therapeutic goals for positive airway pressure therapy(CPAP or BiPAP): Ideal is usage 100% of nights for 6 - 8 hours per night. Minimum usage is 70% of night for at least 4 hours per night used. Pateint expressed understanding. All Questions answered.    Complaince evaluation in 2 months.                  TIME SPENT WITH PATIENT: Time spent: 45 minutes in face to face  discussion concerning diagnosis, prognosis, review of lab and test results, benefits of treatment as well as management of disease, counseling of patient and coordination of care between  various health  care providers . Greater than half the time spent was used for coordination of care and counseling of patient.     Follow-up in about 2 months (around 6/20/2018) for Pneumonia, LEV, Hypoxemia.

## 2018-04-20 NOTE — PATIENT INSTRUCTIONS
Treating Pneumonia  Pneumonia is an infection of one or both of the lungs. Pneumonia:  · Is usually caused by either a virus or a bacteria  · Can be very serious, especially in infants, young children, and older adults. Its also serious for those with other long-term health problems or weakened immune systems.  · Is sometimes treated at home and sometimes in the hospital    Antibiotic medicines  Antibiotics may be prescribed for pneumonia caused by bacteria. They may be pills (oral medicines), or shots (injections). Or they may be given by IV (intravenously) into a vein. If you are taking oral medicines at home:  · Fill your prescription and start taking your medicine as soon as you can.  · You will likely start to feel better in a day or 2, but dont stop taking the antibiotic.  · Use a pill organizer to help you remember to take your medicine.  · Let your healthcare provider know if you have side effects.  · Take your medicine exactly as directed on the label. Talk to your provider or pharmacist if you have any questions.  Antiviral medicines  Antiviral medicine may be prescribed for pneumonia caused by a virus. For example, antiviral medicine may be prescribed for pneumonia caused by the flu virus. Antibiotics do not work against viruses. If you are taking antiviral medicine at home:  · Fill your prescription and start taking your medicine as soon as you can.  · Talk with your provider or pharmacist about possible side effects.  · Take the medicine exactly as instructed.  To relieve symptoms  There are many medicines that can help relieve symptoms of pneumonia. Some are prescription and some are over-the-counter.  Your healthcare provider may recommend:  · Acetaminophen or ibuprofen to lower your fever and to lessen headache or other pain  · Cough medicine to loosen mucus or to reduce coughing  Make sure you check with your healthcare provider or pharmacist before taking any over-the-counter  medicines.  Special treatments  If you are hospitalized for pneumonia, you may have other therapies, including:  · Inhaled medicines to help with breathing or chest congestion  · Supplemental oxygen to increase low oxygen levels  Drink fluids and eat healthy  You should eat healthy to help your body fight the infection. Drinking a lot of fluids helps to replace fluids lost from fever and to loosen mucus in your chest.  · Diet. Make healthy food choices, including fruits and vegetables, lean meats and other proteins, 100% whole grain and low- or no-fat dairy products.  · Fluids. Drink at least 6 to 8 tall glasses a day. Water and 100% fruit or vegetable juice are best.  Get plenty of rest and sleep  You may be more tired than usual for a while. It is important to get enough sleep at night. Its also important to rest during the day. Talk with your healthcare provider if coughing or other symptoms are interfering with your sleep.  Preventing the spread of germs  The best thing you can do to prevent spreading germs is to wash your hands often. You should:  · Rub your hand with soap and water for 20 to 30 seconds.  · Clean in between your fingers, the backs of your hands, and around your nails.  · Dry your hands on a separate towel or use paper towels.  You should also:  · Keep alcohol-based hand  nearby.  · Make sure you also clean surfaces that you touch. Use a product that kills all types of germs.  · Stay away from others until you are feeling better.  When to call your healthcare provider  Call your healthcare provider if you have any of the following:  · Symptoms get worse  · Fever continues  · Shortness of breath gets worse  · Increased mucus or mucus that is darker in color  · Coughing gets worse  · Lips or fingers are bluish in color  · Side effects from your medicine   Date Last Reviewed: 12/1/2016  © 6292-1804 [employer]. [address] [place] [address], [place] [address]. All rights reserved.  This information is not intended as a substitute for professional medical care. Always follow your healthcare professional's instructions.

## 2018-04-20 NOTE — ASSESSMENT & PLAN NOTE
Clinically resolved. Radiologically improving.   DOXYCYCLINE 100mg PO BID  X 5 days.   Repeat CXR in 8 weeks.

## 2018-04-23 ENCOUNTER — PATIENT MESSAGE (OUTPATIENT)
Dept: INFUSION THERAPY | Facility: HOSPITAL | Age: 83
End: 2018-04-23

## 2018-04-23 ENCOUNTER — TELEPHONE (OUTPATIENT)
Dept: CARDIOLOGY | Facility: CLINIC | Age: 83
End: 2018-04-23

## 2018-04-24 ENCOUNTER — TELEPHONE (OUTPATIENT)
Dept: FAMILY MEDICINE | Facility: CLINIC | Age: 83
End: 2018-04-24

## 2018-04-24 ENCOUNTER — ANTI-COAG VISIT (OUTPATIENT)
Dept: CARDIOLOGY | Facility: CLINIC | Age: 83
End: 2018-04-24
Payer: MEDICARE

## 2018-04-24 DIAGNOSIS — Z79.01 LONG TERM (CURRENT) USE OF ANTICOAGULANTS: Primary | ICD-10-CM

## 2018-04-24 LAB — INR PPP: 1.9 (ref 2–3)

## 2018-04-24 PROCEDURE — 85610 PROTHROMBIN TIME: CPT | Mod: PBBFAC

## 2018-04-24 NOTE — TELEPHONE ENCOUNTER
I spoke with pt. She reports she has been taking lasix 60mg bid. She took lasix 60mg this AM as well.   Wt today on home scale 116lb, was 121lb in clinic 4/19.   She says she is having increase in UOP but only minimal improvement in LE edema. She says after being up out of bed only a few minutes her edema looks just like it did before.    I let her know I would report to the provider and call back with any new orders.

## 2018-04-24 NOTE — TELEPHONE ENCOUNTER
Patient has questions about her medication. She would like a call back before 10:00am. She states someone was suppose to call her back yesterday, but she did not hear from anyone.

## 2018-04-24 NOTE — PROGRESS NOTES
Patient's INR is slightly low at 1.9.  Reports Doxycycline started on 4/19/2018 and taken last dose this morning.  No changes in dose.  Recheck in 1 week.  Please call should you have any questions or concerns at [phone] or [phone].

## 2018-04-25 ENCOUNTER — CLINICAL SUPPORT (OUTPATIENT)
Dept: CARDIOLOGY | Facility: CLINIC | Age: 83
End: 2018-04-25
Attending: INTERNAL MEDICINE
Payer: MEDICARE

## 2018-04-25 DIAGNOSIS — I25.5 ISCHEMIC CARDIOMYOPATHY: ICD-10-CM

## 2018-04-25 DIAGNOSIS — Z95.0 CARDIAC PACEMAKER IN SITU: ICD-10-CM

## 2018-04-25 DIAGNOSIS — I48.20 CHRONIC ATRIAL FIBRILLATION: ICD-10-CM

## 2018-04-25 PROCEDURE — 93281 PM DEVICE PROGR EVAL MULTI: CPT | Mod: PBBFAC | Performed by: INTERNAL MEDICINE

## 2018-04-25 RX ORDER — METOLAZONE 2.5 MG/1
2.5 TABLET ORAL DAILY PRN
Qty: 30 TABLET | Refills: 11 | Status: ON HOLD | OUTPATIENT
Start: 2018-04-25 | End: 2018-05-05 | Stop reason: HOSPADM

## 2018-04-25 RX ORDER — POTASSIUM CHLORIDE 20 MEQ/1
20 TABLET, EXTENDED RELEASE ORAL DAILY
Qty: 30 TABLET | Refills: 6 | Status: SHIPPED | OUTPATIENT
Start: 2018-04-25 | End: 2018-05-02 | Stop reason: SDUPTHER

## 2018-04-25 NOTE — TELEPHONE ENCOUNTER
Please let patient know that I have reached out to Dr. Chau to see if he thinks the Amio has anything to do with her edema. However, looking at her labs, she had a BNP of 3,000  Almost 2 weeks ago, which indicates that she has lots of fluid on board. I think she needs to diurese more. She should continue Lasix 60mg BID, but add metolazone 2.5mg daily x 2 doses for now. Phone review on Friday with further instructions on metolazone. I will let her know what to do then. She also needs to be taking K+ supplement 20meq daily while on high dose dieretics. I will also let her know when I hear back from Dr. Chau.  Scripts have been sent

## 2018-04-25 NOTE — TELEPHONE ENCOUNTER
I saw pt today in clinic. We discussed orders in detail. All questions were answered. phrev is scheduled Friday to f/u

## 2018-04-27 ENCOUNTER — TELEPHONE (OUTPATIENT)
Dept: CARDIOLOGY | Facility: CLINIC | Age: 83
End: 2018-04-27

## 2018-04-27 DIAGNOSIS — I50.42 CHRONIC COMBINED SYSTOLIC AND DIASTOLIC CONGESTIVE HEART FAILURE: Primary | ICD-10-CM

## 2018-04-27 NOTE — TELEPHONE ENCOUNTER
Hold metolazone and continue Lasix over the weekend. Make sure she is taking the KCL supplement. Needs BMP and BNP on Monday

## 2018-04-27 NOTE — TELEPHONE ENCOUNTER
The patient has been notified of this information and all questions answered.      Pt scheduled for labs on Monday 4/30/18.

## 2018-04-27 NOTE — TELEPHONE ENCOUNTER
I spoke with pt states since taking Lasix 60 mg BID and metolazone 2.5 daily x's 2 doses states edema has improved a lot. Pt states she is also taking K+ supplement 20meq.    Pt states right leg/foot looks normal and left leg and foot has improved a little but looks much better. Pt denies any other symptoms.

## 2018-04-30 ENCOUNTER — TELEPHONE (OUTPATIENT)
Dept: INTERNAL MEDICINE | Facility: CLINIC | Age: 83
End: 2018-04-30

## 2018-04-30 ENCOUNTER — HOSPITAL ENCOUNTER (EMERGENCY)
Facility: HOSPITAL | Age: 83
Discharge: HOME | End: 2018-04-30
Attending: EMERGENCY MEDICINE
Payer: MEDICARE

## 2018-04-30 ENCOUNTER — TELEPHONE (OUTPATIENT)
Dept: CARDIOLOGY | Facility: CLINIC | Age: 83
End: 2018-04-30

## 2018-04-30 VITALS
OXYGEN SATURATION: 97 % | HEART RATE: 69 BPM | RESPIRATION RATE: 13 BRPM | BODY MASS INDEX: 20.37 KG/M2 | DIASTOLIC BLOOD PRESSURE: 64 MMHG | TEMPERATURE: 97 F | WEIGHT: 110.69 LBS | SYSTOLIC BLOOD PRESSURE: 139 MMHG | HEIGHT: 62 IN

## 2018-04-30 DIAGNOSIS — E87.5 HIGH POTASSIUM: ICD-10-CM

## 2018-04-30 DIAGNOSIS — E87.5 HYPERKALEMIA: ICD-10-CM

## 2018-04-30 DIAGNOSIS — N28.9 ACUTE RENAL INSUFFICIENCY: ICD-10-CM

## 2018-04-30 DIAGNOSIS — I48.91 ATRIAL FIBRILLATION, UNSPECIFIED TYPE: ICD-10-CM

## 2018-04-30 DIAGNOSIS — E86.0 DEHYDRATION: Primary | ICD-10-CM

## 2018-04-30 LAB
ALBUMIN SERPL BCP-MCNC: 4 G/DL
ALP SERPL-CCNC: 69 U/L
ALT SERPL W/O P-5'-P-CCNC: 20 U/L
ANION GAP SERPL CALC-SCNC: 15 MMOL/L
AST SERPL-CCNC: 26 U/L
BASOPHILS # BLD AUTO: 0.07 K/UL
BASOPHILS NFR BLD: 1 %
BILIRUB SERPL-MCNC: 0.5 MG/DL
BUN SERPL-MCNC: 60 MG/DL
CALCIUM SERPL-MCNC: 9.5 MG/DL
CHLORIDE SERPL-SCNC: 96 MMOL/L
CO2 SERPL-SCNC: 27 MMOL/L
CREAT SERPL-MCNC: 2.1 MG/DL
DIFFERENTIAL METHOD: ABNORMAL
EOSINOPHIL # BLD AUTO: 0.1 K/UL
EOSINOPHIL NFR BLD: 1.5 %
ERYTHROCYTE [DISTWIDTH] IN BLOOD BY AUTOMATED COUNT: 20.9 %
EST. GFR  (AFRICAN AMERICAN): 24 ML/MIN/1.73 M^2
EST. GFR  (NON AFRICAN AMERICAN): 21 ML/MIN/1.73 M^2
GLUCOSE SERPL-MCNC: 120 MG/DL
HCT VFR BLD AUTO: 33.6 %
HGB BLD-MCNC: 9.8 G/DL
INR PPP: 1.7
LYMPHOCYTES # BLD AUTO: 2.4 K/UL
LYMPHOCYTES NFR BLD: 35.2 %
MAGNESIUM SERPL-MCNC: 2.4 MG/DL
MCH RBC QN AUTO: 24.4 PG
MCHC RBC AUTO-ENTMCNC: 29.2 G/DL
MCV RBC AUTO: 84 FL
MONOCYTES # BLD AUTO: 0.4 K/UL
MONOCYTES NFR BLD: 5.7 %
NEUTROPHILS # BLD AUTO: 3.8 K/UL
NEUTROPHILS NFR BLD: 56.6 %
PLATELET # BLD AUTO: 279 K/UL
PMV BLD AUTO: 9.9 FL
POTASSIUM SERPL-SCNC: 5 MMOL/L
PROT SERPL-MCNC: 7.1 G/DL
PROTHROMBIN TIME: 17.4 SEC
RBC # BLD AUTO: 4.01 M/UL
SODIUM SERPL-SCNC: 138 MMOL/L
WBC # BLD AUTO: 6.67 K/UL

## 2018-04-30 PROCEDURE — 85610 PROTHROMBIN TIME: CPT

## 2018-04-30 PROCEDURE — 36415 COLL VENOUS BLD VENIPUNCTURE: CPT

## 2018-04-30 PROCEDURE — 80053 COMPREHEN METABOLIC PANEL: CPT

## 2018-04-30 PROCEDURE — 93005 ELECTROCARDIOGRAM TRACING: CPT

## 2018-04-30 PROCEDURE — 99283 EMERGENCY DEPT VISIT LOW MDM: CPT | Mod: 25

## 2018-04-30 PROCEDURE — 83735 ASSAY OF MAGNESIUM: CPT

## 2018-04-30 PROCEDURE — 93010 ELECTROCARDIOGRAM REPORT: CPT | Mod: ,,, | Performed by: INTERNAL MEDICINE

## 2018-04-30 PROCEDURE — 85025 COMPLETE CBC W/AUTO DIFF WBC: CPT

## 2018-04-30 RX ORDER — FUROSEMIDE 40 MG/1
40 TABLET ORAL DAILY
Qty: 180 TABLET | Refills: 3
Start: 2018-04-30 | End: 2018-05-02 | Stop reason: SDUPTHER

## 2018-04-30 NOTE — TELEPHONE ENCOUNTER
I have contacted patient regarding abnormal BMP. She has required OP diuretics for CHF symptoms, but now has JESSICA. I have advised her to report to the ER for gentle hydration. She verbalized understanding

## 2018-04-30 NOTE — ED PROVIDER NOTES
"SCRIBE #1 NOTE: I, Brice Mills, am scribing for, and in the presence of, Clay Velásquez MD. I have scribed the entire note.      History      Chief Complaint   Patient presents with    Abnormal Lab     Send here by her cardiologist office due to K+ level high and creatinine level high       Review of patient's allergies indicates:   Allergen Reactions    Cephalosporins Hives    Metaxalone Itching    Penicillins      Other reaction(s): Unknown      Pregabalin      Other reaction(s): "Bad feeling"      Sulfa (sulfonamide antibiotics) Itching        HPI   HPI    4/30/2018, 11:50 AM   History obtained from the patient      History of Present Illness: Jennifer Mathews is a 85 y.o. female patient w/ PMhx of anemia, CKD, CHF, and HTN presents to the Emergency Department, sent by Dr. Cary's office, for evaluation after a BMP resulted a potassium of 6.1 and creatinine of 2.1. Pt reports that her lasix was increased from 40 mg BID to 60 mg BID 1 week ago. Pt is asymptomatic. No other associated sxs reported Pt is currently on coumadin, and pt's INR on 04/24 was 1.9. Patient denies any nausea/ vomiting/ diarrhea, chest pain, SOB, abd pain, focal weakness/ numbness, and all other sxs at this time. No further complaints or concerns at this time.         Arrival mode: Personal vehicle    PCP: Desirae Bello MD       Past Medical History:  Past Medical History:   Diagnosis Date    Anemia     Anticoagulated on Coumadin 7/13/2015    Arthritis     Atrial fibrillation     Basal cell carcinoma 10/2015    left neck    Cardiac arrest     Cardiac resynchronization therapy defibrillator (CRT-D) in place 07/13/2015    Pt denies, states it does not shock me    Chronic combined systolic and diastolic congestive heart failure     CKD (chronic kidney disease) stage 3, GFR 30-59 ml/min     Dyslipidemia 1/30/2014    Hypertension     Ischemic cardiomyopathy 01/30/2014    Macular hole of left eye     Old    " Macular hole of left eye     LEV (obstructive sleep apnea) 9/30/2013    Recurrent UTI     Refractive error     Stroke        Past Surgical History:  Past Surgical History:   Procedure Laterality Date    APPENDECTOMY      CARDIAC PACEMAKER PLACEMENT  2014    CATARACT EXTRACTION      OU    CHOLECYSTECTOMY      COLONOSCOPY      MITRAL VALVE REPLACEMENT  2014    MITRAL VALVE SURGERY      x3         Family History:  Family History   Problem Relation Age of Onset    Coronary artery disease Mother      mi    Coronary artery disease Father     Diabetes Brother     Cancer Brother     Melanoma Neg Hx     Psoriasis Neg Hx     Lupus Neg Hx     Eczema Neg Hx        Social History:  Social History     Social History Main Topics    Smoking status: Never Smoker    Smokeless tobacco: Never Used    Alcohol use No    Drug use: No    Sexual activity: Not on file       ROS   Review of Systems   Constitutional: Negative for chills and fever.        + evaluation of abnormal labs   Respiratory: Negative for shortness of breath.    Cardiovascular: Negative for chest pain.   Gastrointestinal: Negative for abdominal pain, diarrhea, nausea and vomiting.   Genitourinary: Negative for difficulty urinating and urgency.   Neurological: Negative for dizziness, syncope, weakness, light-headedness, numbness and headaches.   All other systems reviewed and are negative.      Physical Exam      Initial Vitals [04/30/18 1148]   BP Pulse Resp Temp SpO2   126/65 70 20 97.4 °F (36.3 °C) 96 %      MAP       85.33          Physical Exam  Nursing Notes and Vital Signs Reviewed.  Constitutional: Patient is in no apparent distress. Elderly. Well-nourished.  Head: Atraumatic. Normocephalic.  Eyes: PERRL. EOM intact. Conjunctivae are not pale. No scleral icterus.  ENT: Mucous membranes are dry. Oropharynx is clear and symmetric.    Neck: Supple. Full ROM. No lymphadenopathy.  Cardiovascular: Regular rate. Regular rhythm. No murmurs, rubs,  "or gallops. Distal pulses are 2+ and symmetric.  Pulmonary/Chest: No respiratory distress. Clear to auscultation bilaterally. No wheezing or rales.  Abdominal: Soft and non-distended.  There is no tenderness.  No rebound, guarding, or rigidity. Good bowel sounds.  Musculoskeletal: Moves all extremities. No obvious deformities. No edema. No calf tenderness.  Skin: Warm and dry.  Neurological:  Alert, awake, and appropriate.  Normal speech.  No acute focal neurological deficits are appreciated.  Psychiatric: Normal affect. Good eye contact. Appropriate in content.    ED Course    Procedures  ED Vital Signs:  Vitals:    04/30/18 1148 04/30/18 1207   BP: 126/65 139/64   Pulse: 70 69   Resp: 20 13   Temp: 97.4 °F (36.3 °C)    TempSrc: Oral    SpO2: 96% 97%   Weight: 50.2 kg (110 lb 10.7 oz)    Height: 5' 2" (1.575 m)        Abnormal Lab Results:  Labs Reviewed   CBC W/ AUTO DIFFERENTIAL - Abnormal; Notable for the following:        Result Value    Hemoglobin 9.8 (*)     Hematocrit 33.6 (*)     MCH 24.4 (*)     MCHC 29.2 (*)     RDW 20.9 (*)     All other components within normal limits   COMPREHENSIVE METABOLIC PANEL - Abnormal; Notable for the following:     Glucose 120 (*)     BUN, Bld 60 (*)     Creatinine 2.1 (*)     eGFR if  24 (*)     eGFR if non  21 (*)     All other components within normal limits   PROTIME-INR - Abnormal; Notable for the following:     Prothrombin Time 17.4 (*)     INR 1.7 (*)     All other components within normal limits   MAGNESIUM        All Lab Results:  Results for orders placed or performed during the hospital encounter of 04/30/18   CBC auto differential   Result Value Ref Range    WBC 6.67 3.90 - 12.70 K/uL    RBC 4.01 4.00 - 5.40 M/uL    Hemoglobin 9.8 (L) 12.0 - 16.0 g/dL    Hematocrit 33.6 (L) 37.0 - 48.5 %    MCV 84 82 - 98 fL    MCH 24.4 (L) 27.0 - 31.0 pg    MCHC 29.2 (L) 32.0 - 36.0 g/dL    RDW 20.9 (H) 11.5 - 14.5 %    Platelets 279 150 - 350 K/uL "    MPV 9.9 9.2 - 12.9 fL    Gran # (ANC) 3.8 1.8 - 7.7 K/uL    Lymph # 2.4 1.0 - 4.8 K/uL    Mono # 0.4 0.3 - 1.0 K/uL    Eos # 0.1 0.0 - 0.5 K/uL    Baso # 0.07 0.00 - 0.20 K/uL    Gran% 56.6 38.0 - 73.0 %    Lymph% 35.2 18.0 - 48.0 %    Mono% 5.7 4.0 - 15.0 %    Eosinophil% 1.5 0.0 - 8.0 %    Basophil% 1.0 0.0 - 1.9 %    Differential Method Automated    Comprehensive metabolic panel   Result Value Ref Range    Sodium 138 136 - 145 mmol/L    Potassium 5.0 3.5 - 5.1 mmol/L    Chloride 96 95 - 110 mmol/L    CO2 27 23 - 29 mmol/L    Glucose 120 (H) 70 - 110 mg/dL    BUN, Bld 60 (H) 8 - 23 mg/dL    Creatinine 2.1 (H) 0.5 - 1.4 mg/dL    Calcium 9.5 8.7 - 10.5 mg/dL    Total Protein 7.1 6.0 - 8.4 g/dL    Albumin 4.0 3.5 - 5.2 g/dL    Total Bilirubin 0.5 0.1 - 1.0 mg/dL    Alkaline Phosphatase 69 55 - 135 U/L    AST 26 10 - 40 U/L    ALT 20 10 - 44 U/L    Anion Gap 15 8 - 16 mmol/L    eGFR if African American 24 (A) >60 mL/min/1.73 m^2    eGFR if non African American 21 (A) >60 mL/min/1.73 m^2   Magnesium   Result Value Ref Range    Magnesium 2.4 1.6 - 2.6 mg/dL   Protime-INR   Result Value Ref Range    Prothrombin Time 17.4 (H) 9.0 - 12.5 sec    INR 1.7 (H) 0.8 - 1.2      The EKG was ordered, reviewed, and independently interpreted by the ED provider.  Interpretation time: 12:04  Rate: 71 BPM  Rhythm: AV sequential or dual chamber electronic pacemaker         The Emergency Provider reviewed the vital signs and test results, which are outlined above.    ED Discussion     12:11 PM: Review pt's past medical history  Lab results from today  Basic metabolic panel  Sodium 136 - 145 mmol/L 139    Potassium 3.5 - 5.1 mmol/L 6.1     Chloride 95 - 110 mmol/L 95    CO2 23 - 29 mmol/L 32     Glucose 70 - 110 mg/dL 95    BUN, Bld 8 - 23 mg/dL 61     Creatinine 0.5 - 1.4 mg/dL 2.1     Calcium 8.7 - 10.5 mg/dL 9.6    Anion Gap 8 - 16 mmol/L 12    eGFR if African American >60 mL/min/1.73 m^2 24     eGFR if non  >60  mL/min/1.73 m^2 21       Brain natriuretic peptide   BNP 0 - 99 pg/mL 1,303       12:48 PM: Dr. Velásquez discussed the pt's case with Dr. Cary (cardiology) who recommends decreasing pt's lasix back to 40 mg BID and repeating pt's labs in 1 week.    12:58 PM: Reassessed pt at this time. Discussed with pt all pertinent ED information and results. Discussed pt dx and plan of tx. Gave pt all f/u and return to the ED instructions. All questions and concerns were addressed at this time. Pt expresses understanding of information and instructions, and is comfortable with plan to discharge. Pt is stable for discharge.    I discussed with patient and/or family/caretaker that evaluation in the ED does not suggest any emergent or life threatening medical conditions requiring immediate intervention beyond what was provided in the ED, and I believe patient is safe for discharge.  Regardless, an unremarkable evaluation in the ED does not preclude the development or presence of a serious of life threatening condition. As such, patient was instructed to return immediately for any worsening or change in current symptoms.    ED Medication(s):  Medications - No data to display    Current Discharge Medication List          Follow-up Information     Desirae Bello MD. Call in 1 day.    Specialty:  Family Medicine  Why:  As needed  Contact information:  [address] [place] [address] [address] [place] [address] [phone]             Cardiology. Call in 1 week.    Why:  for repeat lab work                   Medical Decision Making    Medical Decision Making:   Clinical Tests:   Lab Tests: Reviewed and Ordered           Scribe Attestation:   Scribe #1: I performed the above scribed service and the documentation accurately describes the services I performed. I attest to the accuracy of the note.    Attending:   Physician Attestation Statement for Scribe #1: I, Clay Velásquez,*, personally performed the services described in this  documentation, as scribed by Brice Mills, in my presence, and it is both accurate and complete.          Clinical Impression       ICD-10-CM ICD-9-CM   1. Dehydration E86.0 276.51   2. Hyperkalemia E87.5 276.7   3. High potassium E87.5 276.7   4. Acute renal insufficiency N28.9 593.9   5. Atrial fibrillation, unspecified type I48.91 427.31       Disposition:   Disposition: Discharged  Condition: Stable         Clay Velásquez MD  05/01/18 1524

## 2018-04-30 NOTE — TELEPHONE ENCOUNTER
----- Message from Kezia Lim sent at 4/30/2018  4:31 PM CDT -----  Patient state need to know if she need to schedule a follow up appt. Please adv/call [phone].[email or web address]

## 2018-05-01 ENCOUNTER — TELEPHONE (OUTPATIENT)
Dept: CARDIOLOGY | Facility: CLINIC | Age: 83
End: 2018-05-01

## 2018-05-01 DIAGNOSIS — I50.42 CHRONIC COMBINED SYSTOLIC AND DIASTOLIC CONGESTIVE HEART FAILURE: Primary | ICD-10-CM

## 2018-05-01 DIAGNOSIS — N18.30 CKD (CHRONIC KIDNEY DISEASE) STAGE 3, GFR 30-59 ML/MIN: ICD-10-CM

## 2018-05-01 NOTE — TELEPHONE ENCOUNTER
----- Message from Mayuri Boothe LPN sent at 5/1/2018  1:43 PM CDT -----  Contact: Pt  Please advise and route back, thanks    ----- Message -----  From: Jagdish Gómez  Sent: 5/1/2018   1:34 PM  To: [facility] Staff    Pt request a callback to see if she needs to come in for an apt after she was referred to the ER on 04-19-18 by LITTLE Alston but they did not do any fluids and they increased her Lasix, pt states she was informed that she needed to come in and have labs done for fluids in her system but wants to know if she just need to get a order for labs or does she have to make an appt, please contact the pt at [phone]

## 2018-05-01 NOTE — TELEPHONE ENCOUNTER
Patient states that she actually decreased her Lasix to 40mg BID since being seen in the ED.   Patient states that she feels great today. Has no SOB, edema and is not fatigue. Will check BMP and BNP tomorrow at [facility] and adjust Lasix as appropriate.    Please just put on schedule at [facility] tomorrow for labs

## 2018-05-02 ENCOUNTER — TELEPHONE (OUTPATIENT)
Dept: CARDIOLOGY | Facility: CLINIC | Age: 83
End: 2018-05-02

## 2018-05-02 ENCOUNTER — INITIAL CONSULT (OUTPATIENT)
Dept: HEMATOLOGY/ONCOLOGY | Facility: CLINIC | Age: 83
End: 2018-05-02
Payer: MEDICARE

## 2018-05-02 ENCOUNTER — LAB VISIT (OUTPATIENT)
Dept: LAB | Facility: HOSPITAL | Age: 83
End: 2018-05-02
Attending: NURSE PRACTITIONER
Payer: MEDICARE

## 2018-05-02 ENCOUNTER — ANTI-COAG VISIT (OUTPATIENT)
Dept: CARDIOLOGY | Facility: CLINIC | Age: 83
End: 2018-05-02
Payer: MEDICARE

## 2018-05-02 ENCOUNTER — NURSE TRIAGE (OUTPATIENT)
Dept: ADMINISTRATIVE | Facility: CLINIC | Age: 83
End: 2018-05-02

## 2018-05-02 VITALS
HEIGHT: 62 IN | WEIGHT: 111.31 LBS | DIASTOLIC BLOOD PRESSURE: 68 MMHG | OXYGEN SATURATION: 94 % | RESPIRATION RATE: 18 BRPM | HEART RATE: 71 BPM | TEMPERATURE: 98 F | BODY MASS INDEX: 20.48 KG/M2 | SYSTOLIC BLOOD PRESSURE: 120 MMHG

## 2018-05-02 DIAGNOSIS — N18.4 ANEMIA ASSOCIATED WITH STAGE 4 CHRONIC RENAL FAILURE: Primary | ICD-10-CM

## 2018-05-02 DIAGNOSIS — Z79.01 LONG TERM (CURRENT) USE OF ANTICOAGULANTS: Primary | ICD-10-CM

## 2018-05-02 DIAGNOSIS — I48.91 ATRIAL FIBRILLATION, UNSPECIFIED TYPE: ICD-10-CM

## 2018-05-02 DIAGNOSIS — I50.42 CHRONIC COMBINED SYSTOLIC AND DIASTOLIC CONGESTIVE HEART FAILURE: Primary | ICD-10-CM

## 2018-05-02 DIAGNOSIS — D63.1 ANEMIA ASSOCIATED WITH STAGE 4 CHRONIC RENAL FAILURE: Primary | ICD-10-CM

## 2018-05-02 DIAGNOSIS — N18.4 CHRONIC RENAL FAILURE, STAGE 4 (SEVERE): Primary | ICD-10-CM

## 2018-05-02 DIAGNOSIS — N18.4 ANEMIA ASSOCIATED WITH STAGE 4 CHRONIC RENAL FAILURE: ICD-10-CM

## 2018-05-02 DIAGNOSIS — D63.1 ANEMIA ASSOCIATED WITH STAGE 4 CHRONIC RENAL FAILURE: ICD-10-CM

## 2018-05-02 DIAGNOSIS — N18.30 CKD (CHRONIC KIDNEY DISEASE) STAGE 3, GFR 30-59 ML/MIN: ICD-10-CM

## 2018-05-02 DIAGNOSIS — I50.42 CHRONIC COMBINED SYSTOLIC AND DIASTOLIC CONGESTIVE HEART FAILURE: ICD-10-CM

## 2018-05-02 LAB
ANION GAP SERPL CALC-SCNC: 9 MMOL/L
BNP SERPL-MCNC: 600 PG/ML
BUN SERPL-MCNC: 58 MG/DL
CALCIUM SERPL-MCNC: 8.6 MG/DL
CHLORIDE SERPL-SCNC: 99 MMOL/L
CO2 SERPL-SCNC: 31 MMOL/L
CREAT SERPL-MCNC: 1.9 MG/DL
EST. GFR  (AFRICAN AMERICAN): 27 ML/MIN/1.73 M^2
EST. GFR  (NON AFRICAN AMERICAN): 24 ML/MIN/1.73 M^2
GLUCOSE SERPL-MCNC: 87 MG/DL
INR PPP: 2.2 (ref 2–3)
POTASSIUM SERPL-SCNC: 6.7 MMOL/L
SODIUM SERPL-SCNC: 139 MMOL/L

## 2018-05-02 PROCEDURE — 99999 PR PBB SHADOW E&M-EST. PATIENT-LVL III: CPT | Mod: PBBFAC,,, | Performed by: INTERNAL MEDICINE

## 2018-05-02 PROCEDURE — 99213 OFFICE O/P EST LOW 20 MIN: CPT | Mod: PBBFAC,PO | Performed by: INTERNAL MEDICINE

## 2018-05-02 PROCEDURE — 85610 PROTHROMBIN TIME: CPT | Mod: PBBFAC,PO

## 2018-05-02 PROCEDURE — 80048 BASIC METABOLIC PNL TOTAL CA: CPT | Mod: 91,PO

## 2018-05-02 PROCEDURE — 36415 COLL VENOUS BLD VENIPUNCTURE: CPT | Mod: PO

## 2018-05-02 PROCEDURE — 83880 ASSAY OF NATRIURETIC PEPTIDE: CPT

## 2018-05-02 PROCEDURE — 99205 OFFICE O/P NEW HI 60 MIN: CPT | Mod: S$PBB,,, | Performed by: INTERNAL MEDICINE

## 2018-05-02 RX ORDER — POTASSIUM CHLORIDE 20 MEQ/1
20 TABLET, EXTENDED RELEASE ORAL DAILY PRN
Qty: 30 TABLET | Refills: 6
Start: 2018-05-02 | End: 2018-05-23

## 2018-05-02 RX ORDER — FUROSEMIDE 40 MG/1
40 TABLET ORAL DAILY PRN
Qty: 180 TABLET | Refills: 3 | Status: ON HOLD
Start: 2018-05-02 | End: 2018-06-21 | Stop reason: HOSPADM

## 2018-05-02 NOTE — PROGRESS NOTES
Subjective:       Patient ID: Jennifer Mathews is a 85 y.o. female.    Chief Complaint: Follow-up; Results; and Anemia    HPI 85-year-old female referred for evaluation of anemia secondary to chronic renal failure    Past Medical History:   Diagnosis Date    Anemia     Anticoagulated on Coumadin 7/13/2015    Arthritis     Atrial fibrillation     Basal cell carcinoma 10/2015    left neck    Cardiac arrest     Cardiac resynchronization therapy defibrillator (CRT-D) in place 07/13/2015    Pt denies, states it does not shock me    Chronic combined systolic and diastolic congestive heart failure     CKD (chronic kidney disease) stage 3, GFR 30-59 ml/min     Dyslipidemia 1/30/2014    Hypertension     Ischemic cardiomyopathy 01/30/2014    Macular hole of left eye     Old    Macular hole of left eye     LEV (obstructive sleep apnea) 9/30/2013    Recurrent UTI     Refractive error     Stroke      Family History   Problem Relation Age of Onset    Coronary artery disease Mother      mi    Coronary artery disease Father     Diabetes Brother     Cancer Brother     Melanoma Neg Hx     Psoriasis Neg Hx     Lupus Neg Hx     Eczema Neg Hx      Social History     Social History    Marital status:      Spouse name: N/A    Number of children: N/A    Years of education: N/A     Occupational History    Not on file.     Social History Main Topics    Smoking status: Never Smoker    Smokeless tobacco: Never Used    Alcohol use No    Drug use: No    Sexual activity: Not on file     Other Topics Concern    Not on file     Social History Narrative    No narrative on file     Past Surgical History:   Procedure Laterality Date    APPENDECTOMY      CARDIAC PACEMAKER PLACEMENT  2014    CATARACT EXTRACTION      OU    CHOLECYSTECTOMY      COLONOSCOPY      MITRAL VALVE REPLACEMENT  2014    MITRAL VALVE SURGERY      x3       Labs:  Lab Results   Component Value Date    WBC 6.67 04/30/2018    HGB 9.8  (L) 04/30/2018    HCT 33.6 (L) 04/30/2018    MCV 84 04/30/2018     04/30/2018     BMP  Lab Results   Component Value Date     04/30/2018    K 5.0 04/30/2018    CL 96 04/30/2018    CO2 27 04/30/2018    BUN 60 (H) 04/30/2018    CREATININE 2.1 (H) 04/30/2018    CALCIUM 9.5 04/30/2018    ANIONGAP 15 04/30/2018    ESTGFRAFRICA 24 (A) 04/30/2018    EGFRNONAA 21 (A) 04/30/2018     Lab Results   Component Value Date    ALT 20 04/30/2018    AST 26 04/30/2018    ALKPHOS 69 04/30/2018    BILITOT 0.5 04/30/2018       Lab Results   Component Value Date    IRON 51 01/22/2018    TIBC 414 01/22/2018    FERRITIN 51 01/22/2018    SATURATEDIRO 11 (L) 04/13/2010     Lab Results   Component Value Date    BAPEQHKJ77 560 01/22/2018     Lab Results   Component Value Date    FOLATE 15.7 01/22/2018     Lab Results   Component Value Date    TSH 4.831 (H) 04/17/2018         Review of Systems   Constitutional: Positive for activity change and fatigue. Negative for appetite change, chills, diaphoresis, fever and unexpected weight change.   HENT: Negative for congestion, dental problem, drooling, ear discharge, ear pain, facial swelling, hearing loss, mouth sores, nosebleeds, postnasal drip, rhinorrhea, sinus pressure, sneezing, sore throat, tinnitus, trouble swallowing and voice change.    Eyes: Negative for photophobia, pain, discharge, redness, itching and visual disturbance.   Respiratory: Negative for cough, choking, chest tightness, shortness of breath, wheezing and stridor.    Cardiovascular: Negative for chest pain, palpitations and leg swelling.   Gastrointestinal: Negative for abdominal distention, abdominal pain, anal bleeding, blood in stool, constipation, diarrhea, nausea, rectal pain and vomiting.   Endocrine: Negative for cold intolerance, heat intolerance, polydipsia, polyphagia and polyuria.   Genitourinary: Negative for decreased urine volume, difficulty urinating, dyspareunia, dysuria, enuresis, flank pain,  frequency, genital sores, hematuria, menstrual problem, pelvic pain, urgency, vaginal bleeding, vaginal discharge and vaginal pain.   Musculoskeletal: Positive for gait problem. Negative for arthralgias, back pain, joint swelling, myalgias, neck pain and neck stiffness.   Skin: Negative for color change, pallor and rash.   Allergic/Immunologic: Negative for environmental allergies, food allergies and immunocompromised state.   Neurological: Positive for weakness. Negative for dizziness, tremors, seizures, syncope, facial asymmetry, speech difficulty, light-headedness, numbness and headaches.   Hematological: Negative for adenopathy. Does not bruise/bleed easily.   Psychiatric/Behavioral: Positive for dysphoric mood. Negative for agitation, behavioral problems, confusion, decreased concentration, hallucinations, self-injury, sleep disturbance and suicidal ideas. The patient is nervous/anxious. The patient is not hyperactive.        Objective:      Physical Exam   Constitutional: She is oriented to person, place, and time. She appears well-developed and well-nourished. She appears distressed.   HENT:   Head: Normocephalic and atraumatic.   Right Ear: Tympanic membrane and external ear normal.   Left Ear: Tympanic membrane and external ear normal.   Nose: Nose normal. Right sinus exhibits no maxillary sinus tenderness and no frontal sinus tenderness. Left sinus exhibits no maxillary sinus tenderness and no frontal sinus tenderness.   Mouth/Throat: Oropharynx is clear and moist. No oropharyngeal exudate.   Eyes: Conjunctivae, EOM and lids are normal. Pupils are equal, round, and reactive to light. Right eye exhibits no discharge. Left eye exhibits no discharge. Right conjunctiva is not injected. Right conjunctiva has no hemorrhage. Left conjunctiva is not injected. Left conjunctiva has no hemorrhage. No scleral icterus.   Neck: Normal range of motion. Neck supple. No JVD present. No tracheal deviation present. No  thyromegaly present.   Cardiovascular: Normal rate, regular rhythm, normal heart sounds and intact distal pulses.    Pulmonary/Chest: Effort normal and breath sounds normal. No stridor. No respiratory distress. She exhibits no tenderness.   Abdominal: Soft. Bowel sounds are normal. She exhibits no distension and no mass. There is no splenomegaly or hepatomegaly. There is no tenderness. There is no rebound.   Musculoskeletal: Normal range of motion. She exhibits no edema or tenderness.   Lymphadenopathy:     She has no cervical adenopathy.     She has no axillary adenopathy.        Right: No supraclavicular adenopathy present.        Left: No supraclavicular adenopathy present.   Neurological: She is alert and oriented to person, place, and time. No cranial nerve deficit. Coordination abnormal.   Skin: Skin is dry. No rash noted. She is not diaphoretic. No erythema.   Psychiatric: She has a normal mood and affect. Her behavior is normal. Judgment and thought content normal.   Vitals reviewed.          Assessment:      1. Chronic renal failure, stage 4 (severe)    2. Anemia associated with stage 4 chronic renal failure           Plan:   Presumptive diagnosis of anemia secondary to chronic renal failure at this point will ask renal medicine to evaluate patient laboratory evaluation in order orders placed for Procrit if laboratory as appropriate we'll proceed with Procrit 20,000 units every 2 weeks return in 6 weeks with CBC BMP not we'll reconsider and

## 2018-05-02 NOTE — TELEPHONE ENCOUNTER
Reason for Disposition   Caller has medication question only, adult not sick, and triager answers question    Protocols used: ST MEDICATION QUESTION CALL-A-ELIGIO Rodriguez has a prescription for kayexalate but it is a 454 gm container. She isn't sure how many teaspoons/tablespoons to take and how much water to mix with it. Conferenced the pharmacy on the line and it says 4 level teaspoons of powder and should be mixed with 4 tablespoons of liquid. Caller has no further questions. Please contact caller with any further care advice.

## 2018-05-02 NOTE — LETTER
May 2, 2018      Desirae Bello MD  [address] [place] [address] [address] [place] [address]           [facility] - Hematology On[facility]  [address] [facility] [place] [address]  Phone: [phone]  Fax: [phone]          Patient: Jennifer Mathews   MR Number: [id number]   YOB: 1932   Date of Visit: 5/2/2018       Dear Dr. Desirae Bello:    Thank you for referring Jennifer Mathews to me for evaluation. Attached you will find relevant portions of my assessment and plan of care.    If you have questions, please do not hesitate to call me. I look forward to following Jennifer Mathews along with you.    Sincerely,    El Arreguin MD    Enclosure  CC:  No Recipients    If you would like to receive this communication electronically, please contact [email or web address] or [phone] to request more information on [employer] Link access.    For providers and/or their staff who would like to refer a patient to [facility], please contact us through our one-stop-shop provider referral line, [facility] , at [phone].    If you feel you have received this communication in error or would no longer like to receive these types of communications, please e-mail [email or web address]

## 2018-05-02 NOTE — TELEPHONE ENCOUNTER
----- Message from RAUL Gupta sent at 5/2/2018  1:28 PM CDT -----  Hyperkalemia on repeat labs today, but improved when ran again   Hold Lasix for now and use only PRN  Stop KCL supplement.   Use KCL only when Lasix taken  Will given 1 dose of Kayexalate. Patient to hydrate today and recheck BMP on Friday, per patient request. Please make sure she is holding K supplement as well.

## 2018-05-02 NOTE — PROGRESS NOTES
Hyperkalemia on repeat labs today, but improved when ran again   Hold Lasix for now and use only PRN  Stop KCL supplement.   Use KCL only when Lasix taken  Will given 1 dose of Kayexalate. Patient to hydrate today and recheck BMP on Friday, per patient request. Please make sure she is holding K supplement as well.

## 2018-05-02 NOTE — TELEPHONE ENCOUNTER
Spoke with Giorgi informed provider per Linda in lab states pt potassium level is 6.7 per Giorgi ellison pt needs to go to ER. Giorgi states she will contact pt.

## 2018-05-02 NOTE — PROGRESS NOTES
Patient's INR is therapeutic at 2.2.  No changes in dose.  Recheck in 2 weeks.  Please call should you have any questions or concerns at [phone] or [phone].

## 2018-05-02 NOTE — TELEPHONE ENCOUNTER
Spoke with pt notified pt information pt voiced understanding. I have scheduled pt for repeat lab work on this Friday 5/4/18.

## 2018-05-03 DIAGNOSIS — N18.4 ANEMIA ASSOCIATED WITH STAGE 4 CHRONIC RENAL FAILURE: ICD-10-CM

## 2018-05-03 DIAGNOSIS — D50.0 IRON DEFICIENCY ANEMIA DUE TO CHRONIC BLOOD LOSS: Primary | ICD-10-CM

## 2018-05-03 DIAGNOSIS — D63.1 ANEMIA ASSOCIATED WITH STAGE 4 CHRONIC RENAL FAILURE: ICD-10-CM

## 2018-05-03 RX ORDER — SODIUM CHLORIDE 0.9 % (FLUSH) 0.9 %
10 SYRINGE (ML) INJECTION
Status: CANCELLED | OUTPATIENT
Start: 2018-05-03

## 2018-05-03 RX ORDER — HEPARIN 100 UNIT/ML
500 SYRINGE INTRAVENOUS
Status: CANCELLED | OUTPATIENT
Start: 2018-05-03

## 2018-05-04 ENCOUNTER — HOSPITAL ENCOUNTER (OUTPATIENT)
Facility: HOSPITAL | Age: 83
Discharge: HOME | End: 2018-05-05
Attending: EMERGENCY MEDICINE | Admitting: INTERNAL MEDICINE
Payer: MEDICARE

## 2018-05-04 ENCOUNTER — TELEPHONE (OUTPATIENT)
Dept: CARDIOLOGY | Facility: CLINIC | Age: 83
End: 2018-05-04

## 2018-05-04 DIAGNOSIS — E87.5 ACUTE HYPERKALEMIA: Primary | ICD-10-CM

## 2018-05-04 DIAGNOSIS — N17.9 ACUTE RENAL FAILURE, UNSPECIFIED ACUTE RENAL FAILURE TYPE: ICD-10-CM

## 2018-05-04 DIAGNOSIS — R07.9 CHEST PAIN: ICD-10-CM

## 2018-05-04 LAB
ALBUMIN SERPL BCP-MCNC: 3.9 G/DL
ALP SERPL-CCNC: 80 U/L
ALT SERPL W/O P-5'-P-CCNC: 25 U/L
ANION GAP SERPL CALC-SCNC: 10 MMOL/L
ANION GAP SERPL CALC-SCNC: 11 MMOL/L
AST SERPL-CCNC: 31 U/L
BASOPHILS # BLD AUTO: 0.06 K/UL
BASOPHILS NFR BLD: 1 %
BILIRUB SERPL-MCNC: 0.5 MG/DL
BNP SERPL-MCNC: 892 PG/ML
BUN SERPL-MCNC: 56 MG/DL
BUN SERPL-MCNC: 59 MG/DL
CALCIUM SERPL-MCNC: 7.7 MG/DL
CALCIUM SERPL-MCNC: 7.9 MG/DL
CHLORIDE SERPL-SCNC: 100 MMOL/L
CHLORIDE SERPL-SCNC: 105 MMOL/L
CO2 SERPL-SCNC: 26 MMOL/L
CO2 SERPL-SCNC: 29 MMOL/L
CREAT SERPL-MCNC: 1.7 MG/DL
CREAT SERPL-MCNC: 2.1 MG/DL
DIFFERENTIAL METHOD: ABNORMAL
EOSINOPHIL # BLD AUTO: 0.2 K/UL
EOSINOPHIL NFR BLD: 3.7 %
ERYTHROCYTE [DISTWIDTH] IN BLOOD BY AUTOMATED COUNT: 20.8 %
EST. GFR  (AFRICAN AMERICAN): 24 ML/MIN/1.73 M^2
EST. GFR  (AFRICAN AMERICAN): 31 ML/MIN/1.73 M^2
EST. GFR  (NON AFRICAN AMERICAN): 21 ML/MIN/1.73 M^2
EST. GFR  (NON AFRICAN AMERICAN): 27 ML/MIN/1.73 M^2
GLUCOSE SERPL-MCNC: 76 MG/DL
GLUCOSE SERPL-MCNC: 97 MG/DL
HCT VFR BLD AUTO: 31.3 %
HGB BLD-MCNC: 9.2 G/DL
INR PPP: 1.7
LYMPHOCYTES # BLD AUTO: 2.2 K/UL
LYMPHOCYTES NFR BLD: 37.9 %
MCH RBC QN AUTO: 24.7 PG
MCHC RBC AUTO-ENTMCNC: 29.4 G/DL
MCV RBC AUTO: 84 FL
MONOCYTES # BLD AUTO: 0.5 K/UL
MONOCYTES NFR BLD: 8.5 %
NEUTROPHILS # BLD AUTO: 2.8 K/UL
NEUTROPHILS NFR BLD: 48.9 %
PLATELET # BLD AUTO: 207 K/UL
PMV BLD AUTO: 9.9 FL
POTASSIUM SERPL-SCNC: 4.6 MMOL/L
POTASSIUM SERPL-SCNC: 5.9 MMOL/L
PROT SERPL-MCNC: 6.9 G/DL
PROTHROMBIN TIME: 17.4 SEC
RBC # BLD AUTO: 3.73 M/UL
SODIUM SERPL-SCNC: 137 MMOL/L
SODIUM SERPL-SCNC: 144 MMOL/L
TROPONIN I SERPL DL<=0.01 NG/ML-MCNC: 0.03 NG/ML
WBC # BLD AUTO: 5.75 K/UL

## 2018-05-04 PROCEDURE — 80048 BASIC METABOLIC PNL TOTAL CA: CPT

## 2018-05-04 PROCEDURE — 93005 ELECTROCARDIOGRAM TRACING: CPT

## 2018-05-04 PROCEDURE — G0378 HOSPITAL OBSERVATION PER HR: HCPCS

## 2018-05-04 PROCEDURE — 84484 ASSAY OF TROPONIN QUANT: CPT

## 2018-05-04 PROCEDURE — 96365 THER/PROPH/DIAG IV INF INIT: CPT

## 2018-05-04 PROCEDURE — 85610 PROTHROMBIN TIME: CPT

## 2018-05-04 PROCEDURE — 25000003 PHARM REV CODE 250: Performed by: EMERGENCY MEDICINE

## 2018-05-04 PROCEDURE — 25000003 PHARM REV CODE 250: Performed by: NURSE PRACTITIONER

## 2018-05-04 PROCEDURE — 63600175 PHARM REV CODE 636 W HCPCS: Performed by: EMERGENCY MEDICINE

## 2018-05-04 PROCEDURE — 80053 COMPREHEN METABOLIC PANEL: CPT

## 2018-05-04 PROCEDURE — 99285 EMERGENCY DEPT VISIT HI MDM: CPT | Mod: 25

## 2018-05-04 PROCEDURE — 93010 ELECTROCARDIOGRAM REPORT: CPT | Mod: ,,, | Performed by: INTERNAL MEDICINE

## 2018-05-04 PROCEDURE — 94640 AIRWAY INHALATION TREATMENT: CPT

## 2018-05-04 PROCEDURE — 96374 THER/PROPH/DIAG INJ IV PUSH: CPT | Mod: 59

## 2018-05-04 PROCEDURE — 83880 ASSAY OF NATRIURETIC PEPTIDE: CPT

## 2018-05-04 PROCEDURE — 85025 COMPLETE CBC W/AUTO DIFF WBC: CPT

## 2018-05-04 PROCEDURE — 25000242 PHARM REV CODE 250 ALT 637 W/ HCPCS: Performed by: EMERGENCY MEDICINE

## 2018-05-04 RX ORDER — CARVEDILOL 12.5 MG/1
25 TABLET ORAL 2 TIMES DAILY WITH MEALS
Status: DISCONTINUED | OUTPATIENT
Start: 2018-05-04 | End: 2018-05-05 | Stop reason: HOSPADM

## 2018-05-04 RX ORDER — ASPIRIN 81 MG/1
81 TABLET ORAL DAILY
Status: DISCONTINUED | OUTPATIENT
Start: 2018-05-05 | End: 2018-05-05 | Stop reason: HOSPADM

## 2018-05-04 RX ORDER — LEVOTHYROXINE SODIUM 50 UG/1
50 TABLET ORAL
Status: DISCONTINUED | OUTPATIENT
Start: 2018-05-05 | End: 2018-05-05 | Stop reason: HOSPADM

## 2018-05-04 RX ORDER — DIGOXIN 125 MCG
125 TABLET ORAL DAILY
Status: DISCONTINUED | OUTPATIENT
Start: 2018-05-05 | End: 2018-05-05 | Stop reason: HOSPADM

## 2018-05-04 RX ORDER — ALLOPURINOL 100 MG/1
200 TABLET ORAL DAILY
Status: DISCONTINUED | OUTPATIENT
Start: 2018-05-05 | End: 2018-05-05 | Stop reason: HOSPADM

## 2018-05-04 RX ORDER — SODIUM BICARBONATE 1 MEQ/ML
50 SYRINGE (ML) INTRAVENOUS
Status: COMPLETED | OUTPATIENT
Start: 2018-05-04 | End: 2018-05-04

## 2018-05-04 RX ORDER — WARFARIN 2.5 MG/1
2.5 TABLET ORAL DAILY
Status: DISCONTINUED | OUTPATIENT
Start: 2018-05-04 | End: 2018-05-05 | Stop reason: HOSPADM

## 2018-05-04 RX ORDER — ALBUTEROL SULFATE 2.5 MG/.5ML
10 SOLUTION RESPIRATORY (INHALATION)
Status: COMPLETED | OUTPATIENT
Start: 2018-05-04 | End: 2018-05-04

## 2018-05-04 RX ORDER — AMIODARONE HYDROCHLORIDE 200 MG/1
200 TABLET ORAL DAILY
Status: DISCONTINUED | OUTPATIENT
Start: 2018-05-05 | End: 2018-05-05 | Stop reason: HOSPADM

## 2018-05-04 RX ORDER — SODIUM CHLORIDE 9 MG/ML
INJECTION, SOLUTION INTRAVENOUS CONTINUOUS
Status: DISCONTINUED | OUTPATIENT
Start: 2018-05-04 | End: 2018-05-05 | Stop reason: HOSPADM

## 2018-05-04 RX ADMIN — SODIUM BICARBONATE 50 MEQ: 84 INJECTION INTRAVENOUS at 02:05

## 2018-05-04 RX ADMIN — SODIUM CHLORIDE: 0.9 INJECTION, SOLUTION INTRAVENOUS at 03:05

## 2018-05-04 RX ADMIN — INSULIN HUMAN 10 UNITS: 100 INJECTION, SOLUTION PARENTERAL at 02:05

## 2018-05-04 RX ADMIN — CARVEDILOL 25 MG: 12.5 TABLET, FILM COATED ORAL at 06:05

## 2018-05-04 RX ADMIN — SODIUM POLYSTYRENE SULFONATE 30 G: 15 SUSPENSION ORAL; RECTAL at 02:05

## 2018-05-04 RX ADMIN — CALCIUM GLUCONATE 1 G: 98 INJECTION, SOLUTION INTRAVENOUS at 02:05

## 2018-05-04 RX ADMIN — WARFARIN SODIUM 2.5 MG: 2.5 TABLET ORAL at 09:05

## 2018-05-04 RX ADMIN — ALBUTEROL SULFATE 10 MG: 2.5 SOLUTION RESPIRATORY (INHALATION) at 02:05

## 2018-05-04 RX ADMIN — DEXTROSE MONOHYDRATE 25 G: 25 INJECTION, SOLUTION INTRAVENOUS at 02:05

## 2018-05-04 NOTE — HPI
Jennifer Mathews is a 85 year old female with history of HTN, CKD, atrial fibrillation, and combined heart failure-On Entresto, who presents to ED for abnormal lab value. She was seen by Cardiology 2 days ago and was noted with Hyperkalemia. During this visit she was asked to hold potassium supplement and was given Kayexalate. Repeat labs today again showed hyperkalemia, and patient was asked to proceed to ED. She has received Kayexalate, insulin with D5W, and albuterol inhaler. She has no other complaints.

## 2018-05-04 NOTE — PLAN OF CARE
05/04/18 1506   YI Message   Medicare Outpatient and Observation Notification regarding financial responsibility Given to patient/caregiver;Explained to patient/caregiver;Signed/date by patient/caregiver   Date YI was signed 05/04/18   Time YI was signed 1501

## 2018-05-04 NOTE — PROGRESS NOTES
[facility] Hospital Medicine  Progress Note    Patient Name: Jennifer Mathews  MRN: [id number]  Patient Class: OP- Observation   Admission Date: 5/4/2018  Length of Stay: 0 days  Attending Physician: Tomasa Johnson Do, MD  Primary Care Provider: Desirae Bello MD    Subjective:     Principal Problem:Acute hyperkalemia    HPI:  Jennifer Mathews is a 85 year old female with history of HTN, CKD, atrial fibrillation, and combined heart failure-On Entresto, who presents to ED for abnormal lab value. She was seen by Cardiology 2 days ago and was noted with Hyperkalemia. During this visit she was asked to hold potassium supplement and was given Kayexalate. Repeat labs today again showed hyperkalemia, and patient was asked to proceed to ED. She has received Kayexalate, insulin with D5W, and albuterol inhaler. She has no other complaints.       Hospital Course:  No notes on file    Past Medical History:   Diagnosis Date    Anemia     Anticoagulated on Coumadin 7/13/2015    Arthritis     Atrial fibrillation     Basal cell carcinoma 10/2015    left neck    Cardiac arrest     Cardiac resynchronization therapy defibrillator (CRT-D) in place 07/13/2015    Pt denies, states it does not shock me    Chronic combined systolic and diastolic congestive heart failure     CKD (chronic kidney disease) stage 3, GFR 30-59 ml/min     Dyslipidemia 1/30/2014    Hypertension     Ischemic cardiomyopathy 01/30/2014    Macular hole of left eye     Old    Macular hole of left eye     LEV (obstructive sleep apnea) 9/30/2013    Recurrent UTI     Refractive error     Stroke        Past Surgical History:   Procedure Laterality Date    APPENDECTOMY      CARDIAC PACEMAKER PLACEMENT  2014    CATARACT EXTRACTION      OU    CHOLECYSTECTOMY      COLONOSCOPY      MITRAL VALVE REPLACEMENT  2014    MITRAL VALVE SURGERY      x3       Review of patient's allergies indicates:   Allergen Reactions    Cephalosporins Hives  "   Metaxalone Itching    Penicillins      Other reaction(s): Unknown      Pregabalin      Other reaction(s): "Bad feeling"      Sulfa (sulfonamide antibiotics) Itching       Current Facility-Administered Medications on File Prior to Encounter   Medication    denosumab (PROLIA) injection 60 mg     Current Outpatient Prescriptions on File Prior to Encounter   Medication Sig    allopurinol (ZYLOPRIM) 100 MG tablet Take 2 tablets (200 mg total) by mouth once daily.    amiodarone (PACERONE) 200 MG Tab Take 1 tablet (200 mg total) by mouth once daily.    aspirin (ECOTRIN) 81 MG EC tablet Take 81 mg by mouth once daily.    carvedilol (COREG) 25 MG tablet Take 1 tablet (25 mg total) by mouth 2 (two) times daily with meals.    colchicine 0.6 mg Cap Take 1 capsule (0.6 mg total) by mouth once daily.    digoxin (LANOXIN) 125 mcg tablet Take 1 tablet (125 mcg total) by mouth once daily. TAKE 1 TABLET (0.125 MG TOTAL) BY MOUTH ONCE DAILY.    furosemide (LASIX) 40 MG tablet Take 1 tablet (40 mg total) by mouth daily as needed.    gabapentin (NEURONTIN) 100 MG capsule TAKE ONE CAPSULE BY MOUTH TWO TIMES DAILY    Lactobac 40-Bifido 3-S.thermop (PROBIOTIC) 100 billion cell Cap Take 1 capsule by mouth once daily.    levothyroxine (SYNTHROID) 50 MCG tablet Take 1 tablet (50 mcg total) by mouth once daily.    potassium chloride SA (K-DUR,KLOR-CON) 20 MEQ tablet Take 1 tablet (20 mEq total) by mouth daily as needed.    warfarin (COUMADIN) 2.5 MG tablet TAKE 1/2 TABLET ON MONDAY AND WEDNESDAY AND 1 TABLET ALL OTHER DAYS. DISCONTINUE 5MG TABLET. (Patient taking differently: TAKE 1/2 TABLET ON SATURDAY AND SUNDAY AND 1 TABLET MONDAY THROUGH FRIDAY. DISCONTINUE 5MG TABLET.)    acetaminophen (TYLENOL EXTRA STRENGTH) 325 MG tablet Take 2 tablets (650 mg total) by mouth every 8 (eight) hours. (Patient taking differently: Take 650 mg by mouth 3 (three) times daily as needed. )    calcium carbonate (OS-SHERRY) 600 mg calcium " (1,500 mg) Tab Take 600 mg by mouth once.    cranberry 1,000 mg Cap Take 1 capsule by mouth Daily.    metOLazone (ZAROXOLYN) 2.5 MG tablet Take 1 tablet (2.5 mg total) by mouth daily as needed (swelling and/or weight gain).    MULTIVITAMIN ORAL Take 1 tablet by mouth Daily.    [DISCONTINUED] sacubitril-valsartan (ENTRESTO) 49-51 mg per tablet Take 1 tablet by mouth 2 (two) times daily.     Family History     Problem Relation (Age of Onset)    Cancer Brother    Coronary artery disease Mother, Father    Diabetes Brother        Social History Main Topics    Smoking status: Never Smoker    Smokeless tobacco: Never Used    Alcohol use No    Drug use: No    Sexual activity: Not on file     Review of Systems   Constitutional: Negative for activity change, diaphoresis, fatigue and unexpected weight change.        Abnormal lab work     HENT: Negative for congestion, ear pain and sore throat.    Eyes: Negative.    Respiratory: Negative for shortness of breath and wheezing.    Cardiovascular: Negative for chest pain and palpitations.   Gastrointestinal: Negative for abdominal pain, constipation, diarrhea and vomiting.   Endocrine: Negative.    Genitourinary: Negative for flank pain, hematuria and urgency.   Musculoskeletal: Negative for joint swelling and neck pain.   Skin: Negative for pallor.   Neurological: Negative for seizures, syncope and light-headedness.   Hematological: Negative.    Psychiatric/Behavioral: Negative.      Objective:     Vital Signs (Most Recent):  Temp: 97.9 °F (36.6 °C) (05/04/18 1302)  Pulse: 69 (05/04/18 1601)  Resp: (!) 22 (05/04/18 1601)  BP: 136/64 (05/04/18 1601)  SpO2: 95 % (05/04/18 1601) Vital Signs (24h Range):  Temp:  [97.9 °F (36.6 °C)] 97.9 °F (36.6 °C)  Pulse:  [69-70] 69  Resp:  [12-33] 22  SpO2:  [95 %-100 %] 95 %  BP: (133-147)/(61-66) 136/64     Weight: 51.3 kg (113 lb)  Body mass index is 20.67 kg/m².    Physical Exam   Constitutional: She is oriented to person, place, and  time. She appears well-developed and well-nourished.   Elderly, thin   HENT:   Head: Normocephalic and atraumatic.   Eyes: EOM are normal.   Neck: Normal range of motion. Neck supple.   Cardiovascular: Normal rate, regular rhythm and normal heart sounds.    No murmur heard.  Pulmonary/Chest: Effort normal and breath sounds normal. No respiratory distress.   Abdominal: Soft. Bowel sounds are normal. She exhibits no distension. There is no tenderness.   Musculoskeletal: Normal range of motion. She exhibits no edema.   Neurological: She is alert and oriented to person, place, and time.   Skin: Skin is warm and dry.         CRANIAL NERVES     CN III, IV, VI   Extraocular motions are normal.     Significant Labs:   CBC:   Recent Labs  Lab 05/04/18  1317   WBC 5.75   HGB 9.2*   HCT 31.3*        CMP:   Recent Labs  Lab 05/04/18  0949 05/04/18  1317    137   K 6.5* 5.9*    100   CO2 28 26   * 97   BUN 62* 59*   CREATININE 2.2* 2.1*   CALCIUM 8.0* 7.9*   PROT  --  6.9   ALBUMIN  --  3.9   BILITOT  --  0.5   ALKPHOS  --  80   AST  --  31   ALT  --  25   ANIONGAP 9 11   EGFRNONAA 20* 21*       Significant Imaging:  Imaging Results          X-Ray Chest AP Portable (Final result)  Result time 05/04/18 13:54:51    Final result by FARA Wood Sr., MD (05/04/18 13:54:51)                 Impression:      1. The findings are characteristic of mild cardiomegaly with mild bilateral pulmonary edema.  2. Surgical changes  .      Electronically signed by: Javon Wood MD  Date:    05/04/2018  Time:    13:54             Narrative:    EXAMINATION:  XR CHEST AP PORTABLE    CLINICAL HISTORY:  Chest Pain;    COMPARISON:  04/19/2018    FINDINGS:  There are multiple sternotomy wires in place.  A cardiac pacemaker remains in place.  There is mild cardiomegaly.  There is mild prominence of the central pulmonary vascularity bilaterally.  There is a mild amount of interstitial and alveolar opacities seen in both  lungs.  There is no pneumothorax.  The costophrenic angles are sharp.                                  Assessment/Plan:      * Acute hyperkalemia    -Related to Entresto and K+ supplement; will hold these medications  -S/p Kayexalate, insulin D5W, and albuterol inhaler  -repeat labs in AM  -cardiac monitoring  -mild hydration            Acquired hypothyroidism    -resume Synthroid        A-fib    -resume Amiodarone and BB  -check PT/INR  -Pharmacy consulted for initial dose.          Chronic combined systolic and diastolic congestive heart failure    -resume BB  -hold Entresto, Lasix for now  -consult cardiology for medication adjustment          VTE Risk Mitigation     Will be on Coumadin once PT/INR results          Azucena Olivares NP  Department of Hospital Medicine   [facility]

## 2018-05-04 NOTE — TELEPHONE ENCOUNTER
Creatine and K+ continue to remain elevated. She needs to have an ECG to be certain that she is not having ECG changes and may need gentle hydration.  If she doesn't want to be seen in the ED again, she needs to repeat her dose of Kayexalate, Stop Entresto and please make sure she IS NOT taking K supplement. Lasix and Metolazone should both be on hold as well. She needs repeat labs Monday morning if not going to the ED.   This is from a combination of the Lasix, metolazone, Potassium supplement and Entresto. She did and is not tolerating

## 2018-05-04 NOTE — ASSESSMENT & PLAN NOTE
-Related to Entresto and K+ supplement; will hold these medications  -S/p Kayexalate, insulin D5W, and albuterol inhaler  -repeat labs in AM  -cardiac monitoring  -mild hydration

## 2018-05-04 NOTE — ED PROVIDER NOTES
"SCRIBE #1 NOTE: I, Kevin Hi, am scribing for, and in the presence of, Tomasa Johnson Do, MD. I have scribed the entire note.      History      Chief Complaint   Patient presents with    Fatigue     Giorgi Alston (cardiology) told patient to come get check out in ER for labs and EKG       Review of patient's allergies indicates:   Allergen Reactions    Cephalosporins Hives    Metaxalone Itching    Penicillins      Other reaction(s): Unknown      Pregabalin      Other reaction(s): "Bad feeling"      Sulfa (sulfonamide antibiotics) Itching        HPI   HPI    5/4/2018, 1:12 PM   History obtained from the patient      History of Present Illness: Jennifer Mathews is a 85 y.o. female patient who presents to the Emergency Department for fatigue which onset gradually an unknown time PTA. Symptoms are constant and moderate in severity. No mitigating or exacerbating factors reported. No associated sxs. Patient denies any CP, SOB, abd pain, n/v/d, fever, chills, and all other sxs at this time. Pt states that RAUL Liu (cardiology) directed her to come to the ED to be administered fluids for high potassium levels and poor kidney function. No further complaints or concerns at this time.         Arrival mode: Personal vehicle     PCP: Desirae Bello MD       Past Medical History:  Past Medical History:   Diagnosis Date    Anemia     Anticoagulated on Coumadin 7/13/2015    Arthritis     Atrial fibrillation     Basal cell carcinoma 10/2015    left neck    Cardiac arrest     Cardiac resynchronization therapy defibrillator (CRT-D) in place 07/13/2015    Pt denies, states it does not shock me    Chronic combined systolic and diastolic congestive heart failure     CKD (chronic kidney disease) stage 3, GFR 30-59 ml/min     Dyslipidemia 1/30/2014    Hypertension     Ischemic cardiomyopathy 01/30/2014    Macular hole of left eye     Old    Macular hole of left eye     LEV (obstructive sleep " apnea) 9/30/2013    Recurrent UTI     Refractive error     Stroke        Past Surgical History:  Past Surgical History:   Procedure Laterality Date    APPENDECTOMY      CARDIAC PACEMAKER PLACEMENT  2014    CATARACT EXTRACTION      OU    CHOLECYSTECTOMY      COLONOSCOPY      MITRAL VALVE REPLACEMENT  2014    MITRAL VALVE SURGERY      x3         Family History:  Family History   Problem Relation Age of Onset    Coronary artery disease Mother         mi    Coronary artery disease Father     Diabetes Brother     Cancer Brother     Melanoma Neg Hx     Psoriasis Neg Hx     Lupus Neg Hx     Eczema Neg Hx        Social History:  Social History     Social History Main Topics    Smoking status: Never Smoker    Smokeless tobacco: Never Used    Alcohol use No    Drug use: No    Sexual activity: Unknown       ROS   Review of Systems   Constitutional: Positive for fatigue. Negative for chills and fever.   HENT: Negative for sore throat.    Respiratory: Negative for shortness of breath.    Cardiovascular: Negative for chest pain.   Gastrointestinal: Negative for abdominal pain, diarrhea, nausea and vomiting.   Genitourinary: Negative for dysuria.   Musculoskeletal: Negative for back pain.   Skin: Negative for rash.   Neurological: Negative for weakness.   Hematological: Does not bruise/bleed easily.   All other systems reviewed and are negative.      Physical Exam      Initial Vitals [05/04/18 1302]   BP Pulse Resp Temp SpO2   137/62 70 19 97.9 °F (36.6 °C) 95 %      MAP       87          Physical Exam  Nursing Notes and Vital Signs Reviewed.  Constitutional: Patient is in no acute distress. Well-developed and well-nourished.  Head: Atraumatic. Normocephalic.  Eyes: PERRL. EOM intact. Conjunctivae are not pale. No scleral icterus.  ENT: Mucous membranes are moist. Oropharynx is clear and symmetric.    Neck: Supple. Full ROM. No lymphadenopathy.  Cardiovascular: Regular rate. Regular rhythm. No murmurs, rubs,  "or gallops. Distal pulses are 2+ and symmetric.  Pulmonary/Chest: No respiratory distress. Clear to auscultation bilaterally. No wheezing or rales.  Abdominal: Soft and non-distended.  There is no tenderness.  No rebound, guarding, or rigidity.   Musculoskeletal: Moves all extremities. No obvious deformities. No edema. No calf tenderness.  Skin: Warm and dry.  Neurological:  Alert, awake, and appropriate.  Normal speech.  No acute focal neurological deficits are appreciated.  Psychiatric: Normal affect. Good eye contact. Appropriate in content.    ED Course    Critical Care  Date/Time: 5/4/2018 1:56 PM  Performed by: IVANIA ANDRADE  Authorized by: IVANIA ANDRADE   Direct patient critical care time: 15 minutes  Additional history critical care time: 5 minutes  Ordering / reviewing critical care time: 5 minutes  Documentation critical care time: 10 minutes  Total critical care time (exclusive of procedural time) : 35 minutes  Critical care time was exclusive of teaching time and separately billable procedures and treating other patients.  Critical care was necessary to treat or prevent imminent or life-threatening deterioration of the following conditions: Hyperkylemia.  Critical care was time spent personally by me on the following activities: blood draw for specimens, discussions with consultants, development of treatment plan with patient or surrogate, interpretation of cardiac output measurements, evaluation of patient's response to treatment, examination of patient, ordering and review of laboratory studies, pulse oximetry and re-evaluation of patient's condition.        ED Vital Signs:  Vitals:    05/04/18 1302 05/04/18 1317 05/04/18 1320 05/04/18 1322   BP: 137/62  (!) 147/65 138/65   Pulse: 70 70 70 70   Resp: 19      Temp: 97.9 °F (36.6 °C)      TempSrc: Oral      SpO2: 95%  97% 97%   Weight: 51.3 kg (113 lb)      Height: 5' 2" (1.575 m)       05/04/18 1324 05/04/18 1404 05/04/18 1512   BP: 139/66  " 133/61   Pulse: 70 69 69   Resp:  12 (!) 33   Temp:      TempSrc:      SpO2: 99% 100% 98%   Weight:      Height:          Abnormal Lab Results:  Labs Reviewed   CBC W/ AUTO DIFFERENTIAL - Abnormal; Notable for the following:        Result Value    RBC 3.73 (*)     Hemoglobin 9.2 (*)     Hematocrit 31.3 (*)     MCH 24.7 (*)     MCHC 29.4 (*)     RDW 20.8 (*)     All other components within normal limits   COMPREHENSIVE METABOLIC PANEL - Abnormal; Notable for the following:     Potassium 5.9 (*)     BUN, Bld 59 (*)     Creatinine 2.1 (*)     Calcium 7.9 (*)     eGFR if  24 (*)     eGFR if non  21 (*)     All other components within normal limits   TROPONIN I - Abnormal; Notable for the following:     Troponin I 0.031 (*)     All other components within normal limits   B-TYPE NATRIURETIC PEPTIDE - Abnormal; Notable for the following:      (*)     All other components within normal limits   URINALYSIS        All Lab Results:  Results for orders placed or performed during the hospital encounter of 05/04/18   CBC auto differential   Result Value Ref Range    WBC 5.75 3.90 - 12.70 K/uL    RBC 3.73 (L) 4.00 - 5.40 M/uL    Hemoglobin 9.2 (L) 12.0 - 16.0 g/dL    Hematocrit 31.3 (L) 37.0 - 48.5 %    MCV 84 82 - 98 fL    MCH 24.7 (L) 27.0 - 31.0 pg    MCHC 29.4 (L) 32.0 - 36.0 g/dL    RDW 20.8 (H) 11.5 - 14.5 %    Platelets 207 150 - 350 K/uL    MPV 9.9 9.2 - 12.9 fL    Gran # (ANC) 2.8 1.8 - 7.7 K/uL    Lymph # 2.2 1.0 - 4.8 K/uL    Mono # 0.5 0.3 - 1.0 K/uL    Eos # 0.2 0.0 - 0.5 K/uL    Baso # 0.06 0.00 - 0.20 K/uL    Gran% 48.9 38.0 - 73.0 %    Lymph% 37.9 18.0 - 48.0 %    Mono% 8.5 4.0 - 15.0 %    Eosinophil% 3.7 0.0 - 8.0 %    Basophil% 1.0 0.0 - 1.9 %    Differential Method Automated    Comprehensive metabolic panel   Result Value Ref Range    Sodium 137 136 - 145 mmol/L    Potassium 5.9 (H) 3.5 - 5.1 mmol/L    Chloride 100 95 - 110 mmol/L    CO2 26 23 - 29 mmol/L    Glucose 97 70 -  110 mg/dL    BUN, Bld 59 (H) 8 - 23 mg/dL    Creatinine 2.1 (H) 0.5 - 1.4 mg/dL    Calcium 7.9 (L) 8.7 - 10.5 mg/dL    Total Protein 6.9 6.0 - 8.4 g/dL    Albumin 3.9 3.5 - 5.2 g/dL    Total Bilirubin 0.5 0.1 - 1.0 mg/dL    Alkaline Phosphatase 80 55 - 135 U/L    AST 31 10 - 40 U/L    ALT 25 10 - 44 U/L    Anion Gap 11 8 - 16 mmol/L    eGFR if African American 24 (A) >60 mL/min/1.73 m^2    eGFR if non African American 21 (A) >60 mL/min/1.73 m^2   Troponin I #1   Result Value Ref Range    Troponin I 0.031 (H) 0.000 - 0.026 ng/mL   B-Type natriuretic peptide (BNP)   Result Value Ref Range     (H) 0 - 99 pg/mL         Imaging Results:  Imaging Results          X-Ray Chest AP Portable (Final result)  Result time 05/04/18 13:54:51    Final result by FARA Wood Sr., MD (05/04/18 13:54:51)                 Impression:      1. The findings are characteristic of mild cardiomegaly with mild bilateral pulmonary edema.  2. Surgical changes  .      Electronically signed by: Javon Wood MD  Date:    05/04/2018  Time:    13:54             Narrative:    EXAMINATION:  XR CHEST AP PORTABLE    CLINICAL HISTORY:  Chest Pain;    COMPARISON:  04/19/2018    FINDINGS:  There are multiple sternotomy wires in place.  A cardiac pacemaker remains in place.  There is mild cardiomegaly.  There is mild prominence of the central pulmonary vascularity bilaterally.  There is a mild amount of interstitial and alveolar opacities seen in both lungs.  There is no pneumothorax.  The costophrenic angles are sharp.                                        The EKG was ordered, reviewed, and independently interpreted by the ED provider.  Interpretation time: 1315  Rate: 70 BPM  Rhythm: AV dual-paced rhythm  Interpretation: ST elevation. No STEMI.        The Emergency Provider reviewed the vital signs and test results, which are outlined above.    ED Discussion     2:17 PM: Discussed case with NENO Montenegro (Hospital Medicine). Dr. Dewitt agrees  with current care and management of pt and accepts admission.   Admitting Service: Hospital medicine   Admitting Physician: Dr. Dewitt  Admit to: Observation-Telemetry    2:18 PM: Re-evaluated pt. I have discussed test results, shared treatment plan, and the need for admission with patient and family at bedside. Pt and family express understanding at this time and agree with all information. All questions answered. Pt and family have no further questions or concerns at this time. Pt is ready for admit.          ED Medication(s):  Medications   0.9%  NaCl infusion ( Intravenous New Bag 5/4/18 1516)   albuterol sulfate nebulizer solution 10 mg (10 mg Nebulization Given 5/4/18 1404)   calcium gluconate 1g in dextrose 5% 100mL (ready to mix system) (0 g Intravenous Stopped 5/4/18 1444)   dextrose 50% injection 25 g (25 g Intravenous Given 5/4/18 1434)   insulin regular injection 10 Units (10 Units Intravenous Given 5/4/18 1427)   sodium bicarbonate 8.4 % (1 mEq/mL) injection 50 mEq (50 mEq Intravenous Given 5/4/18 1426)   sodium polystyrene 15 gram/60 mL suspension 30 g (30 g Oral Given 5/4/18 1424)       New Prescriptions    No medications on file             Medical Decision Making    Medical Decision Making:   Clinical Tests:   Lab Tests: Reviewed and Ordered  Radiological Study: Ordered and Reviewed  Medical Tests: Ordered and Reviewed           Scribe Attestation:    Scribe #1: I performed the above scribed service and the documentation accurately describes the services I performed. I attest to the accuracy of the note.    Attending:   Physician Attestation Statement for Scribe #1: I, Tomasa Johnson Do, MD, personally performed the services described in this documentation, as scribed by Kevin Hi, in my presence, and it is both accurate and complete.          Clinical Impression       ICD-10-CM ICD-9-CM   1. Acute hyperkalemia E87.5 276.7   2. Chest pain R07.9 786.50   3. Acute renal failure, unspecified  acute renal failure type N17.9 584.9       Disposition:   Disposition: Placed in Observation  Condition: Fair         Tomasa Johnson Do, MD  05/04/18 1600       Tomasa Johnson Do, MD  05/06/18 0237

## 2018-05-04 NOTE — TELEPHONE ENCOUNTER
I called pt, no answer, left message for her to call me back ASAP.     I called pts cell phone and notified her of orders.   She is headed to ED now.

## 2018-05-04 NOTE — SUBJECTIVE & OBJECTIVE
"Past Medical History:   Diagnosis Date    Anemia     Anticoagulated on Coumadin 7/13/2015    Arthritis     Atrial fibrillation     Basal cell carcinoma 10/2015    left neck    Cardiac arrest     Cardiac resynchronization therapy defibrillator (CRT-D) in place 07/13/2015    Pt denies, states it does not shock me    Chronic combined systolic and diastolic congestive heart failure     CKD (chronic kidney disease) stage 3, GFR 30-59 ml/min     Dyslipidemia 1/30/2014    Hypertension     Ischemic cardiomyopathy 01/30/2014    Macular hole of left eye     Old    Macular hole of left eye     LEV (obstructive sleep apnea) 9/30/2013    Recurrent UTI     Refractive error     Stroke        Past Surgical History:   Procedure Laterality Date    APPENDECTOMY      CARDIAC PACEMAKER PLACEMENT  2014    CATARACT EXTRACTION      OU    CHOLECYSTECTOMY      COLONOSCOPY      MITRAL VALVE REPLACEMENT  2014    MITRAL VALVE SURGERY      x3       Review of patient's allergies indicates:   Allergen Reactions    Cephalosporins Hives    Metaxalone Itching    Penicillins      Other reaction(s): Unknown      Pregabalin      Other reaction(s): "Bad feeling"      Sulfa (sulfonamide antibiotics) Itching       Current Facility-Administered Medications on File Prior to Encounter   Medication    denosumab (PROLIA) injection 60 mg     Current Outpatient Prescriptions on File Prior to Encounter   Medication Sig    allopurinol (ZYLOPRIM) 100 MG tablet Take 2 tablets (200 mg total) by mouth once daily.    amiodarone (PACERONE) 200 MG Tab Take 1 tablet (200 mg total) by mouth once daily.    aspirin (ECOTRIN) 81 MG EC tablet Take 81 mg by mouth once daily.    carvedilol (COREG) 25 MG tablet Take 1 tablet (25 mg total) by mouth 2 (two) times daily with meals.    colchicine 0.6 mg Cap Take 1 capsule (0.6 mg total) by mouth once daily.    digoxin (LANOXIN) 125 mcg tablet Take 1 tablet (125 mcg total) by mouth once daily. TAKE " 1 TABLET (0.125 MG TOTAL) BY MOUTH ONCE DAILY.    furosemide (LASIX) 40 MG tablet Take 1 tablet (40 mg total) by mouth daily as needed.    gabapentin (NEURONTIN) 100 MG capsule TAKE ONE CAPSULE BY MOUTH TWO TIMES DAILY    Lactobac 40-Bifido 3-S.thermop (PROBIOTIC) 100 billion cell Cap Take 1 capsule by mouth once daily.    levothyroxine (SYNTHROID) 50 MCG tablet Take 1 tablet (50 mcg total) by mouth once daily.    potassium chloride SA (K-DUR,KLOR-CON) 20 MEQ tablet Take 1 tablet (20 mEq total) by mouth daily as needed.    warfarin (COUMADIN) 2.5 MG tablet TAKE 1/2 TABLET ON MONDAY AND WEDNESDAY AND 1 TABLET ALL OTHER DAYS. DISCONTINUE 5MG TABLET. (Patient taking differently: TAKE 1/2 TABLET ON SATURDAY AND SUNDAY AND 1 TABLET MONDAY THROUGH FRIDAY. DISCONTINUE 5MG TABLET.)    acetaminophen (TYLENOL EXTRA STRENGTH) 325 MG tablet Take 2 tablets (650 mg total) by mouth every 8 (eight) hours. (Patient taking differently: Take 650 mg by mouth 3 (three) times daily as needed. )    calcium carbonate (OS-SHERRY) 600 mg calcium (1,500 mg) Tab Take 600 mg by mouth once.    cranberry 1,000 mg Cap Take 1 capsule by mouth Daily.    metOLazone (ZAROXOLYN) 2.5 MG tablet Take 1 tablet (2.5 mg total) by mouth daily as needed (swelling and/or weight gain).    MULTIVITAMIN ORAL Take 1 tablet by mouth Daily.    [DISCONTINUED] sacubitril-valsartan (ENTRESTO) 49-51 mg per tablet Take 1 tablet by mouth 2 (two) times daily.     Family History     Problem Relation (Age of Onset)    Cancer Brother    Coronary artery disease Mother, Father    Diabetes Brother        Social History Main Topics    Smoking status: Never Smoker    Smokeless tobacco: Never Used    Alcohol use No    Drug use: No    Sexual activity: Not on file     Review of Systems   Constitutional: Negative for activity change, diaphoresis, fatigue and unexpected weight change.        Abnormal lab work     HENT: Negative for congestion, ear pain and sore throat.     Eyes: Negative.    Respiratory: Negative for shortness of breath and wheezing.    Cardiovascular: Negative for chest pain and palpitations.   Gastrointestinal: Negative for abdominal pain, constipation, diarrhea and vomiting.   Endocrine: Negative.    Genitourinary: Negative for flank pain, hematuria and urgency.   Musculoskeletal: Negative for joint swelling and neck pain.   Skin: Negative for pallor.   Neurological: Negative for seizures, syncope and light-headedness.   Hematological: Negative.    Psychiatric/Behavioral: Negative.      Objective:     Vital Signs (Most Recent):  Temp: 97.9 °F (36.6 °C) (05/04/18 1302)  Pulse: 69 (05/04/18 1601)  Resp: (!) 22 (05/04/18 1601)  BP: 136/64 (05/04/18 1601)  SpO2: 95 % (05/04/18 1601) Vital Signs (24h Range):  Temp:  [97.9 °F (36.6 °C)] 97.9 °F (36.6 °C)  Pulse:  [69-70] 69  Resp:  [12-33] 22  SpO2:  [95 %-100 %] 95 %  BP: (133-147)/(61-66) 136/64     Weight: 51.3 kg (113 lb)  Body mass index is 20.67 kg/m².    Physical Exam   Constitutional: She is oriented to person, place, and time. She appears well-developed and well-nourished.   Elderly, thin   HENT:   Head: Normocephalic and atraumatic.   Eyes: EOM are normal.   Neck: Normal range of motion. Neck supple.   Cardiovascular: Normal rate, regular rhythm and normal heart sounds.    No murmur heard.  Pulmonary/Chest: Effort normal and breath sounds normal. No respiratory distress.   Abdominal: Soft. Bowel sounds are normal. She exhibits no distension. There is no tenderness.   Musculoskeletal: Normal range of motion. She exhibits no edema.   Neurological: She is alert and oriented to person, place, and time.   Skin: Skin is warm and dry.         CRANIAL NERVES     CN III, IV, VI   Extraocular motions are normal.     Significant Labs:   CBC:   Recent Labs  Lab 05/04/18  1317   WBC 5.75   HGB 9.2*   HCT 31.3*        CMP:   Recent Labs  Lab 05/04/18  0949 05/04/18  1317    137   K 6.5* 5.9*    100   CO2  28 26   * 97   BUN 62* 59*   CREATININE 2.2* 2.1*   CALCIUM 8.0* 7.9*   PROT  --  6.9   ALBUMIN  --  3.9   BILITOT  --  0.5   ALKPHOS  --  80   AST  --  31   ALT  --  25   ANIONGAP 9 11   EGFRNONAA 20* 21*       Significant Imaging:  Imaging Results          X-Ray Chest AP Portable (Final result)  Result time 05/04/18 13:54:51    Final result by FARA Wood Sr., MD (05/04/18 13:54:51)                 Impression:      1. The findings are characteristic of mild cardiomegaly with mild bilateral pulmonary edema.  2. Surgical changes  .      Electronically signed by: Javon Wood MD  Date:    05/04/2018  Time:    13:54             Narrative:    EXAMINATION:  XR CHEST AP PORTABLE    CLINICAL HISTORY:  Chest Pain;    COMPARISON:  04/19/2018    FINDINGS:  There are multiple sternotomy wires in place.  A cardiac pacemaker remains in place.  There is mild cardiomegaly.  There is mild prominence of the central pulmonary vascularity bilaterally.  There is a mild amount of interstitial and alveolar opacities seen in both lungs.  There is no pneumothorax.  The costophrenic angles are sharp.

## 2018-05-05 VITALS
DIASTOLIC BLOOD PRESSURE: 60 MMHG | SYSTOLIC BLOOD PRESSURE: 141 MMHG | BODY MASS INDEX: 19.88 KG/M2 | HEIGHT: 62 IN | RESPIRATION RATE: 16 BRPM | HEART RATE: 78 BPM | WEIGHT: 108 LBS | TEMPERATURE: 99 F | OXYGEN SATURATION: 94 %

## 2018-05-05 PROBLEM — E87.5 ACUTE HYPERKALEMIA: Status: RESOLVED | Noted: 2018-05-04 | Resolved: 2018-05-05

## 2018-05-05 PROBLEM — N17.9 AKI (ACUTE KIDNEY INJURY): Status: RESOLVED | Noted: 2018-05-05 | Resolved: 2018-05-05

## 2018-05-05 PROBLEM — N17.9 AKI (ACUTE KIDNEY INJURY): Status: ACTIVE | Noted: 2018-05-05

## 2018-05-05 LAB
ANION GAP SERPL CALC-SCNC: 10 MMOL/L
BACTERIA #/AREA URNS HPF: ABNORMAL /HPF
BILIRUB UR QL STRIP: NEGATIVE
BUN SERPL-MCNC: 39 MG/DL
CALCIUM SERPL-MCNC: 7.6 MG/DL
CHLORIDE SERPL-SCNC: 108 MMOL/L
CLARITY UR: CLEAR
CO2 SERPL-SCNC: 26 MMOL/L
COLOR UR: YELLOW
CREAT SERPL-MCNC: 1.4 MG/DL
EST. GFR  (AFRICAN AMERICAN): 40 ML/MIN/1.73 M^2
EST. GFR  (NON AFRICAN AMERICAN): 34 ML/MIN/1.73 M^2
GLUCOSE SERPL-MCNC: 205 MG/DL
GLUCOSE UR QL STRIP: NEGATIVE
HGB UR QL STRIP: NEGATIVE
INR PPP: 1.5
KETONES UR QL STRIP: NEGATIVE
LEUKOCYTE ESTERASE UR QL STRIP: ABNORMAL
MICROSCOPIC COMMENT: ABNORMAL
NITRITE UR QL STRIP: NEGATIVE
PH UR STRIP: 8 [PH] (ref 5–8)
POTASSIUM SERPL-SCNC: 4.5 MMOL/L
PROT UR QL STRIP: NEGATIVE
PROTHROMBIN TIME: 15.8 SEC
RBC #/AREA URNS HPF: 0 /HPF (ref 0–4)
SODIUM SERPL-SCNC: 144 MMOL/L
SP GR UR STRIP: 1.01 (ref 1–1.03)
SQUAMOUS #/AREA URNS HPF: 0 /HPF
URN SPEC COLLECT METH UR: ABNORMAL
UROBILINOGEN UR STRIP-ACNC: NEGATIVE EU/DL
WBC #/AREA URNS HPF: >100 /HPF (ref 0–5)

## 2018-05-05 PROCEDURE — G0378 HOSPITAL OBSERVATION PER HR: HCPCS

## 2018-05-05 PROCEDURE — 81000 URINALYSIS NONAUTO W/SCOPE: CPT

## 2018-05-05 PROCEDURE — 25000003 PHARM REV CODE 250: Performed by: NURSE PRACTITIONER

## 2018-05-05 PROCEDURE — 36415 COLL VENOUS BLD VENIPUNCTURE: CPT

## 2018-05-05 PROCEDURE — 99214 OFFICE O/P EST MOD 30 MIN: CPT | Mod: ,,, | Performed by: INTERNAL MEDICINE

## 2018-05-05 PROCEDURE — 80048 BASIC METABOLIC PNL TOTAL CA: CPT

## 2018-05-05 PROCEDURE — 85610 PROTHROMBIN TIME: CPT

## 2018-05-05 RX ADMIN — AMIODARONE HYDROCHLORIDE 200 MG: 200 TABLET ORAL at 09:05

## 2018-05-05 RX ADMIN — DIGOXIN 125 MCG: 125 TABLET ORAL at 09:05

## 2018-05-05 RX ADMIN — ASPIRIN 81 MG: 81 TABLET, COATED ORAL at 09:05

## 2018-05-05 RX ADMIN — ALLOPURINOL 200 MG: 100 TABLET ORAL at 09:05

## 2018-05-05 RX ADMIN — LEVOTHYROXINE SODIUM 50 MCG: 50 TABLET ORAL at 05:05

## 2018-05-05 RX ADMIN — CARVEDILOL 25 MG: 12.5 TABLET, FILM COATED ORAL at 09:05

## 2018-05-05 NOTE — PLAN OF CARE
Problem: Patient Care Overview  Goal: Plan of Care Review  Outcome: Ongoing (interventions implemented as appropriate)  Plan of care discussed with patient AAOX4 vital signs stable afebrile  Free from fall bed alarm in use no pain noted repeat k was 4.6 at 1800 iv fluids cont on RA  Urine sent for urinalysis neg for uti ambulatory stand by assistance possible d/c today patient slept good during the night will cont to danie

## 2018-05-05 NOTE — CONSULTS
"Jennifer Mathews 's Coumadin will be dosed and monitored by Pharmacy at the request of BOLA Olivares NP.      Indication: Afib  Target INR is 2.0 - 3.0    Coumadin Monitoring INR   Date Value Ref Range Status   03/23/2011 2.1 (H) 0.8 - 1.2 Final     Comment:     ACCP Guidelline for Coumadin usage recommends a "target range" of  2.0 - 3.0 for INR for all indicators except mechanical heart valves  and antiphospholipid syndromes which should use 2.5 - 3.5.  .     INR   Date Value Ref Range Status   05/04/2018 1.7 (H) 0.8 - 1.2 Final     Comment:     Coumadin Therapy:  2.0 - 3.0 for INR for all indicators except mechanical heart valves  and antiphospholipid syndromes which should use 2.5 - 3.5.       Patient will be continued on a dose of 2.5 mg daily. Daily INR has been ordered. Pharmacy will educate patient when able to do so.    Thank you for allowing us to participate in this patient's care.     Dior Zavaleta 5/4/2018 7:13 PM    "

## 2018-05-05 NOTE — ASSESSMENT & PLAN NOTE
-Bumped with increased diuresis, trending down  -Repeat BMP today  -Resume po Lasix at 40 mg daily beginning tmw  -Resume Entresto tmw as well  -Close f/u in clinic next week with repeat labs

## 2018-05-05 NOTE — NURSING
Discharged orders received and reviewed with family and pt. Pt instructed when to take each medication next dose.  IV removed, telemetry removed. Pt assisted with dressing by staff. Pt transported to [facility] via w/c by staff for family to transport home.

## 2018-05-05 NOTE — ASSESSMENT & PLAN NOTE
-Compensated  -Lasix held due to JESSICA  -Ok to resume 40 mg of Lasix, beginning tmw if today's BMP is stable  -Resume Entresto tmw as well  -Stop K supplement, no metolazone  -Continue Coreg, ASA  -Close f/u in clinic next week

## 2018-05-05 NOTE — HOSPITAL COURSE
Nataly Mathews is a 85 year old female admitted for hyperkalemia and acute renal insufficiency while on diuretics, Entresto, and potassium supplement. Patient received insulin and D5W, albuterol inhaler, Kayexelate, and gentle hydration. With medical management, impaired function function and hyperkalemia resolved. Patient was asked to resume 40mg Lasix and Entresto starting tomorrow. Cardiology assisted with services. Patient seen and examined on date of discharge and deemed suitable.

## 2018-05-05 NOTE — CONSULTS
[facility]  Cardiology  Consult Note    Patient Name: Jennifer Mathews  MRN: [id number]  Admission Date: 5/4/2018  Hospital Length of Stay: 0 days  Code Status: Full Code   Attending Provider: Clarice Sin MD   Consulting Provider: Adriana Pizano PA-C  Primary Care Physician: Desirae Bello MD  Principal Problem:Acute hyperkalemia    Patient information was obtained from patient, past medical records and ER records.     Inpatient consult to Cardiology  Consult performed by: ADRIANA PIZANO  Consult ordered by: PEDRO RUSH        Subjective:     Chief Complaint:  Abnormal lab    HPI:   Jennifer Mathews is an 86 yo female with a PMHx of mitral valve replacement x 3, HTN, PAF, ACS, combined CHF, PPM-CRT-D placement, s/p MVR, edema, pacemaker placement, and CKD who presented to [facility] ED yesterday due to abnormal labs. Patient's diuretic regimen had recently been increased due to leg edema/CHF symptoms with resultant bump in creatinine and hyperkalemia. Patient denied any complaints-no chest pain, SOB, leg swelling, PND, or orthopnea. Initial workup in ED revealed creatinine of  2.1 and K of 5.9 and patient subsequently admitted for observation and further monitoring. Cardiology consulted to assist with management. Patient seen and examined today, resting in bed. No real complaints. Remains chest pain free. Reports LE edema remarkably improved. Compliant with her medications. Chart reviewed. Creatinine trending down, K has normalized. EKG without any acute changes.     Past Medical History:   Diagnosis Date    Anemia     Anticoagulated on Coumadin 7/13/2015    Arthritis     Atrial fibrillation     Basal cell carcinoma 10/2015    left neck    Cardiac arrest     Cardiac resynchronization therapy defibrillator (CRT-D) in place 07/13/2015    Pt denies, states it does not shock me    Chronic combined systolic and diastolic congestive heart failure     CKD (chronic kidney disease) stage  "3, GFR 30-59 ml/min     Dyslipidemia 1/30/2014    Hypertension     Ischemic cardiomyopathy 01/30/2014    Macular hole of left eye     Old    Macular hole of left eye     LEV (obstructive sleep apnea) 9/30/2013    Recurrent UTI     Refractive error     Stroke        Past Surgical History:   Procedure Laterality Date    APPENDECTOMY      CARDIAC PACEMAKER PLACEMENT  2014    CATARACT EXTRACTION      OU    CHOLECYSTECTOMY      COLONOSCOPY      MITRAL VALVE REPLACEMENT  2014    MITRAL VALVE SURGERY      x3       Review of patient's allergies indicates:   Allergen Reactions    Cephalosporins Hives    Metaxalone Itching    Penicillins      Other reaction(s): Unknown      Pregabalin      Other reaction(s): "Bad feeling"      Sulfa (sulfonamide antibiotics) Itching       No current facility-administered medications on file prior to encounter.      Current Outpatient Prescriptions on File Prior to Encounter   Medication Sig    allopurinol (ZYLOPRIM) 100 MG tablet Take 2 tablets (200 mg total) by mouth once daily.    amiodarone (PACERONE) 200 MG Tab Take 1 tablet (200 mg total) by mouth once daily.    aspirin (ECOTRIN) 81 MG EC tablet Take 81 mg by mouth once daily.    carvedilol (COREG) 25 MG tablet Take 1 tablet (25 mg total) by mouth 2 (two) times daily with meals.    colchicine 0.6 mg Cap Take 1 capsule (0.6 mg total) by mouth once daily.    digoxin (LANOXIN) 125 mcg tablet Take 1 tablet (125 mcg total) by mouth once daily. TAKE 1 TABLET (0.125 MG TOTAL) BY MOUTH ONCE DAILY.    furosemide (LASIX) 40 MG tablet Take 1 tablet (40 mg total) by mouth daily as needed.    gabapentin (NEURONTIN) 100 MG capsule TAKE ONE CAPSULE BY MOUTH TWO TIMES DAILY    Lactobac 40-Bifido 3-S.thermop (PROBIOTIC) 100 billion cell Cap Take 1 capsule by mouth once daily.    levothyroxine (SYNTHROID) 50 MCG tablet Take 1 tablet (50 mcg total) by mouth once daily.    potassium chloride SA (K-DUR,KLOR-CON) 20 MEQ tablet " Take 1 tablet (20 mEq total) by mouth daily as needed.    warfarin (COUMADIN) 2.5 MG tablet TAKE 1/2 TABLET ON MONDAY AND WEDNESDAY AND 1 TABLET ALL OTHER DAYS. DISCONTINUE 5MG TABLET. (Patient taking differently: TAKE 1/2 TABLET ON SATURDAY AND SUNDAY AND 1 TABLET MONDAY THROUGH FRIDAY. DISCONTINUE 5MG TABLET.)    acetaminophen (TYLENOL EXTRA STRENGTH) 325 MG tablet Take 2 tablets (650 mg total) by mouth every 8 (eight) hours. (Patient taking differently: Take 650 mg by mouth 3 (three) times daily as needed. )    calcium carbonate (OS-SHERRY) 600 mg calcium (1,500 mg) Tab Take 600 mg by mouth once.    cranberry 1,000 mg Cap Take 1 capsule by mouth Daily.    metOLazone (ZAROXOLYN) 2.5 MG tablet Take 1 tablet (2.5 mg total) by mouth daily as needed (swelling and/or weight gain).    MULTIVITAMIN ORAL Take 1 tablet by mouth Daily.     Family History     Problem Relation (Age of Onset)    Cancer Brother    Coronary artery disease Mother, Father    Diabetes Brother        Social History Main Topics    Smoking status: Never Smoker    Smokeless tobacco: Never Used    Alcohol use No    Drug use: No    Sexual activity: Not on file     Review of Systems   Constitution: Negative.   HENT: Negative.    Eyes: Negative.    Cardiovascular: Negative.    Respiratory: Negative.    Endocrine: Negative.    Hematologic/Lymphatic: Negative.    Skin: Negative.    Musculoskeletal: Negative.    Gastrointestinal: Negative.    Genitourinary: Negative.    Neurological: Negative.    Psychiatric/Behavioral: Negative.    Allergic/Immunologic: Negative.      Objective:     Vital Signs (Most Recent):  Temp: 98.5 °F (36.9 °C) (05/05/18 0731)  Pulse: 78 (05/05/18 0930)  Resp: 16 (05/05/18 0731)  BP: (!) 141/60 (05/05/18 0930)  SpO2: (!) 94 % (05/05/18 0930) Vital Signs (24h Range):  Temp:  [97.5 °F (36.4 °C)-98.5 °F (36.9 °C)] 98.5 °F (36.9 °C)  Pulse:  [69-78] 78  Resp:  [12-33] 16  SpO2:  [92 %-100 %] 94 %  BP: (101-150)/(51-67) 141/60      Weight: 49 kg (108 lb)  Body mass index is 19.75 kg/m².    SpO2: (!) 94 %  O2 Device (Oxygen Therapy): room air      Intake/Output Summary (Last 24 hours) at 05/05/18 1014  Last data filed at 05/05/18 0700   Gross per 24 hour   Intake          2673.33 ml   Output              200 ml   Net          2473.33 ml       Lines/Drains/Airways     Peripheral Intravenous Line                 Peripheral IV - Single Lumen 05/04/18 1317 Left Forearm less than 1 day         Peripheral IV - Single Lumen 05/04/18 1420 Right Forearm less than 1 day                Physical Exam   Constitutional: She is oriented to person, place, and time. She appears well-developed and well-nourished. No distress.   HENT:   Head: Normocephalic and atraumatic.   Eyes: Pupils are equal, round, and reactive to light. Right eye exhibits no discharge. Left eye exhibits no discharge.   Neck: Neck supple. JVD present.   Cardiovascular: Normal rate, regular rhythm, S1 normal and S2 normal.    Murmur heard.  +TR murmur   Pulmonary/Chest: Effort normal and breath sounds normal. No respiratory distress. She has no wheezes. She has no rales.   CABG site well-healed  PPM site well-healed   Abdominal: Soft. She exhibits no distension. There is no tenderness. There is no rebound.   Musculoskeletal: She exhibits edema (trace BLE).   Neurological: She is alert and oriented to person, place, and time.   Skin: Skin is warm and dry. She is not diaphoretic. No erythema.   CVI changes BLE   Psychiatric: She has a normal mood and affect. Her behavior is normal. Thought content normal.   Nursing note and vitals reviewed.      Significant Labs:   CMP   Recent Labs  Lab 05/04/18  0949 05/04/18  1317 05/04/18  1859    137 144   K 6.5* 5.9* 4.6    100 105   CO2 28 26 29   * 97 76   BUN 62* 59* 56*   CREATININE 2.2* 2.1* 1.7*   CALCIUM 8.0* 7.9* 7.7*   PROT  --  6.9  --    ALBUMIN  --  3.9  --    BILITOT  --  0.5  --    ALKPHOS  --  80  --    AST  --  31   --    ALT  --  25  --    ANIONGAP 9 11 10   ESTGFRAFRICA 23* 24* 31*   EGFRNONAA 20* 21* 27*   , CBC   Recent Labs  Lab 05/04/18  1317   WBC 5.75   HGB 9.2*   HCT 31.3*      , Troponin   Recent Labs  Lab 05/04/18  1317   TROPONINI 0.031*    and All pertinent lab results from the last 24 hours have been reviewed.    Significant Imaging: Echocardiogram:   2D echo with color flow doppler:   Results for orders placed or performed in visit on 03/01/18   2D echo with color flow doppler   Result Value Ref Range    EF 25 (A) 55 - 65    Mitral Valve Regurgitation TRIVIAL     Aortic Valve Regurgitation MILD     Est. PA Systolic Pressure 62.06 (A)     Tricuspid Valve Regurgitation MODERATE (A)    , EKG: Reviewed and X-Ray: CXR: X-Ray Chest 1 View (CXR): No results found for this visit on 05/04/18. and X-Ray Chest PA and Lateral (CXR): No results found for this visit on 05/04/18.    Assessment and Plan:   Patient who presents with bumped creatinine and hyperkalemia s/p increased diuresis. Clinically stable, labs normalizing. Feels well. Ok to d/c home later today with med rec's as noted below with close f/u  Next week .    * Acute hyperkalemia    -Resolved  -Ok to re-start Entresto tmw        JESSICA (acute kidney injury)    -Bumped with increased diuresis, trending down  -Repeat BMP today  -Resume po Lasix at 40 mg daily beginning tmw  -Resume Entresto tmw as well  -Close f/u in clinic next week with repeat labs        A-fib    -Stable in SR  -Continue Coreg  -Continue Coumadin for CVA prophylaxis        Chronic combined systolic and diastolic congestive heart failure    -Compensated  -Lasix held due to JESSICA  -Ok to resume 40 mg of Lasix, beginning tmw if today's BMP is stable  -Resume Entresto tmw as well  -Stop K supplement, no metolazone  -Continue Coreg, ASA  -Close f/u in clinic next week            VTE Risk Mitigation         Ordered     warfarin (COUMADIN) tablet 2.5 mg  Daily      05/04/18 7275          Thank you for  your consult. I will sign off. Please contact us if you have any additional questions.    Adriana Arizmendi PA-C  Cardiology   [facility]     Chart reviewed. Dr. Cary examined patient and agrees with plan as outlined above.

## 2018-05-05 NOTE — DISCHARGE SUMMARY
[facility] Hospital Medicine  Discharge Summary      Patient Name: Jennifer Mathews  MRN: [id number]  Admission Date: 5/4/2018  Hospital Length of Stay: 0 days  Discharge Date and Time:  05/05/2018 11:17 AM  Attending Physician: Clarice Sin MD   Discharging Provider: Pedro Olivares NP  Primary Care Provider: Desirae Bello MD      HPI:   Jennifer Mathews is a 85 year old female with history of HTN, CKD, atrial fibrillation, and combined heart failure-On Entresto, who presents to ED for abnormal lab value. She was seen by Cardiology 2 days ago and was noted with Hyperkalemia. During this visit she was asked to hold potassium supplement and was given Kayexalate. Repeat labs today again showed hyperkalemia, and patient was asked to proceed to ED. She has received Kayexalate, insulin with D5W, and albuterol inhaler. She has no other complaints.       * No surgery found *      Hospital Course:   Nataly Mathews is a 85 year old female admitted for hyperkalemia and acute renal insufficiency while on diuretics, Entresto, and potassium supplement. Patient received insulin and D5W, albuterol inhaler, Kayexelate, and gentle hydration. With medical management, impaired function function and hyperkalemia resolved. Patient was asked to resume 40mg Lasix and Entresto starting tomorrow. Cardiology assisted with services. Patient seen and examined on date of discharge and deemed suitable.      Consults:   Consults         Status Ordering Provider     Inpatient consult to Cardiology  Once     Provider:  Maikel Cary MD    Completed PEDRO OLIVARES     Pharmacy to dose Warfarin consult  Once     Provider:  (Not yet assigned)    Acknowledged PEDRO OLIVARES          No new Assessment & Plan notes have been filed under this hospital service since the last note was generated.  Service: Hospital Medicine    Final Active Diagnoses:    Diagnosis Date Noted POA    Acquired hypothyroidism [E03.9] 02/28/2018 Yes     A-fib [I48.91] 10/08/2014 Yes    Chronic combined systolic and diastolic congestive heart failure [I50.42]  Yes      Problems Resolved During this Admission:    Diagnosis Date Noted Date Resolved POA    PRINCIPAL PROBLEM:  Acute hyperkalemia [E87.5] 05/04/2018 05/05/2018 Yes    JESSICA (acute kidney injury) [N17.9] 05/05/2018 05/05/2018 Unknown       Discharged Condition: stable    Disposition: Home or Self Care    Follow Up:  Follow-up Information     Desirae Bello MD In 3 days.    Specialty:  Family Medicine  Contact information:  [id number] [place] [address] [address] Nyla MORALEZ [address] [phone]                 Patient Instructions:     Activity as tolerated         Significant Diagnostic Studies: Labs:   CMP   Recent Labs  Lab 05/04/18  1317 05/04/18  1859 05/05/18  1018    144 144   K 5.9* 4.6 4.5    105 108   CO2 26 29 26   GLU 97 76 205*   BUN 59* 56* 39*   CREATININE 2.1* 1.7* 1.4   CALCIUM 7.9* 7.7* 7.6*   PROT 6.9  --   --    ALBUMIN 3.9  --   --    BILITOT 0.5  --   --    ALKPHOS 80  --   --    AST 31  --   --    ALT 25  --   --    ANIONGAP 11 10 10   ESTGFRAFRICA 24* 31* 40*   EGFRNONAA 21* 27* 34*    and CBC   Recent Labs  Lab 05/04/18  1317   WBC 5.75   HGB 9.2*   HCT 31.3*          Pending Diagnostic Studies:     None         Medications:  Reconciled Home Medications:      Medication List      CHANGE how you take these medications    acetaminophen 325 MG tablet  Commonly known as:  TYLENOL EXTRA STRENGTH  Take 2 tablets (650 mg total) by mouth every 8 (eight) hours.  What changed:  · when to take this  · reasons to take this     warfarin 2.5 MG tablet  Commonly known as:  COUMADIN  TAKE 1/2 TABLET ON MONDAY AND WEDNESDAY AND 1 TABLET ALL OTHER DAYS. DISCONTINUE 5MG TABLET.  What changed:  See the new instructions.        CONTINUE taking these medications    allopurinol 100 MG tablet  Commonly known as:  ZYLOPRIM  Take 2 tablets (200 mg total) by mouth once daily.     amiodarone 200  MG Tab  Commonly known as:  PACERONE  Take 1 tablet (200 mg total) by mouth once daily.     aspirin 81 MG EC tablet  Commonly known as:  ECOTRIN  Take 81 mg by mouth once daily.     calcium carbonate 600 mg calcium (1,500 mg) Tab  Commonly known as:  OS-SHERRY  Take 600 mg by mouth once.     carvedilol 25 MG tablet  Commonly known as:  COREG  Take 1 tablet (25 mg total) by mouth 2 (two) times daily with meals.     colchicine 0.6 mg Cap  Take 1 capsule (0.6 mg total) by mouth once daily.     cranberry 1,000 mg Cap  Take 1 capsule by mouth Daily.     digoxin 125 mcg tablet  Commonly known as:  LANOXIN  Take 1 tablet (125 mcg total) by mouth once daily. TAKE 1 TABLET (0.125 MG TOTAL) BY MOUTH ONCE DAILY.     furosemide 40 MG tablet  Commonly known as:  LASIX  Take 1 tablet (40 mg total) by mouth daily as needed.     gabapentin 100 MG capsule  Commonly known as:  NEURONTIN  TAKE ONE CAPSULE BY MOUTH TWO TIMES DAILY     Lactobac 40-Bifido 3-S.thermop 100 billion cell Cap  Commonly known as:  PROBIOTIC  Take 1 capsule by mouth once daily.     levothyroxine 50 MCG tablet  Commonly known as:  SYNTHROID  Take 1 tablet (50 mcg total) by mouth once daily.     MULTIVITAMIN ORAL  Take 1 tablet by mouth Daily.     potassium chloride SA 20 MEQ tablet  Commonly known as:  K-DUR,KLOR-CON  Take 1 tablet (20 mEq total) by mouth daily as needed.     sacubitril-valsartan 49-51 mg per tablet  Commonly known as:  ENTRESTO  Take 1 tablet by mouth 2 (two) times daily.  Start taking on:  5/6/2018        STOP taking these medications    metOLazone 2.5 MG tablet  Commonly known as:  ZAROXOLYN            Indwelling Lines/Drains at time of discharge:   Lines/Drains/Airways          No matching active lines, drains, or airways          Time spent on the discharge of patient: >35 minutes  Patient was seen and examined on the date of discharge and determined to be suitable for discharge.         Azucena Olivares NP  Department of [facility]  Medicine  [facility]

## 2018-05-05 NOTE — SUBJECTIVE & OBJECTIVE
"Past Medical History:   Diagnosis Date    Anemia     Anticoagulated on Coumadin 7/13/2015    Arthritis     Atrial fibrillation     Basal cell carcinoma 10/2015    left neck    Cardiac arrest     Cardiac resynchronization therapy defibrillator (CRT-D) in place 07/13/2015    Pt denies, states it does not shock me    Chronic combined systolic and diastolic congestive heart failure     CKD (chronic kidney disease) stage 3, GFR 30-59 ml/min     Dyslipidemia 1/30/2014    Hypertension     Ischemic cardiomyopathy 01/30/2014    Macular hole of left eye     Old    Macular hole of left eye     LEV (obstructive sleep apnea) 9/30/2013    Recurrent UTI     Refractive error     Stroke        Past Surgical History:   Procedure Laterality Date    APPENDECTOMY      CARDIAC PACEMAKER PLACEMENT  2014    CATARACT EXTRACTION      OU    CHOLECYSTECTOMY      COLONOSCOPY      MITRAL VALVE REPLACEMENT  2014    MITRAL VALVE SURGERY      x3       Review of patient's allergies indicates:   Allergen Reactions    Cephalosporins Hives    Metaxalone Itching    Penicillins      Other reaction(s): Unknown      Pregabalin      Other reaction(s): "Bad feeling"      Sulfa (sulfonamide antibiotics) Itching       No current facility-administered medications on file prior to encounter.      Current Outpatient Prescriptions on File Prior to Encounter   Medication Sig    allopurinol (ZYLOPRIM) 100 MG tablet Take 2 tablets (200 mg total) by mouth once daily.    amiodarone (PACERONE) 200 MG Tab Take 1 tablet (200 mg total) by mouth once daily.    aspirin (ECOTRIN) 81 MG EC tablet Take 81 mg by mouth once daily.    carvedilol (COREG) 25 MG tablet Take 1 tablet (25 mg total) by mouth 2 (two) times daily with meals.    colchicine 0.6 mg Cap Take 1 capsule (0.6 mg total) by mouth once daily.    digoxin (LANOXIN) 125 mcg tablet Take 1 tablet (125 mcg total) by mouth once daily. TAKE 1 TABLET (0.125 MG TOTAL) BY MOUTH ONCE DAILY. "    furosemide (LASIX) 40 MG tablet Take 1 tablet (40 mg total) by mouth daily as needed.    gabapentin (NEURONTIN) 100 MG capsule TAKE ONE CAPSULE BY MOUTH TWO TIMES DAILY    Lactobac 40-Bifido 3-S.thermop (PROBIOTIC) 100 billion cell Cap Take 1 capsule by mouth once daily.    levothyroxine (SYNTHROID) 50 MCG tablet Take 1 tablet (50 mcg total) by mouth once daily.    potassium chloride SA (K-DUR,KLOR-CON) 20 MEQ tablet Take 1 tablet (20 mEq total) by mouth daily as needed.    warfarin (COUMADIN) 2.5 MG tablet TAKE 1/2 TABLET ON MONDAY AND WEDNESDAY AND 1 TABLET ALL OTHER DAYS. DISCONTINUE 5MG TABLET. (Patient taking differently: TAKE 1/2 TABLET ON SATURDAY AND SUNDAY AND 1 TABLET MONDAY THROUGH FRIDAY. DISCONTINUE 5MG TABLET.)    acetaminophen (TYLENOL EXTRA STRENGTH) 325 MG tablet Take 2 tablets (650 mg total) by mouth every 8 (eight) hours. (Patient taking differently: Take 650 mg by mouth 3 (three) times daily as needed. )    calcium carbonate (OS-SHERRY) 600 mg calcium (1,500 mg) Tab Take 600 mg by mouth once.    cranberry 1,000 mg Cap Take 1 capsule by mouth Daily.    metOLazone (ZAROXOLYN) 2.5 MG tablet Take 1 tablet (2.5 mg total) by mouth daily as needed (swelling and/or weight gain).    MULTIVITAMIN ORAL Take 1 tablet by mouth Daily.     Family History     Problem Relation (Age of Onset)    Cancer Brother    Coronary artery disease Mother, Father    Diabetes Brother        Social History Main Topics    Smoking status: Never Smoker    Smokeless tobacco: Never Used    Alcohol use No    Drug use: No    Sexual activity: Not on file     Review of Systems   Constitution: Negative.   HENT: Negative.    Eyes: Negative.    Cardiovascular: Negative.    Respiratory: Negative.    Endocrine: Negative.    Hematologic/Lymphatic: Negative.    Skin: Negative.    Musculoskeletal: Negative.    Gastrointestinal: Negative.    Genitourinary: Negative.    Neurological: Negative.    Psychiatric/Behavioral: Negative.     Allergic/Immunologic: Negative.      Objective:     Vital Signs (Most Recent):  Temp: 98.5 °F (36.9 °C) (05/05/18 0731)  Pulse: 78 (05/05/18 0930)  Resp: 16 (05/05/18 0731)  BP: (!) 141/60 (05/05/18 0930)  SpO2: (!) 94 % (05/05/18 0930) Vital Signs (24h Range):  Temp:  [97.5 °F (36.4 °C)-98.5 °F (36.9 °C)] 98.5 °F (36.9 °C)  Pulse:  [69-78] 78  Resp:  [12-33] 16  SpO2:  [92 %-100 %] 94 %  BP: (101-150)/(51-67) 141/60     Weight: 49 kg (108 lb)  Body mass index is 19.75 kg/m².    SpO2: (!) 94 %  O2 Device (Oxygen Therapy): room air      Intake/Output Summary (Last 24 hours) at 05/05/18 1014  Last data filed at 05/05/18 0700   Gross per 24 hour   Intake          2673.33 ml   Output              200 ml   Net          2473.33 ml       Lines/Drains/Airways     Peripheral Intravenous Line                 Peripheral IV - Single Lumen 05/04/18 1317 Left Forearm less than 1 day         Peripheral IV - Single Lumen 05/04/18 1420 Right Forearm less than 1 day                Physical Exam   Constitutional: She is oriented to person, place, and time. She appears well-developed and well-nourished. No distress.   HENT:   Head: Normocephalic and atraumatic.   Eyes: Pupils are equal, round, and reactive to light. Right eye exhibits no discharge. Left eye exhibits no discharge.   Neck: Neck supple. JVD present.   Cardiovascular: Normal rate, regular rhythm, S1 normal and S2 normal.    Murmur heard.  +TR murmur   Pulmonary/Chest: Effort normal and breath sounds normal. No respiratory distress. She has no wheezes. She has no rales.   CABG site well-healed  PPM site well-healed   Abdominal: Soft. She exhibits no distension. There is no tenderness. There is no rebound.   Musculoskeletal: She exhibits edema (trace BLE).   Neurological: She is alert and oriented to person, place, and time.   Skin: Skin is warm and dry. She is not diaphoretic. No erythema.   CVI changes BLE   Psychiatric: She has a normal mood and affect. Her behavior is  normal. Thought content normal.   Nursing note and vitals reviewed.      Significant Labs:   CMP   Recent Labs  Lab 05/04/18  0949 05/04/18  1317 05/04/18  1859    137 144   K 6.5* 5.9* 4.6    100 105   CO2 28 26 29   * 97 76   BUN 62* 59* 56*   CREATININE 2.2* 2.1* 1.7*   CALCIUM 8.0* 7.9* 7.7*   PROT  --  6.9  --    ALBUMIN  --  3.9  --    BILITOT  --  0.5  --    ALKPHOS  --  80  --    AST  --  31  --    ALT  --  25  --    ANIONGAP 9 11 10   ESTGFRAFRICA 23* 24* 31*   EGFRNONAA 20* 21* 27*   , CBC   Recent Labs  Lab 05/04/18  1317   WBC 5.75   HGB 9.2*   HCT 31.3*      , Troponin   Recent Labs  Lab 05/04/18  1317   TROPONINI 0.031*    and All pertinent lab results from the last 24 hours have been reviewed.    Significant Imaging: Echocardiogram:   2D echo with color flow doppler:   Results for orders placed or performed in visit on 03/01/18   2D echo with color flow doppler   Result Value Ref Range    EF 25 (A) 55 - 65    Mitral Valve Regurgitation TRIVIAL     Aortic Valve Regurgitation MILD     Est. PA Systolic Pressure 62.06 (A)     Tricuspid Valve Regurgitation MODERATE (A)    , EKG: Reviewed and X-Ray: CXR: X-Ray Chest 1 View (CXR): No results found for this visit on 05/04/18. and X-Ray Chest PA and Lateral (CXR): No results found for this visit on 05/04/18.

## 2018-05-05 NOTE — HPI
Jennifer Mathews is an 86 yo female with a PMHx of mitral valve replacement x 3, HTN, PAF, ACS, combined CHF, PPM-CRT-D placement, s/p MVR, edema, pacemaker placement, and CKD who presented to [facility] ED yesterday due to abnormal labs. Patient's diuretic regimen had recently been increased due to leg edema/CHF symptoms with resultant bump in creatinine and hyperkalemia. Patient denied any complaints-no chest pain, SOB, leg swelling, PND, or orthopnea. Initial workup in ED revealed creatinine of  2.1 and K of 5.9 and patient subsequently admitted for observation and further monitoring. Cardiology consulted to assist with management. Patient seen and examined today, resting in bed. No real complaints. Remains chest pain free. Reports LE edema remarkably improved. Compliant with her medications. Chart reviewed. Creatinine trending down, K has normalized. EKG without any acute changes.

## 2018-05-06 PROBLEM — N17.9 AKI (ACUTE KIDNEY INJURY): Status: RESOLVED | Noted: 2018-05-06 | Resolved: 2018-05-05

## 2018-05-07 ENCOUNTER — OFFICE VISIT (OUTPATIENT)
Dept: CARDIOLOGY | Facility: CLINIC | Age: 83
End: 2018-05-07
Payer: MEDICARE

## 2018-05-07 ENCOUNTER — LAB VISIT (OUTPATIENT)
Dept: LAB | Facility: HOSPITAL | Age: 83
End: 2018-05-07
Attending: FAMILY MEDICINE
Payer: MEDICARE

## 2018-05-07 ENCOUNTER — TELEPHONE (OUTPATIENT)
Dept: CARDIOLOGY | Facility: CLINIC | Age: 83
End: 2018-05-07

## 2018-05-07 VITALS
WEIGHT: 115.75 LBS | SYSTOLIC BLOOD PRESSURE: 134 MMHG | BODY MASS INDEX: 21.3 KG/M2 | HEIGHT: 62 IN | DIASTOLIC BLOOD PRESSURE: 60 MMHG

## 2018-05-07 DIAGNOSIS — I50.22 CHRONIC SYSTOLIC CONGESTIVE HEART FAILURE: ICD-10-CM

## 2018-05-07 DIAGNOSIS — Z95.810 CARDIAC RESYNCHRONIZATION THERAPY DEFIBRILLATOR (CRT-D) IN PLACE: ICD-10-CM

## 2018-05-07 DIAGNOSIS — I48.0 PAROXYSMAL ATRIAL FIBRILLATION: ICD-10-CM

## 2018-05-07 DIAGNOSIS — I10 ESSENTIAL HYPERTENSION: ICD-10-CM

## 2018-05-07 DIAGNOSIS — I50.22 CHRONIC SYSTOLIC CONGESTIVE HEART FAILURE: Primary | ICD-10-CM

## 2018-05-07 DIAGNOSIS — Z95.2 S/P MVR (MITRAL VALVE REPLACEMENT): ICD-10-CM

## 2018-05-07 DIAGNOSIS — Z95.0 S/P PLACEMENT OF CARDIAC PACEMAKER: ICD-10-CM

## 2018-05-07 DIAGNOSIS — I25.5 ISCHEMIC CARDIOMYOPATHY: ICD-10-CM

## 2018-05-07 DIAGNOSIS — I50.42 CHRONIC COMBINED SYSTOLIC AND DIASTOLIC CONGESTIVE HEART FAILURE: Primary | ICD-10-CM

## 2018-05-07 DIAGNOSIS — E78.5 DYSLIPIDEMIA: ICD-10-CM

## 2018-05-07 DIAGNOSIS — E87.5 HYPERKALEMIA: ICD-10-CM

## 2018-05-07 LAB
ANION GAP SERPL CALC-SCNC: 10 MMOL/L
BUN SERPL-MCNC: 31 MG/DL
CALCIUM SERPL-MCNC: 8.4 MG/DL
CHLORIDE SERPL-SCNC: 103 MMOL/L
CO2 SERPL-SCNC: 28 MMOL/L
CREAT SERPL-MCNC: 1.2 MG/DL
EST. GFR  (AFRICAN AMERICAN): 48 ML/MIN/1.73 M^2
EST. GFR  (NON AFRICAN AMERICAN): 41 ML/MIN/1.73 M^2
GLUCOSE SERPL-MCNC: 88 MG/DL
POTASSIUM SERPL-SCNC: 5.6 MMOL/L
SODIUM SERPL-SCNC: 141 MMOL/L

## 2018-05-07 PROCEDURE — 99213 OFFICE O/P EST LOW 20 MIN: CPT | Mod: PBBFAC | Performed by: INTERNAL MEDICINE

## 2018-05-07 PROCEDURE — 99999 PR PBB SHADOW E&M-EST. PATIENT-LVL III: CPT | Mod: PBBFAC,,, | Performed by: INTERNAL MEDICINE

## 2018-05-07 PROCEDURE — 36415 COLL VENOUS BLD VENIPUNCTURE: CPT

## 2018-05-07 PROCEDURE — 99215 OFFICE O/P EST HI 40 MIN: CPT | Mod: S$PBB,,, | Performed by: INTERNAL MEDICINE

## 2018-05-07 PROCEDURE — 80048 BASIC METABOLIC PNL TOTAL CA: CPT

## 2018-05-07 RX ORDER — SODIUM POLYSTYRENE SULFONATE 4.1 MEQ/G
POWDER, FOR SUSPENSION ORAL; RECTAL ONCE
Qty: 15 G | Refills: 0 | Status: SHIPPED | OUTPATIENT
Start: 2018-05-07 | End: 2018-05-07

## 2018-05-07 NOTE — PROGRESS NOTES
Subjective:   Patient ID:  Jennifer Mathews is a 85 y.o. female who presents for cardiac consult of Hypertension (f/u) and Hyperlipidemia      HPI  The patient came in today for cardiac consult of Hypertension (f/u) and Hyperlipidemia    Jennifer Mathews is an 84 yo female with a PMHx of mitral valve replacement x 3, HTN, PAF, ACS, combined CHF EF 25%, PPM-CRT-D placement, s/p MVR, edema, pacemaker placement, and CKD who presented to [facility] ED on 5/4/17 due to abnormal labs. Patient's diuretic regimen had recently been increased due to leg edema/CHF symptoms with resultant bump in creatinine and hyperkalemia. Patient denied any complaints-no chest pain, SOB, leg swelling, PND, or orthopnea. Initial workup in ED revealed creatinine of  2.1 and K of 5.9 and patient subsequently admitted for observation and further monitoring. She was given insulin and D5W, albuterol inhaler, Kayexelate, and gentle hydration. With medical management, impaired function function and hyperkalemia resolved. Patient was asked to resume 40mg Lasix BID and Entresto.    Weighed 108 lbs at home scale upon DC and today weighs 115 lbs. She feels well except for LE swelling which has been a chronic problem, could not tolerate compression stockings.     Patient is compliant with medications.    2D ECHO 3/2018  CONCLUSIONS     1 - Severely depressed left ventricular systolic function (EF 25%).     2 - Normal right ventricular systolic function .     3 - Mild aortic regurgitation.     4 - Mitral valve prosthesis.     5 - Moderate tricuspid regurgitation.     6 - Increased central venous pressure.     7 - Severe left atrial enlargement.     8 - Concentric hypertrophy.     9 - Pulmonary hypertension. The estimated PA systolic pressure is 62 mmHg.       Past Medical History:   Diagnosis Date    Acute coronary syndrome     Anemia     Anticoagulated on Coumadin 7/13/2015    Arthritis     Atrial fibrillation     Basal cell carcinoma 10/2015    left  neck    Cardiac arrest     Cardiac resynchronization therapy defibrillator (CRT-D) in place 07/13/2015    Pt denies, states it does not shock me    Chronic combined systolic and diastolic congestive heart failure     CKD (chronic kidney disease) stage 3, GFR 30-59 ml/min     Dyslipidemia 1/30/2014    Hyperlipidemia     Hypertension     Ischemic cardiomyopathy 01/30/2014    Macular hole of left eye     Old    Macular hole of left eye     LEV (obstructive sleep apnea) 9/30/2013    Recurrent UTI     Refractive error     Stroke        Past Surgical History:   Procedure Laterality Date    APPENDECTOMY      CARDIAC CATHETERIZATION      CARDIAC PACEMAKER PLACEMENT  2014    CARDIAC VALVE SURGERY      CATARACT EXTRACTION      OU    CHOLECYSTECTOMY      COLONOSCOPY      MITRAL VALVE REPLACEMENT  2014    MITRAL VALVE SURGERY      x3       Social History   Substance Use Topics    Smoking status: Never Smoker    Smokeless tobacco: Never Used    Alcohol use No       Family History   Problem Relation Age of Onset    Coronary artery disease Mother         mi    Coronary artery disease Father     Diabetes Brother     Cancer Brother     Melanoma Neg Hx     Psoriasis Neg Hx     Lupus Neg Hx     Eczema Neg Hx        Patient's Medications   New Prescriptions    SODIUM POLYSTYRENE (KAYEXALATE) POWDER    Take by mouth once.   Previous Medications    ACETAMINOPHEN (TYLENOL EXTRA STRENGTH) 325 MG TABLET    Take 2 tablets (650 mg total) by mouth every 8 (eight) hours.    ALLOPURINOL (ZYLOPRIM) 100 MG TABLET    Take 2 tablets (200 mg total) by mouth once daily.    AMIODARONE (PACERONE) 200 MG TAB    Take 1 tablet (200 mg total) by mouth once daily.    ASPIRIN (ECOTRIN) 81 MG EC TABLET    Take 81 mg by mouth once daily.    CALCIUM CARBONATE (OS-SHERRY) 600 MG CALCIUM (1,500 MG) TAB    Take 600 mg by mouth once.    CARVEDILOL (COREG) 25 MG TABLET    Take 1 tablet (25 mg total) by mouth 2 (two) times daily with  meals.    COLCHICINE 0.6 MG CAP    Take 1 capsule (0.6 mg total) by mouth once daily.    CRANBERRY 1,000 MG CAP    Take 1 capsule by mouth Daily.    DIGOXIN (LANOXIN) 125 MCG TABLET    Take 1 tablet (125 mcg total) by mouth once daily. TAKE 1 TABLET (0.125 MG TOTAL) BY MOUTH ONCE DAILY.    FUROSEMIDE (LASIX) 40 MG TABLET    Take 1 tablet (40 mg total) by mouth daily as needed.    GABAPENTIN (NEURONTIN) 100 MG CAPSULE    TAKE ONE CAPSULE BY MOUTH TWO TIMES DAILY    LACTOBAC 40-BIFIDO 3-S.THERMOP (PROBIOTIC) 100 BILLION CELL CAP    Take 1 capsule by mouth once daily.    LEVOTHYROXINE (SYNTHROID) 50 MCG TABLET    Take 1 tablet (50 mcg total) by mouth once daily.    MULTIVITAMIN ORAL    Take 1 tablet by mouth Daily.    POTASSIUM CHLORIDE SA (K-DUR,KLOR-CON) 20 MEQ TABLET    Take 1 tablet (20 mEq total) by mouth daily as needed.    SACUBITRIL-VALSARTAN (ENTRESTO) 49-51 MG PER TABLET    Take 1 tablet by mouth 2 (two) times daily.    WARFARIN (COUMADIN) 2.5 MG TABLET    TAKE 1/2 TABLET ON MONDAY AND WEDNESDAY AND 1 TABLET ALL OTHER DAYS. DISCONTINUE 5MG TABLET.   Modified Medications    No medications on file   Discontinued Medications    No medications on file       Review of Systems   Constitutional: Negative.    HENT: Negative.    Eyes: Negative.    Respiratory: Negative for shortness of breath.    Cardiovascular: Positive for leg swelling. Negative for chest pain, palpitations and orthopnea.   Gastrointestinal: Negative.    Genitourinary: Negative.    Musculoskeletal: Negative.    Skin: Negative.    Neurological: Negative.    Endo/Heme/Allergies: Negative.    Psychiatric/Behavioral: Negative.    All 12 systems otherwise negative.      Wt Readings from Last 3 Encounters:   05/07/18 52.5 kg (115 lb 11.9 oz)   05/04/18 49 kg (108 lb)   05/02/18 50.5 kg (111 lb 5.3 oz)     Temp Readings from Last 3 Encounters:   05/05/18 98.5 °F (36.9 °C) (Oral)   05/02/18 97.8 °F (36.6 °C) (Oral)   04/30/18 97.4 °F (36.3 °C) (Oral)     BP  "Readings from Last 3 Encounters:   05/07/18 134/60   05/05/18 (!) 141/60   05/02/18 120/68     Pulse Readings from Last 3 Encounters:   05/05/18 78   05/02/18 71   04/30/18 69       /60 (BP Method: Small (Manual))   Ht 5' 2" (1.575 m)   Wt 52.5 kg (115 lb 11.9 oz)   BMI 21.17 kg/m²     Objective:   Physical Exam   Constitutional: She is oriented to person, place, and time. She appears well-developed and well-nourished. No distress.   HENT:   Head: Normocephalic and atraumatic.   Nose: Nose normal.   Mouth/Throat: Oropharynx is clear and moist.   Eyes: Conjunctivae and EOM are normal. No scleral icterus.   Neck: Normal range of motion. Neck supple. No JVD present. No thyromegaly present.   Cardiovascular: Normal rate, regular rhythm, S1 normal and S2 normal.  Exam reveals no gallop, no S3, no S4 and no friction rub.    No murmur heard.  Pulmonary/Chest: Effort normal and breath sounds normal. No stridor. No respiratory distress. She has no wheezes. She has no rales. She exhibits no tenderness.   Abdominal: Soft. Bowel sounds are normal. She exhibits no distension and no mass. There is no tenderness. There is no rebound.   Genitourinary:   Genitourinary Comments: Deferred   Musculoskeletal: Normal range of motion. She exhibits no edema, tenderness or deformity.   Lymphadenopathy:     She has no cervical adenopathy.   Neurological: She is alert and oriented to person, place, and time. She exhibits normal muscle tone. Coordination normal.   Skin: Skin is warm and dry. No rash noted. She is not diaphoretic. No erythema. No pallor.   Psychiatric: She has a normal mood and affect. Her behavior is normal. Judgment and thought content normal.   Nursing note and vitals reviewed.      Lab Results   Component Value Date     05/07/2018    K 5.6 (H) 05/07/2018     05/07/2018    CO2 28 05/07/2018    BUN 31 (H) 05/07/2018    CREATININE 1.2 05/07/2018    GLU 88 05/07/2018    HGBA1C 6.4 (H) 06/20/2006    MG 2.4 " 04/30/2018    AST 31 05/04/2018    ALT 25 05/04/2018    ALBUMIN 3.9 05/04/2018    PROT 6.9 05/04/2018    BILITOT 0.5 05/04/2018    WBC 5.75 05/04/2018    HGB 9.2 (L) 05/04/2018    HCT 31.3 (L) 05/04/2018    MCV 84 05/04/2018     05/04/2018    INR 1.5 (H) 05/05/2018    INR 2.1 (H) 03/23/2011    TSH 4.831 (H) 04/17/2018    CHOL 155 03/20/2018    HDL 36 (L) 03/20/2018    LDLCALC 90.8 03/20/2018    TRIG 141 03/20/2018     (H) 05/04/2018     Assessment:      1. Chronic combined systolic and diastolic congestive heart failure    2. Essential hypertension    3. Dyslipidemia    4. S/P MVR (mitral valve replacement)    5. S/P placement of cardiac pacemaker    6. Paroxysmal atrial fibrillation    7. Cardiac resynchronization therapy defibrillator (CRT-D) in place    8. Ischemic cardiomyopathy    9. Hyperkalemia        Plan:   1. Combined CHF s/p CRT  - cont lasix 40 mg BID, cont entresto and coreg  - pt was on higher diuretic dose last week but had JESSICA and hyperkalemia which resolved  - cont lasix and extra dose as needed, low salt diet, flu  - f/u as scheduled for routine   - f/u labwork, done today - K elevated, ordered kayexalate    2. HTN  - cont meds    3. PAF  - cont amio, digoxin, bb, coumadin  - f/u coumadin clinic    4. HLD  - cont diet controlled    5. JESSICA on CKD  - sec to overdiuresis  - f/u labwork  - changed diuretics to lasix 40 daily  - DC metolazone     6. Edema  - sec to venous insuff likely  - will order PlasmaFlow compression device   - discussed to elevate legs and still use compressions stockings if able to  - discussed Venaseal but will opt for conservative tx now    7. Hyperkalemia  - ordered kayexalate  - order BMP for tomorrow AM    Thank you for allowing me to participate in this patient's care. Please do not hesitate to contact me with any questions or concerns. Consult note has been forwarded to the referral physician.

## 2018-05-07 NOTE — SUBJECTIVE & OBJECTIVE
"Past Medical History:   Diagnosis Date    Anemia     Anticoagulated on Coumadin 7/13/2015    Arthritis     Atrial fibrillation     Basal cell carcinoma 10/2015    left neck    Cardiac arrest     Cardiac resynchronization therapy defibrillator (CRT-D) in place 07/13/2015    Pt denies, states it does not shock me    Chronic combined systolic and diastolic congestive heart failure     CKD (chronic kidney disease) stage 3, GFR 30-59 ml/min     Dyslipidemia 1/30/2014    Hypertension     Ischemic cardiomyopathy 01/30/2014    Macular hole of left eye     Old    Macular hole of left eye     LEV (obstructive sleep apnea) 9/30/2013    Recurrent UTI     Refractive error     Stroke        Past Surgical History:   Procedure Laterality Date    APPENDECTOMY      CARDIAC PACEMAKER PLACEMENT  2014    CATARACT EXTRACTION      OU    CHOLECYSTECTOMY      COLONOSCOPY      MITRAL VALVE REPLACEMENT  2014    MITRAL VALVE SURGERY      x3       Review of patient's allergies indicates:   Allergen Reactions    Cephalosporins Hives    Metaxalone Itching    Penicillins      Other reaction(s): Unknown      Pregabalin      Other reaction(s): "Bad feeling"      Sulfa (sulfonamide antibiotics) Itching       Current Facility-Administered Medications on File Prior to Encounter   Medication    denosumab (PROLIA) injection 60 mg     Current Outpatient Prescriptions on File Prior to Encounter   Medication Sig    allopurinol (ZYLOPRIM) 100 MG tablet Take 2 tablets (200 mg total) by mouth once daily.    amiodarone (PACERONE) 200 MG Tab Take 1 tablet (200 mg total) by mouth once daily.    aspirin (ECOTRIN) 81 MG EC tablet Take 81 mg by mouth once daily.    carvedilol (COREG) 25 MG tablet Take 1 tablet (25 mg total) by mouth 2 (two) times daily with meals.    colchicine 0.6 mg Cap Take 1 capsule (0.6 mg total) by mouth once daily.    digoxin (LANOXIN) 125 mcg tablet Take 1 tablet (125 mcg total) by mouth once daily. TAKE " 1 TABLET (0.125 MG TOTAL) BY MOUTH ONCE DAILY.    furosemide (LASIX) 40 MG tablet Take 1 tablet (40 mg total) by mouth daily as needed. (Patient taking differently: Take 40 mg by mouth daily as needed. Pt states she's taking twice a day)    gabapentin (NEURONTIN) 100 MG capsule TAKE ONE CAPSULE BY MOUTH TWO TIMES DAILY    Lactobac 40-Bifido 3-S.thermop (PROBIOTIC) 100 billion cell Cap Take 1 capsule by mouth once daily.    levothyroxine (SYNTHROID) 50 MCG tablet Take 1 tablet (50 mcg total) by mouth once daily.    potassium chloride SA (K-DUR,KLOR-CON) 20 MEQ tablet Take 1 tablet (20 mEq total) by mouth daily as needed.    warfarin (COUMADIN) 2.5 MG tablet TAKE 1/2 TABLET ON MONDAY AND WEDNESDAY AND 1 TABLET ALL OTHER DAYS. DISCONTINUE 5MG TABLET. (Patient taking differently: TAKE 1/2 TABLET ON SATURDAY AND SUNDAY AND 1 TABLET MONDAY THROUGH FRIDAY. DISCONTINUE 5MG TABLET.)    acetaminophen (TYLENOL EXTRA STRENGTH) 325 MG tablet Take 2 tablets (650 mg total) by mouth every 8 (eight) hours. (Patient taking differently: Take 650 mg by mouth 3 (three) times daily as needed. )    calcium carbonate (OS-SHERRY) 600 mg calcium (1,500 mg) Tab Take 600 mg by mouth once.    cranberry 1,000 mg Cap Take 1 capsule by mouth Daily.    MULTIVITAMIN ORAL Take 1 tablet by mouth Daily.     Family History     Problem Relation (Age of Onset)    Cancer Brother    Coronary artery disease Mother, Father    Diabetes Brother        Social History Main Topics    Smoking status: Never Smoker    Smokeless tobacco: Never Used    Alcohol use No    Drug use: No    Sexual activity: Not on file     Review of Systems   Constitutional: Negative for activity change, diaphoresis, fatigue and unexpected weight change.        Abnormal lab work     HENT: Negative for congestion, ear pain and sore throat.    Eyes: Negative.    Respiratory: Negative for shortness of breath and wheezing.    Cardiovascular: Negative for chest pain and palpitations.    Gastrointestinal: Negative for abdominal pain, constipation, diarrhea and vomiting.   Endocrine: Negative.    Genitourinary: Negative for flank pain, hematuria and urgency.   Musculoskeletal: Negative for joint swelling and neck pain.   Skin: Negative for pallor.   Neurological: Negative for seizures, syncope and light-headedness.   Hematological: Negative.    Psychiatric/Behavioral: Negative.      Objective:     Vital Signs (Most Recent):  Temp: 98.5 °F (36.9 °C) (05/05/18 0731)  Pulse: 78 (05/05/18 0930)  Resp: 16 (05/05/18 0731)  BP: (!) 141/60 (05/05/18 0930)  SpO2: (!) 94 % (05/05/18 0930) Vital Signs (24h Range):        Weight: 49 kg (108 lb)  Body mass index is 19.75 kg/m².    Physical Exam   Constitutional: She is oriented to person, place, and time. She appears well-developed and well-nourished.   Elderly, thin   HENT:   Head: Normocephalic and atraumatic.   Eyes: EOM are normal.   Neck: Normal range of motion. Neck supple.   Cardiovascular: Normal rate, regular rhythm and normal heart sounds.    No murmur heard.  Pulmonary/Chest: Effort normal and breath sounds normal. No respiratory distress.   Abdominal: Soft. Bowel sounds are normal. She exhibits no distension. There is no tenderness.   Musculoskeletal: Normal range of motion. She exhibits no edema.   Neurological: She is alert and oriented to person, place, and time.   Skin: Skin is warm and dry.         CRANIAL NERVES     CN III, IV, VI   Extraocular motions are normal.       Significant Labs:  CBC:   Recent Labs  Lab 05/04/18  1317   WBC 5.75   HGB 9.2*   HCT 31.3*         CMP:   Recent Labs  Lab 05/04/18  0949 05/04/18  1317    137   K 6.5* 5.9*    100   CO2 28 26   * 97   BUN 62* 59*   CREATININE 2.2* 2.1*   CALCIUM 8.0* 7.9*   PROT  --  6.9   ALBUMIN  --  3.9   BILITOT  --  0.5   ALKPHOS  --  80   AST  --  31   ALT  --  25   ANIONGAP 9 11   EGFRNONAA 20* 21*         Significant Imaging:  Imaging Results                    X-Ray Chest AP Portable (Final result)  Result time 05/04/18 13:54:51                Final result by FARA Wood Sr., MD (05/04/18 13:54:51)                               Impression:        1. The findings are characteristic of mild cardiomegaly with mild bilateral pulmonary edema.  2. Surgical changes  .        Electronically signed by:     Javon Wood MD  Date:                                   05/04/2018  Time:                                   13:54                         Narrative:     EXAMINATION:  XR CHEST AP PORTABLE     CLINICAL HISTORY:  Chest Pain;     COMPARISON:  04/19/2018     FINDINGS:  There are multiple sternotomy wires in place.  A cardiac pacemaker remains in place.  There is mild cardiomegaly.  There is mild prominence of the central pulmonary vascularity bilaterally.  There is a mild amount of interstitial and alveolar opacities seen in both lungs.  There is no pneumothorax.  The costophrenic angles are sharp.

## 2018-05-07 NOTE — H&P
[facility] Hospital Medicine  History & Physical    Patient Name: Jennifer Mathews  MRN: [id number]  Admission Date: 5/4/2018  Attending Physician: No att. providers found   Primary Care Provider: Desirae Bello MD     Patient information was obtained from patient and ER records.     Subjective:     Principal Problem:Acute hyperkalemia    Chief Complaint:   Chief Complaint   Patient presents with    Fatigue     Giorgi Gamaliel (cardiology) told patient to come get check out in ER for labs and EKG        HPI: Jennifer Mathews is a 85 year old female with history of HTN, CKD, atrial fibrillation, and combined heart failure-On Entresto, who presents to ED for abnormal lab value. She was seen by Cardiology 2 days ago and was noted with Hyperkalemia. During this visit she was asked to hold potassium supplement and was given Kayexalate. Repeat labs today again showed hyperkalemia, and patient was asked to proceed to ED. She has received Kayexalate, insulin with D5W, and albuterol inhaler. She has no other complaints.       Past Medical History:   Diagnosis Date    Anemia     Anticoagulated on Coumadin 7/13/2015    Arthritis     Atrial fibrillation     Basal cell carcinoma 10/2015    left neck    Cardiac arrest     Cardiac resynchronization therapy defibrillator (CRT-D) in place 07/13/2015    Pt denies, states it does not shock me    Chronic combined systolic and diastolic congestive heart failure     CKD (chronic kidney disease) stage 3, GFR 30-59 ml/min     Dyslipidemia 1/30/2014    Hypertension     Ischemic cardiomyopathy 01/30/2014    Macular hole of left eye     Old    Macular hole of left eye     LEV (obstructive sleep apnea) 9/30/2013    Recurrent UTI     Refractive error     Stroke        Past Surgical History:   Procedure Laterality Date    APPENDECTOMY      CARDIAC PACEMAKER PLACEMENT  2014    CATARACT EXTRACTION      OU    CHOLECYSTECTOMY      COLONOSCOPY      MITRAL  "VALVE REPLACEMENT  2014    MITRAL VALVE SURGERY      x3       Review of patient's allergies indicates:   Allergen Reactions    Cephalosporins Hives    Metaxalone Itching    Penicillins      Other reaction(s): Unknown      Pregabalin      Other reaction(s): "Bad feeling"      Sulfa (sulfonamide antibiotics) Itching       Current Facility-Administered Medications on File Prior to Encounter   Medication    denosumab (PROLIA) injection 60 mg     Current Outpatient Prescriptions on File Prior to Encounter   Medication Sig    allopurinol (ZYLOPRIM) 100 MG tablet Take 2 tablets (200 mg total) by mouth once daily.    amiodarone (PACERONE) 200 MG Tab Take 1 tablet (200 mg total) by mouth once daily.    aspirin (ECOTRIN) 81 MG EC tablet Take 81 mg by mouth once daily.    carvedilol (COREG) 25 MG tablet Take 1 tablet (25 mg total) by mouth 2 (two) times daily with meals.    colchicine 0.6 mg Cap Take 1 capsule (0.6 mg total) by mouth once daily.    digoxin (LANOXIN) 125 mcg tablet Take 1 tablet (125 mcg total) by mouth once daily. TAKE 1 TABLET (0.125 MG TOTAL) BY MOUTH ONCE DAILY.    furosemide (LASIX) 40 MG tablet Take 1 tablet (40 mg total) by mouth daily as needed. (Patient taking differently: Take 40 mg by mouth daily as needed. Pt states she's taking twice a day)    gabapentin (NEURONTIN) 100 MG capsule TAKE ONE CAPSULE BY MOUTH TWO TIMES DAILY    Lactobac 40-Bifido 3-S.thermop (PROBIOTIC) 100 billion cell Cap Take 1 capsule by mouth once daily.    levothyroxine (SYNTHROID) 50 MCG tablet Take 1 tablet (50 mcg total) by mouth once daily.    potassium chloride SA (K-DUR,KLOR-CON) 20 MEQ tablet Take 1 tablet (20 mEq total) by mouth daily as needed.    warfarin (COUMADIN) 2.5 MG tablet TAKE 1/2 TABLET ON MONDAY AND WEDNESDAY AND 1 TABLET ALL OTHER DAYS. DISCONTINUE 5MG TABLET. (Patient taking differently: TAKE 1/2 TABLET ON SATURDAY AND SUNDAY AND 1 TABLET MONDAY THROUGH FRIDAY. DISCONTINUE 5MG TABLET.) "    acetaminophen (TYLENOL EXTRA STRENGTH) 325 MG tablet Take 2 tablets (650 mg total) by mouth every 8 (eight) hours. (Patient taking differently: Take 650 mg by mouth 3 (three) times daily as needed. )    calcium carbonate (OS-SHERRY) 600 mg calcium (1,500 mg) Tab Take 600 mg by mouth once.    cranberry 1,000 mg Cap Take 1 capsule by mouth Daily.    MULTIVITAMIN ORAL Take 1 tablet by mouth Daily.     Family History     Problem Relation (Age of Onset)    Cancer Brother    Coronary artery disease Mother, Father    Diabetes Brother        Social History Main Topics    Smoking status: Never Smoker    Smokeless tobacco: Never Used    Alcohol use No    Drug use: No    Sexual activity: Not on file     Review of Systems   Constitutional: Negative for activity change, diaphoresis, fatigue and unexpected weight change.        Abnormal lab work     HENT: Negative for congestion, ear pain and sore throat.    Eyes: Negative.    Respiratory: Negative for shortness of breath and wheezing.    Cardiovascular: Negative for chest pain and palpitations.   Gastrointestinal: Negative for abdominal pain, constipation, diarrhea and vomiting.   Endocrine: Negative.    Genitourinary: Negative for flank pain, hematuria and urgency.   Musculoskeletal: Negative for joint swelling and neck pain.   Skin: Negative for pallor.   Neurological: Negative for seizures, syncope and light-headedness.   Hematological: Negative.    Psychiatric/Behavioral: Negative.      Objective:     Vital Signs (Most Recent):  Temp: 98.5 °F (36.9 °C) (05/05/18 0731)  Pulse: 78 (05/05/18 0930)  Resp: 16 (05/05/18 0731)  BP: (!) 141/60 (05/05/18 0930)  SpO2: (!) 94 % (05/05/18 0930) Vital Signs (24h Range):        Weight: 49 kg (108 lb)  Body mass index is 19.75 kg/m².    Physical Exam   Constitutional: She is oriented to person, place, and time. She appears well-developed and well-nourished.   Elderly, thin   HENT:   Head: Normocephalic and atraumatic.   Eyes: EOM  are normal.   Neck: Normal range of motion. Neck supple.   Cardiovascular: Normal rate, regular rhythm and normal heart sounds.    No murmur heard.  Pulmonary/Chest: Effort normal and breath sounds normal. No respiratory distress.   Abdominal: Soft. Bowel sounds are normal. She exhibits no distension. There is no tenderness.   Musculoskeletal: Normal range of motion. She exhibits no edema.   Neurological: She is alert and oriented to person, place, and time.   Skin: Skin is warm and dry.         CRANIAL NERVES     CN III, IV, VI   Extraocular motions are normal.       Significant Labs:  CBC:   Recent Labs  Lab 05/04/18  1317   WBC 5.75   HGB 9.2*   HCT 31.3*         CMP:   Recent Labs  Lab 05/04/18  0949 05/04/18  1317    137   K 6.5* 5.9*    100   CO2 28 26   * 97   BUN 62* 59*   CREATININE 2.2* 2.1*   CALCIUM 8.0* 7.9*   PROT  --  6.9   ALBUMIN  --  3.9   BILITOT  --  0.5   ALKPHOS  --  80   AST  --  31   ALT  --  25   ANIONGAP 9 11   EGFRNONAA 20* 21*         Significant Imaging:      Imaging Results                   X-Ray Chest AP Portable (Final result)  Result time 05/04/18 13:54:51                Final result by FARA Wood Sr., MD (05/04/18 13:54:51)                               Impression:        1. The findings are characteristic of mild cardiomegaly with mild bilateral pulmonary edema.  2. Surgical changes  .        Electronically signed by:     Javon Wood MD  Date:                                   05/04/2018  Time:                                   13:54                         Narrative:     EXAMINATION:  XR CHEST AP PORTABLE     CLINICAL HISTORY:  Chest Pain;     COMPARISON:  04/19/2018     FINDINGS:  There are multiple sternotomy wires in place.  A cardiac pacemaker remains in place.  There is mild cardiomegaly.  There is mild prominence of the central pulmonary vascularity bilaterally.  There is a mild amount of interstitial and alveolar opacities seen in both  lungs.  There is no pneumothorax.  The costophrenic angles are sharp.                  Assessment/Plan:     * Acute hyperkalemia     -Related to Entresto and K+ supplement; will hold these medications  -S/p Kayexalate, insulin D5W, and albuterol inhaler  -repeat labs in AM  -cardiac monitoring  -mild hydration                Acquired hypothyroidism     -resume Synthroid          A-fib     -resume Amiodarone and BB  -check PT/INR  -Pharmacy consulted for initial dose.             Chronic combined systolic and diastolic congestive heart failure     -resume BB  -hold Entresto, Lasix for now  -consult cardiology for medication adjustment                 VTE Risk Mitigation      Will be on Coumadin once PT/INR results             Azucena Olivares NP  Department of Hospital Medicine   [facility]

## 2018-05-08 ENCOUNTER — LAB VISIT (OUTPATIENT)
Dept: LAB | Facility: HOSPITAL | Age: 83
End: 2018-05-08
Attending: INTERNAL MEDICINE
Payer: MEDICARE

## 2018-05-08 DIAGNOSIS — E87.5 HYPERKALEMIA: ICD-10-CM

## 2018-05-08 DIAGNOSIS — I50.42 CHRONIC COMBINED SYSTOLIC AND DIASTOLIC CONGESTIVE HEART FAILURE: ICD-10-CM

## 2018-05-08 LAB
ANION GAP SERPL CALC-SCNC: 8 MMOL/L
BUN SERPL-MCNC: 32 MG/DL
CALCIUM SERPL-MCNC: 8.1 MG/DL
CHLORIDE SERPL-SCNC: 103 MMOL/L
CO2 SERPL-SCNC: 30 MMOL/L
CREAT SERPL-MCNC: 1.2 MG/DL
EST. GFR  (AFRICAN AMERICAN): 47.6 ML/MIN/1.73 M^2
EST. GFR  (NON AFRICAN AMERICAN): 41.3 ML/MIN/1.73 M^2
GLUCOSE SERPL-MCNC: 88 MG/DL
POTASSIUM SERPL-SCNC: 5.3 MMOL/L
SODIUM SERPL-SCNC: 141 MMOL/L

## 2018-05-08 PROCEDURE — 80048 BASIC METABOLIC PNL TOTAL CA: CPT

## 2018-05-08 PROCEDURE — 36415 COLL VENOUS BLD VENIPUNCTURE: CPT

## 2018-05-09 ENCOUNTER — PATIENT OUTREACH (OUTPATIENT)
Dept: ADMINISTRATIVE | Facility: HOSPITAL | Age: 83
End: 2018-05-09

## 2018-05-09 ENCOUNTER — TELEPHONE (OUTPATIENT)
Dept: CARDIOLOGY | Facility: CLINIC | Age: 83
End: 2018-05-09

## 2018-05-09 DIAGNOSIS — E87.5 HYPERKALEMIA: Primary | ICD-10-CM

## 2018-05-09 RX ORDER — SODIUM POLYSTYRENE SULFONATE 4.1 MEQ/G
POWDER, FOR SUSPENSION ORAL; RECTAL ONCE
Qty: 30 G | Refills: 0 | Status: SHIPPED | OUTPATIENT
Start: 2018-05-09 | End: 2018-05-09

## 2018-05-09 NOTE — TELEPHONE ENCOUNTER
Called to patient and gave results of labs and orders to take kayexalate--patient verbalizes understanding by repeating back correctly

## 2018-05-09 NOTE — TELEPHONE ENCOUNTER
----- Message from Trent Duke MD sent at 5/9/2018  8:32 AM CDT -----  Please call the patient regarding her abnormal result. Potassium is improved but still elevated, I ordered another dose of Kayexalate to take and consult to nephrology.

## 2018-05-14 DIAGNOSIS — N18.4 ANEMIA ASSOCIATED WITH STAGE 4 CHRONIC RENAL FAILURE: Primary | ICD-10-CM

## 2018-05-14 DIAGNOSIS — D63.1 ANEMIA ASSOCIATED WITH STAGE 4 CHRONIC RENAL FAILURE: Primary | ICD-10-CM

## 2018-05-15 ENCOUNTER — INFUSION (OUTPATIENT)
Dept: INFUSION THERAPY | Facility: HOSPITAL | Age: 83
End: 2018-05-15
Attending: INTERNAL MEDICINE
Payer: MEDICARE

## 2018-05-15 ENCOUNTER — ANTI-COAG VISIT (OUTPATIENT)
Dept: CARDIOLOGY | Facility: CLINIC | Age: 83
End: 2018-05-15
Payer: MEDICARE

## 2018-05-15 VITALS
RESPIRATION RATE: 16 BRPM | BODY MASS INDEX: 21.3 KG/M2 | SYSTOLIC BLOOD PRESSURE: 146 MMHG | DIASTOLIC BLOOD PRESSURE: 60 MMHG | HEART RATE: 70 BPM | WEIGHT: 115.75 LBS | HEIGHT: 62 IN | TEMPERATURE: 97 F | OXYGEN SATURATION: 96 %

## 2018-05-15 DIAGNOSIS — Z79.01 LONG TERM (CURRENT) USE OF ANTICOAGULANTS: Primary | ICD-10-CM

## 2018-05-15 DIAGNOSIS — D63.1 ANEMIA ASSOCIATED WITH STAGE 4 CHRONIC RENAL FAILURE: Primary | ICD-10-CM

## 2018-05-15 DIAGNOSIS — N18.4 ANEMIA ASSOCIATED WITH STAGE 4 CHRONIC RENAL FAILURE: Primary | ICD-10-CM

## 2018-05-15 LAB — INR PPP: 1.3 (ref 2–3)

## 2018-05-15 PROCEDURE — 85610 PROTHROMBIN TIME: CPT | Mod: PBBFAC

## 2018-05-15 PROCEDURE — 63600175 PHARM REV CODE 636 W HCPCS: Mod: JG,EC | Performed by: INTERNAL MEDICINE

## 2018-05-15 PROCEDURE — 99999 PR PBB SHADOW E&M-EST. PATIENT-LVL I: CPT | Mod: PBBFAC,,,

## 2018-05-15 PROCEDURE — 96372 THER/PROPH/DIAG INJ SC/IM: CPT

## 2018-05-15 PROCEDURE — 99211 OFF/OP EST MAY X REQ PHY/QHP: CPT | Mod: PBBFAC,25

## 2018-05-15 RX ORDER — VALSARTAN 160 MG/1
160 TABLET ORAL DAILY
COMMUNITY
Start: 2018-05-13 | End: 2018-05-23 | Stop reason: ALTCHOICE

## 2018-05-15 RX ADMIN — EPOETIN ALFA 20000 UNITS: 20000 SOLUTION INTRAVENOUS; SUBCUTANEOUS at 11:05

## 2018-05-15 NOTE — NURSING
1109  5/15/18  Injection given without difficulties.Bandaid applied. Patient instructed to stay in the clinic for 15 minutes. Patient verbalized understanding and will notify nurse with any complaints.
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
## 2018-05-15 NOTE — PROGRESS NOTES
Patient's INR is sub therapeutic at 1.3.  Reports no medication/diet changes.  Instructed to start new dose of 2.5 mg daily. Recheck in 1 week.

## 2018-05-22 NOTE — PROGRESS NOTES
"Subjective:       Patient ID: Jennifer Mathews is a 85 y.o. female.    Chief Complaint: Osteopenia    Jennifer is here for follow up. She has newly diagnosed gout in the setting of CKD. She also has osteopenia with high risk of fx and is on Prolia.  She occasionally uses a rolling walker if she is doing lots of walking, but today ambulating without assistance. No issues tolerating prolia.  She takes vit D daily. She gets calcium in her diet. New dexa done fall of 2017 shows stable bmd at the hips, improving at the spine. She has some OA and joint pains but doesn't use nsaids due to coumadin.   Regarding gout she is on colchicine 0.6mg/day and allopurinol is at 200mg/day.No issues tolerating her medications. No gout flares in the last few months.     Review of labs over the last several months show low calcium levels.  She has also struggled with hyperkalemia and was working with cards to treat.  K levels improving. She is scheduled to see Nephrology next week to est care for CKD.  Ca level today actually normal.       Review of Systems   Constitutional: Negative for chills, fatigue and fever.   HENT: Negative for mouth sores, rhinorrhea and sore throat.    Eyes: Negative for pain and redness.   Respiratory: Negative for cough and shortness of breath.    Cardiovascular: Negative for chest pain.        CHF, htn, CAD   Gastrointestinal: Negative for abdominal pain, constipation, diarrhea, nausea and vomiting.   Genitourinary: Negative for dysuria and hematuria.   Musculoskeletal: Positive for arthralgias. Negative for joint swelling and myalgias.   Skin: Negative for rash.   Neurological: Negative for weakness, numbness and headaches.   Hematological:        Chronic anemia   Psychiatric/Behavioral: The patient is not nervous/anxious.          Objective:   BP (!) 148/53   Pulse 62   Ht 5' 2" (1.575 m)   Wt 55.6 kg (122 lb 9.2 oz)   BMI 22.42 kg/m²      Physical Exam   Constitutional: She is oriented to person, place, " and time and well-developed, well-nourished, and in no distress.   HENT:   Head: Normocephalic and atraumatic.   Eyes: Pupils are equal, round, and reactive to light. Right eye exhibits no discharge.   Neck: Normal range of motion.   Cardiovascular: Normal rate, regular rhythm and normal heart sounds.  Exam reveals no friction rub.    Pulmonary/Chest: Effort normal and breath sounds normal. No respiratory distress.   Abdominal: Soft. She exhibits no distension. There is no tenderness.   Lymphadenopathy:     She has no cervical adenopathy.   Neurological: She is alert and oriented to person, place, and time.   Skin: No rash noted. No erythema.     Psychiatric: Mood normal.   Musculoskeletal: Normal range of motion. She exhibits no edema or deformity.   Todd hands with oa changes to 1 cmcs, pips and dips, no synovitis, no tender joints  Right shoulder tender to palpation laterally at the subacromial bursa, full rom               Recent Results (from the past 168 hour(s))   POCT INR    Collection Time: 05/23/18  9:36 AM   Result Value Ref Range    INR 1.8 (A) 2.0 - 3.0   Basic metabolic panel    Collection Time: 05/23/18 10:05 AM   Result Value Ref Range    Sodium 142 136 - 145 mmol/L    Potassium 5.4 (H) 3.5 - 5.1 mmol/L    Chloride 102 95 - 110 mmol/L    CO2 34 (H) 23 - 29 mmol/L    Glucose 111 (H) 70 - 110 mg/dL    BUN, Bld 36 (H) 8 - 23 mg/dL    Creatinine 1.4 0.5 - 1.4 mg/dL    Calcium 9.4 8.7 - 10.5 mg/dL    Anion Gap 6 (L) 8 - 16 mmol/L    eGFR if African American 40 (A) >60 mL/min/1.73 m^2    eGFR if non African American 34 (A) >60 mL/min/1.73 m^2   Uric acid    Collection Time: 05/23/18 10:05 AM   Result Value Ref Range    Uric Acid 5.4 2.4 - 5.7 mg/dL       Dexa 7.22.15: DF -1.6, Right neck -2.3, spine -1.9 frax 22.4 major, 7.4 hip, decreasing bmd  Dexa 11.20.17: LN -1.9, Right neck -2.2, DF-1.4, spine -1.3, stable hip, improving spine, frax major 20.4%, hip 6.5%     Assessment:       1. Osteoporosis, senile     2. 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 CKD (chronic kidney disease) stage 3, GFR 30-59 ml/min    4. Medication monitoring encounter        Impression:  Osteopenia with high fracture risk: on prolia q 6 months, daily vitamin D; dexa 11.2017 stable bmd at hip, improving spine    Gout: on allopurinol 200mg/day, colchcine 0.6mg/day, stable no attacks, uric acid at goal 5.4    medication monitoring: no issues, tolerates well colchicine, allopurinol, and prolia--ca level has been low last several months, today is normal    CKD: gfr decreased, scheduled to est care with nephrology next wk    OA mult joints: didi hands, shoulders, knees, avoids nsaids    Plan:         Labs reviewed and done within past 14 days  Ca 9.4, Creat 1.4, eGFR 34  However ca level has been low last few months, ok to give prolia shot today but would rec ca supplement daily 600mg bid  Chew 2 tums daily next 2 wks then recheck ca level in 10 days  Cont Vit D daily 1000units  Decrease colchicine to MWF and come off next visit if remains stable   Repeat dexa 11/2019  Cont allopurinol 200mg/day   rec tumeric 1000mg/day for oa    See back in 6 mos with cmp, ionized ca, and prolia

## 2018-05-23 ENCOUNTER — ANTI-COAG VISIT (OUTPATIENT)
Dept: CARDIOLOGY | Facility: CLINIC | Age: 83
End: 2018-05-23
Payer: MEDICARE

## 2018-05-23 ENCOUNTER — OFFICE VISIT (OUTPATIENT)
Dept: RHEUMATOLOGY | Facility: CLINIC | Age: 83
End: 2018-05-23
Payer: MEDICARE

## 2018-05-23 ENCOUNTER — LAB VISIT (OUTPATIENT)
Dept: LAB | Facility: HOSPITAL | Age: 83
End: 2018-05-23
Attending: PHYSICIAN ASSISTANT
Payer: MEDICARE

## 2018-05-23 VITALS
DIASTOLIC BLOOD PRESSURE: 53 MMHG | HEIGHT: 62 IN | HEART RATE: 62 BPM | BODY MASS INDEX: 22.55 KG/M2 | SYSTOLIC BLOOD PRESSURE: 148 MMHG | WEIGHT: 122.56 LBS

## 2018-05-23 DIAGNOSIS — M81.0 OSTEOPOROSIS, SENILE: Primary | ICD-10-CM

## 2018-05-23 DIAGNOSIS — N18.30 CKD (CHRONIC KIDNEY DISEASE) STAGE 3, GFR 30-59 ML/MIN: ICD-10-CM

## 2018-05-23 DIAGNOSIS — Z51.81 MEDICATION MONITORING ENCOUNTER: ICD-10-CM

## 2018-05-23 DIAGNOSIS — M15.9 PRIMARY OSTEOARTHRITIS INVOLVING MULTIPLE JOINTS: ICD-10-CM

## 2018-05-23 DIAGNOSIS — M1A.39X1 CHRONIC GOUT DUE TO RENAL IMPAIRMENT OF MULTIPLE SITES WITH TOPHUS: ICD-10-CM

## 2018-05-23 DIAGNOSIS — M1A.3791 CHRONIC GOUT DUE TO RENAL IMPAIRMENT INVOLVING FOOT WITH TOPHUS, UNSPECIFIED LATERALITY: ICD-10-CM

## 2018-05-23 DIAGNOSIS — Z79.01 LONG TERM (CURRENT) USE OF ANTICOAGULANTS: Primary | ICD-10-CM

## 2018-05-23 LAB
ANION GAP SERPL CALC-SCNC: 6 MMOL/L
BUN SERPL-MCNC: 36 MG/DL
CALCIUM SERPL-MCNC: 9.4 MG/DL
CHLORIDE SERPL-SCNC: 102 MMOL/L
CO2 SERPL-SCNC: 34 MMOL/L
CREAT SERPL-MCNC: 1.4 MG/DL
EST. GFR  (AFRICAN AMERICAN): 40 ML/MIN/1.73 M^2
EST. GFR  (NON AFRICAN AMERICAN): 34 ML/MIN/1.73 M^2
GLUCOSE SERPL-MCNC: 111 MG/DL
INR PPP: 1.8 (ref 2–3)
POTASSIUM SERPL-SCNC: 5.4 MMOL/L
SODIUM SERPL-SCNC: 142 MMOL/L
URATE SERPL-MCNC: 5.4 MG/DL

## 2018-05-23 PROCEDURE — 80048 BASIC METABOLIC PNL TOTAL CA: CPT | Mod: PO

## 2018-05-23 PROCEDURE — 99999 PR PBB SHADOW E&M-EST. PATIENT-LVL I: CPT | Mod: PBBFAC,,,

## 2018-05-23 PROCEDURE — 99214 OFFICE O/P EST MOD 30 MIN: CPT | Mod: PBBFAC,PO | Performed by: PHYSICIAN ASSISTANT

## 2018-05-23 PROCEDURE — 84550 ASSAY OF BLOOD/URIC ACID: CPT | Mod: PO

## 2018-05-23 PROCEDURE — 85610 PROTHROMBIN TIME: CPT | Mod: PBBFAC,PO

## 2018-05-23 PROCEDURE — 99211 OFF/OP EST MAY X REQ PHY/QHP: CPT | Mod: PBBFAC,PO

## 2018-05-23 PROCEDURE — 99999 PR PBB SHADOW E&M-EST. PATIENT-LVL IV: CPT | Mod: PBBFAC,,, | Performed by: PHYSICIAN ASSISTANT

## 2018-05-23 PROCEDURE — 36415 COLL VENOUS BLD VENIPUNCTURE: CPT | Mod: PO

## 2018-05-23 PROCEDURE — 99214 OFFICE O/P EST MOD 30 MIN: CPT | Mod: S$PBB,,, | Performed by: PHYSICIAN ASSISTANT

## 2018-05-23 NOTE — PROGRESS NOTES
Allergies and medications reviewed.  Administered 1cc Prolia 60mg/cc to RLQ of abdomen. Labs reviewed: Calcium 9.4, Creatinine1.4 . Pt tolerated well. No acute reaction noted to site. Pt instructed on S/S to report. Pt verbalized understanding. Patient waited 15 mins. 10 [facility] follow up scheduled for 06/01/2018 per Janay Valdez PA-C.     Lot:[id number]  Exp:08/2020  Manu:Sascha

## 2018-05-23 NOTE — PROGRESS NOTES
Patient's INR is slightly low at 1.8.  Reports no missed doses/diet changes.  Instructed to start new dose of 1 1/2 tablet (3.75 mg) on Wednesdays and 1 tablet on all other days.  Recheck in 2 weeks.

## 2018-05-23 NOTE — PATIENT INSTRUCTIONS
Lower colchicine to MWF    Tumeric 1000mg/day for joints     Start calcium supplement 1200mg/day total split 600mg in am and 600mg in pm   Chew 2 tums daily for the next 2 weeks   prolia shot today  Will check labs again next Friday

## 2018-06-01 ENCOUNTER — OFFICE VISIT (OUTPATIENT)
Dept: CARDIOLOGY | Facility: CLINIC | Age: 83
End: 2018-06-01
Payer: MEDICARE

## 2018-06-01 ENCOUNTER — CLINICAL SUPPORT (OUTPATIENT)
Dept: CARDIOLOGY | Facility: CLINIC | Age: 83
End: 2018-06-01
Attending: INTERNAL MEDICINE
Payer: MEDICARE

## 2018-06-01 ENCOUNTER — INFUSION (OUTPATIENT)
Dept: INFUSION THERAPY | Facility: HOSPITAL | Age: 83
End: 2018-06-01
Attending: INTERNAL MEDICINE
Payer: MEDICARE

## 2018-06-01 VITALS
OXYGEN SATURATION: 93 % | TEMPERATURE: 97 F | DIASTOLIC BLOOD PRESSURE: 63 MMHG | BODY MASS INDEX: 22.42 KG/M2 | HEART RATE: 62 BPM | SYSTOLIC BLOOD PRESSURE: 144 MMHG | WEIGHT: 122.56 LBS

## 2018-06-01 DIAGNOSIS — I25.5 ISCHEMIC CARDIOMYOPATHY: ICD-10-CM

## 2018-06-01 DIAGNOSIS — Z95.0 CARDIAC PACEMAKER IN SITU: ICD-10-CM

## 2018-06-01 DIAGNOSIS — I27.22 PULMONARY HYPERTENSION DUE TO LEFT HEART DISEASE: ICD-10-CM

## 2018-06-01 DIAGNOSIS — I50.42 CHRONIC COMBINED SYSTOLIC AND DIASTOLIC CONGESTIVE HEART FAILURE: ICD-10-CM

## 2018-06-01 DIAGNOSIS — I48.20 CHRONIC ATRIAL FIBRILLATION: ICD-10-CM

## 2018-06-01 DIAGNOSIS — N18.4 ANEMIA ASSOCIATED WITH STAGE 4 CHRONIC RENAL FAILURE: Primary | ICD-10-CM

## 2018-06-01 DIAGNOSIS — N18.4 CHRONIC RENAL FAILURE, STAGE 4 (SEVERE): ICD-10-CM

## 2018-06-01 DIAGNOSIS — I10 ESSENTIAL HYPERTENSION: ICD-10-CM

## 2018-06-01 DIAGNOSIS — Z79.01 ANTICOAGULATED ON COUMADIN: ICD-10-CM

## 2018-06-01 DIAGNOSIS — Z95.2 S/P MVR (MITRAL VALVE REPLACEMENT): ICD-10-CM

## 2018-06-01 DIAGNOSIS — Z45.010 PACEMAKER AT END OF BATTERY LIFE: ICD-10-CM

## 2018-06-01 DIAGNOSIS — G47.33 OSA (OBSTRUCTIVE SLEEP APNEA): Chronic | ICD-10-CM

## 2018-06-01 DIAGNOSIS — D63.1 ANEMIA ASSOCIATED WITH STAGE 4 CHRONIC RENAL FAILURE: Primary | ICD-10-CM

## 2018-06-01 DIAGNOSIS — E78.5 DYSLIPIDEMIA: ICD-10-CM

## 2018-06-01 DIAGNOSIS — I48.0 PAROXYSMAL ATRIAL FIBRILLATION: ICD-10-CM

## 2018-06-01 DIAGNOSIS — Z95.810 CARDIAC RESYNCHRONIZATION THERAPY DEFIBRILLATOR (CRT-D) IN PLACE: Primary | ICD-10-CM

## 2018-06-01 PROCEDURE — 93281 PM DEVICE PROGR EVAL MULTI: CPT | Mod: 26,,, | Performed by: INTERNAL MEDICINE

## 2018-06-01 PROCEDURE — 63600175 PHARM REV CODE 636 W HCPCS: Mod: JG,EC,PO | Performed by: INTERNAL MEDICINE

## 2018-06-01 PROCEDURE — 96372 THER/PROPH/DIAG INJ SC/IM: CPT | Mod: PO

## 2018-06-01 PROCEDURE — 99211 OFF/OP EST MAY X REQ PHY/QHP: CPT | Mod: PBBFAC,PO,25 | Performed by: INTERNAL MEDICINE

## 2018-06-01 PROCEDURE — 99215 OFFICE O/P EST HI 40 MIN: CPT | Mod: S$PBB,,, | Performed by: INTERNAL MEDICINE

## 2018-06-01 PROCEDURE — 99999 PR PBB SHADOW E&M-EST. PATIENT-LVL I: CPT | Mod: PBBFAC,,, | Performed by: INTERNAL MEDICINE

## 2018-06-01 RX ADMIN — ERYTHROPOIETIN 20000 UNITS: 20000 INJECTION, SOLUTION INTRAVENOUS; SUBCUTANEOUS at 01:06

## 2018-06-01 NOTE — PROGRESS NOTES
Subjective:   Patient ID:  Jennifer Mathews is a 85 y.o. female     Chief complaint:Pacemaker Check (near trudy)      HPI  Background as recorded in my last note (2/28/18):  84 yo female with a PMH of mitral valve replacement x 3, HTN, PAF, ACS, combined CHF, CRT-P placement (S[facility]) , CKD.   Recently admitted to [facility] to the ER today with enterocolitis/GI bleed. Her H and H remained stable and she was treated with a course of cipro/flagyl and discharged.   Coumadin was stopped while admitted due to bleeding.   Patient returned to see Ms Arizmendi on 2/2 and she was doing well. Her only complaint was increased left lower extremity edema. Associated symptoms include left calf soreness. She denied any recent injury to area. Stated her legs are usually swollen, but her left one was then noticeably bigger than her right. Other than the above issue, patient seems stable. No recurrence of bleeding. No cardiac complaints. Denied chest pain, chest heaviness, or tightness.   CHF wise, seemed to be doing well. Denied any PND, orthopnea, or abdominal bloating. Weight stable at home.   Patient reports compliance with her medications. Was still off of Coumadin. No CNS complaints suggestive of TIA/CVA. BP stable. GI has seen patient, repeat H and H stable.   As H and H is stable, Ms Arizmendi re-started Coumadin and aske patient to follow-up with Me for further recommendations.   My last visit with her was in 2014 --  DCM with CHB and CRT-P  that resulted in alleviation of CHF Sx and improvement in EF  Most recent echo from late 2016 shows:    1 - Severe left atrial enlargement.     2 - Moderately depressed left ventricular systolic function (EF 35-40%).     3 - Pulmonary hypertension. The estimated PA systolic pressure is 59 mmHg.     4 - Mitral valve prosthesis.     5 - Trivial mitral regurgitation.     6 - Moderate tricuspid regurgitation.     7 - Increased central venous pressure.    I have reviewed the actual image of the most  recent ECG tracing  and it shows ABiV pacing with a favorable QRS morphology /duration (QRSd no more than 150 Msec in any one lead).  Most recent BNP ~1100  She is comfortable at rest and can walk from parking lot to clinic w/o issues. She walks her dog twice a day around her [place]. 30 to 45 min   She is hypothyroid now     Update since then:  She has been in the hospital 3 times since then -- once for CJHF/ pneumonia , once for ARF and hyperK and once for dehydration  She has sig leg edema  She is OK with ADLs and does not get too GAR  [facility]  Echo in March:  >.    1 - Severely depressed left ventricular systolic function (EF 25%).     2 - Normal right ventricular systolic function .     3 - Mild aortic regurgitation.     4 - Mitral valve prosthesis.     5 - Moderate tricuspid regurgitation.     6 - Increased central venous pressure.     7 - Severe left atrial enlargement.     8 - Concentric hypertrophy.     9 - Pulmonary hypertension. The estimated PA systolic pressure is 62 mmHg.   Current Outpatient Prescriptions   Medication Sig    acetaminophen (TYLENOL EXTRA STRENGTH) 325 MG tablet Take 2 tablets (650 mg total) by mouth every 8 (eight) hours. (Patient taking differently: Take 650 mg by mouth 3 (three) times daily as needed. )    allopurinol (ZYLOPRIM) 100 MG tablet Take 2 tablets (200 mg total) by mouth once daily.    amiodarone (PACERONE) 200 MG Tab Take 1 tablet (200 mg total) by mouth once daily.    aspirin (ECOTRIN) 81 MG EC tablet Take 81 mg by mouth once daily.    calcium carbonate (OS-SHERRY) 600 mg calcium (1,500 mg) Tab Take 600 mg by mouth once.    carvedilol (COREG) 25 MG tablet Take 1 tablet (25 mg total) by mouth 2 (two) times daily with meals.    colchicine 0.6 mg Cap Take 1 capsule (0.6 mg total) by mouth once daily.    cranberry 1,000 mg Cap Take 1 capsule by mouth Daily.    digoxin (LANOXIN) 125 mcg tablet Take 1 tablet (125 mcg total) by mouth once daily. TAKE 1  TABLET (0.125 MG TOTAL) BY MOUTH ONCE DAILY.    furosemide (LASIX) 40 MG tablet Take 1 tablet (40 mg total) by mouth daily as needed. (Patient taking differently: Take 40 mg by mouth daily as needed. Pt states she's taking twice a day)    gabapentin (NEURONTIN) 100 MG capsule TAKE ONE CAPSULE BY MOUTH TWO TIMES DAILY    Lactobac 40-Bifido 3-S.thermop (PROBIOTIC) 100 billion cell Cap Take 1 capsule by mouth once daily.    levothyroxine (SYNTHROID) 50 MCG tablet Take 1 tablet (50 mcg total) by mouth once daily.    MULTIVITAMIN ORAL Take 1 tablet by mouth Daily.    sacubitril-valsartan (ENTRESTO) 49-51 mg per tablet Take 1 tablet by mouth 2 (two) times daily.    warfarin (COUMADIN) 2.5 MG tablet TAKE 1/2 TABLET ON MONDAY AND WEDNESDAY AND 1 TABLET ALL OTHER DAYS. DISCONTINUE 5MG TABLET. (Patient taking differently: Take by mouth every evening 1 tablet daily, except on Wednesday take 1 1/2 tablet as directed by the [facility].)     Current Facility-Administered Medications   Medication    [START ON 10/27/2018] denosumab (PROLIA) injection 60 mg     Review of Systems   Constitution: Positive for malaise/fatigue. Negative for decreased appetite, weakness, weight gain and weight loss.   HENT: Negative for nosebleeds.    Eyes: Negative for blurred vision and visual disturbance.   Cardiovascular: Positive for leg swelling. Negative for chest pain, claudication, cyanosis, dyspnea on exertion, irregular heartbeat, near-syncope, orthopnea, palpitations, paroxysmal nocturnal dyspnea and syncope.   Respiratory: Negative for cough, shortness of breath and wheezing.    Endocrine: Negative for heat intolerance.   Hematologic/Lymphatic: Bruises/bleeds easily.   Skin: Negative for rash.   Musculoskeletal: Negative for muscle weakness and myalgias.   Gastrointestinal: Negative for abdominal pain, anorexia, melena, nausea and vomiting.   Genitourinary: Negative for menorrhagia.   Neurological: Negative for excessive daytime  sleepiness, dizziness, headaches, loss of balance, seizures and vertigo.   Psychiatric/Behavioral: Negative for altered mental status and depression. The patient is not nervous/anxious.        Objective:   Physical Exam   Constitutional: She is oriented to person, place, and time. She appears well-developed and well-nourished.   HENT:   Head: Normocephalic and atraumatic.   Right Ear: External ear normal.   Left Ear: External ear normal.   Eyes: Conjunctivae are normal. Pupils are equal, round, and reactive to light. Left eye exhibits no discharge. No scleral icterus.   Neck: Normal range of motion. Neck supple. JVD present. No thyromegaly present.   V waves noted   Cardiovascular: Normal rate, regular rhythm and intact distal pulses.  Exam reveals no gallop, no S3, no S4, no friction rub, no midsystolic click and no opening snap.    Murmur heard.  High-pitched blowing holosystolic murmur is present with a grade of 2/6  at the apex  Pulses:       Carotid pulses are 2+ on the right side, and 2+ on the left side.       Radial pulses are 2+ on the right side, and 2+ on the left side.        Dorsalis pedis pulses are 2+ on the right side, and 2+ on the left side.        Posterior tibial pulses are 2+ on the right side, and 2+ on the left side.   Pulmonary/Chest: Effort normal and breath sounds normal.   Device pocket is in excellent repair   Abdominal: Soft. She exhibits no distension. There is no hepatomegaly. There is no tenderness. There is no guarding.   Musculoskeletal:        Right ankle: She exhibits no swelling.        Left ankle: She exhibits no swelling.        Right lower leg: She exhibits no swelling.        Left lower leg: She exhibits no swelling.   +2-3 didi pretibial edema   Neurological: She is alert and oriented to person, place, and time. She has normal strength. No cranial nerve deficit. Gait normal.   Skin: Skin is warm, dry and intact. No rash noted. No cyanosis. Nails show no clubbing.    Psychiatric: She has a normal mood and affect. Her speech is normal and behavior is normal. Thought content normal. Cognition and memory are normal.   Nursing note and vitals reviewed.    P 70 bpm /48/10  Assessment:      1. Cardiac resynchronization therapy defibrillator (CRT-D) in place    2. Pacemaker at end of battery life    3. Paroxysmal atrial fibrillation    4. Anticoagulated on Coumadin    5. Chronic combined systolic and diastolic congestive heart failure    6. Chronic renal failure, stage 4 (severe)    7. Dyslipidemia    8. Essential hypertension    9. Ischemic cardiomyopathy    10. LEV (obstructive sleep apnea)    11. Pulmonary hypertension due to left heart disease    12. S/P MVR (mitral valve replacement)        Plan:    PPM replacement soon  No orders of the defined types were placed in this encounter.    Follow-up post op.  There are no discontinued medications.  New Prescriptions    No medications on file     Modified Medications    No medications on file

## 2018-06-07 ENCOUNTER — ANTI-COAG VISIT (OUTPATIENT)
Dept: CARDIOLOGY | Facility: CLINIC | Age: 83
End: 2018-06-07
Payer: MEDICARE

## 2018-06-07 DIAGNOSIS — Z79.01 LONG TERM (CURRENT) USE OF ANTICOAGULANTS: Primary | ICD-10-CM

## 2018-06-07 LAB — INR PPP: 1.8 (ref 2–3)

## 2018-06-07 PROCEDURE — 85610 PROTHROMBIN TIME: CPT | Mod: PBBFAC

## 2018-06-07 PROCEDURE — 99999 PR PBB SHADOW E&M-EST. PATIENT-LVL I: CPT | Mod: PBBFAC,,,

## 2018-06-07 PROCEDURE — 99211 OFF/OP EST MAY X REQ PHY/QHP: CPT | Mod: PBBFAC

## 2018-06-07 NOTE — PROGRESS NOTES
Patient's INR remains sub therapeutic at 1.8.  Instructed patient to start new dose of 3.75 mg (1 1/2 tablet) on Tuesday and Thursday, then 2.5 mg on all other days.  Recheck in 1 week.

## 2018-06-15 ENCOUNTER — OFFICE VISIT (OUTPATIENT)
Dept: HEMATOLOGY/ONCOLOGY | Facility: CLINIC | Age: 83
End: 2018-06-15
Payer: MEDICARE

## 2018-06-15 ENCOUNTER — ANTI-COAG VISIT (OUTPATIENT)
Dept: CARDIOLOGY | Facility: CLINIC | Age: 83
End: 2018-06-15
Payer: MEDICARE

## 2018-06-15 VITALS
TEMPERATURE: 98 F | RESPIRATION RATE: 18 BRPM | WEIGHT: 120.56 LBS | HEIGHT: 62 IN | OXYGEN SATURATION: 91 % | SYSTOLIC BLOOD PRESSURE: 142 MMHG | HEART RATE: 63 BPM | DIASTOLIC BLOOD PRESSURE: 80 MMHG | BODY MASS INDEX: 22.19 KG/M2

## 2018-06-15 DIAGNOSIS — D63.1 ANEMIA ASSOCIATED WITH STAGE 4 CHRONIC RENAL FAILURE: ICD-10-CM

## 2018-06-15 DIAGNOSIS — Z79.01 LONG TERM (CURRENT) USE OF ANTICOAGULANTS: Primary | ICD-10-CM

## 2018-06-15 DIAGNOSIS — N18.4 CHRONIC RENAL FAILURE, STAGE 4 (SEVERE): ICD-10-CM

## 2018-06-15 DIAGNOSIS — D50.0 IRON DEFICIENCY ANEMIA DUE TO CHRONIC BLOOD LOSS: Primary | ICD-10-CM

## 2018-06-15 DIAGNOSIS — N18.4 ANEMIA ASSOCIATED WITH STAGE 4 CHRONIC RENAL FAILURE: ICD-10-CM

## 2018-06-15 LAB — INR PPP: 1.9 (ref 2–3)

## 2018-06-15 PROCEDURE — 99213 OFFICE O/P EST LOW 20 MIN: CPT | Mod: PBBFAC | Performed by: INTERNAL MEDICINE

## 2018-06-15 PROCEDURE — 85610 PROTHROMBIN TIME: CPT | Mod: PBBFAC

## 2018-06-15 PROCEDURE — 99214 OFFICE O/P EST MOD 30 MIN: CPT | Mod: 25,S$PBB,, | Performed by: INTERNAL MEDICINE

## 2018-06-15 PROCEDURE — 99999 PR PBB SHADOW E&M-EST. PATIENT-LVL III: CPT | Mod: PBBFAC,,, | Performed by: INTERNAL MEDICINE

## 2018-06-15 PROCEDURE — 99999 PR PBB SHADOW E&M-EST. PATIENT-LVL I: CPT | Mod: PBBFAC,,,

## 2018-06-15 PROCEDURE — 99211 OFF/OP EST MAY X REQ PHY/QHP: CPT | Mod: PBBFAC,27

## 2018-06-15 RX ORDER — GABAPENTIN 100 MG/1
CAPSULE ORAL
Qty: 180 CAPSULE | Refills: 1 | Status: SHIPPED | OUTPATIENT
Start: 2018-06-15 | End: 2018-12-16 | Stop reason: SDUPTHER

## 2018-06-15 NOTE — PROGRESS NOTES
Subjective:       Patient ID: Jennifer Mathews is a 85 y.o. female.    Chief Complaint: Follow-up; Results; Chronic Renal Failure; and Anemia    HPI 85-year-old female anemia secondary to chronic renal failure patient returns for consideration of Procrit administration    Past Medical History:   Diagnosis Date    Acute coronary syndrome     Anemia     Anticoagulated on Coumadin 7/13/2015    Arthritis     Atrial fibrillation     Basal cell carcinoma 10/2015    left neck    Cardiac arrest     Cardiac resynchronization therapy defibrillator (CRT-D) in place 07/13/2015    Pt denies, states it does not shock me    Chronic combined systolic and diastolic congestive heart failure     CKD (chronic kidney disease) stage 3, GFR 30-59 ml/min     Dyslipidemia 1/30/2014    Hyperlipidemia     Hypertension     Ischemic cardiomyopathy 01/30/2014    Macular hole of left eye     Old    Macular hole of left eye     LEV (obstructive sleep apnea) 9/30/2013    Recurrent UTI     Refractive error     Stroke      Family History   Problem Relation Age of Onset    Coronary artery disease Mother         mi    Coronary artery disease Father     Diabetes Brother     Cancer Brother     Melanoma Neg Hx     Psoriasis Neg Hx     Lupus Neg Hx     Eczema Neg Hx      Social History     Social History    Marital status:      Spouse name: N/A    Number of children: N/A    Years of education: N/A     Occupational History    Not on file.     Social History Main Topics    Smoking status: Never Smoker    Smokeless tobacco: Never Used    Alcohol use No    Drug use: No    Sexual activity: Not on file     Other Topics Concern    Not on file     Social History Narrative    No narrative on file     Past Surgical History:   Procedure Laterality Date    APPENDECTOMY      CARDIAC CATHETERIZATION      CARDIAC PACEMAKER PLACEMENT  2014    CARDIAC VALVE SURGERY      CATARACT EXTRACTION      OU    CHOLECYSTECTOMY       COLONOSCOPY      MITRAL VALVE REPLACEMENT  2014    MITRAL VALVE SURGERY      x3       Labs:  Lab Results   Component Value Date    WBC 5.71 06/15/2018    WBC 5.71 06/15/2018    HGB 8.3 (L) 06/15/2018    HGB 8.3 (L) 06/15/2018    HCT 29.3 (L) 06/15/2018    HCT 29.3 (L) 06/15/2018    MCV 84 06/15/2018    MCV 84 06/15/2018     06/15/2018     06/15/2018     BMP  Lab Results   Component Value Date     06/15/2018     06/15/2018    K 4.9 06/15/2018    K 4.6 06/15/2018     06/15/2018     06/15/2018    CO2 31 (H) 06/15/2018    CO2 30 (H) 06/15/2018    BUN 39 (H) 06/15/2018    BUN 39 (H) 06/15/2018    CREATININE 1.7 (H) 06/15/2018    CREATININE 1.6 (H) 06/15/2018    CALCIUM 9.7 06/15/2018    CALCIUM 9.6 06/15/2018    ANIONGAP 11 06/15/2018    ANIONGAP 11 06/15/2018    ESTGFRAFRICA 31 (A) 06/15/2018    ESTGFRAFRICA 34 (A) 06/15/2018    EGFRNONAA 27 (A) 06/15/2018    EGFRNONAA 29 (A) 06/15/2018     Lab Results   Component Value Date    ALT 15 06/01/2018    AST 20 06/01/2018    ALKPHOS 69 06/01/2018    BILITOT 0.4 06/01/2018       Lab Results   Component Value Date    IRON 34 05/02/2018    TIBC 496 (H) 05/02/2018    FERRITIN 46 05/02/2018    SATURATEDIRO 11 (L) 04/13/2010     Lab Results   Component Value Date    TIWPNEAT74 560 01/22/2018     Lab Results   Component Value Date    FOLATE 15.7 01/22/2018     Lab Results   Component Value Date    TSH 4.831 (H) 04/17/2018         Review of Systems   Constitutional: Positive for fatigue. Negative for activity change, appetite change, chills, diaphoresis, fever and unexpected weight change.   HENT: Negative for congestion, dental problem, drooling, ear discharge, ear pain, facial swelling, hearing loss, mouth sores, nosebleeds, postnasal drip, rhinorrhea, sinus pressure, sneezing, sore throat, tinnitus, trouble swallowing and voice change.    Eyes: Negative for photophobia, pain, discharge, redness, itching and visual disturbance.    Respiratory: Negative for cough, choking, chest tightness, shortness of breath, wheezing and stridor.    Cardiovascular: Negative for chest pain, palpitations and leg swelling.   Gastrointestinal: Negative for abdominal distention, abdominal pain, anal bleeding, blood in stool, constipation, diarrhea, nausea, rectal pain and vomiting.   Endocrine: Negative for cold intolerance, heat intolerance, polydipsia, polyphagia and polyuria.   Genitourinary: Negative for decreased urine volume, difficulty urinating, dyspareunia, dysuria, enuresis, flank pain, frequency, genital sores, hematuria, menstrual problem, pelvic pain, urgency, vaginal bleeding, vaginal discharge and vaginal pain.   Musculoskeletal: Positive for gait problem. Negative for arthralgias, back pain, joint swelling, myalgias, neck pain and neck stiffness.   Skin: Negative for color change, pallor and rash.   Allergic/Immunologic: Negative for environmental allergies, food allergies and immunocompromised state.   Neurological: Positive for weakness. Negative for dizziness, tremors, seizures, syncope, facial asymmetry, speech difficulty, light-headedness, numbness and headaches.   Hematological: Negative for adenopathy. Does not bruise/bleed easily.   Psychiatric/Behavioral: Positive for dysphoric mood. Negative for agitation, behavioral problems, confusion, decreased concentration, hallucinations, self-injury, sleep disturbance and suicidal ideas. The patient is nervous/anxious. The patient is not hyperactive.        Objective:      Physical Exam   Constitutional: She is oriented to person, place, and time. She appears cachectic. She has a sickly appearance. She appears ill. She appears distressed.   HENT:   Head: Normocephalic and atraumatic.   Right Ear: External ear normal.   Left Ear: External ear normal.   Nose: Nose normal. Right sinus exhibits no maxillary sinus tenderness and no frontal sinus tenderness. Left sinus exhibits no maxillary sinus  tenderness and no frontal sinus tenderness.   Mouth/Throat: Oropharynx is clear and moist. No oropharyngeal exudate.   Eyes: Conjunctivae, EOM and lids are normal. Pupils are equal, round, and reactive to light. Right eye exhibits no discharge. Left eye exhibits no discharge. Right conjunctiva is not injected. Right conjunctiva has no hemorrhage. Left conjunctiva is not injected. Left conjunctiva has no hemorrhage. No scleral icterus.   Neck: Normal range of motion. Neck supple. No JVD present. No tracheal deviation present. No thyromegaly present.   Cardiovascular: Normal rate and regular rhythm.    Pulmonary/Chest: Effort normal. No stridor. No respiratory distress. She exhibits no tenderness.   Abdominal: Soft. She exhibits no distension and no mass. There is no splenomegaly or hepatomegaly. There is no tenderness. There is no rebound.   Musculoskeletal: Normal range of motion. She exhibits no edema or tenderness.   Lymphadenopathy:     She has no cervical adenopathy.     She has no axillary adenopathy.        Right: No supraclavicular adenopathy present.        Left: No supraclavicular adenopathy present.   Neurological: She is alert and oriented to person, place, and time. No cranial nerve deficit. Coordination normal.   Skin: Skin is dry. No rash noted. She is not diaphoretic. No erythema.   Psychiatric: Her behavior is normal. Judgment and thought content normal. Her mood appears anxious. She exhibits a depressed mood.   Vitals reviewed.          Assessment:      1. Iron deficiency anemia due to chronic blood loss    2. Anemia associated with stage 4 chronic renal failure    3. Chronic renal failure, stage 4 (severe)           Plan:   Documented iron deficiency will treat with intravenous iron orders written reviewed return in 2-3 weeks to be seen with nurse practitioner CBC iron status of iron repleted will proceed with Procrit administration

## 2018-06-15 NOTE — PROGRESS NOTES
Patient's INR is slightly low at 1.9.  Reports no missed doses or diet changes.  Instructed to start new dose of 3.75 mg (1 1/2 tablet) on Tuesdays, Thursdays and Saturdays, then 1 tablet on all other days.  Recheck in 2 weeks.

## 2018-06-19 DIAGNOSIS — Z45.010 PACEMAKER AT END OF BATTERY LIFE: Primary | ICD-10-CM

## 2018-06-19 DIAGNOSIS — I48.21 PERMANENT ATRIAL FIBRILLATION: ICD-10-CM

## 2018-06-19 DIAGNOSIS — I50.43 ACUTE ON CHRONIC COMBINED SYSTOLIC AND DIASTOLIC CONGESTIVE HEART FAILURE: ICD-10-CM

## 2018-06-19 DIAGNOSIS — I42.9 CARDIOMYOPATHY, UNSPECIFIED TYPE: ICD-10-CM

## 2018-06-19 DIAGNOSIS — I25.5 ISCHEMIC CARDIOMYOPATHY: ICD-10-CM

## 2018-06-20 ENCOUNTER — NURSE TRIAGE (OUTPATIENT)
Dept: ADMINISTRATIVE | Facility: CLINIC | Age: 83
End: 2018-06-20

## 2018-06-20 ENCOUNTER — SURGERY (OUTPATIENT)
Age: 83
End: 2018-06-20

## 2018-06-20 ENCOUNTER — HOSPITAL ENCOUNTER (OUTPATIENT)
Facility: HOSPITAL | Age: 83
Discharge: HOME | End: 2018-06-21
Attending: EMERGENCY MEDICINE | Admitting: HOSPITALIST
Payer: MEDICARE

## 2018-06-20 DIAGNOSIS — R07.9 CHEST PAIN: ICD-10-CM

## 2018-06-20 DIAGNOSIS — D64.9 ANEMIA, UNSPECIFIED TYPE: ICD-10-CM

## 2018-06-20 DIAGNOSIS — R91.8 PULMONARY INFILTRATE IN LEFT LUNG ON CXR: ICD-10-CM

## 2018-06-20 DIAGNOSIS — G89.18 POST-OP PAIN: Primary | ICD-10-CM

## 2018-06-20 PROBLEM — Z91.89 AT RISK FOR SUDDEN CARDIAC DEATH: Status: ACTIVE | Noted: 2018-06-20

## 2018-06-20 PROBLEM — J18.9 LEFT LOWER LOBE PNEUMONIA: Status: ACTIVE | Noted: 2018-06-20

## 2018-06-20 LAB
ALBUMIN SERPL BCP-MCNC: 3.6 G/DL
ALP SERPL-CCNC: 54 U/L
ALT SERPL W/O P-5'-P-CCNC: 18 U/L
ANION GAP SERPL CALC-SCNC: 9 MMOL/L
AST SERPL-CCNC: 23 U/L
BASOPHILS # BLD AUTO: 0.04 K/UL
BASOPHILS NFR BLD: 0.4 %
BILIRUB SERPL-MCNC: 0.7 MG/DL
BNP SERPL-MCNC: 1882 PG/ML
BUN SERPL-MCNC: 35 MG/DL
CALCIUM SERPL-MCNC: 8.3 MG/DL
CHLORIDE SERPL-SCNC: 107 MMOL/L
CO2 SERPL-SCNC: 25 MMOL/L
CREAT SERPL-MCNC: 1.6 MG/DL
DIFFERENTIAL METHOD: ABNORMAL
EOSINOPHIL # BLD AUTO: 0.1 K/UL
EOSINOPHIL NFR BLD: 0.8 %
ERYTHROCYTE [DISTWIDTH] IN BLOOD BY AUTOMATED COUNT: 19.8 %
EST. GFR  (AFRICAN AMERICAN): 34 ML/MIN/1.73 M^2
EST. GFR  (NON AFRICAN AMERICAN): 29 ML/MIN/1.73 M^2
GLUCOSE SERPL-MCNC: 122 MG/DL
HCT VFR BLD AUTO: 26.4 %
HGB BLD-MCNC: 7.6 G/DL
INR PPP: 1.4
LYMPHOCYTES # BLD AUTO: 1.3 K/UL
LYMPHOCYTES NFR BLD: 11.4 %
MCH RBC QN AUTO: 24.2 PG
MCHC RBC AUTO-ENTMCNC: 28.8 G/DL
MCV RBC AUTO: 84 FL
MONOCYTES # BLD AUTO: 0.7 K/UL
MONOCYTES NFR BLD: 6.5 %
NEUTROPHILS # BLD AUTO: 8.9 K/UL
NEUTROPHILS NFR BLD: 80.9 %
PLATELET # BLD AUTO: 227 K/UL
PMV BLD AUTO: 10.1 FL
POTASSIUM SERPL-SCNC: 4.8 MMOL/L
PROT SERPL-MCNC: 6.1 G/DL
PROTHROMBIN TIME: 14 SEC
RBC # BLD AUTO: 3.14 M/UL
SODIUM SERPL-SCNC: 141 MMOL/L
TROPONIN I SERPL DL<=0.01 NG/ML-MCNC: 0.03 NG/ML
TROPONIN I SERPL DL<=0.01 NG/ML-MCNC: 0.03 NG/ML
WBC # BLD AUTO: 10.99 K/UL

## 2018-06-20 PROCEDURE — 36430 TRANSFUSION BLD/BLD COMPNT: CPT

## 2018-06-20 PROCEDURE — 25000003 PHARM REV CODE 250: Performed by: EMERGENCY MEDICINE

## 2018-06-20 PROCEDURE — 84484 ASSAY OF TROPONIN QUANT: CPT

## 2018-06-20 PROCEDURE — 96376 TX/PRO/DX INJ SAME DRUG ADON: CPT

## 2018-06-20 PROCEDURE — 80053 COMPREHEN METABOLIC PANEL: CPT

## 2018-06-20 PROCEDURE — 36415 COLL VENOUS BLD VENIPUNCTURE: CPT

## 2018-06-20 PROCEDURE — 93005 ELECTROCARDIOGRAM TRACING: CPT | Mod: 59

## 2018-06-20 PROCEDURE — 96375 TX/PRO/DX INJ NEW DRUG ADDON: CPT

## 2018-06-20 PROCEDURE — 99285 EMERGENCY DEPT VISIT HI MDM: CPT | Mod: 25

## 2018-06-20 PROCEDURE — 87040 BLOOD CULTURE FOR BACTERIA: CPT

## 2018-06-20 PROCEDURE — G0378 HOSPITAL OBSERVATION PER HR: HCPCS

## 2018-06-20 PROCEDURE — 85610 PROTHROMBIN TIME: CPT | Mod: 91

## 2018-06-20 PROCEDURE — 96374 THER/PROPH/DIAG INJ IV PUSH: CPT

## 2018-06-20 PROCEDURE — 85025 COMPLETE CBC W/AUTO DIFF WBC: CPT

## 2018-06-20 PROCEDURE — 63600175 PHARM REV CODE 636 W HCPCS: Performed by: EMERGENCY MEDICINE

## 2018-06-20 PROCEDURE — 83880 ASSAY OF NATRIURETIC PEPTIDE: CPT

## 2018-06-20 RX ORDER — GABAPENTIN 100 MG/1
100 CAPSULE ORAL 2 TIMES DAILY
Status: DISCONTINUED | OUTPATIENT
Start: 2018-06-21 | End: 2018-06-21 | Stop reason: HOSPADM

## 2018-06-20 RX ORDER — HYDROCODONE BITARTRATE AND ACETAMINOPHEN 5; 325 MG/1; MG/1
1 TABLET ORAL EVERY 6 HOURS PRN
Status: DISCONTINUED | OUTPATIENT
Start: 2018-06-21 | End: 2018-06-21 | Stop reason: HOSPADM

## 2018-06-20 RX ORDER — AMIODARONE HYDROCHLORIDE 200 MG/1
200 TABLET ORAL DAILY
Status: DISCONTINUED | OUTPATIENT
Start: 2018-06-21 | End: 2018-06-20

## 2018-06-20 RX ORDER — MOXIFLOXACIN HYDROCHLORIDE 400 MG/1
400 TABLET ORAL DAILY
Status: DISCONTINUED | OUTPATIENT
Start: 2018-06-21 | End: 2018-06-21 | Stop reason: HOSPADM

## 2018-06-20 RX ORDER — GLUCAGON 1 MG
1 KIT INJECTION
Status: DISCONTINUED | OUTPATIENT
Start: 2018-06-21 | End: 2018-06-21 | Stop reason: HOSPADM

## 2018-06-20 RX ORDER — SODIUM CHLORIDE 0.9 % (FLUSH) 0.9 %
5 SYRINGE (ML) INJECTION
Status: DISCONTINUED | OUTPATIENT
Start: 2018-06-21 | End: 2018-06-21 | Stop reason: HOSPADM

## 2018-06-20 RX ORDER — DIGOXIN 125 MCG
125 TABLET ORAL DAILY
Status: DISCONTINUED | OUTPATIENT
Start: 2018-06-21 | End: 2018-06-21 | Stop reason: HOSPADM

## 2018-06-20 RX ORDER — DOXYCYCLINE 100 MG/1
100 CAPSULE ORAL EVERY 12 HOURS
Qty: 6 CAPSULE | Refills: 0 | Status: SHIPPED | OUTPATIENT
Start: 2018-06-20 | End: 2018-06-20 | Stop reason: SDUPTHER

## 2018-06-20 RX ORDER — HYDROCODONE BITARTRATE AND ACETAMINOPHEN 10; 325 MG/1; MG/1
1 TABLET ORAL EVERY 6 HOURS PRN
Status: DISCONTINUED | OUTPATIENT
Start: 2018-06-21 | End: 2018-06-21 | Stop reason: HOSPADM

## 2018-06-20 RX ORDER — NAPROXEN SODIUM 220 MG/1
162 TABLET, FILM COATED ORAL
Status: COMPLETED | OUTPATIENT
Start: 2018-06-20 | End: 2018-06-20

## 2018-06-20 RX ORDER — FUROSEMIDE 40 MG/1
40 TABLET ORAL DAILY PRN
Status: DISCONTINUED | OUTPATIENT
Start: 2018-06-21 | End: 2018-06-21 | Stop reason: HOSPADM

## 2018-06-20 RX ORDER — LEVOTHYROXINE SODIUM 50 UG/1
50 TABLET ORAL
Status: DISCONTINUED | OUTPATIENT
Start: 2018-06-21 | End: 2018-06-21 | Stop reason: HOSPADM

## 2018-06-20 RX ORDER — IBUPROFEN 200 MG
16 TABLET ORAL
Status: DISCONTINUED | OUTPATIENT
Start: 2018-06-21 | End: 2018-06-21 | Stop reason: HOSPADM

## 2018-06-20 RX ORDER — IBUPROFEN 200 MG
24 TABLET ORAL
Status: DISCONTINUED | OUTPATIENT
Start: 2018-06-21 | End: 2018-06-21 | Stop reason: HOSPADM

## 2018-06-20 RX ORDER — MORPHINE SULFATE 4 MG/ML
2 INJECTION, SOLUTION INTRAMUSCULAR; INTRAVENOUS
Status: COMPLETED | OUTPATIENT
Start: 2018-06-20 | End: 2018-06-20

## 2018-06-20 RX ORDER — CARVEDILOL 12.5 MG/1
25 TABLET ORAL 2 TIMES DAILY WITH MEALS
Status: DISCONTINUED | OUTPATIENT
Start: 2018-06-21 | End: 2018-06-21 | Stop reason: HOSPADM

## 2018-06-20 RX ORDER — ACETAMINOPHEN 325 MG/1
650 TABLET ORAL EVERY 4 HOURS PRN
Status: DISCONTINUED | OUTPATIENT
Start: 2018-06-21 | End: 2018-06-21 | Stop reason: HOSPADM

## 2018-06-20 RX ORDER — AMIODARONE HYDROCHLORIDE 200 MG/1
200 TABLET ORAL DAILY
Status: DISCONTINUED | OUTPATIENT
Start: 2018-06-21 | End: 2018-06-21 | Stop reason: HOSPADM

## 2018-06-20 RX ORDER — ASPIRIN 325 MG
325 TABLET ORAL
Status: DISCONTINUED | OUTPATIENT
Start: 2018-06-20 | End: 2018-06-20

## 2018-06-20 RX ORDER — ASPIRIN 81 MG/1
81 TABLET ORAL DAILY
Status: DISCONTINUED | OUTPATIENT
Start: 2018-06-21 | End: 2018-06-21 | Stop reason: HOSPADM

## 2018-06-20 RX ORDER — ONDANSETRON 2 MG/ML
4 INJECTION INTRAMUSCULAR; INTRAVENOUS EVERY 8 HOURS PRN
Status: DISCONTINUED | OUTPATIENT
Start: 2018-06-21 | End: 2018-06-21 | Stop reason: HOSPADM

## 2018-06-20 RX ORDER — ONDANSETRON 2 MG/ML
4 INJECTION INTRAMUSCULAR; INTRAVENOUS
Status: COMPLETED | OUTPATIENT
Start: 2018-06-20 | End: 2018-06-20

## 2018-06-20 RX ORDER — COLCHICINE 0.6 MG/1
0.6 TABLET, FILM COATED ORAL DAILY
Status: DISCONTINUED | OUTPATIENT
Start: 2018-06-21 | End: 2018-06-21 | Stop reason: HOSPADM

## 2018-06-20 RX ORDER — ALLOPURINOL 100 MG/1
200 TABLET ORAL DAILY
Status: DISCONTINUED | OUTPATIENT
Start: 2018-06-21 | End: 2018-06-21 | Stop reason: HOSPADM

## 2018-06-20 RX ORDER — DOXYCYCLINE 100 MG/1
100 CAPSULE ORAL EVERY 12 HOURS
Qty: 6 CAPSULE | Refills: 0 | Status: SHIPPED | OUTPATIENT
Start: 2018-06-20 | End: 2018-07-03

## 2018-06-20 RX ADMIN — ONDANSETRON 4 MG: 2 INJECTION INTRAMUSCULAR; INTRAVENOUS at 10:06

## 2018-06-20 RX ADMIN — ASPIRIN 81 MG 162 MG: 81 TABLET ORAL at 08:06

## 2018-06-20 RX ADMIN — MORPHINE SULFATE 2 MG: 4 INJECTION INTRAVENOUS at 10:06

## 2018-06-20 NOTE — TELEPHONE ENCOUNTER
Reason for Disposition   Nursing judgment    Protocols used: ST NO PROTOCOL CALL: SICK ADULT-A-[place]  pt complaining of severe back pain along with left arm pain. She had pacemaker replaced this morning. Advised ER

## 2018-06-21 VITALS
SYSTOLIC BLOOD PRESSURE: 127 MMHG | HEIGHT: 62 IN | DIASTOLIC BLOOD PRESSURE: 67 MMHG | TEMPERATURE: 98 F | HEART RATE: 78 BPM | BODY MASS INDEX: 22.07 KG/M2 | OXYGEN SATURATION: 99 % | WEIGHT: 119.94 LBS | RESPIRATION RATE: 20 BRPM

## 2018-06-21 LAB
ABO + RH BLD: NORMAL
ALBUMIN SERPL BCP-MCNC: 3.1 G/DL
ANION GAP SERPL CALC-SCNC: 6 MMOL/L
BASOPHILS # BLD AUTO: 0.04 K/UL
BASOPHILS NFR BLD: 0.5 %
BLD GP AB SCN CELLS X3 SERPL QL: NORMAL
BLD PROD TYP BPU: NORMAL
BLOOD UNIT EXPIRATION DATE: NORMAL
BLOOD UNIT TYPE CODE: 5100
BLOOD UNIT TYPE: NORMAL
BUN SERPL-MCNC: 33 MG/DL
CALCIUM SERPL-MCNC: 7.9 MG/DL
CHLORIDE SERPL-SCNC: 109 MMOL/L
CO2 SERPL-SCNC: 27 MMOL/L
CODING SYSTEM: NORMAL
CREAT SERPL-MCNC: 1.2 MG/DL
DIFFERENTIAL METHOD: ABNORMAL
DISPENSE STATUS: NORMAL
EOSINOPHIL # BLD AUTO: 0.2 K/UL
EOSINOPHIL NFR BLD: 2.4 %
ERYTHROCYTE [DISTWIDTH] IN BLOOD BY AUTOMATED COUNT: 19.7 %
EST. GFR  (AFRICAN AMERICAN): 48 ML/MIN/1.73 M^2
EST. GFR  (NON AFRICAN AMERICAN): 41 ML/MIN/1.73 M^2
GLUCOSE SERPL-MCNC: 93 MG/DL
HCT VFR BLD AUTO: 23.1 %
HGB BLD-MCNC: 6.6 G/DL
INR PPP: 1.2
LYMPHOCYTES # BLD AUTO: 1.3 K/UL
LYMPHOCYTES NFR BLD: 16.9 %
MAGNESIUM SERPL-MCNC: 2.3 MG/DL
MCH RBC QN AUTO: 24 PG
MCHC RBC AUTO-ENTMCNC: 28.6 G/DL
MCV RBC AUTO: 84 FL
MONOCYTES # BLD AUTO: 0.7 K/UL
MONOCYTES NFR BLD: 9 %
NEUTROPHILS # BLD AUTO: 5.4 K/UL
NEUTROPHILS NFR BLD: 71.5 %
NUM UNITS TRANS PACKED RBC: NORMAL
PHOSPHATE SERPL-MCNC: 3.3 MG/DL
PHOSPHATE SERPL-MCNC: 3.3 MG/DL
PLATELET # BLD AUTO: 182 K/UL
PMV BLD AUTO: 9.6 FL
POTASSIUM SERPL-SCNC: 4.5 MMOL/L
PROTHROMBIN TIME: 12.9 SEC
RBC # BLD AUTO: 2.75 M/UL
SODIUM SERPL-SCNC: 142 MMOL/L
WBC # BLD AUTO: 7.52 K/UL

## 2018-06-21 PROCEDURE — 25000003 PHARM REV CODE 250: Performed by: HOSPITALIST

## 2018-06-21 PROCEDURE — 36430 TRANSFUSION BLD/BLD COMPNT: CPT

## 2018-06-21 PROCEDURE — G0378 HOSPITAL OBSERVATION PER HR: HCPCS

## 2018-06-21 PROCEDURE — 83735 ASSAY OF MAGNESIUM: CPT

## 2018-06-21 PROCEDURE — 85025 COMPLETE CBC W/AUTO DIFF WBC: CPT

## 2018-06-21 PROCEDURE — 36415 COLL VENOUS BLD VENIPUNCTURE: CPT

## 2018-06-21 PROCEDURE — 63600175 PHARM REV CODE 636 W HCPCS: Performed by: EMERGENCY MEDICINE

## 2018-06-21 PROCEDURE — 96376 TX/PRO/DX INJ SAME DRUG ADON: CPT

## 2018-06-21 PROCEDURE — 86920 COMPATIBILITY TEST SPIN: CPT

## 2018-06-21 PROCEDURE — 80069 RENAL FUNCTION PANEL: CPT

## 2018-06-21 PROCEDURE — 99024 POSTOP FOLLOW-UP VISIT: CPT | Mod: ,,, | Performed by: INTERNAL MEDICINE

## 2018-06-21 PROCEDURE — P9016 RBC LEUKOCYTES REDUCED: HCPCS

## 2018-06-21 PROCEDURE — 85610 PROTHROMBIN TIME: CPT

## 2018-06-21 PROCEDURE — 96375 TX/PRO/DX INJ NEW DRUG ADDON: CPT

## 2018-06-21 PROCEDURE — 63600175 PHARM REV CODE 636 W HCPCS: Performed by: NURSE PRACTITIONER

## 2018-06-21 PROCEDURE — 86901 BLOOD TYPING SEROLOGIC RH(D): CPT

## 2018-06-21 RX ORDER — WARFARIN 2.5 MG/1
2.5 TABLET ORAL
Status: DISCONTINUED | OUTPATIENT
Start: 2018-06-22 | End: 2018-06-21

## 2018-06-21 RX ORDER — WARFARIN 2.5 MG/1
2.5 TABLET ORAL
Status: DISCONTINUED | OUTPATIENT
Start: 2018-06-22 | End: 2018-06-21 | Stop reason: HOSPADM

## 2018-06-21 RX ORDER — HYDROCODONE BITARTRATE AND ACETAMINOPHEN 500; 5 MG/1; MG/1
TABLET ORAL
Status: DISCONTINUED | OUTPATIENT
Start: 2018-06-21 | End: 2018-06-21 | Stop reason: HOSPADM

## 2018-06-21 RX ORDER — FUROSEMIDE 40 MG/1
40 TABLET ORAL 2 TIMES DAILY
Qty: 60 TABLET | Refills: 0 | Status: SHIPPED | OUTPATIENT
Start: 2018-06-21 | End: 2018-11-19 | Stop reason: SDUPTHER

## 2018-06-21 RX ORDER — MORPHINE SULFATE 4 MG/ML
2 INJECTION, SOLUTION INTRAMUSCULAR; INTRAVENOUS
Status: COMPLETED | OUTPATIENT
Start: 2018-06-21 | End: 2018-06-21

## 2018-06-21 RX ORDER — DOXYCYCLINE HYCLATE 100 MG
100 TABLET ORAL EVERY 12 HOURS
Status: DISCONTINUED | OUTPATIENT
Start: 2018-06-21 | End: 2018-06-21 | Stop reason: HOSPADM

## 2018-06-21 RX ORDER — HYDROCODONE BITARTRATE AND ACETAMINOPHEN 5; 325 MG/1; MG/1
1 TABLET ORAL EVERY 6 HOURS PRN
Qty: 15 TABLET | Refills: 0 | Status: SHIPPED | OUTPATIENT
Start: 2018-06-21 | End: 2018-07-03

## 2018-06-21 RX ORDER — MOXIFLOXACIN HYDROCHLORIDE 400 MG/1
400 TABLET ORAL DAILY
Qty: 5 TABLET | Refills: 0 | Status: SHIPPED | OUTPATIENT
Start: 2018-06-22 | End: 2018-06-27

## 2018-06-21 RX ORDER — FUROSEMIDE 10 MG/ML
60 INJECTION INTRAMUSCULAR; INTRAVENOUS ONCE
Status: COMPLETED | OUTPATIENT
Start: 2018-06-21 | End: 2018-06-21

## 2018-06-21 RX ADMIN — HYDROCODONE BITARTRATE AND ACETAMINOPHEN 1 TABLET: 10; 325 TABLET ORAL at 08:06

## 2018-06-21 RX ADMIN — FUROSEMIDE 60 MG: 10 INJECTION, SOLUTION INTRAMUSCULAR; INTRAVENOUS at 02:06

## 2018-06-21 RX ADMIN — COLCHICINE 0.6 MG: 0.6 TABLET, FILM COATED ORAL at 08:06

## 2018-06-21 RX ADMIN — DIGOXIN 125 MCG: 125 TABLET ORAL at 08:06

## 2018-06-21 RX ADMIN — FUROSEMIDE 40 MG: 40 TABLET ORAL at 09:06

## 2018-06-21 RX ADMIN — AMIODARONE HYDROCHLORIDE 200 MG: 200 TABLET ORAL at 08:06

## 2018-06-21 RX ADMIN — LEVOTHYROXINE SODIUM 50 MCG: 50 TABLET ORAL at 05:06

## 2018-06-21 RX ADMIN — MOXIFLOXACIN HYDROCHLORIDE 400 MG: 400 TABLET, FILM COATED ORAL at 08:06

## 2018-06-21 RX ADMIN — ALLOPURINOL 200 MG: 100 TABLET ORAL at 08:06

## 2018-06-21 RX ADMIN — CARVEDILOL 25 MG: 12.5 TABLET, FILM COATED ORAL at 07:06

## 2018-06-21 RX ADMIN — MORPHINE SULFATE 2 MG: 4 INJECTION INTRAVENOUS at 12:06

## 2018-06-21 RX ADMIN — DOXYCYCLINE HYCLATE 100 MG: 100 TABLET, COATED ORAL at 08:06

## 2018-06-21 RX ADMIN — SACUBITRIL AND VALSARTAN 1 TABLET: 49; 51 TABLET, FILM COATED ORAL at 09:06

## 2018-06-21 RX ADMIN — GABAPENTIN 100 MG: 100 CAPSULE ORAL at 08:06

## 2018-06-21 RX ADMIN — ASPIRIN 81 MG: 81 TABLET, COATED ORAL at 08:06

## 2018-06-21 NOTE — ASSESSMENT & PLAN NOTE
-patient with ischemic cardiomyopathy with EF of 25%  -Currently decompensated on exam  -has edema, BNP 1880 and small pleural effusion on CXR  -needing to be transfuse today  -will need IV Lasix between units and scheduled dosing  -Has no ICD firing  -Continue Entresto and Coreg

## 2018-06-21 NOTE — NURSING
Patient arrived to unit from ER  Patient in room 233  Transferred into bed with 2 assist.   Bedside report given  Charge nurse advised of patient arrival.   VS currently stable.   Tele monitored applied.   Patient oriented to room, rounding sheet and call bell.   Bed in lowest position, call light in reach.  Encouraged to notify of all needs.   Will continue to monitor.  Pacemaker incision. Sling in place with ice pack. Paced on monitor. VSS.

## 2018-06-21 NOTE — CONSULTS
[facility] -   Cardiology  Consult Note    Patient Name: Jennifer Mathews  MRN: [id number]  Admission Date: 6/20/2018  Hospital Length of Stay: 0 days  Code Status: Full Code   Attending Provider: Parish Duran MD   Consulting Provider: RAUL Gupta  Primary Care Physician: Desirae Bello MD  Principal Problem:Post-op pain    Patient information was obtained from patient and ER records.     Inpatient consult to Cardiology  Consult performed by: BETHANY SARABIA  Consult ordered by: MACKENZIE DENTON  Reason for consult: POD 1 generator exchange and reports of device pocket pain         Subjective:     Chief Complaint:  Device pocket pain      HPI:   85 female  h/o Afib, cardiac arrest s/p CRT-D, hypothyroidism, and HTN, Anemia, HLP, CKD, Ischemic cardiomyopathy with EF 25%, previous CVA and LEV. Patient is POD generator exchange on yesterday for battery being at end of life. Presented to the ED last night with pain to device pocket. CXR showed possible left lower lobe infiltrate versus atelectasis and pleural effusion with no evidence of pneumothorax. Also noted to have small pleural effusion. Patient BNP 1,880. Severe anemia on morning labs with plans in place to transfuse. Has been holding coumadin for generator exchanges.  Complains of lower extremity edema.     Past Medical History:   Diagnosis Date    Acute coronary syndrome     Anemia     Anticoagulated on Coumadin 7/13/2015    Arthritis     Atrial fibrillation     Basal cell carcinoma 10/2015    left neck    Cardiac arrest     Cardiac resynchronization therapy defibrillator (CRT-D) in place 07/13/2015    Pt denies, states it does not shock me    Chronic combined systolic and diastolic congestive heart failure     CKD (chronic kidney disease) stage 3, GFR 30-59 ml/min     Dyslipidemia 1/30/2014    Hyperlipidemia     Hypertension     Ischemic cardiomyopathy 01/30/2014    Macular hole of left eye     Old    Macular hole  "of left eye     LEV (obstructive sleep apnea) 9/30/2013    Recurrent UTI     Refractive error     Stroke        Past Surgical History:   Procedure Laterality Date    APPENDECTOMY      CARDIAC CATHETERIZATION      CARDIAC PACEMAKER PLACEMENT  2014    CARDIAC VALVE SURGERY      CATARACT EXTRACTION      OU    CHOLECYSTECTOMY      COLONOSCOPY      MITRAL VALVE REPLACEMENT  2014    MITRAL VALVE SURGERY      x3       Review of patient's allergies indicates:   Allergen Reactions    Cephalosporins Hives    Metaxalone Itching    Penicillins      Other reaction(s): Unknown      Pregabalin      Other reaction(s): "Bad feeling"      Sulfa (sulfonamide antibiotics) Itching       Current Facility-Administered Medications on File Prior to Encounter   Medication    [COMPLETED] vancomycin 1 g in dextrose 5 % 250 mL    [DISCONTINUED] nozaseptin (NOZIN) nasal      Current Outpatient Prescriptions on File Prior to Encounter   Medication Sig    acetaminophen (TYLENOL EXTRA STRENGTH) 325 MG tablet Take 2 tablets (650 mg total) by mouth every 8 (eight) hours. (Patient taking differently: Take 650 mg by mouth 3 (three) times daily as needed. )    allopurinol (ZYLOPRIM) 100 MG tablet Take 2 tablets (200 mg total) by mouth once daily.    amiodarone (PACERONE) 200 MG Tab Take 1 tablet (200 mg total) by mouth once daily.    aspirin (ECOTRIN) 81 MG EC tablet Take 81 mg by mouth once daily.    calcium carbonate (OS-SHERRY) 600 mg calcium (1,500 mg) Tab Take 600 mg by mouth once.    carvedilol (COREG) 25 MG tablet Take 1 tablet (25 mg total) by mouth 2 (two) times daily with meals.    colchicine 0.6 mg Cap Take 1 capsule (0.6 mg total) by mouth once daily.    cranberry 1,000 mg Cap Take 1 capsule by mouth Daily.    digoxin (LANOXIN) 125 mcg tablet Take 1 tablet (125 mcg total) by mouth once daily. TAKE 1 TABLET (0.125 MG TOTAL) BY MOUTH ONCE DAILY.    doxycycline (VIBRAMYCIN) 100 MG Cap Take 1 capsule (100 mg " total) by mouth every 12 (twelve) hours. Take two capsules as a first dose tonight at 8 pm then 1 caps Q 12 hrs until done    furosemide (LASIX) 40 MG tablet Take 1 tablet (40 mg total) by mouth daily as needed. (Patient taking differently: Take 40 mg by mouth daily as needed. Pt states she's taking twice a day)    gabapentin (NEURONTIN) 100 MG capsule TAKE ONE CAPSULE BY MOUTH TWO TIMES DAILY    Lactobac 40-Bifido 3-S.thermop (PROBIOTIC) 100 billion cell Cap Take 1 capsule by mouth once daily.    levothyroxine (SYNTHROID) 50 MCG tablet Take 1 tablet (50 mcg total) by mouth once daily.    MULTIVITAMIN ORAL Take 1 tablet by mouth Daily.    sacubitril-valsartan (ENTRESTO) 49-51 mg per tablet Take 1 tablet by mouth 2 (two) times daily.    warfarin (COUMADIN) 2.5 MG tablet TAKE 1/2 TABLET ON MONDAY AND WEDNESDAY AND 1 TABLET ALL OTHER DAYS. DISCONTINUE 5MG TABLET. (Patient taking differently: Take by mouth every evening 1 1/2 tablet on Tues, Thurs and Sat, then 1 tablet on all other days as directed by the [facility].)     Family History     Problem Relation (Age of Onset)    Cancer Brother    Coronary artery disease Mother, Father    Diabetes Brother        Social History Main Topics    Smoking status: Never Smoker    Smokeless tobacco: Never Used    Alcohol use No    Drug use: No    Sexual activity: Not on file     Review of Systems   Constitution: Positive for malaise/fatigue. Negative for diaphoresis, weakness, weight gain and weight loss.   HENT: Negative for congestion and nosebleeds.    Cardiovascular: Positive for leg swelling. Negative for chest pain, claudication, cyanosis, dyspnea on exertion, irregular heartbeat, near-syncope, orthopnea, palpitations, paroxysmal nocturnal dyspnea and syncope.        Left chest wall pain. Pain isolated to device pocket      Respiratory: Positive for shortness of breath. Negative for cough, hemoptysis, sleep disturbances due to breathing, snoring, sputum  production and wheezing.    Hematologic/Lymphatic: Negative for bleeding problem. Does not bruise/bleed easily.   Skin: Negative for rash.   Musculoskeletal: Negative for arthritis, back pain, falls, joint pain, muscle cramps and muscle weakness.   Gastrointestinal: Negative for abdominal pain, constipation, diarrhea, heartburn, hematemesis, hematochezia, melena and nausea.   Genitourinary: Negative for dysuria, hematuria and nocturia.   Neurological: Negative for excessive daytime sleepiness, dizziness, headaches, light-headedness, loss of balance, numbness and vertigo.     Objective:     Vital Signs (Most Recent):  Temp: 98.7 °F (37.1 °C) (06/21/18 0749)  Pulse: 72 (06/21/18 0901)  Resp: 20 (06/21/18 0749)  BP: (!) 168/74 (06/21/18 0749)  SpO2: 99 % (06/21/18 0749) Vital Signs (24h Range):  Temp:  [98.7 °F (37.1 °C)-99 °F (37.2 °C)] 98.7 °F (37.1 °C)  Pulse:  [69-77] 72  Resp:  [14-30] 20  SpO2:  [92 %-100 %] 99 %  BP: (132-168)/(49-74) 168/74     Weight: 54.4 kg (119 lb 14.9 oz)  Body mass index is 21.94 kg/m².    SpO2: 99 %  O2 Device (Oxygen Therapy): nasal cannula    No intake or output data in the 24 hours ending 06/21/18 0949    Lines/Drains/Airways     Peripheral Intravenous Line                 Peripheral IV - Single Lumen 06/20/18 2027 Right Antecubital less than 1 day                Physical Exam   Constitutional: She is oriented to person, place, and time. She appears well-developed and well-nourished.   Neck: Neck supple. No JVD present.   Cardiovascular: Normal rate, regular rhythm, normal heart sounds and normal pulses.  Exam reveals no friction rub.    No murmur heard.  Pulmonary/Chest: Effort normal and breath sounds normal. No respiratory distress. She has no wheezes. She has no rales.   Left device pocket with mild erythema and ecchymosis.    Abdominal: Soft. Bowel sounds are normal. She exhibits no distension.   Musculoskeletal: She exhibits edema ( +1 BLE ). She exhibits no tenderness.    Neurological: She is alert and oriented to person, place, and time.   Skin: Skin is warm and dry. No rash noted.   Psychiatric: She has a normal mood and affect. Her behavior is normal.   Nursing note and vitals reviewed.      Significant Labs:   All pertinent lab results from the last 24 hours have been reviewed. and   Recent Lab Results       06/21/18  0658 06/21/18  0648 06/21/18  0545 06/20/18  2249 06/20/18  2239      Unit Blood Type Code  5100[P]        Unit Expiration  [id number][P]        Unit Blood Type  O POS[P]        Albumin   3.1(L)       Alkaline Phosphatase          ALT          Anion Gap   6(L)       AST          Baso #   0.04       Basophil%   0.5       Total Bilirubin          Blood Culture, Routine    No Growth to date[P]      BNP          BUN, Bld   33(H)       Calcium   7.9(L)       Chloride   109       CO2   27       CODING SYSTEM  VCRK655[P]        Creatinine   1.2       Differential Method   Automated       DISPENSE STATUS  CROSSMATCHED[P]        eGFR if    48(A)       eGFR if non    41  Comment:  Calculation used to obtain the estimated glomerular filtration  rate (eGFR) is the CKD-EPI equation.   (A)       Eos #   0.2       Eosinophil%   2.4       Glucose   93       Gran # (ANC)   5.4       Gran%   71.5       Group & Rh O POS         Hematocrit   23.1(L)       Hemoglobin   6.6(L)       INDIRECT QUITA NEG         Coumadin Monitoring INR          Lymph #   1.3       Lymph%   16.9(L)       Magnesium   2.3       MCH   24.0(L)       MCHC   28.6(L)       MCV   84       Mono #   0.7       Mono%   9.0       MPV   9.6       Phosphorus   3.3          3.3       Platelets   182       Potassium   4.5       PRODUCT CODE  [id number][P]        Total Protein          Protime          RBC   2.75(L)       RDW   19.7(H)       Sodium   142       Troponin I     0.026  Comment:  The reference interval for Troponin I represents the 99th percentile   cutoff   for our facility and  is consistent with 3rd generation assay   performance.       UNIT NUMBER  [id number][P]        WBC   7.52                   06/20/18  2238 06/20/18  1953 06/20/18  1950 06/20/18  0957      Unit Blood Type Code         Unit Expiration         Unit Blood Type         Albumin  3.6       Alkaline Phosphatase  54(L)       ALT  18       Anion Gap  9       AST  23       Baso #  0.04       Basophil%  0.4       Total Bilirubin  0.7  Comment:  For infants and newborns, interpretation of results should be based  on gestational age, weight and in agreement with clinical  observations.  Premature Infant recommended reference ranges:  Up to 24 hours.............<8.0 mg/dL  Up to 48 hours............<12.0 mg/dL  3-5 days..................<15.0 mg/dL  6-29 days.................<15.0 mg/dL         Blood Culture, Routine No Growth to date[P]        BNP  1,882  Comment:  Values of less than 100 pg/ml are consistent with non-CHF populations.(H)       BUN, Bld  35(H)       Calcium  8.3(L)       Chloride  107       CO2  25       CODING SYSTEM         Creatinine  1.6(H)       Differential Method  Automated       DISPENSE STATUS         eGFR if   34(A)       eGFR if non   29  Comment:  Calculation used to obtain the estimated glomerular filtration  rate (eGFR) is the CKD-EPI equation.   (A)       Eos #  0.1       Eosinophil%  0.8       Glucose  122(H)       Gran # (ANC)  8.9(H)       Gran%  80.9(H)       Group & Rh         Hematocrit  26.4(L)       Hemoglobin  7.6(L)       INDIRECT QUITA         Coumadin Monitoring INR   1.4  Comment:  Coumadin Therapy:  2.0 - 3.0 for INR for all indicators except mechanical heart valves  and antiphospholipid syndromes which should use 2.5 - 3.5.  (H) 1.4  Comment:  Coumadin Therapy:  2.0 - 3.0 for INR for all indicators except mechanical heart valves  and antiphospholipid syndromes which should use 2.5 - 3.5.  (H)     Lymph #  1.3       Lymph%  11.4(L)       Magnesium          MCH  24.2(L)       MCHC  28.8(L)       MCV  84       Mono #  0.7       Mono%  6.5       MPV  10.1       Phosphorus         Platelets  227       Potassium  4.8       PRODUCT CODE         Total Protein  6.1       Protime   14.0(H) 14.8(H)     RBC  3.14(L)       RDW  19.8(H)       Sodium  141       Troponin I  0.025  Comment:  The reference interval for Troponin I represents the 99th percentile   cutoff   for our facility and is consistent with 3rd generation assay   performance.         UNIT NUMBER         WBC  10.99             Significant Imaging: Echocardiogram:   2D echo with color flow doppler:   Results for orders placed or performed in visit on 03/01/18   2D echo with color flow doppler   Result Value Ref Range    EF 25 (A) 55 - 65    Mitral Valve Regurgitation TRIVIAL     Aortic Valve Regurgitation MILD     Est. PA Systolic Pressure 62.06 (A)     Tricuspid Valve Regurgitation MODERATE (A)       and X-Ray: CXR: X-Ray Chest 1 View (CXR):     Impression       Possible left lower lobe infiltrate versus atelectasis and pleural effusion.  Cardiomegaly.  Postoperative changes.  Pacemaker.  Tiny right pleural effusion.      Electronically signed by: Madi Be MD  Date: 06/20/2018  Time: 20:50         Assessment and Plan:     * Post-op pain    -CXR shows no evidence of pneumothorax  -has mild ecchymosis to site  -coumadin has been on hold for procedure. Do not suspect hematoma.   -control pain with meds and ice pack.   reassurance given          Left lower lobe pneumonia    Being addressed by primary team        A-fib    -Patient with chronic A-fib with controlled rate  -Resume amio, coreg and Digoxin  -Coumadin on hold until anemia sorted  -Recommend consulting Hematology for anemia. Patient states that she was scheduled for iron transfusion tonmorrow        Chronic combined systolic and diastolic congestive heart failure    -patient with ischemic cardiomyopathy with EF of 25%  -Currently decompensated  on exam  -has edema, BNP 1880 and small pleural effusion on CXR  -needing to be transfuse today  -will need IV Lasix between units and scheduled dosing  -Has no ICD firing  -Continue Entresto and Coreg             VTE Risk Mitigation         Ordered     IP VTE HIGH RISK PATIENT  Once      06/21/18 0503     Place HEBER hose  Until discontinued      06/21/18 0503     Place sequential compression device  Until discontinued      06/21/18 0503        Chart reviewed. Patient examined by Dr. Hatfield and agrees with plan that has been outlined.     Thank you for your consult. I will follow-up with patient. Please contact us if you have any additional questions.    RAUL Gupta  Cardiology   [facility] [place]

## 2018-06-21 NOTE — ASSESSMENT & PLAN NOTE
- s/p CRT-D battery change  - pain at procedure site  - admit per cardiology    - start tylenol prn mild pain, norco 5mg prn moderate pain, norco 10mg prn severe pain  - consult cardiology in AM

## 2018-06-21 NOTE — NURSING
Plan of care reviewed. Patient discharged. Verbalized understanding. No further questions at this time. IV and cardiac monitor removed. Sling in place. Wheeled down via wheelchair. Follow up apts made

## 2018-06-21 NOTE — ASSESSMENT & PLAN NOTE
-Patient with chronic A-fib with controlled rate  -Resume amio, coreg and Digoxin  -Coumadin on hold until anemia sorted  -Recommend consulting Hematology for anemia. Patient states that she was scheduled for iron transfusion tonmorrow

## 2018-06-21 NOTE — H&P
[facility]  History & Physical    Patient Name: Jennifer Mathews  MRN: [id number]  Admission Date: 6/20/2018  Attending Physician: Samy Suarez MD  Primary Care Provider: Desirae Bello MD         Patient information was obtained from patient and ER records.     Subjective:     Principal Problem:Post-op pain    Chief Complaint:   Chief Complaint   Patient presents with    Post-op Problem     pt states she had pacemaker replaced today and is having alot of pain to L arm and to back        HPI: 85F h/o Afib, cardiac arrest s/p CRT-D, hypothyroidism, and HTN presents with pain at left upper chest, shoulder and arm in the same area as CRT-D replacement surgery.   Currently patient is POD #1.  She reports waking up with pain this afternoon.  Pain is sharp and positional.  No radiation.  Has taken tylenol and ASA without improvement of pain. No exacerbating or alleviating factors. Patient denies any nausea, vomiting, palpitations, CP, SOB, leg swelling, HA, dizziness, fever, activity change, and all other sxs at this time.  In ER, VSS, Labs show Hb 7.6.  Was 8.3 prior to procedure.  Minimal blood loss from procedure. CXR show post op changes, possible LLL infiltrate, consider consolidation vs atelectasis, and also pleural effusion.  Dr. Houser (cardiology) called in ER and recommends admission.  Hospital medicine called for admission.      Past Medical History:   Diagnosis Date    Acute coronary syndrome     Anemia     Anticoagulated on Coumadin 7/13/2015    Arthritis     Atrial fibrillation     Basal cell carcinoma 10/2015    left neck    Cardiac arrest     Cardiac resynchronization therapy defibrillator (CRT-D) in place 07/13/2015    Pt denies, states it does not shock me    Chronic combined systolic and diastolic congestive heart failure     CKD (chronic kidney disease) stage 3, GFR 30-59 ml/min     Dyslipidemia 1/30/2014    Hyperlipidemia     Hypertension      "Ischemic cardiomyopathy 01/30/2014    Macular hole of left eye     Old    Macular hole of left eye     LEV (obstructive sleep apnea) 9/30/2013    Recurrent UTI     Refractive error     Stroke        Past Surgical History:   Procedure Laterality Date    APPENDECTOMY      CARDIAC CATHETERIZATION      CARDIAC PACEMAKER PLACEMENT  2014    CARDIAC VALVE SURGERY      CATARACT EXTRACTION      OU    CHOLECYSTECTOMY      COLONOSCOPY      MITRAL VALVE REPLACEMENT  2014    MITRAL VALVE SURGERY      x3       Review of patient's allergies indicates:   Allergen Reactions    Cephalosporins Hives    Metaxalone Itching    Penicillins      Other reaction(s): Unknown      Pregabalin      Other reaction(s): "Bad feeling"      Sulfa (sulfonamide antibiotics) Itching       Current Facility-Administered Medications on File Prior to Encounter   Medication    [START ON 10/27/2018] denosumab (PROLIA) injection 60 mg    [COMPLETED] vancomycin 1 g in dextrose 5 % 250 mL    [DISCONTINUED] nozaseptin (NOZIN) nasal     [DISCONTINUED] vancomycin 1 gram/250 mL in sodium chloride 0.9% IVPB 1 g     Current Outpatient Prescriptions on File Prior to Encounter   Medication Sig    acetaminophen (TYLENOL EXTRA STRENGTH) 325 MG tablet Take 2 tablets (650 mg total) by mouth every 8 (eight) hours. (Patient taking differently: Take 650 mg by mouth 3 (three) times daily as needed. )    allopurinol (ZYLOPRIM) 100 MG tablet Take 2 tablets (200 mg total) by mouth once daily.    amiodarone (PACERONE) 200 MG Tab Take 1 tablet (200 mg total) by mouth once daily.    aspirin (ECOTRIN) 81 MG EC tablet Take 81 mg by mouth once daily.    calcium carbonate (OS-SHERRY) 600 mg calcium (1,500 mg) Tab Take 600 mg by mouth once.    carvedilol (COREG) 25 MG tablet Take 1 tablet (25 mg total) by mouth 2 (two) times daily with meals.    colchicine 0.6 mg Cap Take 1 capsule (0.6 mg total) by mouth once daily.    cranberry 1,000 mg Cap Take 1 " capsule by mouth Daily.    digoxin (LANOXIN) 125 mcg tablet Take 1 tablet (125 mcg total) by mouth once daily. TAKE 1 TABLET (0.125 MG TOTAL) BY MOUTH ONCE DAILY.    doxycycline (VIBRAMYCIN) 100 MG Cap Take 1 capsule (100 mg total) by mouth every 12 (twelve) hours. Take two capsules as a first dose tonight at 8 pm then 1 caps Q 12 hrs until done    furosemide (LASIX) 40 MG tablet Take 1 tablet (40 mg total) by mouth daily as needed. (Patient taking differently: Take 40 mg by mouth daily as needed. Pt states she's taking twice a day)    gabapentin (NEURONTIN) 100 MG capsule TAKE ONE CAPSULE BY MOUTH TWO TIMES DAILY    Lactobac 40-Bifido 3-S.thermop (PROBIOTIC) 100 billion cell Cap Take 1 capsule by mouth once daily.    levothyroxine (SYNTHROID) 50 MCG tablet Take 1 tablet (50 mcg total) by mouth once daily.    MULTIVITAMIN ORAL Take 1 tablet by mouth Daily.    sacubitril-valsartan (ENTRESTO) 49-51 mg per tablet Take 1 tablet by mouth 2 (two) times daily.    warfarin (COUMADIN) 2.5 MG tablet TAKE 1/2 TABLET ON MONDAY AND WEDNESDAY AND 1 TABLET ALL OTHER DAYS. DISCONTINUE 5MG TABLET. (Patient taking differently: Take by mouth every evening 1 1/2 tablet on Tues, Thurs and Sat, then 1 tablet on all other days as directed by the [facility]mad[facility].)     Family History     Problem Relation (Age of Onset)    Cancer Brother    Coronary artery disease Mother, Father    Diabetes Brother        Social History Main Topics    Smoking status: Never Smoker    Smokeless tobacco: Never Used    Alcohol use No    Drug use: No    Sexual activity: Not on file     Review of Systems   Constitutional: Negative for chills, diaphoresis, fatigue, fever and unexpected weight change.   HENT: Negative for congestion, facial swelling, sore throat and trouble swallowing.    Eyes: Negative for photophobia, redness and visual disturbance.   Respiratory: Negative for apnea, cough, chest tightness, shortness of breath and wheezing.     Cardiovascular: Negative for chest pain, palpitations and leg swelling.   Gastrointestinal: Negative for abdominal distention, abdominal pain, blood in stool, constipation, diarrhea, nausea and vomiting.   Endocrine: Negative for polydipsia, polyphagia and polyuria.   Genitourinary: Negative for difficulty urinating, dysuria, flank pain, frequency, hematuria and urgency.   Musculoskeletal: Negative for arthralgias, back pain, joint swelling, myalgias and neck stiffness.        L upper chest wall/shoulder/arm pain.    Skin: Negative for pallor and rash.   Allergic/Immunologic: Negative for immunocompromised state.   Neurological: Negative for dizziness, tremors, syncope, weakness, light-headedness and headaches.   Psychiatric/Behavioral: Negative for agitation, confusion and suicidal ideas.   All other systems reviewed and are negative.    Objective:     Vital Signs (Most Recent):  Temp: 99 °F (37.2 °C) (06/20/18 1915)  Pulse: 70 (06/20/18 1915)  Resp: 20 (06/20/18 1915)  BP: (!) 143/59 (06/20/18 1915)  SpO2: (!) 92 % (06/20/18 1915) Vital Signs (24h Range):  Temp:  [99 °F (37.2 °C)] 99 °F (37.2 °C)  Pulse:  [64-77] 70  Resp:  [14-20] 20  SpO2:  [92 %-100 %] 92 %  BP: (132-145)/(49-59) 143/59     Weight: 54.4 kg (119 lb 14.9 oz)  Body mass index is 21.94 kg/m².    Physical Exam   Constitutional: She is oriented to person, place, and time. She appears well-developed and well-nourished. No distress.   HENT:   Head: Normocephalic and atraumatic.   Mouth/Throat: Oropharynx is clear and moist.   Eyes: Conjunctivae and EOM are normal. Pupils are equal, round, and reactive to light. No scleral icterus.   Neck: Normal range of motion. Neck supple. No JVD present. No thyromegaly present.   Cardiovascular: Normal rate, regular rhythm and intact distal pulses.  Exam reveals no gallop and no friction rub.    No murmur heard.  Pulmonary/Chest: Effort normal and breath sounds normal. No respiratory distress. She has no wheezes.  She has no rales. She exhibits no tenderness.   Abdominal: Soft. Bowel sounds are normal. She exhibits no distension and no mass. There is no tenderness. There is no guarding.   Musculoskeletal: Normal range of motion. She exhibits tenderness (mild tenderness of PPM site, ecchymosis seen around the area, no erythema or significant edema noted).   Neurological: She is alert and oriented to person, place, and time. No cranial nerve deficit.   Skin: Skin is warm and dry. Capillary refill takes 2 to 3 seconds. No rash noted. She is not diaphoretic. No erythema.   Psychiatric: She has a normal mood and affect.   Nursing note and vitals reviewed.        CRANIAL NERVES     CN III, IV, VI   Pupils are equal, round, and reactive to light.  Extraocular motions are normal.        Significant Labs:   CBC:   Recent Labs  Lab 06/20/18 1953   WBC 10.99   HGB 7.6*   HCT 26.4*        CMP:   Recent Labs  Lab 06/20/18 1953      K 4.8      CO2 25   *   BUN 35*   CREATININE 1.6*   CALCIUM 8.3*   PROT 6.1   ALBUMIN 3.6   BILITOT 0.7   ALKPHOS 54*   AST 23   ALT 18   ANIONGAP 9   EGFRNONAA 29*     Cardiac Markers:   Recent Labs  Lab 06/20/18 1953   BNP 1,882*     Troponin:   Recent Labs  Lab 06/20/18 1953   TROPONINI 0.025     All pertinent labs within the past 24 hours have been reviewed.    Significant Imaging: I have reviewed all pertinent imaging results/findings within the past 24 hours.   Imaging Results          X-Ray Chest AP Portable (Final result)  Result time 06/20/18 20:50:53    Final result by Madi Be MD (06/20/18 20:50:53)                 Impression:      Possible left lower lobe infiltrate versus atelectasis and pleural effusion.  Cardiomegaly.  Postoperative changes.  Pacemaker.  Tiny right pleural effusion.      Electronically signed by: Madi Be MD  Date:    06/20/2018  Time:    20:50             Narrative:    EXAMINATION:  XR CHEST AP PORTABLE    CLINICAL  HISTORY:  Chest pain, Chest Pain;    COMPARISON:  05/04/2018    FINDINGS:  Sternal wires are present.  Left pacemaker.    Cardiomegaly with aortic atherosclerosis.    Right lung is clear with small right pleural effusion.    Volume loss with consolidation at the left lung base is noted.  Possible left lower lobe infiltrate.                                  Assessment/Plan:     * Post-op pain    - s/p CRT-D exchange surgery due to end of battery life  - pain at procedure site  - admit per cardiology    - start tylenol prn mild pain, norco 5mg prn moderate pain, norco 10mg prn severe pain  - consult cardiology in AM            Left lower lobe pneumonia    - incidental finding on CXR  - moxifloxacin 400mg IV x 1 given in ER    - continue moxifloxacin 400mg PO daily          Anemia associated with stage 4 chronic renal failure    - hb 7.6  - no active bleeds    - monitor H/H  - transfuse PRBC prn Hb<7.0  - continue ferrous sulfate 325 mg daily          Chronic renal failure, stage 4 (severe)    - at baseline  - monitor I/O          Acquired hypothyroidism    - continue synthroid at home dose          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    - continue allopurinol at home dose          A-fib    - Rate controlled  - continue coreg, amiodarone at home dose  - consult pharm to dose warfarin          Hypertension    - continue entresto, coreg          Chronic combined systolic and diastolic congestive heart failure    - s/p CRT-D  - continue coreg, entresto, and lasix at home dose            VTE Risk Mitigation         Ordered     IP VTE HIGH RISK PATIENT  Once      06/21/18 0503     Place HEBER hose  Until discontinued      06/21/18 0503     Place sequential compression device  Until discontinued      06/21/18 0503             Samy Suarez MD  Department of Hospital Medicine   [facility]

## 2018-06-21 NOTE — ASSESSMENT & PLAN NOTE
- s/p CRT-D battery change  - pain at procedure site  - was told to admit by cardiology    - start tylenol prn mild pain, norco 5mg prn moderate pain, norco 10mg prn severe pain  - consult cardiology in AM

## 2018-06-21 NOTE — SUBJECTIVE & OBJECTIVE
"Past Medical History:   Diagnosis Date    Acute coronary syndrome     Anemia     Anticoagulated on Coumadin 7/13/2015    Arthritis     Atrial fibrillation     Basal cell carcinoma 10/2015    left neck    Cardiac arrest     Cardiac resynchronization therapy defibrillator (CRT-D) in place 07/13/2015    Pt denies, states it does not shock me    Chronic combined systolic and diastolic congestive heart failure     CKD (chronic kidney disease) stage 3, GFR 30-59 ml/min     Dyslipidemia 1/30/2014    Hyperlipidemia     Hypertension     Ischemic cardiomyopathy 01/30/2014    Macular hole of left eye     Old    Macular hole of left eye     LEV (obstructive sleep apnea) 9/30/2013    Recurrent UTI     Refractive error     Stroke        Past Surgical History:   Procedure Laterality Date    APPENDECTOMY      CARDIAC CATHETERIZATION      CARDIAC PACEMAKER PLACEMENT  2014    CARDIAC VALVE SURGERY      CATARACT EXTRACTION      OU    CHOLECYSTECTOMY      COLONOSCOPY      MITRAL VALVE REPLACEMENT  2014    MITRAL VALVE SURGERY      x3       Review of patient's allergies indicates:   Allergen Reactions    Cephalosporins Hives    Metaxalone Itching    Penicillins      Other reaction(s): Unknown      Pregabalin      Other reaction(s): "Bad feeling"      Sulfa (sulfonamide antibiotics) Itching       Current Facility-Administered Medications on File Prior to Encounter   Medication    [START ON 10/27/2018] denosumab (PROLIA) injection 60 mg    [COMPLETED] vancomycin 1 g in dextrose 5 % 250 mL    [DISCONTINUED] nozaseptin (NOZIN) nasal     [DISCONTINUED] vancomycin 1 gram/250 mL in sodium chloride 0.9% IVPB 1 g     Current Outpatient Prescriptions on File Prior to Encounter   Medication Sig    acetaminophen (TYLENOL EXTRA STRENGTH) 325 MG tablet Take 2 tablets (650 mg total) by mouth every 8 (eight) hours. (Patient taking differently: Take 650 mg by mouth 3 (three) times daily as needed. )    " allopurinol (ZYLOPRIM) 100 MG tablet Take 2 tablets (200 mg total) by mouth once daily.    amiodarone (PACERONE) 200 MG Tab Take 1 tablet (200 mg total) by mouth once daily.    aspirin (ECOTRIN) 81 MG EC tablet Take 81 mg by mouth once daily.    calcium carbonate (OS-SHERRY) 600 mg calcium (1,500 mg) Tab Take 600 mg by mouth once.    carvedilol (COREG) 25 MG tablet Take 1 tablet (25 mg total) by mouth 2 (two) times daily with meals.    colchicine 0.6 mg Cap Take 1 capsule (0.6 mg total) by mouth once daily.    cranberry 1,000 mg Cap Take 1 capsule by mouth Daily.    digoxin (LANOXIN) 125 mcg tablet Take 1 tablet (125 mcg total) by mouth once daily. TAKE 1 TABLET (0.125 MG TOTAL) BY MOUTH ONCE DAILY.    doxycycline (VIBRAMYCIN) 100 MG Cap Take 1 capsule (100 mg total) by mouth every 12 (twelve) hours. Take two capsules as a first dose tonight at 8 pm then 1 caps Q 12 hrs until done    furosemide (LASIX) 40 MG tablet Take 1 tablet (40 mg total) by mouth daily as needed. (Patient taking differently: Take 40 mg by mouth daily as needed. Pt states she's taking twice a day)    gabapentin (NEURONTIN) 100 MG capsule TAKE ONE CAPSULE BY MOUTH TWO TIMES DAILY    Lactobac 40-Bifido 3-S.thermop (PROBIOTIC) 100 billion cell Cap Take 1 capsule by mouth once daily.    levothyroxine (SYNTHROID) 50 MCG tablet Take 1 tablet (50 mcg total) by mouth once daily.    MULTIVITAMIN ORAL Take 1 tablet by mouth Daily.    sacubitril-valsartan (ENTRESTO) 49-51 mg per tablet Take 1 tablet by mouth 2 (two) times daily.    warfarin (COUMADIN) 2.5 MG tablet TAKE 1/2 TABLET ON MONDAY AND WEDNESDAY AND 1 TABLET ALL OTHER DAYS. DISCONTINUE 5MG TABLET. (Patient taking differently: Take by mouth every evening 1 1/2 tablet on Tues, Thurs and Sat, then 1 tablet on all other days as directed by the [facility].)     Family History     Problem Relation (Age of Onset)    Cancer Brother    Coronary artery disease Mother, Father    Diabetes  Brother        Social History Main Topics    Smoking status: Never Smoker    Smokeless tobacco: Never Used    Alcohol use No    Drug use: No    Sexual activity: Not on file     Review of Systems   Constitutional: Negative for chills, diaphoresis, fatigue, fever and unexpected weight change.   HENT: Negative for congestion, facial swelling, sore throat and trouble swallowing.    Eyes: Negative for photophobia, redness and visual disturbance.   Respiratory: Negative for apnea, cough, chest tightness, shortness of breath and wheezing.    Cardiovascular: Negative for chest pain, palpitations and leg swelling.   Gastrointestinal: Negative for abdominal distention, abdominal pain, blood in stool, constipation, diarrhea, nausea and vomiting.   Endocrine: Negative for polydipsia, polyphagia and polyuria.   Genitourinary: Negative for difficulty urinating, dysuria, flank pain, frequency, hematuria and urgency.   Musculoskeletal: Negative for arthralgias, back pain, joint swelling, myalgias and neck stiffness.        L upper chest wall/shoulder/arm pain.    Skin: Negative for pallor and rash.   Allergic/Immunologic: Negative for immunocompromised state.   Neurological: Negative for dizziness, tremors, syncope, weakness, light-headedness and headaches.   Psychiatric/Behavioral: Negative for agitation, confusion and suicidal ideas.   All other systems reviewed and are negative.    Objective:     Vital Signs (Most Recent):  Temp: 99 °F (37.2 °C) (06/20/18 1915)  Pulse: 70 (06/20/18 1915)  Resp: 20 (06/20/18 1915)  BP: (!) 143/59 (06/20/18 1915)  SpO2: (!) 92 % (06/20/18 1915) Vital Signs (24h Range):  Temp:  [99 °F (37.2 °C)] 99 °F (37.2 °C)  Pulse:  [64-77] 70  Resp:  [14-20] 20  SpO2:  [92 %-100 %] 92 %  BP: (132-145)/(49-59) 143/59     Weight: 54.4 kg (119 lb 14.9 oz)  Body mass index is 21.94 kg/m².    Physical Exam   Constitutional: She is oriented to person, place, and time. She appears well-developed and  well-nourished. No distress.   HENT:   Head: Normocephalic and atraumatic.   Mouth/Throat: Oropharynx is clear and moist.   Eyes: Conjunctivae and EOM are normal. Pupils are equal, round, and reactive to light. No scleral icterus.   Neck: Normal range of motion. Neck supple. No JVD present. No thyromegaly present.   Cardiovascular: Normal rate, regular rhythm and intact distal pulses.  Exam reveals no gallop and no friction rub.    No murmur heard.  Pulmonary/Chest: Effort normal and breath sounds normal. No respiratory distress. She has no wheezes. She has no rales. She exhibits no tenderness.   Abdominal: Soft. Bowel sounds are normal. She exhibits no distension and no mass. There is no tenderness. There is no guarding.   Musculoskeletal: Normal range of motion. She exhibits tenderness (mild tenderness of PPM site, ecchymosis seen around the area, no erythema or significant edema noted).   Neurological: She is alert and oriented to person, place, and time. No cranial nerve deficit.   Skin: Skin is warm and dry. Capillary refill takes 2 to 3 seconds. No rash noted. She is not diaphoretic. No erythema.   Psychiatric: She has a normal mood and affect.   Nursing note and vitals reviewed.        CRANIAL NERVES     CN III, IV, VI   Pupils are equal, round, and reactive to light.  Extraocular motions are normal.        Significant Labs:   CBC:   Recent Labs  Lab 06/20/18 1953   WBC 10.99   HGB 7.6*   HCT 26.4*        CMP:   Recent Labs  Lab 06/20/18 1953      K 4.8      CO2 25   *   BUN 35*   CREATININE 1.6*   CALCIUM 8.3*   PROT 6.1   ALBUMIN 3.6   BILITOT 0.7   ALKPHOS 54*   AST 23   ALT 18   ANIONGAP 9   EGFRNONAA 29*     Cardiac Markers:   Recent Labs  Lab 06/20/18 1953   BNP 1,882*     Troponin:   Recent Labs  Lab 06/20/18 1953   TROPONINI 0.025     All pertinent labs within the past 24 hours have been reviewed.    Significant Imaging: I have reviewed all pertinent imaging  results/findings within the past 24 hours.   Imaging Results          X-Ray Chest AP Portable (Final result)  Result time 06/20/18 20:50:53    Final result by Madi Be MD (06/20/18 20:50:53)                 Impression:      Possible left lower lobe infiltrate versus atelectasis and pleural effusion.  Cardiomegaly.  Postoperative changes.  Pacemaker.  Tiny right pleural effusion.      Electronically signed by: Madi Be MD  Date:    06/20/2018  Time:    20:50             Narrative:    EXAMINATION:  XR CHEST AP PORTABLE    CLINICAL HISTORY:  Chest pain, Chest Pain;    COMPARISON:  05/04/2018    FINDINGS:  Sternal wires are present.  Left pacemaker.    Cardiomegaly with aortic atherosclerosis.    Right lung is clear with small right pleural effusion.    Volume loss with consolidation at the left lung base is noted.  Possible left lower lobe infiltrate.

## 2018-06-21 NOTE — ASSESSMENT & PLAN NOTE
-CXR shows no evidence of pneumothorax  -has mild ecchymosis to site  -coumadin has been on hold for procedure. Do not suspect hematoma.   -control pain with meds and ice pack.   reassurance given

## 2018-06-21 NOTE — ED PROVIDER NOTES
"SCRIBE #1 NOTE: I, Mikala Bush, am scribing for, and in the presence of, Daniel Rios Jr., MD. I have scribed the HPI, ROS, and PEx.     SCRIBE #2 NOTE: I, Kevin Hi , am scribing for, and in the presence of,  Tomasa JUÁREZ Do, MD. I have scribed the remaining portions of the note not scribed by Scribe #1.     History      Chief Complaint   Patient presents with    Post-op Problem     pt states she had pacemaker replaced today and is having alot of pain to L arm and to back       Review of patient's allergies indicates:   Allergen Reactions    Cephalosporins Hives    Metaxalone Itching    Penicillins      Other reaction(s): Unknown      Pregabalin      Other reaction(s): "Bad feeling"      Sulfa (sulfonamide antibiotics) Itching        HPI   HPI    6/20/2018, 7:54 PM   History obtained from the patient      History of Present Illness: Jennifer Mathews is a 85 y.o. female patient with PMHx of AFIB, cardiac arrest, HTN, and stroke presents to the Emergency Department for post op- pain which onset gradually 2 hours ago. Symptoms are constant and moderate in severity. Pt reports that she made it back home from the hospital around 3 am after her pacemaker implantation surgery. Pt reports that she took a nap today around 2pm, and woke up around 5pm with pain in her LUE. Pt denies any CP. Pt reports that she only feels pain in her L arm and shoulder. Pt describes sxs as a sharp pain. Pain is exacerbated with movement. No associated sxs reported. Patient denies any nausea, vomiting, palpitations, CP, SOB, leg swelling, HA, dizziness, fever, activity change, and all other sxs at this time. Prior tx includes aspirin and tylenol. No further complaints or concerns at this time.     Arrival mode: Personal vehicle    PCP: Desirae Bello MD       Past Medical History:  Past Medical History:   Diagnosis Date    Acute coronary syndrome     Anemia     Anticoagulated on Coumadin 7/13/2015    Arthritis     Atrial " fibrillation     Basal cell carcinoma 10/2015    left neck    Cardiac arrest     Cardiac resynchronization therapy defibrillator (CRT-D) in place 07/13/2015    Pt denies, states it does not shock me    Chronic combined systolic and diastolic congestive heart failure     CKD (chronic kidney disease) stage 3, GFR 30-59 ml/min     Dyslipidemia 1/30/2014    Hyperlipidemia     Hypertension     Ischemic cardiomyopathy 01/30/2014    Macular hole of left eye     Old    Macular hole of left eye     LEV (obstructive sleep apnea) 9/30/2013    Recurrent UTI     Refractive error     Stroke        Past Surgical History:  Past Surgical History:   Procedure Laterality Date    APPENDECTOMY      CARDIAC CATHETERIZATION      CARDIAC PACEMAKER PLACEMENT  2014    CARDIAC VALVE SURGERY      CATARACT EXTRACTION      OU    CHOLECYSTECTOMY      COLONOSCOPY      MITRAL VALVE REPLACEMENT  2014    MITRAL VALVE SURGERY      x3         Family History:  Family History   Problem Relation Age of Onset    Coronary artery disease Mother         mi    Coronary artery disease Father     Diabetes Brother     Cancer Brother     Melanoma Neg Hx     Psoriasis Neg Hx     Lupus Neg Hx     Eczema Neg Hx        Social History:  Social History     Social History Main Topics    Smoking status: Never Smoker    Smokeless tobacco: Never Used    Alcohol use No    Drug use: No    Sexual activity: Unknown       ROS   Review of Systems   Constitutional: Negative for activity change, appetite change, fatigue and fever.   HENT: Negative for sore throat.    Respiratory: Negative for shortness of breath.    Cardiovascular: Negative for chest pain.   Gastrointestinal: Negative for nausea and vomiting.   Genitourinary: Negative for dysuria.   Musculoskeletal: Positive for arthralgias. Negative for back pain, gait problem, joint swelling, myalgias and neck pain.   Skin: Negative for rash.   Neurological: Negative for dizziness, speech  "difficulty, weakness, numbness and headaches.   Hematological: Does not bruise/bleed easily.       Physical Exam      Initial Vitals [06/20/18 1915]   BP Pulse Resp Temp SpO2   (!) 143/59 70 20 99 °F (37.2 °C) (!) 92 %      MAP       --          Physical Exam  Nursing Notes and Vital Signs Reviewed.  Constitutional: Patient is in no apparent distress. Well-developed and well-nourished.  Head: Atraumatic. Normocephalic.  Eyes: PERRL. EOM intact. Conjunctivae are not pale. No scleral icterus.  ENT: Mucous membranes are moist. Oropharynx is clear and symmetric.    Neck: Supple. Full ROM. No lymphadenopathy.  Cardiovascular: Regular rate. Regular rhythm. No murmurs, rubs, or gallops. Distal pulses are 2+ and symmetri.  Pulmonary/Chest: No respiratory distress. Clear to auscultation bilaterall. No wheezing or rales. Pacemaker site with surrounding bruising. The entire left chest wall and axilla region is swollen and very tender.  No active bleeding.   No active drainage or signs of infection. Neurovascularly intact.   Abdominal: Soft and non-distended.  There is no tendernes.  No rebound, guarding, or rigidity. Good bowel sound.  Genitourinary: No CVA tenderness  Musculoskeletal: Moves all extremities. No obvious deformities. No edema. No calf tenderness.  Skin: Warm and dry.  Neurological:  Alert, awake, and appropriate.  Normal speech.  No acute focal neurological deficits are appreciated.  Psychiatric: Normal affect. Good eye contact. Appropriate in content.    ED Course    Procedures  ED Vital Signs:  Vitals:    06/20/18 1915 06/21/18 0147 06/21/18 0217 06/21/18 0247   BP: (!) 143/59 133/60 (!) 148/66 133/60   Pulse: 70 70 69 69   Resp: 20 15 20 20   Temp: 99 °F (37.2 °C)      TempSrc: Oral      SpO2: (!) 92% 100% 100% 100%   Weight: 54.4 kg (119 lb 14.9 oz)      Height: 5' 2" (1.575 m)          Abnormal Lab Results:  Labs Reviewed   CBC W/ AUTO DIFFERENTIAL - Abnormal; Notable for the following:        Result Value "    RBC 3.14 (*)     Hemoglobin 7.6 (*)     Hematocrit 26.4 (*)     MCH 24.2 (*)     MCHC 28.8 (*)     RDW 19.8 (*)     Gran # (ANC) 8.9 (*)     Gran% 80.9 (*)     Lymph% 11.4 (*)     All other components within normal limits   COMPREHENSIVE METABOLIC PANEL - Abnormal; Notable for the following:     Glucose 122 (*)     BUN, Bld 35 (*)     Creatinine 1.6 (*)     Calcium 8.3 (*)     Alkaline Phosphatase 54 (*)     eGFR if  34 (*)     eGFR if non  29 (*)     All other components within normal limits   B-TYPE NATRIURETIC PEPTIDE - Abnormal; Notable for the following:     BNP 1,882 (*)     All other components within normal limits   PROTIME-INR - Abnormal; Notable for the following:     Prothrombin Time 14.0 (*)     INR 1.4 (*)     All other components within normal limits   CULTURE, BLOOD   CULTURE, BLOOD   TROPONIN I   PROTIME-INR   TROPONIN I   PHOSPHORUS   CBC W/ AUTO DIFFERENTIAL   MAGNESIUM   RENAL FUNCTION PANEL        All Lab Results:  Results for orders placed or performed during the hospital encounter of 06/20/18   CBC auto differential   Result Value Ref Range    WBC 10.99 3.90 - 12.70 K/uL    RBC 3.14 (L) 4.00 - 5.40 M/uL    Hemoglobin 7.6 (L) 12.0 - 16.0 g/dL    Hematocrit 26.4 (L) 37.0 - 48.5 %    MCV 84 82 - 98 fL    MCH 24.2 (L) 27.0 - 31.0 pg    MCHC 28.8 (L) 32.0 - 36.0 g/dL    RDW 19.8 (H) 11.5 - 14.5 %    Platelets 227 150 - 350 K/uL    MPV 10.1 9.2 - 12.9 fL    Gran # (ANC) 8.9 (H) 1.8 - 7.7 K/uL    Lymph # 1.3 1.0 - 4.8 K/uL    Mono # 0.7 0.3 - 1.0 K/uL    Eos # 0.1 0.0 - 0.5 K/uL    Baso # 0.04 0.00 - 0.20 K/uL    Gran% 80.9 (H) 38.0 - 73.0 %    Lymph% 11.4 (L) 18.0 - 48.0 %    Mono% 6.5 4.0 - 15.0 %    Eosinophil% 0.8 0.0 - 8.0 %    Basophil% 0.4 0.0 - 1.9 %    Differential Method Automated    Comprehensive metabolic panel   Result Value Ref Range    Sodium 141 136 - 145 mmol/L    Potassium 4.8 3.5 - 5.1 mmol/L    Chloride 107 95 - 110 mmol/L    CO2 25 23 - 29 mmol/L     Glucose 122 (H) 70 - 110 mg/dL    BUN, Bld 35 (H) 8 - 23 mg/dL    Creatinine 1.6 (H) 0.5 - 1.4 mg/dL    Calcium 8.3 (L) 8.7 - 10.5 mg/dL    Total Protein 6.1 6.0 - 8.4 g/dL    Albumin 3.6 3.5 - 5.2 g/dL    Total Bilirubin 0.7 0.1 - 1.0 mg/dL    Alkaline Phosphatase 54 (L) 55 - 135 U/L    AST 23 10 - 40 U/L    ALT 18 10 - 44 U/L    Anion Gap 9 8 - 16 mmol/L    eGFR if African American 34 (A) >60 mL/min/1.73 m^2    eGFR if non African American 29 (A) >60 mL/min/1.73 m^2   Troponin I #1   Result Value Ref Range    Troponin I 0.025 0.000 - 0.026 ng/mL   B-Type natriuretic peptide (BNP)   Result Value Ref Range    BNP 1,882 (H) 0 - 99 pg/mL   Protime-INR   Result Value Ref Range    Prothrombin Time 14.0 (H) 9.0 - 12.5 sec    INR 1.4 (H) 0.8 - 1.2   Troponin I #2   Result Value Ref Range    Troponin I 0.026 0.000 - 0.026 ng/mL         Imaging Results:  Imaging Results          X-Ray Chest AP Portable (Final result)  Result time 06/20/18 20:50:53    Final result by Madi Be MD (06/20/18 20:50:53)                 Impression:      Possible left lower lobe infiltrate versus atelectasis and pleural effusion.  Cardiomegaly.  Postoperative changes.  Pacemaker.  Tiny right pleural effusion.      Electronically signed by: Madi Be MD  Date:    06/20/2018  Time:    20:50             Narrative:    EXAMINATION:  XR CHEST AP PORTABLE    CLINICAL HISTORY:  Chest pain, Chest Pain;    COMPARISON:  05/04/2018    FINDINGS:  Sternal wires are present.  Left pacemaker.    Cardiomegaly with aortic atherosclerosis.    Right lung is clear with small right pleural effusion.    Volume loss with consolidation at the left lung base is noted.  Possible left lower lobe infiltrate.                                        The EKG was ordered, reviewed, and independently interpreted by the ED provider.  Interpretation time: 19:29  Rate: 70 BPM  Rhythm: atrial paced rhythm  Interpretation: Non specific intraventricular block. T  wave abnormality. No STEMI.        The Emergency Provider reviewed the vital signs and test results, which are outlined above.    ED Discussion     8:00 PM: Dr. Rios transfers care of pt to Dr. Murillo, pending lab/imaging results.    9:56 PM: Re-evaluated pt. Pt is in no acute distress. Pt's L shoulder/arm pain is still present.  D/w pt all pertinent results. D/w pt any concerns expressed at this time. Answered all questions. Pt expresses understanding at this time.    10:03 PM: Dr. Murillo discussed the pt's case with Dr. Houser (cardiology) who states that the new T wave inversion is nonspecific. He does recommend admission for pain control and low H&H.    10:08 PM: Discussed case with Vidhya Johnson NP, (Hospital Medicine). Dr. Suarez agrees with current care and management of pt and accepts admission.   Admitting Service: Hospital medicine   Admitting Physician: Dr. Suarez  Admit to: Med-Tele/obs    10:10 PM: Re-evaluated pt. I have discussed test results, shared treatment plan, and the need for admission with patient and family at bedside. Explained need for admission due to serial H&H, hypoxia, possible infiltrate vs atelectasis, pain management, and management of swelling. Explained that O2 sats could possibly drop if pt is discharged. Pt and family express understanding at this time and agree with all information. All questions answered. Pt and family have no further questions or concerns at this time. Pt is ready for admit.        ED Medication(s):  Medications   gabapentin capsule 100 mg (not administered)   furosemide tablet 40 mg (not administered)   levothyroxine tablet 50 mcg (not administered)   allopurinol tablet 200 mg (not administered)   colchicine capsule/tablet 0.6 mg (not administered)   carvedilol tablet 25 mg (not administered)   digoxin tablet 125 mcg (not administered)   aspirin EC tablet 81 mg (not administered)   sodium chloride 0.9% flush 5 mL (not administered)   glucose chewable tablet 16 g  (not administered)   glucose chewable tablet 24 g (not administered)   dextrose 50% injection 12.5 g (not administered)   dextrose 50% injection 25 g (not administered)   glucagon (human recombinant) injection 1 mg (not administered)   acetaminophen tablet 650 mg (not administered)   HYDROcodone-acetaminophen 5-325 mg per tablet 1 tablet (not administered)   HYDROcodone-acetaminophen  mg per tablet 1 tablet (not administered)   ondansetron injection 4 mg (not administered)   promethazine (PHENERGAN) 6.25 mg in dextrose 5 % 50 mL IVPB (not administered)   amiodarone tablet 200 mg (not administered)   moxifloxacin tablet 400 mg (not administered)   sacubitril-valsartan 49-51 mg per tablet 1 tablet (not administered)   doxycycline tablet 100 mg (not administered)   aspirin chewable tablet 162 mg (162 mg Oral Given 6/20/18 2023)   ondansetron injection 4 mg (4 mg Intravenous Given 6/20/18 2214)   morphine injection 2 mg (2 mg Intravenous Given 6/20/18 2214)   morphine injection 2 mg (2 mg Intravenous Given 6/21/18 0057)       New Prescriptions    No medications on file             Medical Decision Making    Medical Decision Making:   Clinical Tests:   Lab Tests: Ordered and Reviewed  Radiological Study: Ordered and Reviewed  Medical Tests: Ordered and Reviewed           Scribe Attestation:   Scribe #1: I performed the above scribed service and the documentation accurately describes the services I performed. I attest to the accuracy of the note.    Attending:   Physician Attestation Statement for Scribe #1: I, Daniel Rios Jr., MD, personally performed the services described in this documentation, as scribed by Mikala Bush, in my presence, and it is both accurate and complete.       Scribe Attestation:   Scribe #2: I performed the above scribed service and the documentation accurately describes the services I performed. I attest to the accuracy of the note.    Attending Attestation:           Physician  Attestation for Scribe:    Physician Attestation Statement for Scribe #2: I, Tomasa JUÁREZ Do, MD, reviewed documentation, as scribed by Kevin Hi in my presence, and it is both accurate and complete. I also acknowledge and confirm the content of the note done by Scribe #1.          Clinical Impression       ICD-10-CM ICD-9-CM   1. Post-op pain G89.18 338.18   2. Chest pain R07.9 786.50   3. Pulmonary infiltrate in left lung on CXR R91.8 793.19   4. Anemia, unspecified type D64.9 285.9       Disposition:   Disposition: Placed in Observation  Condition: Fair           Tomasa Johnson Do, MD  06/21/18 0315

## 2018-06-21 NOTE — ASSESSMENT & PLAN NOTE
- hb 7.6  - no active bleeds    - monitor H/H  - transfuse PRBC prn Hb<7.0  - continue ferrous sulfate 325 mg daily

## 2018-06-21 NOTE — PLAN OF CARE
Problem: Patient Care Overview  Goal: Plan of Care Review  Outcome: Ongoing (interventions implemented as appropriate)  Plan of care reviewed with patient. Patient stable. Aaox3. Patient free from falls fall precautions in place. Assist x 1 to the bathroom. Call be;; and belongings with in reach reminded to call for assistance. Turn independently in bed. 1 unit of blood received on this shift.Pacemaker in place. Paced on monitor. Sling in place with ice pack. PRN pain medication controlled pain. Will cont to monitor

## 2018-06-21 NOTE — HPI
85 female  h/o Afib, cardiac arrest s/p CRT-D, hypothyroidism, and HTN, Anemia, HLP, CKD, Ischemic cardiomyopathy with EF 25%, previous CVA and LEV. Patient is POD generator exchange on yesterday for battery being at end of life. Presented to the ED last night with pain to device pocket. CXR showed possible left lower lobe infiltrate versus atelectasis and pleural effusion with no evidence of pneumothorax. Also noted to have small pleural effusion. Patient BNP 1,880. Severe anemia on morning labs with plans in place to transfuse. Has been holding coumadin for generator exchanges.  Complains of lower extremity edema.

## 2018-06-21 NOTE — ASSESSMENT & PLAN NOTE
- incidental finding on CXR  - moxifloxacin 400mg IV x 1 given in ER    - continue moxifloxacin 400mg PO daily

## 2018-06-21 NOTE — SUBJECTIVE & OBJECTIVE
"Past Medical History:   Diagnosis Date    Acute coronary syndrome     Anemia     Anticoagulated on Coumadin 7/13/2015    Arthritis     Atrial fibrillation     Basal cell carcinoma 10/2015    left neck    Cardiac arrest     Cardiac resynchronization therapy defibrillator (CRT-D) in place 07/13/2015    Pt denies, states it does not shock me    Chronic combined systolic and diastolic congestive heart failure     CKD (chronic kidney disease) stage 3, GFR 30-59 ml/min     Dyslipidemia 1/30/2014    Hyperlipidemia     Hypertension     Ischemic cardiomyopathy 01/30/2014    Macular hole of left eye     Old    Macular hole of left eye     LEV (obstructive sleep apnea) 9/30/2013    Recurrent UTI     Refractive error     Stroke        Past Surgical History:   Procedure Laterality Date    APPENDECTOMY      CARDIAC CATHETERIZATION      CARDIAC PACEMAKER PLACEMENT  2014    CARDIAC VALVE SURGERY      CATARACT EXTRACTION      OU    CHOLECYSTECTOMY      COLONOSCOPY      MITRAL VALVE REPLACEMENT  2014    MITRAL VALVE SURGERY      x3       Review of patient's allergies indicates:   Allergen Reactions    Cephalosporins Hives    Metaxalone Itching    Penicillins      Other reaction(s): Unknown      Pregabalin      Other reaction(s): "Bad feeling"      Sulfa (sulfonamide antibiotics) Itching       Current Facility-Administered Medications on File Prior to Encounter   Medication    [COMPLETED] vancomycin 1 g in dextrose 5 % 250 mL    [DISCONTINUED] nozaseptin (NOZIN) nasal      Current Outpatient Prescriptions on File Prior to Encounter   Medication Sig    acetaminophen (TYLENOL EXTRA STRENGTH) 325 MG tablet Take 2 tablets (650 mg total) by mouth every 8 (eight) hours. (Patient taking differently: Take 650 mg by mouth 3 (three) times daily as needed. )    allopurinol (ZYLOPRIM) 100 MG tablet Take 2 tablets (200 mg total) by mouth once daily.    amiodarone (PACERONE) 200 MG Tab Take 1 tablet " (200 mg total) by mouth once daily.    aspirin (ECOTRIN) 81 MG EC tablet Take 81 mg by mouth once daily.    calcium carbonate (OS-SHERRY) 600 mg calcium (1,500 mg) Tab Take 600 mg by mouth once.    carvedilol (COREG) 25 MG tablet Take 1 tablet (25 mg total) by mouth 2 (two) times daily with meals.    colchicine 0.6 mg Cap Take 1 capsule (0.6 mg total) by mouth once daily.    cranberry 1,000 mg Cap Take 1 capsule by mouth Daily.    digoxin (LANOXIN) 125 mcg tablet Take 1 tablet (125 mcg total) by mouth once daily. TAKE 1 TABLET (0.125 MG TOTAL) BY MOUTH ONCE DAILY.    doxycycline (VIBRAMYCIN) 100 MG Cap Take 1 capsule (100 mg total) by mouth every 12 (twelve) hours. Take two capsules as a first dose tonight at 8 pm then 1 caps Q 12 hrs until done    furosemide (LASIX) 40 MG tablet Take 1 tablet (40 mg total) by mouth daily as needed. (Patient taking differently: Take 40 mg by mouth daily as needed. Pt states she's taking twice a day)    gabapentin (NEURONTIN) 100 MG capsule TAKE ONE CAPSULE BY MOUTH TWO TIMES DAILY    Lactobac 40-Bifido 3-S.thermop (PROBIOTIC) 100 billion cell Cap Take 1 capsule by mouth once daily.    levothyroxine (SYNTHROID) 50 MCG tablet Take 1 tablet (50 mcg total) by mouth once daily.    MULTIVITAMIN ORAL Take 1 tablet by mouth Daily.    sacubitril-valsartan (ENTRESTO) 49-51 mg per tablet Take 1 tablet by mouth 2 (two) times daily.    warfarin (COUMADIN) 2.5 MG tablet TAKE 1/2 TABLET ON MONDAY AND WEDNESDAY AND 1 TABLET ALL OTHER DAYS. DISCONTINUE 5MG TABLET. (Patient taking differently: Take by mouth every evening 1 1/2 tablet on Tues, Thurs and Sat, then 1 tablet on all other days as directed by the [facility].)     Family History     Problem Relation (Age of Onset)    Cancer Brother    Coronary artery disease Mother, Father    Diabetes Brother        Social History Main Topics    Smoking status: Never Smoker    Smokeless tobacco: Never Used    Alcohol use No    Drug use:  No    Sexual activity: Not on file     Review of Systems   Constitution: Positive for malaise/fatigue. Negative for diaphoresis, weakness, weight gain and weight loss.   HENT: Negative for congestion and nosebleeds.    Cardiovascular: Positive for leg swelling. Negative for chest pain, claudication, cyanosis, dyspnea on exertion, irregular heartbeat, near-syncope, orthopnea, palpitations, paroxysmal nocturnal dyspnea and syncope.        Left chest wall pain. Pain isolated to device pocket      Respiratory: Positive for shortness of breath. Negative for cough, hemoptysis, sleep disturbances due to breathing, snoring, sputum production and wheezing.    Hematologic/Lymphatic: Negative for bleeding problem. Does not bruise/bleed easily.   Skin: Negative for rash.   Musculoskeletal: Negative for arthritis, back pain, falls, joint pain, muscle cramps and muscle weakness.   Gastrointestinal: Negative for abdominal pain, constipation, diarrhea, heartburn, hematemesis, hematochezia, melena and nausea.   Genitourinary: Negative for dysuria, hematuria and nocturia.   Neurological: Negative for excessive daytime sleepiness, dizziness, headaches, light-headedness, loss of balance, numbness and vertigo.     Objective:     Vital Signs (Most Recent):  Temp: 98.7 °F (37.1 °C) (06/21/18 0749)  Pulse: 72 (06/21/18 0901)  Resp: 20 (06/21/18 0749)  BP: (!) 168/74 (06/21/18 0749)  SpO2: 99 % (06/21/18 0749) Vital Signs (24h Range):  Temp:  [98.7 °F (37.1 °C)-99 °F (37.2 °C)] 98.7 °F (37.1 °C)  Pulse:  [69-77] 72  Resp:  [14-30] 20  SpO2:  [92 %-100 %] 99 %  BP: (132-168)/(49-74) 168/74     Weight: 54.4 kg (119 lb 14.9 oz)  Body mass index is 21.94 kg/m².    SpO2: 99 %  O2 Device (Oxygen Therapy): nasal cannula    No intake or output data in the 24 hours ending 06/21/18 0949    Lines/Drains/Airways     Peripheral Intravenous Line                 Peripheral IV - Single Lumen 06/20/18 2027 Right Antecubital less than 1 day                 Physical Exam   Constitutional: She is oriented to person, place, and time. She appears well-developed and well-nourished.   Neck: Neck supple. No JVD present.   Cardiovascular: Normal rate, regular rhythm, normal heart sounds and normal pulses.  Exam reveals no friction rub.    No murmur heard.  Pulmonary/Chest: Effort normal and breath sounds normal. No respiratory distress. She has no wheezes. She has no rales.   Left device pocket with mild erythema and ecchymosis.    Abdominal: Soft. Bowel sounds are normal. She exhibits no distension.   Musculoskeletal: She exhibits edema ( +1 BLE ). She exhibits no tenderness.   Neurological: She is alert and oriented to person, place, and time.   Skin: Skin is warm and dry. No rash noted.   Psychiatric: She has a normal mood and affect. Her behavior is normal.   Nursing note and vitals reviewed.      Significant Labs:   All pertinent lab results from the last 24 hours have been reviewed. and   Recent Lab Results       06/21/18  0658 06/21/18  0648 06/21/18  0545 06/20/18  2249 06/20/18  2239      Unit Blood Type Code  5100[P]        Unit Expiration  [id number][P]        Unit Blood Type  O POS[P]        Albumin   3.1(L)       Alkaline Phosphatase          ALT          Anion Gap   6(L)       AST          Baso #   0.04       Basophil%   0.5       Total Bilirubin          Blood Culture, Routine    No Growth to date[P]      BNP          BUN, Bld   33(H)       Calcium   7.9(L)       Chloride   109       CO2   27       CODING SYSTEM  [id number][P]        Creatinine   1.2       Differential Method   Automated       DISPENSE STATUS  CROSSMATCHED[P]        eGFR if    48(A)       eGFR if non    41  Comment:  Calculation used to obtain the estimated glomerular filtration  rate (eGFR) is the CKD-EPI equation.   (A)       Eos #   0.2       Eosinophil%   2.4       Glucose   93       Gran # (ANC)   5.4       Gran%   71.5       Group & Rh O POS          Hematocrit   23.1(L)       Hemoglobin   6.6(L)       INDIRECT QUITA NEG         Coumadin Monitoring INR          Lymph #   1.3       Lymph%   16.9(L)       Magnesium   2.3       MCH   24.0(L)       MCHC   28.6(L)       MCV   84       Mono #   0.7       Mono%   9.0       MPV   9.6       Phosphorus   3.3          3.3       Platelets   182       Potassium   4.5       PRODUCT CODE  T6425M02[P]        Total Protein          Protime          RBC   2.75(L)       RDW   19.7(H)       Sodium   142       Troponin I     0.026  Comment:  The reference interval for Troponin I represents the 99th percentile   cutoff   for our facility and is consistent with 3rd generation assay   performance.       UNIT NUMBER  [id number][P]        WBC   7.52                   06/20/18  2238 06/20/18  1953 06/20/18  1950 06/20/18  0957      Unit Blood Type Code         Unit Expiration         Unit Blood Type         Albumin  3.6       Alkaline Phosphatase  54(L)       ALT  18       Anion Gap  9       AST  23       Baso #  0.04       Basophil%  0.4       Total Bilirubin  0.7  Comment:  For infants and newborns, interpretation of results should be based  on gestational age, weight and in agreement with clinical  observations.  Premature Infant recommended reference ranges:  Up to 24 hours.............<8.0 mg/dL  Up to 48 hours............<12.0 mg/dL  3-5 days..................<15.0 mg/dL  6-29 days.................<15.0 mg/dL         Blood Culture, Routine No Growth to date[P]        BNP  1,882  Comment:  Values of less than 100 pg/ml are consistent with non-CHF populations.(H)       BUN, Bld  35(H)       Calcium  8.3(L)       Chloride  107       CO2  25       CODING SYSTEM         Creatinine  1.6(H)       Differential Method  Automated       DISPENSE STATUS         eGFR if   34(A)       eGFR if non   29  Comment:  Calculation used to obtain the estimated glomerular filtration  rate (eGFR) is the CKD-EPI equation.    (A)       Eos #  0.1       Eosinophil%  0.8       Glucose  122(H)       Gran # (ANC)  8.9(H)       Gran%  80.9(H)       Group & Rh         Hematocrit  26.4(L)       Hemoglobin  7.6(L)       INDIRECT QUITA         Coumadin Monitoring INR   1.4  Comment:  Coumadin Therapy:  2.0 - 3.0 for INR for all indicators except mechanical heart valves  and antiphospholipid syndromes which should use 2.5 - 3.5.  (H) 1.4  Comment:  Coumadin Therapy:  2.0 - 3.0 for INR for all indicators except mechanical heart valves  and antiphospholipid syndromes which should use 2.5 - 3.5.  (H)     Lymph #  1.3       Lymph%  11.4(L)       Magnesium         MCH  24.2(L)       MCHC  28.8(L)       MCV  84       Mono #  0.7       Mono%  6.5       MPV  10.1       Phosphorus         Platelets  227       Potassium  4.8       PRODUCT CODE         Total Protein  6.1       Protime   14.0(H) 14.8(H)     RBC  3.14(L)       RDW  19.8(H)       Sodium  141       Troponin I  0.025  Comment:  The reference interval for Troponin I represents the 99th percentile   cutoff   for our facility and is consistent with 3rd generation assay   performance.         UNIT NUMBER         WBC  10.99             Significant Imaging: Echocardiogram:   2D echo with color flow doppler:   Results for orders placed or performed in visit on 03/01/18   2D echo with color flow doppler   Result Value Ref Range    EF 25 (A) 55 - 65    Mitral Valve Regurgitation TRIVIAL     Aortic Valve Regurgitation MILD     Est. PA Systolic Pressure 62.06 (A)     Tricuspid Valve Regurgitation MODERATE (A)       and X-Ray: CXR: X-Ray Chest 1 View (CXR):     Impression       Possible left lower lobe infiltrate versus atelectasis and pleural effusion.  Cardiomegaly.  Postoperative changes.  Pacemaker.  Tiny right pleural effusion.      Electronically signed by: Madi Be MD  Date: 06/20/2018  Time: 20:50

## 2018-06-21 NOTE — HPI
85F h/o Afib, cardiac arrest s/p CRT-D, hypothyroidism, and HTN presents with pain at left upper chest, shoulder and arm in the same area as CRT-D replacement surgery.   Currently patient is POD #1.  She reports waking up with pain this afternoon.  Pain is sharp and positional.  No radiation.  Has taken tylenol and ASA without improvement of pain. No exacerbating or alleviating factors. Patient denies any nausea, vomiting, palpitations, CP, SOB, leg swelling, HA, dizziness, fever, activity change, and all other sxs at this time.  In ER, VSS, Labs show Hb 7.6.  Was 8.3 prior to procedure.  Minimal blood loss from procedure. CXR show post op changes, possible LLL infiltrate, consider consolidation vs atelectasis, and also pleural effusion.  Dr. Houser (cardiology) called in ER and recommends admission.  Hospital medicine called for admission.

## 2018-06-21 NOTE — HOSPITAL COURSE
Patient was admitted for post-op problem. S/p CRT-D exchange surgery due to end of battery life. Initial Hb 7.6. Was 8.3 prior to procedure.  Minimal blood loss from procedure. CXR show post op changes, possible LLL infiltrate, consider consolidation vs atelectasis, and also pleural effusion. Cardiology consult. Continue coreg, amiodarone. On 6/21/18 H/H 6.6/23.1. Patient transfused 1 unit of PRBCs. No active bleeding noted. INR 1.2. Spoke with Dr. Ozuna regarding anemia and anticoagulation assisted, states ok to resume coumadin and follow-up with Dr. Arreguin as scheduled. Patient to keep scheduled appointment for iron transfusion tomorrow. Patient to follow-up with PCP in 3 days for hospital follow-up. Patient also to follow-up with cardiology outpatient next week. Patient seen and examined on the date of discharge and found suitable for discharge.

## 2018-06-21 NOTE — DISCHARGE SUMMARY
[facility] Hospital Medicine  Discharge Summary      Patient Name: Jennifer Mathews  MRN: [id number]  Admission Date: 6/20/2018  Hospital Length of Stay: 0 days  Discharge Date and Time:  06/21/2018 4:09 PM  Attending Physician: No att. providers found   Discharging Provider: Chantal Esparza NP  Primary Care Provider: Desirae Bello MD      HPI:   85F h/o Afib, cardiac arrest s/p CRT-D, hypothyroidism, and HTN presents with pain at left upper chest, shoulder and arm in the same area as CRT-D replacement surgery.   Currently patient is POD #1.  She reports waking up with pain this afternoon.  Pain is sharp and positional.  No radiation.  Has taken tylenol and ASA without improvement of pain. No exacerbating or alleviating factors. Patient denies any nausea, vomiting, palpitations, CP, SOB, leg swelling, HA, dizziness, fever, activity change, and all other sxs at this time.  In ER, VSS, Labs show Hb 7.6.  Was 8.3 prior to procedure.  Minimal blood loss from procedure. CXR show post op changes, possible LLL infiltrate, consider consolidation vs atelectasis, and also pleural effusion.  Dr. Houser (cardiology) called in ER and recommends admission.  Hospital medicine called for admission.      * No surgery found *      Hospital Course:   Patient was admitted for post-op problem. S/p CRT-D exchange surgery due to end of battery life. Initial Hb 7.6. Was 8.3 prior to procedure.  Minimal blood loss from procedure. CXR show post op changes, possible LLL infiltrate, consider consolidation vs atelectasis, and also pleural effusion. Cardiology consult. Continue coreg, amiodarone. On 6/21/18 H/H 6.6/23.1. Patient transfused 1 unit of PRBCs. No active bleeding noted. INR 1.2. Spoke with Dr. Ozuna regarding anemia and anticoagulation assisted, states ok to resume coumadin and follow-up with Dr. Arreguin as scheduled. Patient to keep scheduled appointment for iron transfusion tomorrow. Patient to follow-up  with PCP in 3 days for hospital follow-up. Patient also to follow-up with cardiology outpatient next week. Patient seen and examined on the date of discharge and found suitable for discharge.      Consults:   Consults         Status Ordering Provider     Inpatient consult to Cardiology  Once     Provider:  Marcos Hatfield MD    Completed MACKENZIE DENTON     Inpatient consult to Hematology/Oncology  Once     Provider:  Maciej Coon MD    Acknowledged GAYATHRI MARTINEZ     Pharmacy to dose Warfarin consult  Once     Provider:  (Not yet assigned)    Acknowledged MACKENZIE DENTON          * Post-op pain    - s/p CRT-D battery change  - pain at procedure site  - admit per cardiology    - start tylenol prn mild pain, norco 5mg prn moderate pain, norco 10mg prn severe pain  - consult cardiology in AM              Final Active Diagnoses:    Diagnosis Date Noted POA    PRINCIPAL PROBLEM:  Post-op pain [G89.18] 06/20/2018 Yes    Left lower lobe pneumonia [J18.1] 06/20/2018 Yes    Chronic renal failure, stage 4 (severe) [N18.4] 05/02/2018 Yes    Anemia associated with stage 4 chronic renal failure [N18.4, D63.1] 05/02/2018 Yes    Acquired hypothyroidism [E03.9] 02/28/2018 Yes    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    Specialty:  Family Medicine  Why:  nida follow-up of post op pain   Contact information:  [address] [place] [address] [address] Nyla MORALEZ [address]  [phone]             El Arreguin MD.    Specialty:  Hematology and Oncology  Why:  keep scheduled appointment   Contact information:  [address]  [place] [id number] [phone]             RAUL Gupta In 5 days.    Specialty:  Cardiology  Why:  hospital follow-up pt. needs BMP and BNP prior to appointment   Contact information:  [address]  [place] [address]  [phone]                 Patient Instructions:     Activity as tolerated     Notify your health care provider if you experience any of the following:  increased confusion or weakness     Notify your health care provider if you experience any of the following:  persistent dizziness, light-headedness, or visual disturbances     Notify your health care provider if you experience any of the following:  difficulty breathing or increased cough     Notify your health care provider if you experience any of the following:  severe uncontrolled pain         Significant Diagnostic Studies: Labs:   BMP:   Recent Labs  Lab 06/20/18 1953 06/21/18  0545   * 93    142   K 4.8 4.5    109   CO2 25 27   BUN 35* 33*   CREATININE 1.6* 1.2   CALCIUM 8.3* 7.9*   MG  --  2.3   , CMP   Recent Labs  Lab 06/20/18 1953 06/21/18  0545    142   K 4.8 4.5    109   CO2 25 27   * 93   BUN 35* 33*   CREATININE 1.6* 1.2   CALCIUM 8.3* 7.9*   PROT 6.1  --    ALBUMIN 3.6 3.1*   BILITOT 0.7  --    ALKPHOS 54*  --    AST 23  --    ALT 18  --    ANIONGAP 9 6*   ESTGFRAFRICA 34* 48*   EGFRNONAA 29* 41*   , CBC   Recent Labs  Lab 06/20/18 1953 06/21/18  0545   WBC 10.99 7.52   HGB 7.6* 6.6*   HCT 26.4* 23.1*    182    and All labs within the past 24 hours have been reviewed    Pending Diagnostic Studies:     None         Medications:  Reconciled Home Medications:      Medication List      START taking these medications    HYDROcodone-acetaminophen 5-325 mg per tablet  Commonly known as:  NORCO  Take 1 tablet by mouth every 6 (six) hours as needed.     moxifloxacin 400 mg tablet  Commonly known as:  AVELOX  Take 1 tablet (400 mg total) by mouth once daily. for 5  days  Start taking on:  6/22/2018        CHANGE how you take these medications    acetaminophen 325 MG tablet  Commonly known as:  TYLENOL EXTRA STRENGTH  Take 2 tablets (650 mg total) by mouth every 8 (eight) hours.  What changed:  · when to take this  · reasons to take this     furosemide 40 MG tablet  Commonly known as:  LASIX  Take 1 tablet (40 mg total) by mouth 2 (two) times daily.  What changed:  · when to take this  · reasons to take this     warfarin 2.5 MG tablet  Commonly known as:  COUMADIN  TAKE 1/2 TABLET ON MONDAY AND WEDNESDAY AND 1 TABLET ALL OTHER DAYS. DISCONTINUE 5MG TABLET.  What changed:  See the new instructions.        CONTINUE taking these medications    allopurinol 100 MG tablet  Commonly known as:  ZYLOPRIM  Take 2 tablets (200 mg total) by mouth once daily.     amiodarone 200 MG Tab  Commonly known as:  PACERONE  Take 1 tablet (200 mg total) by mouth once daily.     aspirin 81 MG EC tablet  Commonly known as:  ECOTRIN  Take 81 mg by mouth once daily.     calcium carbonate 600 mg calcium (1,500 mg) Tab  Commonly known as:  OS-SHERRY  Take 600 mg by mouth once.     carvedilol 25 MG tablet  Commonly known as:  COREG  Take 1 tablet (25 mg total) by mouth 2 (two) times daily with meals.     colchicine 0.6 mg Cap  Take 1 capsule (0.6 mg total) by mouth once daily.     cranberry 1,000 mg Cap  Take 1 capsule by mouth Daily.     digoxin 125 mcg tablet  Commonly known as:  LANOXIN  Take 1 tablet (125 mcg total) by mouth once daily. TAKE 1 TABLET (0.125 MG TOTAL) BY MOUTH ONCE DAILY.     doxycycline 100 MG Cap  Commonly known as:  VIBRAMYCIN  Take 1 capsule (100 mg total) by mouth every 12 (twelve) hours. Take two capsules as a first dose tonight at 8 pm then 1 caps Q 12 hrs until done     gabapentin 100 MG capsule  Commonly known as:  NEURONTIN  TAKE ONE CAPSULE BY MOUTH TWO TIMES DAILY     Lactobac 40-Bifido 3-S.thermop 100 billion cell Cap  Commonly known as:  PROBIOTIC  Take 1 capsule by mouth once  daily.     levothyroxine 50 MCG tablet  Commonly known as:  SYNTHROID  Take 1 tablet (50 mcg total) by mouth once daily.     MULTIVITAMIN ORAL  Take 1 tablet by mouth Daily.     sacubitril-valsartan 49-51 mg per tablet  Commonly known as:  ENTRESTO  Take 1 tablet by mouth 2 (two) times daily.            Indwelling Lines/Drains at time of discharge:   Lines/Drains/Airways          No matching active lines, drains, or airways          Time spent on the discharge of patient: >45 minutes  Patient was seen and examined on the date of discharge and determined to be suitable for discharge.         Chantal Esparza NP  Department of Hospital Medicine  [facility]

## 2018-06-22 ENCOUNTER — INFUSION (OUTPATIENT)
Dept: INFUSION THERAPY | Facility: HOSPITAL | Age: 83
End: 2018-06-22
Attending: INTERNAL MEDICINE
Payer: MEDICARE

## 2018-06-22 ENCOUNTER — OFFICE VISIT (OUTPATIENT)
Dept: HEMATOLOGY/ONCOLOGY | Facility: CLINIC | Age: 83
End: 2018-06-22
Payer: MEDICARE

## 2018-06-22 VITALS
TEMPERATURE: 98 F | OXYGEN SATURATION: 91 % | SYSTOLIC BLOOD PRESSURE: 91 MMHG | HEIGHT: 62 IN | DIASTOLIC BLOOD PRESSURE: 40 MMHG | SYSTOLIC BLOOD PRESSURE: 90 MMHG | TEMPERATURE: 99 F | DIASTOLIC BLOOD PRESSURE: 39 MMHG | BODY MASS INDEX: 23.93 KG/M2 | HEART RATE: 75 BPM | HEART RATE: 69 BPM | RESPIRATION RATE: 18 BRPM | WEIGHT: 130.06 LBS | OXYGEN SATURATION: 93 %

## 2018-06-22 DIAGNOSIS — D50.0 IRON DEFICIENCY ANEMIA DUE TO CHRONIC BLOOD LOSS: Primary | ICD-10-CM

## 2018-06-22 DIAGNOSIS — I95.9 HYPOTENSION, UNSPECIFIED HYPOTENSION TYPE: ICD-10-CM

## 2018-06-22 DIAGNOSIS — D50.0 IRON DEFICIENCY ANEMIA DUE TO CHRONIC BLOOD LOSS: ICD-10-CM

## 2018-06-22 DIAGNOSIS — N18.4 ANEMIA ASSOCIATED WITH STAGE 4 CHRONIC RENAL FAILURE: ICD-10-CM

## 2018-06-22 DIAGNOSIS — R53.1 WEAKNESS: Primary | ICD-10-CM

## 2018-06-22 DIAGNOSIS — D63.1 ANEMIA ASSOCIATED WITH STAGE 4 CHRONIC RENAL FAILURE: ICD-10-CM

## 2018-06-22 DIAGNOSIS — R60.0 BILATERAL LOWER EXTREMITY EDEMA: ICD-10-CM

## 2018-06-22 DIAGNOSIS — R79.89 ELEVATED SERUM CREATININE: ICD-10-CM

## 2018-06-22 LAB
ALBUMIN SERPL BCP-MCNC: 3.1 G/DL
ALP SERPL-CCNC: 60 U/L
ALT SERPL W/O P-5'-P-CCNC: 24 U/L
ANION GAP SERPL CALC-SCNC: 8 MMOL/L
AST SERPL-CCNC: 27 U/L
BASOPHILS # BLD AUTO: 0.05 K/UL
BASOPHILS NFR BLD: 0.6 %
BILIRUB SERPL-MCNC: 0.6 MG/DL
BUN SERPL-MCNC: 48 MG/DL
CALCIUM SERPL-MCNC: 7.9 MG/DL
CHLORIDE SERPL-SCNC: 107 MMOL/L
CO2 SERPL-SCNC: 26 MMOL/L
CREAT SERPL-MCNC: 1.8 MG/DL
DIFFERENTIAL METHOD: ABNORMAL
EOSINOPHIL # BLD AUTO: 0 K/UL
EOSINOPHIL NFR BLD: 0.5 %
ERYTHROCYTE [DISTWIDTH] IN BLOOD BY AUTOMATED COUNT: 19.3 %
EST. GFR  (AFRICAN AMERICAN): 29 ML/MIN/1.73 M^2
EST. GFR  (NON AFRICAN AMERICAN): 25 ML/MIN/1.73 M^2
GLUCOSE SERPL-MCNC: 124 MG/DL
HCT VFR BLD AUTO: 23.5 %
HGB BLD-MCNC: 7.1 G/DL
LYMPHOCYTES # BLD AUTO: 2.1 K/UL
LYMPHOCYTES NFR BLD: 24.3 %
MCH RBC QN AUTO: 25.4 PG
MCHC RBC AUTO-ENTMCNC: 30.2 G/DL
MCV RBC AUTO: 84 FL
MONOCYTES # BLD AUTO: 1 K/UL
MONOCYTES NFR BLD: 11.5 %
NEUTROPHILS # BLD AUTO: 5.4 K/UL
NEUTROPHILS NFR BLD: 63.1 %
PLATELET # BLD AUTO: 188 K/UL
PMV BLD AUTO: 9.4 FL
POTASSIUM SERPL-SCNC: 4.4 MMOL/L
PROT SERPL-MCNC: 5.4 G/DL
RBC # BLD AUTO: 2.79 M/UL
SODIUM SERPL-SCNC: 141 MMOL/L
WBC # BLD AUTO: 8.51 K/UL

## 2018-06-22 PROCEDURE — 96361 HYDRATE IV INFUSION ADD-ON: CPT

## 2018-06-22 PROCEDURE — 25000003 PHARM REV CODE 250: Performed by: INTERNAL MEDICINE

## 2018-06-22 PROCEDURE — 99999 PR PBB SHADOW E&M-EST. PATIENT-LVL IV: CPT | Mod: PBBFAC,,, | Performed by: INTERNAL MEDICINE

## 2018-06-22 PROCEDURE — 63600175 PHARM REV CODE 636 W HCPCS: Mod: JG | Performed by: INTERNAL MEDICINE

## 2018-06-22 PROCEDURE — 85025 COMPLETE CBC W/AUTO DIFF WBC: CPT

## 2018-06-22 PROCEDURE — 99214 OFFICE O/P EST MOD 30 MIN: CPT | Mod: PBBFAC,25 | Performed by: INTERNAL MEDICINE

## 2018-06-22 PROCEDURE — 99215 OFFICE O/P EST HI 40 MIN: CPT | Mod: 25,S$PBB,, | Performed by: INTERNAL MEDICINE

## 2018-06-22 PROCEDURE — 96374 THER/PROPH/DIAG INJ IV PUSH: CPT

## 2018-06-22 PROCEDURE — 80053 COMPREHEN METABOLIC PANEL: CPT

## 2018-06-22 RX ORDER — SODIUM CHLORIDE 9 MG/ML
500 INJECTION, SOLUTION INTRAVENOUS ONCE
Status: COMPLETED | OUTPATIENT
Start: 2018-06-22 | End: 2018-06-22

## 2018-06-22 RX ORDER — SODIUM CHLORIDE 0.9 % (FLUSH) 0.9 %
10 SYRINGE (ML) INJECTION
Status: DISCONTINUED | OUTPATIENT
Start: 2018-06-22 | End: 2018-06-22 | Stop reason: HOSPADM

## 2018-06-22 RX ORDER — HEPARIN 100 UNIT/ML
500 SYRINGE INTRAVENOUS
Status: CANCELLED | OUTPATIENT
Start: 2018-06-22

## 2018-06-22 RX ORDER — POTASSIUM CHLORIDE 20 MEQ/1
TABLET, EXTENDED RELEASE ORAL
Refills: 6 | Status: ON HOLD | COMMUNITY
Start: 2018-04-25 | End: 2019-04-07 | Stop reason: HOSPADM

## 2018-06-22 RX ORDER — METOLAZONE 2.5 MG/1
TABLET ORAL
Refills: 11 | COMMUNITY
Start: 2018-04-25 | End: 2018-07-13 | Stop reason: SDUPTHER

## 2018-06-22 RX ORDER — SODIUM CHLORIDE 0.9 % (FLUSH) 0.9 %
10 SYRINGE (ML) INJECTION
Status: CANCELLED | OUTPATIENT
Start: 2018-06-22

## 2018-06-22 RX ADMIN — SODIUM CHLORIDE 500 ML: 0.9 INJECTION, SOLUTION INTRAVENOUS at 12:06

## 2018-06-22 RX ADMIN — FERUMOXYTOL 510 MG: 510 INJECTION INTRAVENOUS at 11:06

## 2018-06-22 NOTE — PROGRESS NOTES
Subjective:      Patient ID: Jennifer Mathews is a 85 y.o. female.    Chief Complaint: No chief complaint on file.    The patient is a 85-year-old  female who presents to the [facility] today as an urgent care visit for anemia.  The patient is followed in the outpatient Hematology Oncology Clinic by Dr. Nabeel Arreguin and I am evaluating the patient today for the 1st time.  I have reviewed all the patient's relevant clinical history available medical record have utilized as my evaluation management recommendations today.  The patient was discharged from [facility], [place] yesterday.  She was transfused with 1 unit of PRBCs yesterday before hospital discharge. She continues on Coumadin at this time.  The patient got her 1st dose of IV Feraheme today.  I was asked to evaluate the patient because of hypotension.  The patient reports that she does have some generalized weakness and fatigue but overall feels better than she did yesterday.  She denies any melena, hematochezia, hematemesis, hemoptysis or hematuria.  She denies any chest pain.  She reports shortness of breath with exertion.  She denies any shortness of breath at rest.  She reports chronic swelling in her legs.  This is unchanged.  She denies any nausea, vomiting or abdominal pain. She denies any bowel or urinary complaints.        Review of Systems   Constitutional: Positive for activity change and fatigue. Negative for appetite change, chills, diaphoresis, fever and unexpected weight change.   HENT: Negative for congestion, dental problem, ear discharge, ear pain, hearing loss, mouth sores, postnasal drip, sinus pressure, sore throat, tinnitus, trouble swallowing and voice change.    Eyes: Negative for photophobia, pain and visual disturbance.   Respiratory: Positive for shortness of breath. Negative for cough, chest tightness and wheezing.    Cardiovascular: Negative for chest pain, palpitations and leg swelling.    Gastrointestinal: Negative for abdominal distention, abdominal pain, blood in stool, constipation, diarrhea, nausea and vomiting.   Endocrine: Negative for cold intolerance, heat intolerance, polydipsia, polyphagia and polyuria.   Genitourinary: Negative for difficulty urinating, dysuria, flank pain, frequency, hematuria, urgency, vaginal bleeding and vaginal discharge.   Musculoskeletal: Negative for arthralgias, back pain, gait problem, joint swelling and myalgias.   Skin: Negative for pallor and rash.   Allergic/Immunologic: Negative for immunocompromised state.   Neurological: Positive for weakness. Negative for dizziness, syncope, light-headedness, numbness and headaches.   Hematological: Negative for adenopathy. Does not bruise/bleed easily.   Psychiatric/Behavioral: Negative for agitation, confusion and dysphoric mood. The patient is not nervous/anxious.        Medication List with Changes/Refills   Current Medications    ACETAMINOPHEN (TYLENOL EXTRA STRENGTH) 325 MG TABLET    Take 2 tablets (650 mg total) by mouth every 8 (eight) hours.    ALLOPURINOL (ZYLOPRIM) 100 MG TABLET    Take 2 tablets (200 mg total) by mouth once daily.    AMIODARONE (PACERONE) 200 MG TAB    Take 1 tablet (200 mg total) by mouth once daily.    ASPIRIN (ECOTRIN) 81 MG EC TABLET    Take 81 mg by mouth once daily.    CALCIUM CARBONATE (OS-SHERRY) 600 MG CALCIUM (1,500 MG) TAB    Take 600 mg by mouth once.    CARVEDILOL (COREG) 25 MG TABLET    Take 1 tablet (25 mg total) by mouth 2 (two) times daily with meals.    COLCHICINE 0.6 MG CAP    Take 1 capsule (0.6 mg total) by mouth once daily.    CRANBERRY 1,000 MG CAP    Take 1 capsule by mouth Daily.    DIGOXIN (LANOXIN) 125 MCG TABLET    Take 1 tablet (125 mcg total) by mouth once daily. TAKE 1 TABLET (0.125 MG TOTAL) BY MOUTH ONCE DAILY.    DOXYCYCLINE (VIBRAMYCIN) 100 MG CAP    Take 1 capsule (100 mg total) by mouth every 12 (twelve) hours. Take two capsules as a first dose tonight at 8  "pm then 1 caps Q 12 hrs until done    FUROSEMIDE (LASIX) 40 MG TABLET    Take 1 tablet (40 mg total) by mouth 2 (two) times daily.    GABAPENTIN (NEURONTIN) 100 MG CAPSULE    TAKE ONE CAPSULE BY MOUTH TWO TIMES DAILY    HYDROCODONE-ACETAMINOPHEN (NORCO) 5-325 MG PER TABLET    Take 1 tablet by mouth every 6 (six) hours as needed.    LACTOBAC 40-BIFIDO 3-S.THERMOP (PROBIOTIC) 100 BILLION CELL CAP    Take 1 capsule by mouth once daily.    LEVOTHYROXINE (SYNTHROID) 50 MCG TABLET    Take 1 tablet (50 mcg total) by mouth once daily.    METOLAZONE (ZAROXOLYN) 2.5 MG TABLET    TAKE 1 TABLET BY MOUTH DAILY AS NEEDED FOR SWELLING AND/OR WEIGHT GAIN    MOXIFLOXACIN (AVELOX) 400 MG TABLET    Take 1 tablet (400 mg total) by mouth once daily. for 5 days    MULTIVITAMIN ORAL    Take 1 tablet by mouth Daily.    POTASSIUM CHLORIDE SA (K-DUR,KLOR-CON) 20 MEQ TABLET    TAKE 1 TABLET (20 MEQ TOTAL) BY MOUTH ONCE DAILY.    SACUBITRIL-VALSARTAN (ENTRESTO) 49-51 MG PER TABLET    Take 1 tablet by mouth 2 (two) times daily.    WARFARIN (COUMADIN) 2.5 MG TABLET    TAKE 1/2 TABLET ON MONDAY AND WEDNESDAY AND 1 TABLET ALL OTHER DAYS. DISCONTINUE 5MG TABLET.     Review of patient's allergies indicates:   Allergen Reactions    Cephalosporins Hives    Metaxalone Itching    Penicillins      Other reaction(s): Unknown      Pregabalin      Other reaction(s): "Bad feeling"      Sulfa (sulfonamide antibiotics) Itching       Lab: I have reviewed all of the patient's relevant lab work available in the medical record and have utilized this in my evaluation and management recommendations today    Imaging: I have reviewed all of the patient's diagnostic/imaging results available in the medical record and have utilized this in my evaluation and management recommendations today.      Objective:     Vitals:    06/22/18 1346   BP: (!) 91/40   Pulse: 69   Temp: 97.6 °F (36.4 °C)       Physical Exam   Constitutional: She is oriented to person, place, and " time. She appears well-developed and well-nourished. She is active and cooperative.   HENT:   Head: Normocephalic and atraumatic.   Nose: Nose normal.   Mouth/Throat: Oropharynx is clear and moist. No oropharyngeal exudate.   Eyes: Pupils are equal, round, and reactive to light. No scleral icterus.   Neck: Neck supple. No thyromegaly present.   Cardiovascular: Normal rate, regular rhythm, normal heart sounds and intact distal pulses.    No murmur heard.  Pulmonary/Chest: Effort normal and breath sounds normal. No stridor. No respiratory distress. She has no wheezes. She has no rales.   Abdominal: Soft. Bowel sounds are normal. She exhibits no distension, no ascites and no mass. There is no hepatosplenomegaly. There is no tenderness. There is no rigidity, no rebound and no guarding.   Musculoskeletal: She exhibits edema. She exhibits no tenderness.   Lymphadenopathy:     She has no cervical adenopathy.   Neurological: She is alert and oriented to person, place, and time. No cranial nerve deficit. She exhibits normal muscle tone. Coordination normal.   Skin: Skin is warm and dry. No rash noted. No pallor.   Psychiatric: She has a normal mood and affect. Her behavior is normal. Judgment and thought content normal.   Nursing note and vitals reviewed.      Assessment:     1.  Iron deficiency anemia  2.  Anemia due to chronic kidney disease  3.  Generalized weakness/fatigue  4.  Hypotension likely related to the above    Plan:     1.  I had a detailed discussion with the patient today with regard to her current clinical situation.  Recheck of the patient's CBC shows interval improvement of hemoglobin/hematocrit after PRBC transfusion yesterday.  Review of patient's CMP shows interval worsening of creatinine.  She was given hydration with normal saline 500 cc IV x1.  2.  The patient has been advised to decrease her Lasix from 40 mg p.o. b.i.d. to 40 mg p.o. once daily for the next 3 days.  I have also advised the patient  to hold Entresto for the next 3 days.  She will check her blood pressure at home and contact her cardiologist if this continues to be low (<100 mm hg systolic) despite these measures.  3.  She knows to proceed to the emergency room for any worsening of her symptoms.    The patient will keep all of her follow ups as scheduled including for IV Feraheme next week.  She will follow up with Dr. Arreguin as recommended.  She knows to call for any additional questions or new problems.

## 2018-06-22 NOTE — PLAN OF CARE
"Problem: Patient Care Overview  Goal: Plan of Care Review  Outcome: Ongoing (interventions implemented as appropriate)  "I feel really weak; I just got out of the hospital."      "

## 2018-06-25 ENCOUNTER — TELEPHONE (OUTPATIENT)
Dept: CARDIOLOGY | Facility: CLINIC | Age: 83
End: 2018-06-25

## 2018-06-25 DIAGNOSIS — I25.5 ISCHEMIC CARDIOMYOPATHY: Primary | ICD-10-CM

## 2018-06-25 NOTE — TELEPHONE ENCOUNTER
Spoke with patient about rescheduling appointment due to provider not being in office patient has been schedule for 7/3/18 with car and patient informed me that Dr. Coon made changes to her medication as of Friday 6/22/2018 not taking entresto and she is now taking 40ml of the lasix per  orders. I informed patient that if any fluid build up or swelling to take another lasix for that as car directed.

## 2018-06-26 LAB
BACTERIA BLD CULT: NORMAL
BACTERIA BLD CULT: NORMAL

## 2018-06-27 ENCOUNTER — TELEPHONE (OUTPATIENT)
Dept: INTERNAL MEDICINE | Facility: CLINIC | Age: 83
End: 2018-06-27

## 2018-06-27 NOTE — TELEPHONE ENCOUNTER
----- Message from Buddy Treadwell sent at 6/27/2018  9:52 AM CDT -----  Pt is requesting a call from nurse to discuss a prescription that was put under name at the pharmacy. Pt states she had questions and concerns regarding the medication .          Please call pt back at [phone]

## 2018-06-29 ENCOUNTER — INFUSION (OUTPATIENT)
Dept: INFUSION THERAPY | Facility: HOSPITAL | Age: 83
End: 2018-06-29
Attending: INTERNAL MEDICINE
Payer: MEDICARE

## 2018-06-29 ENCOUNTER — PATIENT OUTREACH (OUTPATIENT)
Dept: ADMINISTRATIVE | Facility: HOSPITAL | Age: 83
End: 2018-06-29

## 2018-06-29 ENCOUNTER — CLINICAL SUPPORT (OUTPATIENT)
Dept: CARDIOLOGY | Facility: CLINIC | Age: 83
End: 2018-06-29
Payer: MEDICARE

## 2018-06-29 ENCOUNTER — ANTI-COAG VISIT (OUTPATIENT)
Dept: CARDIOLOGY | Facility: CLINIC | Age: 83
End: 2018-06-29
Payer: MEDICARE

## 2018-06-29 VITALS — HEART RATE: 69 BPM | SYSTOLIC BLOOD PRESSURE: 131 MMHG | DIASTOLIC BLOOD PRESSURE: 53 MMHG

## 2018-06-29 DIAGNOSIS — I48.20 CHRONIC ATRIAL FIBRILLATION: ICD-10-CM

## 2018-06-29 DIAGNOSIS — D50.0 IRON DEFICIENCY ANEMIA DUE TO CHRONIC BLOOD LOSS: Primary | ICD-10-CM

## 2018-06-29 DIAGNOSIS — Z79.01 LONG TERM (CURRENT) USE OF ANTICOAGULANTS: Primary | ICD-10-CM

## 2018-06-29 DIAGNOSIS — Z95.0 CARDIAC PACEMAKER IN SITU: ICD-10-CM

## 2018-06-29 DIAGNOSIS — I25.5 ISCHEMIC CARDIOMYOPATHY: ICD-10-CM

## 2018-06-29 LAB — INR PPP: 1.8 (ref 2–3)

## 2018-06-29 PROCEDURE — 93281 PM DEVICE PROGR EVAL MULTI: CPT | Mod: PBBFAC,PO | Performed by: INTERNAL MEDICINE

## 2018-06-29 PROCEDURE — 99211 OFF/OP EST MAY X REQ PHY/QHP: CPT | Mod: PBBFAC,25

## 2018-06-29 PROCEDURE — 99999 PR PBB SHADOW E&M-EST. PATIENT-LVL I: CPT | Mod: PBBFAC,,,

## 2018-06-29 PROCEDURE — 63600175 PHARM REV CODE 636 W HCPCS: Mod: JG | Performed by: INTERNAL MEDICINE

## 2018-06-29 PROCEDURE — 25000003 PHARM REV CODE 250: Performed by: INTERNAL MEDICINE

## 2018-06-29 PROCEDURE — 96375 TX/PRO/DX INJ NEW DRUG ADDON: CPT

## 2018-06-29 PROCEDURE — 85610 PROTHROMBIN TIME: CPT | Mod: PBBFAC

## 2018-06-29 RX ORDER — SODIUM CHLORIDE 0.9 % (FLUSH) 0.9 %
10 SYRINGE (ML) INJECTION
Status: CANCELLED | OUTPATIENT
Start: 2018-06-29

## 2018-06-29 RX ORDER — SODIUM CHLORIDE 0.9 % (FLUSH) 0.9 %
10 SYRINGE (ML) INJECTION
Status: DISCONTINUED | OUTPATIENT
Start: 2018-06-29 | End: 2018-06-29 | Stop reason: HOSPADM

## 2018-06-29 RX ORDER — HEPARIN 100 UNIT/ML
500 SYRINGE INTRAVENOUS
Status: CANCELLED | OUTPATIENT
Start: 2018-06-29

## 2018-06-29 RX ADMIN — FERUMOXYTOL 510 MG: 510 INJECTION INTRAVENOUS at 02:06

## 2018-06-29 NOTE — PATIENT INSTRUCTIONS
[facility]  [address]  [address] [address]  [phone] phone     [phone] fax  Hours of Operation: Monday- Friday 8:00am- 5:00pm  After hours phone  [phone]  Hematology / Oncology Physicians on call      Dr. Rodríguez Batista    Please call with any concerns regarding your appointment today.      Ferumoxytol injection  What is this medicine?  FERUMOXYTOL is an iron complex. Iron is used to make healthy red blood cells, which carry oxygen and nutrients throughout the body. This medicine is used to treat iron deficiency anemia in people with chronic kidney disease.  How should I use this medicine?  This medicine is for injection into a vein. It is given by a health care professional in a hospital or clinic setting.  Talk to your pediatrician regarding the use of this medicine in children. Special care may be needed.  What side effects may I notice from receiving this medicine?  Side effects that you should report to your doctor or health care professional as soon as possible:  · allergic reactions like skin rash, itching or hives, swelling of the face, lips, or tongue  · breathing problems  · changes in blood pressure  · feeling faint or lightheaded, falls  · fever or chills  · flushing, sweating, or hot feelings  · swelling of the ankles or feet  Side effects that usually do not require medical attention (Report these to your doctor or health care professional if they continue or are bothersome.):  · diarrhea  · headache  · nausea, vomiting  · stomach pain  What may interact with this medicine?  This medicine may interact with the following medications:  · other iron products  What if I miss a dose?  It is important not to miss your dose. Call your doctor or health care professional if you are unable to keep an appointment.  Where should I keep my medicine?  This drug is given in a hospital or clinic and will not be stored  at home.  What should I tell my health care provider before I take this medicine?  They need to know if you have any of these conditions:  · anemia not caused by low iron levels  · high levels of iron in the blood  · magnetic resonance imaging (MRI) test scheduled  · an unusual or allergic reaction to iron, other medicines, foods, dyes, or preservatives  · pregnant or trying to get pregnant  · breast-feeding  What should I watch for while using this medicine?  Visit your doctor or healthcare professional regularly. Tell your doctor or healthcare professional if your symptoms do not start to get better or if they get worse. You may need blood work done while you are taking this medicine.  You may need to follow a special diet. Talk to your doctor. Foods that contain iron include: whole grains/cereals, dried fruits, beans, or peas, leafy green vegetables, and organ meats (liver, kidney).  NOTE:This sheet is a summary. It may not cover all possible information. If you have questions about this medicine, talk to your doctor, pharmacist, or health care provider. Copyright© 2017 [employer]        Iron-Deficiency Anemia (Adult)  Red blood cells carry oxygen to the tissues of your body. Anemia is a condition in which you have too few red blood cells. You need iron to make red cells. Anemia makes you feel tired and run down. When anemia becomes severe, your skin becomes pale. You may feel short of breath after physical activity. Other symptoms include:  · Headaches  · Dizziness  · Leg cramps with physical activity  · Drowsiness  · Restless legs  Your anemia is caused by not having enough iron in your body. This may be because of:  · Loss of blood. This can be caused by heavy menstrual periods. It can also be caused by bleeding from the stomach or intestines.  · Poor diet. You may not be eating enough foods that contain iron.  · Inability to absorb iron from the foods you eat  · Pregnancy  If your blood count is low  enough, your healthcare provider may prescribe an iron supplement. It usually takes about 2 to 3 months of treatment with iron supplements to correct anemia. Severe cases of anemia need a blood transfusion to quickly ease symptoms and deliver more oxygen to the cells.  Home care  Follow these guidelines when caring for yourself at home:  · Eat foods high in iron. This will boost the amount of iron stored in your body. It is a natural way to build up the number of blood cells. Good sources of iron include beef, liver, spinach and other dark green leafy vegetables, whole grains, beans, and nuts.  · Do not overexert yourself.  · Talk with your healthcare provider before traveling by air or traveling to high altitudes.  Follow-up care  Follow up with your healthcare provider in 2 months, or as advised. This is to have another red blood cell count to be sure your anemia has been fixed.  When to seek medical advice  Call your healthcare provider right away if any of these occur:  · Shortness of breath or chest pain  · Dizziness or fainting  · Vomiting blood or passing red or black-colored stool   Date Last Reviewed: 2/25/2016  © 4454-6795 [employer]. [address] [place] [address], [place] [address]. All rights reserved. This information is not intended as a substitute for professional medical care. Always follow your healthcare professional's instructions.

## 2018-06-29 NOTE — PLAN OF CARE
"Problem: Patient Care Overview  Goal: Plan of Care Review  Outcome: Ongoing (interventions implemented as appropriate)  "I'm just tired and want to go to sleep."      "

## 2018-06-29 NOTE — PROGRESS NOTES
HPFU visit from 6/20/2018:  Patient's INR is sub therapeutic at 1.8.  Instructed patient to start new dose of 2.5 mg on Mon, Wed and Fri, then 3.75 mg (1 1/2 tablet) on Tues, Thurs, Sat and Sun.  Recheck on Tuesday, 7/03/2018.

## 2018-07-02 ENCOUNTER — LAB VISIT (OUTPATIENT)
Dept: LAB | Facility: HOSPITAL | Age: 83
End: 2018-07-02
Attending: FAMILY MEDICINE
Payer: MEDICARE

## 2018-07-02 DIAGNOSIS — I25.5 ISCHEMIC CARDIOMYOPATHY: ICD-10-CM

## 2018-07-02 LAB
ANION GAP SERPL CALC-SCNC: 10 MMOL/L
BUN SERPL-MCNC: 23 MG/DL
CALCIUM SERPL-MCNC: 9.4 MG/DL
CHLORIDE SERPL-SCNC: 108 MMOL/L
CO2 SERPL-SCNC: 25 MMOL/L
CREAT SERPL-MCNC: 1.4 MG/DL
EST. GFR  (AFRICAN AMERICAN): 40 ML/MIN/1.73 M^2
EST. GFR  (NON AFRICAN AMERICAN): 34 ML/MIN/1.73 M^2
GLUCOSE SERPL-MCNC: 94 MG/DL
POTASSIUM SERPL-SCNC: 4.7 MMOL/L
SODIUM SERPL-SCNC: 143 MMOL/L

## 2018-07-02 PROCEDURE — 80048 BASIC METABOLIC PNL TOTAL CA: CPT

## 2018-07-02 PROCEDURE — 36415 COLL VENOUS BLD VENIPUNCTURE: CPT

## 2018-07-03 ENCOUNTER — LAB VISIT (OUTPATIENT)
Dept: LAB | Facility: HOSPITAL | Age: 83
End: 2018-07-03
Attending: NURSE PRACTITIONER
Payer: MEDICARE

## 2018-07-03 ENCOUNTER — OFFICE VISIT (OUTPATIENT)
Dept: INTERNAL MEDICINE | Facility: CLINIC | Age: 83
End: 2018-07-03
Payer: MEDICARE

## 2018-07-03 ENCOUNTER — TELEPHONE (OUTPATIENT)
Dept: CARDIOLOGY | Facility: CLINIC | Age: 83
End: 2018-07-03

## 2018-07-03 ENCOUNTER — OFFICE VISIT (OUTPATIENT)
Dept: CARDIOLOGY | Facility: CLINIC | Age: 83
End: 2018-07-03
Payer: MEDICARE

## 2018-07-03 ENCOUNTER — TELEPHONE (OUTPATIENT)
Dept: ELECTROPHYSIOLOGY | Facility: CLINIC | Age: 83
End: 2018-07-03

## 2018-07-03 ENCOUNTER — ANTI-COAG VISIT (OUTPATIENT)
Dept: CARDIOLOGY | Facility: CLINIC | Age: 83
End: 2018-07-03
Payer: MEDICARE

## 2018-07-03 VITALS
BODY MASS INDEX: 24.42 KG/M2 | WEIGHT: 132.69 LBS | DIASTOLIC BLOOD PRESSURE: 60 MMHG | HEART RATE: 72 BPM | HEIGHT: 62 IN | SYSTOLIC BLOOD PRESSURE: 138 MMHG | OXYGEN SATURATION: 94 % | TEMPERATURE: 98 F

## 2018-07-03 VITALS
HEART RATE: 68 BPM | HEIGHT: 62 IN | DIASTOLIC BLOOD PRESSURE: 74 MMHG | SYSTOLIC BLOOD PRESSURE: 138 MMHG | WEIGHT: 132.69 LBS | BODY MASS INDEX: 24.42 KG/M2

## 2018-07-03 DIAGNOSIS — Z95.2 S/P MVR (MITRAL VALVE REPLACEMENT): ICD-10-CM

## 2018-07-03 DIAGNOSIS — I10 ESSENTIAL HYPERTENSION: ICD-10-CM

## 2018-07-03 DIAGNOSIS — R60.0 BILATERAL LOWER EXTREMITY EDEMA: ICD-10-CM

## 2018-07-03 DIAGNOSIS — Z79.01 LONG TERM (CURRENT) USE OF ANTICOAGULANTS: Primary | ICD-10-CM

## 2018-07-03 DIAGNOSIS — I25.5 ISCHEMIC CARDIOMYOPATHY: ICD-10-CM

## 2018-07-03 DIAGNOSIS — I48.0 PAROXYSMAL ATRIAL FIBRILLATION: ICD-10-CM

## 2018-07-03 DIAGNOSIS — D68.9 COAGULOPATHY: Primary | ICD-10-CM

## 2018-07-03 DIAGNOSIS — I50.42 CHRONIC COMBINED SYSTOLIC AND DIASTOLIC CONGESTIVE HEART FAILURE: Primary | ICD-10-CM

## 2018-07-03 DIAGNOSIS — Z95.810 CARDIAC RESYNCHRONIZATION THERAPY DEFIBRILLATOR (CRT-D) IN PLACE: ICD-10-CM

## 2018-07-03 LAB
ANISOCYTOSIS BLD QL SMEAR: ABNORMAL
BASOPHILS # BLD AUTO: 0.06 K/UL
BASOPHILS NFR BLD: 0.9 %
DACRYOCYTES BLD QL SMEAR: ABNORMAL
DIFFERENTIAL METHOD: ABNORMAL
EOSINOPHIL # BLD AUTO: 0.1 K/UL
EOSINOPHIL NFR BLD: 2.1 %
ERYTHROCYTE [DISTWIDTH] IN BLOOD BY AUTOMATED COUNT: 28.2 %
HCT VFR BLD AUTO: 33.7 %
HGB BLD-MCNC: 9.5 G/DL
HYPOCHROMIA BLD QL SMEAR: ABNORMAL
INR PPP: 2.2 (ref 2–3)
LYMPHOCYTES # BLD AUTO: 1.4 K/UL
LYMPHOCYTES NFR BLD: 21.2 %
MCH RBC QN AUTO: 27.4 PG
MCHC RBC AUTO-ENTMCNC: 28.2 G/DL
MCV RBC AUTO: 97 FL
MONOCYTES # BLD AUTO: 0.7 K/UL
MONOCYTES NFR BLD: 10.1 %
NEUTROPHILS # BLD AUTO: 4.4 K/UL
NEUTROPHILS NFR BLD: 65.7 %
OVALOCYTES BLD QL SMEAR: ABNORMAL
PLATELET # BLD AUTO: 286 K/UL
PMV BLD AUTO: 9.7 FL
POIKILOCYTOSIS BLD QL SMEAR: ABNORMAL
RBC # BLD AUTO: 3.47 M/UL
SCHISTOCYTES BLD QL SMEAR: PRESENT
SPHEROCYTES BLD QL SMEAR: ABNORMAL
WBC # BLD AUTO: 6.64 K/UL

## 2018-07-03 PROCEDURE — 85025 COMPLETE CBC W/AUTO DIFF WBC: CPT

## 2018-07-03 PROCEDURE — 99213 OFFICE O/P EST LOW 20 MIN: CPT | Mod: S$PBB,,, | Performed by: NURSE PRACTITIONER

## 2018-07-03 PROCEDURE — 99999 PR PBB SHADOW E&M-EST. PATIENT-LVL IV: CPT | Mod: PBBFAC,,, | Performed by: NURSE PRACTITIONER

## 2018-07-03 PROCEDURE — 99214 OFFICE O/P EST MOD 30 MIN: CPT | Mod: PBBFAC | Performed by: NURSE PRACTITIONER

## 2018-07-03 PROCEDURE — 99214 OFFICE O/P EST MOD 30 MIN: CPT | Mod: S$PBB,,, | Performed by: NURSE PRACTITIONER

## 2018-07-03 PROCEDURE — 99214 OFFICE O/P EST MOD 30 MIN: CPT | Mod: PBBFAC,27 | Performed by: NURSE PRACTITIONER

## 2018-07-03 PROCEDURE — 36415 COLL VENOUS BLD VENIPUNCTURE: CPT

## 2018-07-03 PROCEDURE — 85610 PROTHROMBIN TIME: CPT | Mod: PBBFAC

## 2018-07-03 NOTE — PROGRESS NOTES
Subjective:   Patient ID:  Jennifer Mathews is a 85 y.o. female who presents for follow up of Congestive Heart Failure; Leg Swelling; and Shortness of Breath      HPI  Patient presents to clinic for follow up and management of ischemic cardiomyopathy. She has PMH of Afib, cardiac arrest s/p CRT-D, hypothyroidism, and HTN, Anemia, HLP, CKD, MVR,  Ischemic cardiomyopathy with EF 25%, previous CVA and LEV. Had generator exchange on 6/20/18. Patient presented to the ED the following day with complaints of SOB and device pocket pain. CXR showed possible left lower lobe infiltrate versus atelectasis and pleural effusion with no evidence of pneumothorax. Also noted to have small pleural effusion. Patient BNP 1,880. Patient  diuresed and treated with abx during admission. Required admission in May 2018 with JESSICA from diuretics. Required therapy for hyperkalemia. Renal function improved and diuretics eventually resumed. She presents today with complaints of weight gain (at least 15 lbs), has lower extremity edema, Has no PND, has no palpitations.  Entresto stopped and lasix 40mg daily since 6/22/18. Patient saw Hematology and was noted to have weakness and was hypotensive. Was advised to hold Entresto for only 3 days, but has has continued to  Hold. Has still been taking Lasix 40mg once daily.   INR being managed by Coumadin clinic. INR 2.2 today. Has no abnormal bleeding. Has no CNS Complaints to suggest TIA or CVA. No device firing. Noted to have bruising     Past Medical History:   Diagnosis Date    Acute coronary syndrome     Anemia     Anticoagulated on Coumadin 7/13/2015    Arthritis     Atrial fibrillation     Basal cell carcinoma 10/2015    left neck    Cardiac arrest     Cardiac resynchronization therapy defibrillator (CRT-D) in place 07/13/2015    Pt denies, states it does not shock me    Chronic combined systolic and diastolic congestive heart failure     CKD (chronic kidney disease) stage 3, GFR 30-59  ml/min     Dyslipidemia 1/30/2014    Hyperlipidemia     Hypertension     Ischemic cardiomyopathy 01/30/2014    Macular hole of left eye     Old    Macular hole of left eye     LEV (obstructive sleep apnea) 9/30/2013    Recurrent UTI     Refractive error     Stroke        Past Surgical History:   Procedure Laterality Date    APPENDECTOMY      CARDIAC CATHETERIZATION      CARDIAC PACEMAKER PLACEMENT  2014    CARDIAC VALVE SURGERY      CATARACT EXTRACTION      OU    CHOLECYSTECTOMY      COLONOSCOPY      MITRAL VALVE REPLACEMENT  2014    MITRAL VALVE SURGERY      x3    REPLACEMENT OF PACEMAKER GENERATOR Left 6/20/2018    Procedure: REPLACEMENT, PACEMAKER GENERATOR/pt has crt-p versus d;  Surgeon: Manny Dunne MD;  Location: [facility] CATH LAB;  Service: Cardiology;  Laterality: Left;  st renée device  patient is pacer dependent       Social History   Substance Use Topics    Smoking status: Never Smoker    Smokeless tobacco: Never Used    Alcohol use No       Family History   Problem Relation Age of Onset    Coronary artery disease Mother         mi    Coronary artery disease Father     Diabetes Brother     Cancer Brother     Melanoma Neg Hx     Psoriasis Neg Hx     Lupus Neg Hx     Eczema Neg Hx        Current Outpatient Prescriptions   Medication Sig    acetaminophen (TYLENOL EXTRA STRENGTH) 325 MG tablet Take 2 tablets (650 mg total) by mouth every 8 (eight) hours. (Patient taking differently: Take 650 mg by mouth 3 (three) times daily as needed. )    allopurinol (ZYLOPRIM) 100 MG tablet Take 2 tablets (200 mg total) by mouth once daily.    amiodarone (PACERONE) 200 MG Tab Take 1 tablet (200 mg total) by mouth once daily.    aspirin (ECOTRIN) 81 MG EC tablet Take 81 mg by mouth once daily.    calcium carbonate (OS-SHERRY) 600 mg calcium (1,500 mg) Tab Take 600 mg by mouth once.    carvedilol (COREG) 25 MG tablet Take 1 tablet (25 mg total) by mouth 2 (two) times daily with meals.     colchicine 0.6 mg Cap Take 1 capsule (0.6 mg total) by mouth once daily.    cranberry 1,000 mg Cap Take 1 capsule by mouth Daily.    digoxin (LANOXIN) 125 mcg tablet Take 1 tablet (125 mcg total) by mouth once daily. TAKE 1 TABLET (0.125 MG TOTAL) BY MOUTH ONCE DAILY.    doxycycline (VIBRAMYCIN) 100 MG Cap Take 1 capsule (100 mg total) by mouth every 12 (twelve) hours. Take two capsules as a first dose tonight at 8 pm then 1 caps Q 12 hrs until done    furosemide (LASIX) 40 MG tablet Take 1 tablet (40 mg total) by mouth 2 (two) times daily.    gabapentin (NEURONTIN) 100 MG capsule TAKE ONE CAPSULE BY MOUTH TWO TIMES DAILY    Lactobac 40-Bifido 3-S.thermop (PROBIOTIC) 100 billion cell Cap Take 1 capsule by mouth once daily.    levothyroxine (SYNTHROID) 50 MCG tablet Take 1 tablet (50 mcg total) by mouth once daily.    metOLazone (ZAROXOLYN) 2.5 MG tablet TAKE 1 TABLET BY MOUTH DAILY AS NEEDED FOR SWELLING AND/OR WEIGHT GAIN    MULTIVITAMIN ORAL Take 1 tablet by mouth Daily.    potassium chloride SA (K-DUR,KLOR-CON) 20 MEQ tablet TAKE 1 TABLET (20 MEQ TOTAL) BY MOUTH ONCE DAILY.    warfarin (COUMADIN) 2.5 MG tablet TAKE 1/2 TABLET ON MONDAY AND WEDNESDAY AND 1 TABLET ALL OTHER DAYS. DISCONTINUE 5MG TABLET. (Patient taking differently: Take by mouth every evening 1 tablet on Mon, Wed and Fri, then 1 1/2 tablet on Tues, Thurs, Sat and Sun as directed by the Coumadin Clinic.)    HYDROcodone-acetaminophen (NORCO) 5-325 mg per tablet Take 1 tablet by mouth every 6 (six) hours as needed.    sacubitril-valsartan (ENTRESTO) 49-51 mg per tablet Take 1 tablet by mouth 2 (two) times daily.     Current Facility-Administered Medications   Medication    [START ON 10/27/2018] denosumab (PROLIA) injection 60 mg     Current Outpatient Prescriptions on File Prior to Visit   Medication Sig    acetaminophen (TYLENOL EXTRA STRENGTH) 325 MG tablet Take 2 tablets (650 mg total) by mouth every 8 (eight) hours. (Patient  taking differently: Take 650 mg by mouth 3 (three) times daily as needed. )    allopurinol (ZYLOPRIM) 100 MG tablet Take 2 tablets (200 mg total) by mouth once daily.    amiodarone (PACERONE) 200 MG Tab Take 1 tablet (200 mg total) by mouth once daily.    aspirin (ECOTRIN) 81 MG EC tablet Take 81 mg by mouth once daily.    calcium carbonate (OS-SHERRY) 600 mg calcium (1,500 mg) Tab Take 600 mg by mouth once.    carvedilol (COREG) 25 MG tablet Take 1 tablet (25 mg total) by mouth 2 (two) times daily with meals.    colchicine 0.6 mg Cap Take 1 capsule (0.6 mg total) by mouth once daily.    cranberry 1,000 mg Cap Take 1 capsule by mouth Daily.    digoxin (LANOXIN) 125 mcg tablet Take 1 tablet (125 mcg total) by mouth once daily. TAKE 1 TABLET (0.125 MG TOTAL) BY MOUTH ONCE DAILY.    doxycycline (VIBRAMYCIN) 100 MG Cap Take 1 capsule (100 mg total) by mouth every 12 (twelve) hours. Take two capsules as a first dose tonight at 8 pm then 1 caps Q 12 hrs until done    furosemide (LASIX) 40 MG tablet Take 1 tablet (40 mg total) by mouth 2 (two) times daily.    gabapentin (NEURONTIN) 100 MG capsule TAKE ONE CAPSULE BY MOUTH TWO TIMES DAILY    Lactobac 40-Bifido 3-S.thermop (PROBIOTIC) 100 billion cell Cap Take 1 capsule by mouth once daily.    levothyroxine (SYNTHROID) 50 MCG tablet Take 1 tablet (50 mcg total) by mouth once daily.    metOLazone (ZAROXOLYN) 2.5 MG tablet TAKE 1 TABLET BY MOUTH DAILY AS NEEDED FOR SWELLING AND/OR WEIGHT GAIN    MULTIVITAMIN ORAL Take 1 tablet by mouth Daily.    potassium chloride SA (K-DUR,KLOR-CON) 20 MEQ tablet TAKE 1 TABLET (20 MEQ TOTAL) BY MOUTH ONCE DAILY.    warfarin (COUMADIN) 2.5 MG tablet TAKE 1/2 TABLET ON MONDAY AND WEDNESDAY AND 1 TABLET ALL OTHER DAYS. DISCONTINUE 5MG TABLET. (Patient taking differently: Take by mouth every evening 1 tablet on Mon, Wed and Fri, then 1 1/2 tablet on Tues, Thurs, Sat and Sun as directed by the Coumadin Clinic.)     HYDROcodone-acetaminophen (NORCO) 5-325 mg per tablet Take 1 tablet by mouth every 6 (six) hours as needed.    sacubitril-valsartan (ENTRESTO) 49-51 mg per tablet Take 1 tablet by mouth 2 (two) times daily.     Current Facility-Administered Medications on File Prior to Visit   Medication    [START ON 10/27/2018] denosumab (PROLIA) injection 60 mg       Review of Systems   Constitution: Positive for malaise/fatigue. Negative for decreased appetite, weakness, weight gain and weight loss.   HENT: Negative for nosebleeds.    Eyes: Negative for blurred vision and visual disturbance.   Cardiovascular: Positive for leg swelling. Negative for chest pain, claudication, cyanosis, dyspnea on exertion, irregular heartbeat, near-syncope, orthopnea, palpitations, paroxysmal nocturnal dyspnea and syncope.   Respiratory: Negative for cough, shortness of breath and wheezing.    Endocrine: Negative for heat intolerance.   Hematologic/Lymphatic: Bruises/bleeds easily.   Skin: Negative for rash.   Musculoskeletal: Negative for muscle weakness and myalgias.   Gastrointestinal: Negative for abdominal pain, anorexia, melena, nausea and vomiting.   Genitourinary: Negative for menorrhagia.   Neurological: Negative for excessive daytime sleepiness, dizziness, headaches, loss of balance, seizures and vertigo.   Psychiatric/Behavioral: Negative for altered mental status and depression. The patient is not nervous/anxious.        Objective:   Physical Exam   Constitutional: She is oriented to person, place, and time. She appears well-developed and well-nourished.   Neck: Neck supple. No JVD present.   Cardiovascular: Normal rate, regular rhythm, normal heart sounds and normal pulses.  Exam reveals no friction rub.    No murmur heard.  Pulmonary/Chest: Effort normal and breath sounds normal. No respiratory distress. She has no wheezes. She has no rales.   Left device pocket hematoma.      Abdominal: Soft. Bowel sounds are normal. She exhibits  "no distension.   Musculoskeletal: She exhibits edema ( +3 BLE ). She exhibits no tenderness.   Neurological: She is alert and oriented to person, place, and time.   Skin: Skin is warm and dry. No rash noted.   ecchymosis to left rib region  (see pictures below)    Psychiatric: She has a normal mood and affect. Her behavior is normal.   Nursing note and vitals reviewed.            Vitals:    07/03/18 0857   BP: 138/74   Pulse: 68   Weight: 60.2 kg (132 lb 11.5 oz)   Height: 5' 2" (1.575 m)     Lab Results   Component Value Date    CHOL 155 03/20/2018    CHOL 154 01/29/2018    CHOL 157 08/15/2017     Lab Results   Component Value Date    HDL 36 (L) 03/20/2018    HDL 34 (L) 01/29/2018    HDL 32 (L) 08/15/2017     Lab Results   Component Value Date    LDLCALC 90.8 03/20/2018    LDLCALC 79.0 01/29/2018    LDLCALC 85.8 08/15/2017     Lab Results   Component Value Date    TRIG 141 03/20/2018    TRIG 205 (H) 01/29/2018    TRIG 196 (H) 08/15/2017     Lab Results   Component Value Date    CHOLHDL 23.2 03/20/2018    CHOLHDL 22.1 01/29/2018    CHOLHDL 20.4 08/15/2017       Chemistry        Component Value Date/Time     07/02/2018 0746    K 4.7 07/02/2018 0746     07/02/2018 0746    CO2 25 07/02/2018 0746    BUN 23 07/02/2018 0746    CREATININE 1.4 07/02/2018 0746    GLU 94 07/02/2018 0746        Component Value Date/Time    CALCIUM 9.4 07/02/2018 0746    ALKPHOS 60 06/22/2018 1058    AST 27 06/22/2018 1058    ALT 24 06/22/2018 1058    BILITOT 0.6 06/22/2018 1058    ESTGFRAFRICA 40 (A) 07/02/2018 0746    EGFRNONAA 34 (A) 07/02/2018 0746          Lab Results   Component Value Date    TSH 4.831 (H) 04/17/2018     Lab Results   Component Value Date    INR 2.2 07/03/2018    INR 1.8 (A) 06/29/2018    INR 1.2 06/21/2018     Lab Results   Component Value Date    WBC 8.51 06/22/2018    HGB 7.1 (L) 06/22/2018    HCT 23.5 (L) 06/22/2018    MCV 84 06/22/2018     06/22/2018     BMP  Sodium   Date Value Ref Range Status "   07/02/2018 143 136 - 145 mmol/L Final     Potassium   Date Value Ref Range Status   07/02/2018 4.7 3.5 - 5.1 mmol/L Final     Chloride   Date Value Ref Range Status   07/02/2018 108 95 - 110 mmol/L Final     CO2   Date Value Ref Range Status   07/02/2018 25 23 - 29 mmol/L Final     BUN, Bld   Date Value Ref Range Status   07/02/2018 23 8 - 23 mg/dL Final     Creatinine   Date Value Ref Range Status   07/02/2018 1.4 0.5 - 1.4 mg/dL Final     Calcium   Date Value Ref Range Status   07/02/2018 9.4 8.7 - 10.5 mg/dL Final     Anion Gap   Date Value Ref Range Status   07/02/2018 10 8 - 16 mmol/L Final     eGFR if    Date Value Ref Range Status   07/02/2018 40 (A) >60 mL/min/1.73 m^2 Final     eGFR if non    Date Value Ref Range Status   07/02/2018 34 (A) >60 mL/min/1.73 m^2 Final     Comment:     Calculation used to obtain the estimated glomerular filtration  rate (eGFR) is the CKD-EPI equation.        Estimated Creatinine Clearance: 25.1 mL/min (based on SCr of 1.4 mg/dL).    Assessment:     1. Chronic combined systolic and diastolic congestive heart failure    2. Paroxysmal atrial fibrillation    3. Cardiac resynchronization therapy defibrillator (CRT-D) in place    4. Essential hypertension    5. Ischemic cardiomyopathy    6. S/P MVR (mitral valve replacement)     INR 2.2 today. Has no abnormal bleeding.   Has no CNS Complaints to suggest TIA or CVA.   No device firing   Has lower extremity edema   Device pocket Hematoma decreasing in size per patient. Will need to be monitored closely   Needs CBC checked today    Plan:   Increase Lasix to 40mg BID x 3 days. Phone review on Friday.   Continue KCL    Will have her continue to hold Entresto until she diureses.   Will watch renal function closely and try to restart Entresto once compensated   I will have her return to clinic next week for recheck.   Will take BMP next week and if edema not improved, will need to add PRN metolazone as  renal functio allows   CBC today.  Will have her also see Dr. Janna james at next appt to recheck device pocket   Patient discussed with Dr. Penny and recommend continuing Coumadin but with INR goal 2.0-2.5.

## 2018-07-03 NOTE — PROGRESS NOTES
Patient's INR is therapeutic at 2.2.  No changes in dose.  Recheck in 4 weeks.  Please call should you have any questions or concerns at [phone] or [phone].

## 2018-07-03 NOTE — PROGRESS NOTES
Subjective:       Patient ID: Jennifer Mathews is a 85 y.o. female.    Chief Complaint: hospital followup    Patient presents for ER follow up. She was seen for post-op complications s/p CRT-D exchange. She was seen by cardiology today as well.       Review of Systems   Constitutional: Positive for unexpected weight change (gaining due to swelling). Negative for chills, fatigue and fever.   Respiratory: Positive for shortness of breath.    Cardiovascular: Positive for leg swelling. Negative for chest pain and palpitations.   Skin:        Bruising to left side and chest wall at surgical site   Neurological: Negative for weakness.   Psychiatric/Behavioral: Negative for dysphoric mood.       Objective:      Physical Exam   Constitutional: She appears well-developed and well-nourished. No distress.   HENT:   Head: Normocephalic and atraumatic.   Cardiovascular: Normal rate and regular rhythm.    +3 BLE edema   Pulmonary/Chest: Effort normal and breath sounds normal.   Neurological: She is alert.   Skin: She is not diaphoretic.   Psychiatric: She has a normal mood and affect.   Vitals reviewed.      Assessment:       1. Coagulopathy    2. Bilateral lower extremity edema    3. Essential hypertension        Plan:   Coagulopathy  Comments:  followed by coumadin clinic    Bilateral lower extremity edema  Comments:  followed by cardiology, will increase lasix BID and hold entresto    Essential hypertension  Comments:  controlled today, followed by cardiology      ER and discharge summary notes reviewed. Patient was seen by cards today and will follow up again with cardiology in 1 week.   Follow-up if symptoms worsen or fail to improve, for keep routine follow up.

## 2018-07-06 ENCOUNTER — TELEPHONE (OUTPATIENT)
Dept: CARDIOLOGY | Facility: CLINIC | Age: 83
End: 2018-07-06

## 2018-07-06 NOTE — TELEPHONE ENCOUNTER
----- Message from Juliet Anglin sent at 7/6/2018  2:53 PM CDT -----  Contact: pt  Pt stated she's calling to change the appointment on 07/13 to the 07/12 so that she can make one trip, she can be reached at [phone] Thanks

## 2018-07-06 NOTE — TELEPHONE ENCOUNTER
----- Message from Bailee Kulkarni MA sent at 7/3/2018  5:01 PM CDT -----  Regarding: phone review  Contact: [phone]  Patient came in office today saw car 07/3/18 legs was swollen

## 2018-07-06 NOTE — TELEPHONE ENCOUNTER
Returned pt call appts have all been rescheduled to one day 7/13/18. I have sent pt appt letter in mail.

## 2018-07-13 ENCOUNTER — TELEPHONE (OUTPATIENT)
Dept: CARDIOLOGY | Facility: CLINIC | Age: 83
End: 2018-07-13

## 2018-07-13 ENCOUNTER — LAB VISIT (OUTPATIENT)
Dept: LAB | Facility: HOSPITAL | Age: 83
End: 2018-07-13
Attending: NURSE PRACTITIONER
Payer: MEDICARE

## 2018-07-13 ENCOUNTER — OFFICE VISIT (OUTPATIENT)
Dept: CARDIOLOGY | Facility: CLINIC | Age: 83
End: 2018-07-13
Payer: MEDICARE

## 2018-07-13 VITALS
DIASTOLIC BLOOD PRESSURE: 58 MMHG | HEIGHT: 62 IN | HEART RATE: 71 BPM | SYSTOLIC BLOOD PRESSURE: 136 MMHG | BODY MASS INDEX: 23.61 KG/M2 | WEIGHT: 128.31 LBS

## 2018-07-13 DIAGNOSIS — Z45.010 PACEMAKER GENERATOR END OF LIFE: ICD-10-CM

## 2018-07-13 DIAGNOSIS — Z95.2 S/P MVR (MITRAL VALVE REPLACEMENT): ICD-10-CM

## 2018-07-13 DIAGNOSIS — I25.5 ISCHEMIC CARDIOMYOPATHY: ICD-10-CM

## 2018-07-13 DIAGNOSIS — I50.42 CHRONIC COMBINED SYSTOLIC AND DIASTOLIC CHF (CONGESTIVE HEART FAILURE): Primary | ICD-10-CM

## 2018-07-13 DIAGNOSIS — I10 ESSENTIAL HYPERTENSION: ICD-10-CM

## 2018-07-13 DIAGNOSIS — R60.0 BILATERAL LOWER EXTREMITY EDEMA: ICD-10-CM

## 2018-07-13 DIAGNOSIS — I50.42 CHRONIC COMBINED SYSTOLIC AND DIASTOLIC CONGESTIVE HEART FAILURE: ICD-10-CM

## 2018-07-13 DIAGNOSIS — E78.5 DYSLIPIDEMIA: ICD-10-CM

## 2018-07-13 DIAGNOSIS — Z79.01 ANTICOAGULATED ON COUMADIN: ICD-10-CM

## 2018-07-13 DIAGNOSIS — Z95.0 S/P PLACEMENT OF CARDIAC PACEMAKER: ICD-10-CM

## 2018-07-13 LAB
ANION GAP SERPL CALC-SCNC: 8 MMOL/L
BNP SERPL-MCNC: 1673 PG/ML
BUN SERPL-MCNC: 25 MG/DL
CALCIUM SERPL-MCNC: 8.9 MG/DL
CHLORIDE SERPL-SCNC: 103 MMOL/L
CO2 SERPL-SCNC: 30 MMOL/L
CREAT SERPL-MCNC: 1.3 MG/DL
EST. GFR  (AFRICAN AMERICAN): 43 ML/MIN/1.73 M^2
EST. GFR  (NON AFRICAN AMERICAN): 37 ML/MIN/1.73 M^2
GLUCOSE SERPL-MCNC: 98 MG/DL
POTASSIUM SERPL-SCNC: 4.3 MMOL/L
SODIUM SERPL-SCNC: 141 MMOL/L

## 2018-07-13 PROCEDURE — 80048 BASIC METABOLIC PNL TOTAL CA: CPT | Mod: PO

## 2018-07-13 PROCEDURE — 99214 OFFICE O/P EST MOD 30 MIN: CPT | Mod: S$PBB,,, | Performed by: PHYSICIAN ASSISTANT

## 2018-07-13 PROCEDURE — 99214 OFFICE O/P EST MOD 30 MIN: CPT | Mod: PBBFAC,PO | Performed by: PHYSICIAN ASSISTANT

## 2018-07-13 PROCEDURE — 36415 COLL VENOUS BLD VENIPUNCTURE: CPT | Mod: PO

## 2018-07-13 PROCEDURE — 99999 PR PBB SHADOW E&M-EST. PATIENT-LVL IV: CPT | Mod: PBBFAC,,, | Performed by: PHYSICIAN ASSISTANT

## 2018-07-13 PROCEDURE — 83880 ASSAY OF NATRIURETIC PEPTIDE: CPT

## 2018-07-13 RX ORDER — LOSARTAN POTASSIUM 25 MG/1
25 TABLET ORAL DAILY
Qty: 30 TABLET | Refills: 3 | Status: SHIPPED | OUTPATIENT
Start: 2018-07-13 | End: 2018-11-12 | Stop reason: SDUPTHER

## 2018-07-13 RX ORDER — METOLAZONE 2.5 MG/1
2.5 TABLET ORAL EVERY OTHER DAY
Qty: 30 TABLET | Refills: 3 | Status: SHIPPED | OUTPATIENT
Start: 2018-07-13 | End: 2018-11-12

## 2018-07-13 NOTE — TELEPHONE ENCOUNTER
Patient stated had not realized return appt was for [facility] location again on 07/16, perfers appt to be chandeg to ONL with anyone in Cardiology.    Appt Dr. Duke at 1320, ONL on 7/16/2018

## 2018-07-13 NOTE — PROGRESS NOTES
Subjective:    Patient ID:  Jennifer Mathews is a 85 y.o. female who presents for follow-up of Follow-up and Edema      HPI   Ms. Mathews is an 84 yo female with a PMH of mitral valve replacement x 3, HTN, PAF, ACS, combined CHF, PPM-CRT-D placement, s/p MVR, edema, pacemaker placement, and CKD who presents today for follow-up. Patient previously seen by Giorgi Alston and had Lasix dosage increased due to BLE edema. She was also noted to have post-generator change hematoma and wound re-check was needed. Patient returns today and states she is doing ok. Complains of increased BLE edema, worse than at last visit. States swelling has gotten so bad that she is now having trouble walking. Associated symptoms include chronic SOB. Denies any chest pain, palpitations, lightheadedness, dizziness, near syncope, or syncope. No PND or orthopnea. Patient reports compliance with her medications. Mindful of salt intake.     Reviewed meds in detail-patient stated she has never taken Metolazone. Not on Entresto currently, plan was to re-add to regimen once she was more compensated. BMP reviewed. Creatinine and electrolytes stable.    Review of Systems   Constitution: Negative for chills, decreased appetite, fever, weakness and malaise/fatigue.   HENT: Negative for congestion, hoarse voice and sore throat.    Eyes: Negative for blurred vision and discharge.   Cardiovascular: Positive for dyspnea on exertion and leg swelling. Negative for chest pain, claudication, cyanosis, irregular heartbeat, near-syncope, orthopnea, palpitations and paroxysmal nocturnal dyspnea.   Respiratory: Positive for shortness of breath. Negative for cough, hemoptysis, snoring, sputum production and wheezing.    Endocrine: Negative for cold intolerance and heat intolerance.   Hematologic/Lymphatic: Negative for bleeding problem. Bruises/bleeds easily.   Skin: Negative for rash.   Musculoskeletal: Positive for arthritis and joint pain. Negative for back  "pain, joint swelling, muscle cramps, muscle weakness and myalgias.   Gastrointestinal: Negative for abdominal pain, constipation, diarrhea, heartburn, melena and nausea.   Genitourinary: Negative for hematuria.   Neurological: Negative for dizziness, focal weakness, headaches, light-headedness, loss of balance, numbness, paresthesias and seizures.   Psychiatric/Behavioral: Negative for memory loss. The patient does not have insomnia.    Allergic/Immunologic: Negative for hives.       BP (!) 136/58   Pulse 71   Ht 5' 2" (1.575 m)   Wt 58.2 kg (128 lb 4.9 oz)   BMI 23.47 kg/m²     Objective:    Physical Exam   Constitutional: She is oriented to person, place, and time. She appears well-developed and well-nourished. No distress.   HENT:   Head: Normocephalic and atraumatic.   Eyes: Pupils are equal, round, and reactive to light. Right eye exhibits no discharge. Left eye exhibits no discharge.   Neck: Neck supple. JVD present. No thyromegaly present.   Cardiovascular: Normal rate, regular rhythm, S1 normal and S2 normal.    Murmur heard.  Pulmonary/Chest: Effort normal and breath sounds normal. No respiratory distress. She has no wheezes. She has no rales.   cabg site well-healed  Incision C/D/I; +hematoma; no drainage   Abdominal: Soft. She exhibits no distension. There is no tenderness. There is no rebound.   Musculoskeletal: She exhibits edema (3+ BLE edema extending to knees).   Neurological: She is alert and oriented to person, place, and time.   Skin: Skin is warm and dry. She is not diaphoretic. No erythema.   Psychiatric: She has a normal mood and affect. Her behavior is normal. Thought content normal.   Nursing note and vitals reviewed.      2D Echo CONCLUSIONS     1 - Severely depressed left ventricular systolic function (EF 25%).     2 - Normal right ventricular systolic function .     3 - Mild aortic regurgitation.     4 - Mitral valve prosthesis.     5 - Moderate tricuspid regurgitation.     6 - " Increased central venous pressure.     7 - Severe left atrial enlargement.     8 - Concentric hypertrophy.     9 - Pulmonary hypertension. The estimated PA systolic pressure is 62 mmHg.       Chemistry        Component Value Date/Time     07/13/2018 0856    K 4.3 07/13/2018 0856     07/13/2018 0856    CO2 30 (H) 07/13/2018 0856    BUN 25 (H) 07/13/2018 0856    CREATININE 1.3 07/13/2018 0856    GLU 98 07/13/2018 0856        Component Value Date/Time    CALCIUM 8.9 07/13/2018 0856    ALKPHOS 60 06/22/2018 1058    AST 27 06/22/2018 1058    ALT 24 06/22/2018 1058    BILITOT 0.6 06/22/2018 1058    ESTGFRAFRICA 43 (A) 07/13/2018 0856    EGFRNONAA 37 (A) 07/13/2018 0856          Assessment:       1. Chronic combined systolic and diastolic CHF (congestive heart failure)    2. Ischemic cardiomyopathy    3. Dyslipidemia    4. Essential hypertension    5. Pacemaker generator end of life    6. S/P MVR (mitral valve replacement)    7. S/P placement of cardiac pacemaker    8. Anticoagulated on Coumadin    9. Bilateral lower extremity edema       Patient returns for f/u. +volume overloaded on exam. Discussed plan of care with Dr. Cary. Will add afterload therapy with Losartan along with metolazone every other day and assess response with plans for close f/u in clinic next week with repeat labs. Patient knows to go to ED if symptoms fail to improve. She is compliant with salt.   Plan:   -Add Losartan 25 mg daily  -Add metolazone 2.5 mg every other day   -Continue Lasix 40 mg BID  -BMP, BNP at [facility] and appt on Monday  -Continue other meds    Chart reviewed. Dr. Cary agrees with plan as outlined above.

## 2018-07-13 NOTE — TELEPHONE ENCOUNTER
----- Message from Ruba Charles sent at 7/13/2018  4:34 PM CDT -----  Contact: pt  She's calling in regards to being worked into schedule  On Monday 7/16  pls call pt back at [phone] (home)

## 2018-07-13 NOTE — PATIENT INSTRUCTIONS
CONTINUE LASIX 40 mg TWICE DAILY (One in AM, one in afternoon)    I added the following:    (1) METOLAZONE 2.5 mg-Take one pill by mouth every OTHER day 30 minutes prior to Lasix dose    (2) Losartan 25 mg-Take one pill by  Mouth daily

## 2018-07-16 ENCOUNTER — HOSPITAL ENCOUNTER (OUTPATIENT)
Dept: RADIOLOGY | Facility: HOSPITAL | Age: 83
Discharge: HOME | End: 2018-07-16
Attending: INTERNAL MEDICINE
Payer: MEDICARE

## 2018-07-16 ENCOUNTER — OFFICE VISIT (OUTPATIENT)
Dept: CARDIOLOGY | Facility: CLINIC | Age: 83
End: 2018-07-16
Payer: MEDICARE

## 2018-07-16 ENCOUNTER — OFFICE VISIT (OUTPATIENT)
Dept: PULMONOLOGY | Facility: CLINIC | Age: 83
End: 2018-07-16
Payer: MEDICARE

## 2018-07-16 ENCOUNTER — TELEPHONE (OUTPATIENT)
Dept: CARDIOLOGY | Facility: CLINIC | Age: 83
End: 2018-07-16

## 2018-07-16 VITALS
BODY MASS INDEX: 23.17 KG/M2 | WEIGHT: 125.88 LBS | SYSTOLIC BLOOD PRESSURE: 140 MMHG | HEART RATE: 72 BPM | HEIGHT: 62 IN | DIASTOLIC BLOOD PRESSURE: 60 MMHG

## 2018-07-16 VITALS
HEIGHT: 62 IN | SYSTOLIC BLOOD PRESSURE: 120 MMHG | WEIGHT: 124 LBS | HEART RATE: 70 BPM | BODY MASS INDEX: 22.82 KG/M2 | OXYGEN SATURATION: 93 % | RESPIRATION RATE: 16 BRPM | DIASTOLIC BLOOD PRESSURE: 80 MMHG

## 2018-07-16 DIAGNOSIS — E78.5 DYSLIPIDEMIA: ICD-10-CM

## 2018-07-16 DIAGNOSIS — I10 ESSENTIAL HYPERTENSION: ICD-10-CM

## 2018-07-16 DIAGNOSIS — J18.9 PNEUMONIA OF RIGHT UPPER LOBE DUE TO INFECTIOUS ORGANISM: Chronic | ICD-10-CM

## 2018-07-16 DIAGNOSIS — I50.42 CHRONIC COMBINED SYSTOLIC AND DIASTOLIC CONGESTIVE HEART FAILURE: Primary | ICD-10-CM

## 2018-07-16 DIAGNOSIS — I50.42 CHRONIC COMBINED SYSTOLIC AND DIASTOLIC CONGESTIVE HEART FAILURE: Primary | Chronic | ICD-10-CM

## 2018-07-16 DIAGNOSIS — R60.0 BILATERAL LOWER EXTREMITY EDEMA: ICD-10-CM

## 2018-07-16 DIAGNOSIS — G47.33 OSA (OBSTRUCTIVE SLEEP APNEA): Chronic | ICD-10-CM

## 2018-07-16 DIAGNOSIS — I25.5 ISCHEMIC CARDIOMYOPATHY: ICD-10-CM

## 2018-07-16 DIAGNOSIS — D63.1 ANEMIA ASSOCIATED WITH STAGE 4 CHRONIC RENAL FAILURE: ICD-10-CM

## 2018-07-16 DIAGNOSIS — I24.9 ACUTE CORONARY SYNDROME: ICD-10-CM

## 2018-07-16 DIAGNOSIS — Z95.810 CARDIAC RESYNCHRONIZATION THERAPY DEFIBRILLATOR (CRT-D) IN PLACE: ICD-10-CM

## 2018-07-16 DIAGNOSIS — N18.4 ANEMIA ASSOCIATED WITH STAGE 4 CHRONIC RENAL FAILURE: ICD-10-CM

## 2018-07-16 DIAGNOSIS — Z95.2 S/P MVR (MITRAL VALVE REPLACEMENT): ICD-10-CM

## 2018-07-16 DIAGNOSIS — I48.0 PAROXYSMAL ATRIAL FIBRILLATION: ICD-10-CM

## 2018-07-16 DIAGNOSIS — Z95.0 S/P PLACEMENT OF CARDIAC PACEMAKER: ICD-10-CM

## 2018-07-16 DIAGNOSIS — I50.42 CHRONIC COMBINED SYSTOLIC AND DIASTOLIC CHF (CONGESTIVE HEART FAILURE): ICD-10-CM

## 2018-07-16 PROCEDURE — 99999 PR PBB SHADOW E&M-EST. PATIENT-LVL III: CPT | Mod: PBBFAC,,, | Performed by: INTERNAL MEDICINE

## 2018-07-16 PROCEDURE — 99213 OFFICE O/P EST LOW 20 MIN: CPT | Mod: PBBFAC,25,27 | Performed by: INTERNAL MEDICINE

## 2018-07-16 PROCEDURE — 99214 OFFICE O/P EST MOD 30 MIN: CPT | Mod: S$PBB,,, | Performed by: INTERNAL MEDICINE

## 2018-07-16 PROCEDURE — 71046 X-RAY EXAM CHEST 2 VIEWS: CPT | Mod: TC

## 2018-07-16 PROCEDURE — 71046 X-RAY EXAM CHEST 2 VIEWS: CPT | Mod: 26,,, | Performed by: RADIOLOGY

## 2018-07-16 PROCEDURE — 99213 OFFICE O/P EST LOW 20 MIN: CPT | Mod: PBBFAC,25 | Performed by: INTERNAL MEDICINE

## 2018-07-16 RX ORDER — LOSARTAN POTASSIUM 25 MG/1
25 TABLET ORAL DAILY
Qty: 30 TABLET | Refills: 6 | Status: CANCELLED | OUTPATIENT
Start: 2018-07-16

## 2018-07-16 NOTE — TELEPHONE ENCOUNTER
Not sure if she needs refill of Losartan? I sent in last week    Does she need an additional refill?

## 2018-07-16 NOTE — PROGRESS NOTES
Subjective:   Patient ID:  Jennifer Mathews is a 85 y.o. female who presents for cardiac consult of No chief complaint on file.      HPI  The patient came in today for cardiac consult of No chief complaint on file.    Jennifer Mathews is an 84 yo female with a medical conditions of mitral valve replacement x 3, HTN, PAF, ACS, combined CHF EF 25%, PPM-CRT-D placement, s/p MVR, edema, pacemaker placement, and CKD who presents for follow up.     5/7/18 Visit  She presented to [facility] ED on 5/4/17 due to abnormal labs. Patient's diuretic regimen had recently been increased due to leg edema/CHF symptoms with resultant bump in creatinine and hyperkalemia. Patient denied any complaints-no chest pain, SOB, leg swelling, PND, or orthopnea. Initial workup in ED revealed creatinine of  2.1 and K of 5.9 and patient subsequently admitted for observation and further monitoring. She was given insulin and D5W, albuterol inhaler, Kayexelate, and gentle hydration. With medical management, impaired function function and hyperkalemia resolved. Patient was asked to resume 40mg Lasix BID and Entresto.    Weighed 108 lbs at home scale upon DC and today weighs 115 lbs. She feels well except for LE swelling which has been a chronic problem, could not tolerate compression stockings.     7/16/18  Pt has been having increasing LE edema and mild dyspena. Pt had metolazone 2.5 mg added 3 days ago and has lost about 5 lbs since. BNP on 7/13 - 1673; (1882 - 3 weeks ago)  Pt had SOB once and took an extra lasix for 2 days and felt better. Prior to this pt had been off metolazone due to JESSICA. She has stopped Entresto as well and is waiting to take later.     Patient is compliant with medications.    2D ECHO 3/2018  CONCLUSIONS     1 - Severely depressed left ventricular systolic function (EF 25%).     2 - Normal right ventricular systolic function .     3 - Mild aortic regurgitation.     4 - Mitral valve prosthesis.     5 - Moderate tricuspid  regurgitation.     6 - Increased central venous pressure.     7 - Severe left atrial enlargement.     8 - Concentric hypertrophy.     9 - Pulmonary hypertension. The estimated PA systolic pressure is 62 mmHg.       Past Medical History:   Diagnosis Date    Acute coronary syndrome     Anemia     Anticoagulated on Coumadin 7/13/2015    Arthritis     Atrial fibrillation     Basal cell carcinoma 10/2015    left neck    Cardiac arrest     Cardiac resynchronization therapy defibrillator (CRT-D) in place 07/13/2015    Pt denies, states it does not shock me    Chronic combined systolic and diastolic congestive heart failure     CKD (chronic kidney disease) stage 3, GFR 30-59 ml/min     Dyslipidemia 1/30/2014    Hyperlipidemia     Hypertension     Ischemic cardiomyopathy 01/30/2014    Macular hole of left eye     Old    Macular hole of left eye     LEV (obstructive sleep apnea) 9/30/2013    Recurrent UTI     Refractive error     Stroke        Past Surgical History:   Procedure Laterality Date    APPENDECTOMY      CARDIAC CATHETERIZATION      CARDIAC PACEMAKER PLACEMENT  2014    CARDIAC VALVE SURGERY      CATARACT EXTRACTION      OU    CHOLECYSTECTOMY      COLONOSCOPY      MITRAL VALVE REPLACEMENT  2014    MITRAL VALVE SURGERY      x3    REPLACEMENT OF PACEMAKER GENERATOR Left 6/20/2018    Procedure: REPLACEMENT, PACEMAKER GENERATOR/pt has crt-p versus d;  Surgeon: Manny Dunne MD;  Location: [facility] CATH LAB;  Service: Cardiology;  Laterality: Left;  st renée device  patient is pacer dependent       Social History   Substance Use Topics    Smoking status: Never Smoker    Smokeless tobacco: Never Used    Alcohol use No       Family History   Problem Relation Age of Onset    Coronary artery disease Mother         mi    Coronary artery disease Father     Diabetes Brother     Cancer Brother     Melanoma Neg Hx     Psoriasis Neg Hx     Lupus Neg Hx     Eczema Neg Hx        Patient's  Medications   New Prescriptions    No medications on file   Previous Medications    ACETAMINOPHEN (TYLENOL EXTRA STRENGTH) 325 MG TABLET    Take 2 tablets (650 mg total) by mouth every 8 (eight) hours.    ALLOPURINOL (ZYLOPRIM) 100 MG TABLET    Take 2 tablets (200 mg total) by mouth once daily.    AMIODARONE (PACERONE) 200 MG TAB    Take 1 tablet (200 mg total) by mouth once daily.    ASPIRIN (ECOTRIN) 81 MG EC TABLET    Take 81 mg by mouth once daily.    CALCIUM CARBONATE (OS-SHERRY) 600 MG CALCIUM (1,500 MG) TAB    Take 600 mg by mouth once.    CARVEDILOL (COREG) 25 MG TABLET    Take 1 tablet (25 mg total) by mouth 2 (two) times daily with meals.    COLCHICINE 0.6 MG CAP    Take 1 capsule (0.6 mg total) by mouth once daily.    CRANBERRY 1,000 MG CAP    Take 1 capsule by mouth Daily.    DIGOXIN (LANOXIN) 125 MCG TABLET    Take 1 tablet (125 mcg total) by mouth once daily. TAKE 1 TABLET (0.125 MG TOTAL) BY MOUTH ONCE DAILY.    FUROSEMIDE (LASIX) 40 MG TABLET    Take 1 tablet (40 mg total) by mouth 2 (two) times daily.    GABAPENTIN (NEURONTIN) 100 MG CAPSULE    TAKE ONE CAPSULE BY MOUTH TWO TIMES DAILY    LACTOBAC 40-BIFIDO 3-S.THERMOP (PROBIOTIC) 100 BILLION CELL CAP    Take 1 capsule by mouth once daily.    LEVOTHYROXINE (SYNTHROID) 50 MCG TABLET    Take 1 tablet (50 mcg total) by mouth once daily.    LOSARTAN (COZAAR) 25 MG TABLET    Take 1 tablet (25 mg total) by mouth once daily.    METOLAZONE (ZAROXOLYN) 2.5 MG TABLET    Take 1 tablet (2.5 mg total) by mouth every other day.    MULTIVITAMIN ORAL    Take 1 tablet by mouth Daily.    POTASSIUM CHLORIDE SA (K-DUR,KLOR-CON) 20 MEQ TABLET    TAKE 1 TABLET (20 MEQ TOTAL) BY MOUTH ONCE DAILY.    WARFARIN (COUMADIN) 2.5 MG TABLET    TAKE 1/2 TABLET ON MONDAY AND WEDNESDAY AND 1 TABLET ALL OTHER DAYS. DISCONTINUE 5MG TABLET.   Modified Medications    No medications on file   Discontinued Medications    No medications on file       Review of Systems   Constitutional:  "Negative.    HENT: Negative.    Eyes: Negative.    Respiratory: Negative for shortness of breath.    Cardiovascular: Positive for leg swelling. Negative for chest pain, palpitations and orthopnea.   Gastrointestinal: Negative.    Genitourinary: Negative.    Musculoskeletal: Negative.    Skin: Negative.    Neurological: Negative.    Endo/Heme/Allergies: Negative.    Psychiatric/Behavioral: Negative.    All 12 systems otherwise negative.      Wt Readings from Last 3 Encounters:   07/16/18 57.1 kg (125 lb 14.1 oz)   07/13/18 58.2 kg (128 lb 4.9 oz)   07/03/18 60.2 kg (132 lb 11.5 oz)     Temp Readings from Last 3 Encounters:   07/03/18 98 °F (36.7 °C) (Tympanic)   06/22/18 97.6 °F (36.4 °C)   06/22/18 98.5 °F (36.9 °C)     BP Readings from Last 3 Encounters:   07/13/18 (!) 136/58   07/03/18 138/60   07/03/18 138/74     Pulse Readings from Last 3 Encounters:   07/13/18 71   07/03/18 72   07/03/18 68       Ht 5' 2" (1.575 m)   Wt 57.1 kg (125 lb 14.1 oz)   BMI 23.02 kg/m²     Objective:   Physical Exam   Constitutional: She is oriented to person, place, and time. She appears well-developed and well-nourished. No distress.   HENT:   Head: Normocephalic and atraumatic.   Nose: Nose normal.   Mouth/Throat: Oropharynx is clear and moist.   Eyes: Conjunctivae and EOM are normal. No scleral icterus.   Neck: Normal range of motion. Neck supple. No JVD present. No thyromegaly present.   Cardiovascular: Normal rate, regular rhythm, S1 normal and S2 normal.  Exam reveals no gallop, no S3, no S4 and no friction rub.    No murmur heard.  Pulmonary/Chest: Effort normal and breath sounds normal. No stridor. No respiratory distress. She has no wheezes. She has no rales. She exhibits no tenderness.   Abdominal: Soft. Bowel sounds are normal. She exhibits no distension and no mass. There is no tenderness. There is no rebound.   Genitourinary:   Genitourinary Comments: Deferred   Musculoskeletal: Normal range of motion. She exhibits no " edema, tenderness or deformity.   Lymphadenopathy:     She has no cervical adenopathy.   Neurological: She is alert and oriented to person, place, and time. She exhibits normal muscle tone. Coordination normal.   Skin: Skin is warm and dry. No rash noted. She is not diaphoretic. No erythema. No pallor.   Psychiatric: She has a normal mood and affect. Her behavior is normal. Judgment and thought content normal.   Nursing note and vitals reviewed.      Lab Results   Component Value Date     07/13/2018    K 4.3 07/13/2018     07/13/2018    CO2 30 (H) 07/13/2018    BUN 25 (H) 07/13/2018    CREATININE 1.3 07/13/2018    GLU 98 07/13/2018    HGBA1C 6.4 (H) 06/20/2006    MG 2.3 06/21/2018    AST 27 06/22/2018    ALT 24 06/22/2018    ALBUMIN 3.1 (L) 06/22/2018    PROT 5.4 (L) 06/22/2018    BILITOT 0.6 06/22/2018    WBC 6.64 07/03/2018    HGB 9.5 (L) 07/03/2018    HCT 33.7 (L) 07/03/2018    MCV 97 07/03/2018     07/03/2018    INR 2.2 07/03/2018    INR 1.2 06/21/2018    INR 2.1 (H) 03/23/2011    TSH 4.831 (H) 04/17/2018    CHOL 155 03/20/2018    HDL 36 (L) 03/20/2018    LDLCALC 90.8 03/20/2018    TRIG 141 03/20/2018    BNP 1,673 (H) 07/13/2018     Assessment:      1. Chronic combined systolic and diastolic congestive heart failure    2. Essential hypertension    3. Dyslipidemia    4. S/P MVR (mitral valve replacement)    5. S/P placement of cardiac pacemaker    6. Paroxysmal atrial fibrillation    7. Cardiac resynchronization therapy defibrillator (CRT-D) in place    8. Ischemic cardiomyopathy    9. Acute coronary syndrome    10. Bilateral lower extremity edema    11. Anemia associated with stage 4 chronic renal failure        Plan:   1. Combined CHF EF 25% s/p CRT  - cont lasix 40 mg BID, cont coreg and losartan - PT held entresto  - extra lasix dose as needed, low salt diet,   - f/u labwork  - schedule for advanced CHF eval for Cardiomems     2. HTN  - cont meds    3. PAF  - cont amio, digoxin, bb,  coumadin  - f/u coumadin clinic    4. HLD  - cont diet controlled    5. CKD4  - cont to monitor with PCP/renal    6. Edema  - sec to venous insuff likely  - will order PlasmaFlow compression device   - discussed to elevate legs and still use compressions stockings if able to  - discussed Venaseal but will opt for conservative tx now    7. CKD  - referred to nephro    Thank you for allowing me to participate in this patient's care. Please do not hesitate to contact me with any questions or concerns. Consult note has been forwarded to the referral physician.

## 2018-07-16 NOTE — ASSESSMENT & PLAN NOTE
Continue APAP of 4 - 20 CMWP. ([facility])     Discussed therapeutic goals for positive airway pressure therapy(CPAP or BiPAP): Ideal is usage 100% of nights for 6 - 8 hours per night. Minimum usage is 70% of night for at least 4 hours per night used. Pateint expressed understanding. All Questions answered.    Complaince evaluation in 12 months.

## 2018-07-16 NOTE — TELEPHONE ENCOUNTER
Patient stated was started on metolazone Friday, weight loss of 5 lbs since Friday.    Followup appt with Dr. Duke today at [facility].

## 2018-07-16 NOTE — PATIENT INSTRUCTIONS
Lung Anatomy  Your lungs take air in to give your body oxygen, which the body needs to work. Your lungs, like all the tissues in your body, are made up of billions of tiny specialized cells. Old lung cells die and are replaced by new, identical lung cells. This natural process helps ensure healthy lungs.    Date Last Reviewed: 11/1/2016  © 8610-1021 [employer]. [address] [place] [address], [place] [address]. All rights reserved. This information is not intended as a substitute for professional medical care. Always follow your healthcare professional's instructions.

## 2018-07-16 NOTE — PROGRESS NOTES
Subjective:      Patient ID: Jennifer Mathews is a 85 y.o. female.  Patient Active Problem List   Diagnosis    LEV (obstructive sleep apnea)    Chronic combined systolic and diastolic congestive heart failure    Edema    Recurrent UTI    Hypertension    Dyslipidemia    S/P MVR (mitral valve replacement)    S/P placement of cardiac pacemaker    Multiple pelvic fractures    Shoulder pain, acute    Coagulopathy    Syncope    Bilateral lower extremity edema    A-fib    Cardiac resynchronization therapy defibrillator (CRT-D) in place    Ischemic cardiomyopathy    Anticoagulated on Coumadin    Osteopenia with high risk of fracture    Osteoarthritis of hand    Pulmonary hypertension due to left heart disease    Primary osteoarthritis involving multiple joints    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    Chief Complaint: Sleep Apnea and Pneumonia        HPI    CXR reviewed with pateint who voiced understanding. Pneumonia resolved.     Patients reports NYHA II dyspnea    The patient does not have currently have symptoms / an exacerbation.       No cough, sputum, or hemoptysis, No shortness or breath and No recent change in breathing.     During exercise, there was no significant desaturation while breathing room air.        She is on AUTOPAP 4 - 20 CMWP. She definitely thinks PAP is beneficial to her health and she wants to  continue with PAP therapy.      Nursery Sleepiness Scale   EPWORTH SLEEPINESS SCALE 7/16/2018 4/20/2018 6/22/2017 6/15/2016 4/13/2016 2/29/2016   Sitting and reading 1 0 0 1 1 3   Watching TV 2 1 1 1 3 3   Sitting, inactive in a public place (e.g. a theatre or a meeting) 0 0 0 0 0 0   As a passenger in a car for an hour without a break 3 0 0 0 0 3   Lying down to rest in the afternoon when circumstances permit 3 2 1 2 0 3   Sitting and talking to someone 0 0 0 0 1 2   Sitting quietly after a lunch without alcohol 2 1 0 2 2 1   In a car, while stopped for a few minutes in traffic 0 0 0 0 0 0   Total score 11 4 2 6 7 15       A full  review of systems, past , family  and social histories was performed except as mentioned in the note above, these are non contributory to the main issues discussed today.       Previous Report Reviewed: office notes and radiology reports     The following portions of the patient's history were reviewed and updated as appropriate: She  has a past medical history of Acute coronary syndrome; Anemia; Anticoagulated on Coumadin (7/13/2015); Arthritis; Atrial fibrillation; Basal cell carcinoma (10/2015); Cardiac arrest; Cardiac resynchronization therapy defibrillator (CRT-D) in place (07/13/2015); Chronic combined systolic and diastolic congestive heart failure; CKD (chronic kidney disease) stage 3, GFR 30-59 ml/min; Dyslipidemia (1/30/2014); Hyperlipidemia; Hypertension; Ischemic cardiomyopathy (01/30/2014); Macular hole of left eye; Macular hole of left eye; LEV (obstructive sleep apnea) (9/30/2013); Recurrent UTI; Refractive error; and Stroke.  She  has a past surgical history that includes Colonoscopy; Mitral valve surgery; Cataract extraction; Cholecystectomy; Appendectomy; Cardiac pacemaker placement (2014); Mitral valve replacement (2014); Cardiac catheterization; Cardiac valuve replacement; and replacement of pacemaker generator (Left, 6/20/2018).  Her family history includes Cancer in  "her brother; Coronary artery disease in her father and mother; Diabetes in her brother.  She  reports that she has never smoked. She has never used smokeless tobacco. She reports that she does not drink alcohol or use drugs.  She has a current medication list which includes the following prescription(s): acetaminophen, allopurinol, amiodarone, aspirin, calcium carbonate, carvedilol, colchicine, cranberry, digoxin, furosemide, gabapentin, lactobac 40-bifido 3-s.thermop, levothyroxine, losartan, metolazone, multivitamin, potassium chloride sa, and warfarin, and the following Facility-Administered Medications: denosumab.  She is allergic to cephalosporins; metaxalone; penicillins; pregabalin; and sulfa (sulfonamide antibiotics)..    Review of Systems   Constitutional: Negative for fever, chills, fatigue and night sweats.   HENT: Negative for nosebleeds, postnasal drip, rhinorrhea, sinus pressure, sore throat, congestion, ear pain and hearing loss.    Eyes: Negative for itching.   Respiratory: Positive for dyspnea on extertion and somnolence. Negative for wheezing, orthopnea and Paroxysmal Nocturnal Dyspnea.    Cardiovascular: Positive for leg swelling. Negative for chest pain and palpitations.   Genitourinary: Negative for difficulty urinating and hematuria.   Endocrine: Negative for cold intolerance and heat intolerance.    Musculoskeletal: Positive for arthralgias and back pain.   Skin: Negative for rash.   Gastrointestinal: Negative for nausea, vomiting, abdominal pain, abdominal distention and acid reflux.   Neurological: Positive for light-headedness. Negative for dizziness, syncope and headaches.   Hematological: Bleeds easily and excessive bruising.   Psychiatric/Behavioral: The patient is nervous/anxious.    All other systems reviewed and are negative.       Objective:   /80   Pulse 70   Resp 16   Ht 5' 2" (1.575 m)   Wt 56.3 kg (124 lb 0.1 oz)   SpO2 (!) 93%   BMI 22.68 kg/m²   Body mass index is " 22.68 kg/m².    Physical Exam   Constitutional: She appears well-developed and well-nourished.   HENT:   Head: Normocephalic and atraumatic.   Eyes: EOM are normal. Pupils are equal, round, and reactive to light.   Neck: Normal range of motion. Neck supple.   Cardiovascular: Normal rate and regular rhythm.    Pulmonary/Chest: Effort normal and breath sounds normal.   Abdominal: Soft. Bowel sounds are normal.   Musculoskeletal: Normal range of motion. She exhibits edema.   Skin: Skin is warm and dry.   Psychiatric: She has a normal mood and affect. Her behavior is normal.   Nursing note and vitals reviewed.      Personal Diagnostic Review  Chest x-ray: Cardiac silhouette and mediastinum unchanged with cardiomegaly present.  Central pulmonary vasculature similar.  Persistent bibasilar opacity consisting of small effusions and adjacent atelectasis and/or airspace disease greater at the left lung base.  Osseous structures are osteopenic.        Assessment / plan:     Discussed diagnosis, its evaluation, treatment and usual course. All questions answered.    Problem List Items Addressed This Visit        Cardiac/Vascular    Chronic combined systolic and diastolic congestive heart failure - Primary    Current Assessment & Plan     Optimize CHF regimen.  Preload and afterload reduction.  Daily weighing.  Dietary salt restriction.  Alcohol and tobacco avoidance.            Other    LEV (obstructive sleep apnea) (Chronic)    Current Assessment & Plan     Continue APAP of 4 - 20 CMWP. ([facility])     Discussed therapeutic goals for positive airway pressure therapy(CPAP or BiPAP): Ideal is usage 100% of nights for 6 - 8 hours per night. Minimum usage is 70% of night for at least 4 hours per night used. Pateint expressed understanding. All Questions answered.    Complaince evaluation in 12 months.                  TIME SPENT WITH PATIENT: Time spent: 25 minutes in face to face  discussion concerning diagnosis, prognosis,  review of lab and test results, benefits of treatment as well as management of disease, counseling of patient and coordination of care between various health  care providers . Greater than half the time spent was used for coordination of care and counseling of patient.     Follow-up in about 1 year (around 7/16/2019) for LEV.

## 2018-07-16 NOTE — TELEPHONE ENCOUNTER
Please do CHF symptom check BMP looks ok, but BNP remains elevated at 1600. Was 1800 three weeks ago

## 2018-07-16 NOTE — ASSESSMENT & PLAN NOTE
Optimize CHF regimen.  Preload and afterload reduction.  Daily weighing.  Dietary salt restriction.  Alcohol and tobacco avoidance.

## 2018-07-17 NOTE — TELEPHONE ENCOUNTER
Spoke with pt who said [facility] didn't call.      ----- Message from Adriana Arizmendi PA-C sent at 7/17/2018  7:28 AM CDT -----  Please find out if she does need a refill of Losartan?    I sent in a prescription when she saw me    THanks

## 2018-07-20 ENCOUNTER — OFFICE VISIT (OUTPATIENT)
Dept: HEMATOLOGY/ONCOLOGY | Facility: CLINIC | Age: 83
End: 2018-07-20
Payer: MEDICARE

## 2018-07-20 ENCOUNTER — LAB VISIT (OUTPATIENT)
Dept: LAB | Facility: HOSPITAL | Age: 83
End: 2018-07-20
Attending: INTERNAL MEDICINE
Payer: MEDICARE

## 2018-07-20 VITALS
WEIGHT: 125.88 LBS | DIASTOLIC BLOOD PRESSURE: 60 MMHG | HEIGHT: 62 IN | OXYGEN SATURATION: 92 % | HEART RATE: 67 BPM | SYSTOLIC BLOOD PRESSURE: 141 MMHG | BODY MASS INDEX: 23.17 KG/M2 | TEMPERATURE: 98 F

## 2018-07-20 DIAGNOSIS — I10 ESSENTIAL HYPERTENSION: ICD-10-CM

## 2018-07-20 DIAGNOSIS — N18.4 ANEMIA ASSOCIATED WITH STAGE 4 CHRONIC RENAL FAILURE: ICD-10-CM

## 2018-07-20 DIAGNOSIS — D50.0 IRON DEFICIENCY ANEMIA DUE TO CHRONIC BLOOD LOSS: ICD-10-CM

## 2018-07-20 DIAGNOSIS — I48.0 PAROXYSMAL ATRIAL FIBRILLATION: ICD-10-CM

## 2018-07-20 DIAGNOSIS — N18.4 CHRONIC RENAL FAILURE, STAGE 4 (SEVERE): ICD-10-CM

## 2018-07-20 DIAGNOSIS — D50.0 IRON DEFICIENCY ANEMIA DUE TO CHRONIC BLOOD LOSS: Primary | ICD-10-CM

## 2018-07-20 DIAGNOSIS — I27.22 PULMONARY HYPERTENSION DUE TO LEFT HEART DISEASE: ICD-10-CM

## 2018-07-20 DIAGNOSIS — D63.1 ANEMIA ASSOCIATED WITH STAGE 4 CHRONIC RENAL FAILURE: ICD-10-CM

## 2018-07-20 LAB
BASOPHILS # BLD AUTO: 0.07 K/UL
BASOPHILS NFR BLD: 1 %
DIFFERENTIAL METHOD: ABNORMAL
EOSINOPHIL # BLD AUTO: 0.1 K/UL
EOSINOPHIL NFR BLD: 1.9 %
ERYTHROCYTE [DISTWIDTH] IN BLOOD BY AUTOMATED COUNT: 25 %
HCT VFR BLD AUTO: 36.1 %
HGB BLD-MCNC: 10.7 G/DL
LYMPHOCYTES # BLD AUTO: 1.7 K/UL
LYMPHOCYTES NFR BLD: 24.3 %
MCH RBC QN AUTO: 29.2 PG
MCHC RBC AUTO-ENTMCNC: 29.6 G/DL
MCV RBC AUTO: 98 FL
MONOCYTES # BLD AUTO: 0.6 K/UL
MONOCYTES NFR BLD: 9.2 %
NEUTROPHILS # BLD AUTO: 4.4 K/UL
NEUTROPHILS NFR BLD: 64 %
PLATELET # BLD AUTO: 211 K/UL
PMV BLD AUTO: 10.2 FL
RBC # BLD AUTO: 3.67 M/UL
WBC # BLD AUTO: 6.87 K/UL

## 2018-07-20 PROCEDURE — 99214 OFFICE O/P EST MOD 30 MIN: CPT | Mod: S$PBB,,, | Performed by: NURSE PRACTITIONER

## 2018-07-20 PROCEDURE — 36415 COLL VENOUS BLD VENIPUNCTURE: CPT

## 2018-07-20 PROCEDURE — 85025 COMPLETE CBC W/AUTO DIFF WBC: CPT

## 2018-07-20 PROCEDURE — 99999 PR PBB SHADOW E&M-EST. PATIENT-LVL III: CPT | Mod: PBBFAC,,, | Performed by: NURSE PRACTITIONER

## 2018-07-20 PROCEDURE — 99213 OFFICE O/P EST LOW 20 MIN: CPT | Mod: PBBFAC | Performed by: NURSE PRACTITIONER

## 2018-07-23 NOTE — PATIENT INSTRUCTIONS

## 2018-07-23 NOTE — PROGRESS NOTES
Subjective:       Patient ID: Jennifer Mathews is a 85 y.o. female.    Chief Complaint: Anemia and Results    85-year-old  female who presents to the [facility]ology [facility]cology Clinic today as a follow-up for anemia, s/p Feraheme.  The patient is followed in the outpatient Hematology Oncology Clinic by Dr. Nabeel Arreguin and I am evaluating the patient today for the 1st time. I have reviewed all the patient's relevant clinical history available medical record have utilized as my evaluation management recommendations today.  The patient was recently admitted to the hospital (6/2018) and was transfused with 1 unit of PRBCs while she was admitted. She continues on Coumadin at this time.  The patient reports that she does have some generalized weakness and fatigue but overall feels better since the feraheme.  She denies any melena, hematochezia, hematemesis, hemoptysis or hematuria.  She denies any chest pain.  She reports shortness of breath with exertion.  She denies any shortness of breath at rest.  She reports chronic swelling in her legs.  This is unchanged.  She denies any nausea, vomiting or abdominal pain. She denies any bowel or urinary complaints.      Review of Systems   Constitutional: Positive for fatigue. Negative for activity change, appetite change, chills, diaphoresis, fever and unexpected weight change.   HENT: Negative for congestion, hearing loss, mouth sores, nosebleeds and trouble swallowing.    Eyes: Negative for discharge and visual disturbance.   Respiratory: Negative for cough, chest tightness and shortness of breath.    Cardiovascular: Negative for chest pain, palpitations and leg swelling.   Gastrointestinal: Positive for abdominal distention. Negative for abdominal pain, blood in stool, constipation, diarrhea, nausea and vomiting.   Endocrine: Negative for cold intolerance and heat intolerance.   Genitourinary: Negative for difficulty urinating, dyspareunia and hematuria.   Musculoskeletal:  Positive for arthralgias, back pain, gait problem and myalgias.   Skin: Negative.    Neurological: Negative for dizziness, weakness, light-headedness and headaches.   Hematological: Negative for adenopathy. Does not bruise/bleed easily.   Psychiatric/Behavioral: Negative for agitation, behavioral problems and confusion. The patient is nervous/anxious.        Medication List with Changes/Refills   Current Medications    ACETAMINOPHEN (TYLENOL EXTRA STRENGTH) 325 MG TABLET    Take 2 tablets (650 mg total) by mouth every 8 (eight) hours.    ALLOPURINOL (ZYLOPRIM) 100 MG TABLET    Take 2 tablets (200 mg total) by mouth once daily.    AMIODARONE (PACERONE) 200 MG TAB    Take 1 tablet (200 mg total) by mouth once daily.    ASPIRIN (ECOTRIN) 81 MG EC TABLET    Take 81 mg by mouth once daily.    CALCIUM CARBONATE (OS-SHERRY) 600 MG CALCIUM (1,500 MG) TAB    Take 600 mg by mouth once.    CARVEDILOL (COREG) 25 MG TABLET    Take 1 tablet (25 mg total) by mouth 2 (two) times daily with meals.    COLCHICINE 0.6 MG CAP    Take 1 capsule (0.6 mg total) by mouth once daily.    CRANBERRY 1,000 MG CAP    Take 1 capsule by mouth Daily.    DIGOXIN (LANOXIN) 125 MCG TABLET    Take 1 tablet (125 mcg total) by mouth once daily. TAKE 1 TABLET (0.125 MG TOTAL) BY MOUTH ONCE DAILY.    FUROSEMIDE (LASIX) 40 MG TABLET    Take 1 tablet (40 mg total) by mouth 2 (two) times daily.    GABAPENTIN (NEURONTIN) 100 MG CAPSULE    TAKE ONE CAPSULE BY MOUTH TWO TIMES DAILY    LACTOBAC 40-BIFIDO 3-S.THERMOP (PROBIOTIC) 100 BILLION CELL CAP    Take 1 capsule by mouth once daily.    LEVOTHYROXINE (SYNTHROID) 50 MCG TABLET    Take 1 tablet (50 mcg total) by mouth once daily.    LOSARTAN (COZAAR) 25 MG TABLET    Take 1 tablet (25 mg total) by mouth once daily.    METOLAZONE (ZAROXOLYN) 2.5 MG TABLET    Take 1 tablet (2.5 mg total) by mouth every other day.    MULTIVITAMIN ORAL    Take 1 tablet by mouth Daily.    POTASSIUM CHLORIDE SA (K-DUR,KLOR-CON) 20 MEQ  TABLET    TAKE 1 TABLET (20 MEQ TOTAL) BY MOUTH ONCE DAILY.    WARFARIN (COUMADIN) 2.5 MG TABLET    TAKE 1/2 TABLET ON MONDAY AND WEDNESDAY AND 1 TABLET ALL OTHER DAYS. DISCONTINUE 5MG TABLET.     Objective:     Vitals:    07/20/18 1053   BP: (!) 141/60   Pulse: 67   Temp: 97.7 °F (36.5 °C)     Physical Exam   Constitutional: She is oriented to person, place, and time. She appears well-developed and well-nourished. No distress.   HENT:   Head: Normocephalic and atraumatic.   Right Ear: Hearing and external ear normal.   Left Ear: Hearing and external ear normal.   Nose: No rhinorrhea or sinus tenderness. Right sinus exhibits no maxillary sinus tenderness and no frontal sinus tenderness. Left sinus exhibits no maxillary sinus tenderness and no frontal sinus tenderness.   Mouth/Throat: Uvula is midline, oropharynx is clear and moist and mucous membranes are normal. No oral lesions.   Eyes: Conjunctivae are normal. Pupils are equal, round, and reactive to light. Right eye exhibits no discharge. Left eye exhibits no discharge.   Neck: Normal range of motion. Carotid bruit is not present. No tracheal deviation present. No thyromegaly present.   Cardiovascular: Normal rate, regular rhythm, S1 normal, S2 normal, normal heart sounds and intact distal pulses.    No murmur heard.  Pulses:       Dorsalis pedis pulses are 2+ on the right side, and 2+ on the left side.   Pulmonary/Chest: Effort normal and breath sounds normal. No respiratory distress.   Abdominal: Soft. Bowel sounds are normal. She exhibits no distension and no mass. There is no tenderness.   Musculoskeletal: Normal range of motion. She exhibits edema. She exhibits no tenderness.   Lymphadenopathy:     She has no cervical adenopathy.        Right: No supraclavicular adenopathy present.        Left: No supraclavicular adenopathy present.   Neurological: She is alert and oriented to person, place, and time. She has normal strength. No sensory deficit. Coordination  and gait normal.   Skin: Skin is warm and dry. Capillary refill takes less than 2 seconds. No rash noted. There is pallor.   Psychiatric: Her speech is normal and behavior is normal. Judgment and thought content normal. Her mood appears anxious. Cognition and memory are normal. She does not exhibit a depressed mood.   Nursing note and vitals reviewed.      Assessment:       Problem List Items Addressed This Visit     Hypertension     Followed by Cardiology, Stable         A-fib     Followed by Cardiology, Stable         Pulmonary hypertension due to left heart disease    Chronic renal failure, stage 4 (severe)    Anemia associated with stage 4 chronic renal failure    Iron deficiency anemia due to chronic blood loss - Primary     S/P Procrit injections, stable         Relevant Orders    CBC auto differential    Comprehensive metabolic panel    Ferritin    Iron and TIBC          Plan:       1) S/P Marquis, has been treated with Procrit in the past.  2) Hgb: 10.7 today, no need for Procrit. Patient has not had a Hgb >10 since 1/2018. This is an improvement for her at this time.  3) Return to clinic in 4 weeks with labs.     I will review assessment/plan with collaborating physician Dr. Maciej Coon.

## 2018-07-27 ENCOUNTER — TELEPHONE (OUTPATIENT)
Dept: CARDIOLOGY | Facility: CLINIC | Age: 83
End: 2018-07-27

## 2018-07-27 NOTE — TELEPHONE ENCOUNTER
----- Message from Rupa Piedra sent at 7/27/2018 10:48 AM CDT -----  Contact: patient  Calling concerning a letter concerning the recall of Valsartan. Please call patient to advise @ [phone]. Thanks, nikhil

## 2018-07-27 NOTE — TELEPHONE ENCOUNTER
Patient is not supposed to be taking Valsartan as of 3/6/18 when started on Entresto which has now been discontinued and she is currently on Losartan 25 mg daily.  Advised her to discard any bottles of Valsartan and Entresto.  Contacted her Pharmacy and had them make all needed updates

## 2018-07-31 ENCOUNTER — ANTI-COAG VISIT (OUTPATIENT)
Dept: CARDIOLOGY | Facility: CLINIC | Age: 83
End: 2018-07-31
Payer: MEDICARE

## 2018-07-31 DIAGNOSIS — Z79.01 LONG TERM (CURRENT) USE OF ANTICOAGULANTS: Primary | ICD-10-CM

## 2018-07-31 LAB — INR PPP: 2.4 (ref 2–3)

## 2018-07-31 PROCEDURE — 85610 PROTHROMBIN TIME: CPT | Mod: PBBFAC

## 2018-07-31 NOTE — PROGRESS NOTES
Patient's INR is therapeutic at 2.4.  No changes in dose.  Recheck in 1 month.  Please call should you have any questions or concerns at [phone] or [phone].

## 2018-08-06 DIAGNOSIS — Z79.01 LONG TERM CURRENT USE OF ANTICOAGULANT THERAPY: ICD-10-CM

## 2018-08-06 RX ORDER — WARFARIN 2.5 MG/1
TABLET ORAL
Qty: 30 TABLET | Refills: 3 | Status: SHIPPED | OUTPATIENT
Start: 2018-08-06 | End: 2018-11-29 | Stop reason: SDUPTHER

## 2018-08-16 ENCOUNTER — OFFICE VISIT (OUTPATIENT)
Dept: CARDIOLOGY | Facility: CLINIC | Age: 83
End: 2018-08-16
Payer: MEDICARE

## 2018-08-16 VITALS
HEIGHT: 62 IN | DIASTOLIC BLOOD PRESSURE: 40 MMHG | SYSTOLIC BLOOD PRESSURE: 124 MMHG | HEART RATE: 72 BPM | WEIGHT: 120.13 LBS | BODY MASS INDEX: 22.11 KG/M2

## 2018-08-16 DIAGNOSIS — Z95.0 S/P PLACEMENT OF CARDIAC PACEMAKER: ICD-10-CM

## 2018-08-16 DIAGNOSIS — Z95.2 S/P MVR (MITRAL VALVE REPLACEMENT): ICD-10-CM

## 2018-08-16 DIAGNOSIS — I50.42 CHRONIC COMBINED SYSTOLIC AND DIASTOLIC CONGESTIVE HEART FAILURE: Primary | ICD-10-CM

## 2018-08-16 DIAGNOSIS — I10 ESSENTIAL HYPERTENSION: ICD-10-CM

## 2018-08-16 DIAGNOSIS — R60.0 LOCALIZED EDEMA: ICD-10-CM

## 2018-08-16 DIAGNOSIS — I24.9 ACUTE CORONARY SYNDROME: ICD-10-CM

## 2018-08-16 DIAGNOSIS — E78.5 DYSLIPIDEMIA: ICD-10-CM

## 2018-08-16 DIAGNOSIS — I48.0 PAROXYSMAL ATRIAL FIBRILLATION: ICD-10-CM

## 2018-08-16 DIAGNOSIS — Z95.810 CARDIAC RESYNCHRONIZATION THERAPY DEFIBRILLATOR (CRT-D) IN PLACE: ICD-10-CM

## 2018-08-16 DIAGNOSIS — G47.33 OSA (OBSTRUCTIVE SLEEP APNEA): Chronic | ICD-10-CM

## 2018-08-16 DIAGNOSIS — I25.5 ISCHEMIC CARDIOMYOPATHY: ICD-10-CM

## 2018-08-16 DIAGNOSIS — Z79.01 ANTICOAGULATED ON COUMADIN: ICD-10-CM

## 2018-08-16 PROCEDURE — 99999 PR PBB SHADOW E&M-EST. PATIENT-LVL III: CPT | Mod: PBBFAC,,, | Performed by: INTERNAL MEDICINE

## 2018-08-16 PROCEDURE — 99213 OFFICE O/P EST LOW 20 MIN: CPT | Mod: PBBFAC | Performed by: INTERNAL MEDICINE

## 2018-08-16 PROCEDURE — 99214 OFFICE O/P EST MOD 30 MIN: CPT | Mod: S$PBB,,, | Performed by: INTERNAL MEDICINE

## 2018-08-16 NOTE — PROGRESS NOTES
Subjective:   Patient ID:  Jennifer Mathews is a 86 y.o. female who presents for cardiac consult of Hypertension and Congestive Heart Failure      HPI  The patient came in today for cardiac consult of Hypertension and Congestive Heart Failure    Jennifer Mathews is an 85 yo female with a medical conditions of mitral valve replacement x 3, HTN, PAF, ACS, combined CHF EF 25%, PPM-CRT-D placement, s/p MVR, edema, pacemaker placement, and CKD who presents for follow up.     5/7/18 Visit  She presented to [facility] ED on 5/4/17 due to abnormal labs. Patient's diuretic regimen had recently been increased due to leg edema/CHF symptoms with resultant bump in creatinine and hyperkalemia. Patient denied any complaints-no chest pain, SOB, leg swelling, PND, or orthopnea. Initial workup in ED revealed creatinine of  2.1 and K of 5.9 and patient subsequently admitted for observation and further monitoring. She was given insulin and D5W, albuterol inhaler, Kayexelate, and gentle hydration. With medical management, impaired function function and hyperkalemia resolved. Patient was asked to resume 40mg Lasix BID and Entresto.    Weighed 108 lbs at home scale upon DC and today weighs 115 lbs. She feels well except for LE swelling which has been a chronic problem, could not tolerate compression stockings.     7/16/18  Pt has been having increasing LE edema and mild dyspena. Pt had metolazone 2.5 mg added 3 days ago and has lost about 5 lbs since. BNP on 7/13 - 1673; (1882 - 3 weeks ago)  Pt had SOB once and took an extra lasix for 2 days and felt better. Prior to this pt had been off metolazone due to JESSICA. She has stopped Entresto as well and is waiting to take later.     8/16/18  Pt has taking metolazone every other day about 30 min prior to lasix. Pt has log of weights with weight on 7/17/18 - 125lbs and today it is 117 lbs on her scale. Breathing has improved as well. She does not want to do have any advanced CHF eval as she does not  want Cardiomems or any other invasive tests. Her main issue today is leg edema as she is unable to get on stockings, had her neighbor wrap her legs but not tight enough. No CP/GAR.     Patient is compliant with medications.    2D ECHO 3/2018  CONCLUSIONS     1 - Severely depressed left ventricular systolic function (EF 25%).     2 - Normal right ventricular systolic function .     3 - Mild aortic regurgitation.     4 - Mitral valve prosthesis.     5 - Moderate tricuspid regurgitation.     6 - Increased central venous pressure.     7 - Severe left atrial enlargement.     8 - Concentric hypertrophy.     9 - Pulmonary hypertension. The estimated PA systolic pressure is 62 mmHg.       Past Medical History:   Diagnosis Date    Acute coronary syndrome     Anemia     Anticoagulated on Coumadin 7/13/2015    Arthritis     Atrial fibrillation     Basal cell carcinoma 10/2015    left neck    Cardiac arrest     Cardiac resynchronization therapy defibrillator (CRT-D) in place 07/13/2015    Pt denies, states it does not shock me    Chronic combined systolic and diastolic congestive heart failure     CKD (chronic kidney disease) stage 3, GFR 30-59 ml/min     Dyslipidemia 1/30/2014    Hyperlipidemia     Hypertension     Ischemic cardiomyopathy 01/30/2014    Macular hole of left eye     Old    Macular hole of left eye     LEV (obstructive sleep apnea) 9/30/2013    Recurrent UTI     Refractive error     Stroke        Past Surgical History:   Procedure Laterality Date    APPENDECTOMY      CARDIAC CATHETERIZATION      CARDIAC PACEMAKER PLACEMENT  2014    CARDIAC VALVE SURGERY      CATARACT EXTRACTION      OU    CHOLECYSTECTOMY      COLONOSCOPY      MITRAL VALVE REPLACEMENT  2014    MITRAL VALVE SURGERY      x3       Social History     Tobacco Use    Smoking status: Never Smoker    Smokeless tobacco: Never Used   Substance Use Topics    Alcohol use: No    Drug use: No       Family History   Problem  Relation Age of Onset    Coronary artery disease Mother         mi    Coronary artery disease Father     Diabetes Brother     Cancer Brother     Melanoma Neg Hx     Psoriasis Neg Hx     Lupus Neg Hx     Eczema Neg Hx        Patient's Medications   New Prescriptions    No medications on file   Previous Medications    ACETAMINOPHEN (TYLENOL EXTRA STRENGTH) 325 MG TABLET    Take 2 tablets (650 mg total) by mouth every 8 (eight) hours.    ALLOPURINOL (ZYLOPRIM) 100 MG TABLET    Take 2 tablets (200 mg total) by mouth once daily.    AMIODARONE (PACERONE) 200 MG TAB    Take 1 tablet (200 mg total) by mouth once daily.    ASPIRIN (ECOTRIN) 81 MG EC TABLET    Take 81 mg by mouth once daily.    CALCIUM CARBONATE (OS-SHERRY) 600 MG CALCIUM (1,500 MG) TAB    Take 600 mg by mouth once.    CARVEDILOL (COREG) 25 MG TABLET    Take 1 tablet (25 mg total) by mouth 2 (two) times daily with meals.    COLCHICINE 0.6 MG CAP    Take 1 capsule (0.6 mg total) by mouth once daily.    CRANBERRY 1,000 MG CAP    Take 1 capsule by mouth Daily.    DIGOXIN (LANOXIN) 125 MCG TABLET    Take 1 tablet (125 mcg total) by mouth once daily. TAKE 1 TABLET (0.125 MG TOTAL) BY MOUTH ONCE DAILY.    FUROSEMIDE (LASIX) 40 MG TABLET    Take 1 tablet (40 mg total) by mouth 2 (two) times daily.    GABAPENTIN (NEURONTIN) 100 MG CAPSULE    TAKE ONE CAPSULE BY MOUTH TWO TIMES DAILY    LACTOBAC 40-BIFIDO 3-S.THERMOP (PROBIOTIC) 100 BILLION CELL CAP    Take 1 capsule by mouth once daily.    LEVOTHYROXINE (SYNTHROID) 50 MCG TABLET    Take 1 tablet (50 mcg total) by mouth once daily.    LOSARTAN (COZAAR) 25 MG TABLET    Take 1 tablet (25 mg total) by mouth once daily.    METOLAZONE (ZAROXOLYN) 2.5 MG TABLET    Take 1 tablet (2.5 mg total) by mouth every other day.    MULTIVITAMIN ORAL    Take 1 tablet by mouth Daily.    POTASSIUM CHLORIDE SA (K-DUR,KLOR-CON) 20 MEQ TABLET    TAKE 1 TABLET (20 MEQ TOTAL) BY MOUTH ONCE DAILY.    WARFARIN (COUMADIN) 2.5 MG TABLET     "TAKE 1/2 TABLET ON MONDAY AND WEDNESDAY AND 1 TABLET ALL OTHER DAYS. DISCONTINUE 5MG TABLET.   Modified Medications    No medications on file   Discontinued Medications    No medications on file       Review of Systems   Constitutional: Negative.    HENT: Negative.    Eyes: Negative.    Respiratory: Negative for shortness of breath.    Cardiovascular: Positive for leg swelling. Negative for chest pain, palpitations and orthopnea.   Gastrointestinal: Negative.    Genitourinary: Negative.    Musculoskeletal: Negative.    Skin: Negative.    Neurological: Negative.    Endo/Heme/Allergies: Negative.    Psychiatric/Behavioral: Negative.    All 12 systems otherwise negative.      Wt Readings from Last 3 Encounters:   08/16/18 54.5 kg (120 lb 2.4 oz)   07/20/18 57.1 kg (125 lb 14.1 oz)   07/16/18 56.3 kg (124 lb 0.1 oz)     Temp Readings from Last 3 Encounters:   07/20/18 97.7 °F (36.5 °C)   07/03/18 98 °F (36.7 °C) (Tympanic)   06/22/18 97.6 °F (36.4 °C)     BP Readings from Last 3 Encounters:   08/16/18 (!) 124/40   07/20/18 (!) 141/60   07/16/18 120/80     Pulse Readings from Last 3 Encounters:   08/16/18 72   07/20/18 67   07/16/18 70       BP (!) 124/40   Pulse 72   Ht 5' 2" (1.575 m)   Wt 54.5 kg (120 lb 2.4 oz)   BMI 21.98 kg/m²     Objective:   Physical Exam   Constitutional: She is oriented to person, place, and time. She appears well-developed and well-nourished. No distress.   HENT:   Head: Normocephalic and atraumatic.   Nose: Nose normal.   Mouth/Throat: Oropharynx is clear and moist.   Eyes: Conjunctivae and EOM are normal. No scleral icterus.   Neck: Normal range of motion. Neck supple. No JVD present. No thyromegaly present.   Cardiovascular: Normal rate, regular rhythm, S1 normal and S2 normal. Exam reveals no gallop, no S3, no S4 and no friction rub.   No murmur heard.  Pulmonary/Chest: Effort normal and breath sounds normal. No stridor. No respiratory distress. She has no wheezes. She has no rales. " She exhibits no tenderness.   Abdominal: Soft. Bowel sounds are normal. She exhibits no distension and no mass. There is no tenderness. There is no rebound.   Genitourinary:   Genitourinary Comments: Deferred   Musculoskeletal: Normal range of motion. She exhibits no edema, tenderness or deformity.   Lymphadenopathy:     She has no cervical adenopathy.   Neurological: She is alert and oriented to person, place, and time. She exhibits normal muscle tone. Coordination normal.   Skin: Skin is warm and dry. No rash noted. She is not diaphoretic. No erythema. No pallor.   Psychiatric: She has a normal mood and affect. Her behavior is normal. Judgment and thought content normal.   Nursing note and vitals reviewed.      Lab Results   Component Value Date     07/16/2018    K 4.5 07/16/2018     07/16/2018    CO2 31 (H) 07/16/2018    BUN 32 (H) 07/16/2018    CREATININE 1.3 07/16/2018    GLU 94 07/16/2018    HGBA1C 6.4 (H) 06/20/2006    MG 2.2 07/16/2018    AST 27 06/22/2018    ALT 24 06/22/2018    ALBUMIN 3.1 (L) 06/22/2018    PROT 5.4 (L) 06/22/2018    BILITOT 0.6 06/22/2018    WBC 6.87 07/20/2018    HGB 10.7 (L) 07/20/2018    HCT 36.1 (L) 07/20/2018    MCV 98 07/20/2018     07/20/2018    INR 2.4 07/31/2018    INR 1.2 06/21/2018    INR 2.1 (H) 03/23/2011    TSH 4.831 (H) 04/17/2018    CHOL 155 03/20/2018    HDL 36 (L) 03/20/2018    LDLCALC 90.8 03/20/2018    TRIG 141 03/20/2018    BNP 1,878 (H) 07/16/2018     Assessment:      1. Chronic combined systolic and diastolic congestive heart failure    2. Essential hypertension    3. Dyslipidemia    4. S/P MVR (mitral valve replacement)    5. S/P placement of cardiac pacemaker    6. Paroxysmal atrial fibrillation    7. Cardiac resynchronization therapy defibrillator (CRT-D) in place    8. Ischemic cardiomyopathy    9. Acute coronary syndrome    10. Anticoagulated on Coumadin    11. LEV (obstructive sleep apnea)    12. Localized edema        Plan:   1. Combined  CHF EF 25% s/p CRT  - cont lasix 40 mg BID, cont coreg and losartan - PT held entresto, still not taking  - extra lasix dose as needed, low salt diet   - cont metolazone QOD    2. HTN  - cont meds    3. PAF  - cont amio, digoxin, bb, coumadin  - f/u coumadin clinic    4. HLD  - cont diet controlled    5. CKD4  - cont to monitor with PCP/renal    6. Edema  - sec to venous insuff likely  - will order PlasmaFlow compression device - pt did not receive yet  - discussed to elevate legs and still use compressions stockings if able to or wrap legs with ACE bandages   - discussed Venaseal but will opt for conservative tx now    7. CKD  - referred to nephro    Thank you for allowing me to participate in this patient's care. Please do not hesitate to contact me with any questions or concerns. Consult note has been forwarded to the referral physician.

## 2018-08-20 ENCOUNTER — OFFICE VISIT (OUTPATIENT)
Dept: NEPHROLOGY | Facility: CLINIC | Age: 83
End: 2018-08-20
Payer: MEDICARE

## 2018-08-20 VITALS
WEIGHT: 119.94 LBS | DIASTOLIC BLOOD PRESSURE: 50 MMHG | SYSTOLIC BLOOD PRESSURE: 118 MMHG | HEIGHT: 62 IN | BODY MASS INDEX: 22.07 KG/M2 | HEART RATE: 76 BPM

## 2018-08-20 DIAGNOSIS — N18.30 CKD (CHRONIC KIDNEY DISEASE) STAGE 3, GFR 30-59 ML/MIN: Primary | ICD-10-CM

## 2018-08-20 PROCEDURE — 99999 PR PBB SHADOW E&M-EST. PATIENT-LVL III: CPT | Mod: PBBFAC,,, | Performed by: INTERNAL MEDICINE

## 2018-08-20 PROCEDURE — 99205 OFFICE O/P NEW HI 60 MIN: CPT | Mod: S$PBB,,, | Performed by: INTERNAL MEDICINE

## 2018-08-20 PROCEDURE — 99213 OFFICE O/P EST LOW 20 MIN: CPT | Mod: PBBFAC | Performed by: INTERNAL MEDICINE

## 2018-08-20 NOTE — PROGRESS NOTES
"Subjective:       Patient ID: Jennifer Mathews is a 86 y.o. White female who presents for follow-up evaluation of No chief complaint on file.    Edema   Pertinent negatives include no abdominal pain, arthralgias, chest pain, chills, coughing, diaphoresis, fatigue, fever, headaches, joint swelling, nausea, neck pain, numbness, rash, vomiting or weakness.   Hypertension   Pertinent negatives include no chest pain, headaches, neck pain, palpitations or shortness of breath.   Arthritis   She reports no joint swelling. Pertinent negatives include no diarrhea, dysuria, fatigue, fever or rash.   Congestive Heart Failure   Pertinent negatives include no abdominal pain, chest pain, fatigue, palpitations or shortness of breath.       Patient was last seen by Dr. Zabala on 3/17/15.    Patient today reports generalized joint pain and LE edema. No other issues.    Counts as "New Patient" for me.     Review of Systems   Constitutional: Negative for appetite change, chills, diaphoresis, fatigue and fever.   HENT: Negative for ear discharge, hearing loss and postnasal drip.    Eyes: Negative for visual disturbance.   Respiratory: Negative for apnea, cough, chest tightness, shortness of breath, wheezing and stridor.    Cardiovascular: Positive for leg swelling. Negative for chest pain and palpitations.   Gastrointestinal: Negative for abdominal distention, abdominal pain, blood in stool, diarrhea, nausea and vomiting.   Genitourinary: Negative for decreased urine volume, difficulty urinating, dyspareunia, dysuria, enuresis, flank pain, frequency, genital sores, hematuria, pelvic pain, urgency and vaginal discharge.   Musculoskeletal: Positive for arthritis. Negative for arthralgias, back pain, gait problem, joint swelling, neck pain and neck stiffness.   Skin: Negative for color change and rash.   Neurological: Negative for dizziness, tremors, seizures, syncope, weakness, light-headedness, numbness and headaches.   Hematological: " "Does not bruise/bleed easily.   Psychiatric/Behavioral: Negative for agitation, confusion, sleep disturbance and suicidal ideas.       Objective:       Vitals:    08/20/18 1007   BP: (!) 118/50   Pulse: 76   Weight: 54.4 kg (119 lb 14.9 oz)   Height: 5' 2" (1.575 m)       Physical Exam   Constitutional: She is oriented to person, place, and time. She appears well-developed and well-nourished.   HENT:   Head: Normocephalic and atraumatic.   Eyes: Conjunctivae and EOM are normal. Pupils are equal, round, and reactive to light.   Neck: Normal range of motion and full passive range of motion without pain. Neck supple. Carotid bruit is not present. No edema present. No thyroid mass and no thyromegaly present.   Cardiovascular: Normal rate, regular rhythm, S1 normal, S2 normal, intact distal pulses and normal pulses. PMI is not displaced. Exam reveals no gallop and no friction rub.   Murmur heard.  Loud P2    Pulmonary/Chest: Effort normal and breath sounds normal. No accessory muscle usage. No respiratory distress. She has no wheezes. She has no rales. She exhibits no tenderness.   Abdominal: Soft. Bowel sounds are normal. She exhibits no distension and no mass. There is no hepatosplenomegaly. There is no tenderness. There is no rebound and no CVA tenderness. No hernia.   Musculoskeletal: She exhibits edema. She exhibits no tenderness.        Neurological: She is alert and oriented to person, place, and time. She has normal reflexes. No cranial nerve deficit or sensory deficit. She exhibits normal muscle tone. Coordination normal.   Skin: Skin is warm and dry. No bruising, no ecchymosis and no rash noted. No cyanosis or erythema. No pallor. Nails show no clubbing.   Psychiatric: She has a normal mood and affect. Her speech is normal and behavior is normal. Judgment and thought content normal.       Lab Results   Component Value Date    WBC 6.87 07/20/2018    HGB 10.7 (L) 07/20/2018    HCT 36.1 (L) 07/20/2018    MCV 98 " 07/20/2018     07/20/2018     Lab Results   Component Value Date    CREATININE 1.3 07/16/2018    BUN 32 (H) 07/16/2018     07/16/2018    K 4.5 07/16/2018     07/16/2018    CO2 31 (H) 07/16/2018     Lab Results   Component Value Date    PTH 56.0 12/27/2013    CALCIUM 9.3 07/16/2018    PHOS 3.3 06/21/2018    PHOS 3.3 06/21/2018     Lab Results   Component Value Date    ALBUMIN 3.1 (L) 06/22/2018       Urinalysis: no protein, rin blood (5/5/18).         Assessment:       No diagnosis found.    Plan:           1.  Edema with CHF : Continue Lasix/metolazone. Followed by Dr. Cary. Compensated at present.     2. Chronic kidney disease stage III: Last creatinine at 1.3. eGFR 37. Stable. No proteinuria/hemturia. Avoids NSAIDs. Patient on Tylenol at present.     3. Severe arthritis: pain control with Tylenol.     4. Atrial fibrillation: Digoxin, coumadin. Followed by Dr. Cary.     5. Hypertension well controlled BP.     6. Electrolytes: at goal.    7. Meds: reviewed. Continue Losartan.                 Follow up in 5 months

## 2018-08-27 DIAGNOSIS — M81.0 OSTEOPOROSIS, SENILE: ICD-10-CM

## 2018-08-27 DIAGNOSIS — M1A.39X1 CHRONIC GOUT DUE TO RENAL IMPAIRMENT OF MULTIPLE SITES WITH TOPHUS: ICD-10-CM

## 2018-08-27 RX ORDER — ALLOPURINOL 100 MG/1
200 TABLET ORAL DAILY
Qty: 180 TABLET | Refills: 1 | Status: SHIPPED | OUTPATIENT
Start: 2018-08-27 | End: 2019-02-13 | Stop reason: SDUPTHER

## 2018-08-28 ENCOUNTER — ANTI-COAG VISIT (OUTPATIENT)
Dept: CARDIOLOGY | Facility: CLINIC | Age: 83
End: 2018-08-28
Payer: MEDICARE

## 2018-08-28 DIAGNOSIS — Z79.01 LONG TERM (CURRENT) USE OF ANTICOAGULANTS: Primary | ICD-10-CM

## 2018-08-28 LAB — INR PPP: 2.1 (ref 2–3)

## 2018-08-28 PROCEDURE — 85610 PROTHROMBIN TIME: CPT | Mod: PBBFAC

## 2018-08-28 NOTE — PROGRESS NOTES
Patient's INR is therapeutic at 2.1.  No changes in dose.  Recheck in 1 month.  Please call should you have any questions or concerns at [phone] or [phone].

## 2018-09-03 RX ORDER — FUROSEMIDE 40 MG/1
TABLET ORAL
Qty: 60 TABLET | Refills: 0 | OUTPATIENT
Start: 2018-09-03

## 2018-09-18 ENCOUNTER — TELEPHONE (OUTPATIENT)
Dept: CARDIOLOGY | Facility: CLINIC | Age: 83
End: 2018-09-18

## 2018-09-18 NOTE — TELEPHONE ENCOUNTER
Returned patient call regarding boots ordered for her.  States that she has not received them, and is wanting update.  No information found in chart relating to pressure boots.  Will consult with Dr. Srikanth gonzalez.

## 2018-09-18 NOTE — TELEPHONE ENCOUNTER
----- Message from Trang Perez sent at 9/18/2018 10:09 AM CDT -----  Contact: self  Pt states she was suppose to receive hump boots in the mail. Pt has not received them yet and was wondering the status. Please call back at [phone].    Thanks,   Trang Perez

## 2018-09-21 ENCOUNTER — CLINICAL SUPPORT (OUTPATIENT)
Dept: CARDIOLOGY | Facility: CLINIC | Age: 83
End: 2018-09-21
Attending: INTERNAL MEDICINE
Payer: MEDICARE

## 2018-09-21 DIAGNOSIS — I48.20 CHRONIC ATRIAL FIBRILLATION: ICD-10-CM

## 2018-09-21 DIAGNOSIS — Z95.0 CARDIAC PACEMAKER IN SITU: ICD-10-CM

## 2018-09-21 DIAGNOSIS — I25.5 ISCHEMIC CARDIOMYOPATHY: ICD-10-CM

## 2018-09-21 PROCEDURE — 93281 PM DEVICE PROGR EVAL MULTI: CPT | Mod: PBBFAC,PO | Performed by: INTERNAL MEDICINE

## 2018-09-21 PROCEDURE — 93290 INTERROG DEV EVAL ICPMS IP: CPT | Mod: 26,S$PBB,, | Performed by: INTERNAL MEDICINE

## 2018-09-21 NOTE — PROGRESS NOTES
Dr. Schulz inspected pocket (pt c/o device near surface), it is in good repair.  Interrogation reviewed, no changes at this time.    Please see completed report under cardio proc tab.

## 2018-09-24 PROBLEM — G89.18 POST-OP PAIN: Status: RESOLVED | Noted: 2018-06-20 | Resolved: 2018-09-24

## 2018-09-25 ENCOUNTER — CLINICAL SUPPORT (OUTPATIENT)
Dept: INTERNAL MEDICINE | Facility: CLINIC | Age: 83
End: 2018-09-25
Payer: MEDICARE

## 2018-09-25 ENCOUNTER — ANTI-COAG VISIT (OUTPATIENT)
Dept: CARDIOLOGY | Facility: CLINIC | Age: 83
End: 2018-09-25
Payer: MEDICARE

## 2018-09-25 DIAGNOSIS — Z79.01 LONG TERM (CURRENT) USE OF ANTICOAGULANTS: Primary | ICD-10-CM

## 2018-09-25 LAB — INR PPP: 3 (ref 2–3)

## 2018-09-25 PROCEDURE — 90662 IIV NO PRSV INCREASED AG IM: CPT | Mod: PBBFAC

## 2018-09-25 PROCEDURE — 85610 PROTHROMBIN TIME: CPT | Mod: PBBFAC

## 2018-09-25 NOTE — PROGRESS NOTES
Patient's INR is therapeutic at 3.0.  Reported no changes in medication regimen.  Instructed to eat a dark leafy vegetable today to lower INR.  No changes in dose.  Recheck in 1 month.  Please call should you have any questions or concerns at [phone] or [phone].

## 2018-10-17 ENCOUNTER — LAB VISIT (OUTPATIENT)
Dept: LAB | Facility: HOSPITAL | Age: 83
End: 2018-10-17
Attending: NURSE PRACTITIONER
Payer: MEDICARE

## 2018-10-17 DIAGNOSIS — D50.0 IRON DEFICIENCY ANEMIA DUE TO CHRONIC BLOOD LOSS: ICD-10-CM

## 2018-10-17 LAB
ALBUMIN SERPL BCP-MCNC: 3.8 G/DL
ALP SERPL-CCNC: 81 U/L
ALT SERPL W/O P-5'-P-CCNC: 20 U/L
ANION GAP SERPL CALC-SCNC: 11 MMOL/L
AST SERPL-CCNC: 25 U/L
BASOPHILS # BLD AUTO: 0.07 K/UL
BASOPHILS NFR BLD: 1.1 %
BILIRUB SERPL-MCNC: 0.7 MG/DL
BUN SERPL-MCNC: 70 MG/DL
CALCIUM SERPL-MCNC: 9.3 MG/DL
CHLORIDE SERPL-SCNC: 98 MMOL/L
CO2 SERPL-SCNC: 31 MMOL/L
CREAT SERPL-MCNC: 2.4 MG/DL
DIFFERENTIAL METHOD: ABNORMAL
EOSINOPHIL # BLD AUTO: 0.1 K/UL
EOSINOPHIL NFR BLD: 1.9 %
ERYTHROCYTE [DISTWIDTH] IN BLOOD BY AUTOMATED COUNT: 17.5 %
EST. GFR  (AFRICAN AMERICAN): 20.5 ML/MIN/1.73 M^2
EST. GFR  (NON AFRICAN AMERICAN): 17.7 ML/MIN/1.73 M^2
FERRITIN SERPL-MCNC: 91 NG/ML
GLUCOSE SERPL-MCNC: 132 MG/DL
HCT VFR BLD AUTO: 32.3 %
HGB BLD-MCNC: 10 G/DL
IMM GRANULOCYTES # BLD AUTO: 0.03 K/UL
IMM GRANULOCYTES NFR BLD AUTO: 0.5 %
IRON SERPL-MCNC: 59 UG/DL
LYMPHOCYTES # BLD AUTO: 1.5 K/UL
LYMPHOCYTES NFR BLD: 24.6 %
MCH RBC QN AUTO: 32.9 PG
MCHC RBC AUTO-ENTMCNC: 31 G/DL
MCV RBC AUTO: 106 FL
MONOCYTES # BLD AUTO: 0.6 K/UL
MONOCYTES NFR BLD: 9.4 %
NEUTROPHILS # BLD AUTO: 3.9 K/UL
NEUTROPHILS NFR BLD: 62.5 %
NRBC BLD-RTO: 0 /100 WBC
PLATELET # BLD AUTO: 207 K/UL
PMV BLD AUTO: 11.8 FL
POTASSIUM SERPL-SCNC: 4.4 MMOL/L
PROT SERPL-MCNC: 6.7 G/DL
RBC # BLD AUTO: 3.04 M/UL
SATURATED IRON: 14 %
SODIUM SERPL-SCNC: 140 MMOL/L
TOTAL IRON BINDING CAPACITY: 411 UG/DL
TRANSFERRIN SERPL-MCNC: 278 MG/DL
WBC # BLD AUTO: 6.26 K/UL

## 2018-10-17 PROCEDURE — 85025 COMPLETE CBC W/AUTO DIFF WBC: CPT

## 2018-10-17 PROCEDURE — 80053 COMPREHEN METABOLIC PANEL: CPT

## 2018-10-17 PROCEDURE — 83540 ASSAY OF IRON: CPT

## 2018-10-17 PROCEDURE — 82728 ASSAY OF FERRITIN: CPT

## 2018-10-17 PROCEDURE — 36415 COLL VENOUS BLD VENIPUNCTURE: CPT | Mod: PO

## 2018-10-23 ENCOUNTER — ANTI-COAG VISIT (OUTPATIENT)
Dept: CARDIOLOGY | Facility: CLINIC | Age: 83
End: 2018-10-23
Payer: MEDICARE

## 2018-10-23 ENCOUNTER — TELEPHONE (OUTPATIENT)
Dept: CARDIOLOGY | Facility: CLINIC | Age: 83
End: 2018-10-23

## 2018-10-23 ENCOUNTER — OFFICE VISIT (OUTPATIENT)
Dept: HEMATOLOGY/ONCOLOGY | Facility: CLINIC | Age: 83
End: 2018-10-23
Payer: MEDICARE

## 2018-10-23 VITALS
BODY MASS INDEX: 22.31 KG/M2 | DIASTOLIC BLOOD PRESSURE: 63 MMHG | SYSTOLIC BLOOD PRESSURE: 141 MMHG | HEIGHT: 62 IN | HEART RATE: 69 BPM | TEMPERATURE: 97 F | OXYGEN SATURATION: 95 % | WEIGHT: 121.25 LBS

## 2018-10-23 DIAGNOSIS — N18.4 CHRONIC RENAL FAILURE, STAGE 4 (SEVERE): ICD-10-CM

## 2018-10-23 DIAGNOSIS — I50.42 CHRONIC COMBINED SYSTOLIC AND DIASTOLIC CONGESTIVE HEART FAILURE: Primary | ICD-10-CM

## 2018-10-23 DIAGNOSIS — D50.0 IRON DEFICIENCY ANEMIA DUE TO CHRONIC BLOOD LOSS: Primary | ICD-10-CM

## 2018-10-23 DIAGNOSIS — I48.0 PAROXYSMAL ATRIAL FIBRILLATION: ICD-10-CM

## 2018-10-23 DIAGNOSIS — Z79.01 LONG TERM (CURRENT) USE OF ANTICOAGULANTS: Primary | ICD-10-CM

## 2018-10-23 DIAGNOSIS — N18.4 ANEMIA ASSOCIATED WITH STAGE 4 CHRONIC RENAL FAILURE: ICD-10-CM

## 2018-10-23 DIAGNOSIS — I27.22 PULMONARY HYPERTENSION DUE TO LEFT HEART DISEASE: ICD-10-CM

## 2018-10-23 DIAGNOSIS — I10 ESSENTIAL HYPERTENSION: ICD-10-CM

## 2018-10-23 DIAGNOSIS — D63.1 ANEMIA ASSOCIATED WITH STAGE 4 CHRONIC RENAL FAILURE: ICD-10-CM

## 2018-10-23 LAB — INR PPP: 2.5 (ref 2–3)

## 2018-10-23 PROCEDURE — 99213 OFFICE O/P EST LOW 20 MIN: CPT | Mod: PBBFAC | Performed by: INTERNAL MEDICINE

## 2018-10-23 PROCEDURE — 99215 OFFICE O/P EST HI 40 MIN: CPT | Mod: S$PBB,,, | Performed by: INTERNAL MEDICINE

## 2018-10-23 PROCEDURE — 85610 PROTHROMBIN TIME: CPT | Mod: PBBFAC

## 2018-10-23 PROCEDURE — 99999 PR PBB SHADOW E&M-EST. PATIENT-LVL III: CPT | Mod: PBBFAC,,, | Performed by: INTERNAL MEDICINE

## 2018-10-23 NOTE — TELEPHONE ENCOUNTER
I wasn't aware of the bump in her BUN/Cr. Labs were checked by another provider. Please have her stop the Metolazone and only use PRN. The lasix can be cut to once daily.Hold Lasix today and tomorrow.  She needs to hydrate herself and repeat BMP on Friday

## 2018-10-23 NOTE — PROGRESS NOTES
Patient's INR is therapeutic at 2.5.  Instructed to maintain current dose of 2.5 mg on Mon, Wed and Fri; and 3.75 mg on all other days per dose calendar given.  Recheck in 1 month.

## 2018-10-23 NOTE — PROGRESS NOTES
Hematology/Oncology Office Note    CC:  The anemia    Referred by:  No ref. provider found    Diagnosis:  Multifactorial anemia  Chronic kidney disease  Iron deficiency    86-year-old female followed in the Hematology/Oncology Clinic by Dr. Coon for iron deficiency anemia and anemia related to chronic kidney disease.  Patient has no new complaints today and presents today to discuss labs.      Past Medical History:   Diagnosis Date    Acute coronary syndrome     Anemia     Anticoagulated on Coumadin 7/13/2015    Arthritis     Atrial fibrillation     Basal cell carcinoma 10/2015    left neck    Cardiac arrest     Cardiac resynchronization therapy defibrillator (CRT-D) in place 07/13/2015    Pt denies, states it does not shock me    Chronic combined systolic and diastolic congestive heart failure     CKD (chronic kidney disease) stage 3, GFR 30-59 ml/min     Dyslipidemia 1/30/2014    Hyperlipidemia     Hypertension     Ischemic cardiomyopathy 01/30/2014    Macular hole of left eye     Old    Macular hole of left eye     LEV (obstructive sleep apnea) 9/30/2013    Recurrent UTI     Refractive error     Stroke          Social History:  No tobacco, alcohol, or illicit drugs      Family History: family history includes Cancer in her brother; Coronary artery disease in her father and mother; Diabetes in her brother.      HPI  Review of Systems   Constitutional: Positive for activity change. Negative for appetite change, chills, diaphoresis, fatigue, fever and unexpected weight change.   HENT: Negative for congestion, dental problem, ear discharge, ear pain, hearing loss, mouth sores, postnasal drip, sinus pressure, sore throat, tinnitus, trouble swallowing and voice change.    Respiratory: Positive for shortness of breath. Negative for apnea, cough, choking and chest tightness.    Cardiovascular: Positive for leg swelling. Negative for chest pain and palpitations.   Gastrointestinal: Negative for  abdominal distention, abdominal pain, blood in stool, constipation, diarrhea, nausea and vomiting.   Genitourinary: Negative for difficulty urinating, dyspareunia, dysuria, enuresis, flank pain, frequency, hematuria, pelvic pain and urgency.   Musculoskeletal: Negative for arthralgias, back pain, gait problem, joint swelling and myalgias.   Skin: Negative for pallor and rash.   Neurological: Positive for weakness. Negative for dizziness, syncope, light-headedness, numbness and headaches.   Hematological: Negative for adenopathy. Does not bruise/bleed easily.   Psychiatric/Behavioral: Negative for agitation, confusion and dysphoric mood. The patient is not nervous/anxious.        Objective:      Physical Exam   Constitutional: She is oriented to person, place, and time. She appears well-developed and well-nourished.   HENT:   Head: Normocephalic and atraumatic.   Right Ear: Hearing and external ear normal.   Left Ear: Hearing and external ear normal.   Nose: Nose normal. No rhinorrhea or sinus tenderness. Right sinus exhibits no maxillary sinus tenderness and no frontal sinus tenderness. Left sinus exhibits no maxillary sinus tenderness and no frontal sinus tenderness.   Mouth/Throat: Uvula is midline, oropharynx is clear and moist and mucous membranes are normal. No oral lesions. No oropharyngeal exudate.   Eyes: Conjunctivae are normal. Pupils are equal, round, and reactive to light. Right eye exhibits no discharge. Left eye exhibits no discharge. No scleral icterus.   Neck: Normal range of motion. Carotid bruit is not present. No tracheal deviation present. No thyromegaly present.   Cardiovascular: Normal rate, S1 normal, S2 normal and intact distal pulses. Exam reveals no friction rub.   No murmur heard.  Pulses:       Dorsalis pedis pulses are 2+ on the right side, and 2+ on the left side.   Pulmonary/Chest: Effort normal and breath sounds normal. No stridor. No respiratory distress. She has no wheezes. She has  no rales.   Abdominal: Soft. Bowel sounds are normal. She exhibits no distension and no mass. There is no tenderness. There is no rebound. No hernia.   Musculoskeletal: Normal range of motion. She exhibits edema. She exhibits no tenderness or deformity.   Lymphadenopathy:        Right: No supraclavicular adenopathy present.        Left: No supraclavicular adenopathy present.   Neurological: She is alert and oriented to person, place, and time. She has normal strength. No sensory deficit. Coordination and gait normal.   Skin: Skin is warm, dry and intact. Capillary refill takes less than 2 seconds. No abrasion, no bruising and no rash noted. She is not diaphoretic. No pallor.   Psychiatric: Her speech is normal and behavior is normal. Judgment and thought content normal. Cognition and memory are normal.   Nursing note and vitals reviewed.        Lab Results   Component Value Date    WBC 6.26 10/17/2018    HGB 10.0 (L) 10/17/2018    HCT 32.3 (L) 10/17/2018     (H) 10/17/2018     10/17/2018     Lab Results   Component Value Date    CREATININE 2.4 (H) 10/17/2018    BUN 70 (H) 10/17/2018     10/17/2018    K 4.4 10/17/2018    CL 98 10/17/2018    CO2 31 (H) 10/17/2018     Lab Results   Component Value Date    ALT 20 10/17/2018    AST 25 10/17/2018    ALKPHOS 81 10/17/2018    BILITOT 0.7 10/17/2018         Assessment:       86-year-old female followed by Dr. Coon in the Hematology/Oncology Clinic for iron deficiency anemia and anemia related to chronic kidney disease.  The labs performed last week show mild iron deficiency as evidence by 14% iron saturation and acute kidney injury which is likely related to diuresis.  The patient will reduce Lasix to 40 mg once a day and she will follow up with Cardiology.  We will replete iron with Feraheme 510 mg IV x2 doses.  She will follow up with nurse practitioner in 1 month with repeat CBC, CMP, and iron studies.  Plan to restart Procrit if hemoglobin falls  below 10 with adequate iron stores.      Multifactorial anemia secondary to chronic kidney disease/iron deficiency:    --Feraheme 510 mg IV x2 doses  --follow-up in 1 month the with repeat CBC/iron studies      JESSICA: CR 1.4 to 2.4   --decrease Lasix to 40 mg once a day and follow-up with cardiology/renal clinic

## 2018-10-23 NOTE — TELEPHONE ENCOUNTER
The patient has been notified of this information and all questions answered.      Pt states she has already taken Lasix and Metolazone today. I advised pt to hold Lasix tomorrow and Thursday and we will repeat labs on Friday. I have scheduled repeat labs on Friday 10/26/18 [facility].

## 2018-10-23 NOTE — TELEPHONE ENCOUNTER
Returned pt call states she saw Dr. Robb Huerta today states he saw her labs that were done on 10/17/18. Pt stated she was told her kidney function isn't where they want it to be states she was informed to take Lasix 40 mg daily instead of Lasix 40 mg BID and take medication metolazone 2.5 mg every other day.    Pt was scheduled to see Dr. You 11/27/18 [facility].    Pt wanted to inform Giorgi of the information and states to look at her lab work that was done on 10/17/18.

## 2018-10-23 NOTE — TELEPHONE ENCOUNTER
----- Message from Bernadine Narvaez sent at 10/23/2018  1:33 PM CDT -----  Please call pt back at [phone] concerning her kidney functioning. Pt need to see about changing lasix.

## 2018-10-25 ENCOUNTER — TELEPHONE (OUTPATIENT)
Dept: RHEUMATOLOGY | Facility: CLINIC | Age: 83
End: 2018-10-25

## 2018-10-25 DIAGNOSIS — I48.91 ATRIAL FIBRILLATION, UNSPECIFIED TYPE: ICD-10-CM

## 2018-10-25 RX ORDER — AMIODARONE HYDROCHLORIDE 200 MG/1
TABLET ORAL
Qty: 30 TABLET | Refills: 6 | Status: SHIPPED | OUTPATIENT
Start: 2018-10-25 | End: 2019-05-07

## 2018-10-25 NOTE — TELEPHONE ENCOUNTER
MD Danilo Mondragon, LPN   Caller: Unspecified (Today, 11:31 AM)             Let her know she cannot increase Colcrys because of her kidney problems   She will need to get her uric acid checked before next visit   Since the current attack resolved, she can wait  until seen in November by Dr. ANANDA Harley

## 2018-10-25 NOTE — TELEPHONE ENCOUNTER
Spoke with pt and she states that she had a gout attack in her toes and she started the medrol dose pack she had on hand and it has helped and the attack is almost gone. States that her colchicine was cut back to 0.6mg Monday, Wednesday and Friday. Pt would like to know if she needs to increase her colchicine back to once daily or come in for an appointment. Please Advise.       Pt taking Colchicine 0.6 mg MWF, allopurinol  200 mg daily and medrol dose pack PRN.  Last visit- 5.23.18 with Janay Valdez PA-C

## 2018-10-26 ENCOUNTER — TELEPHONE (OUTPATIENT)
Dept: CARDIOLOGY | Facility: HOSPITAL | Age: 83
End: 2018-10-26

## 2018-10-26 ENCOUNTER — LAB VISIT (OUTPATIENT)
Dept: LAB | Facility: HOSPITAL | Age: 83
End: 2018-10-26
Attending: NURSE PRACTITIONER
Payer: MEDICARE

## 2018-10-26 DIAGNOSIS — I50.42 CHRONIC COMBINED SYSTOLIC AND DIASTOLIC CONGESTIVE HEART FAILURE: ICD-10-CM

## 2018-10-26 LAB
ANION GAP SERPL CALC-SCNC: 10 MMOL/L
BUN SERPL-MCNC: 54 MG/DL
CALCIUM SERPL-MCNC: 9.8 MG/DL
CHLORIDE SERPL-SCNC: 105 MMOL/L
CO2 SERPL-SCNC: 28 MMOL/L
CREAT SERPL-MCNC: 1.6 MG/DL
EST. GFR  (AFRICAN AMERICAN): 33 ML/MIN/1.73 M^2
EST. GFR  (NON AFRICAN AMERICAN): 29 ML/MIN/1.73 M^2
GLUCOSE SERPL-MCNC: 137 MG/DL
POTASSIUM SERPL-SCNC: 5.2 MMOL/L
SODIUM SERPL-SCNC: 143 MMOL/L

## 2018-10-26 PROCEDURE — 80048 BASIC METABOLIC PNL TOTAL CA: CPT

## 2018-10-26 PROCEDURE — 36415 COLL VENOUS BLD VENIPUNCTURE: CPT

## 2018-10-26 NOTE — TELEPHONE ENCOUNTER
Renal function has improved on repeat labs. Continue to use the Metolazone only PRN. Ok to resume Lasix on Sunday at 40mg  once daily

## 2018-10-29 ENCOUNTER — INFUSION (OUTPATIENT)
Dept: INFUSION THERAPY | Facility: HOSPITAL | Age: 83
End: 2018-10-29
Attending: INTERNAL MEDICINE
Payer: MEDICARE

## 2018-10-29 VITALS
HEART RATE: 63 BPM | SYSTOLIC BLOOD PRESSURE: 128 MMHG | DIASTOLIC BLOOD PRESSURE: 53 MMHG | RESPIRATION RATE: 18 BRPM | TEMPERATURE: 97 F | OXYGEN SATURATION: 94 %

## 2018-10-29 DIAGNOSIS — D50.0 IRON DEFICIENCY ANEMIA DUE TO CHRONIC BLOOD LOSS: Primary | ICD-10-CM

## 2018-10-29 PROCEDURE — 96365 THER/PROPH/DIAG IV INF INIT: CPT

## 2018-10-29 PROCEDURE — 96375 TX/PRO/DX INJ NEW DRUG ADDON: CPT

## 2018-10-29 PROCEDURE — 63600175 PHARM REV CODE 636 W HCPCS: Performed by: INTERNAL MEDICINE

## 2018-10-29 PROCEDURE — 25000003 PHARM REV CODE 250: Performed by: INTERNAL MEDICINE

## 2018-10-29 PROCEDURE — 63600175 PHARM REV CODE 636 W HCPCS: Mod: JG | Performed by: INTERNAL MEDICINE

## 2018-10-29 RX ORDER — HEPARIN 100 UNIT/ML
500 SYRINGE INTRAVENOUS
Status: CANCELLED | OUTPATIENT
Start: 2018-10-29

## 2018-10-29 RX ORDER — METHYLPREDNISOLONE SOD SUCC 125 MG
125 VIAL (EA) INJECTION
Status: COMPLETED | OUTPATIENT
Start: 2018-10-29 | End: 2018-10-29

## 2018-10-29 RX ADMIN — METHYLPREDNISOLONE SODIUM SUCCINATE 125 MG: 125 INJECTION, POWDER, FOR SOLUTION INTRAMUSCULAR; INTRAVENOUS at 09:10

## 2018-10-29 RX ADMIN — FERUMOXYTOL 510 MG: 510 INJECTION INTRAVENOUS at 10:10
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
## 2018-10-29 NOTE — PLAN OF CARE
Problem: Patient Care Overview (Adult)  Goal: Plan of Care Review  Outcome: Ongoing (interventions implemented as appropriate)  Pt stated felling tired and weak today .

## 2018-10-29 NOTE — NURSING
Called DR Huerta And spoke to Devante and notified regarding pt hx. Drug allergies . Devante will notify DR Huerta if pt have to receive ant pre meds.

## 2018-11-05 ENCOUNTER — INFUSION (OUTPATIENT)
Dept: INFUSION THERAPY | Facility: HOSPITAL | Age: 83
End: 2018-11-05
Attending: INTERNAL MEDICINE
Payer: MEDICARE

## 2018-11-05 VITALS
OXYGEN SATURATION: 94 % | RESPIRATION RATE: 20 BRPM | HEART RATE: 65 BPM | DIASTOLIC BLOOD PRESSURE: 53 MMHG | SYSTOLIC BLOOD PRESSURE: 111 MMHG

## 2018-11-05 DIAGNOSIS — D50.0 IRON DEFICIENCY ANEMIA DUE TO CHRONIC BLOOD LOSS: Primary | ICD-10-CM

## 2018-11-05 PROCEDURE — 25000003 PHARM REV CODE 250: Performed by: INTERNAL MEDICINE

## 2018-11-05 PROCEDURE — 63600175 PHARM REV CODE 636 W HCPCS: Performed by: INTERNAL MEDICINE

## 2018-11-05 PROCEDURE — 96365 THER/PROPH/DIAG IV INF INIT: CPT

## 2018-11-05 PROCEDURE — 96375 TX/PRO/DX INJ NEW DRUG ADDON: CPT

## 2018-11-05 RX ORDER — METHYLPREDNISOLONE SOD SUCC 125 MG
125 VIAL (EA) INJECTION
Status: COMPLETED | OUTPATIENT
Start: 2018-11-05 | End: 2018-11-05

## 2018-11-05 RX ORDER — HEPARIN 100 UNIT/ML
500 SYRINGE INTRAVENOUS
Status: CANCELLED | OUTPATIENT
Start: 2018-11-05

## 2018-11-05 RX ADMIN — FERUMOXYTOL 510 MG: 510 INJECTION INTRAVENOUS at 08:11

## 2018-11-05 RX ADMIN — METHYLPREDNISOLONE SODIUM SUCCINATE 125 MG: 125 INJECTION, POWDER, FOR SOLUTION INTRAMUSCULAR; INTRAVENOUS at 08:11
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
## 2018-11-05 NOTE — PLAN OF CARE
Problem: Patient Care Overview (Adult)  Goal: Plan of Care Review  Outcome: Ongoing (interventions implemented as appropriate)  Pt stated feeling much better . Just have a little upper resp. congestion

## 2018-11-12 ENCOUNTER — HOSPITAL ENCOUNTER (OUTPATIENT)
Dept: RADIOLOGY | Facility: HOSPITAL | Age: 83
Discharge: HOME | End: 2018-11-12
Attending: NURSE PRACTITIONER
Payer: MEDICARE

## 2018-11-12 ENCOUNTER — OFFICE VISIT (OUTPATIENT)
Dept: FAMILY MEDICINE | Facility: CLINIC | Age: 83
End: 2018-11-12
Payer: MEDICARE

## 2018-11-12 VITALS
HEART RATE: 74 BPM | OXYGEN SATURATION: 93 % | BODY MASS INDEX: 22.94 KG/M2 | SYSTOLIC BLOOD PRESSURE: 128 MMHG | TEMPERATURE: 98 F | WEIGHT: 125.44 LBS | DIASTOLIC BLOOD PRESSURE: 55 MMHG

## 2018-11-12 DIAGNOSIS — J40 BRONCHITIS: ICD-10-CM

## 2018-11-12 DIAGNOSIS — N39.0 URINARY TRACT INFECTION WITHOUT HEMATURIA, SITE UNSPECIFIED: Primary | ICD-10-CM

## 2018-11-12 DIAGNOSIS — I50.42 CHRONIC COMBINED SYSTOLIC AND DIASTOLIC CHF (CONGESTIVE HEART FAILURE): ICD-10-CM

## 2018-11-12 LAB
BILIRUB SERPL-MCNC: ABNORMAL MG/DL
BLOOD URINE, POC: ABNORMAL
COLOR, POC UA: YELLOW
GLUCOSE UR QL STRIP: NORMAL
KETONES UR QL STRIP: ABNORMAL
LEUKOCYTE ESTERASE URINE, POC: 1
NITRITE, POC UA: POSITIVE
PH, POC UA: 6
PROTEIN, POC: ABNORMAL
SPECIFIC GRAVITY, POC UA: 1
UROBILINOGEN, POC UA: NORMAL

## 2018-11-12 PROCEDURE — 87077 CULTURE AEROBIC IDENTIFY: CPT

## 2018-11-12 PROCEDURE — 71046 X-RAY EXAM CHEST 2 VIEWS: CPT | Mod: 26,,, | Performed by: RADIOLOGY

## 2018-11-12 PROCEDURE — 87086 URINE CULTURE/COLONY COUNT: CPT

## 2018-11-12 PROCEDURE — 87186 SC STD MICRODIL/AGAR DIL: CPT

## 2018-11-12 PROCEDURE — 81002 URINALYSIS NONAUTO W/O SCOPE: CPT | Mod: PBBFAC,PO | Performed by: NURSE PRACTITIONER

## 2018-11-12 PROCEDURE — 99999 PR PBB SHADOW E&M-EST. PATIENT-LVL V: CPT | Mod: PBBFAC,,, | Performed by: NURSE PRACTITIONER

## 2018-11-12 PROCEDURE — 87088 URINE BACTERIA CULTURE: CPT

## 2018-11-12 PROCEDURE — 99214 OFFICE O/P EST MOD 30 MIN: CPT | Mod: S$PBB,,, | Performed by: NURSE PRACTITIONER

## 2018-11-12 PROCEDURE — 99215 OFFICE O/P EST HI 40 MIN: CPT | Mod: PBBFAC,25,PO | Performed by: NURSE PRACTITIONER

## 2018-11-12 PROCEDURE — 71046 X-RAY EXAM CHEST 2 VIEWS: CPT | Mod: TC,PO

## 2018-11-12 RX ORDER — BENZONATATE 100 MG/1
200 CAPSULE ORAL 3 TIMES DAILY PRN
Qty: 40 CAPSULE | Refills: 0 | Status: SHIPPED | OUTPATIENT
Start: 2018-11-12 | End: 2018-11-23

## 2018-11-12 RX ORDER — DOXYCYCLINE 150 MG/1
150 TABLET ORAL 2 TIMES DAILY
Qty: 20 TABLET | Refills: 0 | Status: SHIPPED | OUTPATIENT
Start: 2018-11-12 | End: 2018-11-23 | Stop reason: ALTCHOICE

## 2018-11-12 NOTE — TELEPHONE ENCOUNTER
----- Message from Adriana Arizmendi PA-C sent at 11/12/2018  1:32 PM CST -----  She needs to be seen in clinic and bring her med bottles as I am not sure if she is taking Losartan or Entresto    Please arrange appointment

## 2018-11-12 NOTE — TELEPHONE ENCOUNTER
Spoke with pt offered appt for today to see Adriana at 2pm or Thursday pt states she prefers next week. I have scheduled pt to see Adriana next Monday 11/19/18 [facility].

## 2018-11-13 DIAGNOSIS — I50.42 CHRONIC COMBINED SYSTOLIC AND DIASTOLIC HEART FAILURE: Primary | ICD-10-CM

## 2018-11-13 RX ORDER — COLCHICINE 0.6 MG/1
0.6 CAPSULE ORAL
Qty: 15 CAPSULE | Refills: 5 | Status: SHIPPED | OUTPATIENT
Start: 2018-11-14 | End: 2019-02-01

## 2018-11-13 NOTE — PROGRESS NOTES
Subjective:       Patient ID: Jennifer Mathews is a 86 y.o. female.    Chief Complaint: Cough and Urinary Tract Infection  pt reports to clinic with chief complaint of cough for 2 weeks.  Denies fever, wheezing or SOB.  Cough produces thick yellow sputum.  Denies wheezing, fever or SOB.      Pt also notes urinary frequency with dysuria. Onset 1 day ago.  Reports a history of frequent UTI's. Review of chart shows  Frequent culture positive for E.coli.  Cough   This is a new problem. The current episode started 1 to 4 weeks ago. The problem has been unchanged. The cough is productive of sputum. Pertinent negatives include no ear congestion, nasal congestion, shortness of breath or wheezing. Nothing aggravates the symptoms. She has tried nothing for the symptoms.     Review of Systems   Constitutional: Negative.    HENT: Negative.    Respiratory: Positive for cough. Negative for shortness of breath and wheezing.    Cardiovascular: Negative.    Genitourinary: Positive for dysuria, frequency and urgency.   Musculoskeletal: Negative.        Objective:      Physical Exam   Constitutional: She is oriented to person, place, and time. She appears well-developed and well-nourished.   HENT:   Head: Normocephalic.   Eyes: EOM are normal.   Neck: Neck supple.   Cardiovascular: Normal rate and normal heart sounds.   Pulmonary/Chest: Effort normal and breath sounds normal.   Dry hacking cough   Abdominal: There is no tenderness.   Musculoskeletal: She exhibits edema (3+ pitting BLE).   Neurological: She is alert and oriented to person, place, and time.   Skin: Skin is warm and dry.   Vitals reviewed.      Assessment:       1. Urinary tract infection without hematuria, site unspecified    2. Bronchitis        Plan:   Urinary tract infection without hematuria, site unspecified  -     POCT URINE DIPSTICK WITHOUT MICROSCOPE  -     Urine culture; Future; Expected date: 11/12/2018    Bronchitis  -     X-Ray Chest PA And Lateral; Future;  Expected date: 11/12/2018  -     doxycycline (ADOXA) 150 MG tablet; Take 1 tablet (150 mg total) by mouth 2 (two) times daily.  Dispense: 20 tablet; Refill: 0  -     benzonatate (TESSALON) 100 MG capsule; Take 2 capsules (200 mg total) by mouth 3 (three) times daily as needed for Cough.  Dispense: 40 capsule; Refill: 0      No Follow-up on file.

## 2018-11-15 LAB — BACTERIA UR CULT: NORMAL

## 2018-11-16 ENCOUNTER — PES CALL (OUTPATIENT)
Dept: ADMINISTRATIVE | Facility: CLINIC | Age: 83
End: 2018-11-16

## 2018-11-19 ENCOUNTER — HOSPITAL ENCOUNTER (OUTPATIENT)
Facility: HOSPITAL | Age: 83
Discharge: HOME | End: 2018-11-20
Attending: EMERGENCY MEDICINE | Admitting: INTERNAL MEDICINE
Payer: MEDICARE

## 2018-11-19 ENCOUNTER — OFFICE VISIT (OUTPATIENT)
Dept: CARDIOLOGY | Facility: CLINIC | Age: 83
End: 2018-11-19
Payer: MEDICARE

## 2018-11-19 VITALS
WEIGHT: 126.75 LBS | HEIGHT: 62 IN | HEART RATE: 74 BPM | SYSTOLIC BLOOD PRESSURE: 144 MMHG | BODY MASS INDEX: 23.32 KG/M2 | DIASTOLIC BLOOD PRESSURE: 68 MMHG

## 2018-11-19 DIAGNOSIS — Z95.810 CARDIAC RESYNCHRONIZATION THERAPY DEFIBRILLATOR (CRT-D) IN PLACE: ICD-10-CM

## 2018-11-19 DIAGNOSIS — I25.5 ISCHEMIC CARDIOMYOPATHY: ICD-10-CM

## 2018-11-19 DIAGNOSIS — I50.42 CHRONIC COMBINED SYSTOLIC AND DIASTOLIC CONGESTIVE HEART FAILURE: ICD-10-CM

## 2018-11-19 DIAGNOSIS — Z79.01 ANTICOAGULATED ON COUMADIN: ICD-10-CM

## 2018-11-19 DIAGNOSIS — G47.33 OSA (OBSTRUCTIVE SLEEP APNEA): Chronic | ICD-10-CM

## 2018-11-19 DIAGNOSIS — I10 ESSENTIAL HYPERTENSION: ICD-10-CM

## 2018-11-19 DIAGNOSIS — Z95.2 S/P MVR (MITRAL VALVE REPLACEMENT): ICD-10-CM

## 2018-11-19 DIAGNOSIS — I27.22 PULMONARY HYPERTENSION DUE TO LEFT HEART DISEASE: ICD-10-CM

## 2018-11-19 DIAGNOSIS — Z95.0 S/P PLACEMENT OF CARDIAC PACEMAKER: ICD-10-CM

## 2018-11-19 DIAGNOSIS — I50.42 CHRONIC COMBINED SYSTOLIC AND DIASTOLIC CONGESTIVE HEART FAILURE: Primary | ICD-10-CM

## 2018-11-19 DIAGNOSIS — R60.0 LOCALIZED EDEMA: ICD-10-CM

## 2018-11-19 DIAGNOSIS — I50.20 SYSTOLIC HEART FAILURE: ICD-10-CM

## 2018-11-19 DIAGNOSIS — I50.43 ACUTE ON CHRONIC COMBINED SYSTOLIC AND DIASTOLIC CONGESTIVE HEART FAILURE: Primary | ICD-10-CM

## 2018-11-19 DIAGNOSIS — I50.9 CHF (CONGESTIVE HEART FAILURE): ICD-10-CM

## 2018-11-19 DIAGNOSIS — R60.0 BILATERAL LOWER EXTREMITY EDEMA: ICD-10-CM

## 2018-11-19 DIAGNOSIS — R06.02 SOB (SHORTNESS OF BREATH): ICD-10-CM

## 2018-11-19 DIAGNOSIS — I48.0 PAROXYSMAL ATRIAL FIBRILLATION: ICD-10-CM

## 2018-11-19 LAB
ALBUMIN SERPL BCP-MCNC: 3.4 G/DL
ALP SERPL-CCNC: 66 U/L
ALT SERPL W/O P-5'-P-CCNC: 21 U/L
ANION GAP SERPL CALC-SCNC: 8 MMOL/L
APTT BLDCRRT: 36.4 SEC
AST SERPL-CCNC: 26 U/L
BASOPHILS # BLD AUTO: 0.04 K/UL
BASOPHILS NFR BLD: 0.6 %
BILIRUB SERPL-MCNC: 0.8 MG/DL
BILIRUB UR QL STRIP: NEGATIVE
BNP SERPL-MCNC: 2111 PG/ML
BUN SERPL-MCNC: 26 MG/DL
CALCIUM SERPL-MCNC: 8.6 MG/DL
CHLORIDE SERPL-SCNC: 110 MMOL/L
CLARITY UR: CLEAR
CO2 SERPL-SCNC: 24 MMOL/L
COLOR UR: YELLOW
CREAT SERPL-MCNC: 1.1 MG/DL
DIFFERENTIAL METHOD: ABNORMAL
DIGOXIN SERPL-MCNC: 1.8 NG/ML
EOSINOPHIL # BLD AUTO: 0.1 K/UL
EOSINOPHIL NFR BLD: 0.9 %
ERYTHROCYTE [DISTWIDTH] IN BLOOD BY AUTOMATED COUNT: 17.8 %
EST. GFR  (AFRICAN AMERICAN): 53 ML/MIN/1.73 M^2
EST. GFR  (NON AFRICAN AMERICAN): 46 ML/MIN/1.73 M^2
ESTIMATED PA SYSTOLIC PRESSURE: 69.17
GLUCOSE SERPL-MCNC: 105 MG/DL
GLUCOSE UR QL STRIP: NEGATIVE
HCT VFR BLD AUTO: 35.4 %
HGB BLD-MCNC: 11.1 G/DL
HGB UR QL STRIP: NEGATIVE
INR PPP: 3.3
INR PPP: 3.7
KETONES UR QL STRIP: NEGATIVE
LEUKOCYTE ESTERASE UR QL STRIP: NEGATIVE
LYMPHOCYTES # BLD AUTO: 1.4 K/UL
LYMPHOCYTES NFR BLD: 20.6 %
MCH RBC QN AUTO: 33.5 PG
MCHC RBC AUTO-ENTMCNC: 31.4 G/DL
MCV RBC AUTO: 107 FL
MONOCYTES # BLD AUTO: 0.6 K/UL
MONOCYTES NFR BLD: 9 %
NEUTROPHILS # BLD AUTO: 4.5 K/UL
NEUTROPHILS NFR BLD: 68.9 %
NITRITE UR QL STRIP: NEGATIVE
PH UR STRIP: 6 [PH] (ref 5–8)
PLATELET # BLD AUTO: 177 K/UL
PMV BLD AUTO: 10.1 FL
POTASSIUM SERPL-SCNC: 4 MMOL/L
PROT SERPL-MCNC: 6 G/DL
PROT UR QL STRIP: NEGATIVE
PROTHROMBIN TIME: 34.2 SEC
PROTHROMBIN TIME: 35.7 SEC
RBC # BLD AUTO: 3.31 M/UL
RETIRED EF AND QEF - SEE NOTES: 20 (ref 55–65)
SODIUM SERPL-SCNC: 142 MMOL/L
SP GR UR STRIP: 1.01 (ref 1–1.03)
T4 FREE SERPL-MCNC: 1.1 NG/DL
TRICUSPID VALVE REGURGITATION: ABNORMAL
TROPONIN I SERPL DL<=0.01 NG/ML-MCNC: 0.03 NG/ML
TROPONIN I SERPL DL<=0.01 NG/ML-MCNC: 0.04 NG/ML
TROPONIN I SERPL DL<=0.01 NG/ML-MCNC: 0.04 NG/ML
TSH SERPL DL<=0.005 MIU/L-ACNC: 8.56 UIU/ML
URN SPEC COLLECT METH UR: NORMAL
UROBILINOGEN UR STRIP-ACNC: NEGATIVE EU/DL
WBC # BLD AUTO: 6.56 K/UL

## 2018-11-19 PROCEDURE — 36415 COLL VENOUS BLD VENIPUNCTURE: CPT

## 2018-11-19 PROCEDURE — 85610 PROTHROMBIN TIME: CPT

## 2018-11-19 PROCEDURE — 63600175 PHARM REV CODE 636 W HCPCS: Performed by: EMERGENCY MEDICINE

## 2018-11-19 PROCEDURE — 81003 URINALYSIS AUTO W/O SCOPE: CPT

## 2018-11-19 PROCEDURE — 93306 TTE W/DOPPLER COMPLETE: CPT | Mod: 26,,, | Performed by: INTERNAL MEDICINE

## 2018-11-19 PROCEDURE — 93005 ELECTROCARDIOGRAM TRACING: CPT

## 2018-11-19 PROCEDURE — 99214 OFFICE O/P EST MOD 30 MIN: CPT | Mod: PBBFAC | Performed by: PHYSICIAN ASSISTANT

## 2018-11-19 PROCEDURE — 99220 PR INITIAL OBSERVATION CARE,LEVL III: CPT | Mod: 25,,, | Performed by: INTERNAL MEDICINE

## 2018-11-19 PROCEDURE — 80053 COMPREHEN METABOLIC PANEL: CPT

## 2018-11-19 PROCEDURE — 25000003 PHARM REV CODE 250: Performed by: EMERGENCY MEDICINE

## 2018-11-19 PROCEDURE — 99285 EMERGENCY DEPT VISIT HI MDM: CPT | Mod: 25,27

## 2018-11-19 PROCEDURE — 96374 THER/PROPH/DIAG INJ IV PUSH: CPT

## 2018-11-19 PROCEDURE — 99214 OFFICE O/P EST MOD 30 MIN: CPT | Mod: S$PBB,,, | Performed by: PHYSICIAN ASSISTANT

## 2018-11-19 PROCEDURE — 85730 THROMBOPLASTIN TIME PARTIAL: CPT

## 2018-11-19 PROCEDURE — 93010 ELECTROCARDIOGRAM REPORT: CPT | Mod: ,,, | Performed by: INTERNAL MEDICINE

## 2018-11-19 PROCEDURE — 84443 ASSAY THYROID STIM HORMONE: CPT

## 2018-11-19 PROCEDURE — 99999 PR PBB SHADOW E&M-EST. PATIENT-LVL IV: CPT | Mod: PBBFAC,,, | Performed by: PHYSICIAN ASSISTANT

## 2018-11-19 PROCEDURE — 84439 ASSAY OF FREE THYROXINE: CPT

## 2018-11-19 PROCEDURE — 25000003 PHARM REV CODE 250: Performed by: NURSE PRACTITIONER

## 2018-11-19 PROCEDURE — G0378 HOSPITAL OBSERVATION PER HR: HCPCS

## 2018-11-19 PROCEDURE — 83880 ASSAY OF NATRIURETIC PEPTIDE: CPT

## 2018-11-19 PROCEDURE — 80162 ASSAY OF DIGOXIN TOTAL: CPT

## 2018-11-19 PROCEDURE — 85610 PROTHROMBIN TIME: CPT | Mod: 91

## 2018-11-19 PROCEDURE — 84484 ASSAY OF TROPONIN QUANT: CPT

## 2018-11-19 PROCEDURE — 85025 COMPLETE CBC W/AUTO DIFF WBC: CPT

## 2018-11-19 PROCEDURE — 93306 TTE W/DOPPLER COMPLETE: CPT

## 2018-11-19 PROCEDURE — 63600175 PHARM REV CODE 636 W HCPCS: Performed by: NURSE PRACTITIONER

## 2018-11-19 RX ORDER — DIGOXIN 125 MCG
125 TABLET ORAL DAILY
Status: DISCONTINUED | OUTPATIENT
Start: 2018-11-19 | End: 2018-11-20 | Stop reason: HOSPADM

## 2018-11-19 RX ORDER — CARVEDILOL 12.5 MG/1
25 TABLET ORAL 2 TIMES DAILY WITH MEALS
Status: DISCONTINUED | OUTPATIENT
Start: 2018-11-19 | End: 2018-11-20 | Stop reason: HOSPADM

## 2018-11-19 RX ORDER — ASPIRIN 81 MG/1
81 TABLET ORAL DAILY
Status: DISCONTINUED | OUTPATIENT
Start: 2018-11-20 | End: 2018-11-20 | Stop reason: HOSPADM

## 2018-11-19 RX ORDER — FUROSEMIDE 10 MG/ML
40 INJECTION INTRAMUSCULAR; INTRAVENOUS 3 TIMES DAILY
Status: DISCONTINUED | OUTPATIENT
Start: 2018-11-19 | End: 2018-11-20 | Stop reason: HOSPADM

## 2018-11-19 RX ORDER — GABAPENTIN 100 MG/1
100 CAPSULE ORAL 2 TIMES DAILY
Status: DISCONTINUED | OUTPATIENT
Start: 2018-11-19 | End: 2018-11-20 | Stop reason: HOSPADM

## 2018-11-19 RX ORDER — AMIODARONE HYDROCHLORIDE 200 MG/1
200 TABLET ORAL DAILY
Status: DISCONTINUED | OUTPATIENT
Start: 2018-11-20 | End: 2018-11-20 | Stop reason: HOSPADM

## 2018-11-19 RX ORDER — WARFARIN 1 MG/1
1 TABLET ORAL
Status: DISCONTINUED | OUTPATIENT
Start: 2018-11-24 | End: 2018-11-20 | Stop reason: HOSPADM

## 2018-11-19 RX ORDER — FUROSEMIDE 10 MG/ML
40 INJECTION INTRAMUSCULAR; INTRAVENOUS 2 TIMES DAILY
Status: DISCONTINUED | OUTPATIENT
Start: 2018-11-19 | End: 2018-11-19 | Stop reason: SDUPTHER

## 2018-11-19 RX ORDER — LOSARTAN POTASSIUM 25 MG/1
25 TABLET ORAL DAILY
Status: DISCONTINUED | OUTPATIENT
Start: 2018-11-19 | End: 2018-11-20 | Stop reason: HOSPADM

## 2018-11-19 RX ORDER — FUROSEMIDE 10 MG/ML
40 INJECTION INTRAMUSCULAR; INTRAVENOUS 3 TIMES DAILY
Status: DISCONTINUED | OUTPATIENT
Start: 2018-11-19 | End: 2018-11-19

## 2018-11-19 RX ORDER — ONDANSETRON 2 MG/ML
4 INJECTION INTRAMUSCULAR; INTRAVENOUS EVERY 8 HOURS PRN
Status: DISCONTINUED | OUTPATIENT
Start: 2018-11-19 | End: 2018-11-20 | Stop reason: HOSPADM

## 2018-11-19 RX ORDER — LEVOTHYROXINE SODIUM 50 UG/1
50 TABLET ORAL
Status: DISCONTINUED | OUTPATIENT
Start: 2018-11-20 | End: 2018-11-20 | Stop reason: HOSPADM

## 2018-11-19 RX ORDER — FAMOTIDINE 20 MG/1
20 TABLET, FILM COATED ORAL DAILY
Status: DISCONTINUED | OUTPATIENT
Start: 2018-11-19 | End: 2018-11-20 | Stop reason: HOSPADM

## 2018-11-19 RX ORDER — FUROSEMIDE 10 MG/ML
40 INJECTION INTRAMUSCULAR; INTRAVENOUS
Status: COMPLETED | OUTPATIENT
Start: 2018-11-19 | End: 2018-11-19

## 2018-11-19 RX ORDER — LOSARTAN POTASSIUM 25 MG/1
TABLET ORAL
Qty: 30 TABLET | Refills: 3 | Status: SHIPPED | OUTPATIENT
Start: 2018-11-19 | End: 2018-12-19 | Stop reason: SDUPTHER

## 2018-11-19 RX ORDER — FUROSEMIDE 40 MG/1
40 TABLET ORAL 2 TIMES DAILY
Qty: 60 TABLET | Refills: 3 | Status: SHIPPED | OUTPATIENT
Start: 2018-11-19 | End: 2019-05-19 | Stop reason: SDUPTHER

## 2018-11-19 RX ORDER — POTASSIUM CHLORIDE 20 MEQ/1
20 TABLET, EXTENDED RELEASE ORAL DAILY
Status: DISCONTINUED | OUTPATIENT
Start: 2018-11-20 | End: 2018-11-20 | Stop reason: HOSPADM

## 2018-11-19 RX ADMIN — GABAPENTIN 100 MG: 100 CAPSULE ORAL at 09:11

## 2018-11-19 RX ADMIN — CARVEDILOL 25 MG: 12.5 TABLET, FILM COATED ORAL at 04:11

## 2018-11-19 RX ADMIN — LOSARTAN POTASSIUM 25 MG: 25 TABLET, FILM COATED ORAL at 02:11

## 2018-11-19 RX ADMIN — FUROSEMIDE 40 MG: 10 INJECTION, SOLUTION INTRAMUSCULAR; INTRAVENOUS at 12:11

## 2018-11-19 RX ADMIN — FAMOTIDINE 20 MG: 20 TABLET ORAL at 04:11

## 2018-11-19 RX ADMIN — FUROSEMIDE 40 MG: 10 INJECTION, SOLUTION INTRAMUSCULAR; INTRAVENOUS at 09:11

## 2018-11-19 RX ADMIN — DIGOXIN 125 MCG: 125 TABLET ORAL at 02:11

## 2018-11-19 RX ADMIN — FUROSEMIDE 40 MG: 10 INJECTION, SOLUTION INTRAMUSCULAR; INTRAVENOUS at 04:11

## 2018-11-19 NOTE — SUBJECTIVE & OBJECTIVE
"Past Medical History:   Diagnosis Date    Acute coronary syndrome     Anemia     Anticoagulated on Coumadin 7/13/2015    Arthritis     Atrial fibrillation     Basal cell carcinoma 10/2015    left neck    Cardiac arrest     Cardiac resynchronization therapy defibrillator (CRT-D) in place 07/13/2015    Pt denies, states it does not shock me    Chronic combined systolic and diastolic congestive heart failure     CKD (chronic kidney disease) stage 3, GFR 30-59 ml/min     Dyslipidemia 1/30/2014    Hyperlipidemia     Hypertension     Ischemic cardiomyopathy 01/30/2014    Macular hole of left eye     Old    Macular hole of left eye     LEV (obstructive sleep apnea) 9/30/2013    Recurrent UTI     Refractive error     Stroke        Past Surgical History:   Procedure Laterality Date    APPENDECTOMY      CARDIAC CATHETERIZATION      CARDIAC PACEMAKER PLACEMENT  2014    CARDIAC VALVE SURGERY      CATARACT EXTRACTION      OU    CHOLECYSTECTOMY      COLONOSCOPY      IRRIGATION AND DEBRIDEMENT OF LEFT KNEE Left 9/12/2017    Performed by Juliano Rosenbaum MD at [facility] OR    MITRAL VALVE REPLACEMENT  2014    MITRAL VALVE SURGERY      x3    REPLACEMENT OF PACEMAKER GENERATOR Left 6/20/2018    Procedure: REPLACEMENT, PACEMAKER GENERATOR/pt has crt-p versus d;  Surgeon: Manny Dunne MD;  Location: [facility] CATH LAB;  Service: Cardiology;  Laterality: Left;  st renée device  patient is pacer dependent    REPLACEMENT, PACEMAKER GENERATOR/pt has crt-p versus d Left 6/20/2018    Performed by Manny Dunne MD at [facility] CATH LAB    REVISION, SKIN POCKET, FOR CARDIAC PACEMAKER Left 2/26/2014    Performed by Manny Dunne MD at [facility] CATH LAB       Review of patient's allergies indicates:   Allergen Reactions    Cephalosporins Hives    Metaxalone Itching    Penicillins      Other reaction(s): Unknown      Pregabalin      Other reaction(s): "Bad feeling"      Sulfa (sulfonamide antibiotics) " Itching       No current facility-administered medications on file prior to encounter.      Current Outpatient Medications on File Prior to Encounter   Medication Sig    allopurinol (ZYLOPRIM) 100 MG tablet Take 2 tablets (200 mg total) by mouth once daily.    amiodarone (PACERONE) 200 MG Tab TAKE 1 TABLET EVERY DAY    aspirin (ECOTRIN) 81 MG EC tablet Take 81 mg by mouth once daily.    benzonatate (TESSALON) 100 MG capsule Take 2 capsules (200 mg total) by mouth 3 (three) times daily as needed for Cough.    calcium carbonate (OS-SHERRY) 600 mg calcium (1,500 mg) Tab Take 600 mg by mouth once.    carvedilol (COREG) 25 MG tablet Take 1 tablet (25 mg total) by mouth 2 (two) times daily with meals.    colchicine (MITIGARE) 0.6 mg Cap Take 1 capsule (0.6 mg total) by mouth 3 (three) times a week. On Monday/ wednessday / Friday    cranberry 1,000 mg Cap Take 1 capsule by mouth Daily.    digoxin (LANOXIN) 125 mcg tablet Take 1 tablet (125 mcg total) by mouth once daily. TAKE 1 TABLET (0.125 MG TOTAL) BY MOUTH ONCE DAILY. (Patient taking differently: Take 125 mcg by mouth once daily. )    doxycycline (ADOXA) 150 MG tablet Take 1 tablet (150 mg total) by mouth 2 (two) times daily.    furosemide (LASIX) 40 MG tablet Take 1 tablet (40 mg total) by mouth 2 (two) times daily.    gabapentin (NEURONTIN) 100 MG capsule TAKE ONE CAPSULE BY MOUTH TWO TIMES DAILY    Lactobac 40-Bifido 3-S.thermop (PROBIOTIC) 100 billion cell Cap Take 1 capsule by mouth once daily.    levothyroxine (SYNTHROID) 50 MCG tablet Take 1 tablet (50 mcg total) by mouth once daily.    losartan (COZAAR) 25 MG tablet TAKE 1 TABLET BY MOUTH EVERY DAY    MULTIVITAMIN ORAL Take 1 tablet by mouth Daily.    potassium chloride SA (K-DUR,KLOR-CON) 20 MEQ tablet TAKE 1 TABLET (20 MEQ TOTAL) BY MOUTH ONCE DAILY.    warfarin (COUMADIN) 2.5 MG tablet TAKE 1/2 TABLET ON MONDAY AND WEDNESDAY AND 1 TABLET ALL OTHER DAYS. DISCONTINUE 5MG TABLET. (Patient taking  differently: TAKE 1 1/2 TABLET ON TUESDAY, THURSDAY, SATURDAY, SUNDAY. AND 1 TABLET ALL OTHER DAYS. DISCONTINUE 5MG TABLET.)    acetaminophen (TYLENOL EXTRA STRENGTH) 325 MG tablet Take 2 tablets (650 mg total) by mouth every 8 (eight) hours. (Patient taking differently: Take 650 mg by mouth 3 (three) times daily as needed. )    [DISCONTINUED] furosemide (LASIX) 40 MG tablet Take 1 tablet (40 mg total) by mouth 2 (two) times daily.     Family History     Problem Relation (Age of Onset)    Cancer Brother    Coronary artery disease Mother, Father    Diabetes Brother        Tobacco Use    Smoking status: Never Smoker    Smokeless tobacco: Never Used   Substance and Sexual Activity    Alcohol use: No    Drug use: No    Sexual activity: Not on file     Review of Systems   Constitution: Positive for weight gain. Negative for weakness and malaise/fatigue.   HENT: Negative for hearing loss and hoarse voice.    Eyes: Negative for blurred vision and visual disturbance.   Cardiovascular: Positive for dyspnea on exertion, leg swelling and orthopnea. Negative for chest pain, claudication, irregular heartbeat, near-syncope, palpitations, paroxysmal nocturnal dyspnea and syncope.   Respiratory: Negative for cough, hemoptysis, shortness of breath, sleep disturbances due to breathing, snoring and wheezing.    Endocrine: Negative for cold intolerance and heat intolerance.   Hematologic/Lymphatic: Bruises/bleeds easily.   Skin: Negative for color change, dry skin and nail changes.   Musculoskeletal: Positive for arthritis and back pain. Negative for joint pain and myalgias.   Gastrointestinal: Positive for bloating. Negative for abdominal pain, constipation, nausea and vomiting.   Genitourinary: Negative for dysuria, flank pain, hematuria and hesitancy.   Neurological: Negative for headaches, light-headedness, loss of balance, numbness and paresthesias.   Psychiatric/Behavioral: Negative for altered mental status.    Allergic/Immunologic: Negative for environmental allergies.     Objective:     Vital Signs (Most Recent):  Temp: 98 °F (36.7 °C) (11/19/18 1440)  Pulse: 69 (11/19/18 1500)  Resp: 18 (11/19/18 1440)  BP: (!) 160/69 (11/19/18 1440)  SpO2: (!) 93 % (11/19/18 1440) Vital Signs (24h Range):  Temp:  [97.9 °F (36.6 °C)-98 °F (36.7 °C)] 98 °F (36.7 °C)  Pulse:  [69-87] 69  Resp:  [18-20] 18  SpO2:  [92 %-93 %] 93 %  BP: (142-160)/(66-69) 160/69     Weight: 57.5 kg (126 lb 12.2 oz)  Body mass index is 23.19 kg/m².    SpO2: (!) 93 %  O2 Device (Oxygen Therapy): room air      Intake/Output Summary (Last 24 hours) at 11/19/2018 1602  Last data filed at 11/19/2018 1417  Gross per 24 hour   Intake --   Output 800 ml   Net -800 ml       Lines/Drains/Airways     Peripheral Intravenous Line                 Peripheral IV - Single Lumen 11/19/18 1030 Left Forearm less than 1 day                Physical Exam   Constitutional: She is oriented to person, place, and time. She appears well-developed and well-nourished. No distress.   HENT:   Head: Normocephalic and atraumatic.   Eyes: Pupils are equal, round, and reactive to light.   Neck: Normal range of motion and full passive range of motion without pain. Neck supple. JVD present.   Cardiovascular: Normal rate, regular rhythm, S1 normal, S2 normal and intact distal pulses. PMI is not displaced. Exam reveals no distant heart sounds.   No murmur heard.  Pulses:       Radial pulses are 2+ on the right side, and 2+ on the left side.        Dorsalis pedis pulses are 1+ on the right side, and 1+ on the left side.   Pulmonary/Chest: Accessory muscle usage present. No respiratory distress. She has decreased breath sounds in the right lower field. She has no wheezes. She has rales.   Abdominal: Soft. Bowel sounds are normal. She exhibits distension. There is no tenderness.   Musculoskeletal: Normal range of motion. She exhibits edema.        Right ankle: She exhibits swelling.        Left  ankle: She exhibits swelling.   Neurological: She is alert and oriented to person, place, and time.   Skin: Skin is warm and dry. She is not diaphoretic. No cyanosis. Nails show no clubbing.   Psychiatric: She has a normal mood and affect. Her speech is normal and behavior is normal. Judgment and thought content normal. Cognition and memory are normal.   Nursing note and vitals reviewed.      Significant Labs:   All pertinent lab results from the last 24 hours have been reviewed. and   Recent Lab Results       11/19/18  1511   11/19/18  1147        Albumin   3.4     Alkaline Phosphatase   66     ALT   21     Anion Gap   8     AST   26     Baso #   0.04     Basophil%   0.6     Total Bilirubin   0.8  Comment:  For infants and newborns, interpretation of results should be based  on gestational age, weight and in agreement with clinical  observations.  Premature Infant recommended reference ranges:  Up to 24 hours.............<8.0 mg/dL  Up to 48 hours............<12.0 mg/dL  3-5 days..................<15.0 mg/dL  6-29 days.................<15.0 mg/dL       BNP   2,111  Comment:  Values of less than 100 pg/ml are consistent with non-CHF populations.     BUN, Bld   26     Calcium   8.6     Chloride   110     CO2   24     Creatinine   1.1     Differential Method   Automated     Digoxin Lvl   1.8  Comment:  Toxic:  Adult: >2.5 ng/mL, Pediatric: >3.0 ng/mL       eGFR if    53     eGFR if non    46  Comment:  Calculation used to obtain the estimated glomerular filtration  rate (eGFR) is the CKD-EPI equation.        Eos #   0.1     Eosinophil%   0.9     Free T4   1.10     Glucose   105     Gran # (ANC)   4.5     Gran%   68.9     Hematocrit   35.4     Hemoglobin   11.1     Coumadin Monitoring INR 3.3  Comment:  Coumadin Therapy:  2.0 - 3.0 for INR for all indicators except mechanical heart valves  and antiphospholipid syndromes which should use 2.5 - 3.5.         Lymph #   1.4     Lymph%   20.6      MCH   33.5     MCHC   31.4     MCV   107     Mono #   0.6     Mono%   9.0     MPV   10.1     Platelets   177     Potassium   4.0     Total Protein   6.0     Protime 34.2       RBC   3.31     RDW   17.8     Sodium   142     Troponin I   0.042  Comment:  The reference interval for Troponin I represents the 99th percentile   cutoff   for our facility and is consistent with 3rd generation assay   performance.       TSH   8.560     WBC   6.56           Significant Imaging: Echocardiogram:   2D echo with color flow doppler:   Results for orders placed or performed in visit on 03/01/18   2D echo with color flow doppler   Result Value Ref Range    QEF 25 (A) 55 - 65    Mitral Valve Regurgitation TRIVIAL     Aortic Valve Regurgitation MILD     Est. PA Systolic Pressure 62.06 (A)     Tricuspid Valve Regurgitation MODERATE (A)     and X-Ray: CXR: X-Ray Chest 1 View (CXR): No results found for this visit on 11/19/18.

## 2018-11-19 NOTE — CONSULTS
"Clinical Pharmacy Consult Note: Coumadin Dosing and Monitoring     Jennifer Mathews 's Coumadin will be dosed and monitored by Pharmacy at the request of Toby Davis NP.   Indication: bioprosthetic MVR, atrial fibrillation   Target INR is 2-3.   Coumadin Monitoring INR   Date Value Ref Range Status   03/23/2011 2.1 (H) 0.8 - 1.2 Final     Comment:     ACCP Guidelline for Coumadin usage recommends a "target range" of  2.0 - 3.0 for INR for all indicators except mechanical heart valves  and antiphospholipid syndromes which should use 2.5 - 3.5.  .     INR   Date Value Ref Range Status   11/19/2018 3.3 (H) 0.8 - 1.2 Final     Comment:     Coumadin Therapy:  2.0 - 3.0 for INR for all indicators except mechanical heart valves  and antiphospholipid syndromes which should use 2.5 - 3.5.       Patient will not receive a warfarin dose at this time. Will hold today's dose and will reinitiate warfarin dosing once INR < 3.   Please note: A warfarin 1 mg every Saturday placeholder dose only has been entered.      At that point the patient will be continued on most recent [facility]tico[facility] dose of 2.5 mg per day on Mon/Wed/Fri and 3.75 mg Tues/Thurs/Sat/Sun.  PT/INR will be monitored daily. Dose adjustments will be made accordingly.      Thank you for allowing us to participate in this patient's care.     Shelley Lucio 11/19/2018 4:30 PM    "

## 2018-11-19 NOTE — HPI
Mrs. Mathews is a 86 year old female with PMHx of HFrEF 25%, s/p PPM-CRT-D placement, CKD, LEV on CPAP, Anemia, s/p MVR who presented to [facility] ED from [facility] for increase weight gain of 10 lbs, LE edema and GAR. She reports that symptoms started after adjustment to PO Lasix dose about 2 weeks ago. ED workup revealed BNP 49551, Troponin 0.042. Hospital medicine placed patient in Observation. Cardiology consulted to assist with management today. Patient seen and examined in room. Denies chest pain or anginal equivalents today. TTE pending. Complains of 10 lb weight gain, GAR, abdominal bloating and LE edema. Denies PND or palpitations. Reports compliance with CPAP therapy. Further recs to follow.

## 2018-11-19 NOTE — PROGRESS NOTES
Pharmacist Renal Dose Adjustment Note    Jennifer Mathews is a 86 y.o. female being treated with the medication Pepcid    Patient Data:    Vital Signs (Most Recent):  Temp: 98 °F (36.7 °C) (11/19/18 1440)  Pulse: 70 (11/19/18 1440)  Resp: 18 (11/19/18 1440)  BP: (!) 160/69 (11/19/18 1440)  SpO2: (!) 93 % (11/19/18 1440)   Vital Signs (72h Range):  Temp:  [97.9 °F (36.6 °C)-98 °F (36.7 °C)]   Pulse:  [69-87]   Resp:  [18-20]   BP: (142-160)/(66-69)   SpO2:  [92 %-93 %]      Recent Labs   Lab 11/19/18  1147   CREATININE 1.1     Serum creatinine: 1.1 mg/dL 11/19/18 1147  Estimated creatinine clearance: 29 mL/min    Medication:Pepcid dose: 20mg frequency BID will be changed to medication:Pepcid dose:20mg frequency:QD    Pharmacist's Name: Anamika Branch  Pharmacist's Extension: [phone]

## 2018-11-19 NOTE — PROGRESS NOTES
Subjective:    Patient ID:  Jennifer Mathews is a 86 y.o. female who presents for follow-up of Edema (belly/legs/feet)      HPI  Ms. Mathews is an 87 yo female with a PMH of mitral valve replacement x 3, HTN, PAF, ACS, combined CHF, PPM-CRT-D placement, s/p MVR, edema, pacemaker placement, and CKD who presents today for CHF follow-up. She returns today and states she is doing ok. Complains of GAR along with productive cough with thick yellow sputum. Saw  and was prescribed doxycycline, has noticed some improvement but cough has persisted. In addition, patient also complains of worsening BLE edema, abdominal bloating, and a weight gain of 10 lbs. Denies any PND, orthopnea, or chest pain. No lightheadedness, dizziness, near syncope, or syncope. Patient reports compliance with her medications. There was some discrepancy as to whether she was on Losartan or Entresto---reviewed meds here in office, patient on Losartan at home. Tries to watch salt intake.    Reviewed CXR from UC visit, suggestive of mild pulmonary congestion.    Review of Systems   Constitution: Positive for malaise/fatigue and weight gain. Negative for chills, decreased appetite, fever and weakness.   HENT: Negative for congestion, hoarse voice and sore throat.    Eyes: Negative for blurred vision and discharge.   Cardiovascular: Positive for dyspnea on exertion and leg swelling. Negative for chest pain, claudication, cyanosis, irregular heartbeat, near-syncope, orthopnea, palpitations and paroxysmal nocturnal dyspnea.   Respiratory: Positive for cough, shortness of breath and sputum production. Negative for hemoptysis, snoring and wheezing.    Endocrine: Negative for cold intolerance and heat intolerance.   Hematologic/Lymphatic: Negative for bleeding problem. Does not bruise/bleed easily.   Skin: Negative for rash.   Musculoskeletal: Positive for arthritis. Negative for back pain, joint pain, joint swelling, muscle cramps, muscle weakness and  "myalgias.   Gastrointestinal: Negative for abdominal pain, constipation, diarrhea, heartburn, melena and nausea.   Genitourinary: Negative for hematuria.   Neurological: Negative for dizziness, focal weakness, headaches, light-headedness, loss of balance, numbness, paresthesias and seizures.   Psychiatric/Behavioral: Negative for memory loss. The patient does not have insomnia.    Allergic/Immunologic: Negative for hives.     BP (!) 144/68 (BP Location: Left arm, Patient Position: Sitting)   Pulse 74   Ht 5' 2" (1.575 m)   Wt 57.5 kg (126 lb 12.2 oz)   BMI 23.19 kg/m²   Objective:    Physical Exam   Constitutional: She is oriented to person, place, and time. She appears well-developed and well-nourished. No distress.   HENT:   Head: Normocephalic and atraumatic.   Eyes: Pupils are equal, round, and reactive to light. Right eye exhibits no discharge. Left eye exhibits no discharge.   Neck: Neck supple. JVD present. No thyromegaly present.   Cardiovascular: Normal rate, regular rhythm, S1 normal and S2 normal.   Murmur heard.  Loud P2   Pulmonary/Chest: Effort normal. She has rales (faint rales at bases).   Abdominal: Soft. She exhibits distension.   Musculoskeletal: She exhibits edema.   Neurological: She is alert and oriented to person, place, and time.   Skin: Skin is warm and dry. She is not diaphoretic. No erythema.   CVI changes BLE     Psychiatric: She has a normal mood and affect. Her behavior is normal. Thought content normal.   Nursing note and vitals reviewed.      2D Echo CONCLUSIONS     1 - Severely depressed left ventricular systolic function (EF 25%).     2 - Normal right ventricular systolic function .     3 - Mild aortic regurgitation.     4 - Mitral valve prosthesis.     5 - Moderate tricuspid regurgitation.     6 - Increased central venous pressure.     7 - Severe left atrial enlargement.     8 - Concentric hypertrophy.     9 - Pulmonary hypertension. The estimated PA systolic pressure is 62 " mmHg.   Assessment:       1. Chronic combined systolic and diastolic congestive heart failure    2. Bilateral lower extremity edema    3. LEV (obstructive sleep apnea)    4. Anticoagulated on Coumadin    5. S/P MVR (mitral valve replacement)    6. S/P placement of cardiac pacemaker    7. Ischemic cardiomyopathy    8. 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 Paroxysmal atrial fibrillation    10. Cardiac resynchronization therapy defibrillator (CRT-D) in place    11. Essential hypertension      Presents for f/u. Appears volume overloaded on exam. Tried increasing po Lasix last week without any improvement. Given her multiple co-morbidities and underlying CKD, sent to ED for labs and IV Lasix.   Plan:   -To [facility] ED for further evaluation and treatment  -Notified  of pending arrival    Chart reviewed. Dr. Cary agrees with plan as outlined above.

## 2018-11-19 NOTE — SUBJECTIVE & OBJECTIVE
"Past Medical History:   Diagnosis Date    Acute coronary syndrome     Anemia     Anticoagulated on Coumadin 7/13/2015    Arthritis     Atrial fibrillation     Basal cell carcinoma 10/2015    left neck    Cardiac arrest     Cardiac resynchronization therapy defibrillator (CRT-D) in place 07/13/2015    Pt denies, states it does not shock me    Chronic combined systolic and diastolic congestive heart failure     CKD (chronic kidney disease) stage 3, GFR 30-59 ml/min     Dyslipidemia 1/30/2014    Hyperlipidemia     Hypertension     Ischemic cardiomyopathy 01/30/2014    Macular hole of left eye     Old    Macular hole of left eye     LEV (obstructive sleep apnea) 9/30/2013    Recurrent UTI     Refractive error     Stroke        Past Surgical History:   Procedure Laterality Date    APPENDECTOMY      CARDIAC CATHETERIZATION      CARDIAC PACEMAKER PLACEMENT  2014    CARDIAC VALVE SURGERY      CATARACT EXTRACTION      OU    CHOLECYSTECTOMY      COLONOSCOPY      IRRIGATION AND DEBRIDEMENT OF LEFT KNEE Left 9/12/2017    Performed by [facility]baum [facility] at [facility] OR    MITRAL VALVE REPLACEMENT  2014    MITRAL VALVE SURGERY      x3    REPLACEMENT OF PACEMAKER GENERATOR Left 6/20/2018    Procedure: REPLACEMENT, PACEMAKER GENERATOR/pt has crt-p versus d;  Surgeon: Manny Dunne MD;  Location: [facility] CATH LAB;  Service: Cardiology;  Laterality: Left;  st renée device  patient is pacer dependent    REPLACEMENT, PACEMAKER GENERATOR/pt has crt-p versus d Left 6/20/2018    Performed by [facility]ne [facility] at [facility] CATH LAB    REVISION, SKIN POCKET, FOR CARDIAC PACEMAKER Left 2/26/2014    Performed by M[facility]ne [facility] at [facility] CATH LAB       Review of patient's allergies indicates:   Allergen Reactions    Cephalosporins Hives    Metaxalone Itching    Penicillins      Other reaction(s): Unknown      Pregabalin      Other reaction(s): "Bad feeling"      Sulfa (sulfonamide antibiotics) " Itching       Current Facility-Administered Medications on File Prior to Encounter   Medication    denosumab (PROLIA) injection 60 mg     Current Outpatient Medications on File Prior to Encounter   Medication Sig    allopurinol (ZYLOPRIM) 100 MG tablet Take 2 tablets (200 mg total) by mouth once daily.    amiodarone (PACERONE) 200 MG Tab TAKE 1 TABLET EVERY DAY    aspirin (ECOTRIN) 81 MG EC tablet Take 81 mg by mouth once daily.    benzonatate (TESSALON) 100 MG capsule Take 2 capsules (200 mg total) by mouth 3 (three) times daily as needed for Cough.    calcium carbonate (OS-SHERRY) 600 mg calcium (1,500 mg) Tab Take 600 mg by mouth once.    carvedilol (COREG) 25 MG tablet Take 1 tablet (25 mg total) by mouth 2 (two) times daily with meals.    colchicine (MITIGARE) 0.6 mg Cap Take 1 capsule (0.6 mg total) by mouth 3 (three) times a week. On Monday/ wednessday / Friday    cranberry 1,000 mg Cap Take 1 capsule by mouth Daily.    digoxin (LANOXIN) 125 mcg tablet Take 1 tablet (125 mcg total) by mouth once daily. TAKE 1 TABLET (0.125 MG TOTAL) BY MOUTH ONCE DAILY. (Patient taking differently: Take 125 mcg by mouth once daily. )    doxycycline (ADOXA) 150 MG tablet Take 1 tablet (150 mg total) by mouth 2 (two) times daily.    furosemide (LASIX) 40 MG tablet Take 1 tablet (40 mg total) by mouth 2 (two) times daily.    gabapentin (NEURONTIN) 100 MG capsule TAKE ONE CAPSULE BY MOUTH TWO TIMES DAILY    Lactobac 40-Bifido 3-S.thermop (PROBIOTIC) 100 billion cell Cap Take 1 capsule by mouth once daily.    levothyroxine (SYNTHROID) 50 MCG tablet Take 1 tablet (50 mcg total) by mouth once daily.    losartan (COZAAR) 25 MG tablet TAKE 1 TABLET BY MOUTH EVERY DAY    MULTIVITAMIN ORAL Take 1 tablet by mouth Daily.    potassium chloride SA (K-DUR,KLOR-CON) 20 MEQ tablet TAKE 1 TABLET (20 MEQ TOTAL) BY MOUTH ONCE DAILY.    warfarin (COUMADIN) 2.5 MG tablet TAKE 1/2 TABLET ON MONDAY AND WEDNESDAY AND 1 TABLET ALL OTHER  DAYS. DISCONTINUE 5MG TABLET. (Patient taking differently: TAKE 1 1/2 TABLET ON TUESDAY, THURSDAY, SATURDAY, SUNDAY. AND 1 TABLET ALL OTHER DAYS. DISCONTINUE 5MG TABLET.)    acetaminophen (TYLENOL EXTRA STRENGTH) 325 MG tablet Take 2 tablets (650 mg total) by mouth every 8 (eight) hours. (Patient taking differently: Take 650 mg by mouth 3 (three) times daily as needed. )    [DISCONTINUED] furosemide (LASIX) 40 MG tablet Take 1 tablet (40 mg total) by mouth 2 (two) times daily.     Family History     Problem Relation (Age of Onset)    Cancer Brother    Coronary artery disease Mother, Father    Diabetes Brother        Tobacco Use    Smoking status: Never Smoker    Smokeless tobacco: Never Used   Substance and Sexual Activity    Alcohol use: No    Drug use: No    Sexual activity: Not on file     Review of Systems   Constitutional: Positive for fatigue and unexpected weight change (weight gain ). Negative for chills and fever.   HENT: Negative for congestion, rhinorrhea and sinus pressure.    Respiratory: Positive for cough. Negative for apnea, choking, chest tightness, shortness of breath, wheezing and stridor.         GAR    Cardiovascular: Positive for leg swelling. Negative for chest pain and palpitations.   Gastrointestinal: Positive for abdominal distention. Negative for abdominal pain, diarrhea, nausea and vomiting.   Endocrine: Negative for cold intolerance and heat intolerance.   Genitourinary: Negative for difficulty urinating and hematuria.   Musculoskeletal: Negative for arthralgias and joint swelling.   Skin: Negative for color change, pallor and rash.   Neurological: Positive for weakness. Negative for dizziness, seizures, numbness and headaches.   Psychiatric/Behavioral: Negative for agitation. The patient is not nervous/anxious.      Objective:     Vital Signs (Most Recent):  Temp: 97.9 °F (36.6 °C) (11/19/18 1100)  Pulse: 69 (11/19/18 1355)  Resp: 20 (11/19/18 1355)  BP: (!) 144/67 (11/19/18  1100)  SpO2: (!) 92 % (11/19/18 1100) Vital Signs (24h Range):  Temp:  [97.9 °F (36.6 °C)] 97.9 °F (36.6 °C)  Pulse:  [69-87] 69  Resp:  [20] 20  SpO2:  [92 %] 92 %  BP: (142-144)/(66-68) 144/67     Weight: 57.5 kg (126 lb 12.2 oz)  Body mass index is 23.19 kg/m².    Physical Exam   Constitutional: She is oriented to person, place, and time. No distress.   Eyes: Right eye exhibits no discharge. Left eye exhibits no discharge.   Neck: JVD present.   Cardiovascular: Exam reveals no gallop and no friction rub.   Murmur heard.   Systolic murmur is present.  Pulmonary/Chest: No stridor. No respiratory distress. She has no wheezes. She has rales. She exhibits no tenderness.   Abdominal: She exhibits distension. She exhibits no mass. There is no tenderness. There is no rebound and no guarding. No hernia.   Musculoskeletal: She exhibits edema (+2-3 bilateral lower ext ).   Neurological: She is alert and oriented to person, place, and time.   Skin: Skin is warm and dry. Capillary refill takes less than 2 seconds. She is not diaphoretic.   Skin discoloration bilateral lower ext   ? Rash to Rt foot    Psychiatric: She has a normal mood and affect. Her behavior is normal.   Nursing note and vitals reviewed.          Significant Labs:   CBC:   Recent Labs   Lab 11/19/18  1147   WBC 6.56   HGB 11.1*   HCT 35.4*        CMP:   Recent Labs   Lab 11/19/18  1147      K 4.0      CO2 24      BUN 26*   CREATININE 1.1   CALCIUM 8.6*   PROT 6.0   ALBUMIN 3.4*   BILITOT 0.8   ALKPHOS 66   AST 26   ALT 21   ANIONGAP 8   EGFRNONAA 46*     Cardiac Markers:   Recent Labs   Lab 11/19/18  1147   BNP 2,111*     Coagulation: No results for input(s): PT, INR, APTT in the last 48 hours.  Troponin:   Recent Labs   Lab 11/19/18  1147   TROPONINI 0.042*       Significant Imaging:     Imaging Results          X-Ray Chest AP Portable (Final result)  Result time 11/19/18 13:04:38    Final result by Bartolome Ham MD (11/19/18  13:04:38)                 Impression:      In comparison to the prior study, there is no adverse interval changes      Electronically signed by: Bartolome Ham MD  Date:    11/19/2018  Time:    13:04             Narrative:    EXAMINATION:  XR CHEST AP PORTABLE    CLINICAL HISTORY:  Shortness of breath    TECHNIQUE:  Single frontal view of the chest was performed.    COMPARISON:  11/12/2018    FINDINGS:  Stable cardiomegaly.  Left-sided pacing device demonstrates similar configuration.  Small left-sided pleural effusion with left lower lobe infiltrate suspected.  Aorta demonstrates atherosclerotic disease.  No pneumothorax.  Moderate degenerative changes.  In comparison to the prior study, there is no adverse interval changes

## 2018-11-19 NOTE — H&P
"[facility] Hospital Medicine  History & Physical    Patient Name: Jennfier Mathews  MRN: [id number]  Admission Date: 11/19/2018  Attending Physician: Nato Cox, *   Primary Care Provider: Desirae Bello MD         Patient information was obtained from patient and ER records.     Subjective:     Principal Problem:Acute on chronic combined systolic and diastolic congestive heart failure    Chief Complaint:   Chief Complaint   Patient presents with    Leg Swelling     Pt states, "My legs are all swollen."        HPI: Ms Mathews is a 86 year old female with PMHx of Systolic Heart Failure, CKD, LEV, PPM-CRT-D placement, Anemia who presented to [facility] after being seen in Cardiology clinic. She has been experiencing increase leg swelling. Associated symptoms include GAR, 10 pound weight gain, abdomenal distention. Denies associated symptoms if chest pain, shortness of breath, palpitations, syncope, dizziness, nausea, vomiting, fever or chills. Vital signs stable. She was seen in urgent care on 11/13/2018 and given doxycyline for bronchitis. 2 D echo on 3/2018 showed severely depressed left ventricular systolic function (EF 25%) and normal right ventricular systolic function, Pulmonary HTN and Mitral valve prosthesis. BNP 2,111. Troponin 0.042. TSH elevated, however T 4 normal.         Past Medical History:   Diagnosis Date    Acute coronary syndrome     Anemia     Anticoagulated on Coumadin 7/13/2015    Arthritis     Atrial fibrillation     Basal cell carcinoma 10/2015    left neck    Cardiac arrest     Cardiac resynchronization therapy defibrillator (CRT-D) in place 07/13/2015    Pt denies, states it does not shock me    Chronic combined systolic and diastolic congestive heart failure     CKD (chronic kidney disease) stage 3, GFR 30-59 ml/min     Dyslipidemia 1/30/2014    Hyperlipidemia     Hypertension     Ischemic cardiomyopathy 01/30/2014    Macular hole " "of left eye     Old    Macular hole of left eye     LEV (obstructive sleep apnea) 9/30/2013    Recurrent UTI     Refractive error     Stroke        Past Surgical History:   Procedure Laterality Date    APPENDECTOMY      CARDIAC CATHETERIZATION      CARDIAC PACEMAKER PLACEMENT  2014    CARDIAC VALVE SURGERY      CATARACT EXTRACTION      OU    CHOLECYSTECTOMY      COLONOSCOPY      IRRIGATION AND DEBRIDEMENT OF LEFT KNEE Left 9/12/2017    [facility] by [facility] OR    MITRAL VALVE REPLACEMENT  2014    MITRAL VALVE SURGERY      x3    REPLACEMENT OF PACEMAKER GENERATOR Left 6/20/2018    Procedure: REPLACEMENT, PACEMAKER GENERATOR/pt has crt-p versus d;  Surgeon: Manny Dunne MD;  Location: [facility] CATH LAB;  Service: Cardiology;  Laterality: Left;  st renée device  patient is pacer dependent    REPLACEMENT, PACEMAKER GENERATOR/pt has crt-p versus d Left 6/20/2018    Performed by Manny Dunne MD at [facility] CATH LAB    REVISION, SKIN POCKET, FOR CARDIAC PACEMAKER Left 2/26/2014    Performed by Manny Dunne MD at [facility] CATH LAB       Review of patient's allergies indicates:   Allergen Reactions    Cephalosporins Hives    Metaxalone Itching    Penicillins      Other reaction(s): Unknown      Pregabalin      Other reaction(s): "Bad feeling"      Sulfa (sulfonamide antibiotics) Itching       Current Facility-Administered Medications on File Prior to Encounter   Medication    denosumab (PROLIA) injection 60 mg     Current Outpatient Medications on File Prior to Encounter   Medication Sig    allopurinol (ZYLOPRIM) 100 MG tablet Take 2 tablets (200 mg total) by mouth once daily.    amiodarone (PACERONE) 200 MG Tab TAKE 1 TABLET EVERY DAY    aspirin (ECOTRIN) 81 MG EC tablet Take 81 mg by mouth once daily.    benzonatate (TESSALON) 100 MG capsule Take 2 capsules (200 mg total) by mouth 3 (three) times daily as needed for Cough.    calcium carbonate (OS-SHERRY) 600 mg " calcium (1,500 mg) Tab Take 600 mg by mouth once.    carvedilol (COREG) 25 MG tablet Take 1 tablet (25 mg total) by mouth 2 (two) times daily with meals.    colchicine (MITIGARE) 0.6 mg Cap Take 1 capsule (0.6 mg total) by mouth 3 (three) times a week. On Monday/ wednessday / Friday    cranberry 1,000 mg Cap Take 1 capsule by mouth Daily.    digoxin (LANOXIN) 125 mcg tablet Take 1 tablet (125 mcg total) by mouth once daily. TAKE 1 TABLET (0.125 MG TOTAL) BY MOUTH ONCE DAILY. (Patient taking differently: Take 125 mcg by mouth once daily. )    doxycycline (ADOXA) 150 MG tablet Take 1 tablet (150 mg total) by mouth 2 (two) times daily.    furosemide (LASIX) 40 MG tablet Take 1 tablet (40 mg total) by mouth 2 (two) times daily.    gabapentin (NEURONTIN) 100 MG capsule TAKE ONE CAPSULE BY MOUTH TWO TIMES DAILY    Lactobac 40-Bifido 3-S.thermop (PROBIOTIC) 100 billion cell Cap Take 1 capsule by mouth once daily.    levothyroxine (SYNTHROID) 50 MCG tablet Take 1 tablet (50 mcg total) by mouth once daily.    losartan (COZAAR) 25 MG tablet TAKE 1 TABLET BY MOUTH EVERY DAY    MULTIVITAMIN ORAL Take 1 tablet by mouth Daily.    potassium chloride SA (K-DUR,KLOR-CON) 20 MEQ tablet TAKE 1 TABLET (20 MEQ TOTAL) BY MOUTH ONCE DAILY.    warfarin (COUMADIN) 2.5 MG tablet TAKE 1/2 TABLET ON MONDAY AND WEDNESDAY AND 1 TABLET ALL OTHER DAYS. DISCONTINUE 5MG TABLET. (Patient taking differently: TAKE 1 1/2 TABLET ON TUESDAY, THURSDAY, SATURDAY, SUNDAY. AND 1 TABLET ALL OTHER DAYS. DISCONTINUE 5MG TABLET.)    acetaminophen (TYLENOL EXTRA STRENGTH) 325 MG tablet Take 2 tablets (650 mg total) by mouth every 8 (eight) hours. (Patient taking differently: Take 650 mg by mouth 3 (three) times daily as needed. )    [DISCONTINUED] furosemide (LASIX) 40 MG tablet Take 1 tablet (40 mg total) by mouth 2 (two) times daily.     Family History     Problem Relation (Age of Onset)    Cancer Brother    Coronary artery disease Mother, Father     Diabetes Brother        Tobacco Use    Smoking status: Never Smoker    Smokeless tobacco: Never Used   Substance and Sexual Activity    Alcohol use: No    Drug use: No    Sexual activity: Not on file     Review of Systems   Constitutional: Positive for fatigue and unexpected weight change (weight gain ). Negative for chills and fever.   HENT: Negative for congestion, rhinorrhea and sinus pressure.    Respiratory: Positive for cough. Negative for apnea, choking, chest tightness, shortness of breath, wheezing and stridor.         GAR    Cardiovascular: Positive for leg swelling. Negative for chest pain and palpitations.   Gastrointestinal: Positive for abdominal distention. Negative for abdominal pain, diarrhea, nausea and vomiting.   Endocrine: Negative for cold intolerance and heat intolerance.   Genitourinary: Negative for difficulty urinating and hematuria.   Musculoskeletal: Negative for arthralgias and joint swelling.   Skin: Negative for color change, pallor and rash.   Neurological: Positive for weakness. Negative for dizziness, seizures, numbness and headaches.   Psychiatric/Behavioral: Negative for agitation. The patient is not nervous/anxious.      Objective:     Vital Signs (Most Recent):  Temp: 97.9 °F (36.6 °C) (11/19/18 1100)  Pulse: 69 (11/19/18 1355)  Resp: 20 (11/19/18 1355)  BP: (!) 144/67 (11/19/18 1100)  SpO2: (!) 92 % (11/19/18 1100) Vital Signs (24h Range):  Temp:  [97.9 °F (36.6 °C)] 97.9 °F (36.6 °C)  Pulse:  [69-87] 69  Resp:  [20] 20  SpO2:  [92 %] 92 %  BP: (142-144)/(66-68) 144/67     Weight: 57.5 kg (126 lb 12.2 oz)  Body mass index is 23.19 kg/m².    Physical Exam   Constitutional: She is oriented to person, place, and time. No distress.   Eyes: Right eye exhibits no discharge. Left eye exhibits no discharge.   Neck: JVD present.   Cardiovascular: Exam reveals no gallop and no friction rub.   Murmur heard.   Systolic murmur is present.  Pulmonary/Chest: No stridor. No respiratory  distress. She has no wheezes. She has rales. She exhibits no tenderness.   Abdominal: She exhibits distension. She exhibits no mass. There is no tenderness. There is no rebound and no guarding. No hernia.   Musculoskeletal: She exhibits edema (+2-3 bilateral lower ext ).   Neurological: She is alert and oriented to person, place, and time.   Skin: Skin is warm and dry. Capillary refill takes less than 2 seconds. She is not diaphoretic.   Skin discoloration bilateral lower ext   ? Rash to Rt foot    Psychiatric: She has a normal mood and affect. Her behavior is normal.   Nursing note and vitals reviewed.          Significant Labs:   CBC:   Recent Labs   Lab 11/19/18  1147   WBC 6.56   HGB 11.1*   HCT 35.4*        CMP:   Recent Labs   Lab 11/19/18  1147      K 4.0      CO2 24      BUN 26*   CREATININE 1.1   CALCIUM 8.6*   PROT 6.0   ALBUMIN 3.4*   BILITOT 0.8   ALKPHOS 66   AST 26   ALT 21   ANIONGAP 8   EGFRNONAA 46*     Cardiac Markers:   Recent Labs   Lab 11/19/18  1147   BNP 2,111*     Coagulation: No results for input(s): PT, INR, APTT in the last 48 hours.  Troponin:   Recent Labs   Lab 11/19/18  1147   TROPONINI 0.042*       Significant Imaging:     Imaging Results          X-Ray Chest AP Portable (Final result)  Result time 11/19/18 13:04:38    Final result by Bartolome Ham MD (11/19/18 13:04:38)                 Impression:      In comparison to the prior study, there is no adverse interval changes      Electronically signed by: Bartolome Ham MD  Date:    11/19/2018  Time:    13:04             Narrative:    EXAMINATION:  XR CHEST AP PORTABLE    CLINICAL HISTORY:  Shortness of breath    TECHNIQUE:  Single frontal view of the chest was performed.    COMPARISON:  11/12/2018    FINDINGS:  Stable cardiomegaly.  Left-sided pacing device demonstrates similar configuration.  Small left-sided pleural effusion with left lower lobe infiltrate suspected.  Aorta demonstrates atherosclerotic  disease.  No pneumothorax.  Moderate degenerative changes.  In comparison to the prior study, there is no adverse interval changes                              Assessment/Plan:     * Acute on chronic combined systolic and diastolic congestive heart failure    Place in Observation   Cardiology consulted   Check 2 D Echo   BNP 2111  Strict I & O   Daily weights   Low sodium diet   Fluid restriction   Continue Coreg, Losartan and Dig   Lasix IV            Hypertension    Continue home BP medications        A-fib    Continue Amiodarone, Coreg and Dig   Restart Coumadin, pharmacy dosing        Iron deficiency anemia due to chronic blood loss    History of anemia    H & H currently stable   Monitor        Anticoagulated on Coumadin    INR pending   Pharmacy to dose coumadin        Bilateral lower extremity edema    IV diuresis  Monitor         S/P placement of cardiac pacemaker    Interrogated 10/2018 and functioning WNL       S/P MVR (mitral valve replacement)    Check 2 D Echo   Cardiology consults        Dyslipidemia    Patient currently not on statin   Controled with diet            LEV (obstructive sleep apnea)    CPAP at night          VTE Risk Mitigation (From admission, onward)        Ordered     Place sequential compression device  Until discontinued      11/19/18 2803             Toby Davis NP  Department of Hospital Medicine   [facility]

## 2018-11-19 NOTE — ED PROVIDER NOTES
"SCRIBE #1 NOTE: I, Ghazal Wendy, am scribing for, and in the presence of, Tanmay Narvaez Jr., MD. I have scribed the entire note.      History      Chief Complaint   Patient presents with    Leg Swelling     Pt states, "My legs are all swollen."       Review of patient's allergies indicates:   Allergen Reactions    Cephalosporins Hives    Metaxalone Itching    Penicillins      Other reaction(s): Unknown      Pregabalin      Other reaction(s): "Bad feeling"      Sulfa (sulfonamide antibiotics) Itching        HPI   HPI    11/19/2018, 11:12 AM   History obtained from the patient      History of Present Illness: Jennifer Mathews is a 86 y.o. female patient with a PMHx of mitral valve replacement and a-fib who presents to the Emergency Department for chronic BLE swelling which worsened 1 week ago. Symptoms are constant and moderate in severity. No mitigating or exacerbating factors reported. Associated sxs include SOB upon exertion. Patient denies any fever, chills, CP, palpitations, weakness, numbness, abd pain, n/v, and all other sxs at this time. No further complaints or concerns at this time.       Arrival mode: Personal vehicle      PCP: Desirae Bello MD       Past Medical History:  Past Medical History:   Diagnosis Date    Acute coronary syndrome     Anemia     Anticoagulated on Coumadin 7/13/2015    Arthritis     Atrial fibrillation     Basal cell carcinoma 10/2015    left neck    Cardiac arrest     Cardiac resynchronization therapy defibrillator (CRT-D) in place 07/13/2015    Pt denies, states it does not shock me    Chronic combined systolic and diastolic congestive heart failure     CKD (chronic kidney disease) stage 3, GFR 30-59 ml/min     Dyslipidemia 1/30/2014    Hyperlipidemia     Hypertension     Ischemic cardiomyopathy 01/30/2014    Macular hole of left eye     Old    Macular hole of left eye     LEV (obstructive sleep apnea) 9/30/2013    Recurrent UTI     Refractive error  "    Stroke        Past Surgical History:  Past Surgical History:   Procedure Laterality Date    APPENDECTOMY      CARDIAC CATHETERIZATION      CARDIAC PACEMAKER PLACEMENT  2014    CARDIAC VALVE SURGERY      CATARACT EXTRACTION      OU    CHOLECYSTECTOMY      COLONOSCOPY      IRRIGATION AND DEBRIDEMENT OF LEFT KNEE Left 9/12/2017    Performed by Juliano Rosenbaum MD at [facility] [place]    MITRAL VALVE REPLACEMENT  2014    MITRAL VALVE SURGERY      x3    REPLACEMENT OF PACEMAKER GENERATOR Left 6/20/2018    Procedure: REPLACEMENT, PACEMAKER GENERATOR/pt has crt-p versus d;  Surgeon: Manny Dunne MD;  Location: [facility] CATH LAB;  Service: Cardiology;  Laterality: Left;  st renée device  patient is pacer dependent    REPLACEMENT, PACEMAKER GENERATOR/pt has crt-p versus d Left 6/20/2018    Performed by Manny Dunne MD at [facility] C[facility]    REVISION, SKIN POCKET, FOR CARDIAC PACEMAKER Left 2/26/2014    Performed by Manny Dunne MD at [facility]         Family History:  Family History   Problem Relation Age of Onset    Coronary artery disease Mother         mi    Coronary artery disease Father     Diabetes Brother     Cancer Brother     Melanoma Neg Hx     Psoriasis Neg Hx     Lupus Neg Hx     Eczema Neg Hx        Social History:  Social History     Tobacco Use    Smoking status: Never Smoker    Smokeless tobacco: Never Used   Substance and Sexual Activity    Alcohol use: No    Drug use: No    Sexual activity: Unknown       ROS   Review of Systems   Constitutional: Negative for chills, diaphoresis and fever.   HENT: Negative for congestion, rhinorrhea and sore throat.    Respiratory: Positive for shortness of breath. Negative for cough and choking.    Cardiovascular: Positive for leg swelling (BLE). Negative for chest pain and palpitations.   Gastrointestinal: Negative for abdominal pain, diarrhea, nausea and vomiting.   Genitourinary: Negative for dysuria, frequency and hematuria.  "  Musculoskeletal: Negative for back pain and neck pain.   Skin: Negative for rash.   Neurological: Negative for dizziness, weakness and numbness.   Hematological: Does not bruise/bleed easily.   All other systems reviewed and are negative.      Physical Exam      Initial Vitals [11/19/18 1100]   BP Pulse Resp Temp SpO2   (!) 144/67 87 20 97.9 °F (36.6 °C) (!) 92 %      MAP       --          Physical Exam  Nursing Notes and Vital Signs Reviewed.  Constitutional: Patient is in no acute distress. Well-developed and well-nourished.  Head: Atraumatic. Normocephalic.  Eyes: PERRL. EOM intact. Conjunctivae are not pale. No scleral icterus.  ENT: Mucous membranes are moist. Oropharynx is clear and symmetric.    Neck: Supple. Full ROM. No lymphadenopathy.  Cardiovascular: Regular rate. Regular rhythm. No murmurs, rubs, or gallops. Distal pulses are 2+ and symmetric.  Pulmonary/Chest: No respiratory distress. Clear to auscultation bilaterally. No wheezing or rales.  Abdominal: Soft and non-distended.  There is no tenderness.  No rebound, guarding, or rigidity.   Musculoskeletal: Moves all extremities. 3+ BLE edema. No calf tenderness.  Skin: Warm and dry.  Neurological:  Alert, awake, and appropriate.  Normal speech.  No acute focal neurological deficits are appreciated.  Psychiatric: Normal affect. Good eye contact. Appropriate in content.    ED Course    Procedures  ED Vital Signs:  Vitals:    11/19/18 1100   BP: (!) 144/67   Pulse: 87   Resp: 20   Temp: 97.9 °F (36.6 °C)   TempSrc: Oral   SpO2: (!) 92%   Weight: 57.5 kg (126 lb 12.2 oz)   Height: 5' 2" (1.575 m)       Abnormal Lab Results:  Labs Reviewed   CBC W/ AUTO DIFFERENTIAL - Abnormal; Notable for the following components:       Result Value    RBC 3.31 (*)     Hemoglobin 11.1 (*)     Hematocrit 35.4 (*)      (*)     MCH 33.5 (*)     MCHC 31.4 (*)     RDW 17.8 (*)     All other components within normal limits   COMPREHENSIVE METABOLIC PANEL - Abnormal; " Notable for the following components:    BUN, Bld 26 (*)     Calcium 8.6 (*)     Albumin 3.4 (*)     eGFR if  53 (*)     eGFR if non  46 (*)     All other components within normal limits   B-TYPE NATRIURETIC PEPTIDE - Abnormal; Notable for the following components:    BNP 2,111 (*)     All other components within normal limits   TROPONIN I - Abnormal; Notable for the following components:    Troponin I 0.042 (*)     All other components within normal limits   DIGOXIN LEVEL   PROTIME-INR   APTT   URINALYSIS   TSH   T4, FREE        All Lab Results:  Results for orders placed or performed during the hospital encounter of 11/19/18   CBC auto differential   Result Value Ref Range    WBC 6.56 3.90 - 12.70 K/uL    RBC 3.31 (L) 4.00 - 5.40 M/uL    Hemoglobin 11.1 (L) 12.0 - 16.0 g/dL    Hematocrit 35.4 (L) 37.0 - 48.5 %     (H) 82 - 98 fL    MCH 33.5 (H) 27.0 - 31.0 pg    MCHC 31.4 (L) 32.0 - 36.0 g/dL    RDW 17.8 (H) 11.5 - 14.5 %    Platelets 177 150 - 350 K/uL    MPV 10.1 9.2 - 12.9 fL    Gran # (ANC) 4.5 1.8 - 7.7 K/uL    Lymph # 1.4 1.0 - 4.8 K/uL    Mono # 0.6 0.3 - 1.0 K/uL    Eos # 0.1 0.0 - 0.5 K/uL    Baso # 0.04 0.00 - 0.20 K/uL    Gran% 68.9 38.0 - 73.0 %    Lymph% 20.6 18.0 - 48.0 %    Mono% 9.0 4.0 - 15.0 %    Eosinophil% 0.9 0.0 - 8.0 %    Basophil% 0.6 0.0 - 1.9 %    Differential Method Automated    Comprehensive metabolic panel   Result Value Ref Range    Sodium 142 136 - 145 mmol/L    Potassium 4.0 3.5 - 5.1 mmol/L    Chloride 110 95 - 110 mmol/L    CO2 24 23 - 29 mmol/L    Glucose 105 70 - 110 mg/dL    BUN, Bld 26 (H) 8 - 23 mg/dL    Creatinine 1.1 0.5 - 1.4 mg/dL    Calcium 8.6 (L) 8.7 - 10.5 mg/dL    Total Protein 6.0 6.0 - 8.4 g/dL    Albumin 3.4 (L) 3.5 - 5.2 g/dL    Total Bilirubin 0.8 0.1 - 1.0 mg/dL    Alkaline Phosphatase 66 55 - 135 U/L    AST 26 10 - 40 U/L    ALT 21 10 - 44 U/L    Anion Gap 8 8 - 16 mmol/L    eGFR if African American 53 (A) >60 mL/min/1.73  m^2    eGFR if non African American 46 (A) >60 mL/min/1.73 m^2   Brain natriuretic peptide   Result Value Ref Range    BNP 2,111 (H) 0 - 99 pg/mL   Troponin I   Result Value Ref Range    Troponin I 0.042 (H) 0.000 - 0.026 ng/mL   Digoxin level   Result Value Ref Range    Digoxin Lvl 1.8 0.8 - 2.0 ng/mL       Imaging Results:  Imaging Results          X-Ray Chest AP Portable (Final result)  Result time 11/19/18 13:04:38    Final result by Bartolome Ham MD (11/19/18 13:04:38)                 Impression:      In comparison to the prior study, there is no adverse interval changes      Electronically signed by: Bartolome Ham MD  Date:    11/19/2018  Time:    13:04             Narrative:    EXAMINATION:  XR CHEST AP PORTABLE    CLINICAL HISTORY:  Shortness of breath    TECHNIQUE:  Single frontal view of the chest was performed.    COMPARISON:  11/12/2018    FINDINGS:  Stable cardiomegaly.  Left-sided pacing device demonstrates similar configuration.  Small left-sided pleural effusion with left lower lobe infiltrate suspected.  Aorta demonstrates atherosclerotic disease.  No pneumothorax.  Moderate degenerative changes.  In comparison to the prior study, there is no adverse interval changes                                        The EKG was ordered, reviewed, and independently interpreted by the ED provider.  Interpretation time: 11:40  Rate: 70 BPM  Rhythm: atrial-paced rhythm with prolonged AV conduction  Interpretation: LAD. Left ventricular hypertrophy with QRS widening and repolarization abnormality. No STEMI.      The Emergency Provider reviewed the vital signs and test results, which are outlined above.    ED Discussion     1:07 PM: Discussed case with Azucena Stern NP (Hospital Medicine). Dr. Tavarez agrees with current care and management of pt and accepts admission.   Admitting Service: Hospital medicine   Admitting Physician: Dr. Tavarez  Admit to: Obs    1:14 PM: Re-evaluated pt. I have discussed test  results, shared treatment plan, and the need for admission with patient and family at bedside. Pt and family express understanding at this time and agree with all information. All questions answered. Pt and family have no further questions or concerns at this time. Pt is ready for admit.    ED Medication(s):  Medications   furosemide injection 40 mg (40 mg Intravenous Given 11/19/18 1231)             Medical Decision Making    Medical Decision Making:   Clinical Tests:   Lab Tests: Reviewed and Ordered  Radiological Study: Reviewed and Ordered  Medical Tests: Reviewed and Ordered           Scribe Attestation:   Scribe #1: I performed the above scribed service and the documentation accurately describes the services I performed. I attest to the accuracy of the note.    Attending:   Physician Attestation Statement for Scribe #1: I, Tanmay Narvaez Jr., MD, personally performed the services described in this documentation, as scribed by Ghazal Nguyen, in my presence, and it is both accurate and complete.          Clinical Impression       ICD-10-CM ICD-9-CM   1. Ischemic cardiomyopathy I25.5 414.8   2. SOB (shortness of breath) R06.02 786.05   3. Anticoagulated on Coumadin Z51.81 V58.83    Z79.01 V58.61   4. S/P MVR (mitral valve replacement) Z95.2 V43.3   5. Localized edema R60.0 782.3   6. Chronic combined systolic and diastolic congestive heart failure I50.42 428.42     428.0       Disposition:   Disposition: Placed in Observation  Condition: Fair         Tanmay Narvaez Jr., MD  11/19/18 3933

## 2018-11-19 NOTE — HPI
Ms Mathews is a 86 year old female with PMHx of Systolic Heart Failure, CKD, LEV, PPM-CRT-D placement, Anemia who presented to [facility] after being seen in Cardiology clinic. She has been experiencing increase leg swelling. Associated symptoms include GAR, 10 pound weight gain, abdomenal distention. Denies associated symptoms if chest pain, shortness of breath, palpitations, syncope, dizziness, nausea, vomiting, fever or chills. Vital signs stable. She was seen in urgent care on 11/13/2018 and given doxycyline for bronchitis. 2 D echo on 3/2018 showed severely depressed left ventricular systolic function (EF 25%) and normal right ventricular systolic function, Pulmonary HTN and Mitral valve prosthesis. BNP 2,111. Troponin 0.042. TSH elevated, however T 4 normal.

## 2018-11-19 NOTE — ASSESSMENT & PLAN NOTE
-Repeat 2D Echo pending  -BNP 2111  -IV lasix TID for now  -Dash diet, 2 gm sodium restriction  -1L fluid restriction  -Daily weights  -Continue BB, ARB, Digoxin, IV lasix  -Repeat Labs in AM

## 2018-11-19 NOTE — ASSESSMENT & PLAN NOTE
Continue Amiodarone, BB, Digoxin  Resume Coumadin, pharmacy to dose  Monitor INR  NO CNS complaints to suggest TIA or CVA today.  No signs of abnormal bleeding on Coumadin

## 2018-11-19 NOTE — ED NOTES
Pt c/o edema.    Patient moved to ED room 10, patient assisted onto stretcher and changed into a gown. Patient placed on cardiac monitor, continuous pulse oximetry and automatic blood pressure cuff. Bed placed in low locked position, side rails up x 2, call light is within reach of patient or family, orientation to room and explanation of wait provided to family and patient, alarms set and turned on for monitor and pulse ox, awaiting MD evaluation and orders, will continue to monitor.    Patient identifies self as Jennifer R Bisi      LOC: The patient is awake, alert and aware of environment with an appropriate affect, the patient is oriented x 3 and speaking appropriately.  APPEARANCE: Patient resting comfortably and in no acute distress, patient is clean and well groomed, patient's clothing is properly fastened.  SKIN: The skin is warm and dry, color consistent with ethnicity, patient has normal skin turgor and moist mucus membranes, skin intact, no breakdown or bruising noted.  MUSCULOSKELETAL: Patient moving all extremities well, swelling of BLE w/o pitting.   RESPIRATORY: Airway is open and patent, respirations are spontaneous, patient has a normal effort and rate, no accessory muscle use noted. Productive cough.   CARDIAC: Patient has a normal rate and rhythm, no periphreal edema noted, capillary refill < 3 seconds.  ABDOMEN: Soft and non tender to palpation, no distention noted.  NEUROLOGIC: PERRL, 3 mm bilaterally, eyes open spontaneously, behavior appropriate to situation, follows commands, facial expression symmetrical, bilateral hand grasp equal and even, purposeful motor response noted, normal sensation in all extremities when touched with a finger.

## 2018-11-19 NOTE — CONSULTS
[facility]  Cardiology  Consult Note    Patient Name: Jennifer Mathews  MRN: [id number]  Admission Date: 11/19/2018  Hospital Length of Stay: 0 days  Code Status: Full Code   Attending Provider: Nato Cox, *   Consulting Provider: Apple Infante NP  Primary Care Physician: Desirae Bello MD  Principal Problem:Acute on chronic combined systolic and diastolic congestive heart failure    Patient information was obtained from patient, past medical records and ER records.     Inpatient consult to Cardiology  Consult performed by: Apple Infante NP  Consult ordered by: Toby Davis NP        Subjective:     Chief Complaint:  Weight gain, abdominal distention, LE edema     HPI:   Mrs. Mathews is a 86 year old female with PMHx of HFrEF 25%, s/p PPM-CRT-D placement, CKD, LEV on CPAP, Anemia, s/p MVR who presented to [facility] ED from [facility] for increase weight gain of 10 lbs, LE edema and GAR. She reports that symptoms started after adjustment to PO Lasix dose about 2 weeks ago. ED workup revealed BNP 26516, Troponin 0.042. Hospital medicine placed patient in Observation. Cardiology consulted to assist with management today. Patient seen and examined in room. Denies chest pain or anginal equivalents today. TTE pending. Complains of 10 lb weight gain, GAR, abdominal bloating and LE edema. Denies PND or palpitations. Reports compliance with CPAP therapy. Further recs to follow.     Past Medical History:   Diagnosis Date    Acute coronary syndrome     Anemia     Anticoagulated on Coumadin 7/13/2015    Arthritis     Atrial fibrillation     Basal cell carcinoma 10/2015    left neck    Cardiac arrest     Cardiac resynchronization therapy defibrillator (CRT-D) in place 07/13/2015    Pt denies, states it does not shock me    Chronic combined systolic and diastolic congestive heart failure     CKD (chronic kidney disease) stage 3, GFR 30-59 ml/min     Dyslipidemia  "1/30/2014    Hyperlipidemia     Hypertension     Ischemic cardiomyopathy 01/30/2014    Macular hole of left eye     Old    Macular hole of left eye     LEV (obstructive sleep apnea) 9/30/2013    Recurrent UTI     Refractive error     Stroke        Past Surgical History:   Procedure Laterality Date    APPENDECTOMY      CARDIAC CATHETERIZATION      CARDIAC PACEMAKER PLACEMENT  2014    CARDIAC VALVE SURGERY      CATARACT EXTRACTION      OU    CHOLECYSTECTOMY      COLONOSCOPY      IRRIGATION AND DEBRIDEMENT OF LEFT KNEE Left 9/12/2017    Performed by [facility]baum [facility] at [facility] OR    MITRAL VALVE REPLACEMENT  2014    MITRAL VALVE SURGERY      x3    REPLACEMENT OF PACEMAKER GENERATOR Left 6/20/2018    Procedure: REPLACEMENT, PACEMAKER GENERATOR/pt has crt-p versus d;  Surgeon: Manny Dunne MD;  Location: [facility] CATH LAB;  Service: Cardiology;  Laterality: Left;  st renée device  patient is pacer dependent    REPLACEMENT, PACEMAKER GENERATOR/pt has crt-p versus d Left 6/20/2018    Performed by Manny Dunne MD at [facility] CATH LAB    REVISION, SKIN POCKET, FOR CARDIAC PACEMAKER Left 2/26/2014    Performed by Manny Dunne MD at [facility] CATH LAB       Review of patient's allergies indicates:   Allergen Reactions    Cephalosporins Hives    Metaxalone Itching    Penicillins      Other reaction(s): Unknown      Pregabalin      Other reaction(s): "Bad feeling"      Sulfa (sulfonamide antibiotics) Itching       No current facility-administered medications on file prior to encounter.      Current Outpatient Medications on File Prior to Encounter   Medication Sig    allopurinol (ZYLOPRIM) 100 MG tablet Take 2 tablets (200 mg total) by mouth once daily.    amiodarone (PACERONE) 200 MG Tab TAKE 1 TABLET EVERY DAY    aspirin (ECOTRIN) 81 MG EC tablet Take 81 mg by mouth once daily.    benzonatate (TESSALON) 100 MG capsule Take 2 capsules (200 mg total) by mouth 3 (three) times daily as " needed for Cough.    calcium carbonate (OS-SHERRY) 600 mg calcium (1,500 mg) Tab Take 600 mg by mouth once.    carvedilol (COREG) 25 MG tablet Take 1 tablet (25 mg total) by mouth 2 (two) times daily with meals.    colchicine (MITIGARE) 0.6 mg Cap Take 1 capsule (0.6 mg total) by mouth 3 (three) times a week. On Monday/ wednessday / Friday    cranberry 1,000 mg Cap Take 1 capsule by mouth Daily.    digoxin (LANOXIN) 125 mcg tablet Take 1 tablet (125 mcg total) by mouth once daily. TAKE 1 TABLET (0.125 MG TOTAL) BY MOUTH ONCE DAILY. (Patient taking differently: Take 125 mcg by mouth once daily. )    doxycycline (ADOXA) 150 MG tablet Take 1 tablet (150 mg total) by mouth 2 (two) times daily.    furosemide (LASIX) 40 MG tablet Take 1 tablet (40 mg total) by mouth 2 (two) times daily.    gabapentin (NEURONTIN) 100 MG capsule TAKE ONE CAPSULE BY MOUTH TWO TIMES DAILY    Lactobac 40-Bifido 3-S.thermop (PROBIOTIC) 100 billion cell Cap Take 1 capsule by mouth once daily.    levothyroxine (SYNTHROID) 50 MCG tablet Take 1 tablet (50 mcg total) by mouth once daily.    losartan (COZAAR) 25 MG tablet TAKE 1 TABLET BY MOUTH EVERY DAY    MULTIVITAMIN ORAL Take 1 tablet by mouth Daily.    potassium chloride SA (K-DUR,KLOR-CON) 20 MEQ tablet TAKE 1 TABLET (20 MEQ TOTAL) BY MOUTH ONCE DAILY.    warfarin (COUMADIN) 2.5 MG tablet TAKE 1/2 TABLET ON MONDAY AND WEDNESDAY AND 1 TABLET ALL OTHER DAYS. DISCONTINUE 5MG TABLET. (Patient taking differently: TAKE 1 1/2 TABLET ON TUESDAY, THURSDAY, SATURDAY, SUNDAY. AND 1 TABLET ALL OTHER DAYS. DISCONTINUE 5MG TABLET.)    acetaminophen (TYLENOL EXTRA STRENGTH) 325 MG tablet Take 2 tablets (650 mg total) by mouth every 8 (eight) hours. (Patient taking differently: Take 650 mg by mouth 3 (three) times daily as needed. )    [DISCONTINUED] furosemide (LASIX) 40 MG tablet Take 1 tablet (40 mg total) by mouth 2 (two) times daily.     Family History     Problem Relation (Age of Onset)     Cancer Brother    Coronary artery disease Mother, Father    Diabetes Brother        Tobacco Use    Smoking status: Never Smoker    Smokeless tobacco: Never Used   Substance and Sexual Activity    Alcohol use: No    Drug use: No    Sexual activity: Not on file     Review of Systems   Constitution: Positive for weight gain. Negative for weakness and malaise/fatigue.   HENT: Negative for hearing loss and hoarse voice.    Eyes: Negative for blurred vision and visual disturbance.   Cardiovascular: Positive for dyspnea on exertion, leg swelling and orthopnea. Negative for chest pain, claudication, irregular heartbeat, near-syncope, palpitations, paroxysmal nocturnal dyspnea and syncope.   Respiratory: Negative for cough, hemoptysis, shortness of breath, sleep disturbances due to breathing, snoring and wheezing.    Endocrine: Negative for cold intolerance and heat intolerance.   Hematologic/Lymphatic: Bruises/bleeds easily.   Skin: Negative for color change, dry skin and nail changes.   Musculoskeletal: Positive for arthritis and back pain. Negative for joint pain and myalgias.   Gastrointestinal: Positive for bloating. Negative for abdominal pain, constipation, nausea and vomiting.   Genitourinary: Negative for dysuria, flank pain, hematuria and hesitancy.   Neurological: Negative for headaches, light-headedness, loss of balance, numbness and paresthesias.   Psychiatric/Behavioral: Negative for altered mental status.   Allergic/Immunologic: Negative for environmental allergies.     Objective:     Vital Signs (Most Recent):  Temp: 98 °F (36.7 °C) (11/19/18 1440)  Pulse: 69 (11/19/18 1500)  Resp: 18 (11/19/18 1440)  BP: (!) 160/69 (11/19/18 1440)  SpO2: (!) 93 % (11/19/18 1440) Vital Signs (24h Range):  Temp:  [97.9 °F (36.6 °C)-98 °F (36.7 °C)] 98 °F (36.7 °C)  Pulse:  [69-87] 69  Resp:  [18-20] 18  SpO2:  [92 %-93 %] 93 %  BP: (142-160)/(66-69) 160/69     Weight: 57.5 kg (126 lb 12.2 oz)  Body mass index is 23.19  kg/m².    SpO2: (!) 93 %  O2 Device (Oxygen Therapy): room air      Intake/Output Summary (Last 24 hours) at 11/19/2018 1602  Last data filed at 11/19/2018 1417  Gross per 24 hour   Intake --   Output 800 ml   Net -800 ml       Lines/Drains/Airways     Peripheral Intravenous Line                 Peripheral IV - Single Lumen 11/19/18 1030 Left Forearm less than 1 day                Physical Exam   Constitutional: She is oriented to person, place, and time. She appears well-developed and well-nourished. No distress.   HENT:   Head: Normocephalic and atraumatic.   Eyes: Pupils are equal, round, and reactive to light.   Neck: Normal range of motion and full passive range of motion without pain. Neck supple. JVD present.   Cardiovascular: Normal rate, regular rhythm, S1 normal, S2 normal and intact distal pulses. PMI is not displaced. Exam reveals no distant heart sounds.   No murmur heard.  Pulses:       Radial pulses are 2+ on the right side, and 2+ on the left side.        Dorsalis pedis pulses are 1+ on the right side, and 1+ on the left side.   Pulmonary/Chest: Accessory muscle usage present. No respiratory distress. She has decreased breath sounds in the right lower field. She has no wheezes. She has rales.   Abdominal: Soft. Bowel sounds are normal. She exhibits distension. There is no tenderness.   Musculoskeletal: Normal range of motion. She exhibits edema.        Right ankle: She exhibits swelling.        Left ankle: She exhibits swelling.   Neurological: She is alert and oriented to person, place, and time.   Skin: Skin is warm and dry. She is not diaphoretic. No cyanosis. Nails show no clubbing.   Psychiatric: She has a normal mood and affect. Her speech is normal and behavior is normal. Judgment and thought content normal. Cognition and memory are normal.   Nursing note and vitals reviewed.      Significant Labs:   All pertinent lab results from the last 24 hours have been reviewed. and   Recent Lab Results        11/19/18  1511   11/19/18  1147        Albumin   3.4     Alkaline Phosphatase   66     ALT   21     Anion Gap   8     AST   26     Baso #   0.04     Basophil%   0.6     Total Bilirubin   0.8  Comment:  For infants and newborns, interpretation of results should be based  on gestational age, weight and in agreement with clinical  observations.  Premature Infant recommended reference ranges:  Up to 24 hours.............<8.0 mg/dL  Up to 48 hours............<12.0 mg/dL  3-5 days..................<15.0 mg/dL  6-29 days.................<15.0 mg/dL       BNP   2,111  Comment:  Values of less than 100 pg/ml are consistent with non-CHF populations.     BUN, Bld   26     Calcium   8.6     Chloride   110     CO2   24     Creatinine   1.1     Differential Method   Automated     Digoxin Lvl   1.8  Comment:  Toxic:  Adult: >2.5 ng/mL, Pediatric: >3.0 ng/mL       eGFR if    53     eGFR if non    46  Comment:  Calculation used to obtain the estimated glomerular filtration  rate (eGFR) is the CKD-EPI equation.        Eos #   0.1     Eosinophil%   0.9     Free T4   1.10     Glucose   105     Gran # (ANC)   4.5     Gran%   68.9     Hematocrit   35.4     Hemoglobin   11.1     Coumadin Monitoring INR 3.3  Comment:  Coumadin Therapy:  2.0 - 3.0 for INR for all indicators except mechanical heart valves  and antiphospholipid syndromes which should use 2.5 - 3.5.         Lymph #   1.4     Lymph%   20.6     MCH   33.5     MCHC   31.4     MCV   107     Mono #   0.6     Mono%   9.0     MPV   10.1     Platelets   177     Potassium   4.0     Total Protein   6.0     Protime 34.2       RBC   3.31     RDW   17.8     Sodium   142     Troponin I   0.042  Comment:  The reference interval for Troponin I represents the 99th percentile   cutoff   for our facility and is consistent with 3rd generation assay   performance.       TSH   8.560     WBC   6.56           Significant Imaging: Echocardiogram:   2D echo with  color flow doppler:   Results for orders placed or performed in visit on 03/01/18   2D echo with color flow doppler   Result Value Ref Range    QEF 25 (A) 55 - 65    Mitral Valve Regurgitation TRIVIAL     Aortic Valve Regurgitation MILD     Est. PA Systolic Pressure 62.06 (A)     Tricuspid Valve Regurgitation MODERATE (A)     and X-Ray: CXR: X-Ray Chest 1 View (CXR): No results found for this visit on 11/19/18.    Assessment and Plan:     * Acute on chronic combined systolic and diastolic congestive heart failure    -Repeat 2D Echo pending  -BNP 2111  -IV lasix TID for now  -Dash diet, 2 gm sodium restriction  -1L fluid restriction  -Daily weights  -Continue BB, ARB, Digoxin, IV lasix  -Repeat Labs in AM     A-fib    Continue Amiodarone, BB, Digoxin  Resume Coumadin, pharmacy to dose  Monitor INR  NO CNS complaints to suggest TIA or CVA today.  No signs of abnormal bleeding on Coumadin     Bilateral lower extremity edema    Continue IV Lasix TID     S/P MVR (mitral valve replacement)    Repeat 2D Echo pending     Hypertension    On medical therapy  Continue BB, Digoxin, Lasix, Losartan     LEV (obstructive sleep apnea)    Per primary team         VTE Risk Mitigation (From admission, onward)        Ordered     Place sequential compression device  Until discontinued      11/19/18 0322          Thank you for your consult. I will follow-up with patient. Please contact us if you have any additional questions.    Apple Infante NP  Cardiology   [facility]

## 2018-11-19 NOTE — ASSESSMENT & PLAN NOTE
Place in Observation   Cardiology consulted   Check 2 D Echo   BNP 2111  Strict I & O   Daily weights   Low sodium diet   Fluid restriction   Continue Coreg, Losartan and Dig   Lasix IV

## 2018-11-20 VITALS
HEIGHT: 62 IN | OXYGEN SATURATION: 91 % | HEART RATE: 72 BPM | TEMPERATURE: 98 F | WEIGHT: 113.75 LBS | DIASTOLIC BLOOD PRESSURE: 67 MMHG | RESPIRATION RATE: 18 BRPM | BODY MASS INDEX: 20.93 KG/M2 | SYSTOLIC BLOOD PRESSURE: 158 MMHG

## 2018-11-20 LAB
ANION GAP SERPL CALC-SCNC: 8 MMOL/L
BASOPHILS # BLD AUTO: 0.06 K/UL
BASOPHILS NFR BLD: 1.2 %
BUN SERPL-MCNC: 28 MG/DL
CALCIUM SERPL-MCNC: 8.5 MG/DL
CHLORIDE SERPL-SCNC: 107 MMOL/L
CO2 SERPL-SCNC: 29 MMOL/L
CREAT SERPL-MCNC: 1.2 MG/DL
DIFFERENTIAL METHOD: ABNORMAL
EOSINOPHIL # BLD AUTO: 0.1 K/UL
EOSINOPHIL NFR BLD: 1.7 %
ERYTHROCYTE [DISTWIDTH] IN BLOOD BY AUTOMATED COUNT: 17.6 %
EST. GFR  (AFRICAN AMERICAN): 47 ML/MIN/1.73 M^2
EST. GFR  (NON AFRICAN AMERICAN): 41 ML/MIN/1.73 M^2
GLUCOSE SERPL-MCNC: 83 MG/DL
HCT VFR BLD AUTO: 33.9 %
HGB BLD-MCNC: 10.6 G/DL
INR PPP: 3.1
LYMPHOCYTES # BLD AUTO: 1.3 K/UL
LYMPHOCYTES NFR BLD: 25.5 %
MCH RBC QN AUTO: 33.1 PG
MCHC RBC AUTO-ENTMCNC: 31.3 G/DL
MCV RBC AUTO: 106 FL
MONOCYTES # BLD AUTO: 0.6 K/UL
MONOCYTES NFR BLD: 11.5 %
NEUTROPHILS # BLD AUTO: 3.1 K/UL
NEUTROPHILS NFR BLD: 60.1 %
PLATELET # BLD AUTO: 159 K/UL
PMV BLD AUTO: 9.5 FL
POTASSIUM SERPL-SCNC: 3.9 MMOL/L
PROTHROMBIN TIME: 32 SEC
RBC # BLD AUTO: 3.2 M/UL
SODIUM SERPL-SCNC: 144 MMOL/L
WBC # BLD AUTO: 5.21 K/UL

## 2018-11-20 PROCEDURE — G0378 HOSPITAL OBSERVATION PER HR: HCPCS

## 2018-11-20 PROCEDURE — 63600175 PHARM REV CODE 636 W HCPCS: Performed by: NURSE PRACTITIONER

## 2018-11-20 PROCEDURE — 85610 PROTHROMBIN TIME: CPT

## 2018-11-20 PROCEDURE — 25000003 PHARM REV CODE 250: Performed by: NURSE PRACTITIONER

## 2018-11-20 PROCEDURE — 85025 COMPLETE CBC W/AUTO DIFF WBC: CPT

## 2018-11-20 PROCEDURE — 99213 OFFICE O/P EST LOW 20 MIN: CPT | Mod: ,,, | Performed by: INTERNAL MEDICINE

## 2018-11-20 PROCEDURE — 36415 COLL VENOUS BLD VENIPUNCTURE: CPT

## 2018-11-20 PROCEDURE — 80048 BASIC METABOLIC PNL TOTAL CA: CPT

## 2018-11-20 PROCEDURE — 25000003 PHARM REV CODE 250: Performed by: EMERGENCY MEDICINE

## 2018-11-20 RX ADMIN — LOSARTAN POTASSIUM 25 MG: 25 TABLET, FILM COATED ORAL at 08:11

## 2018-11-20 RX ADMIN — LEVOTHYROXINE SODIUM 50 MCG: 50 TABLET ORAL at 06:11

## 2018-11-20 RX ADMIN — FAMOTIDINE 20 MG: 20 TABLET ORAL at 08:11

## 2018-11-20 RX ADMIN — POTASSIUM CHLORIDE 20 MEQ: 1500 TABLET, EXTENDED RELEASE ORAL at 08:11

## 2018-11-20 RX ADMIN — FUROSEMIDE 40 MG: 10 INJECTION, SOLUTION INTRAMUSCULAR; INTRAVENOUS at 10:11

## 2018-11-20 RX ADMIN — AMIODARONE HYDROCHLORIDE 200 MG: 200 TABLET ORAL at 08:11

## 2018-11-20 RX ADMIN — GABAPENTIN 100 MG: 100 CAPSULE ORAL at 08:11

## 2018-11-20 RX ADMIN — ASPIRIN 81 MG: 81 TABLET, COATED ORAL at 08:11

## 2018-11-20 RX ADMIN — CARVEDILOL 25 MG: 12.5 TABLET, FILM COATED ORAL at 08:11

## 2018-11-20 RX ADMIN — DIGOXIN 125 MCG: 125 TABLET ORAL at 08:11

## 2018-11-20 NOTE — PROGRESS NOTES
Pharmacy Brief Progress Note:    Patient/caregiver educated on warfarin indication, side effects, and drug interactions. Discussed importance of medication compliance and INR monitoring and reviewed signs of abnormal bleeding. Patient/caregiver given warfarin educational handout. Patient/caregiver expressed understanding and had no further questions.    Thank you for allowing us to participate in this patient's care.     Anamika Branch 11/20/2018 9:49 AM

## 2018-11-20 NOTE — HOSPITAL COURSE
Ms Mathews is a 86 year old female who presented to [facility] Emergency Room after being seen in cardiology Clinic. Symptoms include 10 pound weight, bilateral leg swelling and abdominal distension. Denies associated symptoms of chest pain, increase shortness of breath. Vital signs and O 2 sats stable. Cardiology consulted. She was diuresis with Lasix 40 mg IV TID. Weight 113 lbs today, down from from 126 on yesterday, bilateral leg swelling and abdominal distension are resolved. Case discussed with Cardiology, Dr Houser, ok to discharge today. INR 3.1, pt insturcted to hold coumadin today and restart on tomorrow. Will follow up in Coumadin Clinic. Patient will have close follow up in Cardiology Clinic as outpatient for CHF, appointment scheduled. She was seen, examined and deemed suitable for discharged.

## 2018-11-20 NOTE — NURSING
Attempted to schedule f/u appointment with Dr. Bello within 3 days per MD request. No appointments available until December. Messaged Dr. Bello's staff to see if an appointment can be scheduled. Advised them to contact patient with appointment date and time.

## 2018-11-20 NOTE — PLAN OF CARE
Problem: Patient Care Overview  Goal: Plan of Care Review  Outcome: Ongoing (interventions implemented as appropriate)  Patient AAO x4, resting in bed free from falls and injury, normal sinus rhythm on monitor, PIV saline locked, no SOB, denies pain at this time, voids spontaneously, frequent weight shift encouraged, POC reviewed with patient,  bed low locked, call light within reach, family at bedside, will continue to monitor

## 2018-11-20 NOTE — DISCHARGE SUMMARY
[facility]  Hospital Medicine  Discharge Summary      Patient Name: Jennifer Mathews  MRN: [id number]  Admission Date: 11/19/2018  Hospital Length of Stay: 0 days  Discharge Date and Time:  11/20/2018 10:36 AM  Attending Physician: Nato Cox, *   Discharging Provider: Toby Davis NP  Primary Care Provider: Desirae Bello MD      HPI:   Ms Mathews is a 86 year old female with PMHx of Systolic Heart Failure, CKD, LEV, PPM-CRT-D placement, Anemia who presented to [facility] Emergency Rooms after being seen in Cardiology clinic. She has been experiencing increase leg swelling. Associated symptoms include GAR, 10 pound weight gain, abdomenal distention. Denies associated symptoms if chest pain, shortness of breath, palpitations, syncope, dizziness, nausea, vomiting, fever or chills. Vital signs stable. She was seen in urgent care on 11/13/2018 and given doxycyline for bronchitis. 2 D echo on 3/2018 showed severely depressed left ventricular systolic function (EF 25%) and normal right ventricular systolic function, Pulmonary HTN and Mitral valve prosthesis. BNP 2,111. Troponin 0.042. TSH elevated, however T 4 normal.         * No surgery found *      Hospital Course:   Ms Mathews is a 86 year old female who presented to [facility] Emergency Room after being seen in cardiology Clinic. Symptoms include 10 pound weight, bilateral leg swelling and abdominal distension. Denies associated symptoms of chest pain, increase shortness of breath. Vital signs and O 2 sats stable. Cardiology consulted. She was diuresis with Lasix 40 mg IV TID. Weight 113 lbs today, down from from 126 on yesterday, bilateral leg swelling and abdominal distension are resolved. Case discussed with Cardiology, karen Plata to discharge today. INR 3.1, pt insturcted to hold coumadin today and restart on tomorrow. Will follow up in Coumadin Clinic. Patient will have close follow up in Cardiology Clinic as outpatient for CHF,  appointment scheduled. She was seen, examined and deemed suitable for discharged.      Consults:   Consults (From admission, onward)        Status Ordering Provider     Inpatient consult to Cardiology  Once     Provider:  Suresh Houser MD    Completed MALACHI RODRIGUES     IP consult to case management  Once     Provider:  (Not yet assigned)    Acknowledged KIRSTIE BAER     Pharmacy to dose Warfarin consult  Once     Provider:  (Not yet assigned)    Acknowledged MALACHI RODRIGUES          No new Assessment & Plan notes have been filed under this hospital service since the last note was generated.  Service: Hospital Medicine    Final Active Diagnoses:    Diagnosis Date Noted POA    PRINCIPAL PROBLEM:  Acute on chronic combined systolic and diastolic congestive heart failure [I50.43] 11/19/2018 Yes    Hypertension [I10]  Yes    A-fib [I48.91] 10/08/2014 Yes    Iron deficiency anemia due to chronic blood loss [D50.0] 05/03/2018 Yes    Anticoagulated on Coumadin [Z51.81, Z79.01] 07/13/2015 Not Applicable    Bilateral lower extremity edema [R60.0] 06/05/2014 Yes    S/P MVR (mitral valve replacement) [Z95.2] 01/30/2014 Not Applicable    Dyslipidemia [E78.5] 01/30/2014 Yes    S/P placement of cardiac pacemaker [Z95.0] 01/30/2014 Yes    LEV (obstructive sleep apnea) [G47.33] 09/30/2013 Yes     Chronic      Problems Resolved During this Admission:       Discharged Condition: stable    Disposition: Home or Self Care    Follow Up:  Follow-up Information     Desirae Bello MD In 3 days.    Specialty:  Family Medicine  Contact information:  [address] [place] [address] [address]OR[address]Z [id number]  [phone]             Adriana Arizmendi PA-C.    Specialty:  Cardiology  Why:  as scheduled   Contact information:  [address] [place] [address] [address] Nyla [address]ORALEZ [id number]  [phone]                 Patient Instructions:      Diet Adult Regular     Order Specific Question Answer Comments   Na restriction, if any: 2gNa    Fluid  restriction: Fluid - 1500mL      Activity as tolerated       Significant Diagnostic Studies: Labs:   CMP   Recent Labs   Lab 11/19/18  1147 11/20/18  0409    144   K 4.0 3.9    107   CO2 24 29    83   BUN 26* 28*   CREATININE 1.1 1.2   CALCIUM 8.6* 8.5*   PROT 6.0  --    ALBUMIN 3.4*  --    BILITOT 0.8  --    ALKPHOS 66  --    AST 26  --    ALT 21  --    ANIONGAP 8 8   ESTGFRAFRICA 53* 47*   EGFRNONAA 46* 41*   , CBC   Recent Labs   Lab 11/19/18  1147 11/20/18  0409   WBC 6.56 5.21   HGB 11.1* 10.6*   HCT 35.4* 33.9*    159    and INR   Lab Results   Component Value Date    INR 3.1 (H) 11/20/2018    INR 3.3 (H) 11/19/2018    INR 3.7 (H) 11/19/2018       Pending Diagnostic Studies:     None         Medications:  Reconciled Home Medications:      Medication List      CHANGE how you take these medications    acetaminophen 325 MG tablet  Commonly known as:  TYLENOL EXTRA STRENGTH  Take 2 tablets (650 mg total) by mouth every 8 (eight) hours.  What changed:    · when to take this  · reasons to take this     digoxin 125 mcg tablet  Commonly known as:  LANOXIN  Take 1 tablet (125 mcg total) by mouth once daily. TAKE 1 TABLET (0.125 MG TOTAL) BY MOUTH ONCE DAILY.  What changed:  additional instructions     warfarin 2.5 MG tablet  Commonly known as:  COUMADIN  TAKE 1/2 TABLET ON MONDAY AND WEDNESDAY AND 1 TABLET ALL OTHER DAYS. DISCONTINUE 5MG TABLET.  What changed:  See the new instructions.  Notes to patient:  Hold coumadin today 11/20/2018 and restart tomorrow         CONTINUE taking these medications    allopurinol 100 MG tablet  Commonly known as:  ZYLOPRIM  Take 2 tablets (200 mg total) by mouth once daily.     amiodarone 200 MG Tab  Commonly known as:  PACERONE  TAKE 1 TABLET EVERY DAY     aspirin 81 MG EC tablet  Commonly known as:  ECOTRIN  Take 81 mg by mouth once daily.     benzonatate 100 MG capsule  Commonly known as:  TESSALON  Take 2 capsules (200 mg total) by mouth 3 (three) times  daily as needed for Cough.     calcium carbonate 600 mg calcium (1,500 mg) Tab  Commonly known as:  OS-SHERRY  Take 600 mg by mouth once.     carvedilol 25 MG tablet  Commonly known as:  COREG  Take 1 tablet (25 mg total) by mouth 2 (two) times daily with meals.     colchicine 0.6 mg Cap  Commonly known as:  MITIGARE  Take 1 capsule (0.6 mg total) by mouth 3 (three) times a week. On Monday/ wednessday / Friday     cranberry 1,000 mg Cap  Take 1 capsule by mouth Daily.     doxycycline 150 MG tablet  Commonly known as:  ADOXA  Take 1 tablet (150 mg total) by mouth 2 (two) times daily.     furosemide 40 MG tablet  Commonly known as:  LASIX  Take 1 tablet (40 mg total) by mouth 2 (two) times daily.     gabapentin 100 MG capsule  Commonly known as:  NEURONTIN  TAKE ONE CAPSULE BY MOUTH TWO TIMES DAILY     Lactobac 40-Bifido 3-S.thermop 100 billion cell Cap  Commonly known as:  PROBIOTIC  Take 1 capsule by mouth once daily.     levothyroxine 50 MCG tablet  Commonly known as:  SYNTHROID  Take 1 tablet (50 mcg total) by mouth once daily.     losartan 25 MG tablet  Commonly known as:  COZAAR  TAKE 1 TABLET BY MOUTH EVERY DAY     MULTIVITAMIN ORAL  Take 1 tablet by mouth Daily.     potassium chloride SA 20 MEQ tablet  Commonly known as:  K-DUR,KLOR-CON  TAKE 1 TABLET (20 MEQ TOTAL) BY MOUTH ONCE DAILY.            Indwelling Lines/Drains at time of discharge:   Lines/Drains/Airways          None          Time spent on the discharge of patient: 40 minutes  Patient was seen and examined on the date of discharge and determined to be suitable for discharge.         Toby Davis NP  Department of Hospital Medicine  [facility]

## 2018-11-20 NOTE — PLAN OF CARE
Discharge instructions reviewed with patient.   No medications needed at this time. Instructed per Raimundo Davis NP to hold coumadin today and resume tomorrow.   Follow up appointments scheduled and discussed. Contacted Dr. Bello's office for f/u appointment.     Questions answered. No further needs.    IV removed. Patient tolerated well.   Heart monitor removed, cleaned, and returned to monitor tech.     Patient waiting for daughter to come pick her up. Aware to call nurses station for staff escort to vehicle.

## 2018-11-20 NOTE — PLAN OF CARE
Problem: Patient Care Overview  Goal: Plan of Care Review  Outcome: Ongoing (interventions implemented as appropriate)  POC discussed w/patient, verbalized understanding.   Paced on monitor. VSS. AAO X 4.   Up independently to restroom.   IV intact. Medications administered as prescribed.   Patient turns independently in bed.  Fall precautions in place, call bell in reach, bed in low and locked position.   Patient remains free from falls/injuries.   Patient denies needs at this time.   Will continue to monitor.

## 2018-11-21 NOTE — SUBJECTIVE & OBJECTIVE
ROS  Objective:     Vital Signs (Most Recent):  Temp: 97.5 °F (36.4 °C) (11/20/18 0715)  Pulse: 72 (11/20/18 0919)  Resp: 18 (11/20/18 0919)  BP: (!) 158/67 (11/20/18 0715)  SpO2: (!) 91 % (11/20/18 0919) Vital Signs (24h Range):  Temp:  [97.5 °F (36.4 °C)-98.9 °F (37.2 °C)] 97.5 °F (36.4 °C)  Pulse:  [68-72] 72  Resp:  [16-18] 18  SpO2:  [91 %-95 %] 91 %  BP: (130-158)/(61-67) 158/67     Weight: 51.6 kg (113 lb 12.1 oz)  Body mass index is 20.81 kg/m².     SpO2: (!) 91 %  O2 Device (Oxygen Therapy): room air      Intake/Output Summary (Last 24 hours) at 11/20/2018 1916  Last data filed at 11/20/2018 0900  Gross per 24 hour   Intake 330 ml   Output 2300 ml   Net -1970 ml       Lines/Drains/Airways          None          Physical Exam   Constitutional: She is oriented to person, place, and time. She appears well-developed and well-nourished. No distress.   HENT:   Head: Normocephalic and atraumatic.   Eyes: Pupils are equal, round, and reactive to light.   Neck: Normal range of motion and full passive range of motion without pain. Neck supple. JVD present.   Cardiovascular: Normal rate, regular rhythm, S1 normal, S2 normal and intact distal pulses. PMI is not displaced. Exam reveals no distant heart sounds.   No murmur heard.  Pulses:       Radial pulses are 2+ on the right side, and 2+ on the left side.        Dorsalis pedis pulses are 1+ on the right side, and 1+ on the left side.   Pulmonary/Chest: Accessory muscle usage present. No respiratory distress. She has decreased breath sounds in the right lower field. She has no wheezes. She has rales.   Abdominal: Soft. Bowel sounds are normal. She exhibits distension. There is no tenderness.   Musculoskeletal: Normal range of motion. She exhibits edema.        Right ankle: She exhibits swelling.        Left ankle: She exhibits swelling.   Neurological: She is alert and oriented to person, place, and time.   Skin: Skin is warm and dry. She is not diaphoretic. No  cyanosis. Nails show no clubbing.   Psychiatric: She has a normal mood and affect. Her speech is normal and behavior is normal. Judgment and thought content normal. Cognition and memory are normal.   Nursing note and vitals reviewed.      Significant Labs:   ABG: No results for input(s): PH, PCO2, HCO3, POCSATURATED, BE in the last 48 hours., Blood Culture: No results for input(s): LABBLOO in the last 48 hours., BMP:   Recent Labs   Lab 11/19/18  1147 11/20/18  0409    83    144   K 4.0 3.9    107   CO2 24 29   BUN 26* 28*   CREATININE 1.1 1.2   CALCIUM 8.6* 8.5*   , CMP   Recent Labs   Lab 11/19/18  1147 11/20/18  0409    144   K 4.0 3.9    107   CO2 24 29    83   BUN 26* 28*   CREATININE 1.1 1.2   CALCIUM 8.6* 8.5*   PROT 6.0  --    ALBUMIN 3.4*  --    BILITOT 0.8  --    ALKPHOS 66  --    AST 26  --    ALT 21  --    ANIONGAP 8 8   ESTGFRAFRICA 53* 47*   EGFRNONAA 46* 41*   , CBC   Recent Labs   Lab 11/19/18  1147 11/20/18  0409   WBC 6.56 5.21   HGB 11.1* 10.6*   HCT 35.4* 33.9*    159   , INR   Recent Labs   Lab 11/19/18  1147 11/19/18  1511 11/20/18  0409   INR 3.7* 3.3* 3.1*   , Lipid Panel No results for input(s): CHOL, HDL, LDLCALC, TRIG, CHOLHDL in the last 48 hours. and Troponin   Recent Labs   Lab 11/19/18  1147 11/19/18  1511 11/19/18  2136   TROPONINI 0.042* 0.030* 0.039*       Significant Imaging: EKG:

## 2018-11-21 NOTE — ASSESSMENT & PLAN NOTE
-BNP 2111  -ok to switch to lasix po and d/c  -Dash diet, 2 gm sodium restriction  -1L fluid restriction  -Daily weights  -Continue BB, ARB, Digoxin

## 2018-11-21 NOTE — PROGRESS NOTES
[facility] -   Cardiology  Progress Note    Patient Name: Jennifer Mathews  MRN: [id number]  Admission Date: 11/19/2018  Hospital Length of Stay: 0 days  Code Status: Prior   Attending Physician: No att. providers found   Primary Care Physician: Desirae Bello MD  Expected Discharge Date: 11/20/2018  Principal Problem:Acute on chronic combined systolic and diastolic congestive heart failure    Subjective:     Hospital Course:   11/20 on AV pacing. Improved dyspnea. Good urine output.BP stable        ROS  Objective:     Vital Signs (Most Recent):  Temp: 97.5 °F (36.4 °C) (11/20/18 0715)  Pulse: 72 (11/20/18 0919)  Resp: 18 (11/20/18 0919)  BP: (!) 158/67 (11/20/18 0715)  SpO2: (!) 91 % (11/20/18 0919) Vital Signs (24h Range):  Temp:  [97.5 °F (36.4 °C)-98.9 °F (37.2 °C)] 97.5 °F (36.4 °C)  Pulse:  [68-72] 72  Resp:  [16-18] 18  SpO2:  [91 %-95 %] 91 %  BP: (130-158)/(61-67) 158/67     Weight: 51.6 kg (113 lb 12.1 oz)  Body mass index is 20.81 kg/m².     SpO2: (!) 91 %  O2 Device (Oxygen Therapy): room air      Intake/Output Summary (Last 24 hours) at 11/20/2018 1916  Last data filed at 11/20/2018 0900  Gross per 24 hour   Intake 330 ml   Output 2300 ml   Net -1970 ml       Lines/Drains/Airways          None          Physical Exam   Constitutional: She is oriented to person, place, and time. She appears well-developed and well-nourished. No distress.   HENT:   Head: Normocephalic and atraumatic.   Eyes: Pupils are equal, round, and reactive to light.   Neck: Normal range of motion and full passive range of motion without pain. Neck supple. JVD present.   Cardiovascular: Normal rate, regular rhythm, S1 normal, S2 normal and intact distal pulses. PMI is not displaced. Exam reveals no distant heart sounds.   No murmur heard.  Pulses:       Radial pulses are 2+ on the right side, and 2+ on the left side.        Dorsalis pedis pulses are 1+ on the right side, and 1+ on the left side.   Pulmonary/Chest:  Accessory muscle usage present. No respiratory distress. She has decreased breath sounds in the right lower field. She has no wheezes. She has rales.   Abdominal: Soft. Bowel sounds are normal. She exhibits distension. There is no tenderness.   Musculoskeletal: Normal range of motion. She exhibits edema.        Right ankle: She exhibits swelling.        Left ankle: She exhibits swelling.   Neurological: She is alert and oriented to person, place, and time.   Skin: Skin is warm and dry. She is not diaphoretic. No cyanosis. Nails show no clubbing.   Psychiatric: She has a normal mood and affect. Her speech is normal and behavior is normal. Judgment and thought content normal. Cognition and memory are normal.   Nursing note and vitals reviewed.      Significant Labs:   ABG: No results for input(s): PH, PCO2, HCO3, POCSATURATED, BE in the last 48 hours., Blood Culture: No results for input(s): LABBLOO in the last 48 hours., BMP:   Recent Labs   Lab 11/19/18  1147 11/20/18  0409    83    144   K 4.0 3.9    107   CO2 24 29   BUN 26* 28*   CREATININE 1.1 1.2   CALCIUM 8.6* 8.5*   , CMP   Recent Labs   Lab 11/19/18  1147 11/20/18  0409    144   K 4.0 3.9    107   CO2 24 29    83   BUN 26* 28*   CREATININE 1.1 1.2   CALCIUM 8.6* 8.5*   PROT 6.0  --    ALBUMIN 3.4*  --    BILITOT 0.8  --    ALKPHOS 66  --    AST 26  --    ALT 21  --    ANIONGAP 8 8   ESTGFRAFRICA 53* 47*   EGFRNONAA 46* 41*   , CBC   Recent Labs   Lab 11/19/18  1147 11/20/18  0409   WBC 6.56 5.21   HGB 11.1* 10.6*   HCT 35.4* 33.9*    159   , INR   Recent Labs   Lab 11/19/18  1147 11/19/18  1511 11/20/18  0409   INR 3.7* 3.3* 3.1*   , Lipid Panel No results for input(s): CHOL, HDL, LDLCALC, TRIG, CHOLHDL in the last 48 hours. and Troponin   Recent Labs   Lab 11/19/18  1147 11/19/18  1511 11/19/18  2136   TROPONINI 0.042* 0.030* 0.039*       Significant Imaging: EKG:      Assessment and Plan:       * Acute on  chronic combined systolic and diastolic congestive heart failure      -BNP 2111  -ok to switch to lasix po and d/c  -Dash diet, 2 gm sodium restriction  -1L fluid restriction  -Daily weights  -Continue BB, ARB, Digoxin       A-fib    Continue Amiodarone, BB, Digoxin  Resume Coumadin, pharmacy to dose  Monitor INR  NO CNS complaints to suggest TIA or CVA today.  No signs of abnormal bleeding on Coumadin     Bilateral lower extremity edema    Continue IV Lasix TID     S/P MVR (mitral valve replacement)    Repeat 2D Echo pending     Hypertension    On medical therapy  Continue BB, Digoxin, Lasix, Losartan     LEV (obstructive sleep apnea)    Per primary team         VTE Risk Mitigation (From admission, onward)    None          Suresh Houser MD  Cardiology  [facility]

## 2018-11-23 ENCOUNTER — LAB VISIT (OUTPATIENT)
Dept: LAB | Facility: HOSPITAL | Age: 83
End: 2018-11-23
Attending: INTERNAL MEDICINE
Payer: MEDICARE

## 2018-11-23 ENCOUNTER — OFFICE VISIT (OUTPATIENT)
Dept: CARDIOLOGY | Facility: CLINIC | Age: 83
End: 2018-11-23
Attending: EMERGENCY MEDICINE
Payer: MEDICARE

## 2018-11-23 VITALS
HEIGHT: 62 IN | DIASTOLIC BLOOD PRESSURE: 56 MMHG | SYSTOLIC BLOOD PRESSURE: 136 MMHG | HEART RATE: 68 BPM | WEIGHT: 117.94 LBS | BODY MASS INDEX: 21.7 KG/M2

## 2018-11-23 DIAGNOSIS — I50.43 ACUTE ON CHRONIC COMBINED SYSTOLIC AND DIASTOLIC CONGESTIVE HEART FAILURE: ICD-10-CM

## 2018-11-23 DIAGNOSIS — N18.4 CHRONIC RENAL FAILURE, STAGE 4 (SEVERE): ICD-10-CM

## 2018-11-23 DIAGNOSIS — I48.0 PAROXYSMAL ATRIAL FIBRILLATION: ICD-10-CM

## 2018-11-23 DIAGNOSIS — I50.42 CHRONIC COMBINED SYSTOLIC AND DIASTOLIC CONGESTIVE HEART FAILURE: Primary | ICD-10-CM

## 2018-11-23 DIAGNOSIS — Z95.0 S/P PLACEMENT OF CARDIAC PACEMAKER: ICD-10-CM

## 2018-11-23 DIAGNOSIS — I25.5 ISCHEMIC CARDIOMYOPATHY: ICD-10-CM

## 2018-11-23 DIAGNOSIS — D63.1 ANEMIA ASSOCIATED WITH STAGE 4 CHRONIC RENAL FAILURE: ICD-10-CM

## 2018-11-23 DIAGNOSIS — I27.22 PULMONARY HYPERTENSION DUE TO LEFT HEART DISEASE: ICD-10-CM

## 2018-11-23 DIAGNOSIS — Z95.810 CARDIAC RESYNCHRONIZATION THERAPY DEFIBRILLATOR (CRT-D) IN PLACE: ICD-10-CM

## 2018-11-23 DIAGNOSIS — I10 ESSENTIAL HYPERTENSION: ICD-10-CM

## 2018-11-23 DIAGNOSIS — N18.4 ANEMIA ASSOCIATED WITH STAGE 4 CHRONIC RENAL FAILURE: ICD-10-CM

## 2018-11-23 DIAGNOSIS — E78.5 DYSLIPIDEMIA: ICD-10-CM

## 2018-11-23 DIAGNOSIS — Z79.01 ANTICOAGULATED ON COUMADIN: ICD-10-CM

## 2018-11-23 DIAGNOSIS — Z95.2 S/P MVR (MITRAL VALVE REPLACEMENT): ICD-10-CM

## 2018-11-23 DIAGNOSIS — D50.0 IRON DEFICIENCY ANEMIA DUE TO CHRONIC BLOOD LOSS: ICD-10-CM

## 2018-11-23 LAB
ALBUMIN SERPL BCP-MCNC: 3.9 G/DL
ALP SERPL-CCNC: 74 U/L
ALT SERPL W/O P-5'-P-CCNC: 18 U/L
ANION GAP SERPL CALC-SCNC: 12 MMOL/L
AST SERPL-CCNC: 26 U/L
BASOPHILS # BLD AUTO: 0.04 K/UL
BASOPHILS NFR BLD: 0.6 %
BILIRUB SERPL-MCNC: 0.8 MG/DL
BUN SERPL-MCNC: 33 MG/DL
CALCIUM SERPL-MCNC: 10.4 MG/DL
CHLORIDE SERPL-SCNC: 100 MMOL/L
CO2 SERPL-SCNC: 30 MMOL/L
CREAT SERPL-MCNC: 1.4 MG/DL
DIFFERENTIAL METHOD: ABNORMAL
EOSINOPHIL # BLD AUTO: 0.1 K/UL
EOSINOPHIL NFR BLD: 1.3 %
ERYTHROCYTE [DISTWIDTH] IN BLOOD BY AUTOMATED COUNT: 16.8 %
EST. GFR  (AFRICAN AMERICAN): 39 ML/MIN/1.73 M^2
EST. GFR  (NON AFRICAN AMERICAN): 34 ML/MIN/1.73 M^2
FERRITIN SERPL-MCNC: 567 NG/ML
GLUCOSE SERPL-MCNC: 101 MG/DL
HCT VFR BLD AUTO: 39.8 %
HGB BLD-MCNC: 12.4 G/DL
IRON SERPL-MCNC: 66 UG/DL
LYMPHOCYTES # BLD AUTO: 1.7 K/UL
LYMPHOCYTES NFR BLD: 24.7 %
MCH RBC QN AUTO: 33.5 PG
MCHC RBC AUTO-ENTMCNC: 31.2 G/DL
MCV RBC AUTO: 108 FL
MONOCYTES # BLD AUTO: 0.8 K/UL
MONOCYTES NFR BLD: 10.9 %
NEUTROPHILS # BLD AUTO: 4.3 K/UL
NEUTROPHILS NFR BLD: 62.5 %
PLATELET # BLD AUTO: 197 K/UL
PMV BLD AUTO: 9.1 FL
POTASSIUM SERPL-SCNC: 4.5 MMOL/L
PROT SERPL-MCNC: 6.7 G/DL
RBC # BLD AUTO: 3.7 M/UL
RETICS/RBC NFR AUTO: 4.2 %
SATURATED IRON: 22 %
SODIUM SERPL-SCNC: 142 MMOL/L
TOTAL IRON BINDING CAPACITY: 297 UG/DL
TRANSFERRIN SERPL-MCNC: 201 MG/DL
WBC # BLD AUTO: 6.88 K/UL

## 2018-11-23 PROCEDURE — 80053 COMPREHEN METABOLIC PANEL: CPT

## 2018-11-23 PROCEDURE — 85025 COMPLETE CBC W/AUTO DIFF WBC: CPT

## 2018-11-23 PROCEDURE — 85045 AUTOMATED RETICULOCYTE COUNT: CPT

## 2018-11-23 PROCEDURE — 36415 COLL VENOUS BLD VENIPUNCTURE: CPT

## 2018-11-23 PROCEDURE — 99214 OFFICE O/P EST MOD 30 MIN: CPT | Mod: PBBFAC | Performed by: PHYSICIAN ASSISTANT

## 2018-11-23 PROCEDURE — 99999 PR PBB SHADOW E&M-EST. PATIENT-LVL IV: CPT | Mod: PBBFAC,,, | Performed by: PHYSICIAN ASSISTANT

## 2018-11-23 PROCEDURE — 99214 OFFICE O/P EST MOD 30 MIN: CPT | Mod: S$PBB,,, | Performed by: PHYSICIAN ASSISTANT

## 2018-11-23 PROCEDURE — 83540 ASSAY OF IRON: CPT

## 2018-11-23 PROCEDURE — 82728 ASSAY OF FERRITIN: CPT

## 2018-11-23 NOTE — PROGRESS NOTES
Subjective:    Patient ID:  Jennifer Mathews is a 86 y.o. female who presents for follow-up of CHF/hospital follow-up      HPI  Ms. Mathews is an 87 yo female with a PMH of mitral valve replacement x 3, HTN, PAF, ACS, combined CHF, PPM-CRT-D placement, s/p MVR, edema, pacemaker placement, and CKD who presents today for CHF follow-up. Patient recently hospitalized at [facility] with decompensated CHF. She was diuresed and po Lasix increased to 40 mg BID. She returns today and states she is feeling well. SOB improved. Weight down 9 lbs since our visit. Denies any PND or orthopnea. No chest pain. Still complains of BLE edema, although has noticed slight improvement since our visit. Still having cough with yellow phlegm. No fever or chills. Patient reports compliance with her medications. Mindful of salt intake. No bleeding issues on Coumadin. Would like for me to discuss switching to Eliquis with Dr. Cary.        Review of Systems   Constitution: Positive for weight loss. Negative for chills, decreased appetite, fever, weakness and malaise/fatigue.   HENT: Negative for congestion, hoarse voice and sore throat.    Eyes: Negative for blurred vision and discharge.   Cardiovascular: Positive for leg swelling. Negative for chest pain, claudication, cyanosis, dyspnea on exertion, irregular heartbeat, near-syncope, orthopnea, palpitations and paroxysmal nocturnal dyspnea.   Respiratory: Positive for cough and sputum production. Negative for hemoptysis, shortness of breath, snoring and wheezing.    Endocrine: Negative for cold intolerance and heat intolerance.   Hematologic/Lymphatic: Negative for bleeding problem. Bruises/bleeds easily.   Skin: Negative for rash.   Musculoskeletal: Positive for arthritis and joint pain. Negative for back pain, joint swelling, muscle cramps, muscle weakness and myalgias.   Gastrointestinal: Negative for abdominal pain, constipation, diarrhea, heartburn, melena and nausea.   Genitourinary:  Negative for hematuria.   Neurological: Negative for dizziness, focal weakness, headaches, light-headedness, loss of balance, numbness, paresthesias and seizures.   Psychiatric/Behavioral: Negative for memory loss. The patient does not have insomnia.    Allergic/Immunologic: Negative for hives.        Objective:    Physical Exam   Constitutional: She is oriented to person, place, and time. She appears well-developed and well-nourished. No distress.   HENT:   Head: Normocephalic and atraumatic.   Eyes: Pupils are equal, round, and reactive to light. Right eye exhibits no discharge. Left eye exhibits no discharge.   Neck: Neck supple. JVD present. No thyromegaly present.   Cardiovascular: Normal rate, regular rhythm, S1 normal and S2 normal.   Murmur heard.  High-pitched blowing holosystolic murmur is present at the apex.  Pulmonary/Chest: No respiratory distress. She has no wheezes.   Slightly diminished BS left base   Abdominal: Soft. She exhibits no distension. There is no tenderness. There is no rebound.   Musculoskeletal: She exhibits edema (1-2+ BLE).   Neurological: She is alert and oriented to person, place, and time.   Skin: Skin is warm and dry. She is not diaphoretic. No erythema.   CVI changes BLE     Psychiatric: She has a normal mood and affect. Her behavior is normal. Thought content normal.   Nursing note and vitals reviewed.    2D Echo CONCLUSIONS     1 - Severe left atrial enlargement.     2 - Concentric hypertrophy.     3 - Wall motion abnormalities.     4 - Severely depressed left ventricular systolic function (EF 20-25%).     5 - Moderately depressed right ventricular systolic function .     6 - Pulmonary hypertension. The estimated PA systolic pressure is 69 mmHg.     7 - Mitral valve prosthesis.     8 - Moderate tricuspid regurgitation.     9 - Increased central venous pressure.   Assessment:       1. Chronic combined systolic and diastolic congestive heart failure    2. Anticoagulated on  Coumadin    3. S/P placement of cardiac pacemaker    4. S/P MVR (mitral valve replacement)    5. Ischemic cardiomyopathy    6. Essential hypertension    7. Dyslipidemia    8. Cardiac resynchronization therapy defibrillator (CRT-D) in place    9. Acute on chronic combined systolic and diastolic congestive heart failure    10. Paroxysmal atrial fibrillation    11. Pulmonary hypertension due to left heart disease      Presents for hospital f/u. Clinically improved. LE Edema is in part due to CVI. Continue current dose of Lasix 40 mg BID. Continue other home meds. Watch salt. Elevate legs. Labs scheduled today. Would benefit from HH 1-2x weekly for CHF follow-up. Will discuss Eliquis further with Dr. Cary.   Plan:   -Continue Lasix 40 mg BID for now; advised ok to take extra 1/2 pill to 1 pill if needed for weight gain > 3 lbs, worsening LE edema  -Continue other meds  -Cardiac low salt diet  -Phone review Monday for symptom check  -Needs HH for CHF care  -Will discuss Eliquis further with Dr. Cary  -Follw-up TBA    Chart reviewed. Dr. Cary agrees with plan as outlined above.

## 2018-11-23 NOTE — LETTER
November 23, 2018      [place]  [address] [place] [address]           [facility] Cardiology  [address]  [place] [address]  Phone: [phone]  Fax: [phone]          Patient: Jennifer Mathews   MR Number: [id number]   YOB: 1932   Date of Visit: 11/23/2018       Dear Dr. Tanmay Narvaez Jr.:    Thank you for referring Jennifer Mathews to me for evaluation. Attached you will find relevant portions of my assessment and plan of care.    If you have questions, please do not hesitate to call me. I look forward to following Jennifer Mathews along with you.    Sincerely,    Adriana Arizmendi PA-C    Enclosure  CC:  No Recipients    If you would like to receive this communication electronically, please contact [email or web address] or [phone] to request more information on [employer] access.    For providers and/or their staff who would like to refer a patient to [facility], please contact us through our one-stop-shop provider referral line, [facility], at [phone].    If you feel you have received this communication in error or would no longer like to receive these types of communications, please e-mail [email or web address]

## 2018-11-26 ENCOUNTER — OFFICE VISIT (OUTPATIENT)
Dept: RHEUMATOLOGY | Facility: CLINIC | Age: 83
End: 2018-11-26
Payer: MEDICARE

## 2018-11-26 ENCOUNTER — LAB VISIT (OUTPATIENT)
Dept: LAB | Facility: HOSPITAL | Age: 83
End: 2018-11-26
Attending: PHYSICIAN ASSISTANT
Payer: MEDICARE

## 2018-11-26 ENCOUNTER — TELEPHONE (OUTPATIENT)
Dept: CARDIOLOGY | Facility: CLINIC | Age: 83
End: 2018-11-26

## 2018-11-26 VITALS
WEIGHT: 118.81 LBS | BODY MASS INDEX: 21.86 KG/M2 | DIASTOLIC BLOOD PRESSURE: 62 MMHG | HEART RATE: 70 BPM | HEIGHT: 62 IN | SYSTOLIC BLOOD PRESSURE: 131 MMHG

## 2018-11-26 DIAGNOSIS — M81.0 OSTEOPOROSIS, SENILE: Primary | ICD-10-CM

## 2018-11-26 DIAGNOSIS — D63.1 ANEMIA ASSOCIATED WITH STAGE 4 CHRONIC RENAL FAILURE: Primary | ICD-10-CM

## 2018-11-26 DIAGNOSIS — N18.30 CKD (CHRONIC KIDNEY DISEASE) STAGE 3, GFR 30-59 ML/MIN: ICD-10-CM

## 2018-11-26 DIAGNOSIS — M1A.39X1 CHRONIC GOUT DUE TO RENAL IMPAIRMENT OF MULTIPLE SITES WITH TOPHUS: ICD-10-CM

## 2018-11-26 DIAGNOSIS — N18.4 ANEMIA ASSOCIATED WITH STAGE 4 CHRONIC RENAL FAILURE: Primary | ICD-10-CM

## 2018-11-26 DIAGNOSIS — M81.0 OSTEOPOROSIS, SENILE: ICD-10-CM

## 2018-11-26 LAB
ALBUMIN SERPL BCP-MCNC: 3.7 G/DL
ALP SERPL-CCNC: 79 U/L
ALT SERPL W/O P-5'-P-CCNC: 18 U/L
ANION GAP SERPL CALC-SCNC: 11 MMOL/L
AST SERPL-CCNC: 24 U/L
BILIRUB SERPL-MCNC: 0.5 MG/DL
BUN SERPL-MCNC: 45 MG/DL
CALCIUM SERPL-MCNC: 10 MG/DL
CHLORIDE SERPL-SCNC: 104 MMOL/L
CO2 SERPL-SCNC: 29 MMOL/L
CREAT SERPL-MCNC: 1.7 MG/DL
EST. GFR  (AFRICAN AMERICAN): 31 ML/MIN/1.73 M^2
EST. GFR  (NON AFRICAN AMERICAN): 27 ML/MIN/1.73 M^2
GLUCOSE SERPL-MCNC: 158 MG/DL
POTASSIUM SERPL-SCNC: 4.1 MMOL/L
PROT SERPL-MCNC: 6.2 G/DL
SODIUM SERPL-SCNC: 144 MMOL/L

## 2018-11-26 PROCEDURE — 99999 PR PBB SHADOW E&M-EST. PATIENT-LVL III: CPT | Mod: PBBFAC,,, | Performed by: INTERNAL MEDICINE

## 2018-11-26 PROCEDURE — 99214 OFFICE O/P EST MOD 30 MIN: CPT | Mod: S$PBB,,, | Performed by: INTERNAL MEDICINE

## 2018-11-26 PROCEDURE — 99213 OFFICE O/P EST LOW 20 MIN: CPT | Mod: PBBFAC,PO,25 | Performed by: INTERNAL MEDICINE

## 2018-11-26 PROCEDURE — 36415 COLL VENOUS BLD VENIPUNCTURE: CPT | Mod: PO

## 2018-11-26 PROCEDURE — 80053 COMPREHEN METABOLIC PANEL: CPT | Mod: PO

## 2018-11-26 PROCEDURE — 96372 THER/PROPH/DIAG INJ SC/IM: CPT | Mod: PBBFAC,PO

## 2018-11-26 RX ORDER — METHYLPREDNISOLONE 4 MG/1
TABLET ORAL
Qty: 1 PACKAGE | Refills: 2 | Status: ON HOLD | OUTPATIENT
Start: 2018-11-26 | End: 2019-03-14 | Stop reason: HOSPADM

## 2018-11-26 RX ADMIN — DENOSUMAB 60 MG: 60 INJECTION SUBCUTANEOUS at 10:11

## 2018-11-26 NOTE — PROGRESS NOTES
RHEUMATOLOGY CLINIC FOLLOW UP VISIT  Chief complaints:-  To follow-up for osteoporosis and gout.    HPI:-  Jennifer Moscoso a 86 y.o. pleasant female comes in for a follow up visit with above chief complaints.       Review of Systems   Constitutional: Negative for chills and fever.   HENT: Negative for congestion and sore throat.    Eyes: Negative for blurred vision and redness.   Respiratory: Negative for cough and shortness of breath.    Cardiovascular: Negative for chest pain and leg swelling.   Gastrointestinal: Negative for abdominal pain.   Genitourinary: Negative for dysuria.   Musculoskeletal: Negative for back pain, falls, joint pain, myalgias and neck pain.   Skin: Negative for rash.   Neurological: Negative for headaches.   Endo/Heme/Allergies: Does not bruise/bleed easily.   Psychiatric/Behavioral: Negative for memory loss. The patient does not have insomnia.        Past Medical History:   Diagnosis Date    Acute coronary syndrome     Anemia     Anticoagulated on Coumadin 7/13/2015    Arthritis     Atrial fibrillation     Basal cell carcinoma 10/2015    left neck    Cardiac arrest     Cardiac resynchronization therapy defibrillator (CRT-D) in place 07/13/2015    Pt denies, states it does not shock me    Chronic combined systolic and diastolic congestive heart failure     CKD (chronic kidney disease) stage 3, GFR 30-59 ml/min     Dyslipidemia 1/30/2014    Hyperlipidemia     Hypertension     Ischemic cardiomyopathy 01/30/2014    Macular hole of left eye     Old    Macular hole of left eye     LEV (obstructive sleep apnea) 9/30/2013    Recurrent UTI     Refractive error     Stroke        Past Surgical History:   Procedure Laterality Date    APPENDECTOMY      CARDIAC CATHETERIZATION      CARDIAC PACEMAKER PLACEMENT  2014    CARDIAC VALVE SURGERY      CATARACT EXTRACTION      OU    CHOLECYSTECTOMY      COLONOSCOPY       "IRRIGATION AND DEBRIDEMENT OF LEFT KNEE Left 9/12/2017    Performed by Juliano Rosenbaum [facility] at [facility] OR    MITRAL VALVE REPLACEMENT  2014    MITRAL VALVE SURGERY      x3    REPLACEMENT OF PACEMAKER GENERATOR Left 6/20/2018    Procedure: REPLACEMENT, PACEMAKER GENERATOR/pt has crt-p versus d;  Surgeon: Manny Dunne MD;  Location: [facility] CATH LAB;  Service: Cardiology;  Laterality: Left;  st renée device  patient is pacer dependent    REPLACEMENT, PACEMAKER GENERATOR/pt has crt-p versus d Left 6/20/2018    Performed by Manny Dunne MD at [facility] CATH LAB    REVISION, SKIN POCKET, FOR CARDIAC PACEMAKER Left 2/26/2014    Performed by Manny Dunne MD at [facility] CATH LAB        Social History     Tobacco Use    Smoking status: Never Smoker    Smokeless tobacco: Never Used   Substance Use Topics    Alcohol use: No    Drug use: No       Family History   Problem Relation Age of Onset    Coronary artery disease Mother         mi    Coronary artery disease Father     Diabetes Brother     Cancer Brother     Melanoma Neg Hx     Psoriasis Neg Hx     Lupus Neg Hx     Eczema Neg Hx        Review of patient's allergies indicates:   Allergen Reactions    Cephalosporins Hives    Metaxalone Itching    Penicillins      Other reaction(s): Unknown      Pregabalin      Other reaction(s): "Bad feeling"      Sulfa (sulfonamide antibiotics) Itching           Physical examination:-    Vitals:    11/26/18 0946   BP: 131/62   Pulse: 70   Weight: 53.9 kg (118 lb 13.3 oz)   Height: 5' 2" (1.575 m)   PainSc: 0-No pain       Physical Exam   Constitutional: She is oriented to person, place, and time and well-developed, well-nourished, and in no distress. No distress.   HENT:   Head: Normocephalic.   Mouth/Throat: Oropharynx is clear and moist.   Eyes: Conjunctivae and EOM are normal. Pupils are equal, round, and reactive to light.   Neck: Normal range of motion. Neck supple.   Cardiovascular: Normal rate and intact " distal pulses.   Pulmonary/Chest: Effort normal. No respiratory distress.   Abdominal: Soft. There is no tenderness.   Musculoskeletal:   No synovitis over small joints of hands or feet.  No effusion over large joints.   Neurological: She is alert and oriented to person, place, and time. No cranial nerve deficit.   Skin: Skin is warm. No rash noted. No erythema.   Psychiatric: Mood and affect normal.   Nursing note and vitals reviewed.           Medication List           Accurate as of 11/26/18  5:26 PM. If you have any questions, ask your nurse or doctor.               START taking these medications    methylPREDNISolone 4 mg tablet  Commonly known as:  MEDROL DOSEPACK  use as directed  Started by:  Oc Catherine MD        CHANGE how you take these medications    acetaminophen 325 MG tablet  Commonly known as:  TYLENOL EXTRA STRENGTH  Take 2 tablets (650 mg total) by mouth every 8 (eight) hours.  What changed:    · when to take this  · reasons to take this     digoxin 125 mcg tablet  Commonly known as:  LANOXIN  Take 1 tablet (125 mcg total) by mouth once daily. TAKE 1 TABLET (0.125 MG TOTAL) BY MOUTH ONCE DAILY.  What changed:  additional instructions     warfarin 2.5 MG tablet  Commonly known as:  COUMADIN  TAKE 1/2 TABLET ON MONDAY AND WEDNESDAY AND 1 TABLET ALL OTHER DAYS. DISCONTINUE 5MG TABLET.  What changed:  See the new instructions.        CONTINUE taking these medications    allopurinol 100 MG tablet  Commonly known as:  ZYLOPRIM  Take 2 tablets (200 mg total) by mouth once daily.     amiodarone 200 MG Tab  Commonly known as:  PACERONE  TAKE 1 TABLET EVERY DAY     aspirin 81 MG EC tablet  Commonly known as:  ECOTRIN     calcium carbonate 600 mg calcium (1,500 mg) Tab  Commonly known as:  OS-SHERRY     carvedilol 25 MG tablet  Commonly known as:  COREG  Take 1 tablet (25 mg total) by mouth 2 (two) times daily with meals.     colchicine 0.6 mg Cap  Commonly known as:  MITIGARE  Take 1 capsule (0.6 mg  total) by mouth 3 (three) times a week. On Monday/ wednessday / Friday     cranberry 1,000 mg Cap     furosemide 40 MG tablet  Commonly known as:  LASIX  Take 1 tablet (40 mg total) by mouth 2 (two) times daily.     gabapentin 100 MG capsule  Commonly known as:  NEURONTIN  TAKE ONE CAPSULE BY MOUTH TWO TIMES DAILY     Lactobac 40-Bifido 3-S.thermop 100 billion cell Cap  Commonly known as:  PROBIOTIC  Take 1 capsule by mouth once daily.     levothyroxine 50 MCG tablet  Commonly known as:  SYNTHROID  Take 1 tablet (50 mcg total) by mouth once daily.     losartan 25 MG tablet  Commonly known as:  COZAAR  TAKE 1 TABLET BY MOUTH EVERY DAY     MULTIVITAMIN ORAL     potassium chloride SA 20 MEQ tablet  Commonly known as:  K-DUR,KLOR-CON           Where to Get Your Medications      These medications were sent to [facility]/pharmacy #[id number] - [place], [place] - [address]  [address] [facility] [address]    Phone:  [phone]   · methylPREDNISolone 4 mg tablet         Assessment/Plans:-  First visit with me.  All problems new to me.    # Osteoporosis, senile  Severe osteoporosis on Prolia.  Tolerating it well.  Continue Prolia.  High risk for hypocalcemia after Prolia.  Repeat CMP in 10 days.  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  Continue allopurinol and 3 times weekly colchicine-renally dosed.  Use Medrol Dosepaks for flare-up.  Uric acid at goal.  - methylPREDNISolone (MEDROL DOSEPACK) 4 mg tablet; use as directed  Dispense: 1 Package; Refill: 2     # Follow-up in about 6 months (around 5/26/2019).    Disclaimer: This note was prepared using voice recognition system and is likely to have sound alike errors and is not proof read.  Please call me with any questions.

## 2018-11-26 NOTE — TELEPHONE ENCOUNTER
Follow-up on patients weight and symptoms. I spoke with pt.   She reports her Wt is going down. When she left the hosp her wt was 115lb on home scale    11/21 - 116lb  11/22 - 116.1 lb   11/23 - 117 lb  Sun 25- 115 lb  116.6 lb today.  No coughing. LE edema still the same as it was at LOV. Pt denies any worsening sob, no pnd or orthopnea. Pt currently taking lasix 40mg bid and has not had to take the extra 1/2pill so far.    I let pt know I would notify the provider and call back if any new orders.

## 2018-11-26 NOTE — ASSESSMENT & PLAN NOTE
No flare-up since last visit.  Continue allopurinol and 3 times weekly colchicine-renally dosed.  Use Medrol Dosepaks for flare-up.  Uric acid at goal.

## 2018-11-26 NOTE — TELEPHONE ENCOUNTER
The patient has been notified of this information and all questions answered.  Having cmp and cbc for hem/onc tomorrow

## 2018-11-26 NOTE — ASSESSMENT & PLAN NOTE
Severe osteoporosis on Prolia.  Tolerating it well.  Continue Prolia.  High risk for hypocalcemia after Prolia.  Repeat CMP in 10 days.

## 2018-11-26 NOTE — PATIENT INSTRUCTIONS
You will get the Prolia injection today which can drop your calcium levels over the next 2 weeks. Please over the next two weeks, chew 2 tums daily and try to get more calcium through your diet. Your goal is 1200mg daily.   We will repeat labs in 10 days to make sure the calcium is normal.

## 2018-11-26 NOTE — PROGRESS NOTES
Administered 1cc Prolia 60mg/cc to right upper quad of abdomen. Pt tolerated well. No acute reaction noted at site. Pt instructed on S/S of reaction to report. Pt verbalized understanding. Patient waited 15 minutes post injection    Lot:[id number]  Exp.01/21  Manu:Sascha    Calcium: 10.0  Creatinine: 1.7       Check-out Note:   1. Ok to get prolia. Chew 2 Tums daily after labs in 10 days will advise if to continue 2 Tums daily  2. CMP and vitamin D in 10 days.   3. Follow up in 6 months with Janay and 1 year with me.

## 2018-11-27 ENCOUNTER — LAB VISIT (OUTPATIENT)
Dept: LAB | Facility: HOSPITAL | Age: 83
End: 2018-11-27
Attending: NURSE PRACTITIONER
Payer: MEDICARE

## 2018-11-27 ENCOUNTER — OFFICE VISIT (OUTPATIENT)
Dept: INTERNAL MEDICINE | Facility: CLINIC | Age: 83
End: 2018-11-27
Payer: MEDICARE

## 2018-11-27 ENCOUNTER — OFFICE VISIT (OUTPATIENT)
Dept: NEPHROLOGY | Facility: CLINIC | Age: 83
End: 2018-11-27
Payer: MEDICARE

## 2018-11-27 ENCOUNTER — OFFICE VISIT (OUTPATIENT)
Dept: HEMATOLOGY/ONCOLOGY | Facility: CLINIC | Age: 83
End: 2018-11-27
Payer: MEDICARE

## 2018-11-27 ENCOUNTER — ANTI-COAG VISIT (OUTPATIENT)
Dept: CARDIOLOGY | Facility: CLINIC | Age: 83
End: 2018-11-27
Attending: EMERGENCY MEDICINE
Payer: MEDICARE

## 2018-11-27 VITALS
OXYGEN SATURATION: 92 % | HEIGHT: 62 IN | SYSTOLIC BLOOD PRESSURE: 124 MMHG | WEIGHT: 118.63 LBS | RESPIRATION RATE: 18 BRPM | TEMPERATURE: 98 F | DIASTOLIC BLOOD PRESSURE: 61 MMHG | HEART RATE: 69 BPM | BODY MASS INDEX: 21.83 KG/M2

## 2018-11-27 VITALS
OXYGEN SATURATION: 95 % | HEIGHT: 62 IN | SYSTOLIC BLOOD PRESSURE: 136 MMHG | BODY MASS INDEX: 21.94 KG/M2 | DIASTOLIC BLOOD PRESSURE: 64 MMHG | WEIGHT: 119.25 LBS | TEMPERATURE: 98 F | HEART RATE: 71 BPM

## 2018-11-27 VITALS
SYSTOLIC BLOOD PRESSURE: 132 MMHG | HEART RATE: 78 BPM | WEIGHT: 118.81 LBS | BODY MASS INDEX: 21.86 KG/M2 | HEIGHT: 62 IN | DIASTOLIC BLOOD PRESSURE: 60 MMHG

## 2018-11-27 DIAGNOSIS — Z71.89 ENCOUNTER FOR MEDICATION REVIEW AND COUNSELING: ICD-10-CM

## 2018-11-27 DIAGNOSIS — Z79.01 LONG TERM (CURRENT) USE OF ANTICOAGULANTS: Primary | ICD-10-CM

## 2018-11-27 DIAGNOSIS — D63.1 ANEMIA ASSOCIATED WITH STAGE 4 CHRONIC RENAL FAILURE: Primary | ICD-10-CM

## 2018-11-27 DIAGNOSIS — D50.0 IRON DEFICIENCY ANEMIA DUE TO CHRONIC BLOOD LOSS: ICD-10-CM

## 2018-11-27 DIAGNOSIS — D63.1 ANEMIA ASSOCIATED WITH STAGE 4 CHRONIC RENAL FAILURE: ICD-10-CM

## 2018-11-27 DIAGNOSIS — N18.4 ANEMIA ASSOCIATED WITH STAGE 4 CHRONIC RENAL FAILURE: ICD-10-CM

## 2018-11-27 DIAGNOSIS — N18.30 CHRONIC KIDNEY DISEASE, STAGE III (MODERATE): ICD-10-CM

## 2018-11-27 DIAGNOSIS — N18.4 ANEMIA ASSOCIATED WITH STAGE 4 CHRONIC RENAL FAILURE: Primary | ICD-10-CM

## 2018-11-27 DIAGNOSIS — R76.8 ELEVATED SERUM IMMUNOGLOBULIN FREE LIGHT CHAIN LEVEL: ICD-10-CM

## 2018-11-27 DIAGNOSIS — R79.89 PRERENAL AZOTEMIA: ICD-10-CM

## 2018-11-27 DIAGNOSIS — I50.22 CHRONIC SYSTOLIC CONGESTIVE HEART FAILURE: Primary | ICD-10-CM

## 2018-11-27 DIAGNOSIS — I50.43 ACUTE ON CHRONIC COMBINED SYSTOLIC AND DIASTOLIC CONGESTIVE HEART FAILURE: Primary | ICD-10-CM

## 2018-11-27 DIAGNOSIS — N17.9 ACUTE KIDNEY INJURY: ICD-10-CM

## 2018-11-27 LAB
ALBUMIN SERPL BCP-MCNC: 3.6 G/DL
ALP SERPL-CCNC: 77 U/L
ALT SERPL W/O P-5'-P-CCNC: 21 U/L
ANION GAP SERPL CALC-SCNC: 9 MMOL/L
AST SERPL-CCNC: 32 U/L
BASOPHILS # BLD AUTO: 0.05 K/UL
BASOPHILS NFR BLD: 0.8 %
BILIRUB SERPL-MCNC: 0.6 MG/DL
BUN SERPL-MCNC: 43 MG/DL
CALCIUM SERPL-MCNC: 9.5 MG/DL
CHLORIDE SERPL-SCNC: 102 MMOL/L
CO2 SERPL-SCNC: 31 MMOL/L
CREAT SERPL-MCNC: 1.5 MG/DL
DIFFERENTIAL METHOD: ABNORMAL
EOSINOPHIL # BLD AUTO: 0.1 K/UL
EOSINOPHIL NFR BLD: 1.4 %
ERYTHROCYTE [DISTWIDTH] IN BLOOD BY AUTOMATED COUNT: 15.5 %
EST. GFR  (AFRICAN AMERICAN): 36 ML/MIN/1.73 M^2
EST. GFR  (NON AFRICAN AMERICAN): 31 ML/MIN/1.73 M^2
GLUCOSE SERPL-MCNC: 93 MG/DL
HCT VFR BLD AUTO: 36.8 %
HGB BLD-MCNC: 11.4 G/DL
INR PPP: 4.2 (ref 2–3)
LYMPHOCYTES # BLD AUTO: 1.9 K/UL
LYMPHOCYTES NFR BLD: 29.2 %
MCH RBC QN AUTO: 33.6 PG
MCHC RBC AUTO-ENTMCNC: 31 G/DL
MCV RBC AUTO: 109 FL
MONOCYTES # BLD AUTO: 0.7 K/UL
MONOCYTES NFR BLD: 10.1 %
NEUTROPHILS # BLD AUTO: 3.8 K/UL
NEUTROPHILS NFR BLD: 58.5 %
PLATELET # BLD AUTO: 189 K/UL
PMV BLD AUTO: 10.7 FL
POTASSIUM SERPL-SCNC: 4.2 MMOL/L
PROT SERPL-MCNC: 6.2 G/DL
RBC # BLD AUTO: 3.39 M/UL
SODIUM SERPL-SCNC: 142 MMOL/L
WBC # BLD AUTO: 6.54 K/UL

## 2018-11-27 PROCEDURE — 99213 OFFICE O/P EST LOW 20 MIN: CPT | Mod: S$PBB,,, | Performed by: NURSE PRACTITIONER

## 2018-11-27 PROCEDURE — 99999 PR PBB SHADOW E&M-EST. PATIENT-LVL IV: CPT | Mod: PBBFAC,,, | Performed by: INTERNAL MEDICINE

## 2018-11-27 PROCEDURE — 36415 COLL VENOUS BLD VENIPUNCTURE: CPT

## 2018-11-27 PROCEDURE — 99213 OFFICE O/P EST LOW 20 MIN: CPT | Mod: PBBFAC,27 | Performed by: NURSE PRACTITIONER

## 2018-11-27 PROCEDURE — 99214 OFFICE O/P EST MOD 30 MIN: CPT | Mod: S$PBB,,, | Performed by: NURSE PRACTITIONER

## 2018-11-27 PROCEDURE — 99999 PR PBB SHADOW E&M-EST. PATIENT-LVL I: CPT | Mod: PBBFAC,,,

## 2018-11-27 PROCEDURE — 80053 COMPREHEN METABOLIC PANEL: CPT

## 2018-11-27 PROCEDURE — 99214 OFFICE O/P EST MOD 30 MIN: CPT | Mod: PBBFAC,27 | Performed by: INTERNAL MEDICINE

## 2018-11-27 PROCEDURE — 99211 OFF/OP EST MAY X REQ PHY/QHP: CPT | Mod: PBBFAC

## 2018-11-27 PROCEDURE — 99214 OFFICE O/P EST MOD 30 MIN: CPT | Mod: PBBFAC | Performed by: NURSE PRACTITIONER

## 2018-11-27 PROCEDURE — 99215 OFFICE O/P EST HI 40 MIN: CPT | Mod: S$PBB,,, | Performed by: INTERNAL MEDICINE

## 2018-11-27 PROCEDURE — 85610 PROTHROMBIN TIME: CPT | Mod: PBBFAC

## 2018-11-27 PROCEDURE — 85025 COMPLETE CBC W/AUTO DIFF WBC: CPT

## 2018-11-27 PROCEDURE — 99999 PR PBB SHADOW E&M-EST. PATIENT-LVL IV: CPT | Mod: PBBFAC,,, | Performed by: NURSE PRACTITIONER

## 2018-11-27 PROCEDURE — 99999 PR PBB SHADOW E&M-EST. PATIENT-LVL III: CPT | Mod: PBBFAC,,, | Performed by: NURSE PRACTITIONER

## 2018-11-27 RX ORDER — CALCIUM CARBONATE 200(500)MG
1 TABLET,CHEWABLE ORAL DAILY
COMMUNITY
End: 2018-12-07

## 2018-11-27 NOTE — PROGRESS NOTES
Subjective:      Patient ID: Jennifer Mathews is a 86 y.o. female.    Chief Complaint: lab discussion    HPI:  Patient is a 86 year old  female who presents today for follow-up of her multifactorial anemia.  She has a previous diagnosis iron deficiency anemia and also has chronic kidney disease contributing to her anemia as well.  Labs today show hemoglobin of 11.4 so she will not require Procrit today.  Her iron is currently repleted.    She was recently hospitalized for a CHF exacerbation and was discharged on November 20, 2018.  She has an EF of 20%.  She states she has not had a colonoscopy in over 9 years; however due to her poor cardiac function she would require approval from Cardiology prior to proceeding with GI workup.    She denies chest pain or shortness of breath (stating shortness of breath only on exertion).  She denies fatigue, palpitations.  She denies hematuria, melena, hematochezia, epistaxis, bleeding gums or unusual bruising.  She is on Coumadin daily.  She has +3 edema to lower extremities.     Social History     Socioeconomic History    Marital status:      Spouse name: Not on file    Number of children: Not on file    Years of education: Not on file    Highest education level: Not on file   Social Needs    Financial resource strain: Not on file    Food insecurity - worry: Not on file    Food insecurity - inability: Not on file    Transportation needs - medical: Not on file    Transportation needs - non-medical: Not on file   Occupational History    Not on file   Tobacco Use    Smoking status: Never Smoker    Smokeless tobacco: Never Used   Substance and Sexual Activity    Alcohol use: No    Drug use: No    Sexual activity: Not on file   Other Topics Concern    Are you pregnant or think you may be? Not Asked    Breast-feeding Not Asked   Social History Narrative    Not on file       Family History   Problem Relation Age of Onset    Coronary artery disease  Mother         mi    Coronary artery disease Father     Diabetes Brother     Cancer Brother     Melanoma Neg Hx     Psoriasis Neg Hx     Lupus Neg Hx     Eczema Neg Hx        Past Surgical History:   Procedure Laterality Date    APPENDECTOMY      CARDIAC CATHETERIZATION      CARDIAC PACEMAKER PLACEMENT  2014    CARDIAC VALVE SURGERY      CATARACT EXTRACTION      OU    CHOLECYSTECTOMY      COLONOSCOPY      IRRIGATION AND DEBRIDEMENT OF LEFT KNEE Left 9/12/2017    Performed by [facility]baum [facility] at [facility] OR    MITRAL VALVE REPLACEMENT  2014    MITRAL VALVE SURGERY      x3    REPLACEMENT OF PACEMAKER GENERATOR Left 6/20/2018    Procedure: REPLACEMENT, PACEMAKER GENERATOR/pt has crt-p versus d;  Surgeon: Manny Dunne MD;  Location: [facility] CATH LAB;  Service: Cardiology;  Laterality: Left;  st renée device  patient is pacer dependent    REPLACEMENT, PACEMAKER GENERATOR/pt has crt-p versus d Left 6/20/2018    Performed by Manny Dunne MD at [facility] CATH LAB    REVISION, SKIN POCKET, FOR CARDIAC PACEMAKER Left 2/26/2014    Performed by Manny Dunne MD at [facility] CATH LAB       Past Medical History:   Diagnosis Date    Acute coronary syndrome     Anemia     Anticoagulated on Coumadin 7/13/2015    Arthritis     Atrial fibrillation     Basal cell carcinoma 10/2015    left neck    Cardiac arrest     Cardiac resynchronization therapy defibrillator (CRT-D) in place 07/13/2015    Pt denies, states it does not shock me    Chronic combined systolic and diastolic congestive heart failure     CKD (chronic kidney disease) stage 3, GFR 30-59 ml/min     Dyslipidemia 1/30/2014    Hyperlipidemia     Hypertension     Ischemic cardiomyopathy 01/30/2014    Macular hole of left eye     Old    Macular hole of left eye     LEV (obstructive sleep apnea) 9/30/2013    Recurrent UTI     Refractive error     Stroke        Review of Systems   Constitutional: Negative for activity change, appetite  change and fatigue.   HENT: Negative for congestion and nosebleeds.    Respiratory: Positive for shortness of breath (With exertion). Negative for chest tightness.    Cardiovascular: Positive for leg swelling ( +3 lower extremity). Negative for chest pain and palpitations.   Gastrointestinal: Negative for anal bleeding, blood in stool, diarrhea, nausea and vomiting.   Endocrine: Negative for cold intolerance and heat intolerance.   Genitourinary: Negative for dysuria, hematuria and vaginal bleeding.   Musculoskeletal: Positive for gait problem.   Skin: Negative for rash and wound.   Neurological: Negative for dizziness, syncope and light-headedness.   Hematological: Negative for adenopathy. Does not bruise/bleed easily.   Psychiatric/Behavioral: Negative for confusion and decreased concentration.             Medication List           Accurate as of 11/27/18  5:17 PM. If you have any questions, ask your nurse or doctor.               CHANGE how you take these medications    acetaminophen 325 MG tablet  Commonly known as:  TYLENOL EXTRA STRENGTH  Take 2 tablets (650 mg total) by mouth every 8 (eight) hours.  What changed:    · when to take this  · reasons to take this     digoxin 125 mcg tablet  Commonly known as:  LANOXIN  Take 1 tablet (125 mcg total) by mouth once daily. TAKE 1 TABLET (0.125 MG TOTAL) BY MOUTH ONCE DAILY.  What changed:  additional instructions     warfarin 2.5 MG tablet  Commonly known as:  COUMADIN  TAKE 1/2 TABLET ON MONDAY AND WEDNESDAY AND 1 TABLET ALL OTHER DAYS. DISCONTINUE 5MG TABLET.  What changed:  See the new instructions.        CONTINUE taking these medications    allopurinol 100 MG tablet  Commonly known as:  ZYLOPRIM  Take 2 tablets (200 mg total) by mouth once daily.     amiodarone 200 MG Tab  Commonly known as:  PACERONE  TAKE 1 TABLET EVERY DAY     aspirin 81 MG EC tablet  Commonly known as:  ECOTRIN     calcium carbonate 200 mg calcium (500 mg) chewable tablet  Commonly known as:   TUMS     calcium carbonate 600 mg calcium (1,500 mg) Tab  Commonly known as:  OS-SHERRY     carvedilol 25 MG tablet  Commonly known as:  COREG  Take 1 tablet (25 mg total) by mouth 2 (two) times daily with meals.     colchicine 0.6 mg Cap  Commonly known as:  MITIGARE  Take 1 capsule (0.6 mg total) by mouth 3 (three) times a week. On Monday/ wednessday / Friday     cranberry 1,000 mg Cap     furosemide 40 MG tablet  Commonly known as:  LASIX  Take 1 tablet (40 mg total) by mouth 2 (two) times daily.     gabapentin 100 MG capsule  Commonly known as:  NEURONTIN  TAKE ONE CAPSULE BY MOUTH TWO TIMES DAILY     Lactobac 40-Bifido 3-S.thermop 100 billion cell Cap  Commonly known as:  PROBIOTIC  Take 1 capsule by mouth once daily.     levothyroxine 50 MCG tablet  Commonly known as:  SYNTHROID  Take 1 tablet (50 mcg total) by mouth once daily.     losartan 25 MG tablet  Commonly known as:  COZAAR  TAKE 1 TABLET BY MOUTH EVERY DAY     methylPREDNISolone 4 mg tablet  Commonly known as:  MEDROL DOSEPACK  use as directed     MULTIVITAMIN ORAL     potassium chloride SA 20 MEQ tablet  Commonly known as:  K-DUR,KLOR-CON             Objective:     Vitals:    11/27/18 1252   BP: 124/61   Pulse: 69   Resp: 18   Temp: 97.7 °F (36.5 °C)       Physical Exam   Constitutional: She is oriented to person, place, and time. She appears well-developed and well-nourished.  Non-toxic appearance. She does not have a sickly appearance. She does not appear ill. No distress.   HENT:   Head: Normocephalic and atraumatic.   Right Ear: External ear normal.   Left Ear: External ear normal.   Nose: Nose normal.   Eyes: Conjunctivae, EOM and lids are normal. Right eye exhibits no discharge. Left eye exhibits no discharge. No scleral icterus.   Cardiovascular: Normal rate, regular rhythm, S1 normal, S2 normal and normal heart sounds. Exam reveals no gallop and no friction rub.   No murmur heard.  Pulmonary/Chest: Effort normal and breath sounds normal. No  accessory muscle usage or stridor. No apnea, no tachypnea and no bradypnea. No respiratory distress. She has no wheezes. She has no rales.   Abdominal: Normal appearance. She exhibits no distension. There is no tenderness.   Musculoskeletal: Normal range of motion. She exhibits edema.   Neurological: She is alert and oriented to person, place, and time.   Skin: Skin is warm, dry and intact. Capillary refill takes less than 2 seconds. No bruising, no lesion and no rash noted. She is not diaphoretic. No pallor.   Psychiatric: She has a normal mood and affect. Her speech is normal and behavior is normal. Judgment and thought content normal. Cognition and memory are normal. She is attentive.   Vitals reviewed.      Assessment:     Problem List Items Addressed This Visit        Renal/    Anemia associated with stage 4 chronic renal failure - Primary    Relevant Orders    CBC auto differential    Comprehensive metabolic panel       Oncology    Iron deficiency anemia due to chronic blood loss    Relevant Orders    CBC auto differential    Iron and TIBC    Ferritin          Plan:   Anemia associated with stage 4 chronic renal failure  -     CBC auto differential; Future; Expected date: 11/27/2018  -     Comprehensive metabolic panel; Future; Expected date: 11/27/2018    Iron deficiency anemia due to chronic blood loss  -     CBC auto differential; Future; Expected date: 11/27/2018  -     Iron and TIBC; Future; Expected date: 11/27/2018  -     Ferritin; Future; Expected date: 11/27/2018    Follow up in 2 months with CBC, CMP ferritin, TIBC, iron, FLC, and path review.  Possible procrit 20,000 if hemoglobin <10 after next visit.        I will review assessment/plan with collaborating physician.    Thank You,  ABELINO Prado

## 2018-11-27 NOTE — PROGRESS NOTES
NEPHROLOGY CLINIC FOLLOWUP NOTE AND POST-HOSPITAL FOLLOWUP NOTE    REASON FOR FOLLOWUP AND CHIEF COMPLAINT:  Post-hospital followup for CHF   exacerbation and acute kidney injury.    HISTORY OF PRESENT ILLNESS:  Ms. Jennifer Mathews is an 86-year-old    female whom I am meeting for the first time today.  The patient was previously   seen by Dr. Rey in our clinic.  She has a pertinent history of mild renal   insufficiency with CKD stage III and congestive heart failure.  Her ejection   fraction is severely depressed at 20%.  She recently was drinking excessive   amounts of water and she became hospitalized at [facility] for CHF   exacerbation.  Hospital records were reviewed today.  She was discharged on   11/20/2018.  She required IV diuretics.  She had gained 10 pounds of weight gain   with increased leg swelling and shortness of breath.  On her visit to the   clinic today for post-hospital followup, she says she is doing much better, she   is not short of breath, her leg swelling is still present but is not worse.  She   appreciates the value of keeping salt intake low in diet as well as she said   she knows that she needs to keep her fluid intake low to moderate.  She has no   chest pain, no palpitation, no other pertinent issues today.    Her serum creatinine in the hospital had worsened from baseline of 1.2 to 1.7.    Her creatinine today is 1.5.  She has not taken any NSAIDs.  No diarrhea, no GI   losses, no dehydration, no recent infections, no recent antibiotics.    PAST MEDICAL HISTORY:  1.  CKD stage III, baseline creatinine 1.2 to 1.4.  2.  Hypertension.  3.  Severe systolic congestive heart failure with EF of 20%.  4.  Osteoarthritis.  5.  Multiple falls in the past with pelvic fractures.  6.  Hyperlipidemia.  7.  Paroxysmal atrial fibrillation.  8.  Osteopenia.  9.  Pulmonary hypertension.    PAST SURGICAL HISTORY:  Reviewed per EPIC, unchanged.    FAMILY MEDICAL HISTORY:  Reviewed and  unchanged.    ALLERGIES:  Reviewed to cephalosporins, penicillin, sulfa drugs, pregabalin.    SOCIAL HISTORY:  She does not smoke.  No alcohol.    DIETARY HISTORY:  As above.    MEDICATIONS:  Reviewed, carvedilol 25 mg p.o. b.i.d., Lasix 40 mg b.i.d.,   losartan 25 mg daily, allopurinol 200 mg daily.  Other meds reviewed and noted.    REVIEW OF SYSTEMS:  Recent hospitalization as reviewed and summarized above.  GENERAL:  Negative.  HEAD, EYES, EARS, NOSE, AND THROAT:  Negative.  CARDIAC:  Negative.  PULMONARY:  Negative.  GASTROINTESTINAL:  Negative.  GENITOURINARY:  Negative.  PSYCHOLOGICAL:  Negative.  NEUROLOGICAL:  Negative.  ENDOCRINE:  Negative.  HEMATOLOGIC AND ONCOLOGIC:  Negative.  INFECTIOUS DISEASE:  Negative.  The rest of the review of systems negative.    PHYSICAL EXAMINATION:  VITAL SIGNS:  Blood pressure is 132/60, pulse 78, weight is 119 pounds compared   to 127 pounds when she had been admitted.  GENERAL:  She is cooperative, pleasant, in no acute distress.  Speech and   thought process appropriate, normal.  HEENT:  Mucous membranes moist.  NECK:  No JVD.  HEART:  Regular rate and rhythm.  CHEST:  Clear to auscultation.  No rales.  No wheezes.  Breathing symmetric,   unlabored.  ABDOMEN:  Soft, nontender.  EXTREMITIES:  Showed 3+ pitting edema bilaterally.    LABORATORY DATA:  Reviewed.  Creatinine has improved to 1.5, sodium 142,   potassium 4.2, chloride 102, bicarbonate 31, glucose 93, calcium 9.5, albumin   3.6.  White count 6.5, hemoglobin 11.4, platelets 189.    ASSESSMENT AND PLAN:  This is an 86-year-old female with history of severe CHF,   who presents for post-hospital followup for CHF exacerbation and acute kidney   injury.  The impression is as follows:  1.  Renal.  The patient had acute kidney injury.  Serum creatinine is now stable   and has begun to improve towards previous baseline.  The renal injury was   probably due to prerenal azotemia due to CHF exacerbation causing decreased    renal perfusion, prognosis should be favorable.  Labs reviewed.  Potassium is   normal.  She has mild alkalosis due to intake of Lasix.  2.  Cardiac.  The patient has CHF exacerbation.  She has very severe CHF with   20% EF, only.  She was advised again on reducing salt and fluid intake.  Salt   intake should not be more than 2 to 3 g a day fluid intake not greater than 1.5   liters a day.  She needs to continue Lasix 40 mg p.o. b.i.d.  I have advised the   patient that if she became short of breath or leg swelling worsened, she can   take metolazone, which she already has at home once per day, one hour before the   morning dose of Lasix.  She verbalized understanding.  Despite her advanced   age, she is remarkably alert and well engaged.    PLANS AND RECOMMENDATIONS:  As discussed above.  Opportunity for questions and   discussion provided.  Time was needed to review the records, the patient   evaluation, documentation, face-to-face discussion with the patient, as the   patient was new to me was 45 minutes, more than 50% of the time was spent on   counseling and coordination of care.  Level 5 visit.      MARCE  dd: 11/27/2018 17:44:01 (CST)  td: 11/28/2018 04:54:26 (CST)  Doc ID   #[id number]  Job ID #[id number]    CC:     [id number]

## 2018-11-27 NOTE — PROGRESS NOTES
Subjective:       Patient ID: Jennifer Mathews is a 86 y.o. female.    Chief Complaint: Hospital followup    Patient presents for hospital follow up. She was admitted overnight for acute on chronic CHF. She had a 10 pound weight gain and diuresed. She reports weighing daily at home and stable. She states the BLE edema is also stable. She does not have HH but reports it is ordered per cardiology.       Review of Systems   Constitutional: Positive for fatigue. Negative for chills, fever and unexpected weight change.   Eyes: Negative for visual disturbance.   Respiratory: Negative for shortness of breath.    Cardiovascular: Positive for leg swelling. Negative for chest pain.   Musculoskeletal: Positive for arthralgias. Negative for myalgias.   Neurological: Negative for headaches.       Objective:      Physical Exam   Constitutional: She appears well-developed and well-nourished. No distress.   HENT:   Head: Normocephalic and atraumatic.   Cardiovascular: Normal rate and regular rhythm.   Murmur heard.  Pulmonary/Chest: Effort normal and breath sounds normal.   Neurological: She is alert.   Skin: She is not diaphoretic.   Psychiatric: She has a normal mood and affect.   Vitals reviewed.      Assessment:       1. Acute on chronic combined systolic and diastolic congestive heart failure        Plan:   Acute on chronic combined systolic and diastolic congestive heart failure  Comments:  condition stable, keep cardiology follow up        Follow-up if symptoms worsen or fail to improve, for keep routine follow up.

## 2018-11-27 NOTE — PATIENT INSTRUCTIONS
Follow up in 2 months with CBC, CMP, ferritin, TIBC, iron, FLC, path review labs and possible procrit injection after visit

## 2018-11-27 NOTE — PROGRESS NOTES
Patient's INR is supra-therapeutic at 4.2.  No significant changes or extra doses reported.  No signs of bleeding noted; advised patient to seek immediate medical attention if she notices any abnormal bleeding.  Instructions given for patient to hold dose on today; then resume current dose of 2.5mg Mon, Wed, Fri and 3.75mg on all other days of the week.  Patient voiced understanding.  Follow-up in 1 week.

## 2018-11-28 ENCOUNTER — TELEPHONE (OUTPATIENT)
Dept: CARDIOLOGY | Facility: CLINIC | Age: 83
End: 2018-11-28

## 2018-11-28 NOTE — TELEPHONE ENCOUNTER
Pt was told by  to start taking  metolazone 2.5mg 1 tablet by mouth every other day on yesterday along with the Lasix 40 mg that she takes now. Pt would like to know what LAST Valadez recommends before taking this medication again.      Please advise

## 2018-11-28 NOTE — TELEPHONE ENCOUNTER
I reviewed his note. Looks like he recommended taking Metolazone on an as needed basis with any worsening symptoms. This should be fine. Agree.     She just needs to keep us informed if she is having to take lots of extra doses as it can affect her kidney function.

## 2018-11-28 NOTE — TELEPHONE ENCOUNTER
----- Message from Farzana Pastor sent at 11/28/2018 10:46 AM CST -----  Contact: pt   Pt is requesting a call back from the nurse in regards to the pt med  [phone] (home)

## 2018-11-28 NOTE — TELEPHONE ENCOUNTER
Called and  Notified pt of all this information and all questions answered at this time, pt also verbalized understanding medication instructions.

## 2018-11-29 DIAGNOSIS — Z79.01 LONG TERM CURRENT USE OF ANTICOAGULANT THERAPY: ICD-10-CM

## 2018-11-29 RX ORDER — WARFARIN 2.5 MG/1
TABLET ORAL
Qty: 30 TABLET | Refills: 3 | Status: ON HOLD | OUTPATIENT
Start: 2018-11-29 | End: 2019-03-13

## 2018-11-30 ENCOUNTER — OFFICE VISIT (OUTPATIENT)
Dept: DERMATOLOGY | Facility: CLINIC | Age: 83
End: 2018-11-30
Payer: MEDICARE

## 2018-11-30 ENCOUNTER — TELEPHONE (OUTPATIENT)
Dept: DERMATOLOGY | Facility: CLINIC | Age: 83
End: 2018-11-30

## 2018-11-30 DIAGNOSIS — L30.9 DERMATITIS: Primary | ICD-10-CM

## 2018-11-30 PROCEDURE — 99202 OFFICE O/P NEW SF 15 MIN: CPT | Mod: S$PBB,,, | Performed by: STUDENT IN AN ORGANIZED HEALTH CARE EDUCATION/TRAINING PROGRAM

## 2018-11-30 PROCEDURE — 99212 OFFICE O/P EST SF 10 MIN: CPT | Mod: PBBFAC,PO | Performed by: STUDENT IN AN ORGANIZED HEALTH CARE EDUCATION/TRAINING PROGRAM

## 2018-11-30 PROCEDURE — 99999 PR PBB SHADOW E&M-EST. PATIENT-LVL II: CPT | Mod: PBBFAC,,, | Performed by: STUDENT IN AN ORGANIZED HEALTH CARE EDUCATION/TRAINING PROGRAM

## 2018-11-30 RX ORDER — BETAMETHASONE VALERATE 1.2 MG/G
CREAM TOPICAL 2 TIMES DAILY
Qty: 45 G | Refills: 2 | Status: SHIPPED | OUTPATIENT
Start: 2018-11-30 | End: 2018-11-30 | Stop reason: SDUPTHER

## 2018-11-30 RX ORDER — BETAMETHASONE VALERATE 1.2 MG/G
CREAM TOPICAL 2 TIMES DAILY
Qty: 45 G | Refills: 2 | Status: SHIPPED | OUTPATIENT
Start: 2018-11-30 | End: 2019-03-20

## 2018-11-30 NOTE — TELEPHONE ENCOUNTER
----- Message from Glenis Sevilla sent at 11/30/2018  1:50 PM CST -----  Contact: pt  Calling in regards to please give her a call vianey and please advise [phone] (home)

## 2018-11-30 NOTE — TELEPHONE ENCOUNTER
Informed pt that we will send the rx over to [facility] since they state they have not received anything today. Pt verbalized understanding and has no further questions at this time.

## 2018-12-07 ENCOUNTER — ANTI-COAG VISIT (OUTPATIENT)
Dept: CARDIOLOGY | Facility: CLINIC | Age: 83
End: 2018-12-07
Payer: MEDICARE

## 2018-12-07 ENCOUNTER — LAB VISIT (OUTPATIENT)
Dept: LAB | Facility: HOSPITAL | Age: 83
End: 2018-12-07
Attending: FAMILY MEDICINE
Payer: MEDICARE

## 2018-12-07 ENCOUNTER — OFFICE VISIT (OUTPATIENT)
Dept: INTERNAL MEDICINE | Facility: CLINIC | Age: 83
End: 2018-12-07
Payer: MEDICARE

## 2018-12-07 VITALS
BODY MASS INDEX: 22.6 KG/M2 | HEIGHT: 62 IN | SYSTOLIC BLOOD PRESSURE: 126 MMHG | OXYGEN SATURATION: 96 % | WEIGHT: 122.81 LBS | HEART RATE: 70 BPM | TEMPERATURE: 98 F | DIASTOLIC BLOOD PRESSURE: 50 MMHG

## 2018-12-07 DIAGNOSIS — E78.5 DYSLIPIDEMIA: ICD-10-CM

## 2018-12-07 DIAGNOSIS — Z95.810 CARDIAC RESYNCHRONIZATION THERAPY DEFIBRILLATOR (CRT-D) IN PLACE: ICD-10-CM

## 2018-12-07 DIAGNOSIS — M81.0 OSTEOPOROSIS, SENILE: ICD-10-CM

## 2018-12-07 DIAGNOSIS — R60.0 BILATERAL LOWER EXTREMITY EDEMA: ICD-10-CM

## 2018-12-07 DIAGNOSIS — D68.9 COAGULOPATHY: ICD-10-CM

## 2018-12-07 DIAGNOSIS — Z00.00 ENCOUNTER FOR PREVENTIVE HEALTH EXAMINATION: Primary | ICD-10-CM

## 2018-12-07 DIAGNOSIS — G47.33 OSA (OBSTRUCTIVE SLEEP APNEA): Chronic | ICD-10-CM

## 2018-12-07 DIAGNOSIS — N18.30 CKD (CHRONIC KIDNEY DISEASE) STAGE 3, GFR 30-59 ML/MIN: ICD-10-CM

## 2018-12-07 DIAGNOSIS — D63.1 ANEMIA ASSOCIATED WITH STAGE 4 CHRONIC RENAL FAILURE: ICD-10-CM

## 2018-12-07 DIAGNOSIS — N18.4 ANEMIA ASSOCIATED WITH STAGE 4 CHRONIC RENAL FAILURE: ICD-10-CM

## 2018-12-07 DIAGNOSIS — Z79.01 LONG TERM (CURRENT) USE OF ANTICOAGULANTS: Primary | ICD-10-CM

## 2018-12-07 DIAGNOSIS — M1A.39X1 CHRONIC GOUT DUE TO RENAL IMPAIRMENT OF MULTIPLE SITES WITH TOPHUS: ICD-10-CM

## 2018-12-07 DIAGNOSIS — I27.22 PULMONARY HYPERTENSION DUE TO LEFT HEART DISEASE: ICD-10-CM

## 2018-12-07 DIAGNOSIS — I50.42 CHRONIC COMBINED SYSTOLIC AND DIASTOLIC CONGESTIVE HEART FAILURE: ICD-10-CM

## 2018-12-07 DIAGNOSIS — N18.4 CHRONIC RENAL FAILURE, STAGE 4 (SEVERE): ICD-10-CM

## 2018-12-07 DIAGNOSIS — R94.120 ABNORMAL HEARING SCREEN: ICD-10-CM

## 2018-12-07 DIAGNOSIS — Z91.81 AT RISK FOR FALLS: ICD-10-CM

## 2018-12-07 DIAGNOSIS — I10 ESSENTIAL HYPERTENSION: ICD-10-CM

## 2018-12-07 DIAGNOSIS — I48.0 PAROXYSMAL ATRIAL FIBRILLATION: ICD-10-CM

## 2018-12-07 DIAGNOSIS — E03.9 ACQUIRED HYPOTHYROIDISM: ICD-10-CM

## 2018-12-07 PROBLEM — L08.9 SOFT TISSUE INFECTION: Status: RESOLVED | Noted: 2017-09-15 | Resolved: 2018-12-07

## 2018-12-07 PROBLEM — S81.002A UNSPECIFIED OPEN WOUND, LEFT KNEE, INITIAL ENCOUNTER: Status: RESOLVED | Noted: 2017-09-12 | Resolved: 2018-12-07

## 2018-12-07 PROBLEM — S80.02XA TRAUMATIC HEMATOMA OF LEFT KNEE: Status: RESOLVED | Noted: 2017-09-12 | Resolved: 2018-12-07

## 2018-12-07 LAB
ALBUMIN SERPL BCP-MCNC: 3.7 G/DL
ALP SERPL-CCNC: 79 U/L
ALT SERPL W/O P-5'-P-CCNC: 21 U/L
ANION GAP SERPL CALC-SCNC: 13 MMOL/L
AST SERPL-CCNC: 30 U/L
BILIRUB SERPL-MCNC: 0.4 MG/DL
BUN SERPL-MCNC: 39 MG/DL
CALCIUM SERPL-MCNC: 9.2 MG/DL
CHLORIDE SERPL-SCNC: 103 MMOL/L
CO2 SERPL-SCNC: 27 MMOL/L
CREAT SERPL-MCNC: 1.5 MG/DL
EST. GFR  (AFRICAN AMERICAN): 36 ML/MIN/1.73 M^2
EST. GFR  (NON AFRICAN AMERICAN): 31 ML/MIN/1.73 M^2
GLUCOSE SERPL-MCNC: 152 MG/DL
INR PPP: 2.5 (ref 2–3)
POTASSIUM SERPL-SCNC: 4.8 MMOL/L
PROT SERPL-MCNC: 6.5 G/DL
SODIUM SERPL-SCNC: 143 MMOL/L

## 2018-12-07 PROCEDURE — 85610 PROTHROMBIN TIME: CPT | Mod: PBBFAC

## 2018-12-07 PROCEDURE — 36415 COLL VENOUS BLD VENIPUNCTURE: CPT

## 2018-12-07 PROCEDURE — 80053 COMPREHEN METABOLIC PANEL: CPT

## 2018-12-07 PROCEDURE — 99999 PR PBB SHADOW E&M-EST. PATIENT-LVL IV: CPT | Mod: PBBFAC,,, | Performed by: NURSE PRACTITIONER

## 2018-12-07 PROCEDURE — G0439 PPPS, SUBSEQ VISIT: HCPCS | Mod: ,,, | Performed by: NURSE PRACTITIONER

## 2018-12-07 PROCEDURE — 99214 OFFICE O/P EST MOD 30 MIN: CPT | Mod: PBBFAC | Performed by: NURSE PRACTITIONER

## 2018-12-07 NOTE — ASSESSMENT & PLAN NOTE
Lab Results   Component Value Date    TSH 8.560 (H) 11/19/2018     TSH trending up. T4 normal. Patient not taking by itself on an empty stomach. Medication education provided. Will repeat TSH/T4 in 6 weeks.

## 2018-12-07 NOTE — PROGRESS NOTES
"Jennifer Mathews presented for a  Medicare AWV and comprehensive Health Risk Assessment today. The following components were reviewed and updated:    · Medical history  · Family History  · Social history  · Allergies and Current Medications  · Health Risk Assessment  · Health Maintenance  · Care Team     ** See Completed Assessments for Annual Wellness Visit within the encounter summary.**       The following assessments were completed:  · Living Situation  · CAGE  · Depression Screening  · Timed Get Up and Go  · Whisper Test  · Cognitive Function Screening  · Nutrition Screening  · ADL Screening  · PAQ Screening    Vitals:    12/07/18 1016   BP: (!) 126/50   BP Location: Right arm   Pulse: 70   Temp: 98.4 °F (36.9 °C)   TempSrc: Tympanic   SpO2: 96%   Weight: 55.7 kg (122 lb 12.7 oz)   Height: 5' 2" (1.575 m)     Body mass index is 22.46 kg/m².  Physical Exam   Constitutional: She is oriented to person, place, and time. She appears well-developed and well-nourished. No distress.   HENT:   Head: Normocephalic and atraumatic.   Eyes: Conjunctivae and EOM are normal. Pupils are equal, round, and reactive to light.   Cardiovascular: Normal rate and regular rhythm.   Pulmonary/Chest: Effort normal and breath sounds normal.   Neurological: She is alert and oriented to person, place, and time.   Skin: Skin is warm and dry.   Psychiatric: She has a normal mood and affect.   Vitals reviewed.        Diagnoses and health risks identified today and associated recommendations/orders:    1. Encounter for preventive health examination  Reviewed HM.     Coagulopathy  Stable and controlled. Takes coumadin. Continue current treatment plan as previously prescribed with your cardiologist.    A-fib  Stable and controlled. Continue current treatment plan as previously prescribed with your PCP and cardiologist.  Per patient, "goes in and out".     Chronic combined systolic and diastolic congestive heart failure  Stable and controlled. " Continue current treatment plan as previously prescribed with your cardiologist.    Pulmonary hypertension due to left heart disease  ECHO 3/18 - Pulmonary hypertension. The estimated PA systolic pressure is 62 mmHg.  Stable and controlled. Continue current treatment plan as previously prescribed with your cardiologist.    Chronic renal failure, stage 4 (severe)  CMP  Sodium   Date Value Ref Range Status   11/27/2018 142 136 - 145 mmol/L Final     Potassium   Date Value Ref Range Status   11/27/2018 4.2 3.5 - 5.1 mmol/L Final     Chloride   Date Value Ref Range Status   11/27/2018 102 95 - 110 mmol/L Final     CO2   Date Value Ref Range Status   11/27/2018 31 (H) 23 - 29 mmol/L Final     Glucose   Date Value Ref Range Status   11/27/2018 93 70 - 110 mg/dL Final     BUN, Bld   Date Value Ref Range Status   11/27/2018 43 (H) 8 - 23 mg/dL Final     Creatinine   Date Value Ref Range Status   11/27/2018 1.5 (H) 0.5 - 1.4 mg/dL Final     Calcium   Date Value Ref Range Status   11/27/2018 9.5 8.7 - 10.5 mg/dL Final     Total Protein   Date Value Ref Range Status   11/27/2018 6.2 6.0 - 8.4 g/dL Final     Albumin   Date Value Ref Range Status   11/27/2018 3.6 3.5 - 5.2 g/dL Final     Total Bilirubin   Date Value Ref Range Status   11/27/2018 0.6 0.1 - 1.0 mg/dL Final     Comment:     For infants and newborns, interpretation of results should be based  on gestational age, weight and in agreement with clinical  observations.  Premature Infant recommended reference ranges:  Up to 24 hours.............<8.0 mg/dL  Up to 48 hours............<12.0 mg/dL  3-5 days..................<15.0 mg/dL  6-29 days.................<15.0 mg/dL       Alkaline Phosphatase   Date Value Ref Range Status   11/27/2018 77 55 - 135 U/L Final     AST   Date Value Ref Range Status   11/27/2018 32 10 - 40 U/L Final     ALT   Date Value Ref Range Status   11/27/2018 21 10 - 44 U/L Final     Anion Gap   Date Value Ref Range Status   11/27/2018 9 8 - 16  mmol/L Final     eGFR if    Date Value Ref Range Status   11/27/2018 36 (A) >60 mL/min/1.73 m^2 Final     eGFR if non    Date Value Ref Range Status   11/27/2018 31 (A) >60 mL/min/1.73 m^2 Final     Comment:     Calculation used to obtain the estimated glomerular filtration  rate (eGFR) is the CKD-EPI equation.        Stable. Followed by Dr. You.    Anemia associated with stage 4 chronic renal failure  Lab Results   Component Value Date    WBC 6.54 11/27/2018    HGB 11.4 (L) 11/27/2018    HCT 36.8 (L) 11/27/2018     (H) 11/27/2018     11/27/2018     Stable. Continue current treatment plan as previously prescribed with your PCP.      Acquired hypothyroidism  Lab Results   Component Value Date    TSH 8.560 (H) 11/19/2018     TSH trending up. T4 normal. Patient not taking by itself on an empty stomach. Medication education provided. Will repeat TSH/T4 in 6 weeks.    Osteoporosis, senile  DEXA 2017 osteopenia. Has history of falls. On prolia. Continue current treatment plan as previously prescribed with your rheumatologist.       Bilateral lower extremity edema  Chronic and stable. Followed by cardiology for CHF.    Cardiac resynchronization therapy defibrillator (CRT-D) in place  Stable. Followed by cardiology.     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 Continue current treatment plan as previously prescribed with your nephrologist. No recent gout attacks.    Dyslipidemia  Lab Results   Component Value Date    CHOL 155 03/20/2018    CHOL 154 01/29/2018    CHOL 157 08/15/2017     Lab Results   Component Value Date    HDL 36 (L) 03/20/2018    HDL 34 (L) 01/29/2018    HDL 32 (L) 08/15/2017     Lab Results   Component Value Date    LDLCALC 90.8 03/20/2018    LDLCALC 79.0 01/29/2018    LDLCALC 85.8 08/15/2017     Lab Results   Component Value Date    TRIG 141 03/20/2018    TRIG 205 (H) 01/29/2018    TRIG 196 (H) 08/15/2017      Lab Results   Component Value Date    CHOLHDL 23.2 03/20/2018    CHOLHDL 22.1 01/29/2018    CHOLHDL 20.4 08/15/2017     Stable and controlled. Continue current treatment plan as previously prescribed with your PCP and cardiologist.      Hypertension  Stable and controlled. Continue current treatment plan as previously prescribed with your PCP and cardiologist.    LEV (obstructive sleep apnea)  Stable and controlled. compliant with CPAP/ Continue current treatment plan as previously prescribed with your PCP.        At risk for falls  None in past year. Did PT for history of falls and does stretches and exercises at home.      Abnormal hearing screen  Chronic and stable.       Provided Jennifer with a 5-10 year written screening schedule and personal prevention plan. Recommendations were developed using the USPSTF age appropriate recommendations. Education, counseling, and referrals were provided as needed. After Visit Summary printed and given to patient which includes a list of additional screenings\tests needed.    No Follow-up on file.    Shikha Browning NP

## 2018-12-07 NOTE — ASSESSMENT & PLAN NOTE
DEXA 2017 osteopenia. Has history of falls. On prolia. Continue current treatment plan as previously prescribed with your rheumatologist.

## 2018-12-07 NOTE — ASSESSMENT & PLAN NOTE
Stable and controlled. Takes coumadin. Continue current treatment plan as previously prescribed with your cardiologist.

## 2018-12-07 NOTE — ASSESSMENT & PLAN NOTE
"Stable and controlled. Continue current treatment plan as previously prescribed with your PCP and cardiologist.  Per patient, "goes in and out".   "

## 2018-12-07 NOTE — ASSESSMENT & PLAN NOTE
Lab Results   Component Value Date    CHOL 155 03/20/2018    CHOL 154 01/29/2018    CHOL 157 08/15/2017     Lab Results   Component Value Date    HDL 36 (L) 03/20/2018    HDL 34 (L) 01/29/2018    HDL 32 (L) 08/15/2017     Lab Results   Component Value Date    LDLCALC 90.8 03/20/2018    LDLCALC 79.0 01/29/2018    LDLCALC 85.8 08/15/2017     Lab Results   Component Value Date    TRIG 141 03/20/2018    TRIG 205 (H) 01/29/2018    TRIG 196 (H) 08/15/2017     Lab Results   Component Value Date    CHOLHDL 23.2 03/20/2018    CHOLHDL 22.1 01/29/2018    CHOLHDL 20.4 08/15/2017     Stable and controlled. Continue current treatment plan as previously prescribed with your PCP and cardiologist.

## 2018-12-07 NOTE — ASSESSMENT & PLAN NOTE
CMP  Sodium   Date Value Ref Range Status   11/27/2018 142 136 - 145 mmol/L Final     Potassium   Date Value Ref Range Status   11/27/2018 4.2 3.5 - 5.1 mmol/L Final     Chloride   Date Value Ref Range Status   11/27/2018 102 95 - 110 mmol/L Final     CO2   Date Value Ref Range Status   11/27/2018 31 (H) 23 - 29 mmol/L Final     Glucose   Date Value Ref Range Status   11/27/2018 93 70 - 110 mg/dL Final     BUN, Bld   Date Value Ref Range Status   11/27/2018 43 (H) 8 - 23 mg/dL Final     Creatinine   Date Value Ref Range Status   11/27/2018 1.5 (H) 0.5 - 1.4 mg/dL Final     Calcium   Date Value Ref Range Status   11/27/2018 9.5 8.7 - 10.5 mg/dL Final     Total Protein   Date Value Ref Range Status   11/27/2018 6.2 6.0 - 8.4 g/dL Final     Albumin   Date Value Ref Range Status   11/27/2018 3.6 3.5 - 5.2 g/dL Final     Total Bilirubin   Date Value Ref Range Status   11/27/2018 0.6 0.1 - 1.0 mg/dL Final     Comment:     For infants and newborns, interpretation of results should be based  on gestational age, weight and in agreement with clinical  observations.  Premature Infant recommended reference ranges:  Up to 24 hours.............<8.0 mg/dL  Up to 48 hours............<12.0 mg/dL  3-5 days..................<15.0 mg/dL  6-29 days.................<15.0 mg/dL       Alkaline Phosphatase   Date Value Ref Range Status   11/27/2018 77 55 - 135 U/L Final     AST   Date Value Ref Range Status   11/27/2018 32 10 - 40 U/L Final     ALT   Date Value Ref Range Status   11/27/2018 21 10 - 44 U/L Final     Anion Gap   Date Value Ref Range Status   11/27/2018 9 8 - 16 mmol/L Final     eGFR if    Date Value Ref Range Status   11/27/2018 36 (A) >60 mL/min/1.73 m^2 Final     eGFR if non    Date Value Ref Range Status   11/27/2018 31 (A) >60 mL/min/1.73 m^2 Final     Comment:     Calculation used to obtain the estimated glomerular filtration  rate (eGFR) is the CKD-EPI equation.        Stable. Followed by  Dr. You.

## 2018-12-07 NOTE — ASSESSMENT & PLAN NOTE
Lab Results   Component Value Date    WBC 6.54 11/27/2018    HGB 11.4 (L) 11/27/2018    HCT 36.8 (L) 11/27/2018     (H) 11/27/2018     11/27/2018     Stable. Continue current treatment plan as previously prescribed with your PCP.

## 2018-12-07 NOTE — PATIENT INSTRUCTIONS
Counseling and Referral of Other Preventative  (Italic type indicates deductible and co-insurance are waived)    Patient Name: Jennifer Mathews  Today's Date: 12/7/2018    Health Maintenance       Date Due Completion Date    Zoster Vaccine 07/23/1992 ---    TETANUS VACCINE 02/22/2020 2/22/2010    Lipid Panel 03/20/2023 3/20/2018        No orders of the defined types were placed in this encounter.    The following information is provided to all patients.  This information is to help you find resources for any of the problems found today that may be affecting your health:                Living healthy guide: www.[facility]      Understanding Diabetes: www.diabetes.org      Eating healthy: www.cdc.gov/healthyweight      CDC home safety checklist: www.cdc.gov/steadi/patient.html      Agency on Aging: www.goea.[place].[facility]      Alcoholics anonymous (AA): www.aa.org      Physical Activity: www.idris.nih.gov/ca5znuz      Tobacco use: www.quitwithusla.org

## 2018-12-07 NOTE — PROGRESS NOTES
Patient's INR is therapeutic at 2.5.  Reports no current concerns at the time of visit.  Instructed to maintain current dose of Warfarin 2.5 mg every Monday, Wednesday, and Friday; and 3.75 mg on all other days per dosing calendar given.  Recheck in 2 weeks.

## 2018-12-07 NOTE — ASSESSMENT & PLAN NOTE
ECHO 3/18 - Pulmonary hypertension. The estimated PA systolic pressure is 62 mmHg.  Stable and controlled. Continue current treatment plan as previously prescribed with your cardiologist.

## 2018-12-07 NOTE — ASSESSMENT & PLAN NOTE
Stable and controlled. Continue current treatment plan as previously prescribed with your nephrologist. No recent gout attacks.

## 2018-12-07 NOTE — ASSESSMENT & PLAN NOTE
Stable and controlled. compliant with CPAP/ Continue current treatment plan as previously prescribed with your PCP.

## 2018-12-09 RX ORDER — LEVOTHYROXINE SODIUM 50 UG/1
TABLET ORAL
Qty: 30 TABLET | Refills: 5 | Status: SHIPPED | OUTPATIENT
Start: 2018-12-09 | End: 2019-02-22 | Stop reason: SDUPTHER

## 2018-12-17 RX ORDER — GABAPENTIN 100 MG/1
CAPSULE ORAL
Qty: 180 CAPSULE | Refills: 1 | Status: SHIPPED | OUTPATIENT
Start: 2018-12-17 | End: 2019-07-08 | Stop reason: SDUPTHER

## 2018-12-17 NOTE — PLAN OF CARE
Patient stopped at [facility], for signed letter, please call the patient does [phone].   Problem: Patient Care Overview  Goal: Interdisciplinary Rounds/Family Conf  Outcome: Ongoing (interventions implemented as appropriate)  Pt stable. Plan of care reviewed with patient. Patient verbalized understanding. Bed low, wheels locked, bed alarm on, call light within reach. Patient instructed to call for assistance. Will continue to monitor.     Pt transferring from lovenox to coumadin. INR goal of 3     Has out patient appointment for blood work for Dr. Cary. Would like to know if she can have the blood work done here if it has not already.     Value Min Max   Temp 97.6 °F (36.4 °C) 98.5 °F (36.9 °C)   Pulse 69 70   Resp 18 20   BP: Systolic 120 147   BP: Diastolic 57 66   MAP (mmHg) 82 95   Oxygen Concentration (%) 28 28   SpO2 93 % 99 %

## 2018-12-19 DIAGNOSIS — I50.42 CHRONIC COMBINED SYSTOLIC AND DIASTOLIC CHF (CONGESTIVE HEART FAILURE): ICD-10-CM

## 2018-12-19 RX ORDER — LOSARTAN POTASSIUM 25 MG/1
25 TABLET ORAL DAILY
Qty: 30 TABLET | Refills: 11 | Status: SHIPPED | OUTPATIENT
Start: 2018-12-19 | End: 2019-01-22 | Stop reason: SDUPTHER

## 2018-12-21 ENCOUNTER — ANTI-COAG VISIT (OUTPATIENT)
Dept: CARDIOLOGY | Facility: CLINIC | Age: 83
End: 2018-12-21
Payer: MEDICARE

## 2018-12-21 DIAGNOSIS — Z79.01 LONG TERM (CURRENT) USE OF ANTICOAGULANTS: Primary | ICD-10-CM

## 2018-12-21 LAB — INR PPP: 2.5 (ref 2–3)

## 2018-12-21 PROCEDURE — 85610 PROTHROMBIN TIME: CPT | Mod: PBBFAC

## 2018-12-21 NOTE — PROGRESS NOTES
Patient's INR is therapeutic at 2.5.  No upcoming procedures or changes reported.  No changes in dose.  Continue current dose of 2.5mg on Mondays, Wednesdays, Fridays; and 3.75mg on all other days of the week.  Recheck in 3 weeks.  Please call should you have any questions or concerns at [phone] or [phone].

## 2018-12-26 ENCOUNTER — CLINICAL SUPPORT (OUTPATIENT)
Dept: CARDIOLOGY | Facility: CLINIC | Age: 83
End: 2018-12-26
Payer: MEDICARE

## 2018-12-26 DIAGNOSIS — I25.5 ISCHEMIC CARDIOMYOPATHY: ICD-10-CM

## 2018-12-26 DIAGNOSIS — Z95.0 CARDIAC PACEMAKER IN SITU: ICD-10-CM

## 2018-12-26 DIAGNOSIS — I48.20 CHRONIC ATRIAL FIBRILLATION: ICD-10-CM

## 2018-12-26 PROCEDURE — 93296 REM INTERROG EVL PM/IDS: CPT | Mod: PBBFAC,PO | Performed by: INTERNAL MEDICINE

## 2018-12-26 PROCEDURE — 93294 REM INTERROG EVL PM/LDLS PM: CPT | Mod: ,,, | Performed by: INTERNAL MEDICINE

## 2018-12-26 PROCEDURE — 93294 PACEMAKER REMOTE: ICD-10-PCS | Mod: ,,, | Performed by: INTERNAL MEDICINE

## 2018-12-31 ENCOUNTER — EXTERNAL CHRONIC CARE MANAGEMENT (OUTPATIENT)
Dept: PRIMARY CARE CLINIC | Facility: CLINIC | Age: 83
End: 2018-12-31
Payer: MEDICARE

## 2018-12-31 PROCEDURE — 99490 PR CHRONIC CARE MGMT, 1ST 20 MIN: ICD-10-PCS | Mod: S$PBB,,, | Performed by: FAMILY MEDICINE

## 2018-12-31 PROCEDURE — 99490 CHRNC CARE MGMT STAFF 1ST 20: CPT | Mod: PBBFAC | Performed by: FAMILY MEDICINE

## 2018-12-31 PROCEDURE — 99490 CHRNC CARE MGMT STAFF 1ST 20: CPT | Mod: S$PBB,,, | Performed by: FAMILY MEDICINE

## 2019-01-03 ENCOUNTER — LAB VISIT (OUTPATIENT)
Dept: LAB | Facility: HOSPITAL | Age: 84
End: 2019-01-03
Attending: INTERNAL MEDICINE
Payer: MEDICARE

## 2019-01-03 ENCOUNTER — OFFICE VISIT (OUTPATIENT)
Dept: CARDIOLOGY | Facility: CLINIC | Age: 84
End: 2019-01-03
Payer: MEDICARE

## 2019-01-03 VITALS
DIASTOLIC BLOOD PRESSURE: 70 MMHG | HEIGHT: 62 IN | HEART RATE: 68 BPM | WEIGHT: 119.5 LBS | SYSTOLIC BLOOD PRESSURE: 140 MMHG | BODY MASS INDEX: 21.99 KG/M2

## 2019-01-03 DIAGNOSIS — R60.0 LOCALIZED EDEMA: ICD-10-CM

## 2019-01-03 DIAGNOSIS — I25.5 ISCHEMIC CARDIOMYOPATHY: ICD-10-CM

## 2019-01-03 DIAGNOSIS — R55 SYNCOPE, UNSPECIFIED SYNCOPE TYPE: ICD-10-CM

## 2019-01-03 DIAGNOSIS — M1A.39X1 CHRONIC GOUT DUE TO RENAL IMPAIRMENT OF MULTIPLE SITES WITH TOPHUS: ICD-10-CM

## 2019-01-03 DIAGNOSIS — I50.43 ACUTE ON CHRONIC COMBINED SYSTOLIC AND DIASTOLIC CONGESTIVE HEART FAILURE: ICD-10-CM

## 2019-01-03 DIAGNOSIS — M15.9 PRIMARY OSTEOARTHRITIS INVOLVING MULTIPLE JOINTS: ICD-10-CM

## 2019-01-03 DIAGNOSIS — E78.5 DYSLIPIDEMIA: ICD-10-CM

## 2019-01-03 DIAGNOSIS — I50.42 CHRONIC COMBINED SYSTOLIC AND DIASTOLIC CONGESTIVE HEART FAILURE: Primary | ICD-10-CM

## 2019-01-03 DIAGNOSIS — N18.4 ANEMIA ASSOCIATED WITH STAGE 4 CHRONIC RENAL FAILURE: ICD-10-CM

## 2019-01-03 DIAGNOSIS — I10 ESSENTIAL HYPERTENSION: ICD-10-CM

## 2019-01-03 DIAGNOSIS — D63.1 ANEMIA ASSOCIATED WITH STAGE 4 CHRONIC RENAL FAILURE: ICD-10-CM

## 2019-01-03 DIAGNOSIS — D50.0 IRON DEFICIENCY ANEMIA DUE TO CHRONIC BLOOD LOSS: ICD-10-CM

## 2019-01-03 DIAGNOSIS — Z95.810 CARDIAC RESYNCHRONIZATION THERAPY DEFIBRILLATOR (CRT-D) IN PLACE: ICD-10-CM

## 2019-01-03 DIAGNOSIS — N18.4 CHRONIC RENAL FAILURE, STAGE 4 (SEVERE): ICD-10-CM

## 2019-01-03 DIAGNOSIS — Z51.81 MEDICATION MONITORING ENCOUNTER: ICD-10-CM

## 2019-01-03 DIAGNOSIS — R60.0 BILATERAL LOWER EXTREMITY EDEMA: ICD-10-CM

## 2019-01-03 DIAGNOSIS — Z79.01 ANTICOAGULATED ON COUMADIN: ICD-10-CM

## 2019-01-03 DIAGNOSIS — I50.22 CHRONIC SYSTOLIC CONGESTIVE HEART FAILURE: ICD-10-CM

## 2019-01-03 DIAGNOSIS — G47.33 OSA (OBSTRUCTIVE SLEEP APNEA): Chronic | ICD-10-CM

## 2019-01-03 DIAGNOSIS — I48.0 PAROXYSMAL ATRIAL FIBRILLATION: ICD-10-CM

## 2019-01-03 DIAGNOSIS — I27.22 PULMONARY HYPERTENSION DUE TO LEFT HEART DISEASE: ICD-10-CM

## 2019-01-03 DIAGNOSIS — Z95.0 S/P PLACEMENT OF CARDIAC PACEMAKER: ICD-10-CM

## 2019-01-03 DIAGNOSIS — Z95.2 S/P MVR (MITRAL VALVE REPLACEMENT): ICD-10-CM

## 2019-01-03 DIAGNOSIS — N18.30 CKD (CHRONIC KIDNEY DISEASE) STAGE 3, GFR 30-59 ML/MIN: ICD-10-CM

## 2019-01-03 LAB
ALBUMIN SERPL BCP-MCNC: 4.1 G/DL
ANION GAP SERPL CALC-SCNC: 9 MMOL/L
BUN SERPL-MCNC: 60 MG/DL
CALCIUM SERPL-MCNC: 9.8 MG/DL
CHLORIDE SERPL-SCNC: 99 MMOL/L
CO2 SERPL-SCNC: 33 MMOL/L
CREAT SERPL-MCNC: 1.9 MG/DL
EST. GFR  (AFRICAN AMERICAN): 27.1 ML/MIN/1.73 M^2
EST. GFR  (NON AFRICAN AMERICAN): 23.5 ML/MIN/1.73 M^2
GLUCOSE SERPL-MCNC: 125 MG/DL
PHOSPHATE SERPL-MCNC: 4.3 MG/DL
POTASSIUM SERPL-SCNC: 4 MMOL/L
SODIUM SERPL-SCNC: 141 MMOL/L

## 2019-01-03 PROCEDURE — 99999 PR PBB SHADOW E&M-EST. PATIENT-LVL II: ICD-10-PCS | Mod: PBBFAC,,, | Performed by: INTERNAL MEDICINE

## 2019-01-03 PROCEDURE — 99212 OFFICE O/P EST SF 10 MIN: CPT | Mod: PBBFAC | Performed by: INTERNAL MEDICINE

## 2019-01-03 PROCEDURE — 99214 PR OFFICE/OUTPT VISIT, EST, LEVL IV, 30-39 MIN: ICD-10-PCS | Mod: S$PBB,,, | Performed by: INTERNAL MEDICINE

## 2019-01-03 PROCEDURE — 99214 OFFICE O/P EST MOD 30 MIN: CPT | Mod: S$PBB,,, | Performed by: INTERNAL MEDICINE

## 2019-01-03 PROCEDURE — 99999 PR PBB SHADOW E&M-EST. PATIENT-LVL II: CPT | Mod: PBBFAC,,, | Performed by: INTERNAL MEDICINE

## 2019-01-03 PROCEDURE — 36415 COLL VENOUS BLD VENIPUNCTURE: CPT

## 2019-01-03 PROCEDURE — 80069 RENAL FUNCTION PANEL: CPT

## 2019-01-03 RX ORDER — METOLAZONE 2.5 MG/1
2.5 TABLET ORAL DAILY
Qty: 30 TABLET | Refills: 11 | Status: ON HOLD | OUTPATIENT
Start: 2019-01-03 | End: 2019-03-14 | Stop reason: SDUPTHER

## 2019-01-03 NOTE — PROGRESS NOTES
Subjective:   Patient ID:  Jennifer Mathews is a 86 y.o. female who presents for follow up of No chief complaint on file.      HPI  An 87 yo female with multiple pacer crt s/p valvular replacement is here for f/u she has been doing well clinically he rleg swelling improved she is compliant with diet takes her meds regularily has a sliding scale for weight and leg swelling with diuretic sliding scale and metolazone. Has no orthopnea no change in exercise tolerance. Has no other issues clinically   Past Medical History:   Diagnosis Date    Acute coronary syndrome     Anemia     Anticoagulated on Coumadin 7/13/2015    Arthritis     Asthma     patient denies    Atrial fibrillation     Basal cell carcinoma 10/2015    left neck    Cardiac arrest     Cardiac resynchronization therapy defibrillator (CRT-D) in place 07/13/2015    Pt denies, states it does not shock me    Chronic combined systolic and diastolic congestive heart failure     CKD (chronic kidney disease) stage 3, GFR 30-59 ml/min     Dyslipidemia 1/30/2014    Hyperlipidemia     Hypertension     Hypothyroidism     Ischemic cardiomyopathy 01/30/2014    Macular hole of left eye     Old    Macular hole of left eye     LEV (obstructive sleep apnea) 9/30/2013    Pneumonia     required hospitalization    Recurrent UTI     Refractive error     Spastic colon     Stroke        Past Surgical History:   Procedure Laterality Date    APPENDECTOMY      CARDIAC CATHETERIZATION      CARDIAC PACEMAKER PLACEMENT  2014    CARDIAC VALVE SURGERY      CATARACT EXTRACTION      OU    CHOLECYSTECTOMY      COLONOSCOPY      IRRIGATION AND DEBRIDEMENT OF LEFT KNEE Left 9/12/2017    Performed by Juliano Rosenbaum MD at [facility] OR    MITRAL VALVE REPLACEMENT  2014    MITRAL VALVE SURGERY      x3    REPLACEMENT, PACEMAKER GENERATOR/pt has crt-p versus d Left 6/20/2018    Performed by Manny Dunne MD at [facility] CATH LAB    REVISION, SKIN POCKET, FOR CARDIAC  PACEMAKER Left 2/26/2014    Performed by [facility] at [facility] CATH LAB       Social History     Tobacco Use    Smoking status: Never Smoker    Smokeless tobacco: Never Used   Substance Use Topics    Alcohol use: No    Drug use: No       Family History   Problem Relation Age of Onset    Coronary artery disease Mother         mi    Epilepsy Mother     Heart attack Mother     Coronary artery disease Father     Heart disease Father     Emphysema Father     COPD Father     Diabetes Brother     Cancer Brother     Melanoma Neg Hx     Psoriasis Neg Hx     Lupus Neg Hx     Eczema Neg Hx        Current Outpatient Medications   Medication Sig    allopurinol (ZYLOPRIM) 100 MG tablet Take 2 tablets (200 mg total) by mouth once daily. (Patient taking differently: Take 100 mg by mouth once daily. )    amiodarone (PACERONE) 200 MG Tab TAKE 1 TABLET EVERY DAY    aspirin (ECOTRIN) 81 MG EC tablet Take 81 mg by mouth once daily.    calcium carbonate (OS-SHERRY) 600 mg calcium (1,500 mg) Tab Take 600 mg by mouth once.    carvedilol (COREG) 25 MG tablet Take 1 tablet (25 mg total) by mouth 2 (two) times daily with meals.    colchicine (MITIGARE) 0.6 mg Cap Take 1 capsule (0.6 mg total) by mouth 3 (three) times a week. On Monday/ wednessday / Friday    cranberry 1,000 mg Cap Take 1 capsule by mouth Daily.    digoxin (LANOXIN) 125 mcg tablet Take 1 tablet (125 mcg total) by mouth once daily. TAKE 1 TABLET (0.125 MG TOTAL) BY MOUTH ONCE DAILY. (Patient taking differently: Take 125 mcg by mouth once daily. )    furosemide (LASIX) 40 MG tablet Take 1 tablet (40 mg total) by mouth 2 (two) times daily.    gabapentin (NEURONTIN) 100 MG capsule TAKE ONE CAPSULE BY MOUTH TWO TIMES DAILY    levothyroxine (SYNTHROID) 50 MCG tablet TAKE 1 TABLET BY MOUTH EVERY DAY    losartan (COZAAR) 25 MG tablet Take 1 tablet (25 mg total) by mouth once daily.    MULTIVITAMIN ORAL Take 1 tablet by mouth Daily.    potassium  chloride SA (K-DUR,KLOR-CON) 20 MEQ tablet TAKE 1 TABLET (20 MEQ TOTAL) BY MOUTH ONCE DAILY.    warfarin (COUMADIN) 2.5 MG tablet TAKE 1/2 TABLET ON MONDAY AND WEDNESDAY AND 1 TABLET ALL OTHER DAYS. DISCONTINUE 5MG TABLET. (Patient taking differently: TAKE 1 TABLET ON MONDAYS, WEDNESDAYS, FRIDAYS  AND 1.5 TABLETS ON  ALL OTHER DAYS OR AS DIRECTED BY COUMADIN CLINIC.)    acetaminophen (TYLENOL EXTRA STRENGTH) 325 MG tablet Take 2 tablets (650 mg total) by mouth every 8 (eight) hours. (Patient taking differently: Take 650 mg by mouth 3 (three) times daily as needed. )    betamethasone valerate 0.1% (VALISONE) 0.1 % Crea Apply topically 2 (two) times daily.    Lactobac 40-Bifido 3-S.thermop (PROBIOTIC) 100 billion cell Cap Take 1 capsule by mouth once daily.    levothyroxine (SYNTHROID) 50 MCG tablet Take 1 tablet (50 mcg total) by mouth once daily.    methylPREDNISolone (MEDROL DOSEPACK) 4 mg tablet use as directed     Current Facility-Administered Medications   Medication    denosumab (PROLIA) injection 60 mg     Current Outpatient Medications on File Prior to Visit   Medication Sig    allopurinol (ZYLOPRIM) 100 MG tablet Take 2 tablets (200 mg total) by mouth once daily. (Patient taking differently: Take 100 mg by mouth once daily. )    amiodarone (PACERONE) 200 MG Tab TAKE 1 TABLET EVERY DAY    aspirin (ECOTRIN) 81 MG EC tablet Take 81 mg by mouth once daily.    calcium carbonate (OS-SHERRY) 600 mg calcium (1,500 mg) Tab Take 600 mg by mouth once.    carvedilol (COREG) 25 MG tablet Take 1 tablet (25 mg total) by mouth 2 (two) times daily with meals.    colchicine (MITIGARE) 0.6 mg Cap Take 1 capsule (0.6 mg total) by mouth 3 (three) times a week. On Monday/ wednessday / Friday    cranberry 1,000 mg Cap Take 1 capsule by mouth Daily.    digoxin (LANOXIN) 125 mcg tablet Take 1 tablet (125 mcg total) by mouth once daily. TAKE 1 TABLET (0.125 MG TOTAL) BY MOUTH ONCE DAILY. (Patient taking differently: Take  "125 mcg by mouth once daily. )    furosemide (LASIX) 40 MG tablet Take 1 tablet (40 mg total) by mouth 2 (two) times daily.    gabapentin (NEURONTIN) 100 MG capsule TAKE ONE CAPSULE BY MOUTH TWO TIMES DAILY    levothyroxine (SYNTHROID) 50 MCG tablet TAKE 1 TABLET BY MOUTH EVERY DAY    losartan (COZAAR) 25 MG tablet Take 1 tablet (25 mg total) by mouth once daily.    MULTIVITAMIN ORAL Take 1 tablet by mouth Daily.    potassium chloride SA (K-DUR,KLOR-CON) 20 MEQ tablet TAKE 1 TABLET (20 MEQ TOTAL) BY MOUTH ONCE DAILY.    warfarin (COUMADIN) 2.5 MG tablet TAKE 1/2 TABLET ON MONDAY AND WEDNESDAY AND 1 TABLET ALL OTHER DAYS. DISCONTINUE 5MG TABLET. (Patient taking differently: TAKE 1 TABLET ON MONDAYS, WEDNESDAYS, FRIDAYS  AND 1.5 TABLETS ON  ALL OTHER DAYS OR AS DIRECTED BY COUMADIN CLINIC.)    acetaminophen (TYLENOL EXTRA STRENGTH) 325 MG tablet Take 2 tablets (650 mg total) by mouth every 8 (eight) hours. (Patient taking differently: Take 650 mg by mouth 3 (three) times daily as needed. )    betamethasone valerate 0.1% (VALISONE) 0.1 % Crea Apply topically 2 (two) times daily.    Lactobac 40-Bifido 3-S.thermop (PROBIOTIC) 100 billion cell Cap Take 1 capsule by mouth once daily.    levothyroxine (SYNTHROID) 50 MCG tablet Take 1 tablet (50 mcg total) by mouth once daily.    methylPREDNISolone (MEDROL DOSEPACK) 4 mg tablet use as directed     Current Facility-Administered Medications on File Prior to Visit   Medication    denosumab (PROLIA) injection 60 mg     Review of patient's allergies indicates:   Allergen Reactions    Cephalosporins Hives    Metaxalone Itching    Penicillins      Other reaction(s): Unknown      Pregabalin      Other reaction(s): "Bad feeling"      Sulfa (sulfonamide antibiotics) Itching       Review of Systems   Constitution: Negative for diaphoresis, weakness, malaise/fatigue and weight gain.   HENT: Negative for hoarse voice.    Eyes: Negative for double vision and visual " disturbance.   Cardiovascular: Positive for leg swelling. Negative for chest pain, claudication, cyanosis, dyspnea on exertion, irregular heartbeat, near-syncope, orthopnea, palpitations, paroxysmal nocturnal dyspnea and syncope.   Respiratory: Positive for shortness of breath. Negative for cough, hemoptysis and snoring.    Hematologic/Lymphatic: Negative for bleeding problem. Bruises/bleeds easily.   Skin: Negative for color change and poor wound healing.   Musculoskeletal: Negative for muscle cramps, muscle weakness and myalgias.   Gastrointestinal: Negative for bloating, abdominal pain, change in bowel habit, diarrhea, heartburn, hematemesis, hematochezia, melena and nausea.   Neurological: Negative for excessive daytime sleepiness, dizziness, headaches, light-headedness, loss of balance and numbness.   Psychiatric/Behavioral: Negative for memory loss. The patient does not have insomnia.    Allergic/Immunologic: Negative for hives.       Objective:   Physical Exam   Constitutional: She is oriented to person, place, and time. She appears well-developed and well-nourished. She does not appear ill. No distress.   HENT:   Head: Normocephalic and atraumatic.   Eyes: EOM are normal. Pupils are equal, round, and reactive to light. No scleral icterus.   Neck: Normal range of motion. Neck supple. JVD present. Normal carotid pulses and no hepatojugular reflux present. Carotid bruit is not present. No tracheal deviation present. No thyromegaly present.   Cardiovascular: Normal rate, regular rhythm, normal heart sounds and normal pulses. Exam reveals no gallop and no friction rub.   No murmur heard.  Pulmonary/Chest: Effort normal and breath sounds normal. No respiratory distress. She has no wheezes. She has no rhonchi. She has no rales. She exhibits no tenderness.   Pacer site well healed.  Scar cabg well healed.   Abdominal: Soft. Normal appearance, normal aorta and bowel sounds are normal. She exhibits no distension, no  "abdominal bruit, no ascites and no pulsatile midline mass. There is no hepatomegaly. There is no tenderness.   Musculoskeletal: She exhibits edema (2+ pitting bilateral).        Right shoulder: She exhibits no deformity.   Neurological: She is alert and oriented to person, place, and time. She has normal strength. No cranial nerve deficit. Coordination normal.   Skin: Skin is warm and dry. No rash noted. She is not diaphoretic. No cyanosis or erythema. Nails show no clubbing.   Psychiatric: She has a normal mood and affect. Her speech is normal and behavior is normal.   Nursing note and vitals reviewed.    Vitals:    01/03/19 1420   BP: (!) 140/70   Patient Position: Sitting   BP Method: Small (Manual)   Pulse: 68   Weight: 54.2 kg (119 lb 7.8 oz)   Height: 5' 2" (1.575 m)     Lab Results   Component Value Date    CHOL 155 03/20/2018    CHOL 154 01/29/2018    CHOL 157 08/15/2017     Lab Results   Component Value Date    HDL 36 (L) 03/20/2018    HDL 34 (L) 01/29/2018    HDL 32 (L) 08/15/2017     Lab Results   Component Value Date    LDLCALC 90.8 03/20/2018    LDLCALC 79.0 01/29/2018    LDLCALC 85.8 08/15/2017     Lab Results   Component Value Date    TRIG 141 03/20/2018    TRIG 205 (H) 01/29/2018    TRIG 196 (H) 08/15/2017     Lab Results   Component Value Date    CHOLHDL 23.2 03/20/2018    CHOLHDL 22.1 01/29/2018    CHOLHDL 20.4 08/15/2017       Chemistry        Component Value Date/Time     12/07/2018 1121    K 4.8 12/07/2018 1121     12/07/2018 1121    CO2 27 12/07/2018 1121    BUN 39 (H) 12/07/2018 1121    CREATININE 1.5 (H) 12/07/2018 1121     (H) 12/07/2018 1121        Component Value Date/Time    CALCIUM 9.2 12/07/2018 1121    ALKPHOS 79 12/07/2018 1121    AST 30 12/07/2018 1121    ALT 21 12/07/2018 1121    BILITOT 0.4 12/07/2018 1121    ESTGFRAFRICA 36 (A) 12/07/2018 1121    EGFRNONAA 31 (A) 12/07/2018 1121          Lab Results   Component Value Date    TSH 8.560 (H) 11/19/2018     Lab " Results   Component Value Date    INR 2.5 12/21/2018    INR 2.5 12/07/2018    INR 4.2 (A) 11/27/2018     Lab Results   Component Value Date    WBC 6.54 11/27/2018    HGB 11.4 (L) 11/27/2018    HCT 36.8 (L) 11/27/2018     (H) 11/27/2018     11/27/2018     BMP  Sodium   Date Value Ref Range Status   12/07/2018 143 136 - 145 mmol/L Final     Potassium   Date Value Ref Range Status   12/07/2018 4.8 3.5 - 5.1 mmol/L Final     Chloride   Date Value Ref Range Status   12/07/2018 103 95 - 110 mmol/L Final     CO2   Date Value Ref Range Status   12/07/2018 27 23 - 29 mmol/L Final     BUN, Bld   Date Value Ref Range Status   12/07/2018 39 (H) 8 - 23 mg/dL Final     Creatinine   Date Value Ref Range Status   12/07/2018 1.5 (H) 0.5 - 1.4 mg/dL Final     Calcium   Date Value Ref Range Status   12/07/2018 9.2 8.7 - 10.5 mg/dL Final     Anion Gap   Date Value Ref Range Status   12/07/2018 13 8 - 16 mmol/L Final     eGFR if    Date Value Ref Range Status   12/07/2018 36 (A) >60 mL/min/1.73 m^2 Final     eGFR if non    Date Value Ref Range Status   12/07/2018 31 (A) >60 mL/min/1.73 m^2 Final     Comment:     Calculation used to obtain the estimated glomerular filtration  rate (eGFR) is the CKD-EPI equation.        CrCl cannot be calculated (Patient's most recent lab result is older than the maximum 7 days allowed.).    Assessment:     1. Chronic combined systolic and diastolic congestive heart failure    2. LEV (obstructive sleep apnea)    3. Localized edema    4. Essential hypertension    5. Dyslipidemia    6. S/P MVR (mitral valve replacement)    7. S/P placement of cardiac pacemaker    8. Syncope, unspecified syncope type    9. Bilateral lower extremity edema    10. Paroxysmal atrial fibrillation    11. Cardiac resynchronization therapy defibrillator (CRT-D) in place    12. Ischemic cardiomyopathy    13. Anticoagulated on Coumadin    14. Pulmonary hypertension due to left heart  disease    15. Primary osteoarthritis involving multiple joints    16. 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 Medication monitoring encounter    18. Chronic renal failure, stage 4 (severe)    19. Anemia associated with stage 4 chronic renal failure    20. Iron deficiency anemia due to chronic blood loss    21. Acute on chronic combined systolic and diastolic congestive heart failure    22. CKD (chronic kidney disease) stage 3, GFR 30-59 ml/min      Stable clinically no exacerbation appears well compensated with her  medical regimen. counseled about salt intake.   Plan:   Continue current therapy  Cardiac low salt diet.  Risk factor modification and excercise program.  F/u in 6 months with lipid cmp   Get tsh added to labs from 1/8/2019.

## 2019-01-09 DIAGNOSIS — I25.5 ISCHEMIC CARDIOMYOPATHY: ICD-10-CM

## 2019-01-09 DIAGNOSIS — Z95.0 CARDIAC PACEMAKER IN SITU: ICD-10-CM

## 2019-01-09 DIAGNOSIS — I48.20 CHRONIC ATRIAL FIBRILLATION: Primary | ICD-10-CM

## 2019-01-11 ENCOUNTER — ANTI-COAG VISIT (OUTPATIENT)
Dept: CARDIOLOGY | Facility: CLINIC | Age: 84
End: 2019-01-11
Payer: MEDICARE

## 2019-01-11 DIAGNOSIS — Z79.01 LONG TERM (CURRENT) USE OF ANTICOAGULANTS: Primary | ICD-10-CM

## 2019-01-11 LAB — INR PPP: 1.8 (ref 2–3)

## 2019-01-11 PROCEDURE — 85610 PROTHROMBIN TIME: CPT | Mod: PBBFAC

## 2019-01-11 NOTE — PROGRESS NOTES
Patient's INR is slightly low at 1.8.   Reports no missed doses or diet changes.  No s/s reported.  Since there's no clear explanation exists for the INR to be out of range, patient will maintain current dose of Warfarin 2.5 mg every Monday, Wednesday, and Friday; and 3.75 mg on all other days per dosing calendar given.  Will rechallenge.  Recheck in 2 weeks.

## 2019-01-18 ENCOUNTER — LAB VISIT (OUTPATIENT)
Dept: LAB | Facility: HOSPITAL | Age: 84
End: 2019-01-18
Attending: NURSE PRACTITIONER
Payer: MEDICARE

## 2019-01-18 DIAGNOSIS — D50.0 IRON DEFICIENCY ANEMIA DUE TO CHRONIC BLOOD LOSS: ICD-10-CM

## 2019-01-18 DIAGNOSIS — N18.4 ANEMIA ASSOCIATED WITH STAGE 4 CHRONIC RENAL FAILURE: ICD-10-CM

## 2019-01-18 DIAGNOSIS — D63.1 ANEMIA ASSOCIATED WITH STAGE 4 CHRONIC RENAL FAILURE: ICD-10-CM

## 2019-01-18 DIAGNOSIS — R76.8 ELEVATED SERUM IMMUNOGLOBULIN FREE LIGHT CHAIN LEVEL: ICD-10-CM

## 2019-01-18 LAB
ALBUMIN SERPL BCP-MCNC: 4 G/DL
ALP SERPL-CCNC: 67 U/L
ALT SERPL W/O P-5'-P-CCNC: 18 U/L
ANION GAP SERPL CALC-SCNC: 8 MMOL/L
AST SERPL-CCNC: 24 U/L
BASOPHILS # BLD AUTO: 0.07 K/UL
BASOPHILS NFR BLD: 1.2 %
BILIRUB SERPL-MCNC: 0.6 MG/DL
BUN SERPL-MCNC: 41 MG/DL
CALCIUM SERPL-MCNC: 9.6 MG/DL
CHLORIDE SERPL-SCNC: 98 MMOL/L
CO2 SERPL-SCNC: 34 MMOL/L
CREAT SERPL-MCNC: 1.5 MG/DL
DIFFERENTIAL METHOD: ABNORMAL
EOSINOPHIL # BLD AUTO: 0.1 K/UL
EOSINOPHIL NFR BLD: 2.1 %
ERYTHROCYTE [DISTWIDTH] IN BLOOD BY AUTOMATED COUNT: 14.6 %
EST. GFR  (AFRICAN AMERICAN): 36.1 ML/MIN/1.73 M^2
EST. GFR  (NON AFRICAN AMERICAN): 31.3 ML/MIN/1.73 M^2
GLUCOSE SERPL-MCNC: 112 MG/DL
HCT VFR BLD AUTO: 37.3 %
HGB BLD-MCNC: 11.7 G/DL
LYMPHOCYTES # BLD AUTO: 1.7 K/UL
LYMPHOCYTES NFR BLD: 29.7 %
MCH RBC QN AUTO: 33.4 PG
MCHC RBC AUTO-ENTMCNC: 31.4 G/DL
MCV RBC AUTO: 107 FL
MONOCYTES # BLD AUTO: 0.6 K/UL
MONOCYTES NFR BLD: 10.1 %
NEUTROPHILS # BLD AUTO: 3.2 K/UL
NEUTROPHILS NFR BLD: 56.5 %
NRBC BLD-RTO: 0 /100 WBC
PLATELET # BLD AUTO: 168 K/UL
PMV BLD AUTO: 11.8 FL
POTASSIUM SERPL-SCNC: 4.4 MMOL/L
PROT SERPL-MCNC: 6.9 G/DL
RBC # BLD AUTO: 3.5 M/UL
SODIUM SERPL-SCNC: 140 MMOL/L
WBC # BLD AUTO: 5.65 K/UL

## 2019-01-18 PROCEDURE — 80053 COMPREHEN METABOLIC PANEL: CPT

## 2019-01-18 PROCEDURE — 85060 BLOOD SMEAR INTERPRETATION: CPT | Mod: ,,, | Performed by: PATHOLOGY

## 2019-01-18 PROCEDURE — 85060 PATHOLOGIST REVIEW: ICD-10-PCS | Mod: ,,, | Performed by: PATHOLOGY

## 2019-01-18 PROCEDURE — 83520 IMMUNOASSAY QUANT NOS NONAB: CPT

## 2019-01-18 PROCEDURE — 83540 ASSAY OF IRON: CPT

## 2019-01-18 PROCEDURE — 36415 COLL VENOUS BLD VENIPUNCTURE: CPT | Mod: PO

## 2019-01-18 PROCEDURE — 85025 COMPLETE CBC W/AUTO DIFF WBC: CPT

## 2019-01-18 PROCEDURE — 82728 ASSAY OF FERRITIN: CPT

## 2019-01-19 LAB
FERRITIN SERPL-MCNC: 129 NG/ML
IRON SERPL-MCNC: 42 UG/DL
SATURATED IRON: 11 %
TOTAL IRON BINDING CAPACITY: 380 UG/DL
TRANSFERRIN SERPL-MCNC: 257 MG/DL

## 2019-01-21 LAB
KAPPA LC SER QL IA: 4.98 MG/DL
KAPPA LC/LAMBDA SER IA: 2.06
LAMBDA LC SER QL IA: 2.42 MG/DL
PATH REV BLD -IMP: NORMAL
PATH REV BLD -IMP: NORMAL

## 2019-01-22 DIAGNOSIS — I50.42 CHRONIC COMBINED SYSTOLIC AND DIASTOLIC CHF (CONGESTIVE HEART FAILURE): ICD-10-CM

## 2019-01-22 RX ORDER — LOSARTAN POTASSIUM 25 MG/1
25 TABLET ORAL DAILY
Qty: 30 TABLET | Refills: 11 | Status: SHIPPED | OUTPATIENT
Start: 2019-01-22 | End: 2019-06-24

## 2019-01-24 ENCOUNTER — ANTI-COAG VISIT (OUTPATIENT)
Dept: CARDIOLOGY | Facility: CLINIC | Age: 84
End: 2019-01-24
Payer: MEDICARE

## 2019-01-24 ENCOUNTER — OFFICE VISIT (OUTPATIENT)
Dept: NEPHROLOGY | Facility: CLINIC | Age: 84
End: 2019-01-24
Payer: MEDICARE

## 2019-01-24 VITALS
WEIGHT: 123 LBS | HEIGHT: 62 IN | HEART RATE: 70 BPM | DIASTOLIC BLOOD PRESSURE: 70 MMHG | SYSTOLIC BLOOD PRESSURE: 140 MMHG | BODY MASS INDEX: 22.63 KG/M2

## 2019-01-24 DIAGNOSIS — N18.30 CHRONIC KIDNEY DISEASE, STAGE III (MODERATE): Primary | ICD-10-CM

## 2019-01-24 DIAGNOSIS — Z71.89 ENCOUNTER FOR MEDICATION REVIEW AND COUNSELING: ICD-10-CM

## 2019-01-24 DIAGNOSIS — Z79.01 LONG TERM (CURRENT) USE OF ANTICOAGULANTS: Primary | ICD-10-CM

## 2019-01-24 DIAGNOSIS — N17.9 ACUTE KIDNEY INJURY: ICD-10-CM

## 2019-01-24 DIAGNOSIS — E87.3 METABOLIC ALKALOSIS: ICD-10-CM

## 2019-01-24 DIAGNOSIS — D89.2 PARAPROTEINEMIA: ICD-10-CM

## 2019-01-24 DIAGNOSIS — I50.22 CHRONIC SYSTOLIC CONGESTIVE HEART FAILURE: ICD-10-CM

## 2019-01-24 LAB — INR PPP: 2.9 (ref 2–3)

## 2019-01-24 PROCEDURE — 99214 OFFICE O/P EST MOD 30 MIN: CPT | Mod: PBBFAC | Performed by: INTERNAL MEDICINE

## 2019-01-24 PROCEDURE — 85610 PROTHROMBIN TIME: CPT | Mod: PBBFAC

## 2019-01-24 PROCEDURE — 99215 OFFICE O/P EST HI 40 MIN: CPT | Mod: S$PBB,,, | Performed by: INTERNAL MEDICINE

## 2019-01-24 PROCEDURE — 99999 PR PBB SHADOW E&M-EST. PATIENT-LVL IV: CPT | Mod: PBBFAC,,, | Performed by: INTERNAL MEDICINE

## 2019-01-24 PROCEDURE — 99215 PR OFFICE/OUTPT VISIT, EST, LEVL V, 40-54 MIN: ICD-10-PCS | Mod: S$PBB,,, | Performed by: INTERNAL MEDICINE

## 2019-01-24 PROCEDURE — 99999 PR PBB SHADOW E&M-EST. PATIENT-LVL IV: ICD-10-PCS | Mod: PBBFAC,,, | Performed by: INTERNAL MEDICINE

## 2019-01-24 NOTE — PROGRESS NOTES
NEPHROLOGY CLINIC FOLLOWUP NOTE    REASON FOR FOLLOWUP AND CHIEF COMPLAINT:  Chronic kidney disease and congestive   heart failure.    HISTORY OF PRESENT ILLNESS:  Ms. Jennifer Mathews is an 86-year-old female who   presents for followup.  The patient has a history of CKD stage III and severe   congestive heart failure with ejection fraction of 20%.  The patient's main   issue is fluid status.  She was previously coached on restricting salt intake   and restricting to moderating fluid intake.  She was last seen by me about two   months ago.  Her renal function has remained stable.  She presents for followup   today.  She says she is doing okay.  No worsening in leg swelling.  She denies   any shortness of breath.  She has no cardiopulmonary symptoms, no palpitation,   no chest pain, no discomfort, and no dizziness.  Labs and meds were reviewed   with her.    PAST MEDICAL HISTORY:  1.  CKD stage III, baseline creatinine is about 1.4.  2.  Hypertension.  3.  Severe systolic congestive heart failure with EF of 20%.  4.  Osteoarthritis.  5.  Multiple falls in the past with pelvic fractures.  6.  Hyperlipidemia.  7.  Paroxysmal atrial fibrillation.  8.  Osteopenia.  9.  Pulmonary hypertension.    PAST SURGICAL HISTORY:  Reviewed and unchanged.    FAMILY HISTORY:  Reviewed and unchanged.    ALLERGIES:  Reviewed.  Cephalosporins, penicillin, sulfa drugs, and pregabalin.    SOCIAL HISTORY:  Reviewed.  Negative for smoking.  No alcohol use.    DIETARY HISTORY:  Reviewed, as above.    MEDICATIONS:  Reviewed.  Lasix 40 mg p.o. b.i.d., losartan 25 mg daily,   metolazone 2.5 mg daily, digoxin, carvedilol 25 mg b.i.d.  Other meds reviewed   and noted.    REVIEW OF SYSTEMS:  No recent hospitalizations.  GENERAL:  Negative.  HEAD, EYES, EARS, NOSE, AND THROAT:  Negative.  CARDIAC:  Negative.  PULMONARY:  Negative.  GASTROINTESTINAL:  Negative.  GENITOURINARY:  Negative.  PSYCHOLOGICAL:  Negative.  NEUROLOGICAL:   Negative.  ENDOCRINE:  Negative.  HEMATOLOGIC AND ONCOLOGIC:  Negative.  INFECTIOUS DISEASE:  Negative.  The rest of the review of systems negative.    PHYSICAL EXAMINATION:  VITAL SIGNS:  Blood pressure is 140/70, pulse is 70, weight is 123 pounds, which   is compared to 120 pounds last visit.  GENERAL:  She is cooperative, pleasant, in no acute distress, ambulating by   herself appear to be in no acute distress.  Speech and thought process was   normal and appropriate.  HEENT:  Mucous membranes moist.  NECK:  No JVD.  HEART:  Regular rate and rhythm, S1 and S2 audible.  No rubs.  CHEST:  Clear to auscultation.  No rales.  No wheezes.  Breathing symmetric and   unlabored.  ABDOMEN:  Soft, nontender.  EXTREMITIES:  Showed no edema.    LABS:  Reviewed.  Creatinine is 1.5, sodium 140, potassium 4.4, chloride 98,   bicarbonate 34, calcium 9.6.  White count 5.6, hemoglobin 11.7, platelets 168.    It is noted that primary care had ordered a serum protein electrophoresis.  The   patient has elevation of kappa to lambda in a 2:1 ratio.    ASSESSMENT AND PLAN:  This is an 86-year-old female who has chronic kidney   disease and severe congestive heart failure pressure.  The patient presents for   followup.  The impression is as follows:  1.  Renal.  The patient has stable renal function.  Previous acute kidney injury   has resolved.  The patient is doing well, clinically stable from our point of   view, the patient has stable and normal electrolytes including normal potassium.    She does have some metabolic alkalosis due to the use of diuretics.  We will   monitor.  2.  Cardiac:  She has severe systolic congestive heart failure with ejection   fraction of only 20%.  It appears that she has followed medical advice very   well.  I spent extensive time with the patient today going over the medical   issues, explained the rationale behind salt and fluid restriction to her.    Medications were carefully reviewed with her, the  reason for medications   explained.  3.  Lab review for SPEP.  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    PLANS AND RECOMMENDATIONS:  As discussed above.  Opportunity for question and   discussion provided.    Total time spent 40 minutes, more than 50% of the time was spent in counseling   and coordination of care.  Return to see us in about four months.  Level V   visit.      AK/ABILIO  dd: 01/24/2019 17:32:05 (CST)  td: 01/25/2019 06:54:28 (CST)  Doc ID   #[id number]  Job ID #[id number]    CC:     [id number]

## 2019-01-24 NOTE — PROGRESS NOTES
Patient's INR is therapeutic at 2.9.  Reports no recent changes.  Instructed to maintain current dose of Warfarin 2.5 mg every Monday, Wednesday, and Friday; and 3.75 mg on all other days per dosing calendar given.  Recheck in 1 month.

## 2019-01-31 ENCOUNTER — OFFICE VISIT (OUTPATIENT)
Dept: HEMATOLOGY/ONCOLOGY | Facility: CLINIC | Age: 84
End: 2019-01-31
Payer: MEDICARE

## 2019-01-31 ENCOUNTER — EXTERNAL CHRONIC CARE MANAGEMENT (OUTPATIENT)
Dept: PRIMARY CARE CLINIC | Facility: CLINIC | Age: 84
End: 2019-01-31
Payer: MEDICARE

## 2019-01-31 ENCOUNTER — OFFICE VISIT (OUTPATIENT)
Dept: DERMATOLOGY | Facility: CLINIC | Age: 84
End: 2019-01-31
Payer: MEDICARE

## 2019-01-31 VITALS
HEIGHT: 62 IN | TEMPERATURE: 98 F | OXYGEN SATURATION: 90 % | HEART RATE: 70 BPM | DIASTOLIC BLOOD PRESSURE: 52 MMHG | BODY MASS INDEX: 20.82 KG/M2 | RESPIRATION RATE: 17 BRPM | WEIGHT: 113.13 LBS | SYSTOLIC BLOOD PRESSURE: 110 MMHG

## 2019-01-31 DIAGNOSIS — D63.1 ANEMIA ASSOCIATED WITH STAGE 4 CHRONIC RENAL FAILURE: Primary | ICD-10-CM

## 2019-01-31 DIAGNOSIS — D18.00 ANGIOMA: ICD-10-CM

## 2019-01-31 DIAGNOSIS — Z79.01 ANTICOAGULATED ON COUMADIN: ICD-10-CM

## 2019-01-31 DIAGNOSIS — M79.89 LEFT LEG SWELLING: ICD-10-CM

## 2019-01-31 DIAGNOSIS — L30.9 DERMATITIS: Primary | ICD-10-CM

## 2019-01-31 DIAGNOSIS — D50.0 IRON DEFICIENCY ANEMIA DUE TO CHRONIC BLOOD LOSS: ICD-10-CM

## 2019-01-31 DIAGNOSIS — N18.4 ANEMIA ASSOCIATED WITH STAGE 4 CHRONIC RENAL FAILURE: Primary | ICD-10-CM

## 2019-01-31 DIAGNOSIS — I50.43 ACUTE ON CHRONIC COMBINED SYSTOLIC AND DIASTOLIC CONGESTIVE HEART FAILURE: ICD-10-CM

## 2019-01-31 DIAGNOSIS — Z85.828 HISTORY OF NONMELANOMA SKIN CANCER: ICD-10-CM

## 2019-01-31 PROCEDURE — 99490 CHRNC CARE MGMT STAFF 1ST 20: CPT | Mod: PBBFAC | Performed by: FAMILY MEDICINE

## 2019-01-31 PROCEDURE — 99999 PR PBB SHADOW E&M-EST. PATIENT-LVL III: CPT | Mod: PBBFAC,,, | Performed by: NURSE PRACTITIONER

## 2019-01-31 PROCEDURE — 99999 PR PBB SHADOW E&M-EST. PATIENT-LVL II: CPT | Mod: PBBFAC,,, | Performed by: STUDENT IN AN ORGANIZED HEALTH CARE EDUCATION/TRAINING PROGRAM

## 2019-01-31 PROCEDURE — 99490 CHRNC CARE MGMT STAFF 1ST 20: CPT | Mod: S$PBB,,, | Performed by: FAMILY MEDICINE

## 2019-01-31 PROCEDURE — 99490 PR CHRONIC CARE MGMT, 1ST 20 MIN: ICD-10-PCS | Mod: S$PBB,,, | Performed by: FAMILY MEDICINE

## 2019-01-31 PROCEDURE — 99213 PR OFFICE/OUTPT VISIT, EST, LEVL III, 20-29 MIN: ICD-10-PCS | Mod: S$PBB,,, | Performed by: STUDENT IN AN ORGANIZED HEALTH CARE EDUCATION/TRAINING PROGRAM

## 2019-01-31 PROCEDURE — 99213 OFFICE O/P EST LOW 20 MIN: CPT | Mod: S$PBB,,, | Performed by: STUDENT IN AN ORGANIZED HEALTH CARE EDUCATION/TRAINING PROGRAM

## 2019-01-31 PROCEDURE — 99999 PR PBB SHADOW E&M-EST. PATIENT-LVL III: ICD-10-PCS | Mod: PBBFAC,,, | Performed by: NURSE PRACTITIONER

## 2019-01-31 PROCEDURE — 99214 PR OFFICE/OUTPT VISIT, EST, LEVL IV, 30-39 MIN: ICD-10-PCS | Mod: S$PBB,,, | Performed by: NURSE PRACTITIONER

## 2019-01-31 PROCEDURE — 99999 PR PBB SHADOW E&M-EST. PATIENT-LVL II: ICD-10-PCS | Mod: PBBFAC,,, | Performed by: STUDENT IN AN ORGANIZED HEALTH CARE EDUCATION/TRAINING PROGRAM

## 2019-01-31 PROCEDURE — 99213 OFFICE O/P EST LOW 20 MIN: CPT | Mod: PBBFAC,PN | Performed by: NURSE PRACTITIONER

## 2019-01-31 PROCEDURE — 99214 OFFICE O/P EST MOD 30 MIN: CPT | Mod: S$PBB,,, | Performed by: NURSE PRACTITIONER

## 2019-01-31 PROCEDURE — 99212 OFFICE O/P EST SF 10 MIN: CPT | Mod: PBBFAC,PN,27,25 | Performed by: STUDENT IN AN ORGANIZED HEALTH CARE EDUCATION/TRAINING PROGRAM

## 2019-01-31 NOTE — PROGRESS NOTES
Subjective:      Patient ID: Jennifer Mathews is a 86 y.o. female.    Chief Complaint:     HPI:  Patient is a 86 year old  female who presents today for follow-up of her multifactorial anemia.  She has a previous diagnosis iron deficiency anemia and also has chronic kidney disease contributing to her anemia as well.  Labs on 1/18/19 show hemoglobin of 11.7 so she will not require Procrit today.  Her saturated iron is 11%.     She was recently hospitalized for a CHF exacerbation and was discharged on November 20, 2018.  She has an EF of 20%.  She states she has not had a colonoscopy in over 9 years; however due to her poor cardiac function she would require approval from Cardiology prior to proceeding with GI workup.     She denies chest pain or shortness of breath (stating shortness of breath only on exertion).  She denies fatigue, palpitations.  She denies hematuria, melena, hematochezia, epistaxis, bleeding gums or unusual bruising.  She is on Coumadin daily.  She has +3 edema to lower extremities.         Social History     Socioeconomic History    Marital status:      Spouse name: Not on file    Number of children: Not on file    Years of education: Not on file    Highest education level: Not on file   Social Needs    Financial resource strain: Not on file    Food insecurity - worry: Not on file    Food insecurity - inability: Not on file    Transportation needs - medical: Not on file    Transportation needs - non-medical: Not on file   Occupational History    Not on file   Tobacco Use    Smoking status: Never Smoker    Smokeless tobacco: Never Used   Substance and Sexual Activity    Alcohol use: No    Drug use: No    Sexual activity: No   Other Topics Concern    Are you pregnant or think you may be? Not Asked    Breast-feeding Not Asked   Social History Narrative    Not on file       Family History   Problem Relation Age of Onset    Coronary artery disease Mother          mi    Epilepsy Mother     Heart attack Mother     Coronary artery disease Father     Heart disease Father     Emphysema Father     COPD Father     Diabetes Brother     Cancer Brother     Melanoma Neg Hx     Psoriasis Neg Hx     Lupus Neg Hx     Eczema Neg Hx        Past Surgical History:   Procedure Laterality Date    APPENDECTOMY      CARDIAC CATHETERIZATION      CARDIAC PACEMAKER PLACEMENT  2014    CARDIAC VALVE SURGERY      CATARACT EXTRACTION      OU    CHOLECYSTECTOMY      COLONOSCOPY      IRRIGATION AND DEBRIDEMENT OF LEFT KNEE Left 9/12/2017    Performed by [facility]baum [facility] at [facility] OR    MITRAL VALVE REPLACEMENT  2014    MITRAL VALVE SURGERY      x3    REPLACEMENT, PACEMAKER GENERATOR/pt has crt-p versus d Left 6/20/2018    Performed by Manny Dunne MD at [facility] CATH LAB    REVISION, SKIN POCKET, FOR CARDIAC PACEMAKER Left 2/26/2014    Performed by Manny Dunne MD at [facility] CATH LAB       Past Medical History:   Diagnosis Date    Acute coronary syndrome     Anemia     Anticoagulated on Coumadin 7/13/2015    Arthritis     Asthma     patient denies    Atrial fibrillation     Basal cell carcinoma 10/2015    left neck    Cardiac arrest     Cardiac resynchronization therapy defibrillator (CRT-D) in place 07/13/2015    Pt denies, states it does not shock me    Chronic combined systolic and diastolic congestive heart failure     CKD (chronic kidney disease) stage 3, GFR 30-59 ml/min     Dyslipidemia 1/30/2014    Hyperlipidemia     Hypertension     Hypothyroidism     Ischemic cardiomyopathy 01/30/2014    Macular hole of left eye     Old    Macular hole of left eye     LEV (obstructive sleep apnea) 9/30/2013    Pneumonia     required hospitalization    Recurrent UTI     Refractive error     Spastic colon     Stroke        Review of Systems   Constitutional: Positive for fatigue. Negative for activity change and appetite change.   HENT: Negative for  congestion and nosebleeds.    Respiratory: Positive for shortness of breath (With exertion). Negative for chest tightness.    Cardiovascular: Positive for leg swelling ( +3 lower extremity). Negative for chest pain and palpitations.   Gastrointestinal: Negative for anal bleeding, blood in stool, diarrhea, nausea and vomiting.   Endocrine: Negative for cold intolerance and heat intolerance.   Genitourinary: Negative for dysuria, hematuria and vaginal bleeding.   Musculoskeletal: Positive for gait problem.   Skin: Negative for rash and wound.   Neurological: Negative for dizziness, syncope and light-headedness.   Hematological: Negative for adenopathy. Does not bruise/bleed easily.   Psychiatric/Behavioral: Negative for confusion and decreased concentration.             Medication List           Accurate as of 1/31/19 10:31 AM. If you have any questions, ask your nurse or doctor.               CHANGE how you take these medications    acetaminophen 325 MG tablet  Commonly known as:  TYLENOL EXTRA STRENGTH  Take 2 tablets (650 mg total) by mouth every 8 (eight) hours.  What changed:    · when to take this  · reasons to take this     allopurinol 100 MG tablet  Commonly known as:  ZYLOPRIM  Take 2 tablets (200 mg total) by mouth once daily.  What changed:  how much to take     digoxin 125 mcg tablet  Commonly known as:  LANOXIN  Take 1 tablet (125 mcg total) by mouth once daily. TAKE 1 TABLET (0.125 MG TOTAL) BY MOUTH ONCE DAILY.  What changed:  additional instructions     warfarin 2.5 MG tablet  Commonly known as:  COUMADIN  TAKE 1/2 TABLET ON MONDAY AND WEDNESDAY AND 1 TABLET ALL OTHER DAYS. DISCONTINUE 5MG TABLET.  What changed:  See the new instructions.        CONTINUE taking these medications    amiodarone 200 MG Tab  Commonly known as:  PACERONE  TAKE 1 TABLET EVERY DAY     aspirin 81 MG EC tablet  Commonly known as:  ECOTRIN     betamethasone valerate 0.1% 0.1 % Crea  Commonly known as:  VALISONE  Apply topically  2 (two) times daily.     calcium carbonate 600 mg calcium (1,500 mg) Tab  Commonly known as:  OS-SHERRY     carvedilol 25 MG tablet  Commonly known as:  COREG  Take 1 tablet (25 mg total) by mouth 2 (two) times daily with meals.     colchicine 0.6 mg Cap  Commonly known as:  MITIGARE  Take 1 capsule (0.6 mg total) by mouth 3 (three) times a week. On Monday/ wednessday / Friday     cranberry 1,000 mg Cap     furosemide 40 MG tablet  Commonly known as:  LASIX  Take 1 tablet (40 mg total) by mouth 2 (two) times daily.     gabapentin 100 MG capsule  Commonly known as:  NEURONTIN  TAKE ONE CAPSULE BY MOUTH TWO TIMES DAILY     Lactobac 40-Bifido 3-S.thermop 100 billion cell Cap  Commonly known as:  PROBIOTIC  Take 1 capsule by mouth once daily.     * levothyroxine 50 MCG tablet  Commonly known as:  SYNTHROID  Take 1 tablet (50 mcg total) by mouth once daily.     * levothyroxine 50 MCG tablet  Commonly known as:  SYNTHROID  TAKE 1 TABLET BY MOUTH EVERY DAY     losartan 25 MG tablet  Commonly known as:  COZAAR  Take 1 tablet (25 mg total) by mouth once daily.     methylPREDNISolone 4 mg tablet  Commonly known as:  MEDROL DOSEPACK  use as directed     metOLazone 2.5 MG tablet  Commonly known as:  ZAROXOLYN  Take 1 tablet (2.5 mg total) by mouth once daily.     MULTIVITAMIN ORAL     potassium chloride SA 20 MEQ tablet  Commonly known as:  K-DUR,KLOR-CON         * This list has 2 medication(s) that are the same as other medications prescribed for you. Read the directions carefully, and ask your doctor or other care provider to review them with you.                 Objective:   There were no vitals filed for this visit.    Physical Exam   Constitutional: She is oriented to person, place, and time. She appears well-developed and well-nourished.  Non-toxic appearance. She does not have a sickly appearance. She does not appear ill. No distress.   HENT:   Head: Normocephalic and atraumatic.   Right Ear: External ear normal.   Left  Ear: External ear normal.   Nose: Nose normal.   Eyes: Conjunctivae, EOM and lids are normal. Right eye exhibits no discharge. Left eye exhibits no discharge. No scleral icterus.   Cardiovascular: Normal rate, regular rhythm, S1 normal, S2 normal and normal heart sounds. Exam reveals no gallop and no friction rub.   No murmur heard.  Pulmonary/Chest: Effort normal. No accessory muscle usage or stridor. No apnea, no tachypnea and no bradypnea. No respiratory distress. She has decreased breath sounds in the left lower field. She has no wheezes. She has no rales.   Abdominal: Soft. Normal appearance. She exhibits no distension.   Musculoskeletal: Normal range of motion. She exhibits edema (+3 lower extremities).   Neurological: She is alert and oriented to person, place, and time.   Skin: Skin is warm, dry and intact. Capillary refill takes less than 2 seconds. No bruising, no lesion and no rash noted. She is not diaphoretic. No pallor.   Psychiatric: She has a normal mood and affect. Her speech is normal and behavior is normal. Judgment and thought content normal. She is not actively hallucinating. Cognition and memory are normal. She is attentive.   Vitals reviewed.      Assessment:     Problem List Items Addressed This Visit     None          Plan:   There are no diagnoses linked to this encounter.     Will set up for feraheme infusion x 1.  She will follow up in 2 months to re evaluate with labs prior including CBC, CMP, ferritin, TIBC and iron and will get procrit 20,000 units after as long as iron is repleated and hemoglobin is less than 10.      I will review assessment/plan with collaborating physician.    Thank You,  ABELINO Prado

## 2019-01-31 NOTE — PROGRESS NOTES
Subjective:       Patient ID:  Jennifer Mathews is a 86 y.o. female who presents for   Chief Complaint   Patient presents with    Rash     follow up to rash on right foot tx valisone      History of Present Illness: The patient presents for follow-up of a rash on the right foot and an upper body skin examination. This is a high risk patient here to check for the development of new lesions. She has a history of NMSC on the left neck over 10 years ago. She was last seen on 11/30/18 where she had a rash on the right foot and was given betamethasone. She reports resolution of the rash on the foot.         Review of Systems   Skin: Positive for activity-related sunscreen use. Negative for daily sunscreen use and recent sunburn.   Hematologic/Lymphatic: Does not bruise/bleed easily.        Objective:    Physical Exam   Constitutional: She appears well-developed and well-nourished. No distress.   Neurological: She is alert and oriented to person, place, and time. She is not disoriented.   Psychiatric: She has a normal mood and affect.   Skin:   Areas Examined (abnormalities noted in diagram):   Head / Face Inspection Performed  Neck Inspection Performed  Chest / Axilla Inspection Performed  Back Inspection Performed  RUE Inspected  LUE Inspection Performed                       Diagram Legend     Erythematous scaling macule/papule c/w actinic keratosis       Vascular papule c/w angioma      Pigmented verrucoid papule/plaque c/w seborrheic keratosis      Yellow umbilicated papule c/w sebaceous hyperplasia      Irregularly shaped tan macule c/w lentigo     1-2 mm smooth white papules consistent with Milia      Movable subcutaneous cyst with punctum c/w epidermal inclusion cyst      Subcutaneous movable cyst c/w pilar cyst      Firm pink to brown papule c/w dermatofibroma      Pedunculated fleshy papule(s) c/w skin tag(s)      Evenly pigmented macule c/w junctional nevus     Mildly variegated pigmented, slightly  irregular-bordered macule c/w mildly atypical nevus      Flesh colored to evenly pigmented papule c/w intradermal nevus       Pink pearly papule/plaque c/w basal cell carcinoma      Erythematous hyperkeratotic cursted plaque c/w SCC      Surgical scar with no sign of skin cancer recurrence      Open and closed comedones      Inflammatory papules and pustules      Verrucoid papule consistent consistent with wart     Erythematous eczematous patches and plaques     Dystrophic onycholytic nail with subungual debris c/w onychomycosis     Umbilicated papule    Erythematous-base heme-crusted tan verrucoid plaque consistent with inflamed seborrheic keratosis     Erythematous Silvery Scaling Plaque c/w Psoriasis     See annotation      Assessment / Plan:        Dermatitis - resolved.   Monitor for recurrence     Angioma  This is a benign vascular lesion. Reassurance given. No treatment required.     History of nonmelanoma skin cancer  Area(s) of previous NMSC evaluated with no signs of recurrence.    Upper body skin examination performed today including at least 6 points as noted in physical examination. No lesions suspicious for malignancy noted.             Follow-up in about 1 year (around 1/31/2020).

## 2019-01-31 NOTE — PATIENT INSTRUCTIONS
Monitoring Moles     Don't forget to check your feet.   Moles, also called nevi, are small, colored (pigmented) marks on the skin. They have no known purpose. Many moles appear before age 30, but they also increase frequently as people age. Moles most often are not cancer (benign) and are harmless. But some become cancerous (malignant). Thats why you need to watch the moles on your body and tell your healthcare provider about any that concern you.  What are moles?  Moles are a type of pigmented femi. Freckles are another type of pigmented femi. They are often sprinkled across the bridge of the nose, the cheeks, and the arms. Moles can appear on any part of the body. There are many types, sizes, and shapes of moles. Most moles are solid brown. In most cases they are flat or dome-shaped, smooth, and have well-defined edges.  Why worry about moles?  Most moles are benign and dont require treatment. You can have moles removed if you dont like the way they look or feel. But moles may become a problem if they appear after you are 30, or if they change in certain ways. These moles may turn into melanoma, a type of skin cancer. Melanoma is one of the fastest growing cancers in the [place] It is often curable if caught early. But this disease can be life-threatening, particularly when not diagnosed early. Your risk for melanoma is higher if you:  · Have a lot of moles  · Have had more lifetime exposure to the sun  · Have had severe blistering sunburns  · Use tanning beds  · Have a personal or family history of skin cancer  To manage your risk, its smart to check your moles for changes and ask your healthcare provider to do a thorough skin exam when you have a physical exam. To do this, you first need to learn where your moles are. Then, be sure to check your moles each month.  Checking your moles  You can check many of your moles each month. You can do this right after you shower and before you get dressed. Check your  body from head to toe. Then, make a list of your moles. If you find any new moles or changes in your moles, call your healthcare provider. To check your moles, youll need:  · A full-length mirror  · A stool or chair to sit on while you check your feet  If you have a lot of moles, take digital photos of them. Make sure to take photos both up close and from a distance. These can help you see if any moles change over time.  When to seek medical treatment  See your healthcare provider if your moles hurt, itch, ooze, bleed, thicken, become crusty, or show other changes. Also, be sure to call your health care provider if your moles show any of the following signs of melanoma:  · A change in size, shape, color, or height  · The sides dont match (asymmetry)  · Ragged, notched, or blurred borders  · Different colors within the same mole  · Size is larger than 5 mm or 6 mm in diameter (the size of a pencil eraser)  Date Last Reviewed: 2/1/2017 © 2000-2017 [employer]. [address] [place] [id number]. All rights reserved. This information is not intended as a substitute for professional medical care. Always follow your healthcare professional's instructions.

## 2019-02-01 RX ORDER — COLCHICINE 0.6 MG/1
TABLET ORAL
Qty: 15 TABLET | Refills: 11 | Status: SHIPPED | OUTPATIENT
Start: 2019-02-01 | End: 2019-04-09 | Stop reason: ALTCHOICE

## 2019-02-01 RX ORDER — COLCHICINE 0.6 MG/1
0.6 CAPSULE ORAL
Qty: 15 CAPSULE | Refills: 5 | OUTPATIENT
Start: 2019-02-01

## 2019-02-07 ENCOUNTER — INFUSION (OUTPATIENT)
Dept: INFUSION THERAPY | Facility: HOSPITAL | Age: 84
End: 2019-02-07
Attending: INTERNAL MEDICINE
Payer: MEDICARE

## 2019-02-07 VITALS
RESPIRATION RATE: 20 BRPM | TEMPERATURE: 98 F | OXYGEN SATURATION: 95 % | SYSTOLIC BLOOD PRESSURE: 126 MMHG | DIASTOLIC BLOOD PRESSURE: 61 MMHG | HEART RATE: 70 BPM

## 2019-02-07 DIAGNOSIS — D50.0 IRON DEFICIENCY ANEMIA DUE TO CHRONIC BLOOD LOSS: Primary | ICD-10-CM

## 2019-02-07 PROCEDURE — 96376 TX/PRO/DX INJ SAME DRUG ADON: CPT

## 2019-02-07 PROCEDURE — 96365 THER/PROPH/DIAG IV INF INIT: CPT

## 2019-02-07 PROCEDURE — 63600175 PHARM REV CODE 636 W HCPCS: Mod: JG | Performed by: NURSE PRACTITIONER

## 2019-02-07 PROCEDURE — 25000003 PHARM REV CODE 250: Performed by: NURSE PRACTITIONER

## 2019-02-07 PROCEDURE — 63600175 PHARM REV CODE 636 W HCPCS: Performed by: INTERNAL MEDICINE

## 2019-02-07 RX ORDER — HEPARIN 100 UNIT/ML
500 SYRINGE INTRAVENOUS
Status: CANCELLED | OUTPATIENT
Start: 2019-02-07

## 2019-02-07 RX ORDER — METHYLPREDNISOLONE SOD SUCC 125 MG
125 VIAL (EA) INJECTION
Status: COMPLETED | OUTPATIENT
Start: 2019-02-07 | End: 2019-02-07

## 2019-02-07 RX ADMIN — METHYLPREDNISOLONE SODIUM SUCCINATE 125 MG: 125 INJECTION, POWDER, FOR SOLUTION INTRAMUSCULAR; INTRAVENOUS at 01:02

## 2019-02-07 RX ADMIN — FERUMOXYTOL 510 MG: 510 INJECTION INTRAVENOUS at 01:02

## 2019-02-07 NOTE — PLAN OF CARE
Problem: Adult Inpatient Plan of Care  Goal: Plan of Care Review  Outcome: Ongoing (interventions implemented as appropriate)  Pt stated feeling tired and weak

## 2019-02-13 DIAGNOSIS — M1A.39X1 CHRONIC GOUT DUE TO RENAL IMPAIRMENT OF MULTIPLE SITES WITH TOPHUS: ICD-10-CM

## 2019-02-13 DIAGNOSIS — M81.0 OSTEOPOROSIS, SENILE: ICD-10-CM

## 2019-02-13 RX ORDER — ALLOPURINOL 100 MG/1
200 TABLET ORAL DAILY
Qty: 180 TABLET | Refills: 1 | Status: SHIPPED | OUTPATIENT
Start: 2019-02-13 | End: 2019-07-29

## 2019-02-14 ENCOUNTER — OFFICE VISIT (OUTPATIENT)
Dept: FAMILY MEDICINE | Facility: CLINIC | Age: 84
End: 2019-02-14
Payer: MEDICARE

## 2019-02-14 ENCOUNTER — TELEPHONE (OUTPATIENT)
Dept: FAMILY MEDICINE | Facility: CLINIC | Age: 84
End: 2019-02-14

## 2019-02-14 VITALS
BODY MASS INDEX: 21.53 KG/M2 | HEART RATE: 70 BPM | SYSTOLIC BLOOD PRESSURE: 117 MMHG | WEIGHT: 117.75 LBS | TEMPERATURE: 99 F | DIASTOLIC BLOOD PRESSURE: 52 MMHG | OXYGEN SATURATION: 95 %

## 2019-02-14 DIAGNOSIS — J06.9 VIRAL UPPER RESPIRATORY TRACT INFECTION: Primary | ICD-10-CM

## 2019-02-14 PROCEDURE — 99999 PR PBB SHADOW E&M-EST. PATIENT-LVL IV: CPT | Mod: PBBFAC,,, | Performed by: NURSE PRACTITIONER

## 2019-02-14 PROCEDURE — 99214 OFFICE O/P EST MOD 30 MIN: CPT | Mod: PBBFAC,PO | Performed by: NURSE PRACTITIONER

## 2019-02-14 PROCEDURE — 99213 PR OFFICE/OUTPT VISIT, EST, LEVL III, 20-29 MIN: ICD-10-PCS | Mod: S$PBB,,, | Performed by: NURSE PRACTITIONER

## 2019-02-14 PROCEDURE — 99999 PR PBB SHADOW E&M-EST. PATIENT-LVL IV: ICD-10-PCS | Mod: PBBFAC,,, | Performed by: NURSE PRACTITIONER

## 2019-02-14 PROCEDURE — 99213 OFFICE O/P EST LOW 20 MIN: CPT | Mod: S$PBB,,, | Performed by: NURSE PRACTITIONER

## 2019-02-14 RX ORDER — BENZONATATE 200 MG/1
200 CAPSULE ORAL 3 TIMES DAILY PRN
Qty: 40 CAPSULE | Refills: 1 | Status: SHIPPED | OUTPATIENT
Start: 2019-02-14 | End: 2019-02-22

## 2019-02-14 RX ORDER — LEVOCETIRIZINE DIHYDROCHLORIDE 5 MG/1
5 TABLET, FILM COATED ORAL NIGHTLY
Qty: 30 TABLET | Refills: 0 | Status: SHIPPED | OUTPATIENT
Start: 2019-02-14 | End: 2019-03-20

## 2019-02-14 RX ORDER — AZITHROMYCIN 250 MG/1
TABLET, FILM COATED ORAL
Qty: 6 TABLET | Refills: 0 | Status: SHIPPED | OUTPATIENT
Start: 2019-02-14 | End: 2019-02-14 | Stop reason: ALTCHOICE

## 2019-02-14 RX ORDER — FLUTICASONE PROPIONATE 50 MCG
2 SPRAY, SUSPENSION (ML) NASAL DAILY
Qty: 16 G | Refills: 0 | Status: ON HOLD | OUTPATIENT
Start: 2019-02-14 | End: 2019-03-14 | Stop reason: HOSPADM

## 2019-02-14 RX ORDER — DOXYCYCLINE 150 MG/1
150 TABLET ORAL 2 TIMES DAILY
Qty: 14 TABLET | Refills: 0 | Status: ON HOLD | OUTPATIENT
Start: 2019-02-14 | End: 2019-03-14 | Stop reason: HOSPADM

## 2019-02-14 NOTE — TELEPHONE ENCOUNTER
----- Message from Jessika Bush sent at 2/14/2019  9:31 AM CST -----  Contact: [facility] pharmmacy  Caller needs call back rg drug interaction between two medications  [phone]

## 2019-02-14 NOTE — PROGRESS NOTES
CC:   Chief Complaint   Patient presents with    Cough     HPI: This is a new problem.   Jennifer Mathews is a 86 y.o. female with a complaint of URI.  The current episode started in the past 2 days.   The problem has been gradually worsening.   Associated symptoms included fever, nasal congestion, rhinorrhea, cough.    Pertinent negatives include chest pain, dyspnea, wheezing   Treatments tried: mucinex has been used and this has provided no relief.     [unfilled]  Outpatient Medications Prior to Visit   Medication Sig Dispense Refill    acetaminophen (TYLENOL EXTRA STRENGTH) 325 MG tablet Take 2 tablets (650 mg total) by mouth every 8 (eight) hours. (Patient taking differently: Take 650 mg by mouth 3 (three) times daily as needed. )      allopurinol (ZYLOPRIM) 100 MG tablet TAKE 2 TABLETS (200 MG TOTAL) BY MOUTH ONCE DAILY. 180 tablet 1    amiodarone (PACERONE) 200 MG Tab TAKE 1 TABLET EVERY DAY 30 tablet 6    aspirin (ECOTRIN) 81 MG EC tablet Take 81 mg by mouth once daily.      betamethasone valerate 0.1% (VALISONE) 0.1 % Crea Apply topically 2 (two) times daily. 45 g 2    calcium carbonate (OS-SHERRY) 600 mg calcium (1,500 mg) Tab Take 600 mg by mouth once.      carvedilol (COREG) 25 MG tablet Take 1 tablet (25 mg total) by mouth 2 (two) times daily with meals. 180 tablet 3    colchicine (COLCRYS) 0.6 mg tablet Take 1 tablet by mouth 3 times a week 15 tablet 11    cranberry 1,000 mg Cap Take 1 capsule by mouth Daily.      digoxin (LANOXIN) 125 mcg tablet Take 1 tablet (125 mcg total) by mouth once daily. TAKE 1 TABLET (0.125 MG TOTAL) BY MOUTH ONCE DAILY. (Patient taking differently: Take 125 mcg by mouth once daily. ) 90 tablet 3    gabapentin (NEURONTIN) 100 MG capsule TAKE ONE CAPSULE BY MOUTH TWO TIMES DAILY 180 capsule 1    Lactobac 40-Bifido 3-S.thermop (PROBIOTIC) 100 billion cell Cap Take 1 capsule by mouth once daily. 30 capsule 0    levothyroxine (SYNTHROID) 50 MCG tablet Take 1 tablet  (50 mcg total) by mouth once daily. 30 tablet 11    levothyroxine (SYNTHROID) 50 MCG tablet TAKE 1 TABLET BY MOUTH EVERY DAY 30 tablet 5    losartan (COZAAR) 25 MG tablet Take 1 tablet (25 mg total) by mouth once daily. 30 tablet 11    methylPREDNISolone (MEDROL DOSEPACK) 4 mg tablet use as directed 1 Package 2    metOLazone (ZAROXOLYN) 2.5 MG tablet Take 1 tablet (2.5 mg total) by mouth once daily. 30 tablet 11    MULTIVITAMIN ORAL Take 1 tablet by mouth Daily.      potassium chloride SA (K-DUR,KLOR-CON) 20 MEQ tablet TAKE 1 TABLET (20 MEQ TOTAL) BY MOUTH ONCE DAILY.  6    warfarin (COUMADIN) 2.5 MG tablet TAKE 1/2 TABLET ON MONDAY AND WEDNESDAY AND 1 TABLET ALL OTHER DAYS. DISCONTINUE 5MG TABLET. (Patient taking differently: TAKE 1 TABLET ON MONDAYS, WEDNESDAYS, FRIDAYS  AND 1.5 TABLETS ON  ALL OTHER DAYS OR AS DIRECTED BY COUMADIN CLINIC.) 30 tablet 3    furosemide (LASIX) 40 MG tablet Take 1 tablet (40 mg total) by mouth 2 (two) times daily. 60 tablet 3     Facility-Administered Medications Prior to Visit   Medication Dose Route Frequency Provider Last Rate Last Dose    denosumab (PROLIA) injection 60 mg  60 mg Subcutaneous Q6 Months Oc Catherine MD   60 mg at 11/26/18 1031        Physical Exam   BP (!) 117/52   Pulse 70   Temp 98.8 °F (37.1 °C) (Tympanic)   Wt 53.4 kg (117 lb 11.6 oz)   LMP  (LMP Unknown)   SpO2 95%   BMI 21.53 kg/m²   Constitutional: The patient appears well-developed and well-nourished.   Head: Normocephalic and atraumatic.   Right Ear: Tympanic membrane and ear canal normal. No drainage, swelling or tenderness. Tympanic membrane is not injected, not erythematous and not bulging.   Left Ear: Ear canal normal. No drainage, swelling or tenderness. Tympanic membrane is not injected, not erythematous and not bulging.   Nose: Mucosal edema and rhinorrhea present. No sinus tenderness on palpation   Mouth/Throat: Uvula is midline. Posterior oropharyngeal erythema present. No  oropharyngeal exudate.        THE MUCOSA IS BOGGY AND ERYTHEMATOUS.     Eyes: Conjunctivae normal and lids are normal. Pupils are equal, round, and reactive to light. Right eye exhibits no discharge. Left eye exhibits no discharge. Right eye exhibits normal extraocular motion. Left eye exhibits normal extraocular motion.   Neck: Trachea normal and normal range of motion. Neck supple. No tracheal tenderness present. No mass and no thyromegaly present.   Cardiovascular: Normal rate, regular rhythm, S1 normal, S2 normal and normal heart sounds.  Exam reveals no gallop, no S3, no S4 and no friction rub.    No murmur heard.  Pulmonary/Chest: Effort normal and breath sounds normal. No stridor. Not tachypneic. No respiratory distress. The patient has no wheezes. The patient has no rhonchi. The patient has no rales.   Skin: The patient is not diaphoretic.     Encounter Diagnosis   Name Primary?    Viral upper respiratory tract infection Yes       PLAN:    Jennifer was seen today for cough.    Diagnoses and all orders for this visit:    Viral upper respiratory tract infection  -     Influenza A & B by Molecular  -     fluticasone (FLONASE) 50 mcg/actuation nasal spray; 2 sprays (100 mcg total) by Each Nare route once daily.  -     levocetirizine (XYZAL) 5 MG tablet; Take 1 tablet (5 mg total) by mouth every evening.  -     benzonatate (TESSALON) 200 MG capsule; Take 1 capsule (200 mg total) by mouth 3 (three) times daily as needed for Cough.  -     Discontinue: azithromycin (Z-PARIS) 250 MG tablet; Take 2 tablets by mouth on day 1; Take 1 tablet by mouth on days 2-5      Medications Ordered This Encounter   Medications    benzonatate (TESSALON) 200 MG capsule     Sig: Take 1 capsule (200 mg total) by mouth 3 (three) times daily as needed for Cough.     Dispense:  40 capsule     Refill:  1    fluticasone (FLONASE) 50 mcg/actuation nasal spray     Si sprays (100 mcg total) by Each Nare route once daily.     Dispense:  16 g      Refill:  0    levocetirizine (XYZAL) 5 MG tablet     Sig: Take 1 tablet (5 mg total) by mouth every evening.     Dispense:  30 tablet     Refill:  0     Orders Placed This Encounter   Procedures    Influenza A & B by Molecular     RTC if symptoms are worsening or changing significantly or if not improved by the end of therapy.

## 2019-02-19 ENCOUNTER — ANTI-COAG VISIT (OUTPATIENT)
Dept: CARDIOLOGY | Facility: CLINIC | Age: 84
End: 2019-02-19
Payer: MEDICARE

## 2019-02-19 DIAGNOSIS — Z79.01 LONG TERM (CURRENT) USE OF ANTICOAGULANTS: Primary | ICD-10-CM

## 2019-02-19 LAB — INR PPP: 5.1 (ref 2–3)

## 2019-02-19 PROCEDURE — 99999 PR PBB SHADOW E&M-EST. PATIENT-LVL I: CPT | Mod: PBBFAC,,,

## 2019-02-19 PROCEDURE — 99999 PR PBB SHADOW E&M-EST. PATIENT-LVL I: ICD-10-PCS | Mod: PBBFAC,,,

## 2019-02-19 PROCEDURE — 99211 OFF/OP EST MAY X REQ PHY/QHP: CPT | Mod: PBBFAC

## 2019-02-19 PROCEDURE — 85610 PROTHROMBIN TIME: CPT | Mod: PBBFAC

## 2019-02-19 NOTE — PROGRESS NOTES
Patient's INR is supra-therapeutic at 5.1. Only medication changes: Tessalon perles and probiotics - hasn't started doxycycline.  No signs of bleeding noted; advised patient to seek immediate medical attention if she notices any abnormal bleeding.  Instructions given for patient to hold dose on today; then resume current dose of coumadin 2.5mg on Monday, Wednesday, Friday; and 3.75mg on all other days of the week.  Recheck on 2/22/19.

## 2019-02-21 DIAGNOSIS — I50.9 ACUTE ON CHRONIC CONGESTIVE HEART FAILURE: ICD-10-CM

## 2019-02-21 DIAGNOSIS — I48.91 ATRIAL FIBRILLATION, UNSPECIFIED TYPE: ICD-10-CM

## 2019-02-21 RX ORDER — CARVEDILOL 25 MG/1
TABLET ORAL
Qty: 180 TABLET | Refills: 3 | Status: SHIPPED | OUTPATIENT
Start: 2019-02-21 | End: 2020-05-26

## 2019-02-22 ENCOUNTER — ANTI-COAG VISIT (OUTPATIENT)
Dept: CARDIOLOGY | Facility: CLINIC | Age: 84
End: 2019-02-22
Payer: MEDICARE

## 2019-02-22 ENCOUNTER — OFFICE VISIT (OUTPATIENT)
Dept: INTERNAL MEDICINE | Facility: CLINIC | Age: 84
End: 2019-02-22
Payer: MEDICARE

## 2019-02-22 VITALS
DIASTOLIC BLOOD PRESSURE: 60 MMHG | HEART RATE: 70 BPM | TEMPERATURE: 98 F | WEIGHT: 115.31 LBS | HEIGHT: 62 IN | SYSTOLIC BLOOD PRESSURE: 116 MMHG | BODY MASS INDEX: 21.22 KG/M2

## 2019-02-22 DIAGNOSIS — Z79.01 LONG TERM (CURRENT) USE OF ANTICOAGULANTS: Primary | ICD-10-CM

## 2019-02-22 DIAGNOSIS — T14.8XXA OPEN WOUND: Primary | ICD-10-CM

## 2019-02-22 LAB — INR PPP: 4.7 (ref 2–3)

## 2019-02-22 PROCEDURE — 85610 PROTHROMBIN TIME: CPT | Mod: PBBFAC

## 2019-02-22 PROCEDURE — 99999 PR PBB SHADOW E&M-EST. PATIENT-LVL I: ICD-10-PCS | Mod: PBBFAC,,,

## 2019-02-22 PROCEDURE — 99215 OFFICE O/P EST HI 40 MIN: CPT | Mod: PBBFAC | Performed by: PHYSICIAN ASSISTANT

## 2019-02-22 PROCEDURE — 99214 PR OFFICE/OUTPT VISIT, EST, LEVL IV, 30-39 MIN: ICD-10-PCS | Mod: S$PBB,,, | Performed by: PHYSICIAN ASSISTANT

## 2019-02-22 PROCEDURE — 99214 OFFICE O/P EST MOD 30 MIN: CPT | Mod: S$PBB,,, | Performed by: PHYSICIAN ASSISTANT

## 2019-02-22 PROCEDURE — 99999 PR PBB SHADOW E&M-EST. PATIENT-LVL V: CPT | Mod: PBBFAC,,, | Performed by: PHYSICIAN ASSISTANT

## 2019-02-22 PROCEDURE — 99999 PR PBB SHADOW E&M-EST. PATIENT-LVL V: ICD-10-PCS | Mod: PBBFAC,,, | Performed by: PHYSICIAN ASSISTANT

## 2019-02-22 PROCEDURE — 99211 OFF/OP EST MAY X REQ PHY/QHP: CPT | Mod: PBBFAC,27

## 2019-02-22 PROCEDURE — 99999 PR PBB SHADOW E&M-EST. PATIENT-LVL I: CPT | Mod: PBBFAC,,,

## 2019-02-22 RX ORDER — PROMETHAZINE HYDROCHLORIDE AND DEXTROMETHORPHAN HYDROBROMIDE 6.25; 15 MG/5ML; MG/5ML
5 SYRUP ORAL 3 TIMES DAILY PRN
Qty: 240 ML | Refills: 0 | Status: SHIPPED | OUTPATIENT
Start: 2019-02-22 | End: 2019-03-04

## 2019-02-22 NOTE — PROGRESS NOTES
Subjective:       Patient ID: Jennifer Mathews is a 86 y.o. female.    Chief Complaint: Elbow Injury (left Tuesday 2/19/19  PCP - Eugenia) and URI    Wound Check   She was originally treated 3 to 5 days ago. Previous treatment included wound cleansing or irrigation. There has been no drainage from the wound. There is no redness present. The pain has improved.     Past Medical History:   Diagnosis Date    Acute coronary syndrome     Anemia     Anticoagulated on Coumadin 7/13/2015    Arthritis     Asthma     patient denies    Atrial fibrillation     Basal cell carcinoma 10/2015    left neck    Cardiac arrest     Cardiac resynchronization therapy defibrillator (CRT-D) in place 07/13/2015    Pt denies, states it does not shock me    Chronic combined systolic and diastolic congestive heart failure     CKD (chronic kidney disease) stage 3, GFR 30-59 ml/min     Dyslipidemia 1/30/2014    Hyperlipidemia     Hypertension     Hypothyroidism     Ischemic cardiomyopathy 01/30/2014    Macular hole of left eye     Old    Macular hole of left eye     LEV (obstructive sleep apnea) 9/30/2013    Pneumonia     required hospitalization    Recurrent UTI     Refractive error     Spastic colon     Stroke        Review of Systems   Constitutional: Negative for chills, fatigue and fever.   HENT: Positive for congestion.    Respiratory: Positive for cough and wheezing. Negative for chest tightness and shortness of breath.    Cardiovascular: Negative for chest pain.   Gastrointestinal: Negative for abdominal pain.       Objective:      Physical Exam   Constitutional: She appears well-developed and well-nourished. No distress.   Neck: Neck supple.   Cardiovascular: Normal rate and regular rhythm. Exam reveals no gallop and no friction rub.   No murmur heard.  Pulmonary/Chest: Effort normal. No stridor. No respiratory distress. She has wheezes. She has no rales. She exhibits no tenderness.   Abdominal: Soft. There is no  tenderness.   Lymphadenopathy:     She has no cervical adenopathy.   Skin: Abrasion and bruising noted. She is not diaphoretic.        Nursing note and vitals reviewed.      Assessment:       1. Open wound        Plan:       Open wound  Patient has doxycycline from a recent infection. She was instructed to continue that medication.     Other orders  -     promethazine-dextromethorphan (PROMETHAZINE-DM) 6.25-15 mg/5 mL Syrp; Take 5 mLs by mouth 3 (three) times daily as needed.  Dispense: 240 mL; Refill: 0

## 2019-02-22 NOTE — PATIENT INSTRUCTIONS
Self-Care for Cuts, Scrapes, and Burns  Cuts, scrapes, and burns are hard to avoid. Most minor injuries can be treated at home. A small wound may threaten your health if it causes severe blood loss or becomes infected. Call your doctor if a wound doesnt heal within a couple of weeks.  When should I call my healthcare provider?  Call your healthcare provider right away if:  · You cant stop the bleeding.  · The wound covers a large area, is deep, or you can see muscles, tendons or bones.  · Your ear or eye is injured or burned.  · The burn is larger than the size of your palm, or is on your neck, face, foot, groin, or your hand.  · A puncture wound is deep or wide, or was caused by a dirty or librado object.  · You have signs of infection: fever, pus, pain, or redness.  · It has been 10 years or more since your last tetanus shot.   Caring for cuts, scrapes, and puncture wounds  If youre caring for someone else, remember to protect yourself from illnesses carried in blood and body fluids. Use latex gloves or whatever else is available (a towel, perhaps) as a barrier between you and the blood.  Step 1. Control bleeding  · Apply direct pressure to a cut or scrape to stop bleeding.  · Allow a minor puncture wound to stop bleeding on its own, unless the bleeding is heavy. This may help clean out the wound.  Step 2. Clean the wound  · Kill germs and remove the dirt by washing the wound with warm water and soap.  · Soak a minor puncture wound in warm, sudsy water for several minutes. Repeat this at least 2 times every day.  Step 3. Cover the injury  · Hold the edges of a cut together with a butterfly bandage.  · Apply antibiotic ointment.  · For a cut or scrape, apply an adhesive bandage or clean gauze. Tape it in place.  · Cover a minor puncture with gauze to absorb drainage and let in air to help with healing.  Treating minor burns  · Cool the burn immediately. Otherwise, the skin continues to hold heat and will keep  burning. Use cloths soaked in cool water, place the burned area under a gentle stream of cool water, or submerge the burn in a full sink or bucket.  · Treat a minor burn like you treat a minor cut or scrape. Clean and cover it with a loose dressing.  · Do not put butter, oil, or ointment on a burn. This only seals in heat. After you cool the area, you can apply a moisturizer with Aloe Vera (with or without a numbing agent) to soothe the burn.  · Dont break blisters or pull off skin from a broken blister. This skin helps protect the healing skin underneath.  Date Last Reviewed: 12/1/2016  © 0649-7920 [employer]. [address] [place] [address], [place] [address]. All rights reserved. This information is not intended as a substitute for professional medical care. Always follow your healthcare professional's instructions.

## 2019-02-22 NOTE — PROGRESS NOTES
Patient's INR is supra-therapeutic at 4.7.  Patient will start doxycycline 150mg twice daily (treatment of wound) and promethazine DM cough syrup on today.  Educated patient on potential increase in INR with antibiotic and alcohol component of cough syrup.  No signs of bleeding noted; she does have bruising near site of wound.  Advised patient to seek immediate medical attention if she notices any abnormal bleeding.  Instructions given for patient to hold dose of coumadin on today and tomorrow (2/23); then resume current dose of 2.5mg on Monday, Wednesday, Friday and 3.75mg on all other days of the week.  Patient voiced understanding.  Follow-up in 1 week.

## 2019-03-01 ENCOUNTER — ANTI-COAG VISIT (OUTPATIENT)
Dept: CARDIOLOGY | Facility: CLINIC | Age: 84
End: 2019-03-01
Payer: MEDICARE

## 2019-03-01 DIAGNOSIS — Z79.01 LONG TERM (CURRENT) USE OF ANTICOAGULANTS: Primary | ICD-10-CM

## 2019-03-01 LAB — INR PPP: 4.3 (ref 2–3)

## 2019-03-01 PROCEDURE — 99211 OFF/OP EST MAY X REQ PHY/QHP: CPT | Mod: PBBFAC

## 2019-03-01 PROCEDURE — 99999 PR PBB SHADOW E&M-EST. PATIENT-LVL I: CPT | Mod: PBBFAC,,,

## 2019-03-01 PROCEDURE — 85610 PROTHROMBIN TIME: CPT | Mod: PBBFAC

## 2019-03-01 PROCEDURE — 99999 PR PBB SHADOW E&M-EST. PATIENT-LVL I: ICD-10-PCS | Mod: PBBFAC,,,

## 2019-03-01 NOTE — PROGRESS NOTES
Patient's INR is supra-therapeutic at 4.3.  Still taking Doxycycline 150mg BID ~3 days remaining; no longer taking cough syrup.  No signs of bleeding noted; advised patient to seek immediate medical attention if she notices any abnormal bleeding.  Instructions given for patient to hold dose of coumadin on today and take 1.25mg on tomorrow (3/2); then resume current dose of 2.5mg on Mondays, Wednesdays, Fridays and 3.75mg on all other days of the week.  Patient voiced understanding.  Follow-up in 1 week.

## 2019-03-04 ENCOUNTER — PATIENT MESSAGE (OUTPATIENT)
Dept: CARDIOLOGY | Facility: CLINIC | Age: 84
End: 2019-03-04

## 2019-03-08 ENCOUNTER — ANTI-COAG VISIT (OUTPATIENT)
Dept: CARDIOLOGY | Facility: CLINIC | Age: 84
End: 2019-03-08
Payer: MEDICARE

## 2019-03-08 DIAGNOSIS — Z79.01 LONG TERM (CURRENT) USE OF ANTICOAGULANTS: Primary | ICD-10-CM

## 2019-03-08 LAB — INR PPP: 7.1 (ref 2–3)

## 2019-03-08 PROCEDURE — 99999 PR PBB SHADOW E&M-EST. PATIENT-LVL I: CPT | Mod: PBBFAC,,,

## 2019-03-08 PROCEDURE — 99211 OFF/OP EST MAY X REQ PHY/QHP: CPT | Mod: PBBFAC

## 2019-03-08 PROCEDURE — 99999 PR PBB SHADOW E&M-EST. PATIENT-LVL I: ICD-10-PCS | Mod: PBBFAC,,,

## 2019-03-08 PROCEDURE — 85610 PROTHROMBIN TIME: CPT | Mod: PBBFAC

## 2019-03-08 NOTE — PROGRESS NOTES
Patient's INR is supra-therapeutic at 7.1.  Doxycycline completed about a week ago; patient had a decreased in appetite along with vomiting shortly after the holidays (Fran).  Ms. Mathews states appetite is improving. No signs of bleeding noted; advised patient to seek immediate medical attention if she notices any abnormal bleeding.  Instructions given for patient to hold dose of coumadin on today and tomorrow; then resume current dose of 2.5mg on Mondays, Wednesdays, Fridays and 3.75mg on all other days of the week.  Patient voiced understanding.  Recheck on 3/12/19.

## 2019-03-10 ENCOUNTER — NURSE TRIAGE (OUTPATIENT)
Dept: ADMINISTRATIVE | Facility: CLINIC | Age: 84
End: 2019-03-10

## 2019-03-10 ENCOUNTER — HOSPITAL ENCOUNTER (INPATIENT)
Facility: HOSPITAL | Age: 84
LOS: 2 days | Discharge: HOME | DRG: 377 | End: 2019-03-14
Attending: EMERGENCY MEDICINE | Admitting: EMERGENCY MEDICINE
Payer: MEDICARE

## 2019-03-10 DIAGNOSIS — K92.2 UPPER GI BLEED: ICD-10-CM

## 2019-03-10 DIAGNOSIS — E43 SEVERE MALNUTRITION: ICD-10-CM

## 2019-03-10 DIAGNOSIS — Z79.01 LONG TERM CURRENT USE OF ANTICOAGULANT THERAPY: ICD-10-CM

## 2019-03-10 DIAGNOSIS — R79.1 SUBTHERAPEUTIC INTERNATIONAL NORMALIZED RATIO (INR): ICD-10-CM

## 2019-03-10 DIAGNOSIS — Z79.01 CHRONIC ANTICOAGULATION: ICD-10-CM

## 2019-03-10 DIAGNOSIS — I50.42 CHRONIC COMBINED SYSTOLIC AND DIASTOLIC CONGESTIVE HEART FAILURE: ICD-10-CM

## 2019-03-10 DIAGNOSIS — K92.1 MELENA: Primary | ICD-10-CM

## 2019-03-10 DIAGNOSIS — R94.31 PROLONGED Q-T INTERVAL ON ECG: ICD-10-CM

## 2019-03-10 LAB
ABO + RH BLD: NORMAL
ALBUMIN SERPL BCP-MCNC: 3.2 G/DL
ALP SERPL-CCNC: 68 U/L
ALT SERPL W/O P-5'-P-CCNC: 16 U/L
ANION GAP SERPL CALC-SCNC: 9 MMOL/L
APTT BLDCRRT: 43 SEC
AST SERPL-CCNC: 28 U/L
BASOPHILS # BLD AUTO: 0.03 K/UL
BASOPHILS NFR BLD: 0.4 %
BILIRUB SERPL-MCNC: 0.5 MG/DL
BLD GP AB SCN CELLS X3 SERPL QL: NORMAL
BUN SERPL-MCNC: 83 MG/DL
CALCIUM SERPL-MCNC: 8.4 MG/DL
CHLORIDE SERPL-SCNC: 102 MMOL/L
CO2 SERPL-SCNC: 27 MMOL/L
CREAT SERPL-MCNC: 1.9 MG/DL
DIFFERENTIAL METHOD: ABNORMAL
EOSINOPHIL # BLD AUTO: 0.1 K/UL
EOSINOPHIL NFR BLD: 1 %
ERYTHROCYTE [DISTWIDTH] IN BLOOD BY AUTOMATED COUNT: 16.4 %
EST. GFR  (AFRICAN AMERICAN): 27 ML/MIN/1.73 M^2
EST. GFR  (NON AFRICAN AMERICAN): 24 ML/MIN/1.73 M^2
GLUCOSE SERPL-MCNC: 103 MG/DL
HCT VFR BLD AUTO: 31.9 %
HGB BLD-MCNC: 10.3 G/DL
INR PPP: 3.2
LYMPHOCYTES # BLD AUTO: 2.3 K/UL
LYMPHOCYTES NFR BLD: 33.6 %
MAGNESIUM SERPL-MCNC: 2.1 MG/DL
MCH RBC QN AUTO: 31.9 PG
MCHC RBC AUTO-ENTMCNC: 32.3 G/DL
MCV RBC AUTO: 99 FL
MONOCYTES # BLD AUTO: 0.5 K/UL
MONOCYTES NFR BLD: 7.6 %
NEUTROPHILS # BLD AUTO: 3.9 K/UL
NEUTROPHILS NFR BLD: 57.4 %
PHOSPHATE SERPL-MCNC: 3.4 MG/DL
PLATELET # BLD AUTO: 172 K/UL
PMV BLD AUTO: 10.6 FL
POTASSIUM SERPL-SCNC: 4.7 MMOL/L
PROT SERPL-MCNC: 6 G/DL
PROTHROMBIN TIME: 33.5 SEC
RBC # BLD AUTO: 3.23 M/UL
SODIUM SERPL-SCNC: 138 MMOL/L
WBC # BLD AUTO: 6.73 K/UL

## 2019-03-10 PROCEDURE — 96374 THER/PROPH/DIAG INJ IV PUSH: CPT

## 2019-03-10 PROCEDURE — 86901 BLOOD TYPING SEROLOGIC RH(D): CPT

## 2019-03-10 PROCEDURE — 96361 HYDRATE IV INFUSION ADD-ON: CPT

## 2019-03-10 PROCEDURE — 85025 COMPLETE CBC W/AUTO DIFF WBC: CPT

## 2019-03-10 PROCEDURE — 83735 ASSAY OF MAGNESIUM: CPT

## 2019-03-10 PROCEDURE — C9113 INJ PANTOPRAZOLE SODIUM, VIA: HCPCS | Performed by: NURSE PRACTITIONER

## 2019-03-10 PROCEDURE — 25000003 PHARM REV CODE 250: Performed by: NURSE PRACTITIONER

## 2019-03-10 PROCEDURE — 25000003 PHARM REV CODE 250: Performed by: EMERGENCY MEDICINE

## 2019-03-10 PROCEDURE — 63600175 PHARM REV CODE 636 W HCPCS: Performed by: NURSE PRACTITIONER

## 2019-03-10 PROCEDURE — 84100 ASSAY OF PHOSPHORUS: CPT

## 2019-03-10 PROCEDURE — 85730 THROMBOPLASTIN TIME PARTIAL: CPT

## 2019-03-10 PROCEDURE — G0378 HOSPITAL OBSERVATION PER HR: HCPCS

## 2019-03-10 PROCEDURE — 99285 EMERGENCY DEPT VISIT HI MDM: CPT | Mod: 25

## 2019-03-10 PROCEDURE — 85610 PROTHROMBIN TIME: CPT

## 2019-03-10 PROCEDURE — 80053 COMPREHEN METABOLIC PANEL: CPT

## 2019-03-10 RX ORDER — CARVEDILOL 12.5 MG/1
25 TABLET ORAL 2 TIMES DAILY WITH MEALS
Status: DISCONTINUED | OUTPATIENT
Start: 2019-03-10 | End: 2019-03-14 | Stop reason: HOSPADM

## 2019-03-10 RX ORDER — DIGOXIN 125 MCG
125 TABLET ORAL NIGHTLY
Status: DISCONTINUED | OUTPATIENT
Start: 2019-03-10 | End: 2019-03-14 | Stop reason: HOSPADM

## 2019-03-10 RX ORDER — AMIODARONE HYDROCHLORIDE 200 MG/1
200 TABLET ORAL DAILY
Status: DISCONTINUED | OUTPATIENT
Start: 2019-03-11 | End: 2019-03-14 | Stop reason: HOSPADM

## 2019-03-10 RX ORDER — WARFARIN 2 MG/1
2 TABLET ORAL DAILY
Status: DISCONTINUED | OUTPATIENT
Start: 2019-03-12 | End: 2019-03-11

## 2019-03-10 RX ORDER — ALLOPURINOL 100 MG/1
100 TABLET ORAL DAILY
Status: DISCONTINUED | OUTPATIENT
Start: 2019-03-11 | End: 2019-03-14 | Stop reason: HOSPADM

## 2019-03-10 RX ORDER — FUROSEMIDE 40 MG/1
40 TABLET ORAL 2 TIMES DAILY
Status: DISCONTINUED | OUTPATIENT
Start: 2019-03-11 | End: 2019-03-14 | Stop reason: HOSPADM

## 2019-03-10 RX ORDER — IBUPROFEN 200 MG
24 TABLET ORAL
Status: DISCONTINUED | OUTPATIENT
Start: 2019-03-10 | End: 2019-03-14 | Stop reason: HOSPADM

## 2019-03-10 RX ORDER — LOSARTAN POTASSIUM 25 MG/1
25 TABLET ORAL DAILY
Status: DISCONTINUED | OUTPATIENT
Start: 2019-03-11 | End: 2019-03-14 | Stop reason: HOSPADM

## 2019-03-10 RX ORDER — PANTOPRAZOLE SODIUM 40 MG/10ML
40 INJECTION, POWDER, LYOPHILIZED, FOR SOLUTION INTRAVENOUS 2 TIMES DAILY
Status: DISCONTINUED | OUTPATIENT
Start: 2019-03-10 | End: 2019-03-14

## 2019-03-10 RX ORDER — PANTOPRAZOLE SODIUM 40 MG/10ML
40 INJECTION, POWDER, LYOPHILIZED, FOR SOLUTION INTRAVENOUS ONCE
Status: DISCONTINUED | OUTPATIENT
Start: 2019-03-10 | End: 2019-03-10 | Stop reason: SDUPTHER

## 2019-03-10 RX ORDER — IBUPROFEN 200 MG
16 TABLET ORAL
Status: DISCONTINUED | OUTPATIENT
Start: 2019-03-10 | End: 2019-03-14 | Stop reason: HOSPADM

## 2019-03-10 RX ORDER — GLUCAGON 1 MG
1 KIT INJECTION
Status: DISCONTINUED | OUTPATIENT
Start: 2019-03-10 | End: 2019-03-14 | Stop reason: HOSPADM

## 2019-03-10 RX ORDER — LEVOTHYROXINE SODIUM 50 UG/1
50 TABLET ORAL DAILY
Status: DISCONTINUED | OUTPATIENT
Start: 2019-03-11 | End: 2019-03-14 | Stop reason: HOSPADM

## 2019-03-10 RX ORDER — DIGOXIN 125 MCG
125 TABLET ORAL DAILY
Status: DISCONTINUED | OUTPATIENT
Start: 2019-03-11 | End: 2019-03-10

## 2019-03-10 RX ADMIN — CARVEDILOL 25 MG: 12.5 TABLET, FILM COATED ORAL at 06:03

## 2019-03-10 RX ADMIN — PANTOPRAZOLE SODIUM 40 MG: 40 INJECTION, POWDER, LYOPHILIZED, FOR SOLUTION INTRAVENOUS at 06:03

## 2019-03-10 RX ADMIN — SODIUM CHLORIDE 1000 ML: 0.9 INJECTION, SOLUTION INTRAVENOUS at 04:03

## 2019-03-10 RX ADMIN — DIGOXIN 125 MCG: 125 TABLET ORAL at 11:03

## 2019-03-10 NOTE — ED PROVIDER NOTES
"SCRIBE #1 NOTE: I, Debbie Be, am scribing for, and in the presence of, Yadira Barron MD. I have scribed the entire note.       History     Chief Complaint   Patient presents with    Melena     started this moring; on coumadin, last level was 7.1 on friday      Review of patient's allergies indicates:   Allergen Reactions    Cephalosporins Hives    Metaxalone Itching    Penicillins      Other reaction(s): Unknown      Pregabalin      Other reaction(s): "Bad feeling"      Sulfa (sulfonamide antibiotics) Itching         History of Present Illness     HPI    3/10/2019, 4:10 PM  History obtained from the patient      History of Present Illness: Jennifer Mathews is a 86 y.o. female patient with a PMHx of stroke, hypothyroidism, HTN, HLD, CKD, afib anemia, open heart surgery, and ischemic cardiomyopathy who presents to the Emergency Department for evaluation of blood in stool which onset suddenly this afternoon. Pt states the "stool is black, but the water is red."  Pt reports 2 episodes today. Symptoms are episodic and moderate in severity. No mitigating or exacerbating factors reported. No associated sxs reported. Patient denies any LOC, injury/trauma, abd pain, dizziness, HA, fever, chills, weakness, n/v/d, dysuria, frequency, and all other sxs at this time. Pt notes she had her coumadin checked on Friday and it was 7.1. Pt also notes her last dose of coumadin was on Thursday. No further complaints or concerns at this time.       Arrival mode: Personal vehicle     PCP: Desirae Bello MD        Past Medical History:  Past Medical History:   Diagnosis Date    Acute coronary syndrome     Anemia     Anticoagulated on Coumadin 7/13/2015    Arthritis     Asthma     patient denies    Atrial fibrillation     Basal cell carcinoma 10/2015    left neck    Cardiac arrest     Cardiac resynchronization therapy defibrillator (CRT-D) in place 07/13/2015    Pt denies, states it does not shock me    Chronic " combined systolic and diastolic congestive heart failure     CKD (chronic kidney disease) stage 3, GFR 30-59 ml/min     Dyslipidemia 1/30/2014    Hyperlipidemia     Hypertension     Hypothyroidism     Ischemic cardiomyopathy 01/30/2014    Macular hole of left eye     Old    Macular hole of left eye     LEV (obstructive sleep apnea) 9/30/2013    Pneumonia     required hospitalization    Recurrent UTI     Refractive error     Spastic colon     Stroke        Past Surgical History:  Past Surgical History:   Procedure Laterality Date    APPENDECTOMY      CARDIAC CATHETERIZATION      CARDIAC PACEMAKER PLACEMENT  2014    CARDIAC VALVE SURGERY      CATARACT EXTRACTION      OU    CHOLECYSTECTOMY      COLONOSCOPY      IRRIGATION AND DEBRIDEMENT OF LEFT KNEE Left 9/12/2017    [facility]    MITRAL VALVE REPLACEMENT  2014    MITRAL VALVE SURGERY      x3    REPLACEMENT, PACEMAKER GENERATOR/pt has crt-p versus d Left 6/20/2018    [facility]ne [facility] at [facility] CATH LAB    REVISION, SKIN POCKET, FOR CARDIAC PACEMAKER Left 2/26/2014    Performed [facility] at [facility] CATH LAB         Family History:  Family History   Problem Relation Age of Onset    Coronary artery disease Mother         mi    Epilepsy Mother     Heart attack Mother     Coronary artery disease Father     Heart disease Father     Emphysema Father     COPD Father     Diabetes Brother     Cancer Brother     Melanoma Neg Hx     Psoriasis Neg Hx     Lupus Neg Hx     Eczema Neg Hx        Social History:  Social History     Tobacco Use    Smoking status: Never Smoker    Smokeless tobacco: Never Used   Substance and Sexual Activity    Alcohol use: No    Drug use: No    Sexual activity: No        Review of Systems     Review of Systems   Constitutional: Negative for chills and fever.        (-) injury/trauma   HENT: Negative for sore throat.    Respiratory: Negative for  shortness of breath.    Cardiovascular: Negative for chest pain.   Gastrointestinal: Positive for blood in stool (black stool). Negative for abdominal pain, diarrhea, nausea and vomiting.   Genitourinary: Negative for dysuria and frequency.   Musculoskeletal: Negative for back pain.   Skin: Negative for rash.   Neurological: Negative for dizziness, weakness and headaches.        (-) LOC   Hematological: Does not bruise/bleed easily.   All other systems reviewed and are negative.     Physical Exam     Initial Vitals [03/10/19 1517]   BP Pulse Resp Temp SpO2   (!) 113/52 69 18 98 °F (36.7 °C) (!) 93 %      MAP       --          Physical Exam  Nursing Notes and Vital Signs Reviewed.  Constitutional: Patient is in no acute distress. Well-developed and well-nourished.  Head: Atraumatic. Normocephalic.  Eyes: PERRL. EOM intact. Conjunctivae are not pale. No scleral icterus.  ENT: Mucous membranes are moist. Oropharynx is clear and symmetric.    Neck: Supple. Full ROM. No lymphadenopathy.  Cardiovascular: Regular rate. Regular rhythm. No murmurs, rubs, or gallops. Distal pulses are 2+ and symmetric.  Pulmonary/Chest: No respiratory distress. Clear to auscultation bilaterally. No wheezing or rales.  Abdominal:  Soft and non-distended.  There is no tenderness.  No rebound, guarding, or rigidity.  No organomegaly. No pulsatile mass. Normal bowel sounds.  Genitourinary: No CVA tenderness  Musculoskeletal: Moves all extremities. No obvious deformities. No edema. No calf tenderness.  Skin: Warm and dry.  Neurological:  Alert, awake, and appropriate.  Normal speech.  No acute focal neurological deficits are appreciated.  Psychiatric: Normal affect. Good eye contact. Appropriate in content.     ED Course   Procedures  ED Vital Signs:  Vitals:    03/10/19 1517 03/10/19 1637   BP: (!) 113/52 131/61   Pulse: 69 69   Resp: 18 18   Temp: 98 °F (36.7 °C) 98.3 °F (36.8 °C)   TempSrc: Oral Oral   SpO2: (!) 93% 96%   Weight: 48.1 kg (106  "lb 0.7 oz)    Height: 5' 2" (1.575 m)        Abnormal Lab Results:  Labs Reviewed   CBC W/ AUTO DIFFERENTIAL - Abnormal; Notable for the following components:       Result Value    RBC 3.23 (*)     Hemoglobin 10.3 (*)     Hematocrit 31.9 (*)     MCV 99 (*)     MCH 31.9 (*)     RDW 16.4 (*)     All other components within normal limits   COMPREHENSIVE METABOLIC PANEL - Abnormal; Notable for the following components:    BUN, Bld 83 (*)     Creatinine 1.9 (*)     Calcium 8.4 (*)     Albumin 3.2 (*)     eGFR if  27 (*)     eGFR if non  24 (*)     All other components within normal limits   PROTIME-INR - Abnormal; Notable for the following components:    Prothrombin Time 33.5 (*)     INR 3.2 (*)     All other components within normal limits   APTT - Abnormal; Notable for the following components:    aPTT 43.0 (*)     All other components within normal limits   MAGNESIUM   PHOSPHORUS   MAGNESIUM   PHOSPHORUS   OCCULT BLOOD X 1, STOOL   HEMOGLOBIN   HEMATOCRIT   TYPE & SCREEN        All Lab Results:  Results for orders placed or performed during the hospital encounter of 03/10/19   CBC auto differential   Result Value Ref Range    WBC 6.73 3.90 - 12.70 K/uL    RBC 3.23 (L) 4.00 - 5.40 M/uL    Hemoglobin 10.3 (L) 12.0 - 16.0 g/dL    Hematocrit 31.9 (L) 37.0 - 48.5 %    MCV 99 (H) 82 - 98 fL    MCH 31.9 (H) 27.0 - 31.0 pg    MCHC 32.3 32.0 - 36.0 g/dL    RDW 16.4 (H) 11.5 - 14.5 %    Platelets 172 150 - 350 K/uL    MPV 10.6 9.2 - 12.9 fL    Gran # (ANC) 3.9 1.8 - 7.7 K/uL    Lymph # 2.3 1.0 - 4.8 K/uL    Mono # 0.5 0.3 - 1.0 K/uL    Eos # 0.1 0.0 - 0.5 K/uL    Baso # 0.03 0.00 - 0.20 K/uL    Gran% 57.4 38.0 - 73.0 %    Lymph% 33.6 18.0 - 48.0 %    Mono% 7.6 4.0 - 15.0 %    Eosinophil% 1.0 0.0 - 8.0 %    Basophil% 0.4 0.0 - 1.9 %    Differential Method Automated    Comprehensive metabolic panel   Result Value Ref Range    Sodium 138 136 - 145 mmol/L    Potassium 4.7 3.5 - 5.1 mmol/L    Chloride " 102 95 - 110 mmol/L    CO2 27 23 - 29 mmol/L    Glucose 103 70 - 110 mg/dL    BUN, Bld 83 (H) 8 - 23 mg/dL    Creatinine 1.9 (H) 0.5 - 1.4 mg/dL    Calcium 8.4 (L) 8.7 - 10.5 mg/dL    Total Protein 6.0 6.0 - 8.4 g/dL    Albumin 3.2 (L) 3.5 - 5.2 g/dL    Total Bilirubin 0.5 0.1 - 1.0 mg/dL    Alkaline Phosphatase 68 55 - 135 U/L    AST 28 10 - 40 U/L    ALT 16 10 - 44 U/L    Anion Gap 9 8 - 16 mmol/L    eGFR if African American 27 (A) >60 mL/min/1.73 m^2    eGFR if non African American 24 (A) >60 mL/min/1.73 m^2   Protime-INR   Result Value Ref Range    Prothrombin Time 33.5 (H) 9.0 - 12.5 sec    INR 3.2 (H) 0.8 - 1.2   APTT   Result Value Ref Range    aPTT 43.0 (H) 21.0 - 32.0 sec   Magnesium   Result Value Ref Range    Magnesium 2.1 1.6 - 2.6 mg/dL   Phosphorus   Result Value Ref Range    Phosphorus 3.4 2.7 - 4.5 mg/dL     *Note: Due to a large number of results and/or encounters for the requested time period, some results have not been displayed. A complete set of results can be found in Results Review.         Imaging Results:  Imaging Results    None               The Emergency Provider reviewed the vital signs and test results, which are outlined above.     ED Discussion     5:22 PM: Re-evaluated pt. I have discussed test results, shared treatment plan, and the need for admission with patient and family at bedside. Pt and family express understanding at this time and agree with all information. All questions answered. Pt and family have no further questions or concerns at this time. Pt is ready for admit.    5:23 PM: Discussed case with Bartolome Morrison NP (Hospital Medicine). Dr. Sin agrees with current care and management of pt and accepts admission.   Admitting Service: Hospital Medicine  Admitting Physician: Dr. Sin  Admit to: Obs Med Tele     ED Medication(s):  Medications   pantoprazole injection 40 mg (not administered)   amiodarone tablet 200 mg (not administered)   allopurinol tablet 100 mg (not  administered)   carvedilol tablet 25 mg (not administered)   digoxin tablet 125 mcg (not administered)   levothyroxine tablet 50 mcg (not administered)   losartan tablet 25 mg (not administered)   sodium chloride 0.9% bolus 1,000 mL (1,000 mLs Intravenous New Bag 3/10/19 5429)       New Prescriptions    No medications on file             Medical Decision Making:   Clinical Tests:   Lab Tests: Ordered and Reviewed             Scribe Attestation:   Scribe #1: I performed the above scribed service and the documentation accurately describes the services I performed. I attest to the accuracy of the note.     Attending:   Physician Attestation Statement for Scribe #1: I, Yadira Barron MD, personally performed the services described in this documentation, as scribed by Debbie Be, in my presence, and it is both accurate and complete.           Clinical Impression       ICD-10-CM ICD-9-CM   1. Melena K92.1 578.1   2. Chronic anticoagulation Z79.01 V58.61   3. Subtherapeutic international normalized ratio (INR) R79.1 790.92       Disposition:   Disposition: Placed in Observation  Condition: Fair       Yadira Barron MD  03/10/19 9482

## 2019-03-10 NOTE — ED NOTES
Pt lying in bed in NAD,RR equal and unlabored. Bed is low, locked, and call light in reach. Side rails up x 2. Family is at bedside. Patient denies needs at this time.

## 2019-03-10 NOTE — ED NOTES
Bed: 19  Expected date:   Expected time:   Means of arrival: Personal Transportation  Comments:     Debbie Salguero, RN  03/10/19 0828

## 2019-03-10 NOTE — TELEPHONE ENCOUNTER
Reason for Disposition   [1] MODERATE rectal bleeding (small blood clots, passing blood without stool, or toilet water turns red) AND [2] more than once a day    Protocols used: RECTAL BLEEDING-A-    Pt states her coumadin level was 7.1 Friday and pt now c/o blood in stool since this morning. Pt advised per protocol and pt verbalizes understanding.

## 2019-03-10 NOTE — ASSESSMENT & PLAN NOTE
Was 7.1 on 03/08/2019.  Is improving today's result is 3.2.  Pharmacy to dose and monitor.  Treat GI bleeding.  Further evaluation/diagnostics/interventions/consults pending course

## 2019-03-10 NOTE — ASSESSMENT & PLAN NOTE
Suspected.  Ppi b.i.d..  Suspect related to supratherapeutic warfarin which is improving.  Hold antiplatelet anticoagulation at this time pharmacy to dose and monitor Coumadin.  Consider consult to GI pending course.  Further evaluation/diagnostics/interventions/consults pending course

## 2019-03-10 NOTE — SUBJECTIVE & OBJECTIVE
"Past Medical History:   Diagnosis Date    Acute coronary syndrome     Anemia     Anticoagulated on Coumadin 7/13/2015    Arthritis     Asthma     patient denies    Atrial fibrillation     Basal cell carcinoma 10/2015    left neck    Cardiac arrest     Cardiac resynchronization therapy defibrillator (CRT-D) in place 07/13/2015    Pt denies, states it does not shock me    Chronic combined systolic and diastolic congestive heart failure     CKD (chronic kidney disease) stage 3, GFR 30-59 ml/min     Dyslipidemia 1/30/2014    Hyperlipidemia     Hypertension     Hypothyroidism     Ischemic cardiomyopathy 01/30/2014    Macular hole of left eye     Old    Macular hole of left eye     LEV (obstructive sleep apnea) 9/30/2013    Pneumonia     required hospitalization    Recurrent UTI     Refractive error     Spastic colon     Stroke        Past Surgical History:   Procedure Laterality Date    APPENDECTOMY      CARDIAC CATHETERIZATION      CARDIAC PACEMAKER PLACEMENT  2014    CARDIAC VALVE SURGERY      CATARACT EXTRACTION      OU    CHOLECYSTECTOMY      COLONOSCOPY      IRRIGATION AND DEBRIDEMENT OF LEFT KNEE Left 9/12/2017    Performed by [facility] at [facility] OR    MITRAL VALVE REPLACEMENT  2014    MITRAL VALVE SURGERY      x3    REPLACEMENT, PACEMAKER GENERATOR/pt has crt-p versus d Left 6/20/2018    Performed by Manny Dunne MD at [facility] CATH LAB    REVISION, SKIN POCKET, FOR CARDIAC PACEMAKER Left 2/26/2014    Performed by Manny Dunne MD at [facility] CATH LAB       Review of patient's allergies indicates:   Allergen Reactions    Cephalosporins Hives    Metaxalone Itching    Penicillins      Other reaction(s): Unknown      Pregabalin      Other reaction(s): "Bad feeling"      Sulfa (sulfonamide antibiotics) Itching       Current Facility-Administered Medications on File Prior to Encounter   Medication    denosumab (PROLIA) injection 60 mg     Current Outpatient " Medications on File Prior to Encounter   Medication Sig    allopurinol (ZYLOPRIM) 100 MG tablet TAKE 2 TABLETS (200 MG TOTAL) BY MOUTH ONCE DAILY.    amiodarone (PACERONE) 200 MG Tab TAKE 1 TABLET EVERY DAY    aspirin (ECOTRIN) 81 MG EC tablet Take 81 mg by mouth once daily.    calcium carbonate (OS-SHERRY) 600 mg calcium (1,500 mg) Tab Take 600 mg by mouth once.    carvedilol (COREG) 25 MG tablet TAKE 1 TABLET BY MOUTH TWICE A DAY WITH MEALS    cranberry 1,000 mg Cap Take 1 capsule by mouth Daily.    digoxin (LANOXIN) 125 mcg tablet Take 1 tablet (125 mcg total) by mouth once daily. TAKE 1 TABLET (0.125 MG TOTAL) BY MOUTH ONCE DAILY. (Patient taking differently: Take 125 mcg by mouth once daily. )    doxycycline (ADOXA) 150 MG tablet Take 1 tablet (150 mg total) by mouth 2 (two) times daily.    furosemide (LASIX) 40 MG tablet Take 1 tablet (40 mg total) by mouth 2 (two) times daily.    gabapentin (NEURONTIN) 100 MG capsule TAKE ONE CAPSULE BY MOUTH TWO TIMES DAILY    losartan (COZAAR) 25 MG tablet Take 1 tablet (25 mg total) by mouth once daily.    potassium chloride SA (K-DUR,KLOR-CON) 20 MEQ tablet TAKE 1 TABLET (20 MEQ TOTAL) BY MOUTH ONCE DAILY.    warfarin (COUMADIN) 2.5 MG tablet TAKE 1/2 TABLET ON MONDAY AND WEDNESDAY AND 1 TABLET ALL OTHER DAYS. DISCONTINUE 5MG TABLET. (Patient taking differently: TAKE 1 TABLET ON MONDAYS, WEDNESDAYS, FRIDAYS  AND 1.5 TABLETS ON  ALL OTHER DAYS OR AS DIRECTED BY COUMADIN CLINIC.)    acetaminophen (TYLENOL EXTRA STRENGTH) 325 MG tablet Take 2 tablets (650 mg total) by mouth every 8 (eight) hours. (Patient taking differently: Take 650 mg by mouth 3 (three) times daily as needed. )    betamethasone valerate 0.1% (VALISONE) 0.1 % Crea Apply topically 2 (two) times daily.    colchicine (COLCRYS) 0.6 mg tablet Take 1 tablet by mouth 3 times a week    fluticasone (FLONASE) 50 mcg/actuation nasal spray 2 sprays (100 mcg total) by Each Nare route once daily.     Lactobac 40-Bifido 3-S.thermop (PROBIOTIC) 100 billion cell Cap Take 1 capsule by mouth once daily.    levocetirizine (XYZAL) 5 MG tablet Take 1 tablet (5 mg total) by mouth every evening.    levothyroxine (SYNTHROID) 50 MCG tablet Take 1 tablet (50 mcg total) by mouth once daily.    methylPREDNISolone (MEDROL DOSEPACK) 4 mg tablet use as directed    metOLazone (ZAROXOLYN) 2.5 MG tablet Take 1 tablet (2.5 mg total) by mouth once daily.    MULTIVITAMIN ORAL Take 1 tablet by mouth Daily.     Family History     Problem Relation (Age of Onset)    COPD Father    Cancer Brother    Coronary artery disease Mother, Father    Diabetes Brother    Emphysema Father    Epilepsy Mother    Heart attack Mother    Heart disease Father        Tobacco Use    Smoking status: Never Smoker    Smokeless tobacco: Never Used   Substance and Sexual Activity    Alcohol use: No    Drug use: No    Sexual activity: No     Review of Systems   Constitutional: Negative for chills, diaphoresis, fatigue and fever.   HENT: Negative for congestion, sore throat and voice change.    Eyes: Negative for photophobia and visual disturbance.   Respiratory: Negative for cough, shortness of breath, wheezing and stridor.    Cardiovascular: Negative for chest pain and leg swelling.   Gastrointestinal: Positive for blood in stool. Negative for abdominal distention, abdominal pain, constipation, diarrhea, nausea and vomiting.   Endocrine: Negative for polydipsia, polyphagia and polyuria.   Genitourinary: Negative for difficulty urinating, dysuria, flank pain, pelvic pain, urgency and vaginal discharge.   Musculoskeletal: Negative for back pain, joint swelling, neck pain and neck stiffness.   Skin: Negative for color change and rash.   Allergic/Immunologic: Negative for immunocompromised state.   Neurological: Negative for dizziness, syncope, weakness, numbness and headaches.   Hematological: Does not bruise/bleed easily.   Psychiatric/Behavioral: Negative  for agitation, behavioral problems and confusion.     Objective:     Vital Signs (Most Recent):  Temp: 98.3 °F (36.8 °C) (03/10/19 1637)  Pulse: 69 (03/10/19 1637)  Resp: 18 (03/10/19 1637)  BP: 131/61 (03/10/19 1637)  SpO2: 96 % (03/10/19 1637) Vital Signs (24h Range):  Temp:  [98 °F (36.7 °C)-98.3 °F (36.8 °C)] 98.3 °F (36.8 °C)  Pulse:  [69] 69  Resp:  [18] 18  SpO2:  [93 %-96 %] 96 %  BP: (113-131)/(52-61) 131/61     Weight: 48.1 kg (106 lb 0.7 oz)  Body mass index is 19.4 kg/m².    Physical Exam   Constitutional: She is oriented to person, place, and time. She appears well-developed and well-nourished. No distress.   HENT:   Head: Normocephalic and atraumatic.   Nose: Nose normal.   Eyes: Conjunctivae and EOM are normal. Pupils are equal, round, and reactive to light. No scleral icterus.   Neck: Normal range of motion. Neck supple. No tracheal deviation present.   Cardiovascular: Normal rate, regular rhythm, normal heart sounds and intact distal pulses.   No murmur heard.  Pulmonary/Chest: Effort normal and breath sounds normal. No stridor. No respiratory distress. She has no wheezes. She has no rales.   Abdominal: Soft. Bowel sounds are normal. She exhibits no distension. There is no tenderness. There is no guarding.   Genitourinary:   Genitourinary Comments: Check occult stool     Musculoskeletal: Normal range of motion. She exhibits no edema or deformity.   Neurological: She is alert and oriented to person, place, and time. No cranial nerve deficit.   Skin: Skin is warm and dry. Capillary refill takes less than 2 seconds. No rash noted. She is not diaphoretic.   Psychiatric: She has a normal mood and affect. Her behavior is normal. Judgment and thought content normal.   Nursing note and vitals reviewed.        CRANIAL NERVES     CN III, IV, VI   Pupils are equal, round, and reactive to light.  Extraocular motions are normal.        Significant Labs: All pertinent labs within the past 24 hours have been  reviewed.  Results for orders placed or performed during the hospital encounter of 03/10/19   CBC auto differential   Result Value Ref Range    WBC 6.73 3.90 - 12.70 K/uL    RBC 3.23 (L) 4.00 - 5.40 M/uL    Hemoglobin 10.3 (L) 12.0 - 16.0 g/dL    Hematocrit 31.9 (L) 37.0 - 48.5 %    MCV 99 (H) 82 - 98 fL    MCH 31.9 (H) 27.0 - 31.0 pg    MCHC 32.3 32.0 - 36.0 g/dL    RDW 16.4 (H) 11.5 - 14.5 %    Platelets 172 150 - 350 K/uL    MPV 10.6 9.2 - 12.9 fL    Gran # (ANC) 3.9 1.8 - 7.7 K/uL    Lymph # 2.3 1.0 - 4.8 K/uL    Mono # 0.5 0.3 - 1.0 K/uL    Eos # 0.1 0.0 - 0.5 K/uL    Baso # 0.03 0.00 - 0.20 K/uL    Gran% 57.4 38.0 - 73.0 %    Lymph% 33.6 18.0 - 48.0 %    Mono% 7.6 4.0 - 15.0 %    Eosinophil% 1.0 0.0 - 8.0 %    Basophil% 0.4 0.0 - 1.9 %    Differential Method Automated    Comprehensive metabolic panel   Result Value Ref Range    Sodium 138 136 - 145 mmol/L    Potassium 4.7 3.5 - 5.1 mmol/L    Chloride 102 95 - 110 mmol/L    CO2 27 23 - 29 mmol/L    Glucose 103 70 - 110 mg/dL    BUN, Bld 83 (H) 8 - 23 mg/dL    Creatinine 1.9 (H) 0.5 - 1.4 mg/dL    Calcium 8.4 (L) 8.7 - 10.5 mg/dL    Total Protein 6.0 6.0 - 8.4 g/dL    Albumin 3.2 (L) 3.5 - 5.2 g/dL    Total Bilirubin 0.5 0.1 - 1.0 mg/dL    Alkaline Phosphatase 68 55 - 135 U/L    AST 28 10 - 40 U/L    ALT 16 10 - 44 U/L    Anion Gap 9 8 - 16 mmol/L    eGFR if African American 27 (A) >60 mL/min/1.73 m^2    eGFR if non African American 24 (A) >60 mL/min/1.73 m^2   Protime-INR   Result Value Ref Range    Prothrombin Time 33.5 (H) 9.0 - 12.5 sec    INR 3.2 (H) 0.8 - 1.2   APTT   Result Value Ref Range    aPTT 43.0 (H) 21.0 - 32.0 sec     *Note: Due to a large number of results and/or encounters for the requested time period, some results have not been displayed. A complete set of results can be found in Results Review.       Significant Imaging: I have reviewed all pertinent imaging results/findings within the past 24 hours.   Imaging Results    None

## 2019-03-10 NOTE — ASSESSMENT & PLAN NOTE
Continue amiodarone, digoxin, Coreg.  Hold antiplatelet and anticoagulation given patient's melenic stools   Further evaluation/diagnostics/interventions/consults pending course   .

## 2019-03-10 NOTE — HPI
Jennifer Mathews is a 86 y.o. female patient with a PMHx of stroke, hypothyroidism, HTN, HLD, CKD, afib anemia, open heart surgery, and ischemic cardiomyopathy who presents for evaluation of blood in stool. Onset this afternoon. described as dark stool.  Pt reports 2 episodes today. No mitigating or exacerbating factors reported. To note patient on warfarin. Pt notes she had her coumadin checked on Friday and it was 7.1. And has been being held. Patient evaluated in Er. H/H stable 10.3/31.9. INR today 3.2  BUN above baseline at 83 today Cr. Stable with basline, today 1.9. HM consulted. Patient placed in obs.

## 2019-03-10 NOTE — ED NOTES
Patient ambulatory to room. Patient reconnected to monitor and positioned to comfort. Urine sample collected and labeled at bedside

## 2019-03-10 NOTE — H&P
[facility] Hospital Medicine  History & Physical    Patient Name: Jennifer Mathews  MRN: [id number]  Admission Date: 3/10/2019  Attending Physician: Clarice Sin MD  Primary Care Provider: Desirae Bello MD         Patient information was obtained from patient, past medical records and ER records.     Subjective:     Principal Problem:Upper GI bleed    Chief Complaint:   Chief Complaint   Patient presents with    Melena     started this moring; on coumadin, last level was 7.1 on friday         HPI: Jennifer Mathews is a 86 y.o. female patient with a PMHx of stroke, hypothyroidism, HTN, HLD, CKD, afib anemia, open heart surgery, and ischemic cardiomyopathy who presents for evaluation of blood in stool. Onset this afternoon.  described as dark stool.  Pt reports 2 episodes today. No mitigating or exacerbating factors reported. To note patient on warfarin. Pt notes she had her coumadin checked on Friday and it was 7.1. And has been being held. Patient evaluated in Er. H/H stable 10.3/31.9. INR today 3.2  BUN above baseline at 83 today Cr. Stable with basline, today 1.9. HM consulted. Patient placed in obs.       Past Medical History:   Diagnosis Date    Acute coronary syndrome     Anemia     Anticoagulated on Coumadin 7/13/2015    Arthritis     Asthma     patient denies    Atrial fibrillation     Basal cell carcinoma 10/2015    left neck    Cardiac arrest     Cardiac resynchronization therapy defibrillator (CRT-D) in place 07/13/2015    Pt denies, states it does not shock me    Chronic combined systolic and diastolic congestive heart failure     CKD (chronic kidney disease) stage 3, GFR 30-59 ml/min     Dyslipidemia 1/30/2014    Hyperlipidemia     Hypertension     Hypothyroidism     Ischemic cardiomyopathy 01/30/2014    Macular hole of left eye     Old    Macular hole of left eye     LEV (obstructive sleep apnea) 9/30/2013    Pneumonia     required hospitalization    Recurrent  "UTI     Refractive error     Spastic colon     Stroke        Past Surgical History:   Procedure Laterality Date    APPENDECTOMY      CARDIAC CATHETERIZATION      CARDIAC PACEMAKER PLACEMENT  2014    CARDIAC VALVE SURGERY      CATARACT EXTRACTION      OU    CHOLECYSTECTOMY      COLONOSCOPY      IRRIGATION AND DEBRIDEMENT OF LEFT KNEE Left 9/12/2017    Performed by [facility] at [facility] OR    MITRAL VALVE REPLACEMENT  2014    MITRAL VALVE SURGERY      x3    REPLACEMENT, PACEMAKER GENERATOR/pt has crt-p versus d Left 6/20/2018    Performed by Manny Dunne MD at [facility] CATH LAB    REVISION, SKIN POCKET, FOR CARDIAC PACEMAKER Left 2/26/2014    Performed by Manny Dunne MD at [facility] CATH LAB       Review of patient's allergies indicates:   Allergen Reactions    Cephalosporins Hives    Metaxalone Itching    Penicillins      Other reaction(s): Unknown      Pregabalin      Other reaction(s): "Bad feeling"      Sulfa (sulfonamide antibiotics) Itching       Current Facility-Administered Medications on File Prior to Encounter   Medication    denosumab (PROLIA) injection 60 mg     Current Outpatient Medications on File Prior to Encounter   Medication Sig    allopurinol (ZYLOPRIM) 100 MG tablet TAKE 2 TABLETS (200 MG TOTAL) BY MOUTH ONCE DAILY.    amiodarone (PACERONE) 200 MG Tab TAKE 1 TABLET EVERY DAY    aspirin (ECOTRIN) 81 MG EC tablet Take 81 mg by mouth once daily.    calcium carbonate (OS-SHERRY) 600 mg calcium (1,500 mg) Tab Take 600 mg by mouth once.    carvedilol (COREG) 25 MG tablet TAKE 1 TABLET BY MOUTH TWICE A DAY WITH MEALS    cranberry 1,000 mg Cap Take 1 capsule by mouth Daily.    digoxin (LANOXIN) 125 mcg tablet Take 1 tablet (125 mcg total) by mouth once daily. TAKE 1 TABLET (0.125 MG TOTAL) BY MOUTH ONCE DAILY. (Patient taking differently: Take 125 mcg by mouth once daily. )    doxycycline (ADOXA) 150 MG tablet Take 1 tablet (150 mg total) by mouth 2 (two) times " daily.    furosemide (LASIX) 40 MG tablet Take 1 tablet (40 mg total) by mouth 2 (two) times daily.    gabapentin (NEURONTIN) 100 MG capsule TAKE ONE CAPSULE BY MOUTH TWO TIMES DAILY    losartan (COZAAR) 25 MG tablet Take 1 tablet (25 mg total) by mouth once daily.    potassium chloride SA (K-DUR,KLOR-CON) 20 MEQ tablet TAKE 1 TABLET (20 MEQ TOTAL) BY MOUTH ONCE DAILY.    warfarin (COUMADIN) 2.5 MG tablet TAKE 1/2 TABLET ON MONDAY AND WEDNESDAY AND 1 TABLET ALL OTHER DAYS. DISCONTINUE 5MG TABLET. (Patient taking differently: TAKE 1 TABLET ON MONDAYS, WEDNESDAYS, FRIDAYS  AND 1.5 TABLETS ON  ALL OTHER DAYS OR AS DIRECTED BY COUMADIN CLINIC.)    acetaminophen (TYLENOL EXTRA STRENGTH) 325 MG tablet Take 2 tablets (650 mg total) by mouth every 8 (eight) hours. (Patient taking differently: Take 650 mg by mouth 3 (three) times daily as needed. )    betamethasone valerate 0.1% (VALISONE) 0.1 % Crea Apply topically 2 (two) times daily.    colchicine (COLCRYS) 0.6 mg tablet Take 1 tablet by mouth 3 times a week    fluticasone (FLONASE) 50 mcg/actuation nasal spray 2 sprays (100 mcg total) by Each Nare route once daily.    Lactobac 40-Bifido 3-S.thermop (PROBIOTIC) 100 billion cell Cap Take 1 capsule by mouth once daily.    levocetirizine (XYZAL) 5 MG tablet Take 1 tablet (5 mg total) by mouth every evening.    levothyroxine (SYNTHROID) 50 MCG tablet Take 1 tablet (50 mcg total) by mouth once daily.    methylPREDNISolone (MEDROL DOSEPACK) 4 mg tablet use as directed    metOLazone (ZAROXOLYN) 2.5 MG tablet Take 1 tablet (2.5 mg total) by mouth once daily.    MULTIVITAMIN ORAL Take 1 tablet by mouth Daily.     Family History     Problem Relation (Age of Onset)    COPD Father    Cancer Brother    Coronary artery disease Mother, Father    Diabetes Brother    Emphysema Father    Epilepsy Mother    Heart attack Mother    Heart disease Father        Tobacco Use    Smoking status: Never Smoker    Smokeless tobacco:  Never Used   Substance and Sexual Activity    Alcohol use: No    Drug use: No    Sexual activity: No     Review of Systems   Constitutional: Negative for chills, diaphoresis, fatigue and fever.   HENT: Negative for congestion, sore throat and voice change.    Eyes: Negative for photophobia and visual disturbance.   Respiratory: Negative for cough, shortness of breath, wheezing and stridor.    Cardiovascular: Negative for chest pain and leg swelling.   Gastrointestinal: Positive for blood in stool. Negative for abdominal distention, abdominal pain, constipation, diarrhea, nausea and vomiting.   Endocrine: Negative for polydipsia, polyphagia and polyuria.   Genitourinary: Negative for difficulty urinating, dysuria, flank pain, pelvic pain, urgency and vaginal discharge.   Musculoskeletal: Negative for back pain, joint swelling, neck pain and neck stiffness.   Skin: Negative for color change and rash.   Allergic/Immunologic: Negative for immunocompromised state.   Neurological: Negative for dizziness, syncope, weakness, numbness and headaches.   Hematological: Does not bruise/bleed easily.   Psychiatric/Behavioral: Negative for agitation, behavioral problems and confusion.     Objective:     Vital Signs (Most Recent):  Temp: 98.3 °F (36.8 °C) (03/10/19 1637)  Pulse: 69 (03/10/19 1637)  Resp: 18 (03/10/19 1637)  BP: 131/61 (03/10/19 1637)  SpO2: 96 % (03/10/19 1637) Vital Signs (24h Range):  Temp:  [98 °F (36.7 °C)-98.3 °F (36.8 °C)] 98.3 °F (36.8 °C)  Pulse:  [69] 69  Resp:  [18] 18  SpO2:  [93 %-96 %] 96 %  BP: (113-131)/(52-61) 131/61     Weight: 48.1 kg (106 lb 0.7 oz)  Body mass index is 19.4 kg/m².    Physical Exam   Constitutional: She is oriented to person, place, and time. She appears well-developed and well-nourished. No distress.   HENT:   Head: Normocephalic and atraumatic.   Nose: Nose normal.   Eyes: Conjunctivae and EOM are normal. Pupils are equal, round, and reactive to light. No scleral icterus.    Neck: Normal range of motion. Neck supple. No tracheal deviation present.   Cardiovascular: Normal rate, regular rhythm, normal heart sounds and intact distal pulses.  Bilateral lower extremity +2 edema.  No murmur heard.  Pulmonary/Chest: Effort normal and breath sounds normal. No stridor. No respiratory distress. She has no wheezes. She has no rales.   Abdominal: Soft. Bowel sounds are normal. She exhibits no distension. There is no tenderness. There is no guarding.   Genitourinary:   Genitourinary Comments: Check occult stool     Musculoskeletal: Normal range of motion. She exhibits no edema or deformity.   Neurological: She is alert and oriented to person, place, and time. No cranial nerve deficit.   Skin: Skin is warm and dry. Capillary refill takes less than 2 seconds. No rash noted. She is not diaphoretic.  Generalized bruising to extremities.  Thin, frail  Psychiatric: She has a normal mood and affect. Her behavior is normal. Judgment and thought content normal.   Nursing note and vitals reviewed.        CRANIAL NERVES     CN III, IV, VI   Pupils are equal, round, and reactive to light.  Extraocular motions are normal.        Significant Labs: All pertinent labs within the past 24 hours have been reviewed.  Results for orders placed or performed during the hospital encounter of 03/10/19   CBC auto differential   Result Value Ref Range    WBC 6.73 3.90 - 12.70 K/uL    RBC 3.23 (L) 4.00 - 5.40 M/uL    Hemoglobin 10.3 (L) 12.0 - 16.0 g/dL    Hematocrit 31.9 (L) 37.0 - 48.5 %    MCV 99 (H) 82 - 98 fL    MCH 31.9 (H) 27.0 - 31.0 pg    MCHC 32.3 32.0 - 36.0 g/dL    RDW 16.4 (H) 11.5 - 14.5 %    Platelets 172 150 - 350 K/uL    MPV 10.6 9.2 - 12.9 fL    Gran # (ANC) 3.9 1.8 - 7.7 K/uL    Lymph # 2.3 1.0 - 4.8 K/uL    Mono # 0.5 0.3 - 1.0 K/uL    Eos # 0.1 0.0 - 0.5 K/uL    Baso # 0.03 0.00 - 0.20 K/uL    Gran% 57.4 38.0 - 73.0 %    Lymph% 33.6 18.0 - 48.0 %    Mono% 7.6 4.0 - 15.0 %    Eosinophil% 1.0 0.0 - 8.0 %     Basophil% 0.4 0.0 - 1.9 %    Differential Method Automated    Comprehensive metabolic panel   Result Value Ref Range    Sodium 138 136 - 145 mmol/L    Potassium 4.7 3.5 - 5.1 mmol/L    Chloride 102 95 - 110 mmol/L    CO2 27 23 - 29 mmol/L    Glucose 103 70 - 110 mg/dL    BUN, Bld 83 (H) 8 - 23 mg/dL    Creatinine 1.9 (H) 0.5 - 1.4 mg/dL    Calcium 8.4 (L) 8.7 - 10.5 mg/dL    Total Protein 6.0 6.0 - 8.4 g/dL    Albumin 3.2 (L) 3.5 - 5.2 g/dL    Total Bilirubin 0.5 0.1 - 1.0 mg/dL    Alkaline Phosphatase 68 55 - 135 U/L    AST 28 10 - 40 U/L    ALT 16 10 - 44 U/L    Anion Gap 9 8 - 16 mmol/L    eGFR if African American 27 (A) >60 mL/min/1.73 m^2    eGFR if non African American 24 (A) >60 mL/min/1.73 m^2   Protime-INR   Result Value Ref Range    Prothrombin Time 33.5 (H) 9.0 - 12.5 sec    INR 3.2 (H) 0.8 - 1.2   APTT   Result Value Ref Range    aPTT 43.0 (H) 21.0 - 32.0 sec     *Note: Due to a large number of results and/or encounters for the requested time period, some results have not been displayed. A complete set of results can be found in Results Review.       Significant Imaging: I have reviewed all pertinent imaging results/findings within the past 24 hours.   Imaging Results    None          I have personally reviewed the patients labs, imaging, and discussed the patient case in detail with the Er provider    Assessment/Plan:     * Upper GI bleed    Suspected.  Ppi b.i.d..  Suspect related to supratherapeutic warfarin which is improving.  Hold antiplatelet anticoagulation at this time pharmacy to dose and monitor Coumadin.  Consider consult to GI pending course.  Further evaluation/diagnostics/interventions/consults pending course        Supratherapeutic INR    Was 7.1 on 03/08/2019.  Is improving today's result is 3.2.  Pharmacy to dose and monitor.  Treat GI bleeding.  Further evaluation/diagnostics/interventions/consults pending course          Melena    Hold antiplatelet and anticoagulation at this time.   Pharmacy to monitor and dose Coumadin.  Ppi b.i.d..  Consider consult to GI pending course.  Further evaluation/diagnostics/interventions/consults pending course                 Acquired hypothyroidism      Check TSH.  Continue Synthroid.     A-fib    Continue amiodarone, digoxin, Coreg.  Hold antiplatelet and anticoagulation given patient's melenic stools   Further evaluation/diagnostics/interventions/consults pending course   .       Chronic CHF, combined.  Stable.  Continue at-home medications.  Further evaluation/diagnostics/interventions/consults pending course       VTE Risk Mitigation (From admission, onward)        Ordered     Place HEBER hose  Until discontinued      03/10/19 1722     Place sequential compression device  Until discontinued     No pharmacological due to bleeding. 03/10/19 1722        ADVANCE DIRECTIVES: The topic of advance directives was discussed with the patient and daughter at bedside.  Patient states daughter is her power-of-.  Encourage family to bring any paperwork so a copy can be placed on patient's chart during this admission.  Patient wishes to be full code.      **Portions of this note has been dictated using speech recognition software, M*Modal Fluency Direct; although, time has been taken to proof read and revise it may still contain misspellings, grammatical and or other errors.*            Bartolome Morrison NP  Department of Hospital Medicine   [facility]

## 2019-03-10 NOTE — ASSESSMENT & PLAN NOTE
Hold antiplatelet and anticoagulation at this time.  Pharmacy to monitor and dose Coumadin.  Ppi b.i.d..  Consider consult to GI pending course.  Further evaluation/diagnostics/interventions/consults pending course

## 2019-03-11 PROBLEM — D53.9 MACROCYTIC ANEMIA: Status: ACTIVE | Noted: 2019-03-11

## 2019-03-11 PROBLEM — N39.0 UTI (URINARY TRACT INFECTION): Status: ACTIVE | Noted: 2019-03-11

## 2019-03-11 LAB
ANION GAP SERPL CALC-SCNC: 10 MMOL/L
BACTERIA #/AREA URNS HPF: ABNORMAL /HPF
BASOPHILS # BLD AUTO: 0.03 K/UL
BASOPHILS NFR BLD: 0.5 %
BILIRUB UR QL STRIP: NEGATIVE
BUN SERPL-MCNC: 78 MG/DL
CALCIUM SERPL-MCNC: 8 MG/DL
CHLORIDE SERPL-SCNC: 107 MMOL/L
CLARITY UR: CLEAR
CO2 SERPL-SCNC: 26 MMOL/L
COLOR UR: YELLOW
CREAT SERPL-MCNC: 1.6 MG/DL
DIFFERENTIAL METHOD: ABNORMAL
EOSINOPHIL # BLD AUTO: 0.1 K/UL
EOSINOPHIL NFR BLD: 1.2 %
ERYTHROCYTE [DISTWIDTH] IN BLOOD BY AUTOMATED COUNT: 17 %
EST. GFR  (AFRICAN AMERICAN): 33 ML/MIN/1.73 M^2
EST. GFR  (NON AFRICAN AMERICAN): 29 ML/MIN/1.73 M^2
FERRITIN SERPL-MCNC: 370 NG/ML
GLUCOSE SERPL-MCNC: 81 MG/DL
GLUCOSE UR QL STRIP: NEGATIVE
HCT VFR BLD AUTO: 27.7 %
HCT VFR BLD AUTO: 27.7 %
HCT VFR BLD AUTO: 29.1 %
HCT VFR BLD AUTO: 30.3 %
HCT VFR BLD AUTO: 30.4 %
HGB BLD-MCNC: 8.8 G/DL
HGB BLD-MCNC: 8.8 G/DL
HGB BLD-MCNC: 9.2 G/DL
HGB BLD-MCNC: 9.4 G/DL
HGB BLD-MCNC: 9.5 G/DL
HGB UR QL STRIP: ABNORMAL
INR PPP: 3.2
IRON SERPL-MCNC: 55 UG/DL
KETONES UR QL STRIP: NEGATIVE
LEUKOCYTE ESTERASE UR QL STRIP: ABNORMAL
LYMPHOCYTES # BLD AUTO: 1.9 K/UL
LYMPHOCYTES NFR BLD: 28.9 %
MCH RBC QN AUTO: 31.9 PG
MCHC RBC AUTO-ENTMCNC: 31.8 G/DL
MCV RBC AUTO: 100 FL
MICROSCOPIC COMMENT: ABNORMAL
MONOCYTES # BLD AUTO: 0.6 K/UL
MONOCYTES NFR BLD: 8.8 %
NEUTROPHILS # BLD AUTO: 3.9 K/UL
NEUTROPHILS NFR BLD: 60.6 %
NITRITE UR QL STRIP: NEGATIVE
PH UR STRIP: 6 [PH] (ref 5–8)
PLATELET # BLD AUTO: 151 K/UL
PMV BLD AUTO: 11.1 FL
POTASSIUM SERPL-SCNC: 4.4 MMOL/L
PROT UR QL STRIP: NEGATIVE
PROTHROMBIN TIME: 33.6 SEC
RBC # BLD AUTO: 2.76 M/UL
RBC #/AREA URNS HPF: 1 /HPF (ref 0–4)
SATURATED IRON: 25 %
SODIUM SERPL-SCNC: 143 MMOL/L
SP GR UR STRIP: 1.01 (ref 1–1.03)
SQUAMOUS #/AREA URNS HPF: 1 /HPF
TOTAL IRON BINDING CAPACITY: 221 UG/DL
TRANSFERRIN SERPL-MCNC: 149 MG/DL
URN SPEC COLLECT METH UR: ABNORMAL
UROBILINOGEN UR STRIP-ACNC: NEGATIVE EU/DL
WBC # BLD AUTO: 6.47 K/UL
WBC #/AREA URNS HPF: >100 /HPF (ref 0–5)

## 2019-03-11 PROCEDURE — G0378 HOSPITAL OBSERVATION PER HR: HCPCS

## 2019-03-11 PROCEDURE — 85018 HEMOGLOBIN: CPT | Mod: 91

## 2019-03-11 PROCEDURE — 85025 COMPLETE CBC W/AUTO DIFF WBC: CPT

## 2019-03-11 PROCEDURE — 25000003 PHARM REV CODE 250: Performed by: NURSE PRACTITIONER

## 2019-03-11 PROCEDURE — 97116 GAIT TRAINING THERAPY: CPT

## 2019-03-11 PROCEDURE — 99214 OFFICE O/P EST MOD 30 MIN: CPT | Mod: ,,, | Performed by: INTERNAL MEDICINE

## 2019-03-11 PROCEDURE — 97162 PT EVAL MOD COMPLEX 30 MIN: CPT

## 2019-03-11 PROCEDURE — 93041 RHYTHM ECG TRACING: CPT

## 2019-03-11 PROCEDURE — 85610 PROTHROMBIN TIME: CPT

## 2019-03-11 PROCEDURE — 87186 SC STD MICRODIL/AGAR DIL: CPT

## 2019-03-11 PROCEDURE — 83540 ASSAY OF IRON: CPT

## 2019-03-11 PROCEDURE — 97165 OT EVAL LOW COMPLEX 30 MIN: CPT

## 2019-03-11 PROCEDURE — 80048 BASIC METABOLIC PNL TOTAL CA: CPT

## 2019-03-11 PROCEDURE — 63600175 PHARM REV CODE 636 W HCPCS: Performed by: NURSE PRACTITIONER

## 2019-03-11 PROCEDURE — C9113 INJ PANTOPRAZOLE SODIUM, VIA: HCPCS | Performed by: NURSE PRACTITIONER

## 2019-03-11 PROCEDURE — 85014 HEMATOCRIT: CPT | Mod: 91

## 2019-03-11 PROCEDURE — 87086 URINE CULTURE/COLONY COUNT: CPT

## 2019-03-11 PROCEDURE — 93010 ELECTROCARDIOGRAM REPORT: CPT | Mod: ,,, | Performed by: INTERNAL MEDICINE

## 2019-03-11 PROCEDURE — 97530 THERAPEUTIC ACTIVITIES: CPT

## 2019-03-11 PROCEDURE — 87088 URINE BACTERIA CULTURE: CPT

## 2019-03-11 PROCEDURE — 36415 COLL VENOUS BLD VENIPUNCTURE: CPT

## 2019-03-11 PROCEDURE — 85014 HEMATOCRIT: CPT

## 2019-03-11 PROCEDURE — 82746 ASSAY OF FOLIC ACID SERUM: CPT

## 2019-03-11 PROCEDURE — 93010 EKG 12-LEAD: ICD-10-PCS | Mod: ,,, | Performed by: INTERNAL MEDICINE

## 2019-03-11 PROCEDURE — 81000 URINALYSIS NONAUTO W/SCOPE: CPT

## 2019-03-11 PROCEDURE — 96376 TX/PRO/DX INJ SAME DRUG ADON: CPT

## 2019-03-11 PROCEDURE — 82728 ASSAY OF FERRITIN: CPT

## 2019-03-11 PROCEDURE — 96375 TX/PRO/DX INJ NEW DRUG ADDON: CPT

## 2019-03-11 PROCEDURE — 99214 PR OFFICE/OUTPT VISIT, EST, LEVL IV, 30-39 MIN: ICD-10-PCS | Mod: ,,, | Performed by: INTERNAL MEDICINE

## 2019-03-11 PROCEDURE — 82607 VITAMIN B-12: CPT

## 2019-03-11 PROCEDURE — 85018 HEMOGLOBIN: CPT

## 2019-03-11 PROCEDURE — 87077 CULTURE AEROBIC IDENTIFY: CPT

## 2019-03-11 RX ORDER — PHYTONADIONE 5 MG/1
5 TABLET ORAL ONCE
Status: DISCONTINUED | OUTPATIENT
Start: 2019-03-11 | End: 2019-03-11

## 2019-03-11 RX ORDER — DOXYCYCLINE HYCLATE 100 MG
100 TABLET ORAL EVERY 12 HOURS
Status: DISCONTINUED | OUTPATIENT
Start: 2019-03-11 | End: 2019-03-14 | Stop reason: HOSPADM

## 2019-03-11 RX ORDER — MEROPENEM AND SODIUM CHLORIDE 500 MG/50ML
500 INJECTION, SOLUTION INTRAVENOUS
Status: DISCONTINUED | OUTPATIENT
Start: 2019-03-11 | End: 2019-03-11

## 2019-03-11 RX ORDER — PHYTONADIONE 5 MG/1
5 TABLET ORAL ONCE
Status: COMPLETED | OUTPATIENT
Start: 2019-03-11 | End: 2019-03-11

## 2019-03-11 RX ADMIN — DOXYCYCLINE HYCLATE 100 MG: 100 TABLET, COATED ORAL at 01:03

## 2019-03-11 RX ADMIN — ALLOPURINOL 100 MG: 100 TABLET ORAL at 08:03

## 2019-03-11 RX ADMIN — CARVEDILOL 25 MG: 12.5 TABLET, FILM COATED ORAL at 05:03

## 2019-03-11 RX ADMIN — PANTOPRAZOLE SODIUM 40 MG: 40 INJECTION, POWDER, LYOPHILIZED, FOR SOLUTION INTRAVENOUS at 09:03

## 2019-03-11 RX ADMIN — AMIODARONE HYDROCHLORIDE 200 MG: 200 TABLET ORAL at 08:03

## 2019-03-11 RX ADMIN — DIGOXIN 125 MCG: 125 TABLET ORAL at 09:03

## 2019-03-11 RX ADMIN — PHYTONADIONE 5 MG: 5 TABLET ORAL at 01:03

## 2019-03-11 RX ADMIN — FUROSEMIDE 40 MG: 40 TABLET ORAL at 05:03

## 2019-03-11 RX ADMIN — CARVEDILOL 25 MG: 12.5 TABLET, FILM COATED ORAL at 08:03

## 2019-03-11 RX ADMIN — PANTOPRAZOLE SODIUM 40 MG: 40 INJECTION, POWDER, LYOPHILIZED, FOR SOLUTION INTRAVENOUS at 08:03

## 2019-03-11 RX ADMIN — LOSARTAN POTASSIUM 25 MG: 25 TABLET, FILM COATED ORAL at 08:03

## 2019-03-11 RX ADMIN — LEVOTHYROXINE SODIUM 50 MCG: 50 TABLET ORAL at 08:03

## 2019-03-11 RX ADMIN — DOXYCYCLINE HYCLATE 100 MG: 100 TABLET, COATED ORAL at 09:03

## 2019-03-11 RX ADMIN — MEROPENEM AND SODIUM CHLORIDE 500 MG: 500 INJECTION, SOLUTION INTRAVENOUS at 05:03

## 2019-03-11 RX ADMIN — FUROSEMIDE 40 MG: 40 TABLET ORAL at 08:03

## 2019-03-11 NOTE — ED NOTES
Assumed care from DARIO Joseph. Patient in bed resting, LOUIS, family at bedside. Patient awaiting admit to floor.

## 2019-03-11 NOTE — PLAN OF CARE
Problem: Adult Inpatient Plan of Care  Goal: Plan of Care Review  Outcome: Ongoing (interventions implemented as appropriate)   03/11/19 0319   Plan of Care Review   Plan of Care Reviewed With patient   Progress improving     Goal: Absence of Hospital-Acquired Illness or Injury  Outcome: Ongoing (interventions implemented as appropriate)  Intervention: Identify and Manage Fall Risk   03/11/19 0319   Optimize Balance and Safe Activity   Safety Promotion/Fall Prevention assistive device/personal item within reach;bed alarm set;crib side rails raised x2;Fall Risk reviewed with patient/family;lighting adjusted;medications reviewed;nonskid shoes/socks when out of bed;side rails raised x 2     Intervention: Prevent VTE (venous thromboembolism)   03/11/19 0319   Prevent or Manage Embolism   VTE Prevention/Management remove, assess skin and reapply compression stockings;bleeding precautions maintained;bleeding risk assessed       Goal: Optimal Comfort and Wellbeing    Intervention: Provide Person-Centered Care   03/11/19 0319   Support Dyspnea Relief   Trust Relationship/Rapport care explained;choices provided;emotional support provided;empathic listening provided;questions answered         Problem: Fall Injury Risk  Goal: Absence of Fall and Fall-Related Injury    Intervention: Identify and Manage Contributors to Fall Injury Risk   03/11/19 0319   Manage Acute Allergic Reaction   Medication Review/Management medications reviewed     Intervention: Promote Injury-Free Environment   03/11/19 0319   Optimize Balance and Safe Activity   Safety Promotion/Fall Prevention assistive device/personal item within reach;bed alarm set;crib side rails raised x2;Fall Risk reviewed with patient/family;lighting adjusted;medications reviewed;nonskid shoes/socks when out of bed;side rails raised x 2   Optimize [place] and Functional Mobility   Environmental Safety Modification assistive device/personal items within reach;clutter free  environment maintained;lighting adjusted;mobility aid in reach

## 2019-03-11 NOTE — PROGRESS NOTES
[facility] Hospital Medicine  Progress Note    Patient Name: Jennifer Mathews  MRN: [id number]  Patient Class: OP- Observation   Admission Date: 3/10/2019  Length of Stay: 0 days  Attending Physician: Clarice Sin MD  Primary Care Provider: Desirae Bello MD        Subjective:     Principal Problem:Upper GI bleed    HPI:  Jennifer Mathews is a 86 y.o. female patient with a PMHx of stroke, hypothyroidism, HTN, HLD, CKD, afib anemia, open heart surgery, and ischemic cardiomyopathy who presents for evaluation of blood in stool. Onset this afternoon.  described as dark stool.  Pt reports 2 episodes today. No mitigating or exacerbating factors reported. To note patient on warfarin. Pt notes she had her coumadin checked on Friday and it was 7.1. And has been being held. Patient evaluated in Er. H/H stable 10.3/31.9. INR today 3.2  BUN above baseline at 83 today Cr. Stable with basline, today 1.9. HM consulted. Patient placed in obs.       Hospital Course:  The pt is a 87 yo female with mitral valve replacement x 3-bioprosthesis, Stroke-no obvious deficits, HTN, PAF, combined CHF with EF 25%, ICM s/p PPM-CRT-D placement, recurrent UTI, CKD3-4, and hypothyroidism who was placed in observation for GI bleed and UTI. On 3/8/19, her INR was 7.1. INR was 3.2 on admit. Hgb  10.3>8.8>9.5.     Pt reports she had melena today. INR remains 3.2. Will give Vitamin K 5mg po x one dose. GI evaluated pt and recommends EGD if INR 1.5 or lower         Interval History: +melena today   Review of Systems   Constitutional: Negative for appetite change, chills, diaphoresis, fatigue, fever and unexpected weight change.   HENT: Negative for congestion, nosebleeds, sinus pressure and sore throat.    Eyes: Negative for pain, discharge and visual disturbance.   Respiratory: Negative for cough, chest tightness, shortness of breath, wheezing and stridor.    Cardiovascular: Negative for chest pain, palpitations and leg swelling.    Gastrointestinal: Positive for blood in stool. Negative for abdominal distention, abdominal pain, constipation, diarrhea, nausea and vomiting.   Endocrine: Negative for cold intolerance and heat intolerance.   Genitourinary: Negative for difficulty urinating, dysuria, flank pain, frequency and urgency.   Musculoskeletal: Negative for arthralgias, back pain, joint swelling, myalgias, neck pain and neck stiffness.   Skin: Negative for rash and wound.   Allergic/Immunologic: Negative for food allergies and immunocompromised state.   Neurological: Negative for dizziness, seizures, syncope, facial asymmetry, speech difficulty, weakness, light-headedness, numbness and headaches.   Hematological: Negative for adenopathy.   Psychiatric/Behavioral: Negative for agitation, confusion and hallucinations.     Objective:     Vital Signs (Most Recent):  Temp: 97.9 °F (36.6 °C) (03/11/19 0720)  Pulse: 70 (03/11/19 1123)  Resp: 18 (03/11/19 1123)  BP: 125/60 (03/11/19 1123)  SpO2: 96 % (03/11/19 1123) Vital Signs (24h Range):  Temp:  [97.9 °F (36.6 °C)-98.5 °F (36.9 °C)] 97.9 °F (36.6 °C)  Pulse:  [68-70] 70  Resp:  [16-20] 18  SpO2:  [92 %-98 %] 96 %  BP: (113-146)/(52-61) 125/60     Weight: 48.1 kg (106 lb 0.7 oz)  Body mass index is 19.4 kg/m².    Intake/Output Summary (Last 24 hours) at 3/11/2019 1220  Last data filed at 3/11/2019 1146  Gross per 24 hour   Intake 1000 ml   Output 950 ml   Net 50 ml      Physical Exam   Constitutional: She is oriented to person, place, and time. No distress.   Elderly and frail    HENT:   Head: Normocephalic and atraumatic.   Nose: Nose normal.   Mouth/Throat: Oropharynx is clear and moist.   Eyes: Conjunctivae and EOM are normal. No scleral icterus.   Neck: Normal range of motion. Neck supple. No tracheal deviation present.   Cardiovascular: Normal rate, regular rhythm and intact distal pulses. Exam reveals no gallop and no friction rub.   Murmur heard.  Pulmonary/Chest: Effort normal and  breath sounds normal. No stridor. No respiratory distress. She has no wheezes. She has no rales. She exhibits no tenderness.   Abdominal: Soft. Bowel sounds are normal. She exhibits no distension and no mass. There is no tenderness. There is no rebound and no guarding.   Musculoskeletal: Normal range of motion. She exhibits edema (trace). She exhibits no tenderness or deformity.   Neurological: She is alert and oriented to person, place, and time. No cranial nerve deficit. She exhibits normal muscle tone. Coordination normal.   Skin: Skin is warm and dry. No rash noted. She is not diaphoretic. No erythema. No pallor.   Psychiatric: She has a normal mood and affect. Her behavior is normal. Thought content normal.   Nursing note and vitals reviewed.      Significant Labs:   BMP:   Recent Labs   Lab 03/10/19  1643 03/11/19  0526    81    143   K 4.7 4.4    107   CO2 27 26   BUN 83* 78*   CREATININE 1.9* 1.6*   CALCIUM 8.4* 8.0*   MG 2.1  --      CBC:   Recent Labs   Lab 03/10/19  1643 03/11/19  0240 03/11/19  0525 03/11/19  1121   WBC 6.73  --  6.47  --    HGB 10.3* 9.2* 8.8*  8.8* 9.5*   HCT 31.9* 29.1* 27.7*  27.7* 30.4*     --  151  --      CMP:   Recent Labs   Lab 03/10/19  1643 03/11/19  0526    143   K 4.7 4.4    107   CO2 27 26    81   BUN 83* 78*   CREATININE 1.9* 1.6*   CALCIUM 8.4* 8.0*   PROT 6.0  --    ALBUMIN 3.2*  --    BILITOT 0.5  --    ALKPHOS 68  --    AST 28  --    ALT 16  --    ANIONGAP 9 10   EGFRNONAA 24* 29*     All pertinent labs within the past 24 hours have been reviewed.    Significant Imaging:   Imaging Results    None       Assessment/Plan:      * Upper GI bleed    Serial H/H  Continued melena today  Give one dose of Vitamin K  GI recommends EGD if INR less than 1.5  Cont Protonix        Supratherapeutic INR    Was 7.1 on 03/08/2019  INR 3.2 today  Vit K 5mg po x one dose      UTI (urinary tract infection)    Transition to oral Doxycycline    Urine culture pending      Macrocytic and ABL anemia    Iron studies   Monitor          Chronic renal failure, stage 4 (severe)    Monitor closely        Acquired hypothyroidism      Continue Synthroid.     A-fib    Continue amiodarone, digoxin, Coreg.    Hold ASA and Coumadin      S/P MVR (mitral valve replacement)    Bioprosthesis  Stable          Chronic combined systolic and diastolic congestive heart failure    Cont Coreg, Dig, And Losartan  Hold lasix and metolazone for now          VTE Risk Mitigation (From admission, onward)        Ordered     Place HEBER hose  Until discontinued      03/10/19 1722     Place sequential compression device  Until discontinued      03/10/19 1722              Serenity Pickett NP  Department of Hospital Medicine   [facility]

## 2019-03-11 NOTE — NURSING
Pt c/o burning during urination. Spoke with Vidhya. Ordered UA. UA collected and sent down to lab.

## 2019-03-11 NOTE — ED NOTES
Called to given report to Sonia BISHOP; stated that she'd already received report but was unsure of which nurse gave her report.

## 2019-03-11 NOTE — ASSESSMENT & PLAN NOTE
Serial H/H  Continued melena today  Give one dose of Vitamin K  GI recommends EGD if INR less than 1.5  Cont Protonix

## 2019-03-11 NOTE — HOSPITAL COURSE
The pt is a 85 yo female with mitral valve replacement x 3-bioprosthesis, Stroke-no obvious deficits, HTN, PAF, combined CHF with EF 25%, ICM s/p PPM-CRT-D placement, recurrent UTI, CKD3-4, and hypothyroidism who was placed in observation for GI bleed and UTI. On 3/8/19, her INR was 7.1. INR was 3.2 on admit. Hgb  10.3>9.4>8.5.     Pt reports she no further melena today. INR remains 2.1. Will give another dose of Vitamin K 5mg po. GI evaluated pt and recommends EGD if INR 1.5 or lower     3/13- doing well, no more melena, underwent EGD this afternoon which showed LA Grade D Candida Esophagitis. Biopsied. Gastric erosions without bleeding. Normal first portion of the duodenum and second portion of the duodenum. Pt started on IV Diflucan. Tolerated diet well. H/H stable at 8.9/29- INR 1.3.  3/14- pt continues to do well, H/H remained stable, she also received a dose of Inj Venofer. She feels well, got up to use the bedside commode, no dizziness or weakness. She lives alone and is mostly independent in all her ADLs. She said that she had been sick with Flu and other UTI's on and off for last 1 month but was not taking any steroids but she was on Nasal Steroids Steroids Flonase for her allergies which has been d/donald. She wishes to be discharged home today with HH which has been arranged,. GI has cleared her for discharge. She was seen and examined and deemed stable for discharge home today with HH.

## 2019-03-11 NOTE — CONSULTS
"[facility] -   Gastroenterology  Consult Note    Patient Name: Jennifer Mathews  MRN: [id number]  Admission Date: 3/10/2019  Hospital Length of Stay: 0 days  Code Status: Full Code   Attending Provider: Clarice Sin MD   Consulting Provider: Kelvin Cabezas PA-C  Primary Care Physician: Desirae Bello MD  Principal Problem:Upper GI bleed    Inpatient consult to Gastroenterology  Consult performed by: Kelvin Cabezas PA-C  Consult ordered by: Serenity Pickett NP  Reason for consult: GI bleed        Subjective:     HPI:  86-year-old female with complex past medical history presents with melena and elevated INR. Patient is on Coumadin for A. Fib and states on Friday (3/8) her INR level was 7.1; however, denied any signs of bleeding at that time. Her last dose of Coumadin was Thursday evening and the pharmacist advised her to hold her coumadin dose for Friday and Saturday. On Sunday, patient noticed a singular episode of black stool. States the stool was black, but noticed "darker red" blood in the toilet bowl and on toilet paper. Patient states she did not take her Coumadin on Sunday due to the bleeding and came to ED. Patient supports some mild lower abdominal cramping before the bowel movement. Patient also supports another episode of black stool in the hospital. Of note, states two weeks prior she was sick with an URI and on an antibiotic (doxycycline) which was completed over a week ago. States 2 years ago had similar episode of loose, bloody stools, but denies receiving any scopes. Most recent INR is supra-therapeutic at 3.2. Most recent H&H is 8.8 and 27.7. Baseline hemoglobin typically between 11-12.   BUN = 7.8 and Creatinine = 1.7. Unsure of date of any prior upper or lower scopes. Denies any shortness of breath, loss of appetite, weakness, nausea, vomiting, hematemesis, or any other issues.     Past Medical History:   Diagnosis Date    Acute coronary syndrome     Anemia     " "Anticoagulated on Coumadin 7/13/2015    Arthritis     Asthma     patient denies    Atrial fibrillation     Basal cell carcinoma 10/2015    left neck    Cardiac arrest     Cardiac resynchronization therapy defibrillator (CRT-D) in place 07/13/2015    Pt denies, states it does not shock me    Chronic combined systolic and diastolic congestive heart failure     CKD (chronic kidney disease) stage 3, GFR 30-59 ml/min     Dyslipidemia 1/30/2014    Hyperlipidemia     Hypertension     Hypothyroidism     Ischemic cardiomyopathy 01/30/2014    Macular hole of left eye     Old    Macular hole of left eye     LEV (obstructive sleep apnea) 9/30/2013    Pneumonia     required hospitalization    Recurrent UTI     Refractive error     Spastic colon     Stroke        Past Surgical History:   Procedure Laterality Date    APPENDECTOMY      CARDIAC CATHETERIZATION      CARDIAC PACEMAKER PLACEMENT  2014    CARDIAC VALVE SURGERY      CATARACT EXTRACTION      OU    CHOLECYSTECTOMY      COLONOSCOPY      IRRIGATION AND DEBRIDEMENT OF LEFT KNEE Left 9/12/2017    [facility] OR    MITRAL VALVE REPLACEMENT  2014    MITRAL VALVE SURGERY      x3    REPLACEMENT, PACEMAKER GENERATOR/pt has crt-p versus d Left 6/20/2018    Performed by [facility]ne [facility] at [facility] CATH LAB    REVISION, SKIN POCKET, FOR CARDIAC PACEMAKER Left 2/26/2014    Performed by [facility]ne [facility] at [facility] CATH LAB       Review of patient's allergies indicates:   Allergen Reactions    Cephalosporins Hives    Metaxalone Itching    Penicillins      Other reaction(s): Unknown      Pregabalin      Other reaction(s): "Bad feeling"      Sulfa (sulfonamide antibiotics) Itching     Family History     Problem Relation (Age of Onset)    COPD Father    Cancer Brother    Coronary artery disease Mother, Father    Diabetes Brother    Emphysema Father    Epilepsy Mother    Heart attack Mother    Heart disease Father    "     Tobacco Use    Smoking status: Never Smoker    Smokeless tobacco: Never Used   Substance and Sexual Activity    Alcohol use: No    Drug use: No    Sexual activity: No     Review of Systems   Constitutional: Negative for fever.   HENT: Negative for hearing loss.    Eyes: Negative for visual disturbance.   Respiratory: Negative for cough and shortness of breath.    Cardiovascular: Negative for chest pain.   Gastrointestinal: Positive for abdominal pain (cramping prior to BM) and blood in stool. Negative for abdominal distention, constipation, diarrhea, nausea and vomiting.        As per HPI.   Genitourinary: Positive for dysuria. Negative for frequency and hematuria.   Musculoskeletal: Negative for arthralgias and back pain.   Skin: Negative for rash.   Neurological: Negative for seizures, syncope, numbness and headaches.   Hematological: Does not bruise/bleed easily.   Psychiatric/Behavioral: The patient is not nervous/anxious.      Objective:     Vital Signs (Most Recent):  Temp: 97.9 °F (36.6 °C) (03/11/19 0720)  Pulse: 69 (03/11/19 0905)  Resp: 20 (03/11/19 0720)  BP: (!) 122/57 (03/11/19 0720)  SpO2: (!) 92 % (03/11/19 0720) Vital Signs (24h Range):  Temp:  [97.9 °F (36.6 °C)-98.5 °F (36.9 °C)] 97.9 °F (36.6 °C)  Pulse:  [68-70] 69  Resp:  [16-20] 20  SpO2:  [92 %-98 %] 92 %  BP: (113-146)/(52-61) 122/57     Weight: 48.1 kg (106 lb 0.7 oz) (03/10/19 1517)  Body mass index is 19.4 kg/m².      Intake/Output Summary (Last 24 hours) at 3/11/2019 1111  Last data filed at 3/11/2019 0600  Gross per 24 hour   Intake 1000 ml   Output 350 ml   Net 650 ml       Lines/Drains/Airways     Peripheral Intravenous Line                 Peripheral IV - Single Lumen 03/11/19 0229 Anterior;Left Forearm less than 1 day                Physical Exam   Constitutional: She is oriented to person, place, and time. She appears well-developed and well-nourished.   HENT:   Head: Normocephalic and atraumatic.   Eyes: EOM are normal.    Neck: Normal range of motion. Neck supple. Carotid bruit is not present.   Cardiovascular: Normal rate and regular rhythm.   No murmur heard.  Pulmonary/Chest: Effort normal and breath sounds normal. No respiratory distress. She has no wheezes.   Abdominal: Soft. Normal appearance and bowel sounds are normal. She exhibits no distension and no mass. There is no tenderness.   Musculoskeletal: She exhibits no edema.   Neurological: She is alert and oriented to person, place, and time. No cranial nerve deficit.   Skin: Skin is warm and dry. No rash noted.   Psychiatric: Her behavior is normal.       Significant Labs:  CBC:   Recent Labs   Lab 03/10/19  1643 03/11/19  0240 03/11/19  0525   WBC 6.73  --  6.47   HGB 10.3* 9.2* 8.8*  8.8*   HCT 31.9* 29.1* 27.7*  27.7*     --  151     CMP:   Recent Labs   Lab 03/10/19  1643 03/11/19  0526    81   CALCIUM 8.4* 8.0*   ALBUMIN 3.2*  --    PROT 6.0  --     143   K 4.7 4.4   CO2 27 26    107   BUN 83* 78*   CREATININE 1.9* 1.6*   ALKPHOS 68  --    ALT 16  --    AST 28  --    BILITOT 0.5  --      Coagulation:   Recent Labs   Lab 03/10/19  1643 03/11/19  0525   INR 3.2* 3.2*   APTT 43.0*  --        Significant Imaging:  Imaging results within the past 24 hours have been reviewed.    Assessment/Plan:     Melena    2 episodes of melena per patient. Suspect upper GI bleed. Today's INR = 3.2.  Goal INR 1.5 before endoscopy will be considered. Reversal up to   Re-consult GI when INR reaches appropriate level     Supratherapeutic INR    INR peaked at 7.1 on Friday. Went down to 3.2 today.  Goal 1.5.    See above.          Thank you for your consult. I will follow-up with patient. Please contact us if you have any additional questions.    Kelvin Cabezas PA-C  Gastroenterology  [facility]

## 2019-03-11 NOTE — NURSING
Pt is resting comfortably. Bed locked, in lowered position, lights dimmed, bed alarm on, pt instructed to call for asst.

## 2019-03-11 NOTE — CONSULTS
Anticoagulation Monitoring   Pharmacy Coumadin Consult   Jennifer Mathews  MRN [id number]    ED 19    Dx: Atrial Fibrillation, long term anticoagulation therapy    Goal INR: 2.0 - 3.0  Current INR =  Collected: 03/10/19 1643   Resulting lab: [facility]   Reference range: 0.8 - 1.2   Value: 3.2 Abnormally high      Daily PT-INR values will be collected. A placeholder dose of Coumadin 2 mg has been ordered to begin on or before 3/12 @ 1700 based on daily INR levels.     The patient will be counseled by pharmacy on proper diet, lab values, and medication compliance, along with additional drug information.     Thank you for allowing pharmacy to participate in this patient's care.   Bartolome Rebollar, WilliamD

## 2019-03-11 NOTE — PT/OT/SLP EVAL
Physical Therapy Evaluation    Patient Name:  Jennifer Mathews   MRN:  [id number]    Recommendations:     Discharge Recommendations:  other (see comments)(HHPT)   Discharge Equipment Recommendations: none   Barriers to discharge: NONE    Assessment:     Jennifer Mathews is a 86 y.o. female admitted with a medical diagnosis of Upper GI bleed.  She presents with the following impairments/functional limitations:  weakness, impaired endurance, impaired functional mobilty, gait instability, impaired balance, decreased coordination, decreased safety awareness.    Rehab Prognosis: Good; patient would benefit from acute skilled PT services to address these deficits and reach maximum level of function.    Recent Surgery: * No surgery found *     Plan:     During this hospitalization, patient to be seen 5 x/week to address the identified rehab impairments via gait training, therapeutic activities, therapeutic exercises and progress toward the following goals:    · Plan of Care Expires:  03/18/19    Subjective     Chief Complaint:   Patient/Family Comments/goals:   Pain/Comfort:  · Pain Rating 1: 0/10    Patients cultural, spiritual, Faith conflicts given the current situation:      Living Environment:  PT LIVES ALONE 1 STORY HOUSE WITH RAMP TO ENTER, LIVES LIVES NEXT DOOR AND IS AVAILABLE TO ASSIST AS NEEDED, PT AMB WITH SPC IN COMMUNITY BUT INDEP IN HOME, STILL DRIVES, INDEP WITH ADL'S  Prior to admission, patients level of function was TENNILLE.  Equipment used at home: cane, straight, bath bench, raised toilet, bedside commode, rollator, other (see comments)([facility]).  DME owned (not currently used): none.  Upon discharge, patient will have assistance from DAUGHTER.    Objective:     Communicated with NURSE ACEVEDO prior to session.  Patient found SUPINE telemetry  upon PT entry to room.    General Precautions: Standard, fall   Orthopedic Precautions:N/A   Braces: N/A     Exams:  · Cognitive Exam:  Patient is oriented to Person,  Place, Time and Situation  · Postural Exam:  Patient presented with the following abnormalities:    · -       Rounded shoulders  · Sensation:    · -       Intact  · Skin Integrity/Edema:      · -       Edema: Mild B ANKLES-CHRONIC PER PT  · RLE ROM: WFL  · RLE Strength: GROSSLY 3+/5  · LLE ROM: WFL  · LLE Strength: GROSSLY 3+/5    Functional Mobility:  · Bed Mobility:     · Rolling Left:  stand by assistance  · Scooting: stand by assistance  · Supine to Sit: stand by assistance  · Transfers:     · Sit to Stand:  stand by assistance with straight cane  · Bed to Chair: stand by assistance with  straight cane  using  Step Transfer  · Gait: PT AMB 65' WITH SPC AND CGA, SLOW PACED GAIT, MILDLY UNSTEADY BUT NO GROSS LOB, PT FEARFUL OF FALLING  · Balance: FAIR      Therapeutic Activities and Exercises:   PT EDUCATED IN ROLE OF P.T. AND POC, PT STOOD AT SINK WITH SBA TO WASH/DRY FACE, BRUSH TEETH.  PT EDUCATED IN BLE THEREX TO PERFORM WHILE SEATED IN CHAIR    AM-PAC 6 CLICK MOBILITY  Total Score:20     Patient left up in chair with all lines intact, call button in reach and NURSE notified.    GOALS:   Multidisciplinary Problems     Physical Therapy Goals        Problem: Physical Therapy Goal    Goal Priority Disciplines Outcome Goal Variances Interventions   Physical Therapy Goal     PT, PT/OT      Description:  LTG'S TO BE MET IN 7 DAYS (3-18-19)  1. PT WILL BE TENNILLE WITH BED MOBILITY  2. PT WILL BE TENNILLE WITH TF'S  3. PT WILL ' WITH SPV OR RW WITH SBA  4. PT WILL DEMO G DYNAMIC BALANCE DURING GAIT                     History:     Past Medical History:   Diagnosis Date    Acute coronary syndrome     Anemia     Anticoagulated on Coumadin 7/13/2015    Arthritis     Asthma     patient denies    Atrial fibrillation     Basal cell carcinoma 10/2015    left neck    Cardiac arrest     Cardiac resynchronization therapy defibrillator (CRT-D) in place 07/13/2015    Pt denies, states it does not shock me    Chronic  combined systolic and diastolic congestive heart failure     CKD (chronic kidney disease) stage 3, GFR 30-59 ml/min     Dyslipidemia 1/30/2014    Hyperlipidemia     Hypertension     Hypothyroidism     Ischemic cardiomyopathy 01/30/2014    Macular hole of left eye     Old    Macular hole of left eye     LEV (obstructive sleep apnea) 9/30/2013    Pneumonia     required hospitalization    Recurrent UTI     Refractive error     Spastic colon     Stroke        Past Surgical History:   Procedure Laterality Date    APPENDECTOMY      CARDIAC CATHETERIZATION      CARDIAC PACEMAKER PLACEMENT  2014    CARDIAC VALVE SURGERY      CATARACT EXTRACTION      OU    CHOLECYSTECTOMY      COLONOSCOPY      IRRIGATION AND DEBRIDEMENT OF LEFT KNEE Left 9/12/2017    [facility] at [facility] OR    MITRAL VALVE REPLACEMENT  2014    MITRAL VALVE SURGERY      x3    REPLACEMENT, PACEMAKER GENERATOR/pt has crt-p versus d Left 6/20/2018    Performed by Manny [facility]ne [facility] at [facility] CATH LAB    REVISION, SKIN POCKET, FOR CARDIAC PACEMAKER Left 2/26/2014    Performed by Manny Dunne MD at [facility] CATH LAB       Time Tracking:     PT Received On: 03/11/19  PT Start Time: 0755     PT Stop Time: 0820  PT Total Time (min): 25 min     Billable Minutes: Evaluation 15 and Gait Training 10     PT ENCOURAGED TO CALL FOR ASSISTANCE WITH ALL NEEDS DUE TO FALL RISK STATUS, PT AGREEABLE    Jodie Noriega, PT  03/11/2019

## 2019-03-11 NOTE — PROVIDER PROGRESS NOTES - EMERGENCY DEPT.
Encounter Date: 3/10/2019    ED Physician Progress Notes       SCRIBE NOTE: I, Aranza Rodriguez, am scribing for, and in the presence of,  Holly Boykin MD.  Physician Statement: I, Holly Boykin MD, personally performed the services described in this documentation as scribed by Aranza Rodriguez in my presence, and it is both accurate and complete.            9:38 PM: Abnormal rhythm was noted on monitor. EKG was performed. Pt is resting comfortably, eating a sandwich at this time. Contacted HM as pt is admitted who recommended consulting cardiology.    The EKG was ordered, reviewed, and independently interpreted by the ED provider.  Interpretation time: 2146  Rate: 70 BPM  Rhythm: AV dual-paced rhythm with prolonged AV conductance  Interpretation: Nonspecific ST elevation and depression in leading leads. Difficult to decipher in precordial leads. No STEMI.  When compared to EKG performed November 2018, ST elevation is now present.    10:04 PM: Discussed pt's case with Dr. Houser (Cardiology) who will review the EKG's and call back.    10:08 PM: Discussed pt's case with Dr. Houser (Cardiology) who has reviewed pt's EKG and previous EKG. He states everything is fine, there is not STEMI or significant changes.     10:10 PM: Dr. Comfort delong cardiology has reviewed EKG's and there is no STEMI or significant changes.

## 2019-03-11 NOTE — PLAN OF CARE
Problem: Adult Inpatient Plan of Care  Goal: Plan of Care Review  Outcome: Ongoing (interventions implemented as appropriate)  Patient AAO x4. VSS..   Patient remained afebrile throughout the shift.  Patient remained free of falls this shift.  Patient free of pain this shift.  Patient's stools remain bloody, H/H and INR improving   Plan of care reviewed.  Patient verbalized understanding.   Patient up with assist, turning independently   Frequent weight shifting encouraged.  Patient NSR on monitor.  Bed low, siderails up x2, wheels locked, call light in reach.  Bed alarm maintained for safety.  Patient instructed to call for assistance.  Hourly rounding completed.  Will continue to monitor.

## 2019-03-11 NOTE — SUBJECTIVE & OBJECTIVE
Interval History: +melena today   Review of Systems   Constitutional: Negative for appetite change, chills, diaphoresis, fatigue, fever and unexpected weight change.   HENT: Negative for congestion, nosebleeds, sinus pressure and sore throat.    Eyes: Negative for pain, discharge and visual disturbance.   Respiratory: Negative for cough, chest tightness, shortness of breath, wheezing and stridor.    Cardiovascular: Negative for chest pain, palpitations and leg swelling.   Gastrointestinal: Positive for blood in stool. Negative for abdominal distention, abdominal pain, constipation, diarrhea, nausea and vomiting.   Endocrine: Negative for cold intolerance and heat intolerance.   Genitourinary: Negative for difficulty urinating, dysuria, flank pain, frequency and urgency.   Musculoskeletal: Negative for arthralgias, back pain, joint swelling, myalgias, neck pain and neck stiffness.   Skin: Negative for rash and wound.   Allergic/Immunologic: Negative for food allergies and immunocompromised state.   Neurological: Negative for dizziness, seizures, syncope, facial asymmetry, speech difficulty, weakness, light-headedness, numbness and headaches.   Hematological: Negative for adenopathy.   Psychiatric/Behavioral: Negative for agitation, confusion and hallucinations.     Objective:     Vital Signs (Most Recent):  Temp: 97.9 °F (36.6 °C) (03/11/19 0720)  Pulse: 70 (03/11/19 1123)  Resp: 18 (03/11/19 1123)  BP: 125/60 (03/11/19 1123)  SpO2: 96 % (03/11/19 1123) Vital Signs (24h Range):  Temp:  [97.9 °F (36.6 °C)-98.5 °F (36.9 °C)] 97.9 °F (36.6 °C)  Pulse:  [68-70] 70  Resp:  [16-20] 18  SpO2:  [92 %-98 %] 96 %  BP: (113-146)/(52-61) 125/60     Weight: 48.1 kg (106 lb 0.7 oz)  Body mass index is 19.4 kg/m².    Intake/Output Summary (Last 24 hours) at 3/11/2019 1220  Last data filed at 3/11/2019 1146  Gross per 24 hour   Intake 1000 ml   Output 950 ml   Net 50 ml      Physical Exam   Constitutional: She is oriented to person,  place, and time. No distress.   Elderly and frail    HENT:   Head: Normocephalic and atraumatic.   Nose: Nose normal.   Mouth/Throat: Oropharynx is clear and moist.   Eyes: Conjunctivae and EOM are normal. No scleral icterus.   Neck: Normal range of motion. Neck supple. No tracheal deviation present.   Cardiovascular: Normal rate, regular rhythm and intact distal pulses. Exam reveals no gallop and no friction rub.   Murmur heard.  Pulmonary/Chest: Effort normal and breath sounds normal. No stridor. No respiratory distress. She has no wheezes. She has no rales. She exhibits no tenderness.   Abdominal: Soft. Bowel sounds are normal. She exhibits no distension and no mass. There is no tenderness. There is no rebound and no guarding.   Musculoskeletal: Normal range of motion. She exhibits edema (trace). She exhibits no tenderness or deformity.   Neurological: She is alert and oriented to person, place, and time. No cranial nerve deficit. She exhibits normal muscle tone. Coordination normal.   Skin: Skin is warm and dry. No rash noted. She is not diaphoretic. No erythema. No pallor.   Psychiatric: She has a normal mood and affect. Her behavior is normal. Thought content normal.   Nursing note and vitals reviewed.      Significant Labs:   BMP:   Recent Labs   Lab 03/10/19  1643 03/11/19  0526    81    143   K 4.7 4.4    107   CO2 27 26   BUN 83* 78*   CREATININE 1.9* 1.6*   CALCIUM 8.4* 8.0*   MG 2.1  --      CBC:   Recent Labs   Lab 03/10/19  1643 03/11/19  0240 03/11/19  0525 03/11/19  1121   WBC 6.73  --  6.47  --    HGB 10.3* 9.2* 8.8*  8.8* 9.5*   HCT 31.9* 29.1* 27.7*  27.7* 30.4*     --  151  --      CMP:   Recent Labs   Lab 03/10/19  1643 03/11/19  0526    143   K 4.7 4.4    107   CO2 27 26    81   BUN 83* 78*   CREATININE 1.9* 1.6*   CALCIUM 8.4* 8.0*   PROT 6.0  --    ALBUMIN 3.2*  --    BILITOT 0.5  --    ALKPHOS 68  --    AST 28  --    ALT 16  --    ANIONGAP 9 10    EGFRNONAA 24* 29*     All pertinent labs within the past 24 hours have been reviewed.    Significant Imaging:   Imaging Results    None

## 2019-03-11 NOTE — ASSESSMENT & PLAN NOTE
2 episodes of melena per patient. Suspect upper GI bleed. Today's INR = 3.2.  Goal INR 1.5 before endoscopy will be considered. Reversal up to   Re-consult GI when INR reaches appropriate level

## 2019-03-11 NOTE — SUBJECTIVE & OBJECTIVE
"Past Medical History:   Diagnosis Date    Acute coronary syndrome     Anemia     Anticoagulated on Coumadin 7/13/2015    Arthritis     Asthma     patient denies    Atrial fibrillation     Basal cell carcinoma 10/2015    left neck    Cardiac arrest     Cardiac resynchronization therapy defibrillator (CRT-D) in place 07/13/2015    Pt denies, states it does not shock me    Chronic combined systolic and diastolic congestive heart failure     CKD (chronic kidney disease) stage 3, GFR 30-59 ml/min     Dyslipidemia 1/30/2014    Hyperlipidemia     Hypertension     Hypothyroidism     Ischemic cardiomyopathy 01/30/2014    Macular hole of left eye     Old    Macular hole of left eye     LEV (obstructive sleep apnea) 9/30/2013    Pneumonia     required hospitalization    Recurrent UTI     Refractive error     Spastic colon     Stroke        Past Surgical History:   Procedure Laterality Date    APPENDECTOMY      CARDIAC CATHETERIZATION      CARDIAC PACEMAKER PLACEMENT  2014    CARDIAC VALVE SURGERY      CATARACT EXTRACTION      OU    CHOLECYSTECTOMY      COLONOSCOPY      IRRIGATION AND DEBRIDEMENT OF LEFT KNEE Left 9/12/2017    [facility]formed by [facility] at [facility] OR    MITRAL VALVE REPLACEMENT  2014    MITRAL VALVE SURGERY      x3    REPLACEMENT, PACEMAKER GENERATOR/pt has crt-p versus d Left 6/20/2018    Performed by Manny Dunne MD at [facility] CATH LAB    REVISION, SKIN POCKET, FOR CARDIAC PACEMAKER Left 2/26/2014    Performed by Manny Dunne MD at [facility] CATH LAB       Review of patient's allergies indicates:   Allergen Reactions    Cephalosporins Hives    Metaxalone Itching    Penicillins      Other reaction(s): Unknown      Pregabalin      Other reaction(s): "Bad feeling"      Sulfa (sulfonamide antibiotics) Itching     Family History     Problem Relation (Age of Onset)    COPD Father    Cancer Brother    Coronary artery disease Mother, Father    Diabetes Brother    " Emphysema Father    Epilepsy Mother    Heart attack Mother    Heart disease Father        Tobacco Use    Smoking status: Never Smoker    Smokeless tobacco: Never Used   Substance and Sexual Activity    Alcohol use: No    Drug use: No    Sexual activity: No     Review of Systems   Constitutional: Negative for fever.   HENT: Negative for hearing loss.    Eyes: Negative for visual disturbance.   Respiratory: Negative for cough and shortness of breath.    Cardiovascular: Negative for chest pain.   Gastrointestinal: Positive for abdominal pain (cramping prior to BM) and blood in stool. Negative for abdominal distention, constipation, diarrhea, nausea and vomiting.        As per HPI.   Genitourinary: Positive for dysuria. Negative for frequency and hematuria.   Musculoskeletal: Negative for arthralgias and back pain.   Skin: Negative for rash.   Neurological: Negative for seizures, syncope, numbness and headaches.   Hematological: Does not bruise/bleed easily.   Psychiatric/Behavioral: The patient is not nervous/anxious.      Objective:     Vital Signs (Most Recent):  Temp: 97.9 °F (36.6 °C) (03/11/19 0720)  Pulse: 69 (03/11/19 0905)  Resp: 20 (03/11/19 0720)  BP: (!) 122/57 (03/11/19 0720)  SpO2: (!) 92 % (03/11/19 0720) Vital Signs (24h Range):  Temp:  [97.9 °F (36.6 °C)-98.5 °F (36.9 °C)] 97.9 °F (36.6 °C)  Pulse:  [68-70] 69  Resp:  [16-20] 20  SpO2:  [92 %-98 %] 92 %  BP: (113-146)/(52-61) 122/57     Weight: 48.1 kg (106 lb 0.7 oz) (03/10/19 1517)  Body mass index is 19.4 kg/m².      Intake/Output Summary (Last 24 hours) at 3/11/2019 1111  Last data filed at 3/11/2019 0600  Gross per 24 hour   Intake 1000 ml   Output 350 ml   Net 650 ml       Lines/Drains/Airways     Peripheral Intravenous Line                 Peripheral IV - Single Lumen 03/11/19 0229 Anterior;Left Forearm less than 1 day                Physical Exam   Constitutional: She is oriented to person, place, and time. She appears well-developed and  well-nourished.   HENT:   Head: Normocephalic and atraumatic.   Eyes: EOM are normal.   Neck: Normal range of motion. Neck supple. Carotid bruit is not present.   Cardiovascular: Normal rate and regular rhythm.   No murmur heard.  Pulmonary/Chest: Effort normal and breath sounds normal. No respiratory distress. She has no wheezes.   Abdominal: Soft. Normal appearance and bowel sounds are normal. She exhibits no distension and no mass. There is no tenderness.   Musculoskeletal: She exhibits no edema.   Neurological: She is alert and oriented to person, place, and time. No cranial nerve deficit.   Skin: Skin is warm and dry. No rash noted.   Psychiatric: Her behavior is normal.       Significant Labs:  CBC:   Recent Labs   Lab 03/10/19  1643 03/11/19  0240 03/11/19  0525   WBC 6.73  --  6.47   HGB 10.3* 9.2* 8.8*  8.8*   HCT 31.9* 29.1* 27.7*  27.7*     --  151     CMP:   Recent Labs   Lab 03/10/19  1643 03/11/19  0526    81   CALCIUM 8.4* 8.0*   ALBUMIN 3.2*  --    PROT 6.0  --     143   K 4.7 4.4   CO2 27 26    107   BUN 83* 78*   CREATININE 1.9* 1.6*   ALKPHOS 68  --    ALT 16  --    AST 28  --    BILITOT 0.5  --      Coagulation:   Recent Labs   Lab 03/10/19  1643 03/11/19  0525   INR 3.2* 3.2*   APTT 43.0*  --        Significant Imaging:  Imaging results within the past 24 hours have been reviewed.

## 2019-03-11 NOTE — HPI
"86-year-old female with complex past medical history presents with melena and elevated INR. Patient is on Coumadin for A. Fib and states on Friday (3/8) her INR level was 7.1; however, denied any signs of bleeding at that time. Her last dose of Coumadin was Thursday evening and the pharmacist advised her to hold her coumadin dose for Friday and Saturday. On Sunday, patient noticed a singular episode of black stool. States the stool was black, but noticed "darker red" blood in the toilet bowl and on toilet paper. Patient states she did not take her Coumadin on Sunday due to the bleeding and came to ED. Patient supports some mild lower abdominal cramping before the bowel movement. Patient also supports another episode of black stool in the hospital. Of note, states two weeks prior she was sick with an URI and on an antibiotic (doxycycline) which was completed over a week ago. States 2 years ago had similar episode of loose, bloody stools, but denies receiving any scopes. Most recent INR is supra-therapeutic at 3.2. Most recent H&H is 8.8 and 27.7. Baseline hemoglobin typically between 11-12.   BUN = 7.8 and Creatinine = 1.7. Unsure of date of any prior upper or lower scopes. Denies any shortness of breath, loss of appetite, weakness, nausea, vomiting, hematemesis, or any other issues.   "

## 2019-03-12 PROBLEM — K92.1 MELENA: Status: ACTIVE | Noted: 2019-03-12

## 2019-03-12 PROBLEM — E43 SEVERE MALNUTRITION: Status: ACTIVE | Noted: 2019-03-12

## 2019-03-12 LAB
ANION GAP SERPL CALC-SCNC: 5 MMOL/L
BASOPHILS # BLD AUTO: 0.02 K/UL
BASOPHILS NFR BLD: 0.4 %
BUN SERPL-MCNC: 61 MG/DL
CALCIUM SERPL-MCNC: 7.8 MG/DL
CHLORIDE SERPL-SCNC: 107 MMOL/L
CO2 SERPL-SCNC: 30 MMOL/L
CREAT SERPL-MCNC: 1.3 MG/DL
DIFFERENTIAL METHOD: ABNORMAL
EOSINOPHIL # BLD AUTO: 0.1 K/UL
EOSINOPHIL NFR BLD: 1.6 %
ERYTHROCYTE [DISTWIDTH] IN BLOOD BY AUTOMATED COUNT: 16.7 %
EST. GFR  (AFRICAN AMERICAN): 43 ML/MIN/1.73 M^2
EST. GFR  (NON AFRICAN AMERICAN): 37 ML/MIN/1.73 M^2
FOLATE SERPL-MCNC: 15.3 NG/ML
GLUCOSE SERPL-MCNC: 85 MG/DL
HCT VFR BLD AUTO: 26.6 %
HCT VFR BLD AUTO: 26.9 %
HCT VFR BLD AUTO: 28 %
HGB BLD-MCNC: 8.4 G/DL
HGB BLD-MCNC: 8.5 G/DL
HGB BLD-MCNC: 8.8 G/DL
INR PPP: 2.1
LYMPHOCYTES # BLD AUTO: 1.5 K/UL
LYMPHOCYTES NFR BLD: 29.2 %
MCH RBC QN AUTO: 32 PG
MCHC RBC AUTO-ENTMCNC: 31.6 G/DL
MCV RBC AUTO: 101 FL
MONOCYTES # BLD AUTO: 0.4 K/UL
MONOCYTES NFR BLD: 8.6 %
NEUTROPHILS # BLD AUTO: 3.1 K/UL
NEUTROPHILS NFR BLD: 60.2 %
PLATELET # BLD AUTO: 136 K/UL
PMV BLD AUTO: 10.9 FL
POTASSIUM SERPL-SCNC: 3.8 MMOL/L
PROTHROMBIN TIME: 22.5 SEC
RBC # BLD AUTO: 2.66 M/UL
SODIUM SERPL-SCNC: 142 MMOL/L
VIT B12 SERPL-MCNC: 465 PG/ML
WBC # BLD AUTO: 5.13 K/UL

## 2019-03-12 PROCEDURE — 85610 PROTHROMBIN TIME: CPT

## 2019-03-12 PROCEDURE — 97110 THERAPEUTIC EXERCISES: CPT

## 2019-03-12 PROCEDURE — 97802 MEDICAL NUTRITION INDIV IN: CPT

## 2019-03-12 PROCEDURE — 80048 BASIC METABOLIC PNL TOTAL CA: CPT

## 2019-03-12 PROCEDURE — 25000003 PHARM REV CODE 250: Performed by: NURSE PRACTITIONER

## 2019-03-12 PROCEDURE — C9113 INJ PANTOPRAZOLE SODIUM, VIA: HCPCS | Performed by: NURSE PRACTITIONER

## 2019-03-12 PROCEDURE — 85014 HEMATOCRIT: CPT

## 2019-03-12 PROCEDURE — 96376 TX/PRO/DX INJ SAME DRUG ADON: CPT

## 2019-03-12 PROCEDURE — 21400001 HC TELEMETRY ROOM

## 2019-03-12 PROCEDURE — 97116 GAIT TRAINING THERAPY: CPT

## 2019-03-12 PROCEDURE — 85025 COMPLETE CBC W/AUTO DIFF WBC: CPT

## 2019-03-12 PROCEDURE — 63600175 PHARM REV CODE 636 W HCPCS: Performed by: NURSE PRACTITIONER

## 2019-03-12 PROCEDURE — 36415 COLL VENOUS BLD VENIPUNCTURE: CPT

## 2019-03-12 PROCEDURE — 85018 HEMOGLOBIN: CPT | Mod: 91

## 2019-03-12 RX ORDER — PHYTONADIONE 5 MG/1
5 TABLET ORAL ONCE
Status: COMPLETED | OUTPATIENT
Start: 2019-03-12 | End: 2019-03-12

## 2019-03-12 RX ORDER — SODIUM CHLORIDE 9 MG/ML
INJECTION, SOLUTION INTRAVENOUS CONTINUOUS
Status: DISCONTINUED | OUTPATIENT
Start: 2019-03-12 | End: 2019-03-14 | Stop reason: HOSPADM

## 2019-03-12 RX ADMIN — PANTOPRAZOLE SODIUM 40 MG: 40 INJECTION, POWDER, LYOPHILIZED, FOR SOLUTION INTRAVENOUS at 09:03

## 2019-03-12 RX ADMIN — DIGOXIN 125 MCG: 125 TABLET ORAL at 08:03

## 2019-03-12 RX ADMIN — FUROSEMIDE 40 MG: 40 TABLET ORAL at 09:03

## 2019-03-12 RX ADMIN — FUROSEMIDE 40 MG: 40 TABLET ORAL at 04:03

## 2019-03-12 RX ADMIN — AMIODARONE HYDROCHLORIDE 200 MG: 200 TABLET ORAL at 09:03

## 2019-03-12 RX ADMIN — CARVEDILOL 25 MG: 12.5 TABLET, FILM COATED ORAL at 04:03

## 2019-03-12 RX ADMIN — LEVOTHYROXINE SODIUM 50 MCG: 50 TABLET ORAL at 09:03

## 2019-03-12 RX ADMIN — CARVEDILOL 25 MG: 12.5 TABLET, FILM COATED ORAL at 09:03

## 2019-03-12 RX ADMIN — SODIUM CHLORIDE: 0.9 INJECTION, SOLUTION INTRAVENOUS at 09:03

## 2019-03-12 RX ADMIN — PHYTONADIONE 5 MG: 5 TABLET ORAL at 12:03

## 2019-03-12 RX ADMIN — DOXYCYCLINE HYCLATE 100 MG: 100 TABLET, COATED ORAL at 09:03

## 2019-03-12 RX ADMIN — PANTOPRAZOLE SODIUM 40 MG: 40 INJECTION, POWDER, LYOPHILIZED, FOR SOLUTION INTRAVENOUS at 08:03

## 2019-03-12 RX ADMIN — DOXYCYCLINE HYCLATE 100 MG: 100 TABLET, COATED ORAL at 08:03

## 2019-03-12 RX ADMIN — LOSARTAN POTASSIUM 25 MG: 25 TABLET, FILM COATED ORAL at 09:03

## 2019-03-12 RX ADMIN — ALLOPURINOL 100 MG: 100 TABLET ORAL at 09:03

## 2019-03-12 NOTE — PLAN OF CARE
[facility]     03/12/19 1608   Post-Acute Status   Post-Acute Authorization Home Health/Hospice   Home Health/Hospice Status Referrals Sent

## 2019-03-12 NOTE — CONSULTS
"  [facility] -   Adult Nutrition  Consult Note    SUMMARY     Recommendations    Recommendation: 1. When medically able, ADAT to Cardiac. 2. Add boost breeze TID. 3.  If unable to adv diet to at least full liquids within 72 hrs, consider alternate nutrition support. 4. Will continue to monitor.   Intervention: Provided coumadin diet education w/ handouts from the Nutrition Care Manual. Pt verbalized understanding. RD contact info provided.   Goals: Meet > 85 % EEN/EPN while admitted  Nutrition Goal Status: new  Communication of RD Recs: reviewed with RN(POc, sticky note)    Reason for Assessment    Reason For Assessment: consult   Dx:  1. Melena    2. Chronic anticoagulation    3. Subtherapeutic international normalized ratio (INR)    4. Prolonged Q-T interval on ECG    5. Upper GI bleed      Hx: Anemia, Basal cell carcinoma, cardiac arrest, CKD, HLD, HTN, stroke     General info comments: RD consulted x coumadin diet education. Pt admitted x evaluation of blood in stool. Pt now on clear liquid diet. Pt reports wt loss of 9 lbs within the last month. Pt reports decreased appetite and intake x 1 month PTA (PO intake > 75 %). Per NFPE 3.12.19, moderate subcutaneous fat and muscle loss.   Discharge plan: Cardiac diet     Nutrition Risk Screen    Nutrition Risk Screen: no indicators present    Nutrition/Diet History    Spiritual, Cultural Beliefs, Hinduism Practices, Values that Affect Care: no    Anthropometrics    Temp: 98.5 °F (36.9 °C)  Height Method: Stated  Height: 5' 2" (157.5 cm)  Height (inches): 62 in  Weight Method: Standard Scale  Weight: 48.1 kg (106 lb 0.7 oz)  Weight (lb): 106.04 lb  Ideal Body Weight (IBW), Female: 110 lb  % Ideal Body Weight, Female (lb): 96.4 lb  BMI (Calculated): 19.4  BMI Grade: 18.5-24.9 - normal       Lab/Procedures/Meds    Pertinent Labs Reviewed: reviewed  BMP  Lab Results   Component Value Date     03/12/2019    K 3.8 03/12/2019     03/12/2019    CO2 " 30 (H) 03/12/2019    BUN 61 (H) 03/12/2019    CREATININE 1.3 03/12/2019    CALCIUM 7.8 (L) 03/12/2019    ANIONGAP 5 (L) 03/12/2019    ESTGFRAFRICA 43 (A) 03/12/2019    EGFRNONAA 37 (A) 03/12/2019     Lab Results   Component Value Date    CALCIUM 7.8 (L) 03/12/2019    PHOS 3.4 03/10/2019     Lab Results   Component Value Date    ALBUMIN 3.2 (L) 03/10/2019     No results for input(s): POCTGLUCOSE in the last 24 hours.\    Pertinent Medications Reviewed: reviewed    Physical Findings/Assessment     skin: murtaza 21     Estimated/Assessed Needs    Weight Used For Calorie Calculations: 48.1 kg (106 lb 0.7 oz)  Energy Calorie Requirements (kcal): 1443  Energy Need Method: Kcal/kg  Protein Requirements: 48 - 57 g  Weight Used For Protein Calculations: 48.1 kg (106 lb 0.7 oz)     Estimated Fluid Requirement Method: RDA Method(or per MD)  RDA Method (mL): 1443         Nutrition Prescription Ordered    Nutrition Order Comments: clear liquid diet    Evaluation of Received Nutrient/Fluid Intake          Intake/Output Summary (Last 24 hours) at 3/12/2019 1259  Last data filed at 3/12/2019 0905  Gross per 24 hour   Intake 240 ml   Output 1351 ml   Net -1111 ml       % Intake of Estimated Energy Needs: 25 - 50 %  % Meal Intake: 25 - 50 %    Nutrition Risk      2xweekly     Assessment and Plan    Severe malnutrition    Malnutrition in the context of Acute Illness/Injury    Related to (etiology):  Inadequate energy intake     Signs and Symptoms (as evidenced by):  Energy Intake: <75% of estimated energy requirement for 1 month   Body Fat Depletion: moderate depletion of orbitals   Muscle Mass Depletion: moderate depletion of temples, clavicle region and scapular region   Weight Loss: 7.8 % within the last month     Interventions/Recommendations (treatment strategy):  See above     Nutrition Diagnosis Status:  New          Monitor and Evaluation    Food and Nutrient Intake: energy intake, food and beverage intake  Food and Nutrient  Adminstration: diet order  Anthropometric Measurements: weight  Biochemical Data, Medical Tests and Procedures: electrolyte and renal panel, glucose/endocrine profile  Nutrition-Focused Physical Findings: overall appearance     Malnutrition Assessment                 Orbital Region (Subcutaneous Fat Loss): moderate depletion   [place] Region (Muscle Loss): moderate depletion  Clavicle Bone Region (Muscle Loss): moderate depletion  Clavicle and Acromion Bone Region (Muscle Loss): moderate depletion  Scapular Bone Region (Muscle Loss): moderate depletion                 Nutrition Follow-Up    RD Follow-up?: Yes

## 2019-03-12 NOTE — PLAN OF CARE
Problem: Adult Inpatient Plan of Care  Goal: Plan of Care Review  Outcome: Ongoing (interventions implemented as appropriate)    Recommendations     Recommendation: 1. When medically able, ADAT to Cardiac. 2. Add boost breeze TID. 3.  If unable to adv diet to at least full liquids within 72 hrs, consider alternate nutrition support. 4. Will continue to monitor.   Intervention: Provided coumadin diet education w/ handouts from the Nutrition Care Manual. Pt verbalized understanding. RD contact info provided.   Goals: Meet > 85 % EEN/EPN while admitted  Nutrition Goal Status: new  Communication of RD Recs: reviewed with RN(POc, sticky note)

## 2019-03-12 NOTE — PT/OT/SLP EVAL
Occupational Therapy   Evaluation and Discharge Note    Name: Jennifer Mathews  MRN: [id number]  Admitting Diagnosis:  Upper GI bleed      Recommendations:     Discharge Recommendations: ( OT)  Discharge Equipment Recommendations:  none  Barriers to discharge:  Decreased caregiver support    Assessment:     Jennifer Mathews is a 86 y.o. female with a medical diagnosis of Upper GI bleed. At this time, patient is functioning at their prior level of function and does not require further acute OT services.     Plan:     During this hospitalization, patient does not require further acute OT services.  Please re-consult if situation changes.    · Plan of Care Reviewed with: patient    Subjective     Chief Complaint:   Patient/Family Comments/goals:     Occupational Profile:  Living Environment: LIVES ALONE 1 STORY AND RAMP TO ENTER  Previous level of function: (I) WITH ADL'S AND FUNCTIONAL MOBILITY WITHIN HOUSE AND MOD (I) WITHIN COMMUNITY  Roles and Routines: OCCUPATIONAL THERAPY  Equipment Used at home:  cane, straight, bath bench, raised toilet, bedside commode  Assistance upon Discharge:     Pain/Comfort:  ·      Patients cultural, spiritual, Evangelical conflicts given the current situation:      Objective:     Communicated with: NURSE AND Epic CHART REVIEW prior to session.  Patient found up in chair with telemetry upon OT entry to room.    General Precautions: Standard, fall   Orthopedic Precautions:N/A   Braces: N/A     Occupational Performance:    Bed Mobility:    ·     Functional Mobility/Transfers:  · Patient completed Sit <> Stand Transfer with supervision  with  no assistive device   · Functional Mobility: PT AMBULATED 20 FEET WITH S WITH NO AE      Activities of Daily Living:  · Upper Body Dressing: supervision .  · Lower Body Dressing: supervision .    Cognitive/Visual Perceptual:  Cognitive/Psychosocial Skills:     -       Oriented to: Person, Place, Time and Situation   -       Follows  Commands/attention:Follows multistep  commands  -       Communication: clear/fluent  -       Memory: No Deficits noted  -       Safety awareness/insight to disability: intact   Visual/Perceptual:      -Intact .    Physical Exam:  Upper Extremity Range of Motion:     -       Right Upper Extremity: WFL  -       Left Upper Extremity: WFL  Upper Extremity Strength:    -       Right Upper Extremity: MMT:-4/5 GROSSLY  -       Left Upper Extremity: MMT:-4/5 GROSSLY   Strength:    -       Right Upper Extremity: WFL  -       Left Upper Extremity: WFL    AMPAC 6 Click ADL:  AMPAC Total Score: 24    Treatment & Education:    Education:  PT LEFT SIITTING IN BED SIDE SOFA WITH ALL NEEDS MET. PT EDUCATED ABOUT SAFETY AND VERBALIZED UNDERSTANDING NOT TO RETURN TO BED WITHOUT STAFF  Patient left up in chair with all lines intact, call button in reach and NURSE notified    GOALS:   Multidisciplinary Problems     Occupational Therapy Goals     Not on file                History:     Past Medical History:   Diagnosis Date    Acute coronary syndrome     Anemia     Anticoagulated on Coumadin 7/13/2015    Arthritis     Asthma     patient denies    Atrial fibrillation     Basal cell carcinoma 10/2015    left neck    Cardiac arrest     Cardiac resynchronization therapy defibrillator (CRT-D) in place 07/13/2015    Pt denies, states it does not shock me    Chronic combined systolic and diastolic congestive heart failure     CKD (chronic kidney disease) stage 3, GFR 30-59 ml/min     Dyslipidemia 1/30/2014    Hyperlipidemia     Hypertension     Hypothyroidism     Ischemic cardiomyopathy 01/30/2014    Macular hole of left eye     Old    Macular hole of left eye     LEV (obstructive sleep apnea) 9/30/2013    Pneumonia     required hospitalization    Recurrent UTI     Refractive error     Spastic colon     Stroke        Past Surgical History:   Procedure Laterality Date    APPENDECTOMY      CARDIAC CATHETERIZATION       CARDIAC PACEMAKER PLACEMENT  2014    CARDIAC VALVE SURGERY      CATARACT EXTRACTION      OU    CHOLECYSTECTOMY      COLONOSCOPY      IRRIGATION AND DEBRIDEMENT OF LEFT KNEE Left 9/12/2017    [facility] OR    MITRAL VALVE REPLACEMENT  2014    MITRAL VALVE SURGERY      x3    REPLACEMENT, PACEMAKER GENERATOR/pt has crt-p versus d Left 6/20/2018    Performed by Manny Dunne MD at [facility] CATH LAB    REVISION, SKIN POCKET, FOR CARDIAC PACEMAKER Left 2/26/2014    Performed by Manny Dunne MD at [facility] CATH LAB       Time Tracking:     OT Date of Treatment: 03/11/19  OT Start Time: 0930  OT Stop Time: 0953  OT Total Time (min): 23 min    Billable Minutes:Evaluation 10 MINUTES  Therapeutic Activity 13 MINUTES    Brie Schreiber, OT  3/11/2019

## 2019-03-12 NOTE — PROGRESS NOTES
[facility] Hospital Medicine  Progress Note    Patient Name: Jennifer Mathews  MRN: [id number]  Patient Class: IP- Inpatient   Admission Date: 3/10/2019  Length of Stay: 0 days  Attending Physician: Clarice Sin MD  Primary Care Provider: Desirae Bello MD        Subjective:     Principal Problem:Upper GI bleed    HPI:  Jennifer Mathews is a 86 y.o. female patient with a PMHx of stroke, hypothyroidism, HTN, HLD, CKD, afib anemia, open heart surgery, and ischemic cardiomyopathy who presents for evaluation of blood in stool. Onset this afternoon.  described as dark stool.  Pt reports 2 episodes today. No mitigating or exacerbating factors reported. To note patient on warfarin. Pt notes she had her coumadin checked on Friday and it was 7.1. And has been being held. Patient evaluated in Er. H/H stable 10.3/31.9. INR today 3.2  BUN above baseline at 83 today Cr. Stable with basline, today 1.9. HM consulted. Patient placed in obs.       Hospital Course:  The pt is a 87 yo female with mitral valve replacement x 3-bioprosthesis, Stroke-no obvious deficits, HTN, PAF, combined CHF with EF 25%, ICM s/p PPM-CRT-D placement, recurrent UTI, CKD3-4, and hypothyroidism who was placed in observation for GI bleed and UTI. On 3/8/19, her INR was 7.1. INR was 3.2 on admit. Hgb  10.3>9.4>8.5.     Pt reports she no further melena today. INR remains 2.1. Will give another dose of Vitamin K 5mg po. GI evaluated pt and recommends EGD if INR 1.5 or lower         Interval History: no further melena today   Review of Systems   Constitutional: Negative for appetite change, chills, diaphoresis, fatigue, fever and unexpected weight change.   HENT: Negative for congestion, nosebleeds, sinus pressure and sore throat.    Eyes: Negative for pain, discharge and visual disturbance.   Respiratory: Negative for cough, chest tightness, shortness of breath, wheezing and stridor.    Cardiovascular: Negative for chest pain,  palpitations and leg swelling.   Gastrointestinal: Negative for abdominal distention, abdominal pain, blood in stool, constipation, diarrhea, nausea and vomiting.   Endocrine: Negative for cold intolerance and heat intolerance.   Genitourinary: Positive for dysuria. Negative for difficulty urinating, flank pain, frequency and urgency.   Musculoskeletal: Negative for arthralgias, back pain, joint swelling, myalgias, neck pain and neck stiffness.   Skin: Negative for rash and wound.   Allergic/Immunologic: Negative for food allergies and immunocompromised state.   Neurological: Negative for dizziness, seizures, syncope, facial asymmetry, speech difficulty, weakness, light-headedness, numbness and headaches.   Hematological: Negative for adenopathy.   Psychiatric/Behavioral: Negative for agitation, confusion and hallucinations.     Objective:     Vital Signs (Most Recent):  Temp: 98 °F (36.7 °C) (03/12/19 0741)  Pulse: 73 (03/12/19 0741)  Resp: 16 (03/12/19 0327)  BP: 130/63 (03/12/19 0741)  SpO2: (!) 93 % (03/12/19 0741) Vital Signs (24h Range):  Temp:  [98 °F (36.7 °C)-98.1 °F (36.7 °C)] 98 °F (36.7 °C)  Pulse:  [69-73] 73  Resp:  [16-18] 16  SpO2:  [93 %-94 %] 93 %  BP: (109-137)/(54-64) 130/63     Weight: 48.1 kg (106 lb 0.7 oz)  Body mass index is 19.4 kg/m².    Intake/Output Summary (Last 24 hours) at 3/12/2019 1134  Last data filed at 3/12/2019 0905  Gross per 24 hour   Intake 480 ml   Output 1951 ml   Net -1471 ml      Physical Exam   Constitutional: She is oriented to person, place, and time. No distress.   Elderly and frail    HENT:   Head: Normocephalic and atraumatic.   Nose: Nose normal.   Mouth/Throat: Oropharynx is clear and moist.   Eyes: Conjunctivae and EOM are normal. No scleral icterus.   Neck: Normal range of motion. Neck supple. No tracheal deviation present.   Cardiovascular: Normal rate, regular rhythm and intact distal pulses. Exam reveals no gallop and no friction rub.   Murmur  heard.  Pulmonary/Chest: Effort normal and breath sounds normal. No stridor. No respiratory distress. She has no wheezes. She has no rales. She exhibits no tenderness.   Abdominal: Soft. Bowel sounds are normal. She exhibits no distension and no mass. There is no tenderness. There is no rebound and no guarding.   Musculoskeletal: Normal range of motion. She exhibits edema (trace). She exhibits no tenderness or deformity.   Neurological: She is alert and oriented to person, place, and time. No cranial nerve deficit. She exhibits normal muscle tone. Coordination normal.   Skin: Skin is warm and dry. No rash noted. She is not diaphoretic. No erythema. No pallor.   Psychiatric: She has a normal mood and affect. Her behavior is normal. Thought content normal.   Nursing note and vitals reviewed.      Significant Labs:   BMP:   Recent Labs   Lab 03/10/19  1643  03/12/19  0449      < > 85      < > 142   K 4.7   < > 3.8      < > 107   CO2 27   < > 30*   BUN 83*   < > 61*   CREATININE 1.9*   < > 1.3   CALCIUM 8.4*   < > 7.8*   MG 2.1  --   --     < > = values in this interval not displayed.     CBC:   Recent Labs   Lab 03/10/19  1643  03/11/19  0525 03/11/19  1121 03/11/19  1826 03/12/19 0449   WBC 6.73  --  6.47  --   --  5.13   HGB 10.3*   < > 8.8*  8.8* 9.5* 9.4* 8.5*   HCT 31.9*   < > 27.7*  27.7* 30.4* 30.3* 26.9*     --  151  --   --  136*    < > = values in this interval not displayed.     CMP:   Recent Labs   Lab 03/10/19  1643 03/11/19  0526 03/12/19  0449    143 142   K 4.7 4.4 3.8    107 107   CO2 27 26 30*    81 85   BUN 83* 78* 61*   CREATININE 1.9* 1.6* 1.3   CALCIUM 8.4* 8.0* 7.8*   PROT 6.0  --   --    ALBUMIN 3.2*  --   --    BILITOT 0.5  --   --    ALKPHOS 68  --   --    AST 28  --   --    ALT 16  --   --    ANIONGAP 9 10 5*   EGFRNONAA 24* 29* 37*     All pertinent labs within the past 24 hours have been reviewed.    Significant Imaging:   Imaging Results     None       Assessment/Plan:      * Upper GI bleed    Cont Serial H/H  Give another dose of Vitamin K  GI recommends EGD if INR less than 1.5  Cont Protonix   NPO after MN        Supratherapeutic INR    Was 7.1 on 03/08/2019  INR 2.1 today  Vit K 5mg po x one dose      UTI (urinary tract infection)    Transition to oral Doxycycline   Urine culture pending      Melena    See above        Macrocytic anemia    Monitor        Acute on chronic combined systolic and diastolic congestive heart failure           Chronic renal failure, stage 4 (severe)    Monitor closely        Acquired hypothyroidism      Continue Synthroid.     A-fib    Continue amiodarone, digoxin, Coreg.    Hold ASA and Coumadin      S/P MVR (mitral valve replacement)    Bioprosthesis  Stable          Chronic combined systolic and diastolic congestive heart failure    Cont Coreg, Dig, And Losartan  Hold lasix and metolazone for now          VTE Risk Mitigation (From admission, onward)        Ordered     Place HEBER hose  Until discontinued      03/10/19 1722     Place sequential compression device  Until discontinued      03/10/19 1722              Serenity Pickett NP  Department of Hospital Medicine   [facility]

## 2019-03-12 NOTE — ASSESSMENT & PLAN NOTE
Malnutrition in the context of Acute Illness/Injury    Related to (etiology):  Inadequate energy intake     Signs and Symptoms (as evidenced by):  Energy Intake: <75% of estimated energy requirement for 1 month   Body Fat Depletion: moderate depletion of orbitals   Muscle Mass Depletion: moderate depletion of temples, clavicle region and scapular region   Weight Loss: 7.8 % within the last month     Interventions/Recommendations (treatment strategy):  See above     Nutrition Diagnosis Status:  New

## 2019-03-12 NOTE — ASSESSMENT & PLAN NOTE
Cont Serial H/H  Give another dose of Vitamin K  GI recommends EGD if INR less than 1.5  Cont Protonix   NPO after MN

## 2019-03-12 NOTE — PLAN OF CARE
Met with patient and daughter Gillian Do [phone] at bedside to complete discharge planning assessment. Patient reports that she lives at home alone but has 2 daughters that live nearby that provide support. Patient reports that she is independent with adls and iadls, but uses DME for assistance with mobility, can, standard walker, rollator, grab bar.  Patient and daughter report that PT/OT recommended Home health services. Patient and daughter specifically requested [facility] since [facility] only [facility]. Obtained signed patient preference form for [facility] services, placed in chart. Updated white board with 's name and number. Provided Transitional Care Folder with information on Advance Directives, Discharge Planning Begins on Admission pamphlet, and [facility] Bedside Delivery pamphlet. Instructed patient or family to call with any questions or concerns.      [facility]/pharmacy #[id number] - [place], [place] - [address] [facility]  [address] [address] [address]  Phone: [phone] Fax: [phone]    Desirae Bello MD  Payor: MEDICARE / Plan: MEDICARE PART A & B / Product Type: [facility] /         03/12/19 1559   Discharge Assessment   Assessment Type Discharge Planning Assessment   Confirmed/corrected address and phone number on facesheet? Yes   Assessment information obtained from? Patient;Caregiver   Communicated expected length of stay with patient/caregiver yes   Prior to hospitilization cognitive status: Alert/Oriented   Prior to hospitalization functional status: Assistive Equipment   Current cognitive status: Alert/Oriented   Current Functional Status: Assistive Equipment   Lives With alone   Able to Return to Prior Arrangements yes   Is patient able to care for self after discharge? Yes   Who are your caregiver(s) and their phone number(s)? Zhao West, grandson [phone]; Eri Garnett, daughter [phone]; Gillian  Ernestina, daughter [phone]   Readmission Within the Last 30 Days no previous admission in last 30 days   Patient currently being followed by outpatient case management? No   Patient currently receives any other outside agency services? No   Equipment Currently Used at Home cane, straight;walker, standard;rollator;grab bar   Do you have any problems affording any of your prescribed medications? No   Does the patient have transportation home? Yes   Transportation Anticipated family or friend will provide   Does the patient receive services at the [facility]? Yes   Discharge Plan A Home Health;Home with family   DME Needed Upon Discharge  none   Patient/Family in Agreement with Plan yes   Does the patient have transportation to healthcare appointments? Yes

## 2019-03-12 NOTE — PT/OT/SLP PROGRESS
Physical Therapy  Treatment    Jennifer Mathews   MRN: [id number]   Admitting Diagnosis: Upper GI bleed    PT Received On: 03/12/19  PT Start Time: 1110     PT Stop Time: 1135    PT Total Time (min): 25 min       Billable Minutes:  Gait Training 15 and Therapeutic Exercise 10    Treatment Type: Treatment        PTA Visit Number: 1       General Precautions: Standard, fall  Orthopedic Precautions: N/A   Braces: N/A    Spiritual, Cultural Beliefs, Confucianist Practices, Values that Affect Care: no    Subjective:  Communicated with Epic AND NURSEDUNIA  prior to session.  PATIENT AGREE TO TX NOW.    Pain/Comfort  Pain Rating 1: 0/10    Objective:   Patient found with: telemetry, SUPINE . ASSISTED PATIENT OOB T/FS FOR GT INTO HALLWAY , GOOD PACE,  LITTLE UNSTEADY SO UTILIZED RW AND WAS ABLE TO GO FURTHER DISTANCE VERSUS A SC.    Functional Mobility:  Bed Mobility:    SUPINE TO SIT AT CLOSE SBAX1.    Transfers:   SITTO STAND , STAND TO SIT AT CGAX1.    Gait:    100'X2 , WITH RW AT CGAX1 VCS FOR BODY ALIGNMENT.    Balance:   Static Sit: FAIR: Maintains without assist, but unable to take any challenges   Dynamic Sit: FAIR+: Maintains balance through MINIMAL excursions of active trunk motion  Static Stand: FAIR: Maintains without assist but unable to take challenges  Dynamic stand: FAIR: Needs CONTACT GUARD during gait     Therapeutic Activities and Exercises:  BRIONNA LE EXERCISE INSTRUCTION , OOB T/FS TO B/S CHAIR, GT INTO HALLWAY.    AM-PAC 6 CLICK MOBILITY  How much help from another person does this patient currently need?   1 = Unable, Total/Dependent Assistance  2 = A lot, Maximum/Moderate Assistance  3 = A little, Minimum/Contact Guard/Supervision  4 = None, Modified Ross/Independent    Turning over in bed (including adjusting bedclothes, sheets and blankets)?: 4  Sitting down on and standing up from a chair with arms (e.g., wheelchair, bedside commode, etc.): 4  Moving from lying on back to sitting on the side of  the bed?: 4  Moving to and from a bed to a chair (including a wheelchair)?: 4  Need to walk in hospital room?: 3  Climbing 3-5 steps with a railing?: 1  Basic Mobility Total Score: 20    AM-PAC Raw Score CMS G-Code Modifier Level of Impairment Assistance   6 % Total / Unable   7 - 9 CM 80 - 100% Maximal Assist   10 - 14 CL 60 - 80% Moderate Assist   15 - 19 CK 40 - 60% Moderate Assist   20 - 22 CJ 20 - 40% Minimal Assist   23 CI 1-20% SBA / CGA   24 CH 0% Independent/ Mod I     Patient left up in chair with all lines intact and call button in reach, FAMILY PRESENT.    Assessment:  Jennifer Mathews is a 86 y.o. female with a medical diagnosis of Upper GI bleed . PATIENT PARTICIPATING WELL WITH ALL THERAPUETIC ACTIVITIES.     Rehab identified problem list/impairments: Rehab identified problem list/impairments: weakness, gait instability, impaired endurance, impaired self care skills, impaired functional mobilty, impaired balance    Rehab potential is excellent. PATIENT MOTIVATED TO INCREASE ACTIVITY LEVEL AS TOLERATED.    Activity tolerance: Excellent    Discharge recommendations:       Barriers to discharge:      Equipment recommendations: Equipment Needed After Discharge: none     GOALS:   Multidisciplinary Problems     Physical Therapy Goals        Problem: Physical Therapy Goal    Goal Priority Disciplines Outcome Goal Variances Interventions   Physical Therapy Goal     PT, PT/OT      Description:  LTG'S TO BE MET IN 7 DAYS (3-18-19)  1. PT WILL BE TENNILLE WITH BED MOBILITY  2. PT WILL BE TENNILLE WITH TF'S  3. PT WILL ' WITH SPV OR RW WITH SBA  4. PT WILL DEMO G DYNAMIC BALANCE DURING GAIT                     PLAN:    Patient to be seen 5 x/week  to address the above listed problems via gait training, therapeutic activities, therapeutic exercises  Plan of Care expires: 03/18/19  Plan of Care reviewed with: patient         Mariah Wesley, PTA  03/12/2019

## 2019-03-12 NOTE — PLAN OF CARE
Problem: Adult Inpatient Plan of Care  Goal: Plan of Care Review  Outcome: Ongoing (interventions implemented as appropriate)  Pt alert and oriented. POC reviewed. NPO after midnight. Cane at bedside.  Pt remained free of falls during shift. Bed alarm set , call light in reach, room free of clutter, side rails  up x2, pt on telemetry monitor paced, will continue to monitor.

## 2019-03-12 NOTE — PLAN OF CARE
Problem: Physical Therapy Goal  Goal: Physical Therapy Goal  LTG'S TO BE MET IN 7 DAYS (3-18-19)  1. PT WILL BE TENNILLE WITH BED MOBILITY  2. PT WILL BE TENNILLE WITH TF'S  3. PT WILL ' WITH SPV OR RW WITH SBA  4. PT WILL DEMO G DYNAMIC BALANCE DURING GAIT    Outcome: Ongoing (interventions implemented as appropriate)  PATIENT DOING WELL WITH ALL THERAPY OOB ACTIVITIES , AMBULATING IN HALLWAY AT CGAX1 WITH 'X2, GOOD STEADY PACE.

## 2019-03-12 NOTE — SUBJECTIVE & OBJECTIVE
Interval History: no further melena today   Review of Systems   Constitutional: Negative for appetite change, chills, diaphoresis, fatigue, fever and unexpected weight change.   HENT: Negative for congestion, nosebleeds, sinus pressure and sore throat.    Eyes: Negative for pain, discharge and visual disturbance.   Respiratory: Negative for cough, chest tightness, shortness of breath, wheezing and stridor.    Cardiovascular: Negative for chest pain, palpitations and leg swelling.   Gastrointestinal: Negative for abdominal distention, abdominal pain, blood in stool, constipation, diarrhea, nausea and vomiting.   Endocrine: Negative for cold intolerance and heat intolerance.   Genitourinary: Positive for dysuria. Negative for difficulty urinating, flank pain, frequency and urgency.   Musculoskeletal: Negative for arthralgias, back pain, joint swelling, myalgias, neck pain and neck stiffness.   Skin: Negative for rash and wound.   Allergic/Immunologic: Negative for food allergies and immunocompromised state.   Neurological: Negative for dizziness, seizures, syncope, facial asymmetry, speech difficulty, weakness, light-headedness, numbness and headaches.   Hematological: Negative for adenopathy.   Psychiatric/Behavioral: Negative for agitation, confusion and hallucinations.     Objective:     Vital Signs (Most Recent):  Temp: 98 °F (36.7 °C) (03/12/19 0741)  Pulse: 73 (03/12/19 0741)  Resp: 16 (03/12/19 0327)  BP: 130/63 (03/12/19 0741)  SpO2: (!) 93 % (03/12/19 0741) Vital Signs (24h Range):  Temp:  [98 °F (36.7 °C)-98.1 °F (36.7 °C)] 98 °F (36.7 °C)  Pulse:  [69-73] 73  Resp:  [16-18] 16  SpO2:  [93 %-94 %] 93 %  BP: (109-137)/(54-64) 130/63     Weight: 48.1 kg (106 lb 0.7 oz)  Body mass index is 19.4 kg/m².    Intake/Output Summary (Last 24 hours) at 3/12/2019 1134  Last data filed at 3/12/2019 0905  Gross per 24 hour   Intake 480 ml   Output 1951 ml   Net -1471 ml      Physical Exam   Constitutional: She is oriented  to person, place, and time. No distress.   Elderly and frail    HENT:   Head: Normocephalic and atraumatic.   Nose: Nose normal.   Mouth/Throat: Oropharynx is clear and moist.   Eyes: Conjunctivae and EOM are normal. No scleral icterus.   Neck: Normal range of motion. Neck supple. No tracheal deviation present.   Cardiovascular: Normal rate, regular rhythm and intact distal pulses. Exam reveals no gallop and no friction rub.   Murmur heard.  Pulmonary/Chest: Effort normal and breath sounds normal. No stridor. No respiratory distress. She has no wheezes. She has no rales. She exhibits no tenderness.   Abdominal: Soft. Bowel sounds are normal. She exhibits no distension and no mass. There is no tenderness. There is no rebound and no guarding.   Musculoskeletal: Normal range of motion. She exhibits edema (trace). She exhibits no tenderness or deformity.   Neurological: She is alert and oriented to person, place, and time. No cranial nerve deficit. She exhibits normal muscle tone. Coordination normal.   Skin: Skin is warm and dry. No rash noted. She is not diaphoretic. No erythema. No pallor.   Psychiatric: She has a normal mood and affect. Her behavior is normal. Thought content normal.   Nursing note and vitals reviewed.      Significant Labs:   BMP:   Recent Labs   Lab 03/10/19  1643  03/12/19  0449      < > 85      < > 142   K 4.7   < > 3.8      < > 107   CO2 27   < > 30*   BUN 83*   < > 61*   CREATININE 1.9*   < > 1.3   CALCIUM 8.4*   < > 7.8*   MG 2.1  --   --     < > = values in this interval not displayed.     CBC:   Recent Labs   Lab 03/10/19  1643  03/11/19  0525 03/11/19  1121 03/11/19  1826 03/12/19  0449   WBC 6.73  --  6.47  --   --  5.13   HGB 10.3*   < > 8.8*  8.8* 9.5* 9.4* 8.5*   HCT 31.9*   < > 27.7*  27.7* 30.4* 30.3* 26.9*     --  151  --   --  136*    < > = values in this interval not displayed.     CMP:   Recent Labs   Lab 03/10/19  1643 03/11/19  0526 03/12/19  0449     143 142   K 4.7 4.4 3.8    107 107   CO2 27 26 30*    81 85   BUN 83* 78* 61*   CREATININE 1.9* 1.6* 1.3   CALCIUM 8.4* 8.0* 7.8*   PROT 6.0  --   --    ALBUMIN 3.2*  --   --    BILITOT 0.5  --   --    ALKPHOS 68  --   --    AST 28  --   --    ALT 16  --   --    ANIONGAP 9 10 5*   EGFRNONAA 24* 29* 37*     All pertinent labs within the past 24 hours have been reviewed.    Significant Imaging:   Imaging Results    None

## 2019-03-13 ENCOUNTER — ANESTHESIA EVENT (OUTPATIENT)
Dept: ENDOSCOPY | Facility: HOSPITAL | Age: 84
DRG: 377 | End: 2019-03-13
Payer: MEDICARE

## 2019-03-13 ENCOUNTER — ANESTHESIA (OUTPATIENT)
Dept: ENDOSCOPY | Facility: HOSPITAL | Age: 84
DRG: 377 | End: 2019-03-13
Payer: MEDICARE

## 2019-03-13 PROBLEM — D62 ACUTE BLOOD LOSS ANEMIA: Status: ACTIVE | Noted: 2018-04-15

## 2019-03-13 LAB
ANION GAP SERPL CALC-SCNC: 4 MMOL/L
BACTERIA UR CULT: NORMAL
BASOPHILS # BLD AUTO: 0.03 K/UL
BASOPHILS NFR BLD: 0.6 %
BUN SERPL-MCNC: 46 MG/DL
CALCIUM SERPL-MCNC: 7.9 MG/DL
CHLORIDE SERPL-SCNC: 111 MMOL/L
CO2 SERPL-SCNC: 29 MMOL/L
CREAT SERPL-MCNC: 1.1 MG/DL
DIFFERENTIAL METHOD: ABNORMAL
EOSINOPHIL # BLD AUTO: 0 K/UL
EOSINOPHIL NFR BLD: 0.6 %
ERYTHROCYTE [DISTWIDTH] IN BLOOD BY AUTOMATED COUNT: 16.8 %
EST. GFR  (AFRICAN AMERICAN): 53 ML/MIN/1.73 M^2
EST. GFR  (NON AFRICAN AMERICAN): 46 ML/MIN/1.73 M^2
GLUCOSE SERPL-MCNC: 102 MG/DL
HCT VFR BLD AUTO: 25.8 %
HCT VFR BLD AUTO: 26.8 %
HCT VFR BLD AUTO: 26.9 %
HCT VFR BLD AUTO: 26.9 %
HCT VFR BLD AUTO: 27.3 %
HCT VFR BLD AUTO: 28.8 %
HGB BLD-MCNC: 7.9 G/DL
HGB BLD-MCNC: 8.3 G/DL
HGB BLD-MCNC: 8.6 G/DL
HGB BLD-MCNC: 8.6 G/DL
HGB BLD-MCNC: 8.7 G/DL
HGB BLD-MCNC: 8.9 G/DL
INR PPP: 1.3
LYMPHOCYTES # BLD AUTO: 1.5 K/UL
LYMPHOCYTES NFR BLD: 28.8 %
MCH RBC QN AUTO: 32.3 PG
MCHC RBC AUTO-ENTMCNC: 32 G/DL
MCV RBC AUTO: 101 FL
MONOCYTES # BLD AUTO: 0.5 K/UL
MONOCYTES NFR BLD: 8.8 %
NEUTROPHILS # BLD AUTO: 3.2 K/UL
NEUTROPHILS NFR BLD: 61.2 %
PLATELET # BLD AUTO: 147 K/UL
PMV BLD AUTO: 11.2 FL
POTASSIUM SERPL-SCNC: 3.7 MMOL/L
PROTHROMBIN TIME: 13.7 SEC
RBC # BLD AUTO: 2.66 M/UL
SODIUM SERPL-SCNC: 144 MMOL/L
WBC # BLD AUTO: 5.25 K/UL

## 2019-03-13 PROCEDURE — 36415 COLL VENOUS BLD VENIPUNCTURE: CPT

## 2019-03-13 PROCEDURE — 88312 SPECIAL STAINS GROUP 1: CPT | Mod: 26,,, | Performed by: PATHOLOGY

## 2019-03-13 PROCEDURE — 25000003 PHARM REV CODE 250: Performed by: NURSE PRACTITIONER

## 2019-03-13 PROCEDURE — 37000009 HC ANESTHESIA EA ADD 15 MINS: Performed by: INTERNAL MEDICINE

## 2019-03-13 PROCEDURE — 80048 BASIC METABOLIC PNL TOTAL CA: CPT

## 2019-03-13 PROCEDURE — 37000008 HC ANESTHESIA 1ST 15 MINUTES: Performed by: INTERNAL MEDICINE

## 2019-03-13 PROCEDURE — 88341 IMHCHEM/IMCYTCHM EA ADD ANTB: CPT | Mod: 26,,, | Performed by: PATHOLOGY

## 2019-03-13 PROCEDURE — 85610 PROTHROMBIN TIME: CPT

## 2019-03-13 PROCEDURE — 43239 EGD BIOPSY SINGLE/MULTIPLE: CPT | Performed by: INTERNAL MEDICINE

## 2019-03-13 PROCEDURE — 85025 COMPLETE CBC W/AUTO DIFF WBC: CPT

## 2019-03-13 PROCEDURE — 85018 HEMOGLOBIN: CPT | Mod: 91

## 2019-03-13 PROCEDURE — 88305 TISSUE EXAM BY PATHOLOGIST: CPT | Mod: 26,,, | Performed by: PATHOLOGY

## 2019-03-13 PROCEDURE — 88312 TISSUE SPECIMEN TO PATHOLOGY - SURGERY: ICD-10-PCS | Mod: 26,,, | Performed by: PATHOLOGY

## 2019-03-13 PROCEDURE — 63600175 PHARM REV CODE 636 W HCPCS: Performed by: HOSPITALIST

## 2019-03-13 PROCEDURE — 25000003 PHARM REV CODE 250: Performed by: NURSE ANESTHETIST, CERTIFIED REGISTERED

## 2019-03-13 PROCEDURE — G8978 MOBILITY CURRENT STATUS: HCPCS | Mod: CJ

## 2019-03-13 PROCEDURE — 88305 TISSUE EXAM BY PATHOLOGIST: CPT | Performed by: PATHOLOGY

## 2019-03-13 PROCEDURE — 27201012 HC FORCEPS, HOT/COLD, DISP: Performed by: INTERNAL MEDICINE

## 2019-03-13 PROCEDURE — 88341 IMHCHEM/IMCYTCHM EA ADD ANTB: CPT | Performed by: PATHOLOGY

## 2019-03-13 PROCEDURE — 63600175 PHARM REV CODE 636 W HCPCS: Performed by: NURSE PRACTITIONER

## 2019-03-13 PROCEDURE — 43239 EGD BIOPSY SINGLE/MULTIPLE: CPT | Mod: ,,, | Performed by: INTERNAL MEDICINE

## 2019-03-13 PROCEDURE — C9113 INJ PANTOPRAZOLE SODIUM, VIA: HCPCS | Performed by: NURSE PRACTITIONER

## 2019-03-13 PROCEDURE — 88342 TISSUE SPECIMEN TO PATHOLOGY - SURGERY: ICD-10-PCS | Mod: 26,,, | Performed by: PATHOLOGY

## 2019-03-13 PROCEDURE — 88341 TISSUE SPECIMEN TO PATHOLOGY - SURGERY: ICD-10-PCS | Mod: 26,,, | Performed by: PATHOLOGY

## 2019-03-13 PROCEDURE — 97116 GAIT TRAINING THERAPY: CPT

## 2019-03-13 PROCEDURE — 85014 HEMATOCRIT: CPT | Mod: 91

## 2019-03-13 PROCEDURE — 63600175 PHARM REV CODE 636 W HCPCS: Performed by: EMERGENCY MEDICINE

## 2019-03-13 PROCEDURE — 43239 PR EGD, FLEX, W/BIOPSY, SGL/MULTI: ICD-10-PCS | Mod: ,,, | Performed by: INTERNAL MEDICINE

## 2019-03-13 PROCEDURE — 63600175 PHARM REV CODE 636 W HCPCS: Performed by: NURSE ANESTHETIST, CERTIFIED REGISTERED

## 2019-03-13 PROCEDURE — 88342 IMHCHEM/IMCYTCHM 1ST ANTB: CPT | Mod: 26,,, | Performed by: PATHOLOGY

## 2019-03-13 PROCEDURE — 88305 TISSUE SPECIMEN TO PATHOLOGY - SURGERY: ICD-10-PCS | Mod: 26,,, | Performed by: PATHOLOGY

## 2019-03-13 PROCEDURE — 21400001 HC TELEMETRY ROOM

## 2019-03-13 PROCEDURE — G8979 MOBILITY GOAL STATUS: HCPCS | Mod: CI

## 2019-03-13 RX ORDER — PROPOFOL 10 MG/ML
INJECTION, EMULSION INTRAVENOUS
Status: DISCONTINUED | OUTPATIENT
Start: 2019-03-13 | End: 2019-03-13

## 2019-03-13 RX ORDER — FLUCONAZOLE 2 MG/ML
100 INJECTION, SOLUTION INTRAVENOUS
Status: DISCONTINUED | OUTPATIENT
Start: 2019-03-13 | End: 2019-03-13 | Stop reason: DRUGHIGH

## 2019-03-13 RX ORDER — SODIUM CHLORIDE, SODIUM LACTATE, POTASSIUM CHLORIDE, CALCIUM CHLORIDE 600; 310; 30; 20 MG/100ML; MG/100ML; MG/100ML; MG/100ML
INJECTION, SOLUTION INTRAVENOUS CONTINUOUS PRN
Status: DISCONTINUED | OUTPATIENT
Start: 2019-03-13 | End: 2019-03-13

## 2019-03-13 RX ORDER — ONDANSETRON 2 MG/ML
4 INJECTION INTRAMUSCULAR; INTRAVENOUS EVERY 8 HOURS PRN
Status: DISCONTINUED | OUTPATIENT
Start: 2019-03-13 | End: 2019-03-14 | Stop reason: HOSPADM

## 2019-03-13 RX ORDER — NYSTATIN 100000 [USP'U]/ML
500000 SUSPENSION ORAL 4 TIMES DAILY
Status: DISCONTINUED | OUTPATIENT
Start: 2019-03-13 | End: 2019-03-13 | Stop reason: SDUPTHER

## 2019-03-13 RX ORDER — LIDOCAINE HCL/PF 100 MG/5ML
SYRINGE (ML) INTRAVENOUS
Status: DISCONTINUED | OUTPATIENT
Start: 2019-03-13 | End: 2019-03-13

## 2019-03-13 RX ORDER — FLUCONAZOLE 100 MG/1
200 TABLET ORAL DAILY
Status: DISCONTINUED | OUTPATIENT
Start: 2019-03-13 | End: 2019-03-13 | Stop reason: SDUPTHER

## 2019-03-13 RX ORDER — FLUCONAZOLE 2 MG/ML
200 INJECTION, SOLUTION INTRAVENOUS
Status: DISCONTINUED | OUTPATIENT
Start: 2019-03-13 | End: 2019-03-14 | Stop reason: HOSPADM

## 2019-03-13 RX ADMIN — PROPOFOL 20 MG: 10 INJECTION, EMULSION INTRAVENOUS at 02:03

## 2019-03-13 RX ADMIN — CARVEDILOL 25 MG: 12.5 TABLET, FILM COATED ORAL at 08:03

## 2019-03-13 RX ADMIN — DOXYCYCLINE HYCLATE 100 MG: 100 TABLET, COATED ORAL at 09:03

## 2019-03-13 RX ADMIN — FUROSEMIDE 40 MG: 40 TABLET ORAL at 08:03

## 2019-03-13 RX ADMIN — CARVEDILOL 25 MG: 12.5 TABLET, FILM COATED ORAL at 06:03

## 2019-03-13 RX ADMIN — FUROSEMIDE 40 MG: 40 TABLET ORAL at 06:03

## 2019-03-13 RX ADMIN — LEVOTHYROXINE SODIUM 50 MCG: 50 TABLET ORAL at 08:03

## 2019-03-13 RX ADMIN — ALLOPURINOL 100 MG: 100 TABLET ORAL at 08:03

## 2019-03-13 RX ADMIN — LOSARTAN POTASSIUM 25 MG: 25 TABLET, FILM COATED ORAL at 08:03

## 2019-03-13 RX ADMIN — PANTOPRAZOLE SODIUM 40 MG: 40 INJECTION, POWDER, LYOPHILIZED, FOR SOLUTION INTRAVENOUS at 09:03

## 2019-03-13 RX ADMIN — PROPOFOL 40 MG: 10 INJECTION, EMULSION INTRAVENOUS at 02:03

## 2019-03-13 RX ADMIN — SODIUM CHLORIDE, SODIUM LACTATE, POTASSIUM CHLORIDE, AND CALCIUM CHLORIDE: 600; 310; 30; 20 INJECTION, SOLUTION INTRAVENOUS at 02:03

## 2019-03-13 RX ADMIN — AMIODARONE HYDROCHLORIDE 200 MG: 200 TABLET ORAL at 08:03

## 2019-03-13 RX ADMIN — ONDANSETRON 4 MG: 2 INJECTION INTRAMUSCULAR; INTRAVENOUS at 06:03

## 2019-03-13 RX ADMIN — FLUCONAZOLE, SODIUM CHLORIDE 200 MG: 2 INJECTION INTRAVENOUS at 06:03

## 2019-03-13 RX ADMIN — SODIUM CHLORIDE: 0.9 INJECTION, SOLUTION INTRAVENOUS at 04:03

## 2019-03-13 RX ADMIN — DIGOXIN 125 MCG: 125 TABLET ORAL at 09:03

## 2019-03-13 RX ADMIN — LIDOCAINE HYDROCHLORIDE 50 MG: 20 INJECTION, SOLUTION INTRAVENOUS at 02:03

## 2019-03-13 RX ADMIN — DOXYCYCLINE HYCLATE 100 MG: 100 TABLET, COATED ORAL at 08:03

## 2019-03-13 NOTE — PT/OT/SLP PROGRESS
Physical Therapy Treatment    Patient Name:  Jennifer Mathews   MRN:  [id number]    Recommendations:     Discharge Recommendations:  independent living facility   Discharge Equipment Recommendations: none   Barriers to discharge: None    Assessment:     Jennifer Mathews is a 86 y.o. female admitted with a medical diagnosis of Upper GI bleed.  She presents with the following impairments/functional limitations:  weakness, gait instability, impaired functional mobilty   .    Rehab Prognosis: Good; patient would benefit from acute skilled PT services to address these deficits and reach maximum level of function.    Recent Surgery: Procedure(s) (LRB):  ESOPHAGOGASTRODUODENOSCOPY (EGD) (N/A) Day of Surgery    Plan:     During this hospitalization, patient to be seen 5 x/week to address the identified rehab impairments via gait training, therapeutic activities, therapeutic exercises and progress toward the following goals:    · Plan of Care Expires:  03/18/19    Subjective     Chief Complaint: decreased endurance  Patient/Family Comments/goals: none  Pain/Comfort:  · Pain Rating 1: 0/10      Objective:     Communicated with EPIC prior to session.  Patient found all lines intact and call button in reach peripheral IV  upon PT entry to room.     General Precautions: Standard, fall   Orthopedic Precautions:N/A   Braces: N/A     Functional Mobility:  · Gait with RW 80 ft with CGA       AM-PAC 6 CLICK MOBILITY  Turning over in bed (including adjusting bedclothes, sheets and blankets)?: 4  Sitting down on and standing up from a chair with arms (e.g., wheelchair, bedside commode, etc.): 4  Moving from lying on back to sitting on the side of the bed?: 4  Moving to and from a bed to a chair (including a wheelchair)?: 4  Need to walk in hospital room?: 3  Climbing 3-5 steps with a railing?: 1  Basic Mobility Total Score: 20       Therapeutic Activities and Exercises:   sit to stand with SBA. Stand without u/e support with SPV.  Gait  only as pt scheduled for a scope.  Waiting for transport.    Patient left sitting EOB with all lines intact and call button in reach..    GOALS:   Multidisciplinary Problems     Physical Therapy Goals        Problem: Physical Therapy Goal    Goal Priority Disciplines Outcome Goal Variances Interventions   Physical Therapy Goal     PT, PT/OT Ongoing (interventions implemented as appropriate)     Description:  LTG'S TO BE MET IN 7 DAYS (3-18-19)  1. PT WILL BE TENNILLE WITH BED MOBILITY  2. PT WILL BE TENNILLE WITH TF'S  3. PT WILL ' WITH SPV OR RW WITH SBA  4. PT WILL DEMO G DYNAMIC BALANCE DURING GAIT                     Time Tracking:     PT Received On: 03/13/19  PT Start Time: 1230     PT Stop Time: 1245  PT Total Time (min): 15 min     Billable Minutes: Gait Training 15    Treatment Type: Treatment  PT/PTA: PT     PTA Visit Number: 1     Chichi Lezama, PT  03/13/2019

## 2019-03-13 NOTE — ANESTHESIA PREPROCEDURE EVALUATION
03/13/2019  Jennifer Mathews is a 86 y.o., female.    Anesthesia Evaluation    I have reviewed the Patient Summary Reports.    I have reviewed the Nursing Notes.   I have reviewed the Medications.     Review of Systems  Anesthesia Hx:  No problems with previous Anesthesia  Denies Family Hx of Anesthesia complications.   Denies Personal Hx of Anesthesia complications.   Hematology/Oncology:     Oncology Normal    -- Anemia:   EENT/Dental:EENT/Dental Normal   Cardiovascular:   Pacemaker Hypertension, well controlled Valvular problems/Murmurs CHF hyperlipidemia ECG has been reviewed.   1 - Severe left atrial enlargement.     2 - Concentric hypertrophy.     3 - Wall motion abnormalities.     4 - Severely depressed left ventricular systolic function (EF 20-25%).     5 - Moderately depressed right ventricular systolic function .     6 - Pulmonary hypertension. The estimated PA systolic pressure is 69 mmHg.     7 - Mitral valve prosthesis.     8 - Moderate tricuspid regurgitation.     9 - Increased central venous pressure.             This document has been electronically    SIGNED BY: Suresh Houser MD On: 11/19/2018 18:00   Pulmonary:   Pneumonia Asthma mild Sleep Apnea, CPAP    Renal/:   Chronic Renal Disease, CRI    Hepatic/GI:  Hepatic/GI Normal    Musculoskeletal:   Arthritis     Neurological:   CVA    Endocrine:   Hypothyroidism    Dermatological:  Skin Normal    Psych:  Psychiatric Normal           Physical Exam  General:  Well nourished    Airway/Jaw/Neck:  Airway Findings: Mouth Opening: Normal Tongue: Normal  General Airway Assessment: Adult, Average  Mallampati: II  TM Distance: Normal, at least 6 cm       Chest/Lungs:  Chest/Lungs Findings: Clear to auscultation, Normal Respiratory Rate     Heart/Vascular:  Heart Findings: Rate: Normal        Mental Status:  Mental Status Findings:  Cooperative, Alert  and Oriented         Anesthesia Plan  Type of Anesthesia, risks & benefits discussed:  Anesthesia Type:  MAC  Patient's Preference:   Intra-op Monitoring Plan:   Intra-op Monitoring Plan Comments:   Post Op Pain Control Plan:   Post Op Pain Control Plan Comments:   Induction:   IV  Beta Blocker:  Patient is on a Beta-Blocker and has received one dose within the past 24 hours (No further documentation required).       Informed Consent: Patient understands risks and agrees with Anesthesia plan.  Questions answered. Anesthesia consent signed with patient.  ASA Score: 4     Day of Surgery Review of History & Physical: I have interviewed and examined the patient. I have reviewed the patient's H&P dated:  There are no significant changes.          Ready For Surgery From Anesthesia Perspective.

## 2019-03-13 NOTE — H&P
PRE PROCEDURE H&P    Patient Name: Jennifer Mathews  MRN: [id number]  : 1932  Date of Procedure:  3/13/2019  Referring Physician: Self, Aaareferral  Primary Physician: Desirae Bello MD  Procedure Physician: Ghazal Orellana MD       Planned Procedure: EGD  Diagnosis: melena  Chief Complaint: Same as above    HPI: Patient is an 86 y.o. female is here for the above. INR now 1.3     Past Medical History:   Past Medical History:   Diagnosis Date    Acute coronary syndrome     Anemia     Anticoagulated on Coumadin 2015    Arthritis     Asthma     patient denies    Atrial fibrillation     Basal cell carcinoma 10/2015    left neck    Cardiac arrest     Cardiac resynchronization therapy defibrillator (CRT-D) in place 2015    Pt denies, states it does not shock me    Chronic combined systolic and diastolic congestive heart failure     CKD (chronic kidney disease) stage 3, GFR 30-59 ml/min     Dyslipidemia 2014    Hyperlipidemia     Hypertension     Hypothyroidism     Ischemic cardiomyopathy 2014    Macular hole of left eye     Old    Macular hole of left eye     LEV (obstructive sleep apnea) 2013    Pneumonia     required hospitalization    Recurrent UTI     Refractive error     Spastic colon     Stroke         Past Surgical History:  Past Surgical History:   Procedure Laterality Date    APPENDECTOMY      CARDIAC CATHETERIZATION      CARDIAC PACEMAKER PLACEMENT  2014    CARDIAC VALVE SURGERY      CATARACT EXTRACTION      OU    CHOLECYSTECTOMY      COLONOSCOPY      IRRIGATION AND DEBRIDEMENT OF LEFT KNEE Left 2017    Performed by [facility] at [facility] OR    MITRAL VALVE REPLACEMENT      MITRAL VALVE SURGERY      x3    REPLACEMENT, PACEMAKER GENERATOR/pt has crt-p versus d Left 2018    Performed by Manny Dunne MD at [facility] CATH LAB    REVISION, SKIN POCKET, FOR CARDIAC PACEMAKER Left 2014    Performed by Manny Dunne MD  at [facility] CATH LAB        Home Medications:  Prior to Admission medications    Medication Sig Start Date End Date Taking? Authorizing Provider   allopurinol (ZYLOPRIM) 100 MG tablet TAKE 2 TABLETS (200 MG TOTAL) BY MOUTH ONCE DAILY. 2/13/19  Yes Janay Valdez PA-C   amiodarone (PACERONE) 200 MG Tab TAKE 1 TABLET EVERY DAY 10/25/18  Yes Maikel Cary MD   aspirin (ECOTRIN) 81 MG EC tablet Take 81 mg by mouth once daily.   Yes Historical Provider, MD   calcium carbonate (OS-SHERRY) 600 mg calcium (1,500 mg) Tab Take 600 mg by mouth once.   Yes Historical Provider, MD   carvedilol (COREG) 25 MG tablet TAKE 1 TABLET BY MOUTH TWICE A DAY WITH MEALS 2/21/19  Yes Maikel Cary MD   cranberry 1,000 mg Cap Take 1 capsule by mouth Daily. 5/10/12  Yes Historical Provider, MD   digoxin (LANOXIN) 125 mcg tablet Take 1 tablet (125 mcg total) by mouth once daily. TAKE 1 TABLET (0.125 MG TOTAL) BY MOUTH ONCE DAILY.  Patient taking differently: Take 125 mcg by mouth once daily.  1/22/18  Yes Maikel Cary MD   doxycycline (ADOXA) 150 MG tablet Take 1 tablet (150 mg total) by mouth 2 (two) times daily. 2/14/19  Yes Sheree Phelps NP   furosemide (LASIX) 40 MG tablet Take 1 tablet (40 mg total) by mouth 2 (two) times daily. 11/19/18 3/10/19 Yes Adriana Arizmendi PA-C   gabapentin (NEURONTIN) 100 MG capsule TAKE ONE CAPSULE BY MOUTH TWO TIMES DAILY 12/17/18  Yes Shikha Browning NP   losartan (COZAAR) 25 MG tablet Take 1 tablet (25 mg total) by mouth once daily. 1/22/19  Yes Adriana Arizmendi PA-C   potassium chloride SA (K-DUR,KLOR-CON) 20 MEQ tablet TAKE 1 TABLET (20 MEQ TOTAL) BY MOUTH ONCE DAILY. 4/25/18  Yes Historical Provider, MD   warfarin (COUMADIN) 2.5 MG tablet TAKE 1/2 TABLET ON MONDAY AND WEDNESDAY AND 1 TABLET ALL OTHER DAYS. DISCONTINUE 5MG TABLET.  Patient taking differently: TAKE 1 TABLET ON MONDAYS, WEDNESDAYS, FRIDAYS  AND 1.5 TABLETS ON  ALL OTHER DAYS OR AS DIRECTED BY COUMADIN CLINIC. 11/29/18  Yes Maikel  "DEWEY Cary MD   acetaminophen (TYLENOL EXTRA STRENGTH) 325 MG tablet Take 2 tablets (650 mg total) by mouth every 8 (eight) hours.  Patient taking differently: Take 650 mg by mouth 3 (three) times daily as needed.  8/8/14   Desirae Bello MD   betamethasone valerate 0.1% (VALISONE) 0.1 % Crea Apply topically 2 (two) times daily. 11/30/18   Perry Arndt MD   colchicine (COLCRYS) 0.6 mg tablet Take 1 tablet by mouth 3 times a week 2/1/19   Janay Valdez PA-C   fluticasone (FLONASE) 50 mcg/actuation nasal spray 2 sprays (100 mcg total) by Each Nare route once daily. 2/14/19   Sheree Phelps NP   Lactobac 40-Bifido 3-S.thermop (PROBIOTIC) 100 billion cell Cap Take 1 capsule by mouth once daily. 1/25/18   Haile Morocho NP   levocetirizine (XYZAL) 5 MG tablet Take 1 tablet (5 mg total) by mouth every evening. 2/14/19 2/14/20  Sheree Phelps NP   levothyroxine (SYNTHROID) 50 MCG tablet Take 1 tablet (50 mcg total) by mouth once daily. 3/6/18 3/6/19  RAUL Gupta   methylPREDNISolone (MEDROL DOSEPACK) 4 mg tablet use as directed 11/26/18   Oc Catherine MD   metOLazone (ZAROXOLYN) 2.5 MG tablet Take 1 tablet (2.5 mg total) by mouth once daily. 1/3/19 1/3/20  Maikel Cary MD   MULTIVITAMIN ORAL Take 1 tablet by mouth Daily.    Historical Provider, MD        Allergies:  Review of patient's allergies indicates:   Allergen Reactions    Cephalosporins Hives    Metaxalone Itching    Penicillins      Other reaction(s): Unknown      Pregabalin      Other reaction(s): "Bad feeling"      Sulfa (sulfonamide antibiotics) Itching        Social History:   Social History     Socioeconomic History    Marital status:      Spouse name: Not on file    Number of children: Not on file    Years of education: Not on file    Highest education level: Not on file   Social Needs    Financial resource strain: Not on file    Food insecurity - worry: Not on file    Food insecurity - inability: " "Not on file    Transportation needs - medical: Not on file    Transportation needs - non-medical: Not on file   Occupational History    Not on file   Tobacco Use    Smoking status: Never Smoker    Smokeless tobacco: Never Used   Substance and Sexual Activity    Alcohol use: No    Drug use: No    Sexual activity: No   Other Topics Concern    Are you pregnant or think you may be? Not Asked    Breast-feeding Not Asked   Social History Narrative    Not on file       Family History:  Family History   Problem Relation Age of Onset    Coronary artery disease Mother         mi    Epilepsy Mother     Heart attack Mother     Coronary artery disease Father     Heart disease Father     Emphysema Father     COPD Father     Diabetes Brother     Cancer Brother     Melanoma Neg Hx     Psoriasis Neg Hx     Lupus Neg Hx     Eczema Neg Hx        ROS: No acute cardiac events, no acute respiratory complaints.     Physical Exam (all patients):    BP (!) 140/89 (BP Location: Left arm, Patient Position: Lying)   Pulse 72   Temp 97.8 °F (36.6 °C) (Temporal)   Resp 18   Ht 5' 2" (1.575 m)   Wt 44 kg (97 lb 0 oz)   LMP  (LMP Unknown)   SpO2 (!) 94%   Breastfeeding? No   BMI 17.74 kg/m²   Lungs: Clear to auscultation bilaterally, respirations unlabored  Heart: Regular rate and rhythm, S1 and S2 normal, no obvious murmurs  Abdomen:  Soft, non-tender, bowel sounds normal, no masses, no organomegaly    Lab Results   Component Value Date    WBC 5.25 03/13/2019     (H) 03/13/2019    RDW 16.8 (H) 03/13/2019     (L) 03/13/2019    INR 1.3 (H) 03/13/2019     03/13/2019    HGBA1C 6.4 (H) 06/20/2006    BUN 46 (H) 03/13/2019     03/13/2019    K 3.7 03/13/2019     (H) 03/13/2019        SEDATION PLAN: per anesthesia       History reviewed, vital signs satisfactory, cardiopulmonary status satisfactory, sedation options, risks and plans have been discussed with the patient. All their questions " were answered and the patient agrees to the sedation procedures as planned and the patient is deemed an appropriate candidate for the sedation as planned.    Procedure explained to patient, informed consent obtained and placed in chart.    Ghazal Orellana  3/13/2019  12:54 PM

## 2019-03-13 NOTE — SUBJECTIVE & OBJECTIVE
Interval History: doing well, no more melena, underwent EGD this afternoon which showed LA Grade D candidiasis esophagitis. Biopsied. Gastric erosions without bleeding. Normal first portion of the duodenum and second portion of the duodenum. Pt started on IV Diflucan. Tolerated diet well. H/H stable at 8.9/29- INR 1.3.     Review of Systems   Constitutional: Negative for appetite change, chills, diaphoresis, fatigue, fever and unexpected weight change.   HENT: Negative for congestion, nosebleeds, sinus pressure and sore throat.    Eyes: Negative for pain, discharge and visual disturbance.   Respiratory: Negative for cough, chest tightness, shortness of breath, wheezing and stridor.    Cardiovascular: Negative for chest pain, palpitations and leg swelling.   Gastrointestinal: Negative for abdominal distention, abdominal pain, blood in stool, constipation, diarrhea, nausea and vomiting.   Endocrine: Negative for cold intolerance and heat intolerance.   Genitourinary: Positive for dysuria. Negative for difficulty urinating, flank pain, frequency and urgency.   Musculoskeletal: Negative for arthralgias, back pain, joint swelling, myalgias, neck pain and neck stiffness.   Skin: Negative for rash and wound.   Allergic/Immunologic: Negative for food allergies and immunocompromised state.   Neurological: Negative for dizziness, seizures, syncope, facial asymmetry, speech difficulty, weakness, light-headedness, numbness and headaches.   Hematological: Negative for adenopathy.   Psychiatric/Behavioral: Negative for agitation, confusion and hallucinations.     Objective:     Vital Signs (Most Recent):  Temp: 97.9 °F (36.6 °C) (03/13/19 1443)  Pulse: 71 (03/13/19 1510)  Resp: 18 (03/13/19 1503)  BP: 133/60 (03/13/19 1503)  SpO2: (!) 94 % (03/13/19 1503) Vital Signs (24h Range):  Temp:  [97.5 °F (36.4 °C)-99 °F (37.2 °C)] 97.9 °F (36.6 °C)  Pulse:  [69-75] 71  Resp:  [16-18] 18  SpO2:  [92 %-97 %] 94 %  BP: (105-140)/(49-89)  133/60     Weight: 44 kg (97 lb 0 oz)  Body mass index is 17.74 kg/m².    Intake/Output Summary (Last 24 hours) at 3/13/2019 1839  Last data filed at 3/13/2019 1810  Gross per 24 hour   Intake 100 ml   Output 400 ml   Net -300 ml      Physical Exam   Constitutional: She is oriented to person, place, and time. No distress.   Elderly and frail    HENT:   Head: Normocephalic and atraumatic.   Nose: Nose normal.   Mouth/Throat: Oropharynx is clear and moist.   Eyes: Conjunctivae and EOM are normal. No scleral icterus.   Neck: Normal range of motion. Neck supple. No tracheal deviation present.   Cardiovascular: Normal rate, regular rhythm and intact distal pulses. Exam reveals no gallop and no friction rub.   Murmur heard.  Pulmonary/Chest: Effort normal and breath sounds normal. No stridor. No respiratory distress. She has no wheezes. She has no rales. She exhibits no tenderness.   Abdominal: Soft. Bowel sounds are normal. She exhibits no distension and no mass. There is no tenderness. There is no rebound and no guarding.   Musculoskeletal: Normal range of motion. She exhibits edema (trace). She exhibits no tenderness or deformity.   Neurological: She is alert and oriented to person, place, and time. No cranial nerve deficit. She exhibits normal muscle tone. Coordination normal.   Skin: Skin is warm and dry. No rash noted. She is not diaphoretic. No erythema. No pallor.   Psychiatric: She has a normal mood and affect. Her behavior is normal. Thought content normal.   Nursing note and vitals reviewed.      Significant Labs:   BMP:   Recent Labs   Lab 03/13/19  0453         K 3.7   *   CO2 29   BUN 46*   CREATININE 1.1   CALCIUM 7.9*     CBC:   Recent Labs   Lab 03/12/19  0449  03/13/19  0453 03/13/19  1128 03/13/19  1730   WBC 5.13  --  5.25  --   --    HGB 8.5*   < > 8.6*  8.6* 8.7* 8.9*   HCT 26.9*   < > 26.9*  26.9* 27.3* 28.8*   *  --  147*  --   --     < > = values in this interval not  displayed.     Coagulation:   Recent Labs   Lab 03/13/19  0453   INR 1.3*     All pertinent labs within the past 24 hours have been reviewed.    Significant Imaging: I have reviewed all pertinent imaging results/findings within the past 24 hours.

## 2019-03-13 NOTE — PLAN OF CARE
Problem: Adult Inpatient Plan of Care  Goal: Plan of Care Review  Outcome: Ongoing (interventions implemented as appropriate)  POC reviewed, verbalized understanding. Pt remained free from falls, fall precautions in place. Pt is Paced on monitor, 60-70s. VSS. No other c/o at this time. PIV intact and infusing. Pt NPO since MN. Call bell and personal belongings within reach. Hourly rounding complete. Reminded to call for assistance. Will continue to monitor.

## 2019-03-13 NOTE — PLAN OF CARE
Problem: Adult Inpatient Plan of Care  Goal: Plan of Care Review  Outcome: Ongoing (interventions implemented as appropriate)  Discussed Plan of Care with patient and verbalized understanding - Patient remains AAOx4 - remains free of falls, accidents and trauma during the day shift. Bed in the low position and the call light is within reach. EGD completed, patient tolerated well. Will continue to monitor.

## 2019-03-13 NOTE — ANESTHESIA POSTPROCEDURE EVALUATION
"Anesthesia Post Evaluation    Patient: Jennifer Mathews    Procedure(s) Performed: Procedure(s) (LRB):  ESOPHAGOGASTRODUODENOSCOPY (EGD) (N/A)    Final Anesthesia Type: MAC  Patient location during evaluation: PACU  Patient participation: Yes- Able to Participate  Level of consciousness: awake, awake and alert and oriented  Post-procedure vital signs: reviewed and stable  Pain management: adequate  Airway patency: patent  PONV status at discharge: No PONV  Anesthetic complications: no      Cardiovascular status: blood pressure returned to baseline  Respiratory status: spontaneous ventilation, unassisted and room air  Hydration status: euvolemic  Follow-up not needed.        Visit Vitals  BP (!) 140/89 (BP Location: Left arm, Patient Position: Lying)   Pulse 72   Temp 36.6 °C (97.8 °F) (Temporal)   Resp 18   Ht 5' 2" (1.575 m)   Wt 44 kg (97 lb 0 oz)   LMP  (LMP Unknown)   SpO2 (!) 94%   Breastfeeding? No   BMI 17.74 kg/m²       Pain/Elaine Score: No Data Recorded      "

## 2019-03-13 NOTE — DISCHARGE INSTRUCTIONS
Esophagitis     With esophagitis, the lining of the esophagus is inflamed.   Do you often have burning pain in your chest? You may have esophagitis. This is when the lining of the esophagus becomes red and swollen (inflamed). The esophagus is the tube that connects your throat to your stomach. This sheet tells you more about esophagitis. It also explains your treatment options.  Main types of esophagitis  Reflux esophagitis. This is the more common type. It is caused by GERD (gastroesophageal reflux disease). Stomach contents with stomach acid flow back up into the esophagus. This happens over and over. It leads to inflammation. Risk factors can include:  · Being overweight  · Asthma  · Smoking  · Pregnancy  · Frequent vomiting  · Certain medicines (such as aspirin and other anti-inflammatories)  · Hiatal hernia  Infectious esophagitis. This is caused by an infection. You are more at risk for this if you have a weakened immune system and poor nutrition. Antibiotic use can also be a factor. The infection is often due to the following:  · A type of fungus (typically candida)  · A virus, such as herpes simplex virus 1 (HSV-1) or cytomegalovirus (CMV)  Eosinophilic esophagitis. Foods or other things around you can give you an allergic reaction. This triggers an immune response and leads to esophagitis.  Pill-induced esophagitis. Certain types of medicines can cause inflammation and ulcers in the esophagus. These include doxycycline, aspirin, NSAIDs, alendronate, potassium, quinidine, iron.  Symptoms of esophagitis  The following symptoms can occur with esophagitis:  · Pain when swallowing, or trouble swallowing  · Pain behind your breastbone (heartburn)  · Acid regurgitation  · Chronic sore throat  · Gum Inflammation  · Cavities  · Bad breath  · Nausea  · Pain in your upper belly (abdomen)  · Bleeding (indicated by bright red vomit or black, tarry stool)  These symptoms occur more often with reflux  esophagitis:  · Coughing, wheezing, or asthma  · Hoarseness  Diagnosis of esophagitis  Your healthcare provider will ask about your health history and symptoms. Youll also be examined. Sometimes certain tests are needed. These may include:  · Upper endoscopy. A thin, flexible tube with a tiny light and camera is used. It is inserted through the mouth down into the esophagus. This lets the provider look for damage. A small sample of tissue (biopsy) may also be removed. The sample is sent to a lab for testing.  · Upper GI X-ray with barium. An X-ray is done after you drink a substance called barium. Barium may make problems in the esophagus easier to see on an x-ray.  · Esophageal pH. A soft, thin tube is passed into the esophagus through the nose or mouth for 24 hours. It measures the acid level in the esophagus.  · Esophageal manometry. A soft, thin tube is passed into the esophagus through the nose or mouth. It measures muscle contractions in the esophagus.  Treatment of esophagitis  Medicines. Different medicines can help treat esophagitis. The medicine used will depend on the type of esophagitis you have. Talk with your healthcare provider.  Lifestyle changes. Making the following changes can help reduce irritation and ease your symptoms:  · Avoid spicy foods (pepper, chili powder, ceja). Also avoid hard foods (nuts, crackers, raw vegetables) and acidic foods and drinks (tomatoes, citrus fruits and juices). Other problem foods include chocolate, peppermint, nutmeg, and foods high in fat.  · Until you can swallow without pain, follow a combined liquid and soft diet. Try foods such as cooked cereals, mashed potatoes, and soups.  · Take small bites and chew your food thoroughly.  · Avoid large meals and heavy evening meals. Don't lie down within 2 to 3 hours of eating.  · Get to or stay at a healthy weight.  · Avoid alcohol, caffeine, and smoking or tobacco products.  · Brush and floss your teeth  · Raise your  upper body by 4 to 6 inches when lying in bed. This can be done using a foam wedge. Or put blocks under the legs at the head of your bed.  Surgery. This may be needed for severe reflux esophagitis. Other noninvasive procedures to treat GERD and esophagitis are being studied. Your provider can tell you more.  Why treatment Is important  Without treatment, esophagitis can get worse. This is especially true with severe reflux esophagitis. For instance, continued symptoms can cause scarring of the esophagus. Over time, this can cause a narrowing the esophagus (stricture). This can make it hard to pass food down to the stomach. As symptoms go on they can also cause changes in the lining of the esophagus. These changes can put you at a slightly higher risk of cancer of the esophagus.   Date Last Reviewed: 7/1/2016  © 4930-8665 [employer]. [address] [place] [address], [place] [address]. All rights reserved. This information is not intended as a substitute for professional medical care. Always follow your healthcare professional's instructions.

## 2019-03-13 NOTE — PROGRESS NOTES
[facility] Hospital Medicine  Progress Note    Patient Name: Jennifer Mathews  MRN: [id number]  Patient Class: IP- Inpatient   Admission Date: 3/10/2019  Length of Stay: 1 days  Attending Physician: Clarice iSn MD  Primary Care Provider: Desirae Bello MD        Subjective:     Principal Problem:Upper GI bleed    HPI:  Jennifer Mathews is a 86 y.o. female patient with a PMHx of stroke, hypothyroidism, HTN, HLD, CKD, afib anemia, open heart surgery, and ischemic cardiomyopathy who presents for evaluation of blood in stool. Onset this afternoon.  described as dark stool.  Pt reports 2 episodes today. No mitigating or exacerbating factors reported. To note patient on warfarin. Pt notes she had her coumadin checked on Friday and it was 7.1. And has been being held. Patient evaluated in Er. H/H stable 10.3/31.9. INR today 3.2  BUN above baseline at 83 today Cr. Stable with basline, today 1.9. HM consulted. Patient placed in obs.       Hospital Course:  The pt is a 87 yo female with mitral valve replacement x 3-bioprosthesis, Stroke-no obvious deficits, HTN, PAF, combined CHF with EF 25%, ICM s/p PPM-CRT-D placement, recurrent UTI, CKD3-4, and hypothyroidism who was placed in observation for GI bleed and UTI. On 3/8/19, her INR was 7.1. INR was 3.2 on admit. Hgb  10.3>9.4>8.5.     Pt reports she no further melena today. INR remains 2.1. Will give another dose of Vitamin K 5mg po. GI evaluated pt and recommends EGD if INR 1.5 or lower     3/13- doing well, no more melena, underwent EGD this afternoon which showed LA Grade D candidiasis esophagitis. Biopsied. Gastric erosions without bleeding. Normal first portion of the duodenum and second portion of the duodenum. Pt started on IV Diflucan. Tolerated diet well. H/H stable at 8.9/29- INR 1.3.    Interval History: doing well, no more melena, underwent EGD this afternoon which showed LA Grade D candidiasis esophagitis. Biopsied. Gastric erosions  without bleeding. Normal first portion of the duodenum and second portion of the duodenum. Pt started on IV Diflucan. Tolerated diet well. H/H stable at 8.9/29- INR 1.3.     Review of Systems   Constitutional: Negative for appetite change, chills, diaphoresis, fatigue, fever and unexpected weight change.   HENT: Negative for congestion, nosebleeds, sinus pressure and sore throat.    Eyes: Negative for pain, discharge and visual disturbance.   Respiratory: Negative for cough, chest tightness, shortness of breath, wheezing and stridor.    Cardiovascular: Negative for chest pain, palpitations and leg swelling.   Gastrointestinal: Negative for abdominal distention, abdominal pain, blood in stool, constipation, diarrhea, nausea and vomiting.   Endocrine: Negative for cold intolerance and heat intolerance.   Genitourinary: Positive for dysuria. Negative for difficulty urinating, flank pain, frequency and urgency.   Musculoskeletal: Negative for arthralgias, back pain, joint swelling, myalgias, neck pain and neck stiffness.   Skin: Negative for rash and wound.   Allergic/Immunologic: Negative for food allergies and immunocompromised state.   Neurological: Negative for dizziness, seizures, syncope, facial asymmetry, speech difficulty, weakness, light-headedness, numbness and headaches.   Hematological: Negative for adenopathy.   Psychiatric/Behavioral: Negative for agitation, confusion and hallucinations.     Objective:     Vital Signs (Most Recent):  Temp: 97.9 °F (36.6 °C) (03/13/19 1443)  Pulse: 71 (03/13/19 1510)  Resp: 18 (03/13/19 1503)  BP: 133/60 (03/13/19 1503)  SpO2: (!) 94 % (03/13/19 1503) Vital Signs (24h Range):  Temp:  [97.5 °F (36.4 °C)-99 °F (37.2 °C)] 97.9 °F (36.6 °C)  Pulse:  [69-75] 71  Resp:  [16-18] 18  SpO2:  [92 %-97 %] 94 %  BP: (105-140)/(49-89) 133/60     Weight: 44 kg (97 lb 0 oz)  Body mass index is 17.74 kg/m².    Intake/Output Summary (Last 24 hours) at 3/13/2019 6757  Last data filed at  3/13/2019 1810  Gross per 24 hour   Intake 100 ml   Output 400 ml   Net -300 ml      Physical Exam   Constitutional: She is oriented to person, place, and time. No distress.   Elderly and frail    HENT:   Head: Normocephalic and atraumatic.   Nose: Nose normal.   Mouth/Throat: Oropharynx is clear and moist.   Eyes: Conjunctivae and EOM are normal. No scleral icterus.   Neck: Normal range of motion. Neck supple. No tracheal deviation present.   Cardiovascular: Normal rate, regular rhythm and intact distal pulses. Exam reveals no gallop and no friction rub.   Murmur heard.  Pulmonary/Chest: Effort normal and breath sounds normal. No stridor. No respiratory distress. She has no wheezes. She has no rales. She exhibits no tenderness.   Abdominal: Soft. Bowel sounds are normal. She exhibits no distension and no mass. There is no tenderness. There is no rebound and no guarding.   Musculoskeletal: Normal range of motion. She exhibits edema (trace). She exhibits no tenderness or deformity.   Neurological: She is alert and oriented to person, place, and time. No cranial nerve deficit. She exhibits normal muscle tone. Coordination normal.   Skin: Skin is warm and dry. No rash noted. She is not diaphoretic. No erythema. No pallor.   Psychiatric: She has a normal mood and affect. Her behavior is normal. Thought content normal.   Nursing note and vitals reviewed.      Significant Labs:   BMP:   Recent Labs   Lab 03/13/19 0453         K 3.7   *   CO2 29   BUN 46*   CREATININE 1.1   CALCIUM 7.9*     CBC:   Recent Labs   Lab 03/12/19  0449  03/13/19  0453 03/13/19  1128 03/13/19  1730   WBC 5.13  --  5.25  --   --    HGB 8.5*   < > 8.6*  8.6* 8.7* 8.9*   HCT 26.9*   < > 26.9*  26.9* 27.3* 28.8*   *  --  147*  --   --     < > = values in this interval not displayed.     Coagulation:   Recent Labs   Lab 03/13/19 0453   INR 1.3*     All pertinent labs within the past 24 hours have been  reviewed.    Significant Imaging: I have reviewed all pertinent imaging results/findings within the past 24 hours.    Assessment/Plan:      * Upper GI bleed    Cont Serial H/H  Give another dose of Vitamin K  GI recommends EGD if INR less than 1.5  Cont Protonix   NPO after MN     3/13- sec to Candida Esophagitis with Esophageal bleed and gastric erosions   treat with IV Protonix and IV Diflucan       Coumadin Toxicity/ Supratherapeutic INR    Was 7.1 on 03/08/2019  INR 2.1 today  Vit K 5mg po x one dose      Chronic combined systolic and diastolic congestive heart failure    Cont Coreg, Dig, And Losartan  Hold lasix and metolazone for now        S/P MVR (mitral valve replacement)    Bioprosthesis  Stable          A-fib    Continue amiodarone, digoxin, Coreg.    Hold ASA and Coumadin      Mild Protein Calorie Malnutrition    Mild PCM       Melena    See above        UTI (urinary tract infection)    Transition to oral Doxycycline   Urine culture pending     Give 1 dose of rocephin     Macrocytic anemia    Monitor        Acute on chronic combined systolic and diastolic congestive heart failure           Chronic renal failure, stage 4 (severe)    Monitor closely     normal renal function       Acute blood loss anemia    H/H stable at 28/9.0- did not require blood transfusion  Will continue to monitor       Acquired hypothyroidism      Continue Synthroid.       VTE Risk Mitigation (From admission, onward)        Ordered     Place HEBER hose  Until discontinued      03/10/19 1722     Place sequential compression device  Until discontinued      03/10/19 1722              Clarice Sin MD  Department of Hospital Medicine   [facility]

## 2019-03-13 NOTE — ASSESSMENT & PLAN NOTE
Cont Serial H/H  Give another dose of Vitamin K  GI recommends EGD if INR less than 1.5  Cont Protonix   NPO after MN     3/13- sec to Candida Esophagitis with Esophageal bleed and gastric erosions   treat with IV Protonix and IV Diflucan

## 2019-03-13 NOTE — [employer] PATIENT INSTRUCTIONS
Discharge Summary/Instructions after an Endoscopic Procedure  Patient Name: Jennifer Mathews  Patient MRN: [id number]  Patient YOB: 1932 Wednesday, March 13, 2019 Ghazal Orellana MD  RESTRICTIONS:  During your procedure today, you received medications for sedation.  These   medications may affect your judgment, balance and coordination.  Therefore,   for 24 hours, you have the following restrictions:   - DO NOT drive a car, operate machinery, make legal/financial decisions,   sign important papers or drink alcohol.    ACTIVITY:  Today: no heavy lifting, straining or running due to procedural   sedation/anesthesia.  The following day: return to full activity including work.  DIET:  Eat and drink normally unless instructed otherwise.     TREATMENT FOR COMMON SIDE EFFECTS:  - Mild abdominal pain, nausea, belching, bloating or excessive gas:  rest,   eat lightly and use a heating pad.  - Sore Throat: treat with throat lozenges and/or gargle with warm salt   water.  - Because air was used during the procedure, expelling large amounts of air   from your rectum or belching is normal.  - If a bowel prep was taken, you may not have a bowel movement for 1-3 days.    This is normal.  SYMPTOMS TO WATCH FOR AND REPORT TO YOUR PHYSICIAN:  1. Abdominal pain or bloating, other than gas cramps.  2. Chest pain.  3. Back pain.  4. Signs of infection such as: chills or fever occurring within 24 hours   after the procedure.  5. Rectal bleeding, which would show as bright red, maroon, or black stools.   (A tablespoon of blood from the rectum is not serious, especially if   hemorrhoids are present.)  6. Vomiting.  7. Weakness or dizziness.  GO DIRECTLY TO THE NEAREST EMERGENCY ROOM IF YOU HAVE ANY OF THE FOLLOWING:      Difficulty breathing              Chills and/or fever over 101 F   Persistent vomiting and/or vomiting blood   Severe abdominal pain   Severe chest pain   Black, tarry stools   Bleeding- more than one  tablespoon   Any other symptom or condition that you feel may need urgent attention  Your doctor recommends these additional instructions:  If any biopsies were taken, your doctors clinic will contact you in 1 to 2   weeks with any results.  - Patient has a contact number available for emergencies.  The signs and   symptoms of potential delayed complications were discussed with the   patient.  Return to normal activities tomorrow.  Written discharge   instructions were provided to the patient.   - Resume previous diet today.   - Return patient to [facility] for ongoing care.   - Continue present medications.   - Await pathology results.  For questions, problems or results please call your physician Ghazal Orellana MD at Work:  [phone]  If you have any questions about the above instructions, call the GI   department at [phone] or call the endoscopy unit at [phone]   from 7am until 3 pm.  [facility], EMERGENCY ROOM PHONE NUMBER:   [phone]  IF A COMPLICATION OR EMERGENCY SITUATION ARISES AND YOU ARE UNABLE TO REACH   YOUR PHYSICIAN - GO DIRECTLY TO THE EMERGENCY ROOM.  I have read or have had read to me these discharge instructions for my   procedure and have received a written copy.  I understand these   instructions and will follow-up with my physician if I have any questions.     __________________________________       _____________________________________  Nurse Signature                                          Patient/Designated   Responsible Party Signature  MD Ghazal Glez MD  3/13/2019 2:45:28 PM  This report has been verified and signed electronically.  [employer]

## 2019-03-13 NOTE — NURSING
"Pt c/o nausea this morning. Pt states "I  Think its because I haven't had anything to eat or drink this morning." Dr. Suarez notified and new orders obtained. Will continue to monitor.  "

## 2019-03-13 NOTE — ANESTHESIA RELEASE NOTE
"Anesthesia Release from PACU Note    Patient: Jennifer Mathews    Procedure(s) Performed: Procedure(s) (LRB):  ESOPHAGOGASTRODUODENOSCOPY (EGD) (N/A)    Anesthesia type: MAC    Post pain: Adequate analgesia    Post assessment: no apparent anesthetic complications, tolerated procedure well and no evidence of recall    Last Vitals:   Visit Vitals  BP (!) 140/89 (BP Location: Left arm, Patient Position: Lying)   Pulse 72   Temp 36.6 °C (97.8 °F) (Temporal)   Resp 18   Ht 5' 2" (1.575 m)   Wt 44 kg (97 lb 0 oz)   LMP  (LMP Unknown)   SpO2 (!) 94%   Breastfeeding? No   BMI 17.74 kg/m²       Post vital signs: stable    Level of consciousness: responds to stimulation    Nausea/Vomiting: no nausea/no vomiting    Complications: none    Airway Patency: patent    Respiratory: unassisted    Cardiovascular: stable and blood pressure at baseline    Hydration: euvolemic     "

## 2019-03-14 VITALS
TEMPERATURE: 97 F | OXYGEN SATURATION: 97 % | WEIGHT: 97 LBS | BODY MASS INDEX: 17.85 KG/M2 | SYSTOLIC BLOOD PRESSURE: 131 MMHG | HEART RATE: 70 BPM | HEIGHT: 62 IN | DIASTOLIC BLOOD PRESSURE: 61 MMHG | RESPIRATION RATE: 18 BRPM

## 2019-03-14 PROBLEM — R79.1 SUPRATHERAPEUTIC INR: Status: RESOLVED | Noted: 2019-03-10 | Resolved: 2019-03-14

## 2019-03-14 PROBLEM — D62 ACUTE BLOOD LOSS ANEMIA: Status: RESOLVED | Noted: 2018-04-15 | Resolved: 2019-03-14

## 2019-03-14 PROBLEM — K92.1 MELENA: Status: RESOLVED | Noted: 2019-03-12 | Resolved: 2019-03-14

## 2019-03-14 PROBLEM — K92.2 UPPER GI BLEED: Status: RESOLVED | Noted: 2018-01-22 | Resolved: 2019-03-14

## 2019-03-14 LAB
ANION GAP SERPL CALC-SCNC: 6 MMOL/L
BASOPHILS # BLD AUTO: 0.02 K/UL
BASOPHILS NFR BLD: 0.3 %
BUN SERPL-MCNC: 35 MG/DL
CALCIUM SERPL-MCNC: 7.8 MG/DL
CHLORIDE SERPL-SCNC: 110 MMOL/L
CO2 SERPL-SCNC: 28 MMOL/L
CREAT SERPL-MCNC: 1.1 MG/DL
DIFFERENTIAL METHOD: ABNORMAL
EOSINOPHIL # BLD AUTO: 0 K/UL
EOSINOPHIL NFR BLD: 0.4 %
ERYTHROCYTE [DISTWIDTH] IN BLOOD BY AUTOMATED COUNT: 16.8 %
EST. GFR  (AFRICAN AMERICAN): 53 ML/MIN/1.73 M^2
EST. GFR  (NON AFRICAN AMERICAN): 46 ML/MIN/1.73 M^2
GLUCOSE SERPL-MCNC: 116 MG/DL
HCT VFR BLD AUTO: 28.2 %
HCT VFR BLD AUTO: 28.2 %
HGB BLD-MCNC: 8.9 G/DL
HGB BLD-MCNC: 8.9 G/DL
INR PPP: 1.1
LYMPHOCYTES # BLD AUTO: 1.2 K/UL
LYMPHOCYTES NFR BLD: 15 %
MCH RBC QN AUTO: 32.2 PG
MCHC RBC AUTO-ENTMCNC: 31.6 G/DL
MCV RBC AUTO: 102 FL
MONOCYTES # BLD AUTO: 0.7 K/UL
MONOCYTES NFR BLD: 8.9 %
NEUTROPHILS # BLD AUTO: 5.8 K/UL
NEUTROPHILS NFR BLD: 75.4 %
PLATELET # BLD AUTO: 157 K/UL
PMV BLD AUTO: 10.9 FL
POTASSIUM SERPL-SCNC: 3.4 MMOL/L
PROTHROMBIN TIME: 12.1 SEC
RBC # BLD AUTO: 2.76 M/UL
SODIUM SERPL-SCNC: 144 MMOL/L
WBC # BLD AUTO: 7.73 K/UL

## 2019-03-14 PROCEDURE — 63600175 PHARM REV CODE 636 W HCPCS: Performed by: EMERGENCY MEDICINE

## 2019-03-14 PROCEDURE — 85610 PROTHROMBIN TIME: CPT

## 2019-03-14 PROCEDURE — 85025 COMPLETE CBC W/AUTO DIFF WBC: CPT

## 2019-03-14 PROCEDURE — 63600175 PHARM REV CODE 636 W HCPCS: Performed by: HOSPITALIST

## 2019-03-14 PROCEDURE — 36415 COLL VENOUS BLD VENIPUNCTURE: CPT

## 2019-03-14 PROCEDURE — C9113 INJ PANTOPRAZOLE SODIUM, VIA: HCPCS | Performed by: NURSE PRACTITIONER

## 2019-03-14 PROCEDURE — C9113 INJ PANTOPRAZOLE SODIUM, VIA: HCPCS | Performed by: HOSPITALIST

## 2019-03-14 PROCEDURE — 63600175 PHARM REV CODE 636 W HCPCS: Performed by: NURSE PRACTITIONER

## 2019-03-14 PROCEDURE — 25000003 PHARM REV CODE 250: Performed by: NURSE PRACTITIONER

## 2019-03-14 PROCEDURE — 80048 BASIC METABOLIC PNL TOTAL CA: CPT

## 2019-03-14 RX ORDER — PANTOPRAZOLE SODIUM 40 MG/1
40 TABLET, DELAYED RELEASE ORAL DAILY
Status: DISCONTINUED | OUTPATIENT
Start: 2019-03-15 | End: 2019-03-14 | Stop reason: HOSPADM

## 2019-03-14 RX ORDER — FERROUS SULFATE 324(65)MG
325 TABLET, DELAYED RELEASE (ENTERIC COATED) ORAL DAILY
Qty: 30 TABLET | Refills: 2 | Status: SHIPPED | OUTPATIENT
Start: 2019-03-14 | End: 2019-03-20

## 2019-03-14 RX ORDER — METOLAZONE 2.5 MG/1
2.5 TABLET ORAL DAILY PRN
Qty: 30 TABLET | Refills: 11 | Status: SHIPPED | OUTPATIENT
Start: 2019-03-14 | End: 2019-05-07 | Stop reason: ALTCHOICE

## 2019-03-14 RX ORDER — WARFARIN 2.5 MG/1
TABLET ORAL
Qty: 30 TABLET | Refills: 3 | Status: SHIPPED | OUTPATIENT
Start: 2019-03-14 | End: 2019-03-21 | Stop reason: SDUPTHER

## 2019-03-14 RX ORDER — PANTOPRAZOLE SODIUM 40 MG/10ML
40 INJECTION, POWDER, LYOPHILIZED, FOR SOLUTION INTRAVENOUS ONCE
Status: COMPLETED | OUTPATIENT
Start: 2019-03-14 | End: 2019-03-14

## 2019-03-14 RX ORDER — FLUCONAZOLE 100 MG/1
100 TABLET ORAL DAILY
Qty: 15 TABLET | Refills: 0 | Status: SHIPPED | OUTPATIENT
Start: 2019-03-14 | End: 2019-03-29 | Stop reason: ALTCHOICE

## 2019-03-14 RX ADMIN — ALLOPURINOL 100 MG: 100 TABLET ORAL at 08:03

## 2019-03-14 RX ADMIN — IRON SUCROSE 200 MG: 20 INJECTION, SOLUTION INTRAVENOUS at 11:03

## 2019-03-14 RX ADMIN — DOXYCYCLINE HYCLATE 100 MG: 100 TABLET, COATED ORAL at 08:03

## 2019-03-14 RX ADMIN — FUROSEMIDE 40 MG: 40 TABLET ORAL at 08:03

## 2019-03-14 RX ADMIN — PANTOPRAZOLE SODIUM 40 MG: 40 INJECTION, POWDER, LYOPHILIZED, FOR SOLUTION INTRAVENOUS at 03:03

## 2019-03-14 RX ADMIN — CARVEDILOL 25 MG: 12.5 TABLET, FILM COATED ORAL at 08:03

## 2019-03-14 RX ADMIN — PANTOPRAZOLE SODIUM 40 MG: 40 INJECTION, POWDER, LYOPHILIZED, FOR SOLUTION INTRAVENOUS at 08:03

## 2019-03-14 RX ADMIN — LOSARTAN POTASSIUM 25 MG: 25 TABLET, FILM COATED ORAL at 08:03

## 2019-03-14 RX ADMIN — LEVOTHYROXINE SODIUM 50 MCG: 50 TABLET ORAL at 08:03

## 2019-03-14 RX ADMIN — AMIODARONE HYDROCHLORIDE 200 MG: 200 TABLET ORAL at 08:03

## 2019-03-14 NOTE — DISCHARGE SUMMARY
[facility] Hospital Medicine  Discharge Summary      Patient Name: Jennifer Mathews  MRN: [id number]  Admission Date: 3/10/2019  Hospital Length of Stay: 2 days  Discharge Date and Time:  03/14/2019 11:33 AM  Attending Physician: Clarice Sin MD   Discharging Provider: Clarice Sin MD  Primary Care Provider: Desirae Bello MD      HPI:   Jennifer Mathews is a 86 y.o. female patient with a PMHx of stroke, hypothyroidism, HTN, HLD, CKD, afib anemia, open heart surgery, and ischemic cardiomyopathy who presents for evaluation of blood in stool. Onset this afternoon.  described as dark stool.  Pt reports 2 episodes today. No mitigating or exacerbating factors reported. To note patient on warfarin. Pt notes she had her coumadin checked on Friday and it was 7.1. And has been being held. Patient evaluated in Er. H/H stable 10.3/31.9. INR today 3.2  BUN above baseline at 83 today Cr. Stable with basline, today 1.9. HM consulted. Patient placed in obs.       Procedure(s) (LRB):  ESOPHAGOGASTRODUODENOSCOPY (EGD) (N/A)      Hospital Course:   The pt is a 85 yo female with mitral valve replacement x 3-bioprosthesis, Stroke-no obvious deficits, HTN, PAF, combined CHF with EF 25%, ICM s/p PPM-CRT-D placement, recurrent UTI, CKD3-4, and hypothyroidism who was placed in observation for GI bleed and UTI. On 3/8/19, her INR was 7.1. INR was 3.2 on admit. Hgb  10.3>9.4>8.5.     Pt reports she no further melena today. INR remains 2.1. Will give another dose of Vitamin K 5mg po. GI evaluated pt and recommends EGD if INR 1.5 or lower     3/13- doing well, no more melena, underwent EGD this afternoon which showed LA Grade D Candida Esophagitis. Biopsied. Gastric erosions without bleeding. Normal first portion of the duodenum and second portion of the duodenum. Pt started on IV Diflucan. Tolerated diet well. H/H stable at 8.9/29- INR 1.3.  3/14- pt continues to do well, H/H remained stable, she also received a dose of  Hai Terrell. She feels well, got up to use the bedside commode, no dizziness or weakness. She lives alone and is mostly independent in all her ADLs. She said that she had been sick with Flu and other UTI's on and off for last 1 month but was not taking any steroids but she was on Nasal Steroids Steroids Flonase for her allergies which has been d/donald. She wishes to be discharged home today with HH which has been arranged,. GI has cleared her for discharge. She was seen and examined and deemed stable for discharge home today with HH.      Consults:   Consults (From admission, onward)        Status Ordering Provider     Inpatient consult to Gastroenterology  Once     Provider:  Ghazal Orellana MD    Completed AIRAM MCCORMACK     Nutrition Services Referral  Once     Provider:  (Not yet assigned)    DAMON Bateman          No new Assessment & Plan notes have been filed under this hospital service since the last note was generated.  Service: Hospital Medicine    Final Active Diagnoses:    Diagnosis Date Noted POA    Chronic combined systolic and diastolic congestive heart failure [I50.42]  Yes    S/P MVR (mitral valve replacement) [Z95.2] 01/30/2014 Not Applicable    A-fib [I48.91] 10/08/2014 Yes    Mild Protein Calorie Malnutrition [E43] 03/12/2019 Yes    Macrocytic anemia [D53.9] 03/11/2019 Yes    UTI (urinary tract infection) [N39.0] 03/11/2019 Yes    Chronic renal failure, stage 4 (severe) [N18.4] 05/02/2018 Yes    Acquired hypothyroidism [E03.9] 02/28/2018 Yes      Problems Resolved During this Admission:    Diagnosis Date Noted Date Resolved POA    PRINCIPAL PROBLEM:  Upper GI bleed [K92.2] 01/22/2018 03/14/2019 Yes    Coumadin Toxicity/ Supratherapeutic INR [R79.1] 03/10/2019 03/14/2019 Yes    Melena [K92.1] 03/12/2019 03/14/2019 Yes    Acute blood loss anemia [D62] 04/15/2018 03/14/2019 Yes       Discharged Condition: stable    Disposition: Home or Self Care    Follow Up:  Follow-up  Information     Desirae Bello MD. Schedule an appointment as soon as possible for a visit in 1 week.    Specialty:  Family Medicine  Why:  Hospital follow up  Contact information:  [address] [place] [address]  [phone]             [facility].    Why:  Home Health will contact patient after discharge.  Contact information:  [address]  [place] [address]  [phone]                 Patient Instructions:      Ambulatory referral to Home Health   Referral Priority: Routine Referral Type: Home Health   Referral Reason: Specialty Services Required   Requested Specialty: Home Health Services   Number of Visits Requested: 1     Diet Cardiac     Activity as tolerated       Significant Diagnostic Studies: Labs:   BMP:   Recent Labs   Lab 03/13/19  0453 03/14/19  0510    116*    144   K 3.7 3.4*   * 110   CO2 29 28   BUN 46* 35*   CREATININE 1.1 1.1   CALCIUM 7.9* 7.8*   , CMP   Recent Labs   Lab 03/13/19  0453 03/14/19  0510    144   K 3.7 3.4*   * 110   CO2 29 28    116*   BUN 46* 35*   CREATININE 1.1 1.1   CALCIUM 7.9* 7.8*   ANIONGAP 4* 6*   ESTGFRAFRICA 53* 53*   EGFRNONAA 46* 46*   , CBC   Recent Labs   Lab 03/13/19  0453  03/13/19  1730 03/13/19  2312 03/14/19  0510   WBC 5.25  --   --   --  7.73   HGB 8.6*  8.6*   < > 8.9* 8.3* 8.9*  8.9*   HCT 26.9*  26.9*   < > 28.8* 26.8* 28.2*  28.2*   *  --   --   --  157    < > = values in this interval not displayed.   , INR   Lab Results   Component Value Date    INR 1.1 03/14/2019    INR 1.3 (H) 03/13/2019    INR 2.1 (H) 03/12/2019   , Lipid Panel   Lab Results   Component Value Date    CHOL 155 03/20/2018    HDL 36 (L) 03/20/2018    LDLCALC 90.8 03/20/2018    TRIG 141 03/20/2018    CHOLHDL 23.2 03/20/2018   All labs within the past 24 hours have been reviewed  Microbiology:   Blood Culture   Lab Results   Component Value Date    LABBLOO No growth  after 5 days. 06/20/2018     Radiology: X-Ray: CXR: X-Ray Chest 1 View (CXR): No results found for this visit on 03/10/19.  Cardiac Graphics: Echocardiogram:   2D echo with color flow doppler:   Results for orders placed or performed during the hospital encounter of 11/19/18   2D echo with color flow doppler   Result Value Ref Range    QEF 20 (A) 55 - 65    Est. PA Systolic Pressure 69.17 (A)     Tricuspid Valve Regurgitation MODERATE (A)        Pending Diagnostic Studies:     Procedure Component Value Units Date/Time    Hematocrit [[id number]]     Order Status:  Sent Lab Status:  No result     Specimen:  Blood     Hemoglobin [[id number]]     Order Status:  Sent Lab Status:  No result     Specimen:  Blood          Medications:  Reconciled Home Medications:      Medication List      START taking these medications    fluconazole 100 MG tablet  Commonly known as:  DIFLUCAN  Take 1 tablet (100 mg total) by mouth once daily.        CHANGE how you take these medications    acetaminophen 325 MG tablet  Commonly known as:  TYLENOL EXTRA STRENGTH  Take 2 tablets (650 mg total) by mouth every 8 (eight) hours.  What changed:    · when to take this  · reasons to take this     digoxin 125 mcg tablet  Commonly known as:  LANOXIN  Take 1 tablet (125 mcg total) by mouth once daily. TAKE 1 TABLET (0.125 MG TOTAL) BY MOUTH ONCE DAILY.  What changed:  additional instructions     metOLazone 2.5 MG tablet  Commonly known as:  ZAROXOLYN  Take 1 tablet (2.5 mg total) by mouth daily as needed. For SOB or Leg edema  What changed:    · when to take this  · reasons to take this  · additional instructions     warfarin 2.5 MG tablet  Commonly known as:  COUMADIN  TAKE 1/2 TABLET ON MONDAY AND WEDNESDAY AND 1 TABLET ALL OTHER DAYS. DISCONTINUE 5MG TABLET.  What changed:  See the new instructions.        CONTINUE taking these medications    allopurinol 100 MG tablet  Commonly known as:  ZYLOPRIM  TAKE 2 TABLETS (200 MG TOTAL) BY MOUTH ONCE DAILY.      amiodarone 200 MG Tab  Commonly known as:  PACERONE  TAKE 1 TABLET EVERY DAY     aspirin 81 MG EC tablet  Commonly known as:  ECOTRIN  Take 81 mg by mouth once daily.     betamethasone valerate 0.1% 0.1 % Crea  Commonly known as:  VALISONE  Apply topically 2 (two) times daily.     calcium carbonate 600 mg calcium (1,500 mg) Tab  Commonly known as:  OS-SHERRY  Take 600 mg by mouth once.     carvedilol 25 MG tablet  Commonly known as:  COREG  TAKE 1 TABLET BY MOUTH TWICE A DAY WITH MEALS     colchicine 0.6 mg tablet  Commonly known as:  COLCRYS  Take 1 tablet by mouth 3 times a week     furosemide 40 MG tablet  Commonly known as:  LASIX  Take 1 tablet (40 mg total) by mouth 2 (two) times daily.     gabapentin 100 MG capsule  Commonly known as:  NEURONTIN  TAKE ONE CAPSULE BY MOUTH TWO TIMES DAILY     Lactobac 40-Bifido 3-S.thermop 100 billion cell Cap  Commonly known as:  PROBIOTIC  Take 1 capsule by mouth once daily.     levocetirizine 5 MG tablet  Commonly known as:  XYZAL  Take 1 tablet (5 mg total) by mouth every evening.     levothyroxine 50 MCG tablet  Commonly known as:  SYNTHROID  Take 1 tablet (50 mcg total) by mouth once daily.     losartan 25 MG tablet  Commonly known as:  COZAAR  Take 1 tablet (25 mg total) by mouth once daily.     potassium chloride SA 20 MEQ tablet  Commonly known as:  K-DUR,KLOR-CON  TAKE 1 TABLET (20 MEQ TOTAL) BY MOUTH ONCE DAILY.        STOP taking these medications    cranberry 1,000 mg Cap     doxycycline 150 MG tablet  Commonly known as:  ADOXA     fluticasone 50 mcg/actuation nasal spray  Commonly known as:  FLONASE     methylPREDNISolone 4 mg tablet  Commonly known as:  MEDROL DOSEPACK     MULTIVITAMIN ORAL            Indwelling Lines/Drains at time of discharge:   Lines/Drains/Airways          None          Time spent on the discharge of patient: 47 minutes  Patient was seen and examined on the date of discharge and determined to be suitable for discharge.         Clarice Sin  MD  Department of Hospital Medicine  [facility]

## 2019-03-14 NOTE — PROGRESS NOTES
Discharge instructions given to patient and her daughter, verbalized understanding - IV removed without difficulty, pressure dressing applied to site - cardiac monitor removed and returned to monitor tech - patient to car via wheelchair without distress noted.

## 2019-03-14 NOTE — PLAN OF CARE
Problem: Adult Inpatient Plan of Care  Goal: Plan of Care Review  Outcome: Ongoing (interventions implemented as appropriate)  Patient AAOX4, vitals stable. No complaints of pain. Ambulates independently, free from falls and injury. POC reviewed, will continue to monitor.

## 2019-03-15 NOTE — PHYSICIAN QUERY
"PT Name: Jennifer Mathews  MR #: [id number]    Physician Query Form - Heart  Condition Clarification     CDS/: Flora Huizar RN, CDS               Contact information:[email or web address]  This form is a permanent document in the medical record.     Query Date: March 15, 2019    By submitting this query, we are merely seeking further clarification of documentation. Please utilize your independent clinical judgment when addressing the question(s) below.    The medical record contains the following   Indicators     Supporting Clinical Findings Location in Medical Record    BNP     x EF  25%    PN 3/13    Radiology findings      Echo Results      "Ascites" documented     x "SOB" or "GAR" documented Respiratory: Negative for cough, chest tightness, shortness of breath, wheezing and stridor   H&P 3/10    "Hypoxia" documented     x Heart Failure documented Acute on chronic combined systolic and diastolic congestive heart failure    Chronic combined systolic and diastolic congestive heart failure    PN 3/12      DC Summary 3/14   x "Edema" documented She exhibits edema (trace).     PN 3/12   x Diuretics/Meds furosemide (LASIX) 40 MG tablet   digoxin (LANOXIN) 125 mcg tablet  carvedilol (COREG) 25 MG tablet  furosemide tablet 40 mg   Home Med  Home Med  Home Med  MAR 3/11 - 3/14    Treatment:      Other:      Heart failure (HF) can be acute, chronic or both. It is generally further specificed as systolic, diastolic, or combined. Lastly, it is important to identify an underlying etiology if known or suspected.     Common clues to acute exacerbation:  Rapidly progressive symptoms (w/in 2 weeks of presentation), using IV diuretics to treat, using supplemental O2, pulmonary edema on Xray, MI w/in 4 weeks, and/or BNP >500    Systolic Heart Failure: is defined as chart documentation of a left ventricular ejection fraction (LVEF) less than 40%     Diastolic Heart Failure: is defined as a left ventricular ejection " fraction (LVEF) greater than 40%   +      Evidence of diastolic dysfunction on echocardiography OR    Right heart catheterization wedge pressure above 12 mm Hg OR    Left heart catheterization left ventricular end diastolic pressure 18 mm Hg or above.    References: *American Heart Association    The clinical guidelines noted below are only system guidelines, and do not replace the providers clinical judgment.     Provider, please specify the conflicting diagnosis associated with above clinical findings    [ xx  ] Acute on Chronic Combined Systolic and Diastolic Heart Failure                   [   ] Chronic Combined Systolic and Diastolic Heart Failure  [   ] Other Type of Heart Failure (please specify type): _________________________  [   ] Heart Failure Ruled Out  [   ] Other (please specify): ___________________________________  [  ] Clinically Undetermined                          Please document in your progress notes daily for the duration of treatment until resolved and include in your discharge summary.

## 2019-03-18 ENCOUNTER — PATIENT OUTREACH (OUTPATIENT)
Dept: ADMINISTRATIVE | Facility: CLINIC | Age: 84
End: 2019-03-18

## 2019-03-18 ENCOUNTER — TELEPHONE (OUTPATIENT)
Dept: ADMINISTRATIVE | Facility: CLINIC | Age: 84
End: 2019-03-18

## 2019-03-18 DIAGNOSIS — Z79.01 LONG TERM CURRENT USE OF ANTICOAGULANT THERAPY: ICD-10-CM

## 2019-03-18 RX ORDER — WARFARIN 2.5 MG/1
TABLET ORAL
Qty: 30 TABLET | Refills: 3 | Status: ON HOLD | OUTPATIENT
Start: 2019-03-18 | End: 2019-04-07 | Stop reason: SDUPTHER

## 2019-03-18 NOTE — PATIENT INSTRUCTIONS
Upper GI Bleeding (Stable)  Your upper gastrointestinal (GI) tract includes your esophagus, stomach, and upper small intestine. You have signs of bleeding from your upper GI tract. You may have vomited or coughed up blood or coffee-ground like material. Or you may have black or tarry stools. Very small amounts of GI bleeding may not be visible and can only be found by a test of the stool.  Causes of upper GI bleeding can include:  · Tear in the lining of the esophagus  · Enlarged veins in the esophagus  · An ulcer in the stomach or top of the small intestine  · Severe irritation of the stomach  · Inflammation of the digestive tract  Note: A bloody nose or mouth or dental problems may cause blood to be swallowed and vomited up again. This is not true GI bleeding. Iron supplements and medicines for diarrhea and upset stomach can cause black stools. This is not GI bleeding and is not a cause for concern.  Home care  Depending on the cause of your bleeding, care may include the following:  · You may be given medicines to help protect your GI tract, treat your problem, and promote healing. Take these as directed.  · Avoid NSAIDs, such as aspirin, ibuprofen, or naproxen. They can irritate the stomach and cause further bleeding. If you are taking these medicines for other medical reasons, talk to your healthcare provider before you stop them.    · Avoid alcohol, caffeine, and tobacco, which can delay healing and worsen your problem.  Follow-up care  Follow up with your healthcare provider as advised. Further tests may need to be done to find the cause of your bleeding.  When to seek medical advice  Call your healthcare provider for any of the following:  · Stomach pain appears or gets worse  · Pain spreads to the neck, back, shoulder, or arm  · Weakness or dizziness  · Swelling of your abdomen  · Red blood in your stool  · Fever of 100.4F (38C) or higher, or as directed by your healthcare provider  Call 911  Get  emergency medical care if any of these occur:  · Trouble breathing or swallowing  · Severe dizziness  · Loss of consciousness  · Vomiting blood or large amounts of blood in the stool  Date Last Reviewed: 6/24/2015  © 1918-5412 [email or web address] [address] [place] [address], [place] [address]. All rights reserved. This information is not intended as a substitute for professional medical care. Always follow your healthcare professional's instructions.

## 2019-03-19 ENCOUNTER — PATIENT OUTREACH (OUTPATIENT)
Dept: ADMINISTRATIVE | Facility: HOSPITAL | Age: 84
End: 2019-03-19

## 2019-03-20 ENCOUNTER — OFFICE VISIT (OUTPATIENT)
Dept: INTERNAL MEDICINE | Facility: CLINIC | Age: 84
End: 2019-03-20
Payer: MEDICARE

## 2019-03-20 VITALS
BODY MASS INDEX: 20.29 KG/M2 | HEART RATE: 70 BPM | DIASTOLIC BLOOD PRESSURE: 60 MMHG | TEMPERATURE: 97 F | WEIGHT: 110.25 LBS | HEIGHT: 62 IN | SYSTOLIC BLOOD PRESSURE: 118 MMHG

## 2019-03-20 DIAGNOSIS — B37.81 CANDIDA ESOPHAGITIS: Primary | ICD-10-CM

## 2019-03-20 DIAGNOSIS — E78.5 DYSLIPIDEMIA: ICD-10-CM

## 2019-03-20 DIAGNOSIS — Z79.01 ANTICOAGULATED ON COUMADIN: ICD-10-CM

## 2019-03-20 DIAGNOSIS — K25.3 ACUTE GASTRIC EROSION: ICD-10-CM

## 2019-03-20 PROCEDURE — 99496 TRANSITIONAL CARE MANAGE SERVICE 7 DAY DISCHARGE: ICD-10-PCS | Mod: S$PBB,,, | Performed by: NURSE PRACTITIONER

## 2019-03-20 PROCEDURE — 99496 TRANSJ CARE MGMT HIGH F2F 7D: CPT | Mod: S$PBB,,, | Performed by: NURSE PRACTITIONER

## 2019-03-20 PROCEDURE — 99214 OFFICE O/P EST MOD 30 MIN: CPT | Mod: PBBFAC | Performed by: NURSE PRACTITIONER

## 2019-03-20 PROCEDURE — 99496 TRANSJ CARE MGMT HIGH F2F 7D: CPT | Mod: PBBFAC | Performed by: NURSE PRACTITIONER

## 2019-03-20 PROCEDURE — 99999 PR PBB SHADOW E&M-EST. PATIENT-LVL IV: CPT | Mod: PBBFAC,,, | Performed by: NURSE PRACTITIONER

## 2019-03-20 PROCEDURE — 99999 PR PBB SHADOW E&M-EST. PATIENT-LVL IV: ICD-10-PCS | Mod: PBBFAC,,, | Performed by: NURSE PRACTITIONER

## 2019-03-20 RX ORDER — NYSTATIN 100000 [USP'U]/ML
SUSPENSION ORAL
Refills: 0 | Status: ON HOLD | COMMUNITY
Start: 2019-03-18 | End: 2019-04-07 | Stop reason: HOSPADM

## 2019-03-20 NOTE — PROGRESS NOTES
Jennifer Mathews  03/20/2019  [id number]    Desirae Bello MD  Patient Care Team:  Desirae Bello MD as PCP - General (Family Medicine)  Trent Duke MD as PCP - [facility]  Radha John LPN as Care Coordinator (Internal Medicine)  Prosper You MD as Consulting Physician (Nephrology)  Maikel Cary MD as Consulting Physician (Cardiology)  Oc Catherine MD as Consulting Physician (Rheumatology)  Robb Huerta Jr., MD as Consulting Physician (Hematology and Oncology)  Has the patient seen any provider outside of the [facility] network since the last visit? (no). If yes, HIPPA forms completed and records requested.        Visit Type:a scheduled visit following a recent hospitalization    Chief Complaint:  Chief Complaint   Patient presents with    hospital followup       History of Present Illness:      Patient presents for TCC visit. She was in the hospital in obs 3/8 and then admitted 3/10. Discharged 3/12.      Transitional Care Note    Family and/or Caretaker present at visit?  No.  Diagnostic tests reviewed/disposition: No diagnosic tests pending after this hospitalization.  Disease/illness education: GI bleed  Home health/community services discussion/referrals: Patient has home health established at [facility].   Establishment or re-establishment of referral orders for community resources: No other necessary community resources.   Discussion with other health care providers: discussed with  lab orders.     3/8 coumadin clinic visit and PT/INR supratherapeutic. Patient states she went home and then two days later, blood in stool. Went to ED at that time. EGD done in the hospital and found ulcers. Also yeast discovered and treated with diflucan.     Coumadin currently on hold until finishes diflucan due to med interaction. Diflucan for 8 more days. Last coumadin dose 3/8. Patient states blood draw scheduled tomorrow with  and PT/INR will be drawn.     Denies blood in stool since  discharge. Reports feeling tired from lack of sleep since hospital stay and now doing PT at home with HH.    Needs GI and cardiology hospital follow up.      History:  Past Medical History:   Diagnosis Date    Acute coronary syndrome     Anemia     Anticoagulated on Coumadin 7/13/2015    Arthritis     Asthma     patient denies    Atrial fibrillation     Basal cell carcinoma 10/2015    left neck    Cardiac arrest     Cardiac resynchronization therapy defibrillator (CRT-D) in place 07/13/2015    Pt denies, states it does not shock me    Chronic combined systolic and diastolic congestive heart failure     CKD (chronic kidney disease) stage 3, GFR 30-59 ml/min     Dyslipidemia 1/30/2014    Hyperlipidemia     Hypertension     Hypothyroidism     Ischemic cardiomyopathy 01/30/2014    Macular hole of left eye     Old    Macular hole of left eye     LEV (obstructive sleep apnea) 9/30/2013    Pneumonia     required hospitalization    Recurrent UTI     Refractive error     Spastic colon     Stroke      Past Surgical History:   Procedure Laterality Date    APPENDECTOMY      CARDIAC CATHETERIZATION      CARDIAC PACEMAKER PLACEMENT  2014    CARDIAC VALVE SURGERY      CATARACT EXTRACTION      OU    CHOLECYSTECTOMY      COLONOSCOPY      ESOPHAGOGASTRODUODENOSCOPY (EGD) N/A 3/13/2019    [facility]    IRRIGATION AND DEBRIDEMENT OF LEFT KNEE Left 9/12/2017    [facility]baum [facility] at [facility] OR    MITRAL VALVE REPLACEMENT  2014    MITRAL VALVE SURGERY      x3    REPLACEMENT, PACEMAKER GENERATOR/pt has crt-p versus d Left 6/20/2018    Per[facility]ne [facility] at [facility] CATH LAB    REVISION, SKIN POCKET, FOR CARDIAC PACEMAKER Left 2/26/2014    Per[facility] by [facility] at [facility] CATH LAB     Family History   Problem Relation Age of Onset    Coronary artery disease Mother         mi    Epilepsy Mother     Heart attack Mother     Coronary  artery disease Father     Heart disease Father     Emphysema Father     COPD Father     Diabetes Brother     Cancer Brother     Melanoma Neg Hx     Psoriasis Neg Hx     Lupus Neg Hx     Eczema Neg Hx      Social History     Socioeconomic History    Marital status:      Spouse name: Not on file    Number of children: Not on file    Years of education: Not on file    Highest education level: Not on file   Social Needs    Financial resource strain: Not on file    Food insecurity - worry: Not on file    Food insecurity - inability: Not on file    Transportation needs - medical: Not on file    Transportation needs - non-medical: Not on file   Occupational History    Not on file   Tobacco Use    Smoking status: Never Smoker    Smokeless tobacco: Never Used   Substance and Sexual Activity    Alcohol use: No    Drug use: No    Sexual activity: No   Other Topics Concern    Are you pregnant or think you may be? Not Asked    Breast-feeding Not Asked   Social History Narrative    Not on file     Patient Active Problem List   Diagnosis    LEV (obstructive sleep apnea)    Chronic combined systolic and diastolic congestive heart failure    Edema    Recurrent UTI    Hypertension    Dyslipidemia    S/P MVR (mitral valve replacement)    S/P placement of cardiac pacemaker    Multiple pelvic fractures    Shoulder pain, acute    Coagulopathy    Syncope    Bilateral lower extremity edema    A-fib    Cardiac resynchronization therapy defibrillator (CRT-D) in place    Ischemic cardiomyopathy    Anticoagulated on Coumadin    Osteopenia with high risk of fracture    Osteoarthritis of hand    Pulmonary hypertension due to left heart disease    Primary osteoarthritis involving multiple joints    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    Medication monitoring encounter    Disorder of bone and articular cartilage    Enterocolitis    Left leg swelling     "Acquired hypothyroidism    Chronic renal failure, stage 4 (severe)    Anemia associated with stage 4 chronic renal failure    Iron deficiency anemia due to chronic blood loss    Pacemaker generator end of life    At risk for sudden cardiac death    Acute on chronic combined systolic and diastolic congestive heart failure    Osteoporosis, senile    Elevated serum immunoglobulin free light chain level    Macrocytic anemia    UTI (urinary tract infection)    Mild Protein Calorie Malnutrition     Review of patient's allergies indicates:   Allergen Reactions    Cephalosporins Hives    Metaxalone Itching    Penicillins      Other reaction(s): Unknown      Pregabalin      Other reaction(s): "Bad feeling"      Sulfa (sulfonamide antibiotics) Itching       The following were reviewed at this visit: active problem list, medication list, allergies, family history, social history, and health maintenance.    Medications:  Current Outpatient Medications on File Prior to Visit   Medication Sig Dispense Refill    allopurinol (ZYLOPRIM) 100 MG tablet TAKE 2 TABLETS (200 MG TOTAL) BY MOUTH ONCE DAILY. 180 tablet 1    aspirin (ECOTRIN) 81 MG EC tablet Take 81 mg by mouth once daily.      carvedilol (COREG) 25 MG tablet TAKE 1 TABLET BY MOUTH TWICE A DAY WITH MEALS 180 tablet 3    digoxin (LANOXIN) 125 mcg tablet Take 1 tablet (125 mcg total) by mouth once daily. TAKE 1 TABLET (0.125 MG TOTAL) BY MOUTH ONCE DAILY. (Patient taking differently: Take 125 mcg by mouth once daily. ) 90 tablet 3    fluconazole (DIFLUCAN) 100 MG tablet Take 1 tablet (100 mg total) by mouth once daily. 15 tablet 0    furosemide (LASIX) 40 MG tablet Take 1 tablet (40 mg total) by mouth 2 (two) times daily. 60 tablet 3    gabapentin (NEURONTIN) 100 MG capsule TAKE ONE CAPSULE BY MOUTH TWO TIMES DAILY 180 capsule 1    levothyroxine (SYNTHROID) 50 MCG tablet Take 1 tablet (50 mcg total) by mouth once daily. 30 tablet 11    losartan " (COZAAR) 25 MG tablet Take 1 tablet (25 mg total) by mouth once daily. 30 tablet 11    metOLazone (ZAROXOLYN) 2.5 MG tablet Take 1 tablet (2.5 mg total) by mouth daily as needed. For SOB or Leg edema 30 tablet 11    nystatin (MYCOSTATIN) 100,000 unit/mL suspension SWISH   SWALLOW 4 TO 6 MLS FIVE TO SIX TIMES A DAILY  0    potassium chloride SA (K-DUR,KLOR-CON) 20 MEQ tablet TAKE 1 TABLET (20 MEQ TOTAL) BY MOUTH ONCE DAILY.  6    acetaminophen (TYLENOL EXTRA STRENGTH) 325 MG tablet Take 2 tablets (650 mg total) by mouth every 8 (eight) hours. (Patient taking differently: Take 650 mg by mouth 3 (three) times daily as needed. )      amiodarone (PACERONE) 200 MG Tab TAKE 1 TABLET EVERY DAY 30 tablet 6    colchicine (COLCRYS) 0.6 mg tablet Take 1 tablet by mouth 3 times a week 15 tablet 11    warfarin (COUMADIN) 2.5 MG tablet TAKE 1/2 TABLET ON MONDAY AND WEDNESDAY AND 1 TABLET ALL OTHER DAYS. DISCONTINUE 5MG TABLET. 30 tablet 3    warfarin (COUMADIN) 2.5 MG tablet TAKE 1/2 TABLET ON MONDAY AND WEDNESDAY AND 1 TABLET ALL OTHER DAYS. DISCONTINUE 5MG TABLET. 30 tablet 3    [DISCONTINUED] betamethasone valerate 0.1% (VALISONE) 0.1 % Crea Apply topically 2 (two) times daily. 45 g 2    [DISCONTINUED] calcium carbonate (OS-SHERRY) 600 mg calcium (1,500 mg) Tab Take 600 mg by mouth once.      [DISCONTINUED] ferrous sulfate 324 mg (65 mg iron) TbEC Take 1 tablet (324 mg total) by mouth once daily. 30 tablet 2    [DISCONTINUED] Lactobac 40-Bifido 3-S.thermop (PROBIOTIC) 100 billion cell Cap Take 1 capsule by mouth once daily. 30 capsule 0    [DISCONTINUED] levocetirizine (XYZAL) 5 MG tablet Take 1 tablet (5 mg total) by mouth every evening. 30 tablet 0     Current Facility-Administered Medications on File Prior to Visit   Medication Dose Route Frequency Provider Last Rate Last Dose    denosumab (PROLIA) injection 60 mg  60 mg Subcutaneous Q6 Months Oc Catherine MD   60 mg at 11/26/18 1031       Medications  have been reviewed and reconciled with patient at this visit.  Barriers to medications present (no)    Adverse reactions to current medications (no)    Over the counter medications reviewed (Yes ), and if needed added to active Medication list at this visit.     Exam:  Wt Readings from Last 3 Encounters:   03/20/19 50 kg (110 lb 3.7 oz)   03/13/19 44 kg (97 lb 0 oz)   02/22/19 52.3 kg (115 lb 4.8 oz)     Temp Readings from Last 3 Encounters:   03/20/19 97.2 °F (36.2 °C) (Tympanic)   03/14/19 96.8 °F (36 °C) (Oral)   02/22/19 98 °F (36.7 °C) (Tympanic)     BP Readings from Last 3 Encounters:   03/20/19 118/60   03/14/19 131/61   02/22/19 116/60     Pulse Readings from Last 3 Encounters:   03/20/19 70   03/14/19 70   02/22/19 70     Body mass index is 20.16 kg/m².      Review of Systems   Constitutional: Positive for malaise/fatigue. Negative for chills, fever and weight loss.   Respiratory: Negative for cough and shortness of breath.    Cardiovascular: Negative for chest pain and palpitations.   Gastrointestinal: Negative for abdominal pain, blood in stool, diarrhea, melena, nausea and vomiting.   Genitourinary: Negative for dysuria.   Neurological: Negative for weakness and headaches.     Physical Exam   Constitutional: She is oriented to person, place, and time. She appears well-developed and well-nourished. No distress.   HENT:   Head: Normocephalic and atraumatic.   Eyes: Conjunctivae and EOM are normal. Pupils are equal, round, and reactive to light.   Cardiovascular: An irregularly irregular rhythm present. Exam reveals no gallop and no friction rub.   No murmur heard.  Pulmonary/Chest: Effort normal and breath sounds normal.   Neurological: She is alert and oriented to person, place, and time.   Skin: Skin is warm and dry.   Psychiatric: She has a normal mood and affect.   Vitals reviewed.      Laboratory Reviewed ({Yes)  Lab Results   Component Value Date    WBC 7.73 03/14/2019    HGB 8.9 (L) 03/14/2019     HGB 8.9 (L) 03/14/2019    HCT 28.2 (L) 03/14/2019    HCT 28.2 (L) 03/14/2019     03/14/2019    CHOL 155 03/20/2018    TRIG 141 03/20/2018    HDL 36 (L) 03/20/2018    ALT 16 03/10/2019    AST 28 03/10/2019     03/14/2019    K 3.4 (L) 03/14/2019     03/14/2019    CREATININE 1.1 03/14/2019    BUN 35 (H) 03/14/2019    CO2 28 03/14/2019    TSH 8.560 (H) 11/19/2018    INR 1.1 03/14/2019    HGBA1C 6.4 (H) 06/20/2006       Jennifer was seen today for hospital followup.    Diagnoses and all orders for this visit:    Candida esophagitis  -     Ambulatory referral to Gastroenterology    Acute gastric erosion  Comments:  no s/s of bleeding, CBC tomorrow  Orders:  -     Ambulatory referral to Gastroenterology    Dyslipidemia  Comments:  status pending labs, continue current medications  Orders:  -     Cancel: Lipid panel; Future    Anticoagulated on Coumadin  Comments:  on hold due to drug-drug interaction, PT/INR to be drawn tomorrow by ; will follow up with coumadin clinic and cardiology      Will add CBC and lipid to  blood draw tomorrow.   Scheduled with GI and cardiology for follow up.  Discussed deconditioning and need for PT in recovery and improving fatigue.           Care Plan/Goals: Reviewed  (N/A)  Goals     None          Follow up: Follow-up if symptoms worsen or fail to improve, for keep routine follow up.    After visit summary was printed and given to patient upon discharge today.  Patient goals and care plan are included in After Visit Summary.

## 2019-03-21 ENCOUNTER — ANTI-COAG VISIT (OUTPATIENT)
Dept: CARDIOLOGY | Facility: CLINIC | Age: 84
End: 2019-03-21

## 2019-03-21 ENCOUNTER — LAB VISIT (OUTPATIENT)
Dept: LAB | Facility: HOSPITAL | Age: 84
End: 2019-03-21
Attending: EMERGENCY MEDICINE
Payer: MEDICARE

## 2019-03-21 DIAGNOSIS — E43 ALIMENTARY EDEMA: Primary | ICD-10-CM

## 2019-03-21 LAB
BASOPHILS # BLD AUTO: 0.02 K/UL
BASOPHILS NFR BLD: 0.3 %
CHOLEST SERPL-MCNC: 131 MG/DL
CHOLEST/HDLC SERPL: 4.2 {RATIO}
DIFFERENTIAL METHOD: ABNORMAL
EOSINOPHIL # BLD AUTO: 0.1 K/UL
EOSINOPHIL NFR BLD: 0.6 %
ERYTHROCYTE [DISTWIDTH] IN BLOOD BY AUTOMATED COUNT: 18 %
HCT VFR BLD AUTO: 27.8 %
HDLC SERPL-MCNC: 31 MG/DL
HDLC SERPL: 23.7 %
HGB BLD-MCNC: 8.7 G/DL
INR PPP: 1.1
LDLC SERPL CALC-MCNC: 68.6 MG/DL
LYMPHOCYTES # BLD AUTO: 1.8 K/UL
LYMPHOCYTES NFR BLD: 23.6 %
MCH RBC QN AUTO: 31.9 PG
MCHC RBC AUTO-ENTMCNC: 31.3 G/DL
MCV RBC AUTO: 102 FL
MONOCYTES # BLD AUTO: 1 K/UL
MONOCYTES NFR BLD: 13.3 %
NEUTROPHILS # BLD AUTO: 4.8 K/UL
NEUTROPHILS NFR BLD: 62.2 %
NONHDLC SERPL-MCNC: 100 MG/DL
PLATELET # BLD AUTO: 223 K/UL
PMV BLD AUTO: 11.1 FL
RBC # BLD AUTO: 2.73 M/UL
TRIGL SERPL-MCNC: 157 MG/DL
WBC # BLD AUTO: 7.75 K/UL

## 2019-03-21 PROCEDURE — 85025 COMPLETE CBC W/AUTO DIFF WBC: CPT

## 2019-03-21 PROCEDURE — 80061 LIPID PANEL: CPT

## 2019-03-21 NOTE — PROGRESS NOTES
Mrs. Mathews started Fluconazole on 3/14 x 15 days.  Dose of coumadin was held due to interactions.  Orders given: Resume current dose of 2.5mg on Mondays, Wednesdays, Fridays and 3.75mg on all other days of the week.  Will monitor closely.  Recheck on Monday, 3/25/19.

## 2019-03-22 NOTE — PLAN OF CARE
03/22/19 1432   Final Note   Assessment Type Final Discharge Note   Anticipated Discharge Disposition Home-Health   Right Care Referral Info   Post Acute Recommendation Home-care   Facility Name [facility] [place], State [place], [place]

## 2019-03-25 ENCOUNTER — TELEPHONE (OUTPATIENT)
Dept: RHEUMATOLOGY | Facility: CLINIC | Age: 84
End: 2019-03-25

## 2019-03-25 ENCOUNTER — ANTI-COAG VISIT (OUTPATIENT)
Dept: CARDIOLOGY | Facility: CLINIC | Age: 84
End: 2019-03-25

## 2019-03-25 LAB — INR PPP: 1.6

## 2019-03-25 NOTE — TELEPHONE ENCOUNTER
----- Message from Janay Valdez PA-C sent at 3/25/2019  7:42 AM CDT -----  See if she wants to reschedule her appt, she was the Friday visit who got lost.

## 2019-03-25 NOTE — PROGRESS NOTES
Verbal result taken from Jennifer with [facility]. PT/INR 18.6 / 1.6.   Date drawn 3/25/19.  Patient will be finishing up course of fluconazole on Friday, 3/29/19.  Will continue to monitor closely.  Orders given: Take coumadin 5mg on today; then resume current dose of 2.5mg on Mondays, Wednesdays, Fridays and 3.75mg on all other days of the week.  Recheck on 3/28/19.

## 2019-03-26 ENCOUNTER — PATIENT MESSAGE (OUTPATIENT)
Dept: RHEUMATOLOGY | Facility: CLINIC | Age: 84
End: 2019-03-26

## 2019-03-27 ENCOUNTER — OFFICE VISIT (OUTPATIENT)
Dept: GASTROENTEROLOGY | Facility: CLINIC | Age: 84
End: 2019-03-27
Payer: MEDICARE

## 2019-03-27 VITALS
DIASTOLIC BLOOD PRESSURE: 58 MMHG | HEART RATE: 68 BPM | WEIGHT: 110.88 LBS | SYSTOLIC BLOOD PRESSURE: 122 MMHG | HEIGHT: 62 IN | BODY MASS INDEX: 20.4 KG/M2

## 2019-03-27 DIAGNOSIS — K92.2 UPPER GI BLEED: ICD-10-CM

## 2019-03-27 DIAGNOSIS — K20.90 ESOPHAGITIS: Primary | ICD-10-CM

## 2019-03-27 PROCEDURE — 99213 OFFICE O/P EST LOW 20 MIN: CPT | Mod: S$PBB,,, | Performed by: PHYSICIAN ASSISTANT

## 2019-03-27 PROCEDURE — 99999 PR PBB SHADOW E&M-EST. PATIENT-LVL IV: ICD-10-PCS | Mod: PBBFAC,,, | Performed by: PHYSICIAN ASSISTANT

## 2019-03-27 PROCEDURE — 99214 OFFICE O/P EST MOD 30 MIN: CPT | Mod: PBBFAC | Performed by: PHYSICIAN ASSISTANT

## 2019-03-27 PROCEDURE — 99213 PR OFFICE/OUTPT VISIT, EST, LEVL III, 20-29 MIN: ICD-10-PCS | Mod: S$PBB,,, | Performed by: PHYSICIAN ASSISTANT

## 2019-03-27 PROCEDURE — 99999 PR PBB SHADOW E&M-EST. PATIENT-LVL IV: CPT | Mod: PBBFAC,,, | Performed by: PHYSICIAN ASSISTANT

## 2019-03-27 RX ORDER — PANTOPRAZOLE SODIUM 20 MG/1
20 TABLET, DELAYED RELEASE ORAL DAILY
Qty: 30 TABLET | Refills: 11 | Status: SHIPPED | OUTPATIENT
Start: 2019-03-27 | End: 2019-04-09 | Stop reason: DRUGHIGH

## 2019-03-27 RX ORDER — FUROSEMIDE 40 MG/1
40 TABLET ORAL 2 TIMES DAILY
COMMUNITY
End: 2019-05-13 | Stop reason: SDUPTHER

## 2019-03-27 NOTE — PROGRESS NOTES
Subjective:      Patient ID: Jennifer Mathews is a 86 y.o. female.    Chief Complaint: Hospital Follow Up (Yeast in throat  and stomach)    HPI  The patient is here for a hospital follow up. She was recently admitted to [facility] for anemia, blood in the stool and coumadin toxicity. After her INR was corrected, an EGD was done which showed [place] Grade D candidiasis esophagitis and gastric erosions without bleeding.  treated her with 100 mg daily for 15 days. She reportedly was on an inhaler recently. Today the patient reports feeling better. Her appetite is good. She denies nausea, vomiting, dysphagia, odynophagia, heartburn or melena. She has two days of diflucan left.     Review of Systems  As per HPI.     Objective:     Physical Exam   Constitutional: She is oriented to person, place, and time. She appears well-developed and well-nourished. No distress.   HENT:   Head: Normocephalic and atraumatic.   Eyes: EOM are normal.   Cardiovascular: Normal rate and regular rhythm.   Pulmonary/Chest: Effort normal and breath sounds normal. No respiratory distress. She has no wheezes.   Abdominal: Soft. Bowel sounds are normal. She exhibits no distension. There is no tenderness.   Neurological: She is alert and oriented to person, place, and time. No cranial nerve deficit.   Skin: She is not diaphoretic.   Psychiatric: Her behavior is normal.       Assessment:     1. Esophagitis    2. Upper GI bleed        Plan:     The patient is feeling better. Finish diflucan as prescribed.   She had Santos's on EGD biopsies so I will start her on a PPI daily.     Medications Ordered This Encounter   Medications    pantoprazole (PROTONIX) 20 MG tablet     Sig: Take 1 tablet (20 mg total) by mouth once daily.     Dispense:  30 tablet     Refill:  11     Follow up if symptoms worsen or fail to improve.    Thank you for the opportunity to participate in the care of this patient.   Kelvin Cabezas PA-C.

## 2019-03-28 ENCOUNTER — ANTI-COAG VISIT (OUTPATIENT)
Dept: CARDIOLOGY | Facility: CLINIC | Age: 84
End: 2019-03-28

## 2019-03-28 LAB — INR PPP: 3.9

## 2019-03-28 NOTE — PROGRESS NOTES
Verbal result taken from Nelsy with [facility]. PT/INR 47.2 / 3.9.   Date drawn 3/28/19.  Pantoprazole started today, last dose of fluconazole will be tomorrow.  Will continue to monitor closely.  Orders given: Hold dose of coumadin on today; then resume current dose of 2.5mg on Mondays, Wednesdays, Fridays and 3.75mg on all other days of the week.  Recheck on 04/04/19.

## 2019-03-29 ENCOUNTER — TELEPHONE (OUTPATIENT)
Dept: HEMATOLOGY/ONCOLOGY | Facility: CLINIC | Age: 84
End: 2019-03-29

## 2019-03-29 ENCOUNTER — LAB VISIT (OUTPATIENT)
Dept: LAB | Facility: HOSPITAL | Age: 84
End: 2019-03-29
Attending: INTERNAL MEDICINE
Payer: MEDICARE

## 2019-03-29 ENCOUNTER — OFFICE VISIT (OUTPATIENT)
Dept: CARDIOLOGY | Facility: CLINIC | Age: 84
End: 2019-03-29
Payer: MEDICARE

## 2019-03-29 ENCOUNTER — TELEPHONE (OUTPATIENT)
Dept: CARDIOLOGY | Facility: CLINIC | Age: 84
End: 2019-03-29

## 2019-03-29 VITALS
WEIGHT: 118.81 LBS | DIASTOLIC BLOOD PRESSURE: 56 MMHG | SYSTOLIC BLOOD PRESSURE: 118 MMHG | HEART RATE: 81 BPM | BODY MASS INDEX: 21.86 KG/M2 | OXYGEN SATURATION: 96 % | HEIGHT: 62 IN

## 2019-03-29 DIAGNOSIS — E87.5 HYPERKALEMIA: Primary | ICD-10-CM

## 2019-03-29 DIAGNOSIS — I50.42 CHRONIC COMBINED SYSTOLIC AND DIASTOLIC CONGESTIVE HEART FAILURE: ICD-10-CM

## 2019-03-29 DIAGNOSIS — I50.42 CHRONIC COMBINED SYSTOLIC AND DIASTOLIC CONGESTIVE HEART FAILURE: Primary | ICD-10-CM

## 2019-03-29 DIAGNOSIS — I48.0 PAROXYSMAL ATRIAL FIBRILLATION: ICD-10-CM

## 2019-03-29 DIAGNOSIS — D50.0 IRON DEFICIENCY ANEMIA DUE TO CHRONIC BLOOD LOSS: ICD-10-CM

## 2019-03-29 DIAGNOSIS — Z95.810 CARDIAC RESYNCHRONIZATION THERAPY DEFIBRILLATOR (CRT-D) IN PLACE: ICD-10-CM

## 2019-03-29 DIAGNOSIS — Z79.01 ANTICOAGULATED ON COUMADIN: ICD-10-CM

## 2019-03-29 DIAGNOSIS — D63.1 ANEMIA ASSOCIATED WITH STAGE 4 CHRONIC RENAL FAILURE: ICD-10-CM

## 2019-03-29 DIAGNOSIS — N18.4 ANEMIA ASSOCIATED WITH STAGE 4 CHRONIC RENAL FAILURE: ICD-10-CM

## 2019-03-29 LAB
ALBUMIN SERPL BCP-MCNC: 3 G/DL (ref 3.5–5.2)
ALP SERPL-CCNC: 97 U/L (ref 55–135)
ALT SERPL W/O P-5'-P-CCNC: 22 U/L (ref 10–44)
ANION GAP SERPL CALC-SCNC: 9 MMOL/L (ref 8–16)
AST SERPL-CCNC: 23 U/L (ref 10–40)
BASOPHILS # BLD AUTO: 0.07 K/UL (ref 0–0.2)
BASOPHILS NFR BLD: 1.1 % (ref 0–1.9)
BILIRUB SERPL-MCNC: 0.2 MG/DL (ref 0.1–1)
BUN SERPL-MCNC: 47 MG/DL (ref 8–23)
CALCIUM SERPL-MCNC: 9.4 MG/DL (ref 8.7–10.5)
CHLORIDE SERPL-SCNC: 103 MMOL/L (ref 95–110)
CO2 SERPL-SCNC: 26 MMOL/L (ref 23–29)
CREAT SERPL-MCNC: 1.4 MG/DL (ref 0.5–1.4)
DIFFERENTIAL METHOD: ABNORMAL
EOSINOPHIL # BLD AUTO: 0.1 K/UL (ref 0–0.5)
EOSINOPHIL NFR BLD: 1.5 % (ref 0–8)
ERYTHROCYTE [DISTWIDTH] IN BLOOD BY AUTOMATED COUNT: 16.5 % (ref 11.5–14.5)
EST. GFR  (AFRICAN AMERICAN): 39 ML/MIN/1.73 M^2
EST. GFR  (NON AFRICAN AMERICAN): 34 ML/MIN/1.73 M^2
FERRITIN SERPL-MCNC: 485 NG/ML (ref 20–300)
GLUCOSE SERPL-MCNC: 101 MG/DL (ref 70–110)
HCT VFR BLD AUTO: 28.3 % (ref 37–48.5)
HGB BLD-MCNC: 8.3 G/DL (ref 12–16)
IRON SERPL-MCNC: 36 UG/DL (ref 30–160)
LYMPHOCYTES # BLD AUTO: 1.6 K/UL (ref 1–4.8)
LYMPHOCYTES NFR BLD: 26.3 % (ref 18–48)
MCH RBC QN AUTO: 30.2 PG (ref 27–31)
MCHC RBC AUTO-ENTMCNC: 29.3 G/DL (ref 32–36)
MCV RBC AUTO: 103 FL (ref 82–98)
MONOCYTES # BLD AUTO: 0.5 K/UL (ref 0.3–1)
MONOCYTES NFR BLD: 7.6 % (ref 4–15)
NEUTROPHILS # BLD AUTO: 3.9 K/UL (ref 1.8–7.7)
NEUTROPHILS NFR BLD: 63.5 % (ref 38–73)
PLATELET # BLD AUTO: 336 K/UL (ref 150–350)
PMV BLD AUTO: 10 FL (ref 9.2–12.9)
POTASSIUM SERPL-SCNC: 5.3 MMOL/L (ref 3.5–5.1)
PROT SERPL-MCNC: 6.2 G/DL (ref 6–8.4)
RBC # BLD AUTO: 2.75 M/UL (ref 4–5.4)
SATURATED IRON: 14 % (ref 20–50)
SODIUM SERPL-SCNC: 138 MMOL/L (ref 136–145)
T4 FREE SERPL-MCNC: 0.92 NG/DL (ref 0.71–1.51)
TOTAL IRON BINDING CAPACITY: 255 UG/DL (ref 250–450)
TRANSFERRIN SERPL-MCNC: 172 MG/DL (ref 200–375)
TSH SERPL DL<=0.005 MIU/L-ACNC: 6.33 UIU/ML (ref 0.4–4)
WBC # BLD AUTO: 6.09 K/UL (ref 3.9–12.7)

## 2019-03-29 PROCEDURE — 99999 PR PBB SHADOW E&M-EST. PATIENT-LVL IV: CPT | Mod: PBBFAC,,, | Performed by: NURSE PRACTITIONER

## 2019-03-29 PROCEDURE — 80053 COMPREHEN METABOLIC PANEL: CPT

## 2019-03-29 PROCEDURE — 82728 ASSAY OF FERRITIN: CPT

## 2019-03-29 PROCEDURE — 99214 OFFICE O/P EST MOD 30 MIN: CPT | Mod: PBBFAC | Performed by: NURSE PRACTITIONER

## 2019-03-29 PROCEDURE — 99214 OFFICE O/P EST MOD 30 MIN: CPT | Mod: S$PBB,,, | Performed by: NURSE PRACTITIONER

## 2019-03-29 PROCEDURE — 99214 PR OFFICE/OUTPT VISIT, EST, LEVL IV, 30-39 MIN: ICD-10-PCS | Mod: S$PBB,,, | Performed by: NURSE PRACTITIONER

## 2019-03-29 PROCEDURE — 84439 ASSAY OF FREE THYROXINE: CPT

## 2019-03-29 PROCEDURE — 36415 COLL VENOUS BLD VENIPUNCTURE: CPT

## 2019-03-29 PROCEDURE — 85025 COMPLETE CBC W/AUTO DIFF WBC: CPT

## 2019-03-29 PROCEDURE — 84443 ASSAY THYROID STIM HORMONE: CPT

## 2019-03-29 PROCEDURE — 99999 PR PBB SHADOW E&M-EST. PATIENT-LVL IV: ICD-10-PCS | Mod: PBBFAC,,, | Performed by: NURSE PRACTITIONER

## 2019-03-29 PROCEDURE — 83540 ASSAY OF IRON: CPT

## 2019-03-29 RX ORDER — SODIUM POLYSTYRENE SULFONATE 4.1 MEQ/G
15 POWDER, FOR SUSPENSION ORAL; RECTAL DAILY
Qty: 15 G | Refills: 0 | Status: ON HOLD | OUTPATIENT
Start: 2019-03-29 | End: 2019-04-07 | Stop reason: HOSPADM

## 2019-03-29 NOTE — TELEPHONE ENCOUNTER
----- Message from Aspen Lord NP sent at 3/29/2019  3:11 PM CDT -----  Can you call the patient and let her know that her potassium is a little high.  She has an EF of 20% so we cannot increase fluids.  She will need kayexalte.  Will send to our pharmacy and can you call her and let her know.  We can recheck CMP next We  dnesday.

## 2019-03-29 NOTE — TELEPHONE ENCOUNTER
----- Message from Zaheer Hobbs sent at 3/29/2019  2:33 PM CDT -----  Contact: Pt [phone]  Type:  Patient Returning Call    Who Called: pt   Who Left Message for Patient:nurse   Does the patient know what this is regarding?:n/a  Would the patient rather a call back or a response via [facility]? Phone   Best Call Back Number: [phone]  Additional Information: states to pls leave a message when calling

## 2019-03-29 NOTE — PROGRESS NOTES
Subjective:   Patient ID:  Jennifer Mathews is a 86 y.o. female who presents for follow up of Congestive Heart Failure; Atrial Fibrillation; and Hypertension      HPI    The pt is a 85 yo female with mitral valve replacement -bioprosthesis, Stroke-no obvious deficits, HTN, PAF, combined CHF with EF 25%, ICM s/p PPM-CRT-D placement, recurrent UTI, CKD3-4, and hypothyroidism. She presents to clinic today for hospital follow up . Presented to ED with reports of melena. Was seen in Coumadin clinic on 3/8/19 and had INR of 7.1. States that she had completed a course of abx for URI before admit.  Was given instructions to hold coumadin, but presented to ED with melena. INR 3.2 on admit. Hgb  10.3>9.4>8.5. Vitamin K 5mg x1 given. GI consulted for EGD.   EGD showed LA Grade D Candida Esophagitis. Biopsied. Gastric erosions without bleeding. Normal first portion of the duodenum and second portion of the duodenum. Pt started on IV Diflucan. 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   INR on yesterday was 3.9 and being managed by Coumadin clinic.  Has no recurrent melena.   Her only complaint today is lower extremity edema since hospital discharge. Has no chest pain, SOB, orthopnea, PND, dizziness, near syncope or syncope.   Currently taking Lasix 40mg BID. Is limiting her sodium intake. Has metolazone 2.5mg tab as home to take PRN, but hasn't taken in at least 4 months.   Has been holding Amio since hospital discharge     Past Medical History:   Diagnosis Date    Acute coronary syndrome     Anemia     Anticoagulated on Coumadin 7/13/2015    Arthritis     Asthma     patient denies    Atrial fibrillation     Basal cell carcinoma 10/2015    left neck    Cardiac arrest     Cardiac resynchronization therapy defibrillator (CRT-D) in place 07/13/2015    Pt denies, states it does not shock me    Chronic combined systolic and diastolic congestive heart failure     CKD (chronic kidney disease) stage  3, GFR 30-59 ml/min     Dyslipidemia 1/30/2014    Hyperlipidemia     Hypertension     Hypothyroidism     Ischemic cardiomyopathy 01/30/2014    Macular hole of left eye     Old    Macular hole of left eye     LEV (obstructive sleep apnea) 9/30/2013    Pneumonia     required hospitalization    Recurrent UTI     Refractive error     Spastic colon     Stroke        Past Surgical History:   Procedure Laterality Date    APPENDECTOMY      CARDIAC CATHETERIZATION      CARDIAC PACEMAKER PLACEMENT  2014    CARDIAC VALVE SURGERY      CATARACT EXTRACTION      OU    CHOLECYSTECTOMY      COLONOSCOPY      ESOPHAGOGASTRODUODENOSCOPY (EGD) N/A 3/13/2019    Performed [facility] ENDO    IRRIGATION AND DEBRIDEMENT OF LEFT KNEE Left 9/12/2017    Performed by [facility]baum [facility] at [facility] OR    MITRAL VALVE REPLACEMENT  2014    MITRAL VALVE SURGERY      x3    REPLACEMENT, PACEMAKER GENERATOR/pt has crt-p versus d Left 6/20/2018    Performed by Manny Dunne MD at [facility] CATH LAB    REVISION, SKIN POCKET, FOR CARDIAC PACEMAKER Left 2/26/2014    Performed by M[facility]ne [facility] at [facility] CATH LAB       Social History     Tobacco Use    Smoking status: Never Smoker    Smokeless tobacco: Never Used   Substance Use Topics    Alcohol use: No    Drug use: No       Family History   Problem Relation Age of Onset    Coronary artery disease Mother         mi    Epilepsy Mother     Heart attack Mother     Coronary artery disease Father     Heart disease Father     Emphysema Father     COPD Father     Diabetes Brother     Cancer Brother     Melanoma Neg Hx     Psoriasis Neg Hx     Lupus Neg Hx     Eczema Neg Hx        Current Outpatient Medications   Medication Sig    acetaminophen (TYLENOL EXTRA STRENGTH) 325 MG tablet Take 2 tablets (650 mg total) by mouth every 8 (eight) hours. (Patient taking differently: Take 650 mg by mouth 3 (three) times daily as needed. )    allopurinol  (ZYLOPRIM) 100 MG tablet TAKE 2 TABLETS (200 MG TOTAL) BY MOUTH ONCE DAILY.    amiodarone (PACERONE) 200 MG Tab TAKE 1 TABLET EVERY DAY    aspirin (ECOTRIN) 81 MG EC tablet Take 81 mg by mouth once daily.    carvedilol (COREG) 25 MG tablet TAKE 1 TABLET BY MOUTH TWICE A DAY WITH MEALS    colchicine (COLCRYS) 0.6 mg tablet Take 1 tablet by mouth 3 times a week    digoxin (LANOXIN) 125 mcg tablet Take 1 tablet (125 mcg total) by mouth once daily. TAKE 1 TABLET (0.125 MG TOTAL) BY MOUTH ONCE DAILY. (Patient taking differently: Take 125 mcg by mouth once daily. )    furosemide (LASIX) 40 MG tablet Take 40 mg by mouth 2 (two) times daily.    gabapentin (NEURONTIN) 100 MG capsule TAKE ONE CAPSULE BY MOUTH TWO TIMES DAILY    levothyroxine (SYNTHROID) 50 MCG tablet Take 1 tablet (50 mcg total) by mouth once daily.    losartan (COZAAR) 25 MG tablet Take 1 tablet (25 mg total) by mouth once daily.    metOLazone (ZAROXOLYN) 2.5 MG tablet Take 1 tablet (2.5 mg total) by mouth daily as needed. For SOB or Leg edema    nystatin (MYCOSTATIN) 100,000 unit/mL suspension SWISH   SWALLOW 4 TO 6 MLS FIVE TO SIX TIMES A DAILY    pantoprazole (PROTONIX) 20 MG tablet Take 1 tablet (20 mg total) by mouth once daily.    potassium chloride SA (K-DUR,KLOR-CON) 20 MEQ tablet TAKE 1 TABLET (20 MEQ TOTAL) BY MOUTH ONCE DAILY.    warfarin (COUMADIN) 2.5 MG tablet TAKE 1/2 TABLET ON MONDAY AND WEDNESDAY AND 1 TABLET ALL OTHER DAYS. DISCONTINUE 5MG TABLET. (Patient taking differently: TAKE 1 TABLET BY MOUTH ON MONDAY, WEDNESDAY AND FRIDAY; AND 1.5 TABLETS ALL OTHER DAYS.)     Current Facility-Administered Medications   Medication    denosumab (PROLIA) injection 60 mg     Current Outpatient Medications on File Prior to Visit   Medication Sig    acetaminophen (TYLENOL EXTRA STRENGTH) 325 MG tablet Take 2 tablets (650 mg total) by mouth every 8 (eight) hours. (Patient taking differently: Take 650 mg by mouth 3 (three) times daily as  needed. )    allopurinol (ZYLOPRIM) 100 MG tablet TAKE 2 TABLETS (200 MG TOTAL) BY MOUTH ONCE DAILY.    amiodarone (PACERONE) 200 MG Tab TAKE 1 TABLET EVERY DAY    aspirin (ECOTRIN) 81 MG EC tablet Take 81 mg by mouth once daily.    carvedilol (COREG) 25 MG tablet TAKE 1 TABLET BY MOUTH TWICE A DAY WITH MEALS    colchicine (COLCRYS) 0.6 mg tablet Take 1 tablet by mouth 3 times a week    digoxin (LANOXIN) 125 mcg tablet Take 1 tablet (125 mcg total) by mouth once daily. TAKE 1 TABLET (0.125 MG TOTAL) BY MOUTH ONCE DAILY. (Patient taking differently: Take 125 mcg by mouth once daily. )    furosemide (LASIX) 40 MG tablet Take 40 mg by mouth 2 (two) times daily.    gabapentin (NEURONTIN) 100 MG capsule TAKE ONE CAPSULE BY MOUTH TWO TIMES DAILY    levothyroxine (SYNTHROID) 50 MCG tablet Take 1 tablet (50 mcg total) by mouth once daily.    losartan (COZAAR) 25 MG tablet Take 1 tablet (25 mg total) by mouth once daily.    metOLazone (ZAROXOLYN) 2.5 MG tablet Take 1 tablet (2.5 mg total) by mouth daily as needed. For SOB or Leg edema    nystatin (MYCOSTATIN) 100,000 unit/mL suspension SWISH   SWALLOW 4 TO 6 MLS FIVE TO SIX TIMES A DAILY    pantoprazole (PROTONIX) 20 MG tablet Take 1 tablet (20 mg total) by mouth once daily.    potassium chloride SA (K-DUR,KLOR-CON) 20 MEQ tablet TAKE 1 TABLET (20 MEQ TOTAL) BY MOUTH ONCE DAILY.    warfarin (COUMADIN) 2.5 MG tablet TAKE 1/2 TABLET ON MONDAY AND WEDNESDAY AND 1 TABLET ALL OTHER DAYS. DISCONTINUE 5MG TABLET. (Patient taking differently: TAKE 1 TABLET BY MOUTH ON MONDAY, WEDNESDAY AND FRIDAY; AND 1.5 TABLETS ALL OTHER DAYS.)    [DISCONTINUED] fluconazole (DIFLUCAN) 100 MG tablet Take 1 tablet (100 mg total) by mouth once daily.     Current Facility-Administered Medications on File Prior to Visit   Medication    denosumab (PROLIA) injection 60 mg       Review of Systems   Constitution: Negative for diaphoresis, malaise/fatigue, weight gain and weight loss.    HENT: Negative for congestion and nosebleeds.    Cardiovascular: Negative for chest pain, claudication, cyanosis, dyspnea on exertion, irregular heartbeat, leg swelling, near-syncope, orthopnea, palpitations, paroxysmal nocturnal dyspnea and syncope.   Respiratory: Negative for cough, hemoptysis, shortness of breath, sleep disturbances due to breathing, snoring, sputum production and wheezing.    Hematologic/Lymphatic: Negative for bleeding problem. Bruises/bleeds easily.   Skin: Negative for rash.   Musculoskeletal: Negative for arthritis, back pain, falls, joint pain, muscle cramps and muscle weakness.   Gastrointestinal: Negative for abdominal pain, constipation, diarrhea, heartburn, hematemesis, hematochezia, melena and nausea.   Genitourinary: Negative for dysuria, hematuria and nocturia.   Neurological: Negative for excessive daytime sleepiness, dizziness, headaches, light-headedness, loss of balance, numbness, vertigo and weakness.       Objective:   Physical Exam   Constitutional: She is oriented to person, place, and time. She appears well-developed and well-nourished. She does not appear ill. No distress.   HENT:   Head: Normocephalic and atraumatic.   Eyes: Pupils are equal, round, and reactive to light. EOM are normal. No scleral icterus.   Neck: Normal range of motion. Neck supple. JVD present. Normal carotid pulses and no hepatojugular reflux present. Carotid bruit is not present. No tracheal deviation present. No thyromegaly present.   Cardiovascular: Normal rate, regular rhythm, normal heart sounds and normal pulses. Exam reveals no gallop and no friction rub.   No murmur heard.  Pulmonary/Chest: Effort normal and breath sounds normal. No respiratory distress. She has no wheezes. She has no rhonchi. She has no rales. She exhibits no tenderness.   Pacer site well healed.  Scar cabg well healed.   Abdominal: Soft. Normal appearance, normal aorta and bowel sounds are normal. She exhibits no  "distension, no abdominal bruit, no ascites and no pulsatile midline mass. There is no hepatomegaly. There is no tenderness.   Musculoskeletal: She exhibits edema (2+ pitting bilateral). She exhibits no tenderness.        Right shoulder: She exhibits no deformity.   Neurological: She is alert and oriented to person, place, and time. She has normal strength. No cranial nerve deficit. Coordination normal.   Skin: Skin is warm and dry. No rash noted. She is not diaphoretic. No cyanosis or erythema. Nails show no clubbing.   Psychiatric: She has a normal mood and affect. Her speech is normal and behavior is normal.   Nursing note and vitals reviewed.    Vitals:    03/29/19 1307   BP: (!) 118/56   Pulse: 81   SpO2: 96%   Weight: 53.9 kg (118 lb 13.3 oz)   Height: 5' 2" (1.575 m)     Lab Results   Component Value Date    CHOL 131 03/21/2019    CHOL 155 03/20/2018    CHOL 154 01/29/2018     Lab Results   Component Value Date    HDL 31 (L) 03/21/2019    HDL 36 (L) 03/20/2018    HDL 34 (L) 01/29/2018     Lab Results   Component Value Date    LDLCALC 68.6 03/21/2019    LDLCALC 90.8 03/20/2018    LDLCALC 79.0 01/29/2018     Lab Results   Component Value Date    TRIG 157 (H) 03/21/2019    TRIG 141 03/20/2018    TRIG 205 (H) 01/29/2018     Lab Results   Component Value Date    CHOLHDL 23.7 03/21/2019    CHOLHDL 23.2 03/20/2018    CHOLHDL 22.1 01/29/2018       Chemistry        Component Value Date/Time     03/14/2019 0510    K 3.4 (L) 03/14/2019 0510     03/14/2019 0510    CO2 28 03/14/2019 0510    BUN 35 (H) 03/14/2019 0510    CREATININE 1.1 03/14/2019 0510     (H) 03/14/2019 0510        Component Value Date/Time    CALCIUM 7.8 (L) 03/14/2019 0510    ALKPHOS 68 03/10/2019 1643    AST 28 03/10/2019 1643    ALT 16 03/10/2019 1643    BILITOT 0.5 03/10/2019 1643    ESTGFRAFRICA 53 (A) 03/14/2019 0510    EGFRNONAA 46 (A) 03/14/2019 0510          Lab Results   Component Value Date    TSH 8.560 (H) 11/19/2018     Lab " Results   Component Value Date    INR 3.9 03/28/2019    INR 1.6 03/25/2019    INR 1.1 03/21/2019     Lab Results   Component Value Date    WBC 6.09 03/29/2019    HGB 8.3 (L) 03/29/2019    HCT 28.3 (L) 03/29/2019     (H) 03/29/2019     03/29/2019     BMP  Sodium   Date Value Ref Range Status   03/14/2019 144 136 - 145 mmol/L Final     Potassium   Date Value Ref Range Status   03/14/2019 3.4 (L) 3.5 - 5.1 mmol/L Final     Chloride   Date Value Ref Range Status   03/14/2019 110 95 - 110 mmol/L Final     CO2   Date Value Ref Range Status   03/14/2019 28 23 - 29 mmol/L Final     BUN, Bld   Date Value Ref Range Status   03/14/2019 35 (H) 8 - 23 mg/dL Final     Creatinine   Date Value Ref Range Status   03/14/2019 1.1 0.5 - 1.4 mg/dL Final     Calcium   Date Value Ref Range Status   03/14/2019 7.8 (L) 8.7 - 10.5 mg/dL Final     Anion Gap   Date Value Ref Range Status   03/14/2019 6 (L) 8 - 16 mmol/L Final     eGFR if    Date Value Ref Range Status   03/14/2019 53 (A) >60 mL/min/1.73 m^2 Final     eGFR if non    Date Value Ref Range Status   03/14/2019 46 (A) >60 mL/min/1.73 m^2 Final     Comment:     Calculation used to obtain the estimated glomerular filtration  rate (eGFR) is the CKD-EPI equation.        CrCl cannot be calculated (Patient's most recent lab result is older than the maximum 7 days allowed.).    Assessment:     1. Chronic combined systolic and diastolic congestive heart failure    2. 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 Paroxysmal atrial fibrillation    4. Anticoagulated on Coumadin        Plan:     Will have patient hold her amio for now due to recent coumadin toxicity with INR 7 and now 3.9.   I will discuss restarting with Dr. Dunne.   Continue to follow with coumadin clinic   Advised to continue current dose of Lasix 40mg BID   Will have her take 1 dose of metolazone 2.5mg   Phone review on Monday for edema check  Heart  healthy diet  Limit fluid intake 50-60 oz   Daily weights and to notify clinic if weight increases by more than 3 lbs in 1 day or 5 lbs in 1 week.   Exercise routine as tolerated  Continue with device clinic    INR scheduled for recheck next week. Will have her hold Amio until INR therapeutic. Once INR back at goal, will restart Amio at 200mg daily. This has been discussed with Dr. Janna james and agrees with plan that has been outlined

## 2019-03-29 NOTE — TELEPHONE ENCOUNTER
Called patient to inform her to update her secondary insurance so [facility] will have it on file. Also called her to ask how is she getting her medications approved because she has no Part D coverage per [facility] who denied her Metolazone 2.5. Lvm for patient to return my call regarding this matter.

## 2019-03-29 NOTE — PROGRESS NOTES
Can you call the patient and let her know that her potassium is a little high.  She has an EF of 20% so we cannot increase fluids.  She will need kayexalte.  Will send to our pharmacy and can you call her and let her know.  We can recheck CMP next Wednesday.

## 2019-03-29 NOTE — TELEPHONE ENCOUNTER
Notified patient her potassium is elevated she will need to take on dose of kayexalate and [facility]  will recheck her labs next week . Medication was filled at [facility]. Verbalized understanding. Patient states she doesn't have anyone who is able to get the medication for her today but they will get it by Monday.

## 2019-03-31 ENCOUNTER — EXTERNAL CHRONIC CARE MANAGEMENT (OUTPATIENT)
Dept: PRIMARY CARE CLINIC | Facility: CLINIC | Age: 84
End: 2019-03-31
Payer: MEDICARE

## 2019-03-31 PROCEDURE — 99490 PR CHRONIC CARE MGMT, 1ST 20 MIN: ICD-10-PCS | Mod: S$PBB,,, | Performed by: FAMILY MEDICINE

## 2019-03-31 PROCEDURE — 99490 CHRNC CARE MGMT STAFF 1ST 20: CPT | Mod: S$PBB,,, | Performed by: FAMILY MEDICINE

## 2019-03-31 PROCEDURE — 99490 CHRNC CARE MGMT STAFF 1ST 20: CPT | Mod: PBBFAC | Performed by: FAMILY MEDICINE

## 2019-04-01 ENCOUNTER — TELEPHONE (OUTPATIENT)
Dept: HEMATOLOGY/ONCOLOGY | Facility: CLINIC | Age: 84
End: 2019-04-01

## 2019-04-02 RX ORDER — HEPARIN 100 UNIT/ML
5 SYRINGE INTRAVENOUS
Status: CANCELLED | OUTPATIENT
Start: 2019-04-02

## 2019-04-02 RX ORDER — SODIUM CHLORIDE 9 MG/ML
INJECTION, SOLUTION INTRAVENOUS CONTINUOUS
Status: CANCELLED | OUTPATIENT
Start: 2019-04-02

## 2019-04-02 RX ORDER — SODIUM CHLORIDE 0.9 % (FLUSH) 0.9 %
10 SYRINGE (ML) INJECTION
Status: CANCELLED | OUTPATIENT
Start: 2019-04-02

## 2019-04-02 RX ORDER — METHYLPREDNISOLONE SODIUM SUCCINATE 1 G/16ML
40 INJECTION, POWDER, LYOPHILIZED, FOR SOLUTION INTRAMUSCULAR; INTRAVENOUS ONCE
Status: CANCELLED
Start: 2019-04-02

## 2019-04-03 ENCOUNTER — TELEPHONE (OUTPATIENT)
Dept: CARDIOLOGY | Facility: CLINIC | Age: 84
End: 2019-04-03

## 2019-04-03 ENCOUNTER — ANTI-COAG VISIT (OUTPATIENT)
Dept: CARDIOLOGY | Facility: CLINIC | Age: 84
DRG: 378 | End: 2019-04-03
Payer: MEDICARE

## 2019-04-03 ENCOUNTER — CLINICAL SUPPORT (OUTPATIENT)
Dept: CARDIOLOGY | Facility: CLINIC | Age: 84
DRG: 378 | End: 2019-04-03
Attending: INTERNAL MEDICINE
Payer: MEDICARE

## 2019-04-03 DIAGNOSIS — Z95.0 CARDIAC PACEMAKER IN SITU: ICD-10-CM

## 2019-04-03 DIAGNOSIS — Z79.01 LONG TERM (CURRENT) USE OF ANTICOAGULANTS: Primary | ICD-10-CM

## 2019-04-03 DIAGNOSIS — I25.5 ISCHEMIC CARDIOMYOPATHY: ICD-10-CM

## 2019-04-03 DIAGNOSIS — I48.20 CHRONIC ATRIAL FIBRILLATION: ICD-10-CM

## 2019-04-03 LAB — INR PPP: >8

## 2019-04-03 PROCEDURE — 93281 PM DEVICE PROGR EVAL MULTI: CPT | Mod: PBBFAC,PN | Performed by: INTERNAL MEDICINE

## 2019-04-03 PROCEDURE — 93281 PACEMAKER PROGRAMMING: ICD-10-PCS | Mod: 26,S$PBB,, | Performed by: INTERNAL MEDICINE

## 2019-04-03 PROCEDURE — 93290 PACEMAKER PROGRAMMING: ICD-10-PCS | Mod: 26,S$PBB,, | Performed by: INTERNAL MEDICINE

## 2019-04-03 PROCEDURE — 93290 INTERROG DEV EVAL ICPMS IP: CPT | Mod: 26,S$PBB,, | Performed by: INTERNAL MEDICINE

## 2019-04-03 RX ORDER — PHYTONADIONE 5 MG/1
5 TABLET ORAL ONCE
Status: ON HOLD | COMMUNITY
End: 2019-04-07 | Stop reason: HOSPADM

## 2019-04-03 NOTE — PROGRESS NOTES
Dr. Schulz reviewed interrogation, inspected device pocket, and discussed with patient.  Pt will schedule remote check 3 months, return to clinic in 6 months for SJM pacemaker check.    Please see completed report under cardio proc tab.

## 2019-04-03 NOTE — PROGRESS NOTES
Verbal result taken from Jennifer with [facility]. INR >8.0 (confirmed in lab at PT/ INR 74.0 / 7.6).   Date drawn 4/03/19.  Last dose of fluconazole taken on 3/29; INR elevated despite dose reduction.   reports patient had blood in her stool this morning.  Patient contacted: Family states they would prefer to come into the coumadin clinic to receive dose of Vitamin K to prevent patient from being exposed to any pathogens within the hospital.  INR confirmed in the lab. Vitamin K 5mg given in clinic.   Orders given: Hold dose of coumadin on today and tomorrow; then resume current dose of 2.5mg on Mondays, Wednesdays, Fridays and 3.75mg on all other days of the week.  Advised patient to seek immediate medical attention if bleeding continues.  Dosing calendar provided to patient and home health contacted.  Recheck on Friday, 4/05/19.

## 2019-04-03 NOTE — TELEPHONE ENCOUNTER
Home Health Nurse arrived at patient's home,  a day early to check INR due to patient reported blood stool this morning.    INR today x 2, greater than 8.  Please call Landan and patient with instructions.

## 2019-04-05 ENCOUNTER — ANTI-COAG VISIT (OUTPATIENT)
Dept: CARDIOLOGY | Facility: CLINIC | Age: 84
End: 2019-04-05

## 2019-04-05 ENCOUNTER — HOSPITAL ENCOUNTER (INPATIENT)
Facility: HOSPITAL | Age: 84
LOS: 1 days | Discharge: HOME HEALTH | DRG: 378 | End: 2019-04-07
Attending: EMERGENCY MEDICINE | Admitting: INTERNAL MEDICINE
Payer: MEDICARE

## 2019-04-05 DIAGNOSIS — Z79.01 LONG TERM CURRENT USE OF ANTICOAGULANT THERAPY: ICD-10-CM

## 2019-04-05 DIAGNOSIS — K92.2 GASTROINTESTINAL HEMORRHAGE, UNSPECIFIED GASTROINTESTINAL HEMORRHAGE TYPE: ICD-10-CM

## 2019-04-05 DIAGNOSIS — K92.2 GASTROINTESTINAL HEMORRHAGE: ICD-10-CM

## 2019-04-05 DIAGNOSIS — D64.9 SYMPTOMATIC ANEMIA: ICD-10-CM

## 2019-04-05 DIAGNOSIS — R79.1 SUPRATHERAPEUTIC INR: Primary | ICD-10-CM

## 2019-04-05 DIAGNOSIS — Z79.01 ANTICOAGULATED ON COUMADIN: ICD-10-CM

## 2019-04-05 PROBLEM — K22.70 BARRETT'S ESOPHAGUS: Status: ACTIVE | Noted: 2019-04-05

## 2019-04-05 LAB
ABO + RH BLD: NORMAL
BASOPHILS # BLD AUTO: 0.04 K/UL (ref 0–0.2)
BASOPHILS NFR BLD: 0.7 % (ref 0–1.9)
BLD GP AB SCN CELLS X3 SERPL QL: NORMAL
DIFFERENTIAL METHOD: ABNORMAL
EOSINOPHIL # BLD AUTO: 0.1 K/UL (ref 0–0.5)
EOSINOPHIL NFR BLD: 1.4 % (ref 0–8)
ERYTHROCYTE [DISTWIDTH] IN BLOOD BY AUTOMATED COUNT: 19 % (ref 11.5–14.5)
HCT VFR BLD AUTO: 20.8 % (ref 37–48.5)
HGB BLD-MCNC: 6.5 G/DL (ref 12–16)
INR PPP: 4.1
LYMPHOCYTES # BLD AUTO: 1.7 K/UL (ref 1–4.8)
LYMPHOCYTES NFR BLD: 28.7 % (ref 18–48)
MCH RBC QN AUTO: 32.2 PG (ref 27–31)
MCHC RBC AUTO-ENTMCNC: 31.3 G/DL (ref 32–36)
MCV RBC AUTO: 103 FL (ref 82–98)
MONOCYTES # BLD AUTO: 0.5 K/UL (ref 0.3–1)
MONOCYTES NFR BLD: 7.7 % (ref 4–15)
NEUTROPHILS # BLD AUTO: 3.6 K/UL (ref 1.8–7.7)
NEUTROPHILS NFR BLD: 61.5 % (ref 38–73)
PLATELET # BLD AUTO: 264 K/UL (ref 150–350)
PMV BLD AUTO: 9.4 FL (ref 9.2–12.9)
POCT GLUCOSE: 107 MG/DL (ref 70–110)
POCT GLUCOSE: 68 MG/DL (ref 70–110)
POCT GLUCOSE: 91 MG/DL (ref 70–110)
POTASSIUM SERPL-SCNC: 5.5 MMOL/L (ref 3.5–5.1)
RBC # BLD AUTO: 2.02 M/UL (ref 4–5.4)
WBC # BLD AUTO: 5.86 K/UL (ref 3.9–12.7)

## 2019-04-05 PROCEDURE — 82962 GLUCOSE BLOOD TEST: CPT

## 2019-04-05 PROCEDURE — G0378 HOSPITAL OBSERVATION PER HR: HCPCS

## 2019-04-05 PROCEDURE — 25000003 PHARM REV CODE 250: Performed by: NURSE PRACTITIONER

## 2019-04-05 PROCEDURE — 36415 COLL VENOUS BLD VENIPUNCTURE: CPT

## 2019-04-05 PROCEDURE — 96374 THER/PROPH/DIAG INJ IV PUSH: CPT

## 2019-04-05 PROCEDURE — 63600175 PHARM REV CODE 636 W HCPCS: Performed by: NURSE PRACTITIONER

## 2019-04-05 PROCEDURE — 86850 RBC ANTIBODY SCREEN: CPT

## 2019-04-05 PROCEDURE — 25000003 PHARM REV CODE 250: Performed by: EMERGENCY MEDICINE

## 2019-04-05 PROCEDURE — C9113 INJ PANTOPRAZOLE SODIUM, VIA: HCPCS | Performed by: NURSE PRACTITIONER

## 2019-04-05 PROCEDURE — 99285 EMERGENCY DEPT VISIT HI MDM: CPT | Mod: 25

## 2019-04-05 PROCEDURE — 63600175 PHARM REV CODE 636 W HCPCS: Performed by: EMERGENCY MEDICINE

## 2019-04-05 PROCEDURE — 36430 TRANSFUSION BLD/BLD COMPNT: CPT

## 2019-04-05 PROCEDURE — 86920 COMPATIBILITY TEST SPIN: CPT

## 2019-04-05 PROCEDURE — 85025 COMPLETE CBC W/AUTO DIFF WBC: CPT

## 2019-04-05 PROCEDURE — 96375 TX/PRO/DX INJ NEW DRUG ADDON: CPT

## 2019-04-05 PROCEDURE — 84132 ASSAY OF SERUM POTASSIUM: CPT

## 2019-04-05 PROCEDURE — P9016 RBC LEUKOCYTES REDUCED: HCPCS

## 2019-04-05 RX ORDER — PHYTONADIONE 5 MG/1
5 TABLET ORAL
Status: COMPLETED | OUTPATIENT
Start: 2019-04-05 | End: 2019-04-05

## 2019-04-05 RX ORDER — AMIODARONE HYDROCHLORIDE 200 MG/1
200 TABLET ORAL DAILY
Status: DISCONTINUED | OUTPATIENT
Start: 2019-04-06 | End: 2019-04-05

## 2019-04-05 RX ORDER — FUROSEMIDE 40 MG/1
40 TABLET ORAL 2 TIMES DAILY
Status: DISCONTINUED | OUTPATIENT
Start: 2019-04-05 | End: 2019-04-07 | Stop reason: HOSPADM

## 2019-04-05 RX ORDER — PANTOPRAZOLE SODIUM 40 MG/10ML
40 INJECTION, POWDER, LYOPHILIZED, FOR SOLUTION INTRAVENOUS 2 TIMES DAILY
Status: DISCONTINUED | OUTPATIENT
Start: 2019-04-05 | End: 2019-04-07 | Stop reason: HOSPADM

## 2019-04-05 RX ORDER — ACETAMINOPHEN 325 MG/1
650 TABLET ORAL EVERY 8 HOURS PRN
Status: DISCONTINUED | OUTPATIENT
Start: 2019-04-05 | End: 2019-04-07 | Stop reason: HOSPADM

## 2019-04-05 RX ORDER — LEVOTHYROXINE SODIUM 75 UG/1
75 TABLET ORAL
Status: DISCONTINUED | OUTPATIENT
Start: 2019-04-06 | End: 2019-04-07 | Stop reason: HOSPADM

## 2019-04-05 RX ORDER — ONDANSETRON 2 MG/ML
4 INJECTION INTRAMUSCULAR; INTRAVENOUS EVERY 12 HOURS PRN
Status: DISCONTINUED | OUTPATIENT
Start: 2019-04-05 | End: 2019-04-07 | Stop reason: HOSPADM

## 2019-04-05 RX ORDER — ONDANSETRON 2 MG/ML
4 INJECTION INTRAMUSCULAR; INTRAVENOUS EVERY 8 HOURS PRN
Status: DISCONTINUED | OUTPATIENT
Start: 2019-04-05 | End: 2019-04-07 | Stop reason: HOSPADM

## 2019-04-05 RX ORDER — GLUCAGON 1 MG
1 KIT INJECTION
Status: DISCONTINUED | OUTPATIENT
Start: 2019-04-05 | End: 2019-04-07 | Stop reason: HOSPADM

## 2019-04-05 RX ORDER — SODIUM CHLORIDE 0.9 % (FLUSH) 0.9 %
10 SYRINGE (ML) INJECTION
Status: DISCONTINUED | OUTPATIENT
Start: 2019-04-05 | End: 2019-04-07 | Stop reason: HOSPADM

## 2019-04-05 RX ORDER — IBUPROFEN 200 MG
24 TABLET ORAL
Status: DISCONTINUED | OUTPATIENT
Start: 2019-04-05 | End: 2019-04-07 | Stop reason: HOSPADM

## 2019-04-05 RX ORDER — DIGOXIN 125 MCG
125 TABLET ORAL DAILY
Status: DISCONTINUED | OUTPATIENT
Start: 2019-04-06 | End: 2019-04-07 | Stop reason: HOSPADM

## 2019-04-05 RX ORDER — CARVEDILOL 12.5 MG/1
25 TABLET ORAL 2 TIMES DAILY WITH MEALS
Status: DISCONTINUED | OUTPATIENT
Start: 2019-04-05 | End: 2019-04-07 | Stop reason: HOSPADM

## 2019-04-05 RX ORDER — HYDROCODONE BITARTRATE AND ACETAMINOPHEN 500; 5 MG/1; MG/1
TABLET ORAL
Status: DISCONTINUED | OUTPATIENT
Start: 2019-04-05 | End: 2019-04-07 | Stop reason: HOSPADM

## 2019-04-05 RX ORDER — LEVOTHYROXINE SODIUM 50 UG/1
50 TABLET ORAL
Status: DISCONTINUED | OUTPATIENT
Start: 2019-04-06 | End: 2019-04-05

## 2019-04-05 RX ORDER — IBUPROFEN 200 MG
16 TABLET ORAL
Status: DISCONTINUED | OUTPATIENT
Start: 2019-04-05 | End: 2019-04-07 | Stop reason: HOSPADM

## 2019-04-05 RX ORDER — ONDANSETRON 8 MG/1
8 TABLET, ORALLY DISINTEGRATING ORAL EVERY 8 HOURS PRN
Status: DISCONTINUED | OUTPATIENT
Start: 2019-04-05 | End: 2019-04-07 | Stop reason: HOSPADM

## 2019-04-05 RX ADMIN — INSULIN HUMAN 10 UNITS: 100 INJECTION, SOLUTION PARENTERAL at 02:04

## 2019-04-05 RX ADMIN — DEXTROSE MONOHYDRATE 25 G: 25 INJECTION, SOLUTION INTRAVENOUS at 02:04

## 2019-04-05 RX ADMIN — PHYTONADIONE 5 MG: 5 TABLET ORAL at 02:04

## 2019-04-05 RX ADMIN — CARVEDILOL 25 MG: 12.5 TABLET, FILM COATED ORAL at 05:04

## 2019-04-05 RX ADMIN — FUROSEMIDE 40 MG: 40 TABLET ORAL at 09:04

## 2019-04-05 RX ADMIN — PANTOPRAZOLE SODIUM 40 MG: 40 INJECTION, POWDER, LYOPHILIZED, FOR SOLUTION INTRAVENOUS at 05:04

## 2019-04-05 NOTE — ASSESSMENT & PLAN NOTE
"- Place in OBS  - Hgb 6.5, decreased from 8.3 on 3/29  - Recently here with similar presentation; s/p EGD on 3/13 per Dr. Orellana that showed LA Grade D candidiasis esophagitis that was biopsied, and gastric erosions without bleeding.  She was treated with Diflucan and also recently started on PPI for Santos's esophagus, confirmed on biopsy.    - INR elevated to 4.1 for which she was given Vitamin K po in the ER   - Hold Coumadin and ASA  - Transfuse 2 units PRBCs  - Started on Protonix 40 mg IVP BID  - CL diet  - Case d/w Dr. Arizmendi with GI who recommended holding Coumadin with no indication for GI consult at this time given recent workup with bleeding likely a result of elevated INR.  - Per patient, "I have not been in [facility] for about 6 months and Dr. Dunne mentioned I may be able to stop Coumadin since I'm having so many issues with it".  Will need to d/w Cardiology to determine POC moving forward.  - Recheck Hgb/Hct after blood is completed  - Transfuse to keep Hgb >7.0  - GI consult pending clinical course or if bleeding persists after INR is corrected  - Currently hemodynamically stable  - Tele monitoring    "

## 2019-04-05 NOTE — ASSESSMENT & PLAN NOTE
"- Recurrent issue  - INR elevated to 4.1 for which she was given 5mg of Vitamin K po  - Hold Coumadin  - Per patient, "I have not been in [facility] for about 6 months and Dr. Dunne mentioned I may be able to stop Coumadin since I'm having so many issues with it".    - Cardiology consult in AM to determine POC moving forward given recurrent GI bleeds and supratherapeutic INR  - Daily INR  - See plan as per above     "

## 2019-04-05 NOTE — SUBJECTIVE & OBJECTIVE
"Past Medical History:   Diagnosis Date    Acute coronary syndrome     Anemia     Anticoagulated on Coumadin 7/13/2015    Arthritis     Asthma     patient denies    Atrial fibrillation     Basal cell carcinoma 10/2015    left neck    Cardiac arrest     Cardiac resynchronization therapy defibrillator (CRT-D) in place 07/13/2015    Pt denies, states it does not shock me    Chronic combined systolic and diastolic congestive heart failure     CKD (chronic kidney disease) stage 3, GFR 30-59 ml/min     Dyslipidemia 1/30/2014    Hyperlipidemia     Hypertension     Hypothyroidism     Ischemic cardiomyopathy 01/30/2014    Macular hole of left eye     Old    Macular hole of left eye     LEV (obstructive sleep apnea) 9/30/2013    Pneumonia     required hospitalization    Recurrent UTI     Refractive error     Spastic colon     Stroke        Past Surgical History:   Procedure Laterality Date    APPENDECTOMY      CARDIAC CATHETERIZATION      CARDIAC PACEMAKER PLACEMENT  2014    CARDIAC VALVE SURGERY      CATARACT EXTRACTION      OU    CHOLECYSTECTOMY      COLONOSCOPY      ESOPHAGOGASTRODUODENOSCOPY (EGD) N/A 3/13/2019    Performed by Ghazal [facility]rellana MD at [facility] ENDO    IRRIGATION AND DEBRIDEMENT OF LEFT KNEE Left 9/12/2017    Performed by Juliano Rosenbaum MD at [facility] OR    MITRAL VALVE REPLACEMENT  2014    MITRAL VALVE SURGERY      x3    REPLACEMENT, PACEMAKER GENERATOR/pt has crt-p versus d Left 6/20/2018    Performed by Manny Dunne MD at [facility] CATH LAB    REVISION, SKIN POCKET, FOR CARDIAC PACEMAKER Left 2/26/2014    Performed by Manny Dunne MD at [facility] CATH LAB       Review of patient's allergies indicates:   Allergen Reactions    Cephalosporins Hives    Metaxalone Itching    Penicillins      Other reaction(s): Unknown      Pregabalin      Other reaction(s): "Bad feeling"      Sulfa (sulfonamide antibiotics) Itching       Current Facility-Administered Medications on File " Prior to Encounter   Medication    denosumab (PROLIA) injection 60 mg     Current Outpatient Medications on File Prior to Encounter   Medication Sig    allopurinol (ZYLOPRIM) 100 MG tablet TAKE 2 TABLETS (200 MG TOTAL) BY MOUTH ONCE DAILY.    aspirin (ECOTRIN) 81 MG EC tablet Take 81 mg by mouth once daily.    carvedilol (COREG) 25 MG tablet TAKE 1 TABLET BY MOUTH TWICE A DAY WITH MEALS    colchicine (COLCRYS) 0.6 mg tablet Take 1 tablet by mouth 3 times a week    digoxin (LANOXIN) 125 mcg tablet Take 1 tablet (125 mcg total) by mouth once daily. TAKE 1 TABLET (0.125 MG TOTAL) BY MOUTH ONCE DAILY. (Patient taking differently: Take 125 mcg by mouth once daily. )    furosemide (LASIX) 40 MG tablet Take 40 mg by mouth 2 (two) times daily.    gabapentin (NEURONTIN) 100 MG capsule TAKE ONE CAPSULE BY MOUTH TWO TIMES DAILY    levothyroxine (SYNTHROID) 50 MCG tablet Take 1 tablet (50 mcg total) by mouth once daily.    losartan (COZAAR) 25 MG tablet Take 1 tablet (25 mg total) by mouth once daily.    pantoprazole (PROTONIX) 20 MG tablet Take 1 tablet (20 mg total) by mouth once daily.    phytonadione, vitamin K1, (MEPHYTON) 5 mg Tab Take 5 mg by mouth once.    potassium chloride SA (K-DUR,KLOR-CON) 20 MEQ tablet TAKE 1 TABLET (20 MEQ TOTAL) BY MOUTH ONCE DAILY.    warfarin (COUMADIN) 2.5 MG tablet TAKE 1/2 TABLET ON MONDAY AND WEDNESDAY AND 1 TABLET ALL OTHER DAYS. DISCONTINUE 5MG TABLET. (Patient taking differently: TAKE 1 TABLET BY MOUTH ON MONDAY, WEDNESDAY AND FRIDAY; AND 1.5 TABLETS ALL OTHER DAYS.)    acetaminophen (TYLENOL EXTRA STRENGTH) 325 MG tablet Take 2 tablets (650 mg total) by mouth every 8 (eight) hours. (Patient taking differently: Take 650 mg by mouth 3 (three) times daily as needed. )    amiodarone (PACERONE) 200 MG Tab TAKE 1 TABLET EVERY DAY    metOLazone (ZAROXOLYN) 2.5 MG tablet Take 1 tablet (2.5 mg total) by mouth daily as needed. For SOB or Leg edema    nystatin (MYCOSTATIN)  100,000 unit/mL suspension SWISH   SWALLOW 4 TO 6 MLS FIVE TO SIX TIMES A DAILY    sodium polystyrene (KAYEXALATE) powder Take 15 g by mouth once daily for 1 dose mix with 60 mls of water     Family History     Problem Relation (Age of Onset)    COPD Father    Cancer Brother    Coronary artery disease Mother, Father    Diabetes Brother    Emphysema Father    Epilepsy Mother    Heart attack Mother    Heart disease Father        Tobacco Use    Smoking status: Never Smoker    Smokeless tobacco: Never Used   Substance and Sexual Activity    Alcohol use: No    Drug use: No    Sexual activity: Never     Review of Systems   Constitutional: Positive for fatigue. Negative for chills, diaphoresis and fever.   HENT: Negative for facial swelling, hearing loss, mouth sores, sneezing, sore throat, tinnitus and trouble swallowing.    Eyes: Negative for photophobia, pain, discharge, redness and visual disturbance.   Respiratory: Negative for apnea, cough, choking, chest tightness, shortness of breath, wheezing and stridor.    Cardiovascular: Positive for leg swelling. Negative for chest pain and palpitations.        Chronic BLE edema, left greater than right   Gastrointestinal: Positive for anal bleeding and blood in stool. Negative for abdominal distention, abdominal pain, constipation, diarrhea, nausea, rectal pain and vomiting.   Endocrine: Negative for cold intolerance, heat intolerance, polydipsia, polyphagia and polyuria.   Genitourinary: Negative for difficulty urinating, dysuria, flank pain, frequency, hematuria, pelvic pain, urgency, vaginal bleeding, vaginal discharge and vaginal pain.   Musculoskeletal: Negative for arthralgias, back pain, gait problem, myalgias and neck stiffness.   Skin: Negative for pallor, rash and wound.   Allergic/Immunologic: Negative for food allergies.   Neurological: Positive for weakness. Negative for dizziness, tremors, seizures, syncope, speech difficulty and headaches.    Hematological: Negative for adenopathy. Does not bruise/bleed easily.   Psychiatric/Behavioral: Negative for behavioral problems and confusion. The patient is not nervous/anxious.    All other systems reviewed and are negative.    Objective:     Vital Signs (Most Recent):  Temp: 97.9 °F (36.6 °C) (04/05/19 1549)  Pulse: 69(Simultaneous filing. User may not have seen previous data.) (04/05/19 1549)  Resp: 20 (04/05/19 1549)  BP: (!) 143/66(Simultaneous filing. User may not have seen previous data.) (04/05/19 1549)  SpO2: 98 %(Simultaneous filing. User may not have seen previous data.) (04/05/19 1549) Vital Signs (24h Range):  Temp:  [97.9 °F (36.6 °C)] 97.9 °F (36.6 °C)  Pulse:  [69-70] 69  Resp:  [18-20] 20  SpO2:  [95 %-98 %] 98 %  BP: (115-143)/(55-66) 143/66     Weight: 49.4 kg (109 lb)  Body mass index is 19.94 kg/m².    Physical Exam   Constitutional: She is oriented to person, place, and time. She appears well-developed and well-nourished. She appears cachectic.   HENT:   Head: Normocephalic and atraumatic.   Mouth/Throat: Oropharynx is clear and moist.   Eyes: Pupils are equal, round, and reactive to light. Conjunctivae and EOM are normal.   Neck: Normal range of motion. Neck supple.   Cardiovascular: Normal rate, regular rhythm and intact distal pulses. Exam reveals no gallop and no friction rub.   Murmur heard.  Pulmonary/Chest: Effort normal and breath sounds normal. No respiratory distress. She has no wheezes. She has no rales. She exhibits no tenderness.   Abdominal: Soft. Bowel sounds are normal. She exhibits no distension and no mass. There is no tenderness.   Musculoskeletal: Normal range of motion. She exhibits edema. She exhibits no tenderness.   Chronic generalized edema to BLE, left greater than right   Neurological: She is alert and oriented to person, place, and time.   Skin: Skin is warm and dry. No rash noted. No erythema.   Psychiatric: She has a normal mood and affect. Her behavior is  normal. Judgment and thought content normal.   Nursing note and vitals reviewed.        CRANIAL NERVES     CN III, IV, VI   Pupils are equal, round, and reactive to light.  Extraocular motions are normal.        Significant Labs:   CBC:   Recent Labs   Lab 04/05/19  1410   WBC 5.86   HGB 6.5*   HCT 20.8*        CMP:   Recent Labs   Lab 04/05/19  1121      K 5.9*      CO2 27      BUN 48*   CREATININE 1.3   CALCIUM 8.0*   PROT 4.9*   ALBUMIN 2.8*   BILITOT 0.3   ALKPHOS 73   AST 21   ALT 17   ANIONGAP 8   EGFRNONAA 37*     Coagulation:   Recent Labs   Lab 04/05/19   INR 4.1     TSH:   Recent Labs   Lab 03/29/19  1235   TSH 6.333*       Significant Imaging: I have reviewed all pertinent imaging results/findings within the past 24 hours.

## 2019-04-05 NOTE — ASSESSMENT & PLAN NOTE
- KCL 5.9  - Given 10 units of Insulin and 1 amp of D50 in the ER  - Repeat KCL level at 2000  - Monitor and correct as needed

## 2019-04-05 NOTE — H&P
"[facility] Hospital Medicine  History & Physical    Patient Name: Jennifer Mathews  MRN: [id number]  Admission Date: 4/5/2019  Attending Physician: No att. providers found   Primary Care Provider: Desirae Bello MD         Patient information was obtained from patient, past medical records and ER records.     Subjective:     Principal Problem:Gastrointestinal hemorrhage    Chief Complaint:   Chief Complaint   Patient presents with    Rectal Bleeding     x 1 week, pt states her coumadin level is high        HPI: Jennifer Mathews is a 86 y.o. female patient with a PMHx of ACS, Cardiac Arrest, Systolic CHF with an EF of 20%, Ischemic cardiomyopathy s/p pacemaker placement, multiple MVRs with a bioprosthesis valve, CVA with no residual, Anemia, paroxysmal Afib on OAC with Coumadin, CKD, HLD,  recently diagnosed Santos's esophagus, and HTN who presented to the Emergency Department today for evaluation of rectal bleeding which onset gradually one week ago.  She states that the blood is mixed between " a maroon color and bright red, and is present in stool as well".  Symptoms are constant and moderate in severity.  No mitigating or exacerbating factors reported.  Associated sxs include generalized weakness.  She was recently discharged from here on 3/14 with a similar presentation.  During this admission, patient underwent an EGD on 3/13 per Dr. Orellana that showed LA Grade D candidiasis esophagitis that was biopsied, and gastric erosions without bleeding.  She was treated with Diflucan and also recently started on PPI for Santos's esophagus, confirmed on biopsy.  Patient denies any lightheadedness, numbness, focal weakness, dizziness, syncope, abd pain, n/v/d, fever, chills, and all other sxs at this time.  No further complaints or concerns at this time.  ER workup showed: Stable VS.  Hgb 6.5, decreased from 8.3 on 3/29.  INR 4.1, decreased from 7.6 on 4/3, for which she was given 5mg of oral Vitamin " "K.  KCL 5.9, for which she was given 10 units of IV insulin and 1 amp of D50.  Cr stable at baseline.  Type and match completed and patient will be transfused with 2 units of PRBCs.  Also started on Protonix IVP BID.  Case d/w Dr. Arizmendi with GI who recommended holding Coumadin with no indication for GI consult at this time given recent workup with bleeding likely a result of elevated INR.  Per patient, "I have not been in Afib for about 6 months and Dr. Dunne mentioned I may be able to stop Coumadin since I'm having so many issues with it".  Will need to d/w Cardiology to determine POC moving forward.  Hospital Medicine called for admit.  She will be placed in OBS.  She is a Full Code.  Her SDM is her daughter Eri who can be reached at [phone].        Past Medical History:   Diagnosis Date    Acute coronary syndrome     Anemia     Anticoagulated on Coumadin 7/13/2015    Arthritis     Asthma     patient denies    Atrial fibrillation     Basal cell carcinoma 10/2015    left neck    Cardiac arrest     Cardiac resynchronization therapy defibrillator (CRT-D) in place 07/13/2015    Pt denies, states it does not shock me    Chronic combined systolic and diastolic congestive heart failure     CKD (chronic kidney disease) stage 3, GFR 30-59 ml/min     Dyslipidemia 1/30/2014    Hyperlipidemia     Hypertension     Hypothyroidism     Ischemic cardiomyopathy 01/30/2014    Macular hole of left eye     Old    Macular hole of left eye     LEV (obstructive sleep apnea) 9/30/2013    Pneumonia     required hospitalization    Recurrent UTI     Refractive error     Spastic colon     Stroke        Past Surgical History:   Procedure Laterality Date    APPENDECTOMY      CARDIAC CATHETERIZATION      CARDIAC PACEMAKER PLACEMENT  2014    CARDIAC VALVE SURGERY      CATARACT EXTRACTION      OU    CHOLECYSTECTOMY      COLONOSCOPY      ESOPHAGOGASTRODUODENOSCOPY (EGD) N/A 3/13/2019    Performed by " "Ghaazl Orellana MD at [facility] ENDO    IRRIGATION AND DEBRIDEMENT OF LEFT KNEE Left 9/12/2017    Performed by Juliano Rosenbaum MD at [facility] OR    MITRAL VALVE REPLACEMENT  2014    MITRAL VALVE SURGERY      x3    REPLACEMENT, PACEMAKER GENERATOR/pt has crt-p versus d Left 6/20/2018    Performed by Manny Dunne MD at [facility] CATH LAB    REVISION, SKIN POCKET, FOR CARDIAC PACEMAKER Left 2/26/2014    Performed by Manny Dunne MD at [facility] CATH LAB       Review of patient's allergies indicates:   Allergen Reactions    Cephalosporins Hives    Metaxalone Itching    Penicillins      Other reaction(s): Unknown      Pregabalin      Other reaction(s): "Bad feeling"      Sulfa (sulfonamide antibiotics) Itching       Current Facility-Administered Medications on File Prior to Encounter   Medication    denosumab (PROLIA) injection 60 mg     Current Outpatient Medications on File Prior to Encounter   Medication Sig    allopurinol (ZYLOPRIM) 100 MG tablet TAKE 2 TABLETS (200 MG TOTAL) BY MOUTH ONCE DAILY.    aspirin (ECOTRIN) 81 MG EC tablet Take 81 mg by mouth once daily.    carvedilol (COREG) 25 MG tablet TAKE 1 TABLET BY MOUTH TWICE A DAY WITH MEALS    colchicine (COLCRYS) 0.6 mg tablet Take 1 tablet by mouth 3 times a week    digoxin (LANOXIN) 125 mcg tablet Take 1 tablet (125 mcg total) by mouth once daily. TAKE 1 TABLET (0.125 MG TOTAL) BY MOUTH ONCE DAILY. (Patient taking differently: Take 125 mcg by mouth once daily. )    furosemide (LASIX) 40 MG tablet Take 40 mg by mouth 2 (two) times daily.    gabapentin (NEURONTIN) 100 MG capsule TAKE ONE CAPSULE BY MOUTH TWO TIMES DAILY    levothyroxine (SYNTHROID) 50 MCG tablet Take 1 tablet (50 mcg total) by mouth once daily.    losartan (COZAAR) 25 MG tablet Take 1 tablet (25 mg total) by mouth once daily.    pantoprazole (PROTONIX) 20 MG tablet Take 1 tablet (20 mg total) by mouth once daily.    phytonadione, vitamin K1, (MEPHYTON) 5 mg Tab Take 5 mg by " mouth once.    potassium chloride SA (K-DUR,KLOR-CON) 20 MEQ tablet TAKE 1 TABLET (20 MEQ TOTAL) BY MOUTH ONCE DAILY.    warfarin (COUMADIN) 2.5 MG tablet TAKE 1/2 TABLET ON MONDAY AND WEDNESDAY AND 1 TABLET ALL OTHER DAYS. DISCONTINUE 5MG TABLET. (Patient taking differently: TAKE 1 TABLET BY MOUTH ON MONDAY, WEDNESDAY AND FRIDAY; AND 1.5 TABLETS ALL OTHER DAYS.)    acetaminophen (TYLENOL EXTRA STRENGTH) 325 MG tablet Take 2 tablets (650 mg total) by mouth every 8 (eight) hours. (Patient taking differently: Take 650 mg by mouth 3 (three) times daily as needed. )    amiodarone (PACERONE) 200 MG Tab TAKE 1 TABLET EVERY DAY    metOLazone (ZAROXOLYN) 2.5 MG tablet Take 1 tablet (2.5 mg total) by mouth daily as needed. For SOB or Leg edema    nystatin (MYCOSTATIN) 100,000 unit/mL suspension SWISH   SWALLOW 4 TO 6 MLS FIVE TO SIX TIMES A DAILY    sodium polystyrene (KAYEXALATE) powder Take 15 g by mouth once daily for 1 dose mix with 60 mls of water     Family History     Problem Relation (Age of Onset)    COPD Father    Cancer Brother    Coronary artery disease Mother, Father    Diabetes Brother    Emphysema Father    Epilepsy Mother    Heart attack Mother    Heart disease Father        Tobacco Use    Smoking status: Never Smoker    Smokeless tobacco: Never Used   Substance and Sexual Activity    Alcohol use: No    Drug use: No    Sexual activity: Never     Review of Systems   Constitutional: Positive for fatigue. Negative for chills, diaphoresis and fever.   HENT: Negative for facial swelling, hearing loss, mouth sores, sneezing, sore throat, tinnitus and trouble swallowing.    Eyes: Negative for photophobia, pain, discharge, redness and visual disturbance.   Respiratory: Negative for apnea, cough, choking, chest tightness, shortness of breath, wheezing and stridor.    Cardiovascular: Positive for leg swelling. Negative for chest pain and palpitations.        Chronic BLE edema, left greater than right    Gastrointestinal: Positive for anal bleeding and blood in stool. Negative for abdominal distention, abdominal pain, constipation, diarrhea, nausea, rectal pain and vomiting.   Endocrine: Negative for cold intolerance, heat intolerance, polydipsia, polyphagia and polyuria.   Genitourinary: Negative for difficulty urinating, dysuria, flank pain, frequency, hematuria, pelvic pain, urgency, vaginal bleeding, vaginal discharge and vaginal pain.   Musculoskeletal: Negative for arthralgias, back pain, gait problem, myalgias and neck stiffness.   Skin: Negative for pallor, rash and wound.   Allergic/Immunologic: Negative for food allergies.   Neurological: Positive for weakness. Negative for dizziness, tremors, seizures, syncope, speech difficulty and headaches.   Hematological: Negative for adenopathy. Does not bruise/bleed easily.   Psychiatric/Behavioral: Negative for behavioral problems and confusion. The patient is not nervous/anxious.    All other systems reviewed and are negative.    Objective:     Vital Signs (Most Recent):  Temp: 97.9 °F (36.6 °C) (04/05/19 1549)  Pulse: 69(Simultaneous filing. User may not have seen previous data.) (04/05/19 1549)  Resp: 20 (04/05/19 1549)  BP: (!) 143/66(Simultaneous filing. User may not have seen previous data.) (04/05/19 1549)  SpO2: 98 %(Simultaneous filing. User may not have seen previous data.) (04/05/19 1549) Vital Signs (24h Range):  Temp:  [97.9 °F (36.6 °C)] 97.9 °F (36.6 °C)  Pulse:  [69-70] 69  Resp:  [18-20] 20  SpO2:  [95 %-98 %] 98 %  BP: (115-143)/(55-66) 143/66     Weight: 49.4 kg (109 lb)  Body mass index is 19.94 kg/m².    Physical Exam   Constitutional: She is oriented to person, place, and time. She appears well-developed and well-nourished. She appears cachectic.   HENT:   Head: Normocephalic and atraumatic.   Mouth/Throat: Oropharynx is clear and moist.   Eyes: Pupils are equal, round, and reactive to light. Conjunctivae and EOM are normal.   Neck: Normal  range of motion. Neck supple.   Cardiovascular: Normal rate, regular rhythm and intact distal pulses. Exam reveals no gallop and no friction rub.   Murmur heard.  Pulmonary/Chest: Effort normal and breath sounds normal. No respiratory distress. She has no wheezes. She has no rales. She exhibits no tenderness.   Abdominal: Soft. Bowel sounds are normal. She exhibits no distension and no mass. There is no tenderness.   Musculoskeletal: Normal range of motion. She exhibits edema. She exhibits no tenderness.   Chronic generalized edema to BLE, left greater than right   Neurological: She is alert and oriented to person, place, and time.   Skin: Skin is warm and dry. No rash noted. No erythema.   Psychiatric: She has a normal mood and affect. Her behavior is normal. Judgment and thought content normal.   Nursing note and vitals reviewed.        CRANIAL NERVES     CN III, IV, VI   Pupils are equal, round, and reactive to light.  Extraocular motions are normal.        Significant Labs:   CBC:   Recent Labs   Lab 04/05/19  1410   WBC 5.86   HGB 6.5*   HCT 20.8*        CMP:   Recent Labs   Lab 04/05/19  1121      K 5.9*      CO2 27      BUN 48*   CREATININE 1.3   CALCIUM 8.0*   PROT 4.9*   ALBUMIN 2.8*   BILITOT 0.3   ALKPHOS 73   AST 21   ALT 17   ANIONGAP 8   EGFRNONAA 37*     Coagulation:   Recent Labs   Lab 04/05/19   INR 4.1     TSH:   Recent Labs   Lab 03/29/19  1235   TSH 6.333*       Significant Imaging: I have reviewed all pertinent imaging results/findings within the past 24 hours.    Assessment/Plan:     * Gastrointestinal hemorrhage  - Place in OBS  - Hgb 6.5, decreased from 8.3 on 3/29  - Recently here with similar presentation; s/p EGD on 3/13 per Dr. Orellana that showed LA Grade D candidiasis esophagitis that was biopsied, and gastric erosions without bleeding.  She was treated with Diflucan and also recently started on PPI for Santos's esophagus, confirmed on biopsy.    - INR elevated  "to 4.1 for which she was given Vitamin K po in the ER   - Hold Coumadin and ASA  - Transfuse 2 units PRBCs  - Started on Protonix 40 mg IVP BID  - CL diet  - Case d/w Dr. Arizmendi with GI who recommended holding Coumadin with no indication for GI consult at this time given recent workup with bleeding likely a result of elevated INR.  - Per patient, "I have not been in Afib for about 6 months and Dr. Dunne mentioned I may be able to stop Coumadin since I'm having so many issues with it".  Will need to d/w Cardiology to determine POC moving forward.  - Recheck Hgb/Hct after blood is completed  - Transfuse to keep Hgb >7.0  - GI consult pending clinical course or if bleeding persists after INR is corrected  - Currently hemodynamically stable  - Tele monitoring      Coumadin Toxicity/ Supratherapeutic INR  - Recurrent issue  - INR elevated to 4.1 for which she was given 5mg of Vitamin K po  - Hold Coumadin  - Per patient, "I have not been in Afib for about 6 months and Dr. Dunne mentioned I may be able to stop Coumadin since I'm having so many issues with it".    - Cardiology consult in AM to determine POC moving forward given recurrent GI bleeds and supratherapeutic INR  - Daily INR  - See plan as per above       Hyperkalemia  - KCL 5.9  - Given 10 units of Insulin and 1 amp of D50 in the ER  - Repeat KCL level at 2000  - Monitor and correct as needed      A-fib  - Currently in a paced rhythm  - Followed outpatient by LITTLE Alston and Dr. Dunne  - Patient's home Amiodarone is currently on hold, continue to hold  - Hold Coumadin given above  - Cardiology consult in AM to determine POC moving forward given recurrent GI bleeds on Coumadin  - Tele monitoring      Chronic combined systolic and diastolic congestive heart failure  - Currently appears compensated  - ECHO in November of 2018 showed severely depressed left ventricular systolic function (EF 20-25%), Moderately depressed right ventricular systolic " function  - Followed outpatient by LITTLE Alston  - Continue home Coreg, Lasix, and Digoxin  - 1500 cc FR  - Daily weights  - Strict I and O  - Tele monitoring      Santos's esophagus  - Diagnosed on recent EGD and started on Protonix 20 mg po daily  - Protonix 40 mg IVP BID given #1  - Resume home dose at DC  - Outpatient f/u with GI as directed for monitoring      Chronic renal failure, stage 4 (severe)  - Cr stable at 1.8  - Daily BMP  - Avoid nephrotoxic agents      S/P MVR (mitral valve replacement)  - with a bioprosthesis valve  - Outpatient f/u with cardiology      Pulmonary hypertension due to left heart disease  - ECHO in November of 2018 showed estimated PA systolic pressure of 69  - Outpatient f/u with cardiology      LEV (obstructive sleep apnea)  - Cpap q hs      Acquired hypothyroidism  - TSH elevated to 6.3 in March  - Patient is unsure if Synthroid dose has recently changed  - Increase Synthroid from 50 mcg to 75 mcg  - Recommend repeat TSH with PCP in 4-6 weeks for monitoring and dose adjustments      VTE Risk Mitigation (From admission, onward)        Ordered     IP VTE HIGH RISK PATIENT  Once      04/05/19 1620     Place sequential compression device  Until discontinued      04/05/19 1620     Place sequential compression device  Until discontinued      04/05/19 1620             Mery Dhillon, WILBERT, ACNP-BC  Department of Hospital Medicine   [facility]

## 2019-04-05 NOTE — ASSESSMENT & PLAN NOTE
- Currently appears compensated  - ECHO in November of 2018 showed severely depressed left ventricular systolic function (EF 20-25%), Moderately depressed right ventricular systolic function  - Followed outpatient by LITTLE Alston  - Continue home Coreg, Lasix, and Digoxin  - 1500 cc FR  - Daily weights  - Strict I and O  - Tele monitoring

## 2019-04-05 NOTE — ASSESSMENT & PLAN NOTE
- Diagnosed on recent EGD and started on Protonix 20 mg po daily  - Protonix 40 mg IVP BID given #1  - Resume home dose at DC  - Outpatient f/u with GI as directed for monitoring

## 2019-04-05 NOTE — ED PROVIDER NOTES
"SCRIBE #1 NOTE: I, Kevin Delacruzing, am scribing for, and in the presence of, Randolph Agustin MD. I have scribed the entire note.       History     Chief Complaint   Patient presents with    Rectal Bleeding     x 1 week, pt states her coumadin level is high     Review of patient's allergies indicates:   Allergen Reactions    Cephalosporins Hives    Metaxalone Itching    Penicillins      Other reaction(s): Unknown      Pregabalin      Other reaction(s): "Bad feeling"      Sulfa (sulfonamide antibiotics) Itching         History of Present Illness     HPI    4/5/2019, 1:39 PM  History obtained from the patient      History of Present Illness: Jennifer Mathews is a 86 y.o. female patient with a PMHx of ACS, anemia, afib, cardiac arrest, CKD, HLD, who presents to the Emergency Department for evaluation of rectal bleeding which onset gradually one week ago. She states that the blood is maroon colored and is present in stool as well. Pt reports her coumadin levels as of today were in the 4's. Symptoms are constant and moderate in severity. No mitigating or exacerbating factors reported. Associated sxs include generalized weakness. Patient denies any lightheadedness, numbness, focal weakness, dizziness, syncope, abd pain, n/v/d, fever, chills, and all other sxs at this time. No further complaints or concerns at this time.         Arrival mode: Personal vehicle    PCP: Desirae Bello MD        Past Medical History:  Past Medical History:   Diagnosis Date    Acute coronary syndrome     Anemia     Anticoagulated on Coumadin 7/13/2015    Arthritis     Asthma     patient denies    Atrial fibrillation     Basal cell carcinoma 10/2015    left neck    Cardiac arrest     Cardiac resynchronization therapy defibrillator (CRT-D) in place 07/13/2015    Pt denies, states it does not shock me    Chronic combined systolic and diastolic congestive heart failure     CKD (chronic kidney disease) stage 3, GFR 30-59 " ml/min     Dyslipidemia 1/30/2014    Hyperlipidemia     Hypertension     Hypothyroidism     Ischemic cardiomyopathy 01/30/2014    Macular hole of left eye     Old    Macular hole of left eye     LEV (obstructive sleep apnea) 9/30/2013    Pneumonia     required hospitalization    Recurrent UTI     Refractive error     Spastic colon     Stroke        Past Surgical History:  Past Surgical History:   Procedure Laterality Date    APPENDECTOMY      CARDIAC CATHETERIZATION      CARDIAC PACEMAKER PLACEMENT  2014    CARDIAC VALVE SURGERY      CATARACT EXTRACTION      OU    CHOLECYSTECTOMY      COLONOSCOPY      ESOPHAGOGASTRODUODENOSCOPY (EGD) N/A 3/13/2019    Performed by Gh[facility]al [facility]rellana [facility] at [facility] ENDO    IRRIGATION AND DEBRIDEMENT OF LEFT KNEE Left 9/12/2017    Performed by Juliano Rosenbaum MD at [facility] OR    MITRAL VALVE REPLACEMENT  2014    MITRAL VALVE SURGERY      x3    REPLACEMENT, PACEMAKER GENERATOR/pt has crt-p versus d Left 6/20/2018    Performed by Manny Dunne MD at [facility] CATH LAB    REVISION, SKIN POCKET, FOR CARDIAC PACEMAKER Left 2/26/2014    Performed by [facility]anny [facility]ne MD at [facility] CATH LAB         Family History:  Family History   Problem Relation Age of Onset    Coronary artery disease Mother         mi    Epilepsy Mother     Heart attack Mother     Coronary artery disease Father     Heart disease Father     Emphysema Father     COPD Father     Diabetes Brother     Cancer Brother     Melanoma Neg Hx     Psoriasis Neg Hx     Lupus Neg Hx     Eczema Neg Hx        Social History:  Social History     Tobacco Use    Smoking status: Never Smoker    Smokeless tobacco: Never Used   Substance and Sexual Activity    Alcohol use: No    Drug use: No    Sexual activity: Never        Review of Systems     Review of Systems   Constitutional: Negative for chills and fever.        (+) Generalized weakness   HENT: Negative for sore throat.    Respiratory: Negative for  "shortness of breath.    Cardiovascular: Negative for chest pain.   Gastrointestinal: Positive for anal bleeding and blood in stool. Negative for abdominal pain, diarrhea, nausea and vomiting.   Genitourinary: Negative for dysuria.   Musculoskeletal: Negative for back pain.   Skin: Negative for rash.   Neurological: Negative for dizziness, syncope, weakness, light-headedness, numbness and headaches.   Hematological: Does not bruise/bleed easily.   All other systems reviewed and are negative.     Physical Exam     Initial Vitals [04/05/19 1331]   BP Pulse Resp Temp SpO2   (!) 118/55 70 18 97.9 °F (36.6 °C) 95 %      MAP       --          Physical Exam  Nursing Notes and Vital Signs Reviewed.  Constitutional: Patient is in no acute distress. Elderly and frail appearing  Head: Atraumatic. Normocephalic.  Eyes: PERRL. EOM intact. Conjunctivae are not pale. No scleral icterus.  ENT: Mucous membranes are moist. Oropharynx is clear and symmetric.    Neck: Supple. Full ROM. No lymphadenopathy.  Cardiovascular: Regular rate. Regular rhythm. No murmurs, rubs, or gallops. Distal pulses are 2+ and symmetric.  Pulmonary/Chest: No respiratory distress. Clear to auscultation bilaterally. No wheezing or rales.  Abdominal: Soft and non-distended.  There is no tenderness.  No rebound, guarding, or rigidity.   Musculoskeletal: Moves all extremities. No obvious deformities. No edema. No calf tenderness.  Skin: Warm and dry.  Neurological:  Alert, awake, and appropriate.  Normal speech.  No acute focal neurological deficits are appreciated.  Psychiatric: Normal affect. Good eye contact. Appropriate in content.     ED Course   Procedures  ED Vital Signs:  Vitals:    04/05/19 1331 04/05/19 1400 04/05/19 1406 04/05/19 1408   BP: (!) 118/55  (!) 115/55 (!) 123/59   Pulse: 70 70 70 70   Resp: 18      Temp: 97.9 °F (36.6 °C)      TempSrc: Oral      SpO2: 95%      Weight: 49.4 kg (109 lb)      Height: 5' 2" (1.575 m)       04/05/19 1410 " 04/05/19 1549   BP: (!) 120/55 (!) 143/66   Pulse: 70 69   Resp:     Temp:     TempSrc:     SpO2:  98%   Weight:     Height:         Abnormal Lab Results:  Labs Reviewed   CBC W/ AUTO DIFFERENTIAL - Abnormal; Notable for the following components:       Result Value    RBC 2.02 (*)     Hemoglobin 6.5 (*)     Hematocrit 20.8 (*)      (*)     MCH 32.2 (*)     MCHC 31.3 (*)     RDW 19.0 (*)     All other components within normal limits   POCT GLUCOSE - Abnormal; Notable for the following components:    POCT Glucose 68 (*)     All other components within normal limits   POCT GLUCOSE        All Lab Results:  Results for orders placed or performed during the hospital encounter of 04/05/19   CBC auto differential   Result Value Ref Range    WBC 5.86 3.90 - 12.70 K/uL    RBC 2.02 (L) 4.00 - 5.40 M/uL    Hemoglobin 6.5 (L) 12.0 - 16.0 g/dL    Hematocrit 20.8 (L) 37.0 - 48.5 %     (H) 82 - 98 fL    MCH 32.2 (H) 27.0 - 31.0 pg    MCHC 31.3 (L) 32.0 - 36.0 g/dL    RDW 19.0 (H) 11.5 - 14.5 %    Platelets 264 150 - 350 K/uL    MPV 9.4 9.2 - 12.9 fL    Gran # (ANC) 3.6 1.8 - 7.7 K/uL    Lymph # 1.7 1.0 - 4.8 K/uL    Mono # 0.5 0.3 - 1.0 K/uL    Eos # 0.1 0.0 - 0.5 K/uL    Baso # 0.04 0.00 - 0.20 K/uL    Gran% 61.5 38.0 - 73.0 %    Lymph% 28.7 18.0 - 48.0 %    Mono% 7.7 4.0 - 15.0 %    Eosinophil% 1.4 0.0 - 8.0 %    Basophil% 0.7 0.0 - 1.9 %    Differential Method Automated    POCT glucose   Result Value Ref Range    POCT Glucose 107 70 - 110 mg/dL   POCT glucose   Result Value Ref Range    POCT Glucose 68 (L) 70 - 110 mg/dL     *Note: Due to a large number of results and/or encounters for the requested time period, some results have not been displayed. A complete set of results can be found in Results Review.         Imaging Results:  Imaging Results    None                 The Emergency Provider reviewed the vital signs and test results, which are outlined above.     ED Discussion     3:43 PM: Discussed case with  LAST Perez ([facility]). Dr. Dewitt agrees with current care and management of pt and accepts admission.   Admitting Service: [facility] medicine   Admitting Physician: Esdras  Admit to: Obs/tele    3:44 PM: Re-evaluated pt. I have discussed test results, shared treatment plan, and the need for admission with patient and family at bedside. Pt and family express understanding at this time and agree with all information. All questions answered. Pt and family have no further questions or concerns at this time. Pt is ready for admit.      ED Medication(s):  Medications   phytonadione (vitamin K1) tablet 5 mg (5 mg Oral Given 4/5/19 1410)   dextrose 50% injection 25 g (25 g Intravenous Given 4/5/19 1442)   insulin regular injection 10 Units (10 Units Intravenous Given 4/5/19 1443)       New Prescriptions    No medications on file                 Medical Decision Making:   Clinical Tests:   Lab Tests: Ordered and Reviewed             Scribe Attestation:   Scribe #1: I performed the above scribed service and the documentation accurately describes the services I performed. I attest to the accuracy of the note.     Attending:   Physician Attestation Statement for Scribe #1: I, Randolph Agustin MD, personally performed the services described in this documentation, as scribed by Kevin Hi, in my presence, and it is both accurate and complete.           Clinical Impression       ICD-10-CM ICD-9-CM   1. Gastrointestinal hemorrhage, unspecified gastrointestinal hemorrhage type K92.2 578.9   2. Symptomatic anemia D64.9 285.9   3. Anticoagulated on Coumadin Z51.81 V58.83    Z79.01 V58.61       Disposition:   Disposition: Placed in Observation  Condition: Fair         Randolph Agustin MD  04/05/19 0997

## 2019-04-05 NOTE — ASSESSMENT & PLAN NOTE
- Currently in a paced rhythm  - Followed outpatient by LITTLE Alston and Dr. Dunne  - Patient's home Amiodarone is currently on hold, continue to hold  - Hold Coumadin given above  - Cardiology consult in AM to determine POC moving forward given recurrent GI bleeds on Coumadin  - Tele monitoring

## 2019-04-05 NOTE — NURSING
Received repeat labs from patient's home health previously ordered for follow up of hyperkalemia treated with kayexalate.  Patient's potassium is currently 5.9 was 5.3 seven days ago.      Patient is currently in the ED at [facility] for supra therapeutic INR with rectal bleeding.  Talked to charge nurse in the ED to explain the situation regarding increased potassium and to see if repeat CMP can be ordered and patient's potassium corrected while in the ED.  She stated that she would relay message to Dr. Agustin.

## 2019-04-05 NOTE — ASSESSMENT & PLAN NOTE
- ECHO in November of 2018 showed estimated PA systolic pressure of 69  - Outpatient f/u with cardiology

## 2019-04-05 NOTE — ED NOTES
Ramsey Ordonez with hospital medicine, redraw potassium level at 8:00 p.m. And H&H after both units of blood has infused.

## 2019-04-05 NOTE — HPI
"Jennifer Mathews is a 86 y.o. female patient with a PMHx of ACS, Cardiac Arrest, Systolic CHF with an EF of 20%, Ischemic cardiomyopathy s/p pacemaker placement, multiple MVRs with a bioprosthesis valve, CVA with no residual, Anemia, paroxysmal Afib on OAC with Coumadin, CKD, HLD, recently diagnosed Santos's esophagus, and HTN who presented to the Emergency Department today for evaluation of rectal bleeding which onset gradually one week ago.  She states that the blood is mixed between " a maroon color and bright red, and is present in stool as well".  Symptoms are constant and moderate in severity.  No mitigating or exacerbating factors reported.  Associated sxs include generalized weakness.  She was recently discharged from here on 3/14 with a similar presentation.  During this admission, patient underwent an EGD on 3/13 per Dr. Orellana that showed LA Grade D candidiasis esophagitis that was biopsied, and gastric erosions without bleeding.  She was treated with Diflucan and also recently started on PPI for Santos's esophagus, confirmed on biopsy.  Patient denies any lightheadedness, numbness, focal weakness, dizziness, syncope, abd pain, n/v/d, fever, chills, and all other sxs at this time.  No further complaints or concerns at this time.  ER workup showed: Stable VS.  Hgb 6.5, decreased from 8.3 on 3/29.  INR 4.1, decreased from 7.6 on 4/3, for which she was given 5mg of oral Vitamin K.  KCL 5.9, for which she was given 10 units of IV insulin and 1 amp of D50.  Cr stable at baseline.  Type and match completed and patient will be transfused with 2 units of PRBCs.  Also started on Protonix IVP BID.  Case d/w Dr. Arizmendi with GI who recommended holding Coumadin with no indication for GI consult at this time given recent workup with bleeding likely a result of elevated INR.  Per patient, "I have not been in Afib for about 6 months and Dr. Dunne mentioned I may be able to stop Coumadin since I'm having so many " "issues with it".  Will need to d/w Cardiology to determine POC moving forward.  Hospital Medicine called for admit.  She will be placed in OBS.  She is a Full Code.  Her SDM is her daughter Eri who can be reached at [phone].      "

## 2019-04-05 NOTE — ASSESSMENT & PLAN NOTE
- TSH elevated to 6.3 in March  - Patient is unsure if Synthroid dose has recently changed  - Increase Synthroid from 50 mcg to 75 mcg  - Recommend repeat TSH with PCP in 4-6 weeks for monitoring and dose adjustments

## 2019-04-05 NOTE — PROGRESS NOTES
Verbal result taken from Jennifer with [facility]. PT / INR  49.2 / 4.1.   Date drawn 4/05/19.   HH states patient reports blood in her stool has continued and refuses to go to hospital.  Patient contacted: Mrs. Mathews states the bleeding has improved but it is still present.  Notified patient that she should seek immediate medical attention since bleeding has not resolved. Patient states she will try to get someone to bring her to the ED.      Orders given: Hold dose of coumadin on  today, tomorrow and Monday.  Recheck on Monday, 4/08/19.

## 2019-04-06 LAB
ANION GAP SERPL CALC-SCNC: 9 MMOL/L (ref 8–16)
ANION GAP SERPL CALC-SCNC: 9 MMOL/L (ref 8–16)
BASOPHILS # BLD AUTO: 0.02 K/UL (ref 0–0.2)
BASOPHILS # BLD AUTO: 0.04 K/UL (ref 0–0.2)
BASOPHILS NFR BLD: 0.3 % (ref 0–1.9)
BASOPHILS NFR BLD: 0.7 % (ref 0–1.9)
BLD PROD TYP BPU: NORMAL
BLD PROD TYP BPU: NORMAL
BLOOD UNIT EXPIRATION DATE: NORMAL
BLOOD UNIT EXPIRATION DATE: NORMAL
BLOOD UNIT TYPE CODE: 5100
BLOOD UNIT TYPE CODE: 5100
BLOOD UNIT TYPE: NORMAL
BLOOD UNIT TYPE: NORMAL
BUN SERPL-MCNC: 40 MG/DL (ref 8–23)
BUN SERPL-MCNC: 43 MG/DL (ref 8–23)
CALCIUM SERPL-MCNC: 8.1 MG/DL (ref 8.7–10.5)
CALCIUM SERPL-MCNC: 8.1 MG/DL (ref 8.7–10.5)
CHLORIDE SERPL-SCNC: 104 MMOL/L (ref 95–110)
CHLORIDE SERPL-SCNC: 106 MMOL/L (ref 95–110)
CO2 SERPL-SCNC: 27 MMOL/L (ref 23–29)
CO2 SERPL-SCNC: 28 MMOL/L (ref 23–29)
CODING SYSTEM: NORMAL
CODING SYSTEM: NORMAL
CREAT SERPL-MCNC: 1.3 MG/DL (ref 0.5–1.4)
CREAT SERPL-MCNC: 1.3 MG/DL (ref 0.5–1.4)
DIFFERENTIAL METHOD: ABNORMAL
DIFFERENTIAL METHOD: ABNORMAL
DISPENSE STATUS: NORMAL
DISPENSE STATUS: NORMAL
EOSINOPHIL # BLD AUTO: 0 K/UL (ref 0–0.5)
EOSINOPHIL # BLD AUTO: 0.1 K/UL (ref 0–0.5)
EOSINOPHIL NFR BLD: 0.6 % (ref 0–8)
EOSINOPHIL NFR BLD: 1.6 % (ref 0–8)
ERYTHROCYTE [DISTWIDTH] IN BLOOD BY AUTOMATED COUNT: 19.5 % (ref 11.5–14.5)
ERYTHROCYTE [DISTWIDTH] IN BLOOD BY AUTOMATED COUNT: 19.7 % (ref 11.5–14.5)
EST. GFR  (AFRICAN AMERICAN): 43 ML/MIN/1.73 M^2
EST. GFR  (AFRICAN AMERICAN): 43 ML/MIN/1.73 M^2
EST. GFR  (NON AFRICAN AMERICAN): 37 ML/MIN/1.73 M^2
EST. GFR  (NON AFRICAN AMERICAN): 37 ML/MIN/1.73 M^2
GLUCOSE SERPL-MCNC: 78 MG/DL (ref 70–110)
GLUCOSE SERPL-MCNC: 84 MG/DL (ref 70–110)
HCT VFR BLD AUTO: 26.9 % (ref 37–48.5)
HCT VFR BLD AUTO: 28.8 % (ref 37–48.5)
HGB BLD-MCNC: 8.9 G/DL (ref 12–16)
HGB BLD-MCNC: 9.1 G/DL (ref 12–16)
INR PPP: 2 (ref 0.8–1.2)
LYMPHOCYTES # BLD AUTO: 1.4 K/UL (ref 1–4.8)
LYMPHOCYTES # BLD AUTO: 1.5 K/UL (ref 1–4.8)
LYMPHOCYTES NFR BLD: 22.4 % (ref 18–48)
LYMPHOCYTES NFR BLD: 27.7 % (ref 18–48)
MCH RBC QN AUTO: 30.7 PG (ref 27–31)
MCH RBC QN AUTO: 32 PG (ref 27–31)
MCHC RBC AUTO-ENTMCNC: 31.6 G/DL (ref 32–36)
MCHC RBC AUTO-ENTMCNC: 33.1 G/DL (ref 32–36)
MCV RBC AUTO: 97 FL (ref 82–98)
MCV RBC AUTO: 97 FL (ref 82–98)
MONOCYTES # BLD AUTO: 0.4 K/UL (ref 0.3–1)
MONOCYTES # BLD AUTO: 0.5 K/UL (ref 0.3–1)
MONOCYTES NFR BLD: 6.3 % (ref 4–15)
MONOCYTES NFR BLD: 9.9 % (ref 4–15)
NEUTROPHILS # BLD AUTO: 3.3 K/UL (ref 1.8–7.7)
NEUTROPHILS # BLD AUTO: 4.4 K/UL (ref 1.8–7.7)
NEUTROPHILS NFR BLD: 60.8 % (ref 38–73)
NEUTROPHILS NFR BLD: 70.4 % (ref 38–73)
NUM UNITS TRANS PACKED RBC: NORMAL
NUM UNITS TRANS PACKED RBC: NORMAL
PLATELET # BLD AUTO: 241 K/UL (ref 150–350)
PLATELET # BLD AUTO: 249 K/UL (ref 150–350)
PMV BLD AUTO: 9.8 FL (ref 9.2–12.9)
PMV BLD AUTO: 9.9 FL (ref 9.2–12.9)
POTASSIUM SERPL-SCNC: 4.5 MMOL/L (ref 3.5–5.1)
POTASSIUM SERPL-SCNC: 4.9 MMOL/L (ref 3.5–5.1)
PROTHROMBIN TIME: 20.8 SEC (ref 9–12.5)
RBC # BLD AUTO: 2.78 M/UL (ref 4–5.4)
RBC # BLD AUTO: 2.96 M/UL (ref 4–5.4)
SODIUM SERPL-SCNC: 141 MMOL/L (ref 136–145)
SODIUM SERPL-SCNC: 142 MMOL/L (ref 136–145)
WBC # BLD AUTO: 5.46 K/UL (ref 3.9–12.7)
WBC # BLD AUTO: 6.24 K/UL (ref 3.9–12.7)

## 2019-04-06 PROCEDURE — 97802 MEDICAL NUTRITION INDIV IN: CPT

## 2019-04-06 PROCEDURE — 99223 1ST HOSP IP/OBS HIGH 75: CPT | Mod: ,,, | Performed by: INTERNAL MEDICINE

## 2019-04-06 PROCEDURE — 36415 COLL VENOUS BLD VENIPUNCTURE: CPT

## 2019-04-06 PROCEDURE — 96376 TX/PRO/DX INJ SAME DRUG ADON: CPT

## 2019-04-06 PROCEDURE — C9113 INJ PANTOPRAZOLE SODIUM, VIA: HCPCS | Performed by: NURSE PRACTITIONER

## 2019-04-06 PROCEDURE — 21400001 HC TELEMETRY ROOM

## 2019-04-06 PROCEDURE — 25000003 PHARM REV CODE 250: Performed by: NURSE PRACTITIONER

## 2019-04-06 PROCEDURE — P9016 RBC LEUKOCYTES REDUCED: HCPCS

## 2019-04-06 PROCEDURE — 85025 COMPLETE CBC W/AUTO DIFF WBC: CPT

## 2019-04-06 PROCEDURE — 80048 BASIC METABOLIC PNL TOTAL CA: CPT

## 2019-04-06 PROCEDURE — 80048 BASIC METABOLIC PNL TOTAL CA: CPT | Mod: 91

## 2019-04-06 PROCEDURE — 85610 PROTHROMBIN TIME: CPT

## 2019-04-06 PROCEDURE — 99223 PR INITIAL HOSPITAL CARE,LEVL III: ICD-10-PCS | Mod: ,,, | Performed by: INTERNAL MEDICINE

## 2019-04-06 PROCEDURE — 63600175 PHARM REV CODE 636 W HCPCS: Performed by: NURSE PRACTITIONER

## 2019-04-06 RX ORDER — WARFARIN 2.5 MG/1
2.5 TABLET ORAL DAILY
Status: DISCONTINUED | OUTPATIENT
Start: 2019-04-06 | End: 2019-04-07

## 2019-04-06 RX ADMIN — PANTOPRAZOLE SODIUM 40 MG: 40 INJECTION, POWDER, LYOPHILIZED, FOR SOLUTION INTRAVENOUS at 10:04

## 2019-04-06 RX ADMIN — DIGOXIN 125 MCG: 125 TABLET ORAL at 09:04

## 2019-04-06 RX ADMIN — CARVEDILOL 25 MG: 12.5 TABLET, FILM COATED ORAL at 04:04

## 2019-04-06 RX ADMIN — PANTOPRAZOLE SODIUM 40 MG: 40 INJECTION, POWDER, LYOPHILIZED, FOR SOLUTION INTRAVENOUS at 09:04

## 2019-04-06 RX ADMIN — LEVOTHYROXINE SODIUM 75 MCG: 75 TABLET ORAL at 06:04

## 2019-04-06 RX ADMIN — FUROSEMIDE 40 MG: 40 TABLET ORAL at 09:04

## 2019-04-06 RX ADMIN — CARVEDILOL 25 MG: 12.5 TABLET, FILM COATED ORAL at 09:04

## 2019-04-06 RX ADMIN — FUROSEMIDE 40 MG: 40 TABLET ORAL at 08:04

## 2019-04-06 NOTE — ASSESSMENT & PLAN NOTE
"- Recurrent issue  - INR elevated to 4.1 for which she was given 5mg of Vitamin K po  - Hold Coumadin  - Per patient, "I have not been in Afib for about 6 months and Dr. Dunne mentioned I may be able to stop Coumadin since I'm having so many issues with it".    - Cardiology consult in AM to determine POC moving forward given recurrent GI bleeds and supratherapeutic INR  - Daily INR  - See plan as per above     4/6  Admit with INR 7.6  INR trend down to 2.0 today   Resume Coumadin   "

## 2019-04-06 NOTE — SUBJECTIVE & OBJECTIVE
Interval History: Patient seen and examined. INR 2.0 today, Cardiology recommend restarting Coumadin and monitor overnight.     Review of Systems   Constitutional: Positive for activity change and fatigue. Negative for chills and fever.   HENT: Negative for congestion, rhinorrhea and sinus pressure.    Respiratory: Negative for apnea, cough, choking, chest tightness, shortness of breath, wheezing and stridor.    Cardiovascular: Negative for chest pain, palpitations and leg swelling.   Gastrointestinal: Negative for abdominal distention, abdominal pain, diarrhea, nausea and vomiting.   Endocrine: Negative for cold intolerance and heat intolerance.   Genitourinary: Negative for difficulty urinating and hematuria.   Musculoskeletal: Negative for arthralgias and joint swelling.   Skin: Negative for color change, pallor and rash.   Neurological: Positive for weakness. Negative for dizziness, seizures, numbness and headaches.   Psychiatric/Behavioral: Negative for agitation. The patient is not nervous/anxious.      Objective:     Vital Signs (Most Recent):  Temp: 98.3 °F (36.8 °C) (04/06/19 1542)  Pulse: 70 (04/06/19 1542)  Resp: 16 (04/06/19 1118)  BP: 130/60 (04/06/19 1542)  SpO2: 95 % (04/06/19 1542) Vital Signs (24h Range):  Temp:  [97.6 °F (36.4 °C)-98.7 °F (37.1 °C)] 98.3 °F (36.8 °C)  Pulse:  [69-72] 70  Resp:  [14-20] 16  SpO2:  [94 %-98 %] 95 %  BP: (117-155)/(55-67) 130/60     Weight: 49.4 kg (109 lb)  Body mass index is 19.94 kg/m².    Intake/Output Summary (Last 24 hours) at 4/6/2019 1606  Last data filed at 4/6/2019 0830  Gross per 24 hour   Intake 1635.88 ml   Output --   Net 1635.88 ml      Physical Exam   Constitutional: She is oriented to person, place, and time. No distress.   Eyes: Right eye exhibits no discharge. Left eye exhibits no discharge.   Neck: No JVD present.   Cardiovascular: Exam reveals no gallop and no friction rub.   No murmur heard.  Pulmonary/Chest: No stridor. No respiratory distress.  She has no wheezes. She has no rales.   Abdominal: She exhibits no distension and no mass. There is no tenderness. There is no rebound and no guarding.   Musculoskeletal: She exhibits no edema or deformity.   Neurological: She is alert and oriented to person, place, and time.   Skin: Skin is warm and dry. Capillary refill takes less than 2 seconds. She is not diaphoretic.   Nursing note and vitals reviewed.      Significant Labs:   CBC:   Recent Labs   Lab 04/05/19  1410 04/06/19 0425 04/06/19  1115   WBC 5.86 5.46 6.24   HGB 6.5* 8.9* 9.1*   HCT 20.8* 26.9* 28.8*    241 249     CMP:   Recent Labs   Lab 04/05/19  1121 04/05/19 2000 04/06/19 0425 04/06/19  1115     --  142 141   K 5.9* 5.5* 4.9 4.5     --  106 104   CO2 27  --  27 28     --  78 84   BUN 48*  --  43* 40*   CREATININE 1.3  --  1.3 1.3   CALCIUM 8.0*  --  8.1* 8.1*   PROT 4.9*  --   --   --    ALBUMIN 2.8*  --   --   --    BILITOT 0.3  --   --   --    ALKPHOS 73  --   --   --    AST 21  --   --   --    ALT 17  --   --   --    ANIONGAP 8  --  9 9   EGFRNONAA 37*  --  37* 37*     Cardiac Markers: No results for input(s): CKMB, MYOGLOBIN, BNP, TROPISTAT in the last 48 hours.    Significant Imaging:     Imaging Results    None

## 2019-04-06 NOTE — PLAN OF CARE
Problem: Adult Inpatient Plan of Care  Goal: Plan of Care Review  Outcome: Ongoing (interventions implemented as appropriate)  Pt free from fall and injury on shift.  Pt had 2 BMs this morning shortly after 7am.  Dr. Sin was notified at that time.  Coumadin held per Dr. Sin order due to GI bleeding.

## 2019-04-06 NOTE — ASSESSMENT & PLAN NOTE
-Clinically compensated  -Continue home medications-Coreg, Lasix, digoxin  -Plan to re-start ARB tmw given hyperkalemia upon admission

## 2019-04-06 NOTE — ASSESSMENT & PLAN NOTE
"- Place in OBS  - Hgb 6.5, decreased from 8.3 on 3/29  - Recently here with similar presentation; s/p EGD on 3/13 per Dr. Orellana that showed LA Grade D candidiasis esophagitis that was biopsied, and gastric erosions without bleeding.  She was treated with Diflucan and also recently started on PPI for Santos's esophagus, confirmed on biopsy.    - INR elevated to 4.1 for which she was given Vitamin K po in the ER   - Hold Coumadin and ASA  - Transfuse 2 units PRBCs  - Started on Protonix 40 mg IVP BID  - CL diet  - Case d/w Dr. Arizmendi with GI who recommended holding Coumadin with no indication for GI consult at this time given recent workup with bleeding likely a result of elevated INR.  - Per patient, "I have not been in Afib for about 6 months and Dr. Dunne mentioned I may be able to stop Coumadin since I'm having so many issues with it".  Will need to d/w Cardiology to determine POC moving forward.  - Recheck Hgb/Hct after blood is completed  - Transfuse to keep Hgb >7.0  - GI consult pending clinical course or if bleeding persists after INR is corrected  - Currently hemodynamically stable  - Tele monitoring    4/6  GI bleeding reoccurred today    INR 2.0 today   Will hold today and monitor   Daily INR    "

## 2019-04-06 NOTE — SUBJECTIVE & OBJECTIVE
Interval History: Patient seen and examined today. No current GI bleeding noted. Cardiology recommend restarting today and monitor for bleeding overnight.     Review of Systems   Constitutional: Positive for fatigue. Negative for chills and fever.   HENT: Negative for congestion, rhinorrhea and sinus pressure.    Respiratory: Negative for apnea, cough, choking, chest tightness, shortness of breath, wheezing and stridor.    Cardiovascular: Negative for chest pain, palpitations and leg swelling.   Gastrointestinal: Negative for abdominal distention, abdominal pain, diarrhea, nausea and vomiting.   Endocrine: Negative for cold intolerance and heat intolerance.   Genitourinary: Negative for difficulty urinating and hematuria.   Musculoskeletal: Negative for arthralgias and joint swelling.   Skin: Negative for color change, pallor and rash.   Neurological: Positive for weakness. Negative for dizziness, seizures, numbness and headaches.   Psychiatric/Behavioral: Negative for agitation. The patient is not nervous/anxious.      Objective:     Vital Signs (Most Recent):  Temp: 98.3 °F (36.8 °C) (04/06/19 1542)  Pulse: 70 (04/06/19 1542)  Resp: 16 (04/06/19 1118)  BP: 130/60 (04/06/19 1542)  SpO2: 95 % (04/06/19 1542) Vital Signs (24h Range):  Temp:  [97.6 °F (36.4 °C)-98.7 °F (37.1 °C)] 98.3 °F (36.8 °C)  Pulse:  [69-72] 70  Resp:  [14-20] 16  SpO2:  [94 %-98 %] 95 %  BP: (117-155)/(55-67) 130/60     Weight: 49.4 kg (109 lb)  Body mass index is 19.94 kg/m².    Intake/Output Summary (Last 24 hours) at 4/6/2019 1624  Last data filed at 4/6/2019 0830  Gross per 24 hour   Intake 1635.88 ml   Output --   Net 1635.88 ml      Physical Exam   Constitutional: No distress.   Eyes: Right eye exhibits no discharge. Left eye exhibits no discharge.   Neck: No JVD present.   Cardiovascular: Exam reveals no gallop and no friction rub.   No murmur heard.  Pulmonary/Chest: No stridor. No respiratory distress. She has no wheezes. She has no  rales. She exhibits no tenderness.   Abdominal: She exhibits no distension and no mass. There is no tenderness. There is no rebound and no guarding. No hernia.   Musculoskeletal: She exhibits no edema or deformity.   Neurological: She is alert.   Skin: Skin is warm and dry. Capillary refill takes less than 2 seconds. She is not diaphoretic.   Nursing note and vitals reviewed.      Significant Labs:   CBC:   Recent Labs   Lab 04/05/19  1410 04/06/19 0425 04/06/19  1115   WBC 5.86 5.46 6.24   HGB 6.5* 8.9* 9.1*   HCT 20.8* 26.9* 28.8*    241 249     CMP:   Recent Labs   Lab 04/05/19  1121 04/05/19  2000 04/06/19 0425 04/06/19  1115     --  142 141   K 5.9* 5.5* 4.9 4.5     --  106 104   CO2 27  --  27 28     --  78 84   BUN 48*  --  43* 40*   CREATININE 1.3  --  1.3 1.3   CALCIUM 8.0*  --  8.1* 8.1*   PROT 4.9*  --   --   --    ALBUMIN 2.8*  --   --   --    BILITOT 0.3  --   --   --    ALKPHOS 73  --   --   --    AST 21  --   --   --    ALT 17  --   --   --    ANIONGAP 8  --  9 9   EGFRNONAA 37*  --  37* 37*     Coagulation:   Recent Labs   Lab 04/06/19 0425   INR 2.0*       Significant Imaging:     Imaging Results    None

## 2019-04-06 NOTE — ASSESSMENT & PLAN NOTE
"- Place in OBS  - Hgb 6.5, decreased from 8.3 on 3/29  - Recently here with similar presentation; s/p EGD on 3/13 per Dr. Orellana that showed LA Grade D candidiasis esophagitis that was biopsied, and gastric erosions without bleeding.  She was treated with Diflucan and also recently started on PPI for Santos's esophagus, confirmed on biopsy.    - INR elevated to 4.1 for which she was given Vitamin K po in the ER   - Hold Coumadin and ASA  - Transfuse 2 units PRBCs  - Started on Protonix 40 mg IVP BID  - CL diet  - Case d/w Dr. Arizmendi with GI who recommended holding Coumadin with no indication for GI consult at this time given recent workup with bleeding likely a result of elevated INR.  - Per patient, "I have not been in Afib for about 6 months and Dr. Dunne mentioned I may be able to stop Coumadin since I'm having so many issues with it".  Will need to d/w Cardiology to determine POC moving forward.  - Recheck Hgb/Hct after blood is completed  - Transfuse to keep Hgb >7.0  - GI consult pending clinical course or if bleeding persists after INR is corrected  - Currently hemodynamically stable  - Tele monitoring    4/6  GI bleeding currently stable   INR 2.0 today   Restart Coumarin  Daily INR    "

## 2019-04-06 NOTE — SUBJECTIVE & OBJECTIVE
Interval History: Patient seen and examined today. Will hold Coumadin today due to Rectal bleeding this morning .     Review of Systems   Constitutional: Positive for appetite change and fatigue. Negative for chills and fever.   HENT: Negative for congestion, rhinorrhea and sinus pressure.    Respiratory: Negative for apnea, cough, choking, chest tightness, shortness of breath, wheezing and stridor.    Cardiovascular: Negative for chest pain, palpitations and leg swelling.   Gastrointestinal: Negative for abdominal distention, abdominal pain, diarrhea, nausea and vomiting.   Endocrine: Negative for cold intolerance and heat intolerance.   Genitourinary: Negative for difficulty urinating and hematuria.   Musculoskeletal: Negative for arthralgias and joint swelling.   Skin: Negative for color change, pallor and rash.   Neurological: Positive for weakness. Negative for dizziness, seizures, numbness and headaches.   Psychiatric/Behavioral: Negative for agitation. The patient is not nervous/anxious.      Objective:     Vital Signs (Most Recent):  Temp: 98.3 °F (36.8 °C) (04/06/19 1542)  Pulse: 70 (04/06/19 1542)  Resp: 16 (04/06/19 1118)  BP: 130/60 (04/06/19 1542)  SpO2: 95 % (04/06/19 1542) Vital Signs (24h Range):  Temp:  [97.6 °F (36.4 °C)-98.7 °F (37.1 °C)] 98.3 °F (36.8 °C)  Pulse:  [69-72] 70  Resp:  [14-20] 16  SpO2:  [94 %-98 %] 95 %  BP: (117-155)/(55-67) 130/60     Weight: 49.4 kg (109 lb)  Body mass index is 19.94 kg/m².    Intake/Output Summary (Last 24 hours) at 4/6/2019 1636  Last data filed at 4/6/2019 0830  Gross per 24 hour   Intake 1635.88 ml   Output --   Net 1635.88 ml      Physical Exam   Constitutional: No distress.   HENT:   Mouth/Throat: No oropharyngeal exudate.   Eyes: Right eye exhibits no discharge. Left eye exhibits no discharge.   Cardiovascular: Exam reveals no gallop and no friction rub.   No murmur heard.  Pulmonary/Chest: No stridor. No respiratory distress. She has no wheezes. She has  no rales. She exhibits no tenderness.   Abdominal: She exhibits no distension and no mass. There is no tenderness. There is no rebound and no guarding. No hernia.   Musculoskeletal: She exhibits no edema or deformity.   Neurological: She is alert.   Skin: Skin is warm. Capillary refill takes less than 2 seconds. She is not diaphoretic.   Nursing note and vitals reviewed.      Significant Labs:   CBC:   Recent Labs   Lab 04/05/19  1410 04/06/19  0425 04/06/19  1115   WBC 5.86 5.46 6.24   HGB 6.5* 8.9* 9.1*   HCT 20.8* 26.9* 28.8*    241 249     CMP:   Recent Labs   Lab 04/05/19  1121 04/05/19  2000 04/06/19  0425 04/06/19  1115     --  142 141   K 5.9* 5.5* 4.9 4.5     --  106 104   CO2 27  --  27 28     --  78 84   BUN 48*  --  43* 40*   CREATININE 1.3  --  1.3 1.3   CALCIUM 8.0*  --  8.1* 8.1*   PROT 4.9*  --   --   --    ALBUMIN 2.8*  --   --   --    BILITOT 0.3  --   --   --    ALKPHOS 73  --   --   --    AST 21  --   --   --    ALT 17  --   --   --    ANIONGAP 8  --  9 9   EGFRNONAA 37*  --  37* 37*       Significant Imaging:     Imaging Results    None

## 2019-04-06 NOTE — CONSULTS
[facility]  Cardiology  Consult Note    Patient Name: Jennifer Mathews  MRN: [id number]  Admission Date: 4/5/2019  Hospital Length of Stay: 0 days  Code Status: Full Code   Attending Provider: Clarice Sin MD   Consulting Provider: Adriana Arizmendi PA-C  Primary Care Physician: Desirae Bello MD  Principal Problem:Gastrointestinal hemorrhage    Patient information was obtained from patient, past medical records and ER records.     Inpatient consult to Cardiology  Consult performed by: Adriana Arizmendi PA-C  Consult ordered by: Mery Dhillon NP        Subjective:     Chief Complaint:  Melena     HPI:   Ms. Mathews is an 86 year old female patient whose current medical conditions include chronic systolic CHF (EF=25%), s/p PPM, s/p CRT-D placement, CKD, LEV on CPAP, anemia, s/p bioprosthetic MVR, and PAF (on Coumadin) who presented to [facility] ED yesterday with a chief complaint of rectal bleeding x 1 week. Patient reported maroon colored stool. Associated symptoms included fatigue/malaise. Patient denied any associated chest pain, SOB, lightheadedness, dizziness, palpitations, near syncope, or syncope. Initial workup in ED revealed severe anemia (H/H 6.5/20.8), hyperkalemia (K 5.9), and supra-therapeutic INR (4.1, down from 7.6 three days ago) and patient was subsequently admitted for further evaluation and treatment. Cardiology consulted to assist with management. Patient seen and examined today, resting in bed. She is well known to our service and followed routinely in clinic. Feels well at time of exam. No cardiac complaints. Denies chest pain or SOB. Still reports some mild dark discoloration of her stool this AM. She reports compliance with her medications. Previously admitted in March with similar presentation/GI bleed/anemia. Underwent GI evaluation/EGD which showed LA Grade D candidiasis esophagitis and gastric erosions without bleeding. Biopsy confirmed Santos's esophagus. She  was treated with Diflucan and started on Protonix. Chart reviewed. K has normalized. INR 2.0. H and H improved to 8.9/26.9 post transfusion. Echo 11/18 showed EF of 25%, pulmonary HTN, moderately depressed RV systolic funciton.       Past Medical History:   Diagnosis Date    Acute coronary syndrome     Anemia     Anticoagulated on Coumadin 7/13/2015    Arthritis     Asthma     patient denies    Atrial fibrillation     Basal cell carcinoma 10/2015    left neck    Cardiac arrest     Cardiac resynchronization therapy defibrillator (CRT-D) in place 07/13/2015    Pt denies, states it does not shock me    Chronic combined systolic and diastolic congestive heart failure     CKD (chronic kidney disease) stage 3, GFR 30-59 ml/min     Dyslipidemia 1/30/2014    Hyperlipidemia     Hypertension     Hypothyroidism     Ischemic cardiomyopathy 01/30/2014    Macular hole of left eye     Old    Macular hole of left eye     LEV (obstructive sleep apnea) 9/30/2013    Pneumonia     required hospitalization    Recurrent UTI     Refractive error     Spastic colon     Stroke        Past Surgical History:   Procedure Laterality Date    APPENDECTOMY      CARDIAC CATHETERIZATION      CARDIAC PACEMAKER PLACEMENT  2014    CARDIAC VALVE SURGERY      CATARACT EXTRACTION      OU    CHOLECYSTECTOMY      COLONOSCOPY      ESOPHAGOGASTRODUODENOSCOPY (EGD) N/A 3/13/2019    Performed by Gh[facility]al [facility]rellana MD at [facility] ENDO    IRRIGATION AND DEBRIDEMENT OF LEFT KNEE Left 9/12/2017    Performed by Juliano Rosenbaum MD at [facility] OR    MITRAL VALVE REPLACEMENT  2014    MITRAL VALVE SURGERY      x3    REPLACEMENT, PACEMAKER GENERATOR/pt has crt-p versus d Left 6/20/2018    Performed by Manny Dunne MD at [facility] CATH LAB    REVISION, SKIN POCKET, FOR CARDIAC PACEMAKER Left 2/26/2014    Performed by Manny Dunne MD at [facility] CATH LAB       Review of patient's allergies indicates:   Allergen Reactions    Cephalosporins  "Hives    Metaxalone Itching    Penicillins      Other reaction(s): Unknown      Pregabalin      Other reaction(s): "Bad feeling"      Sulfa (sulfonamide antibiotics) Itching       No current facility-administered medications on file prior to encounter.      Current Outpatient Medications on File Prior to Encounter   Medication Sig    allopurinol (ZYLOPRIM) 100 MG tablet TAKE 2 TABLETS (200 MG TOTAL) BY MOUTH ONCE DAILY.    aspirin (ECOTRIN) 81 MG EC tablet Take 81 mg by mouth once daily.    carvedilol (COREG) 25 MG tablet TAKE 1 TABLET BY MOUTH TWICE A DAY WITH MEALS    colchicine (COLCRYS) 0.6 mg tablet Take 1 tablet by mouth 3 times a week    digoxin (LANOXIN) 125 mcg tablet Take 1 tablet (125 mcg total) by mouth once daily. TAKE 1 TABLET (0.125 MG TOTAL) BY MOUTH ONCE DAILY. (Patient taking differently: Take 125 mcg by mouth once daily. )    furosemide (LASIX) 40 MG tablet Take 40 mg by mouth 2 (two) times daily.    gabapentin (NEURONTIN) 100 MG capsule TAKE ONE CAPSULE BY MOUTH TWO TIMES DAILY    levothyroxine (SYNTHROID) 50 MCG tablet Take 1 tablet (50 mcg total) by mouth once daily.    losartan (COZAAR) 25 MG tablet Take 1 tablet (25 mg total) by mouth once daily.    pantoprazole (PROTONIX) 20 MG tablet Take 1 tablet (20 mg total) by mouth once daily.    phytonadione, vitamin K1, (MEPHYTON) 5 mg Tab Take 5 mg by mouth once.    potassium chloride SA (K-DUR,KLOR-CON) 20 MEQ tablet TAKE 1 TABLET (20 MEQ TOTAL) BY MOUTH ONCE DAILY.    warfarin (COUMADIN) 2.5 MG tablet TAKE 1/2 TABLET ON MONDAY AND WEDNESDAY AND 1 TABLET ALL OTHER DAYS. DISCONTINUE 5MG TABLET. (Patient taking differently: TAKE 1 TABLET BY MOUTH ON MONDAY, WEDNESDAY AND FRIDAY; AND 1.5 TABLETS ALL OTHER DAYS.)    acetaminophen (TYLENOL EXTRA STRENGTH) 325 MG tablet Take 2 tablets (650 mg total) by mouth every 8 (eight) hours. (Patient taking differently: Take 650 mg by mouth 3 (three) times daily as needed. )    amiodarone " (PACERONE) 200 MG Tab TAKE 1 TABLET EVERY DAY    metOLazone (ZAROXOLYN) 2.5 MG tablet Take 1 tablet (2.5 mg total) by mouth daily as needed. For SOB or Leg edema    nystatin (MYCOSTATIN) 100,000 unit/mL suspension SWISH   SWALLOW 4 TO 6 MLS FIVE TO SIX TIMES A DAILY    sodium polystyrene (KAYEXALATE) powder Take 15 g by mouth once daily for 1 dose mix with 60 mls of water     Family History     Problem Relation (Age of Onset)    COPD Father    Cancer Brother    Coronary artery disease Mother, Father    Diabetes Brother    Emphysema Father    Epilepsy Mother    Heart attack Mother    Heart disease Father        Tobacco Use    Smoking status: Never Smoker    Smokeless tobacco: Never Used   Substance and Sexual Activity    Alcohol use: No    Drug use: No    Sexual activity: Never     Review of Systems   Constitution: Positive for malaise/fatigue.   HENT: Negative.    Eyes: Negative.    Cardiovascular: Negative.    Respiratory: Negative.    Endocrine: Negative.    Hematologic/Lymphatic: Negative.    Skin: Negative.    Musculoskeletal: Negative.    Gastrointestinal: Negative.    Genitourinary: Negative.    Neurological: Negative.    Psychiatric/Behavioral: Negative.      Objective:     Vital Signs (Most Recent):  Temp: 98.3 °F (36.8 °C) (04/06/19 0737)  Pulse: 70 (04/06/19 0900)  Resp: 18 (04/06/19 0737)  BP: 135/61 (04/06/19 0737)  SpO2: 97 % (04/06/19 0737) Vital Signs (24h Range):  Temp:  [97.6 °F (36.4 °C)-98.7 °F (37.1 °C)] 98.3 °F (36.8 °C)  Pulse:  [69-72] 70  Resp:  [14-20] 18  SpO2:  [94 %-98 %] 97 %  BP: (115-155)/(55-67) 135/61     Weight: 49.4 kg (109 lb)  Body mass index is 19.94 kg/m².    SpO2: 97 %  O2 Device (Oxygen Therapy): room air      Intake/Output Summary (Last 24 hours) at 4/6/2019 1048  Last data filed at 4/6/2019 0408  Gross per 24 hour   Intake 1035.88 ml   Output --   Net 1035.88 ml       Lines/Drains/Airways     Peripheral Intravenous Line                 Peripheral IV - Single Lumen  04/05/19 2015 Left;Posterior Hand less than 1 day                Physical Exam   Constitutional: She is oriented to person, place, and time. She appears well-developed and well-nourished. No distress.   HENT:   Head: Normocephalic and atraumatic.   Eyes: Pupils are equal, round, and reactive to light. Right eye exhibits no discharge. Left eye exhibits no discharge.   Neck: Neck supple. JVD present.   Cardiovascular: Normal rate, regular rhythm, S1 normal and S2 normal.   Murmur heard.  High-pitched blowing holosystolic murmur is present at the apex.  Pulmonary/Chest: Effort normal and breath sounds normal. No respiratory distress. She has no wheezes. She has no rales.   PPM/CRT sites well-healed   Abdominal: Soft. She exhibits no distension.   Musculoskeletal: She exhibits no edema.   Neurological: She is alert and oriented to person, place, and time.   Skin: Skin is warm and dry. She is not diaphoretic. No erythema.   CVI BLE     Psychiatric: She has a normal mood and affect. Her behavior is normal. Thought content normal.   Nursing note and vitals reviewed.      Significant Labs:   CMP   Recent Labs   Lab 04/05/19  1121 04/05/19 2000 04/06/19  0425     --  142   K 5.9* 5.5* 4.9     --  106   CO2 27  --  27     --  78   BUN 48*  --  43*   CREATININE 1.3  --  1.3   CALCIUM 8.0*  --  8.1*   PROT 4.9*  --   --    ALBUMIN 2.8*  --   --    BILITOT 0.3  --   --    ALKPHOS 73  --   --    AST 21  --   --    ALT 17  --   --    ANIONGAP 8  --  9   ESTGFRAFRICA 43*  --  43*   EGFRNONAA 37*  --  37*   , CBC   Recent Labs   Lab 04/05/19  1410 04/06/19  0425   WBC 5.86 5.46   HGB 6.5* 8.9*   HCT 20.8* 26.9*    241   , Troponin No results for input(s): TROPONINI in the last 48 hours. and All pertinent lab results from the last 24 hours have been reviewed.    Significant Imaging: Echocardiogram:   2D echo with color flow doppler:   Results for orders placed or performed during the hospital encounter of  11/19/18   2D echo with color flow doppler   Result Value Ref Range    QEF 20 (A) 55 - 65    Est. PA Systolic Pressure 69.17 (A)     Tricuspid Valve Regurgitation MODERATE (A)    , EKG: Reviewed and X-Ray: CXR: X-Ray Chest 1 View (CXR): No results found for this visit on 04/05/19. and X-Ray Chest PA and Lateral (CXR): No results found for this visit on 04/05/19.    Assessment and Plan:   Patient who presents with GI bleed/anemia in setting of supra-therapeutic INR. H and H improved s/p transfusion. INR 2.0, resume Coumadin and monitor overnight. Continue IV Protonix.    * Gastrointestinal hemorrhage  -Presents with GI bleed/anemia in setting of supra-therapeutic INR  -Recent GI evaluation/EGD reviewed  -Continue IV protonix  -Monitor H and H overnight  -Resume Coumadin    Coumadin Toxicity/ Supratherapeutic INR  -INR initially supra-therapeutic  -Repeat 2.0 this AM  -Re-start Coumadin and monitor H and H overnight; pharmacy to dose    Chronic renal failure, stage 4 (severe)  -Creatinine stable at 1.3  -Plan to resume ARB tmw    A-fib  -History of PAF  -Remains in SR  -Continue Coreg 25 mg BID  -Amiodarone held due to supra-therapeutic INR (has not been taking)  -INR this AM 2.0, resume Coumadin per MD's rec's and monitor H and H overnight    S/P MVR (mitral valve replacement)  -Stable    Chronic combined systolic and diastolic congestive heart failure  -Clinically compensated  -Continue home medications-Coreg, Lasix, digoxin  -Plan to re-start ARB tmw given hyperkalemia upon admission        VTE Risk Mitigation (From admission, onward)        Ordered     IP VTE HIGH RISK PATIENT  Once      04/05/19 1620     Place sequential compression device  Until discontinued      04/05/19 1620     Place sequential compression device  Until discontinued      04/05/19 1620          Thank you for your consult. I will follow-up with patient. Please contact us if you have any additional questions.    Adriana Arizmendi,  BATOOL  Cardiology   [facility]    Chart reviewed. Dr. Duke examined patient and agrees with plan as outlined above.

## 2019-04-06 NOTE — ASSESSMENT & PLAN NOTE
- KCL 5.9  - Given 10 units of Insulin and 1 amp of D50 in the ER  - Repeat KCL level at 2000  - Monitor and correct as needed    4/6  K 4.5 today   Monitor

## 2019-04-06 NOTE — CONSULTS
Food & Nutrition  Education     Diet Education: Food and Drug interaction education -Vitamin K and Coumadin   Time Spent: 15 minutes  Learners:Patient      Nutrition Education provided with handouts: Yes - Vitamin K and Medications from the Nutrition Care Manual      Comments:  Nutrition services was consulted for food and drug interaction education. Patient was educated and provided with a handout on Vitamin K and Coumadin information was provided to patient. Pt verbalized understanding and the importance of diet compliance and making lifestyle changes. All questions and concerns answered. Dietitian's contact information provided for further questions or concerns. Pt reported she had a good appetite and eating 100% of meals PTA. Pt reported no recent wt loss. NFPE not performed d/t pt w/ no signs of malnutrition      Please Re-consult as needed           Thanks!   Camille Mantilla RD,LDN

## 2019-04-06 NOTE — ASSESSMENT & PLAN NOTE
- Currently in a paced rhythm  - Followed outpatient by LITTLE Alston and Dr. Dunne  - Patient's home Amiodarone is currently on hold, continue to hold  - Hold Coumadin given above  - Cardiology consult in AM to determine POC moving forward given recurrent GI bleeds on Coumadin  - Tele monitoring    4/6  Cardiology following   Continue Dig and B blocker   Restart Coumadin

## 2019-04-06 NOTE — PLAN OF CARE
04/06/19 1117   YI Message   Medicare Outpatient and Observation Notification regarding financial responsibility Given to patient/caregiver;Explained to patient/caregiver;Signed/date by patient/caregiver   Date YI was signed 04/06/19   Time YI was signed 1036

## 2019-04-06 NOTE — PLAN OF CARE
POC reviewed, verbalized understanding. Pt remained free from falls, fall precautions in place. Pt is AV paced on monitor. VSS. No other c/o at this time. PIV intact. Call bell and personal belongings within reach. Hourly rounding complete. Reminded to call for assistance. Will continue to monitor.

## 2019-04-06 NOTE — PROGRESS NOTES
"Jennifer Mathews 's Coumadin will be dosed and monitored by Pharmacy at the request of Dr. Duke/Pooja Arizmendi  Indication: Afib  Target INR is 2-3  Coumadin Monitoring INR   Date Value Ref Range Status   03/23/2011 2.1 (H) 0.8 - 1.2 Final     Comment:     ACCP Guidelline for Coumadin usage recommends a "target range" of  2.0 - 3.0 for INR for all indicators except mechanical heart valves  and antiphospholipid syndromes which should use 2.5 - 3.5.  .     INR   Date Value Ref Range Status   04/06/2019 2.0 (H) 0.8 - 1.2 Final     Comment:     Coumadin Therapy:  2.0 - 3.0 for INR for all indicators except mechanical heart valves  and antiphospholipid syndromes which should use 2.5 - 3.5.     04/05/2019 4.1  Final     Patient will be initiated  on a dose of 2.5 mg per day.  PT/INR will be monitored daily. Dose adjustments will be made accordingly.      Thank you for allowing us to participate in this patient's care.     Florence Rawls [facility] 4/6/2019 3:40 PM    "

## 2019-04-06 NOTE — HPI
Ms. Mathews is an 86 year old female patient whose current medical conditions include chronic systolic CHF (EF=25%), s/p PPM, s/p CRT-D placement, CKD, LEV on CPAP, anemia, s/p bioprosthetic MVR, and PAF (on Coumadin) who presented to [facility] ED yesterday with a chief complaint of rectal bleeding x 1 week. Patient reported maroon colored stool. Associated symptoms included fatigue/malaise. Patient denied any associated chest pain, SOB, lightheadedness, dizziness, palpitations, near syncope, or syncope. Initial workup in ED revealed severe anemia (H/H 6.5/20.8), hyperkalemia (K 5.9), and supra-therapeutic INR (4.1, down from 7.6 three days ago) and patient was subsequently admitted for further evaluation and treatment. Cardiology consulted to assist with management. Patient seen and examined today, resting in bed. She is well known to our service and followed routinely in clinic. Feels well at time of exam. No cardiac complaints. Denies chest pain or SOB. Still reports some mild dark discoloration of her stool this AM. She reports compliance with her medications. Previously admitted in March with similar presentation/GI bleed/anemia. Underwent GI evaluation/EGD which showed LA Grade D candidiasis esophagitis and gastric erosions without bleeding. Biopsy confirmed Santos's esophagus. She was treated with Diflucan and started on Protonix. Chart reviewed. K has normalized. INR 2.0. H and H improved to 8.9/26.9 post transfusion. Echo 11/18 showed EF of 25%, pulmonary HTN, moderately depressed RV systolic funciton.

## 2019-04-06 NOTE — PROGRESS NOTES
"[facility] Hospital Medicine  Progress Note    Patient Name: Jennifer Mathews  MRN: [id number]  Patient Class: IP- Inpatient   Admission Date: 4/5/2019  Length of Stay: 0 days  Attending Physician: Clarice Sin MD  Primary Care Provider: Desirae Bello MD        Subjective:     Principal Problem:Gastrointestinal hemorrhage    HPI:  Jennifer Mathews is a 86 y.o. female patient with a PMHx of ACS, Cardiac Arrest, Systolic CHF with an EF of 20%, Ischemic cardiomyopathy s/p pacemaker placement, multiple MVRs with a bioprosthesis valve, CVA with no residual, Anemia, paroxysmal Afib on OAC with Coumadin, CKD, HLD,  recently diagnosed Santos's esophagus, and HTN who presented to the Emergency Department today for evaluation of rectal bleeding which onset gradually one week ago.  She states that the blood is mixed between " a maroon color and bright red, and is present in stool as well".  Symptoms are constant and moderate in severity.  No mitigating or exacerbating factors reported.  Associated sxs include generalized weakness.  She was recently discharged from here on 3/14 with a similar presentation.  During this admission, patient underwent an EGD on 3/13 per Dr. Orellana that showed LA Grade D candidiasis esophagitis that was biopsied, and gastric erosions without bleeding.  She was treated with Diflucan and also recently started on PPI for Santos's esophagus, confirmed on biopsy.  Patient denies any lightheadedness, numbness, focal weakness, dizziness, syncope, abd pain, n/v/d, fever, chills, and all other sxs at this time.  No further complaints or concerns at this time.  ER workup showed: Stable VS.  Hgb 6.5, decreased from 8.3 on 3/29.  INR 4.1, decreased from 7.6 on 4/3, for which she was given 5mg of oral Vitamin K.  KCL 5.9, for which she was given 10 units of IV insulin and 1 amp of D50.  Cr stable at baseline.  Type and match completed and patient will be transfused with 2 units of " "PRBCs.  Also started on Protonix IVP BID.  Case d/w Dr. Arizmendi with GI who recommended holding Coumadin with no indication for GI consult at this time given recent workup with bleeding likely a result of elevated INR.  Per patient, "I have not been in Afib for about 6 months and Dr. Dunne mentioned I may be able to stop Coumadin since I'm having so many issues with it".  Will need to d/w Cardiology to determine POC moving forward.  Hospital Medicine called for admit.  She will be placed in OBS.  She is a Full Code.  Her SDM is her daughter Eri who can be reached at [phone].        Hospital Course:  Ms Mathews is a 86 year old female with complaints of rectal bleeding. INR of 7.6 on admission, coumadin was placed on hold, INR trended downward and today INR 2.0. Case was discussed with GI who recommended holding Coumadin with no indication for GI consult at this time given recent workup with bleeding likely a result of elevated INR. Cardiology evaluated patient today, coumadin resumed and monitor H & H overnight. Patient currently reports, mild bleeding last night, however no current rectal bleeding. H & H currently stable. Will monitor. Will consult GI if bleeding continues with corrected INR.         Interval History: Patient seen and examined. INR 2.0 today, Cardiology recommend restarting Coumadin and monitor overnight.     Review of Systems   Constitutional: Positive for activity change and fatigue. Negative for chills and fever.   HENT: Negative for congestion, rhinorrhea and sinus pressure.    Respiratory: Negative for apnea, cough, choking, chest tightness, shortness of breath, wheezing and stridor.    Cardiovascular: Negative for chest pain, palpitations and leg swelling.   Gastrointestinal: Negative for abdominal distention, abdominal pain, diarrhea, nausea and vomiting.   Endocrine: Negative for cold intolerance and heat intolerance.   Genitourinary: Negative for difficulty urinating and " hematuria.   Musculoskeletal: Negative for arthralgias and joint swelling.   Skin: Negative for color change, pallor and rash.   Neurological: Positive for weakness. Negative for dizziness, seizures, numbness and headaches.   Psychiatric/Behavioral: Negative for agitation. The patient is not nervous/anxious.      Objective:     Vital Signs (Most Recent):  Temp: 98.3 °F (36.8 °C) (04/06/19 1542)  Pulse: 70 (04/06/19 1542)  Resp: 16 (04/06/19 1118)  BP: 130/60 (04/06/19 1542)  SpO2: 95 % (04/06/19 1542) Vital Signs (24h Range):  Temp:  [97.6 °F (36.4 °C)-98.7 °F (37.1 °C)] 98.3 °F (36.8 °C)  Pulse:  [69-72] 70  Resp:  [14-20] 16  SpO2:  [94 %-98 %] 95 %  BP: (117-155)/(55-67) 130/60     Weight: 49.4 kg (109 lb)  Body mass index is 19.94 kg/m².    Intake/Output Summary (Last 24 hours) at 4/6/2019 1606  Last data filed at 4/6/2019 0830  Gross per 24 hour   Intake 1635.88 ml   Output --   Net 1635.88 ml      Physical Exam   Constitutional: She is oriented to person, place, and time. No distress.   Eyes: Right eye exhibits no discharge. Left eye exhibits no discharge.   Neck: No JVD present.   Cardiovascular: Exam reveals no gallop and no friction rub.   No murmur heard.  Pulmonary/Chest: No stridor. No respiratory distress. She has no wheezes. She has no rales.   Abdominal: She exhibits no distension and no mass. There is no tenderness. There is no rebound and no guarding.   Musculoskeletal: She exhibits no edema or deformity.   Neurological: She is alert and oriented to person, place, and time.   Skin: Skin is warm and dry. Capillary refill takes less than 2 seconds. She is not diaphoretic.   Nursing note and vitals reviewed.      Significant Labs:   CBC:   Recent Labs   Lab 04/05/19  1410 04/06/19  0425 04/06/19  1115   WBC 5.86 5.46 6.24   HGB 6.5* 8.9* 9.1*   HCT 20.8* 26.9* 28.8*    241 249     CMP:   Recent Labs   Lab 04/05/19  1121 04/05/19 2000 04/06/19  0425 04/06/19  1115     --  142 141   K 5.9*  "5.5* 4.9 4.5     --  106 104   CO2 27  --  27 28     --  78 84   BUN 48*  --  43* 40*   CREATININE 1.3  --  1.3 1.3   CALCIUM 8.0*  --  8.1* 8.1*   PROT 4.9*  --   --   --    ALBUMIN 2.8*  --   --   --    BILITOT 0.3  --   --   --    ALKPHOS 73  --   --   --    AST 21  --   --   --    ALT 17  --   --   --    ANIONGAP 8  --  9 9   EGFRNONAA 37*  --  37* 37*     Cardiac Markers: No results for input(s): CKMB, MYOGLOBIN, BNP, TROPISTAT in the last 48 hours.    Significant Imaging:     Imaging Results    None       Assessment/Plan:      * Gastrointestinal hemorrhage  - Place in OBS  - Hgb 6.5, decreased from 8.3 on 3/29  - Recently here with similar presentation; s/p EGD on 3/13 per Dr. Orellana that showed LA Grade D candidiasis esophagitis that was biopsied, and gastric erosions without bleeding.  She was treated with Diflucan and also recently started on PPI for Santos's esophagus, confirmed on biopsy.    - INR elevated to 4.1 for which she was given Vitamin K po in the ER   - Hold Coumadin and ASA  - Transfuse 2 units PRBCs  - Started on Protonix 40 mg IVP BID  - CL diet  - Case d/w Dr. Arizmendi with GI who recommended holding Coumadin with no indication for GI consult at this time given recent workup with bleeding likely a result of elevated INR.  - Per patient, "I have not been in Afib for about 6 months and Dr. Dunne mentioned I may be able to stop Coumadin since I'm having so many issues with it".  Will need to d/w Cardiology to determine POC moving forward.  - Recheck Hgb/Hct after blood is completed  - Transfuse to keep Hgb >7.0  - GI consult pending clinical course or if bleeding persists after INR is corrected  - Currently hemodynamically stable  - Tele monitoring    4/6  GI bleeding currently stable   INR 2.0 today   Restart Coumarin  Daily INR      Coumadin Toxicity/ Supratherapeutic INR  - Recurrent issue  - INR elevated to 4.1 for which she was given 5mg of Vitamin K po  - Hold Coumadin  - " "Per patient, "I have not been in Afib for about 6 months and Dr. Dunne mentioned I may be able to stop Coumadin since I'm having so many issues with it".    - Cardiology consult in AM to determine POC moving forward given recurrent GI bleeds and supratherapeutic INR  - Daily INR  - See plan as per above     4/6  Admit with INR 7.6  INR trend down to 2.0 today   Resume Coumadin     Hyperkalemia  - KCL 5.9  - Given 10 units of Insulin and 1 amp of D50 in the ER  - Repeat KCL level at 2000  - Monitor and correct as needed    4/6  K 4.5 today   Monitor     A-fib  - Currently in a paced rhythm  - Followed outpatient by LITTLE Alston and Dr. Dunne  - Patient's home Amiodarone is currently on hold, continue to hold  - Hold Coumadin given above  - Cardiology consult in AM to determine POC moving forward given recurrent GI bleeds on Coumadin  - Tele monitoring    4/6  Cardiology following   Continue Dig and B blocker   Restart Coumadin     Chronic combined systolic and diastolic congestive heart failure  - Currently appears compensated  - ECHO in November of 2018 showed severely depressed left ventricular systolic function (EF 20-25%), Moderately depressed right ventricular systolic function  - Followed outpatient by LITTLE Alston  - Continue home Coreg, Lasix, and Digoxin  - 1500 cc FR  - Daily weights  - Strict I and O  - Tele monitoring      Santos's esophagus  - Diagnosed on recent EGD and started on Protonix 20 mg po daily  - Protonix 40 mg IVP BID given #1  - Resume home dose at DC  - Outpatient f/u with GI as directed for monitoring      Chronic renal failure, stage 4 (severe)  - Cr stable at 1.8  - Daily BMP  - Avoid nephrotoxic agents      S/P MVR (mitral valve replacement)  - with a bioprosthesis valve  - Outpatient f/u with cardiology    4/6  Cardiology following   Restart Coumadin       Acquired hypothyroidism  - TSH elevated to 6.3 in March  - Patient is unsure if Synthroid dose has recently changed  - " Increase Synthroid from 50 mcg to 75 mcg  - Recommend repeat TSH with PCP in 4-6 weeks for monitoring and dose adjustments      Pulmonary hypertension due to left heart disease  - ECHO in November of 2018 showed estimated PA systolic pressure of 69  - Outpatient f/u with cardiology      LEV (obstructive sleep apnea)  - Cpap q hs        VTE Risk Mitigation (From admission, onward)        Ordered     warfarin (COUMADIN) tablet 2.5 mg  Daily      04/06/19 1544     IP VTE HIGH RISK PATIENT  Once      04/05/19 1620     Place sequential compression device  Until discontinued      04/05/19 1620     Place sequential compression device  Until discontinued      04/05/19 1620              Toby Davis NP  Department of Hospital Medicine   [facility]

## 2019-04-06 NOTE — ASSESSMENT & PLAN NOTE
- with a bioprosthesis valve  - Outpatient f/u with cardiology    4/6  Cardiology following   Restart Coumadin

## 2019-04-06 NOTE — ASSESSMENT & PLAN NOTE
-INR initially supra-therapeutic  -Repeat 2.0 this AM  -Re-start Coumadin and monitor H and H overnight; pharmacy to dose

## 2019-04-06 NOTE — PROGRESS NOTES
"[facility] Hospital Medicine  Progress Note    Patient Name: Jennifer Mathews  MRN: [id number]  Patient Class: IP- Inpatient   Admission Date: 4/5/2019  Length of Stay: 0 days  Attending Physician: Clarice Sin MD  Primary Care Provider: Desirae Bello MD        Subjective:     Principal Problem:Gastrointestinal hemorrhage    HPI:  Jennifer Mathews is a 86 y.o. female patient with a PMHx of ACS, Cardiac Arrest, Systolic CHF with an EF of 20%, Ischemic cardiomyopathy s/p pacemaker placement, multiple MVRs with a bioprosthesis valve, CVA with no residual, Anemia, paroxysmal Afib on OAC with Coumadin, CKD, HLD,  recently diagnosed Santos's esophagus, and HTN who presented to the Emergency Department today for evaluation of rectal bleeding which onset gradually one week ago.  She states that the blood is mixed between " a maroon color and bright red, and is present in stool as well".  Symptoms are constant and moderate in severity.  No mitigating or exacerbating factors reported.  Associated sxs include generalized weakness.  She was recently discharged from here on 3/14 with a similar presentation.  During this admission, patient underwent an EGD on 3/13 per Dr. Orellana that showed LA Grade D candidiasis esophagitis that was biopsied, and gastric erosions without bleeding.  She was treated with Diflucan and also recently started on PPI for Santos's esophagus, confirmed on biopsy.  Patient denies any lightheadedness, numbness, focal weakness, dizziness, syncope, abd pain, n/v/d, fever, chills, and all other sxs at this time.  No further complaints or concerns at this time.  ER workup showed: Stable VS.  Hgb 6.5, decreased from 8.3 on 3/29.  INR 4.1, decreased from 7.6 on 4/3, for which she was given 5mg of oral Vitamin K.  KCL 5.9, for which she was given 10 units of IV insulin and 1 amp of D50.  Cr stable at baseline.  Type and match completed and patient will be transfused with 2 units of " "PRBCs.  Also started on Protonix IVP BID.  Case d/w Dr. Arizmendi with GI who recommended holding Coumadin with no indication for GI consult at this time given recent workup with bleeding likely a result of elevated INR.  Per patient, "I have not been in Afib for about 6 months and Dr. Dunne mentioned I may be able to stop Coumadin since I'm having so many issues with it".  Will need to d/w Cardiology to determine POC moving forward.  Hospital Medicine called for admit.  She will be placed in OBS.  She is a Full Code.  Her SDM is her daughter Eri who can be reached at [phone].        Hospital Course:  Ms Mathews is a 86 year old female with complaints of rectal bleeding. INR of 7.6 on admission, coumadin was placed on hold, INR trended downward and today INR 2.0. Case was discussed with GI who recommended holding Coumadin with no indication for GI consult at this time given recent workup with bleeding likely a result of elevated INR. Cardiology evaluated patient today, coumadin resumed and monitor H & H overnight. Patient currently reports, mild bleeding last night, however no current rectal bleeding. H & H currently stable. Will monitor. Will consult GI if bleeding continues with corrected INR.         Interval History: Patient seen and examined today. No current GI bleeding noted. Cardiology recommend restarting today and monitor for bleeding overnight.     Review of Systems   Constitutional: Positive for fatigue. Negative for chills and fever.   HENT: Negative for congestion, rhinorrhea and sinus pressure.    Respiratory: Negative for apnea, cough, choking, chest tightness, shortness of breath, wheezing and stridor.    Cardiovascular: Negative for chest pain, palpitations and leg swelling.   Gastrointestinal: Negative for abdominal distention, abdominal pain, diarrhea, nausea and vomiting.   Endocrine: Negative for cold intolerance and heat intolerance.   Genitourinary: Negative for difficulty urinating " and hematuria.   Musculoskeletal: Negative for arthralgias and joint swelling.   Skin: Negative for color change, pallor and rash.   Neurological: Positive for weakness. Negative for dizziness, seizures, numbness and headaches.   Psychiatric/Behavioral: Negative for agitation. The patient is not nervous/anxious.      Objective:     Vital Signs (Most Recent):  Temp: 98.3 °F (36.8 °C) (04/06/19 1542)  Pulse: 70 (04/06/19 1542)  Resp: 16 (04/06/19 1118)  BP: 130/60 (04/06/19 1542)  SpO2: 95 % (04/06/19 1542) Vital Signs (24h Range):  Temp:  [97.6 °F (36.4 °C)-98.7 °F (37.1 °C)] 98.3 °F (36.8 °C)  Pulse:  [69-72] 70  Resp:  [14-20] 16  SpO2:  [94 %-98 %] 95 %  BP: (117-155)/(55-67) 130/60     Weight: 49.4 kg (109 lb)  Body mass index is 19.94 kg/m².    Intake/Output Summary (Last 24 hours) at 4/6/2019 1624  Last data filed at 4/6/2019 0830  Gross per 24 hour   Intake 1635.88 ml   Output --   Net 1635.88 ml      Physical Exam   Constitutional: No distress.   Eyes: Right eye exhibits no discharge. Left eye exhibits no discharge.   Neck: No JVD present.   Cardiovascular: Exam reveals no gallop and no friction rub.   No murmur heard.  Pulmonary/Chest: No stridor. No respiratory distress. She has no wheezes. She has no rales. She exhibits no tenderness.   Abdominal: She exhibits no distension and no mass. There is no tenderness. There is no rebound and no guarding. No hernia.   Musculoskeletal: She exhibits no edema or deformity.   Neurological: She is alert.   Skin: Skin is warm and dry. Capillary refill takes less than 2 seconds. She is not diaphoretic.   Nursing note and vitals reviewed.      Significant Labs:   CBC:   Recent Labs   Lab 04/05/19  1410 04/06/19  0425 04/06/19  1115   WBC 5.86 5.46 6.24   HGB 6.5* 8.9* 9.1*   HCT 20.8* 26.9* 28.8*    241 249     CMP:   Recent Labs   Lab 04/05/19  1121 04/05/19 2000 04/06/19  0425 04/06/19  1115     --  142 141   K 5.9* 5.5* 4.9 4.5     --  106 104   CO2  "27  --  27 28     --  78 84   BUN 48*  --  43* 40*   CREATININE 1.3  --  1.3 1.3   CALCIUM 8.0*  --  8.1* 8.1*   PROT 4.9*  --   --   --    ALBUMIN 2.8*  --   --   --    BILITOT 0.3  --   --   --    ALKPHOS 73  --   --   --    AST 21  --   --   --    ALT 17  --   --   --    ANIONGAP 8  --  9 9   EGFRNONAA 37*  --  37* 37*     Coagulation:   Recent Labs   Lab 04/06/19  0425   INR 2.0*       Significant Imaging:     Imaging Results    None       Assessment/Plan:      * Gastrointestinal hemorrhage  - Place in OBS  - Hgb 6.5, decreased from 8.3 on 3/29  - Recently here with similar presentation; s/p EGD on 3/13 per Dr. Orellana that showed LA Grade D candidiasis esophagitis that was biopsied, and gastric erosions without bleeding.  She was treated with Diflucan and also recently started on PPI for Santos's esophagus, confirmed on biopsy.    - INR elevated to 4.1 for which she was given Vitamin K po in the ER   - Hold Coumadin and ASA  - Transfuse 2 units PRBCs  - Started on Protonix 40 mg IVP BID  - CL diet  - Case d/w Dr. Arizmendi with GI who recommended holding Coumadin with no indication for GI consult at this time given recent workup with bleeding likely a result of elevated INR.  - Per patient, "I have not been in Afib for about 6 months and Dr. Dunne mentioned I may be able to stop Coumadin since I'm having so many issues with it".  Will need to d/w Cardiology to determine POC moving forward.  - Recheck Hgb/Hct after blood is completed  - Transfuse to keep Hgb >7.0  - GI consult pending clinical course or if bleeding persists after INR is corrected  - Currently hemodynamically stable  - Tele monitoring    4/6  GI bleeding currently stable   INR 2.0 today   Restart Coumarin  Daily INR      Coumadin Toxicity/ Supratherapeutic INR  - Recurrent issue  - INR elevated to 4.1 for which she was given 5mg of Vitamin K po  - Hold Coumadin  - Per patient, "I have not been in Afib for about 6 months and Dr. Dunne " "mentioned I may be able to stop Coumadin since I'm having so many issues with it".    - Cardiology consult in AM to determine POC moving forward given recurrent GI bleeds and supratherapeutic INR  - Daily INR  - See plan as per above     4/6  Admit with INR 7.6  INR trend down to 2.0 today   Resume Coumadin     Hyperkalemia  - KCL 5.9  - Given 10 units of Insulin and 1 amp of D50 in the ER  - Repeat KCL level at 2000  - Monitor and correct as needed    4/6  K 4.5 today   Monitor     A-fib  - Currently in a paced rhythm  - Followed outpatient by LITTLE Alston and Dr. Dunne  - Patient's home Amiodarone is currently on hold, continue to hold  - Hold Coumadin given above  - Cardiology consult in AM to determine POC moving forward given recurrent GI bleeds on Coumadin  - Tele monitoring    4/6  Cardiology following   Continue Dig and B blocker   Restart Coumadin     Chronic combined systolic and diastolic congestive heart failure  - Currently appears compensated  - ECHO in November of 2018 showed severely depressed left ventricular systolic function (EF 20-25%), Moderately depressed right ventricular systolic function  - Followed outpatient by LITTLE Alston  - Continue home Coreg, Lasix, and Digoxin  - 1500 cc FR  - Daily weights  - Strict I and O  - Tele monitoring      Santos's esophagus  - Diagnosed on recent EGD and started on Protonix 20 mg po daily  - Protonix 40 mg IVP BID given #1  - Resume home dose at DC  - Outpatient f/u with GI as directed for monitoring      Chronic renal failure, stage 4 (severe)  - Cr stable at 1.8  - Daily BMP  - Avoid nephrotoxic agents      S/P MVR (mitral valve replacement)  - with a bioprosthesis valve  - Outpatient f/u with cardiology    4/6  Cardiology following   Restart Coumadin       Acquired hypothyroidism  - TSH elevated to 6.3 in March  - Patient is unsure if Synthroid dose has recently changed  - Increase Synthroid from 50 mcg to 75 mcg  - Recommend repeat TSH with PCP in " 4-6 weeks for monitoring and dose adjustments      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        Pulmonary hypertension due to left heart disease  - ECHO in November of 2018 showed estimated PA systolic pressure of 69  - Outpatient f/u with cardiology      LEV (obstructive sleep apnea)  - Cpap q hs        VTE Risk Mitigation (From admission, onward)        Ordered     warfarin (COUMADIN) tablet 2.5 mg  Daily      04/06/19 1544     IP VTE HIGH RISK PATIENT  Once      04/05/19 1620     Place sequential compression device  Until discontinued      04/05/19 1620     Place sequential compression device  Until discontinued      04/05/19 1620              Toby Davis NP  Department of Hospital Medicine   [facility]

## 2019-04-06 NOTE — HOSPITAL COURSE
Ms Mathews is a 86 year old female with complaints of rectal bleeding. INR of 7.6 on admission, coumadin was placed on hold, INR trended downward and today INR 2.0. Case was discussed with GI who recommended holding Coumadin with no indication for GI consult at this time given recent workup with bleeding likely a result of elevated INR. Cardiology evaluated patient this AM, coumadin restart, however will hold due to GI bleeding earlier today. H & H currently stable. Will monitor. Will need to consult GI if bleeding continues with corrected INR.     Pt continued to improved and remains stable, INR down to 1.3 as Coumadin was held yesterday, H/H stable. Dr. Duke considers her a high risk for CVA due to Afib and hence strongly recommends continuing Coumadin at 2.5 mg daily despite the UGI Bleeding sec to Coumadin toxicity. Pt informs me again that Dr. Janna Taylor had interrogated her PPM last week and found that she did not have any Afin in last 6 months and if continues without any afib for another 3 months-- she may come off Coumadin. She will continue the low dose coumadin at 2.5 mg daily and skip every Sunday and she will follow with Janna Johnson in next couple of weeks to discuss coumadin. She is eating drinking well, walking around well, she requests to be discharged home today, Cards has already cleared her, she was seen and examined and deemed stable for discharge home today and continue [facility] and check PT/INR twice a week.

## 2019-04-06 NOTE — ASSESSMENT & PLAN NOTE
- ECHO in November of 2018 showed estimated PA systolic pressure of 69  - Outpatient f/u with cardiology    4/6  Cardiology following

## 2019-04-06 NOTE — PROGRESS NOTES
"[facility] Hospital Medicine  Progress Note    Patient Name: Jennifer Mathews  MRN: [id number]  Patient Class: IP- Inpatient   Admission Date: 4/5/2019  Length of Stay: 0 days  Attending Physician: Clarice Sin MD  Primary Care Provider: Desirae Bello MD        Subjective:     Principal Problem:Gastrointestinal hemorrhage    HPI:  Jennifer Mathews is a 86 y.o. female patient with a PMHx of ACS, Cardiac Arrest, Systolic CHF with an EF of 20%, Ischemic cardiomyopathy s/p pacemaker placement, multiple MVRs with a bioprosthesis valve, CVA with no residual, Anemia, paroxysmal Afib on OAC with Coumadin, CKD, HLD,  recently diagnosed Santos's esophagus, and HTN who presented to the Emergency Department today for evaluation of rectal bleeding which onset gradually one week ago.  She states that the blood is mixed between " a maroon color and bright red, and is present in stool as well".  Symptoms are constant and moderate in severity.  No mitigating or exacerbating factors reported.  Associated sxs include generalized weakness.  She was recently discharged from here on 3/14 with a similar presentation.  During this admission, patient underwent an EGD on 3/13 per Dr. Orellana that showed LA Grade D candidiasis esophagitis that was biopsied, and gastric erosions without bleeding.  She was treated with Diflucan and also recently started on PPI for Santos's esophagus, confirmed on biopsy.  Patient denies any lightheadedness, numbness, focal weakness, dizziness, syncope, abd pain, n/v/d, fever, chills, and all other sxs at this time.  No further complaints or concerns at this time.  ER workup showed: Stable VS.  Hgb 6.5, decreased from 8.3 on 3/29.  INR 4.1, decreased from 7.6 on 4/3, for which she was given 5mg of oral Vitamin K.  KCL 5.9, for which she was given 10 units of IV insulin and 1 amp of D50.  Cr stable at baseline.  Type and match completed and patient will be transfused with 2 units of " "PRBCs.  Also started on Protonix IVP BID.  Case d/w Dr. Arizmendi with GI who recommended holding Coumadin with no indication for GI consult at this time given recent workup with bleeding likely a result of elevated INR.  Per patient, "I have not been in Afib for about 6 months and Dr. Dunne mentioned I may be able to stop Coumadin since I'm having so many issues with it".  Will need to d/w Cardiology to determine POC moving forward.  Hospital Medicine called for admit.  She will be placed in OBS.  She is a Full Code.  Her SDM is her daughter Eri who can be reached at [phone].        Hospital Course:  Ms Mathews is a 86 year old female with complaints of rectal bleeding. INR of 7.6 on admission, coumadin was placed on hold, INR trended downward and today INR 2.0. Case was discussed with GI who recommended holding Coumadin with no indication for GI consult at this time given recent workup with bleeding likely a result of elevated INR. Cardiology evaluated patient this AM, coumadin restart, however will hold due to GI bleeding earlier today. H & H currently stable. Will monitor. Will need to consult GI if bleeding continues with corrected INR.         Interval History: Patient seen and examined today. Will hold Coumadin today due to Rectal bleeding this morning .     Review of Systems   Constitutional: Positive for appetite change and fatigue. Negative for chills and fever.   HENT: Negative for congestion, rhinorrhea and sinus pressure.    Respiratory: Negative for apnea, cough, choking, chest tightness, shortness of breath, wheezing and stridor.    Cardiovascular: Negative for chest pain, palpitations and leg swelling.   Gastrointestinal: Negative for abdominal distention, abdominal pain, diarrhea, nausea and vomiting.   Endocrine: Negative for cold intolerance and heat intolerance.   Genitourinary: Negative for difficulty urinating and hematuria.   Musculoskeletal: Negative for arthralgias and joint " swelling.   Skin: Negative for color change, pallor and rash.   Neurological: Positive for weakness. Negative for dizziness, seizures, numbness and headaches.   Psychiatric/Behavioral: Negative for agitation. The patient is not nervous/anxious.      Objective:     Vital Signs (Most Recent):  Temp: 98.3 °F (36.8 °C) (04/06/19 1542)  Pulse: 70 (04/06/19 1542)  Resp: 16 (04/06/19 1118)  BP: 130/60 (04/06/19 1542)  SpO2: 95 % (04/06/19 1542) Vital Signs (24h Range):  Temp:  [97.6 °F (36.4 °C)-98.7 °F (37.1 °C)] 98.3 °F (36.8 °C)  Pulse:  [69-72] 70  Resp:  [14-20] 16  SpO2:  [94 %-98 %] 95 %  BP: (117-155)/(55-67) 130/60     Weight: 49.4 kg (109 lb)  Body mass index is 19.94 kg/m².    Intake/Output Summary (Last 24 hours) at 4/6/2019 1636  Last data filed at 4/6/2019 0830  Gross per 24 hour   Intake 1635.88 ml   Output --   Net 1635.88 ml      Physical Exam   Constitutional: No distress.   HENT:   Mouth/Throat: No oropharyngeal exudate.   Eyes: Right eye exhibits no discharge. Left eye exhibits no discharge.   Cardiovascular: Exam reveals no gallop and no friction rub.   No murmur heard.  Pulmonary/Chest: No stridor. No respiratory distress. She has no wheezes. She has no rales. She exhibits no tenderness.   Abdominal: She exhibits no distension and no mass. There is no tenderness. There is no rebound and no guarding. No hernia.   Musculoskeletal: She exhibits no edema or deformity.   Neurological: She is alert.   Skin: Skin is warm. Capillary refill takes less than 2 seconds. She is not diaphoretic.   Nursing note and vitals reviewed.      Significant Labs:   CBC:   Recent Labs   Lab 04/05/19  1410 04/06/19  0425 04/06/19  1115   WBC 5.86 5.46 6.24   HGB 6.5* 8.9* 9.1*   HCT 20.8* 26.9* 28.8*    241 249     CMP:   Recent Labs   Lab 04/05/19  1121 04/05/19 2000 04/06/19  0425 04/06/19  1115     --  142 141   K 5.9* 5.5* 4.9 4.5     --  106 104   CO2 27  --  27 28     --  78 84   BUN 48*  --   "43* 40*   CREATININE 1.3  --  1.3 1.3   CALCIUM 8.0*  --  8.1* 8.1*   PROT 4.9*  --   --   --    ALBUMIN 2.8*  --   --   --    BILITOT 0.3  --   --   --    ALKPHOS 73  --   --   --    AST 21  --   --   --    ALT 17  --   --   --    ANIONGAP 8  --  9 9   EGFRNONAA 37*  --  37* 37*       Significant Imaging:     Imaging Results    None       Assessment/Plan:      * Gastrointestinal hemorrhage  - Place in OBS  - Hgb 6.5, decreased from 8.3 on 3/29  - Recently here with similar presentation; s/p EGD on 3/13 per Dr. Orellana that showed LA Grade D candidiasis esophagitis that was biopsied, and gastric erosions without bleeding.  She was treated with Diflucan and also recently started on PPI for Santos's esophagus, confirmed on biopsy.    - INR elevated to 4.1 for which she was given Vitamin K po in the ER   - Hold Coumadin and ASA  - Transfuse 2 units PRBCs  - Started on Protonix 40 mg IVP BID  - CL diet  - Case d/w Dr. Arizmendi with GI who recommended holding Coumadin with no indication for GI consult at this time given recent workup with bleeding likely a result of elevated INR.  - Per patient, "I have not been in Afib for about 6 months and Dr. Dunne mentioned I may be able to stop Coumadin since I'm having so many issues with it".  Will need to d/w Cardiology to determine POC moving forward.  - Recheck Hgb/Hct after blood is completed  - Transfuse to keep Hgb >7.0  - GI consult pending clinical course or if bleeding persists after INR is corrected  - Currently hemodynamically stable  - Tele monitoring    4/6  GI bleeding reoccurred today    INR 2.0 today   Will hold today and monitor   Daily INR      Acute on chronic combined systolic and diastolic congestive heart failure        Coumadin Toxicity/ Supratherapeutic INR  - Recurrent issue  - INR elevated to 4.1 for which she was given 5mg of Vitamin K po  - Hold Coumadin  - Per patient, "I have not been in Afib for about 6 months and Dr. Dunne mentioned I may be " "able to stop Coumadin since I'm having so many issues with it".    - Cardiology consult in AM to determine POC moving forward given recurrent GI bleeds and supratherapeutic INR  - Daily INR  - See plan as per above     4/6  Admit with INR 7.6  INR trend down to 2.0 today   Resume Coumadin     Hyperkalemia  - KCL 5.9  - Given 10 units of Insulin and 1 amp of D50 in the ER  - Repeat KCL level at 2000  - Monitor and correct as needed    4/6  K 4.5 today   Monitor     A-fib  - Currently in a paced rhythm  - Followed outpatient by LITTLE Alston and Dr. Dunne  - Patient's home Amiodarone is currently on hold, continue to hold  - Hold Coumadin given above  - Cardiology consult in AM to determine POC moving forward given recurrent GI bleeds on Coumadin  - Tele monitoring    4/6  Cardiology following   Continue Dig and B blocker   Restart Coumadin     Chronic combined systolic and diastolic congestive heart failure  - Currently appears compensated  - ECHO in November of 2018 showed severely depressed left ventricular systolic function (EF 20-25%), Moderately depressed right ventricular systolic function  - Followed outpatient by [facility] Alston  - Continue home Coreg, Lasix, and Digoxin  - 1500 cc FR  - Daily weights  - Strict I and O  - Tele monitoring      Santos's esophagus  - Diagnosed on recent EGD and started on Protonix 20 mg po daily  - Protonix 40 mg IVP BID given #1  - Resume home dose at DC  - Outpatient f/u with GI as directed for monitoring      Chronic renal failure, stage 4 (severe)  - Cr stable at 1.8  - Daily BMP  - Avoid nephrotoxic agents      S/P MVR (mitral valve replacement)  - with a bioprosthesis valve  - Outpatient f/u with cardiology    4/6  Cardiology following         Acquired hypothyroidism  - TSH elevated to 6.3 in March  - Patient is unsure if Synthroid dose has recently changed  - Increase Synthroid from 50 mcg to 75 mcg  - Recommend repeat TSH with PCP in 4-6 weeks for monitoring and dose " adjustments      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        Pulmonary hypertension due to left heart disease  - ECHO in November of 2018 showed estimated PA systolic pressure of 69  - Outpatient f/u with cardiology    4/6  Cardiology following      LEV (obstructive sleep apnea)  - Cpap q hs        VTE Risk Mitigation (From admission, onward)        Ordered     warfarin (COUMADIN) tablet 2.5 mg  Daily      04/06/19 1544     IP VTE HIGH RISK PATIENT  Once      04/05/19 1620     Place sequential compression device  Until discontinued      04/05/19 1620     Place sequential compression device  Until discontinued      04/05/19 1620              Toby Davis NP  Department of Hospital Medicine   [facility]

## 2019-04-06 NOTE — SUBJECTIVE & OBJECTIVE
"Past Medical History:   Diagnosis Date    Acute coronary syndrome     Anemia     Anticoagulated on Coumadin 7/13/2015    Arthritis     Asthma     patient denies    Atrial fibrillation     Basal cell carcinoma 10/2015    left neck    Cardiac arrest     Cardiac resynchronization therapy defibrillator (CRT-D) in place 07/13/2015    Pt denies, states it does not shock me    Chronic combined systolic and diastolic congestive heart failure     CKD (chronic kidney disease) stage 3, GFR 30-59 ml/min     Dyslipidemia 1/30/2014    Hyperlipidemia     Hypertension     Hypothyroidism     Ischemic cardiomyopathy 01/30/2014    Macular hole of left eye     Old    Macular hole of left eye     LEV (obstructive sleep apnea) 9/30/2013    Pneumonia     required hospitalization    Recurrent UTI     Refractive error     Spastic colon     Stroke        Past Surgical History:   Procedure Laterality Date    APPENDECTOMY      CARDIAC CATHETERIZATION      CARDIAC PACEMAKER PLACEMENT  2014    CARDIAC VALVE SURGERY      CATARACT EXTRACTION      OU    CHOLECYSTECTOMY      COLONOSCOPY      ESOPHAGOGASTRODUODENOSCOPY (EGD) N/A 3/13/2019    [facility] ENDO    IRRIGATION AND DEBRIDEMENT OF LEFT KNEE Left 9/12/2017    Performed by Jul[facility] Rosenbaum MD at [facility] OR    MITRAL VALVE REPLACEMENT  2014    MITRAL VALVE SURGERY      x3    REPLACEMENT, PACEMAKER GENERATOR/pt has crt-p versus d Left 6/20/2018    Performed by Manny Dunne MD at [facility] CATH LAB    REVISION, SKIN POCKET, FOR CARDIAC PACEMAKER Left 2/26/2014    Performed by M[facility]ne [facility] at [facility] CATH LAB       Review of patient's allergies indicates:   Allergen Reactions    Cephalosporins Hives    Metaxalone Itching    Penicillins      Other reaction(s): Unknown      Pregabalin      Other reaction(s): "Bad feeling"      Sulfa (sulfonamide antibiotics) Itching       No current facility-administered medications on " file prior to encounter.      Current Outpatient Medications on File Prior to Encounter   Medication Sig    allopurinol (ZYLOPRIM) 100 MG tablet TAKE 2 TABLETS (200 MG TOTAL) BY MOUTH ONCE DAILY.    aspirin (ECOTRIN) 81 MG EC tablet Take 81 mg by mouth once daily.    carvedilol (COREG) 25 MG tablet TAKE 1 TABLET BY MOUTH TWICE A DAY WITH MEALS    colchicine (COLCRYS) 0.6 mg tablet Take 1 tablet by mouth 3 times a week    digoxin (LANOXIN) 125 mcg tablet Take 1 tablet (125 mcg total) by mouth once daily. TAKE 1 TABLET (0.125 MG TOTAL) BY MOUTH ONCE DAILY. (Patient taking differently: Take 125 mcg by mouth once daily. )    furosemide (LASIX) 40 MG tablet Take 40 mg by mouth 2 (two) times daily.    gabapentin (NEURONTIN) 100 MG capsule TAKE ONE CAPSULE BY MOUTH TWO TIMES DAILY    levothyroxine (SYNTHROID) 50 MCG tablet Take 1 tablet (50 mcg total) by mouth once daily.    losartan (COZAAR) 25 MG tablet Take 1 tablet (25 mg total) by mouth once daily.    pantoprazole (PROTONIX) 20 MG tablet Take 1 tablet (20 mg total) by mouth once daily.    phytonadione, vitamin K1, (MEPHYTON) 5 mg Tab Take 5 mg by mouth once.    potassium chloride SA (K-DUR,KLOR-CON) 20 MEQ tablet TAKE 1 TABLET (20 MEQ TOTAL) BY MOUTH ONCE DAILY.    warfarin (COUMADIN) 2.5 MG tablet TAKE 1/2 TABLET ON MONDAY AND WEDNESDAY AND 1 TABLET ALL OTHER DAYS. DISCONTINUE 5MG TABLET. (Patient taking differently: TAKE 1 TABLET BY MOUTH ON MONDAY, WEDNESDAY AND FRIDAY; AND 1.5 TABLETS ALL OTHER DAYS.)    acetaminophen (TYLENOL EXTRA STRENGTH) 325 MG tablet Take 2 tablets (650 mg total) by mouth every 8 (eight) hours. (Patient taking differently: Take 650 mg by mouth 3 (three) times daily as needed. )    amiodarone (PACERONE) 200 MG Tab TAKE 1 TABLET EVERY DAY    metOLazone (ZAROXOLYN) 2.5 MG tablet Take 1 tablet (2.5 mg total) by mouth daily as needed. For SOB or Leg edema    nystatin (MYCOSTATIN) 100,000 unit/mL suspension SWISH   SWALLOW 4 TO 6  MLS FIVE TO SIX TIMES A DAILY    sodium polystyrene (KAYEXALATE) powder Take 15 g by mouth once daily for 1 dose mix with 60 mls of water     Family History     Problem Relation (Age of Onset)    COPD Father    Cancer Brother    Coronary artery disease Mother, Father    Diabetes Brother    Emphysema Father    Epilepsy Mother    Heart attack Mother    Heart disease Father        Tobacco Use    Smoking status: Never Smoker    Smokeless tobacco: Never Used   Substance and Sexual Activity    Alcohol use: No    Drug use: No    Sexual activity: Never     Review of Systems   Constitution: Positive for malaise/fatigue.   HENT: Negative.    Eyes: Negative.    Cardiovascular: Negative.    Respiratory: Negative.    Endocrine: Negative.    Hematologic/Lymphatic: Negative.    Skin: Negative.    Musculoskeletal: Negative.    Gastrointestinal: Negative.    Genitourinary: Negative.    Neurological: Negative.    Psychiatric/Behavioral: Negative.      Objective:     Vital Signs (Most Recent):  Temp: 98.3 °F (36.8 °C) (04/06/19 0737)  Pulse: 70 (04/06/19 0900)  Resp: 18 (04/06/19 0737)  BP: 135/61 (04/06/19 0737)  SpO2: 97 % (04/06/19 0737) Vital Signs (24h Range):  Temp:  [97.6 °F (36.4 °C)-98.7 °F (37.1 °C)] 98.3 °F (36.8 °C)  Pulse:  [69-72] 70  Resp:  [14-20] 18  SpO2:  [94 %-98 %] 97 %  BP: (115-155)/(55-67) 135/61     Weight: 49.4 kg (109 lb)  Body mass index is 19.94 kg/m².    SpO2: 97 %  O2 Device (Oxygen Therapy): room air      Intake/Output Summary (Last 24 hours) at 4/6/2019 1048  Last data filed at 4/6/2019 0408  Gross per 24 hour   Intake 1035.88 ml   Output --   Net 1035.88 ml       Lines/Drains/Airways     Peripheral Intravenous Line                 Peripheral IV - Single Lumen 04/05/19 2015 Left;Posterior Hand less than 1 day                Physical Exam   Constitutional: She is oriented to person, place, and time. She appears well-developed and well-nourished. No distress.   HENT:   Head: Normocephalic and  atraumatic.   Eyes: Pupils are equal, round, and reactive to light. Right eye exhibits no discharge. Left eye exhibits no discharge.   Neck: Neck supple. JVD present.   Cardiovascular: Normal rate, regular rhythm, S1 normal and S2 normal.   Murmur heard.  High-pitched blowing holosystolic murmur is present at the apex.  Pulmonary/Chest: Effort normal and breath sounds normal. No respiratory distress. She has no wheezes. She has no rales.   PPM/CRT sites well-healed   Abdominal: Soft. She exhibits no distension.   Musculoskeletal: She exhibits no edema.   Neurological: She is alert and oriented to person, place, and time.   Skin: Skin is warm and dry. She is not diaphoretic. No erythema.   CVI BLE     Psychiatric: She has a normal mood and affect. Her behavior is normal. Thought content normal.   Nursing note and vitals reviewed.      Significant Labs:   CMP   Recent Labs   Lab 04/05/19  1121 04/05/19 2000 04/06/19  0425     --  142   K 5.9* 5.5* 4.9     --  106   CO2 27  --  27     --  78   BUN 48*  --  43*   CREATININE 1.3  --  1.3   CALCIUM 8.0*  --  8.1*   PROT 4.9*  --   --    ALBUMIN 2.8*  --   --    BILITOT 0.3  --   --    ALKPHOS 73  --   --    AST 21  --   --    ALT 17  --   --    ANIONGAP 8  --  9   ESTGFRAFRICA 43*  --  43*   EGFRNONAA 37*  --  37*   , CBC   Recent Labs   Lab 04/05/19  1410 04/06/19  0425   WBC 5.86 5.46   HGB 6.5* 8.9*   HCT 20.8* 26.9*    241   , Troponin No results for input(s): TROPONINI in the last 48 hours. and All pertinent lab results from the last 24 hours have been reviewed.    Significant Imaging: Echocardiogram:   2D echo with color flow doppler:   Results for orders placed or performed during the hospital encounter of 11/19/18   2D echo with color flow doppler   Result Value Ref Range    QEF 20 (A) 55 - 65    Est. PA Systolic Pressure 69.17 (A)     Tricuspid Valve Regurgitation MODERATE (A)    , EKG: Reviewed and X-Ray: CXR: X-Ray Chest 1 View (CXR):  No results found for this visit on 04/05/19. and X-Ray Chest PA and Lateral (CXR): No results found for this visit on 04/05/19.

## 2019-04-06 NOTE — ASSESSMENT & PLAN NOTE
-History of PAF  -Remains in SR  -Continue Coreg 25 mg BID  -Amiodarone held due to supra-therapeutic INR (has not been taking)  -INR this AM 2.0, resume Coumadin per MD's rec's and monitor H and H overnight

## 2019-04-07 VITALS
HEART RATE: 69 BPM | RESPIRATION RATE: 18 BRPM | SYSTOLIC BLOOD PRESSURE: 130 MMHG | BODY MASS INDEX: 20.06 KG/M2 | DIASTOLIC BLOOD PRESSURE: 60 MMHG | WEIGHT: 109 LBS | OXYGEN SATURATION: 94 % | HEIGHT: 62 IN | TEMPERATURE: 98 F

## 2019-04-07 PROBLEM — N18.4 CHRONIC RENAL FAILURE, STAGE 4 (SEVERE): Status: RESOLVED | Noted: 2018-05-02 | Resolved: 2019-04-07

## 2019-04-07 PROBLEM — I50.43 ACUTE ON CHRONIC COMBINED SYSTOLIC AND DIASTOLIC CONGESTIVE HEART FAILURE: Status: RESOLVED | Noted: 2018-11-19 | Resolved: 2019-04-07

## 2019-04-07 PROBLEM — K92.2 GASTROINTESTINAL HEMORRHAGE: Status: RESOLVED | Noted: 2019-04-05 | Resolved: 2019-04-07

## 2019-04-07 PROBLEM — E87.5 HYPERKALEMIA: Status: RESOLVED | Noted: 2018-05-04 | Resolved: 2019-04-07

## 2019-04-07 PROBLEM — R79.1 SUPRATHERAPEUTIC INR: Status: RESOLVED | Noted: 2019-03-10 | Resolved: 2019-04-07

## 2019-04-07 LAB
ANION GAP SERPL CALC-SCNC: 8 MMOL/L (ref 8–16)
BASOPHILS # BLD AUTO: 0.05 K/UL (ref 0–0.2)
BASOPHILS NFR BLD: 0.9 % (ref 0–1.9)
BUN SERPL-MCNC: 32 MG/DL (ref 8–23)
CALCIUM SERPL-MCNC: 8.1 MG/DL (ref 8.7–10.5)
CHLORIDE SERPL-SCNC: 103 MMOL/L (ref 95–110)
CO2 SERPL-SCNC: 30 MMOL/L (ref 23–29)
CREAT SERPL-MCNC: 1.1 MG/DL (ref 0.5–1.4)
DIFFERENTIAL METHOD: ABNORMAL
EOSINOPHIL # BLD AUTO: 0.1 K/UL (ref 0–0.5)
EOSINOPHIL NFR BLD: 1.1 % (ref 0–8)
ERYTHROCYTE [DISTWIDTH] IN BLOOD BY AUTOMATED COUNT: 19.5 % (ref 11.5–14.5)
EST. GFR  (AFRICAN AMERICAN): 53 ML/MIN/1.73 M^2
EST. GFR  (NON AFRICAN AMERICAN): 46 ML/MIN/1.73 M^2
GLUCOSE SERPL-MCNC: 71 MG/DL (ref 70–110)
HCT VFR BLD AUTO: 27.7 % (ref 37–48.5)
HGB BLD-MCNC: 8.7 G/DL (ref 12–16)
INR PPP: 1.3 (ref 0.8–1.2)
INR PPP: 1.3 (ref 0.8–1.2)
LYMPHOCYTES # BLD AUTO: 1.4 K/UL (ref 1–4.8)
LYMPHOCYTES NFR BLD: 26.4 % (ref 18–48)
MCH RBC QN AUTO: 30.7 PG (ref 27–31)
MCHC RBC AUTO-ENTMCNC: 31.4 G/DL (ref 32–36)
MCV RBC AUTO: 98 FL (ref 82–98)
MONOCYTES # BLD AUTO: 0.6 K/UL (ref 0.3–1)
MONOCYTES NFR BLD: 10.3 % (ref 4–15)
NEUTROPHILS # BLD AUTO: 3.3 K/UL (ref 1.8–7.7)
NEUTROPHILS NFR BLD: 61.3 % (ref 38–73)
PLATELET # BLD AUTO: 234 K/UL (ref 150–350)
PMV BLD AUTO: 9.6 FL (ref 9.2–12.9)
POTASSIUM SERPL-SCNC: 3.9 MMOL/L (ref 3.5–5.1)
PROTHROMBIN TIME: 14.1 SEC (ref 9–12.5)
PROTHROMBIN TIME: 14.1 SEC (ref 9–12.5)
RBC # BLD AUTO: 2.83 M/UL (ref 4–5.4)
SODIUM SERPL-SCNC: 141 MMOL/L (ref 136–145)
WBC # BLD AUTO: 5.35 K/UL (ref 3.9–12.7)

## 2019-04-07 PROCEDURE — 85025 COMPLETE CBC W/AUTO DIFF WBC: CPT

## 2019-04-07 PROCEDURE — 36415 COLL VENOUS BLD VENIPUNCTURE: CPT

## 2019-04-07 PROCEDURE — C9113 INJ PANTOPRAZOLE SODIUM, VIA: HCPCS | Performed by: NURSE PRACTITIONER

## 2019-04-07 PROCEDURE — 80048 BASIC METABOLIC PNL TOTAL CA: CPT

## 2019-04-07 PROCEDURE — 25000003 PHARM REV CODE 250: Performed by: EMERGENCY MEDICINE

## 2019-04-07 PROCEDURE — 99233 PR SUBSEQUENT HOSPITAL CARE,LEVL III: ICD-10-PCS | Mod: ,,, | Performed by: INTERNAL MEDICINE

## 2019-04-07 PROCEDURE — 63600175 PHARM REV CODE 636 W HCPCS: Performed by: NURSE PRACTITIONER

## 2019-04-07 PROCEDURE — 99233 SBSQ HOSP IP/OBS HIGH 50: CPT | Mod: ,,, | Performed by: INTERNAL MEDICINE

## 2019-04-07 PROCEDURE — 25000003 PHARM REV CODE 250: Performed by: NURSE PRACTITIONER

## 2019-04-07 PROCEDURE — 85610 PROTHROMBIN TIME: CPT

## 2019-04-07 PROCEDURE — 63600175 PHARM REV CODE 636 W HCPCS: Performed by: EMERGENCY MEDICINE

## 2019-04-07 RX ORDER — PANTOPRAZOLE SODIUM 40 MG/1
40 TABLET, DELAYED RELEASE ORAL DAILY
Qty: 30 TABLET | Refills: 1 | Status: SHIPPED | OUTPATIENT
Start: 2019-04-07 | End: 2019-07-20 | Stop reason: SDUPTHER

## 2019-04-07 RX ORDER — WARFARIN 2.5 MG/1
2.5 TABLET ORAL DAILY
Status: DISCONTINUED | OUTPATIENT
Start: 2019-04-07 | End: 2019-04-07 | Stop reason: HOSPADM

## 2019-04-07 RX ORDER — WARFARIN 2.5 MG/1
2.5 TABLET ORAL DAILY
Qty: 30 TABLET | Refills: 3 | Status: SHIPPED | OUTPATIENT
Start: 2019-04-07 | End: 2019-05-07

## 2019-04-07 RX ADMIN — CARVEDILOL 25 MG: 12.5 TABLET, FILM COATED ORAL at 08:04

## 2019-04-07 RX ADMIN — IRON SUCROSE 100 MG: 20 INJECTION, SOLUTION INTRAVENOUS at 02:04

## 2019-04-07 RX ADMIN — PANTOPRAZOLE SODIUM 40 MG: 40 INJECTION, POWDER, LYOPHILIZED, FOR SOLUTION INTRAVENOUS at 08:04

## 2019-04-07 RX ADMIN — DIGOXIN 125 MCG: 125 TABLET ORAL at 08:04

## 2019-04-07 RX ADMIN — WARFARIN SODIUM 2.5 MG: 2.5 TABLET ORAL at 02:04

## 2019-04-07 RX ADMIN — LEVOTHYROXINE SODIUM 75 MCG: 75 TABLET ORAL at 05:04

## 2019-04-07 RX ADMIN — FUROSEMIDE 40 MG: 40 TABLET ORAL at 08:04

## 2019-04-07 NOTE — PROGRESS NOTES
Instructed to call dr. elliott for follow appt in 2 weeks. No available appt in [place]. Verbalized understanding.

## 2019-04-07 NOTE — PLAN OF CARE
04/07/19 1432   Post-Acute Status   Post-Acute Authorization Home Health/Hospice   Home Health/Hospice Status Set-up Complete

## 2019-04-07 NOTE — PLAN OF CARE
Patient to resume with [facility]        04/07/19 1411   Readmission Questionnaire   At the time of your discharge, did someone talk to you about what your health problems were? Yes   At the time of discharge, did someone talk to you about what to watch out for regarding worsening of your health problem? Yes   What do you believe caused you to be sick enough to be re-admitted? bleeding   How often do you need to have someone help you when you read instructions, pamphlets, or other written material from your doctor or pharmacy? Never   Do you have problems taking your medications as prescribed? No   Do you have any problems affording any of  your prescribed medications? No   Do you have problems obtaining/receiving your medications? No   Does the patient have transportation to healthcare appointments? Yes   Lives With alone   Living Arrangements house   Does the patient have family/friends to help with healtcare needs after discharge? yes   Who are your caregiver(s) and their phone number(s)? Eri Wetumka ( daughter ) [phone]   Does your caregiver provide all the help you need? Yes   Are you currently feeling confused? No

## 2019-04-07 NOTE — PLAN OF CARE
Met with patient and family initial assessment completed. . Patient is independent with adls and iadls.  Patient lives alone , stated that she drives herself to her appointments but that her daughter is very supportive and also provides her transportation when needed. Discussed consult for home health, Patient wishes to resume services with [facility]. Preference letter obtained. Referral faxed via [employer]. Notified Claire on nurse at [facility] of patient's discharge. Patient denies any other  post hospital needs or services at this time.  Patient declines bedside delivery. IMM reviewed and signed. Updated white board with 's name and number. Transitional Care Folder, Discharge Planning Begins on Admission pamphlet, [facility] Bedside Delivery pamphlet, Advance Directive information given to patient along with the contact information given.Instructed patient or family to call with any questions or concerns.    Discussed accessing the [employer] via the [employer] Instant Activation. Patient has active account.        Follow-up Information     Desirae Bello MD. Schedule an appointment as soon as possible for a visit in 1 week.    Specialty:  Family Medicine  Why:  Hospital follow up  Contact information:  [address] [place] [address] [address] [place] [address]  [phone]             Manny Dunne MD In 2 weeks.    Specialties:  Electrophysiology, Cardiology  Why:  Hospital follow up- here in [facility] please for Coumadin Toxicity/ UGI bleed  Contact information:  [address]  [facility] [address]  [phone]             [facility].    Specialty:  Home Health Services  Why:  Home Health  Contact information:  [address]  [place] [address]  [phone]                   [id number] [place], [place] - [address] [address] [facility]  [address] [facility] [address]  Phone: [phone] Fax: [phone]    [facility]  [place]  [id number] [place] [address] [address]  [place] [address]  Phone: [phone] Fax: [phone]    Desirae Bello MD  Payor: MEDICARE / Plan: MEDICARE PART A & B / Product Type: Government /           04/07/19 1418   Discharge Assessment   Assessment Type Discharge Planning Assessment   Confirmed/corrected address and phone number on facesheet? Yes   Assessment information obtained from? Patient;Caregiver;Medical Record   Expected Length of Stay (days)   (dc today)   Communicated expected length of stay with patient/caregiver yes   Prior to hospitilization cognitive status: Alert/Oriented   Prior to hospitalization functional status: Independent   Current cognitive status: Alert/Oriented   Current Functional Status: Independent   Facility Arrived From: home   Lives With alone   Able to Return to Prior Arrangements yes   Is patient able to care for self after discharge? Yes   Who are your caregiver(s) and their phone number(s)? Eri Garnett ( daughter ) [phone] or [phone]   Patient's perception of discharge disposition home health   Readmission Within the Last 30 Days previous discharge plan unsuccessful   If yes, most recent facility name: [facility]   Patient currently being followed by outpatient case management? No   Patient currently receives any other outside agency services? No   Equipment Currently Used at Home CPAP;other (see comments)  (states has other equipment not in use)   Do you have any problems affording any of your prescribed medications? No   Is the patient taking medications as prescribed? yes   Does the patient have transportation home? Yes   Transportation Anticipated family or friend will provide;car, drives self   Does the patient receive services at the Coumadin Clinic? Yes   Discharge Plan A Home;Home Health   Discharge Plan B Home;Home with family;Home Health   DME Needed Upon Discharge  none   Patient/Family in Agreement with Plan yes

## 2019-04-07 NOTE — PROGRESS NOTES
Pharmacy Brief Progress Note:    Patient/caregiver educated on warfarin indication, side effects, and drug interactions. Discussed importance of medication compliance and INR monitoring and reviewed signs of abnormal bleeding. Patient/caregiver given warfarin educational handout. Patient/caregiver expressed understanding and had no further questions.    Thank you for allowing us to participate in this patient's care.     Florence Rawls [facility] 4/7/2019 3:40 PM

## 2019-04-07 NOTE — DISCHARGE SUMMARY
"[facility] Hospital Medicine  Discharge Summary      Patient Name: Jennifer Mathews  MRN: [id number]  Admission Date: 4/5/2019  Hospital Length of Stay: 1 days  Discharge Date and Time:  04/07/2019 1:40 PM  Attending Physician: Clarice Sin MD   Discharging Provider: Clarice Sin MD  Primary Care Provider: Desirae Bello MD      HPI:   Jennifer Mathews is a 86 y.o. female patient with a PMHx of ACS, Cardiac Arrest, Systolic CHF with an EF of 20%, Ischemic cardiomyopathy s/p pacemaker placement, multiple MVRs with a bioprosthesis valve, CVA with no residual, Anemia, paroxysmal Afib on OAC with Coumadin, CKD, HLD,  recently diagnosed Santos's esophagus, and HTN who presented to the Emergency Department today for evaluation of rectal bleeding which onset gradually one week ago.  She states that the blood is mixed between " a maroon color and bright red, and is present in stool as well".  Symptoms are constant and moderate in severity.  No mitigating or exacerbating factors reported.  Associated sxs include generalized weakness.  She was recently discharged from here on 3/14 with a similar presentation.  During this admission, patient underwent an EGD on 3/13 per Dr. Orellana that showed LA Grade D candidiasis esophagitis that was biopsied, and gastric erosions without bleeding.  She was treated with Diflucan and also recently started on PPI for Santos's esophagus, confirmed on biopsy.  Patient denies any lightheadedness, numbness, focal weakness, dizziness, syncope, abd pain, n/v/d, fever, chills, and all other sxs at this time.  No further complaints or concerns at this time.  ER workup showed: Stable VS.  Hgb 6.5, decreased from 8.3 on 3/29.  INR 4.1, decreased from 7.6 on 4/3, for which she was given 5mg of oral Vitamin K.  KCL 5.9, for which she was given 10 units of IV insulin and 1 amp of D50.  Cr stable at baseline.  Type and match completed and patient will be transfused with 2 units of " "PRBCs.  Also started on Protonix IVP BID.  Case d/w Dr. Arizmendi with GI who recommended holding Coumadin with no indication for GI consult at this time given recent workup with bleeding likely a result of elevated INR.  Per patient, "I have not been in Afib for about 6 months and Dr. Dunne mentioned I may be able to stop Coumadin since I'm having so many issues with it".  Will need to d/w Cardiology to determine POC moving forward.  Hospital Medicine called for admit.  She will be placed in OBS.  She is a Full Code.  Her SDM is her daughter Eri who can be reached at [phone].        * No surgery found *      Hospital Course:   Ms Mathews is a 86 year old female with complaints of rectal bleeding. INR of 7.6 on admission, coumadin was placed on hold, INR trended downward and today INR 2.0. Case was discussed with GI who recommended holding Coumadin with no indication for GI consult at this time given recent workup with bleeding likely a result of elevated INR. Cardiology evaluated patient this AM, coumadin restart, however will hold due to GI bleeding earlier today. H & H currently stable. Will monitor. Will need to consult GI if bleeding continues with corrected INR.     Pt continued to improved and remains stable, INR down to 1.3 as Coumadin was held yesterday, H/H stable. Dr. Duke considers her a high risk for CVA due to Afib and hence strongly recommends continuing Coumadin at 2.5 mg daily despite the UGI Bleeding sec to Coumadin toxicity. Pt informs me again that Dr. Janna Taylor had interrogated her PPM last week and found that she did not have any Afin in last 6 months and if continues without any afib for another 3 months-- she may come off Coumadin. She will continue the low dose coumadin at 2.5 mg daily and skip every Sunday and she will follow with Janna Johnson in next couple of weeks to discuss coumadin. She is eating drinking well, walking around well, she requests to be discharged home today, " Cards has already cleared her, she was seen and examined and deemed stable for discharge home today and continue [facility]  and check PT/INR twice a week. She is due for her Venofer 100 mg IVP tomorrow at hematology clinic-- hence it was given here today at her request.         Consults:   Consults (From admission, onward)        Status Ordering Provider     Inpatient consult to Cardiology  Once     Provider:  Trent Duke MD    Completed ABBEY TERRAZAS     Inpatient consult to Social Work  Once     Provider:  (Not yet assigned)    Ordered ADAM JANE     Nutrition Services Referral  Once     Provider:  (Not yet assigned)    Completed ABBEY TERRAZAS     Pharmacy to dose Warfarin consult  Once     Provider:  (Not yet assigned)    Acknowledged RADHA PIZANO            Final Active Diagnoses:    Diagnosis Date Noted POA    Chronic combined systolic and diastolic congestive heart failure [I50.42]  Yes    S/P MVR (mitral valve replacement) [Z95.2] 01/30/2014 Not Applicable    A-fib [I48.91] 10/08/2014 Yes    Santos's esophagus [K22.70] 04/05/2019 Yes    Acquired hypothyroidism [E03.9] 02/28/2018 Yes    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 [M1A.39X1] 05/17/2017 Yes    Pulmonary hypertension due to left heart disease [I27.22] 01/07/2016 Yes    LEV (obstructive sleep apnea) [G47.33] 09/30/2013 Yes     Chronic      Problems Resolved During this Admission:    Diagnosis Date Noted Date Resolved POA    PRINCIPAL PROBLEM:  Gastrointestinal hemorrhage [K92.2] 04/05/2019 04/07/2019 Yes    Coumadin Toxicity/ Supratherapeutic INR [R79.1] 03/10/2019 04/07/2019 Yes    Acute on chronic combined systolic and diastolic congestive heart failure [I50.43] 11/19/2018 04/07/2019 Yes    Hyperkalemia [E87.5] 05/04/2018 04/07/2019 Yes    Chronic renal failure, stage 4 (severe) [N18.4] 05/02/2018 04/07/2019 Yes       Discharged Condition: stable    Disposition: Home-Health Care  [facility]    Follow Up:  Follow-up Information     Desirae Bello MD. Schedule an appointment as soon as possible for a visit in 1 week.    Specialty:  Family Medicine  Why:  Hospital follow up  Contact information:  [address] [place] [place] [address] [phone]             Manny Dunne MD In 2 weeks.    Specialties:  Electrophysiology, Cardiology  Why:  Hospital follow up- here in [place] please for Coumadin Toxicity/ UGI bleed  Contact information:  [address]  [place] [address] [phone]                 Patient Instructions:      SUBSEQUENT HOME HEALTH ORDERS   Order Comments: Subsequent Home Health Orders    Current Medications:  Current Facility-Administered Medications:  0.9%  NaCl infusion (for blood administration), , Intravenous, Q24H PRN, Mery Dhillon NP  acetaminophen tablet 650 mg, 650 mg, Oral, Q8H PRN, Randolph Agustin MD  carvedilol tablet 25 mg, 25 mg, Oral, BID WM, Mery Dhillon NP, 25 mg at 04/07/19 0832  dextrose 50% injection 12.5 g, 12.5 g, Intravenous, PRN, Mery Dhillon NP  dextrose 50% injection 25 g, 25 g, Intravenous, PRN, Mery Dhillon NP  digoxin tablet 125 mcg, 125 mcg, Oral, Daily, Mery Dhillon NP, 125 mcg at 04/07/19 0832  furosemide tablet 40 mg, 40 mg, Oral, BID, Mery Dhillon, NENO, 40 mg at 04/07/19 0832  glucagon (human recombinant) injection 1 mg, 1 mg, Intramuscular, PRN, Mery Dhillon NP  glucose chewable tablet 16 g, 16 g, Oral, PRN, Mery Dhillon NP  glucose chewable tablet 24 g, 24 g, Oral, PRN, Mery Dhillon NP  iron sucrose injection 100 mg, 100 mg, Intravenous, Once, Clarice Sin MD  levothyroxine tablet 75 mcg, 75 mcg, Oral, Before breakfast, Mery Dhillon NP, 75 mcg at 04/07/19 0555  ondansetron disintegrating tablet 8 mg, 8 mg, Oral, Q8H PRN, Mery Dhillon NP  ondansetron injection 4 mg, 4 mg, Intravenous, Q12H PRN, Randolph Agustin MD  ondansetron  injection 4 mg, 4 mg, Intravenous, Q8H PRN, Mery Dhillon NP  pantoprazole injection 40 mg, 40 mg, Intravenous, BID, Mery Dhillon NP, 40 mg at 04/07/19 0832  promethazine (PHENERGAN) 6.25 mg in dextrose 5 % 50 mL IVPB, 6.25 mg, Intravenous, Q6H PRN, Randolph Agustin MD  sodium chloride 0.9% flush 10 mL, 10 mL, Intravenous, PRN, Randloph Agustin MD  warfarin (COUMADIN) tablet 2.5 mg, 2.5 mg, Oral, Daily, Clarice Sin MD        Nursing:   N/A    Diet:   cardiac diet    Activities:   activity as tolerated    Labs:  SN to perform labs:  INR: Biweekly; 4 week(s)    Referrals/ Consults       Home Health Aide:  Nursing Twice weekly     Order Specific Question Answer Comments   What Home Health Agency is the patient currently using? [facility]        Significant Diagnostic Studies: Labs:   BMP:   Recent Labs   Lab 04/06/19 0425 04/06/19 1115 04/07/19  0445   GLU 78 84 71    141 141   K 4.9 4.5 3.9    104 103   CO2 27 28 30*   BUN 43* 40* 32*   CREATININE 1.3 1.3 1.1   CALCIUM 8.1* 8.1* 8.1*   , CMP   Recent Labs   Lab 04/06/19 0425 04/06/19 1115 04/07/19  0445    141 141   K 4.9 4.5 3.9    104 103   CO2 27 28 30*   GLU 78 84 71   BUN 43* 40* 32*   CREATININE 1.3 1.3 1.1   CALCIUM 8.1* 8.1* 8.1*   ANIONGAP 9 9 8   ESTGFRAFRICA 43* 43* 53*   EGFRNONAA 37* 37* 46*   , CBC   Recent Labs   Lab 04/06/19 0425 04/06/19 1115 04/07/19  0445   WBC 5.46 6.24 5.35   HGB 8.9* 9.1* 8.7*   HCT 26.9* 28.8* 27.7*    249 234   , INR   Lab Results   Component Value Date    INR 1.3 (H) 04/07/2019    INR 1.3 (H) 04/07/2019    INR 2.0 (H) 04/06/2019   , Troponin No results for input(s): TROPONINI in the last 168 hours. and All labs within the past 24 hours have been reviewed    Pending Diagnostic Studies:     None         Medications:  Reconciled Home Medications:      Medication List      CHANGE how you take these medications    acetaminophen 325 MG tablet  Commonly known as:   TYLENOL EXTRA STRENGTH  Take 2 tablets (650 mg total) by mouth every 8 (eight) hours.  What changed:    · when to take this  · reasons to take this     digoxin 125 mcg tablet  Commonly known as:  LANOXIN  Take 1 tablet (125 mcg total) by mouth once daily. TAKE 1 TABLET (0.125 MG TOTAL) BY MOUTH ONCE DAILY.  What changed:  additional instructions     * pantoprazole 20 MG tablet  Commonly known as:  PROTONIX  Take 1 tablet (20 mg total) by mouth once daily.  What changed:  Another medication with the same name was added. Make sure you understand how and when to take each.     * pantoprazole 40 MG tablet  Commonly known as:  PROTONIX  Take 1 tablet (40 mg total) by mouth once daily.  What changed:  You were already taking a medication with the same name, and this prescription was added. Make sure you understand how and when to take each.     warfarin 2.5 MG tablet  Commonly known as:  COUMADIN  Take 1 tablet (2.5 mg total) by mouth Daily. Hold every Sunday  What changed:  See the new instructions.         * This list has 2 medication(s) that are the same as other medications prescribed for you. Read the directions carefully, and ask your doctor or other care provider to review them with you.            CONTINUE taking these medications    allopurinol 100 MG tablet  Commonly known as:  ZYLOPRIM  TAKE 2 TABLETS (200 MG TOTAL) BY MOUTH ONCE DAILY.     amiodarone 200 MG Tab  Commonly known as:  PACERONE  TAKE 1 TABLET EVERY DAY     carvedilol 25 MG tablet  Commonly known as:  COREG  TAKE 1 TABLET BY MOUTH TWICE A DAY WITH MEALS     colchicine 0.6 mg tablet  Commonly known as:  COLCRYS  Take 1 tablet by mouth 3 times a week     furosemide 40 MG tablet  Commonly known as:  LASIX  Take 40 mg by mouth 2 (two) times daily.     gabapentin 100 MG capsule  Commonly known as:  NEURONTIN  TAKE ONE CAPSULE BY MOUTH TWO TIMES DAILY     levothyroxine 50 MCG tablet  Commonly known as:  SYNTHROID  Take 1 tablet (50 mcg total) by mouth  once daily.     losartan 25 MG tablet  Commonly known as:  COZAAR  Take 1 tablet (25 mg total) by mouth once daily.     metOLazone 2.5 MG tablet  Commonly known as:  ZAROXOLYN  Take 1 tablet (2.5 mg total) by mouth daily as needed. For SOB or Leg edema        STOP taking these medications    aspirin 81 MG EC tablet  Commonly known as:  ECOTRIN     nystatin 100,000 unit/mL suspension  Commonly known as:  MYCOSTATIN     phytonadione (vitamin K1) 5 mg Tab  Commonly known as:  MEPHYTON     potassium chloride SA 20 MEQ tablet  Commonly known as:  K-DUR,KLOR-CON     sodium polystyrene powder  Commonly known as:  KAYEXALATE            Indwelling Lines/Drains at time of discharge:   Lines/Drains/Airways          None          Time spent on the discharge of patient: 53 minutes  Patient was seen and examined on the date of discharge and determined to be suitable for discharge.         Clarice Sin MD  Department of Hospital Medicine  [facility]

## 2019-04-07 NOTE — PLAN OF CARE
04/07/19 1413   Discharge Rx Bedside Delivery   If you have discharge prescriptions, do you want them filled and delivered to you before you leave? No (Please give reason in comments)  (wants to use her [facility] rx. discharging Sunday bedside delivery unavailable)

## 2019-04-07 NOTE — PROGRESS NOTES
Discharge instructions given to pt. Verbalized understanding. Iv removed. Cath intact. No bleeding. Tele removed. Pt instructed to take protonix 40mg as ordered and continue pacerone as already ordered per dr. Sin. No distress noted. Pt dressed. Down to private vehicle. Home with family.

## 2019-04-07 NOTE — PLAN OF CARE
04/07/19 1405   Medicare Message   Important Message from Medicare regarding Discharge Appeal Rights Given to patient/caregiver;Signed/date by patient/caregiver;Explained to patient/caregiver   Date IMM was signed 04/07/19   Time IMM was signed 1409

## 2019-04-07 NOTE — PLAN OF CARE
To [facility]         04/07/19 6301   Post-Acute Status   Post-Acute Authorization Home Health/Hospice   Home Health/Hospice Status Referrals Sent

## 2019-04-08 ENCOUNTER — ANTI-COAG VISIT (OUTPATIENT)
Dept: CARDIOLOGY | Facility: CLINIC | Age: 84
End: 2019-04-08

## 2019-04-08 ENCOUNTER — TELEPHONE (OUTPATIENT)
Dept: INFUSION THERAPY | Facility: HOSPITAL | Age: 84
End: 2019-04-08

## 2019-04-08 DIAGNOSIS — D50.0 IRON DEFICIENCY ANEMIA DUE TO CHRONIC BLOOD LOSS: ICD-10-CM

## 2019-04-08 DIAGNOSIS — N18.4 ANEMIA ASSOCIATED WITH STAGE 4 CHRONIC RENAL FAILURE: Primary | ICD-10-CM

## 2019-04-08 DIAGNOSIS — D63.1 ANEMIA ASSOCIATED WITH STAGE 4 CHRONIC RENAL FAILURE: Primary | ICD-10-CM

## 2019-04-08 LAB — INR PPP: 1.5

## 2019-04-08 NOTE — PROGRESS NOTES
Verbal result taken from Jennifer with [facility]. PT / INR  17.7  / 1.5.   Date drawn 4/08/19.   Patient was admitted to the hospital 4/5-4/7 for elevated INR and GI bleed.  Coumadin held x 3 days. Bleeding has resolved.  Will lower maintenance dose and monitor closely.   Orders given:Take coumadin 2.5mg every evening.  Recheck on Thursday, 4/11/19.

## 2019-04-08 NOTE — TELEPHONE ENCOUNTER
I spoke with patient She said she was in the hospital.  She had a blood transfusion so she canceled her iron infusion today.  She has an appointment with you on 4/15/19.  Would you like her to have blood work when she comes in for her f/u?

## 2019-04-08 NOTE — HOSPITAL COURSE
4/7/19 - Coumadin not given yest due to bloody BM, none this AM H&H stable. Ready for DC. Discussed to restart coumadin and monitor INR. Will f/u in clinic

## 2019-04-08 NOTE — PROGRESS NOTES
[facility]  Cardiology  Progress Note    Patient Name: Jennifer Mathews  MRN: [id number]  Admission Date: 4/5/2019  Hospital Length of Stay: 1 days  Code Status: Prior   Attending Physician: No att. providers found   Primary Care Physician: Desirae Bello MD  Expected Discharge Date: 4/7/2019  Principal Problem:Gastrointestinal hemorrhage    Subjective:     Hospital Course:   4/7/19 - Coumadin not given yest due to bloody BM, none this AM H&H stable. Ready for DC. Discussed to restart coumadin and monitor INR. Will f/u in clinic    Interval History: no acute events o/n    Review of Systems   Constitution: Positive for malaise/fatigue.   HENT: Negative.    Eyes: Negative.    Cardiovascular: Negative.    Respiratory: Negative.    Endocrine: Negative.    Hematologic/Lymphatic: Negative.    Skin: Negative.    Musculoskeletal: Negative.    Gastrointestinal: Negative.    Genitourinary: Negative.    Neurological: Negative.    Psychiatric/Behavioral: Negative.      Objective:     Vital Signs (Most Recent):  Temp: 98.4 °F (36.9 °C) (04/07/19 1225)  Pulse: 69 (04/07/19 1500)  Resp: 18 (04/07/19 1225)  BP: 130/60 (04/07/19 1225)  SpO2: (!) 94 % (04/07/19 1225) Vital Signs (24h Range):  Temp:  [97.8 °F (36.6 °C)-98.4 °F (36.9 °C)] 98.4 °F (36.9 °C)  Pulse:  [68-95] 69  Resp:  [16-18] 18  SpO2:  [94 %-97 %] 94 %  BP: (123-133)/(58-63) 130/60     Weight: 49.4 kg (109 lb)  Body mass index is 19.94 kg/m².     SpO2: (!) 94 %  O2 Device (Oxygen Therapy): room air      Intake/Output Summary (Last 24 hours) at 4/7/2019 2026  Last data filed at 4/7/2019 0700  Gross per 24 hour   Intake 600 ml   Output --   Net 600 ml       Lines/Drains/Airways          None          Physical Exam   Constitutional: She is oriented to person, place, and time. She appears well-developed and well-nourished. No distress.   HENT:   Head: Normocephalic and atraumatic.   Eyes: Pupils are equal, round, and reactive to light. Right eye exhibits  no discharge. Left eye exhibits no discharge.   Neck: Neck supple. JVD present.   Cardiovascular: Normal rate, regular rhythm, S1 normal and S2 normal.   Murmur heard.  High-pitched blowing holosystolic murmur is present at the apex.  Pulmonary/Chest: Effort normal and breath sounds normal. No respiratory distress. She has no wheezes. She has no rales.   PPM/CRT sites well-healed   Abdominal: Soft. She exhibits no distension.   Musculoskeletal: She exhibits no edema.   Neurological: She is alert and oriented to person, place, and time.   Skin: Skin is warm and dry. She is not diaphoretic. No erythema.   CVI BLE     Psychiatric: She has a normal mood and affect. Her behavior is normal. Thought content normal.   Nursing note and vitals reviewed.      Significant Labs:   All pertinent lab results from the last 24 hours have been reviewed. and   Recent Lab Results       04/07/19  0900   04/07/19  0445        Anion Gap   8     Baso #   0.05     Basophil%   0.9     BUN, Bld   32     Calcium   8.1     Chloride   103     CO2   30     Creatinine   1.1     Differential Method   Automated     eGFR if    53     eGFR if non    46  Comment:  Calculation used to obtain the estimated glomerular filtration  rate (eGFR) is the CKD-EPI equation.        Eos #   0.1     Eosinophil%   1.1     Glucose   71     Gran # (ANC)   3.3     Gran%   61.3     Hematocrit   27.7     Hemoglobin   8.7     Coumadin Monitoring INR 1.3  Comment:  Coumadin Therapy:  2.0 - 3.0 for INR for all indicators except mechanical heart valves  and antiphospholipid syndromes which should use 2.5 - 3.5.          1.3  Comment:  Coumadin Therapy:  2.0 - 3.0 for INR for all indicators except mechanical heart valves  and antiphospholipid syndromes which should use 2.5 - 3.5.         Lymph #   1.4     Lymph%   26.4     MCH   30.7     MCHC   31.4     MCV   98     Mono #   0.6     Mono%   10.3     MPV   9.6     Platelets   234     Potassium    3.9     Protime 14.1        14.1       RBC   2.83     RDW   19.5     Sodium   141     WBC   5.35           Significant Imaging: Echocardiogram:   2D echo with color flow doppler:   Results for orders placed or performed during the hospital encounter of 11/19/18   2D echo with color flow doppler   Result Value Ref Range    QEF 20 (A) 55 - 65    Est. PA Systolic Pressure 69.17 (A)     Tricuspid Valve Regurgitation MODERATE (A)     and X-Ray: CXR: X-Ray Chest 1 View (CXR): No results found for this visit on 04/05/19.    Assessment and Plan:     Brief HPI: no acute events o/n    A-fib  -History of PAF  -Remains in SR  -Continue Coreg 25 mg BID  -Amiodarone held due to supra-therapeutic INR (has not been taking)  -INR this AM 2.0, resume Coumadin per MD's rec's and monitor H and H overnight    4/7/19  Rec restarting low dose coumadin and f/u with cardiology clinic     S/P MVR (mitral valve replacement)  -Stable    Chronic combined systolic and diastolic congestive heart failure  -Clinically compensated  -Continue home medications-Coreg, Lasix, digoxin  -Plan to re-start ARB tmw given hyperkalemia upon admission        VTE Risk Mitigation (From admission, onward)        Ordered     IP VTE HIGH RISK PATIENT  Once      04/05/19 1620          Trent Duke Md, MD  Cardiology  [facility]

## 2019-04-08 NOTE — SUBJECTIVE & OBJECTIVE
Interval History: no acute events o/n    Review of Systems   Constitution: Positive for malaise/fatigue.   HENT: Negative.    Eyes: Negative.    Cardiovascular: Negative.    Respiratory: Negative.    Endocrine: Negative.    Hematologic/Lymphatic: Negative.    Skin: Negative.    Musculoskeletal: Negative.    Gastrointestinal: Negative.    Genitourinary: Negative.    Neurological: Negative.    Psychiatric/Behavioral: Negative.      Objective:     Vital Signs (Most Recent):  Temp: 98.4 °F (36.9 °C) (04/07/19 1225)  Pulse: 69 (04/07/19 1500)  Resp: 18 (04/07/19 1225)  BP: 130/60 (04/07/19 1225)  SpO2: (!) 94 % (04/07/19 1225) Vital Signs (24h Range):  Temp:  [97.8 °F (36.6 °C)-98.4 °F (36.9 °C)] 98.4 °F (36.9 °C)  Pulse:  [68-95] 69  Resp:  [16-18] 18  SpO2:  [94 %-97 %] 94 %  BP: (123-133)/(58-63) 130/60     Weight: 49.4 kg (109 lb)  Body mass index is 19.94 kg/m².     SpO2: (!) 94 %  O2 Device (Oxygen Therapy): room air      Intake/Output Summary (Last 24 hours) at 4/7/2019 2026  Last data filed at 4/7/2019 0700  Gross per 24 hour   Intake 600 ml   Output --   Net 600 ml       Lines/Drains/Airways          None          Physical Exam   Constitutional: She is oriented to person, place, and time. She appears well-developed and well-nourished. No distress.   HENT:   Head: Normocephalic and atraumatic.   Eyes: Pupils are equal, round, and reactive to light. Right eye exhibits no discharge. Left eye exhibits no discharge.   Neck: Neck supple. JVD present.   Cardiovascular: Normal rate, regular rhythm, S1 normal and S2 normal.   Murmur heard.  High-pitched blowing holosystolic murmur is present at the apex.  Pulmonary/Chest: Effort normal and breath sounds normal. No respiratory distress. She has no wheezes. She has no rales.   PPM/CRT sites well-healed   Abdominal: Soft. She exhibits no distension.   Musculoskeletal: She exhibits no edema.   Neurological: She is alert and oriented to person, place, and time.   Skin: Skin  is warm and dry. She is not diaphoretic. No erythema.   CVI BLE     Psychiatric: She has a normal mood and affect. Her behavior is normal. Thought content normal.   Nursing note and vitals reviewed.      Significant Labs:   All pertinent lab results from the last 24 hours have been reviewed. and   Recent Lab Results       04/07/19  0900   04/07/19  0445        Anion Gap   8     Baso #   0.05     Basophil%   0.9     BUN, Bld   32     Calcium   8.1     Chloride   103     CO2   30     Creatinine   1.1     Differential Method   Automated     eGFR if    53     eGFR if non    46  Comment:  Calculation used to obtain the estimated glomerular filtration  rate (eGFR) is the CKD-EPI equation.        Eos #   0.1     Eosinophil%   1.1     Glucose   71     Gran # (ANC)   3.3     Gran%   61.3     Hematocrit   27.7     Hemoglobin   8.7     Coumadin Monitoring INR 1.3  Comment:  Coumadin Therapy:  2.0 - 3.0 for INR for all indicators except mechanical heart valves  and antiphospholipid syndromes which should use 2.5 - 3.5.          1.3  Comment:  Coumadin Therapy:  2.0 - 3.0 for INR for all indicators except mechanical heart valves  and antiphospholipid syndromes which should use 2.5 - 3.5.         Lymph #   1.4     Lymph%   26.4     MCH   30.7     MCHC   31.4     MCV   98     Mono #   0.6     Mono%   10.3     MPV   9.6     Platelets   234     Potassium   3.9     Protime 14.1        14.1       RBC   2.83     RDW   19.5     Sodium   141     WBC   5.35           Significant Imaging: Echocardiogram:   2D echo with color flow doppler:   Results for orders placed or performed during the hospital encounter of 11/19/18   2D echo with color flow doppler   Result Value Ref Range    QEF 20 (A) 55 - 65    Est. PA Systolic Pressure 69.17 (A)     Tricuspid Valve Regurgitation MODERATE (A)     and X-Ray: CXR: X-Ray Chest 1 View (CXR): No results found for this visit on 04/05/19.

## 2019-04-08 NOTE — ASSESSMENT & PLAN NOTE
-History of PAF  -Remains in SR  -Continue Coreg 25 mg BID  -Amiodarone held due to supra-therapeutic INR (has not been taking)  -INR this AM 2.0, resume Coumadin per MD's rec's and monitor H and H overnight    4/7/19  Rec restarting low dose coumadin and f/u with cardiology clinic

## 2019-04-09 ENCOUNTER — PATIENT OUTREACH (OUTPATIENT)
Dept: ADMINISTRATIVE | Facility: CLINIC | Age: 84
End: 2019-04-09

## 2019-04-09 ENCOUNTER — TELEPHONE (OUTPATIENT)
Dept: ELECTROPHYSIOLOGY | Facility: CLINIC | Age: 84
End: 2019-04-09

## 2019-04-09 ENCOUNTER — TELEPHONE (OUTPATIENT)
Dept: CARDIOLOGY | Facility: CLINIC | Age: 84
End: 2019-04-09

## 2019-04-09 NOTE — TELEPHONE ENCOUNTER
----- Message from Trang Perez sent at 4/9/2019  3:44 PM CDT -----  Contact: self  Pt is calling to speak with nurse in regards to appt on 04/10. Pt would like to know exactly what appt is for. Please call pt back at [phone] or [phone].      Thanks,   Trang Perez

## 2019-04-09 NOTE — PATIENT INSTRUCTIONS
Lower GI Bleeding (Stable)  You have signs of blood in your stool. This is called rectal bleeding. The bleeding may have begun in another part of your gastrointestinal (GI) tract. If the blood is bright red, it is likely coming from the lower part of the GI tract. If the blood is black or dark, it might be coming from higher up in the GI tract. Very small amounts of GI bleeding may not be visible and can only be discovered during a test on your stool. Possible causes of lower GI bleeding include:  · Hemorrhoids  · Anal fissures  · Diverticulitis  · Inflammatory bowel disease (Crohn's disease or ulcerative colitis)  · Polyps (growths) in the intestine  Note: Iron supplements and medicines for diarrhea or upset stomach can cause black stools. Foods such as licorice and red beets can also discolor the stool and be mistaken for bleeding. These are not bleeding and are not a cause for alarm.  Home care  You have not lost a large amount of blood and your condition appears stable at this time. You may resume normal activity as long as you feel well.  Avoid NSAIDs, such as aspirin, ibuprofen, or naproxen. They can irritate the stomach and cause further bleeding. If you are taking these medicines for other medical reasons, talk to your healthcare provider before you stop them.   Follow-up care  Follow up with your healthcare provider as advised. Further tests may be needed to find the cause of your bleeding.  When to seek medical advice  Call your healthcare provider for any of the following:  · Large amount of rectal bleeding   · Increasing abdominal pain  · Weakness, dizziness  Call 911  Get emergency medical care if any of the following occur:  · Loss of consciousness  · Vomiting blood  Date Last Reviewed: 6/24/2015  © 0980-4775 [employer]. [address] [place] [address]. All rights reserved. This information is not intended as a substitute for professional medical care. Always follow your  healthcare professional's instructions.

## 2019-04-09 NOTE — TELEPHONE ENCOUNTER
Will send message to Ceci to notify of Eamon LAZCANO suggestions. Patient has an appointment with Dr. Dunne on 5-31-19.      Ander BISHOP CCM

## 2019-04-09 NOTE — TELEPHONE ENCOUNTER
Spoke with pt state she will keep scheduled appt with Adriana on Thursday 4/11/19 [facility].

## 2019-04-09 NOTE — TELEPHONE ENCOUNTER
Left patient V/Message that I made her an appointment to see Dr Chau at the [place] location.

## 2019-04-09 NOTE — TELEPHONE ENCOUNTER
----- Message from Holly Carpio NP sent at 4/9/2019  1:26 PM CDT -----  This is a long term management concern so she should really see Dr. Dunne to discuss this. Thanks    ----- Message -----  From: Gloria Kinney RN  Sent: 4/9/2019   1:15 PM  To: NENO Farfan,    Can you please provide your recommendations for the following questions.      Ander BISHOP [facility]    ----- Message -----  From: Nata Hobbs MA  Sent: 4/9/2019  11:36 AM  To: Manny Dunne MD, Gloria Kinney RN        ----- Message -----  From: RAUL Gupta  Sent: 4/8/2019   4:50 PM  To: Manny Dunne MD, #    Patient readmitted with coumadin toxicity and GIB  What would you like to do in regards to OAC?  Looks like we may need to stop it.     We thought the last episode was due to abx therapy, but no abx given this time. Amio wasn't resumed because INR was supra therapeutic before admission. So coumadin toxicity off amio and abx therapy            ----- Message -----  From: Deanna Schreiber, PharmD  Sent: 4/8/2019   8:07 AM  To: RAUL Gupta    Just an update, Mrs. Mathews was admitted again on last week due to coumadin toxicity/GI bleed.     ----- Message -----  From: RAUL Gupta  Sent: 3/29/2019   1:51 PM  To: Adriana Arizmendi PA-C, #    Patient admitted last week with coumadin toxicity that was likely from abx therapy prior to admit. Her amio was stopped during admit and hasn't been resumed. I have spoken with Dr. Dunne and we believe that the toxicity was from the abx and not necessarily the amio . She is in NSR today and Dr. Saldivar would like the Amio restarted once INR therapeutic. I am out next week when INR scheduled to be rechecked. Can you please let LAST Valadez know if her INR is within range so we can give her instructions to restart Amio    Giorgi Harley NP

## 2019-04-11 ENCOUNTER — OFFICE VISIT (OUTPATIENT)
Dept: CARDIOLOGY | Facility: CLINIC | Age: 84
End: 2019-04-11
Payer: MEDICARE

## 2019-04-11 ENCOUNTER — ANTI-COAG VISIT (OUTPATIENT)
Dept: CARDIOLOGY | Facility: CLINIC | Age: 84
End: 2019-04-11
Payer: MEDICARE

## 2019-04-11 VITALS
BODY MASS INDEX: 20.73 KG/M2 | HEIGHT: 62 IN | WEIGHT: 112.63 LBS | HEART RATE: 74 BPM | DIASTOLIC BLOOD PRESSURE: 58 MMHG | OXYGEN SATURATION: 96 % | SYSTOLIC BLOOD PRESSURE: 124 MMHG

## 2019-04-11 DIAGNOSIS — E78.5 DYSLIPIDEMIA: ICD-10-CM

## 2019-04-11 DIAGNOSIS — Z79.01 ANTICOAGULATED ON COUMADIN: Primary | ICD-10-CM

## 2019-04-11 DIAGNOSIS — I50.42 CHRONIC COMBINED SYSTOLIC AND DIASTOLIC CONGESTIVE HEART FAILURE: ICD-10-CM

## 2019-04-11 DIAGNOSIS — I10 ESSENTIAL HYPERTENSION: ICD-10-CM

## 2019-04-11 DIAGNOSIS — Z79.01 LONG TERM (CURRENT) USE OF ANTICOAGULANTS: Primary | ICD-10-CM

## 2019-04-11 DIAGNOSIS — I48.0 PAROXYSMAL ATRIAL FIBRILLATION: ICD-10-CM

## 2019-04-11 DIAGNOSIS — Z95.0 S/P PLACEMENT OF CARDIAC PACEMAKER: ICD-10-CM

## 2019-04-11 DIAGNOSIS — G47.33 OSA (OBSTRUCTIVE SLEEP APNEA): Chronic | ICD-10-CM

## 2019-04-11 DIAGNOSIS — Z95.2 S/P MVR (MITRAL VALVE REPLACEMENT): ICD-10-CM

## 2019-04-11 DIAGNOSIS — Z95.810 CARDIAC RESYNCHRONIZATION THERAPY DEFIBRILLATOR (CRT-D) IN PLACE: ICD-10-CM

## 2019-04-11 LAB — INR PPP: 2.3 (ref 2–3)

## 2019-04-11 PROCEDURE — 99999 PR PBB SHADOW E&M-EST. PATIENT-LVL IV: ICD-10-PCS | Mod: PBBFAC,,, | Performed by: PHYSICIAN ASSISTANT

## 2019-04-11 PROCEDURE — 99999 PR PBB SHADOW E&M-EST. PATIENT-LVL IV: CPT | Mod: PBBFAC,,, | Performed by: PHYSICIAN ASSISTANT

## 2019-04-11 PROCEDURE — 85610 PROTHROMBIN TIME: CPT | Mod: PBBFAC

## 2019-04-11 PROCEDURE — 99214 PR OFFICE/OUTPT VISIT, EST, LEVL IV, 30-39 MIN: ICD-10-PCS | Mod: S$PBB,,, | Performed by: PHYSICIAN ASSISTANT

## 2019-04-11 PROCEDURE — 99214 OFFICE O/P EST MOD 30 MIN: CPT | Mod: S$PBB,,, | Performed by: PHYSICIAN ASSISTANT

## 2019-04-11 PROCEDURE — 99214 OFFICE O/P EST MOD 30 MIN: CPT | Mod: PBBFAC | Performed by: PHYSICIAN ASSISTANT

## 2019-04-11 NOTE — PROGRESS NOTES
Patient's INR is therapeutic at 2.3.  Bleeding has resolved.  No changes in dose.  Continue current dose of 2.5mg every evening.  Recheck on Monday, 4/15/19 with [facility].  Please call should you have any questions or concerns at [phone] or [phone].    Jennifer with [facility] contacted.

## 2019-04-11 NOTE — PROGRESS NOTES
Subjective:    Patient ID:  Jennifer Mathews is a 86 y.o. female who presents for follow-up of hospital follow-up      HPI  Ms. Mathews is an 87 yo female with a PMH of mitral valve replacement x 3, HTN, PAF, ACS, combined CHF, PPM-CRT-D placement, s/p MVR, edema, pacemaker placement, and CKD who presents today for hospital follow-up. Patient recently admitted to [facility] with melena/GI bleed and supra-therapeutic INR. She was transfused 2 units and her dosage of Coumadin was adjusted and she was subsequently discharged. She returns today and states she is feeling well. noc cardiac complaints. Denies any chest pain, SOB, or other anginal signs or symptoms. No lightheadedness, dizziness, palpitations, near syncope, or syncope. Stable BLE edema. No PND or orthopnea. BP stable today in clinic. Patient reports compliance with her medications, K supplement was stopped upon d/c due to hyperkalemia. Repeat CMP scheduled for 4/15. INR this AM 2.3, Patient followed by Coumadin clinic. Denies any recurrence of bleeding/melena.    Of note, patient with recent GI evaluation/EGD which showed LA Grade D candidiasis esophagitis and gastric erosions without bleeding. Biopsy confirmed Santos's esophagus. Treated with Diflucan. Currently on Protonix.    Review of Systems   Constitution: Negative for chills, decreased appetite, fever and malaise/fatigue.   HENT: Negative for congestion, hoarse voice and sore throat.    Eyes: Negative for blurred vision and discharge.   Cardiovascular: Negative for chest pain, claudication, cyanosis, dyspnea on exertion, irregular heartbeat, leg swelling, near-syncope, orthopnea, palpitations and paroxysmal nocturnal dyspnea.   Respiratory: Negative for cough, hemoptysis, shortness of breath, snoring, sputum production and wheezing.    Endocrine: Negative for cold intolerance and heat intolerance.   Hematologic/Lymphatic: Negative for bleeding problem. Does not bruise/bleed easily.   Skin: Negative  "for rash.   Musculoskeletal: Negative for arthritis, back pain, joint pain, joint swelling, muscle cramps, muscle weakness and myalgias.   Gastrointestinal: Negative for abdominal pain, constipation, diarrhea, heartburn, melena and nausea.   Genitourinary: Negative for hematuria.   Neurological: Negative for dizziness, focal weakness, headaches, light-headedness, loss of balance, numbness, paresthesias, seizures and weakness.   Psychiatric/Behavioral: Negative for memory loss. The patient does not have insomnia.    Allergic/Immunologic: Negative for hives.       BP (!) 124/58   Pulse 74   Ht 5' 2" (1.575 m)   Wt 51.1 kg (112 lb 10.5 oz)   LMP  (LMP Unknown)   SpO2 96%   BMI 20.60 kg/m²     Objective:    Physical Exam   Constitutional: She is oriented to person, place, and time. She appears well-developed and well-nourished. No distress.   HENT:   Head: Normocephalic and atraumatic.   Eyes: Pupils are equal, round, and reactive to light. Right eye exhibits no discharge. Left eye exhibits no discharge.   Neck: Neck supple. No JVD present.   Cardiovascular: Normal rate, regular rhythm, S1 normal and S2 normal.   Murmur heard.  High-pitched blowing holosystolic murmur is present at the apex.  Pulmonary/Chest: Effort normal and breath sounds normal. No respiratory distress. She has no wheezes. She has no rales.   CRT D site well-healed  Sternotomy site well-healed   Abdominal: Soft. She exhibits no distension.   Musculoskeletal: She exhibits edema (1+ BLE).   Neurological: She is alert and oriented to person, place, and time.   Skin: Skin is warm and dry. She is not diaphoretic. No erythema.   Psychiatric: She has a normal mood and affect. Her behavior is normal. Thought content normal.   Nursing note and vitals reviewed.     2D Echo CONCLUSIONS     1 - Severe left atrial enlargement.     2 - Concentric hypertrophy.     3 - Wall motion abnormalities.     4 - Severely depressed left ventricular systolic function (EF " 20-25%).     5 - Moderately depressed right ventricular systolic function .     6 - Pulmonary hypertension. The estimated PA systolic pressure is 69 mmHg.     7 - Mitral valve prosthesis.     8 - Moderate tricuspid regurgitation.     9 - Increased central venous pressure.        Chemistry        Component Value Date/Time     04/07/2019 0445    K 3.9 04/07/2019 0445     04/07/2019 0445    CO2 30 (H) 04/07/2019 0445    BUN 32 (H) 04/07/2019 0445    CREATININE 1.1 04/07/2019 0445    GLU 71 04/07/2019 0445        Component Value Date/Time    CALCIUM 8.1 (L) 04/07/2019 0445    ALKPHOS 73 04/05/2019 1121    AST 21 04/05/2019 1121    ALT 17 04/05/2019 1121    BILITOT 0.3 04/05/2019 1121    ESTGFRAFRICA 53 (A) 04/07/2019 0445    EGFRNONAA 46 (A) 04/07/2019 0445        Lab Results   Component Value Date    CHOL 131 03/21/2019    CHOL 155 03/20/2018    CHOL 154 01/29/2018     Lab Results   Component Value Date    HDL 31 (L) 03/21/2019    HDL 36 (L) 03/20/2018    HDL 34 (L) 01/29/2018     Lab Results   Component Value Date    LDLCALC 68.6 03/21/2019    LDLCALC 90.8 03/20/2018    LDLCALC 79.0 01/29/2018     Lab Results   Component Value Date    TRIG 157 (H) 03/21/2019    TRIG 141 03/20/2018    TRIG 205 (H) 01/29/2018     Lab Results   Component Value Date    CHOLHDL 23.7 03/21/2019    CHOLHDL 23.2 03/20/2018    CHOLHDL 22.1 01/29/2018       Assessment:       1. Anticoagulated on Coumadin    2. 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 Chronic combined systolic and diastolic congestive heart failure    4. Dyslipidemia    5. Essential hypertension    6. S/P MVR (mitral valve replacement)    7. S/P placement of cardiac pacemaker    8. LEV (obstructive sleep apnea)      Patient presents for f/u. Doing clinically well. No cardiac complaints. No recurrence of bleeding/melena. INR therapeutic this AM, followed by Coumadin clinic. Needs repeat CMP on 4/15. Continue current therapy. Will discuss  re-initiation of amiodarone with Dr. Cary and Dr. Dunne however at this time, may be best for patient to stay off of medication due to recurrent admissions secondary to anemia/GI bleeding/supra-therapeutic INR and amiodarone's potentiation effect   Plan:    -CMP to labs on 4/15/19  -Continue current medical management and risk factor modification  -Cardiac, low salt diet  -Will discuss amiodarone with Dr. Cary and Dr. Schulz however advised patient to stay off med for now  -Keep EP appt in May  -Keep f/u with Giorgi in June        Chart reviewed. Dr. Cary agrees with plan as outlined above.

## 2019-04-15 ENCOUNTER — ANTI-COAG VISIT (OUTPATIENT)
Dept: CARDIOLOGY | Facility: CLINIC | Age: 84
End: 2019-04-15
Payer: MEDICARE

## 2019-04-15 ENCOUNTER — OFFICE VISIT (OUTPATIENT)
Dept: HEMATOLOGY/ONCOLOGY | Facility: CLINIC | Age: 84
End: 2019-04-15
Payer: MEDICARE

## 2019-04-15 ENCOUNTER — INFUSION (OUTPATIENT)
Dept: INFUSION THERAPY | Facility: HOSPITAL | Age: 84
End: 2019-04-15
Attending: INTERNAL MEDICINE
Payer: MEDICARE

## 2019-04-15 VITALS — HEART RATE: 69 BPM | DIASTOLIC BLOOD PRESSURE: 47 MMHG | SYSTOLIC BLOOD PRESSURE: 110 MMHG

## 2019-04-15 VITALS
SYSTOLIC BLOOD PRESSURE: 107 MMHG | HEIGHT: 62 IN | BODY MASS INDEX: 21.14 KG/M2 | HEART RATE: 70 BPM | WEIGHT: 114.88 LBS | TEMPERATURE: 98 F | DIASTOLIC BLOOD PRESSURE: 55 MMHG | OXYGEN SATURATION: 95 % | RESPIRATION RATE: 17 BRPM

## 2019-04-15 DIAGNOSIS — D63.1 ANEMIA ASSOCIATED WITH STAGE 4 CHRONIC RENAL FAILURE: Primary | ICD-10-CM

## 2019-04-15 DIAGNOSIS — N18.4 ANEMIA ASSOCIATED WITH STAGE 4 CHRONIC RENAL FAILURE: Primary | ICD-10-CM

## 2019-04-15 DIAGNOSIS — D63.1 ANEMIA DUE TO STAGE 3 CHRONIC KIDNEY DISEASE: ICD-10-CM

## 2019-04-15 DIAGNOSIS — M79.89 LEFT LEG SWELLING: ICD-10-CM

## 2019-04-15 DIAGNOSIS — I50.42 CHRONIC COMBINED SYSTOLIC AND DIASTOLIC CONGESTIVE HEART FAILURE: ICD-10-CM

## 2019-04-15 DIAGNOSIS — D50.0 IRON DEFICIENCY ANEMIA DUE TO CHRONIC BLOOD LOSS: ICD-10-CM

## 2019-04-15 DIAGNOSIS — I48.0 PAROXYSMAL ATRIAL FIBRILLATION: ICD-10-CM

## 2019-04-15 DIAGNOSIS — N18.30 ANEMIA DUE TO STAGE 3 CHRONIC KIDNEY DISEASE: ICD-10-CM

## 2019-04-15 DIAGNOSIS — D50.0 IRON DEFICIENCY ANEMIA DUE TO CHRONIC BLOOD LOSS: Primary | ICD-10-CM

## 2019-04-15 LAB — INR PPP: 2.4

## 2019-04-15 PROCEDURE — 93793 PR ANTICOAGULANT MGMT FOR PT TAKING WARFARIN: ICD-10-PCS | Mod: ,,,

## 2019-04-15 PROCEDURE — 99214 OFFICE O/P EST MOD 30 MIN: CPT | Mod: S$PBB,,, | Performed by: NURSE PRACTITIONER

## 2019-04-15 PROCEDURE — 99999 PR PBB SHADOW E&M-EST. PATIENT-LVL III: ICD-10-PCS | Mod: PBBFAC,,, | Performed by: NURSE PRACTITIONER

## 2019-04-15 PROCEDURE — 99213 OFFICE O/P EST LOW 20 MIN: CPT | Mod: PBBFAC,25 | Performed by: NURSE PRACTITIONER

## 2019-04-15 PROCEDURE — 63600175 PHARM REV CODE 636 W HCPCS: Mod: JG | Performed by: NURSE PRACTITIONER

## 2019-04-15 PROCEDURE — 99214 PR OFFICE/OUTPT VISIT, EST, LEVL IV, 30-39 MIN: ICD-10-PCS | Mod: S$PBB,,, | Performed by: NURSE PRACTITIONER

## 2019-04-15 PROCEDURE — 25000003 PHARM REV CODE 250: Performed by: NURSE PRACTITIONER

## 2019-04-15 PROCEDURE — 99999 PR PBB SHADOW E&M-EST. PATIENT-LVL III: CPT | Mod: PBBFAC,,, | Performed by: NURSE PRACTITIONER

## 2019-04-15 PROCEDURE — 96365 THER/PROPH/DIAG IV INF INIT: CPT

## 2019-04-15 PROCEDURE — 93793 ANTICOAG MGMT PT WARFARIN: CPT | Mod: ,,,

## 2019-04-15 RX ORDER — HEPARIN 100 UNIT/ML
5 SYRINGE INTRAVENOUS
Status: CANCELLED | OUTPATIENT
Start: 2019-04-22

## 2019-04-15 RX ORDER — SODIUM CHLORIDE 0.9 % (FLUSH) 0.9 %
10 SYRINGE (ML) INJECTION
Status: CANCELLED | OUTPATIENT
Start: 2019-04-22

## 2019-04-15 RX ORDER — SODIUM CHLORIDE 9 MG/ML
INJECTION, SOLUTION INTRAVENOUS CONTINUOUS
Status: CANCELLED | OUTPATIENT
Start: 2019-04-22

## 2019-04-15 RX ORDER — SODIUM CHLORIDE 0.9 % (FLUSH) 0.9 %
10 SYRINGE (ML) INJECTION
Status: DISCONTINUED | OUTPATIENT
Start: 2019-04-15 | End: 2019-04-15 | Stop reason: HOSPADM

## 2019-04-15 RX ORDER — METHYLPREDNISOLONE SODIUM SUCCINATE 1 G/16ML
40 INJECTION, POWDER, LYOPHILIZED, FOR SOLUTION INTRAMUSCULAR; INTRAVENOUS ONCE
Status: CANCELLED
Start: 2019-04-22

## 2019-04-15 RX ADMIN — FERRIC CARBOXYMALTOSE INJECTION 750 MG: 50 INJECTION, SOLUTION INTRAVENOUS at 02:04

## 2019-04-15 NOTE — PROGRESS NOTES
Verbal results taken from __Jennifer with [facility]__. PT/INR _29.3 / 2.4_ Date drawn__4/15/2019__.  Reports no recent changes.  Patient has taken medication as previously instructed.  Orders given:  Maintain current dose of Warfarin 2.5 mg daily.  Recheck on Thursday, 4/18/2019.  Fax orders to follow.

## 2019-04-15 NOTE — PROGRESS NOTES
Subjective:      Patient ID: Jennifer Mathews is a 86 y.o. female.    Chief Complaint: Lab Discussion    HPI:  Patient is a 86 year old  female who presents today for follow-up of her multifactorial anemia.  She has a previous diagnosis iron deficiency anemia, chronic kidney disease, and heart failure with an EF of 20% all contributing to her anemia.  Labs on 4/15/19 show hemoglobin of 9.1.  She is to get procrit 20,000 units for hemoglobin less than 10; however last iron studies show that she is iron deficient with a saturated iron of 11%.  She was due to get 2 doses of IV injectafer prior to starting retacrit therapy, but she was admitted to the hospital for subtherapeutic INR and rectal bleeding.  She received a blood transfusion in the hospital and 1 dose of venofer.  Today's visit was to be the second outpatient injectafer infusion.  She will receive injectafer today and will follow up in one week with CBC and CMP and will start procrit 20,000 units after for hemoblogin less than 10.  Will recheck iron studies in 6 weeks.       She states she has not had a colonoscopy in over 9 years; however due to her poor cardiac function she would require approval from Cardiology prior to proceeding with GI workup.     She denies chest pain or shortness of breath (stating shortness of breath only on exertion).  She denies fatigue, palpitations.  She denies hematuria, melena, hematochezia, epistaxis, bleeding gums or unusual bruising.  She is on Coumadin daily.  She has chronic +3 edema to lower extremities.        Social History     Socioeconomic History    Marital status:      Spouse name: Not on file    Number of children: Not on file    Years of education: Not on file    Highest education level: Not on file   Occupational History    Not on file   Social Needs    Financial resource strain: Not on file    Food insecurity:     Worry: Not on file     Inability: Not on file    Transportation needs:      Medical: Not on file     Non-medical: Not on file   Tobacco Use    Smoking status: Never Smoker    Smokeless tobacco: Never Used   Substance and Sexual Activity    Alcohol use: No    Drug use: No    Sexual activity: Never   Lifestyle    Physical activity:     Days per week: Not on file     Minutes per session: Not on file    Stress: Not on file   Relationships    Social connections:     Talks on phone: Not on file     Gets together: Not on file     Attends Shinto service: Not on file     Active member of club or organization: Not on file     Attends meetings of clubs or organizations: Not on file     Relationship status: Not on file   Other Topics Concern    Are you pregnant or think you may be? Not Asked    Breast-feeding Not Asked   Social History Narrative    Not on file       Family History   Problem Relation Age of Onset    Coronary artery disease Mother         mi    Epilepsy Mother     Heart attack Mother     Coronary artery disease Father     Heart disease Father     Emphysema Father     COPD Father     Diabetes Brother     Cancer Brother     Melanoma Neg Hx     Psoriasis Neg Hx     Lupus Neg Hx     Eczema Neg Hx        Past Surgical History:   Procedure Laterality Date    APPENDECTOMY      CARDIAC CATHETERIZATION      CARDIAC PACEMAKER PLACEMENT  2014    CARDIAC VALVE SURGERY      CATARACT EXTRACTION      OU    CHOLECYSTECTOMY      COLONOSCOPY      ESOPHAGOGASTRODUODENOSCOPY (EGD) N/A 3/13/2019    Performed by [facility]al [facility]rellana [facility] at [facility] ENDO    IRRIGATION AND DEBRIDEMENT OF LEFT KNEE Left 9/12/2017    Performed by Juliano Rosenbaum MD at [facility] OR    MITRAL VALVE REPLACEMENT  2014    MITRAL VALVE SURGERY      x3    REPLACEMENT, PACEMAKER GENERATOR/pt has crt-p versus d Left 6/20/2018    Performed by Manny Dunne [facility] at [facility] CATH LAB    REVISION, SKIN POCKET, FOR CARDIAC PACEMAKER Left 2/26/2014    Performed by Manny [facility]ne MD at [facility] CATH LAB       Past  Medical History:   Diagnosis Date    Acute coronary syndrome     Anemia     Anticoagulated on Coumadin 7/13/2015    Arthritis     Asthma     patient denies    Atrial fibrillation     Basal cell carcinoma 10/2015    left neck    Cardiac arrest     Cardiac resynchronization therapy defibrillator (CRT-D) in place 07/13/2015    Pt denies, states it does not shock me    Chronic combined systolic and diastolic congestive heart failure     CKD (chronic kidney disease) stage 3, GFR 30-59 ml/min     Dyslipidemia 1/30/2014    Hyperlipidemia     Hypertension     Hypothyroidism     Ischemic cardiomyopathy 01/30/2014    Macular hole of left eye     Old    Macular hole of left eye     LEV (obstructive sleep apnea) 9/30/2013    Pneumonia     required hospitalization    Recurrent UTI     Refractive error     Spastic colon     Stroke        Review of Systems   Constitutional: Positive for fatigue. Negative for activity change and appetite change.   HENT: Negative for congestion and nosebleeds.    Respiratory: Positive for shortness of breath (With exertion). Negative for chest tightness.    Cardiovascular: Positive for leg swelling ( +3 lower extremity). Negative for chest pain and palpitations.   Gastrointestinal: Negative for anal bleeding, blood in stool, diarrhea, nausea and vomiting.   Endocrine: Negative for cold intolerance and heat intolerance.   Genitourinary: Negative for dysuria, hematuria and vaginal bleeding.   Musculoskeletal: Positive for gait problem.   Skin: Negative for rash and wound.   Neurological: Negative for dizziness, syncope and light-headedness.   Hematological: Negative for adenopathy. Does not bruise/bleed easily.   Psychiatric/Behavioral: Negative for confusion and decreased concentration.          Medication List with Changes/Refills   Current Medications    ACETAMINOPHEN (TYLENOL EXTRA STRENGTH) 325 MG TABLET    Take 2 tablets (650 mg total) by mouth every 8 (eight) hours.     ALLOPURINOL (ZYLOPRIM) 100 MG TABLET    TAKE 2 TABLETS (200 MG TOTAL) BY MOUTH ONCE DAILY.    AMIODARONE (PACERONE) 200 MG TAB    TAKE 1 TABLET EVERY DAY    CARVEDILOL (COREG) 25 MG TABLET    TAKE 1 TABLET BY MOUTH TWICE A DAY WITH MEALS    DIGOXIN (LANOXIN) 125 MCG TABLET    Take 1 tablet (125 mcg total) by mouth once daily. TAKE 1 TABLET (0.125 MG TOTAL) BY MOUTH ONCE DAILY.    FUROSEMIDE (LASIX) 40 MG TABLET    Take 40 mg by mouth 2 (two) times daily.    GABAPENTIN (NEURONTIN) 100 MG CAPSULE    TAKE ONE CAPSULE BY MOUTH TWO TIMES DAILY    LEVOTHYROXINE (SYNTHROID) 50 MCG TABLET    Take 1 tablet (50 mcg total) by mouth once daily.    LOSARTAN (COZAAR) 25 MG TABLET    Take 1 tablet (25 mg total) by mouth once daily.    METOLAZONE (ZAROXOLYN) 2.5 MG TABLET    Take 1 tablet (2.5 mg total) by mouth daily as needed. For SOB or Leg edema    PANTOPRAZOLE (PROTONIX) 40 MG TABLET    Take 1 tablet (40 mg total) by mouth once daily.    WARFARIN (COUMADIN) 2.5 MG TABLET    Take 1 tablet (2.5 mg total) by mouth Daily. Hold every Sunday        Objective:     Vitals:    04/15/19 1320   BP: (!) 107/55   Pulse: 70   Resp: 17   Temp: 98 °F (36.7 °C)       Physical Exam   Constitutional: She is oriented to person, place, and time. She appears well-developed.  Non-toxic appearance. She does not have a sickly appearance. She does not appear ill. No distress.   HENT:   Head: Normocephalic and atraumatic.   Right Ear: External ear normal.   Left Ear: External ear normal.   Nose: Nose normal.   Eyes: Conjunctivae, EOM and lids are normal. Right eye exhibits no discharge. Left eye exhibits no discharge. No scleral icterus.   Neck: Normal range of motion.   Cardiovascular: Normal rate, regular rhythm, S1 normal, S2 normal and normal heart sounds. Exam reveals no gallop and no friction rub.   No murmur heard.  Pulmonary/Chest: Effort normal and breath sounds normal. No accessory muscle usage or stridor. No apnea, no tachypnea and no  bradypnea. No respiratory distress. She has no decreased breath sounds. She has no wheezes. She has no rales.   Abdominal: Soft. Normal appearance. She exhibits no distension.   Musculoskeletal: Normal range of motion. She exhibits edema.   Neurological: She is alert and oriented to person, place, and time.   Skin: Skin is warm, dry and intact. Capillary refill takes less than 2 seconds. Bruising noted. No lesion and no rash noted. She is not diaphoretic. No pallor.   Psychiatric: She has a normal mood and affect. Her speech is normal and behavior is normal. Judgment normal. She is not actively hallucinating. Cognition and memory are normal. She is attentive.   Vitals reviewed.      Assessment:     Problem List Items Addressed This Visit        Renal/    Anemia associated with stage 4 chronic renal failure - Primary      Other Visit Diagnoses     Anemia due to stage 3 chronic kidney disease        Relevant Orders    CBC auto differential    Comprehensive metabolic panel        Lab Results   Component Value Date    WBC 6.19 04/15/2019    HGB 9.1 (L) 04/15/2019    HCT 28.8 (L) 04/15/2019    MCV 99 (H) 04/15/2019     04/15/2019     BNP  @LABRCNTIP(BNP,BNPTRIAGEBLO)@  Lab Results   Component Value Date    IRON 36 03/29/2019    TIBC 255 03/29/2019    FERRITIN 485 (H) 03/29/2019    SATURATEDIRO 11 (L) 04/13/2010     CMP  Sodium   Date Value Ref Range Status   04/15/2019 141 136 - 145 mmol/L Final   04/15/2019 141 136 - 145 mmol/L Final     Potassium   Date Value Ref Range Status   04/15/2019 4.2 3.5 - 5.1 mmol/L Final   04/15/2019 4.2 3.5 - 5.1 mmol/L Final     Chloride   Date Value Ref Range Status   04/15/2019 106 95 - 110 mmol/L Final   04/15/2019 106 95 - 110 mmol/L Final     CO2   Date Value Ref Range Status   04/15/2019 26 23 - 29 mmol/L Final   04/15/2019 26 23 - 29 mmol/L Final     Glucose   Date Value Ref Range Status   04/15/2019 84 70 - 110 mg/dL Final   04/15/2019 84 70 - 110 mg/dL Final     BUN, Bld    Date Value Ref Range Status   04/15/2019 44 (H) 8 - 23 mg/dL Final   04/15/2019 44 (H) 8 - 23 mg/dL Final     Creatinine   Date Value Ref Range Status   04/15/2019 1.4 0.5 - 1.4 mg/dL Final   04/15/2019 1.4 0.5 - 1.4 mg/dL Final     Calcium   Date Value Ref Range Status   04/15/2019 7.9 (L) 8.7 - 10.5 mg/dL Final   04/15/2019 7.9 (L) 8.7 - 10.5 mg/dL Final     Total Protein   Date Value Ref Range Status   04/15/2019 5.5 (L) 6.0 - 8.4 g/dL Final   04/15/2019 5.5 (L) 6.0 - 8.4 g/dL Final     Albumin   Date Value Ref Range Status   04/15/2019 3.0 (L) 3.5 - 5.2 g/dL Final   04/15/2019 3.0 (L) 3.5 - 5.2 g/dL Final     Total Bilirubin   Date Value Ref Range Status   04/15/2019 0.4 0.1 - 1.0 mg/dL Final     Comment:     For infants and newborns, interpretation of results should be based  on gestational age, weight and in agreement with clinical  observations.  Premature Infant recommended reference ranges:  Up to 24 hours.............<8.0 mg/dL  Up to 48 hours............<12.0 mg/dL  3-5 days..................<15.0 mg/dL  6-29 days.................<15.0 mg/dL     04/15/2019 0.4 0.1 - 1.0 mg/dL Final     Comment:     For infants and newborns, interpretation of results should be based  on gestational age, weight and in agreement with clinical  observations.  Premature Infant recommended reference ranges:  Up to 24 hours.............<8.0 mg/dL  Up to 48 hours............<12.0 mg/dL  3-5 days..................<15.0 mg/dL  6-29 days.................<15.0 mg/dL       Alkaline Phosphatase   Date Value Ref Range Status   04/15/2019 77 55 - 135 U/L Final   04/15/2019 77 55 - 135 U/L Final     AST   Date Value Ref Range Status   04/15/2019 18 10 - 40 U/L Final   04/15/2019 18 10 - 40 U/L Final     ALT   Date Value Ref Range Status   04/15/2019 17 10 - 44 U/L Final   04/15/2019 17 10 - 44 U/L Final     Anion Gap   Date Value Ref Range Status   04/15/2019 9 8 - 16 mmol/L Final   04/15/2019 9 8 - 16 mmol/L Final     eGFR if African  American   Date Value Ref Range Status   04/15/2019 39 (A) >60 mL/min/1.73 m^2 Final   04/15/2019 39 (A) >60 mL/min/1.73 m^2 Final     eGFR if non    Date Value Ref Range Status   04/15/2019 34 (A) >60 mL/min/1.73 m^2 Final     Comment:     Calculation used to obtain the estimated glomerular filtration  rate (eGFR) is the CKD-EPI equation.      04/15/2019 34 (A) >60 mL/min/1.73 m^2 Final     Comment:     Calculation used to obtain the estimated glomerular filtration  rate (eGFR) is the CKD-EPI equation.              Plan:   Anemia associated with stage 4 chronic renal failure    Anemia due to stage 3 chronic kidney disease  -     CBC auto differential; Future; Expected date: 04/15/2019  -     Comprehensive metabolic panel; Future; Expected date: 04/15/2019    She will receive injectafer today and will follow up in one week with CBC and CMP and will start procrit 20,000 units after for hemoblogin less than 10.  Will recheck iron studies in 6 weeks.  Her calcium is also low.  She will start taking a tums daily and we will reassess in one week.       I will review assessment/plan with collaborating physician.    Thank You,  ABELINO Prado

## 2019-04-15 NOTE — DISCHARGE INSTRUCTIONS
[facility]  [address] [address]  [address]  [phone] phone     [phone] fax  Hours of Operation: Monday- Friday 8:00am- 5:00pm  After hours phone  [phone]  Hematology / Oncology Physicians on call      Dr. Rodríguez Gary      Please call with any concerns regarding your appointment today.    FALL PREVENTION   Falls often occur due to slipping, tripping or losing your balance. Here are ways to reduce your risk of falling again.   Was there anything that caused your fall that can be fixed, removed or replaced?   Make your home safe by keeping walkways clear of objects you may trip over.   Use non-slip pads under rugs.   Do not walk in poorly lit areas.   Do not stand on chairs or wobbly ladders.   Use caution when reaching overhead or looking upward. This position can cause a loss of balance.   Be sure your shoes fit properly, have non-slip bottoms and are in good condition.   Be cautious when going up and down stairs, curbs, and when walking on uneven sidewalks.   If your balance is poor, consider using a cane or walker.   If your fall was related to alcohol use, stop or limit alcohol intake.   If your fall was related to use of sleeping medicines, talk to your doctor about this. You may need to reduce your dosage at bedtime if you awaken during the night to go to the bathroom.   To reduce the need for nighttime bathroom trips:   Avoid drinking fluids for several hours before going to bed   Empty your bladder before going to bed   Men can keep a urinal at the bedside   © 7548-0752 [employer], [address] [place] [address], [place] [address]. All rights reserved. This information is not intended as a substitute for professional medical care. Always follow your healthcare professional's instructions.

## 2019-04-16 ENCOUNTER — TELEPHONE (OUTPATIENT)
Dept: CARDIOLOGY | Facility: CLINIC | Age: 84
End: 2019-04-16

## 2019-04-16 NOTE — TELEPHONE ENCOUNTER
Please phone patient. BMP is stable. Continue same mgmt.    Has she ever tried Eliquis or alternative blood thinner for AC as opposed to Coumadin?    Thanks

## 2019-04-16 NOTE — TELEPHONE ENCOUNTER
"The patient has been notified of this information and all questions answered.      Pt states she has tried Eliquis a long time ago "states she only stopped medication because she felt it wouldn't work like how it suppose to work". Pt stated she didn't have any reactions to medication Eliquis.  "

## 2019-04-17 NOTE — ASSESSMENT & PLAN NOTE
"- Recurrent issue  - INR elevated to 4.1 for which she was given 5mg of Vitamin K po  - Hold Coumadin  - Per patient, "I have not been in [facility] for about 6 months and Dr. Dunne mentioned I may be able to stop Coumadin since I'm having so many issues with it".    - Cardiology consult in AM to determine POC moving forward given recurrent GI bleeds and supratherapeutic INR  - Daily INR  - See plan as per above     4/6  Admit with INR 7.6  INR trend down to 2.0 today   Resume Coumadin   " pt recently admitted to ICU at  and discharged on sunday. 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 last admission pt was septic

## 2019-04-18 ENCOUNTER — ANTI-COAG VISIT (OUTPATIENT)
Dept: CARDIOLOGY | Facility: CLINIC | Age: 84
End: 2019-04-18
Payer: MEDICARE

## 2019-04-18 LAB — INR PPP: 2

## 2019-04-18 PROCEDURE — 93793 PR ANTICOAGULANT MGMT FOR PT TAKING WARFARIN: ICD-10-PCS | Mod: ,,,

## 2019-04-18 PROCEDURE — 93793 ANTICOAG MGMT PT WARFARIN: CPT | Mod: ,,,

## 2019-04-18 NOTE — PROGRESS NOTES
Verbal results taken from [facility]. PT/INR _21.4/ 2.0_ Date drawn_4/18/2019_.   Patient has taken medication as previously instructed.  No other changes reported.  Orders given:  Maintain current dose of Warfarin 2.5 mg every evening.  Patient will be recheck on 4/22/2019 with other scheduled labs at [facility].  [facility] will recheck on 4/25/2019.  Fax orders to follow.

## 2019-04-22 ENCOUNTER — ANTI-COAG VISIT (OUTPATIENT)
Dept: CARDIOLOGY | Facility: CLINIC | Age: 84
End: 2019-04-22
Payer: MEDICARE

## 2019-04-22 ENCOUNTER — OFFICE VISIT (OUTPATIENT)
Dept: HEMATOLOGY/ONCOLOGY | Facility: CLINIC | Age: 84
End: 2019-04-22
Payer: MEDICARE

## 2019-04-22 ENCOUNTER — INFUSION (OUTPATIENT)
Dept: INFUSION THERAPY | Facility: HOSPITAL | Age: 84
End: 2019-04-22
Attending: INTERNAL MEDICINE
Payer: MEDICARE

## 2019-04-22 VITALS
BODY MASS INDEX: 21.67 KG/M2 | TEMPERATURE: 97 F | OXYGEN SATURATION: 96 % | DIASTOLIC BLOOD PRESSURE: 56 MMHG | SYSTOLIC BLOOD PRESSURE: 120 MMHG | WEIGHT: 117.75 LBS | HEIGHT: 62 IN | HEART RATE: 71 BPM

## 2019-04-22 DIAGNOSIS — Z79.01 LONG TERM (CURRENT) USE OF ANTICOAGULANTS: ICD-10-CM

## 2019-04-22 DIAGNOSIS — D53.9 MACROCYTIC ANEMIA: Primary | ICD-10-CM

## 2019-04-22 DIAGNOSIS — I48.91 ATRIAL FIBRILLATION, UNSPECIFIED TYPE: ICD-10-CM

## 2019-04-22 DIAGNOSIS — D63.1 ANEMIA ASSOCIATED WITH STAGE 4 CHRONIC RENAL FAILURE: ICD-10-CM

## 2019-04-22 DIAGNOSIS — I10 ESSENTIAL HYPERTENSION: ICD-10-CM

## 2019-04-22 DIAGNOSIS — D63.1 ANEMIA ASSOCIATED WITH STAGE 4 CHRONIC RENAL FAILURE: Primary | ICD-10-CM

## 2019-04-22 DIAGNOSIS — N18.4 ANEMIA ASSOCIATED WITH STAGE 4 CHRONIC RENAL FAILURE: Primary | ICD-10-CM

## 2019-04-22 DIAGNOSIS — N18.4 ANEMIA ASSOCIATED WITH STAGE 4 CHRONIC RENAL FAILURE: ICD-10-CM

## 2019-04-22 DIAGNOSIS — E78.5 DYSLIPIDEMIA: ICD-10-CM

## 2019-04-22 DIAGNOSIS — Z79.01 ANTICOAGULATED ON COUMADIN: ICD-10-CM

## 2019-04-22 DIAGNOSIS — D50.0 IRON DEFICIENCY ANEMIA DUE TO CHRONIC BLOOD LOSS: ICD-10-CM

## 2019-04-22 DIAGNOSIS — I50.9 ACUTE ON CHRONIC CONGESTIVE HEART FAILURE: ICD-10-CM

## 2019-04-22 PROCEDURE — 96372 THER/PROPH/DIAG INJ SC/IM: CPT

## 2019-04-22 PROCEDURE — 99215 PR OFFICE/OUTPT VISIT, EST, LEVL V, 40-54 MIN: ICD-10-PCS | Mod: S$PBB,,, | Performed by: NURSE PRACTITIONER

## 2019-04-22 PROCEDURE — 99999 PR PBB SHADOW E&M-EST. PATIENT-LVL III: ICD-10-PCS | Mod: PBBFAC,,, | Performed by: NURSE PRACTITIONER

## 2019-04-22 PROCEDURE — 99213 OFFICE O/P EST LOW 20 MIN: CPT | Mod: PBBFAC,25 | Performed by: NURSE PRACTITIONER

## 2019-04-22 PROCEDURE — 93793 PR ANTICOAGULANT MGMT FOR PT TAKING WARFARIN: ICD-10-PCS | Mod: ,,,

## 2019-04-22 PROCEDURE — 63600175 PHARM REV CODE 636 W HCPCS: Mod: JG,EC | Performed by: NURSE PRACTITIONER

## 2019-04-22 PROCEDURE — 99215 OFFICE O/P EST HI 40 MIN: CPT | Mod: S$PBB,,, | Performed by: NURSE PRACTITIONER

## 2019-04-22 PROCEDURE — 93793 ANTICOAG MGMT PT WARFARIN: CPT | Mod: ,,,

## 2019-04-22 PROCEDURE — 99999 PR PBB SHADOW E&M-EST. PATIENT-LVL III: CPT | Mod: PBBFAC,,, | Performed by: NURSE PRACTITIONER

## 2019-04-22 RX ORDER — DIGOXIN 125 MCG
TABLET ORAL
Qty: 90 TABLET | Refills: 3 | Status: SHIPPED | OUTPATIENT
Start: 2019-04-22 | End: 2019-06-04 | Stop reason: DRUGHIGH

## 2019-04-22 RX ADMIN — EPOETIN ALFA 20000 UNITS: 20000 SOLUTION INTRAVENOUS; SUBCUTANEOUS at 02:04

## 2019-04-22 NOTE — PATIENT INSTRUCTIONS
Epoetin Godwin injection  What is this medicine?  EPOETIN GODWIN (e NEGRA e tin AL fa) helps your body make more red blood cells. This medicine is used to treat anemia caused by chronic kidney failure, cancer chemotherapy, or HIV-therapy. It may also be used before surgery if you have anemia.  How should I use this medicine?  This medicine is for injection into a vein or under the skin. It is usually given by a health care professional in a hospital or clinic setting.  If you get this medicine at home, you will be taught how to prepare and give this medicine. Use exactly as directed. Take your medicine at regular intervals. Do not take your medicine more often than directed.  It is important that you put your used needles and syringes in a special sharps container. Do not put them in a trash can. If you do not have a sharps container, call your pharmacist or healthcare provider to get one.  Talk to your pediatrician regarding the use of this medicine in children. While this drug may be prescribed for selected conditions, precautions do apply.  What side effects may I notice from receiving this medicine?  Side effects that you should report to your doctor or health care professional as soon as possible:  · allergic reactions like skin rash, itching or hives, swelling of the face, lips, or tongue  · breathing problems  · changes in vision  · chest pain  · confusion, trouble speaking or understanding  · feeling faint or lightheaded, falls  · high blood pressure  · muscle aches or pains  · pain, swelling, warmth in the leg  · rapid weight gain  · severe headaches  · sudden numbness or weakness of the face, arm or leg  · trouble walking, dizziness, loss of balance or coordination  · seizures (convulsions)  · swelling of the ankles, feet, hands  · unusually weak or tired  Side effects that usually do not require medical attention (report to your doctor or health care professional if they continue or are  bothersome):  · diarrhea  · fever, chills (flu-like symptoms)  · headaches  · nausea, vomiting  · redness, stinging, or swelling at site where injected  What may interact with this medicine?  Do not take this medicine with any of the following medications:  · darbepoetin abby  What if I miss a dose?  If you miss a dose, take it as soon as you can. If it is almost time for your next dose, take only that dose. Do not take double or extra doses.  Where should I keep my medicine?  Keep out of the reach of children.  Store in a refrigerator between 2 and 8 degrees C (36 and 46 degrees F). Do not freeze or shake. Throw away any unused portion if using a single-dose vial. Multi-dose vials can be kept in the refrigerator for up to 21 days after the initial dose. Throw away unused medicine.  What should I tell my health care provider before I take this medicine?  They need to know if you have any of these conditions:  · blood clotting disorders  · cancer patient not on chemotherapy  · cystic fibrosis  · heart disease, such as angina or heart failure  · hemoglobin level of 12 g/dL or greater  · high blood pressure  · low levels of folate, iron, or vitamin B12  · seizures  · an unusual or allergic reaction to erythropoietin, albumin, benzyl alcohol, hamster proteins, other medicines, foods, dyes, or preservatives  · pregnant or trying to get pregnant  · breast-feeding  What should I watch for while using this medicine?  Visit your prescriber or health care professional for regular checks on your progress and for the needed blood tests and blood pressure measurements. It is especially important for the doctor to make sure your hemoglobin level is in the desired range, to limit the risk of potential side effects and to give you the best benefit. Keep all appointments for any recommended tests. Check your blood pressure as directed. Ask your doctor what your blood pressure should be and when you should contact him or her.  As  your body makes more red blood cells, you may need to take iron, folic acid, or vitamin B supplements. Ask your doctor or health care provider which products are right for you. If you have kidney disease continue dietary restrictions, even though this medication can make you feel better. Talk with your doctor or health care professional about the foods you eat and the vitamins that you take.  NOTE:This sheet is a summary. It may not cover all possible information. If you have questions about this medicine, talk to your doctor, pharmacist, or health care provider. Copyright© 2017 [employer]

## 2019-04-22 NOTE — DISCHARGE INSTRUCTIONS
[facility]  [address] [address]  [address]  [phone] phone     [phone] fax  Hours of Operation: Monday- Friday 8:00am- 5:00pm  After hours phone  [phone]  Hematology / Oncology Physicians on call      Dr. Rodríguez Gary      Please call with any concerns regarding your appointment today.    FALL PREVENTION   Falls often occur due to slipping, tripping or losing your balance. Here are ways to reduce your risk of falling again.   Was there anything that caused your fall that can be fixed, removed or replaced?   Make your home safe by keeping walkways clear of objects you may trip over.   Use non-slip pads under rugs.   Do not walk in poorly lit areas.   Do not stand on chairs or wobbly ladders.   Use caution when reaching overhead or looking upward. This position can cause a loss of balance.   Be sure your shoes fit properly, have non-slip bottoms and are in good condition.   Be cautious when going up and down stairs, curbs, and when walking on uneven sidewalks.   If your balance is poor, consider using a cane or walker.   If your fall was related to alcohol use, stop or limit alcohol intake.   If your fall was related to use of sleeping medicines, talk to your doctor about this. You may need to reduce your dosage at bedtime if you awaken during the night to go to the bathroom.   To reduce the need for nighttime bathroom trips:   Avoid drinking fluids for several hours before going to bed   Empty your bladder before going to bed   Men can keep a urinal at the bedside   © 4761-7040 [employer], [address] [place] [address], [place] [address]. All rights reserved. This information is not intended as a substitute for professional medical care. Always follow your healthcare professional's instructions.

## 2019-04-22 NOTE — PROGRESS NOTES
Patient's INR is slightly sub-therapeutic at 1.9. Due to previous signs/symptoms of bleeding, will continue current dose at this time and monitor closely.  Continue current dose of 2.5mg every evening.  Recheck on Thursday, 4/25/19 with [employer].  Please call should you have any questions or concerns at [phone] or [phone].     Jennifer with [facility] contacted.

## 2019-04-23 ENCOUNTER — TELEPHONE (OUTPATIENT)
Dept: CARDIOLOGY | Facility: CLINIC | Age: 84
End: 2019-04-23

## 2019-04-23 NOTE — TELEPHONE ENCOUNTER
Please inform patient that per Dr. Chau, no Coumadin for now and they will discuss further options when she follows up with him.     Thanks

## 2019-04-23 NOTE — TELEPHONE ENCOUNTER
----- Message from Manny Dunne MD sent at 4/22/2019  2:45 AM CDT -----  Would stop coumadin for now  I'll see her later in clinic for further options  ----- Message -----  From: RAUL Lovell  Sent: 4/8/2019   4:50 PM  To: Manny Dunne MD, #    Patient readmitted with coumadin toxicity and GIB  What would you like to do in regards to OAC?  Looks like we may need to stop it.     We thought the last episode was due to abx therapy, but no abx given this time. Amio wasn't resumed because INR was supra therapeutic before admission. So coumadin toxicity off amio and abx therapy            ----- Message -----  From: Deanna Schreiber, PharmD  Sent: 4/8/2019   8:07 AM  To: RAUL Gupta    Just an update, Mrs. Mathews was admitted again on last week due to coumadin toxicity/GI bleed.     ----- Message -----  From: RAUL Gupta  Sent: 3/29/2019   1:51 PM  To: Adriana Arizmendi PA-C, #    Patient admitted last week with coumadin toxicity that was likely from abx therapy prior to admit. Her amio was stopped during admit and hasn't been resumed. I have spoken with Dr. Dunne and we believe that the toxicity was from the abx and not necessarily the amio . She is in NSR today and Dr. Saldivar would like the Amio restarted once INR therapeutic. I am out next week when INR scheduled to be rechecked. Can you please let LAST Valadez know if her INR is within range so we can give her instructions to restart Amio    Thanks,  NENO Rand

## 2019-04-25 ENCOUNTER — ANTI-COAG VISIT (OUTPATIENT)
Dept: CARDIOLOGY | Facility: CLINIC | Age: 84
End: 2019-04-25
Payer: MEDICARE

## 2019-04-25 DIAGNOSIS — Z79.01 LONG TERM (CURRENT) USE OF ANTICOAGULANTS: ICD-10-CM

## 2019-04-25 LAB — INR PPP: 1.5

## 2019-04-25 PROCEDURE — 93793 ANTICOAG MGMT PT WARFARIN: CPT | Mod: ,,,

## 2019-04-25 PROCEDURE — 93793 PR ANTICOAGULANT MGMT FOR PT TAKING WARFARIN: ICD-10-PCS | Mod: ,,,

## 2019-04-25 NOTE — PROGRESS NOTES
Mrs. Mathews was advised to discontinue her coumadin on 4/23 per Dr. Gibbs due to coumadin toxicity/melena  x 2 (3/10 and 4/05).    Patient was concerned about discontinuing therapy as her f/u appt with Sai isn't until 5/31. Cardiology contacted on today.     Per Dr. Cary: Patient should resume her coumadin due to her a-fib and valve replacement.  Patient will f/u with Dr. Gibbs at her next f/u appt on 5/31. Patient and [facility] notified.    Verbal result taken from Jennifer with [facility]. PT / INR  18.5  / 1.5.   Date drawn 4/25/19.   Orders given:Take coumadin 5mg on today; then resume current dose of 2.5mg every evening.  Recheck on Monday, 4/29/19.

## 2019-04-29 ENCOUNTER — TELEPHONE (OUTPATIENT)
Dept: CARDIOLOGY | Facility: CLINIC | Age: 84
End: 2019-04-29

## 2019-04-29 ENCOUNTER — ANTI-COAG VISIT (OUTPATIENT)
Dept: CARDIOLOGY | Facility: CLINIC | Age: 84
End: 2019-04-29
Payer: MEDICARE

## 2019-04-29 DIAGNOSIS — Z79.01 LONG TERM (CURRENT) USE OF ANTICOAGULANTS: ICD-10-CM

## 2019-04-29 LAB — INR PPP: 1.7

## 2019-04-29 PROCEDURE — 93793 ANTICOAG MGMT PT WARFARIN: CPT | Mod: ,,,

## 2019-04-29 PROCEDURE — 93793 PR ANTICOAGULANT MGMT FOR PT TAKING WARFARIN: ICD-10-PCS | Mod: ,,,

## 2019-04-29 NOTE — TELEPHONE ENCOUNTER
----- Message from Trang Perez sent at 4/29/2019  9:44 AM CDT -----  Contact: Luis- [facility]  Luis is calling to speak with nurse in regards to pt having extreme weight gain in the past week. On Thursday pt weighed 114 and on today pt weighed 121. Please call Luis back at [phone].      Thanks,   Trang Perez

## 2019-04-29 NOTE — TELEPHONE ENCOUNTER
Returned call to Nurse Smith regarding patient's with weight gain from 114 to 121 since Thursday.  Asked if patient has been compliant with sodium and fluid restriction and he stated she'd gone out to dinner two times during this time period.  Will telephone patient and advise taking Metolazone left voice mail message

## 2019-04-29 NOTE — PROGRESS NOTES
Verbal result taken from Jennifer with [facility]. PT / INR  20.0  / 1.7.   Date drawn 4/29/19.   Orders given:Take coumadin 5mg on today; then resume current dose of 2.5mg every evening.  Recheck on Thursday, 5/2/19.

## 2019-04-30 ENCOUNTER — EXTERNAL CHRONIC CARE MANAGEMENT (OUTPATIENT)
Dept: PRIMARY CARE CLINIC | Facility: CLINIC | Age: 84
End: 2019-04-30
Payer: MEDICARE

## 2019-04-30 PROCEDURE — 99490 CHRNC CARE MGMT STAFF 1ST 20: CPT | Mod: S$PBB,,, | Performed by: FAMILY MEDICINE

## 2019-04-30 PROCEDURE — 99490 PR CHRONIC CARE MGMT, 1ST 20 MIN: ICD-10-PCS | Mod: S$PBB,,, | Performed by: FAMILY MEDICINE

## 2019-04-30 PROCEDURE — 99490 CHRNC CARE MGMT STAFF 1ST 20: CPT | Mod: PBBFAC | Performed by: FAMILY MEDICINE

## 2019-05-02 ENCOUNTER — ANTI-COAG VISIT (OUTPATIENT)
Dept: CARDIOLOGY | Facility: CLINIC | Age: 84
End: 2019-05-02
Payer: MEDICARE

## 2019-05-02 DIAGNOSIS — Z79.01 LONG TERM (CURRENT) USE OF ANTICOAGULANTS: ICD-10-CM

## 2019-05-02 LAB — INR PPP: 1.8

## 2019-05-02 PROCEDURE — 93793 PR ANTICOAGULANT MGMT FOR PT TAKING WARFARIN: ICD-10-PCS | Mod: ,,,

## 2019-05-02 PROCEDURE — 93793 ANTICOAG MGMT PT WARFARIN: CPT | Mod: ,,,

## 2019-05-02 NOTE — PROGRESS NOTES
Verbal result taken from Jennifer with [facility]. PT / INR  21.5  / 1.8.   Date drawn 5/02/19.   No missed doses or significant changes reported.  Will only increase today's dose slightly as patient has a recent history of melena x 2.  Orders given:Take coumadin 3.75mg on today; then resume current dose of 2.5mg every evening.  Recheck on Monday, 5/6/19 with existing labs.

## 2019-05-06 ENCOUNTER — OFFICE VISIT (OUTPATIENT)
Dept: HEMATOLOGY/ONCOLOGY | Facility: CLINIC | Age: 84
End: 2019-05-06
Payer: MEDICARE

## 2019-05-06 ENCOUNTER — ANTI-COAG VISIT (OUTPATIENT)
Dept: CARDIOLOGY | Facility: CLINIC | Age: 84
End: 2019-05-06
Payer: MEDICARE

## 2019-05-06 ENCOUNTER — LAB VISIT (OUTPATIENT)
Dept: LAB | Facility: HOSPITAL | Age: 84
End: 2019-05-06
Attending: NURSE PRACTITIONER
Payer: MEDICARE

## 2019-05-06 VITALS
SYSTOLIC BLOOD PRESSURE: 136 MMHG | DIASTOLIC BLOOD PRESSURE: 60 MMHG | TEMPERATURE: 98 F | HEART RATE: 72 BPM | WEIGHT: 119.94 LBS | BODY MASS INDEX: 22.07 KG/M2 | OXYGEN SATURATION: 95 % | HEIGHT: 62 IN

## 2019-05-06 DIAGNOSIS — Z79.01 LONG TERM (CURRENT) USE OF ANTICOAGULANTS: ICD-10-CM

## 2019-05-06 DIAGNOSIS — D53.9 MACROCYTIC ANEMIA: ICD-10-CM

## 2019-05-06 DIAGNOSIS — Z79.01 ANTICOAGULATED ON COUMADIN: ICD-10-CM

## 2019-05-06 DIAGNOSIS — I50.42 CHRONIC COMBINED SYSTOLIC AND DIASTOLIC CONGESTIVE HEART FAILURE: ICD-10-CM

## 2019-05-06 DIAGNOSIS — D68.9 COAGULOPATHY: ICD-10-CM

## 2019-05-06 DIAGNOSIS — D50.0 IRON DEFICIENCY ANEMIA DUE TO CHRONIC BLOOD LOSS: Primary | ICD-10-CM

## 2019-05-06 DIAGNOSIS — D50.0 IRON DEFICIENCY ANEMIA DUE TO CHRONIC BLOOD LOSS: ICD-10-CM

## 2019-05-06 DIAGNOSIS — R76.8 ELEVATED SERUM IMMUNOGLOBULIN FREE LIGHT CHAIN LEVEL: ICD-10-CM

## 2019-05-06 LAB
ALBUMIN SERPL BCP-MCNC: 3.6 G/DL (ref 3.5–5.2)
ALP SERPL-CCNC: 88 U/L (ref 55–135)
ALT SERPL W/O P-5'-P-CCNC: 11 U/L (ref 10–44)
ANION GAP SERPL CALC-SCNC: 10 MMOL/L (ref 8–16)
AST SERPL-CCNC: 18 U/L (ref 10–40)
BASOPHILS # BLD AUTO: 0.06 K/UL (ref 0–0.2)
BASOPHILS NFR BLD: 0.8 % (ref 0–1.9)
BILIRUB SERPL-MCNC: 0.5 MG/DL (ref 0.1–1)
BNP SERPL-MCNC: 1510 PG/ML (ref 0–99)
BUN SERPL-MCNC: 57 MG/DL (ref 8–23)
CALCIUM SERPL-MCNC: 9.5 MG/DL (ref 8.7–10.5)
CHLORIDE SERPL-SCNC: 103 MMOL/L (ref 95–110)
CO2 SERPL-SCNC: 29 MMOL/L (ref 23–29)
CREAT SERPL-MCNC: 1.7 MG/DL (ref 0.5–1.4)
DIFFERENTIAL METHOD: ABNORMAL
EOSINOPHIL # BLD AUTO: 0.1 K/UL (ref 0–0.5)
EOSINOPHIL NFR BLD: 1.6 % (ref 0–8)
ERYTHROCYTE [DISTWIDTH] IN BLOOD BY AUTOMATED COUNT: 19.5 % (ref 11.5–14.5)
EST. GFR  (AFRICAN AMERICAN): 31 ML/MIN/1.73 M^2
EST. GFR  (NON AFRICAN AMERICAN): 27 ML/MIN/1.73 M^2
GLUCOSE SERPL-MCNC: 97 MG/DL (ref 70–110)
HCT VFR BLD AUTO: 34.1 % (ref 37–48.5)
HGB BLD-MCNC: 10.6 G/DL (ref 12–16)
INR PPP: 1.3 (ref 0.8–1.2)
LYMPHOCYTES # BLD AUTO: 1.7 K/UL (ref 1–4.8)
LYMPHOCYTES NFR BLD: 23.2 % (ref 18–48)
MCH RBC QN AUTO: 32.1 PG (ref 27–31)
MCHC RBC AUTO-ENTMCNC: 31.1 G/DL (ref 32–36)
MCV RBC AUTO: 103 FL (ref 82–98)
MONOCYTES # BLD AUTO: 0.7 K/UL (ref 0.3–1)
MONOCYTES NFR BLD: 9.4 % (ref 4–15)
NEUTROPHILS # BLD AUTO: 4.8 K/UL (ref 1.8–7.7)
NEUTROPHILS NFR BLD: 65 % (ref 38–73)
PLATELET # BLD AUTO: 250 K/UL (ref 150–350)
PMV BLD AUTO: 10.9 FL (ref 9.2–12.9)
POTASSIUM SERPL-SCNC: 4.7 MMOL/L (ref 3.5–5.1)
PROT SERPL-MCNC: 6.8 G/DL (ref 6–8.4)
PROTHROMBIN TIME: 14.2 SEC (ref 9–12.5)
RBC # BLD AUTO: 3.3 M/UL (ref 4–5.4)
SODIUM SERPL-SCNC: 142 MMOL/L (ref 136–145)
WBC # BLD AUTO: 7.36 K/UL (ref 3.9–12.7)

## 2019-05-06 PROCEDURE — 93793 ANTICOAG MGMT PT WARFARIN: CPT | Mod: ,,,

## 2019-05-06 PROCEDURE — 80053 COMPREHEN METABOLIC PANEL: CPT

## 2019-05-06 PROCEDURE — 99999 PR PBB SHADOW E&M-EST. PATIENT-LVL III: CPT | Mod: PBBFAC,,, | Performed by: NURSE PRACTITIONER

## 2019-05-06 PROCEDURE — 99213 OFFICE O/P EST LOW 20 MIN: CPT | Mod: PBBFAC | Performed by: NURSE PRACTITIONER

## 2019-05-06 PROCEDURE — 36415 COLL VENOUS BLD VENIPUNCTURE: CPT

## 2019-05-06 PROCEDURE — 85610 PROTHROMBIN TIME: CPT

## 2019-05-06 PROCEDURE — 99214 PR OFFICE/OUTPT VISIT, EST, LEVL IV, 30-39 MIN: ICD-10-PCS | Mod: S$PBB,,, | Performed by: NURSE PRACTITIONER

## 2019-05-06 PROCEDURE — 85025 COMPLETE CBC W/AUTO DIFF WBC: CPT

## 2019-05-06 PROCEDURE — 83880 ASSAY OF NATRIURETIC PEPTIDE: CPT

## 2019-05-06 PROCEDURE — 93793 PR ANTICOAGULANT MGMT FOR PT TAKING WARFARIN: ICD-10-PCS | Mod: ,,,

## 2019-05-06 PROCEDURE — 99999 PR PBB SHADOW E&M-EST. PATIENT-LVL III: ICD-10-PCS | Mod: PBBFAC,,, | Performed by: NURSE PRACTITIONER

## 2019-05-06 PROCEDURE — 99214 OFFICE O/P EST MOD 30 MIN: CPT | Mod: S$PBB,,, | Performed by: NURSE PRACTITIONER

## 2019-05-06 NOTE — PROGRESS NOTES
Subjective:       Patient ID: Jennifer Mathews is a 86 y.o. female.    Chief Complaint: Anemia and Results    85-year-old  female who presents to the Hematology Oncology Clinic today as a follow-up for anemia, s/p Feraheme.  I have reviewed all the patient's relevant clinical history available medical record have utilized as my evaluation management recommendations today.  She continues on Coumadin at this time.  The patient reports that she does have some generalized weakness and fatigue but overall feels better since the feraheme.  She denies any melena, hematochezia, hematemesis, hemoptysis or hematuria.  She denies any chest pain.  She reports shortness of breath with exertion.  She denies any shortness of breath at rest.  She reports chronic swelling in her legs.  This is unchanged.  She denies any nausea, vomiting or abdominal pain. She denies any bowel or urinary complaints.    Anemia   There has been no abdominal pain, bruising/bleeding easily, confusion, fever, light-headedness or palpitations.     Review of Systems   Constitutional: Positive for fatigue. Negative for activity change, appetite change, chills, diaphoresis, fever and unexpected weight change.   HENT: Negative for congestion, hearing loss, mouth sores, nosebleeds and trouble swallowing.    Eyes: Negative for discharge and visual disturbance.   Respiratory: Negative for cough, chest tightness and shortness of breath.    Cardiovascular: Negative for chest pain, palpitations and leg swelling.   Gastrointestinal: Positive for abdominal distention. Negative for abdominal pain, blood in stool, constipation, diarrhea, nausea and vomiting.   Endocrine: Negative for cold intolerance and heat intolerance.   Genitourinary: Negative for difficulty urinating, dyspareunia and hematuria.   Musculoskeletal: Positive for arthralgias, back pain, gait problem and myalgias.   Skin: Negative.    Neurological: Negative for dizziness, weakness,  light-headedness and headaches.   Hematological: Negative for adenopathy. Does not bruise/bleed easily.   Psychiatric/Behavioral: Negative for agitation, behavioral problems and confusion. The patient is nervous/anxious.        Medication List with Changes/Refills   Current Medications    ACETAMINOPHEN (TYLENOL EXTRA STRENGTH) 325 MG TABLET    Take 2 tablets (650 mg total) by mouth every 8 (eight) hours.    ALLOPURINOL (ZYLOPRIM) 100 MG TABLET    TAKE 2 TABLETS (200 MG TOTAL) BY MOUTH ONCE DAILY.    AMIODARONE (PACERONE) 200 MG TAB    TAKE 1 TABLET EVERY DAY    CARVEDILOL (COREG) 25 MG TABLET    TAKE 1 TABLET BY MOUTH TWICE A DAY WITH MEALS    DIGOXIN (LANOXIN) 125 MCG TABLET    TAKE 1 TABLET BY MOUTH EVERY DAY    FUROSEMIDE (LASIX) 40 MG TABLET    Take 40 mg by mouth 2 (two) times daily.    GABAPENTIN (NEURONTIN) 100 MG CAPSULE    TAKE ONE CAPSULE BY MOUTH TWO TIMES DAILY    LEVOTHYROXINE (SYNTHROID) 50 MCG TABLET    Take 1 tablet (50 mcg total) by mouth once daily.    LOSARTAN (COZAAR) 25 MG TABLET    Take 1 tablet (25 mg total) by mouth once daily.    METOLAZONE (ZAROXOLYN) 2.5 MG TABLET    Take 1 tablet (2.5 mg total) by mouth daily as needed. For SOB or Leg edema    PANTOPRAZOLE (PROTONIX) 40 MG TABLET    Take 1 tablet (40 mg total) by mouth once daily.    WARFARIN (COUMADIN) 2.5 MG TABLET    Take 1 tablet (2.5 mg total) by mouth Daily. Hold every Sunday     Objective:     Vitals:    05/06/19 1421   BP: 136/60   Pulse: 72   Temp: 97.6 °F (36.4 °C)     Physical Exam   Constitutional: She is oriented to person, place, and time. She appears well-developed and well-nourished. No distress.   HENT:   Head: Normocephalic and atraumatic.   Right Ear: Hearing and external ear normal.   Left Ear: Hearing and external ear normal.   Nose: No rhinorrhea or sinus tenderness. Right sinus exhibits no maxillary sinus tenderness and no frontal sinus tenderness. Left sinus exhibits no maxillary sinus tenderness and no frontal  sinus tenderness.   Mouth/Throat: Uvula is midline, oropharynx is clear and moist and mucous membranes are normal. No oral lesions.   Eyes: Pupils are equal, round, and reactive to light. Conjunctivae are normal. Right eye exhibits no discharge. Left eye exhibits no discharge.   Neck: Normal range of motion. Carotid bruit is not present. No tracheal deviation present. No thyromegaly present.   Cardiovascular: Normal rate, regular rhythm, S1 normal, S2 normal, normal heart sounds and intact distal pulses.   No murmur heard.  Pulses:       Dorsalis pedis pulses are 2+ on the right side, and 2+ on the left side.   Pulmonary/Chest: Effort normal and breath sounds normal. No respiratory distress.   Abdominal: Soft. Bowel sounds are normal. She exhibits no distension and no mass. There is no tenderness.   Musculoskeletal: Normal range of motion. She exhibits edema. She exhibits no tenderness.   Lymphadenopathy:     She has no cervical adenopathy.        Right: No supraclavicular adenopathy present.        Left: No supraclavicular adenopathy present.   Neurological: She is alert and oriented to person, place, and time. She has normal strength. No sensory deficit. Coordination and gait normal.   Skin: Skin is warm and dry. Capillary refill takes less than 2 seconds. No rash noted. There is pallor.   Psychiatric: Her speech is normal and behavior is normal. Judgment and thought content normal. Her mood appears anxious. Cognition and memory are normal. She does not exhibit a depressed mood.   Nursing note and vitals reviewed.      Assessment:       Problem List Items Addressed This Visit        Cardiac/Vascular    Chronic combined systolic and diastolic congestive heart failure       Immunology/Multi System    Elevated serum immunoglobulin free light chain level       Hematology    Coagulopathy    Anticoagulated on Coumadin       Oncology    Iron deficiency anemia due to chronic blood loss - Primary    Relevant Orders    CBC  auto differential    Comprehensive metabolic panel    Macrocytic anemia          Plan:       1) S/P Marquis, has been treated with Procrit in the past.  2) Hgb: 10.6 today, will hold Procrit 20,000U today.   3) Return to clinic in 2 weeks with labs.   4) BNP: 1510, after reviewing available data her BNP is generally >1000, patient denies SOB, does have +4 edema in BLE which she states is her normal. Scheduled patient to see Cardiology tomorrow.     I will review assessment/plan with collaborating physician Dr. Maciej oCon.

## 2019-05-06 NOTE — PATIENT INSTRUCTIONS
Iron-Deficiency Anemia (Adult)  Red blood cells carry oxygen to the tissues of your body. Anemia is a condition in which you have too few red blood cells. You need iron to make red cells. Anemia makes you feel tired and run down. When anemia becomes severe, your skin becomes pale. You may feel short of breath after physical activity. Other symptoms include:  · Headaches  · Dizziness  · Leg cramps with physical activity  · Drowsiness  · Restless legs  Your anemia is caused by not having enough iron in your body. This may be because of:  · Loss of blood. This can be caused by heavy menstrual periods. It can also be caused by bleeding from the stomach or intestines.  · Poor diet. You may not be eating enough foods that contain iron.  · Inability to absorb iron from the foods you eat  · Pregnancy  If your blood count is low enough, your healthcare provider may prescribe an iron supplement. It usually takes about 2 to 3 months of treatment with iron supplements to correct anemia. Severe cases of anemia need a blood transfusion to quickly ease symptoms and deliver more oxygen to the cells.  Home care  Follow these guidelines when caring for yourself at home:  · Eat foods high in iron. This will boost the amount of iron stored in your body. It is a natural way to build up the number of blood cells. Good sources of iron include beef, liver, spinach and other dark green leafy vegetables, whole grains, beans, and nuts.  · Do not overexert yourself.  · Talk with your healthcare provider before traveling by air or traveling to high altitudes.  Follow-up care  Follow up with your healthcare provider in 2 months, or as advised. This is to have another red blood cell count to be sure your anemia has been fixed.  When to seek medical advice  Call your healthcare provider right away if any of these occur:  · Shortness of breath or chest pain  · Dizziness or fainting  · Vomiting blood or passing red or black-colored stool   Date  Last Reviewed: 2/25/2016  © 5531-1808 [employer]. [address] [place] [address], [place] [address]. All rights reserved. This information is not intended as a substitute for professional medical care. Always follow your healthcare professional's instructions.

## 2019-05-07 ENCOUNTER — TELEPHONE (OUTPATIENT)
Dept: CARDIOLOGY | Facility: CLINIC | Age: 84
End: 2019-05-07

## 2019-05-07 ENCOUNTER — OFFICE VISIT (OUTPATIENT)
Dept: CARDIOLOGY | Facility: CLINIC | Age: 84
End: 2019-05-07
Payer: MEDICARE

## 2019-05-07 ENCOUNTER — TELEPHONE (OUTPATIENT)
Dept: NEPHROLOGY | Facility: CLINIC | Age: 84
End: 2019-05-07

## 2019-05-07 VITALS
HEART RATE: 76 BPM | DIASTOLIC BLOOD PRESSURE: 62 MMHG | SYSTOLIC BLOOD PRESSURE: 126 MMHG | HEIGHT: 62 IN | WEIGHT: 120.13 LBS | BODY MASS INDEX: 22.11 KG/M2

## 2019-05-07 DIAGNOSIS — I48.91 ATRIAL FIBRILLATION, UNSPECIFIED TYPE: ICD-10-CM

## 2019-05-07 DIAGNOSIS — I50.42 CHRONIC COMBINED SYSTOLIC AND DIASTOLIC CONGESTIVE HEART FAILURE: Primary | ICD-10-CM

## 2019-05-07 DIAGNOSIS — Z95.2 S/P MVR (MITRAL VALVE REPLACEMENT): ICD-10-CM

## 2019-05-07 DIAGNOSIS — I10 ESSENTIAL HYPERTENSION: ICD-10-CM

## 2019-05-07 DIAGNOSIS — Z95.810 CARDIAC RESYNCHRONIZATION THERAPY DEFIBRILLATOR (CRT-D) IN PLACE: ICD-10-CM

## 2019-05-07 DIAGNOSIS — Z79.01 ANTICOAGULATED ON COUMADIN: ICD-10-CM

## 2019-05-07 DIAGNOSIS — I48.0 PAROXYSMAL ATRIAL FIBRILLATION: ICD-10-CM

## 2019-05-07 DIAGNOSIS — Z95.0 S/P PLACEMENT OF CARDIAC PACEMAKER: ICD-10-CM

## 2019-05-07 PROCEDURE — 99215 OFFICE O/P EST HI 40 MIN: CPT | Mod: S$PBB,,, | Performed by: NURSE PRACTITIONER

## 2019-05-07 PROCEDURE — 99214 OFFICE O/P EST MOD 30 MIN: CPT | Mod: PBBFAC | Performed by: NURSE PRACTITIONER

## 2019-05-07 PROCEDURE — 99999 PR PBB SHADOW E&M-EST. PATIENT-LVL IV: ICD-10-PCS | Mod: PBBFAC,,, | Performed by: NURSE PRACTITIONER

## 2019-05-07 PROCEDURE — 99215 PR OFFICE/OUTPT VISIT, EST, LEVL V, 40-54 MIN: ICD-10-PCS | Mod: S$PBB,,, | Performed by: NURSE PRACTITIONER

## 2019-05-07 PROCEDURE — 99999 PR PBB SHADOW E&M-EST. PATIENT-LVL IV: CPT | Mod: PBBFAC,,, | Performed by: NURSE PRACTITIONER

## 2019-05-07 RX ORDER — AMIODARONE HYDROCHLORIDE 200 MG/1
200 TABLET ORAL DAILY
Qty: 30 TABLET | Refills: 6
Start: 2019-05-07 | End: 2019-06-10 | Stop reason: SDUPTHER

## 2019-05-07 RX ORDER — ASPIRIN 81 MG/1
81 TABLET ORAL DAILY
Refills: 0 | COMMUNITY
Start: 2019-05-07 | End: 2020-02-14 | Stop reason: ALTCHOICE

## 2019-05-07 NOTE — TELEPHONE ENCOUNTER
----- Message from Susan Newman LPN sent at 5/7/2019 12:37 PM CDT -----  Contact: [facility]      ----- Message -----  From: Susan Newman LPN  Sent: 5/7/2019  12:37 PM  To: Kenyatta Cheng Staff        ----- Message -----  From: Ruba Charles  Sent: 5/7/2019  12:15 PM  To: Kenyatta Cheng Staff    She's calling in regards to labs scheduled for 5/9     Can call with a verbal order     pls call back [phone]   17-Jun-2017 08:31

## 2019-05-07 NOTE — TELEPHONE ENCOUNTER
Spoke with Elvi with [facility] notified her of pt's medication change Lasix is being increased to 80mg in the morning and 40mg in the afternoon x4 days.     Elvi repeated medication order back.     Pt is already scheduled for labs in 2 weeks on 5/21/19 at [facility] I have linked orders to that scheduled appt.     I tried reaching pt to schedule 3 week f/u with Giorgi no answer left message for call back.

## 2019-05-07 NOTE — PROGRESS NOTES
Patient's INR is sub-therapeutic at 1.3. Nelsy PACHECO with [facility] contacted.  No missed doses or significant changes reported.  Orders given: Take coumadin 5mg on today (5/7) and tomorrow (5/8).   Recheck on Thursday, 5/09/19 with [facility].

## 2019-05-07 NOTE — TELEPHONE ENCOUNTER
----- Message from RAUL Merrill sent at 5/7/2019  3:00 PM CDT -----  Please notify [facility] that patient's Lasix is being increased to 80mg in the morning and 40mg in the afternoon x4 days.     Will need BMP and BNP in 2 weeks before a follow up appt in 3 weeks with me

## 2019-05-07 NOTE — PATIENT INSTRUCTIONS
Take 2 Lasix pills in the morning and 1 pill around 3 pm x 4 days.     Nurse will call you on Friday to check on you and you will have labs done in about 2 weeks.     You will follow up with Giorgi in about 3 weeks

## 2019-05-07 NOTE — PROGRESS NOTES
Subjective:   Patient ID:  Jennifer Mathews is a 86 y.o. female who presents for follow up of Congestive Heart Failure and Shortness of Breath      HPI    Ms. Mathews's current conditions include mitral valve replacement -bioprosthesis, old Stroke without residual deficits, HTN, PAF, combined CHF with EF 25%, ICM s/p PPM-CRT-D placement, recurrent UTI, CKD3-4, and hypothyroidism.     Has variable compliance with diet restrictions. Had weight gain last week. 114 to 121 lbs. Admits that she had been eating out a few times.   Last INR 1.3 on 5/6/19. Coumadin stopped in mid April 2019 due to issues with toxicity and GIB x2  even off abx and amio. INR as high as 7.1. Underwent EGD that showed LA Grade D Candida Esophagitis. Biopsied. Gastric erosions without bleeding. Normal first portion of the duodenum and second portion of the duodenum     Instructed to hold Coumadin and Amio per Dr. Chau's recommendations. Wanted her to resume Amio after INR down. However, restarted Coumadin on 4/25/19 per Dr. Cary's instructions.   Follows with Hematology and gets Procrit injections as needed. Seen yesterday and noted to have +3-4 BLE and BNP 1510. . Sleeps on 1 pillow.   Denies any chest pain, SOB, GAR,  orthopnea, PND, dizziness, palpitations,  near syncope, syncope, edema or palpitations. Has no symptoms concerning for angina or equivalent. No CNS Complaints to suggest TIA or CVA. Her biggest complaint today is arthritis in her ankles.  Currently taking Lasix 40mg BID      Past Medical History:   Diagnosis Date    Acute coronary syndrome     Anemia     Anticoagulated on Coumadin 7/13/2015    Arthritis     Asthma     patient denies    Atrial fibrillation     Basal cell carcinoma 10/2015    left neck    Cardiac arrest     Cardiac resynchronization therapy defibrillator (CRT-D) in place 07/13/2015    Pt denies, states it does not shock me    Chronic combined systolic and diastolic congestive heart failure     CKD  (chronic kidney disease) stage 3, GFR 30-59 ml/min     Dyslipidemia 1/30/2014    Hyperlipidemia     Hypertension     Hypothyroidism     Ischemic cardiomyopathy 01/30/2014    Macular hole of left eye     Old    Macular hole of left eye     LEV (obstructive sleep apnea) 9/30/2013    Pneumonia     required hospitalization    Recurrent UTI     Refractive error     Spastic colon     Stroke        Past Surgical History:   Procedure Laterality Date    APPENDECTOMY      CARDIAC CATHETERIZATION      CARDIAC PACEMAKER PLACEMENT  2014    CARDIAC VALVE SURGERY      CATARACT EXTRACTION      OU    CHOLECYSTECTOMY      COLONOSCOPY      ESOPHAGOGASTRODUODENOSCOPY (EGD) N/A 3/13/2019    Performed by Ghazal Orellana [facility] ENDO    IRRIGATION AND DEBRIDEMENT OF LEFT KNEE Left 9/12/2017    Performed by Juliano Rosenbaum MD at [facility] OR    MITRAL VALVE REPLACEMENT  2014    MITRAL VALVE SURGERY      x3    REPLACEMENT, PACEMAKER GENERATOR/pt has crt-p versus d Left 6/20/2018    Performed by Manny Dunne MD at [facility] CATH LAB    REVISION, SKIN POCKET, FOR CARDIAC PACEMAKER Left 2/26/2014    Performed by Manny Dunne MD at [facility] CATH LAB       Social History     Tobacco Use    Smoking status: Never Smoker    Smokeless tobacco: Never Used   Substance Use Topics    Alcohol use: No    Drug use: No       Family History   Problem Relation Age of Onset    Coronary artery disease Mother         mi    Epilepsy Mother     Heart attack Mother     Coronary artery disease Father     Heart disease Father     Emphysema Father     COPD Father     Diabetes Brother     Cancer Brother     Melanoma Neg Hx     Psoriasis Neg Hx     Lupus Neg Hx     Eczema Neg Hx        Current Outpatient Medications   Medication Sig    acetaminophen (TYLENOL EXTRA STRENGTH) 325 MG tablet Take 2 tablets (650 mg total) by mouth every 8 (eight) hours. (Patient taking differently: Take 650 mg by mouth 3 (three) times daily  as needed. )    allopurinol (ZYLOPRIM) 100 MG tablet TAKE 2 TABLETS (200 MG TOTAL) BY MOUTH ONCE DAILY.    amiodarone (PACERONE) 200 MG Tab Take 1 tablet (200 mg total) by mouth once daily.    carvedilol (COREG) 25 MG tablet TAKE 1 TABLET BY MOUTH TWICE A DAY WITH MEALS    digoxin (LANOXIN) 125 mcg tablet TAKE 1 TABLET BY MOUTH EVERY DAY    furosemide (LASIX) 40 MG tablet Take 40 mg by mouth 2 (two) times daily.    gabapentin (NEURONTIN) 100 MG capsule TAKE ONE CAPSULE BY MOUTH TWO TIMES DAILY    levothyroxine (SYNTHROID) 50 MCG tablet Take 1 tablet (50 mcg total) by mouth once daily.    losartan (COZAAR) 25 MG tablet Take 1 tablet (25 mg total) by mouth once daily.    pantoprazole (PROTONIX) 40 MG tablet Take 1 tablet (40 mg total) by mouth once daily.    aspirin (ECOTRIN) 81 MG EC tablet Take 1 tablet (81 mg total) by mouth once daily.     Current Facility-Administered Medications   Medication    denosumab (PROLIA) injection 60 mg     Current Outpatient Medications on File Prior to Visit   Medication Sig    acetaminophen (TYLENOL EXTRA STRENGTH) 325 MG tablet Take 2 tablets (650 mg total) by mouth every 8 (eight) hours. (Patient taking differently: Take 650 mg by mouth 3 (three) times daily as needed. )    allopurinol (ZYLOPRIM) 100 MG tablet TAKE 2 TABLETS (200 MG TOTAL) BY MOUTH ONCE DAILY.    carvedilol (COREG) 25 MG tablet TAKE 1 TABLET BY MOUTH TWICE A DAY WITH MEALS    digoxin (LANOXIN) 125 mcg tablet TAKE 1 TABLET BY MOUTH EVERY DAY    furosemide (LASIX) 40 MG tablet Take 40 mg by mouth 2 (two) times daily.    gabapentin (NEURONTIN) 100 MG capsule TAKE ONE CAPSULE BY MOUTH TWO TIMES DAILY    levothyroxine (SYNTHROID) 50 MCG tablet Take 1 tablet (50 mcg total) by mouth once daily.    losartan (COZAAR) 25 MG tablet Take 1 tablet (25 mg total) by mouth once daily.    pantoprazole (PROTONIX) 40 MG tablet Take 1 tablet (40 mg total) by mouth once daily.    [DISCONTINUED] amiodarone  (PACERONE) 200 MG Tab TAKE 1 TABLET EVERY DAY    [DISCONTINUED] metOLazone (ZAROXOLYN) 2.5 MG tablet Take 1 tablet (2.5 mg total) by mouth daily as needed. For SOB or Leg edema    [DISCONTINUED] warfarin (COUMADIN) 2.5 MG tablet Take 1 tablet (2.5 mg total) by mouth Daily. Hold every Sunday (Patient taking differently: Take 2.5 mg by mouth Daily. DO NOT HOLD ON SUNDAYS)     Current Facility-Administered Medications on File Prior to Visit   Medication    denosumab (PROLIA) injection 60 mg       Review of Systems   Constitution: Negative for diaphoresis, malaise/fatigue, weight gain and weight loss.   HENT: Negative for congestion and nosebleeds.    Cardiovascular: Positive for leg swelling. Negative for chest pain, claudication, cyanosis, dyspnea on exertion, irregular heartbeat, near-syncope, orthopnea, palpitations, paroxysmal nocturnal dyspnea and syncope.   Respiratory: Negative for cough, hemoptysis, shortness of breath, sleep disturbances due to breathing, snoring, sputum production and wheezing.    Hematologic/Lymphatic: Negative for bleeding problem. Does not bruise/bleed easily.   Skin: Negative for rash.   Musculoskeletal: Negative for arthritis, back pain, falls, joint pain, muscle cramps and muscle weakness.   Gastrointestinal: Negative for abdominal pain, constipation, diarrhea, heartburn, hematemesis, hematochezia, melena and nausea.   Genitourinary: Negative for dysuria, hematuria and nocturia.   Neurological: Negative for excessive daytime sleepiness, dizziness, headaches, light-headedness, loss of balance, numbness, vertigo and weakness.       Objective:   Physical Exam   Constitutional: She is oriented to person, place, and time. She appears well-developed and well-nourished. No distress.   HENT:   Head: Normocephalic and atraumatic.   Eyes: Pupils are equal, round, and reactive to light. Right eye exhibits no discharge. Left eye exhibits no discharge.   Neck: Neck supple. No JVD present.  "  Cardiovascular: Normal rate, regular rhythm, S1 normal and S2 normal.   Murmur heard.  High-pitched blowing holosystolic murmur is present at the apex.  Pulmonary/Chest: Effort normal and breath sounds normal. No respiratory distress. She has no wheezes. She has no rales.   CRT D site well-healed  Sternotomy site well-healed   Abdominal: Soft. She exhibits no distension.   Musculoskeletal: She exhibits edema (1+ BLE).   Neurological: She is alert and oriented to person, place, and time.   Skin: Skin is warm and dry. She is not diaphoretic. No erythema.   Psychiatric: She has a normal mood and affect. Her behavior is normal. Thought content normal.   Nursing note and vitals reviewed.    Vitals:    05/07/19 1413 05/07/19 1415   BP: 122/62 126/62   BP Location: Left arm Right arm   Pulse: 76    Weight: 54.5 kg (120 lb 2.4 oz)    Height: 5' 2" (1.575 m)      Lab Results   Component Value Date    CHOL 131 03/21/2019    CHOL 155 03/20/2018    CHOL 154 01/29/2018     Lab Results   Component Value Date    HDL 31 (L) 03/21/2019    HDL 36 (L) 03/20/2018    HDL 34 (L) 01/29/2018     Lab Results   Component Value Date    LDLCALC 68.6 03/21/2019    LDLCALC 90.8 03/20/2018    LDLCALC 79.0 01/29/2018     Lab Results   Component Value Date    TRIG 157 (H) 03/21/2019    TRIG 141 03/20/2018    TRIG 205 (H) 01/29/2018     Lab Results   Component Value Date    CHOLHDL 23.7 03/21/2019    CHOLHDL 23.2 03/20/2018    CHOLHDL 22.1 01/29/2018       Chemistry        Component Value Date/Time     05/06/2019 1402    K 4.7 05/06/2019 1402     05/06/2019 1402    CO2 29 05/06/2019 1402    BUN 57 (H) 05/06/2019 1402    CREATININE 1.7 (H) 05/06/2019 1402    GLU 97 05/06/2019 1402        Component Value Date/Time    CALCIUM 9.5 05/06/2019 1402    ALKPHOS 88 05/06/2019 1402    AST 18 05/06/2019 1402    ALT 11 05/06/2019 1402    BILITOT 0.5 05/06/2019 1402    ESTGFRAFRICA 31 (A) 05/06/2019 1402    EGFRNONAA 27 (A) 05/06/2019 1402    "       Lab Results   Component Value Date    TSH 6.333 (H) 03/29/2019     Lab Results   Component Value Date    INR 1.3 (H) 05/06/2019    INR 1.8 05/02/2019    INR 1.7 04/29/2019     Lab Results   Component Value Date    WBC 7.36 05/06/2019    HGB 10.6 (L) 05/06/2019    HCT 34.1 (L) 05/06/2019     (H) 05/06/2019     05/06/2019     BMP  Sodium   Date Value Ref Range Status   05/06/2019 142 136 - 145 mmol/L Final     Potassium   Date Value Ref Range Status   05/06/2019 4.7 3.5 - 5.1 mmol/L Final     Chloride   Date Value Ref Range Status   05/06/2019 103 95 - 110 mmol/L Final     CO2   Date Value Ref Range Status   05/06/2019 29 23 - 29 mmol/L Final     BUN, Bld   Date Value Ref Range Status   05/06/2019 57 (H) 8 - 23 mg/dL Final     Creatinine   Date Value Ref Range Status   05/06/2019 1.7 (H) 0.5 - 1.4 mg/dL Final     Calcium   Date Value Ref Range Status   05/06/2019 9.5 8.7 - 10.5 mg/dL Final     Anion Gap   Date Value Ref Range Status   05/06/2019 10 8 - 16 mmol/L Final     eGFR if    Date Value Ref Range Status   05/06/2019 31 (A) >60 mL/min/1.73 m^2 Final     eGFR if non    Date Value Ref Range Status   05/06/2019 27 (A) >60 mL/min/1.73 m^2 Final     Comment:     Calculation used to obtain the estimated glomerular filtration  rate (eGFR) is the CKD-EPI equation.        Estimated Creatinine Clearance: 18.8 mL/min (A) (based on SCr of 1.7 mg/dL (H)).    Assessment:     1. Chronic combined systolic and diastolic congestive heart failure    2. Essential hypertension    3. S/P MVR (mitral valve replacement)    4. S/P placement of cardiac pacemaker    5. Paroxysmal atrial fibrillation    6. Cardiac resynchronization therapy defibrillator (CRT-D) in place    7. Anticoagulated on Coumadin    8. Atrial fibrillation, unspecified type      Has 3+ BLE on exam   INR at 1.3. Patient restarted her coumadin approximately 3 weeks ago.  Had 2 GIB while on Coumadin before and was  instructed to hold until she sees Dr. Saldivar. Was also given instruction to restart Amio once her INR was down, but this didn't happen   Has no device firing     Plan:     Instructed to increase her Lasix to 2 pills in the morning and 1 pill around 3 pm x4 days  Heart healthy diet  Limit fluid intake 50-60 oz   Daily weights and to notify clinic if weight increases by more than 3 lbs in 1 day or 5 lbs in 1 week.   Exercise routine as tolerated  Phone review on Friday   Continue coreg and needs to resume her Amio as instructed by Dr. Chau since INR is down now  I have spoken with Dr. Cary regarding patient having GIB twice while on Coumadin. He recommends holding coumadin for now and start ASA only. Wants patient to get further recommendations from Dr. Chau when she follows up on 5/31/19. Patient has been made aware of risk with holding coumadin given her history of A-fib and valve replcement and verbalized an understanding  RTC in 2-3 weeks for CHF recheck   Device clinic as scheduled

## 2019-05-08 ENCOUNTER — TELEPHONE (OUTPATIENT)
Dept: CARDIOLOGY | Facility: CLINIC | Age: 84
End: 2019-05-08

## 2019-05-08 NOTE — TELEPHONE ENCOUNTER
Pt returned call I scheduled pt for 3 week f/u states she wanted appt close to a scheduled appt at chemo clinic. Pt is scheduled to see Giorgi on 6/5/19 with scheduled 2 week labs on 5/21/19/.

## 2019-05-08 NOTE — TELEPHONE ENCOUNTER
----- Message from Saadia Shah sent at 5/7/2019  4:30 PM CDT -----  Contact: pt  .Type:  Patient Returning Call    Who Called: pt  Who Left Message for Patient:   Does the patient know what this is regarding?:   Would the patient rather a call back or a response via [facility]? Call back   Best Call Back Number: [phone] ([place])  or [phone] Information:

## 2019-05-08 NOTE — PROGRESS NOTES
5/8/2019:    Spoke with Nelsy PACHECO at [facility].  Cardiologist has decided to hold coumadin and start patient on aspirin  until Mrs. Mathews sees Dr. Dunne on 5/31.

## 2019-05-10 ENCOUNTER — TELEPHONE (OUTPATIENT)
Dept: NEPHROLOGY | Facility: CLINIC | Age: 84
End: 2019-05-10

## 2019-05-10 ENCOUNTER — TELEPHONE (OUTPATIENT)
Dept: CARDIOLOGY | Facility: CLINIC | Age: 84
End: 2019-05-10

## 2019-05-10 NOTE — TELEPHONE ENCOUNTER
----- Message from RAUL Merrill sent at 5/7/2019  2:59 PM CDT -----  Phone review on Friday for edema check

## 2019-05-10 NOTE — TELEPHONE ENCOUNTER
Spoke with pt states since taking Lasix 2 pills in the morning and 1 pill around 3pm states edema has improved and weight is down 2lbs. Pt states her weight is 118lbs. Pt denies any other symptoms.

## 2019-05-10 NOTE — TELEPHONE ENCOUNTER
----- Message from Babita Carrillo sent at 5/10/2019 12:29 PM CDT -----  Contact: [facility])-[phone]  Would like to consult with nurse regarding labs that was taking 05-09-19. Please call back at [phone]. Thanks/ar

## 2019-05-10 NOTE — TELEPHONE ENCOUNTER
The patient has been notified of this information and all questions answered.      Phone review scheduled for Monday symptom check.

## 2019-05-13 ENCOUNTER — TELEPHONE (OUTPATIENT)
Dept: CARDIOLOGY | Facility: CLINIC | Age: 84
End: 2019-05-13

## 2019-05-13 DIAGNOSIS — I50.42 CHRONIC COMBINED SYSTOLIC AND DIASTOLIC CONGESTIVE HEART FAILURE: Primary | ICD-10-CM

## 2019-05-13 RX ORDER — FUROSEMIDE 40 MG/1
TABLET ORAL
Qty: 40 TABLET | Refills: 11
Start: 2019-05-13 | End: 2019-06-05 | Stop reason: SDUPTHER

## 2019-05-13 RX ORDER — CIPROFLOXACIN 500 MG/1
500 TABLET ORAL DAILY
Qty: 14 TABLET | Refills: 0 | Status: SHIPPED | OUTPATIENT
Start: 2019-05-13 | End: 2019-05-31 | Stop reason: ALTCHOICE

## 2019-05-13 NOTE — TELEPHONE ENCOUNTER
----- Message from Chaka Jefferson sent at 5/13/2019  2:54 PM CDT -----  Contact: asre-[phone]  .Type:  Patient Returning Call    Who Called:Jennifer  Who Left Message for Patient:Concepción  Does the patient know what this is regarding?:NO  Would the patient rather a call back or a response via [facility]? Call back  Best Call Back Number:[phone]  Additional Information:

## 2019-05-13 NOTE — TELEPHONE ENCOUNTER
The patient has been notified of this information and all questions answered.      I called [facility] repeat labs have been called in spoke with Monika verbalized understanding.

## 2019-05-13 NOTE — TELEPHONE ENCOUNTER
Have patient cut her dose to just 2 pills in the morning due to bump in creatine. She should eliminate the evening dose. Will need repeat BMP and BNP in 1 week

## 2019-05-13 NOTE — PROGRESS NOTES
Renal chart check:  Noted pt has urine cx + for E coli. Results and sensitivities reviewed.  Pt was called by me.  Allergies reviewed with pt  Has allergies to sulfa, PCN's, and cephalosporins.   Difficult to choose an abx.  Says can take cipro.    Cipro was ordered 500 mg po qd (renal dose adjustment) x 14 days, no refills.  Advised pt to f/u at renal clinic on 5/16/19 at 10:30 am.    Prosper You MD

## 2019-05-13 NOTE — TELEPHONE ENCOUNTER
Spoke with pt states edema bilateral feet have improved states her weight is now 117lbs. Pt denies any other symptoms.       Pt states if she will still be taking Lasix 2 pills in the AM and 1 pill around 3pm states she will need a refill sent to pharmacy.

## 2019-05-13 NOTE — TELEPHONE ENCOUNTER
----- Message from Concepción Macdonald MA sent at 5/10/2019  9:39 AM CDT -----  Regarding: phone review  Sx check

## 2019-05-14 ENCOUNTER — TELEPHONE (OUTPATIENT)
Dept: NEPHROLOGY | Facility: CLINIC | Age: 84
End: 2019-05-14

## 2019-05-14 ENCOUNTER — LAB VISIT (OUTPATIENT)
Dept: LAB | Facility: HOSPITAL | Age: 84
End: 2019-05-14
Attending: INTERNAL MEDICINE
Payer: MEDICARE

## 2019-05-14 ENCOUNTER — TELEPHONE (OUTPATIENT)
Dept: CARDIOLOGY | Facility: CLINIC | Age: 84
End: 2019-05-14

## 2019-05-14 DIAGNOSIS — I50.42 CHRONIC COMBINED SYSTOLIC AND DIASTOLIC CONGESTIVE HEART FAILURE: ICD-10-CM

## 2019-05-14 DIAGNOSIS — Z79.899 ON AMIODARONE THERAPY: ICD-10-CM

## 2019-05-14 DIAGNOSIS — K92.1 BLOOD IN STOOL: Primary | ICD-10-CM

## 2019-05-14 DIAGNOSIS — I48.20 CHRONIC ATRIAL FIBRILLATION: Primary | ICD-10-CM

## 2019-05-14 LAB
BASOPHILS # BLD AUTO: 0.04 K/UL (ref 0–0.2)
BASOPHILS NFR BLD: 0.6 % (ref 0–1.9)
DIFFERENTIAL METHOD: ABNORMAL
EOSINOPHIL # BLD AUTO: 0.1 K/UL (ref 0–0.5)
EOSINOPHIL NFR BLD: 1.4 % (ref 0–8)
ERYTHROCYTE [DISTWIDTH] IN BLOOD BY AUTOMATED COUNT: 18.5 % (ref 11.5–14.5)
HCT VFR BLD AUTO: 33.7 % (ref 37–48.5)
HGB BLD-MCNC: 10.2 G/DL (ref 12–16)
LYMPHOCYTES # BLD AUTO: 1.5 K/UL (ref 1–4.8)
LYMPHOCYTES NFR BLD: 20.3 % (ref 18–48)
MCH RBC QN AUTO: 31.9 PG (ref 27–31)
MCHC RBC AUTO-ENTMCNC: 30.3 G/DL (ref 32–36)
MCV RBC AUTO: 105 FL (ref 82–98)
MONOCYTES # BLD AUTO: 0.7 K/UL (ref 0.3–1)
MONOCYTES NFR BLD: 9.2 % (ref 4–15)
NEUTROPHILS # BLD AUTO: 5 K/UL (ref 1.8–7.7)
NEUTROPHILS NFR BLD: 68.5 % (ref 38–73)
PLATELET # BLD AUTO: 206 K/UL (ref 150–350)
PMV BLD AUTO: 10.9 FL (ref 9.2–12.9)
RBC # BLD AUTO: 3.2 M/UL (ref 4–5.4)
WBC # BLD AUTO: 7.25 K/UL (ref 3.9–12.7)

## 2019-05-14 PROCEDURE — 85025 COMPLETE CBC W/AUTO DIFF WBC: CPT

## 2019-05-14 PROCEDURE — 36415 COLL VENOUS BLD VENIPUNCTURE: CPT

## 2019-05-14 NOTE — TELEPHONE ENCOUNTER
----- Message from Bailee Vital sent at 5/14/2019  8:52 AM CDT -----  Contact: [facility]  Pt was advised bu pharmacist that ciprofloxacin HCl (CIPRO) 500 MG tablet interacts with amiodorone.Please call back at [phone].    Thanks,  Bailee Vital

## 2019-05-14 NOTE — PROGRESS NOTES
Renal f/u note:  Pt has E coli UTI.  Pharmacy has alerted us that she is also on amiodarone, recently re-started by cardiology.  There is a potential drug-drug interaction.  Microbiology results from 5/9/19 again reviewed.    Pt was advised not to use cipro and go to ER tonight for IV abx treatment.  Risks of untreated UTI, including sepsis, discussed with pt.  ER will be alerted.    Prosper You MD   Simple: Patient demonstrates quick and easy understanding/Verbalized Understanding

## 2019-05-14 NOTE — TELEPHONE ENCOUNTER
Returned call to Nurse Janes and wanted to notify Glenroy Cary and Kenyatta of drug interaction between Amiodarone and new rx Cipro 500 daily for colitis  Patient has not started based on said interaction    Please advise      ----- Message from Marianne Francois sent at 5/14/2019 12:36 PM CDT -----  Contact: [facility]  Type:  Needs Medical Advice    Who Called: janes  Symptoms (please be specific):n/a   How long has patient had these symptoms:n/a  Pharmacy name and phone #:n/a  Would the patient rather a call back or a response via [facility]? Call back  Best Call Back Number: [phone]  Additional Information: requesting call back regarding questions about a drug interaction with pt medication.      Thanks,  Marianne Francois

## 2019-05-15 ENCOUNTER — TELEPHONE (OUTPATIENT)
Dept: CARDIOLOGY | Facility: CLINIC | Age: 84
End: 2019-05-15

## 2019-05-15 NOTE — TELEPHONE ENCOUNTER
Patient called asking is it okay to stop the Amiodarone 200 mg once daily for 14 so she can take Ciprofloxacin 500 once daily. Per Dr. You  Pt. was advised not to use cipro and go to ER tonight for IV abx treatment.  Risks of untreated UTI, including sepsis, discussed with pt. Pt declined to go to the ER wanted NENO Rand to address.Please advise.

## 2019-05-15 NOTE — TELEPHONE ENCOUNTER
Spoke with Mrs. Mathews and will go lab in am and  will draw next Tues or Wed  Nurse Flora returned call and will draw Mg, K in the morning and will schedule Ekg for tomorrow afternoon or early Friday for baseline

## 2019-05-15 NOTE — TELEPHONE ENCOUNTER
Patient states that she received a call from [facility] indicating that Dr. Cary has cleared patient to take Cipro and Amio, but will need monitoring of EKG and labs. If this is the case, when did Dr. Cary want her to have labs and EKG. Please arrange

## 2019-05-15 NOTE — TELEPHONE ENCOUNTER
Telephoned patient and Nurse Flora to advise patient starting Cipro with Lab work and ekg to monitor if any changes occur

## 2019-05-16 ENCOUNTER — OFFICE VISIT (OUTPATIENT)
Dept: NEPHROLOGY | Facility: CLINIC | Age: 84
End: 2019-05-16
Payer: MEDICARE

## 2019-05-16 ENCOUNTER — CLINICAL SUPPORT (OUTPATIENT)
Dept: CARDIOLOGY | Facility: CLINIC | Age: 84
End: 2019-05-16
Payer: MEDICARE

## 2019-05-16 ENCOUNTER — LAB VISIT (OUTPATIENT)
Dept: LAB | Facility: HOSPITAL | Age: 84
End: 2019-05-16
Attending: INTERNAL MEDICINE
Payer: MEDICARE

## 2019-05-16 VITALS
HEART RATE: 70 BPM | BODY MASS INDEX: 21.91 KG/M2 | HEIGHT: 62 IN | DIASTOLIC BLOOD PRESSURE: 52 MMHG | WEIGHT: 119.06 LBS | SYSTOLIC BLOOD PRESSURE: 134 MMHG

## 2019-05-16 DIAGNOSIS — Z79.899 ON AMIODARONE THERAPY: ICD-10-CM

## 2019-05-16 DIAGNOSIS — N18.30 CHRONIC KIDNEY DISEASE, STAGE III (MODERATE): Primary | ICD-10-CM

## 2019-05-16 DIAGNOSIS — I48.20 CHRONIC ATRIAL FIBRILLATION: ICD-10-CM

## 2019-05-16 DIAGNOSIS — I50.42 CHRONIC COMBINED SYSTOLIC AND DIASTOLIC CONGESTIVE HEART FAILURE: ICD-10-CM

## 2019-05-16 DIAGNOSIS — I50.22 CHRONIC SYSTOLIC CONGESTIVE HEART FAILURE: ICD-10-CM

## 2019-05-16 DIAGNOSIS — N39.0 E. COLI UTI: ICD-10-CM

## 2019-05-16 DIAGNOSIS — N17.9 ACUTE KIDNEY INJURY: ICD-10-CM

## 2019-05-16 DIAGNOSIS — B96.20 E. COLI UTI: ICD-10-CM

## 2019-05-16 LAB
MAGNESIUM SERPL-MCNC: 2.1 MG/DL (ref 1.6–2.6)
POTASSIUM SERPL-SCNC: 4.8 MMOL/L (ref 3.5–5.1)

## 2019-05-16 PROCEDURE — 93010 ELECTROCARDIOGRAM REPORT: CPT | Mod: S$PBB,,, | Performed by: INTERNAL MEDICINE

## 2019-05-16 PROCEDURE — 99215 OFFICE O/P EST HI 40 MIN: CPT | Mod: S$PBB,,, | Performed by: INTERNAL MEDICINE

## 2019-05-16 PROCEDURE — 84132 ASSAY OF SERUM POTASSIUM: CPT

## 2019-05-16 PROCEDURE — 99999 PR PBB SHADOW E&M-EST. PATIENT-LVL III: CPT | Mod: PBBFAC,,, | Performed by: INTERNAL MEDICINE

## 2019-05-16 PROCEDURE — 99213 OFFICE O/P EST LOW 20 MIN: CPT | Mod: PBBFAC,25 | Performed by: INTERNAL MEDICINE

## 2019-05-16 PROCEDURE — 83735 ASSAY OF MAGNESIUM: CPT

## 2019-05-16 PROCEDURE — 36415 COLL VENOUS BLD VENIPUNCTURE: CPT

## 2019-05-16 PROCEDURE — 93005 ELECTROCARDIOGRAM TRACING: CPT | Mod: PBBFAC | Performed by: INTERNAL MEDICINE

## 2019-05-16 PROCEDURE — 99215 PR OFFICE/OUTPT VISIT, EST, LEVL V, 40-54 MIN: ICD-10-PCS | Mod: S$PBB,,, | Performed by: INTERNAL MEDICINE

## 2019-05-16 PROCEDURE — 93010 EKG 12-LEAD: ICD-10-PCS | Mod: S$PBB,,, | Performed by: INTERNAL MEDICINE

## 2019-05-16 PROCEDURE — 99999 PR PBB SHADOW E&M-EST. PATIENT-LVL III: ICD-10-PCS | Mod: PBBFAC,,, | Performed by: INTERNAL MEDICINE

## 2019-05-16 NOTE — PROGRESS NOTES
NEPHROLOGY CLINIC FOLLOWUP NOTE:  Date of clinic visit: 5/16/19     REASON FOR FOLLOWUP AND CHIEF COMPLAINT:  Chronic kidney disease and congestive heart failure.     HISTORY OF PRESENT ILLNESS:  Ms. Jennifer Mathews is an 86-year-old female with CKD stage 3, HTN, severe systolic CHF, and frequent UTI', who presents for f/u. Home health nurse ordered urine under my name which showed positive results for E coli. As documented, pt has multiple abx allergies (PCN's and sulfa drugs), and cipro was the best choice. Pharmacy alerted us that there could be a drug-drug interaction between cipro and amiodarone. Cardiology was contacted and OK'ed the use of cipro with amiodarone. Pt is taking cipro now (day#2), and is feeling well, no abd pain, no back pain, no fever, no sx's of dysuria.    Pt's other issue is her fluid status.  She was previously coached on restricting salt intake and restricting to moderating fluid intake. No worsening in leg swelling.  She denies any shortness of breath.  She has no cardiopulmonary symptoms, no palpitation, no chest pain, no discomfort, and no dizziness.  Labs and meds were reviewed with her.     PAST MEDICAL HISTORY:  1.  CKD stage III, baseline creatinine is about 1.4.  2.  Hypertension.  3.  Severe systolic congestive heart failure with EF of 20%.  4.  Osteoarthritis.  5.  Multiple falls in the past with pelvic fractures.  6.  Hyperlipidemia.  7.  Paroxysmal atrial fibrillation.  8.  Osteopenia.  9.  Pulmonary hypertension.  10. Frequent UTI's     PAST SURGICAL HISTORY:  Reviewed and unchanged.     FAMILY HISTORY:  Reviewed and unchanged.     ALLERGIES:  Reviewed.  Cephalosporins, penicillin, sulfa drugs, and pregabalin.     SOCIAL HISTORY:  Reviewed.  Negative for smoking.  No alcohol use.     DIETARY HISTORY:  Reviewed, as above.     MEDICATIONS:  Reviewed.      Current Outpatient Medications:     acetaminophen (TYLENOL EXTRA STRENGTH) 325 MG tablet, Take 2 tablets (650 mg total) by  mouth every 8 (eight) hours. (Patient taking differently: Take 650 mg by mouth 3 (three) times daily as needed. ), Disp: , Rfl:     allopurinol (ZYLOPRIM) 100 MG tablet, TAKE 2 TABLETS (200 MG TOTAL) BY MOUTH ONCE DAILY., Disp: 180 tablet, Rfl: 1    amiodarone (PACERONE) 200 MG Tab, Take 1 tablet (200 mg total) by mouth once daily., Disp: 30 tablet, Rfl: 6    aspirin (ECOTRIN) 81 MG EC tablet, Take 1 tablet (81 mg total) by mouth once daily., Disp: , Rfl: 0    carvedilol (COREG) 25 MG tablet, TAKE 1 TABLET BY MOUTH TWICE A DAY WITH MEALS, Disp: 180 tablet, Rfl: 3    ciprofloxacin HCl (CIPRO) 500 MG tablet, Take 1 tablet (500 mg total) by mouth once daily., Disp: 14 tablet, Rfl: 0    digoxin (LANOXIN) 125 mcg tablet, TAKE 1 TABLET BY MOUTH EVERY DAY, Disp: 90 tablet, Rfl: 3    furosemide (LASIX) 40 MG tablet, Take 40mg in the morning and an extra tab as needed for swelling, Disp: 40 tablet, Rfl: 11    gabapentin (NEURONTIN) 100 MG capsule, TAKE ONE CAPSULE BY MOUTH TWO TIMES DAILY, Disp: 180 capsule, Rfl: 1    losartan (COZAAR) 25 MG tablet, Take 1 tablet (25 mg total) by mouth once daily., Disp: 30 tablet, Rfl: 11    pantoprazole (PROTONIX) 40 MG tablet, Take 1 tablet (40 mg total) by mouth once daily., Disp: 30 tablet, Rfl: 1    levothyroxine (SYNTHROID) 50 MCG tablet, Take 1 tablet (50 mcg total) by mouth once daily., Disp: 30 tablet, Rfl: 11    Current Facility-Administered Medications:     denosumab (PROLIA) injection 60 mg, 60 mg, Subcutaneous, Q6 Months, Oc Catherine MD, 60 mg at 11/26/18 1031     REVIEW OF SYSTEMS:  No recent hospitalizations.  GENERAL:  Negative.  HEAD, EYES, EARS, NOSE, AND THROAT:  Negative.  CARDIAC:  Negative.  PULMONARY:  Negative.  GASTROINTESTINAL:  Negative.  GENITOURINARY:  Negative.  PSYCHOLOGICAL:  Negative.  NEUROLOGICAL:  Negative.  ENDOCRINE:  Negative.  HEMATOLOGIC AND ONCOLOGIC:  Negative.  INFECTIOUS DISEASE:  Negative.  The rest of the review of  systems negative.     PHYSICAL EXAMINATION:  VITAL SIGNS:  Blood pressure is 134/52, pulse is 70, weight is 119 lbs, last visit 123 pounds,  GENERAL:  She is cooperative, pleasant, in no acute distress, ambulating by herself appear to be in no acute distress.    Speech and thought process was normal and appropriate.  HEENT:  Mucous membranes moist.  NECK:  No JVD.  HEART:  Regular rate and rhythm, S1 and S2 audible.  No rubs.  CHEST:  Clear to auscultation.  No rales.  No wheezes.  Breathing symmetric and   unlabored.  ABDOMEN:  Soft, nontender.  EXTREMITIES:  Showed no edema.     LABS:  Reviewed.   BMP  Lab Results   Component Value Date     05/09/2019    K 4.8 05/16/2019     05/09/2019    CO2 27 05/09/2019    BUN 54 (H) 05/09/2019    CREATININE 1.8 (H) 05/09/2019    CALCIUM 9.2 05/09/2019    ANIONGAP 11 05/09/2019    ESTGFRAFRICA 29 (A) 05/09/2019    EGFRNONAA 25 (A) 05/09/2019     Lab Results   Component Value Date    WBC 7.25 05/14/2019    HGB 10.2 (L) 05/14/2019    HCT 33.7 (L) 05/14/2019     (H) 05/14/2019     05/14/2019     Urine Cx: + E coli       ASSESSMENT AND PLAN:  This is an 86-year-old female who has CKD and severe congestive heart failure pressure, who presents for f/u:  The impression is as follows:    1.  Renal.  s Cr stable, close to prior baseline  Has stable renal function. CKD stage 3  Prior JESSICA remains resolved  K normal  Acid base stable  Doing well clinically, stable   Has mild metabolic alkalosis due to the use of diuretics.      2.  Cardiac:  h/o of severe systolic congestive heart failure with ejection fraction of only 20%.  Cardiology notes reviewed  Well compensated at this point, hemodynamically stable  continue salt and fluid restriction     3.Medications were reviewed with pt    4. ID: E coli UTI: as above, per HPI  On ciprofloxacin, continue  The likelihood of drug-drug interaction between cipro and amiodarone is low, regardless pt knows the risk and has  accepted to take cipro  Refused to go to ER for IV abx treatment that would not interfere with amiodarone    3.  Lab review for SPEP.  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     PLANS AND RECOMMENDATIONS:    As discussed above.    Multiple issues addressed  Opportunity for question and discussion provided.  Multiple phone calls and messages sent regarding the question of cipro   Total time spent 40 minutes, more than 50% of the time was spent in counseling   and coordination of care.  Return to see us in about four months.    Level V visit.  RTC 4-5 months    Prosper You MD

## 2019-05-17 ENCOUNTER — TELEPHONE (OUTPATIENT)
Dept: RHEUMATOLOGY | Facility: CLINIC | Age: 84
End: 2019-05-17

## 2019-05-17 NOTE — PROGRESS NOTES
"Subjective:       Patient ID: Jennifer Mathews is a 86 y.o. female.    Chief Complaint: Osteoporosis    Jennifer is here for follow up. She has gout in the setting of CKD. She also has osteopenia with high risk of fx and is on Prolia.  She occasionally uses a rolling walker if she is doing lots of walking, but today ambulating with a cane. No issues tolerating prolia. She gets calcium in her diet.  dexa done fall of 2017 showed stable bmd at the hips, improving at the spine. She has some OA and joint pains but doesn't use nsaids due to coumadin use in the past (off now).     Regarding gout she is off colchicine; on allopurinol is at 200mg/day.No issues tolerating her medications. No gout flares recently     Today she is doing ok, recently admitted to the hospital due to bleeding due to coumadin toxicity, she is currently off coumadin.  Feeling better.  Was taken off all her supplements while admitted so not sure if can start her vit d again.     Review of Systems   Constitutional: Negative for chills, fatigue and fever.   HENT: Negative for mouth sores, rhinorrhea and sore throat.    Eyes: Negative for pain and redness.   Respiratory: Negative for cough and shortness of breath.    Cardiovascular: Negative for chest pain.        CHF, htn, CAD   Gastrointestinal: Negative for abdominal pain, constipation, diarrhea, nausea and vomiting.   Genitourinary: Negative for dysuria and hematuria.   Musculoskeletal: Positive for arthralgias. Negative for joint swelling and myalgias.   Skin: Negative for rash.   Neurological: Negative for weakness, numbness and headaches.   Hematological:        Chronic anemia   Psychiatric/Behavioral: The patient is not nervous/anxious.          Objective:   BP (!) 136/56   Pulse 76   Ht 5' 2" (1.575 m)   Wt 54.8 kg (120 lb 13 oz)   LMP  (LMP Unknown)   BMI 22.10 kg/m²      Physical Exam   Constitutional: She is oriented to person, place, and time and well-developed, well-nourished, and in " no distress.   HENT:   Head: Normocephalic and atraumatic.   Eyes: Pupils are equal, round, and reactive to light. Right eye exhibits no discharge.   Neck: Normal range of motion.   Cardiovascular: Normal rate, regular rhythm and normal heart sounds.  Exam reveals no friction rub.    Pulmonary/Chest: Effort normal and breath sounds normal. No respiratory distress.   Abdominal: Soft. She exhibits no distension. There is no tenderness.   Lymphadenopathy:     She has no cervical adenopathy.   Neurological: She is alert and oriented to person, place, and time.   Skin: No rash noted. No erythema.     Psychiatric: Mood normal.   Musculoskeletal: Normal range of motion. She exhibits no edema or deformity.   Todd hands with oa changes to 1 cmcs, pips and dips, no synovitis, no tender joints  Right shoulder tender to palpation laterally at the subacromial bursa, full rom               Recent Results (from the past 168 hour(s))   CBC auto differential    Collection Time: 05/14/19  1:55 PM   Result Value Ref Range    WBC 7.25 3.90 - 12.70 K/uL    RBC 3.20 (L) 4.00 - 5.40 M/uL    Hemoglobin 10.2 (L) 12.0 - 16.0 g/dL    Hematocrit 33.7 (L) 37.0 - 48.5 %    Mean Corpuscular Volume 105 (H) 82 - 98 fL    Mean Corpuscular Hemoglobin 31.9 (H) 27.0 - 31.0 pg    Mean Corpuscular Hemoglobin Conc 30.3 (L) 32.0 - 36.0 g/dL    RDW 18.5 (H) 11.5 - 14.5 %    Platelets 206 150 - 350 K/uL    MPV 10.9 9.2 - 12.9 fL    Gran # (ANC) 5.0 1.8 - 7.7 K/uL    Lymph # 1.5 1.0 - 4.8 K/uL    Mono # 0.7 0.3 - 1.0 K/uL    Eos # 0.1 0.0 - 0.5 K/uL    Baso # 0.04 0.00 - 0.20 K/uL    Gran% 68.5 38.0 - 73.0 %    Lymph% 20.3 18.0 - 48.0 %    Mono% 9.2 4.0 - 15.0 %    Eosinophil% 1.4 0.0 - 8.0 %    Basophil% 0.6 0.0 - 1.9 %    Differential Method Automated    Magnesium    Collection Time: 05/16/19 10:10 AM   Result Value Ref Range    Magnesium 2.1 1.6 - 2.6 mg/dL   POTASSIUM    Collection Time: 05/16/19 10:10 AM   Result Value Ref Range    Potassium 4.8 3.5 - 5.1  mmol/L       Dexa 7.22.15: DF -1.6, Right neck -2.3, spine -1.9 frax 22.4 major, 7.4 hip, decreasing bmd  Dexa 11.20.17: LN -1.9, Right neck -2.2, DF-1.4, spine -1.3, stable hip, improving spine, frax major 20.4%, hip 6.5%     Assessment:       1. 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 CKD (chronic kidney disease) stage 3, GFR 30-59 ml/min    3. Medication monitoring encounter    4. Primary osteoarthritis involving multiple joints    5. Osteopenia with high risk of fracture        Impression:  Osteopenia with high fracture risk: on prolia q 6 months, daily vitamin D; dexa 11.2017 stable bmd at hip, improving spine    Gout: on allopurinol 200mg/day, off colchcine, stable no attacks, uric acid at goal 5.4    medication monitoring: no issues, tolerates well allopurinol, and prolia--    CKD: seeing nephrology now, stable, ca normal    OA mult joints: didi hands, shoulders, knees, avoids nsaids    Plan:         Labs reviewed and done within past 14 days  Ca 9.2, Creat 1.8, eGFR 25  Chew 2 tums daily next 2 wks then recheck ca level in 10 days    Repeat dexa 11/2019 or after   Cont allopurinol 200mg/day   Start back on vit D daily       See back in 6 mos with cmp,  prolia (early cmp, prolia at [place])

## 2019-05-19 RX ORDER — FUROSEMIDE 40 MG/1
TABLET ORAL
Qty: 60 TABLET | Refills: 3 | Status: SHIPPED | OUTPATIENT
Start: 2019-05-19 | End: 2019-06-05 | Stop reason: SDUPTHER

## 2019-05-20 ENCOUNTER — TELEPHONE (OUTPATIENT)
Dept: CARDIOLOGY | Facility: CLINIC | Age: 84
End: 2019-05-20

## 2019-05-20 DIAGNOSIS — Z79.899 ON AMIODARONE THERAPY: Primary | ICD-10-CM

## 2019-05-20 NOTE — TELEPHONE ENCOUNTER
Telephoned patient with Lab and Ekg results and will continue Cipro with repeat lab and Ekg on 5/23/19

## 2019-05-21 ENCOUNTER — OFFICE VISIT (OUTPATIENT)
Dept: RHEUMATOLOGY | Facility: CLINIC | Age: 84
End: 2019-05-21
Payer: MEDICARE

## 2019-05-21 VITALS
BODY MASS INDEX: 22.23 KG/M2 | SYSTOLIC BLOOD PRESSURE: 136 MMHG | DIASTOLIC BLOOD PRESSURE: 56 MMHG | HEART RATE: 76 BPM | HEIGHT: 62 IN | WEIGHT: 120.81 LBS

## 2019-05-21 DIAGNOSIS — Z51.81 MEDICATION MONITORING ENCOUNTER: ICD-10-CM

## 2019-05-21 DIAGNOSIS — N18.30 CKD (CHRONIC KIDNEY DISEASE) STAGE 3, GFR 30-59 ML/MIN: ICD-10-CM

## 2019-05-21 DIAGNOSIS — M15.9 PRIMARY OSTEOARTHRITIS INVOLVING MULTIPLE JOINTS: ICD-10-CM

## 2019-05-21 DIAGNOSIS — M85.80 OSTEOPENIA WITH HIGH RISK OF FRACTURE: ICD-10-CM

## 2019-05-21 DIAGNOSIS — M1A.39X1 CHRONIC GOUT DUE TO RENAL IMPAIRMENT OF MULTIPLE SITES WITH TOPHUS: Primary | ICD-10-CM

## 2019-05-21 PROCEDURE — 99214 PR OFFICE/OUTPT VISIT, EST, LEVL IV, 30-39 MIN: ICD-10-PCS | Mod: S$PBB,,, | Performed by: PHYSICIAN ASSISTANT

## 2019-05-21 PROCEDURE — 96372 THER/PROPH/DIAG INJ SC/IM: CPT | Mod: PBBFAC,PN

## 2019-05-21 PROCEDURE — 99214 OFFICE O/P EST MOD 30 MIN: CPT | Mod: S$PBB,,, | Performed by: PHYSICIAN ASSISTANT

## 2019-05-21 PROCEDURE — 99214 OFFICE O/P EST MOD 30 MIN: CPT | Mod: PBBFAC,25,PN | Performed by: PHYSICIAN ASSISTANT

## 2019-05-21 PROCEDURE — 99999 PR PBB SHADOW E&M-EST. PATIENT-LVL IV: CPT | Mod: PBBFAC,,, | Performed by: PHYSICIAN ASSISTANT

## 2019-05-21 PROCEDURE — 99999 PR PBB SHADOW E&M-EST. PATIENT-LVL IV: ICD-10-PCS | Mod: PBBFAC,,, | Performed by: PHYSICIAN ASSISTANT

## 2019-05-21 RX ADMIN — DENOSUMAB 60 MG: 60 INJECTION SUBCUTANEOUS at 01:05

## 2019-05-21 NOTE — PROGRESS NOTES
Administered 1cc Prolia 60mg/cc to LLQ of abdomen. Ca 9.2 Creat 1.8 Pt. Tolerated well. No acute reaction noted to site. Pt. Instructed on S/S of reaction to report. Pt. Verbalized understanding. Pt waited 15 minutes.    Lot:[id number]  Exp:06/21  Manu:bruce

## 2019-05-21 NOTE — PATIENT INSTRUCTIONS
You will get the Prolia injection today which can drop your calcium levels over the next 2 weeks. Please over the next two weeks, chew 2 tums daily and try to get more calcium through your diet. Your goal is 1200mg daily.     We will repeat labs in 10 days -2 weeks to make sure the calcium is normal.     Continue allopurinol 200mg/day        resume multivitamin and vitamin D daily 1000units

## 2019-05-21 NOTE — LETTER
May 21, 2019      Oc Catherine MD  [id number] [facility]  [place] [id number]           '[facility]  [address] [place] [address] [address] [place] [address]  Phone: [phone]  Fax: [phone]          Patient: Jennifer Mathews   MR Number: [id number]   YOB: 1932   Date of Visit: 5/21/2019       Dear Dr. Oc Catherine:    Thank you for referring Jennifer Mathews to me for evaluation. Attached you will find relevant portions of my assessment and plan of care.    If you have questions, please do not hesitate to call me. I look forward to following Jennifer Mathews along with you.    Sincerely,    BATOOL Liuosure  CC:  No Recipients    If you would like to receive this communication electronically, please contact [email or web address] or [phone] to request more information on [facility] access.    For providers and/or their staff who would like to refer a patient to [facility], please contact us through our one-stop-shop provider referral line, [facility], at [phone].    If you feel you have received this communication in error or would no longer like to receive these types of communications, please e-mail [email or web address]

## 2019-05-22 ENCOUNTER — TELEPHONE (OUTPATIENT)
Dept: ADMINISTRATIVE | Facility: CLINIC | Age: 84
End: 2019-05-22

## 2019-05-22 ENCOUNTER — LAB VISIT (OUTPATIENT)
Dept: LAB | Facility: HOSPITAL | Age: 84
End: 2019-05-22
Attending: INTERNAL MEDICINE
Payer: MEDICARE

## 2019-05-22 DIAGNOSIS — I50.42 CHRONIC COMBINED SYSTOLIC AND DIASTOLIC HEART FAILURE: Primary | ICD-10-CM

## 2019-05-22 LAB
ANION GAP SERPL CALC-SCNC: 11 MMOL/L (ref 8–16)
BNP SERPL-MCNC: 862 PG/ML (ref 0–99)
BUN SERPL-MCNC: 56 MG/DL (ref 8–23)
CALCIUM SERPL-MCNC: 9.2 MG/DL (ref 8.7–10.5)
CHLORIDE SERPL-SCNC: 100 MMOL/L (ref 95–110)
CO2 SERPL-SCNC: 28 MMOL/L (ref 23–29)
CREAT SERPL-MCNC: 1.8 MG/DL (ref 0.5–1.4)
EST. GFR  (AFRICAN AMERICAN): 29 ML/MIN/1.73 M^2
EST. GFR  (NON AFRICAN AMERICAN): 25 ML/MIN/1.73 M^2
GLUCOSE SERPL-MCNC: 101 MG/DL (ref 70–110)
MAGNESIUM SERPL-MCNC: 2.3 MG/DL (ref 1.6–2.6)
POTASSIUM SERPL-SCNC: 4.7 MMOL/L (ref 3.5–5.1)
POTASSIUM SERPL-SCNC: 4.7 MMOL/L (ref 3.5–5.1)
SODIUM SERPL-SCNC: 139 MMOL/L (ref 136–145)

## 2019-05-22 PROCEDURE — 83735 ASSAY OF MAGNESIUM: CPT

## 2019-05-22 PROCEDURE — 80048 BASIC METABOLIC PNL TOTAL CA: CPT

## 2019-05-22 PROCEDURE — 36415 COLL VENOUS BLD VENIPUNCTURE: CPT

## 2019-05-22 PROCEDURE — 83880 ASSAY OF NATRIURETIC PEPTIDE: CPT

## 2019-05-22 NOTE — PROGRESS NOTES
HH re-certification with [facility] [place], Dr. Anamika Bello. Patient received SN services.

## 2019-05-23 ENCOUNTER — CLINICAL SUPPORT (OUTPATIENT)
Dept: CARDIOLOGY | Facility: CLINIC | Age: 84
End: 2019-05-23
Payer: MEDICARE

## 2019-05-23 ENCOUNTER — LAB VISIT (OUTPATIENT)
Dept: LAB | Facility: HOSPITAL | Age: 84
End: 2019-05-23
Attending: INTERNAL MEDICINE
Payer: MEDICARE

## 2019-05-23 DIAGNOSIS — I50.42 CHRONIC COMBINED SYSTOLIC AND DIASTOLIC CONGESTIVE HEART FAILURE: ICD-10-CM

## 2019-05-23 DIAGNOSIS — I48.20 CHRONIC ATRIAL FIBRILLATION: ICD-10-CM

## 2019-05-23 DIAGNOSIS — Z79.899 ON AMIODARONE THERAPY: ICD-10-CM

## 2019-05-23 LAB
MAGNESIUM SERPL-MCNC: 2.3 MG/DL (ref 1.6–2.6)
POTASSIUM SERPL-SCNC: 4.3 MMOL/L (ref 3.5–5.1)

## 2019-05-23 PROCEDURE — 84132 ASSAY OF SERUM POTASSIUM: CPT

## 2019-05-23 PROCEDURE — 93005 ELECTROCARDIOGRAM TRACING: CPT | Mod: PBBFAC | Performed by: INTERNAL MEDICINE

## 2019-05-23 PROCEDURE — 36415 COLL VENOUS BLD VENIPUNCTURE: CPT

## 2019-05-23 PROCEDURE — 93010 EKG 12-LEAD: ICD-10-PCS | Mod: S$PBB,,, | Performed by: INTERNAL MEDICINE

## 2019-05-23 PROCEDURE — 93010 ELECTROCARDIOGRAM REPORT: CPT | Mod: S$PBB,,, | Performed by: INTERNAL MEDICINE

## 2019-05-23 PROCEDURE — 83735 ASSAY OF MAGNESIUM: CPT

## 2019-05-24 ENCOUNTER — TELEPHONE (OUTPATIENT)
Dept: CARDIOLOGY | Facility: CLINIC | Age: 84
End: 2019-05-24

## 2019-05-31 ENCOUNTER — OFFICE VISIT (OUTPATIENT)
Dept: CARDIOLOGY | Facility: CLINIC | Age: 84
End: 2019-05-31
Payer: MEDICARE

## 2019-05-31 ENCOUNTER — LAB VISIT (OUTPATIENT)
Dept: LAB | Facility: HOSPITAL | Age: 84
End: 2019-05-31
Attending: INTERNAL MEDICINE
Payer: MEDICARE

## 2019-05-31 ENCOUNTER — ANTI-COAG VISIT (OUTPATIENT)
Dept: CARDIOLOGY | Facility: CLINIC | Age: 84
End: 2019-05-31

## 2019-05-31 ENCOUNTER — EXTERNAL CHRONIC CARE MANAGEMENT (OUTPATIENT)
Dept: PRIMARY CARE CLINIC | Facility: CLINIC | Age: 84
End: 2019-05-31
Payer: MEDICARE

## 2019-05-31 VITALS
HEIGHT: 62 IN | HEART RATE: 72 BPM | SYSTOLIC BLOOD PRESSURE: 132 MMHG | WEIGHT: 120 LBS | DIASTOLIC BLOOD PRESSURE: 56 MMHG | BODY MASS INDEX: 22.08 KG/M2

## 2019-05-31 DIAGNOSIS — R55 SYNCOPE, UNSPECIFIED SYNCOPE TYPE: ICD-10-CM

## 2019-05-31 DIAGNOSIS — I50.42 CHRONIC COMBINED SYSTOLIC AND DIASTOLIC CONGESTIVE HEART FAILURE: ICD-10-CM

## 2019-05-31 DIAGNOSIS — I48.0 PAROXYSMAL ATRIAL FIBRILLATION: Primary | ICD-10-CM

## 2019-05-31 DIAGNOSIS — G47.33 OSA (OBSTRUCTIVE SLEEP APNEA): Chronic | ICD-10-CM

## 2019-05-31 DIAGNOSIS — Z95.2 S/P MVR (MITRAL VALVE REPLACEMENT): ICD-10-CM

## 2019-05-31 DIAGNOSIS — Z95.0 STATUS POST BIVENTRICULAR PACEMAKER: ICD-10-CM

## 2019-05-31 DIAGNOSIS — Z79.899 AT RISK FOR AMIODARONE TOXICITY WITH LONG TERM USE: ICD-10-CM

## 2019-05-31 DIAGNOSIS — E78.5 DYSLIPIDEMIA: ICD-10-CM

## 2019-05-31 DIAGNOSIS — I27.22 PULMONARY HYPERTENSION DUE TO LEFT HEART DISEASE: ICD-10-CM

## 2019-05-31 DIAGNOSIS — Z91.89 AT RISK FOR AMIODARONE TOXICITY WITH LONG TERM USE: ICD-10-CM

## 2019-05-31 DIAGNOSIS — Z79.01 ANTICOAGULATED ON COUMADIN: ICD-10-CM

## 2019-05-31 DIAGNOSIS — D50.0 IRON DEFICIENCY ANEMIA DUE TO CHRONIC BLOOD LOSS: ICD-10-CM

## 2019-05-31 DIAGNOSIS — I25.5 ISCHEMIC CARDIOMYOPATHY: ICD-10-CM

## 2019-05-31 DIAGNOSIS — Z95.810 CARDIAC RESYNCHRONIZATION THERAPY DEFIBRILLATOR (CRT-D) IN PLACE: ICD-10-CM

## 2019-05-31 DIAGNOSIS — I10 ESSENTIAL HYPERTENSION: ICD-10-CM

## 2019-05-31 PROCEDURE — 80299 QUANTITATIVE ASSAY DRUG: CPT

## 2019-05-31 PROCEDURE — 99490 CHRNC CARE MGMT STAFF 1ST 20: CPT | Mod: S$PBB,,, | Performed by: FAMILY MEDICINE

## 2019-05-31 PROCEDURE — 99215 OFFICE O/P EST HI 40 MIN: CPT | Mod: S$PBB,,, | Performed by: INTERNAL MEDICINE

## 2019-05-31 PROCEDURE — 99490 PR CHRONIC CARE MGMT, 1ST 20 MIN: ICD-10-PCS | Mod: S$PBB,,, | Performed by: FAMILY MEDICINE

## 2019-05-31 PROCEDURE — 99213 OFFICE O/P EST LOW 20 MIN: CPT | Mod: PBBFAC,PN | Performed by: INTERNAL MEDICINE

## 2019-05-31 PROCEDURE — 99215 PR OFFICE/OUTPT VISIT, EST, LEVL V, 40-54 MIN: ICD-10-PCS | Mod: S$PBB,,, | Performed by: INTERNAL MEDICINE

## 2019-05-31 PROCEDURE — 36415 COLL VENOUS BLD VENIPUNCTURE: CPT

## 2019-05-31 PROCEDURE — 99999 PR PBB SHADOW E&M-EST. PATIENT-LVL III: CPT | Mod: PBBFAC,,, | Performed by: INTERNAL MEDICINE

## 2019-05-31 PROCEDURE — 80162 ASSAY OF DIGOXIN TOTAL: CPT

## 2019-05-31 PROCEDURE — 99490 CHRNC CARE MGMT STAFF 1ST 20: CPT | Mod: PBBFAC | Performed by: FAMILY MEDICINE

## 2019-05-31 PROCEDURE — 99999 PR PBB SHADOW E&M-EST. PATIENT-LVL III: ICD-10-PCS | Mod: PBBFAC,,, | Performed by: INTERNAL MEDICINE

## 2019-05-31 RX ORDER — LEVOTHYROXINE SODIUM 75 UG/1
75 TABLET ORAL DAILY
Qty: 30 TABLET | Refills: 11
Start: 2019-05-31 | End: 2020-05-20

## 2019-05-31 NOTE — PROGRESS NOTES
Subjective:   Patient ID:  Jennifer Mathews is a 86 y.o. female     Chief complaint:Hypertension; Congestive Heart Failure; and Hyperlipidemia      HPI  Background (see note dated 2/28/18):  86 yo female with a PMH of mitral valve replacement x 3, HTN, PAF, ACS, combined CHF, CRT-P placement (SJM) , CKD.   Recently admitted to [facility] to the ER today with enterocolitis/GI bleed. Her H and H remained stable and she was treated with a course of cipro/flagyl and discharged.   Coumadin was stopped while admitted due to bleeding.   Patient returned to see Ms Arizmendi on 2/2 and she was doing well. Her only complaint was increased left lower extremity edema. Associated symptoms include left calf soreness. She denied any recent injury to area. Stated her legs are usually swollen, but her left one was then noticeably bigger than her right. Other than the above issue, patient seems stable. No recurrence of bleeding. No cardiac complaints. Denied chest pain, chest heaviness, or tightness.   CHF wise, seemed to be doing well. Denied any PND, orthopnea, or abdominal bloating. Weight stable at home.   Patient reports compliance with her medications. Was still off of Coumadin. No CNS complaints suggestive of TIA/CVA. BP stable. GI has seen patient, repeat H and H stable.   As H and H is stable, Ms Arizmendi re-started Coumadin and aske patient to follow-up with Me for further recommendations.   My last visit with her was in 2014 --  DCM with CHB and CRT-P  that resulted in alleviation of CHF Sx and improvement in EF  Most recent echo from late 2016 shows:    1 - Severe left atrial enlargement.     2 - Moderately depressed left ventricular systolic function (EF 35-40%).     3 - Pulmonary hypertension. The estimated PA systolic pressure is 59 mmHg.     4 - Mitral valve prosthesis.     5 - Trivial mitral regurgitation.     6 - Moderate tricuspid regurgitation.     7 - Increased central venous pressure.    I have reviewed the actual  image of the most recent ECG tracing  and it shows ABiV pacing with a favorable QRS morphology /duration (QRSd no more than 150 Msec in any one lead).  Most recent BNP ~1100  She is comfortable at rest and can walk from parking lot to clinic w/o issues. She walks her dog twice a day around her [place] land. 30 to 45 min   She is hypothyroid now     Update 6/1/18:  She has been in the hospital 3 times since then -- once for CJHF/ pneumonia , once for ARF and hyperK and once for dehydration  She has sig leg edema  She is OK with ADLs and does not get too GAR  Her PPM is at [facility]  Echo in March:  >.    1 - Severely depressed left ventricular systolic function (EF 25%).     2 - Normal right ventricular systolic function .     3 - Mild aortic regurgitation.     4 - Mitral valve prosthesis.     5 - Moderate tricuspid regurgitation.     6 - Increased central venous pressure.     7 - Severe left atrial enlargement.     8 - Concentric hypertrophy.     9 - Pulmonary hypertension. The estimated PA systolic pressure is 62 mmHg.     Update since then:  We held her coumadin to restart her on amio -- and she is now here to eval need for restart -- she has a bio prosthetic MVR -- SHE IS ON asa NOW   She has been feeling well and coreview is ok   Current Outpatient Medications   Medication Sig    acetaminophen (TYLENOL EXTRA STRENGTH) 325 MG tablet Take 2 tablets (650 mg total) by mouth every 8 (eight) hours. (Patient taking differently: Take 650 mg by mouth 3 (three) times daily as needed. )    allopurinol (ZYLOPRIM) 100 MG tablet TAKE 2 TABLETS (200 MG TOTAL) BY MOUTH ONCE DAILY.    amiodarone (PACERONE) 200 MG Tab Take 1 tablet (200 mg total) by mouth once daily.    aspirin (ECOTRIN) 81 MG EC tablet Take 1 tablet (81 mg total) by mouth once daily.    carvedilol (COREG) 25 MG tablet TAKE 1 TABLET BY MOUTH TWICE A DAY WITH MEALS    digoxin (LANOXIN) 125 mcg tablet TAKE 1 TABLET BY MOUTH EVERY DAY    furosemide (LASIX) 40  MG tablet Take 40mg in the morning and an extra tab as needed for swelling    furosemide (LASIX) 40 MG tablet TAKE 1 TABLET BY MOUTH TWICE A DAY    gabapentin (NEURONTIN) 100 MG capsule TAKE ONE CAPSULE BY MOUTH TWO TIMES DAILY    levothyroxine (SYNTHROID) 75 MCG tablet Take 1 tablet (75 mcg total) by mouth once daily.    losartan (COZAAR) 25 MG tablet Take 1 tablet (25 mg total) by mouth once daily.    pantoprazole (PROTONIX) 40 MG tablet Take 1 tablet (40 mg total) by mouth once daily.    apixaban (ELIQUIS) 2.5 mg Tab Take 1 tablet (2.5 mg total) by mouth 2 (two) times daily.     Current Facility-Administered Medications   Medication    denosumab (PROLIA) injection 60 mg     Review of Systems   Constitution: Negative for decreased appetite, weight gain and weight loss.   HENT: Negative for nosebleeds.    Eyes: Negative for blurred vision and visual disturbance.   Cardiovascular: Negative for chest pain, claudication, cyanosis, dyspnea on exertion, irregular heartbeat, leg swelling, near-syncope, orthopnea, palpitations, paroxysmal nocturnal dyspnea and syncope.   Respiratory: Negative for cough, shortness of breath and wheezing.    Endocrine: Negative for heat intolerance.   Skin: Negative for rash.   Musculoskeletal: Negative for muscle weakness and myalgias.   Gastrointestinal: Negative for abdominal pain, anorexia, melena, nausea and vomiting.   Genitourinary: Negative for menorrhagia.   Neurological: Negative for excessive daytime sleepiness, dizziness, headaches, loss of balance, seizures, vertigo and weakness.   Psychiatric/Behavioral: Negative for altered mental status and depression. The patient is not nervous/anxious.        Objective:   Physical Exam   Constitutional: She is oriented to person, place, and time. She appears well-developed and well-nourished.   HENT:   Head: Normocephalic and atraumatic.   Right Ear: External ear normal.   Left Ear: External ear normal.   Eyes: Pupils are equal,  "round, and reactive to light. Conjunctivae are normal. Left eye exhibits no discharge. No scleral icterus.   Neck: Normal range of motion. Neck supple. No thyromegaly present.   Cardiovascular: Normal rate, regular rhythm, normal heart sounds and intact distal pulses. Exam reveals no gallop, no S3, no S4, no friction rub, no midsystolic click and no opening snap.   No murmur heard.  Pulses:       Carotid pulses are 2+ on the right side, and 2+ on the left side.       Radial pulses are 2+ on the right side, and 2+ on the left side.        Dorsalis pedis pulses are 2+ on the right side, and 2+ on the left side.        Posterior tibial pulses are 2+ on the right side, and 2+ on the left side.   Pulmonary/Chest: Effort normal and breath sounds normal.   Device pocket is in excellent repair   Abdominal: Soft. She exhibits no distension. There is no hepatomegaly. There is no tenderness. There is no guarding.   Musculoskeletal:        Right ankle: She exhibits no swelling.        Left ankle: She exhibits no swelling.        Right lower leg: She exhibits no swelling.        Left lower leg: She exhibits no swelling.   Neurological: She is alert and oriented to person, place, and time. She has normal strength. No cranial nerve deficit. Gait normal.   Skin: Skin is warm, dry and intact. No rash noted. No cyanosis. Nails show no clubbing.   Psychiatric: She has a normal mood and affect. Her speech is normal and behavior is normal. Thought content normal. Cognition and memory are normal.   Nursing note and vitals reviewed.    BP (!) 132/56 (BP Location: Right arm, Patient Position: Sitting, BP Method: Medium (Manual))   Pulse 72   Ht 5' 2" (1.575 m)   Wt 54.4 kg (120 lb)   LMP  (LMP Unknown)   BMI 21.95 kg/m²      Assessment:      1. At risk for amiodarone toxicity with long term use    2. Chronic combined systolic and diastolic congestive heart failure    3. Cardiac resynchronization therapy defibrillator (CRT-D) in place  "   4. Paroxysmal atrial fibrillation    5. Pulmonary hypertension due to left heart disease    6. Dyslipidemia    7. Ischemic cardiomyopathy    8. Essential hypertension    9. Anticoagulated on Coumadin    10. Iron deficiency anemia due to chronic blood loss    11. S/P MVR (mitral valve replacement)    12. Status post biventricular pacemaker    13. LEV (obstructive sleep apnea)    14. Syncope, unspecified syncope type        Plan:    I would DC ASA and start Eliquis 2.5 bid  Orders Placed This Encounter   Procedures    TSH     Standing Status:   Future     Standing Expiration Date:   7/29/2020    Amiodarone level     Standing Status:   Future     Number of Occurrences:   1     Standing Expiration Date:   7/29/2020    Digoxin level     Standing Status:   Future     Number of Occurrences:   1     Standing Expiration Date:   7/29/2020    Spirometry with/without bronchodilator & DLCO ([facility] Only)     Standing Status:   Future     Standing Expiration Date:   5/31/2020     Follow up in about 1 year (around 5/31/2020).  Medications Discontinued During This Encounter   Medication Reason    ciprofloxacin HCl (CIPRO) 500 MG tablet Therapy completed    levothyroxine (SYNTHROID) 50 MCG tablet      Medications Ordered This Encounter   Medications    apixaban (ELIQUIS) 2.5 mg Tab     Sig: Take 1 tablet (2.5 mg total) by mouth 2 (two) times daily.     Dispense:  60 tablet     Refill:  11    levothyroxine (SYNTHROID) 75 MCG tablet     Sig: Take 1 tablet (75 mcg total) by mouth once daily.     Dispense:  30 tablet     Refill:  11     Medication List with Changes/Refills   New Medications    APIXABAN (ELIQUIS) 2.5 MG TAB    Take 1 tablet (2.5 mg total) by mouth 2 (two) times daily.   Current Medications    ACETAMINOPHEN (TYLENOL EXTRA STRENGTH) 325 MG TABLET    Take 2 tablets (650 mg total) by mouth every 8 (eight) hours.    ALLOPURINOL (ZYLOPRIM) 100 MG TABLET    TAKE 2 TABLETS (200 MG TOTAL) BY MOUTH ONCE DAILY.     AMIODARONE (PACERONE) 200 MG TAB    Take 1 tablet (200 mg total) by mouth once daily.    ASPIRIN (ECOTRIN) 81 MG EC TABLET    Take 1 tablet (81 mg total) by mouth once daily.    CARVEDILOL (COREG) 25 MG TABLET    TAKE 1 TABLET BY MOUTH TWICE A DAY WITH MEALS    DIGOXIN (LANOXIN) 125 MCG TABLET    TAKE 1 TABLET BY MOUTH EVERY DAY    FUROSEMIDE (LASIX) 40 MG TABLET    Take 40mg in the morning and an extra tab as needed for swelling    FUROSEMIDE (LASIX) 40 MG TABLET    TAKE 1 TABLET BY MOUTH TWICE A DAY    GABAPENTIN (NEURONTIN) 100 MG CAPSULE    TAKE ONE CAPSULE BY MOUTH TWO TIMES DAILY    LOSARTAN (COZAAR) 25 MG TABLET    Take 1 tablet (25 mg total) by mouth once daily.    PANTOPRAZOLE (PROTONIX) 40 MG TABLET    Take 1 tablet (40 mg total) by mouth once daily.   Changed and/or Refilled Medications    Modified Medication Previous Medication    LEVOTHYROXINE (SYNTHROID) 75 MCG TABLET levothyroxine (SYNTHROID) 50 MCG tablet       Take 1 tablet (75 mcg total) by mouth once daily.    Take 1 tablet (50 mcg total) by mouth once daily.   Discontinued Medications    CIPROFLOXACIN HCL (CIPRO) 500 MG TABLET    Take 1 tablet (500 mg total) by mouth once daily.

## 2019-05-31 NOTE — PROGRESS NOTES
Mrs. Mathews was seen by Dr. Charo Taylor on today.  Aspirin was discontinued on today and patient was started on Eliquis. Anticoagulation Episode resolved.  [facility] contacted.

## 2019-06-01 LAB — DIGOXIN SERPL-MCNC: 2.6 NG/ML (ref 0.8–2)

## 2019-06-03 LAB
AMIODARONE SERPL-SCNC: 0.8 UG/ML (ref 1–3)
N-DESETHYL-AMIODARONE: 1.7 UG/ML

## 2019-06-04 ENCOUNTER — TELEPHONE (OUTPATIENT)
Dept: CARDIOLOGY | Facility: CLINIC | Age: 84
End: 2019-06-04

## 2019-06-04 DIAGNOSIS — Z51.81 ENCOUNTER FOR MONITORING DIGOXIN THERAPY: Primary | ICD-10-CM

## 2019-06-04 DIAGNOSIS — Z79.899 ENCOUNTER FOR MONITORING DIGOXIN THERAPY: Primary | ICD-10-CM

## 2019-06-04 PROBLEM — Z45.010 PACEMAKER GENERATOR END OF LIFE: Status: RESOLVED | Noted: 2018-06-01 | Resolved: 2019-06-04

## 2019-06-04 RX ORDER — DIGOXIN 125 MCG
125 TABLET ORAL EVERY OTHER DAY
COMMUNITY
End: 2021-01-04

## 2019-06-04 NOTE — TELEPHONE ENCOUNTER
----- Message from Manny Dunne MD sent at 6/4/2019  1:31 PM CDT -----  See comments below and call patient to discuss.   Please close encounter when done -- no need to route back to me.  Thanks  Amio level is OK  Dig is too hi - I think she is on once a day -- so decrease to once manohar other day   Repeat trough level in 3 weeks   Tx

## 2019-06-05 ENCOUNTER — OFFICE VISIT (OUTPATIENT)
Dept: CARDIOLOGY | Facility: CLINIC | Age: 84
End: 2019-06-05
Payer: MEDICARE

## 2019-06-05 ENCOUNTER — TELEPHONE (OUTPATIENT)
Dept: CARDIOLOGY | Facility: CLINIC | Age: 84
End: 2019-06-05

## 2019-06-05 ENCOUNTER — LAB VISIT (OUTPATIENT)
Dept: LAB | Facility: HOSPITAL | Age: 84
End: 2019-06-05
Attending: NURSE PRACTITIONER
Payer: MEDICARE

## 2019-06-05 ENCOUNTER — OFFICE VISIT (OUTPATIENT)
Dept: HEMATOLOGY/ONCOLOGY | Facility: CLINIC | Age: 84
End: 2019-06-05
Payer: MEDICARE

## 2019-06-05 VITALS
RESPIRATION RATE: 20 BRPM | OXYGEN SATURATION: 98 % | HEIGHT: 62 IN | SYSTOLIC BLOOD PRESSURE: 141 MMHG | DIASTOLIC BLOOD PRESSURE: 66 MMHG | HEART RATE: 70 BPM | TEMPERATURE: 98 F | WEIGHT: 123.69 LBS | BODY MASS INDEX: 22.76 KG/M2

## 2019-06-05 VITALS
BODY MASS INDEX: 22.07 KG/M2 | SYSTOLIC BLOOD PRESSURE: 142 MMHG | DIASTOLIC BLOOD PRESSURE: 64 MMHG | HEART RATE: 78 BPM | WEIGHT: 119.94 LBS | HEIGHT: 62 IN

## 2019-06-05 DIAGNOSIS — Z95.2 S/P MVR (MITRAL VALVE REPLACEMENT): ICD-10-CM

## 2019-06-05 DIAGNOSIS — Z95.0 STATUS POST BIVENTRICULAR PACEMAKER: ICD-10-CM

## 2019-06-05 DIAGNOSIS — D50.0 IRON DEFICIENCY ANEMIA DUE TO CHRONIC BLOOD LOSS: ICD-10-CM

## 2019-06-05 DIAGNOSIS — D53.9 MACROCYTIC ANEMIA: ICD-10-CM

## 2019-06-05 DIAGNOSIS — N18.4 ANEMIA ASSOCIATED WITH STAGE 4 CHRONIC RENAL FAILURE: Primary | ICD-10-CM

## 2019-06-05 DIAGNOSIS — I50.42 CHRONIC COMBINED SYSTOLIC AND DIASTOLIC CONGESTIVE HEART FAILURE: ICD-10-CM

## 2019-06-05 DIAGNOSIS — D63.1 ANEMIA ASSOCIATED WITH STAGE 4 CHRONIC RENAL FAILURE: Primary | ICD-10-CM

## 2019-06-05 DIAGNOSIS — I48.0 PAROXYSMAL ATRIAL FIBRILLATION: ICD-10-CM

## 2019-06-05 DIAGNOSIS — I10 ESSENTIAL HYPERTENSION: ICD-10-CM

## 2019-06-05 DIAGNOSIS — I50.42 CHRONIC COMBINED SYSTOLIC AND DIASTOLIC CONGESTIVE HEART FAILURE: Primary | ICD-10-CM

## 2019-06-05 DIAGNOSIS — R76.8 ELEVATED SERUM IMMUNOGLOBULIN FREE LIGHT CHAIN LEVEL: ICD-10-CM

## 2019-06-05 LAB
ALBUMIN SERPL BCP-MCNC: 3.9 G/DL (ref 3.5–5.2)
ALP SERPL-CCNC: 86 U/L (ref 55–135)
ALT SERPL W/O P-5'-P-CCNC: 9 U/L (ref 10–44)
ANION GAP SERPL CALC-SCNC: 9 MMOL/L (ref 8–16)
AST SERPL-CCNC: 15 U/L (ref 10–40)
BASOPHILS # BLD AUTO: 0.06 K/UL (ref 0–0.2)
BASOPHILS NFR BLD: 0.8 % (ref 0–1.9)
BILIRUB SERPL-MCNC: 0.5 MG/DL (ref 0.1–1)
BUN SERPL-MCNC: 34 MG/DL (ref 8–23)
CALCIUM SERPL-MCNC: 8.6 MG/DL (ref 8.7–10.5)
CHLORIDE SERPL-SCNC: 106 MMOL/L (ref 95–110)
CO2 SERPL-SCNC: 26 MMOL/L (ref 23–29)
CREAT SERPL-MCNC: 1.5 MG/DL (ref 0.5–1.4)
DIFFERENTIAL METHOD: ABNORMAL
EOSINOPHIL # BLD AUTO: 0.1 K/UL (ref 0–0.5)
EOSINOPHIL NFR BLD: 1.6 % (ref 0–8)
ERYTHROCYTE [DISTWIDTH] IN BLOOD BY AUTOMATED COUNT: 17.7 % (ref 11.5–14.5)
EST. GFR  (AFRICAN AMERICAN): 36 ML/MIN/1.73 M^2
EST. GFR  (NON AFRICAN AMERICAN): 31 ML/MIN/1.73 M^2
GLUCOSE SERPL-MCNC: 105 MG/DL (ref 70–110)
HCT VFR BLD AUTO: 38.1 % (ref 37–48.5)
HGB BLD-MCNC: 11.5 G/DL (ref 12–16)
LYMPHOCYTES # BLD AUTO: 1.4 K/UL (ref 1–4.8)
LYMPHOCYTES NFR BLD: 19.3 % (ref 18–48)
MCH RBC QN AUTO: 31.2 PG (ref 27–31)
MCHC RBC AUTO-ENTMCNC: 30.2 G/DL (ref 32–36)
MCV RBC AUTO: 103 FL (ref 82–98)
MONOCYTES # BLD AUTO: 0.6 K/UL (ref 0.3–1)
MONOCYTES NFR BLD: 7.8 % (ref 4–15)
NEUTROPHILS # BLD AUTO: 5.3 K/UL (ref 1.8–7.7)
NEUTROPHILS NFR BLD: 70.5 % (ref 38–73)
PLATELET # BLD AUTO: 256 K/UL (ref 150–350)
PMV BLD AUTO: 9.9 FL (ref 9.2–12.9)
POTASSIUM SERPL-SCNC: 5.1 MMOL/L (ref 3.5–5.1)
PROT SERPL-MCNC: 7 G/DL (ref 6–8.4)
RBC # BLD AUTO: 3.69 M/UL (ref 4–5.4)
SODIUM SERPL-SCNC: 141 MMOL/L (ref 136–145)
WBC # BLD AUTO: 7.48 K/UL (ref 3.9–12.7)

## 2019-06-05 PROCEDURE — 99214 OFFICE O/P EST MOD 30 MIN: CPT | Mod: S$PBB,,, | Performed by: NURSE PRACTITIONER

## 2019-06-05 PROCEDURE — 99214 OFFICE O/P EST MOD 30 MIN: CPT | Mod: PBBFAC,27 | Performed by: NURSE PRACTITIONER

## 2019-06-05 PROCEDURE — 36415 COLL VENOUS BLD VENIPUNCTURE: CPT

## 2019-06-05 PROCEDURE — 99213 OFFICE O/P EST LOW 20 MIN: CPT | Mod: PBBFAC | Performed by: NURSE PRACTITIONER

## 2019-06-05 PROCEDURE — 99999 PR PBB SHADOW E&M-EST. PATIENT-LVL III: CPT | Mod: PBBFAC,,, | Performed by: NURSE PRACTITIONER

## 2019-06-05 PROCEDURE — 85025 COMPLETE CBC W/AUTO DIFF WBC: CPT

## 2019-06-05 PROCEDURE — 99999 PR PBB SHADOW E&M-EST. PATIENT-LVL IV: ICD-10-PCS | Mod: PBBFAC,,, | Performed by: NURSE PRACTITIONER

## 2019-06-05 PROCEDURE — 99214 PR OFFICE/OUTPT VISIT, EST, LEVL IV, 30-39 MIN: ICD-10-PCS | Mod: S$PBB,,, | Performed by: NURSE PRACTITIONER

## 2019-06-05 PROCEDURE — 80053 COMPREHEN METABOLIC PANEL: CPT

## 2019-06-05 PROCEDURE — 99999 PR PBB SHADOW E&M-EST. PATIENT-LVL IV: CPT | Mod: PBBFAC,,, | Performed by: NURSE PRACTITIONER

## 2019-06-05 PROCEDURE — 99999 PR PBB SHADOW E&M-EST. PATIENT-LVL III: ICD-10-PCS | Mod: PBBFAC,,, | Performed by: NURSE PRACTITIONER

## 2019-06-05 RX ORDER — FUROSEMIDE 40 MG/1
TABLET ORAL
Qty: 60 TABLET | Refills: 3
Start: 2019-06-05 | End: 2019-06-21 | Stop reason: SDUPTHER

## 2019-06-05 NOTE — PROGRESS NOTES
Subjective:   Patient ID:  Jennifer Mathews is a 86 y.o. female who presents for follow up of Congestive Heart Failure and Atrial Fibrillation      HPI     Ms. Mathews's current conditions include mitral valve replacement -bioprosthesis, old Stroke without residual deficits, HTN, PAF, CKD,  combined CHF with EF 25%, ICM s/p PPM-CRT-D placement, recurrent UTI, CKD3-4, and hypothyroidism.      Recently admitted to [facility] with enterocolitis/GI bleed. Her H/H remained stable and she was treated with a course of cipro/flagyl and discharged home.  Coumadin was stopped while admitted due to bleeding. Has had at least 2 admissions for GI bleed on Coumadin.   Underwent EGD that showed LA Grade D Candida Esophagitis. Biopsied. Gastric erosions without bleeding. Normal first portion of the duodenum and second portion of the duodenum    Coumadin switched to Eliquis 2.5mg BID on 5/31/19 by Dr. Dunne as well. ASA stopped.   Digoxin decreased to every other day by Dr. Dunne. Planning to repeat level in 3 weeks.   EKG and labs remained stable while on Cipro and Amio.     Denies any chest pain, SOB, GAR,  orthopnea, PND, dizziness, palpitations,  near syncope, syncope, edema or palpitations. Has no symptoms concerning for angina or equivalent. No CNS Complaints to suggest TIA or CVA.  Currently taking Lasix 80mg daily. BNP down to 862 from 1510 one month ago. Renal function stable   Does well with limiting sodium intake.  She walks her dog twice a day around her [place].     Past Medical History:   Diagnosis Date    Acute coronary syndrome     Anemia     Anticoagulated on Coumadin 7/13/2015    Arthritis     Asthma     patient denies    Atrial fibrillation     Basal cell carcinoma 10/2015    left neck    Cardiac arrest     Cardiac resynchronization therapy defibrillator (CRT-D) in place 07/13/2015    Pt denies, states it does not shock me    Cardiomyopathy     Chronic combined systolic and diastolic congestive  heart failure     CKD (chronic kidney disease) stage 3, GFR 30-59 ml/min     Dyslipidemia 1/30/2014    Hyperlipidemia     Hypertension     Hypothyroidism     Ischemic cardiomyopathy 01/30/2014    Macular hole of left eye     Old    Macular hole of left eye     LEV (obstructive sleep apnea) 9/30/2013    Pneumonia     required hospitalization    Recurrent UTI     Refractive error     Spastic colon     Stroke     Syncope and collapse        Past Surgical History:   Procedure Laterality Date    APPENDECTOMY      CARDIAC CATHETERIZATION      CARDIAC PACEMAKER PLACEMENT  2014    CARDIAC VALVE SURGERY      CATARACT EXTRACTION      OU    CHOLECYSTECTOMY      COLONOSCOPY      ESOPHAGOGASTRODUODENOSCOPY (EGD) N/A 3/13/2019    Performed [facility]al [facility]rellana [facility] at [facility] ENDO    IRRIGATION AND DEBRIDEMENT OF LEFT KNEE Left 9/12/2017    Performed by Jul[facility] Rosenbaum MD at [facility] OR    MITRAL VALVE REPLACEMENT  2014    MITRAL VALVE SURGERY      x3    REPLACEMENT, PACEMAKER GENERATOR/pt has crt-p versus d Left 6/20/2018    Performed by Manny Dunne [facility] at [facility] CATH LAB    REVISION, SKIN POCKET, FOR CARDIAC PACEMAKER Left 2/26/2014    Performed by [facility]ne [facility] at [facility] CATH LAB       Social History     Tobacco Use    Smoking status: Never Smoker    Smokeless tobacco: Never Used   Substance Use Topics    Alcohol use: No    Drug use: No       Family History   Problem Relation Age of Onset    Coronary artery disease Mother         mi    Epilepsy Mother     Heart attack Mother     Coronary artery disease Father     Heart disease Father     Emphysema Father     COPD Father     Diabetes Brother     Cancer Brother     Melanoma Neg Hx     Psoriasis Neg Hx     Lupus Neg Hx     Eczema Neg Hx        Current Outpatient Medications   Medication Sig    acetaminophen (TYLENOL EXTRA STRENGTH) 325 MG tablet Take 2 tablets (650 mg total) by mouth every 8 (eight) hours. (Patient taking  differently: Take 650 mg by mouth 3 (three) times daily as needed. )    allopurinol (ZYLOPRIM) 100 MG tablet TAKE 2 TABLETS (200 MG TOTAL) BY MOUTH ONCE DAILY.    amiodarone (PACERONE) 200 MG Tab Take 1 tablet (200 mg total) by mouth once daily.    apixaban (ELIQUIS) 2.5 mg Tab Take 1 tablet (2.5 mg total) by mouth 2 (two) times daily.    aspirin (ECOTRIN) 81 MG EC tablet Take 1 tablet (81 mg total) by mouth once daily.    carvedilol (COREG) 25 MG tablet TAKE 1 TABLET BY MOUTH TWICE A DAY WITH MEALS    digoxin (LANOXIN) 125 mcg tablet Take 125 mcg by mouth every other day.    furosemide (LASIX) 40 MG tablet Take 2 pills once daily    gabapentin (NEURONTIN) 100 MG capsule TAKE ONE CAPSULE BY MOUTH TWO TIMES DAILY    levothyroxine (SYNTHROID) 75 MCG tablet Take 1 tablet (75 mcg total) by mouth once daily.    losartan (COZAAR) 25 MG tablet Take 1 tablet (25 mg total) by mouth once daily.    pantoprazole (PROTONIX) 40 MG tablet Take 1 tablet (40 mg total) by mouth once daily.     Current Facility-Administered Medications   Medication    denosumab (PROLIA) injection 60 mg     Current Outpatient Medications on File Prior to Visit   Medication Sig    acetaminophen (TYLENOL EXTRA STRENGTH) 325 MG tablet Take 2 tablets (650 mg total) by mouth every 8 (eight) hours. (Patient taking differently: Take 650 mg by mouth 3 (three) times daily as needed. )    allopurinol (ZYLOPRIM) 100 MG tablet TAKE 2 TABLETS (200 MG TOTAL) BY MOUTH ONCE DAILY.    amiodarone (PACERONE) 200 MG Tab Take 1 tablet (200 mg total) by mouth once daily.    apixaban (ELIQUIS) 2.5 mg Tab Take 1 tablet (2.5 mg total) by mouth 2 (two) times daily.    aspirin (ECOTRIN) 81 MG EC tablet Take 1 tablet (81 mg total) by mouth once daily.    carvedilol (COREG) 25 MG tablet TAKE 1 TABLET BY MOUTH TWICE A DAY WITH MEALS    digoxin (LANOXIN) 125 mcg tablet Take 125 mcg by mouth every other day.    gabapentin (NEURONTIN) 100 MG capsule TAKE ONE  CAPSULE BY MOUTH TWO TIMES DAILY    levothyroxine (SYNTHROID) 75 MCG tablet Take 1 tablet (75 mcg total) by mouth once daily.    losartan (COZAAR) 25 MG tablet Take 1 tablet (25 mg total) by mouth once daily.    pantoprazole (PROTONIX) 40 MG tablet Take 1 tablet (40 mg total) by mouth once daily.    [DISCONTINUED] furosemide (LASIX) 40 MG tablet Take 40mg in the morning and an extra tab as needed for swelling    [DISCONTINUED] furosemide (LASIX) 40 MG tablet TAKE 1 TABLET BY MOUTH TWICE A DAY     Current Facility-Administered Medications on File Prior to Visit   Medication    denosumab (PROLIA) injection 60 mg       Review of Systems   Constitution: Negative for diaphoresis, malaise/fatigue, weight gain and weight loss.   HENT: Negative for congestion and nosebleeds.    Cardiovascular: Positive for leg swelling. Negative for chest pain, claudication, cyanosis, dyspnea on exertion, irregular heartbeat, near-syncope, orthopnea, palpitations, paroxysmal nocturnal dyspnea and syncope.   Respiratory: Negative for cough, hemoptysis, shortness of breath, sleep disturbances due to breathing, snoring, sputum production and wheezing.    Hematologic/Lymphatic: Negative for bleeding problem. Does not bruise/bleed easily.   Skin: Negative for rash.   Musculoskeletal: Negative for arthritis, back pain, falls, joint pain, muscle cramps and muscle weakness.   Gastrointestinal: Negative for abdominal pain, constipation, diarrhea, heartburn, hematemesis, hematochezia, melena and nausea.   Genitourinary: Negative for dysuria, hematuria and nocturia.   Neurological: Negative for excessive daytime sleepiness, dizziness, headaches, light-headedness, loss of balance, numbness, vertigo and weakness.       Objective:   Physical Exam   Constitutional: She is oriented to person, place, and time. She appears well-developed and well-nourished. No distress.   HENT:   Head: Normocephalic and atraumatic.   Eyes: Pupils are equal, round, and  "reactive to light. Right eye exhibits no discharge. Left eye exhibits no discharge.   Neck: Neck supple. No JVD present.   Cardiovascular: Normal rate, regular rhythm, S1 normal and S2 normal.   Murmur heard.  High-pitched blowing holosystolic murmur is present at the apex.  Pulmonary/Chest: Effort normal. No respiratory distress. She has decreased breath sounds in the right lower field and the left lower field. She has no wheezes. She has rales.   CRT D site well-healed  Sternotomy site well-healed   Abdominal: Soft. She exhibits no distension.   Musculoskeletal: She exhibits edema (+1-2  BLE ).   Neurological: She is alert and oriented to person, place, and time.   Skin: Skin is warm and dry. She is not diaphoretic. No erythema.   Psychiatric: She has a normal mood and affect. Her behavior is normal. Thought content normal.   Nursing note and vitals reviewed.    Vitals:    06/05/19 1056   BP: (!) 142/64   Pulse: 78   Weight: 54.4 kg (119 lb 14.9 oz)   Height: 5' 2" (1.575 m)     Lab Results   Component Value Date    CHOL 131 03/21/2019    CHOL 155 03/20/2018    CHOL 154 01/29/2018     Lab Results   Component Value Date    HDL 31 (L) 03/21/2019    HDL 36 (L) 03/20/2018    HDL 34 (L) 01/29/2018     Lab Results   Component Value Date    LDLCALC 68.6 03/21/2019    LDLCALC 90.8 03/20/2018    LDLCALC 79.0 01/29/2018     Lab Results   Component Value Date    TRIG 157 (H) 03/21/2019    TRIG 141 03/20/2018    TRIG 205 (H) 01/29/2018     Lab Results   Component Value Date    CHOLHDL 23.7 03/21/2019    CHOLHDL 23.2 03/20/2018    CHOLHDL 22.1 01/29/2018       Chemistry        Component Value Date/Time     05/22/2019 1616    K 4.3 05/23/2019 0952     05/22/2019 1616    CO2 28 05/22/2019 1616    BUN 56 (H) 05/22/2019 1616    CREATININE 1.8 (H) 05/22/2019 1616     05/22/2019 1616        Component Value Date/Time    CALCIUM 9.2 05/22/2019 1616    ALKPHOS 88 05/06/2019 1402    AST 18 05/06/2019 1402    ALT 11 " 05/06/2019 1402    BILITOT 0.5 05/06/2019 1402    ESTGFRAFRICA 29 (A) 05/22/2019 1616    EGFRNONAA 25 (A) 05/22/2019 1616          Lab Results   Component Value Date    TSH 6.333 (H) 03/29/2019     Lab Results   Component Value Date    INR 1.3 (H) 05/06/2019    INR 1.8 05/02/2019    INR 1.7 04/29/2019     Lab Results   Component Value Date    WBC 7.25 05/14/2019    HGB 10.2 (L) 05/14/2019    HCT 33.7 (L) 05/14/2019     (H) 05/14/2019     05/14/2019     BMP  Sodium   Date Value Ref Range Status   05/22/2019 139 136 - 145 mmol/L Final     Potassium   Date Value Ref Range Status   05/23/2019 4.3 3.5 - 5.1 mmol/L Final     Chloride   Date Value Ref Range Status   05/22/2019 100 95 - 110 mmol/L Final     CO2   Date Value Ref Range Status   05/22/2019 28 23 - 29 mmol/L Final     BUN, Bld   Date Value Ref Range Status   05/22/2019 56 (H) 8 - 23 mg/dL Final     Creatinine   Date Value Ref Range Status   05/22/2019 1.8 (H) 0.5 - 1.4 mg/dL Final     Calcium   Date Value Ref Range Status   05/22/2019 9.2 8.7 - 10.5 mg/dL Final     Anion Gap   Date Value Ref Range Status   05/22/2019 11 8 - 16 mmol/L Final     eGFR if    Date Value Ref Range Status   05/22/2019 29 (A) >60 mL/min/1.73 m^2 Final     eGFR if non    Date Value Ref Range Status   05/22/2019 25 (A) >60 mL/min/1.73 m^2 Final     Comment:     Calculation used to obtain the estimated glomerular filtration  rate (eGFR) is the CKD-EPI equation.        CrCl cannot be calculated (Patient's most recent lab result is older than the maximum 7 days allowed.).    Assessment:     1. Chronic combined systolic and diastolic congestive heart failure    2. Paroxysmal atrial fibrillation    3. Essential hypertension    4. S/P MVR (mitral valve replacement)    5. Status post biventricular pacemaker        Plan:   Chronic combined systolic and diastolic congestive heart failure  Continue current Coreg, losartan and Lasix   Heart healthy  diet  Limit fluid intake 50-60 oz   Daily weights and to notify clinic if weight increases by more than 3 lbs in 1 day or 5 lbs in 1 week.   Exercise routine as tolerated  Needs to elevate legs since she is not interested in compression stockings again     Paroxysmal atrial fibrillation  Continue Coreg, amio and digoxin as recommended by EP   Continue Eliquis for CVA prophylaxis    Essential hypertension  Continue Coreg and Losartan     S/P MVR (mitral valve replacement)  Continue current medical management     Status post biventricular pacemaker  Continue device clinic     RTC in 3 months. Will check BMP and BNP in 1 month with [facility]

## 2019-06-05 NOTE — PROGRESS NOTES
Subjective:       Patient ID: Jennifer Mathews is a 86 y.o. female.    Chief Complaint: Anemia    85-year-old  female who presents to the [facility] today as a follow-up for anemia, s/p Feraheme.  I have reviewed all the patient's relevant clinical history available medical record have utilized as my evaluation management recommendations today.  She continues on Coumadin at this time.  The patient reports that she does have some generalized weakness and fatigue but overall feels better since the feraheme.  She denies any melena, hematochezia, hematemesis, hemoptysis or hematuria.  She denies any chest pain.  She reports shortness of breath with exertion.  She denies any shortness of breath at rest.  She reports chronic swelling in her legs.  This is unchanged.  She denies any nausea, vomiting or abdominal pain. She denies any bowel or urinary complaints.    Anemia   There has been no abdominal pain, bruising/bleeding easily, confusion, fever, light-headedness or palpitations.     Review of Systems   Constitutional: Positive for fatigue. Negative for activity change, appetite change, chills, diaphoresis, fever and unexpected weight change.   HENT: Negative for congestion, hearing loss, mouth sores, nosebleeds and trouble swallowing.    Eyes: Negative for discharge and visual disturbance.   Respiratory: Negative for cough, chest tightness and shortness of breath.    Cardiovascular: Negative for chest pain, palpitations and leg swelling.   Gastrointestinal: Positive for abdominal distention. Negative for abdominal pain, blood in stool, constipation, diarrhea, nausea and vomiting.   Endocrine: Negative for cold intolerance and heat intolerance.   Genitourinary: Negative for difficulty urinating, dyspareunia and hematuria.   Musculoskeletal: Positive for arthralgias, back pain, gait problem and myalgias.   Skin: Negative.    Neurological: Negative for dizziness, weakness, light-headedness and  headaches.   Hematological: Negative for adenopathy. Does not bruise/bleed easily.   Psychiatric/Behavioral: Negative for agitation, behavioral problems and confusion. The patient is nervous/anxious.        Medication List with Changes/Refills   Current Medications    ACETAMINOPHEN (TYLENOL EXTRA STRENGTH) 325 MG TABLET    Take 2 tablets (650 mg total) by mouth every 8 (eight) hours.    ALLOPURINOL (ZYLOPRIM) 100 MG TABLET    TAKE 2 TABLETS (200 MG TOTAL) BY MOUTH ONCE DAILY.    AMIODARONE (PACERONE) 200 MG TAB    Take 1 tablet (200 mg total) by mouth once daily.    APIXABAN (ELIQUIS) 2.5 MG TAB    Take 1 tablet (2.5 mg total) by mouth 2 (two) times daily.    ASPIRIN (ECOTRIN) 81 MG EC TABLET    Take 1 tablet (81 mg total) by mouth once daily.    CARVEDILOL (COREG) 25 MG TABLET    TAKE 1 TABLET BY MOUTH TWICE A DAY WITH MEALS    DIGOXIN (LANOXIN) 125 MCG TABLET    Take 125 mcg by mouth every other day.    FUROSEMIDE (LASIX) 40 MG TABLET    Take 2 pills once daily    GABAPENTIN (NEURONTIN) 100 MG CAPSULE    TAKE ONE CAPSULE BY MOUTH TWO TIMES DAILY    LEVOTHYROXINE (SYNTHROID) 75 MCG TABLET    Take 1 tablet (75 mcg total) by mouth once daily.    LOSARTAN (COZAAR) 25 MG TABLET    Take 1 tablet (25 mg total) by mouth once daily.    PANTOPRAZOLE (PROTONIX) 40 MG TABLET    Take 1 tablet (40 mg total) by mouth once daily.     Objective:     Vitals:    06/05/19 1329   BP: (!) 141/66   Pulse: 70   Resp: 20   Temp: 98 °F (36.7 °C)     Physical Exam   Constitutional: She is oriented to person, place, and time. She appears well-developed and well-nourished. No distress.   HENT:   Head: Normocephalic and atraumatic.   Right Ear: Hearing and external ear normal.   Left Ear: Hearing and external ear normal.   Nose: No rhinorrhea or sinus tenderness. Right sinus exhibits no maxillary sinus tenderness and no frontal sinus tenderness. Left sinus exhibits no maxillary sinus tenderness and no frontal sinus tenderness.   Mouth/Throat:  Uvula is midline, oropharynx is clear and moist and mucous membranes are normal. No oral lesions.   Eyes: Pupils are equal, round, and reactive to light. Conjunctivae are normal. Right eye exhibits no discharge. Left eye exhibits no discharge.   Neck: Normal range of motion. Carotid bruit is not present. No tracheal deviation present. No thyromegaly present.   Cardiovascular: Normal rate, regular rhythm, S1 normal, S2 normal, normal heart sounds and intact distal pulses.   No murmur heard.  Pulses:       Dorsalis pedis pulses are 2+ on the right side, and 2+ on the left side.   Pulmonary/Chest: Effort normal and breath sounds normal. No respiratory distress.   Abdominal: Soft. Bowel sounds are normal. She exhibits no distension and no mass. There is no tenderness.   Musculoskeletal: Normal range of motion. She exhibits edema. She exhibits no tenderness.   Lymphadenopathy:     She has no cervical adenopathy.        Right: No supraclavicular adenopathy present.        Left: No supraclavicular adenopathy present.   Neurological: She is alert and oriented to person, place, and time. She has normal strength. No sensory deficit. Coordination and gait normal.   Skin: Skin is warm and dry. Capillary refill takes less than 2 seconds. No rash noted. There is pallor.   Psychiatric: Her speech is normal and behavior is normal. Judgment and thought content normal. Her mood appears anxious. Cognition and memory are normal. She does not exhibit a depressed mood.   Nursing note and vitals reviewed.      Assessment:       Problem List Items Addressed This Visit        Cardiac/Vascular    Chronic combined systolic and diastolic congestive heart failure       Renal/    Anemia associated with stage 4 chronic renal failure - Primary    Relevant Orders    CBC auto differential    Comprehensive metabolic panel    Immunoglobulin free LT chains blood    Lactate dehydrogenase    Iron and TIBC    Ferritin    Pathologist Interpretation  Differential       Immunology/Multi System    Elevated serum immunoglobulin free light chain level    Relevant Orders    Immunoglobulin free LT chains blood    Lactate dehydrogenase    Iron and TIBC    Ferritin    Pathologist Interpretation Differential       Oncology    Iron deficiency anemia due to chronic blood loss    Relevant Orders    Immunoglobulin free LT chains blood    Lactate dehydrogenase    Iron and TIBC    Ferritin    Pathologist Interpretation Differential    Macrocytic anemia    Relevant Orders    Immunoglobulin free LT chains blood    Lactate dehydrogenase    Iron and TIBC    Ferritin    Pathologist Interpretation Differential          Plan:       1) S/P Ferbinheme, has been treated with Procrit in the past.  2) Hgb: 11.5 today, will hold Procrit 20,000U today.   3) Return to clinic in 4 weeks with labs.        I will review assessment/plan with collaborating physician Dr. Adiel Gary.

## 2019-06-10 DIAGNOSIS — I48.91 ATRIAL FIBRILLATION, UNSPECIFIED TYPE: ICD-10-CM

## 2019-06-10 RX ORDER — AMIODARONE HYDROCHLORIDE 200 MG/1
200 TABLET ORAL DAILY
Qty: 90 TABLET | Refills: 3 | Status: SHIPPED | OUTPATIENT
Start: 2019-06-10 | End: 2020-06-05 | Stop reason: SDUPTHER

## 2019-06-12 ENCOUNTER — TELEPHONE (OUTPATIENT)
Dept: CARDIOLOGY | Facility: CLINIC | Age: 84
End: 2019-06-12

## 2019-06-12 ENCOUNTER — TELEPHONE (OUTPATIENT)
Dept: CARDIOLOGY | Facility: HOSPITAL | Age: 84
End: 2019-06-12

## 2019-06-12 NOTE — TELEPHONE ENCOUNTER
Spoke with patient regarding weight.  Patient reports consistent weight of 124 pounds.  Reports appetite up.  Denies SOB or any LE edema.   nurse was with patient.  Lung sounds clear.

## 2019-06-12 NOTE — TELEPHONE ENCOUNTER
----- Message from Haile Dunbar LPN sent at 6/12/2019  9:57 AM CDT -----  Spoke with patient regarding weight.  Patient reports consistent weight of 124 pounds.  Reports appetite up.  Denies SOB or any LE edema.   nurse was with patient.  Lung sounds clear.

## 2019-06-21 ENCOUNTER — LAB VISIT (OUTPATIENT)
Dept: LAB | Facility: HOSPITAL | Age: 84
End: 2019-06-21
Attending: INTERNAL MEDICINE
Payer: MEDICARE

## 2019-06-21 ENCOUNTER — OFFICE VISIT (OUTPATIENT)
Dept: CARDIOLOGY | Facility: CLINIC | Age: 84
End: 2019-06-21
Payer: MEDICARE

## 2019-06-21 VITALS
BODY MASS INDEX: 23.37 KG/M2 | SYSTOLIC BLOOD PRESSURE: 142 MMHG | HEART RATE: 66 BPM | WEIGHT: 127 LBS | HEIGHT: 62 IN | DIASTOLIC BLOOD PRESSURE: 70 MMHG

## 2019-06-21 DIAGNOSIS — Z79.899 ENCOUNTER FOR MONITORING DIGOXIN THERAPY: ICD-10-CM

## 2019-06-21 DIAGNOSIS — I50.42 CHRONIC COMBINED SYSTOLIC AND DIASTOLIC CONGESTIVE HEART FAILURE: Primary | ICD-10-CM

## 2019-06-21 DIAGNOSIS — I48.0 PAROXYSMAL ATRIAL FIBRILLATION: ICD-10-CM

## 2019-06-21 DIAGNOSIS — Z51.81 ENCOUNTER FOR MONITORING DIGOXIN THERAPY: ICD-10-CM

## 2019-06-21 PROCEDURE — 36415 COLL VENOUS BLD VENIPUNCTURE: CPT

## 2019-06-21 PROCEDURE — 99212 OFFICE O/P EST SF 10 MIN: CPT | Mod: PBBFAC | Performed by: NURSE PRACTITIONER

## 2019-06-21 PROCEDURE — 99214 OFFICE O/P EST MOD 30 MIN: CPT | Mod: S$PBB,,, | Performed by: NURSE PRACTITIONER

## 2019-06-21 PROCEDURE — 99214 PR OFFICE/OUTPT VISIT, EST, LEVL IV, 30-39 MIN: ICD-10-PCS | Mod: S$PBB,,, | Performed by: NURSE PRACTITIONER

## 2019-06-21 PROCEDURE — 99999 PR PBB SHADOW E&M-EST. PATIENT-LVL II: ICD-10-PCS | Mod: PBBFAC,,, | Performed by: NURSE PRACTITIONER

## 2019-06-21 PROCEDURE — 80162 ASSAY OF DIGOXIN TOTAL: CPT

## 2019-06-21 PROCEDURE — 99999 PR PBB SHADOW E&M-EST. PATIENT-LVL II: CPT | Mod: PBBFAC,,, | Performed by: NURSE PRACTITIONER

## 2019-06-21 RX ORDER — FUROSEMIDE 40 MG/1
TABLET ORAL
Qty: 75 TABLET | Refills: 6 | Status: SHIPPED | OUTPATIENT
Start: 2019-06-21 | End: 2019-08-07 | Stop reason: SDUPTHER

## 2019-06-21 NOTE — PROGRESS NOTES
Subjective:   Patient ID:  Jennifer Mathews is a 86 y.o. female who presents for follow up of Leg Swelling      HPI  Ms. Mathews's current conditions include mitral valve replacement -bioprosthesis, old Stroke without residual deficits, HTN, PAF, CKD,  combined CHF with EF 25%, ICM s/p PPM-CRT-D placement, recurrent UTI, CKD3-4, and hypothyroidism.    Coumadin switched to Eliquis 2.5mg BID on 5/31/19 by Dr. Dunne as well. ASA stopped. OAC switched following admission for GI bleed.  Digoxin decreased to every other day by Dr. Dunne. Dig level pending.     Remains on Lasix 80mg daily. Took an extra 40mg on yesterday with no relief. She has edema that extends into her thighs. Denies any associated SOB, GAR.   Denies any chest pain, orthopnea, PND, dizziness, palpitations,  near syncope, syncope or edema . Has no symptoms concerning for angina or equivalent. No CNS Complaints to suggest TIA or CVA. Can do better with limiting sodium intake.  Using CPAP nightly.  BNP still elevated on most recent check     Past Medical History:   Diagnosis Date    Acute coronary syndrome     Anemia     Anticoagulated on Coumadin 7/13/2015    Arthritis     Asthma     patient denies    Atrial fibrillation     Basal cell carcinoma 10/2015    left neck    Cardiac arrest     Cardiac resynchronization therapy defibrillator (CRT-D) in place 07/13/2015    Pt denies, states it does not shock me    Cardiomyopathy     Chronic combined systolic and diastolic congestive heart failure     CKD (chronic kidney disease) stage 3, GFR 30-59 ml/min     Dyslipidemia 1/30/2014    Hyperlipidemia     Hypertension     Hypothyroidism     Ischemic cardiomyopathy 01/30/2014    Macular hole of left eye     Old    Macular hole of left eye     LEV (obstructive sleep apnea) 9/30/2013    Pneumonia     required hospitalization    Recurrent UTI     Refractive error     Spastic colon     Stroke     Syncope and collapse        Past  Surgical History:   Procedure Laterality Date    APPENDECTOMY      CARDIAC CATHETERIZATION      CARDIAC PACEMAKER PLACEMENT  2014    CARDIAC VALVE SURGERY      CATARACT EXTRACTION      OU    CHOLECYSTECTOMY      COLONOSCOPY      ESOPHAGOGASTRODUODENOSCOPY (EGD) N/A 3/13/2019    Performed by Ghazal Orellana MD at [facility] ENDO    IRRIGATION AND DEBRIDEMENT OF LEFT KNEE Left 9/12/2017    Performed by Juliano Rosenbaum MD at [facility] OR    MITRAL VALVE REPLACEMENT  2014    MITRAL VALVE SURGERY      x3    REPLACEMENT, PACEMAKER GENERATOR/pt has crt-p versus d Left 6/20/2018    Performed by Manny Dunne MD at [facility] CATH LAB    REVISION, SKIN POCKET, FOR CARDIAC PACEMAKER Left 2/26/2014    Performed by Manny Dunne MD at [facility] CATH LAB       Social History     Tobacco Use    Smoking status: Never Smoker    Smokeless tobacco: Never Used   Substance Use Topics    Alcohol use: No    Drug use: No       Family History   Problem Relation Age of Onset    Coronary artery disease Mother         mi    Epilepsy Mother     Heart attack Mother     Coronary artery disease Father     Heart disease Father     Emphysema Father     COPD Father     Diabetes Brother     Cancer Brother     Melanoma Neg Hx     Psoriasis Neg Hx     Lupus Neg Hx     Eczema Neg Hx        Current Outpatient Medications   Medication Sig    acetaminophen (TYLENOL EXTRA STRENGTH) 325 MG tablet Take 2 tablets (650 mg total) by mouth every 8 (eight) hours. (Patient taking differently: Take 650 mg by mouth 3 (three) times daily as needed. )    allopurinol (ZYLOPRIM) 100 MG tablet TAKE 2 TABLETS (200 MG TOTAL) BY MOUTH ONCE DAILY.    amiodarone (PACERONE) 200 MG Tab Take 1 tablet (200 mg total) by mouth once daily.    apixaban (ELIQUIS) 2.5 mg Tab Take 1 tablet (2.5 mg total) by mouth 2 (two) times daily.    aspirin (ECOTRIN) 81 MG EC tablet Take 1 tablet (81 mg total) by mouth once daily.    carvedilol (COREG) 25 MG tablet TAKE 1  TABLET BY MOUTH TWICE A DAY WITH MEALS    digoxin (LANOXIN) 125 mcg tablet Take 125 mcg by mouth every other day.    furosemide (LASIX) 40 MG tablet Take 2 pills once daily with extra tabs as directed    gabapentin (NEURONTIN) 100 MG capsule TAKE ONE CAPSULE BY MOUTH TWO TIMES DAILY    levothyroxine (SYNTHROID) 75 MCG tablet Take 1 tablet (75 mcg total) by mouth once daily.    losartan (COZAAR) 25 MG tablet Take 1 tablet (25 mg total) by mouth once daily.    pantoprazole (PROTONIX) 40 MG tablet Take 1 tablet (40 mg total) by mouth once daily.     Current Facility-Administered Medications   Medication    denosumab (PROLIA) injection 60 mg     Current Outpatient Medications on File Prior to Visit   Medication Sig    acetaminophen (TYLENOL EXTRA STRENGTH) 325 MG tablet Take 2 tablets (650 mg total) by mouth every 8 (eight) hours. (Patient taking differently: Take 650 mg by mouth 3 (three) times daily as needed. )    allopurinol (ZYLOPRIM) 100 MG tablet TAKE 2 TABLETS (200 MG TOTAL) BY MOUTH ONCE DAILY.    amiodarone (PACERONE) 200 MG Tab Take 1 tablet (200 mg total) by mouth once daily.    apixaban (ELIQUIS) 2.5 mg Tab Take 1 tablet (2.5 mg total) by mouth 2 (two) times daily.    aspirin (ECOTRIN) 81 MG EC tablet Take 1 tablet (81 mg total) by mouth once daily.    carvedilol (COREG) 25 MG tablet TAKE 1 TABLET BY MOUTH TWICE A DAY WITH MEALS    digoxin (LANOXIN) 125 mcg tablet Take 125 mcg by mouth every other day.    gabapentin (NEURONTIN) 100 MG capsule TAKE ONE CAPSULE BY MOUTH TWO TIMES DAILY    levothyroxine (SYNTHROID) 75 MCG tablet Take 1 tablet (75 mcg total) by mouth once daily.    losartan (COZAAR) 25 MG tablet Take 1 tablet (25 mg total) by mouth once daily.    pantoprazole (PROTONIX) 40 MG tablet Take 1 tablet (40 mg total) by mouth once daily.    [DISCONTINUED] furosemide (LASIX) 40 MG tablet Take 2 pills once daily     Current Facility-Administered Medications on File Prior to Visit    Medication    denosumab (PROLIA) injection 60 mg       Review of Systems   Constitution: Negative for diaphoresis, malaise/fatigue, weight gain and weight loss.   HENT: Negative for congestion and nosebleeds.    Cardiovascular: Positive for leg swelling. Negative for chest pain, claudication, cyanosis, dyspnea on exertion, irregular heartbeat, near-syncope, orthopnea, palpitations, paroxysmal nocturnal dyspnea and syncope.   Respiratory: Negative for cough, hemoptysis, shortness of breath, sleep disturbances due to breathing, snoring, sputum production and wheezing.    Hematologic/Lymphatic: Negative for bleeding problem. Does not bruise/bleed easily.   Skin: Negative for rash.   Musculoskeletal: Negative for arthritis, back pain, falls, joint pain, muscle cramps and muscle weakness.   Gastrointestinal: Negative for abdominal pain, constipation, diarrhea, heartburn, hematemesis, hematochezia, melena and nausea.   Genitourinary: Negative for dysuria, hematuria and nocturia.   Neurological: Negative for excessive daytime sleepiness, dizziness, headaches, light-headedness, loss of balance, numbness, vertigo and weakness.       Objective:   Physical Exam   Constitutional: She is oriented to person, place, and time. She appears well-developed and well-nourished. No distress.   HENT:   Head: Normocephalic and atraumatic.   Eyes: Pupils are equal, round, and reactive to light. Right eye exhibits no discharge. Left eye exhibits no discharge.   Neck: Neck supple. No JVD present.   Cardiovascular: Normal rate, regular rhythm, S1 normal and S2 normal.   Murmur heard.  High-pitched blowing holosystolic murmur is present at the apex.  Pulmonary/Chest: Effort normal. No respiratory distress. She has decreased breath sounds in the right lower field and the left lower field. She has no wheezes. She has rales.   CRT D site well-healed  Sternotomy site well-healed   Abdominal: Soft. She exhibits no distension.   Musculoskeletal:  "She exhibits edema (+2-3 BLE that extends to kness and +1 to thighs  ).   Neurological: She is alert and oriented to person, place, and time.   Skin: Skin is warm and dry. She is not diaphoretic. No erythema.   Psychiatric: She has a normal mood and affect. Her behavior is normal. Thought content normal.   Nursing note and vitals reviewed.    Vitals:    06/21/19 1326   BP: (!) 142/70   Pulse: 66   Weight: 57.6 kg (126 lb 15.8 oz)   Height: 5' 2" (1.575 m)     Lab Results   Component Value Date    CHOL 131 03/21/2019    CHOL 155 03/20/2018    CHOL 154 01/29/2018     Lab Results   Component Value Date    HDL 31 (L) 03/21/2019    HDL 36 (L) 03/20/2018    HDL 34 (L) 01/29/2018     Lab Results   Component Value Date    LDLCALC 68.6 03/21/2019    LDLCALC 90.8 03/20/2018    LDLCALC 79.0 01/29/2018     Lab Results   Component Value Date    TRIG 157 (H) 03/21/2019    TRIG 141 03/20/2018    TRIG 205 (H) 01/29/2018     Lab Results   Component Value Date    CHOLHDL 23.7 03/21/2019    CHOLHDL 23.2 03/20/2018    CHOLHDL 22.1 01/29/2018       Chemistry        Component Value Date/Time     06/05/2019 1301    K 5.1 06/05/2019 1301     06/05/2019 1301    CO2 26 06/05/2019 1301    BUN 34 (H) 06/05/2019 1301    CREATININE 1.5 (H) 06/05/2019 1301     06/05/2019 1301        Component Value Date/Time    CALCIUM 8.6 (L) 06/05/2019 1301    ALKPHOS 86 06/05/2019 1301    AST 15 06/05/2019 1301    ALT 9 (L) 06/05/2019 1301    BILITOT 0.5 06/05/2019 1301    ESTGFRAFRICA 36 (A) 06/05/2019 1301    EGFRNONAA 31 (A) 06/05/2019 1301          Lab Results   Component Value Date    TSH 6.333 (H) 03/29/2019     Lab Results   Component Value Date    INR 1.3 (H) 05/06/2019    INR 1.8 05/02/2019    INR 1.7 04/29/2019     Lab Results   Component Value Date    WBC 7.48 06/05/2019    HGB 11.5 (L) 06/05/2019    HCT 38.1 06/05/2019     (H) 06/05/2019     06/05/2019     BMP  Sodium   Date Value Ref Range Status   06/05/2019 141 " 136 - 145 mmol/L Final     Potassium   Date Value Ref Range Status   06/05/2019 5.1 3.5 - 5.1 mmol/L Final     Chloride   Date Value Ref Range Status   06/05/2019 106 95 - 110 mmol/L Final     CO2   Date Value Ref Range Status   06/05/2019 26 23 - 29 mmol/L Final     BUN, Bld   Date Value Ref Range Status   06/05/2019 34 (H) 8 - 23 mg/dL Final     Creatinine   Date Value Ref Range Status   06/05/2019 1.5 (H) 0.5 - 1.4 mg/dL Final     Calcium   Date Value Ref Range Status   06/05/2019 8.6 (L) 8.7 - 10.5 mg/dL Final     Anion Gap   Date Value Ref Range Status   06/05/2019 9 8 - 16 mmol/L Final     eGFR if    Date Value Ref Range Status   06/05/2019 36 (A) >60 mL/min/1.73 m^2 Final     eGFR if non    Date Value Ref Range Status   06/05/2019 31 (A) >60 mL/min/1.73 m^2 Final     Comment:     Calculation used to obtain the estimated glomerular filtration  rate (eGFR) is the CKD-EPI equation.        CrCl cannot be calculated (Patient's most recent lab result is older than the maximum 7 days allowed.).    Assessment:     1. Chronic combined systolic and diastolic congestive heart failure    2. Paroxysmal atrial fibrillation        Plan:      Increase Lasix to 80mg BID x3 days.   Increase K+ intake over the weekend   CHF Nurse visit on Monday   Continue Eliquis for CVA prophylaxis  continue Coreg and Losartan   Heart healthy diet  Limit fluid intake 50-60 oz   Daily weights and to notify clinic if weight increases by more than 3 lbs in 1 day or 5 lbs in 1 week.   Exercise routine as tolerated  RTC on Monday with BMP and BNP on Monday  Elevated legs   May need to consider taking Lasix 80mg BID on MWF and daily on the other days once edema resolved

## 2019-06-22 LAB — DIGOXIN SERPL-MCNC: 1.2 NG/ML (ref 0.8–2)

## 2019-06-24 ENCOUNTER — TELEPHONE (OUTPATIENT)
Dept: CARDIOLOGY | Facility: CLINIC | Age: 84
End: 2019-06-24

## 2019-06-24 ENCOUNTER — CLINICAL SUPPORT (OUTPATIENT)
Dept: CARDIOLOGY | Facility: CLINIC | Age: 84
End: 2019-06-24
Payer: MEDICARE

## 2019-06-24 ENCOUNTER — LAB VISIT (OUTPATIENT)
Dept: LAB | Facility: HOSPITAL | Age: 84
End: 2019-06-24
Attending: FAMILY MEDICINE
Payer: MEDICARE

## 2019-06-24 ENCOUNTER — CLINICAL SUPPORT (OUTPATIENT)
Dept: PULMONOLOGY | Facility: CLINIC | Age: 84
End: 2019-06-24
Payer: MEDICARE

## 2019-06-24 VITALS
SYSTOLIC BLOOD PRESSURE: 140 MMHG | DIASTOLIC BLOOD PRESSURE: 64 MMHG | HEART RATE: 64 BPM | WEIGHT: 125.31 LBS | BODY MASS INDEX: 23.06 KG/M2 | HEIGHT: 62 IN

## 2019-06-24 DIAGNOSIS — I50.42 CHRONIC COMBINED SYSTOLIC AND DIASTOLIC CONGESTIVE HEART FAILURE: ICD-10-CM

## 2019-06-24 DIAGNOSIS — Z91.89 AT RISK FOR AMIODARONE TOXICITY WITH LONG TERM USE: ICD-10-CM

## 2019-06-24 DIAGNOSIS — Z79.899 AT RISK FOR AMIODARONE TOXICITY WITH LONG TERM USE: ICD-10-CM

## 2019-06-24 LAB
ANION GAP SERPL CALC-SCNC: 10 MMOL/L (ref 8–16)
BNP SERPL-MCNC: 1015 PG/ML (ref 0–99)
BUN SERPL-MCNC: 56 MG/DL (ref 8–23)
CALCIUM SERPL-MCNC: 9.3 MG/DL (ref 8.7–10.5)
CHLORIDE SERPL-SCNC: 99 MMOL/L (ref 95–110)
CO2 SERPL-SCNC: 33 MMOL/L (ref 23–29)
CREAT SERPL-MCNC: 1.8 MG/DL (ref 0.5–1.4)
EST. GFR  (AFRICAN AMERICAN): 29 ML/MIN/1.73 M^2
EST. GFR  (NON AFRICAN AMERICAN): 25 ML/MIN/1.73 M^2
GLUCOSE SERPL-MCNC: 102 MG/DL (ref 70–110)
POTASSIUM SERPL-SCNC: 5.2 MMOL/L (ref 3.5–5.1)
SODIUM SERPL-SCNC: 142 MMOL/L (ref 136–145)

## 2019-06-24 PROCEDURE — 99212 OFFICE O/P EST SF 10 MIN: CPT | Mod: PBBFAC,25

## 2019-06-24 PROCEDURE — 94060 EVALUATION OF WHEEZING: CPT | Mod: 26,S$PBB,, | Performed by: INTERNAL MEDICINE

## 2019-06-24 PROCEDURE — 94060 EVALUATION OF WHEEZING: CPT | Mod: PBBFAC

## 2019-06-24 PROCEDURE — 36415 COLL VENOUS BLD VENIPUNCTURE: CPT

## 2019-06-24 PROCEDURE — 83880 ASSAY OF NATRIURETIC PEPTIDE: CPT

## 2019-06-24 PROCEDURE — 80048 BASIC METABOLIC PNL TOTAL CA: CPT

## 2019-06-24 PROCEDURE — 94729 DIFFUSING CAPACITY: CPT | Mod: 26,S$PBB,, | Performed by: INTERNAL MEDICINE

## 2019-06-24 PROCEDURE — 94060 PR EVAL OF BRONCHOSPASM: ICD-10-PCS | Mod: 26,S$PBB,, | Performed by: INTERNAL MEDICINE

## 2019-06-24 PROCEDURE — 99999 PR PBB SHADOW E&M-EST. PATIENT-LVL II: ICD-10-PCS | Mod: PBBFAC,,,

## 2019-06-24 PROCEDURE — 99999 PR PBB SHADOW E&M-EST. PATIENT-LVL II: CPT | Mod: PBBFAC,,,

## 2019-06-24 PROCEDURE — 94729 DIFFUSING CAPACITY: CPT | Mod: PBBFAC

## 2019-06-24 PROCEDURE — 94729 PR C02/MEMBANE DIFFUSE CAPACITY: ICD-10-PCS | Mod: 26,S$PBB,, | Performed by: INTERNAL MEDICINE

## 2019-06-24 NOTE — PATIENT INSTRUCTIONS
Stop losartan  Lasix 80mg 2 times a day for 3 days. follow up in 2 weeks with Giorgi for recheck  Will need repeat labs before visit in 3 weeks  Phone review on Thursday afternoon

## 2019-06-24 NOTE — TELEPHONE ENCOUNTER
Stop Losartan to avoid further rise in K+ level and bump in creatine.   Her BNP is elevated so still needs to diurese with same instructions from visit, Lasix 80mg BID x 3 days.  She should follow up in 2 weeks with me for recheck and may need to make more medication changes. Will need repeat BMP and BNP before visit   Phone review on Friday morning before I leave for the day at 1

## 2019-06-24 NOTE — PROGRESS NOTES
Jennifer Mathews is here today for nurse blood pressure check and chf symptom follow up.   She has been taking lasix 80mg bid x 3 days as instructed Friday 6/21.   No improvement in LE edema. She says she is having increase in UOP.   No cp or dizziness  Lung sounds slightly diminished in left lower lobe.       Discussed with provider    Orders  1. Stop losartan  2. Lasix 80mg 2 times a day for 3 days.  3. follow up in 2 weeks with Giorgi for recheck    4. Will need repeat bmp and bnp before visit in 2 weeks  5. Phone review on Thursday afternoon 6/27/19  RHIANNA//Giorgi VILLAVICENCIO//FARA Boothe LPN      Orders discussed with pt and her daughter Gillian in detail. Copy of instructions given  F/u appt, labs and phone review scheduled.

## 2019-06-24 NOTE — TELEPHONE ENCOUNTER
----- Message from Manny Dunne MD sent at 6/22/2019  9:07 PM CDT -----  See comments below and call patient to discuss.   Please close encounter when done -- no need to route back to me.  Thanks    This is an OK level -- keep same QOD dosage

## 2019-06-25 LAB
BRPFT: ABNORMAL
DLCO ADJ PRE: 6.94 ML/(MIN*MMHG) (ref 11.85–23.32)
DLCO SINGLE BREATH LLN: 11.85
DLCO SINGLE BREATH PRE REF: 37 %
DLCO SINGLE BREATH REF: 17.58
DLCOC SBVA LLN: 2.34
DLCOC SBVA PRE REF: 88.6 %
DLCOC SBVA REF: 3.85
DLCOC SINGLE BREATH LLN: 11.85
DLCOC SINGLE BREATH PRE REF: 39.5 %
DLCOC SINGLE BREATH REF: 17.58
DLCOVA LLN: 2.34
DLCOVA PRE REF: 83 %
DLCOVA PRE: 3.19 ML/(MIN*MMHG*L) (ref 2.34–5.35)
DLCOVA REF: 3.85
DLVAADJ PRE: 3.41 ML/(MIN*MMHG*L) (ref 2.34–5.35)
FEF 25 75 CHG: 2.2 %
FEF 25 75 LLN: 0.51
FEF 25 75 POST REF: 28.1 %
FEF 25 75 PRE REF: 27.5 %
FEF 25 75 REF: 1.31
FET100 CHG: -10.7 %
FEV1 CHG: 0 %
FEV1 FVC CHG: 3.8 %
FEV1 FVC LLN: 61
FEV1 FVC POST REF: 84.2 %
FEV1 FVC PRE REF: 81.1 %
FEV1 FVC REF: 77
FEV1 LLN: 1.12
FEV1 POST REF: 52.6 %
FEV1 PRE REF: 52.6 %
FEV1 REF: 1.64
FVC CHG: -3.7 %
FVC LLN: 1.48
FVC POST REF: 61.5 %
FVC PRE REF: 63.8 %
FVC REF: 2.18
IVC PRE: 1.14 L (ref 1.48–2.88)
IVC SINGLE BREATH LLN: 1.48
IVC SINGLE BREATH PRE REF: 52.4 %
IVC SINGLE BREATH REF: 2.18
PEF CHG: 2.2 %
PEF LLN: 2.25
PEF POST REF: 88.1 %
PEF PRE REF: 86.2 %
PEF REF: 3.85
POST FEF 25 75: 0.37 L/S (ref 0.51–2.12)
POST FET 100: 8.44 SEC
POST FEV1 FVC: 64.5 % (ref 60.97–92.24)
POST FEV1: 0.86 L (ref 1.12–2.17)
POST FVC: 1.34 L (ref 1.48–2.88)
POST PEF: 3.39 L/S (ref 2.25–5.45)
PRE DLCO: 6.5 ML/(MIN*MMHG) (ref 11.85–23.32)
PRE FEF 25 75: 0.36 L/S (ref 0.51–2.12)
PRE FET 100: 9.45 SEC
PRE FEV1 FVC: 62.14 % (ref 60.97–92.24)
PRE FEV1: 0.86 L (ref 1.12–2.17)
PRE FVC: 1.39 L (ref 1.48–2.88)
PRE PEF: 3.32 L/S (ref 2.25–5.45)
VA PRE: 2.04 L (ref 4.42–4.42)
VA SINGLE BREATH LLN: 4.42
VA SINGLE BREATH PRE REF: 46.1 %
VA SINGLE BREATH REF: 4.42

## 2019-06-27 ENCOUNTER — TELEPHONE (OUTPATIENT)
Dept: CARDIOLOGY | Facility: CLINIC | Age: 84
End: 2019-06-27

## 2019-06-27 NOTE — TELEPHONE ENCOUNTER
Pt returned my call. She says she has been taking lasix as directed 80mg bid x 3 days. Today is day 3 and she has not taken her pm dosage yet.  She reports no real improvement in LE edema.   HH nurse came today and says lungs are clear  Pt says she is breathing ok, sleeping on 1 pillow.   Wt steady between 122-123lbs on home scale.   Please advise on further recommendations/orders

## 2019-06-27 NOTE — TELEPHONE ENCOUNTER
----- Message from Mayuri Boothe LPN sent at 6/24/2019  4:30 PM CDT -----  Symptom check on lasix 80mg bid

## 2019-06-27 NOTE — TELEPHONE ENCOUNTER
Fluid marker number remains elevated at 1,015.   Her renal function is stable from 1 month ago. Please see if patient has Metolazone tab at home. If so, please see what dose she has.

## 2019-06-28 NOTE — TELEPHONE ENCOUNTER
Discussed with provider    bumex could be alternative to lasix if pt does not respond to metolazone and lasix  Not a candidate for spironolactone due to K level and kidney function.     Orders  1. Take 2.5mg metolazone today 30mins before Pm dose of lasix and again in AM before am dose of lasix  2. Phone review on Monday to check response  RHIANNA//Giorgi VILLAVICENCIO//FARA Boothe LPN      I called pt back and notified of orders.   She repeated this back correctly.  Phone review is scheduled for Monday to f/u

## 2019-06-28 NOTE — TELEPHONE ENCOUNTER
I spoke with pt. She has metolazone 2.5mg at home. Not many tabs left.     Also she is asking as an alternative if bumex and spironolactone would be appropriate for her? She says her son made this suggestion to her and wanted her to ask    Please advise

## 2019-06-30 ENCOUNTER — EXTERNAL CHRONIC CARE MANAGEMENT (OUTPATIENT)
Dept: PRIMARY CARE CLINIC | Facility: CLINIC | Age: 84
End: 2019-06-30
Payer: MEDICARE

## 2019-06-30 PROCEDURE — 99490 CHRNC CARE MGMT STAFF 1ST 20: CPT | Mod: PBBFAC | Performed by: FAMILY MEDICINE

## 2019-06-30 PROCEDURE — 99490 PR CHRONIC CARE MGMT, 1ST 20 MIN: ICD-10-PCS | Mod: S$PBB,,, | Performed by: FAMILY MEDICINE

## 2019-06-30 PROCEDURE — 99490 CHRNC CARE MGMT STAFF 1ST 20: CPT | Mod: S$PBB,,, | Performed by: FAMILY MEDICINE

## 2019-07-01 ENCOUNTER — TELEPHONE (OUTPATIENT)
Dept: CARDIOLOGY | Facility: CLINIC | Age: 84
End: 2019-07-01

## 2019-07-01 ENCOUNTER — LAB VISIT (OUTPATIENT)
Dept: LAB | Facility: HOSPITAL | Age: 84
End: 2019-07-01
Attending: INTERNAL MEDICINE
Payer: MEDICARE

## 2019-07-01 DIAGNOSIS — R79.89 HYPOURICEMIA: Primary | ICD-10-CM

## 2019-07-01 DIAGNOSIS — I50.40 HEART FAILURE, SYSTOLIC AND DIASTOLIC: ICD-10-CM

## 2019-07-01 DIAGNOSIS — I50.43 ACUTE ON CHRONIC COMBINED SYSTOLIC AND DIASTOLIC HEART FAILURE: ICD-10-CM

## 2019-07-01 LAB
ALBUMIN SERPL BCP-MCNC: 3.9 G/DL (ref 3.5–5.2)
ALP SERPL-CCNC: 81 U/L (ref 55–135)
ALT SERPL W/O P-5'-P-CCNC: 12 U/L (ref 10–44)
ANION GAP SERPL CALC-SCNC: 12 MMOL/L (ref 8–16)
AST SERPL-CCNC: 20 U/L (ref 10–40)
BILIRUB SERPL-MCNC: 0.4 MG/DL (ref 0.1–1)
BNP SERPL-MCNC: 1118 PG/ML (ref 0–99)
BUN SERPL-MCNC: 52 MG/DL (ref 8–23)
CALCIUM SERPL-MCNC: 9 MG/DL (ref 8.7–10.5)
CHLORIDE SERPL-SCNC: 103 MMOL/L (ref 95–110)
CO2 SERPL-SCNC: 30 MMOL/L (ref 23–29)
CREAT SERPL-MCNC: 1.6 MG/DL (ref 0.5–1.4)
EST. GFR  (AFRICAN AMERICAN): 33 ML/MIN/1.73 M^2
EST. GFR  (NON AFRICAN AMERICAN): 29 ML/MIN/1.73 M^2
GLUCOSE SERPL-MCNC: 101 MG/DL (ref 70–110)
POTASSIUM SERPL-SCNC: 4.5 MMOL/L (ref 3.5–5.1)
PROT SERPL-MCNC: 6.4 G/DL (ref 6–8.4)
SODIUM SERPL-SCNC: 145 MMOL/L (ref 136–145)

## 2019-07-01 PROCEDURE — 83880 ASSAY OF NATRIURETIC PEPTIDE: CPT

## 2019-07-01 PROCEDURE — 80053 COMPREHEN METABOLIC PANEL: CPT

## 2019-07-01 NOTE — TELEPHONE ENCOUNTER
Provider reviewed labs from today.   Labs ok  Orders  1. Repeat metolazone 2.5mg this PM 30mins before lasix and again in AM.  2. phrev wed. 7/3 to f/u on pt and symptoms   TORB//Giorgi TURNERC//FARA Boothe LPN    I called pt. Orders discussed in detail. Pt repeated back correctly.   phrev is scheduled for wed,

## 2019-07-01 NOTE — TELEPHONE ENCOUNTER
Stop the metolazone  Is she able to repeat labs with HH?  If so, can we check a CMP and repeat BNP. May need her to see one of the MDs  for further recommendations at this point   I know she wasn't able to fit her compression stockings which would help. Is she elevating her legs

## 2019-07-01 NOTE — TELEPHONE ENCOUNTER
"Pt called. She says she is feeling a little dehydrated. She says her feet and legs are still "huge" no change. But her wt is down, was 125lb, then 123lb, now 121lb. She says her skin is itching all on her feet and legs and her mouth is very dry. She took metolazone as directed 6/27 and continues taking lasix 80mg bid as of today. Please advise  "

## 2019-07-01 NOTE — TELEPHONE ENCOUNTER
Orders called to Jennifer at [facility]. She repeated back correctly.   Pt is aware  She says she is elevating her legs and has not taken any more metolazone.

## 2019-07-03 ENCOUNTER — TELEPHONE (OUTPATIENT)
Dept: CARDIOLOGY | Facility: CLINIC | Age: 84
End: 2019-07-03

## 2019-07-03 NOTE — TELEPHONE ENCOUNTER
LE edema going down.  Some weakness but overall ok. She only took the 2 additional doses of metolazone as instructed.   No other worsening sob/calix, pnd or orthopnea  She has only had lasix 80mg so far today

## 2019-07-03 NOTE — TELEPHONE ENCOUNTER
Discussed with provider    Orders  1. 2.5mg of metolazone 30 mins before pm lasix today and again in the morning with lasix 80 mg bid  2. Phone review Friday 7/5   FABRIZIO//Giorgi VILLAVICENCIO//FARA Boothe LPN    Orders called back to pt, she states understanding well. Phone review is scheduled for Friday to f/u

## 2019-07-05 ENCOUNTER — TELEPHONE (OUTPATIENT)
Dept: CARDIOLOGY | Facility: CLINIC | Age: 84
End: 2019-07-05

## 2019-07-05 RX ORDER — METOLAZONE 2.5 MG/1
2.5 TABLET ORAL DAILY
COMMUNITY
End: 2019-07-10 | Stop reason: SDUPTHER

## 2019-07-05 NOTE — TELEPHONE ENCOUNTER
----- Message from Manny Dunne MD sent at 7/4/2019  5:21 PM CDT -----  Cathy,  Results reviewed. abnormalities as listed. Please see appended comments,create telephone encounter, call patient and inform him/her of results and action to take then document in encounter and close it.   In general there will be no need to route back to   me unless there is an issue that you need to discuss. Thanks    Decrease amio to 100 a day   Refer to Dr Banks to eval for possible parenchymal disease and possible relationship to amiodarone intake.  She does need amiodarone achieved appears to have significant lung disease which makes follow-up for toxicity evaluation rather difficult.  My sense is that she does not yet have amiodarone related lung toxicity (dL/VA is rather preserved).  Nevertheless an opinion by Dr. Banks would be quite appreciated.      CHINO Lopez

## 2019-07-05 NOTE — TELEPHONE ENCOUNTER
Recalled Ms. Mathews and advised would follow up with all on Monday      Telephoned patient with results and change in medication dosage Amiodarone to 100 mg daily and need to see Dr. Banks.  Noted she is seeing Dr. Ngo on 7/16/19.  Discussed cost Eliquis and Metolazone not being covered by her Insurance plan  Advised would check with Pharmacist for contact number to her plan  1. Tier exception for Eliquis 2.5 mg bid $53.22  2. Metolazone 2.5 mg daily (diuresis therapy) not covered with cash cost $72.56  Contacted [employer] her supplemental plan and was directed to contact [employer] [phone] or [phone].

## 2019-07-05 NOTE — TELEPHONE ENCOUNTER
"I called and spoke with pt. She says her wt is down from 122lb to 115lb. She took metolazone x 2 doses as instructed 7/3. She says LE edema is improved. She says "my legs are way down" . She says she has had them propped up all day.     Discussed with provider Giorgi Cartagena NP-[facility]    Orders.   1. Take lasix 80mg bid  2. Take metolazone 2.5mg on mondays wednesdays and fridays  3. BMP and BNP next week before appt.   RHIANNA//Giorgi Cartagena NP-C//FARA Boothe LPN    I called pt to give orders. She states understanding well.   Orders called to Flora (oncall) at [facility]. She repeated orders back correctly.       "

## 2019-07-08 ENCOUNTER — TELEPHONE (OUTPATIENT)
Dept: CARDIOLOGY | Facility: CLINIC | Age: 84
End: 2019-07-08

## 2019-07-08 ENCOUNTER — LAB VISIT (OUTPATIENT)
Dept: LAB | Facility: HOSPITAL | Age: 84
End: 2019-07-08
Attending: INTERNAL MEDICINE
Payer: MEDICARE

## 2019-07-08 DIAGNOSIS — I50.42 CHRONIC COMBINED SYSTOLIC AND DIASTOLIC HEART FAILURE: Primary | ICD-10-CM

## 2019-07-08 LAB
ANION GAP SERPL CALC-SCNC: 15 MMOL/L (ref 8–16)
BNP SERPL-MCNC: 1069 PG/ML (ref 0–99)
BUN SERPL-MCNC: 53 MG/DL (ref 8–23)
CALCIUM SERPL-MCNC: 8.9 MG/DL (ref 8.7–10.5)
CHLORIDE SERPL-SCNC: 98 MMOL/L (ref 95–110)
CO2 SERPL-SCNC: 30 MMOL/L (ref 23–29)
CREAT SERPL-MCNC: 1.7 MG/DL (ref 0.5–1.4)
EST. GFR  (AFRICAN AMERICAN): 31 ML/MIN/1.73 M^2
EST. GFR  (NON AFRICAN AMERICAN): 27 ML/MIN/1.73 M^2
GLUCOSE SERPL-MCNC: 122 MG/DL (ref 70–110)
POTASSIUM SERPL-SCNC: 3.6 MMOL/L (ref 3.5–5.1)
SODIUM SERPL-SCNC: 143 MMOL/L (ref 136–145)

## 2019-07-08 PROCEDURE — 80048 BASIC METABOLIC PNL TOTAL CA: CPT

## 2019-07-08 PROCEDURE — 83880 ASSAY OF NATRIURETIC PEPTIDE: CPT

## 2019-07-08 NOTE — TELEPHONE ENCOUNTER
I spoke with pt. Verified that she understood how to take medications.   She repeated instructions back correctly to take lasix 80mg bid and metolazone 2.5mg on mondays wednesdays and fridays only 30 mins before AM dose of lasix

## 2019-07-08 NOTE — TELEPHONE ENCOUNTER
Please inform patient that her BNP is trending down and creatine stable at this time. Please have her continue the lasix and metolazone as recommended on last phone note.

## 2019-07-09 ENCOUNTER — CLINICAL SUPPORT (OUTPATIENT)
Dept: CARDIOLOGY | Facility: CLINIC | Age: 84
End: 2019-07-09
Payer: MEDICARE

## 2019-07-09 DIAGNOSIS — Z95.0 CARDIAC PACEMAKER IN SITU: ICD-10-CM

## 2019-07-09 DIAGNOSIS — Z95.0 CARDIAC PACEMAKER IN SITU: Primary | ICD-10-CM

## 2019-07-09 DIAGNOSIS — I48.0 PAROXYSMAL ATRIAL FIBRILLATION: ICD-10-CM

## 2019-07-09 PROCEDURE — 93296 REM INTERROG EVL PM/IDS: CPT | Mod: PBBFAC,PO | Performed by: INTERNAL MEDICINE

## 2019-07-09 PROCEDURE — 93294 REM INTERROG EVL PM/LDLS PM: CPT | Mod: ,,, | Performed by: INTERNAL MEDICINE

## 2019-07-09 PROCEDURE — 93294 PACEMAKER REMOTE: ICD-10-PCS | Mod: ,,, | Performed by: INTERNAL MEDICINE

## 2019-07-10 ENCOUNTER — TELEPHONE (OUTPATIENT)
Dept: CARDIOLOGY | Facility: CLINIC | Age: 84
End: 2019-07-10

## 2019-07-10 ENCOUNTER — OFFICE VISIT (OUTPATIENT)
Dept: HEMATOLOGY/ONCOLOGY | Facility: CLINIC | Age: 84
End: 2019-07-10
Payer: MEDICARE

## 2019-07-10 ENCOUNTER — OFFICE VISIT (OUTPATIENT)
Dept: CARDIOLOGY | Facility: CLINIC | Age: 84
End: 2019-07-10
Payer: MEDICARE

## 2019-07-10 ENCOUNTER — LAB VISIT (OUTPATIENT)
Dept: LAB | Facility: HOSPITAL | Age: 84
End: 2019-07-10
Attending: NURSE PRACTITIONER
Payer: MEDICARE

## 2019-07-10 VITALS
WEIGHT: 115.31 LBS | HEIGHT: 65 IN | HEART RATE: 70 BPM | BODY MASS INDEX: 19.21 KG/M2 | TEMPERATURE: 98 F | OXYGEN SATURATION: 90 % | SYSTOLIC BLOOD PRESSURE: 139 MMHG | DIASTOLIC BLOOD PRESSURE: 67 MMHG

## 2019-07-10 VITALS
WEIGHT: 115.5 LBS | HEIGHT: 65 IN | DIASTOLIC BLOOD PRESSURE: 54 MMHG | BODY MASS INDEX: 19.24 KG/M2 | HEART RATE: 75 BPM | SYSTOLIC BLOOD PRESSURE: 142 MMHG

## 2019-07-10 DIAGNOSIS — I50.42 CHRONIC COMBINED SYSTOLIC AND DIASTOLIC CONGESTIVE HEART FAILURE: ICD-10-CM

## 2019-07-10 DIAGNOSIS — D68.9 COAGULOPATHY: ICD-10-CM

## 2019-07-10 DIAGNOSIS — I25.5 ISCHEMIC CARDIOMYOPATHY: Primary | ICD-10-CM

## 2019-07-10 DIAGNOSIS — D53.9 MACROCYTIC ANEMIA: ICD-10-CM

## 2019-07-10 DIAGNOSIS — N18.4 ANEMIA ASSOCIATED WITH STAGE 4 CHRONIC RENAL FAILURE: ICD-10-CM

## 2019-07-10 DIAGNOSIS — D50.0 IRON DEFICIENCY ANEMIA DUE TO CHRONIC BLOOD LOSS: ICD-10-CM

## 2019-07-10 DIAGNOSIS — D63.1 ANEMIA ASSOCIATED WITH STAGE 4 CHRONIC RENAL FAILURE: ICD-10-CM

## 2019-07-10 DIAGNOSIS — R76.8 ELEVATED SERUM IMMUNOGLOBULIN FREE LIGHT CHAIN LEVEL: ICD-10-CM

## 2019-07-10 DIAGNOSIS — I48.0 PAROXYSMAL ATRIAL FIBRILLATION: ICD-10-CM

## 2019-07-10 DIAGNOSIS — D50.0 IRON DEFICIENCY ANEMIA DUE TO CHRONIC BLOOD LOSS: Primary | ICD-10-CM

## 2019-07-10 LAB
ALBUMIN SERPL BCP-MCNC: 4.2 G/DL (ref 3.5–5.2)
ALP SERPL-CCNC: 92 U/L (ref 55–135)
ALT SERPL W/O P-5'-P-CCNC: 12 U/L (ref 10–44)
ANION GAP SERPL CALC-SCNC: 12 MMOL/L (ref 8–16)
AST SERPL-CCNC: 20 U/L (ref 10–40)
BASOPHILS # BLD AUTO: 0.02 K/UL (ref 0–0.2)
BASOPHILS NFR BLD: 0.3 % (ref 0–1.9)
BILIRUB SERPL-MCNC: 0.7 MG/DL (ref 0.1–1)
BNP SERPL-MCNC: 1073 PG/ML (ref 0–99)
BUN SERPL-MCNC: 63 MG/DL (ref 8–23)
CALCIUM SERPL-MCNC: 10.7 MG/DL (ref 8.7–10.5)
CHLORIDE SERPL-SCNC: 96 MMOL/L (ref 95–110)
CO2 SERPL-SCNC: 35 MMOL/L (ref 23–29)
CREAT SERPL-MCNC: 1.7 MG/DL (ref 0.5–1.4)
DIFFERENTIAL METHOD: ABNORMAL
EOSINOPHIL # BLD AUTO: 0.1 K/UL (ref 0–0.5)
EOSINOPHIL NFR BLD: 1.7 % (ref 0–8)
ERYTHROCYTE [DISTWIDTH] IN BLOOD BY AUTOMATED COUNT: 16.4 % (ref 11.5–14.5)
EST. GFR  (AFRICAN AMERICAN): 31 ML/MIN/1.73 M^2
EST. GFR  (NON AFRICAN AMERICAN): 27 ML/MIN/1.73 M^2
FERRITIN SERPL-MCNC: 196 NG/ML (ref 20–300)
GLUCOSE SERPL-MCNC: 112 MG/DL (ref 70–110)
HCT VFR BLD AUTO: 37.9 % (ref 37–48.5)
HGB BLD-MCNC: 11.8 G/DL (ref 12–16)
IRON SERPL-MCNC: 44 UG/DL (ref 30–160)
LDH SERPL L TO P-CCNC: 245 U/L (ref 110–260)
LYMPHOCYTES # BLD AUTO: 1.4 K/UL (ref 1–4.8)
LYMPHOCYTES NFR BLD: 22.3 % (ref 18–48)
MCH RBC QN AUTO: 30.8 PG (ref 27–31)
MCHC RBC AUTO-ENTMCNC: 31.1 G/DL (ref 32–36)
MCV RBC AUTO: 99 FL (ref 82–98)
MONOCYTES # BLD AUTO: 0.7 K/UL (ref 0.3–1)
MONOCYTES NFR BLD: 10.8 % (ref 4–15)
NEUTROPHILS # BLD AUTO: 4.2 K/UL (ref 1.8–7.7)
NEUTROPHILS NFR BLD: 65.4 % (ref 38–73)
PATH REV BLD -IMP: NORMAL
PLATELET # BLD AUTO: 179 K/UL (ref 150–350)
PMV BLD AUTO: 10.5 FL (ref 9.2–12.9)
POTASSIUM SERPL-SCNC: 4.1 MMOL/L (ref 3.5–5.1)
PROT SERPL-MCNC: 7.4 G/DL (ref 6–8.4)
RBC # BLD AUTO: 3.83 M/UL (ref 4–5.4)
SATURATED IRON: 11 % (ref 20–50)
SODIUM SERPL-SCNC: 143 MMOL/L (ref 136–145)
TOTAL IRON BINDING CAPACITY: 404 UG/DL (ref 250–450)
TRANSFERRIN SERPL-MCNC: 273 MG/DL (ref 200–375)
WBC # BLD AUTO: 6.46 K/UL (ref 3.9–12.7)

## 2019-07-10 PROCEDURE — 99999 PR PBB SHADOW E&M-EST. PATIENT-LVL III: CPT | Mod: PBBFAC,,, | Performed by: NURSE PRACTITIONER

## 2019-07-10 PROCEDURE — 83540 ASSAY OF IRON: CPT

## 2019-07-10 PROCEDURE — 99213 OFFICE O/P EST LOW 20 MIN: CPT | Mod: PBBFAC | Performed by: NURSE PRACTITIONER

## 2019-07-10 PROCEDURE — 82728 ASSAY OF FERRITIN: CPT

## 2019-07-10 PROCEDURE — 99999 PR PBB SHADOW E&M-EST. PATIENT-LVL III: ICD-10-PCS | Mod: PBBFAC,,, | Performed by: NURSE PRACTITIONER

## 2019-07-10 PROCEDURE — 99214 PR OFFICE/OUTPT VISIT, EST, LEVL IV, 30-39 MIN: ICD-10-PCS | Mod: S$PBB,,, | Performed by: NURSE PRACTITIONER

## 2019-07-10 PROCEDURE — 83880 ASSAY OF NATRIURETIC PEPTIDE: CPT

## 2019-07-10 PROCEDURE — 83520 IMMUNOASSAY QUANT NOS NONAB: CPT | Mod: 59

## 2019-07-10 PROCEDURE — 80053 COMPREHEN METABOLIC PANEL: CPT

## 2019-07-10 PROCEDURE — 99213 OFFICE O/P EST LOW 20 MIN: CPT | Mod: PBBFAC,27 | Performed by: NURSE PRACTITIONER

## 2019-07-10 PROCEDURE — 83615 LACTATE (LD) (LDH) ENZYME: CPT

## 2019-07-10 PROCEDURE — 99214 OFFICE O/P EST MOD 30 MIN: CPT | Mod: S$PBB,,, | Performed by: NURSE PRACTITIONER

## 2019-07-10 PROCEDURE — 36415 COLL VENOUS BLD VENIPUNCTURE: CPT

## 2019-07-10 PROCEDURE — 85025 COMPLETE CBC W/AUTO DIFF WBC: CPT

## 2019-07-10 RX ORDER — METOLAZONE 2.5 MG/1
TABLET ORAL
Qty: 25 TABLET | Refills: 6 | Status: SHIPPED | OUTPATIENT
Start: 2019-07-10 | End: 2020-08-04

## 2019-07-10 NOTE — PROGRESS NOTES
Subjective:   Patient ID:  Jennifer Mathews is a 86 y.o. female who presents for follow up of Congestive Heart Failure; Atrial Fibrillation; and Hypertension      HPI     Ms. Mathews presents to clinic for edema check after taking Lasix 80mg BID and Metolazone.   Her current conditions include mitral valve replacement -bioprosthesis, old Stroke without residual deficits, HTN, PAF, CKD,  combined CHF with EF 25%, ICM s/p PPM-CRT-D placement, recurrent UTI, CKD3-4, and hypothyroidism.    Coumadin switched to Eliquis 2.5mg BID on 5/31/19 by Dr. Dunne as well. ASA stopped. OAC switched following admission for GI bleed.  Digoxin decreased to every other day by Dr. Dunne.  BNP down to 1073, BMP stable.   Edema has improved significantly since starting Metolazone. Has continued Lasix 80mg BID. She states that she has had issues with insurance coverage.   Denies any chest pain, SOB, GAR,  orthopnea, PND, dizziness, palpitations,  near syncope, syncope . Has no symptoms concerning for angina or equivalent. No CNS Complaints to suggest TIA or CVA. Does well with limiting sodium intake.   Using CPAP nightly.     Past Medical History:   Diagnosis Date    Acute coronary syndrome     Anemia     Anticoagulated on Coumadin 7/13/2015    Arthritis     Asthma     patient denies    Atrial fibrillation     Basal cell carcinoma 10/2015    left neck    Cardiac arrest     Cardiac resynchronization therapy defibrillator (CRT-D) in place 07/13/2015    Pt denies, states it does not shock me    Cardiomyopathy     Chronic combined systolic and diastolic congestive heart failure     CKD (chronic kidney disease) stage 3, GFR 30-59 ml/min     Dyslipidemia 1/30/2014    Hyperlipidemia     Hypertension     Hypothyroidism     Ischemic cardiomyopathy 01/30/2014    Macular hole of left eye     Old    Macular hole of left eye     LVE (obstructive sleep apnea) 9/30/2013    Pneumonia     required hospitalization    Recurrent  UTI     Refractive error     Spastic colon     Stroke     Syncope and collapse        Past Surgical History:   Procedure Laterality Date    APPENDECTOMY      CARDIAC CATHETERIZATION      CARDIAC PACEMAKER PLACEMENT  2014    CARDIAC VALVE SURGERY      CATARACT EXTRACTION      OU    CHOLECYSTECTOMY      COLONOSCOPY      ESOPHAGOGASTRODUODENOSCOPY (EGD) N/A 3/13/2019    Performed by Ghazal [facility]rellana [facility] at [facility]    IRRIGATION AND DEBRIDEMENT OF LEFT KNEE Left 9/12/2017    Performed by Juliano Rosenbaum MD at [facility] OR    MITRAL VALVE REPLACEMENT  2014    MITRAL VALVE SURGERY      x3    REPLACEMENT, PACEMAKER GENERATOR/pt has crt-p versus d Left 6/20/2018    Performed by Manny Dunne MD at [facility] CATH LAB    REVISION, SKIN POCKET, FOR CARDIAC PACEMAKER Left 2/26/2014    Performed by Manny Dunne MD at [facility] C[facility]       Social History     Tobacco Use    Smoking status: Never Smoker    Smokeless tobacco: Never Used   Substance Use Topics    Alcohol use: No    Drug use: No       Family History   Problem Relation Age of Onset    Coronary artery disease Mother         mi    Epilepsy Mother     Heart attack Mother     Coronary artery disease Father     Heart disease Father     Emphysema Father     COPD Father     Diabetes Brother     Cancer Brother     Melanoma Neg Hx     Psoriasis Neg Hx     Lupus Neg Hx     Eczema Neg Hx        Current Outpatient Medications   Medication Sig    acetaminophen (TYLENOL EXTRA STRENGTH) 325 MG tablet Take 2 tablets (650 mg total) by mouth every 8 (eight) hours. (Patient taking differently: Take 650 mg by mouth 3 (three) times daily as needed. )    allopurinol (ZYLOPRIM) 100 MG tablet TAKE 2 TABLETS (200 MG TOTAL) BY MOUTH ONCE DAILY.    amiodarone (PACERONE) 200 MG Tab Take 1 tablet (200 mg total) by mouth once daily. (Patient taking differently: Take 200 mg by mouth once daily. As of 7/6/19 decrease to 100 mg (half tablet) daily)    apixaban  (ELIQUIS) 2.5 mg Tab Take 1 tablet (2.5 mg total) by mouth 2 (two) times daily.    aspirin (ECOTRIN) 81 MG EC tablet Take 1 tablet (81 mg total) by mouth once daily.    carvedilol (COREG) 25 MG tablet TAKE 1 TABLET BY MOUTH TWICE A DAY WITH MEALS    digoxin (LANOXIN) 125 mcg tablet Take 125 mcg by mouth every other day.    furosemide (LASIX) 40 MG tablet Take 2 pills once daily with extra tabs as directed (Patient taking differently: Take 80 mg by mouth 2 (two) times daily. )    gabapentin (NEURONTIN) 100 MG capsule TAKE ONE CAPSULE BY MOUTH TWO TIMES DAILY    levothyroxine (SYNTHROID) 75 MCG tablet Take 1 tablet (75 mcg total) by mouth once daily.    metOLazone (ZAROXOLYN) 2.5 MG tablet 30 minutes after Lasix as directed    pantoprazole (PROTONIX) 40 MG tablet Take 1 tablet (40 mg total) by mouth once daily.     Current Facility-Administered Medications   Medication    denosumab (PROLIA) injection 60 mg     Current Outpatient Medications on File Prior to Visit   Medication Sig    acetaminophen (TYLENOL EXTRA STRENGTH) 325 MG tablet Take 2 tablets (650 mg total) by mouth every 8 (eight) hours. (Patient taking differently: Take 650 mg by mouth 3 (three) times daily as needed. )    allopurinol (ZYLOPRIM) 100 MG tablet TAKE 2 TABLETS (200 MG TOTAL) BY MOUTH ONCE DAILY.    amiodarone (PACERONE) 200 MG Tab Take 1 tablet (200 mg total) by mouth once daily. (Patient taking differently: Take 200 mg by mouth once daily. As of 7/6/19 decrease to 100 mg (half tablet) daily)    apixaban (ELIQUIS) 2.5 mg Tab Take 1 tablet (2.5 mg total) by mouth 2 (two) times daily.    aspirin (ECOTRIN) 81 MG EC tablet Take 1 tablet (81 mg total) by mouth once daily.    carvedilol (COREG) 25 MG tablet TAKE 1 TABLET BY MOUTH TWICE A DAY WITH MEALS    digoxin (LANOXIN) 125 mcg tablet Take 125 mcg by mouth every other day.    furosemide (LASIX) 40 MG tablet Take 2 pills once daily with extra tabs as directed (Patient taking  differently: Take 80 mg by mouth 2 (two) times daily. )    gabapentin (NEURONTIN) 100 MG capsule TAKE ONE CAPSULE BY MOUTH TWO TIMES DAILY    levothyroxine (SYNTHROID) 75 MCG tablet Take 1 tablet (75 mcg total) by mouth once daily.    pantoprazole (PROTONIX) 40 MG tablet Take 1 tablet (40 mg total) by mouth once daily.    [DISCONTINUED] metOLazone (ZAROXOLYN) 2.5 MG tablet Take 2.5 mg by mouth once daily. 30 minutes after Lasix     Current Facility-Administered Medications on File Prior to Visit   Medication    denosumab (PROLIA) injection 60 mg       Review of Systems   Constitution: Negative for diaphoresis, malaise/fatigue, weight gain and weight loss.   HENT: Negative for congestion and nosebleeds.    Cardiovascular: Positive for leg swelling. Negative for chest pain, claudication, cyanosis, dyspnea on exertion, irregular heartbeat, near-syncope, orthopnea, palpitations, paroxysmal nocturnal dyspnea and syncope.   Respiratory: Negative for cough, hemoptysis, shortness of breath, sleep disturbances due to breathing, snoring, sputum production and wheezing.         Uses CPAP nightly    Hematologic/Lymphatic: Negative for bleeding problem. Does not bruise/bleed easily.   Skin: Negative for rash.   Musculoskeletal: Negative for arthritis, back pain, falls, joint pain, muscle cramps and muscle weakness.   Gastrointestinal: Negative for abdominal pain, constipation, diarrhea, heartburn, hematemesis, hematochezia, melena and nausea.   Genitourinary: Negative for dysuria, hematuria and nocturia.   Neurological: Negative for excessive daytime sleepiness, dizziness, headaches, light-headedness, loss of balance, numbness, vertigo and weakness.       Objective:   Physical Exam   Constitutional: She is oriented to person, place, and time. She appears well-developed and well-nourished. No distress.   HENT:   Head: Normocephalic and atraumatic.   Eyes: Pupils are equal, round, and reactive to light. Right eye exhibits no  "discharge. Left eye exhibits no discharge.   Neck: Neck supple. No JVD present.   Cardiovascular: Normal rate, regular rhythm, S1 normal and S2 normal.   Murmur heard.  High-pitched blowing holosystolic murmur is present at the apex.  Pulmonary/Chest: Effort normal. No respiratory distress. She has no decreased breath sounds. She has no wheezes. She has no rales.   CRT D site well-healed  Sternotomy site well-healed   Abdominal: Soft. She exhibits no distension.   Musculoskeletal: She exhibits edema (+2 BLE).   Neurological: She is alert and oriented to person, place, and time.   Skin: Skin is warm and dry. She is not diaphoretic. No erythema.   Psychiatric: She has a normal mood and affect. Her behavior is normal. Thought content normal.   Nursing note and vitals reviewed.    Vitals:    07/10/19 1347   BP: (!) 142/54   Pulse: 75   Weight: 52.4 kg (115 lb 8.3 oz)   Height: 5' 5" (1.651 m)     Lab Results   Component Value Date    CHOL 131 03/21/2019    CHOL 155 03/20/2018    CHOL 154 01/29/2018     Lab Results   Component Value Date    HDL 31 (L) 03/21/2019    HDL 36 (L) 03/20/2018    HDL 34 (L) 01/29/2018     Lab Results   Component Value Date    LDLCALC 68.6 03/21/2019    LDLCALC 90.8 03/20/2018    LDLCALC 79.0 01/29/2018     Lab Results   Component Value Date    TRIG 157 (H) 03/21/2019    TRIG 141 03/20/2018    TRIG 205 (H) 01/29/2018     Lab Results   Component Value Date    CHOLHDL 23.7 03/21/2019    CHOLHDL 23.2 03/20/2018    CHOLHDL 22.1 01/29/2018       Chemistry        Component Value Date/Time     07/10/2019 1243    K 4.1 07/10/2019 1243    CL 96 07/10/2019 1243    CO2 35 (H) 07/10/2019 1243    BUN 63 (H) 07/10/2019 1243    CREATININE 1.7 (H) 07/10/2019 1243     (H) 07/10/2019 1243        Component Value Date/Time    CALCIUM 10.7 (H) 07/10/2019 1243    ALKPHOS 92 07/10/2019 1243    AST 20 07/10/2019 1243    ALT 12 07/10/2019 1243    BILITOT 0.7 07/10/2019 1243    ESTGFRAFRICA 31 (A) 07/10/2019 " 1243    EGFRNONAA 27 (A) 07/10/2019 1243          Lab Results   Component Value Date    TSH 6.333 (H) 03/29/2019     Lab Results   Component Value Date    INR 1.3 (H) 05/06/2019    INR 1.8 05/02/2019    INR 1.7 04/29/2019     Lab Results   Component Value Date    WBC 6.46 07/10/2019    HGB 11.8 (L) 07/10/2019    HCT 37.9 07/10/2019    MCV 99 (H) 07/10/2019     07/10/2019     BMP  Sodium   Date Value Ref Range Status   07/10/2019 143 136 - 145 mmol/L Final     Potassium   Date Value Ref Range Status   07/10/2019 4.1 3.5 - 5.1 mmol/L Final     Chloride   Date Value Ref Range Status   07/10/2019 96 95 - 110 mmol/L Final     CO2   Date Value Ref Range Status   07/10/2019 35 (H) 23 - 29 mmol/L Final     BUN, Bld   Date Value Ref Range Status   07/10/2019 63 (H) 8 - 23 mg/dL Final     Creatinine   Date Value Ref Range Status   07/10/2019 1.7 (H) 0.5 - 1.4 mg/dL Final     Calcium   Date Value Ref Range Status   07/10/2019 10.7 (H) 8.7 - 10.5 mg/dL Final     Anion Gap   Date Value Ref Range Status   07/10/2019 12 8 - 16 mmol/L Final     eGFR if    Date Value Ref Range Status   07/10/2019 31 (A) >60 mL/min/1.73 m^2 Final     eGFR if non    Date Value Ref Range Status   07/10/2019 27 (A) >60 mL/min/1.73 m^2 Final     Comment:     Calculation used to obtain the estimated glomerular filtration  rate (eGFR) is the CKD-EPI equation.        Estimated Creatinine Clearance: 19.7 mL/min (A) (based on SCr of 1.7 mg/dL (H)).    Assessment:     1. Ischemic cardiomyopathy    2. Chronic combined systolic and diastolic congestive heart failure    3. Paroxysmal atrial fibrillation        Plan:     Will have her continue Lasix 80mg BID  Metolazone 2.5mg 30 min before morning Lasix for the next 3 days, then on MWF  Elevate legs   Continue eliquis  Nightly CPAP use  Phone review on Monday for edema check   Heart healthy diet  Limit fluid intake 50-60 oz   Daily weights and to notify clinic if weight  increases by more than 3 lbs in 1 day or 5 lbs in 1 week.   Exercise routine as tolerated  RTC in 3 weeks with BMP and BNP with HH

## 2019-07-10 NOTE — PROGRESS NOTES
Subjective:       Patient ID: Jennifer Mathews is a 86 y.o. female.    Chief Complaint: Anemia    85-year-old  female who presents to the [facility] today as a follow-up for anemia, s/p Feraheme.  I have reviewed all the patient's relevant clinical history available medical record have utilized as my evaluation management recommendations today.  She continues on Coumadin at this time.  The patient reports that she does have some generalized weakness and fatigue but overall feels better since the feraheme.  She denies any melena, hematochezia, hematemesis, hemoptysis or hematuria.  She denies any chest pain.  She reports shortness of breath with exertion.  She denies any shortness of breath at rest.  She reports chronic swelling in her legs.  This is unchanged.  She denies any nausea, vomiting or abdominal pain. She denies any bowel or urinary complaints.    Anemia   There has been no abdominal pain, bruising/bleeding easily, confusion, fever, light-headedness or palpitations.     Review of Systems   Constitutional: Positive for fatigue. Negative for activity change, appetite change, chills, diaphoresis, fever and unexpected weight change.   HENT: Negative for congestion, hearing loss, mouth sores, nosebleeds and trouble swallowing.    Eyes: Negative for discharge and visual disturbance.   Respiratory: Negative for cough, chest tightness and shortness of breath.    Cardiovascular: Negative for chest pain, palpitations and leg swelling.   Gastrointestinal: Positive for abdominal distention. Negative for abdominal pain, blood in stool, constipation, diarrhea, nausea and vomiting.   Endocrine: Negative for cold intolerance and heat intolerance.   Genitourinary: Negative for difficulty urinating, dyspareunia and hematuria.   Musculoskeletal: Positive for arthralgias, back pain, gait problem and myalgias.   Skin: Negative.    Neurological: Negative for dizziness, weakness, light-headedness and  headaches.   Hematological: Negative for adenopathy. Does not bruise/bleed easily.   Psychiatric/Behavioral: Negative for agitation, behavioral problems and confusion. The patient is nervous/anxious.        Medication List with Changes/Refills   Current Medications    ACETAMINOPHEN (TYLENOL EXTRA STRENGTH) 325 MG TABLET    Take 2 tablets (650 mg total) by mouth every 8 (eight) hours.    ALLOPURINOL (ZYLOPRIM) 100 MG TABLET    TAKE 2 TABLETS (200 MG TOTAL) BY MOUTH ONCE DAILY.    AMIODARONE (PACERONE) 200 MG TAB    Take 1 tablet (200 mg total) by mouth once daily.    APIXABAN (ELIQUIS) 2.5 MG TAB    Take 1 tablet (2.5 mg total) by mouth 2 (two) times daily.    ASPIRIN (ECOTRIN) 81 MG EC TABLET    Take 1 tablet (81 mg total) by mouth once daily.    CARVEDILOL (COREG) 25 MG TABLET    TAKE 1 TABLET BY MOUTH TWICE A DAY WITH MEALS    DIGOXIN (LANOXIN) 125 MCG TABLET    Take 125 mcg by mouth every other day.    FUROSEMIDE (LASIX) 40 MG TABLET    Take 2 pills once daily with extra tabs as directed    GABAPENTIN (NEURONTIN) 100 MG CAPSULE    TAKE ONE CAPSULE BY MOUTH TWO TIMES DAILY    LEVOTHYROXINE (SYNTHROID) 75 MCG TABLET    Take 1 tablet (75 mcg total) by mouth once daily.    METOLAZONE (ZAROXOLYN) 2.5 MG TABLET    Take 2.5 mg by mouth once daily. 30 minutes after Lasix    PANTOPRAZOLE (PROTONIX) 40 MG TABLET    Take 1 tablet (40 mg total) by mouth once daily.     Objective:     Vitals:    07/10/19 1253   BP: 139/67   Pulse: 70   Temp: 97.6 °F (36.4 °C)     Physical Exam   Constitutional: She is oriented to person, place, and time. She appears well-developed and well-nourished. No distress.   HENT:   Head: Normocephalic and atraumatic.   Right Ear: Hearing and external ear normal.   Left Ear: Hearing and external ear normal.   Nose: No rhinorrhea or sinus tenderness. Right sinus exhibits no maxillary sinus tenderness and no frontal sinus tenderness. Left sinus exhibits no maxillary sinus tenderness and no frontal sinus  tenderness.   Mouth/Throat: Uvula is midline, oropharynx is clear and moist and mucous membranes are normal. No oral lesions.   Eyes: Pupils are equal, round, and reactive to light. Conjunctivae are normal. Right eye exhibits no discharge. Left eye exhibits no discharge.   Neck: Normal range of motion. Carotid bruit is not present. No tracheal deviation present. No thyromegaly present.   Cardiovascular: Normal rate, regular rhythm, S1 normal, S2 normal, normal heart sounds and intact distal pulses.   No murmur heard.  Pulses:       Dorsalis pedis pulses are 2+ on the right side, and 2+ on the left side.   Pulmonary/Chest: Effort normal and breath sounds normal. No respiratory distress.   Abdominal: Soft. Bowel sounds are normal. She exhibits no distension and no mass. There is no tenderness.   Musculoskeletal: Normal range of motion. She exhibits edema. She exhibits no tenderness.   Lymphadenopathy:     She has no cervical adenopathy.        Right: No supraclavicular adenopathy present.        Left: No supraclavicular adenopathy present.   Neurological: She is alert and oriented to person, place, and time. She has normal strength. No sensory deficit. Coordination and gait normal.   Skin: Skin is warm and dry. Capillary refill takes less than 2 seconds. No rash noted. There is pallor.   Psychiatric: Her speech is normal and behavior is normal. Judgment and thought content normal. Her mood appears anxious. Cognition and memory are normal. She does not exhibit a depressed mood.   Nursing note and vitals reviewed.      Assessment:       Problem List Items Addressed This Visit        Cardiac/Vascular    Chronic combined systolic and diastolic congestive heart failure       Renal/    Anemia associated with stage 4 chronic renal failure       Hematology    Coagulopathy       Oncology    Iron deficiency anemia due to chronic blood loss - Primary    Relevant Orders    CBC auto differential    Comprehensive metabolic panel     Macrocytic anemia          Plan:       1) S/P Marquis, has been treated with Procrit in the past.  2) Hgb: 11.8 today, will hold Procrit 20,000U today.   3) Return to clinic in 4 weeks with labs, if Hgb continues to increase will begin to see patient every 2 months for surveillance.        I will review assessment/plan with collaborating physician Dr. Adiel Gary.

## 2019-07-10 NOTE — TELEPHONE ENCOUNTER
Spoke with [facility]  Nurse deepak went over Giorgi NP, recommendations.    Have her continue Lasix 80mg BID  Metolazone 2.5mg 30 min before morning Lasix for the next 3 days, then on MWF  Elevate legs   Continue eliquis  Nightly CPAP use  Phone review on Monday   BNP and BMP in 3 weeks.

## 2019-07-10 NOTE — PATIENT INSTRUCTIONS
Iron-Deficiency Anemia (Adult)  Red blood cells carry oxygen to the tissues of your body. Anemia is a condition in which you have too few red blood cells. You need iron to make red cells. Anemia makes you feel tired and run down. When anemia becomes severe, your skin becomes pale. You may feel short of breath after physical activity. Other symptoms include:  · Headaches  · Dizziness  · Leg cramps with physical activity  · Drowsiness  · Restless legs  Your anemia is caused by not having enough iron in your body. This may be because of:  · Loss of blood. This can be caused by heavy menstrual periods. It can also be caused by bleeding from the stomach or intestines.  · Poor diet. You may not be eating enough foods that contain iron.  · Inability to absorb iron from the foods you eat  · Pregnancy  If your blood count is low enough, your healthcare provider may prescribe an iron supplement. It usually takes about 2 to 3 months of treatment with iron supplements to correct anemia. Severe cases of anemia need a blood transfusion to quickly ease symptoms and deliver more oxygen to the cells.  Home care  Follow these guidelines when caring for yourself at home:  · Eat foods high in iron. This will boost the amount of iron stored in your body. It is a natural way to build up the number of blood cells. Good sources of iron include beef, liver, spinach and other dark green leafy vegetables, whole grains, beans, and nuts.  · Do not overexert yourself.  · Talk with your healthcare provider before traveling by air or traveling to high altitudes.  Follow-up care  Follow up with your healthcare provider in 2 months, or as advised. This is to have another red blood cell count to be sure your anemia has been fixed.  When to seek medical advice  Call your healthcare provider right away if any of these occur:  · Shortness of breath or chest pain  · Dizziness or fainting  · Vomiting blood or passing red or black-colored stool   Date  Last Reviewed: 2/25/2016  © 5207-7487 [employer] [address] [place] [address], [place] [address]. All rights reserved. This information is not intended as a substitute for professional medical care. Always follow your healthcare professional's instructions.          Epoetin Godwin injection  What is this medicine?  EPOETIN GODWIN (e NEGRA e tin AL fa) helps your body make more red blood cells. This medicine is used to treat anemia caused by chronic kidney failure, cancer chemotherapy, or HIV-therapy. It may also be used before surgery if you have anemia.  How should I use this medicine?  This medicine is for injection into a vein or under the skin. It is usually given by a health care professional in a hospital or clinic setting.  If you get this medicine at home, you will be taught how to prepare and give this medicine. Use exactly as directed. Take your medicine at regular intervals. Do not take your medicine more often than directed.  It is important that you put your used needles and syringes in a special sharps container. Do not put them in a trash can. If you do not have a sharps container, call your pharmacist or healthcare provider to get one.  Talk to your pediatrician regarding the use of this medicine in children. While this drug may be prescribed for selected conditions, precautions do apply.  What side effects may I notice from receiving this medicine?  Side effects that you should report to your doctor or health care professional as soon as possible:  · allergic reactions like skin rash, itching or hives, swelling of the face, lips, or tongue  · breathing problems  · changes in vision  · chest pain  · confusion, trouble speaking or understanding  · feeling faint or lightheaded, falls  · high blood pressure  · muscle aches or pains  · pain, swelling, warmth in the leg  · rapid weight gain  · severe headaches  · sudden numbness or weakness of the face, arm or leg  · trouble walking, dizziness, loss  of balance or coordination  · seizures (convulsions)  · swelling of the ankles, feet, hands  · unusually weak or tired  Side effects that usually do not require medical attention (report to your doctor or health care professional if they continue or are bothersome):  · diarrhea  · fever, chills (flu-like symptoms)  · headaches  · nausea, vomiting  · redness, stinging, or swelling at site where injected  What may interact with this medicine?  Do not take this medicine with any of the following medications:  · darbepoetin abby  What if I miss a dose?  If you miss a dose, take it as soon as you can. If it is almost time for your next dose, take only that dose. Do not take double or extra doses.  Where should I keep my medicine?  Keep out of the reach of children.  Store in a refrigerator between 2 and 8 degrees C (36 and 46 degrees F). Do not freeze or shake. Throw away any unused portion if using a single-dose vial. Multi-dose vials can be kept in the refrigerator for up to 21 days after the initial dose. Throw away unused medicine.  What should I tell my health care provider before I take this medicine?  They need to know if you have any of these conditions:  · blood clotting disorders  · cancer patient not on chemotherapy  · cystic fibrosis  · heart disease, such as angina or heart failure  · hemoglobin level of 12 g/dL or greater  · high blood pressure  · low levels of folate, iron, or vitamin B12  · seizures  · an unusual or allergic reaction to erythropoietin, albumin, benzyl alcohol, hamster proteins, other medicines, foods, dyes, or preservatives  · pregnant or trying to get pregnant  · breast-feeding  What should I watch for while using this medicine?  Visit your prescriber or health care professional for regular checks on your progress and for the needed blood tests and blood pressure measurements. It is especially important for the doctor to make sure your hemoglobin level is in the desired range, to limit  the risk of potential side effects and to give you the best benefit. Keep all appointments for any recommended tests. Check your blood pressure as directed. Ask your doctor what your blood pressure should be and when you should contact him or her.  As your body makes more red blood cells, you may need to take iron, folic acid, or vitamin B supplements. Ask your doctor or health care provider which products are right for you. If you have kidney disease continue dietary restrictions, even though this medication can make you feel better. Talk with your doctor or health care professional about the foods you eat and the vitamins that you take.  NOTE:This sheet is a summary. It may not cover all possible information. If you have questions about this medicine, talk to your doctor, pharmacist, or health care provider. Copyright© 2017 [employer]

## 2019-07-11 LAB
KAPPA LC SER QL IA: 7.03 MG/DL (ref 0.33–1.94)
KAPPA LC/LAMBDA SER IA: 2.58 (ref 0.26–1.65)
LAMBDA LC SER QL IA: 2.72 MG/DL (ref 0.57–2.63)

## 2019-07-13 PROCEDURE — G0179 PR HOME HEALTH MD RECERTIFICATION: ICD-10-PCS | Mod: ,,, | Performed by: FAMILY MEDICINE

## 2019-07-13 PROCEDURE — G0179 MD RECERTIFICATION HHA PT: HCPCS | Mod: ,,, | Performed by: FAMILY MEDICINE

## 2019-07-15 ENCOUNTER — TELEPHONE (OUTPATIENT)
Dept: CARDIOLOGY | Facility: CLINIC | Age: 84
End: 2019-07-15

## 2019-07-15 NOTE — TELEPHONE ENCOUNTER
Returned call to Home Health Nurse and checking for new orders  Patient has lost 5 pounds  Since 7/10/19 and checking for medication orders    ----- Message from Rupa Piedra sent at 7/15/2019  1:53 PM CDT -----  Contact: Flora anne/[facility]  Calling as requested after visit with patient. Please call Flora @ [phone]. Thanks, nikhil

## 2019-07-16 ENCOUNTER — OFFICE VISIT (OUTPATIENT)
Dept: PULMONOLOGY | Facility: CLINIC | Age: 84
End: 2019-07-16
Payer: MEDICARE

## 2019-07-16 ENCOUNTER — LAB VISIT (OUTPATIENT)
Dept: LAB | Facility: HOSPITAL | Age: 84
End: 2019-07-16
Attending: INTERNAL MEDICINE
Payer: MEDICARE

## 2019-07-16 VITALS
HEIGHT: 65 IN | HEART RATE: 70 BPM | DIASTOLIC BLOOD PRESSURE: 70 MMHG | WEIGHT: 110.88 LBS | SYSTOLIC BLOOD PRESSURE: 136 MMHG | OXYGEN SATURATION: 95 % | BODY MASS INDEX: 18.47 KG/M2 | RESPIRATION RATE: 16 BRPM

## 2019-07-16 DIAGNOSIS — J44.9 COPD, GROUP A, BY GOLD 2017 CLASSIFICATION: Chronic | ICD-10-CM

## 2019-07-16 DIAGNOSIS — Z91.89 AT RISK FOR AMIODARONE TOXICITY WITH LONG TERM USE: Chronic | ICD-10-CM

## 2019-07-16 DIAGNOSIS — Z79.899 AT RISK FOR AMIODARONE TOXICITY WITH LONG TERM USE: Chronic | ICD-10-CM

## 2019-07-16 DIAGNOSIS — G47.33 OSA (OBSTRUCTIVE SLEEP APNEA): Primary | Chronic | ICD-10-CM

## 2019-07-16 LAB
CRP SERPL-MCNC: 4.3 MG/L (ref 0–8.2)
ERYTHROCYTE [SEDIMENTATION RATE] IN BLOOD BY WESTERGREN METHOD: 19 MM/HR (ref 0–20)

## 2019-07-16 PROCEDURE — 86140 C-REACTIVE PROTEIN: CPT

## 2019-07-16 PROCEDURE — 99213 OFFICE O/P EST LOW 20 MIN: CPT | Mod: PBBFAC | Performed by: INTERNAL MEDICINE

## 2019-07-16 PROCEDURE — 99214 OFFICE O/P EST MOD 30 MIN: CPT | Mod: S$PBB,,, | Performed by: INTERNAL MEDICINE

## 2019-07-16 PROCEDURE — 86255 FLUORESCENT ANTIBODY SCREEN: CPT

## 2019-07-16 PROCEDURE — 99214 PR OFFICE/OUTPT VISIT, EST, LEVL IV, 30-39 MIN: ICD-10-PCS | Mod: S$PBB,,, | Performed by: INTERNAL MEDICINE

## 2019-07-16 PROCEDURE — 85651 RBC SED RATE NONAUTOMATED: CPT

## 2019-07-16 PROCEDURE — 86038 ANTINUCLEAR ANTIBODIES: CPT

## 2019-07-16 PROCEDURE — 36415 COLL VENOUS BLD VENIPUNCTURE: CPT

## 2019-07-16 PROCEDURE — 99999 PR PBB SHADOW E&M-EST. PATIENT-LVL III: ICD-10-PCS | Mod: PBBFAC,,, | Performed by: INTERNAL MEDICINE

## 2019-07-16 PROCEDURE — 99999 PR PBB SHADOW E&M-EST. PATIENT-LVL III: CPT | Mod: PBBFAC,,, | Performed by: INTERNAL MEDICINE

## 2019-07-16 RX ORDER — LOSARTAN POTASSIUM 25 MG/1
25 TABLET ORAL DAILY
Refills: 11 | COMMUNITY
Start: 2019-07-08 | End: 2020-09-25

## 2019-07-16 NOTE — ASSESSMENT & PLAN NOTE
Continue APAP of 4 - 20 CMWP. ([facility])     Discussed therapeutic goals for positive airway pressure therapy(CPAP or BiPAP): Ideal is usage 100% of nights for 6 - 8 hours per night. Minimum usage is 70% of night for at least 4 hours per night used. Pateint expressed understanding. All Questions answered.    Complaince evaluation in 12 months.

## 2019-07-16 NOTE — ASSESSMENT & PLAN NOTE
COPD ROS: taking medications as instructed, no medication side effects noted, no significant ongoing wheezing or shortness of breath, using bronchodilator MDI less than twice a week.   New concerns: Stable NYHA II GAR.   Exam: appears well, vitals normal, no respiratory distress, acyanotic, normal RR.   Assessment:  COPD stable.   Plan: Currently not on any inhaler therapy. Discussed diagnosis, its evaluation, treatment and usual course.  All questions answered. CT chest (Non Contrast), PFT ( REPEAT) , 6MWT. ABG as ordered.

## 2019-07-16 NOTE — PROGRESS NOTES
Subjective:      Patient ID: Jennifer Mathews is a 86 y.o. female.  Patient Active Problem List   Diagnosis    LEV (obstructive sleep apnea)    Chronic combined systolic and diastolic congestive heart failure    Edema    Recurrent UTI    Hypertension    Dyslipidemia    S/P MVR (mitral valve replacement)    Status post biventricular pacemaker    Multiple pelvic fractures    Shoulder pain, acute    Coagulopathy    Syncope    Bilateral lower extremity edema    A-fib    Ischemic cardiomyopathy    Anticoagulated on Coumadin    Osteopenia with high risk of fracture    Osteoarthritis of hand    Pulmonary hypertension due to left heart disease    Primary osteoarthritis involving multiple joints    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    Medication monitoring encounter    Disorder of bone and articular cartilage    Enterocolitis    Left leg swelling    Acquired hypothyroidism    Anemia associated with stage 4 chronic renal failure    Iron deficiency anemia due to chronic blood loss    At risk for sudden cardiac death    Osteoporosis, senile    Elevated serum immunoglobulin free light chain level    Macrocytic anemia    UTI (urinary tract infection)    Mild Protein Calorie Malnutrition    Santos's esophagus    At risk for amiodarone toxicity with long term use    COPD, group A, by GOLD 2017 classification     Problem list has been reviewed.    Chief Complaint: Sleep Apnea and Pulmonary Hypertension    Group Spirometric Classification Exacerbations / year mMRC CAT   Group A: Low risk; less symptom   GOLD 1- 2  < = 1 0 - 1 <10       FEV1: 0.86 L ( 52.6% predicted)     GOLD 1 - mild: FEV1? 80% predicted   GOLD 2 - moderate: 50% ?FEV1 <80% predicted   GOLD 3 - severe: 30% ?FEV1 <50% predicted   GOLD 4 - very severe: FEV1 <30% predicted.    HPI      Patients reports stable  NYHA II dyspnea    The patient does not have currently have symptoms / an exacerbation.        No cough, sputum, or hemoptysis, No shortness or breath and No recent change in breathing.     COPD QUESTIONNAIRE  How often do you cough?: A little of the time  How often do you have phlegm (mucus) in your chest?: Never  How often does your chest feel tight?: Never  When you walk up a hill or one flight of stairs, how often are you breathless?: Most of the time  How often are you limited doing any activities at home?: A little of the time  How often are you confident leaving the house despite your lung condition?: All of the time  How often do you sleep soundly?: All of the time  How often do you have energy?: Most of the time  Total score: 9      She reports hat her cardiologist has asked her to inform me that she has AMIODARONE LUNG TOXICITY. Currently there are no clinical no correlates of AMIODARONE LUNG TOXICITY.  Spirometry and DLCO performed on 06/24/19 revealed moderately sever obstruction and decreased diffusion capacity. LUNG FUNCTION WILL BE MONITORED.     She is on AUTOPAP 4 - 20 CMWP. She is complaint with her CPAP. She definitely thinks PAP is beneficial to her health and she wants to continue with PAP therapy.    Compliance Summary  6/16/2019 - 7/15/2019 (30 days)  Days with Device Usage 28 days  Days without Device Usage 2 days  Percent Days with Device Usage 93.3%  Cumulative Usage 5 days 19 hrs. 1 mins. 47 secs.  Maximum Usage (1 Day) 7 hrs. 46 mins. 32 secs.  Average Usage (All Days) 4 hrs. 38 mins. 3 secs.  Average Usage (Days Used) 4 hrs. 57 mins. 55 secs.  Minimum Usage (1 Day) 2 hrs. 57 mins. 36 secs.  Percent of Days with Usage >= 4 Hours 70.0%  Percent of Days with Usage < 4 Hours 30.0%  Total Blower Time 5 days 20 hrs. 59 mins. 57 secs.  Average AHI 5.0  Auto-CPAP Summary ([facility])  Auto-CPAP Mean Pressure 5.5 cmH2O  Auto-CPAP Peak Average Pressure 8.1 cmH2O  Average Device Pressure <= 90% of Time 7.2 cmH2O  Average Time in Large Leak Per Day 19 secs.      [place] Sleepiness  Scale   EPWORTH SLEEPINESS SCALE 7/16/2019 7/16/2018 4/20/2018 6/22/2017 6/15/2016 4/13/2016 2/29/2016   Sitting and reading 2 1 0 0 1 1 3   Watching TV 1 2 1 1 1 3 3   Sitting, inactive in a public place (e.g. a theatre or a meeting) 0 0 0 0 0 0 0   As a passenger in a car for an hour without a break 0 3 0 0 0 0 3   Lying down to rest in the afternoon when circumstances permit 3 3 2 1 2 0 3   Sitting and talking to someone 0 0 0 0 0 1 2   Sitting quietly after a lunch without alcohol 3 2 1 0 2 2 1   In a car, while stopped for a few minutes in traffic 0 0 0 0 0 0 0   Total score 9 11 4 2 6 7 15      A full  review of systems, past , family  and social histories was performed except as mentioned in the note above, these are non contributory to the main issues discussed today.     A full  review of systems, past , family  and social histories was performed except as mentioned in the note above, these are non contributory to the main issues discussed today.       Previous Report Reviewed: office notes and radiology reports     The following portions of the patient's history were reviewed and updated as appropriate: She  has a past medical history of Acute coronary syndrome, Anemia, Anticoagulated on Coumadin (7/13/2015), Arthritis, Asthma, Atrial fibrillation, Basal cell carcinoma (10/2015), Cardiac arrest, Cardiac resynchronization therapy defibrillator (CRT-D) in place (07/13/2015), Cardiomyopathy, Chronic combined systolic and diastolic congestive heart failure, CKD (chronic kidney disease) stage 3, GFR 30-59 ml/min, Dyslipidemia (1/30/2014), Hyperlipidemia, Hypertension, Hypothyroidism, Ischemic cardiomyopathy (01/30/2014), Macular hole of left eye, Macular hole of left eye, LEV (obstructive sleep apnea) (9/30/2013), Pneumonia, Recurrent UTI, Refractive error, Spastic colon, Stroke, and Syncope and collapse.  She  has a past surgical history that includes Colonoscopy; Mitral valve surgery; Cataract extraction;  Cholecystectomy; Appendectomy; Cardiac pacemaker placement (2014); Mitral valve replacement (2014); Cardiac catheterization; Cardiac valuve replacement; Replacement of pacemaker generator (Left, 6/20/2018); and Esophagogastroduodenoscopy (N/A, 3/13/2019).  Her family history includes COPD in her father; Cancer in her brother; Coronary artery disease in her father and mother; Diabetes in her brother; Emphysema in her father; Epilepsy in her mother; Heart attack in her mother; Heart disease in her father.  She  reports that she has never smoked. She has never used smokeless tobacco. She reports that she does not drink alcohol or use drugs.  She has a current medication list which includes the following prescription(s): acetaminophen, allopurinol, amiodarone, apixaban, aspirin, carvedilol, digoxin, furosemide, gabapentin, levothyroxine, losartan, metolazone, and pantoprazole, and the following Facility-Administered Medications: denosumab.  She is allergic to cephalosporins; metaxalone; penicillins; pregabalin; and sulfa (sulfonamide antibiotics)..    Review of Systems   Constitutional: Negative for fever, chills, fatigue and night sweats.   HENT: Negative for nosebleeds, postnasal drip, rhinorrhea, sinus pressure, sore throat, congestion, ear pain and hearing loss.    Eyes: Negative for itching.   Respiratory: Positive for dyspnea on extertion and somnolence. Negative for wheezing, orthopnea and Paroxysmal Nocturnal Dyspnea.    Cardiovascular: Positive for leg swelling. Negative for chest pain and palpitations.   Genitourinary: Negative for difficulty urinating and hematuria.   Endocrine: Negative for cold intolerance and heat intolerance.    Musculoskeletal: Positive for arthralgias and back pain.   Skin: Negative for rash.   Gastrointestinal: Negative for nausea, vomiting, abdominal pain, abdominal distention and acid reflux.   Neurological: Positive for light-headedness. Negative for dizziness, syncope and  "headaches.   Hematological: Bleeds easily and excessive bruising.   Psychiatric/Behavioral: The patient is nervous/anxious.    All other systems reviewed and are negative.       Objective:   /70   Pulse 70   Resp 16   Ht 5' 5" (1.651 m)   Wt 50.3 kg (110 lb 14.3 oz)   LMP  (LMP Unknown)   SpO2 95%   BMI 18.45 kg/m²   Body mass index is 18.45 kg/m².    Physical Exam   Constitutional: She appears well-developed and well-nourished.   HENT:   Head: Normocephalic and atraumatic.   Eyes: Pupils are equal, round, and reactive to light. EOM are normal.   Neck: Normal range of motion. Neck supple.   Cardiovascular: Normal rate and regular rhythm.   Pulmonary/Chest: Effort normal and breath sounds normal.   Abdominal: Soft. Bowel sounds are normal.   Musculoskeletal: Normal range of motion. She exhibits edema.   Skin: Skin is warm and dry.   Psychiatric: She has a normal mood and affect. Her behavior is normal.   Nursing note and vitals reviewed.      Personal Diagnostic Review  Chest x-ray: Cardiac silhouette and mediastinum unchanged with cardiomegaly present.  Central pulmonary vasculature similar.  Persistent bibasilar opacity consisting of small effusions and adjacent atelectasis and/or airspace disease greater at the left lung base.  Osseous structures are osteopenic.    Pulmonary function tests:06/24/19 FEV1: 0.86  (52.6 % predicted), FVC:  1.39 (63.8 % predicted), FEV1/FVC:  62,  DLCO: 6.50 (37.0 % predicted)  Moderately severe obstruction. Diffusion capacity is severely reduced but corrects for alveolar volume.          Assessment / plan:     Discussed diagnosis, its evaluation, treatment and usual course. All questions answered.    Problem List Items Addressed This Visit        Pulmonary    COPD, group A, by GOLD 2017 classification    Current Assessment & Plan     COPD ROS: taking medications as instructed, no medication side effects noted, no significant ongoing wheezing or shortness of breath, using " bronchodilator MDI less than twice a week.   New concerns: Stable NYHA II GAR.   Exam: appears well, vitals normal, no respiratory distress, acyanotic, normal RR.   Assessment:  COPD stable.   Plan: Currently not on any inhaler therapy. Discussed diagnosis, its evaluation, treatment and usual course.  All questions answered. CT chest (Non Contrast), PFT ( REPEAT) , 6MWT. ABG as ordered.               Other    LEV (obstructive sleep apnea) - Primary (Chronic)    Current Assessment & Plan     Continue APAP of 4 - 20 CMWP. ([facility])     Discussed therapeutic goals for positive airway pressure therapy(CPAP or BiPAP): Ideal is usage 100% of nights for 6 - 8 hours per night. Minimum usage is 70% of night for at least 4 hours per night used. Pateint expressed understanding. All Questions answered.    Complaince evaluation in 12 months.          Relevant Orders    CPAP/BIPAP SUPPLIES    At risk for amiodarone toxicity with long term use    Current Assessment & Plan     She reports hat her cardiologist has asked her to inform me that she has AMIODARONE LUNG TOXICITY. Currently there are no clinical no correlates of AMIODARONE LUNG TOXICITY.  Spirometry and DLCO performed on 06/24/19 revealed moderately sever obstruction and decreased diffusion capacity. LUNG FUNCTION WILL BE MONITORED.       EVALUATION:  [x]        Complete PFT with bronchodilator.  []        Bronchial challenge with methacholine.   [x]        Stress test, pulmonary.  [x]        PULM - Arterial Blood Gases  []        Chest X Ray  []        CT scan of chest.   [x]        MIGUEL  [x]        Sedimentation rate  [x]        C-reactive protein  [x]        Anti-neutrophilic cytoplasmic antibody          PLAN:  Discussed diagnosis, its evaluation, treatment and usual course.  All questions answered.                 Relevant Orders    MIGUEL Screen w/Reflex    C-reactive protein    Sedimentation rate    Anti-neutrophilic cytoplasmic antibody    Complete PFT with  bronchodilator    PULM - Arterial Blood Gases--in addition to PFT only    Pulmonary stress test    CT Chest Without Contrast            TIME SPENT WITH PATIENT: Time spent: 35 minutes in face to face  discussion concerning diagnosis, prognosis, review of lab and test results, benefits of treatment as well as management of disease, counseling of patient and coordination of care between various health  care providers . Greater than half the time spent was used for coordination of care and counseling of patient.     Follow up in about 6 weeks (around 8/27/2019) for LEV, COPD, Amiodarone toxicity.

## 2019-07-16 NOTE — PATIENT INSTRUCTIONS
Lung Anatomy  Your lungs take air in to give your body oxygen, which the body needs to work. Your lungs, like all the tissues in your body, are made up of billions of tiny specialized cells. Old lung cells die and are replaced by new, identical lung cells. This natural process helps ensure healthy lungs.    Date Last Reviewed: 11/1/2016  © 6543-6426 [employer] [address] [place] [address], [place] [address]. All rights reserved. This information is not intended as a substitute for professional medical care. Always follow your healthcare professional's instructions.

## 2019-07-16 NOTE — ASSESSMENT & PLAN NOTE
She reports hat her cardiologist has asked her to inform me that she has AMIODARONE LUNG TOXICITY. Currently there are no clinical no correlates of AMIODARONE LUNG TOXICITY.  Spirometry and DLCO performed on 06/24/19 revealed moderately sever obstruction and decreased diffusion capacity. LUNG FUNCTION WILL BE MONITORED.       EVALUATION:  [x]        Complete PFT with bronchodilator.  []        Bronchial challenge with methacholine.   [x]        Stress test, pulmonary.  [x]        PULM - Arterial Blood Gases  []        Chest X Ray  []        CT scan of chest.   [x]        MIGUEL  [x]        Sedimentation rate  [x]        C-reactive protein  [x]        Anti-neutrophilic cytoplasmic antibody          PLAN:  Discussed diagnosis, its evaluation, treatment and usual course.  All questions answered.

## 2019-07-17 LAB — ANA SER QL IF: NORMAL

## 2019-07-17 RX ORDER — GABAPENTIN 100 MG/1
CAPSULE ORAL
Qty: 180 CAPSULE | Refills: 0 | Status: SHIPPED | OUTPATIENT
Start: 2019-07-17 | End: 2019-10-14 | Stop reason: SDUPTHER

## 2019-07-17 NOTE — TELEPHONE ENCOUNTER
"I spoke with pt. She says she is feeling a little "dizzy headed" this morning. Her last dose of metolazone taken was on Friday 7/12. She has just been taking lasix 80mg bid. LE edema is still improved. A little swollen yesterday due to being up more and having to go to her Pul appt. bp yesterday at pul visit was 136/70 P-70      "

## 2019-07-17 NOTE — TELEPHONE ENCOUNTER
----- Message from Cathy Donahue LPN sent at 7/17/2019  9:38 AM CDT -----  Contact: self [phone]      ----- Message -----  From: Chaka Jefferson  Sent: 7/17/2019   9:10 AM  To: Luis [facility] Staff    .Type:  Patient Returning Call    Who Called:Jennifer Mathews  Who Left Message for Patient:Mayuri  Does the patient know what this is regarding?:no  Would the patient rather a call back or a response via [facility]? Call back  Best Call Back Number:[phone]  Additional Information:

## 2019-07-18 ENCOUNTER — LAB VISIT (OUTPATIENT)
Dept: LAB | Facility: HOSPITAL | Age: 84
End: 2019-07-18
Attending: INTERNAL MEDICINE
Payer: MEDICARE

## 2019-07-18 ENCOUNTER — TELEPHONE (OUTPATIENT)
Dept: CARDIOLOGY | Facility: CLINIC | Age: 84
End: 2019-07-18

## 2019-07-18 DIAGNOSIS — I23.0 HEMOPERICARDIUM AS CURRENT COMPLICATION FOLLOWING ACUTE MYOCARDIAL INFARCTION: Primary | ICD-10-CM

## 2019-07-18 DIAGNOSIS — N18.4 CHRONIC KIDNEY DISEASE, STAGE IV (SEVERE): ICD-10-CM

## 2019-07-18 DIAGNOSIS — I50.42 CHRONIC COMBINED SYSTOLIC AND DIASTOLIC HEART FAILURE: ICD-10-CM

## 2019-07-18 LAB
ANION GAP SERPL CALC-SCNC: 16 MMOL/L (ref 8–16)
BNP SERPL-MCNC: 607 PG/ML (ref 0–99)
BUN SERPL-MCNC: 83 MG/DL (ref 8–23)
CALCIUM SERPL-MCNC: 10.1 MG/DL (ref 8.7–10.5)
CHLORIDE SERPL-SCNC: 99 MMOL/L (ref 95–110)
CO2 SERPL-SCNC: 31 MMOL/L (ref 23–29)
CREAT SERPL-MCNC: 1.8 MG/DL (ref 0.5–1.4)
EST. GFR  (AFRICAN AMERICAN): 29 ML/MIN/1.73 M^2
EST. GFR  (NON AFRICAN AMERICAN): 25 ML/MIN/1.73 M^2
GLUCOSE SERPL-MCNC: 103 MG/DL (ref 70–110)
POTASSIUM SERPL-SCNC: 4.3 MMOL/L (ref 3.5–5.1)
SODIUM SERPL-SCNC: 146 MMOL/L (ref 136–145)

## 2019-07-18 PROCEDURE — 83880 ASSAY OF NATRIURETIC PEPTIDE: CPT

## 2019-07-18 PROCEDURE — 80048 BASIC METABOLIC PNL TOTAL CA: CPT

## 2019-07-18 NOTE — TELEPHONE ENCOUNTER
BNP down significantly. She should continue Lasix 80mg BID, but use Metolazone only PRN at this point. Her BUN is up as well so she is getting on the dry side. Please make sure she understands what PRN means. If fluid begins to accumulate, we may have to do the Metolazone twice a week, but let's see how she does first with just the Lasix 80mg BID and metolazone only PRN.

## 2019-07-19 ENCOUNTER — TELEPHONE (OUTPATIENT)
Dept: INTERNAL MEDICINE | Facility: CLINIC | Age: 84
End: 2019-07-19

## 2019-07-19 LAB
ANCA AB TITR SER IF: NORMAL TITER
P-ANCA TITR SER IF: NORMAL TITER

## 2019-07-19 NOTE — TELEPHONE ENCOUNTER
----- Message from Ruba Charles sent at 7/19/2019  3:53 PM CDT -----  Contact: pt  She's calling in regards to medication not being called in ;pantoprazole 40mg    Pt request to speak with nurse     pls call pt back at [phone] (home)       [id number] - [place], [place] - [address] [facility]  [address] [facility] [address]  Phone: [phone] Fax: [phone]

## 2019-07-20 RX ORDER — PANTOPRAZOLE SODIUM 40 MG/1
40 TABLET, DELAYED RELEASE ORAL DAILY
Qty: 30 TABLET | Refills: 0 | Status: SHIPPED | OUTPATIENT
Start: 2019-07-20 | End: 2019-08-07

## 2019-07-24 ENCOUNTER — TELEPHONE (OUTPATIENT)
Dept: CARDIOLOGY | Facility: CLINIC | Age: 84
End: 2019-07-24

## 2019-07-24 ENCOUNTER — EXTERNAL HOME HEALTH (OUTPATIENT)
Dept: HOME HEALTH SERVICES | Facility: HOSPITAL | Age: 84
End: 2019-07-24
Payer: MEDICARE

## 2019-07-24 NOTE — TELEPHONE ENCOUNTER
Pt called the says her wt on 7/22 was 106lb, on 7/23 it jumped to 109lb. Pt says she took a metolazone and wt today is back down to 107lbs. She just wanted us to be aware,. No other symptoms noted.

## 2019-07-28 DIAGNOSIS — M1A.39X1 CHRONIC GOUT DUE TO RENAL IMPAIRMENT OF MULTIPLE SITES WITH TOPHUS: ICD-10-CM

## 2019-07-28 DIAGNOSIS — M81.0 OSTEOPOROSIS, SENILE: ICD-10-CM

## 2019-07-29 ENCOUNTER — LAB VISIT (OUTPATIENT)
Dept: LAB | Facility: HOSPITAL | Age: 84
End: 2019-07-29
Attending: INTERNAL MEDICINE
Payer: MEDICARE

## 2019-07-29 DIAGNOSIS — Z95.0 CARDIAC PACEMAKER IN SITU: ICD-10-CM

## 2019-07-29 DIAGNOSIS — N18.4 CHRONIC KIDNEY DISEASE, STAGE IV (SEVERE): Primary | ICD-10-CM

## 2019-07-29 DIAGNOSIS — I27.22 PULMONARY HYPERTENSION DUE TO LEFT HEART DISEASE: ICD-10-CM

## 2019-07-29 DIAGNOSIS — I48.0 PAROXYSMAL ATRIAL FIBRILLATION: ICD-10-CM

## 2019-07-29 DIAGNOSIS — I13.10 MALIGNANT HYPERTENSIVE HEART AND RENAL DISEASE, WITH RENAL FAILURE: ICD-10-CM

## 2019-07-29 DIAGNOSIS — Z79.82 ENCOUNTER FOR LONG-TERM (CURRENT) USE OF ASPIRIN: ICD-10-CM

## 2019-07-29 LAB
ANION GAP SERPL CALC-SCNC: 11 MMOL/L (ref 8–16)
BNP SERPL-MCNC: 372 PG/ML (ref 0–99)
BUN SERPL-MCNC: 73 MG/DL (ref 8–23)
CALCIUM SERPL-MCNC: 9.7 MG/DL (ref 8.7–10.5)
CHLORIDE SERPL-SCNC: 99 MMOL/L (ref 95–110)
CO2 SERPL-SCNC: 32 MMOL/L (ref 23–29)
CREAT SERPL-MCNC: 1.7 MG/DL (ref 0.5–1.4)
EST. GFR  (AFRICAN AMERICAN): 31 ML/MIN/1.73 M^2
EST. GFR  (NON AFRICAN AMERICAN): 27 ML/MIN/1.73 M^2
GLUCOSE SERPL-MCNC: 113 MG/DL (ref 70–110)
POTASSIUM SERPL-SCNC: 4.5 MMOL/L (ref 3.5–5.1)
SODIUM SERPL-SCNC: 142 MMOL/L (ref 136–145)

## 2019-07-29 PROCEDURE — 83880 ASSAY OF NATRIURETIC PEPTIDE: CPT

## 2019-07-29 PROCEDURE — 80048 BASIC METABOLIC PNL TOTAL CA: CPT

## 2019-07-29 RX ORDER — ALLOPURINOL 100 MG/1
200 TABLET ORAL DAILY
Qty: 180 TABLET | Refills: 1 | Status: SHIPPED | OUTPATIENT
Start: 2019-07-29 | End: 2019-10-03

## 2019-07-31 ENCOUNTER — EXTERNAL CHRONIC CARE MANAGEMENT (OUTPATIENT)
Dept: PRIMARY CARE CLINIC | Facility: CLINIC | Age: 84
End: 2019-07-31
Payer: MEDICARE

## 2019-07-31 PROCEDURE — 99490 CHRNC CARE MGMT STAFF 1ST 20: CPT | Mod: S$PBB,,, | Performed by: FAMILY MEDICINE

## 2019-07-31 PROCEDURE — 99490 PR CHRONIC CARE MGMT, 1ST 20 MIN: ICD-10-PCS | Mod: S$PBB,,, | Performed by: FAMILY MEDICINE

## 2019-07-31 PROCEDURE — 99490 CHRNC CARE MGMT STAFF 1ST 20: CPT | Mod: PBBFAC | Performed by: FAMILY MEDICINE

## 2019-08-07 ENCOUNTER — OFFICE VISIT (OUTPATIENT)
Dept: CARDIOLOGY | Facility: CLINIC | Age: 84
End: 2019-08-07
Payer: MEDICARE

## 2019-08-07 ENCOUNTER — OFFICE VISIT (OUTPATIENT)
Dept: INTERNAL MEDICINE | Facility: CLINIC | Age: 84
End: 2019-08-07
Payer: MEDICARE

## 2019-08-07 ENCOUNTER — TELEPHONE (OUTPATIENT)
Dept: INTERNAL MEDICINE | Facility: CLINIC | Age: 84
End: 2019-08-07

## 2019-08-07 ENCOUNTER — LAB VISIT (OUTPATIENT)
Dept: LAB | Facility: HOSPITAL | Age: 84
End: 2019-08-07
Attending: NURSE PRACTITIONER
Payer: MEDICARE

## 2019-08-07 ENCOUNTER — OFFICE VISIT (OUTPATIENT)
Dept: HEMATOLOGY/ONCOLOGY | Facility: CLINIC | Age: 84
End: 2019-08-07
Payer: MEDICARE

## 2019-08-07 VITALS
WEIGHT: 111.13 LBS | HEART RATE: 69 BPM | HEIGHT: 65 IN | SYSTOLIC BLOOD PRESSURE: 136 MMHG | BODY MASS INDEX: 18.52 KG/M2 | DIASTOLIC BLOOD PRESSURE: 60 MMHG

## 2019-08-07 VITALS
WEIGHT: 111.13 LBS | HEIGHT: 65 IN | OXYGEN SATURATION: 96 % | HEART RATE: 70 BPM | DIASTOLIC BLOOD PRESSURE: 62 MMHG | TEMPERATURE: 98 F | BODY MASS INDEX: 18.52 KG/M2 | SYSTOLIC BLOOD PRESSURE: 130 MMHG

## 2019-08-07 VITALS
HEIGHT: 62 IN | WEIGHT: 110.88 LBS | TEMPERATURE: 98 F | HEART RATE: 71 BPM | SYSTOLIC BLOOD PRESSURE: 140 MMHG | OXYGEN SATURATION: 93 % | BODY MASS INDEX: 20.4 KG/M2 | DIASTOLIC BLOOD PRESSURE: 65 MMHG

## 2019-08-07 DIAGNOSIS — I48.0 PAROXYSMAL ATRIAL FIBRILLATION: ICD-10-CM

## 2019-08-07 DIAGNOSIS — D50.0 IRON DEFICIENCY ANEMIA DUE TO CHRONIC BLOOD LOSS: ICD-10-CM

## 2019-08-07 DIAGNOSIS — M15.9 PRIMARY OSTEOARTHRITIS INVOLVING MULTIPLE JOINTS: ICD-10-CM

## 2019-08-07 DIAGNOSIS — N18.4 ANEMIA ASSOCIATED WITH STAGE 4 CHRONIC RENAL FAILURE: Primary | ICD-10-CM

## 2019-08-07 DIAGNOSIS — J44.9 COPD, GROUP A, BY GOLD 2017 CLASSIFICATION: ICD-10-CM

## 2019-08-07 DIAGNOSIS — I50.42 CHRONIC COMBINED SYSTOLIC AND DIASTOLIC CONGESTIVE HEART FAILURE: Primary | ICD-10-CM

## 2019-08-07 DIAGNOSIS — D63.1 ANEMIA ASSOCIATED WITH STAGE 4 CHRONIC RENAL FAILURE: Primary | ICD-10-CM

## 2019-08-07 DIAGNOSIS — I10 ESSENTIAL HYPERTENSION: ICD-10-CM

## 2019-08-07 DIAGNOSIS — D53.9 MACROCYTIC ANEMIA: ICD-10-CM

## 2019-08-07 DIAGNOSIS — R35.0 URINARY FREQUENCY: ICD-10-CM

## 2019-08-07 DIAGNOSIS — G47.33 OSA (OBSTRUCTIVE SLEEP APNEA): Chronic | ICD-10-CM

## 2019-08-07 DIAGNOSIS — N39.0 RECURRENT UTI: ICD-10-CM

## 2019-08-07 DIAGNOSIS — Z79.01 ANTICOAGULATED ON COUMADIN: ICD-10-CM

## 2019-08-07 DIAGNOSIS — R30.0 DYSURIA: Primary | ICD-10-CM

## 2019-08-07 DIAGNOSIS — R76.8 ELEVATED SERUM IMMUNOGLOBULIN FREE LIGHT CHAIN LEVEL: ICD-10-CM

## 2019-08-07 LAB
ALBUMIN SERPL BCP-MCNC: 3.9 G/DL (ref 3.5–5.2)
ALP SERPL-CCNC: 84 U/L (ref 55–135)
ALT SERPL W/O P-5'-P-CCNC: 15 U/L (ref 10–44)
ANION GAP SERPL CALC-SCNC: 12 MMOL/L (ref 8–16)
AST SERPL-CCNC: 21 U/L (ref 10–40)
BACTERIA #/AREA URNS HPF: ABNORMAL /HPF
BASOPHILS # BLD AUTO: 0.05 K/UL (ref 0–0.2)
BASOPHILS NFR BLD: 0.7 % (ref 0–1.9)
BILIRUB SERPL-MCNC: 0.5 MG/DL (ref 0.1–1)
BILIRUB UR QL STRIP: ABNORMAL
BUN SERPL-MCNC: 65 MG/DL (ref 8–23)
CALCIUM SERPL-MCNC: 9 MG/DL (ref 8.7–10.5)
CHLORIDE SERPL-SCNC: 101 MMOL/L (ref 95–110)
CLARITY UR: ABNORMAL
CO2 SERPL-SCNC: 29 MMOL/L (ref 23–29)
COLOR UR: ABNORMAL
CREAT SERPL-MCNC: 1.6 MG/DL (ref 0.5–1.4)
DIFFERENTIAL METHOD: ABNORMAL
EOSINOPHIL # BLD AUTO: 0.1 K/UL (ref 0–0.5)
EOSINOPHIL NFR BLD: 1.7 % (ref 0–8)
ERYTHROCYTE [DISTWIDTH] IN BLOOD BY AUTOMATED COUNT: 16 % (ref 11.5–14.5)
EST. GFR  (AFRICAN AMERICAN): 33 ML/MIN/1.73 M^2
EST. GFR  (NON AFRICAN AMERICAN): 29 ML/MIN/1.73 M^2
GLUCOSE SERPL-MCNC: 105 MG/DL (ref 70–110)
GLUCOSE UR QL STRIP: ABNORMAL
HCT VFR BLD AUTO: 36.6 % (ref 37–48.5)
HGB BLD-MCNC: 11.1 G/DL (ref 12–16)
HGB UR QL STRIP: ABNORMAL
HYALINE CASTS #/AREA URNS LPF: <1 /LPF
KETONES UR QL STRIP: ABNORMAL
LEUKOCYTE ESTERASE UR QL STRIP: ABNORMAL
LYMPHOCYTES # BLD AUTO: 1.6 K/UL (ref 1–4.8)
LYMPHOCYTES NFR BLD: 22.3 % (ref 18–48)
MCH RBC QN AUTO: 30.8 PG (ref 27–31)
MCHC RBC AUTO-ENTMCNC: 30.3 G/DL (ref 32–36)
MCV RBC AUTO: 102 FL (ref 82–98)
MICROSCOPIC COMMENT: ABNORMAL
MONOCYTES # BLD AUTO: 0.8 K/UL (ref 0.3–1)
MONOCYTES NFR BLD: 11.5 % (ref 4–15)
NEUTROPHILS # BLD AUTO: 4.5 K/UL (ref 1.8–7.7)
NEUTROPHILS NFR BLD: 63.8 % (ref 38–73)
NITRITE UR QL STRIP: ABNORMAL
NON-SQ EPI CELLS #/AREA URNS HPF: <1 /HPF
PH UR STRIP: ABNORMAL [PH] (ref 5–8)
PLATELET # BLD AUTO: 194 K/UL (ref 150–350)
PMV BLD AUTO: 10 FL (ref 9.2–12.9)
POTASSIUM SERPL-SCNC: 3.7 MMOL/L (ref 3.5–5.1)
PROT SERPL-MCNC: 7.2 G/DL (ref 6–8.4)
PROT UR QL STRIP: ABNORMAL
RBC # BLD AUTO: 3.6 M/UL (ref 4–5.4)
RBC #/AREA URNS HPF: 2 /HPF (ref 0–4)
SODIUM SERPL-SCNC: 142 MMOL/L (ref 136–145)
SP GR UR STRIP: ABNORMAL (ref 1–1.03)
SQUAMOUS #/AREA URNS HPF: 6 /HPF
URN SPEC COLLECT METH UR: ABNORMAL
WBC # BLD AUTO: 6.98 K/UL (ref 3.9–12.7)
WBC #/AREA URNS HPF: >100 /HPF (ref 0–5)
WBC CLUMPS URNS QL MICRO: ABNORMAL

## 2019-08-07 PROCEDURE — 99214 PR OFFICE/OUTPT VISIT, EST, LEVL IV, 30-39 MIN: ICD-10-PCS | Mod: S$PBB,,, | Performed by: NURSE PRACTITIONER

## 2019-08-07 PROCEDURE — 99999 PR PBB SHADOW E&M-EST. PATIENT-LVL IV: CPT | Mod: PBBFAC,,, | Performed by: NURSE PRACTITIONER

## 2019-08-07 PROCEDURE — 99214 OFFICE O/P EST MOD 30 MIN: CPT | Mod: S$PBB,,, | Performed by: NURSE PRACTITIONER

## 2019-08-07 PROCEDURE — 99213 OFFICE O/P EST LOW 20 MIN: CPT | Mod: PBBFAC,27 | Performed by: INTERNAL MEDICINE

## 2019-08-07 PROCEDURE — 99999 PR PBB SHADOW E&M-EST. PATIENT-LVL III: ICD-10-PCS | Mod: PBBFAC,,, | Performed by: NURSE PRACTITIONER

## 2019-08-07 PROCEDURE — 99999 PR PBB SHADOW E&M-EST. PATIENT-LVL IV: ICD-10-PCS | Mod: PBBFAC,,, | Performed by: NURSE PRACTITIONER

## 2019-08-07 PROCEDURE — 80053 COMPREHEN METABOLIC PANEL: CPT

## 2019-08-07 PROCEDURE — 99999 PR PBB SHADOW E&M-EST. PATIENT-LVL III: CPT | Mod: PBBFAC,,, | Performed by: INTERNAL MEDICINE

## 2019-08-07 PROCEDURE — 99214 OFFICE O/P EST MOD 30 MIN: CPT | Mod: PBBFAC,27 | Performed by: NURSE PRACTITIONER

## 2019-08-07 PROCEDURE — 85025 COMPLETE CBC W/AUTO DIFF WBC: CPT

## 2019-08-07 PROCEDURE — 99213 OFFICE O/P EST LOW 20 MIN: CPT | Mod: 27,PBBFAC | Performed by: NURSE PRACTITIONER

## 2019-08-07 PROCEDURE — 99213 PR OFFICE/OUTPT VISIT, EST, LEVL III, 20-29 MIN: ICD-10-PCS | Mod: S$PBB,,, | Performed by: INTERNAL MEDICINE

## 2019-08-07 PROCEDURE — 99999 PR PBB SHADOW E&M-EST. PATIENT-LVL III: CPT | Mod: PBBFAC,,, | Performed by: NURSE PRACTITIONER

## 2019-08-07 PROCEDURE — 99213 OFFICE O/P EST LOW 20 MIN: CPT | Mod: S$PBB,,, | Performed by: INTERNAL MEDICINE

## 2019-08-07 PROCEDURE — 36415 COLL VENOUS BLD VENIPUNCTURE: CPT

## 2019-08-07 PROCEDURE — 99999 PR PBB SHADOW E&M-EST. PATIENT-LVL III: ICD-10-PCS | Mod: PBBFAC,,, | Performed by: INTERNAL MEDICINE

## 2019-08-07 PROCEDURE — 81000 URINALYSIS NONAUTO W/SCOPE: CPT

## 2019-08-07 RX ORDER — CIPROFLOXACIN 500 MG/1
500 TABLET ORAL 2 TIMES DAILY
Qty: 14 TABLET | Refills: 0 | Status: SHIPPED | OUTPATIENT
Start: 2019-08-07 | End: 2019-08-14

## 2019-08-07 RX ORDER — FUROSEMIDE 40 MG/1
80 TABLET ORAL 2 TIMES DAILY
Qty: 120 TABLET | Refills: 6 | Status: SHIPPED | OUTPATIENT
Start: 2019-08-07 | End: 2020-01-30

## 2019-08-07 NOTE — TELEPHONE ENCOUNTER
Spoke with Kezia from [facility], The cipro that was ordered today has a flagged drug interaction with Amiodarone. Relayed information to Dr. Davis for review.

## 2019-08-07 NOTE — PROGRESS NOTES
Subjective:       Patient ID: Jennifer Mathews is a 87 y.o. female.    Chief Complaint: Dysuria    Dysuria    This is a recurrent problem. The current episode started acute onset. The problem has been waxing and waning. The quality of the pain is described as burning. The pain is mild. There has been no fever. She is not sexually active. Associated symptoms include frequency. She has tried increased fluids (AZO) for the symptoms. The treatment provided mild relief. Her past medical history is significant for recurrent UTIs.     Review of Systems   Constitutional: Negative for fever.   Gastrointestinal: Negative for abdominal pain.   Genitourinary: Positive for dysuria and frequency.       Objective:      Physical Exam   Constitutional: She is oriented to person, place, and time. She appears well-developed and well-nourished. No distress.   Eyes: No scleral icterus.   Cardiovascular: Normal rate.   Pulmonary/Chest: Effort normal. No respiratory distress.   Abdominal: Soft. There is no tenderness.   Neurological: She is alert and oriented to person, place, and time.   Skin: Skin is warm and dry.   Psychiatric: She has a normal mood and affect.   Vitals reviewed.      Assessment:       1. Dysuria    2. Urinary frequency    3. Recurrent UTI        Plan:       Jennifer was seen today for dysuria.    Diagnoses and all orders for this visit:    Dysuria  -     Urinalysis    Urinary frequency  -     Urinalysis    Recurrent UTI  -     Urine culture; Future    She will be contacted with results.

## 2019-08-07 NOTE — PROGRESS NOTES
Subjective:   Patient ID:  Jennifer Mathews is a 87 y.o. female who presents for follow up of Atrial Fibrillation; Hypertension; and Congestive Heart Failure      HPI   Ms. Mathews's  current conditions include mitral valve replacement -bioprosthesis, old Stroke without residual deficits, HTN, PAF, CKD,  combined CHF with EF 25%, ICM s/p PPM-CRT-D placement, recurrent UTI, CKD3-4, and hypothyroidism.   Coumadin switched to Eliquis 2.5mg BID on 5/31/19 by Dr. Dunne as well. ASA stopped. OAC switched following admission for GI bleed.  Digoxin decreased to every other day by Dr. Dunne, but has been taking daily.    Denies any chest pain, SOB, GAR,  orthopnea, PND, dizziness, palpitations,  near syncope, syncope or edema . Has no symptoms concerning for angina or equivalent. No CNS Complaints to suggest TIA or CVA. Does well with limiting sodium intake.Patient currently takes Lasix 80mg BID and uses Metolazone PRN.   Has no abnormal bleeding on Eliquis.       Past Medical History:   Diagnosis Date    Acute coronary syndrome     Anemia     Anticoagulated on Coumadin 7/13/2015    Arthritis     Asthma     patient denies    Atrial fibrillation     Basal cell carcinoma 10/2015    left neck    Cardiac arrest     Cardiac resynchronization therapy defibrillator (CRT-D) in place 07/13/2015    Pt denies, states it does not shock me    Cardiomyopathy     Chronic combined systolic and diastolic congestive heart failure     CKD (chronic kidney disease) stage 3, GFR 30-59 ml/min     Dyslipidemia 1/30/2014    Hyperlipidemia     Hypertension     Hypothyroidism     Ischemic cardiomyopathy 01/30/2014    Macular hole of left eye     Old    Macular hole of left eye     LEV (obstructive sleep apnea) 9/30/2013    Pneumonia     required hospitalization    Recurrent UTI     Refractive error     Spastic colon     Stroke     Syncope and collapse        Past Surgical History:   Procedure Laterality Date     APPENDECTOMY      CARDIAC CATHETERIZATION      CARDIAC PACEMAKER PLACEMENT  2014    CARDIAC VALVE SURGERY      CATARACT EXTRACTION      OU    CHOLECYSTECTOMY      COLONOSCOPY      ESOPHAGOGASTRODUODENOSCOPY (EGD) N/A 3/13/2019    Performed by Gh[facility]al [facility]rellana [facility] at [facility] ENDO    IRRIGATION AND DEBRIDEMENT OF LEFT KNEE Left 9/12/2017    Performed by Juliano Rosenbaum MD at [facility] OR    MITRAL VALVE REPLACEMENT  2014    MITRAL VALVE SURGERY      x3    REPLACEMENT, PACEMAKER GENERATOR/pt has crt-p versus d Left 6/20/2018    Performed by Manny Dunne MD at [facility] CATH LAB    REVISION, SKIN POCKET, FOR CARDIAC PACEMAKER Left 2/26/2014    Performed by Manny Dunne MD at [facility] CATH LAB       Social History     Tobacco Use    Smoking status: Never Smoker    Smokeless tobacco: Never Used   Substance Use Topics    Alcohol use: No    Drug use: No       Family History   Problem Relation Age of Onset    Coronary artery disease Mother         mi    Epilepsy Mother     Heart attack Mother     Coronary artery disease Father     Heart disease Father     Emphysema Father     COPD Father     Diabetes Brother     Cancer Brother     Melanoma Neg Hx     Psoriasis Neg Hx     Lupus Neg Hx     Eczema Neg Hx        Current Outpatient Medications   Medication Sig    acetaminophen (TYLENOL EXTRA STRENGTH) 325 MG tablet Take 2 tablets (650 mg total) by mouth every 8 (eight) hours. (Patient taking differently: Take 650 mg by mouth 3 (three) times daily as needed. )    allopurinol (ZYLOPRIM) 100 MG tablet TAKE 2 TABLETS (200 MG TOTAL) BY MOUTH ONCE DAILY.    amiodarone (PACERONE) 200 MG Tab Take 1 tablet (200 mg total) by mouth once daily. (Patient taking differently: Take 200 mg by mouth once daily. As of 7/6/19 decrease to 100 mg (half tablet) daily)    apixaban (ELIQUIS) 2.5 mg Tab Take 1 tablet (2.5 mg total) by mouth 2 (two) times daily.    aspirin (ECOTRIN) 81 MG EC tablet Take 1 tablet (81 mg  total) by mouth once daily.    carvedilol (COREG) 25 MG tablet TAKE 1 TABLET BY MOUTH TWICE A DAY WITH MEALS    digoxin (LANOXIN) 125 mcg tablet Take 125 mcg by mouth every other day.    furosemide (LASIX) 40 MG tablet Take 2 tablets (80 mg total) by mouth 2 (two) times daily.    gabapentin (NEURONTIN) 100 MG capsule TAKE ONE CAPSULE BY MOUTH TWO TIMES DAILY    levothyroxine (SYNTHROID) 75 MCG tablet Take 1 tablet (75 mcg total) by mouth once daily.    losartan (COZAAR) 25 MG tablet Take 25 mg by mouth once daily.    metOLazone (ZAROXOLYN) 2.5 MG tablet 30 minutes after Lasix as directed     Current Facility-Administered Medications   Medication    denosumab (PROLIA) injection 60 mg     Current Outpatient Medications on File Prior to Visit   Medication Sig    acetaminophen (TYLENOL EXTRA STRENGTH) 325 MG tablet Take 2 tablets (650 mg total) by mouth every 8 (eight) hours. (Patient taking differently: Take 650 mg by mouth 3 (three) times daily as needed. )    allopurinol (ZYLOPRIM) 100 MG tablet TAKE 2 TABLETS (200 MG TOTAL) BY MOUTH ONCE DAILY.    amiodarone (PACERONE) 200 MG Tab Take 1 tablet (200 mg total) by mouth once daily. (Patient taking differently: Take 200 mg by mouth once daily. As of 7/6/19 decrease to 100 mg (half tablet) daily)    apixaban (ELIQUIS) 2.5 mg Tab Take 1 tablet (2.5 mg total) by mouth 2 (two) times daily.    aspirin (ECOTRIN) 81 MG EC tablet Take 1 tablet (81 mg total) by mouth once daily.    carvedilol (COREG) 25 MG tablet TAKE 1 TABLET BY MOUTH TWICE A DAY WITH MEALS    digoxin (LANOXIN) 125 mcg tablet Take 125 mcg by mouth every other day.    gabapentin (NEURONTIN) 100 MG capsule TAKE ONE CAPSULE BY MOUTH TWO TIMES DAILY    levothyroxine (SYNTHROID) 75 MCG tablet Take 1 tablet (75 mcg total) by mouth once daily.    losartan (COZAAR) 25 MG tablet Take 25 mg by mouth once daily.    metOLazone (ZAROXOLYN) 2.5 MG tablet 30 minutes after Lasix as directed     [DISCONTINUED] furosemide (LASIX) 40 MG tablet Take 2 pills once daily with extra tabs as directed (Patient taking differently: Take 80 mg by mouth 2 (two) times daily. )    [DISCONTINUED] pantoprazole (PROTONIX) 40 MG tablet Take 1 tablet (40 mg total) by mouth once daily.     Current Facility-Administered Medications on File Prior to Visit   Medication    denosumab (PROLIA) injection 60 mg       Review of Systems   Constitution: Negative for diaphoresis, malaise/fatigue, weight gain and weight loss.   HENT: Negative for congestion and nosebleeds.    Cardiovascular: Positive for leg swelling. Negative for chest pain, claudication, cyanosis, dyspnea on exertion, irregular heartbeat, near-syncope, orthopnea, palpitations, paroxysmal nocturnal dyspnea and syncope.   Respiratory: Negative for cough, hemoptysis, shortness of breath, sleep disturbances due to breathing, snoring, sputum production and wheezing.         Uses CPAP nightly    Hematologic/Lymphatic: Negative for bleeding problem. Does not bruise/bleed easily.   Skin: Negative for rash.   Musculoskeletal: Negative for arthritis, back pain, falls, joint pain, muscle cramps and muscle weakness.   Gastrointestinal: Negative for abdominal pain, constipation, diarrhea, heartburn, hematemesis, hematochezia, melena and nausea.   Genitourinary: Negative for dysuria, hematuria and nocturia.   Neurological: Negative for excessive daytime sleepiness, dizziness, headaches, light-headedness, loss of balance, numbness, vertigo and weakness.       Objective:   Physical Exam   Constitutional: She is oriented to person, place, and time. She appears well-developed and well-nourished. No distress.   HENT:   Head: Normocephalic and atraumatic.   Eyes: Pupils are equal, round, and reactive to light. Right eye exhibits no discharge. Left eye exhibits no discharge.   Neck: Neck supple. No JVD present.   Cardiovascular: Normal rate, regular rhythm, S1 normal and S2 normal.   Murmur  "heard.  High-pitched blowing holosystolic murmur is present at the apex.  Pulmonary/Chest: Effort normal. No respiratory distress. She has no decreased breath sounds. She has no wheezes. She has no rales.   CRT D site well-healed  Sternotomy site well-healed   Abdominal: Soft. She exhibits no distension.   Musculoskeletal: She exhibits edema (+1 LLE).   Neurological: She is alert and oriented to person, place, and time.   Skin: Skin is warm and dry. She is not diaphoretic. No erythema.   Psychiatric: She has a normal mood and affect. Her behavior is normal. Thought content normal.   Nursing note and vitals reviewed.    Vitals:    08/07/19 1325 08/07/19 1334   BP: (!) 124/54 136/60   BP Location: Right arm Left arm   Pulse: 69    Weight: 50.4 kg (111 lb 1.8 oz)    Height: 5' 5" (1.651 m)      Lab Results   Component Value Date    CHOL 131 03/21/2019    CHOL 155 03/20/2018    CHOL 154 01/29/2018     Lab Results   Component Value Date    HDL 31 (L) 03/21/2019    HDL 36 (L) 03/20/2018    HDL 34 (L) 01/29/2018     Lab Results   Component Value Date    LDLCALC 68.6 03/21/2019    LDLCALC 90.8 03/20/2018    LDLCALC 79.0 01/29/2018     Lab Results   Component Value Date    TRIG 157 (H) 03/21/2019    TRIG 141 03/20/2018    TRIG 205 (H) 01/29/2018     Lab Results   Component Value Date    CHOLHDL 23.7 03/21/2019    CHOLHDL 23.2 03/20/2018    CHOLHDL 22.1 01/29/2018       Chemistry        Component Value Date/Time     07/29/2019 1124    K 4.5 07/29/2019 1124    CL 99 07/29/2019 1124    CO2 32 (H) 07/29/2019 1124    BUN 73 (H) 07/29/2019 1124    CREATININE 1.7 (H) 07/29/2019 1124     (H) 07/29/2019 1124        Component Value Date/Time    CALCIUM 9.7 07/29/2019 1124    ALKPHOS 92 07/10/2019 1243    AST 20 07/10/2019 1243    ALT 12 07/10/2019 1243    BILITOT 0.7 07/10/2019 1243    ESTGFRAFRICA 31 (A) 07/29/2019 1124    EGFRNONAA 27 (A) 07/29/2019 1124          Lab Results   Component Value Date    TSH 6.333 (H) " 03/29/2019     Lab Results   Component Value Date    INR 1.3 (H) 05/06/2019    INR 1.8 05/02/2019    INR 1.7 04/29/2019     Lab Results   Component Value Date    WBC 6.46 07/10/2019    HGB 11.8 (L) 07/10/2019    HCT 37.9 07/10/2019    MCV 99 (H) 07/10/2019     07/10/2019     BMP  Sodium   Date Value Ref Range Status   07/29/2019 142 136 - 145 mmol/L Final     Potassium   Date Value Ref Range Status   07/29/2019 4.5 3.5 - 5.1 mmol/L Final     Chloride   Date Value Ref Range Status   07/29/2019 99 95 - 110 mmol/L Final     CO2   Date Value Ref Range Status   07/29/2019 32 (H) 23 - 29 mmol/L Final     BUN, Bld   Date Value Ref Range Status   07/29/2019 73 (H) 8 - 23 mg/dL Final     Creatinine   Date Value Ref Range Status   07/29/2019 1.7 (H) 0.5 - 1.4 mg/dL Final     Calcium   Date Value Ref Range Status   07/29/2019 9.7 8.7 - 10.5 mg/dL Final     Anion Gap   Date Value Ref Range Status   07/29/2019 11 8 - 16 mmol/L Final     eGFR if    Date Value Ref Range Status   07/29/2019 31 (A) >60 mL/min/1.73 m^2 Final     eGFR if non    Date Value Ref Range Status   07/29/2019 27 (A) >60 mL/min/1.73 m^2 Final     Comment:     Calculation used to obtain the estimated glomerular filtration  rate (eGFR) is the CKD-EPI equation.        CrCl cannot be calculated (Patient's most recent lab result is older than the maximum 7 days allowed.).    Assessment:     1. Chronic combined systolic and diastolic congestive heart failure    2. COPD, group A, by GOLD 2017 classification    3. Paroxysmal atrial fibrillation    4. Essential hypertension    5. Anticoagulated on Coumadin    6. LEV (obstructive sleep apnea)        Plan:   Chronic combined systolic and diastolic congestive heart failure  Continue Lasix 80mg BID with metolazone PRN  Heart healthy diet  Limit fluid intake 50-60 oz   Daily weights and to notify clinic if weight increases by more than 3 lbs in 1 day or 5 lbs in 1 week.   Exercise  routine as tolerated    COPD, group A, by GOLD 2017 classification  Continue to follow up with Pulmonology     Paroxysmal atrial fibrillation  Continue Coreg  Digoxin every other day as recommended by Dr. Sol   Continue Eliquis for CVA prophylaxis    Essential hypertension  Continue current medical management     LEV (obstructive sleep apnea)  Continue nightly CPAP use    RTC in 3 months with BMP

## 2019-08-07 NOTE — PATIENT INSTRUCTIONS
"  Dysuria     Painful urination (dysuria) is often caused by a problem in the urinary tract.   Dysuria is pain felt during urination. It is often described as a burning. Learn more about this problem and how it can be treated.  What causes dysuria?  Possible causes include:  · Infection with a bacteria or virus such as a urinary tract infection (UTI or a sexually transmitted infection (STI)  · Sensitivity or allergy to chemicals such as those found in lotions and other products  · Prostate or bladder problems  · Radiation therapy to the pelvic area  How is dysuria diagnosed?  Your healthcare provider will examine you. He or she will ask about your symptoms and health. After talking with you and doing a physical exam, your healthcare provider may know what is causing your dysuria. He or she will usually request  a sample of your urine. Tests of your urine, or a "urinalysis," are done. A urinalysis may include:  · Looking at the urine sample (visual exam)  · Checking for substances (chemical exam)  · Looking at a small amount under a microscope (microscopic exam)  Some parts of the urinalysis may be done in the provider's office and some in a lab. And, the urine sample may be checked for bacteria and yeast (urine culture). Your healthcare provider will tell you more about these tests if they are needed.  How is dysuria treated?  Treatment depends on the cause. If you have a bacterial infection, you may need antibiotics. You may be given medicines to make it easier for you to urinate and help relieve pain. Your healthcare provider can tell you more about your treatment options. Untreated, symptoms may get worse.  When to call your healthcare provider  Call the healthcare provider right away if you have any of the following:  · Fever of 100.4°F (38°C) or higher   · No improvement after three days of treatment  · Trouble urinating because of pain  · New or increased discharge from the vagina or penis  · Rash or joint " pain  · Increased back or abdominal pain  · Enlarged painful lymph nodes (lumps) in the groin   Date Last Reviewed: 1/1/2017  © 8842-8106 [employer]. [address] [place] [address], [place] [address]. All rights reserved. This information is not intended as a substitute for professional medical care. Always follow your healthcare professional's instructions.

## 2019-08-07 NOTE — TELEPHONE ENCOUNTER
----- Message from Hannah Sevilla sent at 8/7/2019  4:09 PM CDT -----  Contact: Fani Kaufman -[phone]  Would like to consult with the nurse, received an Rx medication , it is coming up as a drug reaction , would like to speak with the nurse concerning this, please call back at [phone], thanks sj

## 2019-08-08 ENCOUNTER — LAB VISIT (OUTPATIENT)
Dept: LAB | Facility: HOSPITAL | Age: 84
End: 2019-08-08
Attending: FAMILY MEDICINE
Payer: MEDICARE

## 2019-08-08 DIAGNOSIS — N39.0 RECURRENT UTI: ICD-10-CM

## 2019-08-08 PROCEDURE — 87086 URINE CULTURE/COLONY COUNT: CPT

## 2019-08-08 PROCEDURE — 87077 CULTURE AEROBIC IDENTIFY: CPT

## 2019-08-08 PROCEDURE — 87088 URINE BACTERIA CULTURE: CPT

## 2019-08-08 PROCEDURE — 87186 SC STD MICRODIL/AGAR DIL: CPT

## 2019-08-08 NOTE — TELEPHONE ENCOUNTER
Spoke to pharmacy per Dr wharton I let the pharmacy know that Dr Davis states they can fill the prescription

## 2019-08-08 NOTE — TELEPHONE ENCOUNTER
Patient has multiple allergies to antibiotics and contraindications to others due to other medical conditions. Patient aware of the risk due to being on this antibiotic and amiodarone several times in the past. The risk is likely low and the benefit outweighs the risk.   Please have pharmacy fill the prescription.

## 2019-08-09 NOTE — PROGRESS NOTES
Subjective:       Patient ID: Jennifer Mathews is a 87 y.o. female.    Chief Complaint: Anemia    85-year-old  female who presents to the [facility] today as a follow-up for anemia, s/p Feraheme.  I have reviewed all the patient's relevant clinical history available medical record have utilized as my evaluation management recommendations today.  She continues on Coumadin at this time.  The patient reports that she does have some generalized weakness and fatigue but overall feels better since the feraheme.  She denies any melena, hematochezia, hematemesis, hemoptysis or hematuria.  She denies any chest pain.  She reports shortness of breath with exertion.  She denies any shortness of breath at rest.  She reports chronic swelling in her legs.  This is unchanged.  She denies any nausea, vomiting or abdominal pain. She denies any bowel or urinary complaints.    Anemia   There has been no abdominal pain, bruising/bleeding easily, confusion, fever, light-headedness or palpitations.     Review of Systems   Constitutional: Positive for fatigue. Negative for activity change, appetite change, chills, diaphoresis, fever and unexpected weight change.   HENT: Negative for congestion, hearing loss, mouth sores, nosebleeds and trouble swallowing.    Eyes: Negative for discharge and visual disturbance.   Respiratory: Negative for cough, chest tightness and shortness of breath.    Cardiovascular: Negative for chest pain, palpitations and leg swelling.   Gastrointestinal: Positive for abdominal distention. Negative for abdominal pain, blood in stool, constipation, diarrhea, nausea and vomiting.   Endocrine: Negative for cold intolerance and heat intolerance.   Genitourinary: Negative for difficulty urinating, dyspareunia and hematuria.   Musculoskeletal: Positive for arthralgias, back pain, gait problem and myalgias.   Skin: Negative.    Neurological: Negative for dizziness, weakness, light-headedness and  headaches.   Hematological: Negative for adenopathy. Does not bruise/bleed easily.   Psychiatric/Behavioral: Negative for agitation, behavioral problems and confusion. The patient is nervous/anxious.        Medication List with Changes/Refills   New Medications    CIPROFLOXACIN HCL (CIPRO) 500 MG TABLET    Take 1 tablet (500 mg total) by mouth 2 (two) times daily. for 7 days   Current Medications    ACETAMINOPHEN (TYLENOL EXTRA STRENGTH) 325 MG TABLET    Take 2 tablets (650 mg total) by mouth every 8 (eight) hours.    ALLOPURINOL (ZYLOPRIM) 100 MG TABLET    TAKE 2 TABLETS (200 MG TOTAL) BY MOUTH ONCE DAILY.    AMIODARONE (PACERONE) 200 MG TAB    Take 1 tablet (200 mg total) by mouth once daily.    APIXABAN (ELIQUIS) 2.5 MG TAB    Take 1 tablet (2.5 mg total) by mouth 2 (two) times daily.    ASPIRIN (ECOTRIN) 81 MG EC TABLET    Take 1 tablet (81 mg total) by mouth once daily.    CARVEDILOL (COREG) 25 MG TABLET    TAKE 1 TABLET BY MOUTH TWICE A DAY WITH MEALS    DIGOXIN (LANOXIN) 125 MCG TABLET    Take 125 mcg by mouth every other day.    FUROSEMIDE (LASIX) 40 MG TABLET    Take 2 tablets (80 mg total) by mouth 2 (two) times daily.    GABAPENTIN (NEURONTIN) 100 MG CAPSULE    TAKE ONE CAPSULE BY MOUTH TWO TIMES DAILY    LEVOTHYROXINE (SYNTHROID) 75 MCG TABLET    Take 1 tablet (75 mcg total) by mouth once daily.    LOSARTAN (COZAAR) 25 MG TABLET    Take 25 mg by mouth once daily.    METOLAZONE (ZAROXOLYN) 2.5 MG TABLET    30 minutes after Lasix as directed     Objective:     Vitals:    08/07/19 1506   BP: (!) 140/65   Pulse: 71   Temp: 98.1 °F (36.7 °C)     Physical Exam   Constitutional: She is oriented to person, place, and time. She appears well-developed and well-nourished. No distress.   HENT:   Head: Normocephalic and atraumatic.   Right Ear: Hearing and external ear normal.   Left Ear: Hearing and external ear normal.   Nose: No rhinorrhea or sinus tenderness. Right sinus exhibits no maxillary sinus tenderness and  no frontal sinus tenderness. Left sinus exhibits no maxillary sinus tenderness and no frontal sinus tenderness.   Mouth/Throat: Uvula is midline, oropharynx is clear and moist and mucous membranes are normal. No oral lesions.   Eyes: Pupils are equal, round, and reactive to light. Conjunctivae are normal. Right eye exhibits no discharge. Left eye exhibits no discharge.   Neck: Normal range of motion. Carotid bruit is not present. No tracheal deviation present. No thyromegaly present.   Cardiovascular: Normal rate, regular rhythm, S1 normal, S2 normal, normal heart sounds and intact distal pulses.   No murmur heard.  Pulses:       Dorsalis pedis pulses are 2+ on the right side, and 2+ on the left side.   Pulmonary/Chest: Effort normal and breath sounds normal. No respiratory distress.   Abdominal: Soft. Bowel sounds are normal. She exhibits no distension and no mass. There is no tenderness.   Musculoskeletal: Normal range of motion. She exhibits edema. She exhibits no tenderness.   Lymphadenopathy:     She has no cervical adenopathy.        Right: No supraclavicular adenopathy present.        Left: No supraclavicular adenopathy present.   Neurological: She is alert and oriented to person, place, and time. She has normal strength. No sensory deficit. Coordination and gait normal.   Skin: Skin is warm and dry. Capillary refill takes less than 2 seconds. No rash noted. There is pallor.   Psychiatric: Her speech is normal and behavior is normal. Judgment and thought content normal. Her mood appears anxious. Cognition and memory are normal. She does not exhibit a depressed mood.   Nursing note and vitals reviewed.      Assessment:       Problem List Items Addressed This Visit        Renal/    Anemia associated with stage 4 chronic renal failure - Primary    Relevant Orders    CBC auto differential    Comprehensive metabolic panel    Ferritin    Iron and TIBC       Immunology/Multi System    Elevated serum immunoglobulin  free light chain level       Oncology    Iron deficiency anemia due to chronic blood loss    Macrocytic anemia       Orthopedic    Primary osteoarthritis involving multiple joints          Plan:       1) S/P Feraamir, has been treated with Procrit in the past.  2) Hgb: 11.1 today, will hold Procrit 20,000U today.   3) Return to clinic in 8 weeks with labs.     I will review assessment/plan with collaborating physician Dr. Adiel Gary.

## 2019-08-10 LAB — BACTERIA UR CULT: ABNORMAL

## 2019-08-12 ENCOUNTER — TELEPHONE (OUTPATIENT)
Dept: INTERNAL MEDICINE | Facility: CLINIC | Age: 84
End: 2019-08-12

## 2019-08-12 NOTE — TELEPHONE ENCOUNTER
----- Message from Janay Davis DO sent at 8/7/2019  3:43 PM CDT -----  Notify patient UA shows a UTI.   Send specimen for culture.   Prescription sent to pharmacy.

## 2019-08-16 ENCOUNTER — TELEPHONE (OUTPATIENT)
Dept: INTERNAL MEDICINE | Facility: CLINIC | Age: 84
End: 2019-08-16

## 2019-08-16 NOTE — TELEPHONE ENCOUNTER
----- Message from Janay Davis DO sent at 8/16/2019  2:58 PM CDT -----  Notify patient urine culture shows resistance to the Cipro she was recently prescribed. Review of her chart shows she has allergies to penicillin which would effectively treat the infection. Nitrofurantoin is contraindicated due to her kidney disease.   I would recommend she see an infectious disease specialist and/or her kidney specialist if she continues to have symptoms of a UTI. She should also see her PCP as scheduled next week to discuss.

## 2019-08-20 ENCOUNTER — LAB VISIT (OUTPATIENT)
Dept: LAB | Facility: HOSPITAL | Age: 84
End: 2019-08-20
Payer: MEDICARE

## 2019-08-20 ENCOUNTER — OFFICE VISIT (OUTPATIENT)
Dept: INTERNAL MEDICINE | Facility: CLINIC | Age: 84
End: 2019-08-20
Payer: MEDICARE

## 2019-08-20 VITALS
WEIGHT: 114.19 LBS | OXYGEN SATURATION: 95 % | TEMPERATURE: 98 F | BODY MASS INDEX: 20.89 KG/M2 | SYSTOLIC BLOOD PRESSURE: 114 MMHG | DIASTOLIC BLOOD PRESSURE: 64 MMHG | HEART RATE: 71 BPM

## 2019-08-20 DIAGNOSIS — K22.70 BARRETT'S ESOPHAGUS WITHOUT DYSPLASIA: ICD-10-CM

## 2019-08-20 DIAGNOSIS — R35.0 FREQUENCY OF URINATION: ICD-10-CM

## 2019-08-20 DIAGNOSIS — E03.9 ACQUIRED HYPOTHYROIDISM: ICD-10-CM

## 2019-08-20 DIAGNOSIS — G47.33 OSA (OBSTRUCTIVE SLEEP APNEA): Chronic | ICD-10-CM

## 2019-08-20 DIAGNOSIS — J44.9 COPD, GROUP A, BY GOLD 2017 CLASSIFICATION: ICD-10-CM

## 2019-08-20 DIAGNOSIS — N18.4 ANEMIA ASSOCIATED WITH STAGE 4 CHRONIC RENAL FAILURE: ICD-10-CM

## 2019-08-20 DIAGNOSIS — I50.42 CHRONIC COMBINED SYSTOLIC AND DIASTOLIC CONGESTIVE HEART FAILURE: ICD-10-CM

## 2019-08-20 DIAGNOSIS — R30.0 DYSURIA: Primary | ICD-10-CM

## 2019-08-20 DIAGNOSIS — M81.0 OSTEOPOROSIS, SENILE: ICD-10-CM

## 2019-08-20 DIAGNOSIS — I10 ESSENTIAL HYPERTENSION: ICD-10-CM

## 2019-08-20 DIAGNOSIS — B02.29 POST HERPETIC NEURALGIA: ICD-10-CM

## 2019-08-20 DIAGNOSIS — D63.1 ANEMIA ASSOCIATED WITH STAGE 4 CHRONIC RENAL FAILURE: ICD-10-CM

## 2019-08-20 LAB
BACTERIA #/AREA URNS HPF: ABNORMAL /HPF
BILIRUB UR QL STRIP: NEGATIVE
CLARITY UR: ABNORMAL
COLOR UR: YELLOW
GLUCOSE UR QL STRIP: NEGATIVE
HGB UR QL STRIP: NEGATIVE
KETONES UR QL STRIP: NEGATIVE
LEUKOCYTE ESTERASE UR QL STRIP: ABNORMAL
MICROSCOPIC COMMENT: ABNORMAL
NITRITE UR QL STRIP: NEGATIVE
NON-SQ EPI CELLS #/AREA URNS HPF: 2 /HPF
PH UR STRIP: 6.5 [PH] (ref 5–8)
PROT UR QL STRIP: NEGATIVE
SP GR UR STRIP: 1.01 (ref 1–1.03)
SQUAMOUS #/AREA URNS HPF: 2 /HPF
URN SPEC COLLECT METH UR: ABNORMAL
WBC #/AREA URNS HPF: >100 /HPF (ref 0–5)
WBC CLUMPS URNS QL MICRO: ABNORMAL

## 2019-08-20 PROCEDURE — 99999 PR PBB SHADOW E&M-EST. PATIENT-LVL III: CPT | Mod: PBBFAC,,, | Performed by: FAMILY MEDICINE

## 2019-08-20 PROCEDURE — 84443 ASSAY THYROID STIM HORMONE: CPT

## 2019-08-20 PROCEDURE — 87077 CULTURE AEROBIC IDENTIFY: CPT

## 2019-08-20 PROCEDURE — 84439 ASSAY OF FREE THYROXINE: CPT

## 2019-08-20 PROCEDURE — 81000 URINALYSIS NONAUTO W/SCOPE: CPT

## 2019-08-20 PROCEDURE — 99999 PR PBB SHADOW E&M-EST. PATIENT-LVL III: ICD-10-PCS | Mod: PBBFAC,,, | Performed by: FAMILY MEDICINE

## 2019-08-20 PROCEDURE — 87186 SC STD MICRODIL/AGAR DIL: CPT

## 2019-08-20 PROCEDURE — 99213 OFFICE O/P EST LOW 20 MIN: CPT | Mod: PBBFAC | Performed by: FAMILY MEDICINE

## 2019-08-20 PROCEDURE — 99214 PR OFFICE/OUTPT VISIT, EST, LEVL IV, 30-39 MIN: ICD-10-PCS | Mod: S$PBB,,, | Performed by: FAMILY MEDICINE

## 2019-08-20 PROCEDURE — 87088 URINE BACTERIA CULTURE: CPT

## 2019-08-20 PROCEDURE — 99214 OFFICE O/P EST MOD 30 MIN: CPT | Mod: S$PBB,,, | Performed by: FAMILY MEDICINE

## 2019-08-20 PROCEDURE — 87086 URINE CULTURE/COLONY COUNT: CPT

## 2019-08-20 PROCEDURE — 36415 COLL VENOUS BLD VENIPUNCTURE: CPT

## 2019-08-20 NOTE — PROGRESS NOTES
Subjective:       Patient ID: Jennifer Mathews is a 87 y.o. female.    Chief Complaint: Annual Exam    Patient presents to clinic today for annual physical exam.    Review of Systems   Constitutional: Negative for activity change and unexpected weight change.   HENT: Positive for rhinorrhea. Negative for hearing loss and trouble swallowing.    Eyes: Negative for discharge and visual disturbance.   Respiratory: Negative for chest tightness and wheezing.    Cardiovascular: Negative for chest pain and palpitations.   Gastrointestinal: Positive for diarrhea. Negative for blood in stool, constipation and vomiting.   Endocrine: Negative for polydipsia and polyuria.   Genitourinary: Positive for difficulty urinating. Negative for dysuria, hematuria and menstrual problem.   Musculoskeletal: Positive for arthralgias and joint swelling. Negative for neck pain.   Neurological: Positive for weakness. Negative for headaches.   Psychiatric/Behavioral: Negative for confusion and dysphoric mood.       Objective:      Physical Exam   Constitutional: She is oriented to person, place, and time. She appears well-developed and well-nourished. No distress.   HENT:   Head: Normocephalic and atraumatic.   Eyes: Pupils are equal, round, and reactive to light. Conjunctivae and EOM are normal. No scleral icterus.   Cardiovascular: Normal rate and regular rhythm. Exam reveals no gallop and no friction rub.   No murmur heard.  Pulmonary/Chest: Effort normal and breath sounds normal.   Neurological: She is alert and oriented to person, place, and time. No cranial nerve deficit. Gait normal.   Psychiatric: She has a normal mood and affect.   Vitals reviewed.      Assessment:       1. Dysuria    2. Frequency of urination    3. Acquired hypothyroidism    4. COPD, group A, by GOLD 2017 classification    5. Chronic combined systolic and diastolic congestive heart failure    6. Essential hypertension    7. Anemia associated with stage 4 chronic renal  failure    8. Santos's esophagus without dysplasia    9. Osteoporosis, senile    10. LEV (obstructive sleep apnea)    11. Post herpetic neuralgia        Plan:     Problem List Items Addressed This Visit     Acquired hypothyroidism    Current Assessment & Plan     Status pending labs, continue levothyroxine           Relevant Orders    T4, free (Completed)    TSH (Completed)    Anemia associated with stage 4 chronic renal failure    Current Assessment & Plan     Followed by Nephrology and Hem/Onc         Santos's esophagus    Overview     Followed by GI         Chronic combined systolic and diastolic congestive heart failure    Overview     ECHO 11/19/18 CONCLUSIONS     1 - Severe left atrial enlargement.     2 - Concentric hypertrophy.     3 - Wall motion abnormalities.     4 - Severely depressed left ventricular systolic function (EF 20-25%).     5 - Moderately depressed right ventricular systolic function .     6 - Pulmonary hypertension. The estimated PA systolic pressure is 69 mmHg.     7 - Mitral valve prosthesis.     8 - Moderate tricuspid regurgitation.     9 - Increased central venous pressure.          Current Assessment & Plan     Followed by Cardiology         COPD, group A, by GOLD 2017 classification    Overview     Followed by Pulmonology         Hypertension    Current Assessment & Plan     Controlled, continue current medications         LEV (obstructive sleep apnea) (Chronic)    Overview     Followed by Pulmonology         Osteoporosis, senile    Overview     Followed by Rheumatology         Post herpetic neuralgia    Current Assessment & Plan     Stable on gabapentin           Other Visit Diagnoses     Dysuria    -  Primary    Relevant Orders    Urinalysis (Completed)    Urine culture (Completed)    Frequency of urination        Relevant Orders    Urinalysis (Completed)    Urine culture (Completed)          Health Maintenance reviewed/updated. Advised to obtain flu vaccine this Fall. Discussed  shingrix vaccine.

## 2019-08-21 ENCOUNTER — TELEPHONE (OUTPATIENT)
Dept: INTERNAL MEDICINE | Facility: CLINIC | Age: 84
End: 2019-08-21

## 2019-08-21 LAB
T4 FREE SERPL-MCNC: 1.12 NG/DL (ref 0.71–1.51)
TSH SERPL DL<=0.005 MIU/L-ACNC: 5.88 UIU/ML (ref 0.4–4)

## 2019-08-21 NOTE — TELEPHONE ENCOUNTER
----- Message from Alexandru Sonam sent at 8/21/2019 10:48 AM CDT -----  Contact: pt   .Type:  Test Results    Who Called:  Pt   Name of Test (Lab/Mammo/Etc): lab   Date of Test: 8 /20   Ordering Provider:  andreia   Where the test was performed:  [facility]   Would the patient rather a call back or a response via [facility]? Callback   Best Call Back Number:  [phone]     Additional Information:   Pt checking results and would like to know if a script is being called in.       ..  [id number] - [place], [place] - [address] [address]  [address] [address] [address]  Phone: [phone] Fax: [phone]

## 2019-08-21 NOTE — TELEPHONE ENCOUNTER
Results were sent via portal.   Per    TSH improved, T4 normal range. Continue levothyroxine.  UA shows white cells, no blood; otherwise okay; I recommend we await culture results prior to treating; please seek medical attention for worsening symptoms.     Spoke with pt voiced understanding of results.

## 2019-08-22 PROBLEM — B02.29 POST HERPETIC NEURALGIA: Status: ACTIVE | Noted: 2019-08-22

## 2019-08-23 ENCOUNTER — TELEPHONE (OUTPATIENT)
Dept: INTERNAL MEDICINE | Facility: CLINIC | Age: 84
End: 2019-08-23

## 2019-08-23 NOTE — TELEPHONE ENCOUNTER
Spoke with pt notified final results from the culture are not back yet. Notified we call her once results are back.

## 2019-08-23 NOTE — TELEPHONE ENCOUNTER
----- Message from Eusebia Browning sent at 8/23/2019 10:39 AM CDT -----  Contact: pt   Type:  Test Results    Who Called: pt   Name of Test (Lab/Mammo/Etc): labs   Date of Test: 08/20/19  Ordering Provider: Eugenia   Where the test was performed: [facility]   Would the patient rather a call back or a response via [facility]? Call back   Best Call Back Number: [phone] (home)   Additional Information:

## 2019-08-25 LAB — BACTERIA UR CULT: ABNORMAL

## 2019-08-26 DIAGNOSIS — R82.79 POSITIVE URINE CULTURE: Primary | ICD-10-CM

## 2019-08-26 RX ORDER — NITROFURANTOIN 25; 75 MG/1; MG/1
100 CAPSULE ORAL 2 TIMES DAILY
Qty: 14 CAPSULE | Refills: 0 | Status: SHIPPED | OUTPATIENT
Start: 2019-08-26 | End: 2019-09-02

## 2019-08-30 ENCOUNTER — OFFICE VISIT (OUTPATIENT)
Dept: PULMONOLOGY | Facility: CLINIC | Age: 84
End: 2019-08-30
Payer: MEDICARE

## 2019-08-30 ENCOUNTER — CLINICAL SUPPORT (OUTPATIENT)
Dept: PULMONOLOGY | Facility: CLINIC | Age: 84
End: 2019-08-30
Payer: MEDICARE

## 2019-08-30 ENCOUNTER — HOSPITAL ENCOUNTER (OUTPATIENT)
Dept: RADIOLOGY | Facility: HOSPITAL | Age: 84
Discharge: HOME | End: 2019-08-30
Attending: INTERNAL MEDICINE
Payer: MEDICARE

## 2019-08-30 VITALS
HEIGHT: 62 IN | WEIGHT: 119.06 LBS | RESPIRATION RATE: 18 BRPM | OXYGEN SATURATION: 97 % | DIASTOLIC BLOOD PRESSURE: 78 MMHG | BODY MASS INDEX: 21.91 KG/M2 | HEART RATE: 72 BPM | SYSTOLIC BLOOD PRESSURE: 128 MMHG

## 2019-08-30 VITALS — HEIGHT: 62 IN | WEIGHT: 119.06 LBS | BODY MASS INDEX: 21.91 KG/M2

## 2019-08-30 DIAGNOSIS — Z79.899 AT RISK FOR AMIODARONE TOXICITY WITH LONG TERM USE: Chronic | ICD-10-CM

## 2019-08-30 DIAGNOSIS — Z79.899 AT RISK FOR AMIODARONE TOXICITY WITH LONG TERM USE: ICD-10-CM

## 2019-08-30 DIAGNOSIS — Z91.89 AT RISK FOR AMIODARONE TOXICITY WITH LONG TERM USE: Chronic | ICD-10-CM

## 2019-08-30 DIAGNOSIS — J98.4 AMIODARONE PULMONARY TOXICITY: Primary | ICD-10-CM

## 2019-08-30 DIAGNOSIS — T46.2X5A AMIODARONE PULMONARY TOXICITY: Primary | ICD-10-CM

## 2019-08-30 DIAGNOSIS — Z91.89 AT RISK FOR AMIODARONE TOXICITY WITH LONG TERM USE: ICD-10-CM

## 2019-08-30 DIAGNOSIS — G47.33 OSA (OBSTRUCTIVE SLEEP APNEA): Primary | Chronic | ICD-10-CM

## 2019-08-30 DIAGNOSIS — J98.4 CRLD (CHRONIC RESTRICTIVE LUNG DISEASE): Chronic | ICD-10-CM

## 2019-08-30 LAB
ALLENS TEST: ABNORMAL
BRPFT: ABNORMAL
DELSYS: ABNORMAL
DLCO ADJ PRE: 6.96 ML/(MIN*MMHG) (ref 11.46–22.93)
DLCO SINGLE BREATH LLN: 11.46
DLCO SINGLE BREATH PRE REF: 37.3 %
DLCO SINGLE BREATH REF: 17.19
DLCOC SBVA LLN: 2.29
DLCOC SBVA PRE REF: 74.9 %
DLCOC SBVA REF: 3.82
DLCOC SINGLE BREATH LLN: 11.46
DLCOC SINGLE BREATH PRE REF: 40.5 %
DLCOC SINGLE BREATH REF: 17.19
DLCOVA LLN: 2.29
DLCOVA PRE REF: 69 %
DLCOVA PRE: 2.63 ML/(MIN*MMHG*L) (ref 2.29–5.34)
DLCOVA REF: 3.82
DLVAADJ PRE: 2.86 ML/(MIN*MMHG*L) (ref 2.29–5.34)
ERV LLN: 0.37
ERV PRE REF: 70.3 %
ERV REF: 0.37
FEF 25 75 CHG: 23.8 %
FEF 25 75 LLN: 0.5
FEF 25 75 POST REF: 40.1 %
FEF 25 75 PRE REF: 32.4 %
FEF 25 75 REF: 1.3
FET100 CHG: -10.5 %
FEV1 CHG: 5.4 %
FEV1 FVC CHG: 7 %
FEV1 FVC LLN: 61
FEV1 FVC POST REF: 87.3 %
FEV1 FVC PRE REF: 81.6 %
FEV1 FVC REF: 77
FEV1 LLN: 1.1
FEV1 POST REF: 64.9 %
FEV1 PRE REF: 61.6 %
FEV1 REF: 1.62
FRCPLETH LLN: 1.76
FRCPLETH PREREF: 72.6 %
FRCPLETH REF: 2.58
FVC CHG: -1.4 %
FVC LLN: 1.46
FVC POST REF: 73.2 %
FVC PRE REF: 74.2 %
FVC REF: 2.15
HCO3 UR-SCNC: 26.1 MMOL/L (ref 24–28)
IVC PRE: 1.27 L (ref 1.46–2.83)
IVC SINGLE BREATH LLN: 1.46
IVC SINGLE BREATH PRE REF: 59.2 %
IVC SINGLE BREATH REF: 2.15
MVV LLN: 47
MVV PRE REF: 63.1 %
MVV REF: 55
PCO2 BLDA: 39.1 MMHG (ref 35–45)
PEF CHG: -6.7 %
PEF LLN: 2.11
PEF POST REF: 100.8 %
PEF PRE REF: 108 %
PEF REF: 3.68
PH SMN: 7.43 [PH] (ref 7.35–7.45)
PO2 BLDA: 67 MMHG (ref 80–100)
POC BE: 2 MMOL/L
POC SATURATED O2: 94 % (ref 95–100)
POST FEF 25 75: 0.52 L/S (ref 0.5–2.09)
POST FET 100: 9.79 SEC
POST FEV1 FVC: 66.88 % (ref 60.97–92.31)
POST FEV1: 1.05 L (ref 1.1–2.14)
POST FVC: 1.57 L (ref 1.46–2.83)
POST PEF: 3.71 L/S (ref 2.11–5.26)
PRE DLCO: 6.42 ML/(MIN*MMHG) (ref 11.46–22.93)
PRE ERV: 0.26 L (ref 0.37–0.37)
PRE FEF 25 75: 0.42 L/S (ref 0.5–2.09)
PRE FET 100: 10.94 SEC
PRE FEV1 FVC: 62.52 % (ref 60.97–92.31)
PRE FEV1: 1 L (ref 1.1–2.14)
PRE FRC PL: 1.88 L
PRE FVC: 1.59 L (ref 1.46–2.83)
PRE MVV: 35 L/MIN (ref 47.16–63.8)
PRE PEF: 3.98 L/S (ref 2.11–5.26)
PRE RV: 1.5 L (ref 1.64–2.79)
PRE TLC: 3.1 L (ref 3.52–5.49)
RAW LLN: 3.06
RAW PRE REF: 162.3 %
RAW PRE: 4.97 CMH2O*S/L (ref 3.06–3.06)
RAW REF: 3.06
RV LLN: 1.64
RV PRE REF: 67.8 %
RV REF: 2.22
RVTLC LLN: 39
RVTLC PRE REF: 100 %
RVTLC PRE: 48.53 % (ref 38.95–58.13)
RVTLC REF: 49
SAMPLE: ABNORMAL
SITE: ABNORMAL
TLC LLN: 3.52
TLC PRE REF: 68.7 %
TLC REF: 4.51
VA PRE: 2.44 L (ref 4.36–4.36)
VA SINGLE BREATH LLN: 4.36
VA SINGLE BREATH PRE REF: 56 %
VA SINGLE BREATH REF: 4.36
VC LLN: 1.46
VC PRE REF: 74.2 %
VC PRE: 1.59 L (ref 1.46–2.83)
VC REF: 2.15
VTGRAWPRE: 2.14 L

## 2019-08-30 PROCEDURE — 71250 CT THORAX DX C-: CPT | Mod: TC

## 2019-08-30 PROCEDURE — 36600 WITHDRAWAL OF ARTERIAL BLOOD: CPT | Mod: PBBFAC

## 2019-08-30 PROCEDURE — 36600 WITHDRAWAL OF ARTERIAL BLOOD: CPT | Mod: 59,S$PBB,, | Performed by: INTERNAL MEDICINE

## 2019-08-30 PROCEDURE — 94726 PULM FUNCT TST PLETHYSMOGRAP: ICD-10-PCS | Mod: 26,S$PBB,, | Performed by: INTERNAL MEDICINE

## 2019-08-30 PROCEDURE — 94060 EVALUATION OF WHEEZING: CPT | Mod: 26,59,S$PBB, | Performed by: INTERNAL MEDICINE

## 2019-08-30 PROCEDURE — 94618 PULMONARY STRESS TESTING: CPT | Mod: PBBFAC

## 2019-08-30 PROCEDURE — 94729 DIFFUSING CAPACITY: CPT | Mod: PBBFAC

## 2019-08-30 PROCEDURE — 36600 PR WITHDRAWAL OF ARTERIAL BLOOD: ICD-10-PCS | Mod: 59,S$PBB,, | Performed by: INTERNAL MEDICINE

## 2019-08-30 PROCEDURE — 94060 EVALUATION OF WHEEZING: CPT | Mod: PBBFAC

## 2019-08-30 PROCEDURE — 99999 PR PBB SHADOW E&M-EST. PATIENT-LVL III: CPT | Mod: PBBFAC,,, | Performed by: INTERNAL MEDICINE

## 2019-08-30 PROCEDURE — 82803 BLOOD GASES ANY COMBINATION: CPT | Mod: PBBFAC

## 2019-08-30 PROCEDURE — 94618 PULMONARY STRESS TESTING: ICD-10-PCS | Mod: 26,S$PBB,, | Performed by: INTERNAL MEDICINE

## 2019-08-30 PROCEDURE — 94726 PLETHYSMOGRAPHY LUNG VOLUMES: CPT | Mod: PBBFAC

## 2019-08-30 PROCEDURE — 99215 OFFICE O/P EST HI 40 MIN: CPT | Mod: 25,S$PBB,, | Performed by: INTERNAL MEDICINE

## 2019-08-30 PROCEDURE — 99213 OFFICE O/P EST LOW 20 MIN: CPT | Mod: PBBFAC,25 | Performed by: INTERNAL MEDICINE

## 2019-08-30 PROCEDURE — 94618 PULMONARY STRESS TESTING: CPT | Mod: 26,S$PBB,, | Performed by: INTERNAL MEDICINE

## 2019-08-30 PROCEDURE — 94729 DIFFUSING CAPACITY: CPT | Mod: 26,S$PBB,, | Performed by: INTERNAL MEDICINE

## 2019-08-30 PROCEDURE — 94060 PR EVAL OF BRONCHOSPASM: ICD-10-PCS | Mod: 26,59,S$PBB, | Performed by: INTERNAL MEDICINE

## 2019-08-30 PROCEDURE — 99215 PR OFFICE/OUTPT VISIT, EST, LEVL V, 40-54 MIN: ICD-10-PCS | Mod: 25,S$PBB,, | Performed by: INTERNAL MEDICINE

## 2019-08-30 PROCEDURE — 94726 PLETHYSMOGRAPHY LUNG VOLUMES: CPT | Mod: 26,S$PBB,, | Performed by: INTERNAL MEDICINE

## 2019-08-30 PROCEDURE — 94729 PR C02/MEMBANE DIFFUSE CAPACITY: ICD-10-PCS | Mod: 26,S$PBB,, | Performed by: INTERNAL MEDICINE

## 2019-08-30 PROCEDURE — 99999 PR PBB SHADOW E&M-EST. PATIENT-LVL III: ICD-10-PCS | Mod: PBBFAC,,, | Performed by: INTERNAL MEDICINE

## 2019-08-30 NOTE — ASSESSMENT & PLAN NOTE
Currently there are no clinical no correlates of AMIODARONE LUNG TOXICITY.   Annual PFT monitoring.

## 2019-08-30 NOTE — ASSESSMENT & PLAN NOTE
CRLD ROS: taking medications as instructed, no medication side effects noted, no significant ongoing wheezing or shortness of breath, using bronchodilator MDI less than twice a week.   New concerns: Stable NYHA II GAR.   Exam: appears well, vitals normal, no respiratory distress, acyanotic, normal RR.   Assessment:  CRLD stable.   Plan: Currently not on any inhaler therapy. Discussed diagnosis, its evaluation, treatment and usual course.

## 2019-08-30 NOTE — PROCEDURES
PHYSICIAN INTERPRETATION:        ABG 08/30/19  1028   PH 7.432   PCO2 39.1   PO2 67*   HCO3 26.1   POCSATURATED 94*   BE 2      Moderate obstruction. A-a gradient (alveolar-arterial gradient; AaG) elevated: [ 35.9 mmHg ]  Normal Acid Base Balance  expected pH = 7.45  expected CO2 = 40  expected HCO3- = 25        unsure

## 2019-08-30 NOTE — PROGRESS NOTES
Subjective:      Established patient    Patient ID: Jennifer Mathews is a 87 y.o. female.  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 heart disease    Primary osteoarthritis involving multiple joints    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    Medication monitoring encounter    Disorder of bone and articular cartilage    Enterocolitis    Left leg swelling    Acquired hypothyroidism    Anemia associated with stage 4 chronic renal failure    Iron deficiency anemia due to chronic blood loss    At risk for sudden cardiac death    Osteoporosis, senile    Elevated serum immunoglobulin free light chain level    Macrocytic anemia    UTI (urinary tract infection)    Mild Protein Calorie Malnutrition    Santos's esophagus    At risk for amiodarone toxicity with long term use    CRLD (chronic restrictive lung disease)    Post herpetic neuralgia       Problem list has been reviewed.    Chief Complaint: Restrictive lung disease and Sleep Apnea      HPI     FEV1: 1.00 L ( 61.6% predicted)     CT chest, PFT, 6 MWT and ABG reviewed with patient who expressed and voiced understanding.   All questions were answered to the patients satisfaction.      Patients reports NYHA II dyspnea    The patient does not have currently have symptoms / an exacerbation.           No recent change in breathing.       She is on oral AMIODARONE. Currently there are no clinical no correlates of AMIODARONE  LUNG TOXICITY.   LUNG FUNCTION WILL BE MONITORED.          LEV diagnosed by PSG in 2009. He current CPAP machine is 5 years old. Her currently PAP machine due to be updated. She is on AUTOPAP 4 - 20 CMWP. She is complaint with her CPAP. She definitely thinks PAP is beneficial to her health and she wants to continue with PAP therapy.    Compliance Summary  6/1/2019 - 8/29/2019 (90 days)  Days with Device Usage 81 days  Days without Device Usage 9 days  Percent Days with Device Usage 90.0%  Cumulative Usage 18 days 9 hrs. 26 mins. 2 secs.  Maximum Usage (1 Day) 11 hrs. 19 mins. 5 secs.  Average Usage (All Days) 4 hrs. 54 mins. 17 secs.  Average Usage (Days Used) 5 hrs. 26 mins. 59 secs.  Minimum Usage (1 Day) 2 hrs. 27 mins. 55 secs.  Percent of Days with Usage >= 4 Hours 71.1%  Percent of Days with Usage < 4 Hours 28.9%  Total Blower Time 18 days 14 hrs. 17 mins. 12 secs.  Average AHI 3.9  Auto-CPAP Summary ([facility])  Auto-CPAP Mean Pressure 5.4 cmH2O  Auto-CPAP Peak Average Pressure 10.6 cmH2O  Average Device Pressure <= 90% of Time 7.1 cmH2O  Average Time in Large Leak Per Day 26 secs.     A full  review of systems, past , family  and social histories was performed except as mentioned in the note above, these are non contributory to the main issues discussed today.       Previous Report Reviewed: lab reports, office notes and radiology reports     The following portions of the patient's history were reviewed and updated as appropriate: She  has a past medical history of Acute coronary syndrome, Anemia, Anticoagulated on Coumadin (7/13/2015), Arthritis, Asthma, Atrial fibrillation, Basal cell carcinoma (10/2015), Cardiac arrest, Cardiac resynchronization therapy defibrillator (CRT-D) in place (07/13/2015), Cardiomyopathy, Chronic combined systolic and diastolic congestive heart failure, CKD (chronic kidney disease) stage 3, GFR 30-59 ml/min, Dyslipidemia (1/30/2014), Hyperlipidemia, Hypertension,  Hypothyroidism, Ischemic cardiomyopathy (01/30/2014), Macular hole of left eye, Macular hole of left eye, LEV (obstructive sleep apnea) (9/30/2013), Pneumonia, Recurrent UTI, Refractive error, Spastic colon, Stroke, and Syncope and collapse.  She  has a past surgical history that includes Colonoscopy; Mitral valve surgery; Cataract extraction; Cholecystectomy; Appendectomy; Cardiac pacemaker placement (2014); Mitral valve replacement (2014); Cardiac catheterization; Cardiac valuve replacement; Replacement of pacemaker generator (Left, 6/20/2018); and Esophagogastroduodenoscopy (N/A, 3/13/2019).  Her family history includes COPD in her father; Cancer in her brother; Coronary artery disease in her father and mother; Diabetes in her brother; Emphysema in her father; Epilepsy in her mother; Heart attack in her mother; Heart disease in her father.  She  reports that she has never smoked. She has never used smokeless tobacco. She reports that she does not drink alcohol or use drugs.  She has a current medication list which includes the following prescription(s): acetaminophen, allopurinol, amiodarone, apixaban, aspirin, carvedilol, digoxin, furosemide, gabapentin, levothyroxine, losartan, metolazone, and nitrofurantoin (macrocrystal-monohydrate), and the following Facility-Administered Medications: denosumab.  She is allergic to cephalosporins; metaxalone; penicillins; pregabalin; and sulfa (sulfonamide antibiotics)..    Review of Systems   Constitutional: Negative for fever, chills, fatigue and night sweats.   HENT: Negative for nosebleeds, postnasal drip, rhinorrhea, sinus pressure, sore throat, congestion, ear pain and hearing loss.    Eyes: Negative for itching.   Respiratory: Positive for dyspnea on extertion and somnolence. Negative for wheezing, orthopnea and Paroxysmal Nocturnal Dyspnea.    Cardiovascular: Positive for leg swelling. Negative for chest pain and palpitations.   Genitourinary: Negative for difficulty  "urinating and hematuria.   Endocrine: Negative for cold intolerance and heat intolerance.    Musculoskeletal: Positive for arthralgias and back pain.   Skin: Negative for rash.   Gastrointestinal: Negative for nausea, vomiting, abdominal pain, abdominal distention and acid reflux.   Neurological: Positive for light-headedness. Negative for dizziness, syncope and headaches.   Hematological: Bleeds easily and excessive bruising.   Psychiatric/Behavioral: The patient is nervous/anxious.    All other systems reviewed and are negative.       Objective:     /78   Pulse 72   Resp 18   Ht 5' 1.5" (1.562 m)   Wt 54 kg (119 lb 0.8 oz)   LMP  (LMP Unknown)   SpO2 97%   BMI 22.13 kg/m²   Body mass index is 22.13 kg/m².     Physical Exam   Constitutional: She appears well-developed and well-nourished.   HENT:   Head: Normocephalic and atraumatic.   Eyes: Pupils are equal, round, and reactive to light. EOM are normal.   Neck: Normal range of motion. Neck supple.   Cardiovascular: Normal rate and regular rhythm.   Pulmonary/Chest: Effort normal and breath sounds normal.   Abdominal: Soft. Bowel sounds are normal.   Musculoskeletal: Normal range of motion. She exhibits edema.   Skin: Skin is warm and dry.   Psychiatric: She has a normal mood and affect. Her behavior is normal.   Nursing note and vitals reviewed.      Personal Diagnostic Review      ABG     08/30/19  1028   PH 7.432   PCO2 39.1   PO2 67*   HCO3 26.1   POCSATURATED 94*   BE 2     Moderate obstruction. A-a gradient (alveolar-arterial gradient; AaG) elevated: [ 35.9 mmHg ]  Normal Acid Base Balance  expected pH = 7.45  expected CO2 = 40  expected HCO3- = 25      Pulmonary function tests: FEV1: 1.00  (61.6 % predicted), FVC:  1.59 (74.2 % predicted), FEV1/FVC:  62.52%, TLC: 3.10 (68.7 % predicted), RV/TLVC: 48.53 (100.0 % predicted), DLCO: 2.15 (37 % predicted)  Normla airflow. Moderate restriction. Diffusion capacity is severely reduced .      Six Minute " Walk Test: Six minute walk distance is  144.78 / 370.06 meters (39.12 % predicted) with light dyspnea.   During exercise, there was no significant desaturation while breathing room air.  Blood pressure remained stable and Heart rate increased significantly with walking.  Normotension was present prior to exercise.  This may represent a normal cardiovascular response to exercise.  The patient did not report non-pulmonary symptoms during exercise.  Significant exercise impairment is likely due to cardiovascular causes and subjective symptoms.  The patient did complete the study, walking 360 seconds of the 360 second test.  No previous study performed.  Based upon age and body mass index, exercise capacity is less than predicted.      Peak VO2 during walking was 8.32 ml/min/kg [2.38 METS]    CT of chest performed on 08/30/19 without contrast:    Left-sided AICD in place.  Prior median sternotomy.  The heart is enlarged, especially the left atrium.  Calcified granuloma within the right lower lobe adjacent to the pleural surface.  Trace bilateral pleural effusions.  No dominant pulmonary nodules.  Parenchymal bands within the left lower lobe likely relates to atelectasis.  No pathologically enlarged mediastinal, hilar, or axillary lymph nodes. Calcific atheromatous change of the aorta without aneurysm.  Compression fractures of T6,  T8, T10, and T11 are similar to the radiograph from November 2018. limited images through the upper abdomen demonstrate no acute abnormality.           Assessment / Plan:       Discussed diagnosis, its evaluation, treatment and usual course. All questions answered.    Problem List Items Addressed This Visit        Pulmonary    CRLD (chronic restrictive lung disease) (Chronic)    Overview     Followed by Pulmonology         Current Assessment & Plan     CRLD ROS: taking medications as instructed, no medication side effects noted, no significant ongoing wheezing or shortness of breath, using  bronchodilator MDI less than twice a week.   New concerns: Stable NYHA II GAR.   Exam: appears well, vitals normal, no respiratory distress, acyanotic, normal RR.   Assessment:  CRLD stable.   Plan: Currently not on any inhaler therapy. Discussed diagnosis, its evaluation, treatment and usual course.               Other    LEV (obstructive sleep apnea) - Primary (Chronic)    Overview     Followed by Pulmonology         Current Assessment & Plan         Continue APAP of 4 - 20 CMWP. (DME - [facility])     New APAP machine ordered.     Discussed therapeutic goals for positive airway pressure therapy(CPAP or BiPAP): Ideal is usage 100% of nights for 6 - 8 hours per night. Minimum usage is 70% of night for at least 4 hours per night used. Pateint expressed understanding. All Questions answered.    Complaince evaluation in 6 weeks.           Relevant Orders    CPAP FOR HOME USE    At risk for amiodarone toxicity with long term use    Current Assessment & Plan     Currently there are no clinical no correlates of AMIODARONE LUNG TOXICITY.   Annual PFT monitoring.                    TIME SPENT WITH PATIENT: Time spent: 40 minutes in face to face  discussion concerning diagnosis, prognosis, review of lab and test results, benefits of treatment as well as management of disease, counseling of patient and coordination of care between various health  care providers . Greater than half the time spent was used for coordination of care and counseling of patient.       Follow up for LEV, Restrictive Lung Disease.

## 2019-08-30 NOTE — ASSESSMENT & PLAN NOTE
Continue APAP of 4 - 20 CMWP. (DME - [facility])     New APAP machine ordered.     Discussed therapeutic goals for positive airway pressure therapy(CPAP or BiPAP): Ideal is usage 100% of nights for 6 - 8 hours per night. Minimum usage is 70% of night for at least 4 hours per night used. Pateint expressed understanding. All Questions answered.    Complaince evaluation in 6 weeks.

## 2019-08-30 NOTE — PROCEDURES
[facility]  Six Minute Walk     SUMMARY     Ordering Provider: Dr. Ngo   Interpreting Provider: Dr. Ngo  Performing nurse/tech/RT: ARY Chaney CRT  Diagnosis: (Amiodarone Toxicity)  Height: 5' 1.5" (156.2 cm)  Weight: 54 kg (119 lb 0.8 oz)  BMI (Calculated): 22.2   Patient Race:             Phase Oxygen Assessment Supplemental O2 Heart   Rate Blood Pressure Elmo Dyspnea Scale Rating   Resting 97 % Room Air 70 bpm 138/63 2   Exercise        Minute        1 95 % Room Air 79 bpm     2 96 % Room Air 80 bpm     3 96 % Room Air 82 bpm     4 96 % Room Air 80 bpm     5 95 % Room Air 58 bpm     6  96 % Room Air 83 bpm 139/60 3   Recovery        Minute        1 96 % Room Air 83 bpm     2 96 % Room Air 70 bpm     3 95 % Room Air 70 bpm     4 97 % Room Air 72 bpm 149/70 1     Six Minute Walk Summary  6MWT Status: completed without stopping  Patient Reported: No complaints     Interpretation:  Did the patient stop or pause?: No                                         Total Time Walked (Calculated): 360 seconds  Final Partial Lap Distance (feet): 75 feet  Total Distance Meters (Calculated): 144.78 meters  Predicted Distance Meters (Calculated): 370.06 meters  Percentage of Predicted (Calculated): 39.12  Peak VO2 (Calculated): 8.32  Mets: 2.38  Has The Patient Had a Previous Six Minute Walk Test?: No       Previous 6MWT Results  Has The Patient Had a Previous Six Minute Walk Test?: No        PHYSICIAN INTERPRETATION:  Six minute walk distance is  144.78 / 370.06 meters (39.12 % predicted) with light dyspnea.   During exercise, there was no significant desaturation while breathing room air.  Blood pressure remained stable and Heart rate increased significantly with walking.  Normotension was present prior to exercise.  This may represent a normal cardiovascular response to exercise.  The patient did not report non-pulmonary symptoms during exercise.  Significant exercise impairment is likely due to " cardiovascular causes and subjective symptoms.  The patient did complete the study, walking 360 seconds of the 360 second test.  No previous study performed.  Based upon age and body mass index, exercise capacity is less than predicted.      Peak VO2 during walking was 8.32 ml/min/kg [2.38 METS]

## 2019-08-30 NOTE — PATIENT INSTRUCTIONS
Lung Anatomy  Your lungs take air in to give your body oxygen, which the body needs to work. Your lungs, like all the tissues in your body, are made up of billions of tiny specialized cells. Old lung cells die and are replaced by new, identical lung cells. This natural process helps ensure healthy lungs.    Date Last Reviewed: 11/1/2016  © 6211-9118 [employer]. [address] [place] [address], [place] [address]. All rights reserved. This information is not intended as a substitute for professional medical care. Always follow your healthcare professional's instructions.

## 2019-08-31 ENCOUNTER — EXTERNAL CHRONIC CARE MANAGEMENT (OUTPATIENT)
Dept: PRIMARY CARE CLINIC | Facility: CLINIC | Age: 84
End: 2019-08-31
Payer: MEDICARE

## 2019-08-31 PROCEDURE — 99490 PR CHRONIC CARE MGMT, 1ST 20 MIN: ICD-10-PCS | Mod: S$PBB,,, | Performed by: FAMILY MEDICINE

## 2019-08-31 PROCEDURE — 99490 CHRNC CARE MGMT STAFF 1ST 20: CPT | Mod: S$PBB,,, | Performed by: FAMILY MEDICINE

## 2019-08-31 PROCEDURE — 99490 CHRNC CARE MGMT STAFF 1ST 20: CPT | Mod: PBBFAC | Performed by: FAMILY MEDICINE

## 2019-09-10 DIAGNOSIS — N18.30 CKD (CHRONIC KIDNEY DISEASE) STAGE 3, GFR 30-59 ML/MIN: Primary | ICD-10-CM

## 2019-09-10 NOTE — TELEPHONE ENCOUNTER
Spoke with pharmacist. There is a possibledrug interaction with azithromycin and digoxin. Please advise.    none

## 2019-09-11 ENCOUNTER — TELEPHONE (OUTPATIENT)
Dept: CARDIOLOGY | Facility: CLINIC | Age: 84
End: 2019-09-11

## 2019-09-11 NOTE — TELEPHONE ENCOUNTER
Have her take the metolazone 2 additional days consecutively. Continue Lasix 80mg BID.   She can use the metolazone PRN

## 2019-09-11 NOTE — TELEPHONE ENCOUNTER
Pt called. She says her wt has been slowly climbing.  Was 115lb, then, 116lb. 119lb on 9/9, 120 9/10-took metolazone, yesterday 119lb-took metolazone.   Takes lasix 80mg bid. No worsening sob. She is having increase in LE swelling.

## 2019-09-18 ENCOUNTER — OFFICE VISIT (OUTPATIENT)
Dept: OPHTHALMOLOGY | Facility: CLINIC | Age: 84
End: 2019-09-18
Payer: MEDICARE

## 2019-09-18 DIAGNOSIS — H16.9 KERATITIS, BILATERAL: ICD-10-CM

## 2019-09-18 DIAGNOSIS — H35.342 MACULAR HOLE, LEFT EYE: ICD-10-CM

## 2019-09-18 DIAGNOSIS — Z96.1 PSEUDOPHAKIA OF BOTH EYES: ICD-10-CM

## 2019-09-18 DIAGNOSIS — H26.493 PCO (POSTERIOR CAPSULAR OPACIFICATION), BILATERAL: Primary | ICD-10-CM

## 2019-09-18 DIAGNOSIS — H52.4 BILATERAL PRESBYOPIA: ICD-10-CM

## 2019-09-18 PROCEDURE — 92004 PR EYE EXAM, NEW PATIENT,COMPREHESV: ICD-10-PCS | Mod: S$PBB,,, | Performed by: OPTOMETRIST

## 2019-09-18 PROCEDURE — 92004 COMPRE OPH EXAM NEW PT 1/>: CPT | Mod: S$PBB,,, | Performed by: OPTOMETRIST

## 2019-09-18 PROCEDURE — 99211 OFF/OP EST MAY X REQ PHY/QHP: CPT | Mod: PBBFAC | Performed by: OPTOMETRIST

## 2019-09-18 PROCEDURE — 92015 DETERMINE REFRACTIVE STATE: CPT | Mod: ,,, | Performed by: OPTOMETRIST

## 2019-09-18 PROCEDURE — 92015 PR REFRACTION: ICD-10-PCS | Mod: ,,, | Performed by: OPTOMETRIST

## 2019-09-18 PROCEDURE — 99999 PR PBB SHADOW E&M-EST. PATIENT-LVL I: ICD-10-PCS | Mod: PBBFAC,,, | Performed by: OPTOMETRIST

## 2019-09-18 PROCEDURE — 99999 PR PBB SHADOW E&M-EST. PATIENT-LVL I: CPT | Mod: PBBFAC,,, | Performed by: OPTOMETRIST

## 2019-09-18 NOTE — PROGRESS NOTES
HPI     Patient states glasses are not fitting well anymore.  Raise glasses to see through glasses.  New patient last eye exam 08/28/2015 [facility]  Update glasses RX.  HX of macular hole left eye.    Last edited by Salina Lim on 9/18/2019 10:45 AM. (History)            Assessment /Plan     For exam results, see Encounter Report.    PCO (posterior capsular opacification), bilateral    Keratitis, bilateral    Macular hole, left eye    Pseudophakia of both eyes    Bilateral presbyopia      Cataract surgery with Dr Mcgrath prior to 2009.  PCO OU, causing vision reduction OD>OS    Keratitis OU causing vision reduction OU  Will start Systane Ultra qid OU    Consult [facility] for yag eval before purchasing new glasses.  RTC prn

## 2019-09-25 ENCOUNTER — TELEPHONE (OUTPATIENT)
Dept: OPHTHALMOLOGY | Facility: CLINIC | Age: 84
End: 2019-09-25

## 2019-09-25 NOTE — TELEPHONE ENCOUNTER
Spoke with pt.  Advised that her eyes will be dilated, but there are no restrictions following YAG laser.  It is not necessary for someone to stay with her the night of the procedure.  She will be expected to use drops for about a week afterward. Discussed that procedure is normally done one eye at a time.

## 2019-09-26 ENCOUNTER — IMMUNIZATION (OUTPATIENT)
Dept: INTERNAL MEDICINE | Facility: CLINIC | Age: 84
End: 2019-09-26
Payer: MEDICARE

## 2019-09-26 ENCOUNTER — LAB VISIT (OUTPATIENT)
Dept: LAB | Facility: HOSPITAL | Age: 84
End: 2019-09-26
Attending: INTERNAL MEDICINE
Payer: MEDICARE

## 2019-09-26 DIAGNOSIS — N18.30 CHRONIC KIDNEY DISEASE, STAGE III (MODERATE): ICD-10-CM

## 2019-09-26 LAB
ALBUMIN SERPL BCP-MCNC: 3.9 G/DL (ref 3.5–5.2)
ANION GAP SERPL CALC-SCNC: 11 MMOL/L (ref 8–16)
BUN SERPL-MCNC: 71 MG/DL (ref 8–23)
CALCIUM SERPL-MCNC: 9.9 MG/DL (ref 8.7–10.5)
CHLORIDE SERPL-SCNC: 98 MMOL/L (ref 95–110)
CO2 SERPL-SCNC: 34 MMOL/L (ref 23–29)
CREAT SERPL-MCNC: 2 MG/DL (ref 0.5–1.4)
EST. GFR  (AFRICAN AMERICAN): 25.3 ML/MIN/1.73 M^2
EST. GFR  (NON AFRICAN AMERICAN): 22 ML/MIN/1.73 M^2
GLUCOSE SERPL-MCNC: 111 MG/DL (ref 70–110)
PHOSPHATE SERPL-MCNC: 4 MG/DL (ref 2.7–4.5)
POTASSIUM SERPL-SCNC: 4.6 MMOL/L (ref 3.5–5.1)
SODIUM SERPL-SCNC: 143 MMOL/L (ref 136–145)

## 2019-09-26 PROCEDURE — 36415 COLL VENOUS BLD VENIPUNCTURE: CPT

## 2019-09-26 PROCEDURE — 99999 PR PBB SHADOW E&M-EST. PATIENT-LVL II: CPT | Mod: PBBFAC,,,

## 2019-09-26 PROCEDURE — 80069 RENAL FUNCTION PANEL: CPT

## 2019-09-26 PROCEDURE — 99212 OFFICE O/P EST SF 10 MIN: CPT | Mod: PBBFAC,25

## 2019-09-26 PROCEDURE — 90662 IIV NO PRSV INCREASED AG IM: CPT | Mod: PBBFAC

## 2019-09-26 PROCEDURE — 99999 PR PBB SHADOW E&M-EST. PATIENT-LVL II: ICD-10-PCS | Mod: PBBFAC,,,

## 2019-09-30 ENCOUNTER — OFFICE VISIT (OUTPATIENT)
Dept: HEMATOLOGY/ONCOLOGY | Facility: CLINIC | Age: 84
End: 2019-09-30
Payer: MEDICARE

## 2019-09-30 ENCOUNTER — EXTERNAL CHRONIC CARE MANAGEMENT (OUTPATIENT)
Dept: PRIMARY CARE CLINIC | Facility: CLINIC | Age: 84
End: 2019-09-30
Payer: MEDICARE

## 2019-09-30 VITALS
HEART RATE: 75 BPM | DIASTOLIC BLOOD PRESSURE: 50 MMHG | WEIGHT: 118.38 LBS | SYSTOLIC BLOOD PRESSURE: 107 MMHG | TEMPERATURE: 97 F | HEIGHT: 62 IN | OXYGEN SATURATION: 93 % | BODY MASS INDEX: 21.79 KG/M2

## 2019-09-30 DIAGNOSIS — M15.9 PRIMARY OSTEOARTHRITIS INVOLVING MULTIPLE JOINTS: ICD-10-CM

## 2019-09-30 DIAGNOSIS — K22.70 BARRETT'S ESOPHAGUS WITHOUT DYSPLASIA: ICD-10-CM

## 2019-09-30 DIAGNOSIS — R60.0 LOCALIZED EDEMA: ICD-10-CM

## 2019-09-30 DIAGNOSIS — M89.9 DISORDER OF BONE AND ARTICULAR CARTILAGE: ICD-10-CM

## 2019-09-30 DIAGNOSIS — M1A.39X1 CHRONIC GOUT DUE TO RENAL IMPAIRMENT OF MULTIPLE SITES WITH TOPHUS: ICD-10-CM

## 2019-09-30 DIAGNOSIS — K52.9 ENTEROCOLITIS: ICD-10-CM

## 2019-09-30 DIAGNOSIS — D50.0 IRON DEFICIENCY ANEMIA DUE TO CHRONIC BLOOD LOSS: Primary | ICD-10-CM

## 2019-09-30 DIAGNOSIS — M79.89 LEFT LEG SWELLING: ICD-10-CM

## 2019-09-30 DIAGNOSIS — D53.9 MACROCYTIC ANEMIA: ICD-10-CM

## 2019-09-30 DIAGNOSIS — M94.9 DISORDER OF BONE AND ARTICULAR CARTILAGE: ICD-10-CM

## 2019-09-30 PROCEDURE — 99490 CHRNC CARE MGMT STAFF 1ST 20: CPT | Mod: S$PBB,,, | Performed by: FAMILY MEDICINE

## 2019-09-30 PROCEDURE — 99999 PR PBB SHADOW E&M-EST. PATIENT-LVL IV: ICD-10-PCS | Mod: PBBFAC,,, | Performed by: NURSE PRACTITIONER

## 2019-09-30 PROCEDURE — 99215 PR OFFICE/OUTPT VISIT, EST, LEVL V, 40-54 MIN: ICD-10-PCS | Mod: S$PBB,,, | Performed by: NURSE PRACTITIONER

## 2019-09-30 PROCEDURE — 99999 PR PBB SHADOW E&M-EST. PATIENT-LVL IV: CPT | Mod: PBBFAC,,, | Performed by: NURSE PRACTITIONER

## 2019-09-30 PROCEDURE — 99490 PR CHRONIC CARE MGMT, 1ST 20 MIN: ICD-10-PCS | Mod: S$PBB,,, | Performed by: FAMILY MEDICINE

## 2019-09-30 PROCEDURE — 99215 OFFICE O/P EST HI 40 MIN: CPT | Mod: S$PBB,,, | Performed by: NURSE PRACTITIONER

## 2019-09-30 PROCEDURE — 99214 OFFICE O/P EST MOD 30 MIN: CPT | Mod: PBBFAC | Performed by: NURSE PRACTITIONER

## 2019-09-30 PROCEDURE — 99490 CHRNC CARE MGMT STAFF 1ST 20: CPT | Mod: PBBFAC | Performed by: FAMILY MEDICINE

## 2019-09-30 RX ORDER — EPINEPHRINE 0.3 MG/.3ML
0.3 INJECTION SUBCUTANEOUS ONCE AS NEEDED
Status: CANCELLED | OUTPATIENT
Start: 2019-10-07

## 2019-09-30 RX ORDER — DIPHENHYDRAMINE HYDROCHLORIDE 50 MG/ML
50 INJECTION INTRAMUSCULAR; INTRAVENOUS ONCE AS NEEDED
Status: CANCELLED | OUTPATIENT
Start: 2019-10-07

## 2019-09-30 RX ORDER — SODIUM CHLORIDE 9 MG/ML
INJECTION, SOLUTION INTRAVENOUS CONTINUOUS
Status: CANCELLED | OUTPATIENT
Start: 2019-10-07

## 2019-09-30 RX ORDER — SODIUM CHLORIDE 0.9 % (FLUSH) 0.9 %
10 SYRINGE (ML) INJECTION
Status: CANCELLED | OUTPATIENT
Start: 2019-10-07

## 2019-09-30 RX ORDER — METHYLPREDNISOLONE SOD SUCC 125 MG
40 VIAL (EA) INJECTION ONCE
Status: CANCELLED | OUTPATIENT
Start: 2019-10-07

## 2019-09-30 RX ORDER — HEPARIN 100 UNIT/ML
5 SYRINGE INTRAVENOUS
Status: CANCELLED | OUTPATIENT
Start: 2019-10-07

## 2019-09-30 NOTE — PROGRESS NOTES
Subjective:       Patient ID: Jennifer Mathews is a 87 y.o. female.    Chief Complaint: Anemia    85-year-old  female who presents to the Hematology Oncology Clinic today as a follow-up for anemia, s/p Feraheme.  I have reviewed all the patient's relevant clinical history available medical record have utilized as my evaluation management recommendations today.  She continues on Coumadin at this time.  The patient reports that she does have some generalized weakness and fatigue but overall feels better since the feraheme.  She denies any melena, hematochezia, hematemesis, hemoptysis or hematuria.  She denies any chest pain.  She reports shortness of breath with exertion.  She denies any shortness of breath at rest.  She reports chronic swelling in her legs.  This is unchanged.  She denies any nausea, vomiting or abdominal pain. She denies any bowel or urinary complaints.      Anemia   There has been no abdominal pain, bruising/bleeding easily, confusion, fever, light-headedness or palpitations.     Review of Systems   Constitutional: Positive for fatigue. Negative for activity change, appetite change, chills, diaphoresis, fever and unexpected weight change.   HENT: Negative for congestion, hearing loss, mouth sores, nosebleeds and trouble swallowing.    Eyes: Negative for discharge and visual disturbance.   Respiratory: Negative for cough, chest tightness and shortness of breath.    Cardiovascular: Negative for chest pain, palpitations and leg swelling.   Gastrointestinal: Positive for abdominal distention. Negative for abdominal pain, blood in stool, constipation, diarrhea, nausea and vomiting.   Endocrine: Negative for cold intolerance and heat intolerance.   Genitourinary: Negative for difficulty urinating, dyspareunia and hematuria.   Musculoskeletal: Positive for arthralgias, back pain, gait problem and myalgias.   Skin: Negative.    Neurological: Negative for dizziness, weakness, light-headedness and  headaches.   Hematological: Negative for adenopathy. Does not bruise/bleed easily.   Psychiatric/Behavioral: Negative for agitation, behavioral problems and confusion. The patient is nervous/anxious.        Medication List with Changes/Refills   Current Medications    ACETAMINOPHEN (TYLENOL EXTRA STRENGTH) 325 MG TABLET    Take 2 tablets (650 mg total) by mouth every 8 (eight) hours.    ALLOPURINOL (ZYLOPRIM) 100 MG TABLET    TAKE 2 TABLETS (200 MG TOTAL) BY MOUTH ONCE DAILY.    AMIODARONE (PACERONE) 200 MG TAB    Take 1 tablet (200 mg total) by mouth once daily.    APIXABAN (ELIQUIS) 2.5 MG TAB    Take 1 tablet (2.5 mg total) by mouth 2 (two) times daily.    ASPIRIN (ECOTRIN) 81 MG EC TABLET    Take 1 tablet (81 mg total) by mouth once daily.    CARVEDILOL (COREG) 25 MG TABLET    TAKE 1 TABLET BY MOUTH TWICE A DAY WITH MEALS    DIGOXIN (LANOXIN) 125 MCG TABLET    Take 125 mcg by mouth every other day.    FUROSEMIDE (LASIX) 40 MG TABLET    Take 2 tablets (80 mg total) by mouth 2 (two) times daily.    GABAPENTIN (NEURONTIN) 100 MG CAPSULE    TAKE ONE CAPSULE BY MOUTH TWO TIMES DAILY    LEVOTHYROXINE (SYNTHROID) 75 MCG TABLET    Take 1 tablet (75 mcg total) by mouth once daily.    LOSARTAN (COZAAR) 25 MG TABLET    Take 25 mg by mouth once daily.    METOLAZONE (ZAROXOLYN) 2.5 MG TABLET    30 minutes after Lasix as directed     Objective:     Vitals:    09/30/19 1102   BP: (!) 107/50   Pulse: 75   Temp: 97.2 °F (36.2 °C)     Physical Exam   Constitutional: She is oriented to person, place, and time. She appears well-developed and well-nourished. No distress.   HENT:   Head: Normocephalic and atraumatic.   Right Ear: Hearing and external ear normal.   Left Ear: Hearing and external ear normal.   Nose: No rhinorrhea or sinus tenderness. Right sinus exhibits no maxillary sinus tenderness and no frontal sinus tenderness. Left sinus exhibits no maxillary sinus tenderness and no frontal sinus tenderness.   Mouth/Throat: Uvula  is midline, oropharynx is clear and moist and mucous membranes are normal. No oral lesions.   Eyes: Pupils are equal, round, and reactive to light. Conjunctivae are normal. Right eye exhibits no discharge. Left eye exhibits no discharge.   Neck: Normal range of motion. Carotid bruit is not present. No tracheal deviation present. No thyromegaly present.   Cardiovascular: Normal rate, regular rhythm, S1 normal, S2 normal, normal heart sounds and intact distal pulses.   No murmur heard.  Pulses:       Dorsalis pedis pulses are 2+ on the right side, and 2+ on the left side.   Pulmonary/Chest: Effort normal and breath sounds normal. No respiratory distress.   Abdominal: Soft. Bowel sounds are normal. She exhibits no distension and no mass. There is no tenderness.   Musculoskeletal: Normal range of motion. She exhibits edema. She exhibits no tenderness.   Lymphadenopathy:     She has no cervical adenopathy.        Right: No supraclavicular adenopathy present.        Left: No supraclavicular adenopathy present.   Neurological: She is alert and oriented to person, place, and time. She has normal strength. No sensory deficit. Coordination and gait normal.   Skin: Skin is warm and dry. Capillary refill takes less than 2 seconds. No rash noted. There is pallor.   Psychiatric: Her speech is normal and behavior is normal. Judgment and thought content normal. Her mood appears anxious. Cognition and memory are normal. She does not exhibit a depressed mood.   Nursing note and vitals reviewed.      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   --Return to clinic in 4 weeks with labs.  --Patient is iron deficient, will replete with IV Injectafer x2 doses.      I will review assessment/plan with collaborating physician Dr. Adiel Gary.

## 2019-09-30 NOTE — PATIENT INSTRUCTIONS
Epoetin Godwin injection  What is this medicine?  EPOETIN GODWIN (e NEGRA e tin AL fa) helps your body make more red blood cells. This medicine is used to treat anemia caused by chronic kidney failure, cancer chemotherapy, or HIV-therapy. It may also be used before surgery if you have anemia.  How should I use this medicine?  This medicine is for injection into a vein or under the skin. It is usually given by a health care professional in a hospital or clinic setting.  If you get this medicine at home, you will be taught how to prepare and give this medicine. Use exactly as directed. Take your medicine at regular intervals. Do not take your medicine more often than directed.  It is important that you put your used needles and syringes in a special sharps container. Do not put them in a trash can. If you do not have a sharps container, call your pharmacist or healthcare provider to get one.  Talk to your pediatrician regarding the use of this medicine in children. While this drug may be prescribed for selected conditions, precautions do apply.  What side effects may I notice from receiving this medicine?  Side effects that you should report to your doctor or health care professional as soon as possible:  · allergic reactions like skin rash, itching or hives, swelling of the face, lips, or tongue  · breathing problems  · changes in vision  · chest pain  · confusion, trouble speaking or understanding  · feeling faint or lightheaded, falls  · high blood pressure  · muscle aches or pains  · pain, swelling, warmth in the leg  · rapid weight gain  · severe headaches  · sudden numbness or weakness of the face, arm or leg  · trouble walking, dizziness, loss of balance or coordination  · seizures (convulsions)  · swelling of the ankles, feet, hands  · unusually weak or tired  Side effects that usually do not require medical attention (report to your doctor or health care professional if they continue or are  bothersome):  · diarrhea  · fever, chills (flu-like symptoms)  · headaches  · nausea, vomiting  · redness, stinging, or swelling at site where injected  What may interact with this medicine?  Do not take this medicine with any of the following medications:  · darbepoetin abby  What if I miss a dose?  If you miss a dose, take it as soon as you can. If it is almost time for your next dose, take only that dose. Do not take double or extra doses.  Where should I keep my medicine?  Keep out of the reach of children.  Store in a refrigerator between 2 and 8 degrees C (36 and 46 degrees F). Do not freeze or shake. Throw away any unused portion if using a single-dose vial. Multi-dose vials can be kept in the refrigerator for up to 21 days after the initial dose. Throw away unused medicine.  What should I tell my health care provider before I take this medicine?  They need to know if you have any of these conditions:  · blood clotting disorders  · cancer patient not on chemotherapy  · cystic fibrosis  · heart disease, such as angina or heart failure  · hemoglobin level of 12 g/dL or greater  · high blood pressure  · low levels of folate, iron, or vitamin B12  · seizures  · an unusual or allergic reaction to erythropoietin, albumin, benzyl alcohol, hamster proteins, other medicines, foods, dyes, or preservatives  · pregnant or trying to get pregnant  · breast-feeding  What should I watch for while using this medicine?  Visit your prescriber or health care professional for regular checks on your progress and for the needed blood tests and blood pressure measurements. It is especially important for the doctor to make sure your hemoglobin level is in the desired range, to limit the risk of potential side effects and to give you the best benefit. Keep all appointments for any recommended tests. Check your blood pressure as directed. Ask your doctor what your blood pressure should be and when you should contact him or her.  As  your body makes more red blood cells, you may need to take iron, folic acid, or vitamin B supplements. Ask your doctor or health care provider which products are right for you. If you have kidney disease continue dietary restrictions, even though this medication can make you feel better. Talk with your doctor or health care professional about the foods you eat and the vitamins that you take.  NOTE:This sheet is a summary. It may not cover all possible information. If you have questions about this medicine, talk to your doctor, pharmacist, or health care provider. Copyright© 2017 [employer]          Iron-Deficiency Anemia (Adult)  Red blood cells carry oxygen to the tissues of your body. Anemia is a condition in which you have too few red blood cells. You need iron to make red cells. Anemia makes you feel tired and run down. When anemia becomes severe, your skin becomes pale. You may feel short of breath after physical activity. Other symptoms include:  · Headaches  · Dizziness  · Leg cramps with physical activity  · Drowsiness  · Restless legs  Your anemia is caused by not having enough iron in your body. This may be because of:  · Loss of blood. This can be caused by heavy menstrual periods. It can also be caused by bleeding from the stomach or intestines.  · Poor diet. You may not be eating enough foods that contain iron.  · Inability to absorb iron from the foods you eat  · Pregnancy  If your blood count is low enough, your healthcare provider may prescribe an iron supplement. It usually takes about 2 to 3 months of treatment with iron supplements to correct anemia. Severe cases of anemia need a blood transfusion to quickly ease symptoms and deliver more oxygen to the cells.  Home care  Follow these guidelines when caring for yourself at home:  · Eat foods high in iron. This will boost the amount of iron stored in your body. It is a natural way to build up the number of blood cells. Good sources of iron  include beef, liver, spinach and other dark green leafy vegetables, whole grains, beans, and nuts.  · Do not overexert yourself.  · Talk with your healthcare provider before traveling by air or traveling to high altitudes.  Follow-up care  Follow up with your healthcare provider in 2 months, or as advised. This is to have another red blood cell count to be sure your anemia has been fixed.  When to seek medical advice  Call your healthcare provider right away if any of these occur:  · Shortness of breath or chest pain  · Dizziness or fainting  · Vomiting blood or passing red or black-colored stool   Date Last Reviewed: 2/25/2016  © 6234-4948 [employer] [address] [place] [address], [place] [address]. All rights reserved. This information is not intended as a substitute for professional medical care. Always follow your healthcare professional's instructions.

## 2019-10-03 ENCOUNTER — HOSPITAL ENCOUNTER (OUTPATIENT)
Dept: RADIOLOGY | Facility: HOSPITAL | Age: 84
Discharge: HOME | End: 2019-10-03
Attending: NURSE PRACTITIONER
Payer: MEDICARE

## 2019-10-03 ENCOUNTER — OFFICE VISIT (OUTPATIENT)
Dept: NEPHROLOGY | Facility: CLINIC | Age: 84
End: 2019-10-03
Payer: MEDICARE

## 2019-10-03 VITALS
WEIGHT: 119.69 LBS | SYSTOLIC BLOOD PRESSURE: 118 MMHG | HEART RATE: 74 BPM | DIASTOLIC BLOOD PRESSURE: 50 MMHG | BODY MASS INDEX: 22.03 KG/M2 | HEIGHT: 62 IN

## 2019-10-03 DIAGNOSIS — I10 ESSENTIAL HYPERTENSION: ICD-10-CM

## 2019-10-03 DIAGNOSIS — M79.89 LEFT LEG SWELLING: ICD-10-CM

## 2019-10-03 DIAGNOSIS — N18.30 STAGE 3 CHRONIC KIDNEY DISEASE: ICD-10-CM

## 2019-10-03 DIAGNOSIS — I50.22 CHRONIC SYSTOLIC CONGESTIVE HEART FAILURE: ICD-10-CM

## 2019-10-03 DIAGNOSIS — N39.0 UTI (URINARY TRACT INFECTION), UNCOMPLICATED: ICD-10-CM

## 2019-10-03 DIAGNOSIS — Z71.89 ENCOUNTER FOR MEDICATION REVIEW AND COUNSELING: ICD-10-CM

## 2019-10-03 DIAGNOSIS — M1A.39X1 CHRONIC GOUT DUE TO RENAL IMPAIRMENT OF MULTIPLE SITES WITH TOPHUS: ICD-10-CM

## 2019-10-03 DIAGNOSIS — M81.0 OSTEOPOROSIS, SENILE: ICD-10-CM

## 2019-10-03 DIAGNOSIS — R30.0 DYSURIA: Primary | ICD-10-CM

## 2019-10-03 DIAGNOSIS — R60.0 LOCALIZED EDEMA: ICD-10-CM

## 2019-10-03 PROCEDURE — 99999 PR PBB SHADOW E&M-EST. PATIENT-LVL IV: ICD-10-PCS | Mod: PBBFAC,,, | Performed by: INTERNAL MEDICINE

## 2019-10-03 PROCEDURE — 99214 OFFICE O/P EST MOD 30 MIN: CPT | Mod: PBBFAC,25 | Performed by: INTERNAL MEDICINE

## 2019-10-03 PROCEDURE — 99215 PR OFFICE/OUTPT VISIT, EST, LEVL V, 40-54 MIN: ICD-10-PCS | Mod: S$PBB,,, | Performed by: INTERNAL MEDICINE

## 2019-10-03 PROCEDURE — 99999 PR PBB SHADOW E&M-EST. PATIENT-LVL IV: CPT | Mod: PBBFAC,,, | Performed by: INTERNAL MEDICINE

## 2019-10-03 PROCEDURE — 93970 EXTREMITY STUDY: CPT | Mod: TC

## 2019-10-03 PROCEDURE — 99215 OFFICE O/P EST HI 40 MIN: CPT | Mod: S$PBB,,, | Performed by: INTERNAL MEDICINE

## 2019-10-03 RX ORDER — ALLOPURINOL 100 MG/1
TABLET ORAL
Qty: 90 TABLET | Refills: 1 | Status: SHIPPED | OUTPATIENT
Start: 2019-10-03 | End: 2020-06-01

## 2019-10-03 NOTE — PROGRESS NOTES
"NEPHROLOGY CLINIC FOLLOWUP NOTE:  Date of clinic visit: 10/3/19     REASON FOR FOLLOWUP AND CHIEF COMPLAINT:  Chronic kidney disease and congestive heart failure. "burning on urination"     HISTORY OF PRESENT ILLNESS:  Ms. Jennifer Mathews is an 87-year-old female with CKD stage 3, HTN, severe systolic CHF, AF, and frequent UTI's, who presents for f/u. Pt again reports discomfort on urination. She has no abd pain, no back pain, no fever. I was involved in the treatment of E coli UTI in this pt in May 2019 after Home health nurse ordered urine under my name. As documented, pt has multiple abx allergies (PCN's and sulfa drugs), and cipro was the best choice at the time. Pharmacy alerted us that there could be a drug-drug interaction between cipro and amiodarone. Cardiology was contacted and OK'ed the use of cipro with amiodarone. Pt took cipro and UTI resolved. Pt now has above sx's since this am.     Pt's other issue is her fluid status.  She was previously coached on restricting salt intake and restricting to moderating fluid intake. She claims that her food is not salty, she keep fluid intake low. She still has large leg swelling L>R, but she also had previous knee surgery on the left.  She has no SOB.  She has no cardiopulmonary symptoms, no palpitation, no chest pain, no discomfort, and no dizziness, despite BP being slightly low for her advanced age.  Labs and meds were reviewed with her.     PAST MEDICAL HISTORY:  1.  CKD stage III  2.  Hypertension.  3.  Severe systolic congestive heart failure with EF of 20%.  4.  Osteoarthritis.  5.  Multiple falls in the past with pelvic fractures.  6.  Hyperlipidemia.  7.  Paroxysmal atrial fibrillation.  8.  Osteopenia.  9.  Pulmonary hypertension.  10. Frequent UTI's     PAST SURGICAL HISTORY:  Reviewed and unchanged.     FAMILY HISTORY:  Reviewed and unchanged.     ALLERGIES:  Reviewed.  Cephalosporins, penicillin, sulfa drugs, and pregabalin.     SOCIAL HISTORY: "  Reviewed.  Negative for smoking.  No alcohol use.     DIETARY HISTORY:  Reviewed, as above.     MEDICATIONS:  Reviewed.       Current Outpatient Medications:     Current Outpatient Medications:     acetaminophen (TYLENOL EXTRA STRENGTH) 325 MG tablet, Take 2 tablets (650 mg total) by mouth every 8 (eight) hours. (Patient taking differently: Take 650 mg by mouth 3 (three) times daily as needed. ), Disp: , Rfl:     allopurinol (ZYLOPRIM) 100 MG tablet, TAKE 2 TABLETS (200 MG TOTAL) BY MOUTH ONCE DAILY., Disp: 180 tablet, Rfl: 1    amiodarone (PACERONE) 200 MG Tab, Take 1 tablet (200 mg total) by mouth once daily. (Patient taking differently: Take 200 mg by mouth once daily. As of 7/6/19 decrease to 100 mg (half tablet) daily), Disp: 90 tablet, Rfl: 3    apixaban (ELIQUIS) 2.5 mg Tab, Take 1 tablet (2.5 mg total) by mouth 2 (two) times daily., Disp: 60 tablet, Rfl: 11    aspirin (ECOTRIN) 81 MG EC tablet, Take 1 tablet (81 mg total) by mouth once daily., Disp: , Rfl: 0    carvedilol (COREG) 25 MG tablet, TAKE 1 TABLET BY MOUTH TWICE A DAY WITH MEALS, Disp: 180 tablet, Rfl: 3    digoxin (LANOXIN) 125 mcg tablet, Take 125 mcg by mouth every other day., Disp: , Rfl:     furosemide (LASIX) 40 MG tablet, Take 2 tablets (80 mg total) by mouth 2 (two) times daily., Disp: 120 tablet, Rfl: 6    gabapentin (NEURONTIN) 100 MG capsule, TAKE ONE CAPSULE BY MOUTH TWO TIMES DAILY, Disp: 180 capsule, Rfl: 0    levothyroxine (SYNTHROID) 75 MCG tablet, Take 1 tablet (75 mcg total) by mouth once daily., Disp: 30 tablet, Rfl: 11    losartan (COZAAR) 25 MG tablet, Take 25 mg by mouth once daily., Disp: , Rfl: 11    metOLazone (ZAROXOLYN) 2.5 MG tablet, 30 minutes after Lasix as directed, Disp: 25 tablet, Rfl: 6    Current Facility-Administered Medications:     denosumab (PROLIA) injection 60 mg, 60 mg, Subcutaneous, Q6 Months, Oc Catherine MD, 60 mg at 05/21/19 6193     REVIEW OF SYSTEMS:  No recent  hospitalizations.  GENERAL:  Negative.  HEAD, EYES, EARS, NOSE, AND THROAT:  Negative.  CARDIAC:  Negative.  PULMONARY:  Negative.  GASTROINTESTINAL:  Negative.  GENITOURINARY:  Negative.  PSYCHOLOGICAL:  Negative.  NEUROLOGICAL:  Negative.  ENDOCRINE:  Negative.  HEMATOLOGIC AND ONCOLOGIC:  Negative.  INFECTIOUS DISEASE:  Negative.  The rest of the review of systems negative.     PHYSICAL EXAMINATION:  VITAL SIGNS:  Blood pressure is 118/50, pulse is 74, weight is 119 lbs, last visit  119 lbs  GENERAL:  She is cooperative, pleasant, in no acute distress, ambulating by herself appear to be in no acute distress.    Speech and thought process was normal and appropriate.  HEENT:  Mucous membranes moist.  NECK:  No JVD.  HEART:  Regular rate and rhythm, S1 and S2 audible.  No rubs.  CHEST:  Clear to auscultation.  No rales.  No wheezes.  Breathing symmetric and   unlabored.  ABDOMEN:  Soft, nontender.  EXTREMITIES:  3+ edema L, 2+ R     LABS:  Reviewed.   BMP  Lab Results   Component Value Date     09/26/2019     09/26/2019    K 4.6 09/26/2019    K 4.4 09/26/2019    CL 98 09/26/2019    CL 97 09/26/2019    CO2 34 (H) 09/26/2019    CO2 31 (H) 09/26/2019    BUN 71 (H) 09/26/2019    BUN 72 (H) 09/26/2019    CREATININE 2.0 (H) 09/26/2019    CREATININE 1.9 (H) 09/26/2019    CALCIUM 9.9 09/26/2019    CALCIUM 9.9 09/26/2019    ANIONGAP 11 09/26/2019    ANIONGAP 15 09/26/2019    ESTGFRAFRICA 25.3 (A) 09/26/2019    ESTGFRAFRICA 27 (A) 09/26/2019    EGFRNONAA 22.0 (A) 09/26/2019    EGFRNONAA 23 (A) 09/26/2019     Lab Results   Component Value Date    WBC 6.65 09/26/2019    HGB 10.0 (L) 09/26/2019    HCT 32.8 (L) 09/26/2019     (H) 09/26/2019     09/26/2019     Lab Results   Component Value Date    PTH 56.0 12/27/2013    CALCIUM 9.9 09/26/2019    CALCIUM 9.9 09/26/2019    PHOS 4.0 09/26/2019     Urine Cx: pending, sent today    ASSESSMENT AND PLAN:  This is an 87-year-old female who has CKD and severe  congestive heart failure pressure, who presents for f/u:  The impression is as follows:     1.  Renal.  s Cr within the past baseline range, noted slightly higher, may be related to the BP  Pt is an elderly and with normal to low BP will have decreased renal perfusion  Has stable renal function at this point. CKD stage 3 to stage 4 with some fluctuations  K normal  Acid base stable  Doing well clinically, stable   Has mild metabolic alkalosis due to the use of diuretics.       2.  Cardiac:  h/o of severe systolic congestive heart failure with ejection fraction of only 20%.  Cardiology notes reviewed  Well compensated at this point, hemodynamically stable  Continue salt and fluid restriction, advised pt     3.Medications were reviewed with pt.  Noted takes 2 tablets of allopurinol, 100 mg each, per day  Advised pt not to exceed 1 tablet (100 mg) po qd due to having renal insufficiency     4. ID: again has dsyuria  Will send urine cx  Noted last UTI was in Aug 2019, had E coli, sensitivities reviewed  Pt was referred to PCP, Dr. Bello for f/u     5.  Lab review for SPEP.  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     PLANS AND RECOMMENDATIONS:    As discussed above.    Multiple issues addressed  Opportunity for question and discussion provided.  Multiple issues addressed  Total time spent 40 minutes, more than 50% of the time was spent in counseling   and coordination of care.  Return to see us in about four months.    Level V visit.  RTC 4-5 months     Prosper You MD

## 2019-10-04 RX ORDER — CIPROFLOXACIN 500 MG/1
500 TABLET ORAL 2 TIMES DAILY
Qty: 14 TABLET | Refills: 0 | Status: SHIPPED | OUTPATIENT
Start: 2019-10-04 | End: 2020-02-14 | Stop reason: ALTCHOICE

## 2019-10-04 NOTE — PROGRESS NOTES
Renal chart check note:  U Cx + NGR's, sensitivities gideon not back yet.  Pt was called.  Ciprofloxacin was prescribed 500 mg po bid.  On prior cx's had E coli.  If resistant to cipro, may need hospital admission given has multiple abx allergies and drug-drug interaction with amiodarone.  However, in the past cardiology OK'ed using cipro with amiodarone.    Prosper You MD

## 2019-10-07 ENCOUNTER — INFUSION (OUTPATIENT)
Dept: INFUSION THERAPY | Facility: HOSPITAL | Age: 84
End: 2019-10-07
Attending: NURSE PRACTITIONER
Payer: MEDICARE

## 2019-10-07 VITALS
RESPIRATION RATE: 18 BRPM | HEART RATE: 70 BPM | DIASTOLIC BLOOD PRESSURE: 65 MMHG | SYSTOLIC BLOOD PRESSURE: 129 MMHG | TEMPERATURE: 97 F | OXYGEN SATURATION: 98 %

## 2019-10-07 DIAGNOSIS — D50.0 IRON DEFICIENCY ANEMIA DUE TO CHRONIC BLOOD LOSS: Primary | ICD-10-CM

## 2019-10-07 PROCEDURE — 63600175 PHARM REV CODE 636 W HCPCS: Performed by: NURSE PRACTITIONER

## 2019-10-07 PROCEDURE — 96374 THER/PROPH/DIAG INJ IV PUSH: CPT

## 2019-10-07 PROCEDURE — 96375 TX/PRO/DX INJ NEW DRUG ADDON: CPT

## 2019-10-07 RX ORDER — METHYLPREDNISOLONE SOD SUCC 125 MG
80 VIAL (EA) INJECTION
Status: CANCELLED
Start: 2019-10-14

## 2019-10-07 RX ORDER — METHYLPREDNISOLONE SOD SUCC 125 MG
40 VIAL (EA) INJECTION ONCE
Status: CANCELLED | OUTPATIENT
Start: 2019-10-14

## 2019-10-07 RX ORDER — SODIUM CHLORIDE 9 MG/ML
INJECTION, SOLUTION INTRAVENOUS CONTINUOUS
Status: DISCONTINUED | OUTPATIENT
Start: 2019-10-07 | End: 2019-10-07 | Stop reason: HOSPADM

## 2019-10-07 RX ORDER — DIPHENHYDRAMINE HYDROCHLORIDE 50 MG/ML
50 INJECTION INTRAMUSCULAR; INTRAVENOUS ONCE AS NEEDED
Status: CANCELLED | OUTPATIENT
Start: 2019-10-14

## 2019-10-07 RX ORDER — METHYLPREDNISOLONE SOD SUCC 125 MG
80 VIAL (EA) INJECTION
Status: CANCELLED
Start: 2019-10-07

## 2019-10-07 RX ORDER — SODIUM CHLORIDE 0.9 % (FLUSH) 0.9 %
10 SYRINGE (ML) INJECTION
Status: CANCELLED | OUTPATIENT
Start: 2019-10-14

## 2019-10-07 RX ORDER — SODIUM CHLORIDE 0.9 % (FLUSH) 0.9 %
10 SYRINGE (ML) INJECTION
Status: DISCONTINUED | OUTPATIENT
Start: 2019-10-07 | End: 2019-10-07 | Stop reason: HOSPADM

## 2019-10-07 RX ORDER — SODIUM CHLORIDE 9 MG/ML
INJECTION, SOLUTION INTRAVENOUS CONTINUOUS
Status: CANCELLED | OUTPATIENT
Start: 2019-10-14

## 2019-10-07 RX ORDER — HEPARIN 100 UNIT/ML
5 SYRINGE INTRAVENOUS
Status: CANCELLED | OUTPATIENT
Start: 2019-10-14

## 2019-10-07 RX ORDER — METHYLPREDNISOLONE SOD SUCC 125 MG
80 VIAL (EA) INJECTION
Status: COMPLETED | OUTPATIENT
Start: 2019-10-07 | End: 2019-10-07

## 2019-10-07 RX ORDER — EPINEPHRINE 0.3 MG/.3ML
0.3 INJECTION SUBCUTANEOUS ONCE AS NEEDED
Status: CANCELLED | OUTPATIENT
Start: 2019-10-14

## 2019-10-07 RX ADMIN — METHYLPREDNISOLONE SODIUM SUCCINATE 80 MG: 125 INJECTION, POWDER, FOR SOLUTION INTRAMUSCULAR; INTRAVENOUS at 10:10

## 2019-10-07 RX ADMIN — FERRIC CARBOXYMALTOSE INJECTION 750 MG: 50 INJECTION, SOLUTION INTRAVENOUS at 10:10

## 2019-10-07 NOTE — DISCHARGE INSTRUCTIONS
[facility]  [address] [facility]  [address]  [phone] phone     [phone] fax  Hours of Operation: Monday- Friday 8:00am- 5:00pm  After hours phone  [phone]  Hematology / Oncology Physicians on call      [address] Dr., Dr., Dr., NP Sydney Prescott, NP Tyesha Taylor, NP      Please call with any concerns regarding your appointment today.    IRON DEFICIENCY ANEMIA:  Anemia is a condition where the size or number of red blood cells in the body is reduced. Iron is needed in the diet to make red cells. The red blood cells carry oxygen to all parts of the body. Anemia limits the delivery of oxygen to where it is needed. This causes a feeling of being tired and run down. When anemia becomes severe, the skin becomes pale and there is shortness of breath with exertion, headaches, dizziness, drowsiness and fatigue.  The cause of your anemia is lack of iron in your body. This may occur due to blood loss (for example, heavy menstrual periods or bleeding from the stomach or intestines) or a poor diet (not eating enough iron-containing foods), inability to absorb iron from your diet, or pregnancy.  If the blood count is low enough, an IRON SUPPLEMENT will be prescribed. It usually takes about 2-3 months of treatment with iron supplements to correct an anemia. Severe cases of anemia requires a blood transfusion to rapidly correct symptoms and deliver more oxygen to the cells.  HOME CARE:  1) Increase the iron stores in your body by eating foods high in iron content. This is a natural way of building your blood cells back up again. Beef, liver, spinach and other dark green leafy vegetables, whole grain products, beans and nuts are all natural sources of iron.  2) If you are having symptoms of anemia listed above:  -- Do not overexert yourself.  -- Talk to your doctor before flying on an airplane  or travelling to high altitudes.  FOLLOW UP with your doctor in 2 months for a repeat red blood cell count, or as recommended by our staff, to be sure that the anemia has been corrected.  GET PROMPT MEDICAL ATTENTION if any of the following occur or worsen:  -- Shortness of breath or chest pain  -- Dizziness or fainting  -- Vomiting blood or passing red or black-colored stool  © 6070-8245 [facility], [address] [place] [address], [place] [address]. All rights reserved. This information is not intended as a substitute for professional medical care. Always follow your healthcare professional's instructions.

## 2019-10-08 ENCOUNTER — OFFICE VISIT (OUTPATIENT)
Dept: INTERNAL MEDICINE | Facility: CLINIC | Age: 84
End: 2019-10-08
Payer: MEDICARE

## 2019-10-08 VITALS
BODY MASS INDEX: 22.66 KG/M2 | TEMPERATURE: 97 F | OXYGEN SATURATION: 95 % | SYSTOLIC BLOOD PRESSURE: 114 MMHG | HEART RATE: 69 BPM | DIASTOLIC BLOOD PRESSURE: 60 MMHG | WEIGHT: 123.88 LBS

## 2019-10-08 DIAGNOSIS — I70.0 AORTIC ATHEROSCLEROSIS: ICD-10-CM

## 2019-10-08 DIAGNOSIS — R82.79 POSITIVE URINE CULTURE: Primary | ICD-10-CM

## 2019-10-08 DIAGNOSIS — N39.0 RECURRENT UTI: ICD-10-CM

## 2019-10-08 LAB
BILIRUB UR QL STRIP: NEGATIVE
CLARITY UR: CLEAR
COLOR UR: YELLOW
GLUCOSE UR QL STRIP: NEGATIVE
HGB UR QL STRIP: NEGATIVE
KETONES UR QL STRIP: NEGATIVE
LEUKOCYTE ESTERASE UR QL STRIP: NEGATIVE
NITRITE UR QL STRIP: NEGATIVE
PH UR STRIP: 6.5 [PH] (ref 5–8)
PROT UR QL STRIP: NEGATIVE
SP GR UR STRIP: 1.01 (ref 1–1.03)
URN SPEC COLLECT METH UR: NORMAL

## 2019-10-08 PROCEDURE — 87086 URINE CULTURE/COLONY COUNT: CPT

## 2019-10-08 PROCEDURE — 99214 OFFICE O/P EST MOD 30 MIN: CPT | Mod: S$PBB,,, | Performed by: FAMILY MEDICINE

## 2019-10-08 PROCEDURE — 81002 URINALYSIS NONAUTO W/O SCOPE: CPT

## 2019-10-08 PROCEDURE — 99213 OFFICE O/P EST LOW 20 MIN: CPT | Mod: PBBFAC | Performed by: FAMILY MEDICINE

## 2019-10-08 PROCEDURE — 99999 PR PBB SHADOW E&M-EST. PATIENT-LVL III: ICD-10-PCS | Mod: PBBFAC,,, | Performed by: FAMILY MEDICINE

## 2019-10-08 PROCEDURE — 99214 PR OFFICE/OUTPT VISIT, EST, LEVL IV, 30-39 MIN: ICD-10-PCS | Mod: S$PBB,,, | Performed by: FAMILY MEDICINE

## 2019-10-08 PROCEDURE — 99999 PR PBB SHADOW E&M-EST. PATIENT-LVL III: CPT | Mod: PBBFAC,,, | Performed by: FAMILY MEDICINE

## 2019-10-08 NOTE — PROGRESS NOTES
Subjective:       Patient ID: Jennifer Mathews is a 87 y.o. female.    Chief Complaint: Urinary Tract Infection    Patient presents to clinic today for follow-up of UTI.  She was started on Cipro by Dr. You on Friday.  Urine culture done last week shows Klebsiella sensitive to Cipro.  Patient reports she is feeling better and taking antibiotics as directed.  Patient has long history of recurrent UTIs.  Patient is otherwise without concerns today.    Review of Systems   Constitutional: Negative for chills, fatigue, fever and unexpected weight change.   Eyes: Negative for visual disturbance.   Respiratory: Negative for shortness of breath.    Cardiovascular: Negative for chest pain.   Musculoskeletal: Negative for myalgias.   Neurological: Negative for headaches.       Objective:      Physical Exam   Constitutional: She is oriented to person, place, and time. She appears well-developed and well-nourished. No distress.   HENT:   Head: Normocephalic and atraumatic.   Eyes: Pupils are equal, round, and reactive to light. Conjunctivae and EOM are normal. No scleral icterus.   Pulmonary/Chest: Effort normal.   Neurological: She is alert and oriented to person, place, and time. No cranial nerve deficit. Gait normal.   Psychiatric: She has a normal mood and affect.   Vitals reviewed.      Assessment:       1. Positive urine culture    2. Recurrent UTI    3. Aortic atherosclerosis        Plan:     Problem List Items Addressed This Visit     Aortic atherosclerosis    Overview     1/28/18 CTA Abdomen Pelvis- The abdominal aorta is normal in course and caliber with moderate atherosclerotic calcifications. No evidence of aneurysm or dissection.            Positive urine culture - Primary    Current Assessment & Plan     Advised to complete course of cipro; UA today is negative, culture pending.         Relevant Orders    URINALYSIS (Completed)    CULTURE, URINE    Recurrent UTI    Relevant Orders    Ambulatory referral to  Urology          Health Maintenance reviewed/updated.

## 2019-10-09 LAB — BACTERIA UR CULT: NORMAL

## 2019-10-14 ENCOUNTER — CLINICAL SUPPORT (OUTPATIENT)
Dept: CARDIOLOGY | Facility: CLINIC | Age: 84
End: 2019-10-14
Attending: INTERNAL MEDICINE
Payer: MEDICARE

## 2019-10-14 ENCOUNTER — INFUSION (OUTPATIENT)
Dept: INFUSION THERAPY | Facility: HOSPITAL | Age: 84
End: 2019-10-14
Attending: NURSE PRACTITIONER
Payer: MEDICARE

## 2019-10-14 VITALS
TEMPERATURE: 98 F | HEART RATE: 69 BPM | DIASTOLIC BLOOD PRESSURE: 54 MMHG | RESPIRATION RATE: 18 BRPM | SYSTOLIC BLOOD PRESSURE: 121 MMHG | OXYGEN SATURATION: 96 %

## 2019-10-14 DIAGNOSIS — I48.20 CHRONIC ATRIAL FIBRILLATION: ICD-10-CM

## 2019-10-14 DIAGNOSIS — D50.0 IRON DEFICIENCY ANEMIA DUE TO CHRONIC BLOOD LOSS: Primary | ICD-10-CM

## 2019-10-14 DIAGNOSIS — I25.5 ISCHEMIC CARDIOMYOPATHY: ICD-10-CM

## 2019-10-14 DIAGNOSIS — Z95.0 CARDIAC PACEMAKER IN SITU: ICD-10-CM

## 2019-10-14 DIAGNOSIS — I48.0 PAROXYSMAL ATRIAL FIBRILLATION: Primary | ICD-10-CM

## 2019-10-14 PROCEDURE — 96365 THER/PROPH/DIAG IV INF INIT: CPT

## 2019-10-14 PROCEDURE — 96375 TX/PRO/DX INJ NEW DRUG ADDON: CPT

## 2019-10-14 PROCEDURE — 93281 PM DEVICE PROGR EVAL MULTI: CPT | Mod: PBBFAC | Performed by: INTERNAL MEDICINE

## 2019-10-14 PROCEDURE — 63600175 PHARM REV CODE 636 W HCPCS: Performed by: NURSE PRACTITIONER

## 2019-10-14 PROCEDURE — 63600175 PHARM REV CODE 636 W HCPCS: Mod: JG | Performed by: NURSE PRACTITIONER

## 2019-10-14 PROCEDURE — 93290 INTERROG DEV EVAL ICPMS IP: CPT | Mod: 26,S$PBB,, | Performed by: INTERNAL MEDICINE

## 2019-10-14 PROCEDURE — 93290 PACEMAKER PROGRAMMING: ICD-10-PCS | Mod: 26,S$PBB,, | Performed by: INTERNAL MEDICINE

## 2019-10-14 PROCEDURE — 93281 PACEMAKER PROGRAMMING: ICD-10-PCS | Mod: 26,S$PBB,, | Performed by: INTERNAL MEDICINE

## 2019-10-14 RX ORDER — METHYLPREDNISOLONE SOD SUCC 125 MG
80 VIAL (EA) INJECTION
Status: CANCELLED
Start: 2019-10-21

## 2019-10-14 RX ORDER — SODIUM CHLORIDE 0.9 % (FLUSH) 0.9 %
10 SYRINGE (ML) INJECTION
Status: CANCELLED | OUTPATIENT
Start: 2019-10-21

## 2019-10-14 RX ORDER — SODIUM CHLORIDE 0.9 % (FLUSH) 0.9 %
10 SYRINGE (ML) INJECTION
Status: DISCONTINUED | OUTPATIENT
Start: 2019-10-14 | End: 2019-10-14 | Stop reason: HOSPADM

## 2019-10-14 RX ORDER — METHYLPREDNISOLONE SOD SUCC 125 MG
40 VIAL (EA) INJECTION ONCE
Status: CANCELLED | OUTPATIENT
Start: 2019-10-21

## 2019-10-14 RX ORDER — SODIUM CHLORIDE 9 MG/ML
INJECTION, SOLUTION INTRAVENOUS CONTINUOUS
Status: CANCELLED | OUTPATIENT
Start: 2019-10-21

## 2019-10-14 RX ORDER — DIPHENHYDRAMINE HYDROCHLORIDE 50 MG/ML
50 INJECTION INTRAMUSCULAR; INTRAVENOUS ONCE AS NEEDED
Status: CANCELLED | OUTPATIENT
Start: 2019-10-21

## 2019-10-14 RX ORDER — EPINEPHRINE 0.3 MG/.3ML
0.3 INJECTION SUBCUTANEOUS ONCE AS NEEDED
Status: CANCELLED | OUTPATIENT
Start: 2019-10-21

## 2019-10-14 RX ORDER — HEPARIN 100 UNIT/ML
5 SYRINGE INTRAVENOUS
Status: CANCELLED | OUTPATIENT
Start: 2019-10-21

## 2019-10-14 RX ORDER — METHYLPREDNISOLONE SOD SUCC 125 MG
80 VIAL (EA) INJECTION
Status: COMPLETED | OUTPATIENT
Start: 2019-10-14 | End: 2019-10-14

## 2019-10-14 RX ORDER — GABAPENTIN 100 MG/1
CAPSULE ORAL
Qty: 180 CAPSULE | Refills: 0 | Status: SHIPPED | OUTPATIENT
Start: 2019-10-14 | End: 2020-01-09

## 2019-10-14 RX ADMIN — METHYLPREDNISOLONE SODIUM SUCCINATE 80 MG: 125 INJECTION, POWDER, FOR SOLUTION INTRAMUSCULAR; INTRAVENOUS at 11:10

## 2019-10-14 RX ADMIN — FERRIC CARBOXYMALTOSE INJECTION 750 MG: 50 INJECTION, SOLUTION INTRAVENOUS at 11:10

## 2019-10-14 NOTE — DISCHARGE INSTRUCTIONS
[facility]  [address] [facility]  [address]  [phone] phone     [phone] fax  Hours of Operation: Monday- Friday 8:00am- 5:00pm  After hours phone  [phone]  Hematology / Oncology Physicians on call      [address] Dr., Dr., Dr., NP Sydney Prescott, NP Tyesha Taylor, NP    Please call with any concerns regarding your appointment today.IRON DEFICIENCY ANEMIA:  Anemia is a condition where the size or number of red blood cells in the body is reduced. Iron is needed in the diet to make red cells. The red blood cells carry oxygen to all parts of the body. Anemia limits the delivery of oxygen to where it is needed. This causes a feeling of being tired and run down. When anemia becomes severe, the skin becomes pale and there is shortness of breath with exertion, headaches, dizziness, drowsiness and fatigue.  The cause of your anemia is lack of iron in your body. This may occur due to blood loss (for example, heavy menstrual periods or bleeding from the stomach or intestines) or a poor diet (not eating enough iron-containing foods), inability to absorb iron from your diet, or pregnancy.  If the blood count is low enough, an IRON SUPPLEMENT will be prescribed. It usually takes about 2-3 months of treatment with iron supplements to correct an anemia. Severe cases of anemia requires a blood transfusion to rapidly correct symptoms and deliver more oxygen to the cells.  HOME CARE:  1) Increase the iron stores in your body by eating foods high in iron content. This is a natural way of building your blood cells back up again. Beef, liver, spinach and other dark green leafy vegetables, whole grain products, beans and nuts are all natural sources of iron.  2) If you are having symptoms of anemia listed above:  -- Do not overexert yourself.  -- Talk to your doctor before flying on an airplane or  travelling to high altitudes.  FOLLOW UP with your doctor in 2 months for a repeat red blood cell count, or as recommended by our staff, to be sure that the anemia has been corrected.  GET PROMPT MEDICAL ATTENTION if any of the following occur or worsen:  -- Shortness of breath or chest pain  -- Dizziness or fainting  -- Vomiting blood or passing red or black-colored stool  © 0879-5558 [facility], [address] [place] [address], [place] [address]. All rights reserved. This information is not intended as a substitute for professional medical care. Always follow your healthcare professional's instructions.

## 2019-10-14 NOTE — PROGRESS NOTES
Dr. Schulz reviewed interrogation, inspected device pocket, and discussed with patient.  Pt will return to clinic in 12 months for Abbott device check; remote transmissions every 3 months.

## 2019-10-15 ENCOUNTER — OFFICE VISIT (OUTPATIENT)
Dept: OPHTHALMOLOGY | Facility: CLINIC | Age: 84
End: 2019-10-15
Payer: MEDICARE

## 2019-10-15 DIAGNOSIS — H16.9 KERATITIS, BILATERAL: ICD-10-CM

## 2019-10-15 DIAGNOSIS — H18.529 MAP-DOT-FINGERPRINT CORNEAL DYSTROPHY: ICD-10-CM

## 2019-10-15 DIAGNOSIS — H26.492 PCO (POSTERIOR CAPSULAR OPACIFICATION), LEFT: ICD-10-CM

## 2019-10-15 DIAGNOSIS — Z96.1 PSEUDOPHAKIA OF BOTH EYES: ICD-10-CM

## 2019-10-15 DIAGNOSIS — H26.491 PCO (POSTERIOR CAPSULE OPACIFICATION), RIGHT: Primary | ICD-10-CM

## 2019-10-15 DIAGNOSIS — H35.342 MACULAR HOLE, LEFT EYE: ICD-10-CM

## 2019-10-15 PROCEDURE — 66821 AFTER CATARACT LASER SURGERY: CPT | Mod: PBBFAC,LT | Performed by: OPHTHALMOLOGY

## 2019-10-15 PROCEDURE — 92014 PR EYE EXAM, EST PATIENT,COMPREHESV: ICD-10-PCS | Mod: 57,S$PBB,, | Performed by: OPHTHALMOLOGY

## 2019-10-15 PROCEDURE — 92014 COMPRE OPH EXAM EST PT 1/>: CPT | Mod: 57,S$PBB,, | Performed by: OPHTHALMOLOGY

## 2019-10-15 PROCEDURE — 66821 YAG CAPSULOTOMY - OD - RIGHT EYE: ICD-10-PCS | Mod: S$PBB,50,, | Performed by: OPHTHALMOLOGY

## 2019-10-15 PROCEDURE — 66821 AFTER CATARACT LASER SURGERY: CPT | Mod: PBBFAC,RT | Performed by: OPHTHALMOLOGY

## 2019-10-15 PROCEDURE — 99999 PR PBB SHADOW E&M-EST. PATIENT-LVL I: CPT | Mod: PBBFAC,,, | Performed by: OPHTHALMOLOGY

## 2019-10-15 PROCEDURE — 99211 OFF/OP EST MAY X REQ PHY/QHP: CPT | Mod: PBBFAC | Performed by: OPHTHALMOLOGY

## 2019-10-15 PROCEDURE — 99999 PR PBB SHADOW E&M-EST. PATIENT-LVL I: ICD-10-PCS | Mod: PBBFAC,,, | Performed by: OPHTHALMOLOGY

## 2019-10-15 RX ORDER — PREDNISOLONE SODIUM PHOSPHATE 10 MG/ML
1 SOLUTION/ DROPS OPHTHALMIC 4 TIMES DAILY
Qty: 5 ML | Refills: 0 | Status: SHIPPED | OUTPATIENT
Start: 2019-10-15 | End: 2019-10-22

## 2019-10-15 NOTE — PROGRESS NOTES
"HPI     Blurred Vision      Additional comments: Referred by Dr Lee for OPC Evaluation               Comments     PT C/O:  For the last several months VA appears "foggy" at all ranges and   finds bright sunlight glare to be bothersome, OD>OS.  Pt has trouble with transportation and unable to get ride to/from [place]     Referred by Dr Lee    Macular Hole OS  PCIOL OU  OPC OU  RE          Last edited by Juliet Ramon, Patient Care Assistant on 10/15/2019  2:02   PM. (History)            Assessment /Plan     For exam results, see Encounter Report.      ICD-10-CM ICD-9-CM    1. PCO (posterior capsule opacification), right H26.491 366.50 Yag Operative Procedure Note    87 y.o. year old patient experiencing a symptomatic decrease in vision OD and  OS  with inability to perform activities of daily living including reading.      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 Including risks of retinal detachment (1-3%), macular edema, dislocated implant, pain, inflammation elevated intraocular pressure and vision loss. Consent signed.  Time out procedure form completed prior to laser.    Medications given:  Alphagan 0.15%  Tetracaine    Laser energy settings:  Right eye   2.3  energy per shot  30 bursts  1 to 1 ratio per shot     Left Eye:   2.3  energy per shot  34 bursts  1 to 1 ratio per shot       Central capsular opening created without difficulty    RBA with laser and increased risks floaters       START PRED P QID X 1 WEEK THEN STOP    2. 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 Macular hole, left eye H35.342 362.54 Limiting vision left eye, yet could improve some with depth perception    5. Keratitis, bilateral H16.9 370.9 Stable, follow    6. Pseudophakia of both eyes Z96.1 " V43.1      YAG LASER OU TODAY       RETURN TO CLINIC 1 week with TRF for lida  To resume care at [facility]

## 2019-10-16 DIAGNOSIS — H26.491 PCO (POSTERIOR CAPSULE OPACIFICATION), RIGHT: Primary | ICD-10-CM

## 2019-10-16 DIAGNOSIS — H26.492 PCO (POSTERIOR CAPSULAR OPACIFICATION), LEFT: ICD-10-CM

## 2019-10-16 RX ORDER — PREDNISOLONE ACETATE 10 MG/ML
1 SUSPENSION/ DROPS OPHTHALMIC 4 TIMES DAILY
Qty: 5 ML | Refills: 1 | Status: SHIPPED | OUTPATIENT
Start: 2019-10-16 | End: 2020-02-14 | Stop reason: ALTCHOICE

## 2019-10-17 ENCOUNTER — TELEPHONE (OUTPATIENT)
Dept: RHEUMATOLOGY | Facility: HOSPITAL | Age: 84
End: 2019-10-17

## 2019-10-17 NOTE — TELEPHONE ENCOUNTER
Call placed to notify that Prolia [facility]. No answer. Left message.

## 2019-10-18 NOTE — PROGRESS NOTES
Called and spoke with patient after verifying name and . Notified patient of lab results and recommendations from provider. Patient was given a chance to ask questions and verbalized an understanding of the information. Patient's INR is supra therapeutic today at 5.4.  Reports no active bleeding at present.  Instructed to hold Warfarin for 2 days, then start new dosage of 1/2 tablet (1.25 mg) on Tuesday, then 1 tablet (2.5 mg) on other days.  Recheck in 1 week.

## 2019-10-30 ENCOUNTER — OFFICE VISIT (OUTPATIENT)
Dept: HEMATOLOGY/ONCOLOGY | Facility: CLINIC | Age: 84
End: 2019-10-30
Payer: MEDICARE

## 2019-10-30 ENCOUNTER — LAB VISIT (OUTPATIENT)
Dept: LAB | Facility: HOSPITAL | Age: 84
End: 2019-10-30
Attending: NURSE PRACTITIONER
Payer: MEDICARE

## 2019-10-30 VITALS
HEIGHT: 62 IN | BODY MASS INDEX: 22.48 KG/M2 | OXYGEN SATURATION: 95 % | WEIGHT: 122.13 LBS | TEMPERATURE: 97 F | DIASTOLIC BLOOD PRESSURE: 62 MMHG | HEART RATE: 70 BPM | SYSTOLIC BLOOD PRESSURE: 132 MMHG

## 2019-10-30 DIAGNOSIS — N18.4 ANEMIA ASSOCIATED WITH STAGE 4 CHRONIC RENAL FAILURE: Primary | ICD-10-CM

## 2019-10-30 DIAGNOSIS — I50.42 CHRONIC COMBINED SYSTOLIC AND DIASTOLIC CONGESTIVE HEART FAILURE: ICD-10-CM

## 2019-10-30 DIAGNOSIS — D63.1 ANEMIA ASSOCIATED WITH STAGE 4 CHRONIC RENAL FAILURE: Primary | ICD-10-CM

## 2019-10-30 DIAGNOSIS — R76.8 ELEVATED SERUM IMMUNOGLOBULIN FREE LIGHT CHAIN LEVEL: ICD-10-CM

## 2019-10-30 DIAGNOSIS — I48.0 PAROXYSMAL ATRIAL FIBRILLATION: ICD-10-CM

## 2019-10-30 DIAGNOSIS — D50.0 IRON DEFICIENCY ANEMIA DUE TO CHRONIC BLOOD LOSS: ICD-10-CM

## 2019-10-30 DIAGNOSIS — D68.9 COAGULOPATHY: ICD-10-CM

## 2019-10-30 LAB
ALBUMIN SERPL BCP-MCNC: 3.6 G/DL (ref 3.5–5.2)
ALP SERPL-CCNC: 76 U/L (ref 55–135)
ALT SERPL W/O P-5'-P-CCNC: 9 U/L (ref 10–44)
ANION GAP SERPL CALC-SCNC: 10 MMOL/L (ref 8–16)
AST SERPL-CCNC: 20 U/L (ref 10–40)
BASOPHILS # BLD AUTO: 0.04 K/UL (ref 0–0.2)
BASOPHILS NFR BLD: 0.7 % (ref 0–1.9)
BILIRUB SERPL-MCNC: 0.5 MG/DL (ref 0.1–1)
BUN SERPL-MCNC: 35 MG/DL (ref 8–23)
CALCIUM SERPL-MCNC: 8.3 MG/DL (ref 8.7–10.5)
CHLORIDE SERPL-SCNC: 105 MMOL/L (ref 95–110)
CO2 SERPL-SCNC: 28 MMOL/L (ref 23–29)
CREAT SERPL-MCNC: 1.6 MG/DL (ref 0.5–1.4)
DIFFERENTIAL METHOD: ABNORMAL
EOSINOPHIL # BLD AUTO: 0.1 K/UL (ref 0–0.5)
EOSINOPHIL NFR BLD: 2.2 % (ref 0–8)
ERYTHROCYTE [DISTWIDTH] IN BLOOD BY AUTOMATED COUNT: 17.2 % (ref 11.5–14.5)
EST. GFR  (AFRICAN AMERICAN): 33 ML/MIN/1.73 M^2
EST. GFR  (NON AFRICAN AMERICAN): 29 ML/MIN/1.73 M^2
FERRITIN SERPL-MCNC: 1117 NG/ML (ref 20–300)
GLUCOSE SERPL-MCNC: 91 MG/DL (ref 70–110)
HCT VFR BLD AUTO: 35.8 % (ref 37–48.5)
HGB BLD-MCNC: 10.9 G/DL (ref 12–16)
IMM GRANULOCYTES # BLD AUTO: 0.05 K/UL (ref 0–0.04)
IMM GRANULOCYTES NFR BLD AUTO: 0.9 % (ref 0–0.5)
IRON SERPL-MCNC: 50 UG/DL (ref 30–160)
LYMPHOCYTES # BLD AUTO: 1.2 K/UL (ref 1–4.8)
LYMPHOCYTES NFR BLD: 21.4 % (ref 18–48)
MCH RBC QN AUTO: 32 PG (ref 27–31)
MCHC RBC AUTO-ENTMCNC: 30.4 G/DL (ref 32–36)
MCV RBC AUTO: 105 FL (ref 82–98)
MONOCYTES # BLD AUTO: 0.6 K/UL (ref 0.3–1)
MONOCYTES NFR BLD: 11.5 % (ref 4–15)
NEUTROPHILS # BLD AUTO: 3.4 K/UL (ref 1.8–7.7)
NEUTROPHILS NFR BLD: 63.3 % (ref 38–73)
NRBC BLD-RTO: 0 /100 WBC
PLATELET # BLD AUTO: 129 K/UL (ref 150–350)
PMV BLD AUTO: 10.6 FL (ref 9.2–12.9)
POTASSIUM SERPL-SCNC: 4.4 MMOL/L (ref 3.5–5.1)
PROT SERPL-MCNC: 6.6 G/DL (ref 6–8.4)
RBC # BLD AUTO: 3.41 M/UL (ref 4–5.4)
SATURATED IRON: 15 % (ref 20–50)
SODIUM SERPL-SCNC: 143 MMOL/L (ref 136–145)
TOTAL IRON BINDING CAPACITY: 327 UG/DL (ref 250–450)
TRANSFERRIN SERPL-MCNC: 221 MG/DL (ref 200–375)
WBC # BLD AUTO: 5.41 K/UL (ref 3.9–12.7)

## 2019-10-30 PROCEDURE — 99215 PR OFFICE/OUTPT VISIT, EST, LEVL V, 40-54 MIN: ICD-10-PCS | Mod: S$PBB,,, | Performed by: NURSE PRACTITIONER

## 2019-10-30 PROCEDURE — 99999 PR PBB SHADOW E&M-EST. PATIENT-LVL III: CPT | Mod: PBBFAC,,, | Performed by: NURSE PRACTITIONER

## 2019-10-30 PROCEDURE — 80053 COMPREHEN METABOLIC PANEL: CPT

## 2019-10-30 PROCEDURE — 99999 PR PBB SHADOW E&M-EST. PATIENT-LVL III: ICD-10-PCS | Mod: PBBFAC,,, | Performed by: NURSE PRACTITIONER

## 2019-10-30 PROCEDURE — 36415 COLL VENOUS BLD VENIPUNCTURE: CPT

## 2019-10-30 PROCEDURE — 82728 ASSAY OF FERRITIN: CPT

## 2019-10-30 PROCEDURE — 99213 OFFICE O/P EST LOW 20 MIN: CPT | Mod: PBBFAC | Performed by: NURSE PRACTITIONER

## 2019-10-30 PROCEDURE — 99215 OFFICE O/P EST HI 40 MIN: CPT | Mod: S$PBB,,, | Performed by: NURSE PRACTITIONER

## 2019-10-30 PROCEDURE — 83540 ASSAY OF IRON: CPT

## 2019-10-30 NOTE — PATIENT INSTRUCTIONS
Iron-Deficiency Anemia (Adult)  Red blood cells carry oxygen to the tissues of your body. Anemia is a condition in which you have too few red blood cells. You need iron to make red cells. Anemia makes you feel tired and run down. When anemia becomes severe, your skin becomes pale. You may feel short of breath after physical activity. Other symptoms include:  · Headaches  · Dizziness  · Leg cramps with physical activity  · Drowsiness  · Restless legs  Your anemia is caused by not having enough iron in your body. This may be because of:  · Loss of blood. This can be caused by heavy menstrual periods. It can also be caused by bleeding from the stomach or intestines.  · Poor diet. You may not be eating enough foods that contain iron.  · Inability to absorb iron from the foods you eat  · Pregnancy  If your blood count is low enough, your healthcare provider may prescribe an iron supplement. It usually takes about 2 to 3 months of treatment with iron supplements to correct anemia. Severe cases of anemia need a blood transfusion to quickly ease symptoms and deliver more oxygen to the cells.  Home care  Follow these guidelines when caring for yourself at home:  · Eat foods high in iron. This will boost the amount of iron stored in your body. It is a natural way to build up the number of blood cells. Good sources of iron include beef, liver, spinach and other dark green leafy vegetables, whole grains, beans, and nuts.  · Do not overexert yourself.  · Talk with your healthcare provider before traveling by air or traveling to high altitudes.  Follow-up care  Follow up with your healthcare provider in 2 months, or as advised. This is to have another red blood cell count to be sure your anemia has been fixed.  When to seek medical advice  Call your healthcare provider right away if any of these occur:  · Shortness of breath or chest pain  · Dizziness or fainting  · Vomiting blood or passing red or black-colored stool   Date  Last Reviewed: 2/25/2016  © 1274-4345 [employer]. [address] [place] [address], [place] [address]. All rights reserved. This information is not intended as a substitute for professional medical care. Always follow your healthcare professional's instructions.          Epoetin Godwin injection  What is this medicine?  EPOETIN GODWIN (e NEGRA e tin AL fa) helps your body make more red blood cells. This medicine is used to treat anemia caused by chronic kidney failure, cancer chemotherapy, or HIV-therapy. It may also be used before surgery if you have anemia.  How should I use this medicine?  This medicine is for injection into a vein or under the skin. It is usually given by a health care professional in a hospital or clinic setting.  If you get this medicine at home, you will be taught how to prepare and give this medicine. Use exactly as directed. Take your medicine at regular intervals. Do not take your medicine more often than directed.  It is important that you put your used needles and syringes in a special sharps container. Do not put them in a trash can. If you do not have a sharps container, call your pharmacist or healthcare provider to get one.  Talk to your pediatrician regarding the use of this medicine in children. While this drug may be prescribed for selected conditions, precautions do apply.  What side effects may I notice from receiving this medicine?  Side effects that you should report to your doctor or health care professional as soon as possible:  · allergic reactions like skin rash, itching or hives, swelling of the face, lips, or tongue  · breathing problems  · changes in vision  · chest pain  · confusion, trouble speaking or understanding  · feeling faint or lightheaded, falls  · high blood pressure  · muscle aches or pains  · pain, swelling, warmth in the leg  · rapid weight gain  · severe headaches  · sudden numbness or weakness of the face, arm or leg  · trouble walking, dizziness, loss  of balance or coordination  · seizures (convulsions)  · swelling of the ankles, feet, hands  · unusually weak or tired  Side effects that usually do not require medical attention (report to your doctor or health care professional if they continue or are bothersome):  · diarrhea  · fever, chills (flu-like symptoms)  · headaches  · nausea, vomiting  · redness, stinging, or swelling at site where injected  What may interact with this medicine?  Do not take this medicine with any of the following medications:  · darbepoetin abby  What if I miss a dose?  If you miss a dose, take it as soon as you can. If it is almost time for your next dose, take only that dose. Do not take double or extra doses.  Where should I keep my medicine?  Keep out of the reach of children.  Store in a refrigerator between 2 and 8 degrees C (36 and 46 degrees F). Do not freeze or shake. Throw away any unused portion if using a single-dose vial. Multi-dose vials can be kept in the refrigerator for up to 21 days after the initial dose. Throw away unused medicine.  What should I tell my health care provider before I take this medicine?  They need to know if you have any of these conditions:  · blood clotting disorders  · cancer patient not on chemotherapy  · cystic fibrosis  · heart disease, such as angina or heart failure  · hemoglobin level of 12 g/dL or greater  · high blood pressure  · low levels of folate, iron, or vitamin B12  · seizures  · an unusual or allergic reaction to erythropoietin, albumin, benzyl alcohol, hamster proteins, other medicines, foods, dyes, or preservatives  · pregnant or trying to get pregnant  · breast-feeding  What should I watch for while using this medicine?  Visit your prescriber or health care professional for regular checks on your progress and for the needed blood tests and blood pressure measurements. It is especially important for the doctor to make sure your hemoglobin level is in the desired range, to limit  the risk of potential side effects and to give you the best benefit. Keep all appointments for any recommended tests. Check your blood pressure as directed. Ask your doctor what your blood pressure should be and when you should contact him or her.  As your body makes more red blood cells, you may need to take iron, folic acid, or vitamin B supplements. Ask your doctor or health care provider which products are right for you. If you have kidney disease continue dietary restrictions, even though this medication can make you feel better. Talk with your doctor or health care professional about the foods you eat and the vitamins that you take.  NOTE:This sheet is a summary. It may not cover all possible information. If you have questions about this medicine, talk to your doctor, pharmacist, or health care provider. Copyright© 2017 [employer]

## 2019-10-30 NOTE — PROGRESS NOTES
Subjective:       Patient ID: Jennifer Mathews is a 87 y.o. female.    Chief Complaint: Anemia    87-year-old  female who presents to the Hematology Oncology Clinic today as a follow-up for anemia, s/p Feraheme.  I have reviewed all the patient's relevant clinical history available medical record have utilized as my evaluation management recommendations today.  She continues on Eliquis at this time.  The patient reports that she does have some generalized weakness and fatigue but overall feels better since the feraheme.  She denies any melena, hematochezia, hematemesis, hemoptysis or hematuria.  She denies any chest pain.  She reports shortness of breath with exertion.  She denies any shortness of breath at rest.  She reports chronic swelling in her legs.  This is unchanged.  She denies any nausea, vomiting or abdominal pain. She denies any bowel or urinary complaints.      Anemia   There has been no abdominal pain, bruising/bleeding easily, confusion, fever, light-headedness or palpitations.     Review of Systems   Constitutional: Positive for fatigue. Negative for activity change, appetite change, chills, diaphoresis, fever and unexpected weight change.   HENT: Negative for congestion, hearing loss, mouth sores, nosebleeds and trouble swallowing.    Eyes: Negative for discharge and visual disturbance.   Respiratory: Negative for cough, chest tightness and shortness of breath.    Cardiovascular: Negative for chest pain, palpitations and leg swelling.   Gastrointestinal: Positive for abdominal distention. Negative for abdominal pain, blood in stool, constipation, diarrhea, nausea and vomiting.   Endocrine: Negative for cold intolerance and heat intolerance.   Genitourinary: Negative for difficulty urinating, dyspareunia and hematuria.   Musculoskeletal: Positive for arthralgias, back pain, gait problem and myalgias.   Skin: Negative.    Neurological: Negative for dizziness, weakness, light-headedness and  headaches.   Hematological: Negative for adenopathy. Does not bruise/bleed easily.   Psychiatric/Behavioral: Negative for agitation, behavioral problems and confusion. The patient is nervous/anxious.        Medication List with Changes/Refills   Current Medications    ACETAMINOPHEN (TYLENOL EXTRA STRENGTH) 325 MG TABLET    Take 2 tablets (650 mg total) by mouth every 8 (eight) hours.    ALLOPURINOL (ZYLOPRIM) 100 MG TABLET    Do not exceed 1 tablet (100 mg) per day. Pt has renal insufficiency    AMIODARONE (PACERONE) 200 MG TAB    Take 1 tablet (200 mg total) by mouth once daily.    APIXABAN (ELIQUIS) 2.5 MG TAB    Take 1 tablet (2.5 mg total) by mouth 2 (two) times daily.    ASPIRIN (ECOTRIN) 81 MG EC TABLET    Take 1 tablet (81 mg total) by mouth once daily.    CARVEDILOL (COREG) 25 MG TABLET    TAKE 1 TABLET BY MOUTH TWICE A DAY WITH MEALS    CIPROFLOXACIN HCL (CIPRO) 500 MG TABLET    Take 1 tablet (500 mg total) by mouth 2 (two) times daily.    DIGOXIN (LANOXIN) 125 MCG TABLET    Take 125 mcg by mouth every other day.    FUROSEMIDE (LASIX) 40 MG TABLET    Take 2 tablets (80 mg total) by mouth 2 (two) times daily.    GABAPENTIN (NEURONTIN) 100 MG CAPSULE    TAKE ONE CAPSULE BY MOUTH TWO TIMES DAILY    LEVOTHYROXINE (SYNTHROID) 75 MCG TABLET    Take 1 tablet (75 mcg total) by mouth once daily.    LOSARTAN (COZAAR) 25 MG TABLET    Take 25 mg by mouth once daily.    METOLAZONE (ZAROXOLYN) 2.5 MG TABLET    30 minutes after Lasix as directed    PREDNISOLONE ACETATE (PRED FORTE) 1 % DRPS    Place 1 drop into both eyes 4 (four) times daily.     Objective:     Vitals:    10/30/19 0955   BP: 132/62   Pulse: 70   Temp: 97.4 °F (36.3 °C)     Physical Exam   Constitutional: She is oriented to person, place, and time. She appears well-developed and well-nourished. No distress.   HENT:   Head: Normocephalic and atraumatic.   Right Ear: Hearing and external ear normal.   Left Ear: Hearing and external ear normal.   Nose: No  rhinorrhea or sinus tenderness. Right sinus exhibits no maxillary sinus tenderness and no frontal sinus tenderness. Left sinus exhibits no maxillary sinus tenderness and no frontal sinus tenderness.   Mouth/Throat: Uvula is midline, oropharynx is clear and moist and mucous membranes are normal. No oral lesions.   Eyes: Pupils are equal, round, and reactive to light. Conjunctivae are normal. Right eye exhibits no discharge. Left eye exhibits no discharge.   Neck: Normal range of motion. Carotid bruit is not present. No tracheal deviation present. No thyromegaly present.   Cardiovascular: Normal rate, regular rhythm, S1 normal, S2 normal, normal heart sounds and intact distal pulses.   No murmur heard.  Pulses:       Dorsalis pedis pulses are 2+ on the right side, and 2+ on the left side.   Pulmonary/Chest: Effort normal and breath sounds normal. No respiratory distress.   Abdominal: Soft. Bowel sounds are normal. She exhibits no distension and no mass. There is no tenderness.   Musculoskeletal: Normal range of motion. She exhibits edema. She exhibits no tenderness.   Lymphadenopathy:     She has no cervical adenopathy.        Right: No supraclavicular adenopathy present.        Left: No supraclavicular adenopathy present.   Neurological: She is alert and oriented to person, place, and time. She has normal strength. No sensory deficit. Coordination and gait normal.   Skin: Skin is warm and dry. Capillary refill takes less than 2 seconds. No rash noted. There is pallor.   Psychiatric: Her speech is normal and behavior is normal. Judgment and thought content normal. Her mood appears anxious. Cognition and memory are normal. She does not exhibit a depressed mood.   Nursing note and vitals reviewed.      Assessment:       Problem List Items Addressed This Visit        Cardiac/Vascular    Chronic combined systolic and diastolic congestive heart failure    A-fib       Renal/    Anemia associated with stage 4 chronic renal  failure - Primary    Relevant Orders    CBC auto differential    Comprehensive metabolic panel       Immunology/Multi System    Elevated serum immunoglobulin free light chain level       Hematology    Coagulopathy       Oncology    Iron deficiency anemia due to chronic blood loss          Plan:     Anemia r/t CKD and Iron Deficiency  --Treated with Procrit in the past for Hgb <10.0  --Hgb: 10.9 today, will hold Procrit 20,000U today, last received 4/2019  --Return to clinic in 8 weeks with labs.  --Patient is iron deficient, will replete with IV Injectafer x2 doses.      I will review assessment/plan with collaborating physician Dr. Adiel Gary.

## 2019-10-31 ENCOUNTER — OFFICE VISIT (OUTPATIENT)
Dept: OPHTHALMOLOGY | Facility: CLINIC | Age: 84
End: 2019-10-31
Payer: MEDICARE

## 2019-10-31 ENCOUNTER — EXTERNAL CHRONIC CARE MANAGEMENT (OUTPATIENT)
Dept: PRIMARY CARE CLINIC | Facility: CLINIC | Age: 84
End: 2019-10-31
Payer: MEDICARE

## 2019-10-31 DIAGNOSIS — H26.491 PCO (POSTERIOR CAPSULE OPACIFICATION), RIGHT: Primary | ICD-10-CM

## 2019-10-31 DIAGNOSIS — H26.492 PCO (POSTERIOR CAPSULAR OPACIFICATION), LEFT: ICD-10-CM

## 2019-10-31 PROCEDURE — 92012 INTRM OPH EXAM EST PATIENT: CPT | Mod: S$PBB,,, | Performed by: OPTOMETRIST

## 2019-10-31 PROCEDURE — 99490 CHRNC CARE MGMT STAFF 1ST 20: CPT | Mod: PBBFAC | Performed by: FAMILY MEDICINE

## 2019-10-31 PROCEDURE — 99999 PR PBB SHADOW E&M-EST. PATIENT-LVL II: CPT | Mod: PBBFAC,,, | Performed by: OPTOMETRIST

## 2019-10-31 PROCEDURE — 99212 OFFICE O/P EST SF 10 MIN: CPT | Mod: PBBFAC | Performed by: OPTOMETRIST

## 2019-10-31 PROCEDURE — 99999 PR PBB SHADOW E&M-EST. PATIENT-LVL II: ICD-10-PCS | Mod: PBBFAC,,, | Performed by: OPTOMETRIST

## 2019-10-31 PROCEDURE — 92012 PR EYE EXAM, EST PATIENT,INTERMED: ICD-10-PCS | Mod: S$PBB,,, | Performed by: OPTOMETRIST

## 2019-10-31 PROCEDURE — 99490 CHRNC CARE MGMT STAFF 1ST 20: CPT | Mod: S$PBB,,, | Performed by: FAMILY MEDICINE

## 2019-10-31 PROCEDURE — 99490 PR CHRONIC CARE MGMT, 1ST 20 MIN: ICD-10-PCS | Mod: S$PBB,,, | Performed by: FAMILY MEDICINE

## 2019-10-31 NOTE — PROGRESS NOTES
HPI     7-10 days S/P yag cap both eyes per [facility].  Patient states she sees much better now.  Last eye visit 10/115/2019 [facility].  Last eye visit with TRF 09/18/2019.    Last edited by Salina Lim on 10/31/2019  9:02 AM. (History)            Assessment /Plan     For exam results, see Encounter Report.    PCO (posterior capsule opacification), right    PCO (posterior capsular opacification), left      Stable IOP  Improved subjective vision.    No change in Rx  D/C drops, continue AT    RTC 1 year.

## 2019-11-05 ENCOUNTER — CLINICAL SUPPORT (OUTPATIENT)
Dept: CARDIOLOGY | Facility: CLINIC | Age: 84
End: 2019-11-05
Attending: INTERNAL MEDICINE
Payer: MEDICARE

## 2019-11-05 ENCOUNTER — OFFICE VISIT (OUTPATIENT)
Dept: CARDIOLOGY | Facility: CLINIC | Age: 84
End: 2019-11-05
Payer: MEDICARE

## 2019-11-05 VITALS
HEART RATE: 70 BPM | HEIGHT: 62 IN | OXYGEN SATURATION: 90 % | SYSTOLIC BLOOD PRESSURE: 136 MMHG | WEIGHT: 112.19 LBS | BODY MASS INDEX: 20.65 KG/M2 | DIASTOLIC BLOOD PRESSURE: 70 MMHG

## 2019-11-05 DIAGNOSIS — I25.5 ISCHEMIC CARDIOMYOPATHY: ICD-10-CM

## 2019-11-05 DIAGNOSIS — I50.42 CHRONIC COMBINED SYSTOLIC AND DIASTOLIC CONGESTIVE HEART FAILURE: Primary | ICD-10-CM

## 2019-11-05 DIAGNOSIS — E78.5 DYSLIPIDEMIA: ICD-10-CM

## 2019-11-05 DIAGNOSIS — I48.0 PAROXYSMAL ATRIAL FIBRILLATION: ICD-10-CM

## 2019-11-05 DIAGNOSIS — Z95.2 S/P MVR (MITRAL VALVE REPLACEMENT): ICD-10-CM

## 2019-11-05 LAB
AORTIC VALVE REGURGITATION: ABNORMAL
ESTIMATED PA SYSTOLIC PRESSURE: 69.17
RETIRED EF AND QEF - SEE NOTES: 15 (ref 55–65)
TRICUSPID VALVE REGURGITATION: ABNORMAL

## 2019-11-05 PROCEDURE — 99214 OFFICE O/P EST MOD 30 MIN: CPT | Mod: PBBFAC | Performed by: PHYSICIAN ASSISTANT

## 2019-11-05 PROCEDURE — 99214 PR OFFICE/OUTPT VISIT, EST, LEVL IV, 30-39 MIN: ICD-10-PCS | Mod: S$PBB,,, | Performed by: PHYSICIAN ASSISTANT

## 2019-11-05 PROCEDURE — 93306 2D ECHO WITH COLOR FLOW DOPPLER: ICD-10-PCS | Mod: 26,S$PBB,, | Performed by: INTERNAL MEDICINE

## 2019-11-05 PROCEDURE — 93306 TTE W/DOPPLER COMPLETE: CPT | Mod: PBBFAC | Performed by: INTERNAL MEDICINE

## 2019-11-05 PROCEDURE — 99999 PR PBB SHADOW E&M-EST. PATIENT-LVL IV: ICD-10-PCS | Mod: PBBFAC,,, | Performed by: PHYSICIAN ASSISTANT

## 2019-11-05 PROCEDURE — 99214 OFFICE O/P EST MOD 30 MIN: CPT | Mod: S$PBB,,, | Performed by: PHYSICIAN ASSISTANT

## 2019-11-05 PROCEDURE — 99999 PR PBB SHADOW E&M-EST. PATIENT-LVL IV: CPT | Mod: PBBFAC,,, | Performed by: PHYSICIAN ASSISTANT

## 2019-11-05 NOTE — PROGRESS NOTES
Subjective:    Patient ID:  Jennifer Mathews is a 87 y.o. female who presents for follow-up of Chronic combined systolic and diastolic congestive heart anabell      HPI  Ms. Mathesw is an 85 yo female with a PMH of mitral valve replacement x 3, HTN, PAF, ACS, combined CHF, PPM-CRT-D placement, s/p MVR, edema, pacemaker placement, and CKD who presents for CHF follow-up. She returns today and states she is doing well. No real cardiac complaints. Occasional SOB/GAR and worsening BLE edema, generally controlled with PRN metolazone. No linsey chest pain or tightness. No orthopnea, PND, or excessive weight gain. No lightheadedness, dizziness, palpitations, near syncope, or syncope. BP controlled and stable. Patient reports compliance with her medications. Occasional hemorrhoidal bleeding on Eliquis. Nothing sustained. No s/s of TIA/CVA. Very mindful of her salt intake. Recent BMP showed stable creatinine. Echo from today pending.    Patient requesting referral to , will arrange for CHF monitoring.     Review of Systems   Constitution: Negative for chills, decreased appetite, fever and malaise/fatigue.   HENT: Negative for congestion, hoarse voice and sore throat.    Eyes: Negative for blurred vision and discharge.   Cardiovascular: Positive for dyspnea on exertion and leg swelling. Negative for chest pain, claudication, cyanosis, irregular heartbeat, near-syncope, orthopnea, palpitations and paroxysmal nocturnal dyspnea.   Respiratory: Negative for cough, hemoptysis, shortness of breath, snoring, sputum production and wheezing.    Endocrine: Negative for cold intolerance and heat intolerance.   Hematologic/Lymphatic: Negative for bleeding problem. Bruises/bleeds easily.   Skin: Negative for rash.   Musculoskeletal: Positive for arthritis and joint pain. Negative for back pain, joint swelling, muscle cramps, muscle weakness and myalgias.   Gastrointestinal: Negative for abdominal pain, constipation, diarrhea, heartburn,  "melena and nausea.   Genitourinary: Negative for hematuria.   Neurological: Negative for dizziness, focal weakness, headaches, light-headedness, loss of balance, numbness, paresthesias, seizures and weakness.   Psychiatric/Behavioral: Negative for memory loss. The patient does not have insomnia.    Allergic/Immunologic: Negative for hives.     /70 (BP Location: Left arm, Patient Position: Sitting)   Pulse 70   Ht 5' 2" (1.575 m)   Wt 50.9 kg (112 lb 3.4 oz)   LMP  (LMP Unknown)   SpO2 (!) 90%   BMI 20.52 kg/m²     Objective:    Physical Exam   Constitutional: She is oriented to person, place, and time. She appears well-developed and well-nourished. No distress.   HENT:   Head: Normocephalic and atraumatic.   Eyes: Pupils are equal, round, and reactive to light. Right eye exhibits no discharge. Left eye exhibits no discharge.   Neck: Neck supple. No JVD present.   Cardiovascular: Normal rate, regular rhythm, S1 normal and S2 normal.   Murmur heard.  High-pitched blowing holosystolic murmur is present at the apex.  Pulmonary/Chest: Effort normal and breath sounds normal. No respiratory distress. She has no wheezes. She has no rales.   CRT-D site well-healed   Abdominal: Soft. She exhibits no distension.   Musculoskeletal: She exhibits edema (BLE).   Neurological: She is alert and oriented to person, place, and time.   Skin: Skin is warm and dry. She is not diaphoretic. No erythema.   CVI changes BLE   Psychiatric: She has a normal mood and affect. Her behavior is normal. Thought content normal.   Nursing note and vitals reviewed.      Chemistry        Component Value Date/Time     10/30/2019 0946    K 4.4 10/30/2019 0946     10/30/2019 0946    CO2 28 10/30/2019 0946    BUN 35 (H) 10/30/2019 0946    CREATININE 1.6 (H) 10/30/2019 0946    GLU 91 10/30/2019 0946        Component Value Date/Time    CALCIUM 8.3 (L) 10/30/2019 0946    ALKPHOS 76 10/30/2019 0946    AST 20 10/30/2019 0946    ALT 9 (L) " 10/30/2019 0946    BILITOT 0.5 10/30/2019 0946    ESTGFRAFRICA 33 (A) 10/30/2019 0946    EGFRNONAA 29 (A) 10/30/2019 0946          Assessment:       1. Chronic combined systolic and diastolic congestive heart failure    2. S/P MVR (mitral valve replacement)    3. Ischemic cardiomyopathy    4. Dyslipidemia    5. Paroxysmal atrial fibrillation      Patient presents for f/u. Doing clinically well. CHF symptoms managed well with prn metolazone dosing. Recent BMP showed stable creatinine. Continue same meds/mgmt. 2D echo from today pending.  Plan:   -Continue current medical management and risk factor modification  -Cardiac, low salt diet  -2D echo pending, phone review of results  - referral entered  -RTC 4 months with Giorgi

## 2019-11-06 ENCOUNTER — OFFICE VISIT (OUTPATIENT)
Dept: UROLOGY | Facility: CLINIC | Age: 84
End: 2019-11-06
Payer: MEDICARE

## 2019-11-06 VITALS
SYSTOLIC BLOOD PRESSURE: 132 MMHG | DIASTOLIC BLOOD PRESSURE: 62 MMHG | HEART RATE: 71 BPM | BODY MASS INDEX: 20.53 KG/M2 | WEIGHT: 111.56 LBS | HEIGHT: 62 IN

## 2019-11-06 DIAGNOSIS — N39.0 URINARY TRACT INFECTION WITHOUT HEMATURIA, SITE UNSPECIFIED: ICD-10-CM

## 2019-11-06 DIAGNOSIS — N32.81 OAB (OVERACTIVE BLADDER): Primary | ICD-10-CM

## 2019-11-06 LAB
BACTERIA #/AREA URNS AUTO: ABNORMAL /HPF
BILIRUB SERPL-MCNC: NORMAL MG/DL
BLOOD URINE, POC: NORMAL
COLOR, POC UA: YELLOW
GLUCOSE UR QL STRIP: NORMAL
HYALINE CASTS UR QL AUTO: 18 /LPF
KETONES UR QL STRIP: NORMAL
LEUKOCYTE ESTERASE URINE, POC: NORMAL
MICROSCOPIC COMMENT: ABNORMAL
NITRITE, POC UA: NORMAL
PH, POC UA: 5
POC RESIDUAL URINE VOLUME: 177 ML (ref 0–100)
PROTEIN, POC: NORMAL
SPECIFIC GRAVITY, POC UA: 1.01
SQUAMOUS #/AREA URNS AUTO: 1 /HPF
UROBILINOGEN, POC UA: NORMAL
WBC #/AREA URNS AUTO: 53 /HPF (ref 0–5)
WBC CLUMPS UR QL AUTO: ABNORMAL

## 2019-11-06 PROCEDURE — 99204 OFFICE O/P NEW MOD 45 MIN: CPT | Mod: S$PBB,,, | Performed by: UROLOGY

## 2019-11-06 PROCEDURE — 99999 PR PBB SHADOW E&M-EST. PATIENT-LVL III: ICD-10-PCS | Mod: PBBFAC,,, | Performed by: UROLOGY

## 2019-11-06 PROCEDURE — 99204 PR OFFICE/OUTPT VISIT, NEW, LEVL IV, 45-59 MIN: ICD-10-PCS | Mod: S$PBB,,, | Performed by: UROLOGY

## 2019-11-06 PROCEDURE — 99999 PR PBB SHADOW E&M-EST. PATIENT-LVL III: CPT | Mod: PBBFAC,,, | Performed by: UROLOGY

## 2019-11-06 PROCEDURE — 81001 URINALYSIS AUTO W/SCOPE: CPT

## 2019-11-06 PROCEDURE — 81002 URINALYSIS NONAUTO W/O SCOPE: CPT | Mod: PBBFAC | Performed by: UROLOGY

## 2019-11-06 PROCEDURE — 99213 OFFICE O/P EST LOW 20 MIN: CPT | Mod: PBBFAC,25 | Performed by: UROLOGY

## 2019-11-06 PROCEDURE — 87086 URINE CULTURE/COLONY COUNT: CPT

## 2019-11-06 PROCEDURE — 51798 US URINE CAPACITY MEASURE: CPT | Mod: PBBFAC | Performed by: UROLOGY

## 2019-11-06 RX ORDER — METHENAMINE HIPPURATE 1000 MG/1
1 TABLET ORAL 2 TIMES DAILY
Qty: 60 TABLET | Refills: 11 | Status: SHIPPED | OUTPATIENT
Start: 2019-11-06 | End: 2020-10-15 | Stop reason: SDUPTHER

## 2019-11-06 NOTE — LETTER
November 6, 2019      Desirae Bello MD  [address] [place] [address] Dr Nyla [place]ORALEZ [id number]           [facility]  [address] [place] [address] JACQUES  NYLA HENRYOLEG [place]ALEZ [address]  Phone: [phone]  Fax: [phone]          Patient: Jennifer Mathews   MR Number: [id number]   YOB: 1932   Date of Visit: 11/6/2019       Dear Dr. Desirae Bello:    Thank you for referring Jennifer Mathews to me for evaluation. Attached you will find relevant portions of my assessment and plan of care.    If you have questions, please do not hesitate to call me. I look forward to following Jennifer Mathews along with you.    Sincerely,    Eben Armijo IV, MD    Enclosure  CC:  No Recipients    If you would like to receive this communication electronically, please contact [email or web address] or [phone] to request more information on [employer] access.    For providers and/or their staff who would like to refer a patient to [facility], please contact us through our one-stop-shop provider referral line, [facility], at [phone].    If you feel you have received this communication in error or would no longer like to receive these types of communications, please e-mail [email or web address]

## 2019-11-06 NOTE — PROGRESS NOTES
Chief Complaint: Frequency and Dysuria    HPI:   11/6/19: Returns after long absence with similar complaints as before.  Reviewed history in detail.   Took AZO 3-4 weeks ago.  Nocturia x2 since having to take PM lasix.  BM 3/day has very loose bowels and has to watch food carefully.  5/16/16: 84 yo woman for the last 15 years had symptoms of dysuria, frequency, urgency.  Takes 80mg lasix a day.  Wears a safety pad for urgency control about one a day usually dry.  No OAB meds. No abd/pelvic pain and no exac/rel factors.  No hematuria.  No urolithiasis.  No other urinary bother.  No recent  history.  Takes AZO occasionally.  The symptoms she has are there all the time and there is a flare up about every 3-4 months.  Not constipated.  Relaxed voiding.    Allergies:  Cephalosporins; Metaxalone; Penicillins; Pregabalin; and Sulfa (sulfonamide antibiotics)    Medications: has a current medication list which includes the following prescription(s): acetaminophen, allopurinol, amiodarone, carvedilol, digoxin, furosemide, gabapentin, levothyroxine, metolazone, apixaban, aspirin, ciprofloxacin hcl, losartan, and prednisolone acetate, and the following Facility-Administered Medications: denosumab.    Review of Systems:  General: No fever, chills, fatigability, or weight loss.  Skin: No rashes, itching, or changes in color or texture of skin.  Chest: Denies GAR, cyanosis, wheezing, cough, and sputum production.  Abdomen: Appetite fine. No weight loss. Denies diarrhea, abdominal pain, hematemesis, or blood in stool.  Musculoskeletal: No joint stiffness or swelling. Denies back pain.  : As above.  All other review of systems negative.    PMH:   has a past medical history of Acute coronary syndrome, Anemia, Anticoagulated on Coumadin (7/13/2015), Arthritis, Asthma, Atrial fibrillation, Basal cell carcinoma (10/2015), Cardiac arrest, Cardiac resynchronization therapy defibrillator (CRT-D) in place (07/13/2015), Cardiomyopathy,  Chronic combined systolic and diastolic congestive heart failure, CKD (chronic kidney disease) stage 3, GFR 30-59 ml/min, Dyslipidemia (1/30/2014), Enterocolitis, Hyperlipidemia, Hypertension, Hypothyroidism, Ischemic cardiomyopathy (01/30/2014), Macular hole of left eye, Macular hole of left eye, LEV (obstructive sleep apnea) (9/30/2013), Pneumonia, Recurrent UTI, Refractive error, Spastic colon, Stroke, and Syncope and collapse.    PSH:   has a past surgical history that includes Colonoscopy; Mitral valve surgery; Cholecystectomy; Appendectomy; Cardiac pacemaker placement (2014); Mitral valve replacement (2014); Cardiac catheterization; Cardiac valuve replacement; Replacement of pacemaker generator (Left, 6/20/2018); Esophagogastroduodenoscopy (N/A, 3/13/2019); and Cataract extraction (Bilateral).    FamHx: family history includes COPD in her father; Cancer in her brother; Coronary artery disease in her father and mother; Diabetes in her brother; Emphysema in her father; Epilepsy in her mother; Heart attack in her mother; Heart disease in her father.    SocHx:  reports that she has never smoked. She has never used smokeless tobacco. She reports that she does not drink alcohol or use drugs.     Physical Exam:  Vitals:   Vitals:    11/06/19 1500   BP: 132/62   Pulse: 71     General: A&Ox3. No apparent distress. No deformities.  Neck: No masses. Normal thyroid.  Lungs: normal inspiration. No use of accessory muscles.  Heart: normal pulse. No arrhythmias.  Abdomen: Soft. NT. ND. No masses. No hernias. No hepatosplenomegaly.  Lymphatic: Neck and groin nodes negative.  Skin: The skin is warm and dry. No jaundice.  Ext: No c/c/e.  :     11/6/19: External genitalia normal    5/16: External genitalia normal. No lesions. Meatus normal size and location. Urethra normal. No masses. Bladder normal. No fullness or masses. Vagina normal with no discharge or lesions. Anus/perineum normal. Uterus and adnexa no palpable  abnormalities.    Labs/Studies:   Urinalysis performed in clinic, summary: UA normal  Bladder Scan performed in office:     5/16: PVR 50 ml.    Impression/Plan:   1. OAB predominates.  Cath UA/UCx today to see if UTI present today.  Methenamine empirically.  2. CT RSS to screen upper tracts.

## 2019-11-06 NOTE — PROGRESS NOTES
..Pt in for cath ua/cs. Specimen collected via sterile technique. Pt tolerated well. Informed that we will contact her with results. Pt verbalized understanding.

## 2019-11-08 LAB
BACTERIA UR CULT: NORMAL
BACTERIA UR CULT: NORMAL

## 2019-11-13 ENCOUNTER — HOSPITAL ENCOUNTER (OUTPATIENT)
Dept: RADIOLOGY | Facility: HOSPITAL | Age: 84
Discharge: HOME | End: 2019-11-13
Attending: UROLOGY
Payer: MEDICARE

## 2019-11-13 DIAGNOSIS — N39.0 URINARY TRACT INFECTION WITHOUT HEMATURIA, SITE UNSPECIFIED: ICD-10-CM

## 2019-11-13 DIAGNOSIS — N32.81 OAB (OVERACTIVE BLADDER): ICD-10-CM

## 2019-11-13 PROCEDURE — 74176 CT ABD & PELVIS W/O CONTRAST: CPT | Mod: TC

## 2019-11-15 ENCOUNTER — TELEPHONE (OUTPATIENT)
Dept: CARDIOLOGY | Facility: CLINIC | Age: 84
End: 2019-11-15

## 2019-11-15 NOTE — TELEPHONE ENCOUNTER
Please phone patient. 2D echo reviewed, showed EF of 15-20%, pulmonary HTN.    Continue same medical mgmt.  Thanks

## 2019-11-15 NOTE — TELEPHONE ENCOUNTER
The patient has been notified of this information and all questions answered.    Pt is asking if any new orders were sent in for her home health because no one has reached out to her on this.

## 2019-11-18 DIAGNOSIS — I50.42 CHRONIC COMBINED SYSTOLIC AND DIASTOLIC CONGESTIVE HEART FAILURE: Primary | ICD-10-CM

## 2019-11-19 ENCOUNTER — TELEPHONE (OUTPATIENT)
Dept: RHEUMATOLOGY | Facility: CLINIC | Age: 84
End: 2019-11-19

## 2019-11-19 NOTE — TELEPHONE ENCOUNTER
----- Message from Charmaine Donato sent at 11/19/2019  4:45 PM CST -----  Contact: pt  .Type:  Patient Returning Call    Who Called:pt  Who Left Message for Patient:nurse  Does the patient know what this is regarding?:rs appt  Would the patient rather a call back or a response via [facility]? Call back  Best Call Back Number:[phone]  Additional Information:

## 2019-11-19 NOTE — TELEPHONE ENCOUNTER
----- Message from Ruba Charles sent at 11/19/2019  4:50 PM CST -----  Contact: pt   She's calling in regards to speak with nurse       pls call pt back at [phone] (home)

## 2019-11-19 NOTE — TELEPHONE ENCOUNTER
Returned patient call regarding a call she received to r/s her appointment for lab for 12/31/2019 and her Prolia for the week after. She was unsure who she had spoken with and did not know when her Prolia was to be scheduled for. This nurse will reach out to the infusion center for assistance and call Ms. Mathews back. Ms. [place] states understanding.

## 2019-11-20 ENCOUNTER — TELEPHONE (OUTPATIENT)
Dept: HEMATOLOGY/ONCOLOGY | Facility: CLINIC | Age: 84
End: 2019-11-20

## 2019-11-20 ENCOUNTER — TELEPHONE (OUTPATIENT)
Dept: RHEUMATOLOGY | Facility: HOSPITAL | Age: 84
End: 2019-11-20

## 2019-11-20 NOTE — TELEPHONE ENCOUNTER
Spoke with patient and appointment for Ms. Brooks rescheduled to 12/3/19.  Patient acknowledged. Call ended well.

## 2019-11-20 NOTE — TELEPHONE ENCOUNTER
Jana message for pat  If she needed to speak with us concerning her [facility] appts in 1/23 to please call us at [phone]

## 2019-11-20 NOTE — TELEPHONE ENCOUNTER
----- Message from Mariah Hong sent at 11/20/2019  9:57 AM CST -----  Type:  Needs Medical Advice    Who Called:  Pt  Jennifer  Symptoms (please be specific):      How long has patient had these symptoms:     Pharmacy name and phone #:     Would the patient rather a call back or a response via [facility]?    Call back  Best Call Back Number:   [phone]  Additional Information: pt states she spoke with your office yesterday//her lab appt and Ms Brooks's appt was rescheduled to 12-23-19//she is unable to come in on that date//please call to reschedule//thanks/lh

## 2019-11-27 ENCOUNTER — LAB VISIT (OUTPATIENT)
Dept: LAB | Facility: HOSPITAL | Age: 84
End: 2019-11-27
Attending: PHYSICIAN ASSISTANT
Payer: MEDICARE

## 2019-11-27 DIAGNOSIS — N18.4 ANEMIA ASSOCIATED WITH STAGE 4 CHRONIC RENAL FAILURE: ICD-10-CM

## 2019-11-27 DIAGNOSIS — D63.1 ANEMIA ASSOCIATED WITH STAGE 4 CHRONIC RENAL FAILURE: ICD-10-CM

## 2019-11-27 DIAGNOSIS — R76.8 ELEVATED SERUM IMMUNOGLOBULIN FREE LIGHT CHAIN LEVEL: ICD-10-CM

## 2019-11-27 DIAGNOSIS — D50.0 IRON DEFICIENCY ANEMIA DUE TO CHRONIC BLOOD LOSS: ICD-10-CM

## 2019-11-27 DIAGNOSIS — N18.30 CKD (CHRONIC KIDNEY DISEASE) STAGE 3, GFR 30-59 ML/MIN: ICD-10-CM

## 2019-11-27 LAB
ALBUMIN SERPL BCP-MCNC: 3.5 G/DL (ref 3.5–5.2)
ALP SERPL-CCNC: 85 U/L (ref 55–135)
ALT SERPL W/O P-5'-P-CCNC: 10 U/L (ref 10–44)
ANION GAP SERPL CALC-SCNC: 10 MMOL/L (ref 8–16)
AST SERPL-CCNC: 17 U/L (ref 10–40)
BASOPHILS # BLD AUTO: 0.06 K/UL (ref 0–0.2)
BASOPHILS NFR BLD: 0.6 % (ref 0–1.9)
BILIRUB SERPL-MCNC: 0.7 MG/DL (ref 0.1–1)
BUN SERPL-MCNC: 55 MG/DL (ref 8–23)
CALCIUM SERPL-MCNC: 9.6 MG/DL (ref 8.7–10.5)
CHLORIDE SERPL-SCNC: 95 MMOL/L (ref 95–110)
CO2 SERPL-SCNC: 35 MMOL/L (ref 23–29)
CREAT SERPL-MCNC: 2.2 MG/DL (ref 0.5–1.4)
DIFFERENTIAL METHOD: ABNORMAL
EOSINOPHIL # BLD AUTO: 0.1 K/UL (ref 0–0.5)
EOSINOPHIL NFR BLD: 1.1 % (ref 0–8)
ERYTHROCYTE [DISTWIDTH] IN BLOOD BY AUTOMATED COUNT: 16.9 % (ref 11.5–14.5)
EST. GFR  (AFRICAN AMERICAN): 22.6 ML/MIN/1.73 M^2
EST. GFR  (NON AFRICAN AMERICAN): 19.6 ML/MIN/1.73 M^2
FERRITIN SERPL-MCNC: 870 NG/ML (ref 20–300)
GLUCOSE SERPL-MCNC: 98 MG/DL (ref 70–110)
HCT VFR BLD AUTO: 35.6 % (ref 37–48.5)
HGB BLD-MCNC: 11.1 G/DL (ref 12–16)
IMM GRANULOCYTES # BLD AUTO: 0.06 K/UL (ref 0–0.04)
IMM GRANULOCYTES NFR BLD AUTO: 0.6 % (ref 0–0.5)
IRON SERPL-MCNC: 28 UG/DL (ref 30–160)
LDH SERPL L TO P-CCNC: 234 U/L (ref 110–260)
LYMPHOCYTES # BLD AUTO: 1.6 K/UL (ref 1–4.8)
LYMPHOCYTES NFR BLD: 16.5 % (ref 18–48)
MCH RBC QN AUTO: 32.7 PG (ref 27–31)
MCHC RBC AUTO-ENTMCNC: 31.2 G/DL (ref 32–36)
MCV RBC AUTO: 105 FL (ref 82–98)
MONOCYTES # BLD AUTO: 0.9 K/UL (ref 0.3–1)
MONOCYTES NFR BLD: 9.1 % (ref 4–15)
NEUTROPHILS # BLD AUTO: 7 K/UL (ref 1.8–7.7)
NEUTROPHILS NFR BLD: 72.1 % (ref 38–73)
NRBC BLD-RTO: 0 /100 WBC
PATH REV BLD -IMP: NORMAL
PLATELET # BLD AUTO: 166 K/UL (ref 150–350)
PMV BLD AUTO: 12.3 FL (ref 9.2–12.9)
POTASSIUM SERPL-SCNC: 4.7 MMOL/L (ref 3.5–5.1)
PROT SERPL-MCNC: 6.8 G/DL (ref 6–8.4)
RBC # BLD AUTO: 3.39 M/UL (ref 4–5.4)
SATURATED IRON: 11 % (ref 20–50)
SODIUM SERPL-SCNC: 140 MMOL/L (ref 136–145)
TOTAL IRON BINDING CAPACITY: 258 UG/DL (ref 250–450)
TRANSFERRIN SERPL-MCNC: 174 MG/DL (ref 200–375)
WBC # BLD AUTO: 9.68 K/UL (ref 3.9–12.7)

## 2019-11-27 PROCEDURE — 85060 PATHOLOGIST REVIEW: ICD-10-PCS | Mod: ,,, | Performed by: PATHOLOGY

## 2019-11-27 PROCEDURE — 84165 PROTEIN E-PHORESIS SERUM: CPT | Mod: 26,,, | Performed by: PATHOLOGY

## 2019-11-27 PROCEDURE — 85060 BLOOD SMEAR INTERPRETATION: CPT | Mod: ,,, | Performed by: PATHOLOGY

## 2019-11-27 PROCEDURE — 86334 IMMUNOFIX E-PHORESIS SERUM: CPT

## 2019-11-27 PROCEDURE — 36415 COLL VENOUS BLD VENIPUNCTURE: CPT | Mod: PO

## 2019-11-27 PROCEDURE — 86334 PATHOLOGIST INTERPRETATION IFE: ICD-10-PCS | Mod: 26,,, | Performed by: PATHOLOGY

## 2019-11-27 PROCEDURE — 82728 ASSAY OF FERRITIN: CPT

## 2019-11-27 PROCEDURE — 83540 ASSAY OF IRON: CPT

## 2019-11-27 PROCEDURE — 85025 COMPLETE CBC W/AUTO DIFF WBC: CPT

## 2019-11-27 PROCEDURE — 83520 IMMUNOASSAY QUANT NOS NONAB: CPT | Mod: 59

## 2019-11-27 PROCEDURE — 86334 IMMUNOFIX E-PHORESIS SERUM: CPT | Mod: 26,,, | Performed by: PATHOLOGY

## 2019-11-27 PROCEDURE — 83615 LACTATE (LD) (LDH) ENZYME: CPT

## 2019-11-27 PROCEDURE — 84165 PROTEIN E-PHORESIS SERUM: CPT

## 2019-11-27 PROCEDURE — 80053 COMPREHEN METABOLIC PANEL: CPT

## 2019-11-27 PROCEDURE — 84165 PATHOLOGIST INTERPRETATION SPE: ICD-10-PCS | Mod: 26,,, | Performed by: PATHOLOGY

## 2019-11-29 LAB
ALBUMIN SERPL ELPH-MCNC: 3.49 G/DL (ref 3.35–5.55)
ALPHA1 GLOB SERPL ELPH-MCNC: 0.47 G/DL (ref 0.17–0.41)
ALPHA2 GLOB SERPL ELPH-MCNC: 0.89 G/DL (ref 0.43–0.99)
B-GLOBULIN SERPL ELPH-MCNC: 0.64 G/DL (ref 0.5–1.1)
GAMMA GLOB SERPL ELPH-MCNC: 0.71 G/DL (ref 0.67–1.58)
INTERPRETATION SERPL IFE-IMP: NORMAL
KAPPA LC SER QL IA: 8 MG/DL (ref 0.33–1.94)
KAPPA LC/LAMBDA SER IA: 2.11 (ref 0.26–1.65)
LAMBDA LC SER QL IA: 3.79 MG/DL (ref 0.57–2.63)
PATH REV BLD -IMP: NORMAL
PATHOLOGIST INTERPRETATION IFE: NORMAL
PATHOLOGIST INTERPRETATION SPE: NORMAL
PROT SERPL-MCNC: 6.2 G/DL (ref 6–8.4)

## 2019-11-29 NOTE — PROGRESS NOTES
"Subjective:       Patient ID: Jennifer Mathews is a 87 y.o. female.    Chief Complaint: Gout    Jennifer is here for follow up. She has osteopenia with high frax and gout in the setting of CKD. She is on Prolia for her bone density.  She occasionally uses a rolling walker if she is doing lots of walking, but today ambulating with a cane. No issues tolerating prolia. She gets calcium in her diet.  dexa done fall of 2017 showed stable bmd at the hips, improving at the spine. She has some OA and joint pains but doesn't use nsaids due to coumadin use in the past     Regarding gout she is off colchicine; on allopurinol is at 200mg/day.No issues tolerating her medications. No gout flares recently.  No falls/fxs         Review of Systems   Constitutional: Negative for chills, fatigue and fever.   HENT: Negative for mouth sores, rhinorrhea and sore throat.    Eyes: Negative for pain and redness.   Respiratory: Negative for cough and shortness of breath.    Cardiovascular: Negative for chest pain.        CHF, htn, CAD   Gastrointestinal: Negative for abdominal pain, constipation, diarrhea, nausea and vomiting.   Genitourinary: Negative for dysuria and hematuria.   Musculoskeletal: Positive for arthralgias. Negative for joint swelling and myalgias.   Skin: Negative for rash.   Neurological: Negative for weakness, numbness and headaches.   Hematological:        Chronic anemia   Psychiatric/Behavioral: The patient is not nervous/anxious.          Objective:   BP (!) 122/57   Pulse 73   Ht 5' 2" (1.575 m)   Wt 52.3 kg (115 lb 4.8 oz)   LMP  (LMP Unknown)   BMI 21.09 kg/m²      Physical Exam   Constitutional: She is oriented to person, place, and time and well-developed, well-nourished, and in no distress.   HENT:   Head: Normocephalic and atraumatic.   Eyes: Pupils are equal, round, and reactive to light. Right eye exhibits no discharge.   Neck: Normal range of motion.   Cardiovascular: Normal rate, regular rhythm and normal " heart sounds.  Exam reveals no friction rub.    Pulmonary/Chest: Effort normal and breath sounds normal. No respiratory distress.   Abdominal: Soft. She exhibits no distension. There is no tenderness.   Lymphadenopathy:     She has no cervical adenopathy.   Neurological: She is alert and oriented to person, place, and time.   Skin: No rash noted. No erythema.     Psychiatric: Mood normal.   Musculoskeletal: Normal range of motion. She exhibits no edema or deformity.   Todd hands with oa changes to 1 cmcs, pips and dips, no synovitis, no tender joints  Right shoulder tender to palpation laterally at the subacromial bursa, full rom               Recent Results (from the past 168 hour(s))   Comprehensive metabolic panel    Collection Time: 11/27/19  9:41 AM   Result Value Ref Range    Sodium 140 136 - 145 mmol/L    Potassium 4.7 3.5 - 5.1 mmol/L    Chloride 95 95 - 110 mmol/L    CO2 35 (H) 23 - 29 mmol/L    Glucose 98 70 - 110 mg/dL    BUN, Bld 55 (H) 8 - 23 mg/dL    Creatinine 2.2 (H) 0.5 - 1.4 mg/dL    Calcium 9.6 8.7 - 10.5 mg/dL    Total Protein 6.8 6.0 - 8.4 g/dL    Albumin 3.5 3.5 - 5.2 g/dL    Total Bilirubin 0.7 0.1 - 1.0 mg/dL    Alkaline Phosphatase 85 55 - 135 U/L    AST 17 10 - 40 U/L    ALT 10 10 - 44 U/L    Anion Gap 10 8 - 16 mmol/L    eGFR if African American 22.6 (A) >60 mL/min/1.73 m^2    eGFR if non African American 19.6 (A) >60 mL/min/1.73 m^2   Ferritin    Collection Time: 11/27/19  9:41 AM   Result Value Ref Range    Ferritin 870 (H) 20.0 - 300.0 ng/mL   Iron and TIBC    Collection Time: 11/27/19  9:41 AM   Result Value Ref Range    Iron 28 (L) 30 - 160 ug/dL    Transferrin 174 (L) 200 - 375 mg/dL    TIBC 258 250 - 450 ug/dL    Saturated Iron 11 (L) 20 - 50 %   Lactate dehydrogenase    Collection Time: 11/27/19  9:41 AM   Result Value Ref Range     110 - 260 U/L   Pathologist Interpretation Differential    Collection Time: 11/27/19  9:41 AM   Result Value Ref Range    Pathologist Review  Review completed    Protein electrophoresis, serum    Collection Time: 11/27/19  9:41 AM   Result Value Ref Range    Protein, Serum 6.2 6.0 - 8.4 g/dL    Albumin grams/dl 3.49 3.35 - 5.55 g/dL    Alpha-1 grams/dl 0.47 (H) 0.17 - 0.41 g/dL    Alpha-2 grams/dl 0.89 0.43 - 0.99 g/dL    Beta grams/dl 0.64 0.50 - 1.10 g/dL    Gamma grams/dl 0.71 0.67 - 1.58 g/dL   Immunoglobulin free LT chains blood    Collection Time: 11/27/19  9:41 AM   Result Value Ref Range    Kappa Free Light Chains 8.00 (H) 0.33 - 1.94 mg/dL    Lambda Free Light Chains 3.79 (H) 0.57 - 2.63 mg/dL    Kappa/Lambda FLC Ratio 2.11 (H) 0.26 - 1.65   Immunofixation electrophoresis    Collection Time: 11/27/19  9:41 AM   Result Value Ref Range    Immunofix Interp. SEE COMMENT    CBC auto differential    Collection Time: 11/27/19  9:41 AM   Result Value Ref Range    WBC 9.68 3.90 - 12.70 K/uL    RBC 3.39 (L) 4.00 - 5.40 M/uL    Hemoglobin 11.1 (L) 12.0 - 16.0 g/dL    Hematocrit 35.6 (L) 37.0 - 48.5 %    Mean Corpuscular Volume 105 (H) 82 - 98 fL    Mean Corpuscular Hemoglobin 32.7 (H) 27.0 - 31.0 pg    Mean Corpuscular Hemoglobin Conc 31.2 (L) 32.0 - 36.0 g/dL    RDW 16.9 (H) 11.5 - 14.5 %    Platelets 166 150 - 350 K/uL    MPV 12.3 9.2 - 12.9 fL    Immature Granulocytes 0.6 (H) 0.0 - 0.5 %    Gran # (ANC) 7.0 1.8 - 7.7 K/uL    Immature Grans (Abs) 0.06 (H) 0.00 - 0.04 K/uL    Lymph # 1.6 1.0 - 4.8 K/uL    Mono # 0.9 0.3 - 1.0 K/uL    Eos # 0.1 0.0 - 0.5 K/uL    Baso # 0.06 0.00 - 0.20 K/uL    nRBC 0 0 /100 WBC    Gran% 72.1 38.0 - 73.0 %    Lymph% 16.5 (L) 18.0 - 48.0 %    Mono% 9.1 4.0 - 15.0 %    Eosinophil% 1.1 0.0 - 8.0 %    Basophil% 0.6 0.0 - 1.9 %    Differential Method Automated    Pathologist Review    Collection Time: 11/27/19  9:41 AM   Result Value Ref Range    Pathologist Review Peripheral Smear REVIEWED    Pathologist Interpretation DMITRY    Collection Time: 11/27/19  9:41 AM   Result Value Ref Range    Pathologist Interpretation DMITRY REVIEWED     Pathologist Interpretation SPE    Collection Time: 11/27/19  9:41 AM   Result Value Ref Range    Pathologist Interpretation SPE REVIEWED    CBC auto differential    Collection Time: 12/03/19  8:55 AM   Result Value Ref Range    WBC 7.34 3.90 - 12.70 K/uL    RBC 3.43 (L) 4.00 - 5.40 M/uL    Hemoglobin 11.1 (L) 12.0 - 16.0 g/dL    Hematocrit 36.4 (L) 37.0 - 48.5 %    Mean Corpuscular Volume 106 (H) 82 - 98 fL    Mean Corpuscular Hemoglobin 32.4 (H) 27.0 - 31.0 pg    Mean Corpuscular Hemoglobin Conc 30.5 (L) 32.0 - 36.0 g/dL    RDW 16.2 (H) 11.5 - 14.5 %    Platelets 218 150 - 350 K/uL    MPV 10.2 9.2 - 12.9 fL    Immature Granulocytes 2.0 (H) 0.0 - 0.5 %    Gran # (ANC) 5.1 1.8 - 7.7 K/uL    Immature Grans (Abs) 0.15 (H) 0.00 - 0.04 K/uL    Lymph # 1.3 1.0 - 4.8 K/uL    Mono # 0.6 0.3 - 1.0 K/uL    Eos # 0.2 0.0 - 0.5 K/uL    Baso # 0.08 0.00 - 0.20 K/uL    nRBC 0 0 /100 WBC    Gran% 69.3 38.0 - 73.0 %    Lymph% 17.8 (L) 18.0 - 48.0 %    Mono% 7.8 4.0 - 15.0 %    Eosinophil% 2.0 0.0 - 8.0 %    Basophil% 1.1 0.0 - 1.9 %    Differential Method Automated    Comprehensive metabolic panel    Collection Time: 12/03/19  8:55 AM   Result Value Ref Range    Sodium 143 136 - 145 mmol/L    Potassium 3.8 3.5 - 5.1 mmol/L    Chloride 100 95 - 110 mmol/L    CO2 33 (H) 23 - 29 mmol/L    Glucose 101 70 - 110 mg/dL    BUN, Bld 46 (H) 8 - 23 mg/dL    Creatinine 1.5 (H) 0.5 - 1.4 mg/dL    Calcium 9.9 8.7 - 10.5 mg/dL    Total Protein 6.6 6.0 - 8.4 g/dL    Albumin 3.5 3.5 - 5.2 g/dL    Total Bilirubin 0.4 0.1 - 1.0 mg/dL    Alkaline Phosphatase 73 55 - 135 U/L    AST 18 10 - 40 U/L    ALT 10 10 - 44 U/L    Anion Gap 10 8 - 16 mmol/L    eGFR if African American 36 (A) >60 mL/min/1.73 m^2    eGFR if non African American 31 (A) >60 mL/min/1.73 m^2       Dexa 7.22.15: DF -1.6, Right neck -2.3, spine -1.9 frax 22.4 major, 7.4 hip, decreasing bmd  Dexa 11.20.17: LN -1.9, Right neck -2.2, DF-1.4, spine -1.3, stable hip, improving spine, frax  major 20.4%, hip 6.5%     Assessment:       1. CKD (chronic kidney disease) stage 3, GFR 30-59 ml/min    2. Osteoporosis, senile    3. 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 Osteopenia with high risk of fracture    5. Medication monitoring encounter        Impression:  Osteopenia with high fracture risk: on prolia q 6 months, daily vitamin D; dexa 11.2017 stable bmd at hip, improving spine    Gout: on allopurinol 200mg/day, off colchcine, stable no attacks, uric acid at goal 5.4    medication monitoring: no issues, tolerates well allopurinol, and prolia--    CKD4: seeing nephrology now,     OA mult joints: didi hands, shoulders, knees, avoids nsaids    Plan:       Labs reviewed and done within past 14 days  Ca 9.9, Creat 1.5, eGFR 31  No contraindication for Prolia today, ok for nurse to administer today  Re-evaluate patient again in 6 months to determine if Prolia still medically indicated and appropriate to administer.    KDIGO lab monitoring will be followed according to KDIGO 2017 Clinical Practice Guideline Update for the Diagnosis, Evaluation, Prevention, and Treatment of Chronic Kidney Disease-Mineral and Bone Disorder (CKD-MBD)  Chapter 3.1: Diagnosis of CKD-MBD: biochemical abnormalities      Chew 2 tums daily next 2 wks then recheck ca level in 10 days  Repeat dexa before next visit   Cont allopurinol 200mg/day   Cont vit D daily     cmp in 10 days   See back in 6 mos with cmp, prolia (early cmp, prolia at [place]), dexa scan review

## 2019-11-30 ENCOUNTER — EXTERNAL CHRONIC CARE MANAGEMENT (OUTPATIENT)
Dept: PRIMARY CARE CLINIC | Facility: CLINIC | Age: 84
End: 2019-11-30
Payer: MEDICARE

## 2019-11-30 PROCEDURE — 99490 PR CHRONIC CARE MGMT, 1ST 20 MIN: ICD-10-PCS | Mod: S$PBB,,, | Performed by: FAMILY MEDICINE

## 2019-11-30 PROCEDURE — 99490 CHRNC CARE MGMT STAFF 1ST 20: CPT | Mod: PBBFAC | Performed by: FAMILY MEDICINE

## 2019-11-30 PROCEDURE — 99490 CHRNC CARE MGMT STAFF 1ST 20: CPT | Mod: S$PBB,,, | Performed by: FAMILY MEDICINE

## 2019-12-03 ENCOUNTER — OFFICE VISIT (OUTPATIENT)
Dept: RHEUMATOLOGY | Facility: CLINIC | Age: 84
End: 2019-12-03
Attending: NURSE PRACTITIONER
Payer: MEDICARE

## 2019-12-03 ENCOUNTER — INFUSION (OUTPATIENT)
Dept: RHEUMATOLOGY | Facility: HOSPITAL | Age: 84
End: 2019-12-03
Payer: MEDICARE

## 2019-12-03 ENCOUNTER — OFFICE VISIT (OUTPATIENT)
Dept: HEMATOLOGY/ONCOLOGY | Facility: CLINIC | Age: 84
End: 2019-12-03
Payer: MEDICARE

## 2019-12-03 ENCOUNTER — LAB VISIT (OUTPATIENT)
Dept: LAB | Facility: HOSPITAL | Age: 84
End: 2019-12-03
Attending: NURSE PRACTITIONER
Payer: MEDICARE

## 2019-12-03 VITALS
HEART RATE: 73 BPM | DIASTOLIC BLOOD PRESSURE: 57 MMHG | HEIGHT: 62 IN | BODY MASS INDEX: 21.22 KG/M2 | WEIGHT: 115.31 LBS | TEMPERATURE: 98 F | SYSTOLIC BLOOD PRESSURE: 122 MMHG

## 2019-12-03 VITALS
WEIGHT: 115.31 LBS | SYSTOLIC BLOOD PRESSURE: 122 MMHG | HEART RATE: 73 BPM | BODY MASS INDEX: 21.22 KG/M2 | HEIGHT: 62 IN | DIASTOLIC BLOOD PRESSURE: 57 MMHG

## 2019-12-03 DIAGNOSIS — D63.1 ANEMIA ASSOCIATED WITH STAGE 4 CHRONIC RENAL FAILURE: ICD-10-CM

## 2019-12-03 DIAGNOSIS — N18.4 ANEMIA ASSOCIATED WITH STAGE 4 CHRONIC RENAL FAILURE: ICD-10-CM

## 2019-12-03 DIAGNOSIS — M85.80 OSTEOPENIA WITH HIGH RISK OF FRACTURE: ICD-10-CM

## 2019-12-03 DIAGNOSIS — E03.9 ACQUIRED HYPOTHYROIDISM: ICD-10-CM

## 2019-12-03 DIAGNOSIS — E43 SEVERE MALNUTRITION: ICD-10-CM

## 2019-12-03 DIAGNOSIS — D53.9 MACROCYTIC ANEMIA: ICD-10-CM

## 2019-12-03 DIAGNOSIS — N18.30 CKD (CHRONIC KIDNEY DISEASE) STAGE 3, GFR 30-59 ML/MIN: Primary | ICD-10-CM

## 2019-12-03 DIAGNOSIS — R76.8 ELEVATED SERUM IMMUNOGLOBULIN FREE LIGHT CHAIN LEVEL: ICD-10-CM

## 2019-12-03 DIAGNOSIS — M15.9 PRIMARY OSTEOARTHRITIS INVOLVING MULTIPLE JOINTS: ICD-10-CM

## 2019-12-03 DIAGNOSIS — Z51.81 MEDICATION MONITORING ENCOUNTER: ICD-10-CM

## 2019-12-03 DIAGNOSIS — M81.0 OSTEOPOROSIS, SENILE: ICD-10-CM

## 2019-12-03 DIAGNOSIS — D50.0 IRON DEFICIENCY ANEMIA DUE TO CHRONIC BLOOD LOSS: Primary | ICD-10-CM

## 2019-12-03 DIAGNOSIS — M81.0 OSTEOPOROSIS, SENILE: Primary | ICD-10-CM

## 2019-12-03 DIAGNOSIS — I70.0 AORTIC ATHEROSCLEROSIS: ICD-10-CM

## 2019-12-03 DIAGNOSIS — M1A.39X1 CHRONIC GOUT DUE TO RENAL IMPAIRMENT OF MULTIPLE SITES WITH TOPHUS: ICD-10-CM

## 2019-12-03 LAB
ALBUMIN SERPL BCP-MCNC: 3.5 G/DL (ref 3.5–5.2)
ALP SERPL-CCNC: 73 U/L (ref 55–135)
ALT SERPL W/O P-5'-P-CCNC: 10 U/L (ref 10–44)
ANION GAP SERPL CALC-SCNC: 10 MMOL/L (ref 8–16)
AST SERPL-CCNC: 18 U/L (ref 10–40)
BASOPHILS # BLD AUTO: 0.08 K/UL (ref 0–0.2)
BASOPHILS NFR BLD: 1.1 % (ref 0–1.9)
BILIRUB SERPL-MCNC: 0.4 MG/DL (ref 0.1–1)
BUN SERPL-MCNC: 46 MG/DL (ref 8–23)
CALCIUM SERPL-MCNC: 9.9 MG/DL (ref 8.7–10.5)
CHLORIDE SERPL-SCNC: 100 MMOL/L (ref 95–110)
CO2 SERPL-SCNC: 33 MMOL/L (ref 23–29)
CREAT SERPL-MCNC: 1.5 MG/DL (ref 0.5–1.4)
DIFFERENTIAL METHOD: ABNORMAL
EOSINOPHIL # BLD AUTO: 0.2 K/UL (ref 0–0.5)
EOSINOPHIL NFR BLD: 2 % (ref 0–8)
ERYTHROCYTE [DISTWIDTH] IN BLOOD BY AUTOMATED COUNT: 16.2 % (ref 11.5–14.5)
EST. GFR  (AFRICAN AMERICAN): 36 ML/MIN/1.73 M^2
EST. GFR  (NON AFRICAN AMERICAN): 31 ML/MIN/1.73 M^2
GLUCOSE SERPL-MCNC: 101 MG/DL (ref 70–110)
HCT VFR BLD AUTO: 36.4 % (ref 37–48.5)
HGB BLD-MCNC: 11.1 G/DL (ref 12–16)
IMM GRANULOCYTES # BLD AUTO: 0.15 K/UL (ref 0–0.04)
IMM GRANULOCYTES NFR BLD AUTO: 2 % (ref 0–0.5)
LYMPHOCYTES # BLD AUTO: 1.3 K/UL (ref 1–4.8)
LYMPHOCYTES NFR BLD: 17.8 % (ref 18–48)
MCH RBC QN AUTO: 32.4 PG (ref 27–31)
MCHC RBC AUTO-ENTMCNC: 30.5 G/DL (ref 32–36)
MCV RBC AUTO: 106 FL (ref 82–98)
MONOCYTES # BLD AUTO: 0.6 K/UL (ref 0.3–1)
MONOCYTES NFR BLD: 7.8 % (ref 4–15)
NEUTROPHILS # BLD AUTO: 5.1 K/UL (ref 1.8–7.7)
NEUTROPHILS NFR BLD: 69.3 % (ref 38–73)
NRBC BLD-RTO: 0 /100 WBC
PLATELET # BLD AUTO: 218 K/UL (ref 150–350)
PMV BLD AUTO: 10.2 FL (ref 9.2–12.9)
POTASSIUM SERPL-SCNC: 3.8 MMOL/L (ref 3.5–5.1)
PROT SERPL-MCNC: 6.6 G/DL (ref 6–8.4)
RBC # BLD AUTO: 3.43 M/UL (ref 4–5.4)
SODIUM SERPL-SCNC: 143 MMOL/L (ref 136–145)
WBC # BLD AUTO: 7.34 K/UL (ref 3.9–12.7)

## 2019-12-03 PROCEDURE — 80053 COMPREHEN METABOLIC PANEL: CPT

## 2019-12-03 PROCEDURE — 99213 OFFICE O/P EST LOW 20 MIN: CPT | Mod: PBBFAC,25,27 | Performed by: NURSE PRACTITIONER

## 2019-12-03 PROCEDURE — 1126F AMNT PAIN NOTED NONE PRSNT: CPT | Mod: ,,, | Performed by: PHYSICIAN ASSISTANT

## 2019-12-03 PROCEDURE — 99999 PR PBB SHADOW E&M-EST. PATIENT-LVL III: ICD-10-PCS | Mod: PBBFAC,,, | Performed by: NURSE PRACTITIONER

## 2019-12-03 PROCEDURE — 99215 OFFICE O/P EST HI 40 MIN: CPT | Mod: PBBFAC,25 | Performed by: PHYSICIAN ASSISTANT

## 2019-12-03 PROCEDURE — 1159F PR MEDICATION LIST DOCUMENTED IN MEDICAL RECORD: ICD-10-PCS | Mod: ,,, | Performed by: PHYSICIAN ASSISTANT

## 2019-12-03 PROCEDURE — 99214 OFFICE O/P EST MOD 30 MIN: CPT | Mod: S$PBB,,, | Performed by: PHYSICIAN ASSISTANT

## 2019-12-03 PROCEDURE — 99999 PR PBB SHADOW E&M-EST. PATIENT-LVL III: CPT | Mod: PBBFAC,,, | Performed by: NURSE PRACTITIONER

## 2019-12-03 PROCEDURE — 1126F PR PAIN SEVERITY QUANTIFIED, NO PAIN PRESENT: ICD-10-PCS | Mod: ,,, | Performed by: PHYSICIAN ASSISTANT

## 2019-12-03 PROCEDURE — 99214 PR OFFICE/OUTPT VISIT, EST, LEVL IV, 30-39 MIN: ICD-10-PCS | Mod: S$PBB,,, | Performed by: NURSE PRACTITIONER

## 2019-12-03 PROCEDURE — 1126F AMNT PAIN NOTED NONE PRSNT: CPT | Mod: ,,, | Performed by: NURSE PRACTITIONER

## 2019-12-03 PROCEDURE — 63600175 PHARM REV CODE 636 W HCPCS: Performed by: PHYSICIAN ASSISTANT

## 2019-12-03 PROCEDURE — 1159F MED LIST DOCD IN RCRD: CPT | Mod: ,,, | Performed by: PHYSICIAN ASSISTANT

## 2019-12-03 PROCEDURE — 1159F PR MEDICATION LIST DOCUMENTED IN MEDICAL RECORD: ICD-10-PCS | Mod: ,,, | Performed by: NURSE PRACTITIONER

## 2019-12-03 PROCEDURE — 1159F MED LIST DOCD IN RCRD: CPT | Mod: ,,, | Performed by: NURSE PRACTITIONER

## 2019-12-03 PROCEDURE — 99214 PR OFFICE/OUTPT VISIT, EST, LEVL IV, 30-39 MIN: ICD-10-PCS | Mod: S$PBB,,, | Performed by: PHYSICIAN ASSISTANT

## 2019-12-03 PROCEDURE — 99999 PR PBB SHADOW E&M-EST. PATIENT-LVL V: CPT | Mod: PBBFAC,,, | Performed by: PHYSICIAN ASSISTANT

## 2019-12-03 PROCEDURE — 1126F PR PAIN SEVERITY QUANTIFIED, NO PAIN PRESENT: ICD-10-PCS | Mod: ,,, | Performed by: NURSE PRACTITIONER

## 2019-12-03 PROCEDURE — 99999 PR PBB SHADOW E&M-EST. PATIENT-LVL V: ICD-10-PCS | Mod: PBBFAC,,, | Performed by: PHYSICIAN ASSISTANT

## 2019-12-03 PROCEDURE — 99214 OFFICE O/P EST MOD 30 MIN: CPT | Mod: S$PBB,,, | Performed by: NURSE PRACTITIONER

## 2019-12-03 PROCEDURE — 96372 THER/PROPH/DIAG INJ SC/IM: CPT

## 2019-12-03 RX ORDER — METHYLPREDNISOLONE SOD SUCC 125 MG
80 VIAL (EA) INJECTION
Status: CANCELLED
Start: 2019-12-10

## 2019-12-03 RX ORDER — EPINEPHRINE 0.3 MG/.3ML
0.3 INJECTION SUBCUTANEOUS ONCE AS NEEDED
Status: CANCELLED | OUTPATIENT
Start: 2019-12-10

## 2019-12-03 RX ORDER — HEPARIN 100 UNIT/ML
5 SYRINGE INTRAVENOUS
Status: CANCELLED | OUTPATIENT
Start: 2019-12-10

## 2019-12-03 RX ORDER — METHYLPREDNISOLONE SOD SUCC 125 MG
40 VIAL (EA) INJECTION ONCE
Status: CANCELLED | OUTPATIENT
Start: 2019-12-10

## 2019-12-03 RX ORDER — DIPHENHYDRAMINE HYDROCHLORIDE 50 MG/ML
50 INJECTION INTRAMUSCULAR; INTRAVENOUS ONCE AS NEEDED
Status: CANCELLED | OUTPATIENT
Start: 2019-12-10

## 2019-12-03 RX ORDER — SODIUM CHLORIDE 9 MG/ML
INJECTION, SOLUTION INTRAVENOUS CONTINUOUS
Status: CANCELLED | OUTPATIENT
Start: 2019-12-10

## 2019-12-03 RX ADMIN — DENOSUMAB 60 MG: 60 INJECTION SUBCUTANEOUS at 12:12

## 2019-12-03 NOTE — PATIENT INSTRUCTIONS
Epoetin Godwin injection  What is this medicine?  EPOETIN GODIWN (e NEGRA e tin AL fa) helps your body make more red blood cells. This medicine is used to treat anemia caused by chronic kidney failure, cancer chemotherapy, or HIV-therapy. It may also be used before surgery if you have anemia.  How should I use this medicine?  This medicine is for injection into a vein or under the skin. It is usually given by a health care professional in a hospital or clinic setting.  If you get this medicine at home, you will be taught how to prepare and give this medicine. Use exactly as directed. Take your medicine at regular intervals. Do not take your medicine more often than directed.  It is important that you put your used needles and syringes in a special sharps container. Do not put them in a trash can. If you do not have a sharps container, call your pharmacist or healthcare provider to get one.  Talk to your pediatrician regarding the use of this medicine in children. While this drug may be prescribed for selected conditions, precautions do apply.  What side effects may I notice from receiving this medicine?  Side effects that you should report to your doctor or health care professional as soon as possible:  · allergic reactions like skin rash, itching or hives, swelling of the face, lips, or tongue  · breathing problems  · changes in vision  · chest pain  · confusion, trouble speaking or understanding  · feeling faint or lightheaded, falls  · high blood pressure  · muscle aches or pains  · pain, swelling, warmth in the leg  · rapid weight gain  · severe headaches  · sudden numbness or weakness of the face, arm or leg  · trouble walking, dizziness, loss of balance or coordination  · seizures (convulsions)  · swelling of the ankles, feet, hands  · unusually weak or tired  Side effects that usually do not require medical attention (report to your doctor or health care professional if they continue or are  bothersome):  · diarrhea  · fever, chills (flu-like symptoms)  · headaches  · nausea, vomiting  · redness, stinging, or swelling at site where injected  What may interact with this medicine?  Do not take this medicine with any of the following medications:  · darbepoetin abby  What if I miss a dose?  If you miss a dose, take it as soon as you can. If it is almost time for your next dose, take only that dose. Do not take double or extra doses.  Where should I keep my medicine?  Keep out of the reach of children.  Store in a refrigerator between 2 and 8 degrees C (36 and 46 degrees F). Do not freeze or shake. Throw away any unused portion if using a single-dose vial. Multi-dose vials can be kept in the refrigerator for up to 21 days after the initial dose. Throw away unused medicine.  What should I tell my health care provider before I take this medicine?  They need to know if you have any of these conditions:  · blood clotting disorders  · cancer patient not on chemotherapy  · cystic fibrosis  · heart disease, such as angina or heart failure  · hemoglobin level of 12 g/dL or greater  · high blood pressure  · low levels of folate, iron, or vitamin B12  · seizures  · an unusual or allergic reaction to erythropoietin, albumin, benzyl alcohol, hamster proteins, other medicines, foods, dyes, or preservatives  · pregnant or trying to get pregnant  · breast-feeding  What should I watch for while using this medicine?  Visit your prescriber or health care professional for regular checks on your progress and for the needed blood tests and blood pressure measurements. It is especially important for the doctor to make sure your hemoglobin level is in the desired range, to limit the risk of potential side effects and to give you the best benefit. Keep all appointments for any recommended tests. Check your blood pressure as directed. Ask your doctor what your blood pressure should be and when you should contact him or her.  As  your body makes more red blood cells, you may need to take iron, folic acid, or vitamin B supplements. Ask your doctor or health care provider which products are right for you. If you have kidney disease continue dietary restrictions, even though this medication can make you feel better. Talk with your doctor or health care professional about the foods you eat and the vitamins that you take.  NOTE:This sheet is a summary. It may not cover all possible information. If you have questions about this medicine, talk to your doctor, pharmacist, or health care provider. Copyright© 2017 [employer]

## 2019-12-03 NOTE — NURSING
Prolia 60 mg q 6 months  Last dose given-5/21/19    Any invasive dental procedures in past 3 months or upcoming 3 months: Denied    Last Rheumatology provider visit- Seen by Janay Valdez on 12/3/19    Recent labs? 12/3/19;  CKD pt needing repeat labs in 10 days- 12/13/19   Lab Results   Component Value Date    CALCIUM 9.9 12/03/2019     Lab Results   Component Value Date    CREATININE 1.5 (H) 12/03/2019     Lab Results   Component Value Date    ESTGFRAFRICA 36 (A) 12/03/2019     Lab Results   Component Value Date    EGFRNONAA 31 (A) 12/03/2019     Lab Results   Component Value Date    RKANMPZM72TS 35 10/06/2015         Prolia 60 mg/ml administered SQ to Right lower abdominal quadrant. Tolerated without any complaints. No adverse reaction noted or reported after 15 minutes of administration. No redness, swelling, or drainage noted to site. Pt instructed on signs and symptoms of reaction to report. Verbalizes understanding.     Follow up appointments:  Given to patient.

## 2019-12-03 NOTE — PATIENT INSTRUCTIONS
prolia today     tums (2) daily for the next 2 weeks then lab in [place] in 10 days     dexa scan next year    Vit D daily     Allopurinol the same

## 2019-12-04 NOTE — PROGRESS NOTES
Subjective:       Patient ID: Jennifer Mathews is a 87 y.o. female.    Chief Complaint: Anemia    87-year-old  female who presents to the Hematology Oncology Clinic today as a follow-up for anemia, s/p Feraheme. Patient is also followed for elevated Free LT Chains, which have been stable.  I have reviewed all the patient's relevant clinical history available medical record have utilized as my evaluation management recommendations today.  She continues on Eliquis at this time.      Today's visit:  Patient denies any significant complaints today.       Anemia   There has been no abdominal pain, bruising/bleeding easily, confusion, fever, light-headedness or palpitations.     Review of Systems   Constitutional: Positive for fatigue. Negative for activity change, appetite change, chills, diaphoresis, fever and unexpected weight change.   HENT: Negative for congestion, hearing loss, mouth sores, nosebleeds and trouble swallowing.    Eyes: Negative for discharge and visual disturbance.   Respiratory: Negative for cough, chest tightness and shortness of breath.    Cardiovascular: Negative for chest pain, palpitations and leg swelling.   Gastrointestinal: Positive for abdominal distention. Negative for abdominal pain, blood in stool, constipation, diarrhea, nausea and vomiting.   Endocrine: Negative for cold intolerance and heat intolerance.   Genitourinary: Negative for difficulty urinating, dyspareunia and hematuria.   Musculoskeletal: Positive for arthralgias, back pain, gait problem and myalgias.   Skin: Negative.    Neurological: Negative for dizziness, weakness, light-headedness and headaches.   Hematological: Negative for adenopathy. Does not bruise/bleed easily.   Psychiatric/Behavioral: Negative for agitation, behavioral problems and confusion. The patient is nervous/anxious.        Medication List with Changes/Refills   Current Medications    ACETAMINOPHEN (TYLENOL EXTRA STRENGTH) 325 MG TABLET    Take 2  tablets (650 mg total) by mouth every 8 (eight) hours.    ALLOPURINOL (ZYLOPRIM) 100 MG TABLET    Do not exceed 1 tablet (100 mg) per day. Pt has renal insufficiency    AMIODARONE (PACERONE) 200 MG TAB    Take 1 tablet (200 mg total) by mouth once daily.    APIXABAN (ELIQUIS) 2.5 MG TAB    Take 1 tablet (2.5 mg total) by mouth 2 (two) times daily.    ASPIRIN (ECOTRIN) 81 MG EC TABLET    Take 1 tablet (81 mg total) by mouth once daily.    CARVEDILOL (COREG) 25 MG TABLET    TAKE 1 TABLET BY MOUTH TWICE A DAY WITH MEALS    CIPROFLOXACIN HCL (CIPRO) 500 MG TABLET    Take 1 tablet (500 mg total) by mouth 2 (two) times daily.    DIGOXIN (LANOXIN) 125 MCG TABLET    Take 125 mcg by mouth every other day.    FUROSEMIDE (LASIX) 40 MG TABLET    Take 2 tablets (80 mg total) by mouth 2 (two) times daily.    GABAPENTIN (NEURONTIN) 100 MG CAPSULE    TAKE ONE CAPSULE BY MOUTH TWO TIMES DAILY    LEVOTHYROXINE (SYNTHROID) 75 MCG TABLET    Take 1 tablet (75 mcg total) by mouth once daily.    LOSARTAN (COZAAR) 25 MG TABLET    Take 25 mg by mouth once daily.    METHENAMINE (HIPREX) 1 GRAM TAB    Take 1 tablet (1 g total) by mouth 2 (two) times daily.    METOLAZONE (ZAROXOLYN) 2.5 MG TABLET    30 minutes after Lasix as directed    PREDNISOLONE ACETATE (PRED FORTE) 1 % DRPS    Place 1 drop into both eyes 4 (four) times daily.     Objective:     Vitals:    12/03/19 0900   BP: (!) 122/57   Pulse: 73   Temp: 97.8 °F (36.6 °C)     Physical Exam   Constitutional: She is oriented to person, place, and time. She appears well-developed and well-nourished. No distress.   HENT:   Head: Normocephalic and atraumatic.   Right Ear: Hearing and external ear normal.   Left Ear: Hearing and external ear normal.   Nose: No rhinorrhea or sinus tenderness. Right sinus exhibits no maxillary sinus tenderness and no frontal sinus tenderness. Left sinus exhibits no maxillary sinus tenderness and no frontal sinus tenderness.   Mouth/Throat: Uvula is midline,  oropharynx is clear and moist and mucous membranes are normal. No oral lesions.   Eyes: Pupils are equal, round, and reactive to light. Conjunctivae are normal. Right eye exhibits no discharge. Left eye exhibits no discharge.   Neck: Normal range of motion. Carotid bruit is not present. No tracheal deviation present. No thyromegaly present.   Cardiovascular: Normal rate, regular rhythm, S1 normal, S2 normal, normal heart sounds and intact distal pulses.   No murmur heard.  Pulses:       Dorsalis pedis pulses are 2+ on the right side, and 2+ on the left side.   Pulmonary/Chest: Effort normal and breath sounds normal. No respiratory distress.   Abdominal: Soft. Bowel sounds are normal. She exhibits no distension and no mass. There is no tenderness.   Musculoskeletal: Normal range of motion. She exhibits edema. She exhibits no tenderness.   Lymphadenopathy:     She has no cervical adenopathy.        Right: No supraclavicular adenopathy present.        Left: No supraclavicular adenopathy present.   Neurological: She is alert and oriented to person, place, and time. She has normal strength. No sensory deficit. Coordination and gait normal.   Skin: Skin is warm and dry. Capillary refill takes less than 2 seconds. No rash noted. There is pallor.   Psychiatric: Her speech is normal and behavior is normal. Judgment and thought content normal. Her mood appears anxious. Cognition and memory are normal. She does not exhibit a depressed mood.   Nursing note and vitals reviewed.      Assessment:       Problem List Items Addressed This Visit        Cardiac/Vascular    Aortic atherosclerosis       Renal/    Anemia associated with stage 4 chronic renal failure       Immunology/Multi System    Elevated serum immunoglobulin free light chain level       Oncology    Iron deficiency anemia due to chronic blood loss - Primary    Relevant Orders    CBC auto differential    Comprehensive metabolic panel    Ferritin    Iron and TIBC     Macrocytic anemia    Relevant Orders    CBC auto differential    Comprehensive metabolic panel    Ferritin    Iron and TIBC       Endocrine    Acquired hypothyroidism    Mild Protein Calorie Malnutrition          Plan:     Anemia r/t CKD and Iron Deficiency  --Treated with Procrit in the past for Hgb <10.0  --Hgb: 11.1 today, will hold Procrit 20,000U today, last received 4/2019  --Return to clinic in 8 weeks with labs.  --Patient is iron deficient, will replete with IV Injectafer x2 doses.      I will review assessment/plan with collaborating physician Dr. Adiel Gary.

## 2019-12-10 ENCOUNTER — INFUSION (OUTPATIENT)
Dept: INFUSION THERAPY | Facility: HOSPITAL | Age: 84
End: 2019-12-10
Attending: NURSE PRACTITIONER
Payer: MEDICARE

## 2019-12-10 VITALS
TEMPERATURE: 97 F | HEART RATE: 69 BPM | DIASTOLIC BLOOD PRESSURE: 58 MMHG | SYSTOLIC BLOOD PRESSURE: 124 MMHG | RESPIRATION RATE: 20 BRPM | OXYGEN SATURATION: 96 %

## 2019-12-10 DIAGNOSIS — D50.0 IRON DEFICIENCY ANEMIA DUE TO CHRONIC BLOOD LOSS: Primary | ICD-10-CM

## 2019-12-10 PROCEDURE — 96365 THER/PROPH/DIAG IV INF INIT: CPT

## 2019-12-10 PROCEDURE — 63600175 PHARM REV CODE 636 W HCPCS: Performed by: NURSE PRACTITIONER

## 2019-12-10 PROCEDURE — 96376 TX/PRO/DX INJ SAME DRUG ADON: CPT

## 2019-12-10 RX ORDER — SODIUM CHLORIDE 9 MG/ML
INJECTION, SOLUTION INTRAVENOUS CONTINUOUS
Status: DISCONTINUED | OUTPATIENT
Start: 2019-12-10 | End: 2019-12-10

## 2019-12-10 RX ORDER — METHYLPREDNISOLONE SOD SUCC 125 MG
80 VIAL (EA) INJECTION
Status: CANCELLED
Start: 2019-12-17

## 2019-12-10 RX ORDER — EPINEPHRINE 0.3 MG/.3ML
0.3 INJECTION SUBCUTANEOUS ONCE AS NEEDED
Status: CANCELLED | OUTPATIENT
Start: 2019-12-17

## 2019-12-10 RX ORDER — METHYLPREDNISOLONE SOD SUCC 125 MG
80 VIAL (EA) INJECTION
Status: COMPLETED | OUTPATIENT
Start: 2019-12-10 | End: 2019-12-10

## 2019-12-10 RX ORDER — SODIUM CHLORIDE 9 MG/ML
INJECTION, SOLUTION INTRAVENOUS CONTINUOUS
Status: CANCELLED | OUTPATIENT
Start: 2019-12-17

## 2019-12-10 RX ORDER — DIPHENHYDRAMINE HYDROCHLORIDE 50 MG/ML
50 INJECTION INTRAMUSCULAR; INTRAVENOUS ONCE AS NEEDED
Status: CANCELLED | OUTPATIENT
Start: 2019-12-17

## 2019-12-10 RX ORDER — HEPARIN 100 UNIT/ML
5 SYRINGE INTRAVENOUS
Status: CANCELLED | OUTPATIENT
Start: 2019-12-17

## 2019-12-10 RX ORDER — METHYLPREDNISOLONE SOD SUCC 125 MG
40 VIAL (EA) INJECTION ONCE
Status: CANCELLED | OUTPATIENT
Start: 2019-12-17

## 2019-12-10 RX ADMIN — FERRIC CARBOXYMALTOSE INJECTION 750 MG: 50 INJECTION, SOLUTION INTRAVENOUS at 02:12

## 2019-12-10 RX ADMIN — METHYLPREDNISOLONE SODIUM SUCCINATE 80 MG: 125 INJECTION, POWDER, FOR SOLUTION INTRAMUSCULAR; INTRAVENOUS at 02:12

## 2019-12-10 NOTE — MR AVS SNAPSHOT
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, unspecified laterality         Osteoporosis, senile         Atrial fibrillation, unspecified type                To Do List           Future Appointments        Provider Department Dept Phone    2017 10:15 AM Parvin Tay, PharmD '[facility] - Coumadin [phone]    2017 10:30 AM LABORATORY, '[address] [facility] [phone]    2017 2:40 PM Kevin Ngo MD [facility] - Pulmonary Services [phone]    7/10/2017 8:00 AM HOME MONITOR DEVICE CHECK [facility] - Arrhythmia Procedures [phone]    2017 9:40 AM Desiare Bello MD [facility] - Internal Medicine [phone]      Goals (5 Years of Data)     None      [facility]     [facility] Nurse Care Line - 24/7 Assistance  Unless otherwise directed by your provider, please contact [facility], our nurse care line that is available for 24/7 assistance.     Registered nurses in the [facility] provide: appointment scheduling, clinical advisement, health education, and other advisory services.  Call: [phone] (toll free)               Medications           Message regarding Medications     Verify the changes and/or additions to your medication regime listed below are the same as discussed with your clinician today.  If any of these changes or additions are incorrect, please notify your healthcare provider.             Verify that the below list of  "medications is an accurate representation of the medications you are currently taking.  If none reported, the list may be blank. If incorrect, please contact your healthcare provider. Carry this list with you in case of emergency.           Current Medications     acetaminophen (TYLENOL EXTRA STRENGTH) 325 MG tablet Take 2 tablets (650 mg total) by mouth every 8 (eight) hours.    allopurinol (ZYLOPRIM) 100 MG tablet Take 1/2 tablet daily until otherwise instructed    amiodarone (PACERONE) 200 MG Tab Take 1 tablet (200 mg total) by mouth once daily.    aspirin (ECOTRIN) 81 MG EC tablet Take 81 mg by mouth once daily.    carvedilol (COREG) 25 MG tablet TAKE ONE TABLET BY MOUTH TWICE A DAY WITH MEALS    colchicine 0.6 mg tablet Take 1 tablet (0.6 mg total) by mouth once daily.    cranberry 1,000 mg Cap Take 1 capsule by mouth Daily.    digoxin (LANOXIN) 125 mcg tablet TAKE 1 TABLET (0.125 MG TOTAL) BY MOUTH ONCE DAILY.    diphenhydrAMINE (BENADRYL) 25 mg capsule Take 25 mg by mouth every 6 (six) hours as needed for Itching.    furosemide (LASIX) 40 MG tablet Take 1 tablet (40 mg total) by mouth 2 (two) times daily. 8 am and 2 pm    gabapentin (NEURONTIN) 100 MG capsule TAKE ONE CAPSULE BY MOUTH TWO TIMES DAILY    MULTIVITAMIN ORAL Take 1 tablet by mouth Daily.    PENNSAID 20 mg/gram /actuation(2 %) sopm Apply 40 mg topically 2 (two) times daily.    tramadol (ULTRAM) 50 mg tablet Take 1 tablet (50 mg total) by mouth 3 (three) times daily as needed.    valsartan (DIOVAN) 80 MG tablet Take 2 tablets (160 mg total) by mouth 2 (two) times daily.    warfarin (COUMADIN) 2.5 MG tablet Take 1 tablet every evening as directed by the coumadin clinic.           Clinical Reference Information           Your Vitals Were     BP Pulse Height Weight BMI    157/69 69 5' 2" (1.575 m) 51.9 kg (114 lb 6.7 oz) 20.93 kg/m2      Blood Pressure          Most Recent Value    BP  (!)  157/69      Allergies as of 5/17/2017     Cephalosporins    " Metaxalone    Penicillins    Pregabalin    Sulfa (Sulfonamide Antibiotics)      Immunizations Administered on Date of Encounter - 5/17/2017     None      Administrations This Visit     denosumab (PROLIA) injection 60 mg     Admin Date Action Dose Route Administered By             05/17/2017 Given 60 mg Subcutaneous Calli Bryan LPN                      Language Assistance Services     ATTENTION: Language assistance services are available, free of charge. Please call [phone].      ATENCIÓN: Si habla español, tiene a howard disposición servicios gratuitos de asistencia lingüística. Llame al [phone].     CHÚ Ý: N?u b?n nói Ti?ng Vi?t, có các d?ch v? h? tr? ngôn ng? mi?n phí dành cho b?n. G?i s? [phone].         [facility] - Rheumatology complies with applicable Federal civil rights laws and does not discriminate on the basis of race, color, national origin, age, disability, or sex.         [facility] ED (Blue) hip

## 2019-12-13 ENCOUNTER — LAB VISIT (OUTPATIENT)
Dept: LAB | Facility: HOSPITAL | Age: 84
End: 2019-12-13
Attending: PHYSICIAN ASSISTANT
Payer: MEDICARE

## 2019-12-13 DIAGNOSIS — N18.30 CKD (CHRONIC KIDNEY DISEASE) STAGE 3, GFR 30-59 ML/MIN: ICD-10-CM

## 2019-12-13 LAB
ALBUMIN SERPL BCP-MCNC: 3.5 G/DL (ref 3.5–5.2)
ALP SERPL-CCNC: 79 U/L (ref 55–135)
ALT SERPL W/O P-5'-P-CCNC: 19 U/L (ref 10–44)
ANION GAP SERPL CALC-SCNC: 8 MMOL/L (ref 8–16)
AST SERPL-CCNC: 26 U/L (ref 10–40)
BILIRUB SERPL-MCNC: 0.7 MG/DL (ref 0.1–1)
BUN SERPL-MCNC: 52 MG/DL (ref 8–23)
CALCIUM SERPL-MCNC: 9.3 MG/DL (ref 8.7–10.5)
CHLORIDE SERPL-SCNC: 104 MMOL/L (ref 95–110)
CO2 SERPL-SCNC: 31 MMOL/L (ref 23–29)
CREAT SERPL-MCNC: 1.5 MG/DL (ref 0.5–1.4)
EST. GFR  (AFRICAN AMERICAN): 35.9 ML/MIN/1.73 M^2
EST. GFR  (NON AFRICAN AMERICAN): 31.1 ML/MIN/1.73 M^2
GLUCOSE SERPL-MCNC: 84 MG/DL (ref 70–110)
POTASSIUM SERPL-SCNC: 4.4 MMOL/L (ref 3.5–5.1)
PROT SERPL-MCNC: 6.4 G/DL (ref 6–8.4)
SODIUM SERPL-SCNC: 143 MMOL/L (ref 136–145)

## 2019-12-13 PROCEDURE — 36415 COLL VENOUS BLD VENIPUNCTURE: CPT | Mod: PO

## 2019-12-13 PROCEDURE — 80053 COMPREHEN METABOLIC PANEL: CPT

## 2019-12-17 ENCOUNTER — INFUSION (OUTPATIENT)
Dept: INFUSION THERAPY | Facility: HOSPITAL | Age: 84
End: 2019-12-17
Attending: NURSE PRACTITIONER
Payer: MEDICARE

## 2019-12-17 VITALS
OXYGEN SATURATION: 98 % | RESPIRATION RATE: 20 BRPM | DIASTOLIC BLOOD PRESSURE: 64 MMHG | HEART RATE: 68 BPM | TEMPERATURE: 97 F | SYSTOLIC BLOOD PRESSURE: 136 MMHG

## 2019-12-17 DIAGNOSIS — D50.0 IRON DEFICIENCY ANEMIA DUE TO CHRONIC BLOOD LOSS: Primary | ICD-10-CM

## 2019-12-17 PROCEDURE — 63600175 PHARM REV CODE 636 W HCPCS: Performed by: NURSE PRACTITIONER

## 2019-12-17 PROCEDURE — 96375 TX/PRO/DX INJ NEW DRUG ADDON: CPT

## 2019-12-17 PROCEDURE — 96365 THER/PROPH/DIAG IV INF INIT: CPT

## 2019-12-17 RX ORDER — EPINEPHRINE 0.3 MG/.3ML
0.3 INJECTION SUBCUTANEOUS ONCE AS NEEDED
Status: CANCELLED | OUTPATIENT
Start: 2019-12-24

## 2019-12-17 RX ORDER — METHYLPREDNISOLONE SOD SUCC 125 MG
40 VIAL (EA) INJECTION ONCE
Status: CANCELLED | OUTPATIENT
Start: 2019-12-24

## 2019-12-17 RX ORDER — METHYLPREDNISOLONE SOD SUCC 125 MG
80 VIAL (EA) INJECTION
Status: COMPLETED | OUTPATIENT
Start: 2019-12-17 | End: 2019-12-17

## 2019-12-17 RX ORDER — HEPARIN 100 UNIT/ML
5 SYRINGE INTRAVENOUS
Status: CANCELLED | OUTPATIENT
Start: 2019-12-24

## 2019-12-17 RX ORDER — METHYLPREDNISOLONE SOD SUCC 125 MG
80 VIAL (EA) INJECTION
Status: CANCELLED
Start: 2019-12-24

## 2019-12-17 RX ORDER — DIPHENHYDRAMINE HYDROCHLORIDE 50 MG/ML
50 INJECTION INTRAMUSCULAR; INTRAVENOUS ONCE AS NEEDED
Status: CANCELLED | OUTPATIENT
Start: 2019-12-24

## 2019-12-17 RX ADMIN — SODIUM CHLORIDE: 9 INJECTION, SOLUTION INTRAVENOUS at 02:12

## 2019-12-17 RX ADMIN — FERRIC CARBOXYMALTOSE INJECTION 750 MG: 50 INJECTION, SOLUTION INTRAVENOUS at 03:12

## 2019-12-17 RX ADMIN — METHYLPREDNISOLONE SODIUM SUCCINATE 80 MG: 125 INJECTION, POWDER, FOR SOLUTION INTRAMUSCULAR; INTRAVENOUS at 02:12

## 2019-12-17 NOTE — DISCHARGE INSTRUCTIONS
[facility]  [address]  [address] [address]  [phone] phone     [phone] fax  Hours of Operation: Monday- Friday 8:00am- 5:00pm  After hours phone  [phone]  Hematology / Oncology Physicians on call      Dr. Rodríguez Lord, NENO Olea NP Tyesha Taylor, NP    Please call with any concerns regarding your appointment today.    FALL PREVENTION   Falls often occur due to slipping, tripping or losing your balance. Here are ways to reduce your risk of falling again.   Was there anything that caused your fall that can be fixed, removed or replaced?   Make your home safe by keeping walkways clear of objects you may trip over.   Use non-slip pads under rugs.   Do not walk in poorly lit areas.   Do not stand on chairs or wobbly ladders.   Use caution when reaching overhead or looking upward. This position can cause a loss of balance.   Be sure your shoes fit properly, have non-slip bottoms and are in good condition.   Be cautious when going up and down stairs, curbs, and when walking on uneven sidewalks.   If your balance is poor, consider using a cane or walker.   If your fall was related to alcohol use, stop or limit alcohol intake.   If your fall was related to use of sleeping medicines, talk to your doctor about this. You may need to reduce your dosage at bedtime if you awaken during the night to go to the bathroom.   To reduce the need for nighttime bathroom trips:   Avoid drinking fluids for several hours before going to bed   Empty your bladder before going to bed   Men can keep a urinal at the bedside   © 5086-4261 [employer] [facility], [address] [place] [address], [place] [address]. All rights reserved. This information is not intended as a substitute for professional medical care. Always follow your healthcare professional's instructions.

## 2019-12-31 ENCOUNTER — EXTERNAL CHRONIC CARE MANAGEMENT (OUTPATIENT)
Dept: PRIMARY CARE CLINIC | Facility: CLINIC | Age: 84
End: 2019-12-31
Payer: MEDICARE

## 2019-12-31 PROCEDURE — 99490 CHRNC CARE MGMT STAFF 1ST 20: CPT | Mod: S$PBB,,, | Performed by: FAMILY MEDICINE

## 2019-12-31 PROCEDURE — 99490 PR CHRONIC CARE MGMT, 1ST 20 MIN: ICD-10-PCS | Mod: S$PBB,,, | Performed by: FAMILY MEDICINE

## 2019-12-31 PROCEDURE — 99490 CHRNC CARE MGMT STAFF 1ST 20: CPT | Mod: PBBFAC | Performed by: FAMILY MEDICINE

## 2020-01-09 RX ORDER — GABAPENTIN 100 MG/1
CAPSULE ORAL
Qty: 180 CAPSULE | Refills: 1 | Status: SHIPPED | OUTPATIENT
Start: 2020-01-09 | End: 2020-07-06

## 2020-01-15 ENCOUNTER — CLINICAL SUPPORT (OUTPATIENT)
Dept: CARDIOLOGY | Facility: CLINIC | Age: 85
End: 2020-01-15
Attending: INTERNAL MEDICINE
Payer: MEDICARE

## 2020-01-15 DIAGNOSIS — Z95.0 CARDIAC PACEMAKER IN SITU: ICD-10-CM

## 2020-01-15 DIAGNOSIS — I48.0 PAROXYSMAL ATRIAL FIBRILLATION: ICD-10-CM

## 2020-01-15 PROCEDURE — 93296 REM INTERROG EVL PM/IDS: CPT | Mod: PBBFAC | Performed by: INTERNAL MEDICINE

## 2020-01-15 PROCEDURE — 93294 REM INTERROG EVL PM/LDLS PM: CPT | Mod: ,,, | Performed by: INTERNAL MEDICINE

## 2020-01-15 PROCEDURE — 93294 PACEMAKER REMOTE: ICD-10-PCS | Mod: ,,, | Performed by: INTERNAL MEDICINE

## 2020-01-30 RX ORDER — FUROSEMIDE 40 MG/1
80 TABLET ORAL 2 TIMES DAILY
Qty: 360 TABLET | Refills: 2 | Status: SHIPPED | OUTPATIENT
Start: 2020-01-30 | End: 2020-11-16 | Stop reason: SDUPTHER

## 2020-01-31 ENCOUNTER — EXTERNAL CHRONIC CARE MANAGEMENT (OUTPATIENT)
Dept: PRIMARY CARE CLINIC | Facility: CLINIC | Age: 85
End: 2020-01-31
Payer: MEDICARE

## 2020-01-31 PROCEDURE — 99490 PR CHRONIC CARE MGMT, 1ST 20 MIN: ICD-10-PCS | Mod: S$PBB,,, | Performed by: FAMILY MEDICINE

## 2020-01-31 PROCEDURE — 99490 CHRNC CARE MGMT STAFF 1ST 20: CPT | Mod: S$PBB,,, | Performed by: FAMILY MEDICINE

## 2020-01-31 PROCEDURE — 99490 CHRNC CARE MGMT STAFF 1ST 20: CPT | Mod: PBBFAC | Performed by: FAMILY MEDICINE

## 2020-02-03 ENCOUNTER — TELEPHONE (OUTPATIENT)
Dept: HEMATOLOGY/ONCOLOGY | Facility: CLINIC | Age: 85
End: 2020-02-03

## 2020-02-03 NOTE — TELEPHONE ENCOUNTER
Spoke with patient and rescheduled for 2/7/20 due to illness of Ms. Brooks. Patient acknowledged. Call ended well.

## 2020-02-10 ENCOUNTER — LAB VISIT (OUTPATIENT)
Dept: LAB | Facility: HOSPITAL | Age: 85
End: 2020-02-10
Attending: INTERNAL MEDICINE
Payer: MEDICARE

## 2020-02-10 ENCOUNTER — OFFICE VISIT (OUTPATIENT)
Dept: INTERNAL MEDICINE | Facility: CLINIC | Age: 85
End: 2020-02-10
Payer: MEDICARE

## 2020-02-10 VITALS
SYSTOLIC BLOOD PRESSURE: 134 MMHG | DIASTOLIC BLOOD PRESSURE: 66 MMHG | WEIGHT: 114.44 LBS | BODY MASS INDEX: 21.61 KG/M2 | HEIGHT: 61 IN | HEART RATE: 71 BPM | OXYGEN SATURATION: 95 %

## 2020-02-10 DIAGNOSIS — Z00.00 ENCOUNTER FOR PREVENTIVE HEALTH EXAMINATION: Primary | ICD-10-CM

## 2020-02-10 DIAGNOSIS — Z95.0 PACEMAKER: ICD-10-CM

## 2020-02-10 DIAGNOSIS — Z91.81 AT RISK FOR FALLS: ICD-10-CM

## 2020-02-10 DIAGNOSIS — Z87.81 HISTORY OF FRACTURE: ICD-10-CM

## 2020-02-10 DIAGNOSIS — R32 URINARY INCONTINENCE, UNSPECIFIED TYPE: ICD-10-CM

## 2020-02-10 DIAGNOSIS — Z74.09 OTHER REDUCED MOBILITY: ICD-10-CM

## 2020-02-10 DIAGNOSIS — I70.0 AORTIC ATHEROSCLEROSIS: ICD-10-CM

## 2020-02-10 DIAGNOSIS — I48.0 PAROXYSMAL ATRIAL FIBRILLATION: ICD-10-CM

## 2020-02-10 DIAGNOSIS — R76.8 ELEVATED SERUM IMMUNOGLOBULIN FREE LIGHT CHAIN LEVEL: ICD-10-CM

## 2020-02-10 DIAGNOSIS — I25.5 ISCHEMIC CARDIOMYOPATHY: ICD-10-CM

## 2020-02-10 DIAGNOSIS — D64.9 ANEMIA, UNSPECIFIED TYPE: ICD-10-CM

## 2020-02-10 DIAGNOSIS — E03.9 ACQUIRED HYPOTHYROIDISM: ICD-10-CM

## 2020-02-10 DIAGNOSIS — G47.33 OSA (OBSTRUCTIVE SLEEP APNEA): Chronic | ICD-10-CM

## 2020-02-10 DIAGNOSIS — N18.30 STAGE 3 CHRONIC KIDNEY DISEASE: ICD-10-CM

## 2020-02-10 DIAGNOSIS — I50.42 CHRONIC COMBINED SYSTOLIC AND DIASTOLIC CONGESTIVE HEART FAILURE: ICD-10-CM

## 2020-02-10 DIAGNOSIS — Z99.89 DEPENDENCE ON OTHER ENABLING MACHINES AND DEVICES: ICD-10-CM

## 2020-02-10 DIAGNOSIS — N18.30 CKD (CHRONIC KIDNEY DISEASE) STAGE 3, GFR 30-59 ML/MIN: ICD-10-CM

## 2020-02-10 DIAGNOSIS — J84.10 CALCIFIED GRANULOMA OF LUNG: ICD-10-CM

## 2020-02-10 DIAGNOSIS — K22.70 BARRETT'S ESOPHAGUS WITHOUT DYSPLASIA: ICD-10-CM

## 2020-02-10 DIAGNOSIS — Z95.2 S/P MVR (MITRAL VALVE REPLACEMENT): ICD-10-CM

## 2020-02-10 DIAGNOSIS — M85.80 OSTEOPENIA, UNSPECIFIED LOCATION: ICD-10-CM

## 2020-02-10 DIAGNOSIS — I27.22 PULMONARY HYPERTENSION DUE TO LEFT HEART DISEASE: ICD-10-CM

## 2020-02-10 DIAGNOSIS — J98.4 CRLD (CHRONIC RESTRICTIVE LUNG DISEASE): Chronic | ICD-10-CM

## 2020-02-10 DIAGNOSIS — I10 ESSENTIAL HYPERTENSION: ICD-10-CM

## 2020-02-10 DIAGNOSIS — D68.9 COAGULOPATHY: ICD-10-CM

## 2020-02-10 DIAGNOSIS — E78.5 DYSLIPIDEMIA: ICD-10-CM

## 2020-02-10 PROBLEM — Z86.39 HISTORY OF MALNUTRITION: Status: ACTIVE | Noted: 2019-03-12

## 2020-02-10 PROBLEM — S22.000A COMPRESSION FRACTURE OF THORACIC VERTEBRA: Status: ACTIVE | Noted: 2020-02-10

## 2020-02-10 PROCEDURE — 99215 OFFICE O/P EST HI 40 MIN: CPT | Mod: PBBFAC | Performed by: NURSE PRACTITIONER

## 2020-02-10 PROCEDURE — 80069 RENAL FUNCTION PANEL: CPT

## 2020-02-10 PROCEDURE — G0439 PPPS, SUBSEQ VISIT: HCPCS | Mod: S$GLB,,, | Performed by: NURSE PRACTITIONER

## 2020-02-10 PROCEDURE — 99999 PR PBB SHADOW E&M-EST. PATIENT-LVL V: CPT | Mod: PBBFAC,,, | Performed by: NURSE PRACTITIONER

## 2020-02-10 PROCEDURE — 99999 PR PBB SHADOW E&M-EST. PATIENT-LVL V: ICD-10-PCS | Mod: PBBFAC,,, | Performed by: NURSE PRACTITIONER

## 2020-02-10 PROCEDURE — 36415 COLL VENOUS BLD VENIPUNCTURE: CPT | Mod: PO

## 2020-02-10 PROCEDURE — G0439 PR MEDICARE ANNUAL WELLNESS SUBSEQUENT VISIT: ICD-10-PCS | Mod: S$GLB,,, | Performed by: NURSE PRACTITIONER

## 2020-02-10 NOTE — PROGRESS NOTES
"Jennifer Mathews presented for a  Medicare AWV and comprehensive Health Risk Assessment today. The following components were reviewed and updated:    · Medical history  · Family History  · Social history  · Allergies and Current Medications  · Health Risk Assessment  · Health Maintenance  · Care Team     ** See Completed Assessments for Annual Wellness Visit within the encounter summary.**       The following assessments were completed:  · Living Situation  · CAGE  · Depression Screening  · Timed Get Up and Go  · Whisper Test-NA  · Cognitive Function Screening  · Nutrition Screening  · ADL Screening  · PAQ Screening    Vitals:    02/10/20 1127 02/10/20 1152   BP: 134/66    Pulse: 71    SpO2: (!) 93% 95%   Weight: 51.9 kg (114 lb 6.7 oz)    Height: 5' 1.22" (1.555 m)      Body mass index is 21.46 kg/m².  Physical Exam   Constitutional: She is oriented to person, place, and time. She appears well-developed and well-nourished.   HENT:   Head: Normocephalic.   Cardiovascular: Normal rate, regular rhythm and normal heart sounds.   No murmur heard.  Pulmonary/Chest: Effort normal. No respiratory distress.   Inspiratory rales noted to left lower lobe, otherwise breath sounds clear   Abdominal: Soft. She exhibits no mass. There is no tenderness.   Musculoskeletal: Normal range of motion.   Edema (L>R) noted to bilateral lower extremities. Reports normal for her to actually improved. No erythema, increased warmth, or tenderness noted to either calf.     Neurological: She is alert and oriented to person, place, and time. She exhibits normal muscle tone.   Skin: Skin is warm, dry and intact.   Psychiatric: She has a normal mood and affect. Her speech is normal and behavior is normal.   Nursing note and vitals reviewed.        Diagnoses and health risks identified today and associated recommendations/orders:    1. Encounter for preventive health examination  Reports has an advanced directive/living will and will bring into next " OV, so that can be placed in chart.      Scheduled   Dermatology  [facility] medicine NP    Encouraged healthy diet and exercise as tolerated.    2. Chronic combined systolic and diastolic congestive heart failure  Discussed s/s of MI, CVA, and heart failure (patient denies any s/s) and advised to go to the ER if occur. She expressed understanding.   Stable and controlled. Continue current treatment plan as previously prescribed with your cardiologist.     3. Ischemic cardiomyopathy  See #2    4. S/P MVR (mitral valve replacement)  See #2    5. Paroxysmal atrial fibrillation  Stable and controlled. Continue current treatment plan as previously prescribed with cardiology and electrophysiologist.     6. Pacemaker  See #5    7. Essential hypertension  Stable and controlled. Continue current treatment plan as previously prescribed with your PCP and cardiology.     8. Dyslipidemia  Continue current treatment plan as previously prescribed with your PCP and cardiology.    9. Aortic atherosclerosis  Ct 11/2019  Stable and controlled. Continue current treatment plan as previously prescribed with your PCP.     10. Pulmonary hypertension due to left heart disease  Echo 11/2019  Continue current treatment plan as previously prescribed with cardiology.     11. CRLD (chronic restrictive lung disease)  Continue current treatment plan as previously prescribed with your pulmonologist.     12. Calcified granuloma of lung  Ct 8/2019-Calcified granuloma within the right lower lobe adjacent to the pleural surface.  Advised to follow up with PCP/pulmonologist for further evaluation and recommendations. SHe expressed understanding.      13. History of fracture  Ct 8/2019-3. Chronic compression fractures of T6, T8, T10, and T11.  Advised to follow up with PCP for further evaluation and recommendations. She expressed understanding.      14. Osteopenia, unspecified location  DEXA 7/2015  Continue current treatment plan as previously prescribed  with your PCP and rheumatology.     15. LEV (obstructive sleep apnea)  Continue current treatment plan as previously prescribed with your pulmonologist.    16. Acquired hypothyroidism  Continue current treatment plan as previously prescribed with your PCP.     17. CKD (chronic kidney disease) stage 3, GFR 30-59 ml/min  Stable. Continue current treatment plan as previously prescribed with your PCP and nephrologist.     18. Anemia, unspecified type  Continue current treatment plan as previously prescribed with your PCP and hematology.     19. Santos's esophagus without dysplasia  Continue current treatment plan as previously prescribed with your PCP.     20. Elevated serum immunoglobulin free light chain level  Continue current treatment plan as previously prescribed with hematology.     21. Coagulopathy  Continue current treatment plan as previously prescribed with hematology.     22. At risk for falls  Abnormal timed get up and go test. Denies any falls in the last 12 months. Uses a cane to aid with ambulation.   Fall precautions reviewed with patient. Advised to follow up with PCP for further recommendations. Patient expressed understanding.       23. Urinary incontinence, unspecified type  Reports occasional UI, ongoing for 2-3 months, not worsening.   Advised to follow up with PCP/urologist for further evaluation and treatment. She expressed understanding.      24. 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 Other reduced mobility  See #22    26. Cough  Reports a daytime cough that began about a week ago. Reports she is unable to cough anything up. Denies change in baseline SOB, wheeze, fever, confusion, or other upper respiratory symptoms.   O2 on RA 93-95%.   See above for breath sounds.   Advised to follow up with PCP/urgent care for further evaluation and treatment. She expressed understanding but does not feel needs to be seen today.    She will see NP tomorrow; she knows to seek  immediate medical attention for worsening symptoms; ER if severe.     27. History of prediabetes   A1C 6.4, 2006  Follow up with PCP for further evaluation and recommendations.      Provided Jennifer with a 5-10 year written screening schedule and personal prevention plan. Recommendations were developed using the USPSTF age appropriate recommendations. Education, counseling, and referrals were provided as needed. After Visit Summary printed and given to patient which includes a list of additional screenings\tests needed.    Follow up in about 1 year (around 2/10/2021) for AWV.    Katrina Murillo NP

## 2020-02-10 NOTE — PATIENT INSTRUCTIONS
Counseling and Referral of Other Preventative  (Italic type indicates deductible and co-insurance are waived)    Patient Name: Jennifer Mathews  Today's Date: 2/10/2020    Health Maintenance       Date Due Completion Date    Shingles Vaccine (1 of 2) 08/20/2020 (Originally 7/23/1982) ---    TETANUS VACCINE 02/22/2020 2/22/2010    Lipid Panel 03/21/2020 3/21/2019        No orders of the defined types were placed in this encounter.    The following information is provided to all patients.  This information is to help you find resources for any of the problems found today that may be affecting your health:                Living healthy guide: www.[facility].[place].[facility]      Understanding Diabetes: www.diabetes.org      Eating healthy: www.cdc.gov/healthyweight      [facility] home safety checklist: www.cdc.gov/steadi/patient.html      Agency on Aging: www.goea.[place]      Alcoholics anonymous (AA): www.aa.org      Physical Activity: www.idris.nih.gov/cu6ttai      Tobacco use: www.quitwithusla.org

## 2020-02-11 ENCOUNTER — OFFICE VISIT (OUTPATIENT)
Dept: INTERNAL MEDICINE | Facility: CLINIC | Age: 85
End: 2020-02-11
Payer: MEDICARE

## 2020-02-11 VITALS
DIASTOLIC BLOOD PRESSURE: 64 MMHG | SYSTOLIC BLOOD PRESSURE: 122 MMHG | BODY MASS INDEX: 21.75 KG/M2 | OXYGEN SATURATION: 96 % | WEIGHT: 115.94 LBS | HEART RATE: 75 BPM | TEMPERATURE: 99 F

## 2020-02-11 DIAGNOSIS — G47.33 OSA (OBSTRUCTIVE SLEEP APNEA): Chronic | ICD-10-CM

## 2020-02-11 DIAGNOSIS — I10 ESSENTIAL HYPERTENSION: ICD-10-CM

## 2020-02-11 DIAGNOSIS — R91.8 LUNG FIELD ABNORMAL FINDING ON EXAMINATION: ICD-10-CM

## 2020-02-11 DIAGNOSIS — J06.9 UPPER RESPIRATORY INFECTION WITH COUGH AND CONGESTION: Primary | ICD-10-CM

## 2020-02-11 LAB
ALBUMIN SERPL BCP-MCNC: 3.3 G/DL (ref 3.5–5.2)
ANION GAP SERPL CALC-SCNC: 13 MMOL/L (ref 8–16)
BUN SERPL-MCNC: 48 MG/DL (ref 8–23)
CALCIUM SERPL-MCNC: 7.3 MG/DL (ref 8.7–10.5)
CHLORIDE SERPL-SCNC: 109 MMOL/L (ref 95–110)
CO2 SERPL-SCNC: 23 MMOL/L (ref 23–29)
CREAT SERPL-MCNC: 1.3 MG/DL (ref 0.5–1.4)
EST. GFR  (AFRICAN AMERICAN): 42.6 ML/MIN/1.73 M^2
EST. GFR  (NON AFRICAN AMERICAN): 37 ML/MIN/1.73 M^2
GLUCOSE SERPL-MCNC: 95 MG/DL (ref 70–110)
PHOSPHATE SERPL-MCNC: 2.5 MG/DL (ref 2.7–4.5)
POTASSIUM SERPL-SCNC: 4 MMOL/L (ref 3.5–5.1)
SODIUM SERPL-SCNC: 145 MMOL/L (ref 136–145)

## 2020-02-11 PROCEDURE — 99213 OFFICE O/P EST LOW 20 MIN: CPT | Mod: PBBFAC | Performed by: FAMILY MEDICINE

## 2020-02-11 PROCEDURE — 99213 PR OFFICE/OUTPT VISIT, EST, LEVL III, 20-29 MIN: ICD-10-PCS | Mod: S$PBB,,, | Performed by: FAMILY MEDICINE

## 2020-02-11 PROCEDURE — 99999 PR PBB SHADOW E&M-EST. PATIENT-LVL III: ICD-10-PCS | Mod: PBBFAC,,, | Performed by: FAMILY MEDICINE

## 2020-02-11 PROCEDURE — 99999 PR PBB SHADOW E&M-EST. PATIENT-LVL III: CPT | Mod: PBBFAC,,, | Performed by: FAMILY MEDICINE

## 2020-02-11 PROCEDURE — 99213 OFFICE O/P EST LOW 20 MIN: CPT | Mod: S$PBB,,, | Performed by: FAMILY MEDICINE

## 2020-02-11 RX ORDER — AZITHROMYCIN 500 MG/1
500 TABLET, FILM COATED ORAL DAILY
Qty: 3 TABLET | Refills: 0 | Status: SHIPPED | OUTPATIENT
Start: 2020-02-11 | End: 2020-02-14 | Stop reason: ALTCHOICE

## 2020-02-11 RX ORDER — BENZONATATE 200 MG/1
200 CAPSULE ORAL NIGHTLY PRN
Qty: 14 CAPSULE | Refills: 0 | Status: SHIPPED | OUTPATIENT
Start: 2020-02-11 | End: 2020-02-14

## 2020-02-11 NOTE — PROGRESS NOTES
Subjective:       Patient ID: Jennifer Mathews is a 87 y.o. female.    Chief Complaint: Cough (started about a week ago with some congestion )    HPI  Sx as above  Onset one week  Denies fever    +cough     -chills, sweats   OTC meds tried, mucinex  Sx relieved, nothing     Denies sick contacts  Did receive influenza vaccination  Good PO intake     Review of Systems   Constitutional: Negative for appetite change, fever and unexpected weight change.   HENT: Positive for congestion and sinus pressure.    Respiratory: Positive for cough and shortness of breath.    Gastrointestinal: Negative for diarrhea, nausea and vomiting.   Psychiatric/Behavioral: Positive for sleep disturbance.        Objective:   /64 (BP Location: Right arm, Patient Position: Sitting)   Pulse 75   Temp 98.7 °F (37.1 °C)   Wt 52.6 kg (115 lb 15.4 oz)   LMP  (LMP Unknown)   SpO2 96%   BMI 21.75 kg/m²     Physical Exam   Constitutional: She appears well-developed and well-nourished.  Non-toxic appearance. She does not have a sickly appearance. She does not appear ill. No distress.   HENT:   Head: Normocephalic and atraumatic.   Right Ear: Tympanic membrane normal.   Left Ear: Tympanic membrane normal.   Nose: Mucosal edema and rhinorrhea present.   Mouth/Throat: Posterior oropharyngeal erythema present. No oropharyngeal exudate or posterior oropharyngeal edema.   Eyes: Conjunctivae and EOM are normal.   Neck: Normal range of motion. Neck supple.   Cardiovascular: Normal rate and regular rhythm.   Pulmonary/Chest: Effort normal. No respiratory distress. She has no wheezes. She has rales in the left lower field.   Abdominal: Soft. Bowel sounds are normal.   Musculoskeletal: She exhibits edema. Tenderness: b/l LE.   Using cane for ambulation   Vitals reviewed.    Assessment:     1. Upper respiratory infection with cough and congestion    2. LEV (obstructive sleep apnea)    3. Essential hypertension    4. Lung field abnormal finding on  examination      Plan:     Problem List Items Addressed This Visit        Cardiac/Vascular    Hypertension    Current Assessment & Plan     Monitored and evaluated medical condition. Stable.  Reports compliance to meds.  No adverse effects to meds.  Continue meds and monitor.                Other    LEV (obstructive sleep apnea) (Chronic)    Overview     Followed by Pulmonology         Current Assessment & Plan     Not using cpap d/t congestion           Other Visit Diagnoses     Upper respiratory infection with cough and congestion    -  Primary    Relevant Medications    azithromycin (ZITHROMAX) 500 MG tablet    benzonatate (TESSALON) 200 MG capsule    Lung field abnormal finding on examination        Relevant Medications    azithromycin (ZITHROMAX) 500 MG tablet    benzonatate (TESSALON) 200 MG capsule        DDx Viral vs. Seasonal URI symptoms vs bacterial. based on H&P, duration, comorbid conditions, will rx antibiotics. Symptomatic treatment advised with fluids, throat lozenges, nasal spray, tylenol/NSAIDs for analgesia if not contraindicated. Advised of proper technique for nasal spray. RTC/ED/Urgent care if symptoms worsen or fever develops.     Offered course of abx. Advised to use probiotic/yogurt 2-3 hours after taking abx to avoid GI upset/diarrhea      Follow up in about 3 days (around 2/14/2020).

## 2020-02-11 NOTE — ASSESSMENT & PLAN NOTE
Monitored and evaluated medical condition. Stable.  Reports compliance to meds.  No adverse effects to meds.  Continue meds and monitor.

## 2020-02-11 NOTE — PATIENT INSTRUCTIONS
Take probiotic/yogurt 2-3 hours after taking antibiotic to avoid diarrhea.      Guaifenesin oral solution and syrup  What is this medicine?  GUAIFENESIN (aly FEN e sin) is an expectorant. It helps to thin mucous and make coughs more productive. This medicine is used to treat coughs caused by colds or the flu. It is not intended to treat chronic cough caused by smoking, asthma, emphysema, or heart failure.  How should I use this medicine?  Take this medicine by mouth. Follow the directions on the prescription label. Use a specially marked spoon or container to measure your dose. Household spoons are not accurate. Take your medicine at regular intervals. Do not take it more often than directed.  Talk to your pediatrician regarding the use of this medicine in children. Special care may be needed.  What side effects may I notice from receiving this medicine?  Side effects that you should report to your doctor or health care professional as soon as possible:  · allergic reactions like skin rash, itching or hives, swelling of the face, lips, or tongue  Side effects that usually do not require medical attention (report to your doctor or health care professional if they continue or are bothersome):  · dizziness  · headache  · stomach upset  What may interact with this medicine?  Interactions are not expected.  What if I miss a dose?  If you miss a dose, take it as soon as you can. If it is almost time for your next dose, take only that dose. Do not take double or extra doses.  Where should I keep my medicine?  Keep out of the reach of children.  Store at room temperature between 20 and 25 degrees C (68 and 77 degrees F). Do not freeze. Keep container tightly closed. Throw away any unused medicine after the expiration date.  What should I tell my health care provider before I take this medicine?  They need to know if you have any of these conditions:  · diabetes  · fever  · kidney disease  · an unusual or allergic reaction  to guaifenesin, other medicines, foods, dyes, or preservatives  · pregnant or trying to get pregnant  · breast-feeding  What should I watch for while using this medicine?  Do not treat a cough for more than 1 week without consulting your doctor or health care professional. If you also have a high fever, skin rash, continuing headache, or sore throat, see your doctor.  For best results, drink 6 to 8 glasses water daily while you are taking this medicine.  NOTE:This sheet is a summary. It may not cover all possible information. If you have questions about this medicine, talk to your doctor, pharmacist, or health care provider. Copyright© 2017 [employer]          Azithromycin tablets  What is this medicine?  AZITHROMYCIN (az ith nael MYE sin) is a macrolide antibiotic. It is used to treat or prevent certain kinds of bacterial infections. It will not work for colds, flu, or other viral infections.  How should I use this medicine?  Take this medicine by mouth with a full glass of water. Follow the directions on the prescription label. The tablets can be taken with food or on an empty stomach. If the medicine upsets your stomach, take it with food. Take your medicine at regular intervals. Do not take your medicine more often than directed. Take all of your medicine as directed even if you think your are better. Do not skip doses or stop your medicine early. Talk to your pediatrician regarding the use of this medicine in children. Special care may be needed.  What side effects may I notice from receiving this medicine?  Side effects that you should report to your doctor or health care professional as soon as possible:  · allergic reactions like skin rash, itching or hives, swelling of the face, lips, or tongue  · confusion, nightmares or hallucinations  · dark urine  · difficulty breathing  · hearing loss  · irregular heartbeat or chest pain  · pain or difficulty passing urine  · redness, blistering, peeling or loosening  of the skin, including inside the mouth  · white patches or sores in the mouth  · yellowing of the eyes or skin  Side effects that usually do not require medical attention (report to your doctor or health care professional if they continue or are bothersome):  · diarrhea  · dizziness, drowsiness  · headache  · stomach upset or vomiting  · tooth discoloration  · vaginal irritation  What may interact with this medicine?  Do not take this medicine with any of the following medications:  · lincomycin  This medicine may also interact with the following medications:  · amiodarone  · antacids  · birth control pills  · cyclosporine  · digoxin  · magnesium  · nelfinavir  · phenytoin  · warfarin  What if I miss a dose?  If you miss a dose, take it as soon as you can. If it is almost time for your next dose, take only that dose. Do not take double or extra doses.  Where should I keep my medicine?  Keep out of the reach of children.  Store at room temperature between 15 and 30 degrees C (59 and 86 degrees F). Throw away any unused medicine after the expiration date.  What should I tell my health care provider before I take this medicine?  They need to know if you have any of these conditions:  · kidney disease  · liver disease  · irregular heartbeat or heart disease  · an unusual or allergic reaction to azithromycin, erythromycin, other macrolide antibiotics, foods, dyes, or preservatives  · pregnant or trying to get pregnant  · breast-feeding  What should I watch for while using this medicine?  Tell your doctor or health care professional if your symptoms do not improve.  Do not treat diarrhea with over the counter products. Contact your doctor if you have diarrhea that lasts more than 2 days or if it is severe and watery.  This medicine can make you more sensitive to the sun. Keep out of the sun. If you cannot avoid being in the sun, wear protective clothing and use sunscreen. Do not use sun lamps or tanning  beds/booths.  NOTE:This sheet is a summary. It may not cover all possible information. If you have questions about this medicine, talk to your doctor, pharmacist, or health care provider. Copyright© 2017 [employer]        Benzonatate capsules  What is this medicine?  BENZONATATE (shay DANIEL na mata) is used to treat cough.  How should I use this medicine?  Take this medicine by mouth with a glass of water. Follow the directions on the prescription label. Avoid breaking, chewing, or sucking the capsule, as this can cause serious side effects. Take your medicine at regular intervals. Do not take your medicine more often than directed.  Talk to your pediatrician regarding the use of this medicine in children. While this drug may be prescribed for children as young as 10 years old for selected conditions, precautions do apply.  What side effects may I notice from receiving this medicine?  Side effects that you should report to your doctor or health care professional as soon as possible:  · allergic reactions like skin rash, itching or hives, swelling of the face, lips, or tongue  · breathing problems  · chest pain  · confusion or hallucinations  · irregular heartbeat  · numbness of mouth or throat  · seizures  Side effects that usually do not require medical attention (report to your doctor or health care professional if they continue or are bothersome):  · burning feeling in the eyes  · constipation  · headache  · nasal congestion  · stomach upset  What may interact with this medicine?  Do not take this medicine with any of the following medications:  · MAOIs like Carbex, Eldepryl, Marplan, Nardil, and Parnate  What if I miss a dose?  If you miss a dose, take it as soon as you can. If it is almost time for your next dose, take only that dose. Do not take double or extra doses.  Where should I keep my medicine?  Keep out of the reach of children.  Store at room temperature between 15 and 30 degrees C (59 and 86 degrees  F). Keep tightly closed. Protect from light and moisture. Throw away any unused medicine after the expiration date.  What should I tell my health care provider before I take this medicine?  They need to know if you have any of these conditions:  · kidney or liver disease  · an unusual or allergic reaction to benzonatate, anesthetics, other medicines, foods, dyes, or preservatives  · pregnant or trying to get pregnant  · breast-feeding  What should I watch for while using this medicine?  Tell your doctor if your symptoms do not improve or if they get worse. If you have a high fever, skin rash, or headache, see your health care professional.  You may get drowsy or dizzy. Do not drive, use machinery, or do anything that needs mental alertness until you know how this medicine affects you. Do not sit or stand up quickly, especially if you are an older patient. This reduces the risk of dizzy or fainting spells.  NOTE:This sheet is a summary. It may not cover all possible information. If you have questions about this medicine, talk to your doctor, pharmacist, or health care provider. Copyright© 2017 [employer]

## 2020-02-14 ENCOUNTER — OFFICE VISIT (OUTPATIENT)
Dept: INTERNAL MEDICINE | Facility: CLINIC | Age: 85
End: 2020-02-14
Payer: MEDICARE

## 2020-02-14 VITALS
SYSTOLIC BLOOD PRESSURE: 132 MMHG | WEIGHT: 115.75 LBS | OXYGEN SATURATION: 94 % | RESPIRATION RATE: 18 BRPM | BODY MASS INDEX: 21.85 KG/M2 | TEMPERATURE: 99 F | DIASTOLIC BLOOD PRESSURE: 78 MMHG | HEART RATE: 76 BPM | HEIGHT: 61 IN

## 2020-02-14 DIAGNOSIS — I50.42 CHRONIC COMBINED SYSTOLIC AND DIASTOLIC CONGESTIVE HEART FAILURE: ICD-10-CM

## 2020-02-14 DIAGNOSIS — J06.9 UPPER RESPIRATORY INFECTION WITH COUGH AND CONGESTION: Primary | ICD-10-CM

## 2020-02-14 DIAGNOSIS — J98.4 CRLD (CHRONIC RESTRICTIVE LUNG DISEASE): Chronic | ICD-10-CM

## 2020-02-14 DIAGNOSIS — I10 ESSENTIAL HYPERTENSION: ICD-10-CM

## 2020-02-14 PROBLEM — Z51.81 MEDICATION MONITORING ENCOUNTER: Status: RESOLVED | Noted: 2017-08-15 | Resolved: 2020-02-14

## 2020-02-14 PROBLEM — R82.79 POSITIVE URINE CULTURE: Status: RESOLVED | Noted: 2019-10-08 | Resolved: 2020-02-14

## 2020-02-14 PROBLEM — N39.0 UTI (URINARY TRACT INFECTION): Status: RESOLVED | Noted: 2019-03-11 | Resolved: 2020-02-14

## 2020-02-14 PROCEDURE — 99999 PR PBB SHADOW E&M-EST. PATIENT-LVL IV: ICD-10-PCS | Mod: PBBFAC,,, | Performed by: NURSE PRACTITIONER

## 2020-02-14 PROCEDURE — 99999 PR PBB SHADOW E&M-EST. PATIENT-LVL IV: CPT | Mod: PBBFAC,,, | Performed by: NURSE PRACTITIONER

## 2020-02-14 PROCEDURE — 99214 OFFICE O/P EST MOD 30 MIN: CPT | Mod: PBBFAC | Performed by: NURSE PRACTITIONER

## 2020-02-14 PROCEDURE — 99214 OFFICE O/P EST MOD 30 MIN: CPT | Mod: S$PBB,,, | Performed by: NURSE PRACTITIONER

## 2020-02-14 PROCEDURE — 99214 PR OFFICE/OUTPT VISIT, EST, LEVL IV, 30-39 MIN: ICD-10-PCS | Mod: S$PBB,,, | Performed by: NURSE PRACTITIONER

## 2020-02-14 RX ORDER — BENZONATATE 200 MG/1
200 CAPSULE ORAL 3 TIMES DAILY PRN
Qty: 30 CAPSULE | Refills: 0 | Status: SHIPPED | OUTPATIENT
Start: 2020-02-14 | End: 2020-02-28

## 2020-02-14 NOTE — PROGRESS NOTES
Jennifer Mathews 87 y.o. female     Chief Complaint:  Chief Complaint   Patient presents with    Follow-up       History of Present Illness:  Pt is new to provider, but established in practice and presents for 3 day f/u.     LOV 22/11/20 with Dr. Francis for URI   rx z-pack and tessalon     Feeling overall improved, but still coughing a lot   SOB during coughing episodes   No sweats/chills/fever/body aches   Not taking mucinex as instructed     Exam:  Review of Systems   Constitutional: Negative for activity change, appetite change, chills, diaphoresis, fatigue, fever and unexpected weight change.   HENT: Negative for congestion, postnasal drip and sore throat.    Respiratory: Positive for cough and shortness of breath (only during coughing spells ). Negative for wheezing.    Cardiovascular: Positive for leg swelling (chronic). Negative for chest pain.   Gastrointestinal: Negative for abdominal distention, diarrhea, nausea and vomiting.   Musculoskeletal: Negative for myalgias.   Neurological: Negative for dizziness and headaches.     Physical Exam   Constitutional: She is oriented to person, place, and time. Vital signs are normal. She appears well-developed and well-nourished. She is cooperative.  Non-toxic appearance. She does not have a sickly appearance. She does not appear ill. No distress.   HENT:   Head: Normocephalic and atraumatic.   Right Ear: External ear normal.   Left Ear: External ear normal.   Eyes: Pupils are equal, round, and reactive to light. Conjunctivae and EOM are normal. Right eye exhibits no discharge. Left eye exhibits no discharge. No scleral icterus.   Neck: Normal range of motion. No tracheal deviation present.   Cardiovascular: Normal rate and regular rhythm.   Pulmonary/Chest: Effort normal. No stridor. No respiratory distress. She has no wheezes. She has rhonchi (clears after coughing). She has no rales. She exhibits no tenderness.   Abdominal: Soft. Bowel sounds are normal. She  exhibits no distension. There is no tenderness.   Musculoskeletal: Normal range of motion. She exhibits edema (BLE).   Neurological: She is alert and oriented to person, place, and time.   Skin: Skin is warm, dry and intact. No rash noted. She is not diaphoretic. No erythema. No pallor.   Psychiatric: She has a normal mood and affect. Her speech is normal and behavior is normal. Judgment and thought content normal. Cognition and memory are normal.   Nursing note and vitals reviewed.      Most Recent Laboratory Results Reviewed ({Yes)  Lab Results   Component Value Date    WBC 7.34 12/03/2019    HGB 11.1 (L) 12/03/2019    HCT 36.4 (L) 12/03/2019     12/03/2019    CHOL 131 03/21/2019    TRIG 157 (H) 03/21/2019    HDL 31 (L) 03/21/2019    ALT 19 12/13/2019    AST 26 12/13/2019     02/10/2020    K 4.0 02/10/2020     02/10/2020    CREATININE 1.3 02/10/2020    BUN 48 (H) 02/10/2020    CO2 23 02/10/2020    TSH 5.880 (H) 08/20/2019    INR 1.3 (H) 05/06/2019    HGBA1C 6.4 (H) 06/20/2006       Assessment     ICD-10-CM ICD-9-CM   1. Upper respiratory infection with cough and congestion J06.9 465.9   2. CRLD (chronic restrictive lung disease) J98.4 518.89   3. Chronic combined systolic and diastolic congestive heart failure I50.42 428.42     428.0   4. Essential hypertension I10 401.9        Plan   Upper respiratory infection with cough and congestion  - subjectively improved, but still coughing   - increased tessalon to TID PRN   -     benzonatate (TESSALON) 200 MG capsule; Take 1 capsule (200 mg total) by mouth 3 (three) times daily as needed for Cough.  Dispense: 30 capsule; Refill: 0    CRLD (chronic restrictive lung disease)  Followed by pulm   Stressed mucinex use     Chronic combined systolic and diastolic congestive heart failure  Weight stable   Followed by cards     Essential hypertension   controlled, continue current meds     Follow up if symptoms worsen or fail to improve.  Future Appointments      Date Provider Specialty Appt Notes    2/17/2020 Prosper You MD Nephrology EP/4 month f/u//[facility]    2/17/2020  Radiology dexa    2/25/2020  Lab APRIL    2/25/2020 April NIKKI Brooks NP Hematology and Oncology 8 wk f/u/cbd    2/25/2020 Danial Cartagena, STEFANP Cardiology follow up    2/27/2020 Perry Arndt MD Dermatology history of skin cancer    4/14/2020  Cardiology Merlin/AbiSamra/ppm 2 of 3    6/5/2020  Lab labs    6/16/2020 Janay Valdez PA-C Rheumatology ep/rtc/6months/labs/dexa/ca    6/16/2020  Chemotherapy prolia/ngwv needs referral

## 2020-02-14 NOTE — PATIENT INSTRUCTIONS
- mucinex (not D/DM) in the morning   - increase fluids and rest   - humidifier at night   - sleep with your head elevated as much as possible   - throat lozenges all day long   - tylenol as needed for pain/body aches

## 2020-02-17 ENCOUNTER — APPOINTMENT (OUTPATIENT)
Dept: RADIOLOGY | Facility: HOSPITAL | Age: 85
End: 2020-02-17
Attending: PHYSICIAN ASSISTANT
Payer: MEDICARE

## 2020-02-17 ENCOUNTER — OFFICE VISIT (OUTPATIENT)
Dept: NEPHROLOGY | Facility: CLINIC | Age: 85
End: 2020-02-17
Payer: MEDICARE

## 2020-02-17 VITALS
BODY MASS INDEX: 22.27 KG/M2 | DIASTOLIC BLOOD PRESSURE: 80 MMHG | HEIGHT: 61 IN | WEIGHT: 117.94 LBS | SYSTOLIC BLOOD PRESSURE: 138 MMHG | HEART RATE: 88 BPM | RESPIRATION RATE: 18 BRPM

## 2020-02-17 DIAGNOSIS — Z71.89 ENCOUNTER FOR MEDICATION REVIEW AND COUNSELING: ICD-10-CM

## 2020-02-17 DIAGNOSIS — R60.9 EDEMA, UNSPECIFIED TYPE: ICD-10-CM

## 2020-02-17 DIAGNOSIS — N18.30 STAGE 3 CHRONIC KIDNEY DISEASE: Primary | ICD-10-CM

## 2020-02-17 DIAGNOSIS — N18.30 CKD (CHRONIC KIDNEY DISEASE) STAGE 3, GFR 30-59 ML/MIN: ICD-10-CM

## 2020-02-17 DIAGNOSIS — M81.0 OSTEOPOROSIS, SENILE: ICD-10-CM

## 2020-02-17 DIAGNOSIS — I50.22 CHRONIC SYSTOLIC CONGESTIVE HEART FAILURE: ICD-10-CM

## 2020-02-17 DIAGNOSIS — I10 ESSENTIAL HYPERTENSION: ICD-10-CM

## 2020-02-17 PROCEDURE — 77080 DXA BONE DENSITY AXIAL: CPT | Mod: 26,,, | Performed by: RADIOLOGY

## 2020-02-17 PROCEDURE — 99214 PR OFFICE/OUTPT VISIT, EST, LEVL IV, 30-39 MIN: ICD-10-PCS | Mod: S$PBB,,, | Performed by: INTERNAL MEDICINE

## 2020-02-17 PROCEDURE — 99999 PR PBB SHADOW E&M-EST. PATIENT-LVL III: CPT | Mod: PBBFAC,,, | Performed by: INTERNAL MEDICINE

## 2020-02-17 PROCEDURE — 77080 DXA BONE DENSITY AXIAL: CPT | Mod: TC

## 2020-02-17 PROCEDURE — 99214 OFFICE O/P EST MOD 30 MIN: CPT | Mod: S$PBB,,, | Performed by: INTERNAL MEDICINE

## 2020-02-17 PROCEDURE — 99999 PR PBB SHADOW E&M-EST. PATIENT-LVL III: ICD-10-PCS | Mod: PBBFAC,,, | Performed by: INTERNAL MEDICINE

## 2020-02-17 PROCEDURE — 99213 OFFICE O/P EST LOW 20 MIN: CPT | Mod: PBBFAC | Performed by: INTERNAL MEDICINE

## 2020-02-17 PROCEDURE — 77080 DEXA BONE DENSITY SPINE HIP: ICD-10-PCS | Mod: 26,,, | Performed by: RADIOLOGY

## 2020-02-17 NOTE — PROGRESS NOTES
NEPHROLOGY CLINIC FOLLOWUP NOTE:  Date of clinic visit: 2/17/20     REASON FOR FOLLOWUP AND CHIEF COMPLAINT:  Chronic kidney disease and congestive heart failure. Prior UTI's.     HISTORY OF PRESENT ILLNESS:  Ms. Jennifer Mathews is an 87-year-old female with CKD stage 3, HTN, severe systolic CHF, AF, and frequent UTI's, who presents for f/u. Pt was last seen by us in Oct 2019. Pt has no new or acute c/o's today. No new abd pain, sx's of dysuria, or fever to suggest UTI. Pt has followed with PCP closely, and has just completed treatment for URI.      Pt's other issue is her fluid status.  She was previously coached on restricting salt intake and restricting to moderating fluid intake. She says she has adhered to this advice. She has large leg swelling L>R, but she also had previous knee surgery on the left. she says that leg swelling is not worse. She has no SOB.  She has no cardiopulmonary symptoms, no palpitation, no chest pain, no discomfort, and no dizziness. Labs and meds were reviewed with her.     PAST MEDICAL HISTORY:  1.  CKD stage III  2.  Hypertension.  3.  Severe systolic congestive heart failure with EF of 20%.  4.  Osteoarthritis.  5.  Multiple falls in the past with pelvic fractures.  6.  Hyperlipidemia.  7.  Paroxysmal atrial fibrillation.  8.  Osteopenia.  9.  Pulmonary hypertension.  10. Frequent UTI's     PAST SURGICAL HISTORY:  Reviewed and unchanged.     FAMILY HISTORY:  Reviewed and unchanged.     ALLERGIES:  Reviewed.  Cephalosporins, penicillin, sulfa drugs, and pregabalin.     SOCIAL HISTORY:  Reviewed.  Negative for smoking.  No alcohol use.     DIETARY HISTORY:  Reviewed, as above.     MEDICATIONS:  Reviewed.        Current Outpatient Medications:     acetaminophen (TYLENOL EXTRA STRENGTH) 325 MG tablet, Take 2 tablets (650 mg total) by mouth every 8 (eight) hours. (Patient taking differently: Take 650 mg by mouth 3 (three) times daily as needed. ), Disp: , Rfl:     allopurinol (ZYLOPRIM)  100 MG tablet, Do not exceed 1 tablet (100 mg) per day. Pt has renal insufficiency, Disp: 90 tablet, Rfl: 1    amiodarone (PACERONE) 200 MG Tab, Take 1 tablet (200 mg total) by mouth once daily. (Patient taking differently: Take 200 mg by mouth once daily. As of 7/6/19 decrease to 100 mg (half tablet) daily), Disp: 90 tablet, Rfl: 3    apixaban (ELIQUIS) 2.5 mg Tab, Take 1 tablet (2.5 mg total) by mouth 2 (two) times daily., Disp: 60 tablet, Rfl: 11    benzonatate (TESSALON) 200 MG capsule, Take 1 capsule (200 mg total) by mouth 3 (three) times daily as needed for Cough., Disp: 30 capsule, Rfl: 0    carvedilol (COREG) 25 MG tablet, TAKE 1 TABLET BY MOUTH TWICE A DAY WITH MEALS, Disp: 180 tablet, Rfl: 3    digoxin (LANOXIN) 125 mcg tablet, Take 125 mcg by mouth every other day., Disp: , Rfl:     furosemide (LASIX) 40 MG tablet, TAKE 2 TABLETS (80 MG TOTAL) BY MOUTH 2 (TWO) TIMES DAILY., Disp: 360 tablet, Rfl: 2    gabapentin (NEURONTIN) 100 MG capsule, TAKE ONE CAPSULE BY MOUTH TWO TIMES DAILY, Disp: 180 capsule, Rfl: 1    levothyroxine (SYNTHROID) 75 MCG tablet, Take 1 tablet (75 mcg total) by mouth once daily., Disp: 30 tablet, Rfl: 11    losartan (COZAAR) 25 MG tablet, Take 25 mg by mouth once daily., Disp: , Rfl: 11    methenamine (HIPREX) 1 gram Tab, Take 1 tablet (1 g total) by mouth 2 (two) times daily., Disp: 60 tablet, Rfl: 11    metOLazone (ZAROXOLYN) 2.5 MG tablet, 30 minutes after Lasix as directed, Disp: 25 tablet, Rfl: 6    Current Facility-Administered Medications:     denosumab (PROLIA) injection 60 mg, 60 mg, Subcutaneous, Q6 Months, Oc Catherine MD, 60 mg at 05/21/19 1349     REVIEW OF SYSTEMS:  No recent hospitalizations.  GENERAL:  Negative.  HEAD, EYES, EARS, NOSE, AND THROAT:  Negative.  CARDIAC:  Negative.  PULMONARY:  Negative.  GASTROINTESTINAL:  Negative.  GENITOURINARY:  Negative.  PSYCHOLOGICAL:  Negative.  NEUROLOGICAL:  Negative.  ENDOCRINE:  Negative.  HEMATOLOGIC  AND ONCOLOGIC:  Negative.  INFECTIOUS DISEASE:  Negative.  The rest of the review of systems negative.     PHYSICAL EXAMINATION:  VITAL SIGNS:  Blood pressure is 138/60, pulse is 88, weight is 117 lbs, last visit 119 lbs  GENERAL:  She is cooperative, pleasant, in no acute distress, ambulating by herself appear to be in no acute distress.    Speech and thought process was normal and appropriate.  HEENT:  Mucous membranes moist.  NECK:  No JVD.  HEART:  Regular rate and rhythm, S1 and S2 audible.  No rubs.  CHEST:  Clear to auscultation.  No rales.  No wheezes.  Breathing symmetric and   unlabored.  ABDOMEN:  Soft, nontender.  EXTREMITIES:  3+ edema L, 2+ R     LABS:  Reviewed.   BMP  Lab Results   Component Value Date     02/10/2020    K 4.0 02/10/2020     02/10/2020    CO2 23 02/10/2020    BUN 48 (H) 02/10/2020    CREATININE 1.3 02/10/2020    CALCIUM 7.3 (L) 02/10/2020    ANIONGAP 13 02/10/2020    ESTGFRAFRICA 42.6 (A) 02/10/2020    EGFRNONAA 37.0 (A) 02/10/2020     Lab Results   Component Value Date    WBC 7.34 12/03/2019    HGB 11.1 (L) 12/03/2019    HCT 36.4 (L) 12/03/2019     (H) 12/03/2019     12/03/2019       Lab Results   Component Value Date    PTH 56.0 12/27/2013    CALCIUM 7.3 (L) 02/10/2020    PHOS 2.5 (L) 02/10/2020        ASSESSMENT AND PLAN:  This is an 87-year-old female who has CKD and severe congestive heart failure pressure, who presents for f/u:  The impression is as follows:     1.  Renal.  s Cr within the past baseline range, noted slightly lower, at baseline  Stable renal function, CKD stage 3  K normal  Acid base stable  Doing well clinically, stable       2.  Cardiac:  h/o of severe systolic congestive heart failure with ejection fraction of only 20%.  Cardiology notes reviewed  Well compensated at this point, hemodynamically stable  Continue salt and fluid restriction, advised pt  Fluid wt has not worsned     3.Medications were reviewed with pt.  On renally  adjusted dose of allopurinol, 100 mg each, per day  Dose was lowered last visit, perhaps that's why s Cr improved     4. ID: frequent UTI's  No active issues today  Advised pt to follow with PCP     5.  Lab review for SPEP.  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     PLANS AND RECOMMENDATIONS:    As discussed above.    Multiple issues addressed  Opportunity for question and discussion provided.  Multiple issues addressed  Total time spent 25 minutes, more than 50% of the time was spent in counseling   and coordination of care.  Return to see us in about four months.    Level IV visit.  RTC 6 months     Prosper You MD

## 2020-02-25 ENCOUNTER — OFFICE VISIT (OUTPATIENT)
Dept: CARDIOLOGY | Facility: CLINIC | Age: 85
End: 2020-02-25
Payer: MEDICARE

## 2020-02-25 ENCOUNTER — LAB VISIT (OUTPATIENT)
Dept: LAB | Facility: HOSPITAL | Age: 85
End: 2020-02-25
Attending: NURSE PRACTITIONER
Payer: MEDICARE

## 2020-02-25 ENCOUNTER — OFFICE VISIT (OUTPATIENT)
Dept: HEMATOLOGY/ONCOLOGY | Facility: CLINIC | Age: 85
End: 2020-02-25
Payer: MEDICARE

## 2020-02-25 VITALS
HEIGHT: 62 IN | HEART RATE: 74 BPM | OXYGEN SATURATION: 97 % | BODY MASS INDEX: 21.91 KG/M2 | DIASTOLIC BLOOD PRESSURE: 64 MMHG | TEMPERATURE: 97 F | SYSTOLIC BLOOD PRESSURE: 135 MMHG | WEIGHT: 119.06 LBS

## 2020-02-25 VITALS
OXYGEN SATURATION: 95 % | HEIGHT: 61 IN | BODY MASS INDEX: 22.6 KG/M2 | WEIGHT: 119.69 LBS | SYSTOLIC BLOOD PRESSURE: 138 MMHG | DIASTOLIC BLOOD PRESSURE: 64 MMHG | HEART RATE: 70 BPM

## 2020-02-25 DIAGNOSIS — I10 ESSENTIAL HYPERTENSION: ICD-10-CM

## 2020-02-25 DIAGNOSIS — D50.0 IRON DEFICIENCY ANEMIA DUE TO CHRONIC BLOOD LOSS: ICD-10-CM

## 2020-02-25 DIAGNOSIS — N18.30 ANEMIA ASSOCIATED WITH STAGE 3 CHRONIC RENAL FAILURE: Primary | ICD-10-CM

## 2020-02-25 DIAGNOSIS — Z95.0 STATUS POST BIVENTRICULAR PACEMAKER: ICD-10-CM

## 2020-02-25 DIAGNOSIS — I48.0 PAROXYSMAL ATRIAL FIBRILLATION: ICD-10-CM

## 2020-02-25 DIAGNOSIS — D63.1 ANEMIA ASSOCIATED WITH STAGE 3 CHRONIC RENAL FAILURE: Primary | ICD-10-CM

## 2020-02-25 DIAGNOSIS — D53.9 MACROCYTIC ANEMIA: ICD-10-CM

## 2020-02-25 DIAGNOSIS — R76.8 ELEVATED SERUM IMMUNOGLOBULIN FREE LIGHT CHAIN LEVEL: ICD-10-CM

## 2020-02-25 DIAGNOSIS — Z95.2 S/P MVR (MITRAL VALVE REPLACEMENT): ICD-10-CM

## 2020-02-25 DIAGNOSIS — I50.42 CHRONIC COMBINED SYSTOLIC AND DIASTOLIC CONGESTIVE HEART FAILURE: ICD-10-CM

## 2020-02-25 DIAGNOSIS — I50.42 CHRONIC COMBINED SYSTOLIC AND DIASTOLIC CONGESTIVE HEART FAILURE: Primary | ICD-10-CM

## 2020-02-25 DIAGNOSIS — I70.0 AORTIC ATHEROSCLEROSIS: ICD-10-CM

## 2020-02-25 LAB
ALBUMIN SERPL BCP-MCNC: 3.7 G/DL (ref 3.5–5.2)
ALP SERPL-CCNC: 77 U/L (ref 55–135)
ALT SERPL W/O P-5'-P-CCNC: 11 U/L (ref 10–44)
ANION GAP SERPL CALC-SCNC: 10 MMOL/L (ref 8–16)
AST SERPL-CCNC: 15 U/L (ref 10–40)
BASOPHILS # BLD AUTO: 0.05 K/UL (ref 0–0.2)
BASOPHILS NFR BLD: 0.9 % (ref 0–1.9)
BILIRUB SERPL-MCNC: 0.6 MG/DL (ref 0.1–1)
BUN SERPL-MCNC: 47 MG/DL (ref 8–23)
CALCIUM SERPL-MCNC: 9.5 MG/DL (ref 8.7–10.5)
CHLORIDE SERPL-SCNC: 104 MMOL/L (ref 95–110)
CO2 SERPL-SCNC: 30 MMOL/L (ref 23–29)
CREAT SERPL-MCNC: 1.6 MG/DL (ref 0.5–1.4)
DIFFERENTIAL METHOD: ABNORMAL
EOSINOPHIL # BLD AUTO: 0.1 K/UL (ref 0–0.5)
EOSINOPHIL NFR BLD: 1.2 % (ref 0–8)
ERYTHROCYTE [DISTWIDTH] IN BLOOD BY AUTOMATED COUNT: 15.4 % (ref 11.5–14.5)
EST. GFR  (AFRICAN AMERICAN): 33 ML/MIN/1.73 M^2
EST. GFR  (NON AFRICAN AMERICAN): 29 ML/MIN/1.73 M^2
FERRITIN SERPL-MCNC: 959 NG/ML (ref 20–300)
GLUCOSE SERPL-MCNC: 100 MG/DL (ref 70–110)
HCT VFR BLD AUTO: 36.9 % (ref 37–48.5)
HGB BLD-MCNC: 11.5 G/DL (ref 12–16)
IMM GRANULOCYTES # BLD AUTO: 0.03 K/UL (ref 0–0.04)
IMM GRANULOCYTES NFR BLD AUTO: 0.5 % (ref 0–0.5)
IRON SERPL-MCNC: 51 UG/DL (ref 30–160)
LYMPHOCYTES # BLD AUTO: 1.3 K/UL (ref 1–4.8)
LYMPHOCYTES NFR BLD: 22.2 % (ref 18–48)
MCH RBC QN AUTO: 34.5 PG (ref 27–31)
MCHC RBC AUTO-ENTMCNC: 31.2 G/DL (ref 32–36)
MCV RBC AUTO: 111 FL (ref 82–98)
MONOCYTES # BLD AUTO: 0.5 K/UL (ref 0.3–1)
MONOCYTES NFR BLD: 8 % (ref 4–15)
NEUTROPHILS # BLD AUTO: 3.8 K/UL (ref 1.8–7.7)
NEUTROPHILS NFR BLD: 67.2 % (ref 38–73)
NRBC BLD-RTO: 0 /100 WBC
PLATELET # BLD AUTO: 166 K/UL (ref 150–350)
PMV BLD AUTO: 10.3 FL (ref 9.2–12.9)
POTASSIUM SERPL-SCNC: 4.4 MMOL/L (ref 3.5–5.1)
PROT SERPL-MCNC: 6.6 G/DL (ref 6–8.4)
RBC # BLD AUTO: 3.33 M/UL (ref 4–5.4)
SATURATED IRON: 18 % (ref 20–50)
SODIUM SERPL-SCNC: 144 MMOL/L (ref 136–145)
TOTAL IRON BINDING CAPACITY: 284 UG/DL (ref 250–450)
TRANSFERRIN SERPL-MCNC: 192 MG/DL (ref 200–375)
WBC # BLD AUTO: 5.72 K/UL (ref 3.9–12.7)

## 2020-02-25 PROCEDURE — 99213 OFFICE O/P EST LOW 20 MIN: CPT | Mod: PBBFAC,27 | Performed by: NURSE PRACTITIONER

## 2020-02-25 PROCEDURE — 99999 PR PBB SHADOW E&M-EST. PATIENT-LVL III: ICD-10-PCS | Mod: PBBFAC,,, | Performed by: NURSE PRACTITIONER

## 2020-02-25 PROCEDURE — 99999 PR PBB SHADOW E&M-EST. PATIENT-LVL III: CPT | Mod: PBBFAC,,, | Performed by: NURSE PRACTITIONER

## 2020-02-25 PROCEDURE — 83540 ASSAY OF IRON: CPT

## 2020-02-25 PROCEDURE — 80053 COMPREHEN METABOLIC PANEL: CPT

## 2020-02-25 PROCEDURE — 99214 PR OFFICE/OUTPT VISIT, EST, LEVL IV, 30-39 MIN: ICD-10-PCS | Mod: S$PBB,,, | Performed by: NURSE PRACTITIONER

## 2020-02-25 PROCEDURE — 82728 ASSAY OF FERRITIN: CPT

## 2020-02-25 PROCEDURE — 99213 OFFICE O/P EST LOW 20 MIN: CPT | Mod: PBBFAC | Performed by: NURSE PRACTITIONER

## 2020-02-25 PROCEDURE — 99215 PR OFFICE/OUTPT VISIT, EST, LEVL V, 40-54 MIN: ICD-10-PCS | Mod: S$PBB,,, | Performed by: NURSE PRACTITIONER

## 2020-02-25 PROCEDURE — 99215 OFFICE O/P EST HI 40 MIN: CPT | Mod: S$PBB,,, | Performed by: NURSE PRACTITIONER

## 2020-02-25 PROCEDURE — 99214 OFFICE O/P EST MOD 30 MIN: CPT | Mod: S$PBB,,, | Performed by: NURSE PRACTITIONER

## 2020-02-25 NOTE — PROGRESS NOTES
Subjective:   Patient ID:  Jennifer Mathews is a 87 y.o. female who presents for follow up of Congestive Heart Failure; Atrial Fibrillation; and Hypertension      HPI  Ms. Mathews is an 85 yo female with a PMH of mitral valve replacement x 3, HTN, PAF, ACS, combined CHF, PPM-CRT-D placement, s/p MVR, pacemaker placement, and CKD. Is using CPAP nightly for LEV  Coumadin switched to Eliquis 2.5mg BID on 5/31/19 by Dr. Dunne as well. ASA stopped. OAC switched following admission for GI bleed.  Digoxin decreased to every other day by Dr. Dunne.    Denies any chest pain, GAR,  orthopnea, PND, dizziness, palpitations, near syncope, syncope.   Does complain of LE edema and abdominal bloating. Feels that she is starting to have a little SOB when she noticed the swelling. Weight up 6 lbs.   Has no symptoms concerning for angina or equivalent. No CNS Complaints to suggest TIA or CVA. Does well with limiting sodium intake.  Currently taking Lasix 80mg BID with Metolazone PRN. Has taken a few doses of metolazone this month and in January with good response.     Past Medical History:   Diagnosis Date    Acute coronary syndrome     Anemia     Anticoagulated on Coumadin 7/13/2015    Arthritis     Asthma     patient denies    Atrial fibrillation     Basal cell carcinoma 10/2015    left neck    Cardiac arrest     Cardiac resynchronization therapy defibrillator (CRT-D) in place 07/13/2015    Pt denies, states it does not shock me    Cardiomyopathy     Chronic combined systolic and diastolic congestive heart failure     CKD (chronic kidney disease) stage 3, GFR 30-59 ml/min     Dyslipidemia 1/30/2014    Enterocolitis     Hyperlipidemia     Hypertension     Hypothyroidism     Ischemic cardiomyopathy 01/30/2014    Macular hole of left eye     Old    Macular hole of left eye     LEV (obstructive sleep apnea) 9/30/2013    Pneumonia     required hospitalization    Recurrent UTI     Refractive error      Spastic colon     Stroke     Syncope and collapse        Past Surgical History:   Procedure Laterality Date    APPENDECTOMY      CARDIAC CATHETERIZATION      CARDIAC PACEMAKER PLACEMENT  2014    CARDIAC VALVE SURGERY      CATARACT EXTRACTION Bilateral     OU    CHOLECYSTECTOMY      COLONOSCOPY      ESOPHAGOGASTRODUODENOSCOPY N/A 3/13/2019    Procedure: ESOPHAGOGASTRODUODENOSCOPY (EGD);  Surgeon: Ghazal Orellana MD;  Location: [facility] ENDO;  Service: Endoscopy;  Laterality: N/A;    MITRAL VALVE REPLACEMENT  2014    MITRAL VALVE SURGERY      x3    REPLACEMENT OF PACEMAKER GENERATOR Left 6/20/2018    Procedure: REPLACEMENT, PACEMAKER GENERATOR/pt has crt-p versus d;  Surgeon: Manny Dunne MD;  Location: [facility] CATH LAB;  Service: Cardiology;  Laterality: Left;  st renée device  patient is pacer dependent       Social History     Tobacco Use    Smoking status: Never Smoker    Smokeless tobacco: Never Used   Substance Use Topics    Alcohol use: No    Drug use: No       Family History   Problem Relation Age of Onset    Coronary artery disease Mother         mi    Epilepsy Mother     Heart attack Mother     Heart disease Mother     Coronary artery disease Father     Heart disease Father     Emphysema Father     COPD Father     Diabetes Brother     Cancer Brother     Melanoma Neg Hx     Psoriasis Neg Hx     Lupus Neg Hx     Eczema Neg Hx     Kidney disease Neg Hx     Stroke Neg Hx        Current Outpatient Medications   Medication Sig    acetaminophen (TYLENOL EXTRA STRENGTH) 325 MG tablet Take 2 tablets (650 mg total) by mouth every 8 (eight) hours. (Patient taking differently: Take 650 mg by mouth 3 (three) times daily as needed. )    allopurinol (ZYLOPRIM) 100 MG tablet Do not exceed 1 tablet (100 mg) per day. Pt has renal insufficiency    amiodarone (PACERONE) 200 MG Tab Take 1 tablet (200 mg total) by mouth once daily. (Patient taking differently: Take 200 mg by mouth once daily.  As of 7/6/19 decrease to 100 mg (half tablet) daily)    apixaban (ELIQUIS) 2.5 mg Tab Take 1 tablet (2.5 mg total) by mouth 2 (two) times daily.    carvedilol (COREG) 25 MG tablet TAKE 1 TABLET BY MOUTH TWICE A DAY WITH MEALS    digoxin (LANOXIN) 125 mcg tablet Take 125 mcg by mouth every other day.    furosemide (LASIX) 40 MG tablet TAKE 2 TABLETS (80 MG TOTAL) BY MOUTH 2 (TWO) TIMES DAILY.    gabapentin (NEURONTIN) 100 MG capsule TAKE ONE CAPSULE BY MOUTH TWO TIMES DAILY    levothyroxine (SYNTHROID) 75 MCG tablet Take 1 tablet (75 mcg total) by mouth once daily.    losartan (COZAAR) 25 MG tablet Take 25 mg by mouth once daily.    methenamine (HIPREX) 1 gram Tab Take 1 tablet (1 g total) by mouth 2 (two) times daily.    metOLazone (ZAROXOLYN) 2.5 MG tablet 30 minutes after Lasix as directed    benzonatate (TESSALON) 200 MG capsule Take 1 capsule (200 mg total) by mouth 3 (three) times daily as needed for Cough. (Patient not taking: Reported on 2/25/2020)     Current Facility-Administered Medications   Medication    denosumab (PROLIA) injection 60 mg     Current Outpatient Medications on File Prior to Visit   Medication Sig    acetaminophen (TYLENOL EXTRA STRENGTH) 325 MG tablet Take 2 tablets (650 mg total) by mouth every 8 (eight) hours. (Patient taking differently: Take 650 mg by mouth 3 (three) times daily as needed. )    allopurinol (ZYLOPRIM) 100 MG tablet Do not exceed 1 tablet (100 mg) per day. Pt has renal insufficiency    amiodarone (PACERONE) 200 MG Tab Take 1 tablet (200 mg total) by mouth once daily. (Patient taking differently: Take 200 mg by mouth once daily. As of 7/6/19 decrease to 100 mg (half tablet) daily)    apixaban (ELIQUIS) 2.5 mg Tab Take 1 tablet (2.5 mg total) by mouth 2 (two) times daily.    carvedilol (COREG) 25 MG tablet TAKE 1 TABLET BY MOUTH TWICE A DAY WITH MEALS    digoxin (LANOXIN) 125 mcg tablet Take 125 mcg by mouth every other day.    furosemide (LASIX) 40 MG  tablet TAKE 2 TABLETS (80 MG TOTAL) BY MOUTH 2 (TWO) TIMES DAILY.    gabapentin (NEURONTIN) 100 MG capsule TAKE ONE CAPSULE BY MOUTH TWO TIMES DAILY    levothyroxine (SYNTHROID) 75 MCG tablet Take 1 tablet (75 mcg total) by mouth once daily.    losartan (COZAAR) 25 MG tablet Take 25 mg by mouth once daily.    methenamine (HIPREX) 1 gram Tab Take 1 tablet (1 g total) by mouth 2 (two) times daily.    metOLazone (ZAROXOLYN) 2.5 MG tablet 30 minutes after Lasix as directed    benzonatate (TESSALON) 200 MG capsule Take 1 capsule (200 mg total) by mouth 3 (three) times daily as needed for Cough. (Patient not taking: Reported on 2/25/2020)     Current Facility-Administered Medications on File Prior to Visit   Medication    denosumab (PROLIA) injection 60 mg       Review of Systems   Constitution: Negative for diaphoresis, malaise/fatigue, weight gain and weight loss.   HENT: Negative for congestion and nosebleeds.    Cardiovascular: Positive for leg swelling. Negative for chest pain, claudication, cyanosis, dyspnea on exertion, irregular heartbeat, near-syncope, orthopnea, palpitations, paroxysmal nocturnal dyspnea and syncope.   Respiratory: Negative for cough, hemoptysis, shortness of breath, sleep disturbances due to breathing, snoring, sputum production and wheezing.    Hematologic/Lymphatic: Negative for bleeding problem. Bruises/bleeds easily.   Skin: Negative for rash.   Musculoskeletal: Negative for arthritis, back pain, falls, joint pain, muscle cramps and muscle weakness.   Gastrointestinal: Positive for bloating. Negative for abdominal pain, constipation, diarrhea, heartburn, hematemesis, hematochezia, melena, nausea and vomiting.   Genitourinary: Negative for dysuria, hematuria and nocturia.   Neurological: Negative for excessive daytime sleepiness, dizziness, headaches, light-headedness, loss of balance, numbness, vertigo and weakness.       Objective:   Physical Exam   Constitutional: She is oriented  "to person, place, and time. She appears well-developed and well-nourished. No distress.   HENT:   Head: Normocephalic and atraumatic.   Eyes: Pupils are equal, round, and reactive to light. Right eye exhibits no discharge. Left eye exhibits no discharge.   Neck: Neck supple. JVD ( that extends to mandible ) present.   Cardiovascular: Normal rate, regular rhythm, S1 normal and S2 normal.   Murmur heard.  High-pitched blowing holosystolic murmur is present at the apex.  Pulmonary/Chest: Effort normal. No respiratory distress. She has no wheezes. She has rales in the right lower field and the left lower field.   CRT-D site well-healed   Abdominal: Soft. She exhibits no distension.   Musculoskeletal: She exhibits edema (BLE).   Neurological: She is alert and oriented to person, place, and time.   Skin: Skin is warm and dry. She is not diaphoretic. No erythema.   CVI changes BLE   Psychiatric: She has a normal mood and affect. Her behavior is normal. Thought content normal.   Nursing note and vitals reviewed.    Vitals:    02/25/20 1417   BP: 138/64   Pulse: 70   SpO2: 95%   Weight: 54.3 kg (119 lb 11.4 oz)   Height: 5' 1" (1.549 m)     Lab Results   Component Value Date    CHOL 131 03/21/2019    CHOL 155 03/20/2018    CHOL 154 01/29/2018     Lab Results   Component Value Date    HDL 31 (L) 03/21/2019    HDL 36 (L) 03/20/2018    HDL 34 (L) 01/29/2018     Lab Results   Component Value Date    LDLCALC 68.6 03/21/2019    LDLCALC 90.8 03/20/2018    LDLCALC 79.0 01/29/2018     Lab Results   Component Value Date    TRIG 157 (H) 03/21/2019    TRIG 141 03/20/2018    TRIG 205 (H) 01/29/2018     Lab Results   Component Value Date    CHOLHDL 23.7 03/21/2019    CHOLHDL 23.2 03/20/2018    CHOLHDL 22.1 01/29/2018       Chemistry        Component Value Date/Time     02/25/2020 1258    K 4.4 02/25/2020 1258     02/25/2020 1258    CO2 30 (H) 02/25/2020 1258    BUN 47 (H) 02/25/2020 1258    CREATININE 1.6 (H) 02/25/2020 1258 "     02/25/2020 1258        Component Value Date/Time    CALCIUM 9.5 02/25/2020 1258    ALKPHOS 77 02/25/2020 1258    AST 15 02/25/2020 1258    ALT 11 02/25/2020 1258    BILITOT 0.6 02/25/2020 1258    ESTGFRAFRICA 33 (A) 02/25/2020 1258    EGFRNONAA 29 (A) 02/25/2020 1258          Lab Results   Component Value Date    TSH 5.880 (H) 08/20/2019     Lab Results   Component Value Date    INR 1.3 (H) 05/06/2019    INR 1.8 05/02/2019    INR 1.7 04/29/2019     Lab Results   Component Value Date    WBC 5.72 02/25/2020    HGB 11.5 (L) 02/25/2020    HCT 36.9 (L) 02/25/2020     (H) 02/25/2020     02/25/2020     BMP  Sodium   Date Value Ref Range Status   02/25/2020 144 136 - 145 mmol/L Final     Potassium   Date Value Ref Range Status   02/25/2020 4.4 3.5 - 5.1 mmol/L Final     Chloride   Date Value Ref Range Status   02/25/2020 104 95 - 110 mmol/L Final     CO2   Date Value Ref Range Status   02/25/2020 30 (H) 23 - 29 mmol/L Final     BUN, Bld   Date Value Ref Range Status   02/25/2020 47 (H) 8 - 23 mg/dL Final     Creatinine   Date Value Ref Range Status   02/25/2020 1.6 (H) 0.5 - 1.4 mg/dL Final     Calcium   Date Value Ref Range Status   02/25/2020 9.5 8.7 - 10.5 mg/dL Final     Anion Gap   Date Value Ref Range Status   02/25/2020 10 8 - 16 mmol/L Final     eGFR if    Date Value Ref Range Status   02/25/2020 33 (A) >60 mL/min/1.73 m^2 Final     eGFR if non    Date Value Ref Range Status   02/25/2020 29 (A) >60 mL/min/1.73 m^2 Final     Comment:     Calculation used to obtain the estimated glomerular filtration  rate (eGFR) is the CKD-EPI equation.        Estimated Creatinine Clearance: 18.7 mL/min (A) (based on SCr of 1.6 mg/dL (H)).    Assessment:     1. Chronic combined systolic and diastolic congestive heart failure    2. Essential hypertension    3. S/P MVR (mitral valve replacement)    4. Paroxysmal atrial fibrillation    5. Status post biventricular pacemaker         Plan:   Chronic combined systolic and diastolic congestive heart failure  Continue lasix 80mg BID   Needs to take metolazone x 3 days   Continue Coreg and Losartan   Heart healthy diet  Limit fluid intake 50-60 oz   Daily weights and to notify clinic if weight increases by more than 3 lbs in 1 day or 5 lbs in 1 week.   Exercise routine as tolerated    Essential hypertension  Continue current medical management     S/P MVR (mitral valve replacement)  Continue current therapy     Paroxysmal atrial fibrillation  Continue Digoxin, amio   Eliquis for CVA prophylaxis     Status post biventricular pacemaker  Continue device clinic     RTC in 1-2 weeks    Phone review on Friday for symptom check

## 2020-02-27 ENCOUNTER — OFFICE VISIT (OUTPATIENT)
Dept: DERMATOLOGY | Facility: CLINIC | Age: 85
End: 2020-02-27
Payer: MEDICARE

## 2020-02-27 DIAGNOSIS — Z12.83 SCREENING, MALIGNANT NEOPLASM, SKIN: Primary | ICD-10-CM

## 2020-02-27 PROCEDURE — 99212 OFFICE O/P EST SF 10 MIN: CPT | Mod: PBBFAC | Performed by: STUDENT IN AN ORGANIZED HEALTH CARE EDUCATION/TRAINING PROGRAM

## 2020-02-27 PROCEDURE — 99999 PR PBB SHADOW E&M-EST. PATIENT-LVL II: ICD-10-PCS | Mod: PBBFAC,,, | Performed by: STUDENT IN AN ORGANIZED HEALTH CARE EDUCATION/TRAINING PROGRAM

## 2020-02-27 PROCEDURE — 99213 OFFICE O/P EST LOW 20 MIN: CPT | Mod: S$PBB,,, | Performed by: STUDENT IN AN ORGANIZED HEALTH CARE EDUCATION/TRAINING PROGRAM

## 2020-02-27 PROCEDURE — 99213 PR OFFICE/OUTPT VISIT, EST, LEVL III, 20-29 MIN: ICD-10-PCS | Mod: S$PBB,,, | Performed by: STUDENT IN AN ORGANIZED HEALTH CARE EDUCATION/TRAINING PROGRAM

## 2020-02-27 PROCEDURE — 99999 PR PBB SHADOW E&M-EST. PATIENT-LVL II: CPT | Mod: PBBFAC,,, | Performed by: STUDENT IN AN ORGANIZED HEALTH CARE EDUCATION/TRAINING PROGRAM

## 2020-02-27 NOTE — PROGRESS NOTES
Subjective:       Patient ID: Jennifer Mathews is a 87 y.o. female.    Chief Complaint: Anemia    87-year-old  female who presents to the [facility] today as a follow-up for anemia, s/p Feraheme. Patient is also followed for elevated Free LT Chains, which have been stable.  I have reviewed all the patient's relevant clinical history available medical record have utilized as my evaluation management recommendations today.  She continues on Eliquis at this time.      Today's visit:  Patient denies any significant complaints today.       Anemia   There has been no abdominal pain, bruising/bleeding easily, confusion, fever, light-headedness or palpitations.     Review of Systems   Constitutional: Positive for fatigue. Negative for activity change, appetite change, chills, diaphoresis, fever and unexpected weight change.   HENT: Negative for congestion, hearing loss, mouth sores, nosebleeds and trouble swallowing.    Eyes: Negative for discharge and visual disturbance.   Respiratory: Negative for cough, chest tightness and shortness of breath.    Cardiovascular: Negative for chest pain, palpitations and leg swelling.   Gastrointestinal: Positive for abdominal distention. Negative for abdominal pain, blood in stool, constipation, diarrhea, nausea and vomiting.   Endocrine: Negative for cold intolerance and heat intolerance.   Genitourinary: Negative for difficulty urinating, dyspareunia and hematuria.   Musculoskeletal: Positive for arthralgias, back pain, gait problem and myalgias.   Skin: Negative.    Neurological: Negative for dizziness, weakness, light-headedness and headaches.   Hematological: Negative for adenopathy. Does not bruise/bleed easily.   Psychiatric/Behavioral: Negative for agitation, behavioral problems and confusion. The patient is nervous/anxious.        Medication List with Changes/Refills   Current Medications    ACETAMINOPHEN (TYLENOL EXTRA STRENGTH) 325 MG TABLET    Take 2  tablets (650 mg total) by mouth every 8 (eight) hours.    ALLOPURINOL (ZYLOPRIM) 100 MG TABLET    Do not exceed 1 tablet (100 mg) per day. Pt has renal insufficiency    AMIODARONE (PACERONE) 200 MG TAB    Take 1 tablet (200 mg total) by mouth once daily.    APIXABAN (ELIQUIS) 2.5 MG TAB    Take 1 tablet (2.5 mg total) by mouth 2 (two) times daily.    BENZONATATE (TESSALON) 200 MG CAPSULE    Take 1 capsule (200 mg total) by mouth 3 (three) times daily as needed for Cough.    CARVEDILOL (COREG) 25 MG TABLET    TAKE 1 TABLET BY MOUTH TWICE A DAY WITH MEALS    DIGOXIN (LANOXIN) 125 MCG TABLET    Take 125 mcg by mouth every other day.    FUROSEMIDE (LASIX) 40 MG TABLET    TAKE 2 TABLETS (80 MG TOTAL) BY MOUTH 2 (TWO) TIMES DAILY.    GABAPENTIN (NEURONTIN) 100 MG CAPSULE    TAKE ONE CAPSULE BY MOUTH TWO TIMES DAILY    LEVOTHYROXINE (SYNTHROID) 75 MCG TABLET    Take 1 tablet (75 mcg total) by mouth once daily.    LOSARTAN (COZAAR) 25 MG TABLET    Take 25 mg by mouth once daily.    METHENAMINE (HIPREX) 1 GRAM TAB    Take 1 tablet (1 g total) by mouth 2 (two) times daily.    METOLAZONE (ZAROXOLYN) 2.5 MG TABLET    30 minutes after Lasix as directed     Objective:     Vitals:    02/25/20 1314   BP: 135/64   Pulse: 74   Temp: 96.9 °F (36.1 °C)     Physical Exam   Constitutional: She is oriented to person, place, and time. She appears well-developed and well-nourished. No distress.   HENT:   Head: Normocephalic and atraumatic.   Right Ear: Hearing and external ear normal.   Left Ear: Hearing and external ear normal.   Nose: No rhinorrhea or sinus tenderness. Right sinus exhibits no maxillary sinus tenderness and no frontal sinus tenderness. Left sinus exhibits no maxillary sinus tenderness and no frontal sinus tenderness.   Mouth/Throat: Uvula is midline, oropharynx is clear and moist and mucous membranes are normal. No oral lesions.   Eyes: Pupils are equal, round, and reactive to light. Conjunctivae are normal. Right eye  exhibits no discharge. Left eye exhibits no discharge.   Neck: Normal range of motion. Carotid bruit is not present. No tracheal deviation present. No thyromegaly present.   Cardiovascular: Normal rate, regular rhythm, S1 normal, S2 normal, normal heart sounds and intact distal pulses.   No murmur heard.  Pulses:       Dorsalis pedis pulses are 2+ on the right side, and 2+ on the left side.   Pulmonary/Chest: Effort normal and breath sounds normal. No respiratory distress.   Abdominal: Soft. Bowel sounds are normal. She exhibits no distension and no mass. There is no tenderness.   Musculoskeletal: Normal range of motion. She exhibits edema. She exhibits no tenderness.   Lymphadenopathy:     She has no cervical adenopathy.        Right: No supraclavicular adenopathy present.        Left: No supraclavicular adenopathy present.   Neurological: She is alert and oriented to person, place, and time. She has normal strength. No sensory deficit. Coordination and gait normal.   Skin: Skin is warm and dry. Capillary refill takes less than 2 seconds. No rash noted. There is pallor.   Psychiatric: Her speech is normal and behavior is normal. Judgment and thought content normal. Her mood appears anxious. Cognition and memory are normal. She does not exhibit a depressed mood.   Nursing note and vitals reviewed.      Assessment:       Problem List Items Addressed This Visit        Cardiac/Vascular    Chronic combined systolic and diastolic congestive heart failure    Aortic atherosclerosis       Renal/    Anemia associated with stage 3 chronic renal failure - Primary    Relevant Orders    CBC auto differential    Comprehensive metabolic panel    Ferritin    Iron and TIBC       Immunology/Multi System    Elevated serum immunoglobulin free light chain level       Oncology    Iron deficiency anemia due to chronic blood loss    Macrocytic anemia          Plan:     Anemia r/t CKD and Iron Deficiency  --Treated with Procrit in the  past for Hgb <10.0  --Hgb: 11.5 today, will hold Procrit 20,000U today, last received 4/2019  --S/P IV Injectafer x2 doses- currently iron repleted    Follow-Up:   Return to clinic in 3 months with labs.

## 2020-02-27 NOTE — PROGRESS NOTES
Subjective:       Patient ID:  Jennifer Mathews is a 87 y.o. female who presents for   Chief Complaint   Patient presents with    Skin Check     History of Present Illness: The patient presents for follow up of skin check. This is a high risk patient here to check for the development of new lesions. Has a history of NMSC on the left neck several years ago.     The patient was last seen on: 1/2019 where she was having a rash on the foot that has since completely resolved. Denies any rapidly growing, bleeding or nonhealing skin lesions.     Other skin complaints: white head by the eye        Review of Systems   Skin: Negative for rash.        Objective:    Physical Exam   Constitutional: She appears well-developed and well-nourished. No distress.   Neurological: She is alert and oriented to person, place, and time. She is not disoriented.   Psychiatric: She has a normal mood and affect.   Skin:   Areas Examined (abnormalities noted in diagram):   Head / Face Inspection Performed  Neck Inspection Performed  Chest / Axilla Inspection Performed  Back Inspection Performed  RUE Inspected  LUE Inspection Performed                   Diagram Legend     Erythematous scaling macule/papule c/w actinic keratosis       Vascular papule c/w angioma      Pigmented verrucoid papule/plaque c/w seborrheic keratosis      Yellow umbilicated papule c/w sebaceous hyperplasia      Irregularly shaped tan macule c/w lentigo     1-2 mm smooth white papules consistent with Milia      Movable subcutaneous cyst with punctum c/w epidermal inclusion cyst      Subcutaneous movable cyst c/w pilar cyst      Firm pink to brown papule c/w dermatofibroma      Pedunculated fleshy papule(s) c/w skin tag(s)      Evenly pigmented macule c/w junctional nevus     Mildly variegated pigmented, slightly irregular-bordered macule c/w mildly atypical nevus      Flesh colored to evenly pigmented papule c/w intradermal nevus       Pink pearly papule/plaque c/w  basal cell carcinoma      Erythematous hyperkeratotic cursted plaque c/w SCC      Surgical scar with no sign of skin cancer recurrence      Open and closed comedones      Inflammatory papules and pustules      Verrucoid papule consistent consistent with wart     Erythematous eczematous patches and plaques     Dystrophic onycholytic nail with subungual debris c/w onychomycosis     Umbilicated papule    Erythematous-base heme-crusted tan verrucoid plaque consistent with inflamed seborrheic keratosis     Erythematous Silvery Scaling Plaque c/w Psoriasis     See annotation      Assessment / Plan:        Screening, malignant neoplasm, skin  Area(s) of previous NMSC evaluated with no signs of recurrence.    Upper body skin examination performed today including at least 6 points as noted in physical examination. No lesions suspicious for malignancy noted.             Follow up in about 1 year (around 2/27/2021).

## 2020-02-28 ENCOUNTER — TELEPHONE (OUTPATIENT)
Dept: CARDIOLOGY | Facility: CLINIC | Age: 85
End: 2020-02-28

## 2020-02-28 NOTE — TELEPHONE ENCOUNTER
Ms Rdoriguez states that she's gone down from 119 lbs to 112 lbs. The swelling in her feet has gone down and her symptoms of SOB have subsided. She has completed her 3 days of metalozone and is continuing to take lasix 80mg BID.

## 2020-02-29 ENCOUNTER — EXTERNAL CHRONIC CARE MANAGEMENT (OUTPATIENT)
Dept: PRIMARY CARE CLINIC | Facility: CLINIC | Age: 85
End: 2020-02-29
Payer: MEDICARE

## 2020-02-29 PROCEDURE — 99490 CHRNC CARE MGMT STAFF 1ST 20: CPT | Mod: PBBFAC | Performed by: FAMILY MEDICINE

## 2020-02-29 PROCEDURE — 99490 PR CHRONIC CARE MGMT, 1ST 20 MIN: ICD-10-PCS | Mod: S$PBB,,, | Performed by: FAMILY MEDICINE

## 2020-02-29 PROCEDURE — 99490 CHRNC CARE MGMT STAFF 1ST 20: CPT | Mod: S$PBB,,, | Performed by: FAMILY MEDICINE

## 2020-03-02 ENCOUNTER — PATIENT OUTREACH (OUTPATIENT)
Dept: ADMINISTRATIVE | Facility: OTHER | Age: 85
End: 2020-03-02

## 2020-03-04 NOTE — PROGRESS NOTES
Subjective:   Patient ID:  Jennifer Mathews is a 87 y.o. female who presents for evaluation of Congestive Heart Failure and Risk Factor Management For Atherosclerosis      HPI     Jennifer Mathews is a 87 year old female who presents to clinic for 1 week CHF follow up. She was seen last week and had volume overload noted with LE edema and weight gain. She was to continue her lasix 80 mg BID and take Metolazone x 3 days. Upon CHF phone review, her weight had reduced to 112 lbs from 119 lbs and Leg swelling resolved.     Her current medical conditions include MVR x 3, HTN, PAF on Eliquis, ACS, Combined CHF, s/p PPM-CRT-D implant, s/p MVR, CKD, LEV on CPAP. She was switched from coumadin to eliquis after GI bleed by Dr Schulz. Her digoxin was decreased to QOD dosing as well. She returns today and states she is doing ok. Her daily weight has been 109 lbs for the past several days.     She reports compliance with nightly CPAP. She endorses compliance with medications and dietary/fluid restrictions. She denies any excess salty food at restaurants and typically cook most of her own food. She has some degree of LE edema today but is much improved from last week.     Denies chest pain or anginal equivalents. No shortness of breath, GAR or palpitations. Denies orthopnea, PND or abdominal bloating. Reports regular walking without any issues lately. NO leg swelling or claudications. No recent falls, syncope or near syncopal events. Reports compliance with medications and dietary restrictions. NO CNS complaints to suggest TIA or CVA today. No signs of abnormal bleeding on Eliquis.       Past Medical History:   Diagnosis Date    Acute coronary syndrome     Anemia     Anticoagulated on Coumadin 7/13/2015    Arthritis     Asthma     patient denies    Atrial fibrillation     Basal cell carcinoma 10/2015    left neck    Cardiac arrest     Cardiac resynchronization therapy defibrillator (CRT-D) in place 07/13/2015    Pt  denies, states it does not shock me    Cardiomyopathy     Chronic combined systolic and diastolic congestive heart failure     CKD (chronic kidney disease) stage 3, GFR 30-59 ml/min     Dyslipidemia 1/30/2014    Enterocolitis     Hyperlipidemia     Hypertension     Hypothyroidism     Ischemic cardiomyopathy 01/30/2014    Macular hole of left eye     Old    Macular hole of left eye     LEV (obstructive sleep apnea) 9/30/2013    Pneumonia     required hospitalization    Recurrent UTI     Refractive error     Spastic colon     Stroke     Syncope and collapse        Past Surgical History:   Procedure Laterality Date    APPENDECTOMY      CARDIAC CATHETERIZATION      CARDIAC PACEMAKER PLACEMENT  2014    CARDIAC VALVE SURGERY      CATARACT EXTRACTION Bilateral     OU    CHOLECYSTECTOMY      COLONOSCOPY      ESOPHAGOGASTRODUODENOSCOPY N/A 3/13/2019    Procedure: ESOPHAGOGASTRODUODENOSCOPY (EGD);  Surgeon: Ghazal Orellana MD;  Location: [facility] ENDO;  Service: Endoscopy;  Laterality: N/A;    MITRAL VALVE REPLACEMENT  2014    MITRAL VALVE SURGERY      x3    REPLACEMENT OF PACEMAKER GENERATOR Left 6/20/2018    Procedure: REPLACEMENT, PACEMAKER GENERATOR/pt has crt-p versus d;  Surgeon: Manny Dunne MD;  Location: [facility] CATH LAB;  Service: Cardiology;  Laterality: Left;  st renée device  patient is pacer dependent       Social History     Tobacco Use    Smoking status: Never Smoker    Smokeless tobacco: Never Used   Substance Use Topics    Alcohol use: No    Drug use: No       Family History   Problem Relation Age of Onset    Coronary artery disease Mother         mi    Epilepsy Mother     Heart attack Mother     Heart disease Mother     Coronary artery disease Father     Heart disease Father     Emphysema Father     COPD Father     Diabetes Brother     Cancer Brother     Melanoma Neg Hx     Psoriasis Neg Hx     Lupus Neg Hx     Eczema Neg Hx     Kidney disease Neg Hx      Stroke Neg Hx      Wt Readings from Last 3 Encounters:   03/05/20 51 kg (112 lb 7 oz)   02/25/20 54.3 kg (119 lb 11.4 oz)   02/25/20 54 kg (119 lb 0.8 oz)     Temp Readings from Last 3 Encounters:   02/25/20 96.9 °F (36.1 °C)   02/14/20 98.5 °F (36.9 °C) (Tympanic)   02/11/20 98.7 °F (37.1 °C)     BP Readings from Last 3 Encounters:   03/05/20 (!) 140/72   02/25/20 138/64   02/25/20 135/64     Pulse Readings from Last 3 Encounters:   03/05/20 72   02/25/20 70   02/25/20 74     Current Outpatient Medications on File Prior to Visit   Medication Sig Dispense Refill    acetaminophen (TYLENOL EXTRA STRENGTH) 325 MG tablet Take 2 tablets (650 mg total) by mouth every 8 (eight) hours. (Patient taking differently: Take 650 mg by mouth 3 (three) times daily as needed. )      allopurinol (ZYLOPRIM) 100 MG tablet Do not exceed 1 tablet (100 mg) per day. Pt has renal insufficiency 90 tablet 1    amiodarone (PACERONE) 200 MG Tab Take 1 tablet (200 mg total) by mouth once daily. (Patient taking differently: Take 200 mg by mouth once daily. As of 7/6/19 decrease to 100 mg (half tablet) daily) 90 tablet 3    apixaban (ELIQUIS) 2.5 mg Tab Take 1 tablet (2.5 mg total) by mouth 2 (two) times daily. 60 tablet 11    carvedilol (COREG) 25 MG tablet TAKE 1 TABLET BY MOUTH TWICE A DAY WITH MEALS 180 tablet 3    digoxin (LANOXIN) 125 mcg tablet Take 125 mcg by mouth every other day.      furosemide (LASIX) 40 MG tablet TAKE 2 TABLETS (80 MG TOTAL) BY MOUTH 2 (TWO) TIMES DAILY. 360 tablet 2    gabapentin (NEURONTIN) 100 MG capsule TAKE ONE CAPSULE BY MOUTH TWO TIMES DAILY 180 capsule 1    levothyroxine (SYNTHROID) 75 MCG tablet Take 1 tablet (75 mcg total) by mouth once daily. 30 tablet 11    losartan (COZAAR) 25 MG tablet Take 25 mg by mouth once daily.  11    methenamine (HIPREX) 1 gram Tab Take 1 tablet (1 g total) by mouth 2 (two) times daily. 60 tablet 11    metOLazone (ZAROXOLYN) 2.5 MG tablet 30 minutes after Lasix as  "directed 25 tablet 6     Current Facility-Administered Medications on File Prior to Visit   Medication Dose Route Frequency Provider Last Rate Last Dose    denosumab (PROLIA) injection 60 mg  60 mg Subcutaneous Q6 Months Oc Catherine MD   60 mg at 05/21/19 1348       Review of Systems   Constitution: Positive for malaise/fatigue.   HENT: Negative for hearing loss and hoarse voice.    Eyes: Negative for blurred vision and visual disturbance.   Cardiovascular: Positive for leg swelling. Negative for chest pain, claudication, dyspnea on exertion, irregular heartbeat, near-syncope, orthopnea, palpitations, paroxysmal nocturnal dyspnea and syncope.   Respiratory: Positive for snoring. Negative for cough, hemoptysis, shortness of breath, sleep disturbances due to breathing and wheezing.         +oliver on cpap   Endocrine: Negative for cold intolerance and heat intolerance.   Hematologic/Lymphatic: Bruises/bleeds easily.   Skin: Negative for color change, dry skin and nail changes.   Musculoskeletal: Positive for arthritis and back pain. Negative for joint pain and myalgias.   Gastrointestinal: Negative for bloating, abdominal pain, constipation, nausea and vomiting.   Genitourinary: Negative for dysuria, flank pain, hematuria and hesitancy.   Neurological: Negative for headaches, light-headedness, loss of balance, numbness, paresthesias and weakness.   Psychiatric/Behavioral: Negative for altered mental status.   Allergic/Immunologic: Negative for environmental allergies.     BP (!) 140/72   Pulse 72   Ht 5' 1" (1.549 m)   Wt 51 kg (112 lb 7 oz)   LMP  (LMP Unknown)   BMI 21.24 kg/m²     Objective:   Physical Exam   Constitutional: She is oriented to person, place, and time. She appears well-developed and well-nourished. No distress.   HENT:   Head: Normocephalic and atraumatic.   Eyes: Pupils are equal, round, and reactive to light.   Neck: Normal range of motion and full passive range of motion without " pain. Neck supple. No JVD present.   Cardiovascular: Normal rate, regular rhythm, S1 normal, S2 normal and intact distal pulses. PMI is not displaced. Exam reveals no distant heart sounds.   No murmur heard.  Pulses:       Radial pulses are 2+ on the right side, and 2+ on the left side.        Dorsalis pedis pulses are 2+ on the right side, and 2+ on the left side.   Pulmonary/Chest: Effort normal and breath sounds normal. No accessory muscle usage. No respiratory distress. She has no decreased breath sounds. She has no wheezes. She has no rales.   Abdominal: Soft. Bowel sounds are normal. She exhibits no distension. There is no tenderness.   Musculoskeletal: Normal range of motion. She exhibits edema.        Right ankle: She exhibits swelling.        Left ankle: She exhibits swelling.   Neurological: She is alert and oriented to person, place, and time.   Skin: Skin is warm and dry. She is not diaphoretic. No cyanosis. Nails show no clubbing.   Psychiatric: She has a normal mood and affect. Her speech is normal and behavior is normal. Judgment and thought content normal. Cognition and memory are normal.   Nursing note and vitals reviewed.      Lab Results   Component Value Date    CHOL 131 03/21/2019    CHOL 155 03/20/2018    CHOL 154 01/29/2018     Lab Results   Component Value Date    HDL 31 (L) 03/21/2019    HDL 36 (L) 03/20/2018    HDL 34 (L) 01/29/2018     Lab Results   Component Value Date    LDLCALC 68.6 03/21/2019    LDLCALC 90.8 03/20/2018    LDLCALC 79.0 01/29/2018     Lab Results   Component Value Date    TRIG 157 (H) 03/21/2019    TRIG 141 03/20/2018    TRIG 205 (H) 01/29/2018     Lab Results   Component Value Date    CHOLHDL 23.7 03/21/2019    CHOLHDL 23.2 03/20/2018    CHOLHDL 22.1 01/29/2018       Chemistry        Component Value Date/Time     02/25/2020 1258    K 4.4 02/25/2020 1258     02/25/2020 1258    CO2 30 (H) 02/25/2020 1258    BUN 47 (H) 02/25/2020 1258    CREATININE 1.6 (H)  02/25/2020 1258     02/25/2020 1258        Component Value Date/Time    CALCIUM 9.5 02/25/2020 1258    ALKPHOS 77 02/25/2020 1258    AST 15 02/25/2020 1258    ALT 11 02/25/2020 1258    BILITOT 0.6 02/25/2020 1258    ESTGFRAFRICA 33 (A) 02/25/2020 1258    EGFRNONAA 29 (A) 02/25/2020 1258          Lab Results   Component Value Date    TSH 5.880 (H) 08/20/2019     Lab Results   Component Value Date    INR 1.3 (H) 05/06/2019    INR 1.8 05/02/2019    INR 1.7 04/29/2019     Lab Results   Component Value Date    WBC 5.72 02/25/2020    HGB 11.5 (L) 02/25/2020    HCT 36.9 (L) 02/25/2020     (H) 02/25/2020     02/25/2020        Assessment:      1. Chronic combined systolic and diastolic congestive heart failure    2. S/P MVR (mitral valve replacement)    3. Ischemic cardiomyopathy      Patient presents to clinic for CHF follow up and is doing much better today. She reports drastic improvement after 3 doses of Metolazone and weight has improved to 109 lbs.   No chest pain or shortness of breath today.   Trace pre-tibial edema to BLE today.   Denies orthopnea or PND issues.   Plan:     Continue same CV meds for now  OK to use Metolazone PRN  Dash diet, 2gm sodium restriction  60 ounces fluid restriction  Monitor BP at home  Encourage daily walking  Elevate LE as much as possible  RTC in 3 months for CHF follow up  Labs per Hematology    Nicole May, FNP-C [facility]

## 2020-03-05 ENCOUNTER — OFFICE VISIT (OUTPATIENT)
Dept: CARDIOLOGY | Facility: CLINIC | Age: 85
End: 2020-03-05
Payer: MEDICARE

## 2020-03-05 ENCOUNTER — PATIENT MESSAGE (OUTPATIENT)
Dept: INTERNAL MEDICINE | Facility: CLINIC | Age: 85
End: 2020-03-05

## 2020-03-05 VITALS
DIASTOLIC BLOOD PRESSURE: 72 MMHG | HEIGHT: 61 IN | WEIGHT: 112.44 LBS | BODY MASS INDEX: 21.23 KG/M2 | HEART RATE: 72 BPM | SYSTOLIC BLOOD PRESSURE: 140 MMHG

## 2020-03-05 DIAGNOSIS — I50.42 CHRONIC COMBINED SYSTOLIC AND DIASTOLIC CONGESTIVE HEART FAILURE: Primary | ICD-10-CM

## 2020-03-05 DIAGNOSIS — I25.5 ISCHEMIC CARDIOMYOPATHY: ICD-10-CM

## 2020-03-05 DIAGNOSIS — Z95.2 S/P MVR (MITRAL VALVE REPLACEMENT): ICD-10-CM

## 2020-03-05 PROCEDURE — 99999 PR PBB SHADOW E&M-EST. PATIENT-LVL III: ICD-10-PCS | Mod: PBBFAC,,, | Performed by: NURSE PRACTITIONER

## 2020-03-05 PROCEDURE — 99214 PR OFFICE/OUTPT VISIT, EST, LEVL IV, 30-39 MIN: ICD-10-PCS | Mod: S$PBB,,, | Performed by: NURSE PRACTITIONER

## 2020-03-05 PROCEDURE — 99213 OFFICE O/P EST LOW 20 MIN: CPT | Mod: PBBFAC | Performed by: NURSE PRACTITIONER

## 2020-03-05 PROCEDURE — 99999 PR PBB SHADOW E&M-EST. PATIENT-LVL III: CPT | Mod: PBBFAC,,, | Performed by: NURSE PRACTITIONER

## 2020-03-05 PROCEDURE — 99214 OFFICE O/P EST MOD 30 MIN: CPT | Mod: S$PBB,,, | Performed by: NURSE PRACTITIONER

## 2020-03-31 ENCOUNTER — EXTERNAL CHRONIC CARE MANAGEMENT (OUTPATIENT)
Dept: PRIMARY CARE CLINIC | Facility: CLINIC | Age: 85
End: 2020-03-31
Payer: MEDICARE

## 2020-03-31 PROCEDURE — 99490 PR CHRONIC CARE MGMT, 1ST 20 MIN: ICD-10-PCS | Mod: S$PBB,,, | Performed by: FAMILY MEDICINE

## 2020-03-31 PROCEDURE — 99490 CHRNC CARE MGMT STAFF 1ST 20: CPT | Mod: PBBFAC | Performed by: FAMILY MEDICINE

## 2020-03-31 PROCEDURE — 99490 CHRNC CARE MGMT STAFF 1ST 20: CPT | Mod: S$PBB,,, | Performed by: FAMILY MEDICINE

## 2020-04-14 ENCOUNTER — HOSPITAL ENCOUNTER (OUTPATIENT)
Dept: CARDIOLOGY | Facility: HOSPITAL | Age: 85
Discharge: HOME | End: 2020-04-14
Attending: INTERNAL MEDICINE
Payer: MEDICARE

## 2020-04-14 DIAGNOSIS — I48.0 PAROXYSMAL ATRIAL FIBRILLATION: ICD-10-CM

## 2020-04-14 DIAGNOSIS — Z95.0 CARDIAC PACEMAKER IN SITU: ICD-10-CM

## 2020-04-14 PROCEDURE — 93294 REM INTERROG EVL PM/LDLS PM: CPT | Mod: ,,, | Performed by: INTERNAL MEDICINE

## 2020-04-14 PROCEDURE — 93296 REM INTERROG EVL PM/IDS: CPT

## 2020-04-14 PROCEDURE — 93294 CARDIAC DEVICE CHECK - REMOTE: ICD-10-PCS | Mod: ,,, | Performed by: INTERNAL MEDICINE

## 2020-04-19 LAB
AV DELAY - LONGEST: 200 MSEC
AV DELAY - SHORTEST: 170 MSEC
BATTERY VOLTAGE (V): 2.99 V
IMPEDANCE RA LEAD (DONOR): 810 OHMS
IMPEDANCE RA LEAD (NATIVE): 530 OHMS
IMPEDANCE RA LEAD: 450 OHMS
OHS CV DC PP MS1: 0.4 MS
OHS CV DC PP MS2: 0.4 MS
OHS CV DC PP MS3: 0.4 MS
OHS CV DC PP V1: 2 V
OHS CV DC PP V2: 2 V
OHS CV DC PP V3: 2.5 V
VV DELAY: 80 MSEC

## 2020-04-30 ENCOUNTER — EXTERNAL CHRONIC CARE MANAGEMENT (OUTPATIENT)
Dept: PRIMARY CARE CLINIC | Facility: CLINIC | Age: 85
End: 2020-04-30
Payer: MEDICARE

## 2020-04-30 PROCEDURE — 99490 CHRNC CARE MGMT STAFF 1ST 20: CPT | Mod: S$PBB,,, | Performed by: FAMILY MEDICINE

## 2020-04-30 PROCEDURE — 99490 CHRNC CARE MGMT STAFF 1ST 20: CPT | Mod: PBBFAC | Performed by: FAMILY MEDICINE

## 2020-04-30 PROCEDURE — 99490 PR CHRONIC CARE MGMT, 1ST 20 MIN: ICD-10-PCS | Mod: S$PBB,,, | Performed by: FAMILY MEDICINE

## 2020-05-20 RX ORDER — LEVOTHYROXINE SODIUM 75 UG/1
TABLET ORAL
Qty: 90 TABLET | Refills: 3 | Status: SHIPPED | OUTPATIENT
Start: 2020-05-20 | End: 2021-02-15

## 2020-05-21 RX ORDER — APIXABAN 2.5 MG/1
TABLET, FILM COATED ORAL
Qty: 60 TABLET | Refills: 11 | Status: SHIPPED | OUTPATIENT
Start: 2020-05-21

## 2020-05-26 ENCOUNTER — OFFICE VISIT (OUTPATIENT)
Dept: HEMATOLOGY/ONCOLOGY | Facility: CLINIC | Age: 85
End: 2020-05-26
Payer: MEDICARE

## 2020-05-26 ENCOUNTER — LAB VISIT (OUTPATIENT)
Dept: LAB | Facility: HOSPITAL | Age: 85
End: 2020-05-26
Attending: INTERNAL MEDICINE
Payer: MEDICARE

## 2020-05-26 ENCOUNTER — TELEPHONE (OUTPATIENT)
Dept: CARDIOLOGY | Facility: CLINIC | Age: 85
End: 2020-05-26

## 2020-05-26 VITALS
BODY MASS INDEX: 21.14 KG/M2 | SYSTOLIC BLOOD PRESSURE: 128 MMHG | HEART RATE: 69 BPM | DIASTOLIC BLOOD PRESSURE: 59 MMHG | HEIGHT: 62 IN | OXYGEN SATURATION: 95 % | WEIGHT: 114.88 LBS | TEMPERATURE: 98 F

## 2020-05-26 DIAGNOSIS — D63.1 ANEMIA ASSOCIATED WITH STAGE 3 CHRONIC RENAL FAILURE: ICD-10-CM

## 2020-05-26 DIAGNOSIS — N18.30 ANEMIA ASSOCIATED WITH STAGE 3 CHRONIC RENAL FAILURE: ICD-10-CM

## 2020-05-26 DIAGNOSIS — K22.70 BARRETT'S ESOPHAGUS WITHOUT DYSPLASIA: ICD-10-CM

## 2020-05-26 DIAGNOSIS — D68.9 COAGULOPATHY: ICD-10-CM

## 2020-05-26 DIAGNOSIS — I48.91 ATRIAL FIBRILLATION, UNSPECIFIED TYPE: ICD-10-CM

## 2020-05-26 DIAGNOSIS — D53.9 MACROCYTIC ANEMIA: ICD-10-CM

## 2020-05-26 DIAGNOSIS — R76.8 ELEVATED SERUM IMMUNOGLOBULIN FREE LIGHT CHAIN LEVEL: ICD-10-CM

## 2020-05-26 DIAGNOSIS — D50.0 IRON DEFICIENCY ANEMIA DUE TO CHRONIC BLOOD LOSS: Primary | ICD-10-CM

## 2020-05-26 DIAGNOSIS — I50.9 ACUTE ON CHRONIC CONGESTIVE HEART FAILURE: ICD-10-CM

## 2020-05-26 DIAGNOSIS — Z86.39 HISTORY OF MALNUTRITION: ICD-10-CM

## 2020-05-26 LAB
ALBUMIN SERPL BCP-MCNC: 3.9 G/DL (ref 3.5–5.2)
ALP SERPL-CCNC: 75 U/L (ref 55–135)
ALT SERPL W/O P-5'-P-CCNC: 10 U/L (ref 10–44)
ANION GAP SERPL CALC-SCNC: 12 MMOL/L (ref 8–16)
AST SERPL-CCNC: 17 U/L (ref 10–40)
BASOPHILS # BLD AUTO: 0.05 K/UL (ref 0–0.2)
BASOPHILS NFR BLD: 0.8 % (ref 0–1.9)
BILIRUB SERPL-MCNC: 0.4 MG/DL (ref 0.1–1)
BUN SERPL-MCNC: 82 MG/DL (ref 8–23)
CALCIUM SERPL-MCNC: 9.5 MG/DL (ref 8.7–10.5)
CHLORIDE SERPL-SCNC: 101 MMOL/L (ref 95–110)
CO2 SERPL-SCNC: 33 MMOL/L (ref 23–29)
CREAT SERPL-MCNC: 1.8 MG/DL (ref 0.5–1.4)
DIFFERENTIAL METHOD: ABNORMAL
EOSINOPHIL # BLD AUTO: 0.1 K/UL (ref 0–0.5)
EOSINOPHIL NFR BLD: 1.8 % (ref 0–8)
ERYTHROCYTE [DISTWIDTH] IN BLOOD BY AUTOMATED COUNT: 14.6 % (ref 11.5–14.5)
EST. GFR  (AFRICAN AMERICAN): 29 ML/MIN/1.73 M^2
EST. GFR  (NON AFRICAN AMERICAN): 25 ML/MIN/1.73 M^2
FERRITIN SERPL-MCNC: 361 NG/ML (ref 20–300)
GLUCOSE SERPL-MCNC: 104 MG/DL (ref 70–110)
HCT VFR BLD AUTO: 33.7 % (ref 37–48.5)
HGB BLD-MCNC: 10.4 G/DL (ref 12–16)
IMM GRANULOCYTES # BLD AUTO: 0.03 K/UL (ref 0–0.04)
IMM GRANULOCYTES NFR BLD AUTO: 0.5 % (ref 0–0.5)
IRON SERPL-MCNC: 40 UG/DL (ref 30–160)
LYMPHOCYTES # BLD AUTO: 1.4 K/UL (ref 1–4.8)
LYMPHOCYTES NFR BLD: 22.6 % (ref 18–48)
MCH RBC QN AUTO: 33.7 PG (ref 27–31)
MCHC RBC AUTO-ENTMCNC: 30.9 G/DL (ref 32–36)
MCV RBC AUTO: 109 FL (ref 82–98)
MONOCYTES # BLD AUTO: 0.6 K/UL (ref 0.3–1)
MONOCYTES NFR BLD: 10.2 % (ref 4–15)
NEUTROPHILS # BLD AUTO: 3.9 K/UL (ref 1.8–7.7)
NEUTROPHILS NFR BLD: 64.1 % (ref 38–73)
NRBC BLD-RTO: 0 /100 WBC
PLATELET # BLD AUTO: 161 K/UL (ref 150–350)
PMV BLD AUTO: 10.3 FL (ref 9.2–12.9)
POTASSIUM SERPL-SCNC: 3.7 MMOL/L (ref 3.5–5.1)
PROT SERPL-MCNC: 7 G/DL (ref 6–8.4)
RBC # BLD AUTO: 3.09 M/UL (ref 4–5.4)
SATURATED IRON: 11 % (ref 20–50)
SODIUM SERPL-SCNC: 146 MMOL/L (ref 136–145)
TOTAL IRON BINDING CAPACITY: 366 UG/DL (ref 250–450)
TRANSFERRIN SERPL-MCNC: 247 MG/DL (ref 200–375)
WBC # BLD AUTO: 6.06 K/UL (ref 3.9–12.7)

## 2020-05-26 PROCEDURE — 36415 COLL VENOUS BLD VENIPUNCTURE: CPT

## 2020-05-26 PROCEDURE — 80053 COMPREHEN METABOLIC PANEL: CPT

## 2020-05-26 PROCEDURE — 85025 COMPLETE CBC W/AUTO DIFF WBC: CPT

## 2020-05-26 PROCEDURE — 99999 PR PBB SHADOW E&M-EST. PATIENT-LVL III: ICD-10-PCS | Mod: PBBFAC,,, | Performed by: NURSE PRACTITIONER

## 2020-05-26 PROCEDURE — 99215 OFFICE O/P EST HI 40 MIN: CPT | Mod: S$PBB,,, | Performed by: NURSE PRACTITIONER

## 2020-05-26 PROCEDURE — 99213 OFFICE O/P EST LOW 20 MIN: CPT | Mod: PBBFAC | Performed by: NURSE PRACTITIONER

## 2020-05-26 PROCEDURE — 99999 PR PBB SHADOW E&M-EST. PATIENT-LVL III: CPT | Mod: PBBFAC,,, | Performed by: NURSE PRACTITIONER

## 2020-05-26 PROCEDURE — 82728 ASSAY OF FERRITIN: CPT

## 2020-05-26 PROCEDURE — 99215 PR OFFICE/OUTPT VISIT, EST, LEVL V, 40-54 MIN: ICD-10-PCS | Mod: S$PBB,,, | Performed by: NURSE PRACTITIONER

## 2020-05-26 PROCEDURE — 83540 ASSAY OF IRON: CPT

## 2020-05-26 RX ORDER — CARVEDILOL 25 MG/1
TABLET ORAL
Qty: 180 TABLET | Refills: 3 | Status: SHIPPED | OUTPATIENT
Start: 2020-05-26 | End: 2020-09-25

## 2020-05-28 ENCOUNTER — PATIENT MESSAGE (OUTPATIENT)
Dept: CARDIOLOGY | Facility: CLINIC | Age: 85
End: 2020-05-28

## 2020-05-28 ENCOUNTER — PATIENT MESSAGE (OUTPATIENT)
Dept: HEMATOLOGY/ONCOLOGY | Facility: CLINIC | Age: 85
End: 2020-05-28

## 2020-05-28 ENCOUNTER — PATIENT MESSAGE (OUTPATIENT)
Dept: NEPHROLOGY | Facility: CLINIC | Age: 85
End: 2020-05-28

## 2020-05-28 RX ORDER — METHYLPREDNISOLONE SOD SUCC 125 MG
80 VIAL (EA) INJECTION
Status: CANCELLED
Start: 2020-05-28

## 2020-05-28 RX ORDER — HEPARIN 100 UNIT/ML
500 SYRINGE INTRAVENOUS
Status: CANCELLED | OUTPATIENT
Start: 2020-06-11

## 2020-05-28 RX ORDER — METHYLPREDNISOLONE SOD SUCC 125 MG
80 VIAL (EA) INJECTION
Status: CANCELLED
Start: 2020-06-11

## 2020-05-28 RX ORDER — SODIUM CHLORIDE 0.9 % (FLUSH) 0.9 %
10 SYRINGE (ML) INJECTION
Status: CANCELLED | OUTPATIENT
Start: 2020-05-28

## 2020-05-28 RX ORDER — SODIUM CHLORIDE 0.9 % (FLUSH) 0.9 %
10 SYRINGE (ML) INJECTION
Status: CANCELLED | OUTPATIENT
Start: 2020-06-11

## 2020-05-28 RX ORDER — HEPARIN 100 UNIT/ML
500 SYRINGE INTRAVENOUS
Status: CANCELLED | OUTPATIENT
Start: 2020-05-28

## 2020-05-28 NOTE — PROGRESS NOTES
Subjective:       Patient ID: Jennifer Mathews is a 87 y.o. female.    Chief Complaint: Anemia    87-year-old  female who presents to the [facility] today as a follow-up for anemia, s/p Feraheme. Patient is also followed for elevated Free LT Chains, which have been stable.  I have reviewed all the patient's relevant clinical history available medical record have utilized as my evaluation management recommendations today.  She continues on Eliquis at this time.      Today's visit:  Patient denies any significant complaints today.       Anemia   There has been no abdominal pain, bruising/bleeding easily, confusion, fever, light-headedness or palpitations.     Review of Systems   Constitutional: Positive for fatigue. Negative for activity change, appetite change, chills, diaphoresis, fever and unexpected weight change.   HENT: Negative for congestion, hearing loss, mouth sores, nosebleeds and trouble swallowing.    Eyes: Negative for discharge and visual disturbance.   Respiratory: Negative for cough, chest tightness and shortness of breath.    Cardiovascular: Negative for chest pain, palpitations and leg swelling.   Gastrointestinal: Positive for abdominal distention. Negative for abdominal pain, blood in stool, constipation, diarrhea, nausea and vomiting.   Endocrine: Negative for cold intolerance and heat intolerance.   Genitourinary: Negative for difficulty urinating, dyspareunia and hematuria.   Musculoskeletal: Positive for arthralgias, back pain, gait problem and myalgias.   Skin: Negative.    Neurological: Negative for dizziness, weakness, light-headedness and headaches.   Hematological: Negative for adenopathy. Does not bruise/bleed easily.   Psychiatric/Behavioral: Negative for agitation, behavioral problems and confusion. The patient is nervous/anxious.        Medication List with Changes/Refills   Current Medications    ACETAMINOPHEN (TYLENOL EXTRA STRENGTH) 325 MG TABLET    Take 2  tablets (650 mg total) by mouth every 8 (eight) hours.    ALLOPURINOL (ZYLOPRIM) 100 MG TABLET    Do not exceed 1 tablet (100 mg) per day. Pt has renal insufficiency    AMIODARONE (PACERONE) 200 MG TAB    Take 1 tablet (200 mg total) by mouth once daily.    DIGOXIN (LANOXIN) 125 MCG TABLET    Take 125 mcg by mouth every other day.    ELIQUIS 2.5 MG TAB    TAKE 1 TABLET BY MOUTH TWICE A DAY    FUROSEMIDE (LASIX) 40 MG TABLET    TAKE 2 TABLETS (80 MG TOTAL) BY MOUTH 2 (TWO) TIMES DAILY.    GABAPENTIN (NEURONTIN) 100 MG CAPSULE    TAKE ONE CAPSULE BY MOUTH TWO TIMES DAILY    LEVOTHYROXINE (SYNTHROID) 75 MCG TABLET    TAKE 1 TABLET BY MOUTH EVERY DAY    LOSARTAN (COZAAR) 25 MG TABLET    Take 25 mg by mouth once daily.    METHENAMINE (HIPREX) 1 GRAM TAB    Take 1 tablet (1 g total) by mouth 2 (two) times daily.    METOLAZONE (ZAROXOLYN) 2.5 MG TABLET    30 minutes after Lasix as directed   Changed and/or Refilled Medications    Modified Medication Previous Medication    CARVEDILOL (COREG) 25 MG TABLET carvedilol (COREG) 25 MG tablet       TAKE 1 TABLET BY MOUTH TWICE A DAY WITH MEALS    TAKE 1 TABLET BY MOUTH TWICE A DAY WITH MEALS     Objective:     Vitals:    05/26/20 1429   BP: (!) 128/59   Pulse: 69   Temp: 98.4 °F (36.9 °C)     Physical Exam   Constitutional: She is oriented to person, place, and time. She appears well-developed and well-nourished. No distress.   HENT:   Head: Normocephalic and atraumatic.   Right Ear: Hearing and external ear normal.   Left Ear: Hearing and external ear normal.   Nose: No rhinorrhea or sinus tenderness. Right sinus exhibits no maxillary sinus tenderness and no frontal sinus tenderness. Left sinus exhibits no maxillary sinus tenderness and no frontal sinus tenderness.   Mouth/Throat: Uvula is midline, oropharynx is clear and moist and mucous membranes are normal. No oral lesions.   Eyes: Pupils are equal, round, and reactive to light. Conjunctivae are normal. Right eye exhibits no  discharge. Left eye exhibits no discharge.   Neck: Normal range of motion. Carotid bruit is not present. No tracheal deviation present. No thyromegaly present.   Cardiovascular: Normal rate, regular rhythm, S1 normal, S2 normal, normal heart sounds and intact distal pulses.   No murmur heard.  Pulses:       Dorsalis pedis pulses are 2+ on the right side, and 2+ on the left side.   Pulmonary/Chest: Effort normal and breath sounds normal. No respiratory distress.   Abdominal: Soft. Bowel sounds are normal. She exhibits no distension and no mass. There is no tenderness.   Musculoskeletal: Normal range of motion. She exhibits edema. She exhibits no tenderness.   Lymphadenopathy:     She has no cervical adenopathy.        Right: No supraclavicular adenopathy present.        Left: No supraclavicular adenopathy present.   Neurological: She is alert and oriented to person, place, and time. She has normal strength. No sensory deficit. Coordination and gait normal.   Skin: Skin is warm and dry. Capillary refill takes less than 2 seconds. No rash noted. There is pallor.   Psychiatric: Her speech is normal and behavior is normal. Judgment and thought content normal. Her mood appears anxious. Cognition and memory are normal. She does not exhibit a depressed mood.   Nursing note and vitals reviewed.      Assessment:       Problem List Items Addressed This Visit        Renal/    Anemia associated with stage 3 chronic renal failure       Immunology/Multi System    Elevated serum immunoglobulin free light chain level       Hematology    Coagulopathy       Oncology    Iron deficiency anemia due to chronic blood loss - Primary    Relevant Orders    CBC auto differential    Comprehensive metabolic panel    Ferritin    Iron and TIBC    Macrocytic anemia       Endocrine    History of malnutrition;Mild Protein Calorie Malnutrition       GI    Santos's esophagus          Plan:     Anemia r/t CKD and Iron Deficiency  --Treated with  Procrit in the past for Hgb <10.0  --Hgb: 10.4 today, will hold Procrit 20,000U today, last received 4/2019  --Give IV Injectafer x2 doses- currently iron deficienct      Follow-Up:   Return to clinic in 6 weeks with labs.

## 2020-05-28 NOTE — PATIENT INSTRUCTIONS
Iron-Deficiency Anemia (Adult)  Red blood cells carry oxygen to the tissues of your body. Anemia is a condition in which you have too few red blood cells. You need iron to make red cells. Anemia makes you feel tired and run down. When anemia becomes severe, your skin becomes pale. You may feel short of breath after physical activity. Other symptoms include:  · Headaches  · Dizziness  · Leg cramps with physical activity  · Drowsiness  · Restless legs  Your anemia is caused by not having enough iron in your body. This may be because of:  · Loss of blood. This can be caused by heavy menstrual periods. It can also be caused by bleeding from the stomach or intestines.  · Poor diet. You may not be eating enough foods that contain iron.  · Inability to absorb iron from the foods you eat  · Pregnancy  If your blood count is low enough, your healthcare provider may prescribe an iron supplement. It usually takes about 2 to 3 months of treatment with iron supplements to correct anemia. Severe cases of anemia need a blood transfusion to quickly ease symptoms and deliver more oxygen to the cells.  Home care  Follow these guidelines when caring for yourself at home:  · Eat foods high in iron. This will boost the amount of iron stored in your body. It is a natural way to build up the number of blood cells. Good sources of iron include beef, liver, spinach and other dark green leafy vegetables, whole grains, beans, and nuts.  · Do not overexert yourself.  · Talk with your healthcare provider before traveling by air or traveling to high altitudes.  Follow-up care  Follow up with your healthcare provider in 2 months, or as advised. This is to have another red blood cell count to be sure your anemia has been fixed.  When to seek medical advice  Call your healthcare provider right away if any of these occur:  · Shortness of breath or chest pain  · Dizziness or fainting  · Vomiting blood or passing red or black-colored stool   Date  Last Reviewed: 2/25/2016  © 7086-1622 [employer] [place]. [address] [place] [address], [place] [address]. All rights reserved. This information is not intended as a substitute for professional medical care. Always follow your healthcare professional's instructions.

## 2020-05-29 ENCOUNTER — TELEPHONE (OUTPATIENT)
Dept: RHEUMATOLOGY | Facility: CLINIC | Age: 85
End: 2020-05-29

## 2020-05-29 NOTE — TELEPHONE ENCOUNTER
Spoke with pt would like to do a virtual visit with Janay Valdez on  6-10-20, same day as injectafer; would like to get prolia as well so she wont have to make an extra trip.    Virtual visit scheduled on 6-10-20 at 8am, Ok to add prolia per Aga in infusion, pt Verbalized understanding.    Will get injectafer #2 on 6-17-20    FYI

## 2020-05-30 DIAGNOSIS — M81.0 OSTEOPOROSIS, SENILE: ICD-10-CM

## 2020-05-30 DIAGNOSIS — M1A.39X1 CHRONIC GOUT DUE TO RENAL IMPAIRMENT OF MULTIPLE SITES WITH TOPHUS: ICD-10-CM

## 2020-05-31 ENCOUNTER — EXTERNAL CHRONIC CARE MANAGEMENT (OUTPATIENT)
Dept: PRIMARY CARE CLINIC | Facility: CLINIC | Age: 85
End: 2020-05-31
Payer: MEDICARE

## 2020-05-31 PROCEDURE — 99490 CHRNC CARE MGMT STAFF 1ST 20: CPT | Mod: PBBFAC | Performed by: FAMILY MEDICINE

## 2020-05-31 PROCEDURE — 99490 PR CHRONIC CARE MGMT, 1ST 20 MIN: ICD-10-PCS | Mod: S$PBB,,, | Performed by: FAMILY MEDICINE

## 2020-05-31 PROCEDURE — 99490 CHRNC CARE MGMT STAFF 1ST 20: CPT | Mod: S$PBB,,, | Performed by: FAMILY MEDICINE

## 2020-06-01 RX ORDER — ALLOPURINOL 100 MG/1
TABLET ORAL
Qty: 90 TABLET | Refills: 1 | Status: SHIPPED | OUTPATIENT
Start: 2020-06-01 | End: 2020-12-21 | Stop reason: SDUPTHER

## 2020-06-04 ENCOUNTER — PATIENT OUTREACH (OUTPATIENT)
Dept: ADMINISTRATIVE | Facility: OTHER | Age: 85
End: 2020-06-04

## 2020-06-05 ENCOUNTER — LAB VISIT (OUTPATIENT)
Dept: LAB | Facility: HOSPITAL | Age: 85
End: 2020-06-05
Attending: PHYSICIAN ASSISTANT
Payer: MEDICARE

## 2020-06-05 DIAGNOSIS — I48.91 ATRIAL FIBRILLATION, UNSPECIFIED TYPE: ICD-10-CM

## 2020-06-05 DIAGNOSIS — N18.30 CKD (CHRONIC KIDNEY DISEASE) STAGE 3, GFR 30-59 ML/MIN: ICD-10-CM

## 2020-06-05 PROCEDURE — 36415 COLL VENOUS BLD VENIPUNCTURE: CPT | Mod: PO

## 2020-06-05 PROCEDURE — 80053 COMPREHEN METABOLIC PANEL: CPT

## 2020-06-05 RX ORDER — AMIODARONE HYDROCHLORIDE 200 MG/1
200 TABLET ORAL DAILY
Qty: 90 TABLET | Refills: 3 | Status: SHIPPED | OUTPATIENT
Start: 2020-06-05

## 2020-06-06 LAB
ALBUMIN SERPL BCP-MCNC: 3.5 G/DL (ref 3.5–5.2)
ALP SERPL-CCNC: 70 U/L (ref 55–135)
ALT SERPL W/O P-5'-P-CCNC: 11 U/L (ref 10–44)
ANION GAP SERPL CALC-SCNC: 11 MMOL/L (ref 8–16)
AST SERPL-CCNC: 17 U/L (ref 10–40)
BILIRUB SERPL-MCNC: 0.4 MG/DL (ref 0.1–1)
BUN SERPL-MCNC: 70 MG/DL (ref 8–23)
CALCIUM SERPL-MCNC: 9.5 MG/DL (ref 8.7–10.5)
CHLORIDE SERPL-SCNC: 103 MMOL/L (ref 95–110)
CO2 SERPL-SCNC: 28 MMOL/L (ref 23–29)
CREAT SERPL-MCNC: 1.7 MG/DL (ref 0.5–1.4)
EST. GFR  (AFRICAN AMERICAN): 30.8 ML/MIN/1.73 M^2
EST. GFR  (NON AFRICAN AMERICAN): 26.7 ML/MIN/1.73 M^2
GLUCOSE SERPL-MCNC: 170 MG/DL (ref 70–110)
POTASSIUM SERPL-SCNC: 3.7 MMOL/L (ref 3.5–5.1)
PROT SERPL-MCNC: 6.3 G/DL (ref 6–8.4)
SODIUM SERPL-SCNC: 142 MMOL/L (ref 136–145)

## 2020-06-08 ENCOUNTER — OFFICE VISIT (OUTPATIENT)
Dept: CARDIOLOGY | Facility: CLINIC | Age: 85
End: 2020-06-08
Payer: MEDICARE

## 2020-06-08 DIAGNOSIS — G47.33 OSA (OBSTRUCTIVE SLEEP APNEA): Chronic | ICD-10-CM

## 2020-06-08 DIAGNOSIS — I25.5 ISCHEMIC CARDIOMYOPATHY: ICD-10-CM

## 2020-06-08 DIAGNOSIS — Z95.0 STATUS POST BIVENTRICULAR PACEMAKER: ICD-10-CM

## 2020-06-08 DIAGNOSIS — I50.42 CHRONIC COMBINED SYSTOLIC AND DIASTOLIC CONGESTIVE HEART FAILURE: Primary | ICD-10-CM

## 2020-06-08 DIAGNOSIS — Z95.2 S/P MVR (MITRAL VALVE REPLACEMENT): ICD-10-CM

## 2020-06-08 PROCEDURE — 99214 PR OFFICE/OUTPT VISIT, EST, LEVL IV, 30-39 MIN: ICD-10-PCS | Mod: 95,,, | Performed by: NURSE PRACTITIONER

## 2020-06-08 PROCEDURE — 99214 OFFICE O/P EST MOD 30 MIN: CPT | Mod: 95,,, | Performed by: NURSE PRACTITIONER

## 2020-06-08 NOTE — PROGRESS NOTES
Subjective:   Patient ID:  Jennifer Mathews is a 87 y.o. female who presents for evaluation of No chief complaint on file.      HPI     The patient location is:home  The chief complaint leading to consultation is: CHF    Visit type: audiovisual    Face to Face time with patient: 25   minutes of total time spent on the encounter, which includes face to face time and non-face to face time preparing to see the patient (eg, review of tests), Obtaining and/or reviewing separately obtained history, Documenting clinical information in the electronic or other health record, Independently interpreting results (not separately reported) and communicating results to the patient/family/caregiver, or Care coordination (not separately reported).       Each patient to whom he or she provides medical services by telemedicine is:  (1) informed of the relationship between the physician and patient and the respective role of any other health care provider with respect to management of the patient; and (2) notified that he or she may decline to receive medical services by telemedicine and may withdraw from such care at any time.    Notes:       Jennifer Mathews is a 87 year old female who presents to clinic for 1 week CHF follow up. She was seen last week and had volume overload noted with LE edema and weight gain. She was to continue her lasix 80 mg BID and take Metolazone x 3 days. Upon CHF phone review, her weight had reduced to 112 lbs from 119 lbs and Leg swelling resolved.     She does report that she had leg swelling and weight gain up to 118 lbs which caused her to start taking her lasix again as of this AM.     Her current medical conditions include MVR x 3, HTN, PAF on Eliquis, ACS, Combined CHF, s/p PPM-CRT-D implant, s/p MVR, CKD, LEV on CPAP. She was switched from coumadin to eliquis after GI bleed by Dr Schulz. Her digoxin was decreased to QOD dosing as well. She returns today and states she is doing ok. Her daily  weight has been 109 lbs for the past several days.     She reports compliance with nightly CPAP. She endorses compliance with medications and dietary/fluid restrictions. She denies any excess salty food at restaurants and typically cook most of her own food.     Denies chest pain or anginal equivalents. No shortness of breath, GAR or palpitations. Denies orthopnea, PND or abdominal bloating. Reports regular walking without any issues lately. She has significant LE edema noted today on virtual visit.  No recent falls, syncope or near syncopal events. Reports compliance with medications and dietary restrictions. NO CNS complaints to suggest TIA or CVA today. No signs of abnormal bleeding on Eliquis.     Reviewed recent labs with patient over visit today.       Past Medical History:   Diagnosis Date    Acute coronary syndrome     Anemia     Anticoagulated on Coumadin 7/13/2015    Arthritis     Asthma     patient denies    Atrial fibrillation     Basal cell carcinoma 10/2015    left neck    Cardiac arrest     Cardiac resynchronization therapy defibrillator (CRT-D) in place 07/13/2015    Pt denies, states it does not shock me    Cardiomyopathy     Chronic combined systolic and diastolic congestive heart failure     CKD (chronic kidney disease) stage 3, GFR 30-59 ml/min     Dyslipidemia 1/30/2014    Enterocolitis     Hyperlipidemia     Hypertension     Hypothyroidism     Ischemic cardiomyopathy 01/30/2014    Macular hole of left eye     Old    Macular hole of left eye     LEV (obstructive sleep apnea) 9/30/2013    Pneumonia     required hospitalization    Recurrent UTI     Refractive error     Spastic colon     Stroke     Syncope and collapse        Past Surgical History:   Procedure Laterality Date    APPENDECTOMY      CARDIAC CATHETERIZATION      CARDIAC PACEMAKER PLACEMENT  2014    CARDIAC VALVE SURGERY      CATARACT EXTRACTION Bilateral     OU    CHOLECYSTECTOMY      COLONOSCOPY       ESOPHAGOGASTRODUODENOSCOPY N/A 3/13/2019    Procedure: ESOPHAGOGASTRODUODENOSCOPY (EGD);  Surgeon: Ghazal Orellana MD;  Location: [facility] ENDO;  Service: Endoscopy;  Laterality: N/A;    MITRAL VALVE REPLACEMENT  2014    MITRAL VALVE SURGERY      x3    REPLACEMENT OF PACEMAKER GENERATOR Left 6/20/2018    Procedure: REPLACEMENT, PACEMAKER GENERATOR/pt has crt-p versus d;  Surgeon: Manny Dunne MD;  Location: [facility] CATH LAB;  Service: Cardiology;  Laterality: Left;  st renée device  patient is pacer dependent       Social History     Tobacco Use    Smoking status: Never Smoker    Smokeless tobacco: Never Used   Substance Use Topics    Alcohol use: No    Drug use: No       Family History   Problem Relation Age of Onset    Coronary artery disease Mother         mi    Epilepsy Mother     Heart attack Mother     Heart disease Mother     Coronary artery disease Father     Heart disease Father     Emphysema Father     COPD Father     Diabetes Brother     Cancer Brother     Melanoma Neg Hx     Psoriasis Neg Hx     Lupus Neg Hx     Eczema Neg Hx     Kidney disease Neg Hx     Stroke Neg Hx      Wt Readings from Last 3 Encounters:   05/26/20 52.1 kg (114 lb 13.8 oz)   03/05/20 51 kg (112 lb 7 oz)   02/25/20 54.3 kg (119 lb 11.4 oz)     Temp Readings from Last 3 Encounters:   05/26/20 98.4 °F (36.9 °C)   02/25/20 96.9 °F (36.1 °C)   02/14/20 98.5 °F (36.9 °C) (Tympanic)     BP Readings from Last 3 Encounters:   05/26/20 (!) 128/59   03/05/20 (!) 140/72   02/25/20 138/64     Pulse Readings from Last 3 Encounters:   05/26/20 69   03/05/20 72   02/25/20 70     Current Outpatient Medications on File Prior to Visit   Medication Sig Dispense Refill    acetaminophen (TYLENOL EXTRA STRENGTH) 325 MG tablet Take 2 tablets (650 mg total) by mouth every 8 (eight) hours. (Patient taking differently: Take 650 mg by mouth 3 (three) times daily as needed. )      allopurinoL (ZYLOPRIM) 100 MG tablet TAKE 1 TABLET BY  MOUTH EVERY DAY 90 tablet 1    amiodarone (PACERONE) 200 MG Tab Take 1 tablet (200 mg total) by mouth once daily. 90 tablet 3    carvediloL (COREG) 25 MG tablet TAKE 1 TABLET BY MOUTH TWICE A DAY WITH MEALS 180 tablet 3    digoxin (LANOXIN) 125 mcg tablet Take 125 mcg by mouth every other day.      ELIQUIS 2.5 mg Tab TAKE 1 TABLET BY MOUTH TWICE A DAY 60 tablet 11    furosemide (LASIX) 40 MG tablet TAKE 2 TABLETS (80 MG TOTAL) BY MOUTH 2 (TWO) TIMES DAILY. 360 tablet 2    gabapentin (NEURONTIN) 100 MG capsule TAKE ONE CAPSULE BY MOUTH TWO TIMES DAILY 180 capsule 1    levothyroxine (SYNTHROID) 75 MCG tablet TAKE 1 TABLET BY MOUTH EVERY DAY 90 tablet 3    losartan (COZAAR) 25 MG tablet Take 25 mg by mouth once daily.  11    methenamine (HIPREX) 1 gram Tab Take 1 tablet (1 g total) by mouth 2 (two) times daily. 60 tablet 11    metOLazone (ZAROXOLYN) 2.5 MG tablet 30 minutes after Lasix as directed 25 tablet 6     Current Facility-Administered Medications on File Prior to Visit   Medication Dose Route Frequency Provider Last Rate Last Dose    denosumab (PROLIA) injection 60 mg  60 mg Subcutaneous Q6 Months Oc Catherine MD   60 mg at 05/21/19 1349       Review of Systems   Constitution: Positive for malaise/fatigue and weight gain (4 lbs).   HENT: Negative for hearing loss and hoarse voice.    Eyes: Negative for blurred vision and visual disturbance.   Cardiovascular: Positive for leg swelling. Negative for chest pain, claudication, dyspnea on exertion, irregular heartbeat, near-syncope, orthopnea, palpitations, paroxysmal nocturnal dyspnea and syncope.   Respiratory: Positive for snoring. Negative for cough, hemoptysis, shortness of breath, sleep disturbances due to breathing and wheezing.         +oliver on cpap   Endocrine: Negative for cold intolerance and heat intolerance.   Hematologic/Lymphatic: Bruises/bleeds easily.   Skin: Negative for color change, dry skin and nail changes.    Musculoskeletal: Positive for arthritis and back pain. Negative for joint pain and myalgias.   Gastrointestinal: Negative for bloating, abdominal pain, constipation, nausea and vomiting.   Genitourinary: Negative for dysuria, flank pain, hematuria and hesitancy.   Neurological: Negative for headaches, light-headedness, loss of balance, numbness, paresthesias and weakness.   Psychiatric/Behavioral: Negative for altered mental status.   Allergic/Immunologic: Negative for environmental allergies.         Objective:   Physical Exam   Constitutional: She appears well-developed and well-nourished. No distress.   +weight gain   Musculoskeletal: She exhibits edema.        Right ankle: She exhibits swelling.        Left ankle: She exhibits swelling.   Skin: She is not diaphoretic.       Lab Results   Component Value Date    CHOL 131 03/21/2019    CHOL 155 03/20/2018    CHOL 154 01/29/2018     Lab Results   Component Value Date    HDL 31 (L) 03/21/2019    HDL 36 (L) 03/20/2018    HDL 34 (L) 01/29/2018     Lab Results   Component Value Date    LDLCALC 68.6 03/21/2019    LDLCALC 90.8 03/20/2018    LDLCALC 79.0 01/29/2018     Lab Results   Component Value Date    TRIG 157 (H) 03/21/2019    TRIG 141 03/20/2018    TRIG 205 (H) 01/29/2018     Lab Results   Component Value Date    CHOLHDL 23.7 03/21/2019    CHOLHDL 23.2 03/20/2018    CHOLHDL 22.1 01/29/2018       Chemistry        Component Value Date/Time     06/05/2020 0847    K 3.7 06/05/2020 0847     06/05/2020 0847    CO2 28 06/05/2020 0847    BUN 70 (H) 06/05/2020 0847    CREATININE 1.7 (H) 06/05/2020 0847     (H) 06/05/2020 0847        Component Value Date/Time    CALCIUM 9.5 06/05/2020 0847    ALKPHOS 70 06/05/2020 0847    AST 17 06/05/2020 0847    ALT 11 06/05/2020 0847    BILITOT 0.4 06/05/2020 0847    ESTGFRAFRICA 30.8 (A) 06/05/2020 0847    EGFRNONAA 26.7 (A) 06/05/2020 0847          Lab Results   Component Value Date    TSH 5.880 (H) 08/20/2019      Lab Results   Component Value Date    INR 1.3 (H) 05/06/2019    INR 1.8 05/02/2019    INR 1.7 04/29/2019     Lab Results   Component Value Date    WBC 6.06 05/26/2020    HGB 10.4 (L) 05/26/2020    HCT 33.7 (L) 05/26/2020     (H) 05/26/2020     05/26/2020        Assessment:      1. Chronic combined systolic and diastolic congestive heart failure    2. Ischemic cardiomyopathy    3. Status post biventricular pacemaker    4. S/P MVR (mitral valve replacement)    5. LEV (obstructive sleep apnea)      Patient seen over virtual visit for CHF exacerbation today. Patient reports weight gain and LE edema and started taking her Lasix this AM. Recent CMP reviewed with patient.   Phone review on Friday for further recommendations.   Plan:     Continue same CV meds for now  OK to use Metolazone PRN  Dash diet, 2gm sodium restriction  60 ounces fluid restriction  Monitor BP at home  Encourage daily walking  Elevate LE as much as possible  Phone review on Friday for CHF symptom check    ABELINO Graham  [facility]

## 2020-06-08 NOTE — PROGRESS NOTES
Subjective:       Patient ID: Jennifer Mathews is a 87 y.o. female.    Chief Complaint: No chief complaint on file.    [place]  The chief complaint leading to consultation is: follow up     Visit type: audiovisual    Face to Face time with patient: 10min  20 minutes of total time spent on the encounter, which includes face to face time and non-face to face time preparing to see the patient (eg, review of tests), Obtaining and/or reviewing separately obtained history, Documenting clinical information in the electronic or other health record, Independently interpreting results (not separately reported) and communicating results to the patient/family/caregiver, or Care coordination (not separately reported).         Each patient to whom he or she provides medical services by telemedicine is:  (1) informed of the relationship between the physician and patient and the respective role of any other health care provider with respect to management of the patient; and (2) notified that he or she may decline to receive medical services by telemedicine and may withdraw from such care at any time.    Notes:       Jennifer is here for follow up. She has osteopenia with high frax and gout in the setting of CKD. She is on Prolia for her bone density.  She occasionally uses a rolling walker if she is doing lots of walking, also uses a cane. No issues tolerating prolia. She gets calcium in her diet.   She has some OA and joint pains but doesn't use nsaids due to coumadin use in the past     Regarding gout she is off colchicine; on allopurinol is at 200mg/day.No issues tolerating her medications. No gout flares recently.  No falls/fxs     dexa 2.2020 shows stable bmd    Review of Systems   Constitutional: Negative for chills, fatigue and fever.   HENT: Negative for mouth sores, rhinorrhea and sore throat.    Eyes: Negative for pain and redness.   Respiratory: Negative for cough and shortness of breath.     Cardiovascular: Negative for chest pain.        CHF, htn, CAD   Gastrointestinal: Negative for abdominal pain, constipation, diarrhea, nausea and vomiting.   Genitourinary: Negative for dysuria and hematuria.   Musculoskeletal: Positive for arthralgias. Negative for joint swelling and myalgias.   Skin: Negative for rash.   Neurological: Negative for weakness, numbness and headaches.   Hematological:        Chronic anemia   Psychiatric/Behavioral: The patient is not nervous/anxious.          Objective:   LMP  (LMP Unknown)      Physical Exam   Constitutional: She is oriented to person, place, and time and well-developed, well-nourished, and in no distress.   HENT:   Head: Normocephalic.   Pulmonary/Chest: Effort normal. No respiratory distress.   Neurological: She is alert and oriented to person, place, and time.   Psychiatric: Mood normal.   Musculoskeletal: Normal range of motion.                Recent Results (from the past 168 hour(s))   Comprehensive metabolic panel    Collection Time: 06/05/20  8:47 AM   Result Value Ref Range    Sodium 142 136 - 145 mmol/L    Potassium 3.7 3.5 - 5.1 mmol/L    Chloride 103 95 - 110 mmol/L    CO2 28 23 - 29 mmol/L    Glucose 170 (H) 70 - 110 mg/dL    BUN, Bld 70 (H) 8 - 23 mg/dL    Creatinine 1.7 (H) 0.5 - 1.4 mg/dL    Calcium 9.5 8.7 - 10.5 mg/dL    Total Protein 6.3 6.0 - 8.4 g/dL    Albumin 3.5 3.5 - 5.2 g/dL    Total Bilirubin 0.4 0.1 - 1.0 mg/dL    Alkaline Phosphatase 70 55 - 135 U/L    AST 17 10 - 40 U/L    ALT 11 10 - 44 U/L    Anion Gap 11 8 - 16 mmol/L    eGFR if African American 30.8 (A) >60 mL/min/1.73 m^2    eGFR if non  26.7 (A) >60 mL/min/1.73 m^2       Dexa 7.22.15: DF -1.6, Right neck -2.3, spine -1.9 frax 22.4 major, 7.4 hip, decreasing bmd  Dexa 11.20.17: LN -1.9, Right neck -2.2, DF-1.4, spine -1.3, stable hip, improving spine, frax major 20.4%, hip 6.5%  dexa 2.17.20: LN -2.0, spine -1.0, frax majro 18%, hip 5.6%stable bmd     Assessment:        1. Osteoporosis, senile    2. CKD (chronic kidney disease) stage 3, GFR 30-59 ml/min    3. 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        Impression:  Osteopenia with high fracture risk: on prolia q 6 months, daily vitamin D; stable dexa 2/2020    Gout: on allopurinol 200mg/day, off colchcine, stable no attacks, uric acid at goal 5.4    medication monitoring: no issues, tolerates well allopurinol, and prolia--    CKD4: seeing nephrology now, stable    OA mult joints: didi hands, shoulders, knees, avoids nsaids    Plan:       Labs reviewed and done within past 14 days  Ca 9.5, Creat 1.7, eGFR 26.7  No contraindication for Prolia today, ok for nurse to administer today  Re-evaluate patient again in 6 months to determine if Prolia still medically indicated and appropriate to administer.    KDIGO lab monitoring will be followed according to KDIGO 2017 Clinical Practice Guideline Update for the Diagnosis, Evaluation, Prevention, and Treatment of Chronic Kidney Disease-Mineral and Bone Disorder (CKD-MBD)  Chapter 3.1: Diagnosis of CKD-MBD: biochemical abnormalities    Reviewed dexa w pt, stable, cont prolia  Chew 2 tums daily next 2 wks then recheck ca level in 10 days  Cont allopurinol 200mg/day   Cont vit D daily     cmp in 10 days   See back in 6 mos with cmp, prolia (early cmp, prolia at [facility]),

## 2020-06-09 ENCOUNTER — TELEPHONE (OUTPATIENT)
Dept: RHEUMATOLOGY | Facility: CLINIC | Age: 85
End: 2020-06-09

## 2020-06-10 ENCOUNTER — TELEPHONE (OUTPATIENT)
Dept: RHEUMATOLOGY | Facility: CLINIC | Age: 85
End: 2020-06-10

## 2020-06-10 ENCOUNTER — OFFICE VISIT (OUTPATIENT)
Dept: RHEUMATOLOGY | Facility: CLINIC | Age: 85
End: 2020-06-10
Payer: MEDICARE

## 2020-06-10 ENCOUNTER — INFUSION (OUTPATIENT)
Dept: INFUSION THERAPY | Facility: HOSPITAL | Age: 85
End: 2020-06-10
Attending: INTERNAL MEDICINE
Payer: MEDICARE

## 2020-06-10 ENCOUNTER — PATIENT OUTREACH (OUTPATIENT)
Dept: ADMINISTRATIVE | Facility: OTHER | Age: 85
End: 2020-06-10

## 2020-06-10 VITALS
SYSTOLIC BLOOD PRESSURE: 122 MMHG | OXYGEN SATURATION: 95 % | HEART RATE: 69 BPM | DIASTOLIC BLOOD PRESSURE: 56 MMHG | TEMPERATURE: 98 F | RESPIRATION RATE: 16 BRPM

## 2020-06-10 DIAGNOSIS — M81.0 OSTEOPOROSIS, SENILE: Primary | ICD-10-CM

## 2020-06-10 DIAGNOSIS — M1A.39X1 CHRONIC GOUT DUE TO RENAL IMPAIRMENT OF MULTIPLE SITES WITH TOPHUS: ICD-10-CM

## 2020-06-10 DIAGNOSIS — M81.0 OSTEOPOROSIS, SENILE: ICD-10-CM

## 2020-06-10 DIAGNOSIS — N18.30 ANEMIA ASSOCIATED WITH STAGE 3 CHRONIC RENAL FAILURE: ICD-10-CM

## 2020-06-10 DIAGNOSIS — N18.30 CKD (CHRONIC KIDNEY DISEASE) STAGE 3, GFR 30-59 ML/MIN: ICD-10-CM

## 2020-06-10 DIAGNOSIS — D50.0 IRON DEFICIENCY ANEMIA DUE TO CHRONIC BLOOD LOSS: Primary | ICD-10-CM

## 2020-06-10 DIAGNOSIS — D63.1 ANEMIA ASSOCIATED WITH STAGE 3 CHRONIC RENAL FAILURE: ICD-10-CM

## 2020-06-10 PROCEDURE — 96365 THER/PROPH/DIAG IV INF INIT: CPT

## 2020-06-10 PROCEDURE — 99214 OFFICE O/P EST MOD 30 MIN: CPT | Mod: 95,,, | Performed by: PHYSICIAN ASSISTANT

## 2020-06-10 PROCEDURE — 99214 PR OFFICE/OUTPT VISIT, EST, LEVL IV, 30-39 MIN: ICD-10-PCS | Mod: 95,,, | Performed by: PHYSICIAN ASSISTANT

## 2020-06-10 PROCEDURE — 25000003 PHARM REV CODE 250: Performed by: NURSE PRACTITIONER

## 2020-06-10 PROCEDURE — 63600175 PHARM REV CODE 636 W HCPCS: Mod: JG | Performed by: NURSE PRACTITIONER

## 2020-06-10 PROCEDURE — 96375 TX/PRO/DX INJ NEW DRUG ADDON: CPT

## 2020-06-10 PROCEDURE — 63600175 PHARM REV CODE 636 W HCPCS: Mod: JG | Performed by: PHYSICIAN ASSISTANT

## 2020-06-10 PROCEDURE — 96372 THER/PROPH/DIAG INJ SC/IM: CPT | Mod: 59

## 2020-06-10 RX ORDER — METHYLPREDNISOLONE SOD SUCC 125 MG
80 VIAL (EA) INJECTION
Status: COMPLETED | OUTPATIENT
Start: 2020-06-10 | End: 2020-06-10

## 2020-06-10 RX ADMIN — DENOSUMAB 60 MG: 60 INJECTION SUBCUTANEOUS at 12:06

## 2020-06-10 RX ADMIN — FERRIC CARBOXYMALTOSE INJECTION 750 MG: 50 INJECTION, SOLUTION INTRAVENOUS at 12:06

## 2020-06-10 RX ADMIN — METHYLPREDNISOLONE SODIUM SUCCINATE 80 MG: 125 INJECTION, POWDER, FOR SOLUTION INTRAMUSCULAR; INTRAVENOUS at 11:06

## 2020-06-10 NOTE — DISCHARGE INSTRUCTIONS
[facility]  [address] [facility]  [address]  [phone] phone     [phone] fax  Hours of Operation: Monday- Friday 8:00am- 5:00pm  After hours phone  [phone]  Hematology / Oncology Physicians on call      Dr. Rodríguez Lord, NENO Olea NP Tyesha Taylor, NP    Please call with any concerns regarding your appointment today.    FALL PREVENTION   Falls often occur due to slipping, tripping or losing your balance. Here are ways to reduce your risk of falling again.   Was there anything that caused your fall that can be fixed, removed or replaced?   Make your home safe by keeping walkways clear of objects you may trip over.   Use non-slip pads under rugs.   Do not walk in poorly lit areas.   Do not stand on chairs or wobbly ladders.   Use caution when reaching overhead or looking upward. This position can cause a loss of balance.   Be sure your shoes fit properly, have non-slip bottoms and are in good condition.   Be cautious when going up and down stairs, curbs, and when walking on uneven sidewalks.   If your balance is poor, consider using a cane or walker.   If your fall was related to alcohol use, stop or limit alcohol intake.   If your fall was related to use of sleeping medicines, talk to your doctor about this. You may need to reduce your dosage at bedtime if you awaken during the night to go to the bathroom.   To reduce the need for nighttime bathroom trips:   Avoid drinking fluids for several hours before going to bed   Empty your bladder before going to bed   Men can keep a urinal at the bedside   © 1022-5864 [facility] [facility], [address] [place] [address], [place] [address]. All rights reserved. This information is not intended as a substitute for professional medical care. Always follow your healthcare professional's instructions.

## 2020-06-10 NOTE — TELEPHONE ENCOUNTER
6 mo early labs scheduled on 12.7.11 in [place] f/u with shilo on 12.14.20 at 11am and prolia at 11:30am at [facility], pt Verbalized understanding.

## 2020-06-10 NOTE — TELEPHONE ENCOUNTER
7 day labs added to appointment on 6.17.20 10:50am at [facility] prior to infusion, Left message to call clinic back to confirm with pt.

## 2020-06-10 NOTE — PROGRESS NOTES
Care Everywhere: n/a  Immunization: updated  Health Maintenance: updated  Media Review: n/a  Legacy Review: n/a  Order placed: n/a  Upcoming appts:n/a

## 2020-06-12 ENCOUNTER — TELEPHONE (OUTPATIENT)
Dept: CARDIOLOGY | Facility: CLINIC | Age: 85
End: 2020-06-12

## 2020-06-12 NOTE — TELEPHONE ENCOUNTER
CHF phone review: Ms Rodriguez completed taking her extra dose of Lasix and her breathing has gotten better. The swelling in her legs continues to be at the end of the day. She continues to walk every day

## 2020-06-12 NOTE — TELEPHONE ENCOUNTER
I left a voicemail for Ms Rodriguez to call the office.    Per Apple May: Excellent!     We need to schedule CHF virtual or in office visit with labs (BMP, BNP) in 3-4 weeks     Thanks     Follow up with Apple is scheduled for a virtual visit for 7/15/20 and labs are scheduled for 7/10/20

## 2020-06-12 NOTE — TELEPHONE ENCOUNTER
----- Message from Marcy Urban sent at 6/12/2020 11:58 AM CDT -----  Contact: self  Pt calling in regards to speak with Apple in provider office, pt missed call        Please advise pt can be contact at [phone]

## 2020-06-12 NOTE — TELEPHONE ENCOUNTER
----- Message from Apple Castillo MA sent at 6/8/2020  2:29 PM CDT -----  Regarding: CHF phone review for Friday   Contact: [phone]  CHF phone review: route back to Apple Infante

## 2020-06-17 ENCOUNTER — INFUSION (OUTPATIENT)
Dept: INFUSION THERAPY | Facility: HOSPITAL | Age: 85
End: 2020-06-17
Attending: INTERNAL MEDICINE
Payer: MEDICARE

## 2020-06-17 ENCOUNTER — TELEPHONE (OUTPATIENT)
Dept: RHEUMATOLOGY | Facility: CLINIC | Age: 85
End: 2020-06-17

## 2020-06-17 VITALS
DIASTOLIC BLOOD PRESSURE: 52 MMHG | SYSTOLIC BLOOD PRESSURE: 119 MMHG | HEART RATE: 69 BPM | OXYGEN SATURATION: 96 % | RESPIRATION RATE: 17 BRPM | TEMPERATURE: 99 F | BODY MASS INDEX: 21.35 KG/M2 | WEIGHT: 116.75 LBS

## 2020-06-17 DIAGNOSIS — D50.0 IRON DEFICIENCY ANEMIA DUE TO CHRONIC BLOOD LOSS: Primary | ICD-10-CM

## 2020-06-17 DIAGNOSIS — D63.1 ANEMIA ASSOCIATED WITH STAGE 3 CHRONIC RENAL FAILURE: ICD-10-CM

## 2020-06-17 DIAGNOSIS — N18.30 ANEMIA ASSOCIATED WITH STAGE 3 CHRONIC RENAL FAILURE: ICD-10-CM

## 2020-06-17 PROCEDURE — 63600175 PHARM REV CODE 636 W HCPCS: Mod: JG | Performed by: NURSE PRACTITIONER

## 2020-06-17 PROCEDURE — 96375 TX/PRO/DX INJ NEW DRUG ADDON: CPT

## 2020-06-17 PROCEDURE — 25000003 PHARM REV CODE 250: Performed by: NURSE PRACTITIONER

## 2020-06-17 PROCEDURE — 96365 THER/PROPH/DIAG IV INF INIT: CPT

## 2020-06-17 RX ORDER — METHYLPREDNISOLONE SOD SUCC 125 MG
80 VIAL (EA) INJECTION
Status: COMPLETED | OUTPATIENT
Start: 2020-06-17 | End: 2020-06-17

## 2020-06-17 RX ADMIN — FERRIC CARBOXYMALTOSE INJECTION 750 MG: 50 INJECTION, SOLUTION INTRAVENOUS at 11:06

## 2020-06-17 RX ADMIN — METHYLPREDNISOLONE SODIUM SUCCINATE 80 MG: 125 INJECTION, POWDER, FOR SOLUTION INTRAMUSCULAR; INTRAVENOUS at 11:06

## 2020-06-17 NOTE — TELEPHONE ENCOUNTER
Spoke with Ms. Mathews who states that she is currently taking 2 tums daily. She will increase her tums to 4 a day for the next 7 days. Ms. Mathews states understanding.

## 2020-06-26 ENCOUNTER — TELEPHONE (OUTPATIENT)
Dept: HEMATOLOGY/ONCOLOGY | Facility: CLINIC | Age: 85
End: 2020-06-26

## 2020-06-26 ENCOUNTER — TELEPHONE (OUTPATIENT)
Dept: CARDIOLOGY | Facility: CLINIC | Age: 85
End: 2020-06-26

## 2020-06-26 NOTE — TELEPHONE ENCOUNTER
----- Message from Roopa Hamlin sent at 6/26/2020 11:09 AM CDT -----  Regarding: appt  Please call pt @ [phone] regarding appt 7/15, pt would like to do a virtual visit on 8/3 and do blood work on 7/30

## 2020-06-26 NOTE — TELEPHONE ENCOUNTER
Due to there being a COVID outbreak in her neighborhood Ms Rodriguez requested that her labs be moved to 7/30/20 and her virtual follow up be moved to 8/3/20 with Giorgi

## 2020-06-26 NOTE — TELEPHONE ENCOUNTER
----- Message from Divina Mata sent at 6/26/2020 10:50 AM CDT -----  Type:  Patient Returning Call    Who Called:Jennifer  Who Left Message for Patient:  Does the patient know what this is regarding?:yes, reschedule lab  Would the patient rather a call back or a response via "[employer]? call  Best Call Back Number:[phone]    Additional Information: 07/30 [facility]

## 2020-06-30 ENCOUNTER — EXTERNAL CHRONIC CARE MANAGEMENT (OUTPATIENT)
Dept: PRIMARY CARE CLINIC | Facility: CLINIC | Age: 85
End: 2020-06-30
Payer: MEDICARE

## 2020-06-30 PROCEDURE — 99490 CHRNC CARE MGMT STAFF 1ST 20: CPT | Mod: S$PBB,,, | Performed by: FAMILY MEDICINE

## 2020-06-30 PROCEDURE — 99490 PR CHRONIC CARE MGMT, 1ST 20 MIN: ICD-10-PCS | Mod: S$PBB,,, | Performed by: FAMILY MEDICINE

## 2020-06-30 PROCEDURE — 99490 CHRNC CARE MGMT STAFF 1ST 20: CPT | Mod: PBBFAC | Performed by: FAMILY MEDICINE

## 2020-07-14 ENCOUNTER — HOSPITAL ENCOUNTER (OUTPATIENT)
Dept: CARDIOLOGY | Facility: HOSPITAL | Age: 85
Discharge: HOME | End: 2020-07-14
Attending: INTERNAL MEDICINE
Payer: MEDICARE

## 2020-07-14 DIAGNOSIS — I48.0 PAROXYSMAL ATRIAL FIBRILLATION: ICD-10-CM

## 2020-07-14 DIAGNOSIS — Z95.0 CARDIAC PACEMAKER IN SITU: ICD-10-CM

## 2020-07-14 PROCEDURE — 93294 CARDIAC DEVICE CHECK - REMOTE: ICD-10-PCS | Mod: ,,, | Performed by: INTERNAL MEDICINE

## 2020-07-14 PROCEDURE — 93296 REM INTERROG EVL PM/IDS: CPT

## 2020-07-14 PROCEDURE — 93294 REM INTERROG EVL PM/LDLS PM: CPT | Mod: ,,, | Performed by: INTERNAL MEDICINE

## 2020-07-30 ENCOUNTER — OFFICE VISIT (OUTPATIENT)
Dept: FAMILY MEDICINE | Facility: CLINIC | Age: 85
End: 2020-07-30
Payer: MEDICARE

## 2020-07-30 ENCOUNTER — LAB VISIT (OUTPATIENT)
Dept: LAB | Facility: HOSPITAL | Age: 85
End: 2020-07-30
Attending: INTERNAL MEDICINE
Payer: MEDICARE

## 2020-07-30 VITALS
TEMPERATURE: 98 F | DIASTOLIC BLOOD PRESSURE: 60 MMHG | WEIGHT: 108.94 LBS | OXYGEN SATURATION: 95 % | BODY MASS INDEX: 19.92 KG/M2 | HEART RATE: 71 BPM | SYSTOLIC BLOOD PRESSURE: 115 MMHG

## 2020-07-30 DIAGNOSIS — M89.9 DISORDER OF BONE AND ARTICULAR CARTILAGE: ICD-10-CM

## 2020-07-30 DIAGNOSIS — K52.9 ENTEROCOLITIS: ICD-10-CM

## 2020-07-30 DIAGNOSIS — M15.9 PRIMARY OSTEOARTHRITIS INVOLVING MULTIPLE JOINTS: ICD-10-CM

## 2020-07-30 DIAGNOSIS — M1A.39X1 CHRONIC GOUT DUE TO RENAL IMPAIRMENT OF MULTIPLE SITES WITH TOPHUS: ICD-10-CM

## 2020-07-30 DIAGNOSIS — D50.0 IRON DEFICIENCY ANEMIA DUE TO CHRONIC BLOOD LOSS: ICD-10-CM

## 2020-07-30 DIAGNOSIS — K22.70 BARRETT'S ESOPHAGUS WITHOUT DYSPLASIA: ICD-10-CM

## 2020-07-30 DIAGNOSIS — M94.9 DISORDER OF BONE AND ARTICULAR CARTILAGE: ICD-10-CM

## 2020-07-30 DIAGNOSIS — I70.0 AORTIC ATHEROSCLEROSIS: ICD-10-CM

## 2020-07-30 DIAGNOSIS — J98.4 CRLD (CHRONIC RESTRICTIVE LUNG DISEASE): Chronic | ICD-10-CM

## 2020-07-30 DIAGNOSIS — I10 ESSENTIAL HYPERTENSION: ICD-10-CM

## 2020-07-30 DIAGNOSIS — D63.1 ANEMIA ASSOCIATED WITH STAGE 4 CHRONIC RENAL FAILURE: ICD-10-CM

## 2020-07-30 DIAGNOSIS — Z86.39 HISTORY OF MALNUTRITION: ICD-10-CM

## 2020-07-30 DIAGNOSIS — N18.4 ANEMIA ASSOCIATED WITH STAGE 4 CHRONIC RENAL FAILURE: ICD-10-CM

## 2020-07-30 DIAGNOSIS — N18.30 ANEMIA ASSOCIATED WITH STAGE 3 CHRONIC RENAL FAILURE: ICD-10-CM

## 2020-07-30 DIAGNOSIS — N18.30 STAGE 3 CHRONIC KIDNEY DISEASE: ICD-10-CM

## 2020-07-30 DIAGNOSIS — M81.0 OSTEOPOROSIS, SENILE: ICD-10-CM

## 2020-07-30 DIAGNOSIS — R76.8 ELEVATED SERUM IMMUNOGLOBULIN FREE LIGHT CHAIN LEVEL: ICD-10-CM

## 2020-07-30 DIAGNOSIS — Z95.2 S/P MVR (MITRAL VALVE REPLACEMENT): ICD-10-CM

## 2020-07-30 DIAGNOSIS — I50.42 CHRONIC COMBINED SYSTOLIC AND DIASTOLIC CONGESTIVE HEART FAILURE: ICD-10-CM

## 2020-07-30 DIAGNOSIS — Z95.0 STATUS POST BIVENTRICULAR PACEMAKER: ICD-10-CM

## 2020-07-30 DIAGNOSIS — E03.9 ACQUIRED HYPOTHYROIDISM: Primary | ICD-10-CM

## 2020-07-30 DIAGNOSIS — I27.22 PULMONARY HYPERTENSION DUE TO LEFT HEART DISEASE: ICD-10-CM

## 2020-07-30 DIAGNOSIS — E78.5 DYSLIPIDEMIA: ICD-10-CM

## 2020-07-30 DIAGNOSIS — D53.9 MACROCYTIC ANEMIA: ICD-10-CM

## 2020-07-30 DIAGNOSIS — J44.9 COPD, GROUP A, BY GOLD 2017 CLASSIFICATION: ICD-10-CM

## 2020-07-30 DIAGNOSIS — D63.1 ANEMIA ASSOCIATED WITH STAGE 3 CHRONIC RENAL FAILURE: ICD-10-CM

## 2020-07-30 DIAGNOSIS — I25.5 ISCHEMIC CARDIOMYOPATHY: ICD-10-CM

## 2020-07-30 DIAGNOSIS — R60.0 BILATERAL LOWER EXTREMITY EDEMA: ICD-10-CM

## 2020-07-30 DIAGNOSIS — G47.33 OSA (OBSTRUCTIVE SLEEP APNEA): Chronic | ICD-10-CM

## 2020-07-30 DIAGNOSIS — J84.10 CALCIFIED GRANULOMA OF LUNG: ICD-10-CM

## 2020-07-30 LAB
ALBUMIN SERPL BCP-MCNC: 3.7 G/DL (ref 3.5–5.2)
ALBUMIN SERPL BCP-MCNC: 3.7 G/DL (ref 3.5–5.2)
ALP SERPL-CCNC: 72 U/L (ref 55–135)
ALT SERPL W/O P-5'-P-CCNC: 9 U/L (ref 10–44)
ANION GAP SERPL CALC-SCNC: 10 MMOL/L (ref 8–16)
ANION GAP SERPL CALC-SCNC: 11 MMOL/L (ref 8–16)
AST SERPL-CCNC: 16 U/L (ref 10–40)
BASOPHILS # BLD AUTO: 0.06 K/UL (ref 0–0.2)
BASOPHILS NFR BLD: 0.8 % (ref 0–1.9)
BILIRUB SERPL-MCNC: 0.7 MG/DL (ref 0.1–1)
BUN SERPL-MCNC: 61 MG/DL (ref 8–23)
BUN SERPL-MCNC: 61 MG/DL (ref 8–23)
CALCIUM SERPL-MCNC: 10.3 MG/DL (ref 8.7–10.5)
CALCIUM SERPL-MCNC: 9.6 MG/DL (ref 8.7–10.5)
CHLORIDE SERPL-SCNC: 96 MMOL/L (ref 95–110)
CHLORIDE SERPL-SCNC: 97 MMOL/L (ref 95–110)
CO2 SERPL-SCNC: 34 MMOL/L (ref 23–29)
CO2 SERPL-SCNC: 34 MMOL/L (ref 23–29)
CREAT SERPL-MCNC: 1.6 MG/DL (ref 0.5–1.4)
CREAT SERPL-MCNC: 1.6 MG/DL (ref 0.5–1.4)
DIFFERENTIAL METHOD: ABNORMAL
EOSINOPHIL # BLD AUTO: 0.1 K/UL (ref 0–0.5)
EOSINOPHIL NFR BLD: 1.8 % (ref 0–8)
ERYTHROCYTE [DISTWIDTH] IN BLOOD BY AUTOMATED COUNT: 15.6 % (ref 11.5–14.5)
EST. GFR  (AFRICAN AMERICAN): 32.9 ML/MIN/1.73 M^2
EST. GFR  (AFRICAN AMERICAN): 32.9 ML/MIN/1.73 M^2
EST. GFR  (NON AFRICAN AMERICAN): 28.6 ML/MIN/1.73 M^2
EST. GFR  (NON AFRICAN AMERICAN): 28.6 ML/MIN/1.73 M^2
GLUCOSE SERPL-MCNC: 106 MG/DL (ref 70–110)
GLUCOSE SERPL-MCNC: 107 MG/DL (ref 70–110)
HCT VFR BLD AUTO: 35.3 % (ref 37–48.5)
HGB BLD-MCNC: 10.9 G/DL (ref 12–16)
IMM GRANULOCYTES # BLD AUTO: 0.04 K/UL (ref 0–0.04)
IMM GRANULOCYTES NFR BLD AUTO: 0.5 % (ref 0–0.5)
LYMPHOCYTES # BLD AUTO: 1.6 K/UL (ref 1–4.8)
LYMPHOCYTES NFR BLD: 21.9 % (ref 18–48)
MCH RBC QN AUTO: 33.3 PG (ref 27–31)
MCHC RBC AUTO-ENTMCNC: 30.9 G/DL (ref 32–36)
MCV RBC AUTO: 108 FL (ref 82–98)
MONOCYTES # BLD AUTO: 0.7 K/UL (ref 0.3–1)
MONOCYTES NFR BLD: 9.5 % (ref 4–15)
NEUTROPHILS # BLD AUTO: 4.8 K/UL (ref 1.8–7.7)
NEUTROPHILS NFR BLD: 65.5 % (ref 38–73)
NRBC BLD-RTO: 0 /100 WBC
PHOSPHATE SERPL-MCNC: 4.3 MG/DL (ref 2.7–4.5)
PLATELET # BLD AUTO: 201 K/UL (ref 150–350)
PMV BLD AUTO: 11.6 FL (ref 9.2–12.9)
POTASSIUM SERPL-SCNC: 3.7 MMOL/L (ref 3.5–5.1)
POTASSIUM SERPL-SCNC: 3.8 MMOL/L (ref 3.5–5.1)
PROT SERPL-MCNC: 6.6 G/DL (ref 6–8.4)
RBC # BLD AUTO: 3.27 M/UL (ref 4–5.4)
SODIUM SERPL-SCNC: 141 MMOL/L (ref 136–145)
SODIUM SERPL-SCNC: 141 MMOL/L (ref 136–145)
WBC # BLD AUTO: 7.35 K/UL (ref 3.9–12.7)

## 2020-07-30 PROCEDURE — 80053 COMPREHEN METABOLIC PANEL: CPT

## 2020-07-30 PROCEDURE — 85025 COMPLETE CBC W/AUTO DIFF WBC: CPT

## 2020-07-30 PROCEDURE — 99213 PR OFFICE/OUTPT VISIT, EST, LEVL III, 20-29 MIN: ICD-10-PCS | Mod: S$GLB,ICN,, | Performed by: NURSE PRACTITIONER

## 2020-07-30 PROCEDURE — 99999 PR PBB SHADOW E&M-EST. PATIENT-LVL III: ICD-10-PCS | Mod: PBBFAC,,, | Performed by: NURSE PRACTITIONER

## 2020-07-30 PROCEDURE — 96160 PT-FOCUSED HLTH RISK ASSMT: CPT | Mod: PBBFAC,PO | Performed by: NURSE PRACTITIONER

## 2020-07-30 PROCEDURE — 99999 PR PBB SHADOW E&M-EST. PATIENT-LVL III: CPT | Mod: PBBFAC,,, | Performed by: NURSE PRACTITIONER

## 2020-07-30 PROCEDURE — 82728 ASSAY OF FERRITIN: CPT

## 2020-07-30 PROCEDURE — 80069 RENAL FUNCTION PANEL: CPT

## 2020-07-30 PROCEDURE — 83540 ASSAY OF IRON: CPT

## 2020-07-30 PROCEDURE — 99213 OFFICE O/P EST LOW 20 MIN: CPT | Mod: S$GLB,ICN,, | Performed by: NURSE PRACTITIONER

## 2020-07-30 PROCEDURE — 99213 OFFICE O/P EST LOW 20 MIN: CPT | Mod: PBBFAC,PO,25 | Performed by: NURSE PRACTITIONER

## 2020-07-30 NOTE — PROGRESS NOTES
Jennifer Mathews presented for a  Medicare AWV and comprehensive Health Risk Assessment today. The following components were reviewed and updated:    · Medical history  · Family History  · Social history  · Allergies and Current Medications  · Health Risk Assessment  · Health Maintenance  · Care Team     ** See Completed Assessments for Annual Wellness Visit within the encounter summary.**         The following assessments were completed:  · Living Situation  · CAGE  · Depression Screening  · Timed Get Up and Go  · Whisper Test  · Cognitive Function Screening  · Nutrition Screening  · ADL Screening  · PAQ Screening        Vitals:    07/30/20 1310   BP: 115/60   Pulse: 71   Temp: 98.1 °F (36.7 °C)   SpO2: 95%   Weight: 49.4 kg (108 lb 14.5 oz)     Body mass index is 19.92 kg/m².  Physical Exam          Diagnoses and health risks identified today and associated recommendations/orders:    1. COPD, group A, by GOLD 2017 classification  Managed per pulmo; continue treatment plan    2. Anemia associated with stage 4 chronic renal failure  Stable continue treatment plan    3. Acquired hypothyroidism  Stable continue treatment plan    4. Anemia associated with stage 3 chronic renal failure  Stable continue treatment plan    5. Aortic atherosclerosis  Stable continue treatment plan    6. Santos's esophagus without dysplasia  Stable continue treatment plan    7. Bilateral lower extremity edema  Stable continue treatment plan    8. Calcified granuloma of lung  Stable continue treatment plan    9. Chronic combined systolic and diastolic congestive heart failure  Stable continue treatment plan    10. 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 CRLD (chronic restrictive lung disease)  Stable continue treatment plan    12. Disorder of bone and articular cartilage  Stable continue treatment plan    13. Dyslipidemia  Stable continue treatment plan    14. Elevated serum  immunoglobulin free light chain level  Stable continue treatment plan    15. History of malnutrition;Mild Protein Calorie Malnutrition  Stable continue treatment plan  16. Enterocolitis  Stable continue treatment plan    17. Essential hypertension  Stable continue treatment plan    18. Iron deficiency anemia due to chronic blood loss  Stable continue treatment plan    19. Ischemic cardiomyopathy  Stable continue treatment plan    20. Macrocytic anemia  Stable continue treatment plan    21. LEV (obstructive sleep apnea)  Stable continue treatment plan    22. Osteoporosis, senile  Stable continue treatment plan    23. Primary osteoarthritis involving multiple joints  Stable continue treatment plan    24. Pulmonary hypertension due to left heart disease  Stable continue treatment plan    25. S/P MVR (mitral valve replacement)  Stable continue treatment plan    26. Status post biventricular pacemaker  Stable continue treament      Provided Jennifer with a 5-10 year written screening schedule and personal prevention plan. Recommendations were developed using the USPSTF age appropriate recommendations. Education, counseling, and referrals were provided as needed. After Visit Summary printed and given to patient which includes a list of additional screenings\tests needed.    No follow-ups on file.    Sheree Phelps NP  I offered to discuss end of life issues, including information on how to make advance directives that the patient could use to name someone who would make medical decisions on their behalf if they became too ill to make themselves.    _X_Patient declined  ___Patient is interested, I provided paper work and offered to discuss.

## 2020-07-31 ENCOUNTER — EXTERNAL CHRONIC CARE MANAGEMENT (OUTPATIENT)
Dept: PRIMARY CARE CLINIC | Facility: CLINIC | Age: 85
End: 2020-07-31
Payer: MEDICARE

## 2020-07-31 LAB
FERRITIN SERPL-MCNC: 639 NG/ML (ref 20–300)
IRON SERPL-MCNC: 66 UG/DL (ref 30–160)
SATURATED IRON: 21 % (ref 20–50)
TOTAL IRON BINDING CAPACITY: 311 UG/DL (ref 250–450)
TRANSFERRIN SERPL-MCNC: 210 MG/DL (ref 200–375)

## 2020-07-31 PROCEDURE — 99490 PR CHRONIC CARE MGMT, 1ST 20 MIN: ICD-10-PCS | Mod: S$PBB,,, | Performed by: FAMILY MEDICINE

## 2020-07-31 PROCEDURE — 99490 CHRNC CARE MGMT STAFF 1ST 20: CPT | Mod: S$PBB,,, | Performed by: FAMILY MEDICINE

## 2020-07-31 PROCEDURE — 99490 CHRNC CARE MGMT STAFF 1ST 20: CPT | Mod: PBBFAC | Performed by: FAMILY MEDICINE

## 2020-08-03 ENCOUNTER — OFFICE VISIT (OUTPATIENT)
Dept: HEMATOLOGY/ONCOLOGY | Facility: CLINIC | Age: 85
End: 2020-08-03
Payer: MEDICARE

## 2020-08-03 ENCOUNTER — OFFICE VISIT (OUTPATIENT)
Dept: NEPHROLOGY | Facility: CLINIC | Age: 85
End: 2020-08-03
Payer: MEDICARE

## 2020-08-03 DIAGNOSIS — R60.9 EDEMA, UNSPECIFIED TYPE: ICD-10-CM

## 2020-08-03 DIAGNOSIS — R76.8 ELEVATED SERUM IMMUNOGLOBULIN FREE LIGHT CHAIN LEVEL: ICD-10-CM

## 2020-08-03 DIAGNOSIS — N18.30 STAGE 3 CHRONIC KIDNEY DISEASE: Primary | ICD-10-CM

## 2020-08-03 DIAGNOSIS — M81.0 OSTEOPOROSIS, SENILE: ICD-10-CM

## 2020-08-03 DIAGNOSIS — Z71.89 ENCOUNTER FOR MEDICATION REVIEW AND COUNSELING: ICD-10-CM

## 2020-08-03 DIAGNOSIS — I10 ESSENTIAL HYPERTENSION: ICD-10-CM

## 2020-08-03 DIAGNOSIS — D63.1 ANEMIA ASSOCIATED WITH STAGE 4 CHRONIC RENAL FAILURE: ICD-10-CM

## 2020-08-03 DIAGNOSIS — D53.9 MACROCYTIC ANEMIA: ICD-10-CM

## 2020-08-03 DIAGNOSIS — E87.3 METABOLIC ALKALOSIS: ICD-10-CM

## 2020-08-03 DIAGNOSIS — I50.22 CHRONIC SYSTOLIC CONGESTIVE HEART FAILURE: ICD-10-CM

## 2020-08-03 DIAGNOSIS — N18.4 ANEMIA ASSOCIATED WITH STAGE 4 CHRONIC RENAL FAILURE: ICD-10-CM

## 2020-08-03 DIAGNOSIS — D50.0 IRON DEFICIENCY ANEMIA DUE TO CHRONIC BLOOD LOSS: Primary | ICD-10-CM

## 2020-08-03 DIAGNOSIS — D68.9 COAGULOPATHY: ICD-10-CM

## 2020-08-03 PROCEDURE — 99214 PR OFFICE/OUTPT VISIT, EST, LEVL IV, 30-39 MIN: ICD-10-PCS | Mod: 95,,, | Performed by: NURSE PRACTITIONER

## 2020-08-03 PROCEDURE — 99214 PR OFFICE/OUTPT VISIT, EST, LEVL IV, 30-39 MIN: ICD-10-PCS | Mod: 95,,, | Performed by: INTERNAL MEDICINE

## 2020-08-03 PROCEDURE — 99214 OFFICE O/P EST MOD 30 MIN: CPT | Mod: 95,,, | Performed by: NURSE PRACTITIONER

## 2020-08-03 PROCEDURE — 99214 OFFICE O/P EST MOD 30 MIN: CPT | Mod: 95,,, | Performed by: INTERNAL MEDICINE

## 2020-08-03 NOTE — PROGRESS NOTES
NEPHROLOGY CLINIC FOLLOWUP NOTE:  Date of clinic visit: 8/3/20  Virtual video visit was made today     REASON FOR FOLLOWUP AND CHIEF COMPLAINT:  Chronic kidney disease and congestive heart failure. Prior UTI's     HISTORY OF PRESENT ILLNESS:  Ms. Jennfier Mathews is an 88-year-old female with CKD stage 3, HTN, severe systolic CHF, AF, and frequent UTI's, who presents for f/u. Pt was last seen by 6 months ago. On video visit, pt says that she has been doing well, no new events, no new c/o's or concerms. Legs has some swelling, no SOB, no CP, no discomfort. Pt has kept salt and fluid intake low to moderate. No sx's related to dysuria reported.      PAST MEDICAL HISTORY:  1.  CKD stage III  2.  Hypertension.  3.  Severe systolic congestive heart failure with EF of 20%.  4.  Osteoarthritis.  5.  Multiple falls in the past with pelvic fractures.  6.  Hyperlipidemia.  7.  Paroxysmal atrial fibrillation.  8.  Osteopenia.  9.  Pulmonary hypertension.  10. Frequent UTI's     PAST SURGICAL HISTORY:  Reviewed and unchanged.     FAMILY HISTORY:  Reviewed and unchanged.     ALLERGIES:  Reviewed.  Cephalosporins, penicillin, sulfa drugs, and pregabalin.     SOCIAL HISTORY:  Reviewed.  Negative for smoking.  No alcohol use.     DIETARY HISTORY:  Reviewed, as above.     MEDICATIONS:  Reviewed.      Current Outpatient Medications:     acetaminophen (TYLENOL EXTRA STRENGTH) 325 MG tablet, Take 2 tablets (650 mg total) by mouth every 8 (eight) hours. (Patient taking differently: Take 650 mg by mouth 3 (three) times daily as needed. ), Disp: , Rfl:     allopurinoL (ZYLOPRIM) 100 MG tablet, TAKE 1 TABLET BY MOUTH EVERY DAY, Disp: 90 tablet, Rfl: 1    amiodarone (PACERONE) 200 MG Tab, Take 1 tablet (200 mg total) by mouth once daily., Disp: 90 tablet, Rfl: 3    carvediloL (COREG) 25 MG tablet, TAKE 1 TABLET BY MOUTH TWICE A DAY WITH MEALS, Disp: 180 tablet, Rfl: 3    digoxin (LANOXIN) 125 mcg tablet, Take 125 mcg by mouth every  other day., Disp: , Rfl:     ELIQUIS 2.5 mg Tab, TAKE 1 TABLET BY MOUTH TWICE A DAY, Disp: 60 tablet, Rfl: 11    furosemide (LASIX) 40 MG tablet, TAKE 2 TABLETS (80 MG TOTAL) BY MOUTH 2 (TWO) TIMES DAILY., Disp: 360 tablet, Rfl: 2    gabapentin (NEURONTIN) 100 MG capsule, TAKE ONE CAPSULE BY MOUTH TWO TIMES DAILY, Disp: 180 capsule, Rfl: 1    levothyroxine (SYNTHROID) 75 MCG tablet, TAKE 1 TABLET BY MOUTH EVERY DAY, Disp: 90 tablet, Rfl: 3    losartan (COZAAR) 25 MG tablet, Take 25 mg by mouth once daily., Disp: , Rfl: 11    methenamine (HIPREX) 1 gram Tab, Take 1 tablet (1 g total) by mouth 2 (two) times daily., Disp: 60 tablet, Rfl: 11    metOLazone (ZAROXOLYN) 2.5 MG tablet, 30 minutes after Lasix as directed, Disp: 25 tablet, Rfl: 6    Current Facility-Administered Medications:     denosumab (PROLIA) injection 60 mg, 60 mg, Subcutaneous, Q6 Months, Oc Catherine MD, 60 mg at 05/21/19 1349    REVIEW OF SYSTEMS:  No recent hospitalizations.  GENERAL:  Negative.  HEAD, EYES, EARS, NOSE, AND THROAT:  Negative.  CARDIAC:  Negative.  PULMONARY:  Negative.  GASTROINTESTINAL:  Negative.  GENITOURINARY:  Negative.  PSYCHOLOGICAL:  Negative.  NEUROLOGICAL:  Negative.  ENDOCRINE:  Negative.  HEMATOLOGIC AND ONCOLOGIC:  Negative.  INFECTIOUS DISEASE:  Negative.  The rest of the review of systems negative.     PHYSICAL EXAMINATION: On video, pt looked comfortable, in NAD  Speech  and thought process was normal and appropriate.  BP reported by pt was 130/70     LABS:  Reviewed.   BMP  Lab Results   Component Value Date     07/30/2020     07/30/2020    K 3.7 07/30/2020    K 3.8 07/30/2020    CL 96 07/30/2020    CL 97 07/30/2020    CO2 34 (H) 07/30/2020    CO2 34 (H) 07/30/2020    BUN 61 (H) 07/30/2020    BUN 61 (H) 07/30/2020    CREATININE 1.6 (H) 07/30/2020    CREATININE 1.6 (H) 07/30/2020    CALCIUM 9.6 07/30/2020    CALCIUM 10.3 07/30/2020    ANIONGAP 11 07/30/2020    ANIONGAP 10 07/30/2020     ESTGFRAFRICA 32.9 (A) 07/30/2020    ESTGFRAFRICA 32.9 (A) 07/30/2020    EGFRNONAA 28.6 (A) 07/30/2020    EGFRNONAA 28.6 (A) 07/30/2020     Lab Results   Component Value Date    WBC 7.35 07/30/2020    HGB 10.9 (L) 07/30/2020    HCT 35.3 (L) 07/30/2020     (H) 07/30/2020     07/30/2020          ASSESSMENT AND PLAN:  This is an 88-year-old female who has CKD and severe congestive heart failure pressure, who presents for f/u:  The impression is as follows:     1.  Renal.  s Cr within the past baseline range, at baseline  Stable renal function, CKD stage 3  K normal  Acid base stable: metabolic alkalosis due to the diuretics  Doing well clinically, stable       2.  Cardiac:  h/o of severe systolic congestive heart failure with ejection fraction of only 20%.  Cardiology notes reviewed  Well compensated at this point per h/o. Could not be examined due to virtual visit  Continue salt and fluid restriction, advised pt     3.Medications were reviewed with pt.  On renally adjusted dose of allopurinol, 100 mg each, per day (noted Cr improved after dose reduction)     4. ID: frequent UTI's  No active issues today  Advised pt to follow with PCP     5.  Lab review for SPEP.  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     PLANS AND RECOMMENDATIONS:    As discussed above.    Multiple issues addressed  Opportunity for question and discussion provided.  Multiple issues addressed  Total time spent 25 minutes, more than 50% of the time was spent in counseling   and coordination of care.  Return to see us in about four months.    Level IV visit.  RTC 6 months     Prosper You MD

## 2020-08-04 ENCOUNTER — OFFICE VISIT (OUTPATIENT)
Dept: CARDIOLOGY | Facility: CLINIC | Age: 85
End: 2020-08-04
Payer: MEDICARE

## 2020-08-04 VITALS
DIASTOLIC BLOOD PRESSURE: 64 MMHG | WEIGHT: 112 LBS | HEART RATE: 70 BPM | HEIGHT: 62 IN | BODY MASS INDEX: 20.61 KG/M2 | SYSTOLIC BLOOD PRESSURE: 139 MMHG

## 2020-08-04 DIAGNOSIS — I50.42 CHRONIC COMBINED SYSTOLIC AND DIASTOLIC CONGESTIVE HEART FAILURE: Primary | ICD-10-CM

## 2020-08-04 DIAGNOSIS — Z95.0 STATUS POST BIVENTRICULAR PACEMAKER: ICD-10-CM

## 2020-08-04 DIAGNOSIS — Z95.2 S/P MVR (MITRAL VALVE REPLACEMENT): ICD-10-CM

## 2020-08-04 DIAGNOSIS — I25.5 ISCHEMIC CARDIOMYOPATHY: ICD-10-CM

## 2020-08-04 DIAGNOSIS — I10 ESSENTIAL HYPERTENSION: ICD-10-CM

## 2020-08-04 DIAGNOSIS — I48.0 PAROXYSMAL ATRIAL FIBRILLATION: ICD-10-CM

## 2020-08-04 PROCEDURE — 99214 PR OFFICE/OUTPT VISIT, EST, LEVL IV, 30-39 MIN: ICD-10-PCS | Mod: 95,,, | Performed by: NURSE PRACTITIONER

## 2020-08-04 PROCEDURE — 99214 OFFICE O/P EST MOD 30 MIN: CPT | Mod: 95,,, | Performed by: NURSE PRACTITIONER

## 2020-08-04 RX ORDER — METOLAZONE 2.5 MG/1
TABLET ORAL
Qty: 25 TABLET | Refills: 6
Start: 2020-08-04 | End: 2020-08-21 | Stop reason: SDUPTHER

## 2020-08-04 NOTE — PROGRESS NOTES
Subjective:   Patient ID:  Jennifer Mathews is a 88 y.o. female who presents for follow up of Congestive Heart Failure      HPI   The patient location is: Home due to COVID pandemic   The chief complaint leading to consultation is: CHF check     Visit type: audiovisual    Face to Face time with patient: 15 minutes of total time spent on the encounter, which includes face to face time and non-face to face time preparing to see the patient (eg, review of tests), Obtaining and/or reviewing separately obtained history, Documenting clinical information in the electronic or other health record, Independently interpreting results (not separately reported) and communicating results to the patient/family/caregiver, or Care coordination (not separately reported).         Each patient to whom he or she provides medical services by telemedicine is:  (1) informed of the relationship between the physician and patient and the respective role of any other health care provider with respect to management of the patient; and (2) notified that he or she may decline to receive medical services by telemedicine and may withdraw from such care at any time.    Notes:     Her current medical conditions include MVR x 3, HTN, PAF on Eliquis, ACS, Combined CHF, s/p PPM-CRT-D implant, s/p MVR, CKD, LEV on CPAP. She was switched from coumadin to eliquis after GI bleed by Dr Schulz. Her digoxin was decreased to QOD dosing as well    In June, given instructions to take Lasix 80mg BID and metolazone x3 days for edema and weight gain. Is using Metolazone PRN Renal function stable.      Denies any chest pain, SOB, GAR,  orthopnea, PND, dizziness, palpitations,  near syncope, syncope .  Does have LE edema  Has no symptoms concerning for angina or equivalent. No CNS Complaints to suggest TIA or CVA. Does well with limiting sodium intake.  Has no abnormal bleeding on Eliquis.     Past Medical History:   Diagnosis Date    Acute coronary syndrome      Anemia     Anticoagulated on Coumadin 7/13/2015    Arthritis     Asthma     patient denies    Atrial fibrillation     Basal cell carcinoma 10/2015    left neck    Cardiac arrest     Cardiac resynchronization therapy defibrillator (CRT-D) in place 07/13/2015    Pt denies, states it does not shock me    Cardiomyopathy     Chronic combined systolic and diastolic congestive heart failure     CKD (chronic kidney disease) stage 3, GFR 30-59 ml/min     Dyslipidemia 1/30/2014    Enterocolitis     Hyperlipidemia     Hypertension     Hypothyroidism     Ischemic cardiomyopathy 01/30/2014    Macular hole of left eye     Old    Macular hole of left eye     LEV (obstructive sleep apnea) 9/30/2013    Osteoporosis     Pneumonia     required hospitalization    Recurrent UTI     Refractive error     Spastic colon     Stroke     Syncope and collapse        Past Surgical History:   Procedure Laterality Date    APPENDECTOMY      BREAST SURGERY      CARDIAC CATHETERIZATION      CARDIAC PACEMAKER PLACEMENT  2014    CARDIAC VALVE REPLACEMENT      CARDIAC VALVE SURGERY      CATARACT EXTRACTION Bilateral     OU    CHOLECYSTECTOMY      COLONOSCOPY      ESOPHAGOGASTRODUODENOSCOPY N/A 3/13/2019    Procedure: ESOPHAGOGASTRODUODENOSCOPY (EGD);  Surgeon: Ghazal Orellana MD;  Location: [facility];  Service: Endoscopy;  Laterality: N/A;    FRACTURE SURGERY Left     left hum    HYSTERECTOMY      MITRAL VALVE REPLACEMENT  2014    MITRAL VALVE SURGERY      x3    REPLACEMENT OF PACEMAKER GENERATOR Left 6/20/2018    Procedure: REPLACEMENT, PACEMAKER GENERATOR/pt has crt-p versus d;  Surgeon: Manny Dunne MD;  Location: [facility];  Service: Cardiology;  Laterality: Left;  st renée device  patient is pacer dependent       Social History     Tobacco Use    Smoking status: Never Smoker    Smokeless tobacco: Never Used   Substance Use Topics    Alcohol use: No    Drug use: No       Family History    Problem Relation Age of Onset    Coronary artery disease Mother         mi    Epilepsy Mother     Heart attack Mother     Heart disease Mother     Coronary artery disease Father     Heart disease Father     Emphysema Father     COPD Father     Diabetes Brother     Cancer Brother     Melanoma Neg Hx     Psoriasis Neg Hx     Lupus Neg Hx     Eczema Neg Hx     Kidney disease Neg Hx     Stroke Neg Hx        Current Outpatient Medications   Medication Sig    acetaminophen (TYLENOL EXTRA STRENGTH) 325 MG tablet Take 2 tablets (650 mg total) by mouth every 8 (eight) hours. (Patient taking differently: Take 650 mg by mouth 3 (three) times daily as needed. )    allopurinoL (ZYLOPRIM) 100 MG tablet TAKE 1 TABLET BY MOUTH EVERY DAY    amiodarone (PACERONE) 200 MG Tab Take 1 tablet (200 mg total) by mouth once daily.    carvediloL (COREG) 25 MG tablet TAKE 1 TABLET BY MOUTH TWICE A DAY WITH MEALS    digoxin (LANOXIN) 125 mcg tablet Take 125 mcg by mouth every other day.    ELIQUIS 2.5 mg Tab TAKE 1 TABLET BY MOUTH TWICE A DAY    furosemide (LASIX) 40 MG tablet TAKE 2 TABLETS (80 MG TOTAL) BY MOUTH 2 (TWO) TIMES DAILY.    gabapentin (NEURONTIN) 100 MG capsule TAKE ONE CAPSULE BY MOUTH TWO TIMES DAILY    levothyroxine (SYNTHROID) 75 MCG tablet TAKE 1 TABLET BY MOUTH EVERY DAY    losartan (COZAAR) 25 MG tablet Take 25 mg by mouth once daily.    methenamine (HIPREX) 1 gram Tab Take 1 tablet (1 g total) by mouth 2 (two) times daily.    metOLazone (ZAROXOLYN) 2.5 MG tablet Take 1 pill on MW, 30 minutes after Lasix as directed     Current Facility-Administered Medications   Medication    denosumab (PROLIA) injection 60 mg     Current Outpatient Medications on File Prior to Visit   Medication Sig    acetaminophen (TYLENOL EXTRA STRENGTH) 325 MG tablet Take 2 tablets (650 mg total) by mouth every 8 (eight) hours. (Patient taking differently: Take 650 mg by mouth 3 (three) times daily as needed. )     allopurinoL (ZYLOPRIM) 100 MG tablet TAKE 1 TABLET BY MOUTH EVERY DAY    amiodarone (PACERONE) 200 MG Tab Take 1 tablet (200 mg total) by mouth once daily.    carvediloL (COREG) 25 MG tablet TAKE 1 TABLET BY MOUTH TWICE A DAY WITH MEALS    digoxin (LANOXIN) 125 mcg tablet Take 125 mcg by mouth every other day.    ELIQUIS 2.5 mg Tab TAKE 1 TABLET BY MOUTH TWICE A DAY    furosemide (LASIX) 40 MG tablet TAKE 2 TABLETS (80 MG TOTAL) BY MOUTH 2 (TWO) TIMES DAILY.    gabapentin (NEURONTIN) 100 MG capsule TAKE ONE CAPSULE BY MOUTH TWO TIMES DAILY    levothyroxine (SYNTHROID) 75 MCG tablet TAKE 1 TABLET BY MOUTH EVERY DAY    losartan (COZAAR) 25 MG tablet Take 25 mg by mouth once daily.    methenamine (HIPREX) 1 gram Tab Take 1 tablet (1 g total) by mouth 2 (two) times daily.    [DISCONTINUED] metOLazone (ZAROXOLYN) 2.5 MG tablet 30 minutes after Lasix as directed     Current Facility-Administered Medications on File Prior to Visit   Medication    denosumab (PROLIA) injection 60 mg       Review of Systems   Constitution: Negative for diaphoresis, malaise/fatigue, weight gain and weight loss.   HENT: Negative for congestion and nosebleeds.    Cardiovascular: Positive for leg swelling. Negative for chest pain, claudication, cyanosis, dyspnea on exertion, irregular heartbeat, near-syncope, orthopnea, palpitations, paroxysmal nocturnal dyspnea and syncope.   Respiratory: Negative for cough, hemoptysis, shortness of breath, sleep disturbances due to breathing, snoring, sputum production and wheezing.    Hematologic/Lymphatic: Negative for bleeding problem. Does not bruise/bleed easily.   Skin: Negative for rash.   Musculoskeletal: Negative for arthritis, back pain, falls, joint pain, muscle cramps and muscle weakness.   Gastrointestinal: Negative for abdominal pain, constipation, diarrhea, heartburn, hematemesis, hematochezia, melena, nausea and vomiting.   Genitourinary: Negative for dysuria, hematuria and nocturia.  "  Neurological: Negative for excessive daytime sleepiness, dizziness, headaches, light-headedness, loss of balance, numbness, vertigo and weakness.       Objective:   Physical Exam   Constitutional: She is oriented to person, place, and time. She appears well-developed and well-nourished.   Pulmonary/Chest: Effort normal. No respiratory distress.   Musculoskeletal:         General: Edema ( hard edema per family ) present.   Neurological: She is alert and oriented to person, place, and time.   Psychiatric: She has a normal mood and affect. Her behavior is normal.   Nursing note and vitals reviewed.    Vitals:    08/04/20 1608   BP: 139/64   Pulse: 70   Weight: 50.8 kg (112 lb)   Height: 5' 2" (1.575 m)     Lab Results   Component Value Date    CHOL 131 03/21/2019    CHOL 155 03/20/2018    CHOL 154 01/29/2018     Lab Results   Component Value Date    HDL 31 (L) 03/21/2019    HDL 36 (L) 03/20/2018    HDL 34 (L) 01/29/2018     Lab Results   Component Value Date    LDLCALC 68.6 03/21/2019    LDLCALC 90.8 03/20/2018    LDLCALC 79.0 01/29/2018     Lab Results   Component Value Date    TRIG 157 (H) 03/21/2019    TRIG 141 03/20/2018    TRIG 205 (H) 01/29/2018     Lab Results   Component Value Date    CHOLHDL 23.7 03/21/2019    CHOLHDL 23.2 03/20/2018    CHOLHDL 22.1 01/29/2018       Chemistry        Component Value Date/Time     07/30/2020 1355     07/30/2020 1355    K 3.7 07/30/2020 1355    K 3.8 07/30/2020 1355    CL 96 07/30/2020 1355    CL 97 07/30/2020 1355    CO2 34 (H) 07/30/2020 1355    CO2 34 (H) 07/30/2020 1355    BUN 61 (H) 07/30/2020 1355    BUN 61 (H) 07/30/2020 1355    CREATININE 1.6 (H) 07/30/2020 1355    CREATININE 1.6 (H) 07/30/2020 1355     07/30/2020 1355     07/30/2020 1355        Component Value Date/Time    CALCIUM 9.6 07/30/2020 1355    CALCIUM 10.3 07/30/2020 1355    ALKPHOS 72 07/30/2020 1355    AST 16 07/30/2020 1355    ALT 9 (L) 07/30/2020 1355    BILITOT 0.7 07/30/2020 " 1355    ESTGFRAFRICA 32.9 (A) 07/30/2020 1355    ESTGFRAFRICA 32.9 (A) 07/30/2020 1355    EGFRNONAA 28.6 (A) 07/30/2020 1355    EGFRNONAA 28.6 (A) 07/30/2020 1355          Lab Results   Component Value Date    TSH 5.880 (H) 08/20/2019     Lab Results   Component Value Date    INR 1.3 (H) 05/06/2019    INR 1.8 05/02/2019    INR 1.7 04/29/2019     Lab Results   Component Value Date    WBC 7.35 07/30/2020    HGB 10.9 (L) 07/30/2020    HCT 35.3 (L) 07/30/2020     (H) 07/30/2020     07/30/2020     BMP  Sodium   Date Value Ref Range Status   07/30/2020 141 136 - 145 mmol/L Final   07/30/2020 141 136 - 145 mmol/L Final     Potassium   Date Value Ref Range Status   07/30/2020 3.7 3.5 - 5.1 mmol/L Final   07/30/2020 3.8 3.5 - 5.1 mmol/L Final     Chloride   Date Value Ref Range Status   07/30/2020 96 95 - 110 mmol/L Final   07/30/2020 97 95 - 110 mmol/L Final     CO2   Date Value Ref Range Status   07/30/2020 34 (H) 23 - 29 mmol/L Final   07/30/2020 34 (H) 23 - 29 mmol/L Final     BUN, Bld   Date Value Ref Range Status   07/30/2020 61 (H) 8 - 23 mg/dL Final   07/30/2020 61 (H) 8 - 23 mg/dL Final     Creatinine   Date Value Ref Range Status   07/30/2020 1.6 (H) 0.5 - 1.4 mg/dL Final   07/30/2020 1.6 (H) 0.5 - 1.4 mg/dL Final     Calcium   Date Value Ref Range Status   07/30/2020 9.6 8.7 - 10.5 mg/dL Final   07/30/2020 10.3 8.7 - 10.5 mg/dL Final     Anion Gap   Date Value Ref Range Status   07/30/2020 11 8 - 16 mmol/L Final   07/30/2020 10 8 - 16 mmol/L Final     eGFR if    Date Value Ref Range Status   07/30/2020 32.9 (A) >60 mL/min/1.73 m^2 Final   07/30/2020 32.9 (A) >60 mL/min/1.73 m^2 Final     eGFR if non    Date Value Ref Range Status   07/30/2020 28.6 (A) >60 mL/min/1.73 m^2 Final     Comment:     Calculation used to obtain the estimated glomerular filtration  rate (eGFR) is the CKD-EPI equation.      07/30/2020 28.6 (A) >60 mL/min/1.73 m^2 Final     Comment:      Calculation used to obtain the estimated glomerular filtration  rate (eGFR) is the CKD-EPI equation.        Estimated Creatinine Clearance: 19.2 mL/min (A) (based on SCr of 1.6 mg/dL (H)).    Assessment:     1. Chronic combined systolic and diastolic congestive heart failure    2. Essential hypertension    3. S/P MVR (mitral valve replacement)    4. Status post biventricular pacemaker    5. Paroxysmal atrial fibrillation    6. Ischemic cardiomyopathy        Plan:     Will check BNP and BMP same day as PCP appt.   Will have her take her metolazone on Monday and Wednesday and still PRN  Heart healthy diet  Limit fluid intake 50-60 oz   Daily weights and to notify clinic if weight increases by more than 3 lbs in 1 day or 5 lbs in 1 week.   Exercise routine as tolerated  Continue device clinic   Continue Eliquis for CVA prophylaxis.   RTC in 3 months or sooner if needed

## 2020-08-05 LAB
AV DELAY - LONGEST: 200 MSEC
AV DELAY - SHORTEST: 170 MSEC
BATTERY VOLTAGE (V): 2.99 V
IMPEDANCE RA LEAD (DONOR): 790 OHMS
IMPEDANCE RA LEAD (NATIVE): 530 OHMS
IMPEDANCE RA LEAD: 440 OHMS
OHS CV DC PP MS1: 0.4 MS
OHS CV DC PP MS2: 0.4 MS
OHS CV DC PP MS3: 0.4 MS
OHS CV DC PP V1: 2 V
OHS CV DC PP V2: 2 V
OHS CV DC PP V3: 2.5 V
VV DELAY: 80 MSEC

## 2020-08-05 NOTE — PROGRESS NOTES
Subjective:       Patient ID: Jennifer Mathews is a 88 y.o. female.    Chief Complaint: Anemia    The patient location is:  [place]  The chief complaint leading to consultation is:  Anemia    Visit type: audiovisual    Face to Face time with patient: 20  30 minutes of total time spent on the encounter, which includes face to face time and non-face to face time preparing to see the patient (eg, review of tests), Obtaining and/or reviewing separately obtained history, Documenting clinical information in the electronic or other health record, Independently interpreting results (not separately reported) and communicating results to the patient/family/caregiver, or Care coordination (not separately reported).         Each patient to whom he or she provides medical services by telemedicine is:  (1) informed of the relationship between the physician and patient and the respective role of any other health care provider with respect to management of the patient; and (2) notified that he or she may decline to receive medical services by telemedicine and may withdraw from such care at any time.    Notes:     88-year-old  female who presents to the Hematology Oncology Clinic today as a follow-up for anemia, s/p Feraheme. Patient is also followed for elevated Free LT Chains, which have been stable.  I have reviewed all the patient's relevant clinical history available medical record have utilized as my evaluation management recommendations today.  She continues on Eliquis at this time.      Today's visit:  Patient denies any significant complaints today.       Anemia  There has been no abdominal pain, bruising/bleeding easily, confusion, fever, light-headedness or palpitations.     Review of Systems   Constitutional: Positive for fatigue. Negative for activity change, appetite change, chills, diaphoresis, fever and unexpected weight change.   HENT: Negative for congestion, hearing loss, mouth sores, nosebleeds and  trouble swallowing.    Eyes: Negative for discharge and visual disturbance.   Respiratory: Negative for cough, chest tightness and shortness of breath.    Cardiovascular: Negative for chest pain, palpitations and leg swelling.   Gastrointestinal: Positive for abdominal distention. Negative for abdominal pain, blood in stool, constipation, diarrhea, nausea and vomiting.   Endocrine: Negative for cold intolerance and heat intolerance.   Genitourinary: Negative for difficulty urinating, dyspareunia and hematuria.   Musculoskeletal: Positive for arthralgias, back pain, gait problem and myalgias.   Skin: Negative.    Neurological: Negative for dizziness, weakness, light-headedness and headaches.   Hematological: Negative for adenopathy. Does not bruise/bleed easily.   Psychiatric/Behavioral: Negative for agitation, behavioral problems and confusion. The patient is nervous/anxious.        Medication List with Changes/Refills   Current Medications    ACETAMINOPHEN (TYLENOL EXTRA STRENGTH) 325 MG TABLET    Take 2 tablets (650 mg total) by mouth every 8 (eight) hours.    ALLOPURINOL (ZYLOPRIM) 100 MG TABLET    TAKE 1 TABLET BY MOUTH EVERY DAY    AMIODARONE (PACERONE) 200 MG TAB    Take 1 tablet (200 mg total) by mouth once daily.    CARVEDILOL (COREG) 25 MG TABLET    TAKE 1 TABLET BY MOUTH TWICE A DAY WITH MEALS    DIGOXIN (LANOXIN) 125 MCG TABLET    Take 125 mcg by mouth every other day.    ELIQUIS 2.5 MG TAB    TAKE 1 TABLET BY MOUTH TWICE A DAY    FUROSEMIDE (LASIX) 40 MG TABLET    TAKE 2 TABLETS (80 MG TOTAL) BY MOUTH 2 (TWO) TIMES DAILY.    GABAPENTIN (NEURONTIN) 100 MG CAPSULE    TAKE ONE CAPSULE BY MOUTH TWO TIMES DAILY    LEVOTHYROXINE (SYNTHROID) 75 MCG TABLET    TAKE 1 TABLET BY MOUTH EVERY DAY    LOSARTAN (COZAAR) 25 MG TABLET    Take 25 mg by mouth once daily.    METHENAMINE (HIPREX) 1 GRAM TAB    Take 1 tablet (1 g total) by mouth 2 (two) times daily.   Changed and/or Refilled Medications    Modified Medication  Previous Medication    METOLAZONE (ZAROXOLYN) 2.5 MG TABLET metOLazone (ZAROXOLYN) 2.5 MG tablet       Take 1 pill on MW, 30 minutes after Lasix as directed    30 minutes after Lasix as directed     Objective:     There were no vitals filed for this visit.  Physical Exam  Vitals signs and nursing note reviewed.   Constitutional:       General: She is not in acute distress.     Appearance: She is well-developed.   HENT:      Head: Normocephalic and atraumatic.      Right Ear: Hearing and external ear normal.      Left Ear: Hearing and external ear normal.      Nose: No rhinorrhea.      Right Sinus: No maxillary sinus tenderness or frontal sinus tenderness.      Left Sinus: No maxillary sinus tenderness or frontal sinus tenderness.      Mouth/Throat:      Mouth: No oral lesions.      Pharynx: Uvula midline.   Eyes:      General:         Right eye: No discharge.         Left eye: No discharge.      Conjunctiva/sclera: Conjunctivae normal.   Neck:      Musculoskeletal: Normal range of motion.      Thyroid: No thyromegaly.      Trachea: No tracheal deviation.   Cardiovascular:      Pulses:           Dorsalis pedis pulses are 2+ on the right side and 2+ on the left side.      Heart sounds: S1 normal and S2 normal.   Pulmonary:      Effort: Pulmonary effort is normal. No respiratory distress.   Abdominal:      General: There is no distension.      Palpations: Abdomen is soft.   Musculoskeletal: Normal range of motion.         General: No tenderness.   Lymphadenopathy:      Cervical: No cervical adenopathy.      Upper Body:      Right upper body: No supraclavicular adenopathy.      Left upper body: No supraclavicular adenopathy.   Skin:     General: Skin is warm and dry.      Capillary Refill: Capillary refill takes less than 2 seconds.      Coloration: Skin is pale.      Findings: No rash.   Neurological:      Mental Status: She is alert and oriented to person, place, and time.   Psychiatric:         Mood and Affect: Mood  is anxious. Mood is not depressed.         Speech: Speech normal.         Behavior: Behavior normal.         Thought Content: Thought content normal.         Judgment: Judgment normal.         Assessment:       Problem List Items Addressed This Visit        Renal/    Anemia associated with stage 4 chronic renal failure       Immunology/Multi System    Elevated serum immunoglobulin free light chain level       Hematology    Coagulopathy       Oncology    Iron deficiency anemia due to chronic blood loss - Primary    Relevant Orders    Comprehensive metabolic panel    Ferritin    Iron and TIBC    CBC auto differential    Macrocytic anemia    Relevant Orders    Comprehensive metabolic panel    Ferritin    Iron and TIBC    CBC auto differential       Orthopedic    Osteoporosis, senile          Plan:     Anemia r/t CKD and Iron Deficiency  --Treated with Procrit in the past for Hgb <10.0  --Hgb: 10.9 today, will hold Procrit 20,000U today, last received 4/2019  --currently iron repleted      Follow-Up:   Return to clinic in 6 weeks with labs.

## 2020-08-05 NOTE — PATIENT INSTRUCTIONS
COVID-19 and Cancer Patients    What is COVID-19?  COVID-19 is a new type of virus that can cause mild to severe infections in the lungs. Like other viruses, it can lead to serious infections for people with weakened immune systems. COVID-19 may cause more severe infections than other viruses. We do not have a vaccine to help control its spread, but experts are working to make a vaccine.    How does COVID-19 spread?  The virus can spread easily, just like the common cold or flu. It spreads when an infected person coughs or sneezes droplets that can get into the eyes, nose, or mouth of people nearby. Droplets also land on surfaces that people touch before touching their own eyes, nose, or mouth.    How can I protect myself?  These are some of the best ways to protect yourself and others from the virus:  - Wash your hands often with soap and water for at least 20 seconds.  - Use hand  with 60% or more alcohol until you can wash your hands with soap and water.  - Avoid touching your eyes, nose, and mouth without washing your hands first.  - Clean and disinfect surfaces often. Regular household wipes and sprays will kill the virus. Be sure to  clean places that people touch a lot, such as door handles, phones, keyboards, and light switches.  - Avoid handshakes, hugging, and standing or sitting close to people who are coughing or sneezing.  - Be as healthy as you can. Get plenty of sleep, eat healthy, exercise, and manage your stress.  If you are sick, follow these steps:  - Stay home.  - Cover your nose and mouth when you cough or sneeze. If you use a tissue, put it in the garbage right  away. If you do not have a tissue, cough or sneeze into your elbow crease.  - Call before going to your medical appointments. Let them know about recent travel or if you have had  contact with a person with COVID-19.          As of 3/12/2020  Should I wear a mask?  Experts don't believe that wearing a mask is helpful for the  general public, unless there is concern you have been exposed and may not have symptoms. Some people should use certain types of masks because of their own health or the type of work they do. Talk to your doctor or nurse to see if you would benefit from wearing a mask. If you arrive at the hospital with respiratory symptoms, please ask for a mask.    What if I care for or live with a cancer patient?  If you are caring for or living with someone with cancer, do your best to keep them from getting the virus.  Follow the steps to protect yourself listed on this sheet.  If you become sick yourself, call your doctor to see what more you should do to protect your loved one.    What about people visiting?   If you are sick or have been exposed to COVID-19, we ask that you not come to [facility].    If patients have symptoms, call the [facility] Covid-19 Line ([phone])    We ask that you find someone who isn't sick to join your loved one at their appointments.  To protect all patients, we may limit the number of people who can visit someone staying in the hospital.  Please check with your care team.    How will [facility] protect me from getting COVID-19?  Our hospital and clinics are taking steps to keep infected patients separate from those who may be at risk. At every appointment, your care team will ask questions about overall health and recent travel. We may ask some patients to wait in a separate room or to reschedule until they are feeling better if they have symptoms.  We are also taking extra steps to clean and disinfect surfaces throughout our hospital and clinics.     Will you still care for me if I get sick?  Yes. Your care is our top priority. Although we may change some ways we care for you, we will never put your care or health at risk.            CALL BEFORE YOU ACT   Dial 211 or Text keyword LACOVID to [phone] for general questions and information.   If patients have  symptoms, call the [facility] Covid-19 Line ([phone])               [facility] ([email or web address])    [email or web address]

## 2020-08-19 NOTE — TELEPHONE ENCOUNTER
Colonoscopy:      The pathology results demonstrated Hyperplastic and Tubular adenoma.     Recall:  3 yrs due to size and number    Increase fiber: No Patient aware. Notified DME will contact her.

## 2020-08-24 ENCOUNTER — TELEPHONE (OUTPATIENT)
Dept: CARDIOLOGY | Facility: CLINIC | Age: 85
End: 2020-08-24

## 2020-08-24 RX ORDER — METOLAZONE 2.5 MG/1
TABLET ORAL
Qty: 25 TABLET | Refills: 6 | Status: SHIPPED | OUTPATIENT
Start: 2020-08-24 | End: 2020-09-25

## 2020-08-24 NOTE — TELEPHONE ENCOUNTER
The patient called because her metolazone has not been refilled by the pharmacy yet. I called and they are still working on the PA and said they would call her when it was ready. She still has a couple of days left, but the patient asked what she should do if the medication is not refilled in time.     Please advise.

## 2020-08-25 ENCOUNTER — TELEPHONE (OUTPATIENT)
Dept: PHARMACY | Facility: CLINIC | Age: 85
End: 2020-08-25

## 2020-08-25 NOTE — TELEPHONE ENCOUNTER
The prior authorization for Jennifer Mathews's Metolazone 2.5mg has been submitted on 8/24/2020 @ 5:45pm.  It can take up to 72 hours for a decision to be rendered from the insurance.  Unable to reach pt concerning PA and process.  Please let me know if you have any questions.    Thank You!   Nelsy Quiñones CPhT, B.A  Patient Care Advocate   [facility]  Phone: [phone] Ext [phone]  Fax: [phone]

## 2020-08-25 NOTE — TELEPHONE ENCOUNTER
The patient has been notified of this information and all questions answered regarding PA for metolazone.

## 2020-08-26 ENCOUNTER — TELEPHONE (OUTPATIENT)
Dept: PHARMACY | Facility: CLINIC | Age: 85
End: 2020-08-26

## 2020-08-26 NOTE — TELEPHONE ENCOUNTER
Good Morning,     The prior authorization for Jennifer Mathews's Metolazone 2.5mg prescription has been APPROVED FROM 8/25/2020 TO 8/24/2021 with copayment of $14.03.       Patient has been notified of the decision on 8/26/2020 and patient states she would like medication to be shipped, so arrangements have been made with [facility].    If there are any additional questions or concerns, please contact me.    Thank You!   Nelsy Quiñones CPhT, B.A  Patient Care Advocate   [facility]  Phone: [phone] Ext [phone]  Fax: [phone]

## 2020-08-31 ENCOUNTER — EXTERNAL CHRONIC CARE MANAGEMENT (OUTPATIENT)
Dept: PRIMARY CARE CLINIC | Facility: CLINIC | Age: 85
End: 2020-08-31
Payer: MEDICARE

## 2020-08-31 DIAGNOSIS — I25.5 ISCHEMIC CARDIOMYOPATHY: ICD-10-CM

## 2020-08-31 DIAGNOSIS — I50.42 CHRONIC COMBINED SYSTOLIC AND DIASTOLIC CONGESTIVE HEART FAILURE: ICD-10-CM

## 2020-08-31 DIAGNOSIS — Z95.0 CARDIAC PACEMAKER IN SITU: Primary | ICD-10-CM

## 2020-08-31 PROCEDURE — 99490 CHRNC CARE MGMT STAFF 1ST 20: CPT | Mod: S$PBB,,, | Performed by: FAMILY MEDICINE

## 2020-08-31 PROCEDURE — 99490 CHRNC CARE MGMT STAFF 1ST 20: CPT | Mod: PBBFAC | Performed by: FAMILY MEDICINE

## 2020-08-31 PROCEDURE — 99490 PR CHRONIC CARE MGMT, 1ST 20 MIN: ICD-10-PCS | Mod: S$PBB,,, | Performed by: FAMILY MEDICINE

## 2020-09-24 ENCOUNTER — LAB VISIT (OUTPATIENT)
Dept: LAB | Facility: HOSPITAL | Age: 85
End: 2020-09-24
Attending: INTERNAL MEDICINE
Payer: MEDICARE

## 2020-09-24 ENCOUNTER — IMMUNIZATION (OUTPATIENT)
Dept: PHARMACY | Facility: CLINIC | Age: 85
End: 2020-09-24
Payer: MEDICARE

## 2020-09-24 ENCOUNTER — OFFICE VISIT (OUTPATIENT)
Dept: INTERNAL MEDICINE | Facility: CLINIC | Age: 85
End: 2020-09-24
Payer: MEDICARE

## 2020-09-24 VITALS
DIASTOLIC BLOOD PRESSURE: 64 MMHG | HEART RATE: 71 BPM | OXYGEN SATURATION: 93 % | BODY MASS INDEX: 20.75 KG/M2 | TEMPERATURE: 96 F | SYSTOLIC BLOOD PRESSURE: 136 MMHG | WEIGHT: 112.75 LBS | HEIGHT: 62 IN

## 2020-09-24 DIAGNOSIS — K64.9 BLEEDING HEMORRHOIDS: ICD-10-CM

## 2020-09-24 DIAGNOSIS — N18.4 ANEMIA ASSOCIATED WITH STAGE 4 CHRONIC RENAL FAILURE: ICD-10-CM

## 2020-09-24 DIAGNOSIS — I27.22 PULMONARY HYPERTENSION DUE TO LEFT HEART DISEASE: ICD-10-CM

## 2020-09-24 DIAGNOSIS — J44.9 COPD, GROUP A, BY GOLD 2017 CLASSIFICATION: Primary | ICD-10-CM

## 2020-09-24 DIAGNOSIS — D50.0 IRON DEFICIENCY ANEMIA DUE TO CHRONIC BLOOD LOSS: ICD-10-CM

## 2020-09-24 DIAGNOSIS — J84.10 CALCIFIED GRANULOMA OF LUNG: ICD-10-CM

## 2020-09-24 DIAGNOSIS — I50.42 CHRONIC COMBINED SYSTOLIC AND DIASTOLIC CONGESTIVE HEART FAILURE: ICD-10-CM

## 2020-09-24 DIAGNOSIS — M15.9 PRIMARY OSTEOARTHRITIS INVOLVING MULTIPLE JOINTS: ICD-10-CM

## 2020-09-24 DIAGNOSIS — D53.9 MACROCYTIC ANEMIA: ICD-10-CM

## 2020-09-24 DIAGNOSIS — I70.0 AORTIC ATHEROSCLEROSIS: ICD-10-CM

## 2020-09-24 DIAGNOSIS — E78.5 DYSLIPIDEMIA: ICD-10-CM

## 2020-09-24 DIAGNOSIS — L98.9 SKIN LESION OF RIGHT LEG: ICD-10-CM

## 2020-09-24 DIAGNOSIS — R26.89 BALANCE PROBLEM: ICD-10-CM

## 2020-09-24 DIAGNOSIS — Z95.2 S/P MVR (MITRAL VALVE REPLACEMENT): ICD-10-CM

## 2020-09-24 DIAGNOSIS — K22.70 BARRETT'S ESOPHAGUS WITHOUT DYSPLASIA: ICD-10-CM

## 2020-09-24 DIAGNOSIS — R60.0 BILATERAL LOWER EXTREMITY EDEMA: ICD-10-CM

## 2020-09-24 DIAGNOSIS — J98.4 CRLD (CHRONIC RESTRICTIVE LUNG DISEASE): Chronic | ICD-10-CM

## 2020-09-24 DIAGNOSIS — Z86.39 HISTORY OF MALNUTRITION: ICD-10-CM

## 2020-09-24 DIAGNOSIS — D63.1 ANEMIA ASSOCIATED WITH STAGE 4 CHRONIC RENAL FAILURE: ICD-10-CM

## 2020-09-24 DIAGNOSIS — I10 ESSENTIAL HYPERTENSION: ICD-10-CM

## 2020-09-24 DIAGNOSIS — E03.9 ACQUIRED HYPOTHYROIDISM: ICD-10-CM

## 2020-09-24 DIAGNOSIS — G47.33 OSA (OBSTRUCTIVE SLEEP APNEA): Chronic | ICD-10-CM

## 2020-09-24 DIAGNOSIS — I25.5 ISCHEMIC CARDIOMYOPATHY: ICD-10-CM

## 2020-09-24 DIAGNOSIS — R76.8 ELEVATED SERUM IMMUNOGLOBULIN FREE LIGHT CHAIN LEVEL: ICD-10-CM

## 2020-09-24 DIAGNOSIS — Z95.0 STATUS POST BIVENTRICULAR PACEMAKER: ICD-10-CM

## 2020-09-24 DIAGNOSIS — M81.0 OSTEOPOROSIS, SENILE: ICD-10-CM

## 2020-09-24 DIAGNOSIS — M1A.39X1 CHRONIC GOUT DUE TO RENAL IMPAIRMENT OF MULTIPLE SITES WITH TOPHUS: ICD-10-CM

## 2020-09-24 DIAGNOSIS — N18.30 STAGE 3 CHRONIC KIDNEY DISEASE: ICD-10-CM

## 2020-09-24 LAB
BASOPHILS # BLD AUTO: 0.05 K/UL (ref 0–0.2)
BASOPHILS NFR BLD: 0.8 % (ref 0–1.9)
DIFFERENTIAL METHOD: ABNORMAL
EOSINOPHIL # BLD AUTO: 0.1 K/UL (ref 0–0.5)
EOSINOPHIL NFR BLD: 2.1 % (ref 0–8)
ERYTHROCYTE [DISTWIDTH] IN BLOOD BY AUTOMATED COUNT: 15.4 % (ref 11.5–14.5)
HCT VFR BLD AUTO: 35.4 % (ref 37–48.5)
HGB BLD-MCNC: 10.7 G/DL (ref 12–16)
IMM GRANULOCYTES # BLD AUTO: 0.03 K/UL (ref 0–0.04)
IMM GRANULOCYTES NFR BLD AUTO: 0.5 % (ref 0–0.5)
LYMPHOCYTES # BLD AUTO: 1.3 K/UL (ref 1–4.8)
LYMPHOCYTES NFR BLD: 21.6 % (ref 18–48)
MCH RBC QN AUTO: 32.6 PG (ref 27–31)
MCHC RBC AUTO-ENTMCNC: 30.2 G/DL (ref 32–36)
MCV RBC AUTO: 108 FL (ref 82–98)
MONOCYTES # BLD AUTO: 0.5 K/UL (ref 0.3–1)
MONOCYTES NFR BLD: 8.3 % (ref 4–15)
NEUTROPHILS # BLD AUTO: 4.1 K/UL (ref 1.8–7.7)
NEUTROPHILS NFR BLD: 66.7 % (ref 38–73)
NRBC BLD-RTO: 0 /100 WBC
PLATELET # BLD AUTO: 182 K/UL (ref 150–350)
PMV BLD AUTO: 11.8 FL (ref 9.2–12.9)
RBC # BLD AUTO: 3.28 M/UL (ref 4–5.4)
WBC # BLD AUTO: 6.17 K/UL (ref 3.9–12.7)

## 2020-09-24 PROCEDURE — 80053 COMPREHEN METABOLIC PANEL: CPT

## 2020-09-24 PROCEDURE — 82728 ASSAY OF FERRITIN: CPT

## 2020-09-24 PROCEDURE — 90694 VACC AIIV4 NO PRSRV 0.5ML IM: CPT | Mod: PBBFAC

## 2020-09-24 PROCEDURE — 84443 ASSAY THYROID STIM HORMONE: CPT

## 2020-09-24 PROCEDURE — 90715 TDAP VACCINE 7 YRS/> IM: CPT | Mod: PBBFAC

## 2020-09-24 PROCEDURE — 84439 ASSAY OF FREE THYROXINE: CPT

## 2020-09-24 PROCEDURE — 83880 ASSAY OF NATRIURETIC PEPTIDE: CPT

## 2020-09-24 PROCEDURE — 85025 COMPLETE CBC W/AUTO DIFF WBC: CPT

## 2020-09-24 PROCEDURE — 99999 PR PBB SHADOW E&M-EST. PATIENT-LVL V: ICD-10-PCS | Mod: PBBFAC,,, | Performed by: FAMILY MEDICINE

## 2020-09-24 PROCEDURE — 99214 OFFICE O/P EST MOD 30 MIN: CPT | Mod: S$PBB,,, | Performed by: FAMILY MEDICINE

## 2020-09-24 PROCEDURE — 99999 PR PBB SHADOW E&M-EST. PATIENT-LVL V: CPT | Mod: PBBFAC,,, | Performed by: FAMILY MEDICINE

## 2020-09-24 PROCEDURE — 99215 OFFICE O/P EST HI 40 MIN: CPT | Mod: PBBFAC | Performed by: FAMILY MEDICINE

## 2020-09-24 PROCEDURE — 36415 COLL VENOUS BLD VENIPUNCTURE: CPT | Mod: PO

## 2020-09-24 PROCEDURE — 90472 IMMUNIZATION ADMIN EACH ADD: CPT | Mod: PBBFAC

## 2020-09-24 PROCEDURE — 83540 ASSAY OF IRON: CPT

## 2020-09-24 PROCEDURE — G0008 ADMIN INFLUENZA VIRUS VAC: HCPCS | Mod: PBBFAC

## 2020-09-24 PROCEDURE — 80061 LIPID PANEL: CPT

## 2020-09-24 PROCEDURE — 80069 RENAL FUNCTION PANEL: CPT

## 2020-09-24 PROCEDURE — 99214 PR OFFICE/OUTPT VISIT, EST, LEVL IV, 30-39 MIN: ICD-10-PCS | Mod: S$PBB,,, | Performed by: FAMILY MEDICINE

## 2020-09-24 NOTE — PROGRESS NOTES
Subjective:       Patient ID: Jennifer Mathews is a 88 y.o. female.    Chief Complaint: Annual Exam    Patient presents to clinic today for annual physical exam and review HRA.  Reports problems with her bowels, has to go right away, sometimes has trouble getting to the bathroom in time. This is not new, except for fecal incontinence. Reports issues with hemorrhoids, reports occasional itching and bleeding. No pain. Bleeding intermittent, notices weekly, started about 6 months ago.    Wt Readings from Last 10 Encounters:  09/24/20 : 51.1 kg (112 lb 12.2 oz)  08/04/20 : 50.8 kg (112 lb)  07/30/20 : 49.4 kg (108 lb 14.5 oz)  06/17/20 : 53 kg (116 lb 11.7 oz)  05/26/20 : 52.1 kg (114 lb 13.8 oz)  03/05/20 : 51 kg (112 lb 7 oz)  02/25/20 : 54.3 kg (119 lb 11.4 oz)  02/25/20 : 54 kg (119 lb 0.8 oz)  02/17/20 : 53.5 kg (117 lb 15.1 oz)  02/14/20 : 52.5 kg (115 lb 11.9 oz)    Review of Systems   Constitutional: Positive for fatigue. Negative for chills, fever and unexpected weight change.   HENT: Positive for hearing loss. Negative for congestion, dental problem, ear pain, rhinorrhea and trouble swallowing.    Eyes: Negative for pain and visual disturbance.   Respiratory: Positive for shortness of breath. Negative for cough.    Cardiovascular: Positive for leg swelling. Negative for chest pain and palpitations.   Gastrointestinal: Positive for diarrhea. Negative for abdominal distention, abdominal pain, blood in stool, constipation, nausea and vomiting.   Genitourinary: Negative for difficulty urinating and vaginal discharge.   Musculoskeletal: Positive for arthralgias and myalgias.   Skin: Negative for rash.   Neurological: Positive for weakness. Negative for dizziness, numbness and headaches.   Hematological: Negative for adenopathy. Bruises/bleeds easily.   Psychiatric/Behavioral: Negative for dysphoric mood and sleep disturbance. The patient is not nervous/anxious.        Above positive ROS are chronic/stable per  patient report.  Objective:      Physical Exam  Vitals signs reviewed.   Constitutional:       General: She is not in acute distress.     Appearance: She is well-developed.   HENT:      Head: Normocephalic and atraumatic.   Eyes:      General: Lids are normal. No scleral icterus.     Extraocular Movements: Extraocular movements intact.      Conjunctiva/sclera: Conjunctivae normal.      Pupils: Pupils are equal, round, and reactive to light.   Cardiovascular:      Rate and Rhythm: Normal rate and regular rhythm.      Heart sounds: No murmur. No friction rub. No gallop.    Pulmonary:      Effort: Pulmonary effort is normal.      Breath sounds: Normal breath sounds. No decreased breath sounds, wheezing, rhonchi or rales.   Skin:         Neurological:      Mental Status: She is alert and oriented to person, place, and time.      Cranial Nerves: No cranial nerve deficit.      Gait: Gait normal.   Psychiatric:         Mood and Affect: Mood and affect normal.         Assessment:       1. COPD, group A, by GOLD 2017 classification    2. Anemia associated with stage 4 chronic renal failure    3. Acquired hypothyroidism    4. Aortic atherosclerosis    5. Santos's esophagus without dysplasia    6. Bilateral lower extremity edema    7. Calcified granuloma of lung    8. Chronic combined systolic and diastolic congestive heart failure    9. 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 CRLD (chronic restrictive lung disease)    11. Dyslipidemia    12. Elevated serum immunoglobulin free light chain level    13. History of malnutrition;Mild Protein Calorie Malnutrition    14. Essential hypertension    15. Iron deficiency anemia due to chronic blood loss    16. Ischemic cardiomyopathy    17. Macrocytic anemia    18. LEV (obstructive sleep apnea)    19. Osteoporosis, senile    20. Primary osteoarthritis involving multiple joints    21. Pulmonary hypertension due to left heart disease    22. S/P MVR  (mitral valve replacement)    23. Status post biventricular pacemaker    24. Skin lesion of right leg    25. Bleeding hemorrhoids    26. Balance problem        Plan:     Problem List Items Addressed This Visit     Acquired hypothyroidism    Current Assessment & Plan     Status pending labs, continue levothyroxine           Relevant Orders    TSH    T4, free    Anemia associated with stage 4 chronic renal failure    Aortic atherosclerosis    Overview     1/28/18 CTA Abdomen Pelvis- The abdominal aorta is normal in course and caliber with moderate atherosclerotic calcifications. No evidence of aneurysm or dissection.            Balance problem    Relevant Orders    Ambulatory referral/consult to Home Health    Santos's esophagus    Overview     Followed by GI         Relevant Orders    Ambulatory referral/consult to Gastroenterology    Bilateral lower extremity edema    Bleeding hemorrhoids    Relevant Orders    Ambulatory referral/consult to Gastroenterology    Calcified granuloma of lung    Overview     Ct 8/2019-Calcified granuloma within the right lower lobe adjacent to the pleural surface.         Chronic combined systolic and diastolic congestive heart failure    Overview     ECHO 11/19/18 CONCLUSIONS     1 - Severe left atrial enlargement.     2 - Concentric hypertrophy.     3 - Wall motion abnormalities.     4 - Severely depressed left ventricular systolic function (EF 20-25%).     5 - Moderately depressed right ventricular systolic function .     6 - Pulmonary hypertension. The estimated PA systolic pressure is 69 mmHg.     7 - Mitral valve prosthesis.     8 - Moderate tricuspid regurgitation.     9 - Increased central venous pressure.          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    COPD, group A, by GOLD 2017 classification - Primary    Relevant Orders    Ambulatory referral/consult to Pulmonology    CRLD (chronic restrictive lung disease) (Chronic)    Overview     Followed by  Pulmonology         Dyslipidemia    Current Assessment & Plan     Status pending labs         Relevant Orders    Lipid Panel    Elevated serum immunoglobulin free light chain level    History of malnutrition;Mild Protein Calorie Malnutrition    Hypertension    Current Assessment & Plan     Controlled on current medications         Iron deficiency anemia due to chronic blood loss    Ischemic cardiomyopathy    Macrocytic anemia    LEV (obstructive sleep apnea) (Chronic)    Overview     Followed by Pulmonology         Osteoporosis, senile    Overview     Followed by Rheumatology         Primary osteoarthritis involving multiple joints    Pulmonary hypertension due to left heart disease    S/P MVR (mitral valve replacement)    Overview     Pig then later mechanical         Skin lesion of right leg    Relevant Orders    Ambulatory referral/consult to Dermatology    Status post biventricular pacemaker        HRA reviewed. Followed by Hem/Onc, Nephrology, Cardiology and Rheumatology. Will schedule to re-establish care with Pulmonology and Gastroenterology.  Health Maintenance reviewed/updated. Flu vaccine today.  Discussed shingrix vaccine, given informational handout, advised can be obtained at pharmacy.

## 2020-09-24 NOTE — PATIENT INSTRUCTIONS
Call if you decide to proceed with pelvic floor therapy.    Check with your pharmacist regarding shingrix (shingles) and Tdap (tetanus/diphtheria/pertussis) vaccines.

## 2020-09-25 ENCOUNTER — TELEPHONE (OUTPATIENT)
Dept: CARDIOLOGY | Facility: HOSPITAL | Age: 85
End: 2020-09-25

## 2020-09-25 ENCOUNTER — TELEPHONE (OUTPATIENT)
Dept: CARDIOLOGY | Facility: CLINIC | Age: 85
End: 2020-09-25

## 2020-09-25 DIAGNOSIS — D50.0 IRON DEFICIENCY ANEMIA DUE TO CHRONIC BLOOD LOSS: Primary | ICD-10-CM

## 2020-09-25 DIAGNOSIS — I50.9 ACUTE ON CHRONIC CONGESTIVE HEART FAILURE: ICD-10-CM

## 2020-09-25 DIAGNOSIS — R76.8 ELEVATED SERUM IMMUNOGLOBULIN FREE LIGHT CHAIN LEVEL: ICD-10-CM

## 2020-09-25 DIAGNOSIS — I48.91 ATRIAL FIBRILLATION, UNSPECIFIED TYPE: ICD-10-CM

## 2020-09-25 LAB
ALBUMIN SERPL BCP-MCNC: 3.8 G/DL (ref 3.5–5.2)
ALBUMIN SERPL BCP-MCNC: 3.9 G/DL (ref 3.5–5.2)
ALP SERPL-CCNC: 79 U/L (ref 55–135)
ALT SERPL W/O P-5'-P-CCNC: 11 U/L (ref 10–44)
ANION GAP SERPL CALC-SCNC: 11 MMOL/L (ref 8–16)
ANION GAP SERPL CALC-SCNC: 12 MMOL/L (ref 8–16)
ANION GAP SERPL CALC-SCNC: 12 MMOL/L (ref 8–16)
AST SERPL-CCNC: 17 U/L (ref 10–40)
BILIRUB SERPL-MCNC: 0.5 MG/DL (ref 0.1–1)
BNP SERPL-MCNC: 1124 PG/ML (ref 0–99)
BUN SERPL-MCNC: 63 MG/DL (ref 8–23)
BUN SERPL-MCNC: 64 MG/DL (ref 8–23)
BUN SERPL-MCNC: 64 MG/DL (ref 8–23)
CALCIUM SERPL-MCNC: 10 MG/DL (ref 8.7–10.5)
CALCIUM SERPL-MCNC: 10 MG/DL (ref 8.7–10.5)
CALCIUM SERPL-MCNC: 9.8 MG/DL (ref 8.7–10.5)
CHLORIDE SERPL-SCNC: 96 MMOL/L (ref 95–110)
CHLORIDE SERPL-SCNC: 96 MMOL/L (ref 95–110)
CHLORIDE SERPL-SCNC: 97 MMOL/L (ref 95–110)
CHOLEST SERPL-MCNC: 154 MG/DL (ref 120–199)
CHOLEST/HDLC SERPL: 4.7 {RATIO} (ref 2–5)
CO2 SERPL-SCNC: 33 MMOL/L (ref 23–29)
CO2 SERPL-SCNC: 33 MMOL/L (ref 23–29)
CO2 SERPL-SCNC: 34 MMOL/L (ref 23–29)
CREAT SERPL-MCNC: 2.1 MG/DL (ref 0.5–1.4)
EST. GFR  (AFRICAN AMERICAN): 23.7 ML/MIN/1.73 M^2
EST. GFR  (NON AFRICAN AMERICAN): 20.6 ML/MIN/1.73 M^2
FERRITIN SERPL-MCNC: 216 NG/ML (ref 20–300)
GLUCOSE SERPL-MCNC: 102 MG/DL (ref 70–110)
GLUCOSE SERPL-MCNC: 104 MG/DL (ref 70–110)
GLUCOSE SERPL-MCNC: 104 MG/DL (ref 70–110)
HDLC SERPL-MCNC: 33 MG/DL (ref 40–75)
HDLC SERPL: 21.4 % (ref 20–50)
IRON SERPL-MCNC: 33 UG/DL (ref 30–160)
LDLC SERPL CALC-MCNC: 93.2 MG/DL (ref 63–159)
NONHDLC SERPL-MCNC: 121 MG/DL
PHOSPHATE SERPL-MCNC: 4.2 MG/DL (ref 2.7–4.5)
POTASSIUM SERPL-SCNC: 3.9 MMOL/L (ref 3.5–5.1)
POTASSIUM SERPL-SCNC: 3.9 MMOL/L (ref 3.5–5.1)
POTASSIUM SERPL-SCNC: 4 MMOL/L (ref 3.5–5.1)
PROT SERPL-MCNC: 6.9 G/DL (ref 6–8.4)
SATURATED IRON: 10 % (ref 20–50)
SODIUM SERPL-SCNC: 141 MMOL/L (ref 136–145)
SODIUM SERPL-SCNC: 141 MMOL/L (ref 136–145)
SODIUM SERPL-SCNC: 142 MMOL/L (ref 136–145)
T4 FREE SERPL-MCNC: 1.4 NG/DL (ref 0.71–1.51)
TOTAL IRON BINDING CAPACITY: 345 UG/DL (ref 250–450)
TRANSFERRIN SERPL-MCNC: 233 MG/DL (ref 200–375)
TRIGL SERPL-MCNC: 139 MG/DL (ref 30–150)
TSH SERPL DL<=0.005 MIU/L-ACNC: 3.2 UIU/ML (ref 0.4–4)

## 2020-09-25 PROCEDURE — G0180 PR HOME HEALTH MD CERTIFICATION: ICD-10-PCS | Mod: ,,, | Performed by: FAMILY MEDICINE

## 2020-09-25 PROCEDURE — G0180 MD CERTIFICATION HHA PATIENT: HCPCS | Mod: ,,, | Performed by: FAMILY MEDICINE

## 2020-09-25 RX ORDER — HEPARIN 100 UNIT/ML
500 SYRINGE INTRAVENOUS
Status: CANCELLED | OUTPATIENT
Start: 2021-10-10

## 2020-09-25 RX ORDER — METHYLPREDNISOLONE SOD SUCC 125 MG
80 VIAL (EA) INJECTION
Status: CANCELLED | OUTPATIENT
Start: 2020-10-02

## 2020-09-25 RX ORDER — SODIUM CHLORIDE 0.9 % (FLUSH) 0.9 %
10 SYRINGE (ML) INJECTION
Status: CANCELLED | OUTPATIENT
Start: 2021-10-10

## 2020-09-25 RX ORDER — HEPARIN 100 UNIT/ML
500 SYRINGE INTRAVENOUS
Status: CANCELLED | OUTPATIENT
Start: 2020-10-02

## 2020-09-25 RX ORDER — METHYLPREDNISOLONE SOD SUCC 125 MG
80 VIAL (EA) INJECTION
Status: CANCELLED | OUTPATIENT
Start: 2021-10-10

## 2020-09-25 RX ORDER — METOLAZONE 2.5 MG/1
TABLET ORAL
Qty: 25 TABLET | Refills: 6
Start: 2020-09-25 | End: 2021-02-12 | Stop reason: SDUPTHER

## 2020-09-25 RX ORDER — CARVEDILOL 12.5 MG/1
12.5 TABLET ORAL 2 TIMES DAILY WITH MEALS
Qty: 60 TABLET | Refills: 6
Start: 2020-09-25

## 2020-09-25 RX ORDER — SODIUM CHLORIDE 0.9 % (FLUSH) 0.9 %
10 SYRINGE (ML) INJECTION
Status: CANCELLED | OUTPATIENT
Start: 2020-10-02

## 2020-09-25 NOTE — TELEPHONE ENCOUNTER
DARIO Garza from [facility] calling regarding patients blood pressure running low at 90/35 P 70. Patient is A/O and asymptomatic. On several Blood pressure med, Amiodarone 200 mg QD, Carvedilol 25 mg BID, Digoxin 125 mcg QD, and Furosemide 80 mg BID.     Best contact number is [phone]

## 2020-09-25 NOTE — TELEPHONE ENCOUNTER
I have contacted patient regarding her abnormal BNP and BMP results as well as hypotension. Dr. Cary and I both recommended that patient be admitted for further treatment, but refused.     Please notify patient that after discussion with Dr. Cary, he recommends the following since she has declined hospital admission.    1. Increase Metolazone to MWF  2. Continue Lasix 80mg BID   3. Decrease Coreg to 12.5mg BID  4. Needs Digoxin level checked due to worsen renal function   5. Needs repeat BMP next week . Can check Dig level next week when she comes to have a repeat BMP    Please have CHF clinic arrange phone review next week as well.

## 2020-09-28 ENCOUNTER — LAB VISIT (OUTPATIENT)
Dept: LAB | Facility: HOSPITAL | Age: 85
End: 2020-09-28
Attending: INTERNAL MEDICINE
Payer: MEDICARE

## 2020-09-28 DIAGNOSIS — I50.42 CHRONIC COMBINED SYSTOLIC AND DIASTOLIC HEART FAILURE: Primary | ICD-10-CM

## 2020-09-28 LAB — DIGOXIN SERPL-MCNC: 1 NG/ML (ref 0.8–2)

## 2020-09-28 PROCEDURE — 80162 ASSAY OF DIGOXIN TOTAL: CPT

## 2020-09-29 ENCOUNTER — PATIENT MESSAGE (OUTPATIENT)
Dept: OTHER | Facility: OTHER | Age: 85
End: 2020-09-29

## 2020-09-30 ENCOUNTER — OFFICE VISIT (OUTPATIENT)
Dept: DERMATOLOGY | Facility: CLINIC | Age: 85
End: 2020-09-30
Payer: MEDICARE

## 2020-09-30 ENCOUNTER — EXTERNAL CHRONIC CARE MANAGEMENT (OUTPATIENT)
Dept: PRIMARY CARE CLINIC | Facility: CLINIC | Age: 85
End: 2020-09-30
Payer: MEDICARE

## 2020-09-30 DIAGNOSIS — Q82.8 POROKERATOSIS: Primary | ICD-10-CM

## 2020-09-30 DIAGNOSIS — L98.9 SKIN LESION OF RIGHT LEG: ICD-10-CM

## 2020-09-30 DIAGNOSIS — L57.0 ACTINIC KERATOSIS: ICD-10-CM

## 2020-09-30 DIAGNOSIS — R06.02 SOB (SHORTNESS OF BREATH): Primary | ICD-10-CM

## 2020-09-30 PROCEDURE — 99490 PR CHRONIC CARE MGMT, 1ST 20 MIN: ICD-10-PCS | Mod: S$PBB,,, | Performed by: FAMILY MEDICINE

## 2020-09-30 PROCEDURE — 99213 PR OFFICE/OUTPT VISIT, EST, LEVL III, 20-29 MIN: ICD-10-PCS | Mod: 25,S$PBB,, | Performed by: PHYSICIAN ASSISTANT

## 2020-09-30 PROCEDURE — 17000 DESTRUCT PREMALG LESION: CPT | Mod: S$PBB,,, | Performed by: PHYSICIAN ASSISTANT

## 2020-09-30 PROCEDURE — 17000 DESTRUCT PREMALG LESION: CPT | Mod: PBBFAC | Performed by: PHYSICIAN ASSISTANT

## 2020-09-30 PROCEDURE — 99999 PR PBB SHADOW E&M-EST. PATIENT-LVL III: ICD-10-PCS | Mod: PBBFAC,,, | Performed by: PHYSICIAN ASSISTANT

## 2020-09-30 PROCEDURE — 99213 OFFICE O/P EST LOW 20 MIN: CPT | Mod: PBBFAC,25 | Performed by: PHYSICIAN ASSISTANT

## 2020-09-30 PROCEDURE — 99213 OFFICE O/P EST LOW 20 MIN: CPT | Mod: 25,S$PBB,, | Performed by: PHYSICIAN ASSISTANT

## 2020-09-30 PROCEDURE — 17000 PR DESTRUCTION(LASER SURGERY,CRYOSURGERY,CHEMOSURGERY),PREMALIGNANT LESIONS,FIRST LESION: ICD-10-PCS | Mod: S$PBB,,, | Performed by: PHYSICIAN ASSISTANT

## 2020-09-30 PROCEDURE — 99999 PR PBB SHADOW E&M-EST. PATIENT-LVL III: CPT | Mod: PBBFAC,,, | Performed by: PHYSICIAN ASSISTANT

## 2020-09-30 PROCEDURE — 99490 CHRNC CARE MGMT STAFF 1ST 20: CPT | Mod: PBBFAC | Performed by: FAMILY MEDICINE

## 2020-09-30 PROCEDURE — 99490 CHRNC CARE MGMT STAFF 1ST 20: CPT | Mod: S$PBB,,, | Performed by: FAMILY MEDICINE

## 2020-09-30 NOTE — PATIENT INSTRUCTIONS
CRYOSURGERY      Your PA has used a method called cryosurgery to treat your skin condition. Cryosurgery refers to the use of very cold substances to treat a variety of skin conditions such as warts, pre-skin cancers, molluscum contagiosum, sun spots, and several benign growths. The substance we use in cryosurgery is liquid nitrogen and is so cold (-195 degrees Celsius) that is burns when administered.     Following treatment in the office, the skin may immediately burn and become red. You may find the area around the lesion is affected as well. It is sometimes necessary to treat not only the lesion, but a small area of the surrounding normal skin to achieve a good response.     A blister, and even a blood filled blister, may form after treatment.   This is a normal response. If the blister is painful, it is acceptable to sterilize a needle with rubbing alcohol and gently pop the blister. It is important that you gently wash the area with soap and warm water as the blister fluid may contain wart virus if a wart was treated. Do not remove the roof of the blister.     The area treated can take anywhere from 1-3 weeks to heal. Healing time depends on the kind of skin lesion treated, the location, and how aggressively the lesion was treated. It is recommended that the areas treated are covered with Vaseline or bacitracin ointment and a band-aid. If a band-aid is not practical, just ointment applied several times per day will do. Keeping these areas moist will speed the healing time.    Treatment with liquid nitrogen can leave a scar. In dark skin, it may be a light or dark scar, in light skin it may be a white or pink scar. These will generally fade with time, but may never go away completely.     If you have any concerns after your treatment, please feel free to call the office.       [phone] / [email or web address]

## 2020-09-30 NOTE — PROGRESS NOTES
Subjective:       Patient ID:  Jennifer Mathews is a 88 y.o. female who presents for   Chief Complaint   Patient presents with    Lesion     R leg, x3 mo, sporadic redness, no prior tx     Last seen 2/27/20 by Dr. Arndt for upper body skin check and for f/u of rash. Pt states the lesion of right leg was not there at that time. States the is red some days and sometimes seems to be less red or faded. +Rough. Denies change in size, bleeding, or tenderness. No previous h/o skin cancer.  History of Present Illness: The patient presents with chief complaint of Lesion.  Location: R leg  Duration: 3 mo  Signs/Symptoms: sporadic redness    Prior treatments: none        Review of Systems   Constitutional: Negative for fever and chills.   Gastrointestinal: Negative for nausea and vomiting.   Skin: Positive for activity-related sunscreen use. Negative for itching, rash, dry skin, daily sunscreen use and recent sunburn.   Hematologic/Lymphatic: Does not bruise/bleed easily.        Objective:    Physical Exam   Constitutional: She appears well-developed and well-nourished. No distress.   Neurological: She is alert and oriented to person, place, and time. She is not disoriented.   Psychiatric: She has a normal mood and affect.   Skin:   Areas Examined (abnormalities noted in diagram):   Head / Face Inspection Performed  Neck Inspection Performed  Chest / Axilla Inspection Performed  RUE Inspected  LLE Inspection Performed  Nails and Digits Inspection Performed              Diagram Legend     Erythematous scaling macule/papule c/w actinic keratosis       Vascular papule c/w angioma      Pigmented verrucoid papule/plaque c/w seborrheic keratosis      Yellow umbilicated papule c/w sebaceous hyperplasia      Irregularly shaped tan macule c/w lentigo     1-2 mm smooth white papules consistent with Milia      Movable subcutaneous cyst with punctum c/w epidermal inclusion cyst      Subcutaneous movable cyst c/w pilar cyst      Firm  pink to brown papule c/w dermatofibroma      Pedunculated fleshy papule(s) c/w skin tag(s)      Evenly pigmented macule c/w junctional nevus     Mildly variegated pigmented, slightly irregular-bordered macule c/w mildly atypical nevus      Flesh colored to evenly pigmented papule c/w intradermal nevus       Pink pearly papule/plaque c/w basal cell carcinoma      Erythematous hyperkeratotic cursted plaque c/w SCC      Surgical scar with no sign of skin cancer recurrence      Open and closed comedones      Inflammatory papules and pustules      Verrucoid papule consistent consistent with wart     Erythematous eczematous patches and plaques     Dystrophic onycholytic nail with subungual debris c/w onychomycosis     Umbilicated papule    Erythematous-base heme-crusted tan verrucoid plaque consistent with inflamed seborrheic keratosis     Erythematous Silvery Scaling Plaque c/w Psoriasis     See annotation      Assessment / Plan:        Porokeratosis  Skin lesion of right leg  -     Ambulatory referral/consult to Dermatology  Actinic keratosis  Discussed dx and association w/cumulative sun exposure and potential for increased risk of NMSC. Elected for cryo today. Cryosurgery Procedure Note    The patient is informed of the precancerous quality and need for treatment of these lesions. After risks, benefits and alternatives explained, including blistering, pain, hyper- and hypopigmentation, patient verbally consents to cryotherapy to precancerous lesions. Liquid nitrogen cryosurgery is applied to the 1 actinic keratosis, as detailed in the physical exam, to produce a freeze injury. The patient is aware that blisters may form and is instructed on wound care with gentle cleansing and use of vaseline ointment to keep moist until healed. The patient is supplied a handout on cryosurgery and is instructed to call if lesions do not completely resolve.           Follow up in about 3 months (around 12/30/2020) for to see  Dermatology MD.

## 2020-09-30 NOTE — LETTER
September 30, 2020      Desirae Bello MD  [address] [place] [address] [address] [place] [id number]           [facility]  [address] [facility]  [place] [address]  Phone: [phone]  Fax: [phone]          Patient: Jennifer Mathews   MR Number: [id number]   YOB: 1932   Date of Visit: 9/30/2020       Dear Dr. Desirae Bello:    Thank you for referring Jennifer Mathews to me for evaluation. Attached you will find relevant portions of my assessment and plan of care.    If you have questions, please do not hesitate to call me. I look forward to following Jennifer Mathews along with you.    Sincerely,    Angela Marks PA-C    Enclosure  CC:  No Recipients    If you would like to receive this communication electronically, please contact [email or web address] or [phone] to request more information on [facility] access.    For providers and/or their staff who would like to refer a patient to [facility], please contact us through our one-stop-shop provider referral line, [facility], at [phone].    If you feel you have received this communication in error or would no longer like to receive these types of communications, please e-mail [email or web address]

## 2020-10-02 ENCOUNTER — TELEPHONE (OUTPATIENT)
Dept: CARDIOLOGY | Facility: CLINIC | Age: 85
End: 2020-10-02

## 2020-10-02 NOTE — TELEPHONE ENCOUNTER
----- Message from Debbie Ghosh RN sent at 9/25/2020  3:21 PM CDT -----  Regarding: CHF phone review requested per NENO Rand.

## 2020-10-05 ENCOUNTER — HOSPITAL ENCOUNTER (OUTPATIENT)
Dept: RADIOLOGY | Facility: HOSPITAL | Age: 85
Discharge: HOME | End: 2020-10-05
Attending: INTERNAL MEDICINE
Payer: MEDICARE

## 2020-10-05 ENCOUNTER — DOCUMENT SCAN (OUTPATIENT)
Dept: HOME HEALTH SERVICES | Facility: HOSPITAL | Age: 85
End: 2020-10-05
Payer: MEDICARE

## 2020-10-05 ENCOUNTER — OFFICE VISIT (OUTPATIENT)
Dept: GASTROENTEROLOGY | Facility: CLINIC | Age: 85
End: 2020-10-05
Payer: MEDICARE

## 2020-10-05 ENCOUNTER — OFFICE VISIT (OUTPATIENT)
Dept: PULMONOLOGY | Facility: CLINIC | Age: 85
End: 2020-10-05
Payer: MEDICARE

## 2020-10-05 ENCOUNTER — HOSPITAL ENCOUNTER (OUTPATIENT)
Dept: RADIOLOGY | Facility: HOSPITAL | Age: 85
Discharge: HOME | End: 2020-10-05
Attending: NURSE PRACTITIONER
Payer: MEDICARE

## 2020-10-05 VITALS
BODY MASS INDEX: 20.45 KG/M2 | SYSTOLIC BLOOD PRESSURE: 130 MMHG | HEIGHT: 62 IN | HEART RATE: 66 BPM | WEIGHT: 111.13 LBS | DIASTOLIC BLOOD PRESSURE: 54 MMHG

## 2020-10-05 VITALS
OXYGEN SATURATION: 95 % | DIASTOLIC BLOOD PRESSURE: 74 MMHG | BODY MASS INDEX: 20.61 KG/M2 | RESPIRATION RATE: 16 BRPM | SYSTOLIC BLOOD PRESSURE: 110 MMHG | HEART RATE: 70 BPM | HEIGHT: 62 IN | WEIGHT: 112 LBS

## 2020-10-05 DIAGNOSIS — I27.22 PULMONARY HYPERTENSION DUE TO LEFT HEART DISEASE: ICD-10-CM

## 2020-10-05 DIAGNOSIS — K64.9 BLEEDING HEMORRHOIDS: ICD-10-CM

## 2020-10-05 DIAGNOSIS — K22.70 BARRETT'S ESOPHAGUS WITHOUT DYSPLASIA: ICD-10-CM

## 2020-10-05 DIAGNOSIS — I25.5 ISCHEMIC CARDIOMYOPATHY: ICD-10-CM

## 2020-10-05 DIAGNOSIS — I70.0 AORTIC ATHEROSCLEROSIS: ICD-10-CM

## 2020-10-05 DIAGNOSIS — R15.9 FULL INCONTINENCE OF FECES: Primary | ICD-10-CM

## 2020-10-05 DIAGNOSIS — J84.10 CALCIFIED GRANULOMA OF LUNG: ICD-10-CM

## 2020-10-05 DIAGNOSIS — I50.42 CHRONIC COMBINED SYSTOLIC AND DIASTOLIC CONGESTIVE HEART FAILURE: ICD-10-CM

## 2020-10-05 DIAGNOSIS — R15.9 FULL INCONTINENCE OF FECES: ICD-10-CM

## 2020-10-05 DIAGNOSIS — R06.02 SOB (SHORTNESS OF BREATH): ICD-10-CM

## 2020-10-05 DIAGNOSIS — J44.9 COPD, GROUP A, BY GOLD 2017 CLASSIFICATION: ICD-10-CM

## 2020-10-05 DIAGNOSIS — J98.4 CRLD (CHRONIC RESTRICTIVE LUNG DISEASE): Chronic | ICD-10-CM

## 2020-10-05 PROCEDURE — 74019 RADEX ABDOMEN 2 VIEWS: CPT | Mod: TC

## 2020-10-05 PROCEDURE — 74019 XR ABDOMEN FLAT AND ERECT: ICD-10-PCS | Mod: 26,,, | Performed by: RADIOLOGY

## 2020-10-05 PROCEDURE — 99214 OFFICE O/P EST MOD 30 MIN: CPT | Mod: S$PBB,,, | Performed by: NURSE PRACTITIONER

## 2020-10-05 PROCEDURE — 99999 PR PBB SHADOW E&M-EST. PATIENT-LVL V: CPT | Mod: PBBFAC,,, | Performed by: NURSE PRACTITIONER

## 2020-10-05 PROCEDURE — 99214 OFFICE O/P EST MOD 30 MIN: CPT | Mod: PBBFAC,25,27 | Performed by: INTERNAL MEDICINE

## 2020-10-05 PROCEDURE — 99999 PR PBB SHADOW E&M-EST. PATIENT-LVL V: ICD-10-PCS | Mod: PBBFAC,,, | Performed by: NURSE PRACTITIONER

## 2020-10-05 PROCEDURE — 71046 X-RAY EXAM CHEST 2 VIEWS: CPT | Mod: 26,,, | Performed by: RADIOLOGY

## 2020-10-05 PROCEDURE — 99999 PR PBB SHADOW E&M-EST. PATIENT-LVL IV: ICD-10-PCS | Mod: PBBFAC,,, | Performed by: INTERNAL MEDICINE

## 2020-10-05 PROCEDURE — 99214 PR OFFICE/OUTPT VISIT, EST, LEVL IV, 30-39 MIN: ICD-10-PCS | Mod: S$PBB,,, | Performed by: NURSE PRACTITIONER

## 2020-10-05 PROCEDURE — 74019 RADEX ABDOMEN 2 VIEWS: CPT | Mod: 26,,, | Performed by: RADIOLOGY

## 2020-10-05 PROCEDURE — 99999 PR PBB SHADOW E&M-EST. PATIENT-LVL IV: CPT | Mod: PBBFAC,,, | Performed by: INTERNAL MEDICINE

## 2020-10-05 PROCEDURE — 99214 PR OFFICE/OUTPT VISIT, EST, LEVL IV, 30-39 MIN: ICD-10-PCS | Mod: S$PBB,,, | Performed by: INTERNAL MEDICINE

## 2020-10-05 PROCEDURE — 99215 OFFICE O/P EST HI 40 MIN: CPT | Mod: PBBFAC,25 | Performed by: NURSE PRACTITIONER

## 2020-10-05 PROCEDURE — 99214 OFFICE O/P EST MOD 30 MIN: CPT | Mod: S$PBB,,, | Performed by: INTERNAL MEDICINE

## 2020-10-05 PROCEDURE — 71046 X-RAY EXAM CHEST 2 VIEWS: CPT | Mod: TC

## 2020-10-05 PROCEDURE — 71046 XR CHEST PA AND LATERAL: ICD-10-PCS | Mod: 26,,, | Performed by: RADIOLOGY

## 2020-10-05 RX ORDER — PANTOPRAZOLE SODIUM 20 MG/1
20 TABLET, DELAYED RELEASE ORAL DAILY
Qty: 30 TABLET | Refills: 11 | Status: SHIPPED | OUTPATIENT
Start: 2020-10-05 | End: 2021-10-05

## 2020-10-05 NOTE — PROGRESS NOTES
Subjective:       Patient ID: Jennifer Mathews is a 88 y.o. female.    Chief Complaint: Diarrhea (after eating) and Encopresis (periodically unable to make it to the bathroom in time)    The patient is here with issues of incontinence. She has a history of GERD and had been on Protonix, but admits she is now off these meds. She also had a history of enterocolitis that was treated with antibiotics in the past but is no longer on her Probiotics.     She now has symptoms of incontinence because when she has to have a BM there is urgency. Sometimes she can't make it to the bathroom. These are mainly postprandial BMs. There is no BRBPR or melena. There is no abdominal pain or nausea or vomiting. There is no anorexia or weight loss except for fluid response to diuresis. There is no aversion to the sight or smell of food.    Review of Systems   Constitutional: Negative for activity change, appetite change, chills, diaphoresis, fatigue, fever and unexpected weight change.   HENT: Negative for congestion, ear discharge, ear pain, hearing loss, nosebleeds, postnasal drip and tinnitus.    Eyes: Negative for photophobia and visual disturbance.   Respiratory: Negative for apnea, cough, choking, chest tightness, shortness of breath and wheezing.    Cardiovascular: Positive for leg swelling. Negative for chest pain and palpitations.   Gastrointestinal: Positive for diarrhea. Negative for abdominal distention, abdominal pain, anal bleeding, blood in stool, constipation, nausea, rectal pain and vomiting.   Genitourinary: Negative for difficulty urinating, dyspareunia, dysuria, flank pain, frequency, hematuria, menstrual problem, pelvic pain, urgency, vaginal bleeding and vaginal discharge.   Musculoskeletal: Positive for gait problem and joint swelling. Negative for arthralgias, back pain, myalgias and neck stiffness.        Joint stiffness   Skin: Negative for pallor and rash.   Neurological: Negative for dizziness, tremors,  seizures, syncope, speech difficulty, weakness, numbness and headaches.   Hematological: Negative for adenopathy.   Psychiatric/Behavioral: Negative for agitation, confusion, hallucinations, sleep disturbance and suicidal ideas.       Objective:      Physical Exam  Vitals signs reviewed.   Constitutional:       Appearance: She is well-developed.   HENT:      Head: Normocephalic and atraumatic.   Eyes:      General: No scleral icterus.        Right eye: No discharge.         Left eye: No discharge.      Conjunctiva/sclera: Conjunctivae normal.      Pupils: Pupils are equal, round, and reactive to light.   Neck:      Musculoskeletal: Normal range of motion and neck supple.      Thyroid: No thyromegaly.      Vascular: No JVD.   Cardiovascular:      Rate and Rhythm: Normal rate and regular rhythm.      Heart sounds: Normal heart sounds. No murmur. No friction rub. No gallop.    Pulmonary:      Effort: Pulmonary effort is normal. No respiratory distress.      Breath sounds: Normal breath sounds. No wheezing or rales.   Chest:      Chest wall: No tenderness.   Abdominal:      General: Bowel sounds are normal. There is no distension.      Palpations: Abdomen is soft. There is no mass.      Tenderness: There is no abdominal tenderness. There is no guarding or rebound.   Musculoskeletal: Normal range of motion.   Lymphadenopathy:      Cervical: No cervical adenopathy.   Skin:     General: Skin is warm and dry.      Coloration: Skin is not pale.      Findings: No erythema or rash.   Neurological:      Mental Status: She is alert and oriented to person, place, and time.      Motor: No abnormal muscle tone.      Coordination: Coordination normal.      Deep Tendon Reflexes: Reflexes are normal and symmetric.   Psychiatric:         Behavior: Behavior normal.         Thought Content: Thought content normal.         Judgment: Judgment normal.         Assessment:   Full incontinence of feces  -     X-Ray Abdomen Flat And Erect;  Future; Expected date: 10/05/2020    Santos's esophagus without dysplasia  -     Ambulatory referral/consult to Gastroenterology  -     pantoprazole (PROTONIX) 20 MG tablet; Take 1 tablet (20 mg total) by mouth once daily.  Dispense: 30 tablet; Refill: 11    Bleeding hemorrhoids  -     Ambulatory referral/consult to Gastroenterology             Plan:   Flat and upright abdomen  Resume probiotics  Resume PPI therapy as directed  Further recommendations after above.

## 2020-10-05 NOTE — PROGRESS NOTES
Subjective:      Established patient    Patient ID: Jennifer Mathews is a 88 y.o. female.  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 heart disease    Primary osteoarthritis involving multiple joints    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    Disorder of bone and articular cartilage    Enterocolitis    Left leg swelling    Acquired hypothyroidism    Anemia associated with stage 3 chronic renal failure    Iron deficiency anemia due to chronic blood loss    At risk for sudden cardiac death    Osteoporosis, senile    Elevated serum immunoglobulin free light chain level    Macrocytic anemia    History of malnutrition;Mild Protein Calorie Malnutrition    Santos's esophagus    At risk for amiodarone toxicity with long term use    CRLD (chronic restrictive lung disease)    Post herpetic neuralgia    Aortic atherosclerosis    History of fracture    Calcified granuloma of lung    At risk for falls    COPD, group A, by GOLD 2017 classification    Anemia associated with stage 4 chronic renal failure    Skin lesion of right leg    Bleeding hemorrhoids    Balance problem       Problem list has been reviewed.    Chief Complaint: Pulmonary Hypertension (due to left sided heart failure), Sleep Apnea, COPD, and restrictive lung disease    HPI   Jennifer Mathews 88 y.o. follow up due for CPAP compliance download after replacing CPAP machine August 2019.   She is followed in  pulmonary by Dr. Ngo for restrictive lung disease, obstructive sleep apnea on CPAP.  History of Pul HTN related to left sided heart failure.      Patient states she is at her respiratory baseline, no breathing problems. There is no cough, no wheezing, no shortness of breath, no sputum production, no weight loss,  no chest pain.     FEV1: 1.00 L ( 61.6% predicted)     Patients reports NYHA II dyspnea    The patient does not have currently have symptoms / an exacerbation.       COPD Questionnaire  How often do you cough?: Never  How often do you have phlegm (mucus) in your chest?: Never  How often does your chest feel tight?: Never  When you walk up a hill or one flight of stairs, how often are you breathless?: All of the time  How often are you limited doing any activities at home?: Never  How often are you confident leaving the house despite your lung condition?: All of the time  How often do you sleep soundly?: All of the time  How often do you have energy?: Most of the time  Total score: 6     She is on oral AMIODARONE. Currently there are no clinical no correlates of AMIODARONE LUNG TOXICITY.   LUNG FUNCTION WILL BE MONITORED.      LEV diagnosed by PSG in 2009. Replacement CPAP machine at her last visit August 2019.    She is on AUTOPAP 4 - 20 CMWP. She is complaint with her CPAP.   States benefit.   Compliance Summary  Patient will need to bring in machine for download.     A full  review of systems, past , family  and social histories was performed except as mentioned in the note above, these are non contributory to the main issues discussed today.     Previous Report Reviewed: lab reports, office notes and radiology reports     The following portions of the patient's history were reviewed and updated as appropriate: She  has a past medical history of Acute coronary syndrome, Anemia, Anticoagulated on Coumadin (7/13/2015), Arthritis, Asthma, Atrial fibrillation, Basal cell carcinoma (10/2015), Cardiac arrest,  Cardiac resynchronization therapy defibrillator (CRT-D) in place (07/13/2015), Cardiomyopathy, Chronic combined systolic and diastolic congestive heart failure, CKD (chronic kidney disease) stage 3, GFR 30-59 ml/min, Dyslipidemia (1/30/2014), Enterocolitis, Hyperlipidemia, Hypertension, Hypothyroidism, Ischemic cardiomyopathy (01/30/2014), Macular hole of left eye, Macular hole of left eye, LEV (obstructive sleep apnea) (9/30/2013), Osteoporosis, Pneumonia, Recurrent UTI, Refractive error, Spastic colon, Stroke, and Syncope and collapse.  She  has a past surgical history that includes Colonoscopy; Mitral valve surgery; Cholecystectomy; Appendectomy; Cardiac pacemaker placement (2014); Mitral valve replacement (2014); Cardiac catheterization; Cardiac valuve replacement; Replacement of pacemaker generator (Left, 6/20/2018); Esophagogastroduodenoscopy (N/A, 3/13/2019); Cataract extraction (Bilateral); Breast surgery; Cardiac valve replacement; Fracture surgery (Left); and Hysterectomy.  Her family history includes COPD in her father; Cancer in her brother; Coronary artery disease in her father and mother; Diabetes in her brother; Emphysema in her father; Epilepsy in her mother; Heart attack in her mother; Heart disease in her father and mother.  She  reports that she has never smoked. She has never used smokeless tobacco. She reports that she does not drink alcohol or use drugs.  She has a current medication list which includes the following prescription(s): acetaminophen, allopurinol, amiodarone, carvedilol, digoxin, eliquis, furosemide, gabapentin, levothyroxine, methenamine, and metolazone, and the following Facility-Administered Medications: denosumab.  She is allergic to cephalosporins; metaxalone; penicillins; pregabalin; and sulfa (sulfonamide antibiotics)..    Review of Systems   Constitutional: Negative for fever, chills, fatigue and night sweats.   HENT: Negative for nosebleeds, postnasal drip, rhinorrhea,  "sinus pressure, sore throat, congestion, ear pain and hearing loss.    Eyes: Negative for itching.   Respiratory: Positive for dyspnea on extertion and somnolence. Negative for wheezing, orthopnea and Paroxysmal Nocturnal Dyspnea.    Cardiovascular: Positive for leg swelling. Negative for chest pain and palpitations.   Genitourinary: Negative for difficulty urinating and hematuria.   Endocrine: Negative for cold intolerance and heat intolerance.    Musculoskeletal: Positive for arthralgias and back pain.   Skin: Negative for rash.   Gastrointestinal: Negative for nausea, vomiting, abdominal pain, abdominal distention and acid reflux.   Neurological: Positive for light-headedness. Negative for dizziness, syncope and headaches.   Hematological: Bleeds easily and excessive bruising.   Psychiatric/Behavioral: The patient is nervous/anxious.    All other systems reviewed and are negative.       Objective:     /74   Pulse 70   Resp 16   Ht 5' 2" (1.575 m)   Wt 50.8 kg (111 lb 15.9 oz)   LMP  (LMP Unknown)   SpO2 95%   BMI 20.48 kg/m²   Body mass index is 20.48 kg/m².     Physical Exam  Vitals signs and nursing note reviewed.   Constitutional:       Appearance: She is well-developed.   HENT:      Head: Normocephalic and atraumatic.   Eyes:      Pupils: Pupils are equal, round, and reactive to light.   Neck:      Musculoskeletal: Normal range of motion and neck supple.   Cardiovascular:      Rate and Rhythm: Normal rate and regular rhythm.   Pulmonary:      Effort: Pulmonary effort is normal.      Breath sounds: Normal breath sounds.   Abdominal:      General: Bowel sounds are normal.      Palpations: Abdomen is soft.   Musculoskeletal: Normal range of motion.   Skin:     General: Skin is warm and dry.   Psychiatric:         Behavior: Behavior normal.         Personal Diagnostic Review    X-Ray Chest PA And Lateral  Narrative: EXAMINATION:  XR CHEST PA AND LATERAL    CLINICAL HISTORY:  Shortness of " breath    TECHNIQUE:  PA and lateral views of the chest were performed.    COMPARISON:  Chest radiograph November 19, 2018    FINDINGS:  Median sternotomy wires are unchanged in alignment.  Aortic valve prosthesis remains in place.  Left chest wall pacemaker device is again noted with leads terminating within the right atrium, right ventricle, and a coronary vein.    Lungs are clear without focal consolidation, edema, or mass.  There is no pneumothorax or pleural effusion.    Unchanged cardiomegaly is noted.  Thoracic aorta is tortuous.  Atherosclerotic calcifications are seen within the aortic arch.    Unchanged wedging of multiple thoracic spine vertebral bodies is noted with associated exaggerated kyphosis of the thoracic spine.  Impression: No acute abnormality.  As above.    Electronically signed by: Jorge Patino  Date:    10/05/2020  Time:    11:26      ABG     08/30/19  1028   PH 7.432   PCO2 39.1   PO2 67*   HCO3 26.1   POCSATURATED 94*   BE 2     Moderate obstruction. A-a gradient (alveolar-arterial gradient; AaG) elevated: [ 35.9 mmHg ]  Normal Acid Base Balance  expected pH = 7.45  expected CO2 = 40  expected HCO3- = 25      Pulmonary function tests: FEV1: 1.00  (61.6 % predicted), FVC:  1.59 (74.2 % predicted), FEV1/FVC:  62.52%, TLC: 3.10 (68.7 % predicted), RV/TLVC: 48.53 (100.0 % predicted), DLCO: 2.15 (37 % predicted)  Normla airflow. Moderate restriction. Diffusion capacity is severely reduced .      Six Minute Walk Test: Six minute walk distance is  144.78 / 370.06 meters (39.12 % predicted) with light dyspnea.   During exercise, there was no significant desaturation while breathing room air.  Blood pressure remained stable and Heart rate increased significantly with walking.  Normotension was present prior to exercise.  This may represent a normal cardiovascular response to exercise.  The patient did not report non-pulmonary symptoms during exercise.  Significant exercise impairment is likely  due to cardiovascular causes and subjective symptoms.  The patient did complete the study, walking 360 seconds of the 360 second test.  No previous study performed.  Based upon age and body mass index, exercise capacity is less than predicted.      Peak VO2 during walking was 8.32 ml/min/kg [2.38 METS]    CT of chest performed on 08/30/19 without contrast:    Left-sided AICD in place.  Prior median sternotomy.  The heart is enlarged, especially the left atrium.  Calcified granuloma within the right lower lobe adjacent to the pleural surface.  Trace bilateral pleural effusions.  No dominant pulmonary nodules.  Parenchymal bands within the left lower lobe likely relates to atelectasis.  No pathologically enlarged mediastinal, hilar, or axillary lymph nodes. Calcific atheromatous change of the aorta without aneurysm.  Compression fractures of T6,  T8, T10, and T11 are similar to the radiograph from November 2018. limited images through the upper abdomen demonstrate no acute abnormality.     Assessment / Plan:     Discussed diagnosis, its evaluation, treatment and usual course. All questions answered.    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 Ngo          Ischemic cardiomyopathy    Current Assessment & Plan     Managed by cardiology         CRLD (chronic restrictive lung disease) (Chronic)    Overview     Followed by Pulmonology         Current Assessment & Plan     Seen on cpft,   Asymptomatic  Stable   Follow up  cpft          Relevant Orders    Complete PFT with bronchodilator    COPD, group A, by GOLD 2017 classification    Current Assessment & Plan     Asymptomatic  No breathing problems  Not on inhalers, no indication in asymptomatic mild COPD.          Relevant Orders    Complete PFT with bronchodilator    Chronic combined systolic and diastolic congestive heart failure    Overview     ECHO  11/19/18 CONCLUSIONS     1 - Severe left atrial enlargement.     2 - Concentric hypertrophy.     3 - Wall motion abnormalities.     4 - Severely depressed left ventricular systolic function (EF 20-25%).     5 - Moderately depressed right ventricular systolic function .     6 - Pulmonary hypertension. The estimated PA systolic pressure is 69 mmHg.     7 - Mitral valve prosthesis.     8 - Moderate tricuspid regurgitation.     9 - Increased central venous pressure.          Current Assessment & Plan     Stable managed by cardiology         Calcified granuloma of lung    Overview     Ct 8/2019-Calcified granuloma within the right lower lobe adjacent to the pleural surface.         Current Assessment & Plan     Seen on imaging, stable          Aortic atherosclerosis    Overview     1/28/18 CTA Abdomen Pelvis- The abdominal aorta is normal in course and caliber with moderate atherosclerotic calcifications. No evidence of aneurysm or dissection.            Current Assessment & Plan     Seen on imaging, follow heart healthy diet              TIME SPENT WITH PATIENT: Time spent: 25 minutes in face to face  discussion concerning diagnosis, prognosis, review of lab and test results, benefits of treatment as well as management of disease, counseling of patient and coordination of care between various health  care providers . Greater than half the time spent was used for coordination of care and counseling of patient.     Follow up for CPAP 1 year dnld. copd/restrictive lung disease review cpstephanie w/Dr. Ngo.

## 2020-10-05 NOTE — LETTER
October 7, 2020      Desirae Bello MD  [address] [place] [address] Dr Nyla [place] [address]           [facility]  [address] [place] [address] JACQUES  NYLA HENRYOLEG [place]ALEZ [address]  Phone: [phone]  Fax: [phone]          Patient: Jennifer Mathews   MR Number: [id number]   YOB: 1932   Date of Visit: 10/5/2020       Dear Dr. Desirae Bello:    Thank you for referring Jennifer Mathews to me for evaluation. Attached you will find relevant portions of my assessment and plan of care.    If you have questions, please do not hesitate to call me. I look forward to following Jennifer Mathews along with you.    Sincerely,    Leda Rosado, NP    Enclosure  CC:  No Recipients    If you would like to receive this communication electronically, please contact [email or web address] or [phone] to request more information on [employer] access.    For providers and/or their staff who would like to refer a patient to [facility], please contact us through our one-stop-shop provider referral line, [facility] , at [phone].    If you feel you have received this communication in error or would no longer like to receive these types of communications, please e-mail [email or web address]

## 2020-10-05 NOTE — LETTER
October 22, 2020      Desirae Bello MD  [address] [place] [address] Dr Nyla [place]Z [address]           [facility]  [address] [place] [address] JACQUES  NYLA HENRYOLEG [place] [address]  Phone: [phone]  Fax: [phone]          Patient: Jennifer Mathews   MR Number: [id number]   YOB: 1932   Date of Visit: 10/5/2020       Dear Dr. Desirae Bello:    Thank you for referring Jennifer Mathews to me for evaluation. Attached you will find relevant portions of my assessment and plan of care.    If you have questions, please do not hesitate to call me. I look forward to following Jennifer Mathews along with you.    Sincerely,    Timothy Arizmendi III, MD    Enclosure  CC:  No Recipients    If you would like to receive this communication electronically, please contact [email or web address] or [phone] to request more information on [employer] [facility] access.    For providers and/or their staff who would like to refer a patient to [facility], please contact us through our one-stop-shop provider referral line, [facility], at [phone].    If you feel you have received this communication in error or would no longer like to receive these types of communications, please e-mail [email or web address]

## 2020-10-07 ENCOUNTER — EXTERNAL HOME HEALTH (OUTPATIENT)
Dept: HOME HEALTH SERVICES | Facility: HOSPITAL | Age: 85
End: 2020-10-07
Payer: MEDICARE

## 2020-10-08 ENCOUNTER — INFUSION (OUTPATIENT)
Dept: INFUSION THERAPY | Facility: HOSPITAL | Age: 85
End: 2020-10-08
Attending: INTERNAL MEDICINE
Payer: MEDICARE

## 2020-10-08 VITALS
HEART RATE: 70 BPM | RESPIRATION RATE: 16 BRPM | TEMPERATURE: 96 F | SYSTOLIC BLOOD PRESSURE: 117 MMHG | OXYGEN SATURATION: 95 % | BODY MASS INDEX: 20.45 KG/M2 | WEIGHT: 111.13 LBS | DIASTOLIC BLOOD PRESSURE: 67 MMHG | HEIGHT: 62 IN

## 2020-10-08 DIAGNOSIS — D63.1 ANEMIA ASSOCIATED WITH STAGE 3 CHRONIC RENAL FAILURE: ICD-10-CM

## 2020-10-08 DIAGNOSIS — D50.0 IRON DEFICIENCY ANEMIA DUE TO CHRONIC BLOOD LOSS: Primary | ICD-10-CM

## 2020-10-08 DIAGNOSIS — N18.30 ANEMIA ASSOCIATED WITH STAGE 3 CHRONIC RENAL FAILURE: ICD-10-CM

## 2020-10-08 PROCEDURE — 25000003 PHARM REV CODE 250: Performed by: NURSE PRACTITIONER

## 2020-10-08 PROCEDURE — 63600175 PHARM REV CODE 636 W HCPCS: Mod: JG | Performed by: NURSE PRACTITIONER

## 2020-10-08 PROCEDURE — 96375 TX/PRO/DX INJ NEW DRUG ADDON: CPT

## 2020-10-08 PROCEDURE — 96365 THER/PROPH/DIAG IV INF INIT: CPT

## 2020-10-08 RX ORDER — METHYLPREDNISOLONE SOD SUCC 125 MG
80 VIAL (EA) INJECTION
Status: COMPLETED | OUTPATIENT
Start: 2020-10-08 | End: 2020-10-08

## 2020-10-08 RX ADMIN — SODIUM CHLORIDE: 9 INJECTION, SOLUTION INTRAVENOUS at 08:10

## 2020-10-08 RX ADMIN — FERRIC CARBOXYMALTOSE INJECTION 750 MG: 50 INJECTION, SOLUTION INTRAVENOUS at 09:10

## 2020-10-08 RX ADMIN — METHYLPREDNISOLONE SODIUM SUCCINATE 80 MG: 125 INJECTION, POWDER, FOR SOLUTION INTRAMUSCULAR; INTRAVENOUS at 09:10

## 2020-10-08 NOTE — PLAN OF CARE
Problem: Bariatric Environmental Safety  Goal: Safety Maintained with Care  Outcome: Ongoing, Progressing   Pt tolerated well. D/C'd in stable condition, at daughter's side.

## 2020-10-09 ENCOUNTER — PATIENT MESSAGE (OUTPATIENT)
Dept: GASTROENTEROLOGY | Facility: CLINIC | Age: 85
End: 2020-10-09

## 2020-10-13 ENCOUNTER — CLINICAL SUPPORT (OUTPATIENT)
Dept: CARDIOLOGY | Facility: HOSPITAL | Age: 85
End: 2020-10-13
Payer: MEDICARE

## 2020-10-13 DIAGNOSIS — Z95.0 PRESENCE OF CARDIAC PACEMAKER: ICD-10-CM

## 2020-10-13 PROCEDURE — 93294 REM INTERROG EVL PM/LDLS PM: CPT | Mod: ,,, | Performed by: INTERNAL MEDICINE

## 2020-10-13 PROCEDURE — 99999 PR PBB SHADOW E&M-EST. PATIENT-LVL I: ICD-10-PCS | Mod: PBBFAC,,,

## 2020-10-13 PROCEDURE — 99211 OFF/OP EST MAY X REQ PHY/QHP: CPT | Mod: PBBFAC,25

## 2020-10-13 PROCEDURE — 93294 CARDIAC DEVICE CHECK - REMOTE: ICD-10-PCS | Mod: ,,, | Performed by: INTERNAL MEDICINE

## 2020-10-13 PROCEDURE — 99999 PR PBB SHADOW E&M-EST. PATIENT-LVL I: CPT | Mod: PBBFAC,,,

## 2020-10-15 ENCOUNTER — INFUSION (OUTPATIENT)
Dept: INFUSION THERAPY | Facility: HOSPITAL | Age: 85
End: 2020-10-15
Attending: INTERNAL MEDICINE
Payer: MEDICARE

## 2020-10-15 ENCOUNTER — PATIENT MESSAGE (OUTPATIENT)
Dept: UROLOGY | Facility: CLINIC | Age: 85
End: 2020-10-15

## 2020-10-15 VITALS
TEMPERATURE: 97 F | OXYGEN SATURATION: 97 % | HEART RATE: 68 BPM | RESPIRATION RATE: 17 BRPM | WEIGHT: 111.13 LBS | BODY MASS INDEX: 20.32 KG/M2 | DIASTOLIC BLOOD PRESSURE: 65 MMHG | SYSTOLIC BLOOD PRESSURE: 118 MMHG

## 2020-10-15 DIAGNOSIS — N18.30 ANEMIA ASSOCIATED WITH STAGE 3 CHRONIC RENAL FAILURE: ICD-10-CM

## 2020-10-15 DIAGNOSIS — D63.1 ANEMIA ASSOCIATED WITH STAGE 3 CHRONIC RENAL FAILURE: ICD-10-CM

## 2020-10-15 DIAGNOSIS — D50.0 IRON DEFICIENCY ANEMIA DUE TO CHRONIC BLOOD LOSS: Primary | ICD-10-CM

## 2020-10-15 PROCEDURE — 63600175 PHARM REV CODE 636 W HCPCS: Performed by: NURSE PRACTITIONER

## 2020-10-15 PROCEDURE — 25000003 PHARM REV CODE 250: Performed by: NURSE PRACTITIONER

## 2020-10-15 PROCEDURE — 96365 THER/PROPH/DIAG IV INF INIT: CPT

## 2020-10-15 PROCEDURE — 96375 TX/PRO/DX INJ NEW DRUG ADDON: CPT

## 2020-10-15 RX ORDER — METHYLPREDNISOLONE SOD SUCC 125 MG
80 VIAL (EA) INJECTION
Status: COMPLETED | OUTPATIENT
Start: 2020-10-15 | End: 2020-10-15

## 2020-10-15 RX ORDER — METHENAMINE HIPPURATE 1000 MG/1
1 TABLET ORAL 2 TIMES DAILY
Qty: 60 TABLET | Refills: 11 | Status: SHIPPED | OUTPATIENT
Start: 2020-10-15 | End: 2020-10-16 | Stop reason: SDUPTHER

## 2020-10-15 RX ADMIN — FERRIC CARBOXYMALTOSE INJECTION 750 MG: 50 INJECTION, SOLUTION INTRAVENOUS at 09:10

## 2020-10-15 RX ADMIN — METHYLPREDNISOLONE SODIUM SUCCINATE 80 MG: 125 INJECTION, POWDER, FOR SOLUTION INTRAMUSCULAR; INTRAVENOUS at 08:10

## 2020-10-16 RX ORDER — METHENAMINE HIPPURATE 1000 MG/1
1 TABLET ORAL 2 TIMES DAILY
Qty: 60 TABLET | Refills: 0 | Status: SHIPPED | OUTPATIENT
Start: 2020-10-16 | End: 2020-12-14 | Stop reason: SDUPTHER

## 2020-10-16 NOTE — TELEPHONE ENCOUNTER
I sent 1 refill to correct pharmacy on behalf of Dr. Armijo, I'm not sure he is in clinic today. Future refills need to come from Dr. Armijo. Thanks

## 2020-10-31 ENCOUNTER — EXTERNAL CHRONIC CARE MANAGEMENT (OUTPATIENT)
Dept: PRIMARY CARE CLINIC | Facility: CLINIC | Age: 85
End: 2020-10-31
Payer: MEDICARE

## 2020-10-31 PROCEDURE — G2058 CCM ADD 20MIN: HCPCS | Mod: S$PBB,,, | Performed by: FAMILY MEDICINE

## 2020-10-31 PROCEDURE — 99490 CHRNC CARE MGMT STAFF 1ST 20: CPT | Mod: PBBFAC | Performed by: FAMILY MEDICINE

## 2020-10-31 PROCEDURE — 99490 CHRNC CARE MGMT STAFF 1ST 20: CPT | Mod: S$PBB,,, | Performed by: FAMILY MEDICINE

## 2020-10-31 PROCEDURE — G2058 PR CHRON CARE MGMT, EA ADDTL 20 MINS: ICD-10-PCS | Mod: S$PBB,,, | Performed by: FAMILY MEDICINE

## 2020-10-31 PROCEDURE — G2058 CCM ADD 20MIN: HCPCS | Mod: PBBFAC | Performed by: FAMILY MEDICINE

## 2020-10-31 PROCEDURE — 99490 PR CHRONIC CARE MGMT, 1ST 20 MIN: ICD-10-PCS | Mod: S$PBB,,, | Performed by: FAMILY MEDICINE

## 2020-11-05 ENCOUNTER — OFFICE VISIT (OUTPATIENT)
Dept: CARDIOLOGY | Facility: CLINIC | Age: 85
End: 2020-11-05
Payer: MEDICARE

## 2020-11-05 ENCOUNTER — OFFICE VISIT (OUTPATIENT)
Dept: OPHTHALMOLOGY | Facility: CLINIC | Age: 85
End: 2020-11-05
Payer: MEDICARE

## 2020-11-05 ENCOUNTER — HOSPITAL ENCOUNTER (OUTPATIENT)
Dept: CARDIOLOGY | Facility: HOSPITAL | Age: 85
Discharge: HOME | End: 2020-11-05
Attending: INTERNAL MEDICINE
Payer: MEDICARE

## 2020-11-05 VITALS
HEART RATE: 70 BPM | BODY MASS INDEX: 20 KG/M2 | DIASTOLIC BLOOD PRESSURE: 60 MMHG | WEIGHT: 109.38 LBS | OXYGEN SATURATION: 95 % | SYSTOLIC BLOOD PRESSURE: 124 MMHG

## 2020-11-05 DIAGNOSIS — E03.9 ACQUIRED HYPOTHYROIDISM: ICD-10-CM

## 2020-11-05 DIAGNOSIS — Z95.0 CARDIAC PACEMAKER IN SITU: ICD-10-CM

## 2020-11-05 DIAGNOSIS — Z95.2 S/P MVR (MITRAL VALVE REPLACEMENT): ICD-10-CM

## 2020-11-05 DIAGNOSIS — I25.5 ISCHEMIC CARDIOMYOPATHY: ICD-10-CM

## 2020-11-05 DIAGNOSIS — H52.4 BILATERAL PRESBYOPIA: ICD-10-CM

## 2020-11-05 DIAGNOSIS — I27.22 PULMONARY HYPERTENSION DUE TO LEFT HEART DISEASE: ICD-10-CM

## 2020-11-05 DIAGNOSIS — I50.42 CHRONIC COMBINED SYSTOLIC AND DIASTOLIC CONGESTIVE HEART FAILURE: ICD-10-CM

## 2020-11-05 DIAGNOSIS — H35.342 MACULAR HOLE, LEFT EYE: ICD-10-CM

## 2020-11-05 DIAGNOSIS — G47.33 OSA (OBSTRUCTIVE SLEEP APNEA): Chronic | ICD-10-CM

## 2020-11-05 DIAGNOSIS — Z95.0 STATUS POST BIVENTRICULAR PACEMAKER: ICD-10-CM

## 2020-11-05 DIAGNOSIS — D63.1 ANEMIA ASSOCIATED WITH STAGE 3 CHRONIC RENAL FAILURE: ICD-10-CM

## 2020-11-05 DIAGNOSIS — H18.529 MAP-DOT-FINGERPRINT CORNEAL DYSTROPHY: Primary | ICD-10-CM

## 2020-11-05 DIAGNOSIS — Z79.899 AT RISK FOR AMIODARONE TOXICITY WITH LONG TERM USE: ICD-10-CM

## 2020-11-05 DIAGNOSIS — I50.42 CHRONIC COMBINED SYSTOLIC AND DIASTOLIC CONGESTIVE HEART FAILURE: Primary | ICD-10-CM

## 2020-11-05 DIAGNOSIS — I70.0 AORTIC ATHEROSCLEROSIS: ICD-10-CM

## 2020-11-05 DIAGNOSIS — N18.30 ANEMIA ASSOCIATED WITH STAGE 3 CHRONIC RENAL FAILURE: ICD-10-CM

## 2020-11-05 DIAGNOSIS — I10 ESSENTIAL HYPERTENSION: ICD-10-CM

## 2020-11-05 DIAGNOSIS — R60.0 BILATERAL LOWER EXTREMITY EDEMA: ICD-10-CM

## 2020-11-05 DIAGNOSIS — J98.4 CRLD (CHRONIC RESTRICTIVE LUNG DISEASE): Chronic | ICD-10-CM

## 2020-11-05 DIAGNOSIS — M1A.39X1 CHRONIC GOUT DUE TO RENAL IMPAIRMENT OF MULTIPLE SITES WITH TOPHUS: ICD-10-CM

## 2020-11-05 DIAGNOSIS — Z91.89 AT RISK FOR AMIODARONE TOXICITY WITH LONG TERM USE: ICD-10-CM

## 2020-11-05 DIAGNOSIS — E78.5 DYSLIPIDEMIA: ICD-10-CM

## 2020-11-05 DIAGNOSIS — I48.0 PAROXYSMAL ATRIAL FIBRILLATION: ICD-10-CM

## 2020-11-05 DIAGNOSIS — Z91.89 AT RISK FOR SUDDEN CARDIAC DEATH: ICD-10-CM

## 2020-11-05 DIAGNOSIS — D50.0 IRON DEFICIENCY ANEMIA DUE TO CHRONIC BLOOD LOSS: ICD-10-CM

## 2020-11-05 DIAGNOSIS — J44.9 COPD, GROUP A, BY GOLD 2017 CLASSIFICATION: ICD-10-CM

## 2020-11-05 LAB
AV DELAY - LONGEST: 200 MSEC
AV DELAY - SHORTEST: 170 MSEC
BATTERY VOLTAGE (V): 2.99 V
IMPEDANCE RA LEAD (DONOR): 780 OHMS
IMPEDANCE RA LEAD (NATIVE): 530 OHMS
IMPEDANCE RA LEAD: 440 OHMS
OHS CV DC PP MS1: 0.4 MS
OHS CV DC PP MS2: 0.4 MS
OHS CV DC PP MS3: 0.4 MS
OHS CV DC PP V1: 2 V
OHS CV DC PP V2: 2 V
OHS CV DC PP V3: 2.5 V
P/R-WAVE RA LEAD (NATIVE): 9.7 MV
THRESHOLD MS RA LEAD (DONOR): 0.4 MS
THRESHOLD MS RA LEAD (NATIVE): 0.4 MS
THRESHOLD MS RA LEAD: 0.4 MS
THRESHOLD V RA LEAD (DONOR): 1 V
THRESHOLD V RA LEAD (NATIVE): 1 V
THRESHOLD V RA LEAD: 0.5 V
VV DELAY: 80 MSEC

## 2020-11-05 PROCEDURE — 99999 PR PBB SHADOW E&M-EST. PATIENT-LVL IV: ICD-10-PCS | Mod: PBBFAC,,, | Performed by: INTERNAL MEDICINE

## 2020-11-05 PROCEDURE — 93281 CARDIAC DEVICE CHECK - IN CLINIC & HOSPITAL: ICD-10-PCS | Mod: 26,,, | Performed by: INTERNAL MEDICINE

## 2020-11-05 PROCEDURE — 93281 PM DEVICE PROGR EVAL MULTI: CPT

## 2020-11-05 PROCEDURE — 93281 PM DEVICE PROGR EVAL MULTI: CPT | Mod: 26,,, | Performed by: INTERNAL MEDICINE

## 2020-11-05 PROCEDURE — 92014 COMPRE OPH EXAM EST PT 1/>: CPT | Mod: S$PBB,,, | Performed by: OPTOMETRIST

## 2020-11-05 PROCEDURE — 99214 PR OFFICE/OUTPT VISIT, EST, LEVL IV, 30-39 MIN: ICD-10-PCS | Mod: S$PBB,,, | Performed by: INTERNAL MEDICINE

## 2020-11-05 PROCEDURE — 99214 OFFICE O/P EST MOD 30 MIN: CPT | Mod: PBBFAC,25 | Performed by: INTERNAL MEDICINE

## 2020-11-05 PROCEDURE — 92014 PR EYE EXAM, EST PATIENT,COMPREHESV: ICD-10-PCS | Mod: S$PBB,,, | Performed by: OPTOMETRIST

## 2020-11-05 PROCEDURE — 99214 OFFICE O/P EST MOD 30 MIN: CPT | Mod: S$PBB,,, | Performed by: INTERNAL MEDICINE

## 2020-11-05 PROCEDURE — 92015 DETERMINE REFRACTIVE STATE: CPT | Mod: ,,, | Performed by: OPTOMETRIST

## 2020-11-05 PROCEDURE — 92015 PR REFRACTION: ICD-10-PCS | Mod: ,,, | Performed by: OPTOMETRIST

## 2020-11-05 PROCEDURE — 99999 PR PBB SHADOW E&M-EST. PATIENT-LVL IV: CPT | Mod: PBBFAC,,, | Performed by: INTERNAL MEDICINE

## 2020-11-05 RX ORDER — METOLAZONE 2.5 MG/1
TABLET ORAL
Qty: 25 TABLET | Refills: 6 | Status: SHIPPED | OUTPATIENT
Start: 2020-11-05 | End: 2020-12-14 | Stop reason: SDUPTHER

## 2020-11-05 NOTE — PROGRESS NOTES
HPI     Here for annual check.  Pt states vision unchanged since last visit.    Last edited by Shikha Qureshi on 11/5/2020 10:36 AM. (History)            Assessment /Plan     For exam results, see Encounter Report.    Map-dot-fingerprint corneal dystrophy    Macular hole, left eye    Bilateral presbyopia      Unchanged K dystrophy.    Stable Macular hole OS    Dispense Final Rx for glasses.  RTC 1 year  Discussed above and answered questions.

## 2020-11-06 ENCOUNTER — TELEPHONE (OUTPATIENT)
Dept: UROLOGY | Facility: CLINIC | Age: 85
End: 2020-11-06

## 2020-11-06 RX ORDER — METHENAMINE HIPPURATE 1000 MG/1
TABLET ORAL
Qty: 120 TABLET | Refills: 0 | Status: CANCELLED | OUTPATIENT
Start: 2020-11-06

## 2020-11-06 RX ORDER — GABAPENTIN 100 MG/1
CAPSULE ORAL
Qty: 180 CAPSULE | Refills: 1 | Status: SHIPPED | OUTPATIENT
Start: 2020-11-06 | End: 2021-01-08 | Stop reason: SDUPTHER

## 2020-11-06 NOTE — PROGRESS NOTES
Refill Routing Note   Medication(s) are not appropriate for processing by [facility] for the following reason(s):     - Non-participating provider    [facility] actions completed for this encounter:  Route          Medication reconciliation completed: No   Automatic Epic Generated Protocol Data:        Requested Prescriptions   Pending Prescriptions Disp Refills    gabapentin (NEURONTIN) 100 MG capsule [Pharmacy Med Name: GABAPENTIN 100 MG CAPSULE] 180 capsule 1     Sig: TAKE 1 CAPSULE BY MOUTH TWICE A DAY       Anticonvulsants Protocol Passed - 11/6/2020 10:17 AM        Passed - Visit with Authorizing provider in past 9 months or upcoming 90 days        Passed - No active pregnancy on record              Appointments  past 12m or future 3m with PCP    Date Provider   Last Visit   9/24/2020 Desirae Bello MD   Next Visit   Visit date not found Desirae Bello MD   ED visits in past 90 days: 0        Note composed:10:22 AM 11/06/2020

## 2020-11-06 NOTE — TELEPHONE ENCOUNTER
----- Message from Buddy Treadwell sent at 11/6/2020 11:02 AM CST -----  ..Type:  Patient Returning Call    Who Called: pt   Who Left Message for Patient:  Does the patient know what this is regarding?:  Would the patient rather a call back or a response via [facility]? Call back   Best Call Back Number: [phone]  Additional Information:Pt is requiting a call to discuss issues with her medication. Please sent prescription to [place] [facility]         .  [facility] - [place], [place] - [address] [address] [address]  Phone: [phone] Fax: [phone]

## 2020-11-09 ENCOUNTER — TELEPHONE (OUTPATIENT)
Dept: HEMATOLOGY/ONCOLOGY | Facility: CLINIC | Age: 85
End: 2020-11-09

## 2020-11-09 ENCOUNTER — OFFICE VISIT (OUTPATIENT)
Dept: HEMATOLOGY/ONCOLOGY | Facility: CLINIC | Age: 85
End: 2020-11-09
Payer: MEDICARE

## 2020-11-09 DIAGNOSIS — D50.0 IRON DEFICIENCY ANEMIA DUE TO CHRONIC BLOOD LOSS: ICD-10-CM

## 2020-11-09 DIAGNOSIS — R76.8 ELEVATED SERUM IMMUNOGLOBULIN FREE LIGHT CHAIN LEVEL: ICD-10-CM

## 2020-11-09 DIAGNOSIS — D53.9 MACROCYTIC ANEMIA: ICD-10-CM

## 2020-11-09 DIAGNOSIS — D63.1 ANEMIA ASSOCIATED WITH STAGE 4 CHRONIC RENAL FAILURE: Primary | ICD-10-CM

## 2020-11-09 DIAGNOSIS — Z86.39 HISTORY OF MALNUTRITION: ICD-10-CM

## 2020-11-09 DIAGNOSIS — N18.4 ANEMIA ASSOCIATED WITH STAGE 4 CHRONIC RENAL FAILURE: Primary | ICD-10-CM

## 2020-11-09 PROCEDURE — 99214 PR OFFICE/OUTPT VISIT, EST, LEVL IV, 30-39 MIN: ICD-10-PCS | Mod: 95,,, | Performed by: NURSE PRACTITIONER

## 2020-11-09 PROCEDURE — 99214 OFFICE O/P EST MOD 30 MIN: CPT | Mod: 95,,, | Performed by: NURSE PRACTITIONER

## 2020-11-09 NOTE — TELEPHONE ENCOUNTER
Spoke with patient and let her know that her appt has been changed to video visit per her request.  She acknowledged, and call ended well.       ----- Message from Curtis Martin sent at 11/9/2020  8:03 AM CST -----  Type:  Needs Medical Advice    Who Called:pt  Symptoms (please be specific):    How long has patient had these symptoms:    Pharmacy name and phone #:    Would the patient rather a call back or a response via [facility]? Call   Best Call Back Number: [phone] (home)   Additional Information:   Pt is requesting a call back in regards to appt today at 2pm. Pt wants to know if appt can be done over the phone  Please call back

## 2020-11-09 NOTE — PROGRESS NOTES
Subjective:       Patient ID: Jennifer Mathews is a 88 y.o. female.    Chief Complaint: No chief complaint on file.    The patient location is:  [place]  The chief complaint leading to consultation is:  Anemia    Visit type: audiovisual    Face to Face time with patient: 20  30 minutes of total time spent on the encounter, which includes face to face time and non-face to face time preparing to see the patient (eg, review of tests), Obtaining and/or reviewing separately obtained history, Documenting clinical information in the electronic or other health record, Independently interpreting results (not separately reported) and communicating results to the patient/family/caregiver, or Care coordination (not separately reported).         Each patient to whom he or she provides medical services by telemedicine is:  (1) informed of the relationship between the physician and patient and the respective role of any other health care provider with respect to management of the patient; and (2) notified that he or she may decline to receive medical services by telemedicine and may withdraw from such care at any time.    Notes:     88-year-old  female who presents to the [facility] today as a follow-up for anemia, s/p Feraheme. Patient is also followed for elevated Free LT Chains, which have been stable.  I have reviewed all the patient's relevant clinical history available medical record have utilized as my evaluation management recommendations today.  She continues on Eliquis at this time.      Today's visit:  Patient denies any significant complaints today. Reports improvement in fatigue since iron infusion.       Anemia  There has been no abdominal pain, bruising/bleeding easily, confusion, fever, light-headedness or palpitations.     Review of Systems   Constitutional: Positive for fatigue. Negative for activity change, appetite change, chills, diaphoresis, fever and unexpected weight change.    HENT: Negative for congestion, hearing loss, mouth sores, nosebleeds and trouble swallowing.    Eyes: Negative for discharge and visual disturbance.   Respiratory: Negative for cough, chest tightness and shortness of breath.    Cardiovascular: Negative for chest pain, palpitations and leg swelling.   Gastrointestinal: Positive for abdominal distention. Negative for abdominal pain, blood in stool, constipation, diarrhea, nausea and vomiting.   Endocrine: Negative for cold intolerance and heat intolerance.   Genitourinary: Negative for difficulty urinating, dyspareunia and hematuria.   Musculoskeletal: Positive for arthralgias, back pain, gait problem and myalgias.   Skin: Negative.    Neurological: Negative for dizziness, weakness, light-headedness and headaches.   Hematological: Negative for adenopathy. Does not bruise/bleed easily.   Psychiatric/Behavioral: Negative for agitation, behavioral problems and confusion. The patient is nervous/anxious.        Medication List with Changes/Refills   Current Medications    ACETAMINOPHEN (TYLENOL EXTRA STRENGTH) 325 MG TABLET    Take 2 tablets (650 mg total) by mouth every 8 (eight) hours.    ALLOPURINOL (ZYLOPRIM) 100 MG TABLET    TAKE 1 TABLET BY MOUTH EVERY DAY    AMIODARONE (PACERONE) 200 MG TAB    Take 1 tablet (200 mg total) by mouth once daily.    CARVEDILOL (COREG) 12.5 MG TABLET    Take 1 tablet (12.5 mg total) by mouth 2 (two) times daily with meals.    DIGOXIN (LANOXIN) 125 MCG TABLET    Take 125 mcg by mouth every other day.    ELIQUIS 2.5 MG TAB    TAKE 1 TABLET BY MOUTH TWICE A DAY    FUROSEMIDE (LASIX) 40 MG TABLET    TAKE 2 TABLETS (80 MG TOTAL) BY MOUTH 2 (TWO) TIMES DAILY.    GABAPENTIN (NEURONTIN) 100 MG CAPSULE    TAKE 1 CAPSULE BY MOUTH TWICE A DAY    LEVOTHYROXINE (SYNTHROID) 75 MCG TABLET    TAKE 1 TABLET BY MOUTH EVERY DAY    METHENAMINE (HIPREX) 1 GRAM TAB    TAKE ONE TABLET BY MOUTH TWO TIMES A DAY    METHENAMINE (HIPREX) 1 GRAM TAB    Take 1  tablet (1 g total) by mouth 2 (two) times daily.    METOLAZONE (ZAROXOLYN) 2.5 MG TABLET    Take 1 tablet by mouth on Monday,  Wednesday and Friday, 30 minutes after Lasix as directed    METOLAZONE (ZAROXOLYN) 2.5 MG TABLET    Take 1 tablet by mouth on Monday and Wednesday, 30 minutes after Lasix as directed    PANTOPRAZOLE (PROTONIX) 20 MG TABLET    Take 1 tablet (20 mg total) by mouth once daily.     Objective:     There were no vitals filed for this visit.  Physical Exam  Vitals signs and nursing note reviewed.   Constitutional:       General: She is not in acute distress.     Appearance: She is well-developed.   HENT:      Head: Normocephalic and atraumatic.      Right Ear: Hearing and external ear normal.      Left Ear: Hearing and external ear normal.      Nose: No rhinorrhea.      Right Sinus: No maxillary sinus tenderness or frontal sinus tenderness.      Left Sinus: No maxillary sinus tenderness or frontal sinus tenderness.      Mouth/Throat:      Mouth: No oral lesions.      Pharynx: Uvula midline.   Eyes:      General:         Right eye: No discharge.         Left eye: No discharge.      Conjunctiva/sclera: Conjunctivae normal.   Neck:      Musculoskeletal: Normal range of motion.      Thyroid: No thyromegaly.      Trachea: No tracheal deviation.   Cardiovascular:      Pulses:           Dorsalis pedis pulses are 2+ on the right side and 2+ on the left side.      Heart sounds: S1 normal and S2 normal.   Pulmonary:      Effort: Pulmonary effort is normal. No respiratory distress.   Abdominal:      General: There is no distension.      Palpations: Abdomen is soft.   Musculoskeletal: Normal range of motion.         General: No tenderness.   Lymphadenopathy:      Cervical: No cervical adenopathy.      Upper Body:      Right upper body: No supraclavicular adenopathy.      Left upper body: No supraclavicular adenopathy.   Skin:     General: Skin is warm and dry.      Capillary Refill: Capillary refill takes less  than 2 seconds.      Coloration: Skin is pale.      Findings: No rash.   Neurological:      Mental Status: She is alert and oriented to person, place, and time.   Psychiatric:         Mood and Affect: Mood is anxious. Mood is not depressed.         Speech: Speech normal.         Behavior: Behavior normal.         Thought Content: Thought content normal.         Judgment: Judgment normal.         Assessment:       Problem List Items Addressed This Visit        Renal/    Anemia associated with stage 4 chronic renal failure - Primary    Relevant Orders    CBC Auto Differential    Comprehensive Metabolic Panel    Ferritin    Iron and TIBC    Immunoglobulin Free LT Chains Blood    Immunofixation Electrophoresis    Pathologist Interpretation Differential    Protein Electrophoresis, Serum    Reticulocytes    Beta 2 Microglobulin, Serum    Lactate Dehydrogenase       Immunology/Multi System    Elevated serum immunoglobulin free light chain level    Relevant Orders    CBC Auto Differential    Comprehensive Metabolic Panel    Ferritin    Iron and TIBC    Immunoglobulin Free LT Chains Blood    Immunofixation Electrophoresis    Pathologist Interpretation Differential    Protein Electrophoresis, Serum    Reticulocytes    Beta 2 Microglobulin, Serum    Lactate Dehydrogenase       Oncology    Iron deficiency anemia due to chronic blood loss    Relevant Orders    CBC Auto Differential    Comprehensive Metabolic Panel    Ferritin    Iron and TIBC    Immunoglobulin Free LT Chains Blood    Immunofixation Electrophoresis    Pathologist Interpretation Differential    Protein Electrophoresis, Serum    Reticulocytes    Beta 2 Microglobulin, Serum    Lactate Dehydrogenase    Macrocytic anemia    Relevant Orders    CBC Auto Differential    Comprehensive Metabolic Panel    Ferritin    Iron and TIBC    Immunoglobulin Free LT Chains Blood    Immunofixation Electrophoresis    Pathologist Interpretation Differential    Protein Electrophoresis,  Serum    Reticulocytes    Beta 2 Microglobulin, Serum    Lactate Dehydrogenase       Endocrine    History of malnutrition;Mild Protein Calorie Malnutrition    Relevant Orders    CBC Auto Differential    Comprehensive Metabolic Panel    Ferritin    Iron and TIBC    Immunoglobulin Free LT Chains Blood    Immunofixation Electrophoresis    Pathologist Interpretation Differential    Protein Electrophoresis, Serum    Reticulocytes    Beta 2 Microglobulin, Serum    Lactate Dehydrogenase          Plan:     Anemia r/t CKD and Iron Deficiency  --Treated with Procrit in the past for Hgb <10.0  --Hgb: 10.2 today, will hold Procrit 20,000U today, last received 4/2019  --currently iron repleted    Elevated FLC:  --Calcium Normal, Kidney functions within her normal range  --FLC ration improved, negative for monoclonal peaks      Follow-Up:   Return to clinic in 6 months with labs.

## 2020-11-09 NOTE — PATIENT INSTRUCTIONS
COVID-19 and Cancer Patients    What is COVID-19?  COVID-19 is a new type of virus that can cause mild to severe infections in the lungs. Like other viruses, it can lead to serious infections for people with weakened immune systems. COVID-19 may cause more severe infections than other viruses. We do not have a vaccine to help control its spread, but experts are working to make a vaccine.    How does COVID-19 spread?  The virus can spread easily, just like the common cold or flu. It spreads when an infected person coughs or sneezes droplets that can get into the eyes, nose, or mouth of people nearby. Droplets also land on surfaces that people touch before touching their own eyes, nose, or mouth.    How can I protect myself?  These are some of the best ways to protect yourself and others from the virus:  - Wash your hands often with soap and water for at least 20 seconds.  - Use hand  with 60% or more alcohol until you can wash your hands with soap and water.  - Avoid touching your eyes, nose, and mouth without washing your hands first.  - Clean and disinfect surfaces often. Regular household wipes and sprays will kill the virus. Be sure to  clean places that people touch a lot, such as door handles, phones, keyboards, and light switches.  - Avoid handshakes, hugging, and standing or sitting close to people who are coughing or sneezing.  - Be as healthy as you can. Get plenty of sleep, eat healthy, exercise, and manage your stress.  If you are sick, follow these steps:  - Stay home.  - Cover your nose and mouth when you cough or sneeze. If you use a tissue, put it in the garbage right  away. If you do not have a tissue, cough or sneeze into your elbow crease.  - Call before going to your medical appointments. Let them know about recent travel or if you have had  contact with a person with COVID-19.          As of 3/12/2020  Should I wear a mask?  Experts don't believe that wearing a mask is helpful for the  general public, unless there is concern you have been exposed and may not have symptoms. Some people should use certain types of masks because of their own health or the type of work they do. Talk to your doctor or nurse to see if you would benefit from wearing a mask. If you arrive at the hospital with respiratory symptoms, please ask for a mask.    What if I care for or live with a cancer patient?  If you are caring for or living with someone with cancer, do your best to keep them from getting the virus.  Follow the steps to protect yourself listed on this sheet.  If you become sick yourself, call your doctor to see what more you should do to protect your loved one.    What about people visiting?   If you are sick or have been exposed to COVID-19, we ask that you not come to [facility].    If patients have symptoms, call the [facility] Covid-19 Line ([phone])    We ask that you find someone who isn't sick to join your loved one at their appointments.  To protect all patients, we may limit the number of people who can visit someone staying in the hospital.  Please check with your care team.    How will [facility] protect me from getting COVID-19?  Our hospital and clinics are taking steps to keep infected patients separate from those who may be at risk. At every appointment, your care team will ask questions about overall health and recent travel. We may ask some patients to wait in a separate room or to reschedule until they are feeling better if they have symptoms.  We are also taking extra steps to clean and disinfect surfaces throughout our hospital and clinics.     Will you still care for me if I get sick?  Yes. Your care is our top priority. Although we may change some ways we care for you, we will never put your care or health at risk.            CALL BEFORE YOU ACT   Dial 211 or Text keyword LACOVID to [phone] for general questions and information.   If patients have  symptoms, call the [facility] Covid-19 Line ([phone])               [facility] ([email or web address])    [email or web address]

## 2020-11-16 DIAGNOSIS — I50.42 CHRONIC COMBINED SYSTOLIC AND DIASTOLIC CONGESTIVE HEART FAILURE: Primary | ICD-10-CM

## 2020-11-16 RX ORDER — FUROSEMIDE 40 MG/1
80 TABLET ORAL 2 TIMES DAILY
Qty: 360 TABLET | Refills: 2 | Status: SHIPPED | OUTPATIENT
Start: 2020-11-16

## 2020-11-30 ENCOUNTER — EXTERNAL CHRONIC CARE MANAGEMENT (OUTPATIENT)
Dept: PRIMARY CARE CLINIC | Facility: CLINIC | Age: 85
End: 2020-11-30
Payer: MEDICARE

## 2020-11-30 PROCEDURE — 99490 PR CHRONIC CARE MGMT, 1ST 20 MIN: ICD-10-PCS | Mod: S$PBB,,, | Performed by: FAMILY MEDICINE

## 2020-11-30 PROCEDURE — 99490 CHRNC CARE MGMT STAFF 1ST 20: CPT | Mod: PBBFAC | Performed by: FAMILY MEDICINE

## 2020-11-30 PROCEDURE — 99490 CHRNC CARE MGMT STAFF 1ST 20: CPT | Mod: S$PBB,,, | Performed by: FAMILY MEDICINE

## 2020-12-07 ENCOUNTER — LAB VISIT (OUTPATIENT)
Dept: LAB | Facility: HOSPITAL | Age: 85
End: 2020-12-07
Attending: PHYSICIAN ASSISTANT
Payer: MEDICARE

## 2020-12-07 DIAGNOSIS — N18.30 CKD (CHRONIC KIDNEY DISEASE) STAGE 3, GFR 30-59 ML/MIN: ICD-10-CM

## 2020-12-07 LAB
ALBUMIN SERPL BCP-MCNC: 3.3 G/DL (ref 3.5–5.2)
ALP SERPL-CCNC: 75 U/L (ref 55–135)
ALT SERPL W/O P-5'-P-CCNC: 14 U/L (ref 10–44)
ANION GAP SERPL CALC-SCNC: 8 MMOL/L (ref 8–16)
AST SERPL-CCNC: 18 U/L (ref 10–40)
BILIRUB SERPL-MCNC: 0.5 MG/DL (ref 0.1–1)
BUN SERPL-MCNC: 109 MG/DL (ref 8–23)
CALCIUM SERPL-MCNC: 9.1 MG/DL (ref 8.7–10.5)
CHLORIDE SERPL-SCNC: 101 MMOL/L (ref 95–110)
CO2 SERPL-SCNC: 33 MMOL/L (ref 23–29)
CREAT SERPL-MCNC: 2.1 MG/DL (ref 0.5–1.4)
EST. GFR  (AFRICAN AMERICAN): 24 ML/MIN/1.73 M^2
EST. GFR  (NON AFRICAN AMERICAN): 21 ML/MIN/1.73 M^2
GLUCOSE SERPL-MCNC: 117 MG/DL (ref 70–110)
POTASSIUM SERPL-SCNC: 3.5 MMOL/L (ref 3.5–5.1)
PROT SERPL-MCNC: 6.4 G/DL (ref 6–8.4)
SODIUM SERPL-SCNC: 142 MMOL/L (ref 136–145)

## 2020-12-07 PROCEDURE — 36415 COLL VENOUS BLD VENIPUNCTURE: CPT | Mod: PO

## 2020-12-07 PROCEDURE — 80053 COMPREHEN METABOLIC PANEL: CPT

## 2020-12-11 ENCOUNTER — PATIENT MESSAGE (OUTPATIENT)
Dept: OTHER | Facility: OTHER | Age: 85
End: 2020-12-11

## 2020-12-11 ENCOUNTER — TELEPHONE (OUTPATIENT)
Dept: RHEUMATOLOGY | Facility: CLINIC | Age: 85
End: 2020-12-11

## 2020-12-11 NOTE — PROGRESS NOTES
"Subjective:       Patient ID: Jennifer Mathews is a 88 y.o. female.    Chief Complaint: Osteoporosis, Gout, and Osteoarthritis    Jennifer is here for follow up. She has osteopenia with high frax and gout in the setting of CKD. She is on Prolia for her bone density.  She occasionally uses a rolling walker if she is doing lots of walking, also uses a cane. No issues tolerating prolia. She gets calcium in her diet.   She has some OA and joint pains but doesn't use nsaids due to coumadin use in the past.  OA generalized pain worsening, main complaint    Regarding gout she is off colchicine; on allopurinol is at 200mg/day.No issues tolerating her medications. No gout flares recently.  No falls/fxs     dexa 2.2020 shows stable bmd    Review of Systems   Constitutional: Negative for chills, fatigue and fever.   HENT: Negative for mouth sores, rhinorrhea and sore throat.    Eyes: Negative for pain and redness.   Respiratory: Negative for cough and shortness of breath.    Cardiovascular: Negative for chest pain.        CHF, htn, CAD   Gastrointestinal: Negative for abdominal pain, constipation, diarrhea, nausea and vomiting.   Genitourinary: Negative for dysuria and hematuria.   Musculoskeletal: Positive for arthralgias. Negative for joint swelling and myalgias.   Skin: Negative for rash.   Neurological: Negative for weakness, numbness and headaches.   Hematological:        Chronic anemia   Psychiatric/Behavioral: The patient is not nervous/anxious.          Objective:   /61   Pulse 70   Ht 5' 2" (1.575 m)   Wt 50.3 kg (110 lb 14.3 oz)   LMP  (LMP Unknown)   BMI 20.28 kg/m²      Physical Exam   Constitutional: She is oriented to person, place, and time and well-developed, well-nourished, and in no distress.   HENT:   Head: Normocephalic and atraumatic.   Eyes: Pupils are equal, round, and reactive to light. Right eye exhibits no discharge.   Neck: Normal range of motion.   Cardiovascular: Normal rate, regular " rhythm and normal heart sounds.  Exam reveals no friction rub.    Pulmonary/Chest: Effort normal and breath sounds normal. No respiratory distress.   Abdominal: Soft. She exhibits no distension. There is no abdominal tenderness.   Lymphadenopathy:     She has no cervical adenopathy.   Neurological: She is alert and oriented to person, place, and time.   Skin: No rash noted. No erythema.     Psychiatric: Mood normal.   Musculoskeletal: Normal range of motion. No deformity or edema.      Comments: Walking w walker   OA changes to didi dip and pips          No results found for this or any previous visit (from the past 168 hour(s)).    Dexa 7.22.15: DF -1.6, Right neck -2.3, spine -1.9 frax 22.4 major, 7.4 hip, decreasing bmd  Dexa 11.20.17: LN -1.9, Right neck -2.2, DF-1.4, spine -1.3, stable hip, improving spine, frax major 20.4%, hip 6.5%  dexa 2.17.20: LN -2.0, spine -1.0, frax majro 18%, hip 5.6%stable bmd     Assessment:       1. Osteoporosis, senile    2. Stage 3b chronic kidney disease    3. 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 Medication monitoring encounter    5. Primary osteoarthritis involving multiple joints        Impression:  Osteopenia with high fracture risk: on prolia q 6 months, daily vitamin D; stable dexa 2/2020    Gout: on allopurinol 200mg/day, off colchcine, stable no attacks, uric acid at goal 5.4    medication monitoring: no issues, tolerates well allopurinol, and prolia--    CKD4: seeing nephrology now, stable    OA mult joints: didi hands, shoulders, knees, avoids nsaids--worsening pain     Plan:       Labs reviewed and done within past 14 days  Ca 9.1, Creat 2.1, eGFR 21  No contraindication for Prolia today, ok for nurse to administer today  Re-evaluate patient again in 6 months to determine if Prolia still medically indicated and appropriate to administer.    KDIGO lab monitoring will be followed according to KDIGO 2017 Clinical Practice Guideline Update  for the Diagnosis, Evaluation, Prevention, and Treatment of Chronic Kidney Disease-Mineral and Bone Disorder (CKD-MBD)  Chapter 3.1: Diagnosis of CKD-MBD: biochemical abnormalities    Chew 2 tums daily next 2 wks  Cont allopurinol 200mg/day   Cont vit D daily in MV    Regarding OA pain, considered cymbalta but avoid with her CKD  Ok to try gabapentin tid, I can send in new rx if she likes this dose   Cont tylenol arthritis       See back in 6 mos with cmp, prolia (early cmp, prolia at [facility]),

## 2020-12-14 ENCOUNTER — TELEPHONE (OUTPATIENT)
Dept: RHEUMATOLOGY | Facility: CLINIC | Age: 85
End: 2020-12-14

## 2020-12-14 ENCOUNTER — OFFICE VISIT (OUTPATIENT)
Dept: RHEUMATOLOGY | Facility: CLINIC | Age: 85
End: 2020-12-14
Payer: MEDICARE

## 2020-12-14 ENCOUNTER — INFUSION (OUTPATIENT)
Dept: INFUSION THERAPY | Facility: HOSPITAL | Age: 85
End: 2020-12-14
Attending: INTERNAL MEDICINE
Payer: MEDICARE

## 2020-12-14 VITALS
HEART RATE: 70 BPM | WEIGHT: 110.88 LBS | SYSTOLIC BLOOD PRESSURE: 134 MMHG | BODY MASS INDEX: 20.4 KG/M2 | HEIGHT: 62 IN | DIASTOLIC BLOOD PRESSURE: 61 MMHG

## 2020-12-14 VITALS
TEMPERATURE: 98 F | BODY MASS INDEX: 20.18 KG/M2 | HEART RATE: 70 BPM | DIASTOLIC BLOOD PRESSURE: 57 MMHG | OXYGEN SATURATION: 96 % | SYSTOLIC BLOOD PRESSURE: 127 MMHG | RESPIRATION RATE: 16 BRPM | WEIGHT: 110.31 LBS

## 2020-12-14 DIAGNOSIS — M1A.39X1 CHRONIC GOUT DUE TO RENAL IMPAIRMENT OF MULTIPLE SITES WITH TOPHUS: ICD-10-CM

## 2020-12-14 DIAGNOSIS — N18.32 STAGE 3B CHRONIC KIDNEY DISEASE: ICD-10-CM

## 2020-12-14 DIAGNOSIS — M81.0 OSTEOPOROSIS, SENILE: Primary | ICD-10-CM

## 2020-12-14 DIAGNOSIS — Z51.81 MEDICATION MONITORING ENCOUNTER: ICD-10-CM

## 2020-12-14 DIAGNOSIS — M15.9 PRIMARY OSTEOARTHRITIS INVOLVING MULTIPLE JOINTS: ICD-10-CM

## 2020-12-14 PROCEDURE — 99213 OFFICE O/P EST LOW 20 MIN: CPT | Mod: PBBFAC | Performed by: PHYSICIAN ASSISTANT

## 2020-12-14 PROCEDURE — 99214 PR OFFICE/OUTPT VISIT, EST, LEVL IV, 30-39 MIN: ICD-10-PCS | Mod: S$PBB,,, | Performed by: PHYSICIAN ASSISTANT

## 2020-12-14 PROCEDURE — 99999 PR PBB SHADOW E&M-EST. PATIENT-LVL III: ICD-10-PCS | Mod: PBBFAC,,, | Performed by: PHYSICIAN ASSISTANT

## 2020-12-14 PROCEDURE — 99999 PR PBB SHADOW E&M-EST. PATIENT-LVL III: CPT | Mod: PBBFAC,,, | Performed by: PHYSICIAN ASSISTANT

## 2020-12-14 PROCEDURE — 96372 THER/PROPH/DIAG INJ SC/IM: CPT

## 2020-12-14 PROCEDURE — 63600175 PHARM REV CODE 636 W HCPCS: Mod: JG | Performed by: PHYSICIAN ASSISTANT

## 2020-12-14 PROCEDURE — 99214 OFFICE O/P EST MOD 30 MIN: CPT | Mod: S$PBB,,, | Performed by: PHYSICIAN ASSISTANT

## 2020-12-14 RX ADMIN — DENOSUMAB 60 MG: 60 INJECTION SUBCUTANEOUS at 12:12

## 2020-12-14 NOTE — NURSING
Prolia 60 mg q 6 months  Last dose given-06/10/20    Any invasive dental procedures in past 3 months or upcoming 3 months: Denies    Last Rheumatology provider visit- Seen by [facility] on 12/14/20    Recent labs? 12/07/20  CKD pt needing repeat labs in 10 days-N/A  Lab Results   Component Value Date    CALCIUM 9.1 12/07/2020     Lab Results   Component Value Date    CREATININE 2.1 (H) 12/07/2020     Lab Results   Component Value Date    ESTGFRAFRICA 24 (A) 12/07/2020     Lab Results   Component Value Date    EGFRNONAA 21 (A) 12/07/2020     Lab Results   Component Value Date    PBRFJIBI97YB 35 10/06/2015          Lot #- [id number]  Expiration Date- 04/23      Prolia 60 mg/ml administered SQ to Left lower abdominal quadrant. Tolerated without any complaints. No redness, swelling, or drainage noted to site. Instructed to remain in clinic 15 minutes after administration to monitor for any s/sx of reaction. Pt instructed on signs and symptoms of reaction to report. Verbalizes understanding.

## 2020-12-14 NOTE — PLAN OF CARE
Problem: Infection  Goal: Infection Symptom Resolution  Outcome: Ongoing, Progressing  Intervention: Prevent or Manage Infection  Flowsheets (Taken 12/14/2020 1258)  Infection Management: aseptic technique maintained     Problem: Adult Inpatient Plan of Care  Goal: Plan of Care Review  Outcome: Ongoing, Progressing  Flowsheets (Taken 12/14/2020 1258)  Plan of Care Reviewed With: patient  Goal: Patient-Specific Goal (Individualization)  Outcome: Ongoing, Progressing  Goal: Absence of Hospital-Acquired Illness or Injury  Outcome: Ongoing, Progressing  Goal: Optimal Comfort and Wellbeing  Outcome: Ongoing, Progressing  Intervention: Provide Person-Centered Care  Flowsheets (Taken 12/14/2020 1258)  Trust Relationship/Rapport: care explained

## 2020-12-14 NOTE — TELEPHONE ENCOUNTER
----- Message from Milena Lim sent at 12/14/2020 10:47 AM CST -----  Regarding: appt  Contact: pt  The pt states she will be 30min late for her appt, no additional info given and an be reached at [phone]///[id number]

## 2020-12-14 NOTE — PATIENT INSTRUCTIONS
Stop pantoprazole and see if shakes weakness improve, if not can resume.     prolia today     Vit D daily     Chew 2 tums every day for the next 14 days     No change to allopurinol     Ok to do gabapentin 3 x day or 1 in am and 2 at night

## 2020-12-21 DIAGNOSIS — M1A.39X1 CHRONIC GOUT DUE TO RENAL IMPAIRMENT OF MULTIPLE SITES WITH TOPHUS: ICD-10-CM

## 2020-12-21 DIAGNOSIS — M81.0 OSTEOPOROSIS, SENILE: ICD-10-CM

## 2020-12-22 RX ORDER — ALLOPURINOL 100 MG/1
TABLET ORAL
Qty: 90 TABLET | Refills: 1 | Status: SHIPPED | OUTPATIENT
Start: 2020-12-22

## 2020-12-31 ENCOUNTER — EXTERNAL CHRONIC CARE MANAGEMENT (OUTPATIENT)
Dept: PRIMARY CARE CLINIC | Facility: CLINIC | Age: 85
End: 2020-12-31
Payer: MEDICARE

## 2020-12-31 PROCEDURE — 99490 PR CHRONIC CARE MGMT, 1ST 20 MIN: ICD-10-PCS | Mod: S$PBB,,, | Performed by: FAMILY MEDICINE

## 2020-12-31 PROCEDURE — G2058 CCM ADD 20MIN: HCPCS | Mod: PBBFAC | Performed by: FAMILY MEDICINE

## 2020-12-31 PROCEDURE — G2058 PR CHRON CARE MGMT, EA ADDTL 20 MINS: ICD-10-PCS | Mod: S$PBB,,, | Performed by: FAMILY MEDICINE

## 2020-12-31 PROCEDURE — 99490 CHRNC CARE MGMT STAFF 1ST 20: CPT | Mod: S$PBB,,, | Performed by: FAMILY MEDICINE

## 2020-12-31 PROCEDURE — 99490 CHRNC CARE MGMT STAFF 1ST 20: CPT | Mod: PBBFAC | Performed by: FAMILY MEDICINE

## 2020-12-31 PROCEDURE — G2058 CCM ADD 20MIN: HCPCS | Mod: S$PBB,,, | Performed by: FAMILY MEDICINE

## 2021-01-06 NOTE — PROGRESS NOTES
Subjective:       Patient ID: Jennifer Mathews is a 86 y.o. female.    Chief Complaint: Anemia    85-year-old  female who presents to the [facility] today as a follow-up for anemia, s/p Feraheme.  I have reviewed all the patient's relevant clinical history available medical record have utilized as my evaluation management recommendations today.  She continues on Coumadin at this time.  The patient reports that she does have some generalized weakness and fatigue but overall feels better since the feraheme.  She denies any melena, hematochezia, hematemesis, hemoptysis or hematuria.  She denies any chest pain.  She reports shortness of breath with exertion.  She denies any shortness of breath at rest.  She reports chronic swelling in her legs.  This is unchanged.  She denies any nausea, vomiting or abdominal pain. She denies any bowel or urinary complaints.    Anemia   There has been no abdominal pain, bruising/bleeding easily, confusion, fever, light-headedness or palpitations.     Review of Systems   Constitutional: Positive for fatigue. Negative for activity change, appetite change, chills, diaphoresis, fever and unexpected weight change.   HENT: Negative for congestion, hearing loss, mouth sores, nosebleeds and trouble swallowing.    Eyes: Negative for discharge and visual disturbance.   Respiratory: Negative for cough, chest tightness and shortness of breath.    Cardiovascular: Negative for chest pain, palpitations and leg swelling.   Gastrointestinal: Positive for abdominal distention. Negative for abdominal pain, blood in stool, constipation, diarrhea, nausea and vomiting.   Endocrine: Negative for cold intolerance and heat intolerance.   Genitourinary: Negative for difficulty urinating, dyspareunia and hematuria.   Musculoskeletal: Positive for arthralgias, back pain, gait problem and myalgias.   Skin: Negative.    Neurological: Negative for dizziness, weakness, light-headedness and  headaches.   Hematological: Negative for adenopathy. Does not bruise/bleed easily.   Psychiatric/Behavioral: Negative for agitation, behavioral problems and confusion. The patient is nervous/anxious.        Medication List with Changes/Refills   Current Medications    ACETAMINOPHEN (TYLENOL EXTRA STRENGTH) 325 MG TABLET    Take 2 tablets (650 mg total) by mouth every 8 (eight) hours.    ALLOPURINOL (ZYLOPRIM) 100 MG TABLET    TAKE 2 TABLETS (200 MG TOTAL) BY MOUTH ONCE DAILY.    AMIODARONE (PACERONE) 200 MG TAB    TAKE 1 TABLET EVERY DAY    CARVEDILOL (COREG) 25 MG TABLET    TAKE 1 TABLET BY MOUTH TWICE A DAY WITH MEALS    DIGOXIN (LANOXIN) 125 MCG TABLET    TAKE 1 TABLET BY MOUTH EVERY DAY    FUROSEMIDE (LASIX) 40 MG TABLET    Take 40 mg by mouth 2 (two) times daily.    GABAPENTIN (NEURONTIN) 100 MG CAPSULE    TAKE ONE CAPSULE BY MOUTH TWO TIMES DAILY    LEVOTHYROXINE (SYNTHROID) 50 MCG TABLET    Take 1 tablet (50 mcg total) by mouth once daily.    LOSARTAN (COZAAR) 25 MG TABLET    Take 1 tablet (25 mg total) by mouth once daily.    METOLAZONE (ZAROXOLYN) 2.5 MG TABLET    Take 1 tablet (2.5 mg total) by mouth daily as needed. For SOB or Leg edema    PANTOPRAZOLE (PROTONIX) 40 MG TABLET    Take 1 tablet (40 mg total) by mouth once daily.    WARFARIN (COUMADIN) 2.5 MG TABLET    Take 1 tablet (2.5 mg total) by mouth Daily. Hold every Sunday     Objective:     Vitals:    04/22/19 1408   BP: (!) 120/56   Pulse: 71   Temp: 97.3 °F (36.3 °C)     Physical Exam   Constitutional: She is oriented to person, place, and time. She appears well-developed and well-nourished. No distress.   HENT:   Head: Normocephalic and atraumatic.   Right Ear: Hearing and external ear normal.   Left Ear: Hearing and external ear normal.   Nose: No rhinorrhea or sinus tenderness. Right sinus exhibits no maxillary sinus tenderness and no frontal sinus tenderness. Left sinus exhibits no maxillary sinus tenderness and no frontal sinus tenderness.    Mouth/Throat: Uvula is midline, oropharynx is clear and moist and mucous membranes are normal. No oral lesions.   Eyes: Pupils are equal, round, and reactive to light. Conjunctivae are normal. Right eye exhibits no discharge. Left eye exhibits no discharge.   Neck: Normal range of motion. Carotid bruit is not present. No tracheal deviation present. No thyromegaly present.   Cardiovascular: Normal rate, regular rhythm, S1 normal, S2 normal, normal heart sounds and intact distal pulses.   No murmur heard.  Pulses:       Dorsalis pedis pulses are 2+ on the right side, and 2+ on the left side.   Pulmonary/Chest: Effort normal and breath sounds normal. No respiratory distress.   Abdominal: Soft. Bowel sounds are normal. She exhibits no distension and no mass. There is no tenderness.   Musculoskeletal: Normal range of motion. She exhibits edema. She exhibits no tenderness.   Lymphadenopathy:     She has no cervical adenopathy.        Right: No supraclavicular adenopathy present.        Left: No supraclavicular adenopathy present.   Neurological: She is alert and oriented to person, place, and time. She has normal strength. No sensory deficit. Coordination and gait normal.   Skin: Skin is warm and dry. Capillary refill takes less than 2 seconds. No rash noted. There is pallor.   Psychiatric: Her speech is normal and behavior is normal. Judgment and thought content normal. Her mood appears anxious. Cognition and memory are normal. She does not exhibit a depressed mood.   Nursing note and vitals reviewed.      Assessment:       Problem List Items Addressed This Visit        Cardiac/Vascular    Hypertension    Dyslipidemia       Renal/    Anemia associated with stage 4 chronic renal failure       Hematology    Anticoagulated on Coumadin       Oncology    Iron deficiency anemia due to chronic blood loss    Relevant Orders    CBC auto differential    Comprehensive metabolic panel    B-TYPE NATRIURETIC PEPTIDE    Macrocytic  anemia - Primary    Relevant Orders    CBC auto differential    Comprehensive metabolic panel    B-TYPE NATRIURETIC PEPTIDE          Plan:       1) S/P Marquis, has been treated with Procrit in the past.  2) Hgb: 9.4 today, will proceed with Procrit 20,000U today.   3) Return to clinic in 2 weeks with labs.     I will review assessment/plan with collaborating physician Dr. Maciej Coon.    done

## 2021-01-11 ENCOUNTER — CLINICAL SUPPORT (OUTPATIENT)
Dept: CARDIOLOGY | Facility: HOSPITAL | Age: 86
End: 2021-01-11
Payer: MEDICARE

## 2021-01-11 DIAGNOSIS — Z95.0 PRESENCE OF CARDIAC PACEMAKER: ICD-10-CM

## 2021-01-11 PROCEDURE — 93294 REM INTERROG EVL PM/LDLS PM: CPT | Mod: ,,, | Performed by: INTERNAL MEDICINE

## 2021-01-11 PROCEDURE — 93294 CARDIAC DEVICE CHECK - REMOTE: ICD-10-PCS | Mod: ,,, | Performed by: INTERNAL MEDICINE

## 2021-01-11 PROCEDURE — 93296 REM INTERROG EVL PM/IDS: CPT | Mod: PBBFAC | Performed by: INTERNAL MEDICINE

## 2021-01-11 RX ORDER — GABAPENTIN 100 MG/1
100 CAPSULE ORAL 2 TIMES DAILY
Qty: 180 CAPSULE | Refills: 1 | Status: SHIPPED | OUTPATIENT
Start: 2021-01-11

## 2021-01-13 ENCOUNTER — PATIENT MESSAGE (OUTPATIENT)
Dept: UROLOGY | Facility: CLINIC | Age: 86
End: 2021-01-13

## 2021-01-13 RX ORDER — METHENAMINE HIPPURATE 1000 MG/1
1 TABLET ORAL 2 TIMES DAILY
Qty: 120 TABLET | Refills: 0 | Status: SHIPPED | OUTPATIENT
Start: 2021-01-13 | End: 2021-02-24 | Stop reason: SDUPTHER

## 2021-01-31 ENCOUNTER — EXTERNAL CHRONIC CARE MANAGEMENT (OUTPATIENT)
Dept: PRIMARY CARE CLINIC | Facility: CLINIC | Age: 86
End: 2021-01-31
Payer: MEDICARE

## 2021-01-31 PROCEDURE — 99439 PR CHRONIC CARE MGMT, EA ADDTL 20 MIN: ICD-10-PCS | Mod: S$PBB,,, | Performed by: FAMILY MEDICINE

## 2021-01-31 PROCEDURE — 99490 CHRNC CARE MGMT STAFF 1ST 20: CPT | Mod: PBBFAC | Performed by: FAMILY MEDICINE

## 2021-01-31 PROCEDURE — 99439 CHRNC CARE MGMT STAF EA ADDL: CPT | Mod: PBBFAC,25,27 | Performed by: FAMILY MEDICINE

## 2021-01-31 PROCEDURE — 99490 PR CHRONIC CARE MGMT, 1ST 20 MIN: ICD-10-PCS | Mod: S$PBB,,, | Performed by: FAMILY MEDICINE

## 2021-01-31 PROCEDURE — 99439 CHRNC CARE MGMT STAF EA ADDL: CPT | Mod: S$PBB,,, | Performed by: FAMILY MEDICINE

## 2021-01-31 PROCEDURE — 99490 CHRNC CARE MGMT STAFF 1ST 20: CPT | Mod: S$PBB,,, | Performed by: FAMILY MEDICINE

## 2021-02-12 RX ORDER — METOLAZONE 2.5 MG/1
TABLET ORAL
Qty: 25 TABLET | Refills: 6 | Status: SHIPPED | OUTPATIENT
Start: 2021-02-12

## 2021-02-24 RX ORDER — METHENAMINE HIPPURATE 1000 MG/1
1 TABLET ORAL 2 TIMES DAILY
Qty: 120 TABLET | Refills: 11 | Status: SHIPPED | OUTPATIENT
Start: 2021-02-24

## 2021-02-27 ENCOUNTER — PATIENT MESSAGE (OUTPATIENT)
Dept: INTERNAL MEDICINE | Facility: CLINIC | Age: 86
End: 2021-02-27

## 2021-02-28 ENCOUNTER — EXTERNAL CHRONIC CARE MANAGEMENT (OUTPATIENT)
Dept: PRIMARY CARE CLINIC | Facility: CLINIC | Age: 86
End: 2021-02-28
Payer: MEDICARE

## 2021-02-28 PROCEDURE — 99439 CHRNC CARE MGMT STAF EA ADDL: CPT | Mod: PBBFAC | Performed by: FAMILY MEDICINE

## 2021-02-28 PROCEDURE — 99490 PR CHRONIC CARE MGMT, 1ST 20 MIN: ICD-10-PCS | Mod: S$PBB,,, | Performed by: FAMILY MEDICINE

## 2021-02-28 PROCEDURE — 99490 CHRNC CARE MGMT STAFF 1ST 20: CPT | Mod: PBBFAC | Performed by: FAMILY MEDICINE

## 2021-02-28 PROCEDURE — 99439 PR CHRONIC CARE MGMT, EA ADDTL 20 MIN: ICD-10-PCS | Mod: S$PBB,,, | Performed by: FAMILY MEDICINE

## 2021-02-28 PROCEDURE — 99439 CHRNC CARE MGMT STAF EA ADDL: CPT | Mod: S$PBB,,, | Performed by: FAMILY MEDICINE

## 2021-02-28 PROCEDURE — 99490 CHRNC CARE MGMT STAFF 1ST 20: CPT | Mod: S$PBB,,, | Performed by: FAMILY MEDICINE

## 2021-04-11 ENCOUNTER — CLINICAL SUPPORT (OUTPATIENT)
Dept: CARDIOLOGY | Facility: HOSPITAL | Age: 86
End: 2021-04-11
Payer: MEDICARE

## 2021-04-11 DIAGNOSIS — Z95.0 PRESENCE OF CARDIAC PACEMAKER: ICD-10-CM

## 2021-04-11 PROCEDURE — 93294 CARDIAC DEVICE CHECK - REMOTE: ICD-10-PCS | Mod: ,,, | Performed by: INTERNAL MEDICINE

## 2021-04-11 PROCEDURE — 93294 REM INTERROG EVL PM/LDLS PM: CPT | Mod: ,,, | Performed by: INTERNAL MEDICINE

## 2021-08-23 NOTE — PLAN OF CARE
Problem: Adult Inpatient Plan of Care  Goal: Plan of Care Review  Outcome: Ongoing (interventions implemented as appropriate)  Pt states she is feeling pretty good today      
Physician confirms case reviewed for anesthesia consultation requirements.
Physician confirms case reviewed for anesthesia consultation requirements.

## 2022-06-15 NOTE — Clinical Note
Detail Level: Zone Zack, She had Yag ou done today, will see you in 7-10 days for Post Op at [facility]

## 2022-08-19 NOTE — TELEPHONE ENCOUNTER
Labs called in to [facility] to get bmp and bnp tomorrow.   Pt understands we will call once we have results   no...

## 2022-12-06 NOTE — ED NOTES
Pt resting in bed. NAD, VSS, RR equal and unlabored. Will continue to monitor   Patient/Caregiver provided printed discharge information.

## 2023-09-07 NOTE — PROGRESS NOTES
INR remains sub-therapeutic. Greens (small serving on Monday per patient) earlier this week. Denies missed doses, denies new supplements or vitamin. Patient confirmed dose and compliance. It is unclear why INR remains sub-therapeutic. Will increase total weekly dose. Repeat INR in 1 week.   Patient is on Apixaban.  Education provided on 9/7/23 verbally and in writing.  Information provided includes effects of medication, drug-drug and drug-food interactions, and signs/symptoms of bleeding and clotting.  Patient verbalized understanding through teach back.  All pertinent questions were answered.     Yamileth Martinez, PharmD, BCPS  9/7/2023  13:15 EDT

## 2023-12-15 NOTE — LETTER
November 26, 2018      Janay Valdez PA-C  [address]  [facility] [address]           [facility] - Rheumatology  [address] [place] [address]  Phone: [phone]  Fax: [phone]          Patient: Jennifer Mathews   MR Number: [id number]   YOB: 1932   Date of Visit: 11/26/2018       Dear Janay Valdez:    Thank you for referring Jennifer Mathews to me for evaluation. Attached you will find relevant portions of my assessment and plan of care.    If you have questions, please do not hesitate to call me. I look forward to following Jennifer Mathews along with you.    Sincerely,    Oc Catherine MD    Enclosure  CC:  No Recipients    If you would like to receive this communication electronically, please contact [email or web address] or [phone] to request more information on [facility] Link access.    For providers and/or their staff who would like to refer a patient to [facility], please contact us through our one-stop-shop provider referral line, Critical access hospitalierge, at [phone].    If you feel you have received this communication in error or would no longer like to receive these types of communications, please e-mail [email or web address]          no abdominal pain, no bloating, no constipation, no diarrhea, no nausea and no vomiting. Pt to meet >75% of estimated nutritional needs during hospital stay.

## 2024-05-25 NOTE — PROGRESS NOTES
Subjective:       Patient ID:  Jennifer Mathews is a 86 y.o. female who presents for   Chief Complaint   Patient presents with    Rash     c/o rash to right foot that itch x 3 weeks tx lubiderm lotion     History of Present Illness: The patient presents with chief complaint of rash .  Location: right foot   Duration: 3 weeks   Signs/Symptoms: itch   Prior treatments: lubiderm lotion, minimal improvement in symptoms.           Review of Systems   Skin: Positive for itching, rash and dry skin.        Objective:    Physical Exam   Constitutional: She appears well-developed and well-nourished. No distress.   Neurological: She is alert and oriented to person, place, and time. She is not disoriented.   Psychiatric: She has a normal mood and affect.   Skin:   Areas Examined (abnormalities noted in diagram):   Head / Face Inspection Performed  Neck Inspection Performed  RLE Inspected  LLE Inspection Performed  Nails and Digits Inspection Performed             Diagram Legend     Erythematous scaling macule/papule c/w actinic keratosis       Vascular papule c/w angioma      Pigmented verrucoid papule/plaque c/w seborrheic keratosis      Yellow umbilicated papule c/w sebaceous hyperplasia      Irregularly shaped tan macule c/w lentigo     1-2 mm smooth white papules consistent with Milia      Movable subcutaneous cyst with punctum c/w epidermal inclusion cyst      Subcutaneous movable cyst c/w pilar cyst      Firm pink to brown papule c/w dermatofibroma      Pedunculated fleshy papule(s) c/w skin tag(s)      Evenly pigmented macule c/w junctional nevus     Mildly variegated pigmented, slightly irregular-bordered macule c/w mildly atypical nevus      Flesh colored to evenly pigmented papule c/w intradermal nevus       Pink pearly papule/plaque c/w basal cell carcinoma      Erythematous hyperkeratotic cursted plaque c/w SCC      Surgical scar with no sign of skin cancer recurrence      Open and closed comedones       Psychiatric Evaluation      Inflammatory papules and pustules      Verrucoid papule consistent consistent with wart     Erythematous eczematous patches and plaques     Dystrophic onycholytic nail with subungual debris c/w onychomycosis     Umbilicated papule    Erythematous-base heme-crusted tan verrucoid plaque consistent with inflamed seborrheic keratosis     Erythematous Silvery Scaling Plaque c/w Psoriasis     See annotation      Assessment / Plan:        Dermatitis  -     betamethasone valerate 0.1% (VALISONE) 0.1 % Crea; Apply topically 2 (two) times daily.  Dispense: 45 g; Refill: 2    Patient with a history of NMSC. Will schedule for UBSE at that time.        Follow-up in about 6 weeks (around 1/11/2019).

## 2024-07-29 NOTE — ASSESSMENT & PLAN NOTE
-Rate controlled.   -Continue digoxin, amiodarone and Coreg.   -Follow up INR.      (1) Other Medications/None

## 2024-11-17 NOTE — PROGRESS NOTES
Subjective:   Patient ID:  Jennifer Mathews is a 88 y.o. female who presents for follow up of Follow-up      HPI  An 87 yo female with crt pacer multiple generator changes s/p mvr pulmonary htn oliver copd afib chronic anticoagulation ckd bleed now on noac anemia ckd chf is here fro f/u. She is getting physical therapy at home ha sno chest pain gets short of breath with lots of exertion. Her leg swelling improved with lasix uses cpap regularily. Has no syncope near syncope palpitation she is compliant with diet.her bp improved after cutting dose of coreg down. Getting stronger with therapy. has  Good appetite. Pacer interrogation no arrythmias.    Past Medical History:   Diagnosis Date    Acute coronary syndrome     Anemia     Anticoagulated on Coumadin 7/13/2015    Arthritis     Asthma     patient denies    Atrial fibrillation     Basal cell carcinoma 10/2015    left neck    Cardiac arrest     Cardiac resynchronization therapy defibrillator (CRT-D) in place 07/13/2015    Pt denies, states it does not shock me    Cardiomyopathy     Chronic combined systolic and diastolic congestive heart failure     CKD (chronic kidney disease) stage 3, GFR 30-59 ml/min     Dyslipidemia 1/30/2014    Enterocolitis     Hyperlipidemia     Hypertension     Hypothyroidism     Ischemic cardiomyopathy 01/30/2014    Macular hole of left eye     Old    Macular hole of left eye     OLIVER (obstructive sleep apnea) 9/30/2013    Osteoporosis     Pneumonia     required hospitalization    Recurrent UTI     Refractive error     Spastic colon     Stroke     Syncope and collapse        Past Surgical History:   Procedure Laterality Date    APPENDECTOMY      BREAST SURGERY      CARDIAC CATHETERIZATION      CARDIAC PACEMAKER PLACEMENT  2014    CARDIAC VALVE REPLACEMENT      CARDIAC VALVE SURGERY      CATARACT EXTRACTION Bilateral     OU    CHOLECYSTECTOMY      COLONOSCOPY      ESOPHAGOGASTRODUODENOSCOPY N/A 3/13/2019     Procedure: ESOPHAGOGASTRODUODENOSCOPY (EGD);  Surgeon: Ghazal Orellana MD;  Location: [facility] ENDO;  Service: Endoscopy;  Laterality: N/A;    FRACTURE SURGERY Left     left hum    HYSTERECTOMY      MITRAL VALVE REPLACEMENT  2014    MITRAL VALVE SURGERY      x3    REPLACEMENT OF PACEMAKER GENERATOR Left 6/20/2018    Procedure: REPLACEMENT, PACEMAKER GENERATOR/pt has crt-p versus d;  Surgeon: Manny Dunne MD;  Location: [facility] CATH LAB;  Service: Cardiology;  Laterality: Left;  st renée device  patient is pacer dependent       Social History     Tobacco Use    Smoking status: Never Smoker    Smokeless tobacco: Never Used   Substance Use Topics    Alcohol use: No    Drug use: No       Family History   Problem Relation Age of Onset    Coronary artery disease Mother         mi    Epilepsy Mother     Heart attack Mother     Heart disease Mother     Coronary artery disease Father     Heart disease Father     Emphysema Father     COPD Father     Diabetes Brother     Cancer Brother     Melanoma Neg Hx     Psoriasis Neg Hx     Lupus Neg Hx     Eczema Neg Hx     Kidney disease Neg Hx     Stroke Neg Hx        Current Outpatient Medications   Medication Sig    acetaminophen (TYLENOL EXTRA STRENGTH) 325 MG tablet Take 2 tablets (650 mg total) by mouth every 8 (eight) hours. (Patient taking differently: Take 650 mg by mouth 3 (three) times daily as needed. )    allopurinoL (ZYLOPRIM) 100 MG tablet TAKE 1 TABLET BY MOUTH EVERY DAY    amiodarone (PACERONE) 200 MG Tab Take 1 tablet (200 mg total) by mouth once daily.    carvediloL (COREG) 12.5 MG tablet Take 1 tablet (12.5 mg total) by mouth 2 (two) times daily with meals.    digoxin (LANOXIN) 125 mcg tablet Take 125 mcg by mouth every other day.    ELIQUIS 2.5 mg Tab TAKE 1 TABLET BY MOUTH TWICE A DAY    furosemide (LASIX) 40 MG tablet TAKE 2 TABLETS (80 MG TOTAL) BY MOUTH 2 (TWO) TIMES DAILY.    gabapentin (NEURONTIN) 100 MG capsule TAKE ONE  CAPSULE BY MOUTH TWO TIMES DAILY    levothyroxine (SYNTHROID) 75 MCG tablet TAKE 1 TABLET BY MOUTH EVERY DAY    methenamine (HIPREX) 1 gram Tab TAKE ONE TABLET BY MOUTH TWO TIMES A DAY    methenamine (HIPREX) 1 gram Tab Take 1 tablet (1 g total) by mouth 2 (two) times daily.    metOLazone (ZAROXOLYN) 2.5 MG tablet Take 1 tablet by mouth on Monday,  Wednesday and Friday, 30 minutes after Lasix as directed    metOLazone (ZAROXOLYN) 2.5 MG tablet Take 1 tablet by mouth on Monday and Wednesday, 30 minutes after Lasix as directed    pantoprazole (PROTONIX) 20 MG tablet Take 1 tablet (20 mg total) by mouth once daily.     Current Facility-Administered Medications   Medication    denosumab (PROLIA) injection 60 mg     Current Outpatient Medications on File Prior to Visit   Medication Sig    acetaminophen (TYLENOL EXTRA STRENGTH) 325 MG tablet Take 2 tablets (650 mg total) by mouth every 8 (eight) hours. (Patient taking differently: Take 650 mg by mouth 3 (three) times daily as needed. )    allopurinoL (ZYLOPRIM) 100 MG tablet TAKE 1 TABLET BY MOUTH EVERY DAY    amiodarone (PACERONE) 200 MG Tab Take 1 tablet (200 mg total) by mouth once daily.    carvediloL (COREG) 12.5 MG tablet Take 1 tablet (12.5 mg total) by mouth 2 (two) times daily with meals.    digoxin (LANOXIN) 125 mcg tablet Take 125 mcg by mouth every other day.    ELIQUIS 2.5 mg Tab TAKE 1 TABLET BY MOUTH TWICE A DAY    furosemide (LASIX) 40 MG tablet TAKE 2 TABLETS (80 MG TOTAL) BY MOUTH 2 (TWO) TIMES DAILY.    gabapentin (NEURONTIN) 100 MG capsule TAKE ONE CAPSULE BY MOUTH TWO TIMES DAILY    levothyroxine (SYNTHROID) 75 MCG tablet TAKE 1 TABLET BY MOUTH EVERY DAY    methenamine (HIPREX) 1 gram Tab TAKE ONE TABLET BY MOUTH TWO TIMES A DAY    methenamine (HIPREX) 1 gram Tab Take 1 tablet (1 g total) by mouth 2 (two) times daily.    metOLazone (ZAROXOLYN) 2.5 MG tablet Take 1 tablet by mouth on Monday,  Wednesday and Friday, 30 minutes after  "Lasix as directed    pantoprazole (PROTONIX) 20 MG tablet Take 1 tablet (20 mg total) by mouth once daily.     Current Facility-Administered Medications on File Prior to Visit   Medication    denosumab (PROLIA) injection 60 mg     Review of patient's allergies indicates:   Allergen Reactions    Cephalosporins Hives    Metaxalone Itching    Penicillins      Other reaction(s): Unknown      Pregabalin      Other reaction(s): "Bad feeling"      Sulfa (sulfonamide antibiotics) Itching     Review of Systems   Constitution: Positive for malaise/fatigue. Negative for diaphoresis and weight gain.   HENT: Negative for hoarse voice.    Eyes: Negative for double vision and visual disturbance.   Cardiovascular: Positive for leg swelling. Negative for chest pain, claudication, cyanosis, dyspnea on exertion, irregular heartbeat, near-syncope, orthopnea, palpitations, paroxysmal nocturnal dyspnea and syncope.   Respiratory: Positive for shortness of breath. Negative for cough, hemoptysis and snoring.    Hematologic/Lymphatic: Negative for bleeding problem. Does not bruise/bleed easily.   Skin: Negative for color change and poor wound healing.   Musculoskeletal: Negative for muscle cramps, muscle weakness and myalgias.   Gastrointestinal: Negative for bloating, abdominal pain, change in bowel habit, diarrhea, heartburn, hematemesis, hematochezia, melena and nausea.   Neurological: Negative for excessive daytime sleepiness, dizziness, headaches, light-headedness, loss of balance, numbness and weakness.   Psychiatric/Behavioral: Negative for memory loss. The patient does not have insomnia.    Allergic/Immunologic: Negative for hives.       Objective:   Physical Exam   Constitutional: She is oriented to person, place, and time. She appears well-developed and well-nourished. She does not appear ill. No distress.   HENT:   Head: Normocephalic and atraumatic.   Eyes: Pupils are equal, round, and reactive to light. EOM are normal. " No scleral icterus.   Neck: Normal range of motion. Neck supple. JVD present. Normal carotid pulses and no hepatojugular reflux present. Carotid bruit is not present. No tracheal deviation present. No thyromegaly present.   Positive hjr    Cardiovascular: Normal rate, regular rhythm, normal heart sounds, intact distal pulses and normal pulses. Exam reveals no gallop and no friction rub.   No murmur heard.  rv heave noted displaced apical impulse   TR MURMUR G2/6   Pulmonary/Chest: Effort normal and breath sounds normal. No respiratory distress. She has no wheezes. She has no rhonchi. She has no rales. She exhibits no tenderness.   scar cbg well healed.   Pacer site well healed.    Abdominal: Soft. Normal appearance, normal aorta and bowel sounds are normal. She exhibits no distension, no abdominal bruit, no ascites and no pulsatile midline mass. There is no hepatomegaly. There is no abdominal tenderness.   Musculoskeletal:         General: Edema (BILATERAL PITTING WORSE ON THE LEFT.) present.      Right shoulder: She exhibits no deformity.   Neurological: She is alert and oriented to person, place, and time. She has normal strength. No cranial nerve deficit. Coordination normal.   Skin: Skin is warm and dry. No rash noted. She is not diaphoretic. No cyanosis or erythema. Nails show no clubbing.   Psychiatric: She has a normal mood and affect. Her speech is normal and behavior is normal.   Nursing note and vitals reviewed.    Vitals:    11/05/20 0914 11/05/20 0921   BP: 130/60 124/60   BP Location: Right arm Left arm   Patient Position: Sitting Sitting   BP Method: Medium (Manual) Medium (Manual)   Pulse: 70    SpO2: 95%    Weight: 49.6 kg (109 lb 5.6 oz)      Lab Results   Component Value Date    CHOL 154 09/24/2020    CHOL 131 03/21/2019    CHOL 155 03/20/2018     Lab Results   Component Value Date    HDL 33 (L) 09/24/2020    HDL 31 (L) 03/21/2019    HDL 36 (L) 03/20/2018     Lab Results   Component Value Date     LDLCALC 93.2 09/24/2020    LDLCALC 68.6 03/21/2019    LDLCALC 90.8 03/20/2018     Lab Results   Component Value Date    TRIG 139 09/24/2020    TRIG 157 (H) 03/21/2019    TRIG 141 03/20/2018     Lab Results   Component Value Date    CHOLHDL 21.4 09/24/2020    CHOLHDL 23.7 03/21/2019    CHOLHDL 23.2 03/20/2018       Chemistry        Component Value Date/Time     09/28/2020 1430    K 4.0 09/28/2020 1430     09/28/2020 1430    CO2 30 (H) 09/28/2020 1430    BUN 65 (H) 09/28/2020 1430    CREATININE 2.0 (H) 09/28/2020 1430     (H) 09/28/2020 1430        Component Value Date/Time    CALCIUM 8.4 (L) 09/28/2020 1430    ALKPHOS 79 09/24/2020 1208    AST 17 09/24/2020 1208    ALT 11 09/24/2020 1208    BILITOT 0.5 09/24/2020 1208    ESTGFRAFRICA 25 (A) 09/28/2020 1430    EGFRNONAA 22 (A) 09/28/2020 1430          Lab Results   Component Value Date    TSH 3.199 09/24/2020     Lab Results   Component Value Date    INR 1.3 (H) 05/06/2019    INR 1.8 05/02/2019    INR 1.7 04/29/2019     Lab Results   Component Value Date    WBC 5.68 11/05/2020    HGB 10.2 (L) 11/05/2020    HCT 32.3 (L) 11/05/2020     (H) 11/05/2020     11/05/2020     BMP  Sodium   Date Value Ref Range Status   09/28/2020 144 136 - 145 mmol/L Final     Potassium   Date Value Ref Range Status   09/28/2020 4.0 3.5 - 5.1 mmol/L Final     Chloride   Date Value Ref Range Status   09/28/2020 101 95 - 110 mmol/L Final     CO2   Date Value Ref Range Status   09/28/2020 30 (H) 23 - 29 mmol/L Final     BUN   Date Value Ref Range Status   09/28/2020 65 (H) 8 - 23 mg/dL Final     Creatinine   Date Value Ref Range Status   09/28/2020 2.0 (H) 0.5 - 1.4 mg/dL Final     Calcium   Date Value Ref Range Status   09/28/2020 8.4 (L) 8.7 - 10.5 mg/dL Final     Anion Gap   Date Value Ref Range Status   09/28/2020 13 8 - 16 mmol/L Final     eGFR if    Date Value Ref Range Status   09/28/2020 25 (A) >60 mL/min/1.73 m^2 Final     eGFR if non     Date Value Ref Range Status   09/28/2020 22 (A) >60 mL/min/1.73 m^2 Final     Comment:     Calculation used to obtain the estimated glomerular filtration  rate (eGFR) is the CKD-EPI equation.        CrCl cannot be calculated (Patient's most recent lab result is older than the maximum 7 days allowed.).    Assessment:     1. Chronic combined systolic and diastolic congestive heart failure    2. LEV (obstructive sleep apnea)    3. CRLD (chronic restrictive lung disease)    4. Essential hypertension    5. S/P MVR (mitral valve replacement)    6. Dyslipidemia    7. Status post biventricular pacemaker    8. Bilateral lower extremity edema    9. Paroxysmal atrial fibrillation    10. Ischemic cardiomyopathy    11. Pulmonary hypertension due to left heart disease    12. 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 Acquired hypothyroidism    14. Anemia associated with stage 3 chronic renal failure    15. Iron deficiency anemia due to chronic blood loss    16. At risk for sudden cardiac death    17. At risk for amiodarone toxicity with long term use    18. Aortic atherosclerosis    19. COPD, group A, by GOLD 2017 classification      OVERALL CONDITION STABLE DESPITE HER CARDIOMYOPATHY CHF COMPENSATED GOOD EXERCISE TOLERANCE.   NO ARRYTHMIAS CLINICALLY.   LIPIDS ACCEPTABLE .   HTN FAIR CONTROL.  PHYSICAL THERAPY IS HER EXERCISE AND SHE IS PROGRESSING WELL .   Plan:   Continue current therapy  Cardiac low salt diet.  Risk factor modification and excercise program.  F/u in 6 months EARLIER IF ANY ISSUES. .         [phone]

## 2025-02-02 NOTE — TELEPHONE ENCOUNTER
----- Message from Manny Dunne MD sent at 3/3/2018 10:56 PM CST -----  See comments below and call patient to discuss.   Please close encounter when done -- no need to route back to me.  Thanks    Levels are OK - keep same   Pneumonia Pneumonia Thrombocytopenia

## 2025-03-10 NOTE — TREATMENT PLAN
Since dressing was removed and replaced, I have been monitoring the dressing.  Patient is continuing to bleed.  I am marking the drainage on the ACE wrap.  A sand bag has been placed on leg to apply gentle pressure.  Patient complains of a little discomfort.  Patient and daughter do not feel comfortable at this time going home. Dr. Rosenbaum is in surgery and has been updated on the situation.  He is almost done with that surgery and will come see the patient and assess the site once finished.    Acute CHF

## 2025-07-02 NOTE — TELEPHONE ENCOUNTER
Patient called and states her  was recently diagnosed with viral pneumonia.  She now has a bad cough and some congestion.  She is wanting to know if you could call something in or does she need to be seen?  Her phone number is [phone].   ----- Message from Eusebia Browning sent at 9/25/2019  9:31 AM CDT -----  Contact: pt   Call pt in regards to upcoming procedure. Pt states that she has some questions.   [phone] (home)

## (undated) DEVICE — STOCKINET 4INX48

## (undated) DEVICE — GLOVE BIOGEL PI ORTHO PRO SZ7

## (undated) DEVICE — SYR 10CC LUER LOCK

## (undated) DEVICE — SUT 4-0 ETHILON 18 PS-2

## (undated) DEVICE — SOL NS 1000CC

## (undated) DEVICE — DRAPE MOBILE C-ARM

## (undated) DEVICE — INTERPULSE SET

## (undated) DEVICE — SUT VICRYL ANTIMICRO VIL BR

## (undated) DEVICE — BANDAGE ACE DOUBLE STER 6IN

## (undated) DEVICE — SUT VICRYL 3-0 OB 36 CT-1

## (undated) DEVICE — SEE MEDLINE ITEM 152622

## (undated) DEVICE — ELECTRODE REM PLYHSV RETURN 9

## (undated) DEVICE — SOL NACL IRR 1000ML BTL

## (undated) DEVICE — SEE MEDLINE ITEM 157131

## (undated) DEVICE — DRESSING XEROFORM FOIL PK 1X8

## (undated) DEVICE — COVER OVERHEAD SURG LT BLUE

## (undated) DEVICE — GAUZE SPONGE 4X4 12PLY

## (undated) DEVICE — SEE MEDLINE ITEM 157117

## (undated) DEVICE — NDL SAFETY 25G X 1.5 ECLIPSE

## (undated) DEVICE — SUT VICRYL 2-0 CT-1 VCP345H

## (undated) DEVICE — GLOVE SURGICAL LATEX SZ 6.5

## (undated) DEVICE — PAD CAST SPECIALIST STRL 4

## (undated) DEVICE — APPLICATOR CHLORAPREP ORN 26ML

## (undated) DEVICE — SEE MEDLINE ITEM 152522

## (undated) DEVICE — SEE MEDLINE ITEM 146345

## (undated) DEVICE — SEE MEDLINE ITEM 157027

## (undated) DEVICE — MANIFOLD 4 PORT

## (undated) DEVICE — SEE MEDLINE ITEM 146298

## (undated) DEVICE — DRAIN HEMOVAC 3/16 LARGE

## (undated) DEVICE — STAPLER SKIN PROXIMATE WIDE